# Patient Record
Sex: FEMALE | Race: BLACK OR AFRICAN AMERICAN | NOT HISPANIC OR LATINO | Employment: UNEMPLOYED | URBAN - METROPOLITAN AREA
[De-identification: names, ages, dates, MRNs, and addresses within clinical notes are randomized per-mention and may not be internally consistent; named-entity substitution may affect disease eponyms.]

---

## 2017-01-03 ENCOUNTER — OFFICE VISIT (OUTPATIENT)
Dept: DERMATOLOGY | Facility: CLINIC | Age: 27
End: 2017-01-03
Payer: COMMERCIAL

## 2017-01-03 DIAGNOSIS — B35.1 ONYCHOMYCOSIS: Primary | ICD-10-CM

## 2017-01-03 DIAGNOSIS — L60.8 PINCER NAIL DEFORMITY: ICD-10-CM

## 2017-01-03 DIAGNOSIS — L60.3 BRITTLE NAILS: ICD-10-CM

## 2017-01-03 PROCEDURE — 99999 PR PBB SHADOW E&M-EST. PATIENT-LVL II: CPT | Mod: PBBFAC,,, | Performed by: DERMATOLOGY

## 2017-01-03 PROCEDURE — 1159F MED LIST DOCD IN RCRD: CPT | Mod: S$GLB,,, | Performed by: DERMATOLOGY

## 2017-01-03 PROCEDURE — 99213 OFFICE O/P EST LOW 20 MIN: CPT | Mod: S$GLB,,, | Performed by: DERMATOLOGY

## 2017-01-03 NOTE — PROGRESS NOTES
Subjective:       Patient ID:  Alisia Vines is a 26 y.o. female who presents for   Chief Complaint   Patient presents with    Follow-up     HPI Comments: Hx of SLE and lupus glomerulonephritis, currently on cellcept and plaquenil, last seen on 7/27/16.  She was last seen for potential onychomycosis.  Fungal culture was negative at 4 weeks.  She states that fingernails and toenails have worsened.  She recently started OTC hair and nail supplement.       Review of Systems   Constitutional: Negative for fever and chills.   Gastrointestinal: Negative for nausea and vomiting.   Skin: Negative for itching, rash, daily sunscreen use, activity-related sunscreen use and recent sunburn.   Hematologic/Lymphatic: Does not bruise/bleed easily.        Objective:    Physical Exam   Constitutional: She appears well-developed and well-nourished. No distress.   Neurological: She is alert and oriented to person, place, and time. She is not disoriented.   Psychiatric: She has a normal mood and affect.   Skin:   Areas Examined (abnormalities noted in diagram):   Head / Face Inspection Performed  Neck Inspection Performed  RUE Inspected  LUE Inspection Performed  Nails and Digits Inspection Performed                Assessment / Plan:        Onychomycosis  -     efinaconazole (JUBLIA) 10 % Mario; Apply once daily to nail and nail fold.  Use daily for up to 1 year.  Dispense: 8 mL; Refill: 3  -     Will start jublia.  Fungal culture negative, however onychomycosis clinically discussed that fungal culture can have false negatives.   Recommend water-vinegar soaks 3 times/week.     Pincer nail deformity  -     efinaconazole (JUBLIA) 10 % Mario; Apply once daily to nail and nail fold.  Use daily for up to 1 year.  Dispense: 8 mL; Refill: 3  -     Ambulatory Referral to Podiatry  -     Will refer to podiatry for possible debridement and treatment    Brittle nails  -     efinaconazole (JUBLIA) 10 % Mario; Apply once daily to nail and  nail fold.  Use daily for up to 1 year.  Dispense: 8 mL; Refill: 3  -     Recommend OTC biotin           Return in about 6 months (around 7/3/2017).

## 2017-01-03 NOTE — MR AVS SNAPSHOT
[facility] Dermatology  [address] [place] [address]  Phone: [phone]  Fax: [phone]                  Alisia Loconum   1/3/2017 8:15 AM   Office Visit    Description:  Female : 1990   Provider:  Matilde Tucker MD   Department:  [facility] - Dermatology           Reason for Visit     Follow-up           Diagnoses this Visit        Comments    Onychomycosis    -  Primary     Pincer nail deformity         Brittle nails                To Do List           Future Appointments        Provider Department Dept Phone    2017 8:05 AM LABORATORY, [facility] [phone]    2017 8:30 AM SPECIMEN, [facility] [phone]    2017 1:00 PM Shnae Blair MD [facility] Rheumatology [phone]      Goals (5 Years of Data)     None      Follow-Up and Disposition     Return in about 6 months (around 7/3/2017).       These Medications        Disp Refills Start End    efinaconazole (JUBLIA) 10 % Mario 8 mL 3 1/3/2017     Apply once daily to nail and nail fold.  Use daily for up to 1 year.    Pharmacy: [address], [address] Ph #: [phone]         [facility] On Call     [facility] On Call Nurse Care Line -  Assistance  Registered nurses in the [facility] On Call Center provide clinical advisement, health education, appointment booking, and other advisory services.  Call for this free service at [phone].             Medications           Message regarding Medications     Verify the changes and/or additions to your medication regime listed below are the same as discussed with your clinician today.  If any of these changes or additions are incorrect, please notify your healthcare provider.        START taking these NEW medications        Refills    efinaconazole (JUBLIA) 10 % Mario 3    Sig: Apply once daily to nail and nail fold.  Use daily for up to 1 year.     Class: Normal      STOP taking these medications     albuterol 90 mcg/actuation inhaler Inhale 2 puffs into the lungs every 4 to 6 hours as needed for Wheezing.           Verify that the below list of medications is an accurate representation of the medications you are currently taking.  If none reported, the list may be blank. If incorrect, please contact your healthcare provider. Carry this list with you in case of emergency.           Current Medications     amlodipine (NORVASC) 5 MG tablet Take 1 tablet (5 mg total) by mouth once daily.    atorvastatin (LIPITOR) 10 MG tablet Take 1 tablet (10 mg total) by mouth once daily.    hydroxychloroquine (PLAQUENIL) 200 mg tablet Take 1 tablet (200 mg total) by mouth 2 (two) times daily.    losartan-hydrochlorothiazide 100-12.5 mg (HYZAAR) 100-12.5 mg Tab Take 1 tablet by mouth once daily.    multivitamin capsule Take 1 capsule by mouth once daily.    mycophenolate (CELLCEPT) 500 mg Tab TAKE 1 TABLET THREE TIMES DAILY AFTER MEALS    norgestimate-ethinyl estradiol (TRINESSA, 28,) 0.18/0.215/0.25 mg-35 mcg (28) tablet TAKE 1 TABLET ONCE DAILY.    predniSONE (DELTASONE) 10 MG tablet Take 1 tablet (10 mg total) by mouth once daily.    valacyclovir (VALTREX) 500 MG tablet TAKE 1 TABLET ONE TIME DAILY    efinaconazole (JUBLIA) 10 % Mario Apply once daily to nail and nail fold.  Use daily for up to 1 year.           Clinical Reference Information           Allergies as of 1/3/2017     No Known Allergies      Immunizations Administered on Date of Encounter - 1/3/2017     None      Orders Placed During Today's Visit      Normal Orders This Visit    Ambulatory Referral to Podiatry       Instructions    Start water-vinegar soaks 3 nights/week on feet for 5-10 minutes    Launder socks in hot water    Wear open toe shoes when possible    Wear socks with closed toe shoes

## 2017-01-03 NOTE — PATIENT INSTRUCTIONS
Start water-vinegar soaks 3 nights/week on feet for 5-10 minutes    Launder socks in hot water    Wear open toe shoes when possible    Wear socks with closed toe shoes

## 2017-01-11 ENCOUNTER — OFFICE VISIT (OUTPATIENT)
Dept: PODIATRY | Facility: CLINIC | Age: 27
End: 2017-01-11
Payer: COMMERCIAL

## 2017-01-11 VITALS
SYSTOLIC BLOOD PRESSURE: 167 MMHG | BODY MASS INDEX: 29.61 KG/M2 | HEIGHT: 65 IN | DIASTOLIC BLOOD PRESSURE: 116 MMHG | WEIGHT: 177.69 LBS | HEART RATE: 88 BPM

## 2017-01-11 DIAGNOSIS — L60.8 PINCER NAIL DEFORMITY: ICD-10-CM

## 2017-01-11 DIAGNOSIS — B35.1 DERMATOPHYTOSIS, NAIL: Primary | ICD-10-CM

## 2017-01-11 PROCEDURE — 3077F SYST BP >= 140 MM HG: CPT | Mod: S$GLB,,, | Performed by: PODIATRIST

## 2017-01-11 PROCEDURE — 99999 PR PBB SHADOW E&M-EST. PATIENT-LVL III: CPT | Mod: PBBFAC,,, | Performed by: PODIATRIST

## 2017-01-11 PROCEDURE — 1159F MED LIST DOCD IN RCRD: CPT | Mod: S$GLB,,, | Performed by: PODIATRIST

## 2017-01-11 PROCEDURE — 3080F DIAST BP >= 90 MM HG: CPT | Mod: S$GLB,,, | Performed by: PODIATRIST

## 2017-01-11 PROCEDURE — 99203 OFFICE O/P NEW LOW 30 MIN: CPT | Mod: S$GLB,,, | Performed by: PODIATRIST

## 2017-01-11 RX ORDER — FLUOXETINE HYDROCHLORIDE 40 MG/1
CAPSULE ORAL
COMMUNITY
Start: 2016-10-27 | End: 2017-03-07 | Stop reason: SDUPTHER

## 2017-01-11 RX ORDER — LORAZEPAM 1 MG/1
TABLET ORAL
COMMUNITY
Start: 2016-10-06 | End: 2017-02-22 | Stop reason: ALTCHOICE

## 2017-01-11 NOTE — PROGRESS NOTES
Podiatry Initial Consultation  Requesting Consult Provider:   PCP: Dr. Saumya Quinonez MD    CHIEF COMPLAINT   Chief Complaint   Patient presents with    Nail Problem     bilateral nail thickness with nails growing inward         HPI:    Alisia Vines is a 26 y.o. female presenting to podiatry clinic with complaint of progressive thickening and discoloration of the nails. The patient has tried over the counter medications with some success, but the problem is recurrent and aggravating. She has been referred by dermatology for fungal \nail treatment. She has not started topical jublia. She is performing water vinegar soaks. She has fungal infection in nails as well. Fungal culture of toenail negative for fungus.   Patient inquires about available treatment options. No further pedal complaints.      PMH  Past Medical History   Diagnosis Date    Allergic rhinitis     Anemia     Condyloma acuminata     GERD (gastroesophageal reflux disease)     HSV-2 (herpes simplex virus 2) infection     Lupus nephritis     Mood disorder     Overweight(278.02)     Sjogren's syndrome     Systemic lupus erythematosus     Thrombocytopenia        PROBLEM LIST  Patient Active Problem List    Diagnosis Date Noted    Essential hypertension 11/27/2015    GERD (gastroesophageal reflux disease)     Anemia 10/17/2013    Proteinuria 10/11/2013    Lupus nephritis 10/11/2013    SLE (systemic lupus erythematosus related syndrome) 10/11/2013    HSV-2 (herpes simplex virus 2) infection     Allergic rhinitis     Sjogren's syndrome 08/04/2012       MEDS  Current Outpatient Prescriptions on File Prior to Visit   Medication Sig Dispense Refill    amlodipine (NORVASC) 5 MG tablet Take 1 tablet (5 mg total) by mouth once daily. 90 tablet 3    atorvastatin (LIPITOR) 10 MG tablet Take 1 tablet (10 mg total) by mouth once daily. 90 tablet 3    hydroxychloroquine (PLAQUENIL) 200 mg tablet Take 1 tablet (200 mg total) by mouth 2  "(two) times daily. 180 tablet 3    losartan-hydrochlorothiazide 100-12.5 mg (HYZAAR) 100-12.5 mg Tab Take 1 tablet by mouth once daily. 90 tablet 3    multivitamin capsule Take 1 capsule by mouth once daily.      mycophenolate (CELLCEPT) 500 mg Tab TAKE 1 TABLET THREE TIMES DAILY AFTER MEALS 270 tablet 3    norgestimate-ethinyl estradiol (TRINESSA, 28,) 0.18/0.215/0.25 mg-35 mcg (28) tablet TAKE 1 TABLET ONCE DAILY. 84 tablet 0    predniSONE (DELTASONE) 10 MG tablet Take 1 tablet (10 mg total) by mouth once daily. 90 tablet 1    valacyclovir (VALTREX) 500 MG tablet TAKE 1 TABLET ONE TIME DAILY 90 tablet 3    efinaconazole (JUBLIA) 10 % Mario Apply once daily to nail and nail fold.  Use daily for up to 1 year. 8 mL 3     No current facility-administered medications on file prior to visit.        PSH     Past Surgical History   Procedure Laterality Date     section, low transverse      Renal biopsy  2013        ALL  Review of patient's allergies indicates:  No Known Allergies    SOC     Social History   Substance Use Topics    Smoking status: Never Smoker    Smokeless tobacco: Never Used    Alcohol use No         Family HX  Family History   Problem Relation Age of Onset    Hypertension Mother     Eczema Brother     Heart disease Son     Hypertension Maternal Grandmother     Diabetes Paternal Grandmother     Lupus Neg Hx     Psoriasis Neg Hx             REVIEW OF SYSTEMS  General: Denies any fever or chills  Chest: Denies shortness of breath, wheezing, coughing, or sputum production  Heart: Denies chest pain, cold extremities, orthopenia, or reduced exercise tolerance  As noted above and per history of current illness above, otherwise negative in the remainder of the 14 systems.      PHYSICAL EXAM:      Vitals:    17 1006   BP: (!) 160/108   Pulse: 83   Weight: 80.6 kg (177 lb 11.1 oz)   Height: 5' 5" (1.651 m)       General: This patient is well-developed, well-nourished and " appears stated age, well-oriented to person, place and time, and cooperative and pleasant on today's visit      LOWER EXTREMITY  Vascular:   · Palpable DP/PT pulses b/l  · Skin temperature warm to warm from prox to distally  · CFT <5 secs b/l  · There is no edema noted b/l    Dermatologic:   · Nails 1-5 bilateral thickened, elongated, dystrophic, with subungual debris, nail incurvated b/l borders   · No open skin lesions noted  · No erythema or drainage noted b/l  · Webspaces are C/D/I B/L  · There is no hyperkeratotic tissue noted.   · Skin texture and turgor WNL    Neurologic:  · epicritic sensation grossly intact b/l   · Light touch and sharp/dull sensation intact b/l  · Achilles and patellar deep tendon reflexes intact  · Babinski reflex absent b/l    Musculoskeletal/Orthopedic:  · No symptomatic structural abnormalities noted.   · Muscle strength AT/EHL/EDL/PT: 5/5; Achilles/Gastroc/Soleus: 5/5; PB/PL: 5/5 Muscle tone is normal.  · Ankle joint ROM  B/L supple DF/PF, non-crepitus  · STJ ROM supple inv/ev, non crepitus   · MTPJ b/l supple DF/PF, non crepitus    ASSESSMENT   1. Onychomycosis due to dermatophyte     PLAN    1. Patient was educated about clinical and imaging findings, and verbalizes understanding of above.  2. I Discussed treatment options for nail fungus.   -I explained that fungus lives in a warm dark moist environment and therefore patient should make every attempt to keep feet clean and dry.  We discussed drying feet thoroughly after shower particularly between the toes and then applying powder between the toes and in the shoes. I also discussed to disinfect shower tub, discard old shoes, and disinfect current shoes with antiseptic  -Continue with topical jublia x 12 months  I also discussed oral Lamisil but I did not recommend it as a first line of treatment since it is an internal medicine that may potentially have side effects, including liver problems. .  -We also discussed final resolution  of nail dystrophy via total nail removal either temporary or permanent. Patient was informed that nail fungus might return even after nail removal, however increased prognosis in resolution of nail fungus with combination therapy  3. RTC  for follow up/evaluation PRN    Report Electronically Signed By:  Licha Fuentes DPM   Podiatric Medicine & Surgery  [facility]  1/11/2017

## 2017-01-11 NOTE — MR AVS SNAPSHOT
[facility] - [facility]  [address] [place] [address]  Phone: [phone]  Fax: [phone]                  Alisia Loconum   2017 10:00 AM   Office Visit    Description:  Female : 1990   Provider:  Licha Fuentes DPM   Department:  [facility] - Podiatry           Reason for Visit     Nail Problem                To Do List           Future Appointments        Provider Department Dept Phone    2017 8:05 AM LABORATORY, [facility] [phone]    2017 8:30 AM SPECIMEN, [facility] [phone]    2017 1:00 PM Shane Blair MD [facility] Rheumatology [phone]      Goals (5 Years of Data)     None      [facility]     [facility] Nurse Care Line -  Assistance  Registered nurses in the [facility] On Call Center provide clinical advisement, health education, appointment booking, and other advisory services.  Call for this free service at [phone].             Medications           Message regarding Medications     Verify the changes and/or additions to your medication regime listed below are the same as discussed with your clinician today.  If any of these changes or additions are incorrect, please notify your healthcare provider.             Verify that the below list of medications is an accurate representation of the medications you are currently taking.  If none reported, the list may be blank. If incorrect, please contact your healthcare provider. Carry this list with you in case of emergency.           Current Medications     amlodipine (NORVASC) 5 MG tablet Take 1 tablet (5 mg total) by mouth once daily.    atorvastatin (LIPITOR) 10 MG tablet Take 1 tablet (10 mg total) by mouth once daily.    hydroxychloroquine (PLAQUENIL) 200 mg tablet Take 1 tablet (200 mg total) by mouth 2 (two) times daily.    lorazepam (ATIVAN) 1 MG tablet     losartan-hydrochlorothiazide 100-12.5 mg (HYZAAR) 100-12.5 mg Tab  "Take 1 tablet by mouth once daily.    multivitamin capsule Take 1 capsule by mouth once daily.    mycophenolate (CELLCEPT) 500 mg Tab TAKE 1 TABLET THREE TIMES DAILY AFTER MEALS    norgestimate-ethinyl estradiol (TRINESSA, 28,) 0.18/0.215/0.25 mg-35 mcg (28) tablet TAKE 1 TABLET ONCE DAILY.    predniSONE (DELTASONE) 10 MG tablet Take 1 tablet (10 mg total) by mouth once daily.    valacyclovir (VALTREX) 500 MG tablet TAKE 1 TABLET ONE TIME DAILY    efinaconazole (JUBLIA) 10 % Mario Apply once daily to nail and nail fold.  Use daily for up to 1 year.    fluoxetine (PROZAC) 40 MG capsule            Clinical Reference Information           Vital Signs - Last Recorded  Most recent update: 1/11/2017 10:11 AM by Arabella Sheriff MA    BP Pulse Ht Wt BMI    (!) 160/108 (BP Location: Left arm, Patient Position: Sitting, BP Method: Automatic) 83 5' 5" (1.651 m) 80.6 kg (177 lb 11.1 oz) 29.57 kg/m2      Blood Pressure          Most Recent Value    BP  (!)  160/108 [pt denies feelings of abnormality]      Allergies as of 1/11/2017     No Known Allergies      Immunizations Administered on Date of Encounter - 1/11/2017     None      "

## 2017-01-11 NOTE — LETTER
January 11, 2017      Matilde Tucker MD  [address]  [place] [address]           [facility] - Podiatry  [address] [place] [address]  Phone: [phone]  Fax: [phone]          Patient: Alisia Vines   MR Number: [id number]   YOB: 1990   Date of Visit: 1/11/2017       Dear Dr. Matilde Tucker:    Thank you for referring Alisia iVnes to me for evaluation. Attached you will find relevant portions of my assessment and plan of care.    If you have questions, please do not hesitate to call me. I look forward to following Alisia Vines along with you.    Sincerely,    Licha Fuentes, PAVEL    Enclosure  CC:  No Recipients    If you would like to receive this communication electronically, please contact [email or web address] or [phone] to request more information on [employer] access.    For providers and/or their staff who would like to refer a patient to [facility], please contact us through our one-stop-shop provider referral line, [facility], at [phone].    If you feel you have received this communication in error or would no longer like to receive these types of communications, please e-mail [email or web address]

## 2017-01-23 ENCOUNTER — PATIENT MESSAGE (OUTPATIENT)
Dept: RHEUMATOLOGY | Facility: CLINIC | Age: 27
End: 2017-01-23

## 2017-01-23 ENCOUNTER — LAB VISIT (OUTPATIENT)
Dept: LAB | Facility: HOSPITAL | Age: 27
End: 2017-01-23
Attending: INTERNAL MEDICINE
Payer: COMMERCIAL

## 2017-01-23 DIAGNOSIS — M32.9 SLE (SYSTEMIC LUPUS ERYTHEMATOSUS RELATED SYNDROME): Primary | ICD-10-CM

## 2017-01-23 DIAGNOSIS — M32.9 SLE (SYSTEMIC LUPUS ERYTHEMATOSUS RELATED SYNDROME): ICD-10-CM

## 2017-01-23 LAB
BACTERIA #/AREA URNS HPF: NORMAL /HPF
BILIRUB UR QL STRIP: NEGATIVE
CLARITY UR: CLEAR
COLOR UR: YELLOW
CREAT UR-MCNC: 220 MG/DL
GLUCOSE UR QL STRIP: NEGATIVE
HGB UR QL STRIP: NEGATIVE
HYALINE CASTS #/AREA URNS LPF: 0 /LPF
KETONES UR QL STRIP: NEGATIVE
LEUKOCYTE ESTERASE UR QL STRIP: NEGATIVE
MICROSCOPIC COMMENT: NORMAL
NITRITE UR QL STRIP: NEGATIVE
PH UR STRIP: 6 [PH] (ref 5–8)
PROT UR QL STRIP: ABNORMAL
PROT UR-MCNC: 43 MG/DL
PROT/CREAT RATIO, UR: 0.2
RBC #/AREA URNS HPF: 0 /HPF (ref 0–4)
SP GR UR STRIP: >=1.03 (ref 1–1.03)
SQUAMOUS #/AREA URNS HPF: 3 /HPF
URN SPEC COLLECT METH UR: ABNORMAL
WBC #/AREA URNS HPF: 1 /HPF (ref 0–5)

## 2017-01-23 PROCEDURE — 84156 ASSAY OF PROTEIN URINE: CPT

## 2017-01-23 PROCEDURE — 81000 URINALYSIS NONAUTO W/SCOPE: CPT | Mod: PO

## 2017-01-24 ENCOUNTER — TELEPHONE (OUTPATIENT)
Dept: NEPHROLOGY | Facility: CLINIC | Age: 27
End: 2017-01-24

## 2017-01-24 ENCOUNTER — PATIENT MESSAGE (OUTPATIENT)
Dept: NEPHROLOGY | Facility: CLINIC | Age: 27
End: 2017-01-24

## 2017-01-24 ENCOUNTER — TELEPHONE (OUTPATIENT)
Dept: RHEUMATOLOGY | Facility: CLINIC | Age: 27
End: 2017-01-24

## 2017-01-24 DIAGNOSIS — M32.9 SLE (SYSTEMIC LUPUS ERYTHEMATOSUS RELATED SYNDROME): Primary | ICD-10-CM

## 2017-01-24 DIAGNOSIS — M32.14 LUPUS NEPHRITIS: Primary | ICD-10-CM

## 2017-01-24 RX ORDER — TRAMADOL HYDROCHLORIDE 50 MG/1
50 TABLET ORAL EVERY 12 HOURS PRN
Qty: 60 TABLET | Refills: 0 | Status: SHIPPED | OUTPATIENT
Start: 2017-01-24 | End: 2017-02-22 | Stop reason: SDUPTHER

## 2017-01-24 NOTE — TELEPHONE ENCOUNTER
----- Message from Irena Pascual sent at 1/24/2017  8:11 AM CST -----  Contact: pt   Call pt regarding lab work.   ..[phone] (fjjn)

## 2017-01-24 NOTE — TELEPHONE ENCOUNTER
----- Message from Cindy Marroquin sent at 1/24/2017  9:16 AM CST -----  Contact: SELF [phone]  States that she needs to speak to nurse regarding changing the medication cellcett. States that she just found outh that her lupus was active. Please call back at [phone]//thank you acc

## 2017-01-24 NOTE — TELEPHONE ENCOUNTER
Advised pt of below, pt verbalized understanding. Pt would like further explanation on her lab results showing active lupus. Please Advise.

## 2017-01-24 NOTE — TELEPHONE ENCOUNTER
RETURNED CALL TO PT. WHO STATES THAT SHE WOULD LIKE HER CELLCEPT CHANGED TO A DIFFERENT MEDICATION IF POSSIBLE STATES THE CELLCEPT IS TOO EXPENSIVE WHICH IS WHY SHE HASN'T BEEN TAKING IT LIKE SHE SHOULD. SHE SAID SHE SPOKE WITH DR. MADISON IN [place] AND HE TOLD HER THAT HER LUPUS IS ACTIVE BUT THAT SHE SHOULD CONTACT DR. RAO FOR RECOMMENDATIONS. PLEASE ADVISE.

## 2017-01-24 NOTE — TELEPHONE ENCOUNTER
----- Message from Amee Griffiths sent at 1/24/2017 12:05 PM CST -----  Would like to speak to nurse about upcoming lab work. Please call back at [phone]. Thanks//cdb

## 2017-01-24 NOTE — TELEPHONE ENCOUNTER
Tramadol prn for pain. Patient to discuss with  about switching cellcept to imuran. Please inform Luisa about this patient since she is showing interest in clinical trials. Thanks.

## 2017-01-24 NOTE — TELEPHONE ENCOUNTER
Cellcept was given to her by . We cannot change it. Please request her to contact Dr. Morales and ask for alternative treatments to cellcept. Also , inform us about the decision made after discussion with .Thanks.

## 2017-01-24 NOTE — TELEPHONE ENCOUNTER
Spoke with pt and she states she received a message regarding her lab results stating her lupus was active and asking how much cellcept she was taking. Pt states that it is too expensive and would like to know if she can take something else. Please Advise.

## 2017-01-24 NOTE — TELEPHONE ENCOUNTER
Called pt. To schedule follow up, no answer, left voice message and sent my [facility] message asking pt. To call and schedule follow up

## 2017-01-30 ENCOUNTER — OFFICE VISIT (OUTPATIENT)
Dept: RHEUMATOLOGY | Facility: CLINIC | Age: 27
End: 2017-01-30
Payer: COMMERCIAL

## 2017-01-30 ENCOUNTER — TELEPHONE (OUTPATIENT)
Dept: RHEUMATOLOGY | Facility: CLINIC | Age: 27
End: 2017-01-30

## 2017-01-30 VITALS
SYSTOLIC BLOOD PRESSURE: 160 MMHG | DIASTOLIC BLOOD PRESSURE: 90 MMHG | WEIGHT: 179.25 LBS | BODY MASS INDEX: 29.83 KG/M2

## 2017-01-30 DIAGNOSIS — M32.14 OTHER SYSTEMIC LUPUS ERYTHEMATOSUS WITH GLOMERULAR DISEASE: Primary | ICD-10-CM

## 2017-01-30 DIAGNOSIS — M32.9 SLE (SYSTEMIC LUPUS ERYTHEMATOSUS RELATED SYNDROME): ICD-10-CM

## 2017-01-30 DIAGNOSIS — M32.14 SLE GLOMERULONEPHRITIS SYNDROME, WHO CLASS V: ICD-10-CM

## 2017-01-30 PROCEDURE — 1159F MED LIST DOCD IN RCRD: CPT | Mod: S$GLB,,, | Performed by: INTERNAL MEDICINE

## 2017-01-30 PROCEDURE — 99999 PR PBB SHADOW E&M-EST. PATIENT-LVL III: CPT | Mod: PBBFAC,,, | Performed by: INTERNAL MEDICINE

## 2017-01-30 PROCEDURE — 99214 OFFICE O/P EST MOD 30 MIN: CPT | Mod: 25,S$GLB,, | Performed by: INTERNAL MEDICINE

## 2017-01-30 PROCEDURE — 3077F SYST BP >= 140 MM HG: CPT | Mod: S$GLB,,, | Performed by: INTERNAL MEDICINE

## 2017-01-30 PROCEDURE — 96372 THER/PROPH/DIAG INJ SC/IM: CPT | Mod: S$GLB,,, | Performed by: INTERNAL MEDICINE

## 2017-01-30 PROCEDURE — 3080F DIAST BP >= 90 MM HG: CPT | Mod: S$GLB,,, | Performed by: INTERNAL MEDICINE

## 2017-01-30 RX ORDER — ACETAMINOPHEN 325 MG/1
650 TABLET ORAL
Status: CANCELLED | OUTPATIENT
Start: 2017-01-30

## 2017-01-30 RX ORDER — PREDNISONE 10 MG/1
TABLET ORAL
Qty: 90 TABLET | Refills: 1 | Status: SHIPPED | OUTPATIENT
Start: 2017-01-30 | End: 2017-10-17

## 2017-01-30 RX ORDER — BETAMETHASONE SODIUM PHOSPHATE AND BETAMETHASONE ACETATE 3; 3 MG/ML; MG/ML
6 INJECTION, SUSPENSION INTRA-ARTICULAR; INTRALESIONAL; INTRAMUSCULAR; SOFT TISSUE
Status: COMPLETED | OUTPATIENT
Start: 2017-01-30 | End: 2017-01-30

## 2017-01-30 RX ORDER — SODIUM CHLORIDE 0.9 % (FLUSH) 0.9 %
10 SYRINGE (ML) INJECTION
Status: CANCELLED | OUTPATIENT
Start: 2017-01-30

## 2017-01-30 RX ADMIN — BETAMETHASONE SODIUM PHOSPHATE AND BETAMETHASONE ACETATE 6 MG: 3; 3 INJECTION, SUSPENSION INTRA-ARTICULAR; INTRALESIONAL; INTRAMUSCULAR; SOFT TISSUE at 01:01

## 2017-01-30 NOTE — TELEPHONE ENCOUNTER
----- Message from Luana Connolly sent at 1/30/2017  4:32 PM CST -----                      [facility] is calling to verify script for med Ultram a [phone]/////please call//kamille/arabella

## 2017-01-30 NOTE — PROGRESS NOTES
First visit with me                                                         RHEUMATOLOGY CLINIC FOLLOW UP VISIT  Chief complaints:-  To follow-up for lupus.    HPI:-  Alisia Jolly a 26 y.o. pleasant female comes in for a follow up visit with above chief complaints.  She has systemic lupus erythematosus diagnosed in 2013 when she had diffuse lymphadenopathy and anasarca.  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  She has been intermittently following up in the rheumatology clinic.  There has been a question of noncompliance.  She is supposed to be on CellCept 500 mg 3 times daily and hydroxychloroquine 200 mg 2 times daily.  The last time she had her retinal examination done was a year ago. She had her lupus labs done a week ago which showed active disease. On further questioning she reported that she has not been taking cellcept because her insurance stopped paying for it. Today she reports that they founda payment plan for her and she will get the cellcept within 3 days. She is taking only on plaquenil a day since she often forgets to take the evening dose. She has been taking 5/10 mg a day - not sure what dose she has at home. Other than mild aches all over, she denies any symptoms of lupus disease. She denies seizures or psychosis,  joint swelling, muscle weakness,Raynaud's phenomenon, sicca symptoms .  She has had a pregnancy with her child having complete heart block likely secondary to her positive SSA antibody.  She denies any spontaneous abortions or blood clots.  She denies any preeclampsia or eclampsia during her last pregnancy.  She has been pregnant only once. She is on OCP at this time and has no plans of getting pregnant in the near future.      Review of Systems   Constitutional: Positive for malaise/fatigue. Negative for chills, fever and weight loss.   HENT: Negative for ear discharge, ear pain, hearing loss, nosebleeds  and sore throat.    Eyes: Negative for blurred vision, double vision, photophobia, discharge and redness.   Respiratory: Negative for cough, hemoptysis, sputum production and shortness of breath.    Cardiovascular: Positive for chest pain. Negative for palpitations and claudication.   Gastrointestinal: Negative for abdominal pain, constipation, diarrhea, melena, nausea and vomiting.   Genitourinary: Negative for dysuria, frequency, hematuria and urgency.   Musculoskeletal: Positive for back pain, joint pain and myalgias. Negative for falls and neck pain.   Skin: Negative for itching and rash.   Neurological: Negative for dizziness, tremors, sensory change, speech change, focal weakness, seizures, loss of consciousness, weakness and headaches.   Endo/Heme/Allergies: Negative for environmental allergies. Does not bruise/bleed easily.   Psychiatric/Behavioral: Negative for hallucinations and memory loss. The patient does not have insomnia.        Past Medical History   Diagnosis Date    Allergic rhinitis     Anemia     Condyloma acuminata     GERD (gastroesophageal reflux disease)     HSV-2 (herpes simplex virus 2) infection     Lupus nephritis     Mood disorder     Overweight(278.02)     Sjogren's syndrome     Systemic lupus erythematosus     Thrombocytopenia        Past Surgical History   Procedure Laterality Date     section, low transverse      Renal biopsy  2013        Social History   Substance Use Topics    Smoking status: Never Smoker    Smokeless tobacco: Never Used    Alcohol use No       Family History   Problem Relation Age of Onset    Hypertension Mother     Eczema Brother     Heart disease Son     Hypertension Maternal Grandmother     Diabetes Paternal Grandmother     Lupus Neg Hx     Psoriasis Neg Hx        Review of patient's allergies indicates:  No Known Allergies        Physical examination:-    Vitals:    17 1300   BP: (!) 160/90   Weight: 81.3 kg (179 lb  3.7 oz)   PainSc:   2   PainLoc: Generalized       Physical Exam   Constitutional: She is oriented to person, place, and time and well-developed, well-nourished, and in no distress. No distress.   HENT:   Head: Normocephalic.   Mouth/Throat: Oropharynx is clear and moist. No oropharyngeal exudate.   Good oral pooling of saliva.   Eyes: Conjunctivae and EOM are normal. Pupils are equal, round, and reactive to light.   Neck: Normal range of motion. Neck supple.   Cardiovascular: Normal rate and regular rhythm.    Pulmonary/Chest: Effort normal. No respiratory distress.   Abdominal: Soft. Bowel sounds are normal. There is no tenderness.   Musculoskeletal:   No synovitis or effusion in small joints of hands or feet.  Good range of motion in the large joints without any crepitus or effusion.  Nail fold capillaries look normal.  Good warmth of the peripheral extremities.  No active rash anywhere.  No sclerodactyly or telangiectasias.   Neurological: She is alert and oriented to person, place, and time. No cranial nerve deficit.   Skin: Skin is warm. She is not diaphoretic. No erythema. No pallor.   Psychiatric: Mood, affect and judgment normal.   Nursing note and vitals reviewed.      Labs:-  Results for HEIDI SHAIKH (MRN [id number]) as of 1/30/2017 17:26   Ref. Range 2/29/2016 09:21 4/5/2016 10:25 1/23/2017 08:10   HARPRETE HEP-2 Titer Unknown Positive >=1:2560...  Positive >=1:2560...   Anti-SSA Antibody Latest Ref Range: 0.00 - 19.99 .20 (H)  201.26 (H)   Anti-SSA Interpretation Latest Ref Range: Negative  Positive (A)  Positive (A)   Anti-SSB Antibody Latest Ref Range: 0.00 - 19.99 EU 29.69 (H)  46.84 (H)   Anti-SSB Interpretation Latest Ref Range: Negative  Positive (A)  Positive (A)   ds DNA Ab Latest Ref Range: Negative 1:10  Negative 1:10  Negative 1:10   Anti Sm Antibody Latest Ref Range: 0.00 - 19.99 EU 2.96  2.89   Anti-Sm Interpretation Latest Ref Range: Negative  Negative  Negative   Anti Sm/RNP  Antibody Latest Ref Range: 0.00 - 19.99 EU 1.34  4.12   Anti-Sm/RNP Interpretation Latest Ref Range: Negative  Negative  Negative   Complement (C-3) Latest Ref Range: 50 - 180 mg/dL 49 (L) 49 (L) 45 (L)   Complement (C-4) Latest Ref Range: 11 - 44 mg/dL 11 15 10 (L)       Assessment/Plans:-  # SLE (systemic lupus erythematosus)  High complexity.  Systemic lupus erythematosus with speckled HARPREET and positive SSA, Turpin and SSB antibody.  Restart CellCept 500 mg 3 times daily and Plaquenil 200 mg 2 times daily.  If still has problem getting cellcept , might consider imuran. I will leave that discussion to be done between her and  since he has been prescribing the medication for her lupus nephritis. Active serologies . Start benlysta for active SLE with arthritis and nephritis along with cellcept and plaquenil.   She is up-to-date on influenza and pneumococcal vaccine.     # SLE glomerulonephritis syndrome, WHO class V   Systemic lupus erythematosus with speckled HARPREET and positive SSA, Turpin and SSB antibody.  Restart CellCept 500 mg 3 times daily and Plaquenil 200 mg 2 times daily.  If still has problem getting cellcept , might consider imuran. I will leave that discussion to be done between her and  since he has been prescribing the medication for her lupus nephritis.   She is up-to-date on influenza and pneumococcal vaccine.      # RTC in 3 months.  Disclaimer: This note was prepared using voice recognition system and is likely to have sound alike errors .  Please call me with any questions

## 2017-01-30 NOTE — TELEPHONE ENCOUNTER
Called [facility] pharmacy to confirm if they received tramadol rx on 1.24.17, no answer.

## 2017-01-30 NOTE — MR AVS SNAPSHOT
[facility] Rheumatology  [address] [place] [address]  Phone: [phone]  Fax: [phone]                  Alisia Vines   2017 1:00 PM   Office Visit    Description:  Female : 1990   Provider:  Shane Blair MD   Department:  [facility] - Rheumatology           Reason for Visit     Disease Management           Diagnoses this Visit        Comments    Other systemic lupus erythematosus with glomerular disease    -  Primary     SLE glomerulonephritis syndrome, WHO class V         SLE (systemic lupus erythematosus related syndrome)                To Do List           Future Appointments        Provider Department Dept Phone    2017 9:25 AM LABORATORY, [facility] [phone]    2017 10:00 AM SPECIMEN, [facility] [phone]    2017 1:30 PM Laurence Morales MD [facility] Nephrology [phone]      Goals (5 Years of Data)     None       These Medications        Disp Refills Start End    predniSONE (DELTASONE) 10 MG tablet 90 tablet 1 2017     Take 20 mg once daily for 2 weeks, then reduce to 10 mg daily thereafter.    Pharmacy: [facility] - [facility] [address] Ph #: [phone]         [facility] On Call     [facility] Care Line - [phone] Assistance  Registered nurses in the [facility] provide clinical advisement, health education, appointment booking, and other advisory services.  Call for this free service at [phone].             Medications           Message regarding Medications     Verify the changes and/or additions to your medication regime listed below are the same as discussed with your clinician today.  If any of these changes or additions are incorrect, please notify your healthcare provider.        These medications were administered today        Dose Freq    betamethasone acetate-betamethasone sodium phosphate injection 6 mg 6 mg  Clinic/HOD 1 time    Sig: Inject 1 mL (6 mg total) into the muscle one time.    Class: Normal    Route: Intramuscular      CHANGE how you are taking these medications     Start Taking Instead of    predniSONE (DELTASONE) 10 MG tablet predniSONE (DELTASONE) 10 MG tablet    Dosage:  Take 20 mg once daily for 2 weeks, then reduce to 10 mg daily thereafter. Dosage:  Take 1 tablet (10 mg total) by mouth once daily.    Reason for Change:  Reorder            Verify that the below list of medications is an accurate representation of the medications you are currently taking.  If none reported, the list may be blank. If incorrect, please contact your healthcare provider. Carry this list with you in case of emergency.           Current Medications     amlodipine (NORVASC) 5 MG tablet Take 1 tablet (5 mg total) by mouth once daily.    atorvastatin (LIPITOR) 10 MG tablet Take 1 tablet (10 mg total) by mouth once daily.    efinaconazole (JUBLIA) 10 % Mario Apply once daily to nail and nail fold.  Use daily for up to 1 year.    fluoxetine (PROZAC) 40 MG capsule     hydroxychloroquine (PLAQUENIL) 200 mg tablet Take 1 tablet (200 mg total) by mouth 2 (two) times daily.    lorazepam (ATIVAN) 1 MG tablet     losartan-hydrochlorothiazide 100-12.5 mg (HYZAAR) 100-12.5 mg Tab Take 1 tablet by mouth once daily.    multivitamin capsule Take 1 capsule by mouth once daily.    mycophenolate (CELLCEPT) 500 mg Tab TAKE 1 TABLET THREE TIMES DAILY AFTER MEALS    norgestimate-ethinyl estradiol (TRINESSA, 28,) 0.18/0.215/0.25 mg-35 mcg (28) tablet TAKE 1 TABLET ONCE DAILY.    predniSONE (DELTASONE) 10 MG tablet Take 20 mg once daily for 2 weeks, then reduce to 10 mg daily thereafter.    tramadol (ULTRAM) 50 mg tablet Take 1 tablet (50 mg total) by mouth every 12 (twelve) hours as needed for Pain.    valacyclovir (VALTREX) 500 MG tablet TAKE 1 TABLET ONE TIME DAILY           Clinical Reference Information           Vital Signs - Last Recorded  Most  recent update: 1/30/2017  1:01 PM by Linden Schrader LPN    BP Wt BMI          (!) 160/90 81.3 kg (179 lb 3.7 oz) 29.83 kg/m2        Blood Pressure          Most Recent Value    BP  (!)  160/90      Allergies as of 1/30/2017     No Known Allergies      Immunizations Administered on Date of Encounter - 1/30/2017     None

## 2017-01-30 NOTE — PROGRESS NOTES
Administered 1 cc Betamethasone 6mg/cc  to right ventrogluteal. Pt tolerated well. No acute reaction noted to site. Pt instructed on S/S to report. Pt verbalized understanding.     Lot: [id number]  Exp: 02/18

## 2017-02-02 ENCOUNTER — TELEPHONE (OUTPATIENT)
Dept: RHEUMATOLOGY | Facility: CLINIC | Age: 27
End: 2017-02-02

## 2017-02-02 NOTE — TELEPHONE ENCOUNTER
----- Message from Chelsey Wright sent at 2/2/2017  8:03 AM CST -----  Please call pt regarding an appt she needs. Please call her at [phone].

## 2017-02-02 NOTE — TELEPHONE ENCOUNTER
Returned call to patient and she was calling because she has not heard from the  regarding the infusions she will begin on 2.6.17. She would also like to know how long her infusion will last as well. Patient requested to us to leave  Voicemail with that information since she will be at work in training today. Advised her I will reach out to the  and advised her when the appointment is and that she would like to speak with her before to know how much she has to pay if anything and I will reach out to the infusion department regarding how long her infusion is expected to last on 2.6.17. Pt verbalized understanding

## 2017-02-02 NOTE — TELEPHONE ENCOUNTER
Spoke with Lesvia,  and she will call patient today, 2-2-17.             Spoke with infusion department and patient can expect visit on 2-6-17 to be a minimum of 2 hours once she is seated in the infusion room.

## 2017-02-02 NOTE — TELEPHONE ENCOUNTER
Called patient to advise her of below, pt was on the other line with the . Advised her that once she is seated in the chair in infusion it will be a minimum of 2 hours but it could be longer. Pt verbalized understanding,

## 2017-02-06 ENCOUNTER — TELEPHONE (OUTPATIENT)
Dept: RHEUMATOLOGY | Facility: CLINIC | Age: 27
End: 2017-02-06

## 2017-02-06 ENCOUNTER — INFUSION (OUTPATIENT)
Dept: RHEUMATOLOGY | Facility: HOSPITAL | Age: 27
End: 2017-02-06
Attending: INTERNAL MEDICINE
Payer: COMMERCIAL

## 2017-02-06 VITALS
DIASTOLIC BLOOD PRESSURE: 106 MMHG | TEMPERATURE: 98 F | BODY MASS INDEX: 29.61 KG/M2 | RESPIRATION RATE: 18 BRPM | HEART RATE: 72 BPM | SYSTOLIC BLOOD PRESSURE: 172 MMHG | WEIGHT: 177.94 LBS

## 2017-02-06 DIAGNOSIS — M32.14 OTHER SYSTEMIC LUPUS ERYTHEMATOSUS WITH GLOMERULAR DISEASE: Primary | ICD-10-CM

## 2017-02-06 DIAGNOSIS — M32.9 SLE (SYSTEMIC LUPUS ERYTHEMATOSUS RELATED SYNDROME): ICD-10-CM

## 2017-02-06 PROCEDURE — 96375 TX/PRO/DX INJ NEW DRUG ADDON: CPT | Mod: PO

## 2017-02-06 PROCEDURE — 63600175 PHARM REV CODE 636 W HCPCS: Mod: PO | Performed by: INTERNAL MEDICINE

## 2017-02-06 PROCEDURE — 25000003 PHARM REV CODE 250: Mod: PO | Performed by: INTERNAL MEDICINE

## 2017-02-06 PROCEDURE — 96413 CHEMO IV INFUSION 1 HR: CPT | Mod: PO

## 2017-02-06 RX ORDER — ACETAMINOPHEN 325 MG/1
650 TABLET ORAL
Status: COMPLETED | OUTPATIENT
Start: 2017-02-06 | End: 2017-02-06

## 2017-02-06 RX ORDER — ACETAMINOPHEN 325 MG/1
650 TABLET ORAL
Status: CANCELLED | OUTPATIENT
Start: 2017-02-13

## 2017-02-06 RX ORDER — DIPHENHYDRAMINE HYDROCHLORIDE 50 MG/ML
25 INJECTION INTRAMUSCULAR; INTRAVENOUS
Status: COMPLETED | OUTPATIENT
Start: 2017-02-06 | End: 2017-02-06

## 2017-02-06 RX ORDER — SODIUM CHLORIDE 0.9 % (FLUSH) 0.9 %
10 SYRINGE (ML) INJECTION
Status: CANCELLED | OUTPATIENT
Start: 2017-02-13

## 2017-02-06 RX ORDER — SODIUM CHLORIDE 0.9 % (FLUSH) 0.9 %
10 SYRINGE (ML) INJECTION
Status: DISCONTINUED | OUTPATIENT
Start: 2017-02-06 | End: 2017-02-06 | Stop reason: HOSPADM

## 2017-02-06 RX ADMIN — Medication 10 ML: at 10:02

## 2017-02-06 RX ADMIN — METHYLPREDNISOLONE SODIUM SUCCINATE 100 MG: 125 INJECTION, POWDER, FOR SOLUTION INTRAMUSCULAR; INTRAVENOUS at 10:02

## 2017-02-06 RX ADMIN — BELIMUMAB 807 MG: 400 INJECTION, POWDER, LYOPHILIZED, FOR SOLUTION INTRAVENOUS at 10:02

## 2017-02-06 RX ADMIN — ACETAMINOPHEN 650 MG: 325 TABLET ORAL at 10:02

## 2017-02-06 RX ADMIN — DIPHENHYDRAMINE HYDROCHLORIDE 25 MG: 50 INJECTION INTRAMUSCULAR; INTRAVENOUS at 10:02

## 2017-02-06 NOTE — PROGRESS NOTES
Infusion# 1  S/S infection noted or voiced? denies  Recent labs? yes    Premeds? Benadryl 25 mg IVP over 2 minutes, Solumedrol 100 mg IVP over 2 minutes and Tylenol 650 mg PO    Benlysta 10 mg/kg =  807 mg administered IV at a 60 minute rate per orders; see MAR and vitals for more  details. Tolerated well without adverse events, discharged and ambulatory out of clinic.

## 2017-02-06 NOTE — TELEPHONE ENCOUNTER
----- Message from Amee Diop sent at 2/3/2017 12:01 PM CST -----  Contact: Ilir Bell  Please call [phone] regarding prior authorization.  [facility] medical directory will not approve until the questionnaire is answered. States need to know Does the patient have  severe active lupus nephrotitis?

## 2017-02-06 NOTE — MR AVS SNAPSHOT
[facility]  [address] [place] [address]  Phone: [phone]  Fax: [phone]                  Alisia Ceballos [place]   2017 11:00 AM   Infusion    Description:  Female : 1990   Provider:  RHEUMATOLOGY, INFUSION   Department:  [facility]           Diagnoses this Visit        Comments    Other systemic lupus erythematosus with glomerular disease    -  Primary     SLE (systemic lupus erythematosus related syndrome)                To Do List           Future Appointments        Provider Department Dept Phone    2017 11:00 AM RHEUMATOLOGY, INFUSION [facility] [phone]    2017 9:25 AM LABORATORY, [facility] [phone]    2017 10:00 AM SPECIMEN, [facility] [phone]    2017 8:30 AM RHEUMATOLOGY, [facility] [phone]    2017 1:30 PM Laurence Morales MD [facility] - Nephrology [phone]      Goals (5 Years of Data)     None      [facility]     [facility] Care [facility] -  Assistance  Registered nurses in the [facility] provide clinical advisement, health education, appointment booking, and other advisory services.  Call for this free service at [phone].             Medications           Message regarding Medications     Verify the changes and/or additions to your medication regime listed below are the same as discussed with your clinician today.  If any of these changes or additions are incorrect, please notify your healthcare provider.        These medications were administered today        Dose Freq    acetaminophen tablet 650 mg 650 mg Clinic/HOD 1 time    Sig: Take 2 tablets (650 mg total) by mouth one time.    Class: Normal    Route: Oral    belimumab 807 mg in sodium chloride 0.9% 250 mL IVPB 10 mg/kg × 80.7 kg Clinic/HOD 1 time    Sig: Inject 807 mg into the vein one time.    Class:  Normal    Route: Intravenous    sodium chloride 0.9% flush 10 mL 10 mL As needed (PRN)    Sig: Inject 10 mLs into the vein as needed for Line Care.    Class: Normal    Route: Intravenous    methylPREDNISolone sod suc(PF) 125 mg/2 mL injection 100 mg 100 mg Clinic/HOD 1 time    Sig: Inject 100 mg into the vein one time.    Class: Normal    Route: Intravenous    diphenhydrAMINE injection 25 mg 25 mg Clinic/HOD 1 time    Sig: Inject 0.5 mLs (25 mg total) into the vein one time.    Class: Normal    Route: Intravenous           Verify that the below list of medications is an accurate representation of the medications you are currently taking.  If none reported, the list may be blank. If incorrect, please contact your healthcare provider. Carry this list with you in case of emergency.           Current Medications     amlodipine (NORVASC) 5 MG tablet Take 1 tablet (5 mg total) by mouth once daily.    atorvastatin (LIPITOR) 10 MG tablet Take 1 tablet (10 mg total) by mouth once daily.    efinaconazole (JUBLIA) 10 % Mario Apply once daily to nail and nail fold.  Use daily for up to 1 year.    fluoxetine (PROZAC) 40 MG capsule     hydroxychloroquine (PLAQUENIL) 200 mg tablet Take 1 tablet (200 mg total) by mouth 2 (two) times daily.    lorazepam (ATIVAN) 1 MG tablet     losartan-hydrochlorothiazide 100-12.5 mg (HYZAAR) 100-12.5 mg Tab Take 1 tablet by mouth once daily.    multivitamin capsule Take 1 capsule by mouth once daily.    mycophenolate (CELLCEPT) 500 mg Tab TAKE 1 TABLET THREE TIMES DAILY AFTER MEALS    norgestimate-ethinyl estradiol (TRINESSA, 28,) 0.18/0.215/0.25 mg-35 mcg (28) tablet TAKE 1 TABLET ONCE DAILY.    predniSONE (DELTASONE) 10 MG tablet Take 20 mg once daily for 2 weeks, then reduce to 10 mg daily thereafter.    tramadol (ULTRAM) 50 mg tablet Take 1 tablet (50 mg total) by mouth every 12 (twelve) hours as needed for Pain.    valacyclovir (VALTREX) 500 MG tablet TAKE 1 TABLET ONE TIME DAILY            Clinical Reference Information           Your Vitals Were     BP Pulse Temp Resp Weight BMI    165/110 80 97.7 °F (36.5 °C) 18 80.7 kg (177 lb 14.6 oz) 29.61 kg/m2      Allergies as of 2/6/2017     No Known Allergies      Immunizations Administered on Date of Encounter - 2/6/2017     None      Administrations This Visit     acetaminophen tablet 650 mg     Admin Date Action Dose Route Administered By             02/06/2017 Given 650 mg Oral Estella Stahl RN                    belimumab 807 mg in sodium chloride 0.9% 250 mL IVPB     Admin Date Action Dose Rate Route Administered By          02/06/2017 New Bag 807 mg 250 mL/hr Intravenous Estella Stahl RN                   diphenhydrAMINE injection 25 mg     Admin Date Action Dose Route Administered By             02/06/2017 Given 25 mg Intravenous Estella Stahl RN                    methylPREDNISolone sod suc(PF) 125 mg/2 mL injection 100 mg     Admin Date Action Dose Route Administered By             02/06/2017 Given 100 mg Intravenous Estella Stahl RN                    sodium chloride 0.9% flush 10 mL     Admin Date Action Dose Route Administered By             02/06/2017 Given 10 mL Intravenous Estella Stahl RN                      Instructions      Belimumab solution for injection  What is this medicine?  BELIMUMAB (be ANTOINE ue mab) is a medicine used to treat a certain type of systemic lupus erythematosus (SLE). This medicine is used with standard therapy for SLE. It is not a cure.  How should I use this medicine?  This medicine is for infusion into a vein. It is given by a health care professional in a hospital or clinic setting.  A special MedGuide will be given to you by the pharmacist with each prescription and refill. Be sure to read this information carefully each time.  Talk to your pediatrician regarding the use of this medicine in children. Special care may be needed.  What side effects may I notice from receiving this medicine?  Side  effects that you should report to your doctor or health care professional as soon as possible:  · allergic reactions like skin rash, itching or hives, swelling of the face, lips, or tongue  · depressed mood  · signs of infection - fever or chills, cough, sore throat, pain or difficulty passing urine  · suicidal thoughts or other mood changes  · unusually slow heartbeat  Side effects that usually do not require medical attention (Report these to your doctor or health care professional if they continue or are bothersome.):  · diarrhea  · headache  · muscle aches  · nausea, vomiting  · trouble sleeping  What may interact with this medicine?  Do not take this medicine with any of the following medications:  · live virus vaccines  This medicine may also interact with the following medications:  · cyclophosphamide  · ofatumumab  · rituximab  What if I miss a dose?  It is important not to miss your dose. Call your doctor or health care professional if you are unable to keep an appointment.  Where should I keep my medicine?  This drug is given in a hospital or clinic and will not be stored at home.  What should I tell my health care provider before I take this medicine?  They need to know if you have any of these conditions:  · heart disease  · immune system problems  · infection (especially a virus infection such as chickenpox, cold sores, or herpes)  · mental illness  · suicidal thoughts, plans, or attempt; a previous suicide attempt by you or a family member  · an unusual or allergic reaction to belimumab, other medicines, foods, dyes, or preservatives  · pregnant or trying to get pregnant  · breast-feeding  What should I watch for while using this medicine?  Tell your doctor or healthcare professional if your symptoms do not start to get better or if they get worse.  Your condition will be monitored carefully while you are receiving this medicine.  Date Last Reviewed:   NOTE:This sheet is a summary. It may not cover all  possible information. If you have questions about this medicine, talk to your doctor, pharmacist, or health care provider. Copyright© 2016 [employer]      WAYS TO HELP PREVENT INFECTION         WASH YOUR HANDS OFTEN DURING THE DAY, ESPECIALLY BEFORE YOU EAT, AFTER USING THE BATHROOM, AND AFTER TOUCHING ANIMALS     STAY AWAY FROM PEOPLE WHO HAVE ILLNESSES YOU CAN CATCH; SUCH AS COLDS, FLU, CHICKEN POX     TRY TO AVOID CROWDS     STAY AWAY FROM CHILDREN WHO RECENTLY HAVE RECEIVED LIVE VIRUS VACCINES     MAINTAIN GOOD MOUTH CARE     DO NOT SQUEEZE OR SCRATCH PIMPLES     CLEAN CUTS & SCRAPES RIGHT AWAY AND DAILY UNTIL HEALED WITH WARM WATER, SOAP & AN ANTISEPTIC     AVOID CONTACT WITH LITTER BOXES, BIRD CAGES, & FISH TANKS     AVOID STANDING WATER, IE., BIRD BATHS, FLOWER POTS/VASES, OR HUMIDIFIERS     WEAR GLOVES WHEN GARDENING OR CLEANING UP AFTER OTHERS, ESPECIALLY BABIES & SMALL CHILDREN    DO NOT EAT RAW FISH, SEAFOOD, MEAT, OR EGGS           Language Assistance Services     ATTENTION: Language assistance services are available, free of charge. Please call [phone].      ATENCIÓN: Si habla jerry, tiene a cruz disposición servicios gratuitos de asistencia lingüística. Llame al [phone].     CHÚ Ý: N?u b?n nói Ti?ng Vi?t, có các d?ch v? h? tr? ngôn ng? mi?n phí dành cho b?n. G?i s? [phone].         [facility] complies with applicable Federal civil rights laws and does not discriminate on the basis of race, color, national origin, age, disability, or sex.

## 2017-02-06 NOTE — PATIENT INSTRUCTIONS
Belimumab solution for injection  What is this medicine?  BELIMUMAB (be ANTOINE ue mab) is a medicine used to treat a certain type of systemic lupus erythematosus (SLE). This medicine is used with standard therapy for SLE. It is not a cure.  How should I use this medicine?  This medicine is for infusion into a vein. It is given by a health care professional in a hospital or clinic setting.  A special MedGuide will be given to you by the pharmacist with each prescription and refill. Be sure to read this information carefully each time.  Talk to your pediatrician regarding the use of this medicine in children. Special care may be needed.  What side effects may I notice from receiving this medicine?  Side effects that you should report to your doctor or health care professional as soon as possible:  · allergic reactions like skin rash, itching or hives, swelling of the face, lips, or tongue  · depressed mood  · signs of infection - fever or chills, cough, sore throat, pain or difficulty passing urine  · suicidal thoughts or other mood changes  · unusually slow heartbeat  Side effects that usually do not require medical attention (Report these to your doctor or health care professional if they continue or are bothersome.):  · diarrhea  · headache  · muscle aches  · nausea, vomiting  · trouble sleeping  What may interact with this medicine?  Do not take this medicine with any of the following medications:  · live virus vaccines  This medicine may also interact with the following medications:  · cyclophosphamide  · ofatumumab  · rituximab  What if I miss a dose?  It is important not to miss your dose. Call your doctor or health care professional if you are unable to keep an appointment.  Where should I keep my medicine?  This drug is given in a hospital or clinic and will not be stored at home.  What should I tell my health care provider before I take this medicine?  They need to know if you have any of these  conditions:  · heart disease  · immune system problems  · infection (especially a virus infection such as chickenpox, cold sores, or herpes)  · mental illness  · suicidal thoughts, plans, or attempt; a previous suicide attempt by you or a family member  · an unusual or allergic reaction to belimumab, other medicines, foods, dyes, or preservatives  · pregnant or trying to get pregnant  · breast-feeding  What should I watch for while using this medicine?  Tell your doctor or healthcare professional if your symptoms do not start to get better or if they get worse.  Your condition will be monitored carefully while you are receiving this medicine.  Date Last Reviewed:   NOTE:This sheet is a summary. It may not cover all possible information. If you have questions about this medicine, talk to your doctor, pharmacist, or health care provider. Copyright© 2016 [employer]      WAYS TO HELP PREVENT INFECTION         WASH YOUR HANDS OFTEN DURING THE DAY, ESPECIALLY BEFORE YOU EAT, AFTER USING THE BATHROOM, AND AFTER TOUCHING ANIMALS     STAY AWAY FROM PEOPLE WHO HAVE ILLNESSES YOU CAN CATCH; SUCH AS COLDS, FLU, CHICKEN POX     TRY TO AVOID CROWDS     STAY AWAY FROM CHILDREN WHO RECENTLY HAVE RECEIVED LIVE VIRUS VACCINES     MAINTAIN GOOD MOUTH CARE     DO NOT SQUEEZE OR SCRATCH PIMPLES     CLEAN CUTS & SCRAPES RIGHT AWAY AND DAILY UNTIL HEALED WITH WARM WATER, SOAP & AN ANTISEPTIC     AVOID CONTACT WITH LITTER BOXES, BIRD CAGES, & FISH TANKS     AVOID STANDING WATER, IE., BIRD BATHS, FLOWER POTS/VASES, OR HUMIDIFIERS     WEAR GLOVES WHEN GARDENING OR CLEANING UP AFTER OTHERS, ESPECIALLY BABIES & SMALL CHILDREN    DO NOT EAT RAW FISH, SEAFOOD, MEAT, OR EGGS

## 2017-02-17 ENCOUNTER — LAB VISIT (OUTPATIENT)
Dept: LAB | Facility: HOSPITAL | Age: 27
End: 2017-02-17
Attending: INTERNAL MEDICINE
Payer: COMMERCIAL

## 2017-02-17 DIAGNOSIS — M32.14 LUPUS NEPHRITIS: ICD-10-CM

## 2017-02-17 LAB
BILIRUB UR QL STRIP: NEGATIVE
CLARITY UR: CLEAR
COLOR UR: YELLOW
CREAT UR-MCNC: 124 MG/DL
GLUCOSE UR QL STRIP: NEGATIVE
HGB UR QL STRIP: NEGATIVE
KETONES UR QL STRIP: NEGATIVE
LEUKOCYTE ESTERASE UR QL STRIP: NEGATIVE
NITRITE UR QL STRIP: NEGATIVE
PH UR STRIP: 7 [PH] (ref 5–8)
PROT UR QL STRIP: ABNORMAL
PROT UR-MCNC: 38 MG/DL
PROT/CREAT RATIO, UR: 0.31
SP GR UR STRIP: 1.02 (ref 1–1.03)
URN SPEC COLLECT METH UR: ABNORMAL

## 2017-02-17 PROCEDURE — 82570 ASSAY OF URINE CREATININE: CPT

## 2017-02-17 PROCEDURE — 81003 URINALYSIS AUTO W/O SCOPE: CPT | Mod: PO

## 2017-02-20 ENCOUNTER — TELEPHONE (OUTPATIENT)
Dept: RHEUMATOLOGY | Facility: HOSPITAL | Age: 27
End: 2017-02-20

## 2017-02-20 ENCOUNTER — OFFICE VISIT (OUTPATIENT)
Dept: NEPHROLOGY | Facility: CLINIC | Age: 27
End: 2017-02-20
Payer: COMMERCIAL

## 2017-02-20 VITALS
HEIGHT: 65 IN | WEIGHT: 174.81 LBS | BODY MASS INDEX: 29.12 KG/M2 | HEART RATE: 82 BPM | DIASTOLIC BLOOD PRESSURE: 90 MMHG | SYSTOLIC BLOOD PRESSURE: 160 MMHG

## 2017-02-20 DIAGNOSIS — R80.8 OTHER PROTEINURIA: ICD-10-CM

## 2017-02-20 DIAGNOSIS — I10 ESSENTIAL HYPERTENSION: ICD-10-CM

## 2017-02-20 DIAGNOSIS — M32.14 LUPUS NEPHRITIS: Primary | ICD-10-CM

## 2017-02-20 PROCEDURE — 3077F SYST BP >= 140 MM HG: CPT | Mod: S$GLB,,, | Performed by: INTERNAL MEDICINE

## 2017-02-20 PROCEDURE — 99215 OFFICE O/P EST HI 40 MIN: CPT | Mod: S$GLB,,, | Performed by: INTERNAL MEDICINE

## 2017-02-20 PROCEDURE — 3080F DIAST BP >= 90 MM HG: CPT | Mod: S$GLB,,, | Performed by: INTERNAL MEDICINE

## 2017-02-20 PROCEDURE — 99999 PR PBB SHADOW E&M-EST. PATIENT-LVL III: CPT | Mod: PBBFAC,,, | Performed by: INTERNAL MEDICINE

## 2017-02-20 RX ORDER — GUAIFENESIN/DEXTROMETHORPHAN 100-10MG/5
5 SYRUP ORAL NIGHTLY
Qty: 30 ML | Refills: 0 | COMMUNITY
Start: 2017-02-20 | End: 2017-03-02

## 2017-02-20 RX ORDER — AMLODIPINE BESYLATE 10 MG/1
10 TABLET ORAL DAILY
Qty: 90 TABLET | Refills: 4 | Status: SHIPPED | OUTPATIENT
Start: 2017-02-20 | End: 2017-02-20 | Stop reason: SDUPTHER

## 2017-02-20 RX ORDER — AMLODIPINE BESYLATE 10 MG/1
10 TABLET ORAL DAILY
Qty: 90 TABLET | Refills: 4 | Status: SHIPPED | OUTPATIENT
Start: 2017-02-20 | End: 2017-03-03

## 2017-02-20 NOTE — PROGRESS NOTES
NEPHROLOGY CLINIC FOLLOWUP NOTE      Date of clinic visit: 2/20/17     RHEUMATOLOGIST: Jaden Echeverria M.D.      REASON FOR CONSULTATION: lupus nephritis      CHIEF COMPLAINT: History of systemic lupus erythematosus.      HISTORY OF PRESENT ILLNESS: Ms. Vines is a 26-year-old -American female with a h/o of lupus nephritis who presents for f/u. Pt has kidney biopsy proven class V LN. Pt was lost to f/u, had been seen in July 2015. Previous records, notes since then reviewed, in particular rheumatology note. Noted that pt had for a while run out of mycophenolate due to insurance reasons. On the kidney biopsy she had no acuity and only about 5% to 10% chronicity. Pt has received induction treatment with mycophenolate combined with prednisone. Pt was previously on mycophenolate maintenance dose and low dose prednisone. Noted that complement both C3 and C4 were both low end of 2016 and early 2017. Once pt re-started taking mycophenolate again, the complement levels have improved. She reports no problems or side effects with the medicines she is taking. No new problems. No fever. No diarrhea. She has no acute c/o's today.     PAST MEDICAL HISTORY:   1. Systemic lupus erythematosus diagnosed in November 2013.   2. Class V membranous lupus nephritis. Kidney biopsy on 10/17/2013 showed 5%   to 10% interstitial fibrosis. No acuity on this biopsy.   3. Sjogren's disease, diagnosed in 2012.   4. History of genital herpes.      PAST SURGICAL HISTORY: None, apart from the kidney biopsy.      FAMILY HISTORY: No one in the family with lupus. Father is 47 years old. Mom is 44. Both are healthy. The patient does have a 1-year-old son who was diagnosed with third-degree AV block   .   ALLERGIES: Reviewed. No known drug allergies.      MEDICATIONS: Reviewed. Mycophenolate 500 mg p.o. Bid, prednisone 10 mg daily, amlodipine 5 mg daily, losartan 1000 mg daily, HCTZ 12.5 mg po qd, Valtrex 500 mg daily and Multivitamins.       REVIEW OF SYSTEMS:   HEAD, EYES, EARS, NOSE, THROAT: As above, otherwise negative.   CARDIAC: Negative.   PULMONARY: Negative.   GASTROINTESTINAL: Negative.   GENITOURINARY: As above, otherwise negative.   INFECTIOUS DISEASE: negative.   RHEUMATOLOGICAL: As above, otherwise negative.   The rest of the review of systems is negative.      PHYSICAL EXAMINATION:   VITAL SIGNS: Blood pressure is 160/90, pulse is 82, weight is 174 lbs   In no acute distress.   GENERAL: She is ambulatory.   HEART: Regular rate and rhythm. No pericardial friction rubs. S1 and S2 audible.   CHEST: Clear to auscultation. No rales, breathing symmetric and unlabored  ABDOMEN: Soft, nontender.   EXTREMITIES: Showed no edema.      LABORATORY VALUES: reviewed:  U prot/cr ratio 0.31 g  C3 and C4 now normal  BMP  Lab Results   Component Value Date     02/17/2017    K 3.9 02/17/2017     02/17/2017    CO2 20 (L) 02/17/2017    BUN 11 02/17/2017    CREATININE 0.7 02/17/2017    CALCIUM 8.5 (L) 02/17/2017    ANIONGAP 8 02/17/2017    ESTGFRAFRICA >60 02/17/2017    EGFRNONAA >60 02/17/2017     Lab Results   Component Value Date    WBC 6.91 02/17/2017    HGB 10.1 (L) 02/17/2017    HCT 30.1 (L) 02/17/2017    MCV 83 02/17/2017     02/17/2017          ASSESSMENT AND PLAN: This is a 26-year-old female with lupus nephritis who presents for f/u.   impression is as follows:      1. Renal: Cr is stable, pt is doing well overall now  However, it appears that pt had a lupus flare when she was out of mycophenolate, evidenced by hypocompleemntemia  Pt is taking mycophenolate again.   Insurance problems is resolved, per pt.  Cr has not changed or worsened  H/o of lupus nephritis, membranous, class V, kidney biopsy proven  Proteinuria has improved overall, stable  C3 and C4 are not low at this point, good response to mycophenolate combined with prednisone    2. HTN: BP elevated, Meds reviewed  Is on max dose of ARB  Will increase amlodipine  dose  Tolerating ARB well.     3. The patient is on maintenance dose of mycophenolate.   Pt has been advised that in case of unplanned pregnancy, the teratogenicity of both mycophenolate and losartan was previously explained to pt in layman's language.  Will need to stop both these meds before becoming pregnant, pt is aware.     PLANS AND RECOMMENDATIONS:   Increase amlodipine to 10 mg po qd  Total time spent 40 minutes including time needed to review the records, the   patient evaluation, documentation, face-to-face discussion with the patient,   more than 50% of the time was spent on coordination of care and counseling.    Level V visit.  RTC 3 months     Laurence Morales MD

## 2017-02-20 NOTE — MR AVS SNAPSHOT
[facility]  [address] [place] [address]  Phone: [phone]  Fax: [phone]                  Alisia Loconum   2017 1:30 PM   Office Visit    Description:  Female : 1990   Provider:  Laurence Morales MD   Department:  [facility]           Reason for Visit     Nephritis     Follow-up           Diagnoses this Visit        Comments    Lupus nephritis    -  Primary            To Do List           Future Appointments        Provider Department Dept Phone    3/9/2017 8:30 AM RHEUMATOLOGY, IN[facility]USION [facility] [phone]    4/3/2017 8:30 AM RHEUMATOLOGY, INFUSION [facility] [phone]    2017 1:00 PM LABORATORY, [facility] [phone]    2017 1:30 PM Shane Blair MD [facility] Rheumatology [phone]    2017 2:00 PM RHEUMATOLOGY, INFUSION [facility] [phone]      Goals (5 Years of Data)     None      Follow-Up and Disposition     Return in about 3 months (around 2017).       These Medications        Disp Refills Start End    amlodipine (NORVASC) 10 MG tablet 90 tablet 4 2017     Take 1 tablet (10 mg total) by mouth once daily. - Oral    Pharmacy: [facility] - [address] Ph #: [phone]         PURCHASE these Medications (No prescription required)        Start End    dextromethorphan-guaifenesin  mg/5 ml (ROBITUSSIN-DM)  mg/5 mL liquid 2017 3/2/2017    Sig - Route: Take 5 mLs by mouth every evening. - Oral    Class: OTC      [facility]     [facility] On Call Nurse Care Line -  Assistance  Registered nurses in the [facility] On Call Center provide clinical advisement, health education, appointment booking, and other advisory services.  Call for this free service at [phone].             Medications           Message regarding Medications     Verify the changes and/or  additions to your medication regime listed below are the same as discussed with your clinician today.  If any of these changes or additions are incorrect, please notify your healthcare provider.        START taking these NEW medications        Refills    dextromethorphan-guaifenesin  mg/5 ml (ROBITUSSIN-DM)  mg/5 mL liquid 0    Sig: Take 5 mLs by mouth every evening.    Class: OTC    Route: Oral      CHANGE how you are taking these medications     Start Taking Instead of    amlodipine (NORVASC) 10 MG tablet amlodipine (NORVASC) 5 MG tablet    Dosage:  Take 1 tablet (10 mg total) by mouth once daily. Dosage:  Take 1 tablet (5 mg total) by mouth once daily.    Reason for Change:  Reorder            Verify that the below list of medications is an accurate representation of the medications you are currently taking.  If none reported, the list may be blank. If incorrect, please contact your healthcare provider. Carry this list with you in case of emergency.           Current Medications     amlodipine (NORVASC) 10 MG tablet Take 1 tablet (10 mg total) by mouth once daily.    atorvastatin (LIPITOR) 10 MG tablet Take 1 tablet (10 mg total) by mouth once daily.    efinaconazole (JUBLIA) 10 % Mario Apply once daily to nail and nail fold.  Use daily for up to 1 year.    fluoxetine (PROZAC) 40 MG capsule     hydroxychloroquine (PLAQUENIL) 200 mg tablet Take 1 tablet (200 mg total) by mouth 2 (two) times daily.    lorazepam (ATIVAN) 1 MG tablet     losartan-hydrochlorothiazide 100-12.5 mg (HYZAAR) 100-12.5 mg Tab Take 1 tablet by mouth once daily.    multivitamin capsule Take 1 capsule by mouth once daily.    mycophenolate (CELLCEPT) 500 mg Tab TAKE 1 TABLET THREE TIMES DAILY AFTER MEALS    norgestimate-ethinyl estradiol (TRINESSA, 28,) 0.18/0.215/0.25 mg-35 mcg (28) tablet TAKE 1 TABLET ONCE DAILY.    predniSONE (DELTASONE) 10 MG tablet Take 20 mg once daily for 2 weeks, then reduce to 10 mg daily thereafter.     "tramadol (ULTRAM) 50 mg tablet Take 1 tablet (50 mg total) by mouth every 12 (twelve) hours as needed for Pain.    valacyclovir (VALTREX) 500 MG tablet TAKE 1 TABLET ONE TIME DAILY    dextromethorphan-guaifenesin  mg/5 ml (ROBITUSSIN-DM)  mg/5 mL liquid Take 5 mLs by mouth every evening.           Clinical Reference Information           Your Vitals Were     BP Pulse Height Weight BMI    160/90 82 5' 5" (1.651 m) 79.3 kg (174 lb 13.2 oz) 29.09 kg/m2      Blood Pressure          Most Recent Value    BP  (!)  160/90      Allergies as of 2/20/2017     No Known Allergies      Immunizations Administered on Date of Encounter - 2/20/2017     None      Orders Placed During Today's Visit     Future Labs/Procedures Expected by Expires    Basic metabolic panel  2/20/2017 4/21/2018    C3 complement  2/20/2017 4/21/2018    C4 complement  2/20/2017 4/21/2018    Protein / creatinine ratio, urine  As directed 2/20/2018      Language Assistance Services     ATTENTION: Language assistance services are available, free of charge. Please call [phone].      ATENCIÓN: Si habla jerry, tiene a cruz disposición servicios gratuitos de asistencia lingüística. Llame al [phone].     ALBERTO Ý: N?u b?n nói Ti?ng Vi?t, có các d?ch v? h? tr? ngôn ng? mi?n phí dành cho b?n. G?i s? [phone].         [facility] complies with applicable Federal civil rights laws and does not discriminate on the basis of race, color, national origin, age, disability, or sex.        "

## 2017-02-20 NOTE — TELEPHONE ENCOUNTER
Pt reports Fever of 101.2 with a sore throat and sinus congestion. She is making an appt with her PCP and will call us back with a dx and treatment. She did not receive Benlysta today. We will reschedule appt pending further information.

## 2017-02-22 ENCOUNTER — TELEPHONE (OUTPATIENT)
Dept: RHEUMATOLOGY | Facility: CLINIC | Age: 27
End: 2017-02-22

## 2017-02-22 ENCOUNTER — TELEPHONE (OUTPATIENT)
Dept: NEPHROLOGY | Facility: CLINIC | Age: 27
End: 2017-02-22

## 2017-02-22 ENCOUNTER — OFFICE VISIT (OUTPATIENT)
Dept: FAMILY MEDICINE | Facility: CLINIC | Age: 27
End: 2017-02-22
Payer: COMMERCIAL

## 2017-02-22 VITALS
OXYGEN SATURATION: 100 % | HEIGHT: 66 IN | HEART RATE: 100 BPM | BODY MASS INDEX: 27.95 KG/M2 | WEIGHT: 173.94 LBS | DIASTOLIC BLOOD PRESSURE: 100 MMHG | SYSTOLIC BLOOD PRESSURE: 162 MMHG | RESPIRATION RATE: 18 BRPM | TEMPERATURE: 99 F

## 2017-02-22 DIAGNOSIS — M32.9 SLE (SYSTEMIC LUPUS ERYTHEMATOSUS RELATED SYNDROME): ICD-10-CM

## 2017-02-22 DIAGNOSIS — R05.9 COUGH: ICD-10-CM

## 2017-02-22 DIAGNOSIS — N91.2 AMENORRHEA: Primary | ICD-10-CM

## 2017-02-22 DIAGNOSIS — H10.30 ACUTE CONJUNCTIVITIS, UNSPECIFIED ACUTE CONJUNCTIVITIS TYPE, UNSPECIFIED LATERALITY: ICD-10-CM

## 2017-02-22 DIAGNOSIS — Z34.90 PREGNANCY, UNSPECIFIED GESTATIONAL AGE: ICD-10-CM

## 2017-02-22 DIAGNOSIS — I10 ESSENTIAL HYPERTENSION: ICD-10-CM

## 2017-02-22 LAB
B-HCG UR QL: POSITIVE
CTP QC/QA: YES

## 2017-02-22 PROCEDURE — 99214 OFFICE O/P EST MOD 30 MIN: CPT | Mod: S$GLB,,, | Performed by: FAMILY MEDICINE

## 2017-02-22 PROCEDURE — 81025 URINE PREGNANCY TEST: CPT | Mod: S$GLB,,, | Performed by: FAMILY MEDICINE

## 2017-02-22 PROCEDURE — 99999 PR PBB SHADOW E&M-EST. PATIENT-LVL IV: CPT | Mod: PBBFAC,,, | Performed by: FAMILY MEDICINE

## 2017-02-22 PROCEDURE — 1160F RVW MEDS BY RX/DR IN RCRD: CPT | Mod: S$GLB,,, | Performed by: FAMILY MEDICINE

## 2017-02-22 RX ORDER — BENZONATATE 100 MG/1
100 CAPSULE ORAL 3 TIMES DAILY PRN
Qty: 30 CAPSULE | Refills: 0 | Status: SHIPPED | OUTPATIENT
Start: 2017-02-22 | End: 2017-03-04

## 2017-02-22 RX ORDER — ERYTHROMYCIN 5 MG/G
OINTMENT OPHTHALMIC NIGHTLY
Qty: 3.5 G | Refills: 0 | Status: SHIPPED | OUTPATIENT
Start: 2017-02-22 | End: 2017-03-04

## 2017-02-22 RX ORDER — LOSARTAN POTASSIUM AND HYDROCHLOROTHIAZIDE 12.5; 1 MG/1; MG/1
1 TABLET ORAL DAILY
Qty: 90 TABLET | Refills: 3 | Status: SHIPPED | OUTPATIENT
Start: 2017-02-22 | End: 2017-03-03

## 2017-02-22 RX ORDER — TRAMADOL HYDROCHLORIDE 50 MG/1
50 TABLET ORAL EVERY 12 HOURS PRN
Qty: 60 TABLET | Refills: 0 | Status: SHIPPED | OUTPATIENT
Start: 2017-02-22 | End: 2017-02-24 | Stop reason: CLARIF

## 2017-02-22 NOTE — TELEPHONE ENCOUNTER
Spoke with pt and advised her that Dr. MADISON is out this afternoon and I spoke with Dr. Echeverria regarding the plaquenil and prednisone Dr. MADISON's prescribes and she can continue the plaquenil and prednisone. Advised patient I will forward the message to Dr. MADISON as well and make sure he is aware tomorrow when he is back in clinic. Pt verbalized understanding. Advised her to call clinic back if she needed help with anything else.

## 2017-02-22 NOTE — PROGRESS NOTES
Subjective:       Patient ID: Alisia Vines is a 26 y.o. female.    Chief Complaint: Conjunctivitis      HPI   Ms. Vines presents to clinic today for complaints of having pink eye.   She states she had a fever on Friday of 101.   She has not had any eye pain.   She states her right eye  have been draining some mucus.  She denies any sick contacts . She does not wear contacts.    She also has had cold symptoms since Friday.   She has tried nyquil gel caps for her symptoms without relief.   She states she has congestion, sinus pressure and sneezing. She is to have infusion therapy on Monday and wanted to get cleared.   The infusion is for her lupus.     She also reports not having had a menstrual cycle since December or January.   She has not been taking her birth control.     Review of Systems   Constitutional: Positive for fever. Negative for appetite change.   HENT: Positive for congestion, sinus pressure, sneezing and sore throat. Negative for postnasal drip.    Respiratory: Positive for cough.    Cardiovascular: Negative for chest pain.   Gastrointestinal: Negative for abdominal pain, nausea and vomiting.   Genitourinary: Negative for frequency and hematuria.       Medication List with Changes/Refills   New Medications    AZITHROMYCIN (Z-ORALIA) 250 MG TABLET    Take 2 tabs by mouth today, then 1 tab daily x 4 days.    BENZONATATE (TESSALON) 100 MG CAPSULE    Take 1 capsule (100 mg total) by mouth 3 (three) times daily as needed for Cough.    ERYTHROMYCIN (ROMYCIN) OPHTHALMIC OINTMENT    Place into the right eye every evening. Apply to eye 4 times a day   Current Medications    AMLODIPINE (NORVASC) 10 MG TABLET    Take 1 tablet (10 mg total) by mouth once daily.    ATORVASTATIN (LIPITOR) 10 MG TABLET    Take 1 tablet (10 mg total) by mouth once daily.    DEXTROMETHORPHAN-GUAIFENESIN  MG/5 ML (ROBITUSSIN-DM)  MG/5 ML LIQUID    Take 5 mLs by mouth every evening.    EFINACONAZOLE (JUBLIA) 10 % RALF     Apply once daily to nail and nail fold.  Use daily for up to 1 year.    FLUOXETINE (PROZAC) 40 MG CAPSULE        HYDROXYCHLOROQUINE (PLAQUENIL) 200 MG TABLET    Take 1 tablet (200 mg total) by mouth 2 (two) times daily.    MULTIVITAMIN CAPSULE    Take 1 capsule by mouth once daily.    MYCOPHENOLATE (CELLCEPT) 500 MG TAB    TAKE 1 TABLET THREE TIMES DAILY AFTER MEALS    NORGESTIMATE-ETHINYL ESTRADIOL (TRINESSA, 28,) 0.18/0.215/0.25 MG-35 MCG (28) TABLET    TAKE 1 TABLET ONCE DAILY.    PREDNISONE (DELTASONE) 10 MG TABLET    Take 20 mg once daily for 2 weeks, then reduce to 10 mg daily thereafter.    VALACYCLOVIR (VALTREX) 500 MG TABLET    TAKE 1 TABLET ONE TIME DAILY   Changed and/or Refilled Medications    Modified Medication Previous Medication    LOSARTAN-HYDROCHLOROTHIAZIDE 100-12.5 MG (HYZAAR) 100-12.5 MG TAB losartan-hydrochlorothiazide 100-12.5 mg (HYZAAR) 100-12.5 mg Tab       Take 1 tablet by mouth once daily.    Take 1 tablet by mouth once daily.   Discontinued Medications    LORAZEPAM (ATIVAN) 1 MG TABLET        TRAMADOL (ULTRAM) 50 MG TABLET    Take 1 tablet (50 mg total) by mouth every 12 (twelve) hours as needed for Pain.       Patient Active Problem List   Diagnosis    Sjogren's syndrome    HSV-2 (herpes simplex virus 2) infection    Allergic rhinitis    Proteinuria    Lupus nephritis    SLE (systemic lupus erythematosus related syndrome)    Anemia    GERD (gastroesophageal reflux disease)    Essential hypertension    Systemic lupus erythematosus with glomerular disease    SLE glomerulonephritis syndrome, WHO class V         Objective:     Physical Exam  Vitals:    02/22/17 1049   BP: (!) 162/100   Pulse: 100   Resp: 18   Temp: 98.9 °F (37.2 °C)       Assessment/  PLAN     Amenorrhea  -     POCT Urine Pregnancy  - pregnancy test reviewed with the patient     SLE (systemic lupus erythematosus related syndrome)  -     Ambulatory referral to Obstetrics / Gynecology  - she will contact her  rheumatologist and nephrologist for assistance on what meds she needs to stop because of the pregnancy    Cough  -     benzonatate (TESSALON) 100 MG capsule; Take 1 capsule (100 mg total) by mouth 3 (three) times daily as needed for Cough.  Dispense: 30 capsule; Refill: 0    Essential hypertension  -     losartan-hydrochlorothiazide 100-12.5 mg (HYZAAR) 100-12.5 mg Tab; Take 1 tablet by mouth once daily.  Dispense: 90 tablet; Refill: 3    Acute conjunctivitis, unspecified acute conjunctivitis type, unspecified laterality  -     erythromycin (ROMYCIN) ophthalmic ointment; Place into the right eye every evening. Apply to eye 4 times a day  Dispense: 3.5 g; Refill: 0    Pregnancy, unspecified gestational age  -     Ambulatory referral to Obstetrics / Gynecology    Plan as above   rtc prn     Yane Wright MD  [facility]

## 2017-02-22 NOTE — TELEPHONE ENCOUNTER
Returned call to patient who states that just found out she is pregnant. Patient would like a call back to be advised on what she should do about her medications.   [phone]

## 2017-02-22 NOTE — MR AVS SNAPSHOT
[facility]  [address]  [place] [address]  Phone: [phone]                  Alisia Loconum   2017 10:20 AM   Office Visit    Description:  Female : 1990   Provider:  Yane Wright MD   Department:  [facility]           Reason for Visit     Conjunctivitis           Diagnoses this Visit        Comments    Amenorrhea    -  Primary     SLE (systemic lupus erythematosus related syndrome)         Cough         Essential hypertension         Acute conjunctivitis, unspecified acute conjunctivitis type, unspecified laterality         Pregnancy, unspecified gestational age                To Do List           Future Appointments        Provider Department Dept Phone    3/1/2017 9:45 AM [facility] -[phone]    3/9/2017 8:30 AM RHEUMATOLOGY, [facility] [phone]    4/3/2017 8:30 AM RHEUMATOLOGY, INFUSION [facility] [phone]    2017 1:00 PM LABORATORY, [facility] [phone]    2017 1:30 PM Shane Blair MD [facility] Rheumatology [phone]      Goals (5 Years of Data)     None       These Medications        Disp Refills Start End    tramadol (ULTRAM) 50 mg tablet 60 tablet 0 2017     Take 1 tablet (50 mg total) by mouth every 12 (twelve) hours as needed for Pain. - Oral    Pharmacy: [facility] [address] [place] [address] A Ph #: [phone]       benzonatate (TESSALON) 100 MG capsule 30 capsule 0 2017 3/4/2017    Take 1 capsule (100 mg total) by mouth 3 (three) times daily as needed for Cough. - Oral    Pharmacy: [facility] [address] [place] [address] A Ph #: [phone]       losartan-hydrochlorothiazide 100-12.5 mg (HYZAAR) 100-12.5 mg Tab 90 tablet 3 2017    Take 1 tablet by mouth once daily. - Oral    Pharmacy:  [facility] [id number] [place] [address], [address] Ph #: [phone]       erythromycin (ROMYCIN) ophthalmic ointment 3.5 g 0 2/22/2017 3/4/2017    Place into the right eye every evening. Apply to eye 4 times a day - Right Eye    Pharmacy: [facility][id number] - [place], [place] - [address] [facility] A Ph #: [phone]         [facility]     [facility] On Call Nurse [facility] - 24/7 Assistance  Registered nurses in the [facility] Center provide clinical advisement, health education, appointment booking, and other advisory services.  Call for this free service at [phone].             Medications           Message regarding Medications     Verify the changes and/or additions to your medication regime listed below are the same as discussed with your clinician today.  If any of these changes or additions are incorrect, please notify your healthcare provider.        START taking these NEW medications        Refills    benzonatate (TESSALON) 100 MG capsule 0    Sig: Take 1 capsule (100 mg total) by mouth 3 (three) times daily as needed for Cough.    Class: Normal    Route: Oral    erythromycin (ROMYCIN) ophthalmic ointment 0    Sig: Place into the right eye every evening. Apply to eye 4 times a day    Class: Normal    Route: Right Eye      STOP taking these medications     lorazepam (ATIVAN) 1 MG tablet            Verify that the below list of medications is an accurate representation of the medications you are currently taking.  If none reported, the list may be blank. If incorrect, please contact your healthcare provider. Carry this list with you in case of emergency.           Current Medications     amlodipine (NORVASC) 10 MG tablet Take 1 tablet (10 mg total) by mouth once daily.    atorvastatin (LIPITOR) 10 MG tablet Take 1 tablet (10 mg total) by mouth once daily.    benzonatate (TESSALON) 100 MG capsule Take 1 capsule (100 mg total) by mouth 3 (three) times daily as  "needed for Cough.    dextromethorphan-guaifenesin  mg/5 ml (ROBITUSSIN-DM)  mg/5 mL liquid Take 5 mLs by mouth every evening.    efinaconazole (JUBLIA) 10 % Mario Apply once daily to nail and nail fold.  Use daily for up to 1 year.    erythromycin (ROMYCIN) ophthalmic ointment Place into the right eye every evening. Apply to eye 4 times a day    fluoxetine (PROZAC) 40 MG capsule     hydroxychloroquine (PLAQUENIL) 200 mg tablet Take 1 tablet (200 mg total) by mouth 2 (two) times daily.    losartan-hydrochlorothiazide 100-12.5 mg (HYZAAR) 100-12.5 mg Tab Take 1 tablet by mouth once daily.    multivitamin capsule Take 1 capsule by mouth once daily.    mycophenolate (CELLCEPT) 500 mg Tab TAKE 1 TABLET THREE TIMES DAILY AFTER MEALS    norgestimate-ethinyl estradiol (TRINESSA, 28,) 0.18/0.215/0.25 mg-35 mcg (28) tablet TAKE 1 TABLET ONCE DAILY.    predniSONE (DELTASONE) 10 MG tablet Take 20 mg once daily for 2 weeks, then reduce to 10 mg daily thereafter.    tramadol (ULTRAM) 50 mg tablet Take 1 tablet (50 mg total) by mouth every 12 (twelve) hours as needed for Pain.    valacyclovir (VALTREX) 500 MG tablet TAKE 1 TABLET ONE TIME DAILY           Clinical Reference Information           Your Vitals Were     BP Pulse Temp Resp Height Weight    162/100 100 98.9 °F (37.2 °C) (Tympanic) 18 5' 6" (1.676 m) 78.9 kg (173 lb 15.1 oz)    Last Period SpO2 BMI          12/18/2016 100% 28.08 kg/m2        Blood Pressure          Most Recent Value    BP  (!)  162/100      Allergies as of 2/22/2017     No Known Allergies      Immunizations Administered on Date of Encounter - 2/22/2017     None      Orders Placed During Today's Visit      Normal Orders This Visit    Ambulatory referral to Obstetrics / Gynecology     POCT Urine Pregnancy          2/22/2017 11:25 AM - Aye Rodríguez MA      Component Results     Component Value Flag Ref Range Units Status    POC Preg Test, Ur Positive (A) Negative  Final     " Acceptable Yes    Final            Language Assistance Services     ATTENTION: Language assistance services are available, free of charge. Please call [phone].      ATENCIÓN: Si habla jerry, tiene a cruz disposición servicios gratuitos de asistencia lingüística. Llame al [phone].     CHÚ Ý: N?u b?n nói Ti?ng Vi?t, có các d?ch v? h? tr? ngôn ng? mi?n phí dành cho b?n. G?i s? [phone].         [facility] complies with applicable Federal civil rights laws and does not discriminate on the basis of race, color, national origin, age, disability, or sex.

## 2017-02-22 NOTE — TELEPHONE ENCOUNTER
----- Message from Madonna Diop sent at 2/22/2017 11:53 AM CST -----  Contact: pt   Pt request call back she is preg and needs to speak with someone concerning the meds he is on pt can be reached at [phone]///[id number]

## 2017-02-22 NOTE — TELEPHONE ENCOUNTER
Patient recently found out she is pregnant and called regarding her mediations, plaquenil, prednisone and benlysta. Spoke with Dr. Echeverria in your absence and was advised that patient can continue plaquenil and prednisone. Last benlysta infusion was 2.6.17. Advised patient and she verbalized understanding as well.

## 2017-02-23 ENCOUNTER — OFFICE VISIT (OUTPATIENT)
Dept: FAMILY MEDICINE | Facility: CLINIC | Age: 27
End: 2017-02-23
Payer: COMMERCIAL

## 2017-02-23 VITALS
DIASTOLIC BLOOD PRESSURE: 110 MMHG | WEIGHT: 169.56 LBS | BODY MASS INDEX: 28.25 KG/M2 | RESPIRATION RATE: 16 BRPM | SYSTOLIC BLOOD PRESSURE: 170 MMHG | HEART RATE: 86 BPM | HEIGHT: 65 IN | OXYGEN SATURATION: 99 % | TEMPERATURE: 99 F

## 2017-02-23 DIAGNOSIS — H61.21 IMPACTED CERUMEN OF RIGHT EAR: ICD-10-CM

## 2017-02-23 DIAGNOSIS — I10 ESSENTIAL HYPERTENSION: ICD-10-CM

## 2017-02-23 DIAGNOSIS — J40 BRONCHITIS: Primary | ICD-10-CM

## 2017-02-23 DIAGNOSIS — M32.9 SLE (SYSTEMIC LUPUS ERYTHEMATOSUS RELATED SYNDROME): ICD-10-CM

## 2017-02-23 PROCEDURE — 1160F RVW MEDS BY RX/DR IN RCRD: CPT | Mod: S$GLB,,, | Performed by: FAMILY MEDICINE

## 2017-02-23 PROCEDURE — 3077F SYST BP >= 140 MM HG: CPT | Mod: S$GLB,,, | Performed by: FAMILY MEDICINE

## 2017-02-23 PROCEDURE — 99214 OFFICE O/P EST MOD 30 MIN: CPT | Mod: S$GLB,,, | Performed by: FAMILY MEDICINE

## 2017-02-23 PROCEDURE — 3080F DIAST BP >= 90 MM HG: CPT | Mod: S$GLB,,, | Performed by: FAMILY MEDICINE

## 2017-02-23 PROCEDURE — 99999 PR PBB SHADOW E&M-EST. PATIENT-LVL IV: CPT | Mod: PBBFAC,,, | Performed by: FAMILY MEDICINE

## 2017-02-23 RX ORDER — AZITHROMYCIN 250 MG/1
TABLET, FILM COATED ORAL
Qty: 6 TABLET | Refills: 0 | Status: SHIPPED | OUTPATIENT
Start: 2017-02-23 | End: 2017-03-09

## 2017-02-23 NOTE — PROGRESS NOTES
Subjective:       Patient ID: Alisia Vines is a 26 y.o. female.    Chief Complaint: Follow-up (ear issue)      HPI  Ms. Luevano presents to clinic today for complaints that she can not hear out of her right ear.   She states she has sinus pressure. She has been taking OTC medication for this.   She states she feels very full in her right ear. The hearing loss started today.     Review of Systems   Constitutional: Negative for fever.   HENT: Positive for congestion, ear pain, hearing loss and sinus pressure.    Respiratory: Positive for cough.        Medication List with Changes/Refills   New Medications    AZITHROMYCIN (Z-ORALIA) 250 MG TABLET    Take 2 tabs by mouth today, then 1 tab daily x 4 days.   Current Medications    AMLODIPINE (NORVASC) 10 MG TABLET    Take 1 tablet (10 mg total) by mouth once daily.    ATORVASTATIN (LIPITOR) 10 MG TABLET    Take 1 tablet (10 mg total) by mouth once daily.    BENZONATATE (TESSALON) 100 MG CAPSULE    Take 1 capsule (100 mg total) by mouth 3 (three) times daily as needed for Cough.    DEXTROMETHORPHAN-GUAIFENESIN  MG/5 ML (ROBITUSSIN-DM)  MG/5 ML LIQUID    Take 5 mLs by mouth every evening.    EFINACONAZOLE (JUBLIA) 10 % RALF    Apply once daily to nail and nail fold.  Use daily for up to 1 year.    ERYTHROMYCIN (ROMYCIN) OPHTHALMIC OINTMENT    Place into the right eye every evening. Apply to eye 4 times a day    FLUOXETINE (PROZAC) 40 MG CAPSULE        HYDROXYCHLOROQUINE (PLAQUENIL) 200 MG TABLET    Take 1 tablet (200 mg total) by mouth 2 (two) times daily.    LOSARTAN-HYDROCHLOROTHIAZIDE 100-12.5 MG (HYZAAR) 100-12.5 MG TAB    Take 1 tablet by mouth once daily.    MULTIVITAMIN CAPSULE    Take 1 capsule by mouth once daily.    MYCOPHENOLATE (CELLCEPT) 500 MG TAB    TAKE 1 TABLET THREE TIMES DAILY AFTER MEALS    NORGESTIMATE-ETHINYL ESTRADIOL (TRINESSA, 28,) 0.18/0.215/0.25 MG-35 MCG (28) TABLET    TAKE 1 TABLET ONCE DAILY.    PREDNISONE (DELTASONE) 10 MG  TABLET    Take 20 mg once daily for 2 weeks, then reduce to 10 mg daily thereafter.    VALACYCLOVIR (VALTREX) 500 MG TABLET    TAKE 1 TABLET ONE TIME DAILY   Discontinued Medications    TRAMADOL (ULTRAM) 50 MG TABLET    Take 1 tablet (50 mg total) by mouth every 12 (twelve) hours as needed for Pain.       Patient Active Problem List   Diagnosis    Sjogren's syndrome    HSV-2 (herpes simplex virus 2) infection    Allergic rhinitis    Proteinuria    Lupus nephritis    SLE (systemic lupus erythematosus related syndrome)    Anemia    GERD (gastroesophageal reflux disease)    Essential hypertension    Systemic lupus erythematosus with glomerular disease    SLE glomerulonephritis syndrome, WHO class V         Objective:     Physical Exam   Constitutional: She is oriented to person, place, and time. She appears well-developed and well-nourished. No distress.   HENT:   Head: Normocephalic and atraumatic.   Right Ear: External ear normal.   Left Ear: External ear normal.   Mouth/Throat: Oropharynx is clear and moist. No oropharyngeal exudate.   Right ear with cerumen impaction    Eyes: EOM are normal. Right eye exhibits no discharge. Left eye exhibits no discharge.   Cardiovascular: Normal rate and regular rhythm.    Pulmonary/Chest: Effort normal and breath sounds normal. No respiratory distress. She has no wheezes.   Mild rhonchi    Musculoskeletal: She exhibits no edema.   Neurological: She is alert and oriented to person, place, and time.   Skin: Skin is warm and dry. She is not diaphoretic. No erythema.   Psychiatric: She has a normal mood and affect.   Vitals reviewed.    Vitals:    02/23/17 0825   BP: (!) 170/110   Pulse: 86   Resp: 16   Temp: 98.5 °F (36.9 °C)       Assessment/  PLAN     Bronchitis  -     azithromycin (Z-ORALIA) 250 MG tablet; Take 2 tabs by mouth today, then 1 tab daily x 4 days.  Dispense: 6 tablet; Refill: 0    Impacted cerumen of right ear  - right ear irrigated and cleaned with  katrin  - some cerumen not able to be extracted, advised pt to get debrox ear drops     Essential hypertension  - elevated blood pressure, she see nephrology often   - continue current management     SLE (systemic lupus erythematosus related syndrome)  - controlled currently on meds.   - will continue this, given that she has SLE and is on cellcept , will go ahead and treat bronchitis with abx   - she is also recently found to be pregnant, she has appt with ob gyn soon, she has made her nephrologist/ rheumatologist aware of her pregnancy         Yane Wright MD  [facility]

## 2017-02-23 NOTE — MR AVS SNAPSHOT
[facility]  [address] [address]  Phone: [phone]                  Alisia Vines   2017 8:20 AM   Office Visit    Description:  Female : 1990   Provider:  Yane Wright MD   Department:  [facility]           Reason for Visit     Follow-up           Diagnoses this Visit        Comments    Bronchitis    -  Primary            To Do List           Future Appointments        Provider Department Dept Phone    3/1/2017 9:45 AM Jessica Meredith CNM [facility] - OB/ [phone]    3/9/2017 8:30 AM RHEUMATOLOGY, [facility] [phone]    4/3/2017 8:30 AM RHEUMATOLOGY, IN[facility]US[facility] [phone]    2017 1:00 PM LABORATORY, [facility] [phone]    2017 1:30 PM Shane Blair MD [facility] Rheumatology [phone]      Goals (5 Years of Data)     None       These Medications        Disp Refills Start End    azithromycin (Z-ORALIA) 250 MG tablet 6 tablet 0 2017     Take 2 tabs by mouth today, then 1 tab daily x 4 days.    Pharmacy: [id number] Ph #: [phone]         [facility]     [facility] Nurse [facility] -  Assistance  Registered nurses in the [facility] provide clinical advisement, health education, appointment booking, and other advisory services.  Call for this free service at [phone].             Medications           Message regarding Medications     Verify the changes and/or additions to your medication regime listed below are the same as discussed with your clinician today.  If any of these changes or additions are incorrect, please notify your healthcare provider.        START taking these NEW medications        Refills    azithromycin (Z-ORALIA) 250 MG tablet 0    Sig: Take 2 tabs by mouth today, then 1 tab daily x 4 days.     Class: Normal           Verify that the below list of medications is an accurate representation of the medications you are currently taking.  If none reported, the list may be blank. If incorrect, please contact your healthcare provider. Carry this list with you in case of emergency.           Current Medications     amlodipine (NORVASC) 10 MG tablet Take 1 tablet (10 mg total) by mouth once daily.    atorvastatin (LIPITOR) 10 MG tablet Take 1 tablet (10 mg total) by mouth once daily.    azithromycin (Z-ORALIA) 250 MG tablet Take 2 tabs by mouth today, then 1 tab daily x 4 days.    benzonatate (TESSALON) 100 MG capsule Take 1 capsule (100 mg total) by mouth 3 (three) times daily as needed for Cough.    dextromethorphan-guaifenesin  mg/5 ml (ROBITUSSIN-DM)  mg/5 mL liquid Take 5 mLs by mouth every evening.    efinaconazole (JUBLIA) 10 % Mario Apply once daily to nail and nail fold.  Use daily for up to 1 year.    erythromycin (ROMYCIN) ophthalmic ointment Place into the right eye every evening. Apply to eye 4 times a day    fluoxetine (PROZAC) 40 MG capsule     hydroxychloroquine (PLAQUENIL) 200 mg tablet Take 1 tablet (200 mg total) by mouth 2 (two) times daily.    losartan-hydrochlorothiazide 100-12.5 mg (HYZAAR) 100-12.5 mg Tab Take 1 tablet by mouth once daily.    multivitamin capsule Take 1 capsule by mouth once daily.    mycophenolate (CELLCEPT) 500 mg Tab TAKE 1 TABLET THREE TIMES DAILY AFTER MEALS    norgestimate-ethinyl estradiol (TRINESSA, 28,) 0.18/0.215/0.25 mg-35 mcg (28) tablet TAKE 1 TABLET ONCE DAILY.    predniSONE (DELTASONE) 10 MG tablet Take 20 mg once daily for 2 weeks, then reduce to 10 mg daily thereafter.    tramadol (ULTRAM) 50 mg tablet Take 1 tablet (50 mg total) by mouth every 12 (twelve) hours as needed for Pain.    valacyclovir (VALTREX) 500 MG tablet TAKE 1 TABLET ONE TIME DAILY           Clinical Reference Information           Your Vitals Were     BP Pulse Temp Resp Height  "Weight    170/110 86 98.5 °F (36.9 °C) 16 5' 5" (1.651 m) 76.9 kg (169 lb 8.5 oz)    Last Period SpO2 BMI          12/18/2016 99% 28.21 kg/m2        Blood Pressure          Most Recent Value    BP  (!)  170/110      Allergies as of 2/23/2017     No Known Allergies      Immunizations Administered on Date of Encounter - 2/23/2017     None      Instructions    DEBROX ear drops available at [facility]          Language Assistance Services     ATTENTION: Language assistance services are available, free of charge. Please call [phone].      ATENCIÓN: Si habla español, tiene a cruz disposición servicios gratuitos de asistencia lingüística. Llame al [phone].     CHÚ Ý: N?u b?n nói Ti?ng Vi?t, có các d?ch v? h? tr? ngôn ng? mi?n phí dành cho b?n. G?i s? [phone].         [facility] complies with applicable Federal civil rights laws and does not discriminate on the basis of race, color, national origin, age, disability, or sex.        "

## 2017-02-23 NOTE — TELEPHONE ENCOUNTER
Spoke with pt and offered 11.30 am this morning and pt declined because she has to go into work today at 10 am, pt asked what the latest appointment we had tomorrow and advised her that we had a 4:30 pm, pt states she is not going to be able to make it in today or tomorrow, asked patient about Monday and she states she does not have her schedule for then but would call Monday Morning.

## 2017-02-28 ENCOUNTER — TELEPHONE (OUTPATIENT)
Dept: OPHTHALMOLOGY | Facility: CLINIC | Age: 27
End: 2017-02-28

## 2017-02-28 ENCOUNTER — TELEPHONE (OUTPATIENT)
Dept: FAMILY MEDICINE | Facility: CLINIC | Age: 27
End: 2017-02-28

## 2017-02-28 DIAGNOSIS — H91.91 HEARING LOSS OF RIGHT EAR, UNSPECIFIED HEARING LOSS TYPE: Primary | ICD-10-CM

## 2017-02-28 NOTE — TELEPHONE ENCOUNTER
----- Message from Wallace Anthony sent at 2/28/2017  8:49 AM CST -----  Contact: Pt  Pt request call from nurse to get referral to see an ENT because her ears are still clogged up, please contact pt at [phone]

## 2017-02-28 NOTE — TELEPHONE ENCOUNTER
Dr. Wright,   The patient would like to know if you would refer her to ENT, states she is still not able to hear.

## 2017-02-28 NOTE — TELEPHONE ENCOUNTER
----- Message from Wallace Anthony sent at 2/28/2017  8:47 AM CST -----  Contact: Pt  Pt request copy of her current Eye Glass RX [facility], please contact pt at [phone]

## 2017-02-28 NOTE — TELEPHONE ENCOUNTER
Called spoke with patient. Patient will come in on tomorrow, she needs a PD as well to order glasses online.

## 2017-03-01 ENCOUNTER — PROCEDURE VISIT (OUTPATIENT)
Dept: OBSTETRICS AND GYNECOLOGY | Facility: CLINIC | Age: 27
End: 2017-03-01
Payer: COMMERCIAL

## 2017-03-01 ENCOUNTER — PATIENT MESSAGE (OUTPATIENT)
Dept: OPHTHALMOLOGY | Facility: CLINIC | Age: 27
End: 2017-03-01

## 2017-03-01 ENCOUNTER — OFFICE VISIT (OUTPATIENT)
Dept: OBSTETRICS AND GYNECOLOGY | Facility: CLINIC | Age: 27
End: 2017-03-01
Payer: COMMERCIAL

## 2017-03-01 ENCOUNTER — LAB VISIT (OUTPATIENT)
Dept: LAB | Facility: HOSPITAL | Age: 27
End: 2017-03-01
Attending: ADVANCED PRACTICE MIDWIFE
Payer: COMMERCIAL

## 2017-03-01 VITALS
RESPIRATION RATE: 17 BRPM | HEIGHT: 65 IN | WEIGHT: 177.69 LBS | SYSTOLIC BLOOD PRESSURE: 112 MMHG | BODY MASS INDEX: 29.61 KG/M2 | DIASTOLIC BLOOD PRESSURE: 78 MMHG

## 2017-03-01 DIAGNOSIS — O09.211 HIGH RISK PREGNANCY DUE TO HISTORY OF PRETERM LABOR IN FIRST TRIMESTER: ICD-10-CM

## 2017-03-01 DIAGNOSIS — O09.211: Primary | ICD-10-CM

## 2017-03-01 DIAGNOSIS — O09.211: ICD-10-CM

## 2017-03-01 DIAGNOSIS — N91.1 AMENORRHEA, SECONDARY: ICD-10-CM

## 2017-03-01 LAB
ABO + RH BLD: NORMAL
ALBUMIN SERPL BCP-MCNC: 2.7 G/DL
ALP SERPL-CCNC: 45 U/L
ALT SERPL W/O P-5'-P-CCNC: 12 U/L
ANION GAP SERPL CALC-SCNC: 7 MMOL/L
AST SERPL-CCNC: 16 U/L
BASOPHILS # BLD AUTO: 0.01 K/UL
BASOPHILS NFR BLD: 0.3 %
BILIRUB SERPL-MCNC: 0.1 MG/DL
BLD GP AB SCN CELLS X3 SERPL QL: NORMAL
BUN SERPL-MCNC: 13 MG/DL
CALCIUM SERPL-MCNC: 8.4 MG/DL
CHLORIDE SERPL-SCNC: 108 MMOL/L
CO2 SERPL-SCNC: 22 MMOL/L
CREAT SERPL-MCNC: 0.7 MG/DL
CREAT UR-MCNC: 96 MG/DL
DIFFERENTIAL METHOD: ABNORMAL
EOSINOPHIL # BLD AUTO: 0.2 K/UL
EOSINOPHIL NFR BLD: 4.4 %
ERYTHROCYTE [DISTWIDTH] IN BLOOD BY AUTOMATED COUNT: 14.3 %
EST. GFR  (AFRICAN AMERICAN): >60 ML/MIN/1.73 M^2
EST. GFR  (NON AFRICAN AMERICAN): >60 ML/MIN/1.73 M^2
GLUCOSE SERPL-MCNC: 74 MG/DL
HCT VFR BLD AUTO: 32.2 %
HGB BLD-MCNC: 10.3 G/DL
HGB S BLD QL SOLY: NEGATIVE
LYMPHOCYTES # BLD AUTO: 2.1 K/UL
LYMPHOCYTES NFR BLD: 56.8 %
MCH RBC QN AUTO: 26.5 PG
MCHC RBC AUTO-ENTMCNC: 32 %
MCV RBC AUTO: 83 FL
MONOCYTES # BLD AUTO: 0.3 K/UL
MONOCYTES NFR BLD: 7.8 %
NEUTROPHILS # BLD AUTO: 1.1 K/UL
NEUTROPHILS NFR BLD: 30.7 %
PLATELET # BLD AUTO: 232 K/UL
PMV BLD AUTO: 11.3 FL
POTASSIUM SERPL-SCNC: 3.8 MMOL/L
PROT SERPL-MCNC: 6.9 G/DL
PROT UR-MCNC: 14 MG/DL
PROT/CREAT RATIO, UR: 0.15
RBC # BLD AUTO: 3.89 M/UL
SODIUM SERPL-SCNC: 137 MMOL/L
WBC # BLD AUTO: 3.61 K/UL

## 2017-03-01 PROCEDURE — 0500F INITIAL PRENATAL CARE VISIT: CPT | Mod: S$GLB,,, | Performed by: ADVANCED PRACTICE MIDWIFE

## 2017-03-01 PROCEDURE — 86762 RUBELLA ANTIBODY: CPT

## 2017-03-01 PROCEDURE — 80053 COMPREHEN METABOLIC PANEL: CPT

## 2017-03-01 PROCEDURE — 76817 TRANSVAGINAL US OBSTETRIC: CPT | Mod: 26,S$GLB,, | Performed by: OBSTETRICS & GYNECOLOGY

## 2017-03-01 PROCEDURE — 86703 HIV-1/HIV-2 1 RESULT ANTBDY: CPT

## 2017-03-01 PROCEDURE — 85025 COMPLETE CBC W/AUTO DIFF WBC: CPT

## 2017-03-01 PROCEDURE — 86850 RBC ANTIBODY SCREEN: CPT

## 2017-03-01 PROCEDURE — 85660 RBC SICKLE CELL TEST: CPT

## 2017-03-01 PROCEDURE — 86592 SYPHILIS TEST NON-TREP QUAL: CPT

## 2017-03-01 PROCEDURE — 84156 ASSAY OF PROTEIN URINE: CPT

## 2017-03-01 PROCEDURE — 1160F RVW MEDS BY RX/DR IN RCRD: CPT | Mod: S$GLB,,, | Performed by: ADVANCED PRACTICE MIDWIFE

## 2017-03-01 PROCEDURE — 86900 BLOOD TYPING SEROLOGIC ABO: CPT

## 2017-03-01 PROCEDURE — 87340 HEPATITIS B SURFACE AG IA: CPT

## 2017-03-01 PROCEDURE — 99999 PR PBB SHADOW E&M-EST. PATIENT-LVL III: CPT | Mod: PBBFAC,,, | Performed by: ADVANCED PRACTICE MIDWIFE

## 2017-03-01 PROCEDURE — 36415 COLL VENOUS BLD VENIPUNCTURE: CPT | Mod: PO

## 2017-03-01 NOTE — LETTER
March 1, 2017      Yane Wright MD  [address] [place] [address]           [facility] - OB/ GYN  [address]  [place] [address]  Phone: [phone]  Fax: [phone]          Patient: Alisia Vines   MR Number: [id number]   YOB: 1990   Date of Visit: 3/1/2017       Dear Dr. Yane Wright:    Thank you for referring Alisia Vines to me for evaluation. Attached you will find relevant portions of my assessment and plan of care.    If you have questions, please do not hesitate to call me. I look forward to following Alisia Vines along with you.    Sincerely,    Jessica Meredith, CN    Enclosure  CC:  No Recipients    If you would like to receive this communication electronically, please contact [email or web address] or [phone] to request more information on [facility] access.    For providers and/or their staff who would like to refer a patient to [facility], please contact us through our one-stop-shop provider referral line, [facility] , at [phone].    If you feel you have received this communication in error or would no longer like to receive these types of communications, please e-mail [email or web address]

## 2017-03-01 NOTE — MR AVS SNAPSHOT
[facility] - OB/ GYN  [address] [facility] [place] [address]  Phone: [phone]  Fax: [phone]                  Alisia Ceballos Lothian   3/1/2017 9:45 AM   Office Visit    Description:  Female : 1990   Provider:  Jessica Meredith CNM   Department[facility]  [facility] - OB/ [facility]           Reason for Visit     Possible Pregnancy           Diagnoses this Visit        Comments    Pregnancy complicated by previous  labor, first trimester    -  Primary            To Do List           Future Appointments        Provider Department Dept Phone    3/1/2017 11:30 AM ULTRASOUND, OB-GYN [facility] OB/ -[phone]    3/1/2017 12:00 PM LABORATORY, [facility] [phone]    3/3/2017 8:40 AM ARELY Carcamo - Otohinolaryngology [phone]    3/8/2017 8:30 AM Jessica Meredith CNM [facility] OB/ -[phone]    3/9/2017 8:30 AM RHEUMATOLOGY, INFUS[facility] [phone]      Goals (5 Years of Data)     None      [facility]     [facility] -  Assistance  Registered nurses in the [facility] provide clinical advisement, health education, appointment booking, and other advisory services.  Call for this free service at [phone].             Medications           Message regarding Medications     Verify the changes and/or additions to your medication regime listed below are the same as discussed with your clinician today.  If any of these changes or additions are incorrect, please notify your healthcare provider.             Verify that the below list of medications is an accurate representation of the medications you are currently taking.  If none reported, the list may be blank. If incorrect, please contact your healthcare provider. Carry this list with you in case of emergency.           Current Medications     amlodipine (NORVASC) 10 MG tablet Take 1 tablet (10 mg total) by mouth once daily.    atorvastatin  (LIPITOR) 10 MG tablet Take 1 tablet (10 mg total) by mouth once daily.    azithromycin (Z-ORALIA) 250 MG tablet Take 2 tabs by mouth today, then 1 tab daily x 4 days.    hydroxychloroquine (PLAQUENIL) 200 mg tablet Take 1 tablet (200 mg total) by mouth 2 (two) times daily.    losartan-hydrochlorothiazide 100-12.5 mg (HYZAAR) 100-12.5 mg Tab Take 1 tablet by mouth once daily.    multivitamin capsule Take 1 capsule by mouth once daily.    predniSONE (DELTASONE) 10 MG tablet Take 20 mg once daily for 2 weeks, then reduce to 10 mg daily thereafter.    valacyclovir (VALTREX) 500 MG tablet TAKE 1 TABLET ONE TIME DAILY    benzonatate (TESSALON) 100 MG capsule Take 1 capsule (100 mg total) by mouth 3 (three) times daily as needed for Cough.    dextromethorphan-guaifenesin  mg/5 ml (ROBITUSSIN-DM)  mg/5 mL liquid Take 5 mLs by mouth every evening.    efinaconazole (JUBLIA) 10 % Mario Apply once daily to nail and nail fold.  Use daily for up to 1 year.    erythromycin (ROMYCIN) ophthalmic ointment Place into the right eye every evening. Apply to eye 4 times a day    fluoxetine (PROZAC) 40 MG capsule     mycophenolate (CELLCEPT) 500 mg Tab TAKE 1 TABLET THREE TIMES DAILY AFTER MEALS    norgestimate-ethinyl estradiol (TRINESSA, 28,) 0.18/0.215/0.25 mg-35 mcg (28) tablet TAKE 1 TABLET ONCE DAILY.           Clinical Reference Information           Your Vitals Were     BP                   112/78 (BP Location: Left arm, Patient Position: Sitting, BP Method: Manual)           Blood Pressure          Most Recent Value    BP  112/78      Allergies as of 3/1/2017     No Known Allergies      Immunizations Administered on Date of Encounter - 3/1/2017     None      Orders Placed During Today's Visit      Normal Orders This Visit    Protein / creatinine ratio, urine     US OB/GYN Procedure ([facility])-Today     Future Labs/Procedures Expected by Expires    CBC auto differential  3/1/2017 4/30/2018    Comprehensive metabolic panel   3/1/2017 4/30/2018    Hepatitis B surface antigen  3/1/2017 4/30/2018    HIV-1 and HIV-2 antibodies  3/1/2017 4/30/2018    RPR  3/1/2017 4/30/2018    Rubella antibody, IgG  3/1/2017 4/30/2018    Sickle cell screen  3/1/2017 4/30/2018    Type & Screen  3/1/2017 4/30/2018      Language Assistance Services     ATTENTION: Language assistance services are available, free of charge. Please call [phone].      ATENCIÓN: Si habla jerry, tiene a cruz disposición servicios gratuitos de asistencia lingüística. Llame al [phone].     CHÚ Ý: N?u b?n nói Ti?ng Vi?t, có các d?ch v? h? tr? ngôn ng? mi?n phí dành cho b?n. G?i s? [phone].         [facility] - [facility]/ G[facility] complies with applicable Federal civil rights laws and does not discriminate on the basis of race, color, national origin, age, disability, or sex.

## 2017-03-02 ENCOUNTER — TELEPHONE (OUTPATIENT)
Dept: PHARMACY | Facility: CLINIC | Age: 27
End: 2017-03-02

## 2017-03-02 LAB
HBV SURFACE AG SERPL QL IA: NEGATIVE
HIV 1+2 AB+HIV1 P24 AG SERPL QL IA: NEGATIVE
RPR SER QL: NORMAL
RUBV IGG SER-ACNC: 44.7 IU/ML
RUBV IGG SER-IMP: REACTIVE

## 2017-03-02 NOTE — TELEPHONE ENCOUNTER
Was able to obtain deven for patient through the [employer] for assistance with her Cellcept and Plaquenil Medications.  Will continue to reach out to patient.    Documentation Purposes:  Fund: Systemic Lupus Erythematosus  Approval Date: 3/2/17  Enrollment Period: 1/31/17-1/30/18  GEOLID ID # [id number]  Deven Amount: $15,000 for copayment assistance

## 2017-03-02 NOTE — PROGRESS NOTES
"CC: Absence of menses    Alisia Vines is a 26 y.o. female  presents with complaint of absence of menstruation.  She denies nausea/vomIting/abdominal pain/bleeding.  UPT is positive.     Has an extensive medical history    Past Medical History:   Diagnosis Date    Allergic rhinitis     Anemia     Condyloma acuminata     GERD (gastroesophageal reflux disease)     HSV-2 (herpes simplex virus 2) infection     Lupus nephritis     Mood disorder     Overweight(278.02)     Sjogren's syndrome     Systemic lupus erythematosus     Thrombocytopenia      Past Surgical History:   Procedure Laterality Date     SECTION, LOW TRANSVERSE      RENAL BIOPSY  2013     Social History     Social History    Marital status: Single     Spouse name: N/A    Number of children: 1    Years of education: N/A     Occupational History     [employer]     Social History Main Topics    Smoking status: Never Smoker    Smokeless tobacco: Never Used    Alcohol use No    Drug use: No    Sexual activity: Yes     Partners: Male     Other Topics Concern    Are You Pregnant Or Think You May Be? No    Breast-Feeding No     Social History Narrative    The patient is single and is in a committed relationship.  She lives with her father. She is a college student at [email or web address] and works at [employer].     Family History   Problem Relation Age of Onset    Hypertension Mother     Eczema Brother     Heart disease Son     Hypertension Maternal Grandmother     Diabetes Paternal Grandmother     Lupus Neg Hx     Psoriasis Neg Hx      OB History    Para Term  AB SAB TAB Ectopic Multiple Living   1 1              # Outcome Date GA Lbr José Miguel/2nd Weight Sex Delivery Anes PTL Lv   1 Para    2.948 kg (6 lb 8 oz)  CS-LTranv             /78 (BP Location: Left arm, Patient Position: Sitting, BP Method: Manual)  Resp 17  Ht 5' 5" (1.651 m)  Wt 80.6 kg (177 lb 11.1 oz)  LMP 12/15/2016  BMI 29.57 " kg/m2    ROS:  GENERAL: Denies weight gain or weight loss. Feeling well overall.   SKIN: Denies rash or lesions.   HEAD: Denies head injury or headache.   NODES: Denies enlarged lymph nodes.   CHEST: Denies chest pain or shortness of breath.   CARDIOVASCULAR: Denies palpitations or left sided chest pain.   ABDOMEN: No abdominal pain, constipation, diarrhea, nausea, vomiting or rectal bleeding.   URINARY: No frequency, dysuria, hematuria, or burning on urination.  REPRODUCTIVE: See HPI.   BREASTS: The patient performs breast self-examination and denies pain, lumps, or nipple discharge.   HEMATOLOGIC: No easy bruisability or excessive bleeding.   MUSCULOSKELETAL: Denies joint pain or swelling.   NEUROLOGIC: Denies syncope or weakness.   PSYCHIATRIC: Denies depression, anxiety or mood swings.    PE:   APPEARANCE: Well nourished, well developed, in no acute distress.  AFFECT: WNL, alert and oriented x 3.  PE Deferred to New OB   visit        ASSESSMENT and PLAN:    ICD-10-CM ICD-9-CM    1. Pregnancy complicated by previous  labor, first trimester O09.211 V23.41 US OB/GYN Procedure ([facility])-Today      Hepatitis B surface antigen      HIV-1 and HIV-2 antibodies      RPR      Rubella antibody, IgG      Type & Screen      CBC auto differential      Sickle cell screen      Comprehensive metabolic panel      Protein / creatinine ratio, urine     History Sjorgren syndrome-followed by Dr Quesada at 16 weeks to begin AK intervals  History of child with #rd degree heart block- Pacemaker age 6  CHTN- Norvasc [id number], Hyzaar- 100-12.  Hist HSV  Secondary to extensive history- US performed -Normal IUP at 6w1d with EDC 10/24/17      Patient was counseled today on proper weight gain based on the [employer]'s recommendations based on her pre-pregnancy weight. Discussed foods to avoid in pregnancy (i.e. sushi, fish that are high in mercury, deli meat, and unpasteurized cheeses). Discussed prenatal vitamin options  (i.e. stool softener, DHA). Contingency screen offered - patient desires.    No Follow-up on file.

## 2017-03-03 ENCOUNTER — TELEPHONE (OUTPATIENT)
Dept: OBSTETRICS AND GYNECOLOGY | Facility: CLINIC | Age: 27
End: 2017-03-03

## 2017-03-03 DIAGNOSIS — O10.919 CHRONIC HYPERTENSION COMPLICATING OR REASON FOR CARE DURING PREGNANCY, UNSPECIFIED TRIMESTER: Primary | ICD-10-CM

## 2017-03-03 RX ORDER — METHYLDOPA 250 MG/1
250 TABLET, FILM COATED ORAL EVERY 12 HOURS
Qty: 60 TABLET | Refills: 11 | Status: SHIPPED | OUTPATIENT
Start: 2017-03-03 | End: 2017-03-16 | Stop reason: SDUPTHER

## 2017-03-03 NOTE — TELEPHONE ENCOUNTER
Discussed POC with DR JAZMIN Allan. Will DC Norvasc and Hyzaar and replace with Aldomet 250mg BID. LMOM for Alisia informing of change

## 2017-03-06 ENCOUNTER — TELEPHONE (OUTPATIENT)
Dept: FAMILY MEDICINE | Facility: CLINIC | Age: 27
End: 2017-03-06

## 2017-03-06 ENCOUNTER — TELEPHONE (OUTPATIENT)
Dept: OBSTETRICS AND GYNECOLOGY | Facility: CLINIC | Age: 27
End: 2017-03-06

## 2017-03-06 NOTE — TELEPHONE ENCOUNTER
----- Message from Poppy De La Torre sent at 3/6/2017 10:27 AM CST -----  Call pt at [phone]//returning your call//kaley lawton

## 2017-03-06 NOTE — TELEPHONE ENCOUNTER
----- Message from Amee Griffiths sent at 3/6/2017  8:24 AM CST -----  Would like to speak to nurse about medication. Please call back at [phone]. Thanks//cdb

## 2017-03-06 NOTE — TELEPHONE ENCOUNTER
----- Message from Amee Diop sent at 3/6/2017  9:15 AM CST -----  Patient returning nurse call. Please adv/call [phone].//thanks. cw

## 2017-03-06 NOTE — TELEPHONE ENCOUNTER
----- Message from Poppy De La Torre sent at 3/6/2017 10:27 AM CST -----  Call pt at [phone]//returning your call//kaley lawton

## 2017-03-06 NOTE — TELEPHONE ENCOUNTER
----- Message from Amee Jadon sent at 3/6/2017  4:38 PM CST -----  States she got the medication approved by Jessica Meredith and it can now be called in to     Pt use..  [id number] - [place] - [address], [facility] [address]  [address] [place] [address], [facility] [place] [address]  Phone: [phone] Fax: [phone]    Please adv/call [phone].//thanks. cw

## 2017-03-06 NOTE — TELEPHONE ENCOUNTER
----- Message from Poppy De La Torre sent at 3/6/2017 11:37 AM CST -----  Call pt at [phone]//pt want to know if she can continued to take the medication prozac pt is 7 wks//thks ht

## 2017-03-06 NOTE — TELEPHONE ENCOUNTER
Dr. Wright, the patient is calling and wants to know if you could give her a refill on her Prozac, she has an appointment scheduled with her psychiatrist on next week, and would not be able to get a refill until then, states she really needs this.

## 2017-03-07 DIAGNOSIS — Z76.0 MEDICATION REFILL: Primary | ICD-10-CM

## 2017-03-07 RX ORDER — FLUOXETINE HYDROCHLORIDE 40 MG/1
40 CAPSULE ORAL DAILY
Qty: 30 CAPSULE | Refills: 3 | Status: SHIPPED | OUTPATIENT
Start: 2017-03-07 | End: 2017-03-08 | Stop reason: SDUPTHER

## 2017-03-07 NOTE — TELEPHONE ENCOUNTER
----- Message from Poppy De La Torre sent at 3/7/2017 10:24 AM CST -----  Call pt at [phone]//regarding the prozac that i need to tlak with you about//kaley lawton

## 2017-03-07 NOTE — TELEPHONE ENCOUNTER
----- Message from Poppy De La Torre sent at 3/7/2017 10:24 AM CST -----  Call pt at [phone]//regarding the prozac that i need to tlak with you about//kaley lawton

## 2017-03-08 ENCOUNTER — INITIAL PRENATAL (OUTPATIENT)
Dept: OBSTETRICS AND GYNECOLOGY | Facility: CLINIC | Age: 27
End: 2017-03-08
Payer: COMMERCIAL

## 2017-03-08 VITALS
WEIGHT: 173.31 LBS | DIASTOLIC BLOOD PRESSURE: 100 MMHG | SYSTOLIC BLOOD PRESSURE: 140 MMHG | BODY MASS INDEX: 28.84 KG/M2

## 2017-03-08 DIAGNOSIS — O09.299: ICD-10-CM

## 2017-03-08 DIAGNOSIS — Z76.0 MEDICATION REFILL: ICD-10-CM

## 2017-03-08 DIAGNOSIS — I10 ESSENTIAL HYPERTENSION: ICD-10-CM

## 2017-03-08 DIAGNOSIS — O99.340 DEPRESSION AFFECTING PREGNANCY: Primary | ICD-10-CM

## 2017-03-08 DIAGNOSIS — O09.91 HIGH-RISK PREGNANCY IN FIRST TRIMESTER: ICD-10-CM

## 2017-03-08 DIAGNOSIS — F32.A DEPRESSION AFFECTING PREGNANCY: Primary | ICD-10-CM

## 2017-03-08 PROCEDURE — 0502F SUBSEQUENT PRENATAL CARE: CPT | Mod: S$GLB,,, | Performed by: ADVANCED PRACTICE MIDWIFE

## 2017-03-08 PROCEDURE — 88175 CYTOPATH C/V AUTO FLUID REDO: CPT

## 2017-03-08 PROCEDURE — 87591 N.GONORRHOEAE DNA AMP PROB: CPT

## 2017-03-08 PROCEDURE — 99999 PR PBB SHADOW E&M-EST. PATIENT-LVL III: CPT | Mod: PBBFAC,,, | Performed by: ADVANCED PRACTICE MIDWIFE

## 2017-03-08 RX ORDER — FLUOXETINE HYDROCHLORIDE 40 MG/1
40 CAPSULE ORAL DAILY
Qty: 30 CAPSULE | Refills: 3 | Status: SHIPPED | OUTPATIENT
Start: 2017-03-08 | End: 2017-08-15 | Stop reason: SDUPTHER

## 2017-03-08 NOTE — PROGRESS NOTES
New OB today  Consents not signed for mode of delivery- needs to D/W MD- Blood transfusion only  PN lab - unremarkable with PC R  0.1  Today /100 but Aldomet started yesterday and no meds over W/E- Will re-evaluate next week  Lipitor and Cellcept have been D/C'd - Seeing Dr Blair May 1st- informed pf pregnancy  Last pap 2012  High risk pregnancy- will refer to OB/GYN for evaluation  Pap, GC/CMZ today  Desires Seq 1  Desires Prozac, D/W Dr Eason- OK. Also advised counseling  ,May continue with Plaquinil but discuss with Dr Blair

## 2017-03-08 NOTE — MR AVS SNAPSHOT
[facility] - [facility]/ [facility]  [address]  [place] [address]  Phone: [phone]  Fax: [phone]                  Alisia Ceballos Hankinson   3/8/2017 8:30 AM   Initial Prenatal    Description:  Female : 1990   Provider:  Jessica Meredith CNM   Department:  [facility] - OB/ GYN           Reason for Visit     Initial Prenatal Visit           Diagnoses this Visit        Comments    Depression affecting pregnancy    -  Primary     High-risk pregnancy in first trimester         Essential hypertension         History of fetal anomaly in prior pregnancy, currently pregnant, unspecified trimester         Medication refill                To Do List           Future Appointments        Provider Department Dept Phone    3/9/2017 9:00 AM Donya Diop PA-C [facility] -[phone]    3/16/2017 2:00 PM Janice Arenas MD [facility] OB/ -[phone]    2017 1:00 PM LABORATORY, [facility] [phone]    2017 1:30 PM Shane Blair MD [facility] Rheumatology [phone]    2017 9:10 AM SPECIMEN, [facility] [phone]      Goals (5 Years of Data)     None       These Medications        Disp Refills Start End    fluoxetine (PROZAC) 40 MG capsule 30 capsule 3 3/8/2017     Take 1 capsule (40 mg total) by mouth once daily. - Oral    Pharmacy: [id number] - [place] - [address] [address] Ph #: [phone]         [facility]     [facility] -  Assistance  Registered nurses in the [facility] provide clinical advisement, health education, appointment booking, and other advisory services.  Call for this free service at [phone].             Medications           Message regarding Medications     Verify the changes and/or additions to your medication regime listed below are the same as discussed with your clinician today.  If any of these changes or additions are incorrect,  please notify your healthcare provider.             Verify that the below list of medications is an accurate representation of the medications you are currently taking.  If none reported, the list may be blank. If incorrect, please contact your healthcare provider. Carry this list with you in case of emergency.           Current Medications     atorvastatin (LIPITOR) 10 MG tablet Take 1 tablet (10 mg total) by mouth once daily.    fluoxetine (PROZAC) 40 MG capsule Take 1 capsule (40 mg total) by mouth once daily.    hydroxychloroquine (PLAQUENIL) 200 mg tablet Take 1 tablet (200 mg total) by mouth 2 (two) times daily.    methyldopa (ALDOMET) 250 MG tablet Take 1 tablet (250 mg total) by mouth every 12 (twelve) hours.    multivitamin capsule Take 1 capsule by mouth once daily.    predniSONE (DELTASONE) 10 MG tablet Take 20 mg once daily for 2 weeks, then reduce to 10 mg daily thereafter.    valacyclovir (VALTREX) 500 MG tablet TAKE 1 TABLET ONE TIME DAILY    azithromycin (Z-ORALIA) 250 MG tablet Take 2 tabs by mouth today, then 1 tab daily x 4 days.    efinaconazole (JUBLIA) 10 % Mario Apply once daily to nail and nail fold.  Use daily for up to 1 year.    mycophenolate (CELLCEPT) 500 mg Tab TAKE 1 TABLET THREE TIMES DAILY AFTER MEALS    norgestimate-ethinyl estradiol (TRINESSA, 28,) 0.18/0.215/0.25 mg-35 mcg (28) tablet TAKE 1 TABLET ONCE DAILY.           Clinical Reference Information           Prenatal Vitals     Enc. Date GA Prenatal Vitals Prenatal Pulse Pain Level Urine Albumin/Glucose Edema Presentation Dilation/Effacement/Station    3/8/17 7w1d 140/100 (A) / 78.6 kg (173 lb 4.5 oz)  /  / Absent            Your Vitals Were     BP Weight Last Period BMI       140/100 78.6 kg (173 lb 4.5 oz) 12/15/2016 28.84 kg/m2       Allergies as of 3/8/2017     No Known Allergies      Immunizations Administered on Date of Encounter - 3/8/2017     None      Orders Placed During Today's Visit      Normal Orders This Visit    C.  trachomatis/N. gonorrhoeae by AMP DNA Cervix     Liquid-based pap smear, screening     Future Labs/Procedures Expected by Expires    US OB/GYN Procedure ([facility])-Future  As directed 3/8/2018      Language Assistance Services     ATTENTION: Language assistance services are available, free of charge. Please call [phone].      ATENCIÓN: Si habla ianañol, tiene a cruz disposición servicios gratuitos de asistencia lingüística. Llame al [phone].     CHÚ Ý: N?u b?n nói Ti?ng Vi?t, có các d?ch v? h? tr? ngôn ng? mi?n phí dành cho b?n. G?i s? [phone].         [facility] - [facility]/ [facility] complies with applicable Federal civil rights laws and does not discriminate on the basis of race, color, national origin, age, disability, or sex.

## 2017-03-09 ENCOUNTER — OFFICE VISIT (OUTPATIENT)
Dept: OTOLARYNGOLOGY | Facility: CLINIC | Age: 27
End: 2017-03-09
Payer: COMMERCIAL

## 2017-03-09 VITALS
SYSTOLIC BLOOD PRESSURE: 156 MMHG | WEIGHT: 176.81 LBS | DIASTOLIC BLOOD PRESSURE: 110 MMHG | HEART RATE: 82 BPM | BODY MASS INDEX: 29.42 KG/M2

## 2017-03-09 DIAGNOSIS — H60.501 ACUTE OTITIS EXTERNA OF RIGHT EAR, UNSPECIFIED TYPE: ICD-10-CM

## 2017-03-09 DIAGNOSIS — H61.21 IMPACTED CERUMEN OF RIGHT EAR: Primary | ICD-10-CM

## 2017-03-09 DIAGNOSIS — H91.90 PERCEIVED HEARING CHANGES: ICD-10-CM

## 2017-03-09 LAB
C TRACH DNA SPEC QL NAA+PROBE: NEGATIVE
N GONORRHOEA DNA SPEC QL NAA+PROBE: NEGATIVE

## 2017-03-09 PROCEDURE — 99203 OFFICE O/P NEW LOW 30 MIN: CPT | Mod: 25,S$GLB,, | Performed by: PHYSICIAN ASSISTANT

## 2017-03-09 PROCEDURE — 1160F RVW MEDS BY RX/DR IN RCRD: CPT | Mod: S$GLB,,, | Performed by: PHYSICIAN ASSISTANT

## 2017-03-09 PROCEDURE — 99999 PR PBB SHADOW E&M-EST. PATIENT-LVL III: CPT | Mod: PBBFAC,,, | Performed by: PHYSICIAN ASSISTANT

## 2017-03-09 PROCEDURE — 69210 REMOVE IMPACTED EAR WAX UNI: CPT | Mod: RT,S$GLB,, | Performed by: PHYSICIAN ASSISTANT

## 2017-03-09 RX ORDER — OFLOXACIN 3 MG/ML
3 SOLUTION AURICULAR (OTIC) 2 TIMES DAILY
Qty: 5 ML | Refills: 0 | Status: SHIPPED | OUTPATIENT
Start: 2017-03-09 | End: 2017-03-16

## 2017-03-09 NOTE — PROGRESS NOTES
Subjective:       Patient ID: Alisia Vines is a 26 y.o. female.    Chief Complaint: Ear Fullness    HPI Comments: Patient is a very pleasant 26 year old female here to see me today for the first time for evaluation of hearing loss thought to be related to cerumen impaction in the right ear.  Patient reports diminished hearing AD started about 2-3 weeks ago.  Saw PCP at that time and right ear was flushed out but she was told there was still significant wax present and referred to ENT.  She denies ear pain or drainage.  She's tried Debrox drops last week.  Denies fever.  Occasional dizziness if she stands too fast.  She has no history of otologic surgeries.  No family history of hearing loss.  No loud noise exposure history.    Review of Systems   Constitutional: Negative for fever.   HENT: Positive for congestion and hearing loss (right ear 2-3 weeks). Negative for ear discharge, ear pain, nosebleeds, postnasal drip, rhinorrhea, sinus pressure, sore throat and tinnitus.    Eyes: Negative for discharge and itching.   Respiratory: Negative for cough and shortness of breath.    Cardiovascular: Negative for chest pain and palpitations.   Gastrointestinal: Negative for diarrhea and vomiting.   Musculoskeletal: Negative for neck pain. Arthralgias: occasional related to Lupus.   Allergic/Immunologic: Negative for food allergies.   Neurological: Positive for dizziness (occasional). Negative for light-headedness and headaches.   Hematological: Negative for adenopathy.       Objective:      Physical Exam   Constitutional: She is oriented to person, place, and time. She appears well-developed and well-nourished. No distress.   HENT:   Head: Normocephalic and atraumatic.   Right Ear: Tympanic membrane, external ear and ear canal normal.   Left Ear: Hearing, tympanic membrane, external ear and ear canal normal.  No middle ear effusion.   Nose: Mucosal edema (mild) present. No rhinorrhea. Right sinus exhibits no  maxillary sinus tenderness and no frontal sinus tenderness. Left sinus exhibits no maxillary sinus tenderness and no frontal sinus tenderness.   Mouth/Throat: Uvula is midline, oropharynx is clear and moist and mucous membranes are normal. Mucous membranes are not pale and not dry. No trismus in the jaw. No oropharyngeal exudate or posterior oropharyngeal erythema. Tonsils are 1+ on the right. Tonsils are 1+ on the left. No tonsillar exudate.   Right EAC with cerumen impaction (removal described below)   Eyes: Conjunctivae, EOM and lids are normal. Pupils are equal, round, and reactive to light. Right eye exhibits no chemosis. Left eye exhibits no chemosis. Right conjunctiva is not injected. Left conjunctiva is not injected. No scleral icterus.   Neck: Trachea normal and phonation normal. No tracheal tenderness present. No tracheal deviation present. No thyroid mass and no thyromegaly present.   Cardiovascular: Intact distal pulses.    Pulmonary/Chest: Effort normal. No stridor. No respiratory distress.   Abdominal: She exhibits no distension.   Lymphadenopathy:        Head (right side): No submental, no submandibular, no preauricular, no posterior auricular and no occipital adenopathy present.        Head (left side): No submental, no submandibular, no preauricular, no posterior auricular and no occipital adenopathy present.     She has no cervical adenopathy.   Neurological: She is alert and oriented to person, place, and time. No cranial nerve deficit.   Skin: Skin is warm and dry. No rash noted. No erythema.   Psychiatric: She has a normal mood and affect. Her behavior is normal.         Procedure Note    CHIEF COMPLAINT:  Cerumen Impaction    Description:  The patient was seated in an exam chair.  An ear speculum was placed in the right EAC and was examined under the microscope.  Suction and/or loop curettes were used to remove a large cerumen impaction.  The ear canal was inflamed, swollen and erythematous,  and tympanic membrane was visualized and was dull in appearance.  The patient tolerated the procedure well.    Assessment:       1. Impacted cerumen of right ear    2. Acute otitis externa of right ear, unspecified type    3. Perceived hearing changes        Plan:       1.   Cerumen impaction:  Removed today without difficulty.  I would recommend the use of a wax softening drop, either over the counter Debrox or mineral oil, on a weekly basis.  I also instructed the patient to avoid Qtips.  2.  SERENITY:  Alisia has evidence of right otitis externa.  This was visualized after removal of cerumen.  We discussed the need for dry ear precautions.  For maintenance therapy, I recommend a mixture of distilled vinegar and alcohol.  For acute infection, antibiotic therapy is needed.  For Alisia Ceballos Jasmyn I recommend Floxin.  3.  Perceived hearing changes:  Recommend scheduling audiogram at her convenience.  She elected to try the ear drops first and will call to schedule if she's still having diminished hearing in her right ear.    We discussed her medical conditions, treatments and plan.  Alisia should return to clinic if any issues arise (symptoms worsen or persist), otherwise we will see her back in the clinic only as needed.

## 2017-03-16 ENCOUNTER — PATIENT MESSAGE (OUTPATIENT)
Dept: OBSTETRICS AND GYNECOLOGY | Facility: CLINIC | Age: 27
End: 2017-03-16

## 2017-03-16 ENCOUNTER — TELEPHONE (OUTPATIENT)
Dept: OBSTETRICS AND GYNECOLOGY | Facility: CLINIC | Age: 27
End: 2017-03-16

## 2017-03-16 ENCOUNTER — ROUTINE PRENATAL (OUTPATIENT)
Dept: OBSTETRICS AND GYNECOLOGY | Facility: CLINIC | Age: 27
End: 2017-03-16
Payer: COMMERCIAL

## 2017-03-16 DIAGNOSIS — O99.340 DEPRESSION AFFECTING PREGNANCY: ICD-10-CM

## 2017-03-16 DIAGNOSIS — I10 ESSENTIAL HYPERTENSION: ICD-10-CM

## 2017-03-16 DIAGNOSIS — M32.9 SYSTEMIC LUPUS ERYTHEMATOSUS, UNSPECIFIED SLE TYPE, UNSPECIFIED ORGAN INVOLVEMENT STATUS: ICD-10-CM

## 2017-03-16 DIAGNOSIS — F32.A DEPRESSION AFFECTING PREGNANCY: ICD-10-CM

## 2017-03-16 DIAGNOSIS — O10.919 CHRONIC HYPERTENSION COMPLICATING OR REASON FOR CARE DURING PREGNANCY, UNSPECIFIED TRIMESTER: ICD-10-CM

## 2017-03-16 DIAGNOSIS — Z36.9 ANTENATAL SCREENING ENCOUNTER: Primary | ICD-10-CM

## 2017-03-16 PROCEDURE — 0502F SUBSEQUENT PRENATAL CARE: CPT | Mod: S$GLB,,, | Performed by: OBSTETRICS & GYNECOLOGY

## 2017-03-16 PROCEDURE — 99999 PR PBB SHADOW E&M-EST. PATIENT-LVL II: CPT | Mod: PBBFAC,,, | Performed by: OBSTETRICS & GYNECOLOGY

## 2017-03-16 RX ORDER — METHYLDOPA 500 MG/1
500 TABLET, FILM COATED ORAL EVERY 12 HOURS
Qty: 60 TABLET | Refills: 11 | Status: SHIPPED | OUTPATIENT
Start: 2017-03-16 | End: 2017-08-15 | Stop reason: SDUPTHER

## 2017-03-16 NOTE — TELEPHONE ENCOUNTER
----- Message from Ivis Baron sent at 3/16/2017  7:55 AM CDT -----  Contact: pt   States she is rtn nurses call from yesterday and can be reached at [phone]//thanks/dbw

## 2017-03-16 NOTE — MR AVS SNAPSHOT
[facility] - OB/ GYN  [address] [facility] [place] [address]  Phone: [phone]  Fax: [phone]                  Alisia Ceballos [place]   3/16/2017 2:00 PM   Routine Prenatal    Description:  Female : 1990   Provider:  Janice Arenas MD   Department:  [facility] - OB/ GYN           Reason for Visit     Routine Prenatal Visit           Diagnoses this Visit        Comments     screening encounter    -  Primary            To Do List           Future Appointments        Provider Department Dept Phone    2017 9:30 AM ULTRASOUND, OB-GYN [facility] OB/ -[phone]    2017 2:00 PM ULTRASOUND, MATERNAL FETAL MEDICINE [facility] OB/ -[phone]    2017 1:00 PM [facility]ATORY, [facility] [phone]    2017 1:30 PM Shane Blair MD [facility] Rheumatology [phone]    2017 9:10 AM SPECIMEN, [facility] [phone]      Goals (5 Years of Data)     None      Follow-Up and Disposition     Return in about 4 weeks (around 2017).      [facility]     [facility] Care [facility] - 24/7 Assistance  Registered nurses in the [facility] provide clinical advisement, health education, appointment booking, and other advisory services.  Call for this free service at [phone].             Medications           Message regarding Medications     Verify the changes and/or additions to your medication regime listed below are the same as discussed with your clinician today.  If any of these changes or additions are incorrect, please notify your healthcare provider.        STOP taking these medications     ofloxacin (FLOXIN) 0.3 % otic solution Place 3 drops into the right ear 2 (two) times daily.           Verify that the below list of medications is an accurate representation of the medications you are currently taking.  If none reported, the list may be blank. If incorrect, please contact your healthcare  provider. Carry this list with you in case of emergency.           Current Medications     fluoxetine (PROZAC) 40 MG capsule Take 1 capsule (40 mg total) by mouth once daily.    methyldopa (ALDOMET) 250 MG tablet Take 1 tablet (250 mg total) by mouth every 12 (twelve) hours.    multivitamin capsule Take 1 capsule by mouth once daily.    predniSONE (DELTASONE) 10 MG tablet Take 20 mg once daily for 2 weeks, then reduce to 10 mg daily thereafter.    valacyclovir (VALTREX) 500 MG tablet TAKE 1 TABLET ONE TIME DAILY    efinaconazole (JUBLIA) 10 % Mario Apply once daily to nail and nail fold.  Use daily for up to 1 year.    hydroxychloroquine (PLAQUENIL) 200 mg tablet Take 1 tablet (200 mg total) by mouth 2 (two) times daily.           Clinical Reference Information           Prenatal Vitals     Enc. Date GA Prenatal Vitals Prenatal Pulse Pain Level Urine Albumin/Glucose Edema Presentation Dilation/Effacement/Station    3/8/17 7w1d 140/100 (A) / 78.6 kg (173 lb 4.5 oz)  /  / Absent            Your Vitals Were     Last Period                   12/15/2016           Allergies as of 3/16/2017     No Known Allergies      Immunizations Administered on Date of Encounter - 3/16/2017     None      Orders Placed During Today's Visit     Future Labs/Procedures [facility]  As directed 3/16/2018      Language Assistance Services     ATTENTION: Language assistance services are available, free of charge. Please call [phone].      ATENCIÓN: Si habla español, tiene a cruz disposición servicios gratuitos de asistencia lingüística. Llame al [phone].     CHÚ Ý: N?u b?n nói Ti?ng Vi?t, có các d?ch v? h? tr? ngôn ng? mi?n phí dành cho b?n. G?i s? [phone].         [facility] - OB/ GYN complies with applicable Federal civil rights laws and does not discriminate on the basis of race, color, national origin, age, disability, or sex.

## 2017-03-17 ENCOUNTER — TELEPHONE (OUTPATIENT)
Dept: RHEUMATOLOGY | Facility: CLINIC | Age: 27
End: 2017-03-17

## 2017-03-17 VITALS — WEIGHT: 179 LBS | SYSTOLIC BLOOD PRESSURE: 162 MMHG | DIASTOLIC BLOOD PRESSURE: 92 MMHG | BODY MASS INDEX: 29.79 KG/M2

## 2017-03-17 NOTE — PROGRESS NOTES
Pt here for review of sjogren's, lupus, depression. Pt reports has been feeling well but does not think mood has improved with prozac. Prenatal history reviewed; risks of lupus & sjogren's discussed, plan of management for close monitoring of fetal heart. Last SSA & SSB labs reviewed, done 17. email sent to PCP, rheumatologist, & nephrologist to aid in coordinating care. Aldomet increased to 500mg bid. H/o LTCS,  vs repeat cs discussed, pending clinical status late in pregnancy. Desires sequential screen. F/u MFM next visit

## 2017-03-17 NOTE — TELEPHONE ENCOUNTER
----- Message from Shane Blair MD sent at 3/17/2017 10:38 AM CDT -----  Thanks for the update. I heard about the pregnancy and wanted her to see me immediately. But, she cancelled both her March and April appointments for unknown reasons. I will request my nurse [place] to contact her again and convince her to come in at the earliest. As you might already know she was on cellcept and benlysta during this conception. The last benlysta infusion was on 02/06/2017. Please let me know if I could be of any help in her care.   Regards  Jade.   ----- Message -----     From: Janice Arenas MD     Sent: 3/17/2017  10:24 AM       To: Laurence Morales MD, Shane Blair MD, #    Just wanted to touch base with you guys in case you did not know this pt is currently 8weeks pregnant. Sjogren's was diagnosed in 2012 during her first pregnancy, baby had complete heart block & required pacemaker postpartum. We have adjusted/changed her blood pressure medications, kept her on plaquenil, valtrex, prednisone, prozac & will be closely monitoring fetal heart tones starting at 18 weeks. I would like to have pt continue to follow up with you all so we can coordinate treatment

## 2017-03-20 ENCOUNTER — TELEPHONE (OUTPATIENT)
Dept: NEPHROLOGY | Facility: CLINIC | Age: 27
End: 2017-03-20

## 2017-03-20 NOTE — TELEPHONE ENCOUNTER
Called patient to come in for a sooner appointment per Dr. Morales. No answer, left message for patient to return the call.

## 2017-03-22 ENCOUNTER — OFFICE VISIT (OUTPATIENT)
Dept: RHEUMATOLOGY | Facility: CLINIC | Age: 27
End: 2017-03-22
Payer: COMMERCIAL

## 2017-03-22 ENCOUNTER — LAB VISIT (OUTPATIENT)
Dept: LAB | Facility: HOSPITAL | Age: 27
End: 2017-03-22
Attending: INTERNAL MEDICINE
Payer: COMMERCIAL

## 2017-03-22 VITALS
BODY MASS INDEX: 28.91 KG/M2 | SYSTOLIC BLOOD PRESSURE: 160 MMHG | WEIGHT: 173.75 LBS | HEART RATE: 85 BPM | DIASTOLIC BLOOD PRESSURE: 108 MMHG

## 2017-03-22 DIAGNOSIS — M35.00 SJOGREN'S SYNDROME: ICD-10-CM

## 2017-03-22 DIAGNOSIS — M32.14 SLE GLOMERULONEPHRITIS SYNDROME, WHO CLASS V: ICD-10-CM

## 2017-03-22 DIAGNOSIS — M32.14 OTHER SYSTEMIC LUPUS ERYTHEMATOSUS WITH GLOMERULAR DISEASE: ICD-10-CM

## 2017-03-22 DIAGNOSIS — M32.14 OTHER SYSTEMIC LUPUS ERYTHEMATOSUS WITH GLOMERULAR DISEASE: Primary | ICD-10-CM

## 2017-03-22 LAB
C3 SERPL-MCNC: 47 MG/DL
C4 SERPL-MCNC: 13 MG/DL

## 2017-03-22 PROCEDURE — 99214 OFFICE O/P EST MOD 30 MIN: CPT | Mod: S$GLB,,, | Performed by: INTERNAL MEDICINE

## 2017-03-22 PROCEDURE — 1160F RVW MEDS BY RX/DR IN RCRD: CPT | Mod: S$GLB,,, | Performed by: INTERNAL MEDICINE

## 2017-03-22 PROCEDURE — 86160 COMPLEMENT ANTIGEN: CPT | Mod: 59

## 2017-03-22 PROCEDURE — 36415 COLL VENOUS BLD VENIPUNCTURE: CPT | Mod: PO

## 2017-03-22 PROCEDURE — 99999 PR PBB SHADOW E&M-EST. PATIENT-LVL III: CPT | Mod: PBBFAC,,, | Performed by: INTERNAL MEDICINE

## 2017-03-22 PROCEDURE — 86160 COMPLEMENT ANTIGEN: CPT

## 2017-03-22 NOTE — PROGRESS NOTES
RHEUMATOLOGY CLINIC FOLLOW UP VISIT  Chief complaints:-  To follow-up for lupus.    HPI:-  Alisia Jolly a 26 y.o. pleasant female comes in for a follow up visit with above chief complaints.  She has systemic lupus erythematosus diagnosed in 2013 when she had diffuse lymphadenopathy and anasarca.  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  She has been intermittently following up in the rheumatology clinic.  She was on immunosuppressive therapy and got pregnant accidentally since she was not taking her OCP's during that week because of irregular cycles. So, she has stopped all immunosuppressives except 10 mg daily prednisone and is following with high risk OB clinic. She denies any joint pain today. She also denies seizures or psychosis,  joint swelling, muscle weakness,Raynaud's phenomenon, sicca symptoms .  She has had a pregnancy with her first child having complete heart block likely secondary to her positive SSA antibody.       Review of Systems   Constitutional: Positive for malaise/fatigue. Negative for chills, fever and weight loss.   HENT: Negative for ear discharge, ear pain, hearing loss, nosebleeds and sore throat.    Eyes: Negative for blurred vision, double vision, photophobia, discharge and redness.   Respiratory: Negative for cough, hemoptysis, sputum production and shortness of breath.    Cardiovascular: Negative for chest pain, palpitations and claudication.   Gastrointestinal: Negative for abdominal pain, constipation, diarrhea, melena, nausea and vomiting.   Genitourinary: Negative for dysuria, frequency, hematuria and urgency.   Musculoskeletal: Negative for back pain, falls, joint pain, myalgias and neck pain.   Skin: Negative for itching and rash.   Neurological: Negative for dizziness, tremors, sensory change, speech change, focal weakness, seizures, loss of  consciousness, weakness and headaches.   Endo/Heme/Allergies: Negative for environmental allergies. Does not bruise/bleed easily.   Psychiatric/Behavioral: Negative for hallucinations and memory loss. The patient does not have insomnia.        Past Medical History:   Diagnosis Date    Allergic rhinitis     Anemia     Condyloma acuminata     GERD (gastroesophageal reflux disease)     HSV-2 (herpes simplex virus 2) infection     Lupus nephritis     Mood disorder     Overweight(278.02)     Sjogren's syndrome     Systemic lupus erythematosus     Thrombocytopenia        Past Surgical History:   Procedure Laterality Date     SECTION, LOW TRANSVERSE      RENAL BIOPSY  2013        Social History   Substance Use Topics    Smoking status: Never Smoker    Smokeless tobacco: Never Used    Alcohol use No       Family History   Problem Relation Age of Onset    Hypertension Mother     Eczema Brother     Heart disease Son     Hypertension Maternal Grandmother     Diabetes Paternal Grandmother     Lupus Neg Hx     Psoriasis Neg Hx        Review of patient's allergies indicates:  No Known Allergies        Physical examination:-    Vitals:    17 0801   BP: (!) 160/108   Pulse: 85   Weight: 78.8 kg (173 lb 11.6 oz)   PainSc: 0-No pain       Physical Exam   Constitutional: She is oriented to person, place, and time and well-developed, well-nourished, and in no distress. No distress.   HENT:   Head: Normocephalic.   Mouth/Throat: Oropharynx is clear and moist. No oropharyngeal exudate.   Good oral pooling of saliva.   Eyes: Conjunctivae and EOM are normal. Pupils are equal, round, and reactive to light.   Neck: Normal range of motion. Neck supple.   Cardiovascular: Normal rate and regular rhythm.    Pulmonary/Chest: Effort normal. No respiratory distress.   Abdominal: Soft. There is no tenderness.   Musculoskeletal:   No synovitis or effusion in small joints of hands or feet.  Good range of  motion in the large joints without any crepitus or effusion.  Nail fold capillaries look normal.  Good warmth of the peripheral extremities.  No active rash anywhere.  No sclerodactyly or telangiectasias.   Neurological: She is alert and oriented to person, place, and time. No cranial nerve deficit.   Skin: Skin is warm. She is not diaphoretic. No erythema. No pallor.   Psychiatric: Mood, affect and judgment normal.   Nursing note and vitals reviewed.      Labs:-  Results for HEIDI SHAIKH (MRN [id number]) as of 1/30/2017 17:26   Ref. Range 2/29/2016 09:21 4/5/2016 10:25 1/23/2017 08:10   HARPREET HEP-2 Titer Unknown Positive >=1:2560...  Positive >=1:2560...   Anti-SSA Antibody Latest Ref Range: 0.00 - 19.99 .20 (H)  201.26 (H)   Anti-SSA Interpretation Latest Ref Range: Negative  Positive (A)  Positive (A)   Anti-SSB Antibody Latest Ref Range: 0.00 - 19.99 EU 29.69 (H)  46.84 (H)   Anti-SSB Interpretation Latest Ref Range: Negative  Positive (A)  Positive (A)   ds DNA Ab Latest Ref Range: Negative 1:10  Negative 1:10  Negative 1:10   Anti Sm Antibody Latest Ref Range: 0.00 - 19.99 EU 2.96  2.89   Anti-Sm Interpretation Latest Ref Range: Negative  Negative  Negative   Anti Sm/RNP Antibody Latest Ref Range: 0.00 - 19.99 EU 1.34  4.12   Anti-Sm/RNP Interpretation Latest Ref Range: Negative  Negative  Negative   Complement (C-3) Latest Ref Range: 50 - 180 mg/dL 49 (L) 49 (L) 45 (L)   Complement (C-4) Latest Ref Range: 11 - 44 mg/dL 11 15 10 (L)       Assessment/Plans:-  # SLE (systemic lupus erythematosus)  High complexity.  On 10 mg daily prednisone . Not on any immunosuppressive therapy at this time since she is pregnant. There is no contraindication to take plaquenil but she does not want to take it if the lupus activity is not high. Check activity markers today including Complement levels and quantify proteinuria. Reinforced the importance of close monitoring.      # SLE glomerulonephritis syndrome, WHO  class V   Systemic lupus erythematosus with speckled HARPREET and positive SSA, Turpin and SSB antibody.  Quantify proteinuria today. Last time it was normal.     # RTC in 8 weeks.  Disclaimer: This note was prepared using voice recognition system and is likely to have sound alike errors .  Please call me with any questions

## 2017-03-22 NOTE — MR AVS SNAPSHOT
[facility] Rheumatology  [address] [facility] [address] [place] [address]  Phone: [phone]  Fax: [phone]                  Alisia Ceballos [place]   3/22/2017 8:00 AM   Office Visit    Description:  Female : 1990   Provider:  Shane Blair MD   Department:  [facility] - Rheumatology           Reason for Visit     Disease Management           Diagnoses this Visit        Comments    Other systemic lupus erythematosus with glomerular disease    -  Primary     SLE glomerulonephritis syndrome, WHO class V         Sjogren's syndrome                To Do List           Future Appointments        Provider Department Dept Phone    2017 9:30 AM ULTRASOUND, OB-GYN [facility] OB/ -[phone]    2017 2:00 PM ULTRASOUND, MATERNAL FETAL MEDICINE [facility] OB/ -[phone]    2017 1:00 PM [facility]ATORY, [facility] [phone]    2017 1:30 PM Shane Blair MD [facility] Rheumatology [phone]    2017 9:10 AM SPECIMEN, [facility] [phone]      Goals (5 Years of Data)     None      Follow-Up and Disposition     Return in about 2 months (around 2017).      [facility]     [facility] Care [facility] - 24/7 Assistance  Registered nurses in the [facility] provide clinical advisement, health education, appointment booking, and other advisory services.  Call for this free service at [phone].             Medications           Message regarding Medications     Verify the changes and/or additions to your medication regime listed below are the same as discussed with your clinician today.  If any of these changes or additions are incorrect, please notify your healthcare provider.             Verify that the below list of medications is an accurate representation of the medications you are currently taking.  If none reported, the list may be blank. If incorrect, please contact your healthcare provider. Carry  this list with you in case of emergency.           Current Medications     efinaconazole (JUBLIA) 10 % Mario Apply once daily to nail and nail fold.  Use daily for up to 1 year.    fluoxetine (PROZAC) 40 MG capsule Take 1 capsule (40 mg total) by mouth once daily.    hydroxychloroquine (PLAQUENIL) 200 mg tablet Take 1 tablet (200 mg total) by mouth 2 (two) times daily.    methyldopa (ALDOMET) 500 MG tablet Take 1 tablet (500 mg total) by mouth every 12 (twelve) hours.    multivitamin capsule Take 1 capsule by mouth once daily.    predniSONE (DELTASONE) 10 MG tablet Take 20 mg once daily for 2 weeks, then reduce to 10 mg daily thereafter.    valacyclovir (VALTREX) 500 MG tablet TAKE 1 TABLET ONE TIME DAILY           Clinical Reference Information           Prenatal Vitals     Enc. Date GA Prenatal Vitals Prenatal Pulse Pain Level Urine Albumin/Glucose Edema Presentation Dilation/Effacement/Station    3/22/17 9w1d 160/108 (A) / 78.8 kg (173 lb 11.6 oz)  85         3/16/17 8w2d 162/92 (A) / 81.2 kg (179 lb 0.2 oz)           3/8/17 7w1d 140/100 (A) / 78.6 kg (173 lb 4.5 oz)  /  / Absent            Your Vitals Were     BP Pulse Weight Last Period BMI    160/108 85 78.8 kg (173 lb 11.6 oz) 12/15/2016 28.91 kg/m2      Blood Pressure          Most Recent Value    BP  (!)  160/108      Allergies as of 3/22/2017     No Known Allergies      Immunizations Administered on Date of Encounter - 3/22/2017     None      Orders Placed During Today's Visit     Future Labs/Procedures Expected by Expires    C3 complement  3/22/2017 3/22/2018    C4 complement  3/22/2017 3/22/2018    Protein / creatinine ratio, urine  As directed 3/22/2018    Urinalysis  As directed 5/21/2018      Language Assistance Services     ATTENTION: Language assistance services are available, free of charge. Please call [phone].      ATENCIÓN: Si habla español, tiene a cruz disposición servicios gratuitos de asistencia lingüística. Llame al  [phone].     ALBERTO Ý: N?u b?n nói Ti?ng Vi?t, có các d?ch v? h? tr? ngôn ng? mi?n phí dành cho b?n. G?i s? [phone].         [facility] - Rheumatology complies with applicable Federal civil rights laws and does not discriminate on the basis of race, color, national origin, age, disability, or sex.

## 2017-04-01 ENCOUNTER — NURSE TRIAGE (OUTPATIENT)
Dept: ADMINISTRATIVE | Facility: CLINIC | Age: 27
End: 2017-04-01

## 2017-04-01 NOTE — TELEPHONE ENCOUNTER
"    Reason for Disposition   [1] Patient claims chest pain is same as previously diagnosed "heartburn" AND [2] describes burning in chest AND [3] accompanying sour taste in mouth    Protocols used: ST CHEST PAIN-A-AH    Patient called and is approximately 10 weeks pregnant and has complaints of heart burn. Patient wants to know what she can take over the counter. Contacted [facility] L& D unit at [facility] and Irish Xiong CNM assisted. Irish explained that patient can take regular Tums OTC (no more than 3 Tums per day). If this does not alleviate the heartburn, she should call her provider for additional information. Patient verbalized understanding of instructions given.   "

## 2017-04-06 ENCOUNTER — LAB VISIT (OUTPATIENT)
Dept: LAB | Facility: HOSPITAL | Age: 27
End: 2017-04-06
Attending: INTERNAL MEDICINE
Payer: COMMERCIAL

## 2017-04-06 ENCOUNTER — PROCEDURE VISIT (OUTPATIENT)
Dept: OBSTETRICS AND GYNECOLOGY | Facility: CLINIC | Age: 27
End: 2017-04-06
Payer: COMMERCIAL

## 2017-04-06 DIAGNOSIS — Z36.9 ANTENATAL SCREENING ENCOUNTER: ICD-10-CM

## 2017-04-06 PROCEDURE — 76813 OB US NUCHAL MEAS 1 GEST: CPT | Mod: S$GLB,,, | Performed by: OBSTETRICS & GYNECOLOGY

## 2017-04-06 PROCEDURE — 36415 COLL VENOUS BLD VENIPUNCTURE: CPT | Mod: PO

## 2017-04-06 PROCEDURE — 81508 FTL CGEN ABNOR TWO PROTEINS: CPT

## 2017-04-10 LAB
B-HCG (M.O.M.) (SS1): NORMAL
CRL MEASUREMENT (SS1): 50 MM
DOWN SYNDROME (<1:25) (SS1): NORMAL
DS BASED ON MAT. AGE (SS1): NORMAL
ETHNIC ORIGIN (SS1): NORMAL
GESTATIONAL AGE (DAYS)(SS1): 5
GESTATIONAL AGE (WEEKS)(SS1): 11
HCG (SS1): 82.2 IU/ML
INSULIN DEPEND. DIABETES (SS1): NORMAL
MATERNAL AGE AT EDD (YRS) (SS1): 25
MATERNAL WEIGHT (LBS) (SS1): 173
MULTIPLE GESTATION (SS1): NORMAL
NASAL BONE (SS1): NORMAL
NUCHAL TRANSL. (M.O.M.) (SS1): NORMAL
NUCHAL TRANSLUCENCY (SS1): 1.1 MM
PAPP-A (M.O.M.) (SS1): NORMAL
PAPP-A (SS1): 893.6 NG/ML
SEQ. SCREEN PART 1: NEGATIVE
SEQUENTIAL SCREEN I INTERP.: NORMAL
TRISOMY 18 (<1:100) (SS1): NORMAL
ULTRASOUND DATE (SS1): NORMAL

## 2017-04-12 ENCOUNTER — OFFICE VISIT (OUTPATIENT)
Dept: OBSTETRICS AND GYNECOLOGY | Facility: CLINIC | Age: 27
End: 2017-04-12
Payer: COMMERCIAL

## 2017-04-12 ENCOUNTER — NURSE TRIAGE (OUTPATIENT)
Dept: ADMINISTRATIVE | Facility: CLINIC | Age: 27
End: 2017-04-12

## 2017-04-12 DIAGNOSIS — M35.00 SJOGREN'S SYNDROME, WITH UNSPECIFIED ORGAN INVOLVEMENT: ICD-10-CM

## 2017-04-12 DIAGNOSIS — O09.629: Primary | ICD-10-CM

## 2017-04-12 DIAGNOSIS — O09.629: ICD-10-CM

## 2017-04-12 PROCEDURE — 99214 OFFICE O/P EST MOD 30 MIN: CPT | Mod: 25,S$GLB,, | Performed by: PEDIATRICS

## 2017-04-12 PROCEDURE — 76801 OB US < 14 WKS SINGLE FETUS: CPT | Mod: S$GLB,,, | Performed by: PEDIATRICS

## 2017-04-13 ENCOUNTER — TELEPHONE (OUTPATIENT)
Dept: OBSTETRICS AND GYNECOLOGY | Facility: CLINIC | Age: 27
End: 2017-04-13

## 2017-04-13 ENCOUNTER — HOSPITAL ENCOUNTER (EMERGENCY)
Facility: HOSPITAL | Age: 27
Discharge: HOME | End: 2017-04-13
Payer: COMMERCIAL

## 2017-04-13 VITALS
RESPIRATION RATE: 20 BRPM | DIASTOLIC BLOOD PRESSURE: 86 MMHG | HEIGHT: 65 IN | TEMPERATURE: 100 F | SYSTOLIC BLOOD PRESSURE: 177 MMHG | BODY MASS INDEX: 29.66 KG/M2 | WEIGHT: 178 LBS | HEART RATE: 105 BPM | OXYGEN SATURATION: 99 %

## 2017-04-13 DIAGNOSIS — E86.0 MILD DEHYDRATION: ICD-10-CM

## 2017-04-13 DIAGNOSIS — O21.9 NAUSEA/VOMITING IN PREGNANCY: Primary | ICD-10-CM

## 2017-04-13 DIAGNOSIS — E87.6 HYPOKALEMIA: ICD-10-CM

## 2017-04-13 LAB
ALBUMIN SERPL BCP-MCNC: 3.1 G/DL
ALP SERPL-CCNC: 43 U/L
ALT SERPL W/O P-5'-P-CCNC: 8 U/L
ANION GAP SERPL CALC-SCNC: 10 MMOL/L
AST SERPL-CCNC: 15 U/L
BASOPHILS # BLD AUTO: 0 K/UL
BASOPHILS NFR BLD: 0 %
BILIRUB SERPL-MCNC: 0.4 MG/DL
BUN SERPL-MCNC: 5 MG/DL
CALCIUM SERPL-MCNC: 8.6 MG/DL
CHLORIDE SERPL-SCNC: 103 MMOL/L
CO2 SERPL-SCNC: 20 MMOL/L
CREAT SERPL-MCNC: 0.6 MG/DL
DIFFERENTIAL METHOD: ABNORMAL
EOSINOPHIL # BLD AUTO: 0 K/UL
EOSINOPHIL NFR BLD: 0 %
ERYTHROCYTE [DISTWIDTH] IN BLOOD BY AUTOMATED COUNT: 14.2 %
EST. GFR  (AFRICAN AMERICAN): >60 ML/MIN/1.73 M^2
EST. GFR  (NON AFRICAN AMERICAN): >60 ML/MIN/1.73 M^2
GLUCOSE SERPL-MCNC: 115 MG/DL
HCT VFR BLD AUTO: 29.1 %
HGB BLD-MCNC: 10.3 G/DL
LYMPHOCYTES # BLD AUTO: 0.6 K/UL
LYMPHOCYTES NFR BLD: 6.6 %
MCH RBC QN AUTO: 28.3 PG
MCHC RBC AUTO-ENTMCNC: 35.4 %
MCV RBC AUTO: 80 FL
MONOCYTES # BLD AUTO: 0.6 K/UL
MONOCYTES NFR BLD: 6.1 %
NEUTROPHILS # BLD AUTO: 8.1 K/UL
NEUTROPHILS NFR BLD: 87.6 %
PLATELET # BLD AUTO: 116 K/UL
PMV BLD AUTO: 9.8 FL
POTASSIUM SERPL-SCNC: 3 MMOL/L
PROT SERPL-MCNC: 7.6 G/DL
RBC # BLD AUTO: 3.64 M/UL
SODIUM SERPL-SCNC: 133 MMOL/L
WBC # BLD AUTO: 9.25 K/UL

## 2017-04-13 PROCEDURE — 96374 THER/PROPH/DIAG INJ IV PUSH: CPT

## 2017-04-13 PROCEDURE — 80053 COMPREHEN METABOLIC PANEL: CPT

## 2017-04-13 PROCEDURE — 85025 COMPLETE CBC W/AUTO DIFF WBC: CPT

## 2017-04-13 PROCEDURE — 96361 HYDRATE IV INFUSION ADD-ON: CPT

## 2017-04-13 PROCEDURE — 63600175 PHARM REV CODE 636 W HCPCS: Performed by: PHYSICIAN ASSISTANT

## 2017-04-13 PROCEDURE — 25000003 PHARM REV CODE 250: Performed by: PHYSICIAN ASSISTANT

## 2017-04-13 PROCEDURE — 99284 EMERGENCY DEPT VISIT MOD MDM: CPT

## 2017-04-13 RX ORDER — POTASSIUM CHLORIDE 750 MG/1
10 TABLET, EXTENDED RELEASE ORAL DAILY
Qty: 3 TABLET | Refills: 0 | Status: SHIPPED | OUTPATIENT
Start: 2017-04-13 | End: 2017-05-02

## 2017-04-13 RX ORDER — METHYLDOPA 250 MG/1
500 TABLET, FILM COATED ORAL ONCE
Status: COMPLETED | OUTPATIENT
Start: 2017-04-13 | End: 2017-04-13

## 2017-04-13 RX ORDER — PROMETHAZINE HYDROCHLORIDE 25 MG/1
25 SUPPOSITORY RECTAL EVERY 6 HOURS PRN
Qty: 10 SUPPOSITORY | Refills: 0 | Status: SHIPPED | OUTPATIENT
Start: 2017-04-13 | End: 2017-05-02

## 2017-04-13 RX ORDER — DOXYLAMINE SUCCINATE AND PYRIDOXINE HYDROCHLORIDE, DELAYED RELEASE TABLETS 10 MG/10 MG 10; 10 MG/1; MG/1
2 TABLET, DELAYED RELEASE ORAL NIGHTLY
Qty: 30 TABLET | Refills: 0 | Status: SHIPPED | OUTPATIENT
Start: 2017-04-13 | End: 2017-05-21 | Stop reason: CLARIF

## 2017-04-13 RX ORDER — METOCLOPRAMIDE HYDROCHLORIDE 5 MG/ML
10 INJECTION INTRAMUSCULAR; INTRAVENOUS
Status: COMPLETED | OUTPATIENT
Start: 2017-04-13 | End: 2017-04-13

## 2017-04-13 RX ORDER — POTASSIUM CHLORIDE 750 MG/1
50 TABLET, EXTENDED RELEASE ORAL
Status: COMPLETED | OUTPATIENT
Start: 2017-04-13 | End: 2017-04-13

## 2017-04-13 RX ADMIN — METOCLOPRAMIDE 10 MG: 5 INJECTION, SOLUTION INTRAMUSCULAR; INTRAVENOUS at 09:04

## 2017-04-13 RX ADMIN — POTASSIUM CHLORIDE 50 MEQ: 750 TABLET, EXTENDED RELEASE ORAL at 10:04

## 2017-04-13 RX ADMIN — SODIUM CHLORIDE 1000 ML: 0.9 INJECTION, SOLUTION INTRAVENOUS at 09:04

## 2017-04-13 RX ADMIN — METHYLDOPA 500 MG: 250 TABLET ORAL at 10:04

## 2017-04-13 NOTE — ED AVS SNAPSHOT
[facility]  [address] [facility] [address]               Alisia Ceballos Hodges   2017  8:59 AM   ED    Description:  Female : 1990   Department:  [facility]            Your Care was Coordinated By:     Provider Role From To    Mere Aldana PA-C Physician Assistant 17 [phone] --      Reason for Visit     Emesis During Pregnancy           Diagnoses this Visit        Comments    Nausea/vomiting in pregnancy    -  Primary     Hypokalemia         Mild dehydration           ED Disposition     None           To Do List           Follow-up Information     Follow up with Janice Arenas MD In 2 days.    Specialties:  Obstetrics, Obstetrics and Gynecology    Contact information:    [address]  [facility] [address] [phone]         These Medications        Disp Refills Start End    doxylamine-pyridoxine 10-10 mg TbEC 30 tablet 0 2017     Take 2 tablets by mouth every evening. - Oral    Pharmacy: [facility] #[id number] - [address] A Ph #: [phone]       promethazine (PHENERGAN) 25 MG suppository 10 suppository 0 2017     Place 1 suppository (25 mg total) rectally every 6 (six) hours as needed for Nausea. - Rectal    Pharmacy: [facility] #[id number] - [address] A Ph #: [phone]       potassium chloride (KLOR-CON) 10 MEQ TbSR 3 tablet 0 2017     Take 1 tablet (10 mEq total) by mouth once daily. - Oral    Pharmacy: [facility] #[facility] [address] A Ph #: [phone]         [facility]     [facility] - 24/7 Assistance  Unless otherwise directed by your provider, please contact [facility], our nurse care line that is available for 24/7 assistance.     Registered nurses in the [facility] provide: appointment scheduling, clinical advisement, health  education, and other advisory services.  Call: [phone] (toll free)               Medications           Message regarding Medications     Verify the changes and/or additions to your medication regime listed below are the same as discussed with your clinician today.  If any of these changes or additions are incorrect, please notify your healthcare provider.        START taking these NEW medications        Refills    doxylamine-pyridoxine 10-10 mg TbEC 0    Sig: Take 2 tablets by mouth every evening.    Class: Print    Route: Oral    promethazine (PHENERGAN) 25 MG suppository 0    Sig: Place 1 suppository (25 mg total) rectally every 6 (six) hours as needed for Nausea.    Class: Print    Route: Rectal    potassium chloride (KLOR-CON) 10 MEQ TbSR 0    Sig: Take 1 tablet (10 mEq total) by mouth once daily.    Class: Print    Route: Oral      These medications were administered today        Dose Freq    sodium chloride 0.9% bolus 1,000 mL 1,000 mL ED 1 Time    Sig: Inject 1,000 mLs into the vein ED 1 Time.    Class: Normal    Route: Intravenous    Cosign for Ordering: Required by Kavin Pacheco MD    metoclopramide HCl injection 10 mg 10 mg ED 1 Time    Sig: Inject 2 mLs (10 mg total) into the vein ED 1 Time.    Class: Normal    Route: Intravenous    Cosign for Ordering: Required by Kavin Pacheco MD    methyldopa tablet 500 mg 500 mg Once    Sig: Take 2 tablets (500 mg total) by mouth once.    Class: Normal    Route: Oral    Cosign for Ordering: Required by Kavin Pacheco MD    potassium chloride SA CR tablet 50 mEq 50 mEq ED 1 Time    Sig: Take 5 tablets (50 mEq total) by mouth ED 1 Time.    Class: Normal    Route: Oral    Cosign for Ordering: Required by Kavin Pacheco MD           Verify that the below list of medications is an accurate representation of the medications you are currently taking.  If none reported, the list may be blank. If incorrect, please contact your healthcare provider.  "Carry this list with you in case of emergency.           Current Medications     doxylamine-pyridoxine 10-10 mg TbEC Take 2 tablets by mouth every evening.    efinaconazole (JUBLIA) 10 % Mario Apply once daily to nail and nail fold.  Use daily for up to 1 year.    fluoxetine (PROZAC) 40 MG capsule Take 1 capsule (40 mg total) by mouth once daily.    hydroxychloroquine (PLAQUENIL) 200 mg tablet Take 1 tablet (200 mg total) by mouth 2 (two) times daily.    methyldopa (ALDOMET) 500 MG tablet Take 1 tablet (500 mg total) by mouth every 12 (twelve) hours.    methyldopa tablet 500 mg Take 2 tablets (500 mg total) by mouth once.    multivitamin capsule Take 1 capsule by mouth once daily.    potassium chloride (KLOR-CON) 10 MEQ TbSR Take 1 tablet (10 mEq total) by mouth once daily.    predniSONE (DELTASONE) 10 MG tablet Take 20 mg once daily for 2 weeks, then reduce to 10 mg daily thereafter.    promethazine (PHENERGAN) 25 MG suppository Place 1 suppository (25 mg total) rectally every 6 (six) hours as needed for Nausea.    valacyclovir (VALTREX) 500 MG tablet TAKE 1 TABLET ONE TIME DAILY           Clinical Reference Information           Your Vitals Were     BP Pulse Temp Resp Height Weight    177/86 105 99.8 °F (37.7 °C) (Oral) 20 5' 5" (1.651 m) 80.7 kg (178 lb)    Last Period SpO2 BMI          12/15/2016 99% 29.62 kg/m2        Allergies as of 4/13/2017     No Known Allergies      Immunizations Administered on Date of Encounter - 4/13/2017     None      ED Micro, Lab, POCT     Start Ordered       Status Ordering Provider    04/13/17 0909 04/13/17 0911  Comprehensive metabolic panel  STAT      Final result     04/13/17 0909 04/13/17 0911  CBC auto differential  STAT      Final result       ED Imaging Orders     None      Discharge References/Attachments     HYPEREMESIS GRAVIDARUM (ENGLISH)      Your Scheduled Appointments     May 03, 2017 10:30 AM CDT   Ultrasound with ULTRASOUND, [facility]   [facility]    [address] [place] [address]   [phone]            May 19, 2017  9:10 AM CDT   Urine with SPECIMEN, [facility]    [address] [place] [address]   [phone]            May 19, 2017  9:45 AM CDT   Non-Fasting Lab with LABORATORY, [facility]    [address] [facility] [address] [place] [address]   [phone]            May 22, 2017 11:00 AM CDT   Established Patient Visit with Shane Blair MD   [facility] Rheumatology ([facility])    [address] [facility] [address]  [place] [address]   [phone]            May 22, 2017 11:30 AM CDT   Established Patient Visit with Laurence Morales MD   [facility] Nephrology ([facility])    [address] [facility] [address]  [place] [address]   [phone]               [facility] complies with applicable Federal civil rights laws and does not discriminate on the basis of race, color, national origin, age, disability, or sex.        Language Assistance Services     ATTENTION: Language assistance services are available, free of charge. Please call [phone].      ATENCIÓN: Si habla español, tiene a cruz disposición servicios gratuitos de asistencia lingüística. Llame al [phone].     [facility] Ý: N?u b?n nói Ti?ng Vi?t, có các d?ch v? h? tr? ngôn ng? mi?n phí dành cho b?n. G?i s? [phone].

## 2017-04-13 NOTE — ED PROVIDER NOTES
History      Chief Complaint   Patient presents with    Emesis During Pregnancy     I cant keep anything down since yesterday, im dehydrated, pt is 12 weeks pregnant        Review of patient's allergies indicates:  No Known Allergies     HPI   HPI    2017, 9:04 AM   History obtained from the patient      History of Present Illness: Alisia Vines is a 26 y.o. female patient who presents to the Emergency Department for nausea and vomiting with pregnancy since yesterday.  She is 12 weeks pregnant, IUP confirmed with US through her obgyn.  She denies abd pain, dysuria, vag bleed or fever. Symptoms are moderate in severity.     No further complaints or concerns at this time.           PCP: Saumya Quinonez MD       Past Medical History:  Past Medical History:   Diagnosis Date    Allergic rhinitis     Anemia     Condyloma acuminata     GERD (gastroesophageal reflux disease)     HSV-2 (herpes simplex virus 2) infection     Lupus nephritis     Mood disorder     Overweight     Sjogren's syndrome     Systemic lupus erythematosus     Thrombocytopenia          Past Surgical History:  Past Surgical History:   Procedure Laterality Date     SECTION, LOW TRANSVERSE      RENAL BIOPSY  2013           Family History:  Family History   Problem Relation Age of Onset    Hypertension Mother     Eczema Brother     Heart disease Son     Hypertension Maternal Grandmother     Diabetes Paternal Grandmother     Lupus Neg Hx     Psoriasis Neg Hx            Social History:  Social History     Social History Main Topics    Smoking status: Never Smoker    Smokeless tobacco: Never Used    Alcohol use No    Drug use: No    Sexual activity: Yes     Partners: Male       ROS     Review of Systems   Constitutional: Negative for fever.   HENT: Negative for sore throat.    Respiratory: Negative for shortness of breath.    Cardiovascular: Negative for chest pain.   Gastrointestinal: Positive for  "nausea and vomiting. Negative for abdominal pain.   Genitourinary: Negative for dysuria and vaginal bleeding.   Musculoskeletal: Negative for back pain.   Skin: Negative for rash.   Neurological: Negative for weakness.   Hematological: Does not bruise/bleed easily.   All other systems reviewed and are negative.      Physical Exam      Initial Vitals   BP Pulse Resp Temp SpO2   04/13/17 0844 04/13/17 0844 04/13/17 0844 04/13/17 0844 04/13/17 0844   173/106 126 16 99.8 °F (37.7 °C) 100 %     Physical Exam  Vital signs and nursing notes reviewed.  Constitutional: Patient is in NAD. Awake and alert. Well-developed and well-nourished.  Head: Atraumatic. Normocephalic.  Eyes: PERRL. EOM intact. Conjunctivae nl. No scleral icterus.  ENT: Mucous membranes are moist. Oropharynx is clear.  Neck: Supple. No JVD. No lymphadenopathy.  No meningismus  Cardiovascular: Regular rate and rhythm. No murmurs, rubs, or gallops. Distal pulses are 2+ and symmetric.  Pulmonary/Chest: No respiratory distress. Clear to auscultation bilaterally. No wheezing, rales, or rhonchi.  Abdominal: Soft. Non-distended. No TTP. No rebound, guarding, or rigidity. Good bowel sounds.  Genitourinary: No CVA tenderness  Musculoskeletal: Moves all extremities. No edema.   Skin: Warm and dry.  Neurological: Awake and alert. No acute focal neurological deficits are appreciated.  Psychiatric: Normal affect. Good eye contact. Appropriate in content.      ED Course          Procedures  ED Vital Signs:  Vitals:    04/13/17 0844 04/13/17 1057   BP: (!) 173/106 (!) 177/86   Pulse: (!) 126 105   Resp: 16 20   Temp: 99.8 °F (37.7 °C)    TempSrc: Oral    SpO2: 100% 99%   Weight: 80.7 kg (178 lb)    Height: 5' 5" (1.651 m)          Results for orders placed or performed during the hospital encounter of 04/13/17   Comprehensive metabolic panel   Result Value Ref Range    Sodium 133 (L) 136 - 145 mmol/L    Potassium 3.0 (L) 3.5 - 5.1 mmol/L    Chloride 103 95 - 110 mmol/L "    CO2 20 (L) 23 - 29 mmol/L    Glucose 115 (H) 70 - 110 mg/dL    BUN, Bld 5 (L) 6 - 20 mg/dL    Creatinine 0.6 0.5 - 1.4 mg/dL    Calcium 8.6 (L) 8.7 - 10.5 mg/dL    Total Protein 7.6 6.0 - 8.4 g/dL    Albumin 3.1 (L) 3.5 - 5.2 g/dL    Total Bilirubin 0.4 0.1 - 1.0 mg/dL    Alkaline Phosphatase 43 (L) 55 - 135 U/L    AST 15 10 - 40 U/L    ALT 8 (L) 10 - 44 U/L    Anion Gap 10 8 - 16 mmol/L    eGFR if African American >60 >60 mL/min/1.73 m^2    eGFR if non African American >60 >60 mL/min/1.73 m^2   CBC auto differential   Result Value Ref Range    WBC 9.25 3.90 - 12.70 K/uL    RBC 3.64 (L) 4.00 - 5.40 M/uL    Hemoglobin 10.3 (L) 12.0 - 16.0 g/dL    Hematocrit 29.1 (L) 37.0 - 48.5 %    MCV 80 (L) 82 - 98 fL    MCH 28.3 27.0 - 31.0 pg    MCHC 35.4 32.0 - 36.0 %    RDW 14.2 11.5 - 14.5 %    Platelets 116 (L) 150 - 350 K/uL    MPV 9.8 9.2 - 12.9 fL    Gran # 8.1 (H) 1.8 - 7.7 K/uL    Lymph # 0.6 (L) 1.0 - 4.8 K/uL    Mono # 0.6 0.3 - 1.0 K/uL    Eos # 0.0 0.0 - 0.5 K/uL    Baso # 0.00 0.00 - 0.20 K/uL    Gran% 87.6 (H) 38.0 - 73.0 %    Lymph% 6.6 (L) 18.0 - 48.0 %    Mono% 6.1 4.0 - 15.0 %    Eosinophil% 0.0 0.0 - 8.0 %    Basophil% 0.0 0.0 - 1.9 %    Differential Method Automated              Imaging Results:  Imaging Results     None             The Emergency Provider reviewed the vital signs and test results, which are outlined above.    ED Discussion             Medication(s) given in the ER:  Medications   sodium chloride 0.9% bolus 1,000 mL (0 mLs Intravenous Stopped 4/13/17 1129)   metoclopramide HCl injection 10 mg (10 mg Intravenous Given 4/13/17 0944)   methyldopa tablet 500 mg (500 mg Oral Given 4/13/17 1002)   potassium chloride SA CR tablet 50 mEq (50 mEq Oral Given 4/13/17 1055)           Follow-up Information     Follow up with Janice Arenas MD In 2 days.    Specialties:  Obstetrics, Obstetrics and Gynecology    Contact information:    [address] [place] [address] [phone]                 Discharge Medication List as of 4/13/2017 11:08 AM      START taking these medications    Details   doxylamine-pyridoxine 10-10 mg TbEC Take 2 tablets by mouth every evening., Starting 4/13/2017, Until Discontinued, Print      potassium chloride (KLOR-CON) 10 MEQ TbSR Take 1 tablet (10 mEq total) by mouth once daily., Starting 4/13/2017, Until Discontinued, Print      promethazine (PHENERGAN) 25 MG suppository Place 1 suppository (25 mg total) rectally every 6 (six) hours as needed for Nausea., Starting 4/13/2017, Until Discontinued, Print                Medical Decision Making        Pt tolerating po fluids and says she feels much better.  Will Rx generic diclegis, phenergan suppositories and few days of potassium.  Pt rter if worse.  All findings were reviewed with the patient/family in detail.  Findings seem to be most consistent with a diagnosis of n/v.  All remaining questions and concerns were addressed at that time.  Patient/family has been counseled regarding the need for follow-up as well as the indication to return to the emergency room should new or worrisome developments occur.        MDM               Clinical Impression:        ICD-10-CM ICD-9-CM   1. Nausea/vomiting in pregnancy O21.9 643.90   2. Hypokalemia E87.6 276.8   3. Mild dehydration E86.0 276.51             Mere Aldana PA-C  04/13/17 3248

## 2017-04-13 NOTE — TELEPHONE ENCOUNTER
----- Message from Nicky Lorenz sent at 4/13/2017  6:38 AM CDT -----  Pt needs to speak to the nurse regarding her not being able to sleep or hold anything down/ please call [phone]/ma

## 2017-04-13 NOTE — TELEPHONE ENCOUNTER
Reason for Disposition   [1] Sinus pain (not just congestion) AND [2] fever   [1] Sinus pain (not just congestion) AND [2] fever    Answer Assessment - Initial Assessment Questions  Seen in clinic today. Had onset  of nasal congestion with headache on left side of head and pain around left eye after leaving 's office today. Has po temp 100.3 at time of call. Took 500 mg tylenol about 1400 for headache. Wants to know what else she can take for sx's as she is 12 wks pregnant.    Protocols used: ST SINUS PAIN OR CONGESTION-A-, ST COLDS-A-AH

## 2017-04-14 ENCOUNTER — NURSE TRIAGE (OUTPATIENT)
Dept: ADMINISTRATIVE | Facility: CLINIC | Age: 27
End: 2017-04-14

## 2017-04-14 NOTE — TELEPHONE ENCOUNTER
"12 wks preg   Dr Harden  Pt called re GRIFFIN for past three days. Told to take tylenol     Reason for Disposition   [1] SEVERE headache (e.g., excruciating) AND [2] not improved after 2 hours of pain medicine (Exception: similar to previous migraines)    Answer Assessment - Initial Assessment Questions  1. LOCATION: "Where does it hurt?"      Behind left eye   2. ONSET: "When did the headache start?" (Minutes, hours or days)      Wed afternoon   3. PATTERN: "Does the pain come and go, or has it been constant since it started?"     Constant   4. SEVERITY: "How bad is the pain?" and "What does it keep you from doing?"     - MILD - doesn't interfere with normal activities     - MODERATE - interferes with normal activities or awakens from sleep     - SEVERE - excruciating pain, unable to do any normal activities        9, last took tylenol at 3pm   5. RECURRENT SYMPTOM: "Have you ever had headaches before?" If so, ask: "When was the last time?" and "What happened that time?"       no not that lasted this long   6. CAUSE: "What do you think is causing the headache?"     Unsure   7. MIGRAINE: "Have you been diagnosed with migraine headaches?" If so, ask: "Is this headache similar?"      no  8. HEAD INJURY: "Has there been any recent injury to the head?"      No   9. OTHER SYMPTOMS: "Do you have any other symptoms?" (e.g., fever, stiff neck, blurred vision; swelling of hands, face, or feet)     Fever last pm , afeb today , blurred vision when bending forward   10. PREGNANCY: "How many weeks pregnant are you?"        12 wks   11. VICTORIANO: "What date are you expecting to deliver?"        10/24/2017    Protocols used: ST PREGNANCY - HEADACHE-A-AH  sob when walking around, nauseated - off vits for a few days. rec tylenol and fluids. If no help rec ER for level 9 headache. Friend is currently picking up rx from ED. Call back with questions.     "

## 2017-04-14 NOTE — PROGRESS NOTES
"Indication  ========    SJOGRENS, THROMBOCYTOPENIA.    Maternal Assessment  =================    Weight 79 kg  Weight (lb) 174 lb  BP syst 144 mmHg  BP diast 68 mmHg    Method  ======    Transabdominal ultrasound examination. View: Sufficient.    Pregnancy  =========    Cantor pregnancy. Number of fetuses: 1.    Dating  ======    LMP on: 12/15/2016  GA by LMP 16 w + 6 d  VICTORIANO by LMP: 2017  GA by "stated dating" 12 w + 1 d  VICTORIANO by "stated dating": 10/24/2017  Ultrasound examination on: 2017  GA by U/S based upon: CRL  GA by U/S 12 w + 5 d  VICTORIANO by U/S: 10/20/2017  Assigned: The calculation of the gestational age based on CRL.  The Best Overall Assessment is based on the ultrasound examination on 17.  Assigned GA 12 w + 1 d  Assigned VICTORIANO: 10/24/2017    General Evaluation  ==============    Cardiac activity: present.  Placenta: anterior.  Cord vessels: 3 vessel cord.  Amniotic fluid: normal amount.    Fetal Biometry  ============    CRL 62.6 mm 78% 12w 5d Hadlock   bpm 72% Nicolaides    Fetal Anatomy  ============    The following structures appear normal:  Arms.    The following structures could not be adequately visualized:  Legs.    Maternal Structures  ===============    Uterus / Cervix  Uterus: Visualized  Cervix: Visualized  Ovaries / Tubes / Adnexa  Rt ovary: Visualized  Lt ovary: Not visualized    Consultation  ==========    Consult:  Sjogren's/SLE - positive SSA and SSB    Consultant:  Tiesha Motley MD      I am happy to have the opportunity to assist you with the care of your patient, Ms. Alisia Vines, a 27 yo  female at 12-1/7 weeks  Gestation.    She is referred for Sjogren's disease. She also has been diagnosed with SLE and chronic hypertension for which she takes Aldomet 500 mg po  daily. She also takes Plaquenil 200 mg daily, PNV, prednisone 10 mg po daily, Jublia 10% solution and Prozac.    Last pregnancy resulted in a term delivery by C/S. The fetus had complete " heart block.    We discussed her history of Sjogens disease. She is presently treated with Plaquenil 200 mg bid.    Her Disease will be followed by her Rheumatologist ,but we stand ready to assist if needed.    Pregnancy outcomes among patients with SS are generally similar to those of healthy women. However, women with SS who have  anti-Ro/SSA antibodies are at risk for giving birth to babies with  lupus. Among the manifestations of  lupus, the greatest  concern is congenital heart block. The risk of congenital heart block among the offspring of pregnant SS women who are anti-Ro/SSA antibody  positive is approximately 4-10%. Other features of  lupus include cutaneous manifestations, transient hepatitis, thrombocytopenia, and  anemia.    The diagnosis is usually made in utero in mothers with autoimmune disease who screened positive for anti-Ro/SSA and/or anti-La/SSB  antibodies. There are no formal guidelines for testing to detect fetal heart block, but weekly-pulsed Doppler fetal echocardiography from the  18th through the 26th week of pregnancy and then every other week until 32 weeks is often advised. The detection of a slow fetal heart rate and  subsequent echocardiographic confirmation of heart block, warrants immediate maternal testing for anti-Ro/SSA and La/SSB antibodies.        IMPRESSION:  1. Sjogren's disease with Anti Ro/La antibodies.  2. SLE    RECOMMEND A JEFFERY NS:  1. Serial growth scans every 4 weeks for the duration of the pregnancy to evaluate fetal growth. She is scheduled for a growth in 4 weeks and  anatomy in 8 weeks.  2. Continue current medications including Plaquenil.  3. AR intervals Q week from 16 to 28 weeks then every other week til 32 weeks.  4. Recommend switch of Prozac to less concerning SSRI.  5. Other studies as clinically indicated.    The approximate physician time was 25 minutes with the majority of the time (>50%) spent on counseling of the patient or  coordination of  care.    Thank you for sharing in the care of your patient with us. Please do not hesitate to contact us if you have any questions or concerns.      Impression  =========    Patient seen on Telemed.    First trimester scan.  Single viable intrauterine pregnancy consistent with prior US dating.    Embryo grossly WNL with normal cardiac activity.  Normal uterus, cervix and adnexae as noted above.    No fluid seen in cul-de-sac.      Recommendation  ==============    Recommendation above in consult.    Thank you for allowing us to participate in the care of your patients. If you have any questions concerning today's consultation feel free to  contact me or one of my partners. We can be reached at [phone] during normal business hours. If you have a question after normal  business hours, please contact Labor and Delivery [phone] and the unit secretary will page our on call physician..

## 2017-04-17 ENCOUNTER — OFFICE VISIT (OUTPATIENT)
Dept: FAMILY MEDICINE | Facility: CLINIC | Age: 27
End: 2017-04-17
Payer: COMMERCIAL

## 2017-04-17 ENCOUNTER — TELEPHONE (OUTPATIENT)
Dept: FAMILY MEDICINE | Facility: CLINIC | Age: 27
End: 2017-04-17

## 2017-04-17 ENCOUNTER — LAB VISIT (OUTPATIENT)
Dept: LAB | Facility: HOSPITAL | Age: 27
End: 2017-04-17
Attending: FAMILY MEDICINE
Payer: COMMERCIAL

## 2017-04-17 VITALS
HEART RATE: 105 BPM | TEMPERATURE: 98 F | WEIGHT: 168 LBS | DIASTOLIC BLOOD PRESSURE: 90 MMHG | HEIGHT: 66 IN | RESPIRATION RATE: 18 BRPM | OXYGEN SATURATION: 100 % | SYSTOLIC BLOOD PRESSURE: 140 MMHG | BODY MASS INDEX: 27 KG/M2

## 2017-04-17 DIAGNOSIS — I10 ESSENTIAL HYPERTENSION: ICD-10-CM

## 2017-04-17 DIAGNOSIS — M32.14 OTHER SYSTEMIC LUPUS ERYTHEMATOSUS WITH GLOMERULAR DISEASE: Primary | ICD-10-CM

## 2017-04-17 DIAGNOSIS — R51.9 HEADACHE, UNSPECIFIED HEADACHE TYPE: ICD-10-CM

## 2017-04-17 DIAGNOSIS — E87.6 HYPOKALEMIA: ICD-10-CM

## 2017-04-17 DIAGNOSIS — J01.90 ACUTE NON-RECURRENT SINUSITIS, UNSPECIFIED LOCATION: ICD-10-CM

## 2017-04-17 DIAGNOSIS — O09.91 HIGH-RISK PREGNANCY IN FIRST TRIMESTER: ICD-10-CM

## 2017-04-17 LAB
ALBUMIN SERPL BCP-MCNC: 2.4 G/DL
ALP SERPL-CCNC: 47 U/L
ALT SERPL W/O P-5'-P-CCNC: 6 U/L
ANION GAP SERPL CALC-SCNC: 10 MMOL/L
AST SERPL-CCNC: 11 U/L
BILIRUB SERPL-MCNC: 0.3 MG/DL
BUN SERPL-MCNC: 5 MG/DL
CALCIUM SERPL-MCNC: 8.6 MG/DL
CHLORIDE SERPL-SCNC: 103 MMOL/L
CO2 SERPL-SCNC: 23 MMOL/L
CREAT SERPL-MCNC: 0.6 MG/DL
EST. GFR  (AFRICAN AMERICAN): >60 ML/MIN/1.73 M^2
EST. GFR  (NON AFRICAN AMERICAN): >60 ML/MIN/1.73 M^2
GLUCOSE SERPL-MCNC: 93 MG/DL
POTASSIUM SERPL-SCNC: 3.2 MMOL/L
PROT SERPL-MCNC: 6.9 G/DL
SODIUM SERPL-SCNC: 136 MMOL/L

## 2017-04-17 PROCEDURE — 1160F RVW MEDS BY RX/DR IN RCRD: CPT | Mod: S$GLB,,, | Performed by: FAMILY MEDICINE

## 2017-04-17 PROCEDURE — 99999 PR PBB SHADOW E&M-EST. PATIENT-LVL III: CPT | Mod: PBBFAC,,, | Performed by: FAMILY MEDICINE

## 2017-04-17 PROCEDURE — 36415 COLL VENOUS BLD VENIPUNCTURE: CPT | Mod: PO

## 2017-04-17 PROCEDURE — 80053 COMPREHEN METABOLIC PANEL: CPT

## 2017-04-17 PROCEDURE — 99214 OFFICE O/P EST MOD 30 MIN: CPT | Mod: S$GLB,,, | Performed by: FAMILY MEDICINE

## 2017-04-17 RX ORDER — AMOXICILLIN AND CLAVULANATE POTASSIUM 500; 125 MG/1; MG/1
1 TABLET, FILM COATED ORAL 2 TIMES DAILY
Qty: 20 TABLET | Refills: 0 | Status: SHIPPED | OUTPATIENT
Start: 2017-04-17 | End: 2017-04-27

## 2017-04-17 RX ORDER — HYDROCODONE BITARTRATE AND ACETAMINOPHEN 5; 325 MG/1; MG/1
1 TABLET ORAL EVERY 12 HOURS PRN
Qty: 5 TABLET | Refills: 0 | Status: SHIPPED | OUTPATIENT
Start: 2017-04-17 | End: 2017-05-02

## 2017-04-17 NOTE — PATIENT INSTRUCTIONS
Taking Your Blood Pressure  Blood pressure is the force of blood against the artery wall as it moves from the heart through the blood vessels. You can take your own blood pressure reading using a digital monitor. Take readings as often as your healthcare provider instructs. Take each reading at the same time of day.  Step 1. Relax    · Take your blood pressure at the same time every day, such as in the morning or evening, or at the time your healthcare provider recommends.  · Wait at least a half-hour after smoking, eating, drinking caffeinated beverages, or exercising.  · Sit comfortably at a table with both feet on the floor. Do not cross your legs or feet. Place the monitor near you.  · Rest for a few minutes before you begin.  Step 2. Wrap the cuff    · Place your arm on the table, palm up. Your arm should be at the level of your heart. Wrap the cuff around your upper arm, just above your elbow. Its best done on bare skin, not over clothing. Most cuffs will indicate where the brachial artery (the blood vessel in the middle of the arm at the inner side of the elbow) should line up with the cuff. Look in your monitor's instruction booklet for an illustration. You can also bring your cuff to your healthcare provider and have them show you how to correctly place the cuff.  · Make sure your cuff fits. If it doesnt wrap around your upper arm, order a larger cuff.  Step 3. Inflate the cuff    · Pump the cuff until the scale reads 160. If you have a self-inflating cuff, push the button that starts the pump.  · The cuff will tighten, then loosen.  · The numbers will change. When they stop changing, your blood pressure reading will appear.  · Take 2 or 3 readings one minute apart.  Step 4. Write down the results of each reading    · Write down your blood pressure numbers for each reading. Note the date and time. Keep your results in one place, such as a notebook. Even if your monitor has a built-in memory, keep a hard  copy of the readings.  · Remove the cuff from your arm. Turn off the machine.  · Share your blood pressure records with your healthcare providers at each visit.  Date Last Reviewed: 4/27/2016  © 2481-4724 [employer]. [address] [place] [address], [place] [address]. All rights reserved. This information is not intended as a substitute for professional medical care. Always follow your healthcare professional's instructions.

## 2017-04-17 NOTE — MR AVS SNAPSHOT
[facility]  [address] [facility] [address]  Phone: [phone]                  Alisia Loconum   2017 8:40 AM   Office Visit    Description:  Female : 1990   Provider:  Rebekah Gomez MD   Department:  [facility]           Reason for Visit     Nasal Congestion     Headache           Diagnoses this Visit        Comments    Other systemic lupus erythematosus with glomerular disease    -  Primary     Essential hypertension         High-risk pregnancy in first trimester         Headache, unspecified headache type         Acute non-recurrent sinusitis, unspecified location                To Do List           Future Appointments        Provider Department Dept Phone    5/3/2017 10:30 AM ULTRASOUND, OB-GYN [facility] OB/ [phone]    2017 9:10 AM SPECIMEN, [facility] [phone]    2017 9:45 AM LABORATORY, [facility] [phone]    2017 11:00 AM Shane Blair MD [facility] - Rheumatology [phone]    2017 11:30 AM Laurence Morales MD [facility] - Nephrology [phone]      Goals (5 Years of Data)     None      Follow-Up and Disposition     Return Keep appt with gyn.       These Medications        Disp Refills Start End    hydrocodone-acetaminophen 5-325mg (NORCO) 5-325 mg per tablet 5 tablet 0 2017     Take 1 tablet by mouth every 12 (twelve) hours as needed for Pain. - Oral    Pharmacy: [facility] [address] [place] [address] A Ph #: [phone]       amoxicillin-clavulanate 500-125mg (AUGMENTIN) 500-125 mg Tab 20 tablet 0 2017    Take 1 tablet (500 mg total) by mouth 2 (two) times daily. - Oral    Pharmacy: [facility] [address] [place] [address] A Ph #: [phone]         [facility]     [facility] Care [facility] -  Assistance  Unless otherwise  directed by your provider, please contact [facility], our nurse care line that is available for 24/7 assistance.     Registered nurses in the [facility] provide: appointment scheduling, clinical advisement, health education, and other advisory services.  Call: [phone] (toll free)               Medications           Message regarding Medications     Verify the changes and/or additions to your medication regime listed below are the same as discussed with your clinician today.  If any of these changes or additions are incorrect, please notify your healthcare provider.        START taking these NEW medications        Refills    hydrocodone-acetaminophen 5-325mg (NORCO) 5-325 mg per tablet 0    Sig: Take 1 tablet by mouth every 12 (twelve) hours as needed for Pain.    Class: Print    Route: Oral    amoxicillin-clavulanate 500-125mg (AUGMENTIN) 500-125 mg Tab 0    Sig: Take 1 tablet (500 mg total) by mouth 2 (two) times daily.    Class: Normal    Route: Oral           Verify that the below list of medications is an accurate representation of the medications you are currently taking.  If none reported, the list may be blank. If incorrect, please contact your healthcare provider. Carry this list with you in case of emergency.           Current Medications     doxylamine-pyridoxine 10-10 mg TbEC Take 2 tablets by mouth every evening.    efinaconazole (JUBLIA) 10 % Mario Apply once daily to nail and nail fold.  Use daily for up to 1 year.    fluoxetine (PROZAC) 40 MG capsule Take 1 capsule (40 mg total) by mouth once daily.    hydroxychloroquine (PLAQUENIL) 200 mg tablet Take 1 tablet (200 mg total) by mouth 2 (two) times daily.    methyldopa (ALDOMET) 500 MG tablet Take 1 tablet (500 mg total) by mouth every 12 (twelve) hours.    multivitamin capsule Take 1 capsule by mouth once daily.    potassium chloride (KLOR-CON) 10 MEQ TbSR Take 1 tablet (10 mEq total) by mouth once daily.    predniSONE  "(DELTASONE) 10 MG tablet Take 20 mg once daily for 2 weeks, then reduce to 10 mg daily thereafter.    valacyclovir (VALTREX) 500 MG tablet TAKE 1 TABLET ONE TIME DAILY    amoxicillin-clavulanate 500-125mg (AUGMENTIN) 500-125 mg Tab Take 1 tablet (500 mg total) by mouth 2 (two) times daily.    hydrocodone-acetaminophen 5-325mg (NORCO) 5-325 mg per tablet Take 1 tablet by mouth every 12 (twelve) hours as needed for Pain.    promethazine (PHENERGAN) 25 MG suppository Place 1 suppository (25 mg total) rectally every 6 (six) hours as needed for Nausea.           Clinical Reference Information           Prenatal Vitals     Enc. Date GA Prenatal Vitals Prenatal Pulse Pain Level Urine Albumin/Glucose Edema Presentation Dilation/Effacement/Station    4/17/17 12w6d 140/90 (A) / 76.2 kg (167 lb 15.9 oz)  105         3/16/17 8w2d 162/92 (A) / 81.2 kg (179 lb 0.2 oz)           3/8/17 7w1d 140/100 (A) / 78.6 kg (173 lb 4.5 oz)  /  / Absent            Your Vitals Were     BP Pulse Temp Resp Height Weight    140/90 105 98.1 °F (36.7 °C) (Tympanic) 18 5' 6" (1.676 m) 76.2 kg (167 lb 15.9 oz)    Last Period SpO2 BMI          12/15/2016 100% 27.11 kg/m2        Blood Pressure          Most Recent Value    BP  (!)  140/90      Allergies as of 4/17/2017     No Known Allergies      Immunizations Administered on Date of Encounter - 4/17/2017     None      Instructions      Taking Your Blood Pressure  Blood pressure is the force of blood against the artery wall as it moves from the heart through the blood vessels. You can take your own blood pressure reading using a digital monitor. Take readings as often as your healthcare provider instructs. Take each reading at the same time of day.  Step 1. Relax    · Take your blood pressure at the same time every day, such as in the morning or evening, or at the time your healthcare provider recommends.  · Wait at least a half-hour after smoking, eating, drinking caffeinated beverages, or " exercising.  · Sit comfortably at a table with both feet on the floor. Do not cross your legs or feet. Place the monitor near you.  · Rest for a few minutes before you begin.  Step 2. Wrap the cuff    · Place your arm on the table, palm up. Your arm should be at the level of your heart. Wrap the cuff around your upper arm, just above your elbow. Its best done on bare skin, not over clothing. Most cuffs will indicate where the brachial artery (the blood vessel in the middle of the arm at the inner side of the elbow) should line up with the cuff. Look in your monitor's instruction booklet for an illustration. You can also bring your cuff to your healthcare provider and have them show you how to correctly place the cuff.  · Make sure your cuff fits. If it doesnt wrap around your upper arm, order a larger cuff.  Step 3. Inflate the cuff    · Pump the cuff until the scale reads 160. If you have a self-inflating cuff, push the button that starts the pump.  · The cuff will tighten, then loosen.  · The numbers will change. When they stop changing, your blood pressure reading will appear.  · Take 2 or 3 readings one minute apart.  Step 4. Write down the results of each reading    · Write down your blood pressure numbers for each reading. Note the date and time. Keep your results in one place, such as a notebook. Even if your monitor has a built-in memory, keep a hard copy of the readings.  · Remove the cuff from your arm. Turn off the machine.  · Share your blood pressure records with your healthcare providers at each visit.  Date Last Reviewed: 4/27/2016 © 2000-2016 [employer]. [address] [place] [address], [place] [address]. All rights reserved. This information is not intended as a substitute for professional medical care. Always follow your healthcare professional's instructions.             Language Assistance Services     ATTENTION: Language assistance services are available, free of charge. Please call  [phone].      ATENCIÓN: Si habla español, tiene a cruz disposición servicios gratuitos de asistencia lingüística. Llame al [phone].     CHÚ Ý: N?u b?n nói Ti?ng Vi?t, có các d?ch v? h? tr? ngôn ng? mi?n phí dành cho b?n. G?i s? [phone].         [facility] complies with applicable Federal civil rights laws and does not discriminate on the basis of race, color, national origin, age, disability, or sex.

## 2017-04-17 NOTE — TELEPHONE ENCOUNTER
Notified patient to prescription had been sent to her pharmacy [facility] , patient states will call and have Rx sen to [facility] on [address]

## 2017-04-17 NOTE — TELEPHONE ENCOUNTER
----- Message from Ya Sow sent at 4/17/2017  9:46 AM CDT -----  Contact: Pt   Pt called and stated she needed to speak to the nurse. She stated she thought she was prescribed an antibiotic for a sinus infection. She can be reached at [phone].    Thanks,  TF

## 2017-04-17 NOTE — PROGRESS NOTES
Subjective:      Patient ID: Alisia Vines is a 26 y.o. female.    Chief Complaint: Nasal Congestion and Headache      Past Medical History:   Diagnosis Date    Allergic rhinitis     Anemia     Condyloma acuminata     GERD (gastroesophageal reflux disease)     HSV-2 (herpes simplex virus 2) infection     Lupus nephritis     Mood disorder     Overweight     Sjogren's syndrome     Systemic lupus erythematosus     Thrombocytopenia      Past Surgical History:   Procedure Laterality Date     SECTION, LOW TRANSVERSE      RENAL BIOPSY  2013     Family History   Problem Relation Age of Onset    Hypertension Mother     Eczema Brother     Heart disease Son     Hypertension Maternal Grandmother     Diabetes Paternal Grandmother     Lupus Neg Hx     Psoriasis Neg Hx      Social History     Social History    Marital status: Single     Spouse name: N/A    Number of children: 1    Years of education: N/A     Occupational History     [employer]     Social History Main Topics    Smoking status: Never Smoker    Smokeless tobacco: Never Used    Alcohol use No    Drug use: No    Sexual activity: Yes     Partners: Male     Other Topics Concern    Are You Pregnant Or Think You May Be? No    Breast-Feeding No     Social History Narrative    The patient is single and is in a committed relationship.  She lives with her father. 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       Current Outpatient Prescriptions:     doxylamine-pyridoxine 10-10 mg TbEC, Take 2 tablets by mouth every evening., Disp: 30 tablet, Rfl: 0    efinaconazole (JUBLIA) 10 % Mario, Apply once daily to nail and nail fold.  Use daily for up to 1 year., Disp: 8 mL, Rfl: 3    fluoxetine (PROZAC) 40 MG capsule, Take 1 capsule (40 mg total) by mouth once daily., Disp: 30 capsule, Rfl: 3    hydroxychloroquine (PLAQUENIL) 200 mg tablet, Take 1 tablet (200 mg total) by mouth 2 (two) times daily., Disp: 180  tablet, Rfl: 3    methyldopa (ALDOMET) 500 MG tablet, Take 1 tablet (500 mg total) by mouth every 12 (twelve) hours., Disp: 60 tablet, Rfl: 11    multivitamin capsule, Take 1 capsule by mouth once daily., Disp: , Rfl:     potassium chloride (KLOR-CON) 10 MEQ TbSR, Take 1 tablet (10 mEq total) by mouth once daily., Disp: 3 tablet, Rfl: 0    predniSONE (DELTASONE) 10 MG tablet, Take 20 mg once daily for 2 weeks, then reduce to 10 mg daily thereafter., Disp: 90 tablet, Rfl: 1    valacyclovir (VALTREX) 500 MG tablet, TAKE 1 TABLET ONE TIME DAILY, Disp: 90 tablet, Rfl: 3    amoxicillin-clavulanate 500-125mg (AUGMENTIN) 500-125 mg Tab, Take 1 tablet (500 mg total) by mouth 2 (two) times daily., Disp: 20 tablet, Rfl: 0    hydrocodone-acetaminophen 5-325mg (NORCO) 5-325 mg per tablet, Take 1 tablet by mouth every 12 (twelve) hours as needed for Pain., Disp: 5 tablet, Rfl: 0    promethazine (PHENERGAN) 25 MG suppository, Place 1 suppository (25 mg total) rectally every 6 (six) hours as needed for Nausea., Disp: 10 suppository, Rfl: 0  Review of patient's allergies indicates:  No Known Allergies    Review of Systems   Constitutional: Positive for fatigue. Negative for fever.   HENT: Positive for congestion, postnasal drip, rhinorrhea and sinus pressure. Negative for ear pain.    Eyes: Negative for redness and visual disturbance.   Respiratory: Negative for cough and shortness of breath.    Neurological: Positive for headaches. Negative for tremors, seizures and facial asymmetry.     Headache    Associated symptoms include rhinorrhea and sinus pressure. Pertinent negatives include no coughing, ear pain, eye redness, fever or seizures.     Patient started with headache, nasuea and vomiting last Wednesday.  She was seen in ER on Thursday.  She received treatment for dehydration and nausea, but h/a's are persistent and accompanied with thick yellow sputum - rhinnorhea.  She is taking bp medication daily.  The symptoms  "started acutely last week after her boyfriend was sick.  She is holding down food and fluids, but has decreased po intake.  Headache is 5/10 and tylenol is not helping.        Objective:   BP (!) 140/90  Pulse 105  Temp 98.1 °F (36.7 °C) (Tympanic)   Resp 18  Ht 5' 6" (1.676 m)  Wt 76.2 kg (167 lb 15.9 oz)  LMP 12/15/2016  SpO2 100%  BMI 27.11 kg/m2     Physical Exam   Constitutional: She is oriented to person, place, and time. She is cooperative. No distress.   HENT:   Right Ear: Hearing, tympanic membrane, external ear and ear canal normal.   Left Ear: Hearing, tympanic membrane, external ear and ear canal normal.   Mouth/Throat: Uvula is midline, oropharynx is clear and moist and mucous membranes are normal.   Eyes: Conjunctivae and EOM are normal.   Cardiovascular: Normal rate, regular rhythm and normal heart sounds.    Pulmonary/Chest: Effort normal and breath sounds normal.   Abdominal: There is no tenderness. There is no rebound and no guarding.   Musculoskeletal: She exhibits no edema.   Neurological: She is alert and oriented to person, place, and time. No cranial nerve deficit. Coordination and gait normal.   Skin: Skin is warm, dry and intact. No rash noted. She is not diaphoretic. No erythema.   Psychiatric: She has a normal mood and affect. Her speech is normal and behavior is normal.   Nursing note and vitals reviewed.          Assessment:       1. Other systemic lupus erythematosus with glomerular disease    2. Essential hypertension    3. High-risk pregnancy in first trimester    4. Headache, unspecified headache type    5. Acute non-recurrent sinusitis, unspecified location    6. Hypokalemia            Plan:         Other systemic lupus erythematosus with glomerular disease  Comments:  Currently states that she is only taking 10 mg prednisone for SLE.  She has been on this for 2 years since diagnosis.    Essential hypertension  Comments:  BP better controlled, but still high.  Reassess on " upcoming f/u with GYN a couple of weeks.  Take home bp's.  Call if > 140/90.  Orders:  -     Comprehensive metabolic panel; Future; Expected date: 4/17/17    High-risk pregnancy in first trimester  Comments:  High risk IUP - with sinusitis symptoms.  Seen in ER on Thursday for h/a, dehydration and vomiting.  Now nauseated and decreased po, but h/a has not resolved.    Headache, unspecified headache type  Comments:  Tylenol not working.  Avoid nsaids since taking steroids already. Side effects of hydrocodone - include fatigue and off balance.Call if h/a worsening/not improv    Acute non-recurrent sinusitis, unspecified location  Comments:  Treat with augmentin.  NKDA.    Hypokalemia  Comments:  Recheck potassium today.  Holding down food and fluids, but just decreased po intake.  Nausea due to h/a.  No nausea until last week.    Other orders  -     hydrocodone-acetaminophen 5-325mg (NORCO) 5-325 mg per tablet; Take 1 tablet by mouth every 12 (twelve) hours as needed for Pain.  Dispense: 5 tablet; Refill: 0  -     amoxicillin-clavulanate 500-125mg (AUGMENTIN) 500-125 mg Tab; Take 1 tablet (500 mg total) by mouth 2 (two) times daily.  Dispense: 20 tablet; Refill: 0        Patient Care Team:  Saumya Quinonez MD as PCP - General (Family Medicine)

## 2017-04-18 ENCOUNTER — TELEPHONE (OUTPATIENT)
Dept: FAMILY MEDICINE | Facility: CLINIC | Age: 27
End: 2017-04-18

## 2017-04-18 DIAGNOSIS — E87.6 HYPOKALEMIA: Primary | ICD-10-CM

## 2017-04-18 NOTE — TELEPHONE ENCOUNTER
Spoke with Alisia.  She had potassium at the pharmacy from the ER that she was called back to .  (initially on hold.)  Start potassium - she has 3 days of potassium tablets.  Did not have a problem with low potassium until vomiting and nauseated per patient.  Recheck bmp on Thursday to see if potassium is normalized. She feels better today.  Reassured patient that as food intake normalizes potassium should normalize.  Needs to make 1-2 week f/u appt with GYN or me for f/u on headache/nausea and hypokalemia.

## 2017-04-18 NOTE — TELEPHONE ENCOUNTER
Spoke with patient will come in on Thursday to repeat BMP ,but will follow up with GYN since she already has an appointment in the next 2 weeks

## 2017-04-28 ENCOUNTER — ROUTINE PRENATAL (OUTPATIENT)
Dept: OBSTETRICS AND GYNECOLOGY | Facility: CLINIC | Age: 27
End: 2017-04-28
Payer: COMMERCIAL

## 2017-04-28 VITALS
BODY MASS INDEX: 27.01 KG/M2 | DIASTOLIC BLOOD PRESSURE: 90 MMHG | WEIGHT: 167.31 LBS | SYSTOLIC BLOOD PRESSURE: 140 MMHG

## 2017-04-28 DIAGNOSIS — O21.9 NAUSEA/VOMITING IN PREGNANCY: Primary | ICD-10-CM

## 2017-04-28 DIAGNOSIS — Z36.89 ENCOUNTER FOR FETAL ANATOMIC SURVEY: ICD-10-CM

## 2017-04-28 PROCEDURE — 99999 PR PBB SHADOW E&M-EST. PATIENT-LVL I: CPT | Mod: PBBFAC,,, | Performed by: ADVANCED PRACTICE MIDWIFE

## 2017-04-28 PROCEDURE — 0502F SUBSEQUENT PRENATAL CARE: CPT | Mod: S$GLB,,, | Performed by: ADVANCED PRACTICE MIDWIFE

## 2017-04-28 RX ORDER — PROMETHAZINE HYDROCHLORIDE 25 MG/1
25 TABLET ORAL EVERY 6 HOURS PRN
Qty: 30 TABLET | Refills: 0 | Status: SHIPPED | OUTPATIENT
Start: 2017-04-28 | End: 2017-05-21 | Stop reason: CLARIF

## 2017-04-28 NOTE — MR AVS SNAPSHOT
[facility]  [address] [place] [address]  Phone: [phone]  Fax: [phone]                  Alisia Ceballos [place]   2017 3:45 PM   Routine Prenatal    Description:  Female : 1990   Provider:  Donya Roa CNM   Department:  [facility]           Diagnoses this Visit        Comments    Nausea/vomiting in pregnancy    -  Primary            To Do List           Future Appointments        Provider Department Dept Phone    2017 3:45 PM Donya Roa CNM [facility]/ [phone]    2017 9:30 AM ULTRASOUND, [facility] [phone]    2017 10:00 AM Janice Arenas MD [facility] [phone]    2017 9:10 AM SPECIMEN, [facility] [phone]    2017 9:45 AM LABORATORY, [facility] [phone]      Goals (5 Years of Data)     None       These Medications        Disp Refills Start End    promethazine (PHENERGAN) 25 MG tablet 30 tablet 0 2017     Take 1 tablet (25 mg total) by mouth every 6 (six) hours as needed for Nausea. - Oral    Pharmacy: [id number], [place] - [address] [facility] Ph #: [phone]         [facility]     [facility] Nurse Care Line - 24/ Assistance  Unless otherwise directed by your provider, please contact [facility], our nurse care line that is available for 24/7 assistance.     Registered nurses in the [facility] provide: appointment scheduling, clinical advisement, health education, and other advisory services.  Call: [phone] (toll free)               Medications           Message regarding Medications     Verify the changes and/or additions to your medication regime listed below are the same as discussed with your clinician today.  If any of these changes or additions are incorrect, please notify your healthcare provider.        START taking these NEW medications         Refills    promethazine (PHENERGAN) 25 MG tablet 0    Sig: Take 1 tablet (25 mg total) by mouth every 6 (six) hours as needed for Nausea.    Class: Normal    Route: Oral           Verify that the below list of medications is an accurate representation of the medications you are currently taking.  If none reported, the list may be blank. If incorrect, please contact your healthcare provider. Carry this list with you in case of emergency.           Current Medications     doxylamine-pyridoxine 10-10 mg TbEC Take 2 tablets by mouth every evening.    efinaconazole (JUBLIA) 10 % Mario Apply once daily to nail and nail fold.  Use daily for up to 1 year.    fluoxetine (PROZAC) 40 MG capsule Take 1 capsule (40 mg total) by mouth once daily.    hydrocodone-acetaminophen 5-325mg (NORCO) 5-325 mg per tablet Take 1 tablet by mouth every 12 (twelve) hours as needed for Pain.    hydroxychloroquine (PLAQUENIL) 200 mg tablet Take 1 tablet (200 mg total) by mouth 2 (two) times daily.    methyldopa (ALDOMET) 500 MG tablet Take 1 tablet (500 mg total) by mouth every 12 (twelve) hours.    multivitamin capsule Take 1 capsule by mouth once daily.    potassium chloride (KLOR-CON) 10 MEQ TbSR Take 1 tablet (10 mEq total) by mouth once daily.    predniSONE (DELTASONE) 10 MG tablet Take 20 mg once daily for 2 weeks, then reduce to 10 mg daily thereafter.    promethazine (PHENERGAN) 25 MG suppository Place 1 suppository (25 mg total) rectally every 6 (six) hours as needed for Nausea.    promethazine (PHENERGAN) 25 MG tablet Take 1 tablet (25 mg total) by mouth every 6 (six) hours as needed for Nausea.    valacyclovir (VALTREX) 500 MG tablet TAKE 1 TABLET ONE TIME DAILY           Clinical Reference Information           Prenatal Vitals     Enc. Date GA Prenatal Vitals Prenatal Pulse Pain Level Urine Albumin/Glucose Edema Presentation Dilation/Effacement/Station    4/28/17 14w3d 140/90 (A) / 75.9 kg (167 lb 5.3 oz)           3/16/17 8w2d 162/92  (A) / 81.2 kg (179 lb 0.2 oz)           3/8/17 7w1d 140/100 (A) / 78.6 kg (173 lb 4.5 oz)  /  / Absent            Your Vitals Were     BP Weight Last Period BMI       140/90 75.9 kg (167 lb 5.3 oz) 12/15/2016 27.01 kg/m2       Allergies as of 4/28/2017     No Known Allergies      Immunizations Administered on Date of Encounter - 4/28/2017     None      Language Assistance Services     ATTENTION: Language assistance services are available, free of charge. Please call [phone].      ATENCIÓN: Si habla español, tiene a cruz disposición servicios gratuitos de asistencia lingüística. Llame al [phone].     CHÚ Ý: N?u b?n nói Ti?ng Vi?t, có các d?ch v? h? tr? ngôn ng? mi?n phí dành cho b?n. G?i s? [phone].         [facility] complies with applicable Federal civil rights laws and does not discriminate on the basis of race, color, national origin, age, disability, or sex.

## 2017-04-28 NOTE — PROGRESS NOTES
Here with c/o lower abdominal pain, headaches, unable to eat or drink because of nausea. Pt appears well, NAD, reports abdominal pain 2/10 when occurs. Admits that she has not been eating or drinking well. Rec to increase water to improve headaches and cramping. Smoothies as meal replacement if unable to eat. Has US next visit and appt with Dr. Arenas.

## 2017-05-02 ENCOUNTER — PROCEDURE VISIT (OUTPATIENT)
Dept: OBSTETRICS AND GYNECOLOGY | Facility: CLINIC | Age: 27
End: 2017-05-02
Payer: COMMERCIAL

## 2017-05-02 ENCOUNTER — ROUTINE PRENATAL (OUTPATIENT)
Dept: OBSTETRICS AND GYNECOLOGY | Facility: CLINIC | Age: 27
End: 2017-05-02
Payer: COMMERCIAL

## 2017-05-02 VITALS — SYSTOLIC BLOOD PRESSURE: 142 MMHG | WEIGHT: 167 LBS | DIASTOLIC BLOOD PRESSURE: 100 MMHG | BODY MASS INDEX: 26.95 KG/M2

## 2017-05-02 DIAGNOSIS — O09.91 HIGH-RISK PREGNANCY IN FIRST TRIMESTER: ICD-10-CM

## 2017-05-02 DIAGNOSIS — Z36.89 ENCOUNTER FOR FETAL ANATOMIC SURVEY: Primary | ICD-10-CM

## 2017-05-02 DIAGNOSIS — M35.09 SJOGREN'S SYNDROME WITH OTHER ORGAN INVOLVEMENT: ICD-10-CM

## 2017-05-02 DIAGNOSIS — Z36.89 ENCOUNTER FOR FETAL ANATOMIC SURVEY: ICD-10-CM

## 2017-05-02 DIAGNOSIS — O10.919 CHRONIC HYPERTENSION AFFECTING PREGNANCY: ICD-10-CM

## 2017-05-02 PROCEDURE — 76801 OB US < 14 WKS SINGLE FETUS: CPT | Mod: S$GLB,,, | Performed by: OBSTETRICS & GYNECOLOGY

## 2017-05-02 PROCEDURE — 0502F SUBSEQUENT PRENATAL CARE: CPT | Mod: S$GLB,,, | Performed by: OBSTETRICS & GYNECOLOGY

## 2017-05-02 PROCEDURE — 99999 PR PBB SHADOW E&M-EST. PATIENT-LVL II: CPT | Mod: PBBFAC,,, | Performed by: OBSTETRICS & GYNECOLOGY

## 2017-05-03 NOTE — PROGRESS NOTES
Consistently mildly elevated BPs-pt reports not takin aldomet consistently, often only once a day. U/S today shows AGA with FHTs 150, TN interval 120.ms (normal). Anatomy scan next visit

## 2017-05-04 ENCOUNTER — TELEPHONE (OUTPATIENT)
Dept: OBSTETRICS AND GYNECOLOGY | Facility: CLINIC | Age: 27
End: 2017-05-04

## 2017-05-04 NOTE — TELEPHONE ENCOUNTER
----- Message from Luana Connolly sent at 5/3/2017  2:04 PM CDT -----  Dian with Dr Robert Cohen//at [phone] and fax is [phone]//states pt having procedure tomorrow//5-4-17 and they are waiting to get pt's clearance//please send asap//eliane/marga

## 2017-05-08 ENCOUNTER — TELEPHONE (OUTPATIENT)
Dept: OBSTETRICS AND GYNECOLOGY | Facility: CLINIC | Age: 27
End: 2017-05-08

## 2017-05-08 ENCOUNTER — TELEPHONE (OUTPATIENT)
Dept: FAMILY MEDICINE | Facility: CLINIC | Age: 27
End: 2017-05-08

## 2017-05-08 ENCOUNTER — PATIENT MESSAGE (OUTPATIENT)
Dept: OBSTETRICS AND GYNECOLOGY | Facility: CLINIC | Age: 27
End: 2017-05-08

## 2017-05-08 NOTE — TELEPHONE ENCOUNTER
----- Message from Kenya Caldwell sent at 5/8/2017  8:34 AM CDT -----  Contact: pt  Pt states wants to know if anything she can take for sinus congestion,pt is pregnant....[phone]

## 2017-05-08 NOTE — TELEPHONE ENCOUNTER
She can use nasal saline spray or Rhinocort nasal spray.  She can use Claritin.  She can use Tylenol.

## 2017-05-08 NOTE — TELEPHONE ENCOUNTER
Pt states she has stuffy nose  Congestion  No fever  Runny eyes   No cough  States she is pregnant and wants to know if there is something she can take

## 2017-05-08 NOTE — TELEPHONE ENCOUNTER
----- Message from Kenya Caldwell sent at 5/8/2017  8:31 AM CDT -----  Contact: pt  Pt states wants to know if anything she can take for sinus congestion,pt is pregnant....[phone] (home)

## 2017-05-17 ENCOUNTER — OFFICE VISIT (OUTPATIENT)
Dept: OBSTETRICS AND GYNECOLOGY | Facility: CLINIC | Age: 27
End: 2017-05-17
Payer: COMMERCIAL

## 2017-05-17 DIAGNOSIS — M32.14: ICD-10-CM

## 2017-05-17 DIAGNOSIS — M35.00 SJOGREN'S SYNDROME: ICD-10-CM

## 2017-05-17 DIAGNOSIS — M32.14 SLE GLOMERULONEPHRITIS SYNDROME, WHO CLASS V: Primary | ICD-10-CM

## 2017-05-17 DIAGNOSIS — O09.91 HIGH-RISK PREGNANCY IN FIRST TRIMESTER: ICD-10-CM

## 2017-05-17 DIAGNOSIS — M32.9 SLE (SYSTEMIC LUPUS ERYTHEMATOSUS RELATED SYNDROME): ICD-10-CM

## 2017-05-17 PROCEDURE — 99499 UNLISTED E&M SERVICE: CPT | Mod: S$GLB,,, | Performed by: OBSTETRICS & GYNECOLOGY

## 2017-05-17 PROCEDURE — 76815 OB US LIMITED FETUS(S): CPT | Mod: S$GLB,,, | Performed by: OBSTETRICS & GYNECOLOGY

## 2017-05-17 PROCEDURE — 1160F RVW MEDS BY RX/DR IN RCRD: CPT | Mod: S$GLB,,, | Performed by: OBSTETRICS & GYNECOLOGY

## 2017-05-17 PROCEDURE — 76828 ECHO EXAM OF FETAL HEART: CPT | Mod: S$GLB,,, | Performed by: OBSTETRICS & GYNECOLOGY

## 2017-05-19 ENCOUNTER — LAB VISIT (OUTPATIENT)
Dept: LAB | Facility: HOSPITAL | Age: 27
End: 2017-05-19
Attending: INTERNAL MEDICINE
Payer: COMMERCIAL

## 2017-05-19 DIAGNOSIS — M32.14 LUPUS NEPHRITIS: ICD-10-CM

## 2017-05-19 LAB
ANION GAP SERPL CALC-SCNC: 9 MMOL/L
BUN SERPL-MCNC: 10 MG/DL
C3 SERPL-MCNC: 56 MG/DL
C4 SERPL-MCNC: 16 MG/DL
CALCIUM SERPL-MCNC: 8.7 MG/DL
CHLORIDE SERPL-SCNC: 108 MMOL/L
CO2 SERPL-SCNC: 21 MMOL/L
CREAT SERPL-MCNC: 0.6 MG/DL
CREAT UR-MCNC: 206 MG/DL
EST. GFR  (AFRICAN AMERICAN): >60 ML/MIN/1.73 M^2
EST. GFR  (NON AFRICAN AMERICAN): >60 ML/MIN/1.73 M^2
GLUCOSE SERPL-MCNC: 93 MG/DL
POTASSIUM SERPL-SCNC: 3.6 MMOL/L
PROT UR-MCNC: 45 MG/DL
PROT/CREAT RATIO, UR: 0.22
SODIUM SERPL-SCNC: 138 MMOL/L

## 2017-05-19 PROCEDURE — 86160 COMPLEMENT ANTIGEN: CPT

## 2017-05-19 PROCEDURE — 86160 COMPLEMENT ANTIGEN: CPT | Mod: 59

## 2017-05-19 PROCEDURE — 80048 BASIC METABOLIC PNL TOTAL CA: CPT

## 2017-05-19 PROCEDURE — 84156 ASSAY OF PROTEIN URINE: CPT

## 2017-05-19 PROCEDURE — 36415 COLL VENOUS BLD VENIPUNCTURE: CPT | Mod: PO

## 2017-05-21 ENCOUNTER — NURSE TRIAGE (OUTPATIENT)
Dept: ADMINISTRATIVE | Facility: CLINIC | Age: 27
End: 2017-05-21

## 2017-05-21 ENCOUNTER — HOSPITAL ENCOUNTER (EMERGENCY)
Facility: HOSPITAL | Age: 27
Discharge: HOME | End: 2017-05-22
Payer: COMMERCIAL

## 2017-05-21 DIAGNOSIS — R07.9 CHEST PAIN: ICD-10-CM

## 2017-05-21 DIAGNOSIS — K21.9 GASTROESOPHAGEAL REFLUX DISEASE, ESOPHAGITIS PRESENCE NOT SPECIFIED: Primary | ICD-10-CM

## 2017-05-21 PROCEDURE — 93005 ELECTROCARDIOGRAM TRACING: CPT

## 2017-05-21 PROCEDURE — 99283 EMERGENCY DEPT VISIT LOW MDM: CPT | Mod: 25

## 2017-05-21 PROCEDURE — 93010 ELECTROCARDIOGRAM REPORT: CPT | Mod: ,,, | Performed by: INTERNAL MEDICINE

## 2017-05-21 PROCEDURE — 25000003 PHARM REV CODE 250: Performed by: PHYSICIAN ASSISTANT

## 2017-05-21 RX ADMIN — LIDOCAINE HYDROCHLORIDE 50 ML: 20 SOLUTION ORAL; TOPICAL at 10:05

## 2017-05-21 NOTE — TELEPHONE ENCOUNTER
"16 wks preg.   Pt called re hard for pt to breathe since last night. Did not sleep well last pm.denies vag bleeding. Denies asthma.      Reason for Disposition   [1] MODERATE difficulty breathing (e.g., speaks in phrases, SOB even at rest, pulse 100-120) AND [2] NEW-onset or WORSE than normal    Answer Assessment - Initial Assessment Questions  1. RESPIRATORY STATUS: "Describe your breathing?" (e.g., wheezing, shortness of breath, unable to speak, severe coughing)     SOB sitting still, dx'd with anemia- stopped taking iron and prenatal for a while- stopped about two weeks ago   2. ONSET: "When did this breathing problem begin?"      Sat evening   3. PATTERN "Does the difficult breathing come and go, or has it been constant since it started?"      Constant   4. SEVERITY: "How bad is your breathing?" (e.g., mild, moderate, severe)     - MILD: No SOB at rest, mild SOB with walking, speaks normally in sentences, can lay down, no retractions, pulse < 100.     - MODERATE: SOB at rest, SOB with minimal exertion and prefers to sit, cannot lie down flat, speaks in phrases, mild retractions, audible wheezing, pulse 100-120.     - SEVERE: Very SOB at rest, speaks in single words, struggling to breathe, sitting hunched forward, retractions, pulse > 120      sob at rest   5. RECURRENT SYMPTOM: "Have you had difficulty breathing before?" If so, ask: "When was the last time?" and "What happened that time?"      No   6. CARDIAC HISTORY: "Do you have any history of heart disease?" (e.g., heart attack, angina, bypass surgery, angioplasty)      No   7. LUNG HISTORY: "Do you have any history of lung disease?"  (e.g., pulmonary embolus, asthma, emphysema)     No   8. CAUSE: "What do you think is causing the breathing problem?"      Unsure  9. OTHER SYMPTOMS: "Do you have any other symptoms? (e.g., dizziness, runny nose, cough, chest pain, fever)     No   10. PREGNANCY: "Is there any chance you are pregnant?" "When was your last " "menstrual period?"       16 wks   11. TRAVEL: "Have you traveled out of the country in the last month?" (e.g., travel history, exposures)       No    Protocols used: ST BREATHING DIFFICULTY-A-AH    Two appts danny for neph and for lupus. rec ED due to mod - severe SOB. Call back with questions.   "

## 2017-05-22 ENCOUNTER — OFFICE VISIT (OUTPATIENT)
Dept: NEPHROLOGY | Facility: CLINIC | Age: 27
End: 2017-05-22
Payer: COMMERCIAL

## 2017-05-22 ENCOUNTER — OFFICE VISIT (OUTPATIENT)
Dept: RHEUMATOLOGY | Facility: CLINIC | Age: 27
End: 2017-05-22
Payer: COMMERCIAL

## 2017-05-22 VITALS
HEART RATE: 100 BPM | SYSTOLIC BLOOD PRESSURE: 138 MMHG | DIASTOLIC BLOOD PRESSURE: 88 MMHG | WEIGHT: 171.06 LBS | DIASTOLIC BLOOD PRESSURE: 97 MMHG | BODY MASS INDEX: 28.57 KG/M2 | BODY MASS INDEX: 28.47 KG/M2 | SYSTOLIC BLOOD PRESSURE: 142 MMHG | HEART RATE: 92 BPM | WEIGHT: 171.5 LBS | HEIGHT: 65 IN

## 2017-05-22 VITALS
RESPIRATION RATE: 16 BRPM | DIASTOLIC BLOOD PRESSURE: 98 MMHG | BODY MASS INDEX: 29.16 KG/M2 | HEART RATE: 72 BPM | WEIGHT: 175 LBS | HEIGHT: 65 IN | SYSTOLIC BLOOD PRESSURE: 151 MMHG | OXYGEN SATURATION: 98 % | TEMPERATURE: 98 F

## 2017-05-22 DIAGNOSIS — R07.89 CHEST DISCOMFORT: ICD-10-CM

## 2017-05-22 DIAGNOSIS — M32.9 SLE (SYSTEMIC LUPUS ERYTHEMATOSUS RELATED SYNDROME): Primary | ICD-10-CM

## 2017-05-22 DIAGNOSIS — M32.14 LUPUS NEPHRITIS: Primary | ICD-10-CM

## 2017-05-22 DIAGNOSIS — M32.9 HISTORY OF SYSTEMIC LUPUS ERYTHEMATOSUS (SLE): ICD-10-CM

## 2017-05-22 DIAGNOSIS — I10 ESSENTIAL HYPERTENSION: ICD-10-CM

## 2017-05-22 DIAGNOSIS — R80.9 NON-NEPHROTIC RANGE PROTEINURIA: ICD-10-CM

## 2017-05-22 PROCEDURE — 99214 OFFICE O/P EST MOD 30 MIN: CPT | Mod: S$GLB,,, | Performed by: INTERNAL MEDICINE

## 2017-05-22 PROCEDURE — 1160F RVW MEDS BY RX/DR IN RCRD: CPT | Mod: S$GLB,,, | Performed by: INTERNAL MEDICINE

## 2017-05-22 PROCEDURE — 99999 PR PBB SHADOW E&M-EST. PATIENT-LVL III: CPT | Mod: PBBFAC,,, | Performed by: INTERNAL MEDICINE

## 2017-05-22 PROCEDURE — 99215 OFFICE O/P EST HI 40 MIN: CPT | Mod: S$GLB,,, | Performed by: INTERNAL MEDICINE

## 2017-05-22 RX ORDER — FAMOTIDINE 20 MG/1
20 TABLET, FILM COATED ORAL 2 TIMES DAILY PRN
Qty: 20 TABLET | Refills: 0 | Status: SHIPPED | OUTPATIENT
Start: 2017-05-22 | End: 2017-07-18 | Stop reason: SDUPTHER

## 2017-05-22 NOTE — PROGRESS NOTES
NEPHROLOGY CLINIC FOLLOWUP NOTE      Date of clinic visit: 5/22/17     RHEUMATOLOGIST: Jaden Echeverria M.D.      REASON FOR CONSULTATION: lupus nephritis      CHIEF COMPLAINT: History of systemic lupus erythematosus.      HISTORY OF PRESENT ILLNESS: Ms. Vines is a 26-year-old -American female with a h/o of lupus nephritis who presents for f/u. Pt was last seen by me on 2/20/17. Noted that pt is now pregnant at 17 weeks of gestation. Noted that she had a positive pregnancy test done on 2/22/17, 2 days after her last visit at the renal clinic. Pt reports that rheumatology stopped mycophenolate once the pregnancy test was known in Feb 2017. Noted that losartan was also stopped, and she is now taking methyldopa for BP mgmt, though she does not take it regularly.     Pt has kidney biopsy proven class V LN. On the kidney biopsy she had no acuity and only about 5% to 10% chronicity. Pt previously received induction treatment with mycophenolate combined with prednisone. Pt was previously on mycophenolate maintenance dose and low dose prednisone. She reports no problems or side effects with the medicines she is taking. No new problems. No fever. No diarrhea. She has no acute c/o's today. She is following closely with OB/GYN. Thye pregnancy was unplanned.     PAST MEDICAL HISTORY:   1. Systemic lupus erythematosus diagnosed in November 2013.   2. Class V membranous lupus nephritis. Kidney biopsy on 10/17/2013 showed 5%   to 10% interstitial fibrosis. No acuity on this biopsy.   3. Sjogren's disease, diagnosed in 2012.   4. History of genital herpes.      PAST SURGICAL HISTORY: None, apart from the kidney biopsy.      FAMILY HISTORY: No one in the family with lupus. Father is 47 years old. Mom is 44. Both are healthy. The patient does have a 1-year-old son who was diagnosed with third-degree AV block   .   ALLERGIES: Reviewed. No known drug allergies.      MEDICATIONS: Reviewed     REVIEW OF SYSTEMS:   HEAD, EYES,  EARS, NOSE, THROAT: As above, otherwise negative.   CARDIAC: Negative.   PULMONARY: Negative.   GASTROINTESTINAL: Negative.   GENITOURINARY: As above, otherwise negative.   INFECTIOUS DISEASE: negative.   RHEUMATOLOGICAL: As above, otherwise negative.   The rest of the review of systems is negative.      PHYSICAL EXAMINATION:   VITAL SIGNS: Blood pressure is 138/88, pulse is 100, weight is 171 lbs, last visit 174 lbs   In no acute distress.   GENERAL: She is ambulatory.   HEART: Regular rate and rhythm. No pericardial friction rubs. S1 and S2 audible.   CHEST: Clear to auscultation. No rales, breathing symmetric and unlabored  ABDOMEN: Soft, nontender.   EXTREMITIES: Showed no edema.      LABORATORY VALUES: reviewed:  U prot/cr ratio 220 mg   C3 and C4 now normal  BMP  Lab Results   Component Value Date     05/19/2017    K 3.6 05/19/2017     05/19/2017    CO2 21 (L) 05/19/2017    BUN 10 05/19/2017    CREATININE 0.6 05/19/2017    CALCIUM 8.7 05/19/2017    ANIONGAP 9 05/19/2017    ESTGFRAFRICA >60.0 05/19/2017    EGFRNONAA >60.0 05/19/2017     Lab Results   Component Value Date    WBC 9.25 04/13/2017    RBC 3.64 (L) 04/13/2017    HGB 10.3 (L) 04/13/2017    HCT 29.1 (L) 04/13/2017    MCV 80 (L) 04/13/2017    MCH 28.3 04/13/2017    MCHC 35.4 04/13/2017    RDW 14.2 04/13/2017     (L) 04/13/2017    MPV 9.8 04/13/2017    GRAN 8.1 (H) 04/13/2017    GRAN 87.6 (H) 04/13/2017    LYMPH 0.6 (L) 04/13/2017    LYMPH 6.6 (L) 04/13/2017    MONO 0.6 04/13/2017    MONO 6.1 04/13/2017    EOS 0.0 04/13/2017    BASO 0.00 04/13/2017    EOSINOPHIL 0.0 04/13/2017    BASOPHIL 0.0 04/13/2017           ASSESSMENT AND PLAN: This is a 26-year-old female with lupus nephritis who presents for f/u. Pt is pregnant.  impression is as follows:      1. Renal: Cr is stable, pt is doing well overall now  Cr has not changed or worsened  H/o of lupus nephritis, membranous, class V, kidney biopsy proven  Proteinuria has stable, can not  take any ACE-I's or ARB's at this point.  C3 and C4 are now low at this point     2. HTN: BP controlled to high for pregnancy  Meds reviewed  Advised pt to take methyldopa as prescribed regularly     3. The patient was previosuly on maintenance dose of mycophenolate.   Pt had been advised by me about the teratogenicity of both mycophenolate and losartan in case of an unplanned pregnancy.  See prior documentations    4. Rheum: discussed with pt that lupus normally becomes quiet during a pregnancy  SLE.    PLANS AND RECOMMENDATIONS:   As discussed above  RTC 1 month  High risk pregnancy  Total time spent 50 minutes including time needed to review the records, the   patient evaluation, documentation, face-to-face discussion with the patient,   more than 50% of the time was spent on coordination of care and counseling.    Level V visit.    Laurence Morales MD

## 2017-05-22 NOTE — ED NOTES
Pt examined by RAJ Tamayo.  Patient has been educated on prescriptions, given discharge instructions and discharged to lobby by provider. See provider notes for exam.

## 2017-05-22 NOTE — ASSESSMENT & PLAN NOTE
High complexity.  On 10 mg daily prednisone . Not on any immunosuppressive therapy at this time since she is pregnant. There is no contraindication to take plaquenil but she does not want to take it if the lupus activity is not high.  Stable activity markers.  Reinforced the importance of close monitoring.

## 2017-05-22 NOTE — PROGRESS NOTES
RHEUMATOLOGY CLINIC FOLLOW UP VISIT  Chief complaints:-  My chest hurts.    HPI:-  Alisia Jolly a 26 y.o. pleasant female comes in for a follow up visit with above chief complaints.   She has systemic lupus erythematosus diagnosed in 2013 when she had diffuse lymphadenopathy and anasarca.  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  She has been intermittently following up in the rheumatology clinic.  She was on immunosuppressive therapy and got pregnant accidentally since she was not taking her OCP's during that week because of irregular cycles. So, she has stopped all immunosuppressives except 10 mg daily prednisone and is following with high risk OB clinic. She was at his usual health until yesterday morning when she woke up with sudden onset chest discomfort-Central, epigastric, worse on laying flat on leaning forward, not improved by the GI cocktail given at the emergency department, associated with shortness of breath and fatigue.  She denies any pain while coughing.  The pain is more of an achy diffuse achiness than a sharp pain.  She underwent EKG and emergency department yesterday which was reported to be normal and she was diagnosed with gastritis.  She haven't taken the famotidine which was given in the emergency department yesterday since it was too late when she got home.  She denies any joint pain today.  She also denies seizures or psychosis,  joint swelling, muscle weakness,Raynaud's phenomenon, sicca symptoms .  She has had a pregnancy with her first child having complete heart block likely secondary to her positive SSA antibody.      Review of Systems   Constitutional: Positive for malaise/fatigue. Negative for chills and fever.   HENT: Negative for nosebleeds and sore throat.    Eyes: Negative for blurred vision, photophobia and redness.   Respiratory: Negative for  cough, sputum production and shortness of breath.    Cardiovascular: Positive for chest pain. Negative for leg swelling.   Gastrointestinal: Positive for heartburn. Negative for abdominal pain, constipation and diarrhea.   Genitourinary: Negative for dysuria, frequency and urgency.   Musculoskeletal: Negative for back pain, falls, joint pain, myalgias and neck pain.   Skin: Negative for itching and rash.   Neurological: Negative for dizziness, tremors, seizures, loss of consciousness, weakness and headaches.   Endo/Heme/Allergies: Negative for environmental allergies. Does not bruise/bleed easily.   Psychiatric/Behavioral: Negative for hallucinations and memory loss. The patient does not have insomnia.        Past Medical History:   Diagnosis Date    Allergic rhinitis     Anemia     Condyloma acuminata     GERD (gastroesophageal reflux disease)     HSV-2 (herpes simplex virus 2) infection     Lupus nephritis     Mood disorder     Overweight     Sjogren's syndrome     Systemic lupus erythematosus     Thrombocytopenia        Past Surgical History:   Procedure Laterality Date     SECTION, LOW TRANSVERSE      RENAL BIOPSY  2013        Social History   Substance Use Topics    Smoking status: Never Smoker    Smokeless tobacco: Never Used    Alcohol use No       Family History   Problem Relation Age of Onset    Hypertension Mother     Eczema Brother     Heart disease Son     Hypertension Maternal Grandmother     Diabetes Paternal Grandmother     Lupus Neg Hx     Psoriasis Neg Hx        Review of patient's allergies indicates:  No Known Allergies        Physical examination:-    Vitals:    17 1109   BP: (!) 142/97   Pulse: 92   Weight: 77.6 kg (171 lb 1.2 oz)   PainSc:   5   PainLoc: Chest       Physical Exam   Constitutional: She is oriented to person, place, and time and well-developed, well-nourished, and in no distress. No distress.   HENT:   Head: Normocephalic.    Mouth/Throat: Oropharynx is clear and moist.   Eyes: Conjunctivae and EOM are normal. Pupils are equal, round, and reactive to light.   Neck: Normal range of motion. Neck supple.   Cardiovascular: Normal rate and intact distal pulses.  Exam reveals no friction rub.    Pulmonary/Chest: Effort normal. No respiratory distress. She exhibits no tenderness.   Abdominal: Soft. There is no tenderness.   Musculoskeletal:   No synovitis over small joints of hands or feet.  No effusion over large joints.   Neurological: She is alert and oriented to person, place, and time. No cranial nerve deficit.   Skin: Skin is warm. No rash noted. No erythema.   Psychiatric: Mood and affect normal.   Nursing note and vitals reviewed.      Labs:-  Results for HEIDI SHAIKH (MRN [id number]) as of 5/22/2017 12:21   Ref. Range 1/23/2017 08:10 2/17/2017 08:40 3/22/2017 08:58 5/19/2017 13:36   HARPREET HEP-2 Titer Unknown Positive >=1:2560...      Anti-SSA Antibody Latest Ref Range: 0.00 - 19.99 .26 (H)      Anti-SSA Interpretation Latest Ref Range: Negative  Positive (A)      Anti-SSB Antibody Latest Ref Range: 0.00 - 19.99 EU 46.84 (H)      Anti-SSB Interpretation Latest Ref Range: Negative  Positive (A)      ds DNA Ab Latest Ref Range: Negative 1:10  Negative 1:10      Anti Sm Antibody Latest Ref Range: 0.00 - 19.99 EU 2.89      Anti-Sm Interpretation Latest Ref Range: Negative  Negative      Anti Sm/RNP Antibody Latest Ref Range: 0.00 - 19.99 EU 4.12      Anti-Sm/RNP Interpretation Latest Ref Range: Negative  Negative      Complement (C-3) Latest Ref Range: 50 - 180 mg/dL 45 (L) 56 47 (L) 56   Complement (C-4) Latest Ref Range: 11 - 44 mg/dL 10 (L) 13 13 16         Medication List with Changes/Refills   Current Medications    EFINACONAZOLE (JUBLIA) 10 % RALF    Apply once daily to nail and nail fold.  Use daily for up to 1 year.    FAMOTIDINE (PEPCID) 20 MG TABLET    Take 1 tablet (20 mg total) by mouth 2 (two) times daily as  needed for Heartburn.    FLUOXETINE (PROZAC) 40 MG CAPSULE    Take 1 capsule (40 mg total) by mouth once daily.    HYDROXYCHLOROQUINE (PLAQUENIL) 200 MG TABLET    Take 1 tablet (200 mg total) by mouth 2 (two) times daily.    METHYLDOPA (ALDOMET) 500 MG TABLET    Take 1 tablet (500 mg total) by mouth every 12 (twelve) hours.    PREDNISONE (DELTASONE) 10 MG TABLET    Take 20 mg once daily for 2 weeks, then reduce to 10 mg daily thereafter.    VALACYCLOVIR (VALTREX) 500 MG TABLET    TAKE 1 TABLET ONE TIME DAILY   Discontinued Medications    MULTIVITAMIN CAPSULE    Take 1 capsule by mouth once daily.       Assessment/Plans:-  SLE (systemic lupus erythematosus related syndrome)  High complexity.  On 10 mg daily prednisone . Not on any immunosuppressive therapy at this time since she is pregnant. There is no contraindication to take plaquenil but she does not want to take it if the lupus activity is not high.  Stable activity markers.  Reinforced the importance of close monitoring.    Chest discomfort  Central substernal chest discomfort since Sunday morning.  Differential diagnosis includes gastroesophageal reflux disease, gastritis, pleuritis or nonspecific chest pain.  Examination shows no evidence of pleural effusion or pleural/pericardial rub.  She haven't started taking famotidine which was given in the emergency department yesterday yet.  Requested to take the medication and report back in 2 days.  If no improvement, after getting approval from her OB/GYN would consider doing a chest x-ray-PA view to check for any pleural effusion or signs of interstitial disease.  I have sent an Epic Secure Chat message to her obstetrician explaining the above plan.    # Return in about 3 months (around 8/22/2017).      Time spent: 30 minutes in face to face discussion concerning diagnosis, prognosis, review of lab and test results, benefits of treatment as well as management of disease, counseling of patient and coordination of  care between various health care providers . Greater than half the time spent was used for coordination of care and counseling of patient.      Disclaimer: This note was prepared using voice recognition system and is likely to have sound alike errors and is not proof read.  Please call me with any questions.

## 2017-05-22 NOTE — ASSESSMENT & PLAN NOTE
Central substernal chest discomfort since Sunday morning.  Differential diagnosis includes gastroesophageal reflux disease, gastritis, pleuritis or nonspecific chest pain.  Examination shows no evidence of pleural effusion or pleural/pericardial rub.  She haven't started taking famotidine which was given in the emergency department yesterday yet.  Requested to take the medication and report back in 2 days.  If no improvement, after getting approval from her OB/GYN would consider doing a chest x-ray-PA view to check for any pleural effusion or signs of interstitial disease.  I have sent an Epic Secure Chat message to her obstetrician explaining the above plan.

## 2017-05-22 NOTE — ED NOTES
Patient sitting up in TL chair, laughing with friend at her side.  Denies any pain at this time.  Awaiting results and disposition.

## 2017-05-22 NOTE — DISCHARGE INSTRUCTIONS
Over the counter medications:  Antacids:  Maalox, Milk of Magnesia, Rolaids, Tums  H2 Blockers: Pepcid, Zantac, Prilosec OTC    Avoid fatty foods and spicy foods  Eat fewer acidic foods such as citrus and tomato based foods.    Limit drinking alcohol, caffeine, and fizzy beverages.  Limit chocolate, peppermint, and spearmint    Avoid lying down for 3 hours after eating  Do not snack before going to bed.

## 2017-05-22 NOTE — ED PROVIDER NOTES
"SCRIBE #1 NOTE: I, Fredi Cohen, am scribing for, and in the presence of,  RAJ Shah. I have scribed the entire note.      History      Chief Complaint   Patient presents with    Chest Pain     midsternal w/o radiation. "tightness" denies cough       Review of patient's allergies indicates:  No Known Allergies     HPI   HPI    2017, 10:29 PM   History obtained from the patient      History of Present Illness: Alisia Vines is a 26 y.o. female patient who presents to the Emergency Department for an evaluation of mid-sternal chest tightness which onset suddenly this morning. Pt reports she woke up with a tightness in her chest and contacted a nursing hotline which advised her to come into ED for further evaluation of her sx. Pt repots she has been burping, but states she has not been burping more than usual. Pt reports hx of acid reflux. Symptoms are constant and moderate in severity. Exacerbated by lying down and relieved by nothing. Associated sxs include SOB. Patient denies any fever, chills, diaphoresis, cough, CP, leg swelling, palpitations, HA, weakness, lightheadedness, dysuria, hematuria, urinary frequency/urgency, vaginal bleeding, vaginal discharge, abdominal pain, n/v/d, congestion, sore throat, and all other sxs at this time. No further complaints or concerns at this time.     Arrival mode: Personal vehicle    PCP: Saumya Quinonez MD       Past Medical History:  Past Medical History:   Diagnosis Date    Allergic rhinitis     Anemia     Condyloma acuminata     GERD (gastroesophageal reflux disease)     HSV-2 (herpes simplex virus 2) infection     Lupus nephritis     Mood disorder     Overweight     Sjogren's syndrome     Systemic lupus erythematosus     Thrombocytopenia        Past Surgical History:  Past Surgical History:   Procedure Laterality Date     SECTION, LOW TRANSVERSE      RENAL BIOPSY  2013         Family History:  Family History   Problem " Relation Age of Onset    Hypertension Mother     Eczema Brother     Heart disease Son     Hypertension Maternal Grandmother     Diabetes Paternal Grandmother     Lupus Neg Hx     Psoriasis Neg Hx        Social History:  Social History     Social History Main Topics    Smoking status: Never Smoker    Smokeless tobacco: Never Used    Alcohol use No    Drug use: No    Sexual activity: Yes     Partners: Male       ROS   Review of Systems   Constitutional: Negative for appetite change, chills, diaphoresis and fever.   HENT: Negative for congestion, sore throat and trouble swallowing.    Respiratory: Positive for chest tightness and shortness of breath. Negative for cough and wheezing.    Cardiovascular: Negative for chest pain, palpitations and leg swelling.   Gastrointestinal: Negative for abdominal pain, diarrhea, nausea and vomiting.   Genitourinary: Negative for decreased urine volume, difficulty urinating, dysuria, hematuria, pelvic pain, urgency, vaginal bleeding, vaginal discharge and vaginal pain.   Musculoskeletal: Negative for back pain, neck pain and neck stiffness.   Skin: Negative for rash.   Neurological: Negative for dizziness, weakness, light-headedness, numbness and headaches.     Physical Exam      Initial Vitals [05/21/17 2131]   BP Pulse Resp Temp SpO2   (!) 152/88 83 18 97.6 °F (36.4 °C) 100 %      Physical Exam  Nursing Notes and Vital Signs Reviewed.  Constitutional: Patient is in no acute distress. Well-developed and well-nourished.  Head: Atraumatic. Normocephalic.  Eyes: PERRL. EOM intact. Conjunctivae are not pale. No scleral icterus.  ENT: Mucous membranes are moist.   Neck: Supple. Full ROM. No lymphadenopathy.  Cardiovascular: Regular rate. Regular rhythm. No murmurs, rubs, or gallops.   Pulmonary/Chest: No respiratory distress. Clear to auscultation bilaterally. No wheezing, rales, or rhonchi.  Abdominal: Soft and non-distended.   Musculoskeletal: Moves all extremities. No  "obvious deformities. No edema. No calf tenderness.  Skin: Warm and dry.  Neurological:  Alert, awake, and appropriate.  Normal speech.  No acute focal neurological deficits are appreciated.  Psychiatric: Normal affect. Good eye contact. Appropriate in content.    ED Course    Procedures  ED Vital Signs:  Vitals:    05/21/17 2131 05/21/17 2356   BP: (!) 152/88 (!) 151/98   Pulse: 83 72   Resp: 18 16   Temp: 97.6 °F (36.4 °C)    TempSrc: Oral    SpO2: 100% 98%   Weight: 79.4 kg (175 lb)    Height: 5' 5" (1.651 m)        Abnormal Lab Results:  Labs Reviewed - No data to display         Imaging Results:  Imaging Results    None             The EKG was ordered, reviewed, and independently interpreted by the ED provider.  Interpretation time: 22:45  Rate: 72 BPM  Rhythm: normal sinus rhythm  Interpretation: Normal EKG. No STEMI.        The Emergency Provider reviewed the vital signs and test results, which are outlined above.    ED Discussion     12:10 AM: Reassessed pt at this time. Pt is awake, alert, and in NAD. Pt states her condition has improved at this time. Discussed with pt all pertinent ED information and results. Discussed pt dx of acid reflux and plan of tx. Gave pt all f/u and return to the ED instructions. All questions and concerns were addressed at this time. Pt expresses understanding of information and instructions, and is comfortable with plan to discharge. Pt is stable for discharge.    I discussed with patient and/or family/caretaker that evaluation in the ED does not suggest any emergent or life threatening medical conditions requiring immediate intervention beyond what was provided in the ED, and I believe patient is safe for discharge.  Regardless, an unremarkable evaluation in the ED does not preclude the development or presence of a serious of life threatening condition. As such, patient was instructed to return immediately for any worsening or change in current " symptoms.    Pre-hypertension/Hypertension: The pt has been informed that they may have pre-hypertension or hypertension based on a blood pressure reading in the ED. I recommend that the pt call the PCP listed on their discharge instructions or a physician of their choice this week to arrange f/u for further evaluation of possible pre-hypertension or hypertension.       ED Medication(s):  Medications   GI cocktail (mylanta 30 mL, lidocaine 2 % viscous 10 mL, dicyclomine 10 mL) 50 mL (50 mLs Oral Given 5/21/17 8339)       Discharge Medication List as of 5/22/2017 12:16 AM      START taking these medications    Details   famotidine (PEPCID) 20 MG tablet Take 1 tablet (20 mg total) by mouth 2 (two) times daily as needed for Heartburn., Starting Mon 5/22/2017, Print             Follow-up Information     Saumya Quinonez MD in 2 days.    Specialty:  Family Medicine  Contact information:  [address] [facility] [address] [phone]             OB/GYN.    Contact information:  If symptoms continue/worsen.           [facility] - .    Specialty:  Emergency Medicine  Why:  If symptoms worsen  Contact information:  [address] [address] [address] [phone]                   Medical Decision Making    Medical Decision Making:   Clinical Tests:   Lab Tests: Ordered and Reviewed  Radiological Study: Ordered and Reviewed  Medical Tests: Ordered and Reviewed           Scribe Attestation:   Scribe #1: I performed the above scribed service and the documentation accurately describes the services I performed. I attest to the accuracy of the note.    Attending:   Physician Attestation Statement for Scribe #1: I, RAJ Shah, personally performed the services described in this documentation, as scribed by Fredi Cohen, in my presence, and it is both accurate and complete.          Clinical Impression       ICD-10-CM ICD-9-CM   1. Gastroesophageal reflux disease, esophagitis  presence not specified K21.9 530.81   2. Chest pain R07.9 786.50       Disposition:   Disposition: Discharged  Condition: Stable         Belkis Rivers PA-C  05/22/17 0124

## 2017-05-26 ENCOUNTER — TELEPHONE (OUTPATIENT)
Dept: OBSTETRICS AND GYNECOLOGY | Facility: HOSPITAL | Age: 27
End: 2017-05-26

## 2017-05-26 DIAGNOSIS — M32.14 OTHER SYSTEMIC LUPUS ERYTHEMATOSUS WITH GLOMERULAR DISEASE: Primary | ICD-10-CM

## 2017-05-26 NOTE — TELEPHONE ENCOUNTER
Left message on pt's phone. Pt had presented to ER w/ chest tightness. Message was sent to me about chest xray from rheumatologist. Ok for chest xray w/ abdominal shield if pt is still symptomatic

## 2017-06-06 ENCOUNTER — TELEPHONE (OUTPATIENT)
Dept: OBSTETRICS AND GYNECOLOGY | Facility: CLINIC | Age: 27
End: 2017-06-06

## 2017-06-06 NOTE — TELEPHONE ENCOUNTER
----- Message from Chelsey Wright sent at 6/5/2017 11:58 AM CDT -----  Please call pt back at [phone] about an appt for ultra sound.

## 2017-06-06 NOTE — TELEPHONE ENCOUNTER
----- Message from Macey Acevedo sent at 6/6/2017 11:24 AM CDT -----  Contact: Patient  Patient returned call; she needs to reschedule the:    1. US  2. Teleconference  3. Appt    She can be contacted at [phone].    Thanks,  Macey

## 2017-06-06 NOTE — TELEPHONE ENCOUNTER
Returned call to pt in regards to appts, pt is unable to hear me for some reason, attempted to call pt back again with no answer.

## 2017-06-07 DIAGNOSIS — M32.9 SLE-SJOGREN OVERLAP SYNDROME: Primary | ICD-10-CM

## 2017-06-07 DIAGNOSIS — M35.00 SLE-SJOGREN OVERLAP SYNDROME: Primary | ICD-10-CM

## 2017-06-12 ENCOUNTER — TELEPHONE (OUTPATIENT)
Dept: OBSTETRICS AND GYNECOLOGY | Facility: CLINIC | Age: 27
End: 2017-06-12

## 2017-06-12 NOTE — TELEPHONE ENCOUNTER
----- Message from Meredith Marcelino sent at 6/12/2017  7:15 AM CDT -----  Contact: pt   Pt said she called last week to speak with someone about her re-scheduling her appt. But no one called her back,, pt needs to get her appt and ultrasound  re-scheduled,, and she need to discuss if it can be done in [place] if possible,, please call pt back [phone]

## 2017-06-12 NOTE — TELEPHONE ENCOUNTER
----- Message from Meredith Marcelino sent at 6/12/2017  7:15 AM CDT -----  Contact: pt   Pt said she called last week to speak with someone about her re-scheduling her appt. But no one called her back,, pt needs to get her appt and ultrasound  re-scheduled,, and she need to discuss if it can be done in [place] if possible,, please call pt back [phone]

## 2017-06-12 NOTE — TELEPHONE ENCOUNTER
Pt advised orders have been placed for [facility] ultrasound, pt states she is currently in [place] with her son who has to have heart surgery, she will make appts there and contact our office if needed.

## 2017-06-14 ENCOUNTER — OFFICE VISIT (OUTPATIENT)
Dept: MATERNAL FETAL MEDICINE | Facility: CLINIC | Age: 27
End: 2017-06-14
Attending: OBSTETRICS & GYNECOLOGY
Payer: COMMERCIAL

## 2017-06-14 VITALS — WEIGHT: 177.5 LBS | SYSTOLIC BLOOD PRESSURE: 140 MMHG | BODY MASS INDEX: 29.53 KG/M2 | DIASTOLIC BLOOD PRESSURE: 84 MMHG

## 2017-06-14 DIAGNOSIS — M32.9 SLE-SJOGREN OVERLAP SYNDROME: ICD-10-CM

## 2017-06-14 DIAGNOSIS — Z36.82 ENCOUNTER FOR NUCHAL TRANSLUCENCY TESTING: Primary | ICD-10-CM

## 2017-06-14 DIAGNOSIS — M35.00 SLE-SJOGREN OVERLAP SYNDROME: ICD-10-CM

## 2017-06-14 DIAGNOSIS — Z36.89 ENCOUNTER FOR ULTRASOUND TO CHECK FETAL GROWTH: ICD-10-CM

## 2017-06-14 PROCEDURE — 99999 PR PBB SHADOW E&M-EST. PATIENT-LVL II: CPT | Mod: PBBFAC,,, | Performed by: OBSTETRICS & GYNECOLOGY

## 2017-06-14 PROCEDURE — 99215 OFFICE O/P EST HI 40 MIN: CPT | Mod: 25,S$GLB,, | Performed by: OBSTETRICS & GYNECOLOGY

## 2017-06-14 PROCEDURE — 76811 OB US DETAILED SNGL FETUS: CPT | Mod: S$GLB,,, | Performed by: OBSTETRICS & GYNECOLOGY

## 2017-06-14 PROCEDURE — 76828 ECHO EXAM OF FETAL HEART: CPT | Mod: S$GLB,,, | Performed by: OBSTETRICS & GYNECOLOGY

## 2017-06-14 RX ORDER — HYDROXYCHLOROQUINE SULFATE 200 MG/1
200 TABLET, FILM COATED ORAL DAILY
Qty: 30 TABLET | Refills: 6 | Status: SHIPPED | OUTPATIENT
Start: 2017-06-14 | End: 2017-07-03

## 2017-06-14 NOTE — PROGRESS NOTES
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    Provider requesting consultation: Dr. Harden    26 y.o. [id number] 21w1d EGA     PMH:  Past Medical History:   Diagnosis Date    Allergic rhinitis     Anemia     Condyloma acuminata     GERD (gastroesophageal reflux disease)     HSV-2 (herpes simplex virus 2) infection     Lupus nephritis     Mood disorder     Overweight     Sjogren's syndrome     Systemic lupus erythematosus     Thrombocytopenia        PObHx:  OB History    Para Term  AB Living   2 1 1   1   SAB TAB Ectopic Multiple Live Births       1      # Outcome Date GA Lbr José Miguel/2nd Weight Sex Delivery Anes PTL Lv   2 Current            1 Term 12 38w0d  2.948 kg (6 lb 8 oz) M CS-LTranv EPI N CYDNEY          PSH:  Past Surgical History:   Procedure Laterality Date     SECTION, LOW TRANSVERSE      RENAL BIOPSY  2013       Family history:family history includes Diabetes in her paternal grandmother; Eczema in her brother; Heart disease in her son; Hypertension in her maternal grandmother and mother.    Social history: reports that she has never smoked. She has never used smokeless tobacco. She reports that she does not drink alcohol or use drugs.    A detailed fetal anatomical ultrasound was completed today.  See details in imaging section of [facility].      She is referred for a history of systemic lupus erythematosus (SLE). SLE was diagnosed 4 years ago when she presented with severe fatigue. In addition to a positive HARPREET. Anti SSA/Ro and SSB/La antibodies were + at that time. Interestingly, prior to her diagnosis she had a fetus with heart block.  That child has recently undergone a heart transplant at age 7. She has  knownlupus nephritis and hypertension.    SLE is an idiopathic chronic inflammatory disease involving the skin, joints, kidneys, nervous system and other body organs and is  characterized by many remissions and relapses. The diagnosis of SLE is established when 4 of the following 11 criteria are met; malar or butterfly rash, discoid rash, photosensitivity, oral ulcers, seizures, hemolytic anemia or thrombocytopenia, serositis (pleuritis, pericarditis ), renal disorders (e.g. proteinuria), arthritis in 2 or more joints, positive HARPREET, or an immunologic disorder( false positive VDRL, anti-DS DNA or anti-Turpin antibodies, positive LE prep).    In the past there was significant concern that pregnancy may exacerbate SLE. However, recent well controlled studies show that pregnancy does not increase the frequency of SLE exacerbations, although flares occur commonly postpartum.    Patients with symptomatic lupus are more likely to have adverse pregnancy outcomes including fetal loss (up to 40%), IUGR (25%),  delivery (30%), and preeclampsia (30%). Maternal complications include thrombocytopenia, lupus flare including lupus nephritis and increased risk for .    The mainstay of therapy for symptomatic SLE is prednisone. The goal is to use the lowest dose necessary to alleviate the patients' symptoms. Rarely additional medications are needed such as azathiaprine. One of the most difficult issues in caring for patients with SLE is diagnosing the signs and symptoms of lupus flare. As a general rule elevated anti-DNA levels or decreased complement does not have clinical significance in the absence of clinical symptoms of a flare. Valid symptoms associated with lupus flare include true rash, arthritis, elevated anti-DNA levels, fever, lymphadenopathy, and leukopenia. Symptoms that are NOT suggestive of a lupus flare include arthralgias, knee effusion, proteinuria, hypocomplementemia, and thrombocytopenia.     Distinguishing between lupus nephritis and preeclampsia can be very difficult. Increased proteinuria does not distinguish between these two entities. SLE nephritis is associated  with decreased C3/C4/C50 levels whereas preeclampsia has normal C 50 levels.     lupus can occur in 10-25% of women with SSA/SSB antibodies. This syndrome is characterized by a transient skin rash, thrombocytopenia, and congenital heart block(< 3% risk with SSA antibodies). If congenital heart block is diagnosed serial ultrasounds are needed to look for evidence of fetal hydrops.  She will have WI intervals to evaluate for early effects.      IMPRESSION:  1.IUP at 21w1d weeks gestation  2 SLE      RECOMMENDATIONS:  1. A PTT to screen for lupus anticoagulant, renal function tests and a 24 hour urine for protein and creatinine clearance.  2. She should be observed closely for signs and symptoms of lupus flare, preeclampsia and IUGR.  3. Your patient should be seen more frequently during the pregnancy to observe for the above-mentioned problems. She is scheduled for to see us again in 2 weeks.  4. She should continue her prednisone.  I also told her to restart her Plaquenil at her prior dose..  5. Serial growth scans every 4-6 weeks to evaluate fetal growth.  6. Antepartum testing beginning at 32 weeks gestation or sooner if complications develop.  7. Patients on chronic steroid therapy within 3-6 months from delivery should receive steroid therapy during labor and delivery.  8. The pediatrician should evaluate the infant for  lupus.  9. Postpartum she should be observed closely for evidence of an exacerbation and maintenance therapy can be resumed.    She is scheduled at [facility] for her next WI interval study.  After thatwe will see if these can be done in [place].       The approximate physician face-to-face time was 45 minutes. The majority of the time (>50%) was spent on counseling of the patient or coordination of care.

## 2017-06-28 ENCOUNTER — OFFICE VISIT (OUTPATIENT)
Dept: MATERNAL FETAL MEDICINE | Facility: CLINIC | Age: 27
End: 2017-06-28
Attending: OBSTETRICS & GYNECOLOGY
Payer: COMMERCIAL

## 2017-06-28 DIAGNOSIS — Z36.89 ENCOUNTER FOR FETAL ANATOMIC SURVEY: ICD-10-CM

## 2017-06-28 PROCEDURE — 99499 UNLISTED E&M SERVICE: CPT | Mod: S$GLB,,, | Performed by: OBSTETRICS & GYNECOLOGY

## 2017-06-28 PROCEDURE — 76815 OB US LIMITED FETUS(S): CPT | Mod: S$GLB,,, | Performed by: OBSTETRICS & GYNECOLOGY

## 2017-06-28 NOTE — LETTER
June 28, 2017      Jessica Meredith, [facility]  [address] [facility] [place] [address]           [facility]  [address]  [facility] [address]  Phone: [phone]          Patient: Alisia Vines   MR Number: [id number]   YOB: 1990   Date of Visit: 6/28/2017       Dear Jessica Meredith:    Thank you for referring Alisia Vines to me for evaluation. Attached you will find relevant portions of my assessment and plan of care.    If you have questions, please do not hesitate to call me. I look forward to following Alisia Vines along with you.    Sincerely,    Mello Akers III, MD    Enclosure  CC:  No Recipients    If you would like to receive this communication electronically, please contact [email or web address] or [phone] to request more information on [facility] access.    For providers and/or their staff who would like to refer a patient to [facility], please contact us through our one-stop-shop provider referral line, [facility], at [phone].    If you feel you have received this communication in error or would no longer like to receive these types of communications, please e-mail [email or web address]

## 2017-07-03 ENCOUNTER — PROCEDURE VISIT (OUTPATIENT)
Dept: OBSTETRICS AND GYNECOLOGY | Facility: CLINIC | Age: 27
End: 2017-07-03
Payer: COMMERCIAL

## 2017-07-03 ENCOUNTER — ROUTINE PRENATAL (OUTPATIENT)
Dept: OBSTETRICS AND GYNECOLOGY | Facility: CLINIC | Age: 27
End: 2017-07-03
Payer: COMMERCIAL

## 2017-07-03 VITALS
BODY MASS INDEX: 30.49 KG/M2 | SYSTOLIC BLOOD PRESSURE: 142 MMHG | WEIGHT: 183.19 LBS | DIASTOLIC BLOOD PRESSURE: 94 MMHG

## 2017-07-03 DIAGNOSIS — O16.2 HYPERTENSION DURING PREGNANCY IN SECOND TRIMESTER, UNSPECIFIED HYPERTENSION IN PREGNANCY TYPE: ICD-10-CM

## 2017-07-03 DIAGNOSIS — Z98.891 H/O: C-SECTION: Primary | ICD-10-CM
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
## 2017-07-03 DIAGNOSIS — M32.14 LUPUS NEPHRITIS: ICD-10-CM

## 2017-07-03 DIAGNOSIS — Z3A.23 PREGNANCY WITH 23 COMPLETED WEEKS GESTATION: ICD-10-CM

## 2017-07-03 PROCEDURE — 99999 PR PBB SHADOW E&M-EST. PATIENT-LVL III: CPT | Mod: PBBFAC,,, | Performed by: OBSTETRICS & GYNECOLOGY

## 2017-07-03 PROCEDURE — 76819 FETAL BIOPHYS PROFIL W/O NST: CPT | Mod: S$GLB,,, | Performed by: OBSTETRICS & GYNECOLOGY

## 2017-07-03 PROCEDURE — 0502F SUBSEQUENT PRENATAL CARE: CPT | Mod: S$GLB,,, | Performed by: OBSTETRICS & GYNECOLOGY
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
## 2017-07-10 ENCOUNTER — TELEPHONE (OUTPATIENT)
Dept: OBSTETRICS AND GYNECOLOGY | Facility: CLINIC | Age: 27
End: 2017-07-10
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
## 2017-07-10 NOTE — TELEPHONE ENCOUNTER
Lalita spoke with the patient and she will be contacting [facility] on Wednesday. I will also try to message her online.

## 2017-07-11 ENCOUNTER — PATIENT MESSAGE (OUTPATIENT)
Dept: OBSTETRICS AND GYNECOLOGY | Facility: CLINIC | Age: 27
End: 2017-07-11

## 2017-07-11 DIAGNOSIS — M54.5 BILATERAL LOW BACK PAIN, UNSPECIFIED CHRONICITY, WITH SCIATICA PRESENCE UNSPECIFIED: ICD-10-CM
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
## 2017-07-11 DIAGNOSIS — M54.2 NECK PAIN: ICD-10-CM

## 2017-07-11 DIAGNOSIS — Z34.90 PREGNANCY, UNSPECIFIED GESTATIONAL AGE: ICD-10-CM

## 2017-07-12 ENCOUNTER — OFFICE VISIT (OUTPATIENT)
Dept: OBSTETRICS AND GYNECOLOGY | Facility: CLINIC | Age: 27
End: 2017-07-12
Payer: COMMERCIAL

## 2017-07-12 ENCOUNTER — TELEPHONE (OUTPATIENT)
Dept: OBSTETRICS AND GYNECOLOGY | Facility: CLINIC | Age: 27
End: 2017-07-12

## 2017-07-12 DIAGNOSIS — M32.9 SLE-SJOGREN OVERLAP SYNDROME: ICD-10-CM

## 2017-07-12 DIAGNOSIS — Z36.89 ENCOUNTER FOR ULTRASOUND TO CHECK FETAL GROWTH: ICD-10-CM

## 2017-07-12 DIAGNOSIS — M35.00 SLE-SJOGREN OVERLAP SYNDROME: ICD-10-CM

## 2017-07-12 PROCEDURE — 99499 UNLISTED E&M SERVICE: CPT | Mod: S$GLB,,, | Performed by: OBSTETRICS & GYNECOLOGY

## 2017-07-12 PROCEDURE — 76828 ECHO EXAM OF FETAL HEART: CPT | Mod: S$GLB,,, | Performed by: OBSTETRICS & GYNECOLOGY
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
## 2017-07-18 ENCOUNTER — OFFICE VISIT (OUTPATIENT)
Dept: FAMILY MEDICINE | Facility: CLINIC | Age: 27
End: 2017-07-18
Payer: COMMERCIAL

## 2017-07-18 VITALS
TEMPERATURE: 97 F | BODY MASS INDEX: 29.65 KG/M2 | HEIGHT: 66 IN | HEART RATE: 97 BPM | SYSTOLIC BLOOD PRESSURE: 150 MMHG | WEIGHT: 184.5 LBS | RESPIRATION RATE: 18 BRPM | DIASTOLIC BLOOD PRESSURE: 90 MMHG | OXYGEN SATURATION: 99 %

## 2017-07-18 DIAGNOSIS — Z76.0 MEDICATION REFILL: ICD-10-CM

## 2017-07-18 DIAGNOSIS — R05.9 COUGH: Primary | ICD-10-CM

## 2017-07-18 DIAGNOSIS — J34.3 NASAL TURBINATE HYPERTROPHY: ICD-10-CM

## 2017-07-18 PROCEDURE — 99999 PR PBB SHADOW E&M-EST. PATIENT-LVL III: CPT | Mod: PBBFAC,,, | Performed by: FAMILY MEDICINE

## 2017-07-18 PROCEDURE — 99214 OFFICE O/P EST MOD 30 MIN: CPT | Mod: S$GLB,,, | Performed by: FAMILY MEDICINE

## 2017-07-18 RX ORDER — FAMOTIDINE 20 MG/1
20 TABLET, FILM COATED ORAL 2 TIMES DAILY PRN
Qty: 20 TABLET | Refills: 0 | Status: SHIPPED | OUTPATIENT
Start: 2017-07-18 | End: 2019-04-10 | Stop reason: SDUPTHER

## 2017-07-18 RX ORDER — FLUTICASONE PROPIONATE 50 MCG
2 SPRAY, SUSPENSION (ML) NASAL DAILY
Qty: 16 G | Refills: 0 | Status: SHIPPED | OUTPATIENT
Start: 2017-07-18 | End: 2018-10-18 | Stop reason: ALTCHOICE

## 2017-07-18 NOTE — PROGRESS NOTES
Subjective:       Patient ID: Alisia Vines is a 26 y.o. female.    Chief Complaint: Follow-up      HPI   Ms. Vines presents to clinic for complaints of cough.   She states she has been coughing for a few months.   She has some vomiting when her cough is very bad.   She states at times it wakes her out of her sleep.   It is a dry cough.   She was seen in ER for gerd and was given prevacid, but she states she only took it twice.   She denies any feeling of heartburn.   She denies any runny nose, sinus pressure, sneezing or fever.     Review of Systems   Constitutional: Negative for fever.   HENT: Negative for postnasal drip, rhinorrhea, sinus pressure, sneezing and sore throat.    Respiratory: Positive for cough.    Gastrointestinal: Positive for vomiting. Negative for abdominal pain and nausea.       Medication List with Changes/Refills   Current Medications    EFINACONAZOLE (JUBLIA) 10 % RALF    Apply once daily to nail and nail fold.  Use daily for up to 1 year.    FAMOTIDINE (PEPCID) 20 MG TABLET    Take 1 tablet (20 mg total) by mouth 2 (two) times daily as needed for Heartburn.    FLUOXETINE (PROZAC) 40 MG CAPSULE    Take 1 capsule (40 mg total) by mouth once daily.    HYDROXYCHLOROQUINE (PLAQUENIL) 200 MG TABLET    Take 1 tablet (200 mg total) by mouth 2 (two) times daily.    METHYLDOPA (ALDOMET) 500 MG TABLET    Take 1 tablet (500 mg total) by mouth every 12 (twelve) hours.    PREDNISONE (DELTASONE) 10 MG TABLET    Take 20 mg once daily for 2 weeks, then reduce to 10 mg daily thereafter.    VALACYCLOVIR (VALTREX) 500 MG TABLET    TAKE 1 TABLET ONE TIME DAILY       Patient Active Problem List   Diagnosis    Sjogren's syndrome    HSV-2 (herpes simplex virus 2) infection    Allergic rhinitis    Proteinuria    Lupus nephritis    SLE (systemic lupus erythematosus related syndrome)    Anemia    GERD (gastroesophageal reflux disease)    Essential hypertension    SLE glomerulonephritis syndrome,  WHO class V    High-risk pregnancy in first trimester    History of fetal anomaly in prior pregnancy, currently pregnant, unspecified trimester    Chest discomfort         Objective:     Physical Exam   Constitutional: She is oriented to person, place, and time. She appears well-developed and well-nourished. No distress.   pregnant    HENT:   Head: Normocephalic and atraumatic.   Right Ear: External ear normal.   Left Ear: External ear normal.   Turbinate hypertrophy    Eyes: EOM are normal. Right eye exhibits no discharge. Left eye exhibits no discharge.   Cardiovascular: Normal rate and regular rhythm.    Pulmonary/Chest: Effort normal and breath sounds normal. No respiratory distress. She has no wheezes.   Abdominal: She exhibits distension.   Musculoskeletal: She exhibits no edema.   Neurological: She is alert and oriented to person, place, and time.   Skin: Skin is warm and dry. She is not diaphoretic. No erythema.   Psychiatric: She has a normal mood and affect.   Vitals reviewed.    Vitals:    07/18/17 1006   BP: (!) 150/90   Pulse: 97   Resp: 18   Temp: 96.8 °F (36 °C)       Assessment/  PLAN     Cough  - cough may be due to GERD and post nasal drip   - continue cough drops   - due to pregnancy, will avoid any other stronger cough medicine     Nasal turbinate hypertrophy  -     fluticasone (FLONASE) 50 mcg/actuation nasal spray; 2 sprays by Each Nare route once daily.  Dispense: 16 g; Refill: 0    Medication refill  -     famotidine (PEPCID) 20 MG tablet; Take 1 tablet (20 mg total) by mouth 2 (two) times daily as needed for Heartburn.  Dispense: 20 tablet; Refill: 0    Plan as above  rtc prn    Yane Wright MD  [facility]

## 2017-07-26 ENCOUNTER — OFFICE VISIT (OUTPATIENT)
Dept: OBSTETRICS AND GYNECOLOGY | Facility: CLINIC | Age: 27
End: 2017-07-26
Payer: COMMERCIAL

## 2017-07-26 DIAGNOSIS — M32.14 SLE GLOMERULONEPHRITIS SYNDROME, WHO CLASS V: ICD-10-CM

## 2017-07-26 DIAGNOSIS — M32.14 LUPUS NEPHRITIS: ICD-10-CM

## 2017-07-26 DIAGNOSIS — M35.00 SJOGREN'S SYNDROME: Primary | ICD-10-CM

## 2017-07-26 DIAGNOSIS — O09.299: ICD-10-CM

## 2017-07-26 DIAGNOSIS — M35.09 SJOGREN'S SYNDROME WITH OTHER ORGAN INVOLVEMENT: ICD-10-CM

## 2017-07-26 DIAGNOSIS — M32.9 SLE (SYSTEMIC LUPUS ERYTHEMATOSUS RELATED SYNDROME): ICD-10-CM

## 2017-07-26 PROCEDURE — 76816 OB US FOLLOW-UP PER FETUS: CPT | Mod: S$GLB,,, | Performed by: OBSTETRICS & GYNECOLOGY

## 2017-07-26 PROCEDURE — 99499 UNLISTED E&M SERVICE: CPT | Mod: S$GLB,,, | Performed by: OBSTETRICS & GYNECOLOGY

## 2017-07-31 ENCOUNTER — ROUTINE PRENATAL (OUTPATIENT)
Dept: OBSTETRICS AND GYNECOLOGY | Facility: CLINIC | Age: 27
End: 2017-07-31
Payer: COMMERCIAL

## 2017-07-31 VITALS — SYSTOLIC BLOOD PRESSURE: 150 MMHG | BODY MASS INDEX: 29.53 KG/M2 | WEIGHT: 183 LBS | DIASTOLIC BLOOD PRESSURE: 102 MMHG

## 2017-07-31 DIAGNOSIS — M32.9 SYSTEMIC LUPUS ERYTHEMATOSUS AFFECTING PREGNANCY IN SECOND TRIMESTER: Primary | ICD-10-CM

## 2017-07-31 DIAGNOSIS — Z3A.27 PREGNANCY WITH 27 COMPLETED WEEKS GESTATION: ICD-10-CM

## 2017-07-31 DIAGNOSIS — O99.891 SYSTEMIC LUPUS ERYTHEMATOSUS AFFECTING PREGNANCY IN SECOND TRIMESTER: Primary | ICD-10-CM

## 2017-07-31 DIAGNOSIS — O10.919 CHRONIC HYPERTENSION AFFECTING PREGNANCY: ICD-10-CM

## 2017-07-31 PROCEDURE — 90715 TDAP VACCINE 7 YRS/> IM: CPT | Mod: S$GLB,,, | Performed by: OBSTETRICS & GYNECOLOGY

## 2017-07-31 PROCEDURE — 99999 PR PBB SHADOW E&M-EST. PATIENT-LVL III: CPT | Mod: PBBFAC,,, | Performed by: OBSTETRICS & GYNECOLOGY

## 2017-07-31 PROCEDURE — 0502F SUBSEQUENT PRENATAL CARE: CPT | Mod: S$GLB,,, | Performed by: OBSTETRICS & GYNECOLOGY

## 2017-07-31 PROCEDURE — 90471 IMMUNIZATION ADMIN: CPT | Mod: S$GLB,,, | Performed by: OBSTETRICS & GYNECOLOGY

## 2017-07-31 NOTE — PROGRESS NOTES
Identified patient using two patient identifiers. Allergies verified with patient. Tdap vaccine given to patient in left deltoid. Patient tolerated well and was advised of 15 minute wait time. Patient verbalized understanding.

## 2017-07-31 NOTE — PROGRESS NOTES
Repeat /96. Currently on aldomet 500 bid, will continue this dose. Baseline preE labs tomorrow w/ 28 wk labs (pt declines to do today). Ok w/ tdap today

## 2017-08-01 ENCOUNTER — LAB VISIT (OUTPATIENT)
Dept: LAB | Facility: HOSPITAL | Age: 27
End: 2017-08-01
Attending: OBSTETRICS & GYNECOLOGY
Payer: COMMERCIAL

## 2017-08-01 DIAGNOSIS — Z3A.23 PREGNANCY WITH 23 COMPLETED WEEKS GESTATION: ICD-10-CM

## 2017-08-01 DIAGNOSIS — O10.919 CHRONIC HYPERTENSION AFFECTING PREGNANCY: ICD-10-CM

## 2017-08-01 LAB
ALBUMIN SERPL BCP-MCNC: 2.2 G/DL
ALP SERPL-CCNC: 75 U/L
ALT SERPL W/O P-5'-P-CCNC: 5 U/L
ANION GAP SERPL CALC-SCNC: 7 MMOL/L
AST SERPL-CCNC: 12 U/L
BASOPHILS # BLD AUTO: 0.01 K/UL
BASOPHILS NFR BLD: 0.4 %
BILIRUB SERPL-MCNC: 0.2 MG/DL
BUN SERPL-MCNC: 8 MG/DL
CALCIUM SERPL-MCNC: 7.8 MG/DL
CHLORIDE SERPL-SCNC: 109 MMOL/L
CO2 SERPL-SCNC: 19 MMOL/L
CREAT SERPL-MCNC: 0.6 MG/DL
DIFFERENTIAL METHOD: ABNORMAL
EOSINOPHIL # BLD AUTO: 0.2 K/UL
EOSINOPHIL NFR BLD: 6 %
ERYTHROCYTE [DISTWIDTH] IN BLOOD BY AUTOMATED COUNT: 13.3 %
EST. GFR  (AFRICAN AMERICAN): >60 ML/MIN/1.73 M^2
EST. GFR  (NON AFRICAN AMERICAN): >60 ML/MIN/1.73 M^2
GLUCOSE SERPL-MCNC: 82 MG/DL
GLUCOSE SERPL-MCNC: 84 MG/DL
HCT VFR BLD AUTO: 27.2 %
HGB BLD-MCNC: 9.6 G/DL
LYMPHOCYTES # BLD AUTO: 0.7 K/UL
LYMPHOCYTES NFR BLD: 28.8 %
MCH RBC QN AUTO: 30.2 PG
MCHC RBC AUTO-ENTMCNC: 35.3 G/DL
MCV RBC AUTO: 86 FL
MONOCYTES # BLD AUTO: 0.2 K/UL
MONOCYTES NFR BLD: 8 %
NEUTROPHILS # BLD AUTO: 1.4 K/UL
NEUTROPHILS NFR BLD: 56.4 %
PLATELET # BLD AUTO: 140 K/UL
PMV BLD AUTO: 10.3 FL
POTASSIUM SERPL-SCNC: 3.5 MMOL/L
PROT SERPL-MCNC: 7 G/DL
RBC # BLD AUTO: 3.18 M/UL
SODIUM SERPL-SCNC: 135 MMOL/L
WBC # BLD AUTO: 2.5 K/UL

## 2017-08-01 PROCEDURE — 86592 SYPHILIS TEST NON-TREP QUAL: CPT

## 2017-08-01 PROCEDURE — 80053 COMPREHEN METABOLIC PANEL: CPT

## 2017-08-01 PROCEDURE — 82950 GLUCOSE TEST: CPT

## 2017-08-01 PROCEDURE — 36415 COLL VENOUS BLD VENIPUNCTURE: CPT | Mod: PO

## 2017-08-01 PROCEDURE — 85025 COMPLETE CBC W/AUTO DIFF WBC: CPT

## 2017-08-01 PROCEDURE — 86703 HIV-1/HIV-2 1 RESULT ANTBDY: CPT

## 2017-08-02 LAB
HIV 1+2 AB+HIV1 P24 AG SERPL QL IA: NEGATIVE
RPR SER QL: NORMAL

## 2017-08-15 ENCOUNTER — OFFICE VISIT (OUTPATIENT)
Dept: NEPHROLOGY | Facility: CLINIC | Age: 27
End: 2017-08-15
Payer: COMMERCIAL

## 2017-08-15 ENCOUNTER — ROUTINE PRENATAL (OUTPATIENT)
Dept: OBSTETRICS AND GYNECOLOGY | Facility: CLINIC | Age: 27
End: 2017-08-15
Payer: COMMERCIAL

## 2017-08-15 VITALS
WEIGHT: 185.19 LBS | DIASTOLIC BLOOD PRESSURE: 90 MMHG | SYSTOLIC BLOOD PRESSURE: 140 MMHG | BODY MASS INDEX: 29.89 KG/M2

## 2017-08-15 VITALS
WEIGHT: 186.94 LBS | HEIGHT: 66 IN | SYSTOLIC BLOOD PRESSURE: 154 MMHG | BODY MASS INDEX: 30.04 KG/M2 | DIASTOLIC BLOOD PRESSURE: 82 MMHG | HEART RATE: 84 BPM

## 2017-08-15 DIAGNOSIS — B35.1 ONYCHOMYCOSIS: ICD-10-CM

## 2017-08-15 DIAGNOSIS — M32.9 SYSTEMIC LUPUS ERYTHEMATOSUS AFFECTING PREGNANCY IN THIRD TRIMESTER: ICD-10-CM

## 2017-08-15 DIAGNOSIS — M32.14 LUPUS NEPHRITIS: Primary | ICD-10-CM

## 2017-08-15 DIAGNOSIS — Z76.0 MEDICATION REFILL: ICD-10-CM

## 2017-08-15 DIAGNOSIS — O99.340 DEPRESSION AFFECTING PREGNANCY: ICD-10-CM

## 2017-08-15 DIAGNOSIS — F32.A DEPRESSION AFFECTING PREGNANCY: ICD-10-CM

## 2017-08-15 DIAGNOSIS — O10.919 CHRONIC HYPERTENSION AFFECTING PREGNANCY: ICD-10-CM

## 2017-08-15 DIAGNOSIS — M32.14 LUPUS NEPHRITIS: ICD-10-CM

## 2017-08-15 DIAGNOSIS — L60.8 PINCER NAIL DEFORMITY: ICD-10-CM

## 2017-08-15 DIAGNOSIS — M35.04 SJOGREN'S SYNDROME WITH TUBULO-INTERSTITIAL NEPHROPATHY: ICD-10-CM

## 2017-08-15 DIAGNOSIS — L60.3 BRITTLE NAILS: ICD-10-CM

## 2017-08-15 DIAGNOSIS — O10.919 CHRONIC HYPERTENSION COMPLICATING OR REASON FOR CARE DURING PREGNANCY, UNSPECIFIED TRIMESTER: ICD-10-CM

## 2017-08-15 DIAGNOSIS — Z3A.30 30 WEEKS GESTATION OF PREGNANCY: ICD-10-CM

## 2017-08-15 DIAGNOSIS — O10.011 PRE-EXISTING ESSENTIAL HYPERTENSION DURING PREGNANCY IN FIRST TRIMESTER: ICD-10-CM

## 2017-08-15 DIAGNOSIS — O99.891 SYSTEMIC LUPUS ERYTHEMATOSUS AFFECTING PREGNANCY IN THIRD TRIMESTER: ICD-10-CM

## 2017-08-15 DIAGNOSIS — Z98.891 H/O: C-SECTION: Primary | ICD-10-CM

## 2017-08-15 PROCEDURE — 99999 PR PBB SHADOW E&M-EST. PATIENT-LVL II: CPT | Mod: PBBFAC,,, | Performed by: OBSTETRICS & GYNECOLOGY

## 2017-08-15 PROCEDURE — 99215 OFFICE O/P EST HI 40 MIN: CPT | Mod: S$GLB,,, | Performed by: INTERNAL MEDICINE

## 2017-08-15 PROCEDURE — 0502F SUBSEQUENT PRENATAL CARE: CPT | Mod: S$GLB,,, | Performed by: OBSTETRICS & GYNECOLOGY

## 2017-08-15 PROCEDURE — 3008F BODY MASS INDEX DOCD: CPT | Mod: S$GLB,,, | Performed by: INTERNAL MEDICINE

## 2017-08-15 PROCEDURE — 99999 PR PBB SHADOW E&M-EST. PATIENT-LVL III: CPT | Mod: PBBFAC,,, | Performed by: INTERNAL MEDICINE

## 2017-08-15 RX ORDER — METHYLDOPA 500 MG/1
500 TABLET, FILM COATED ORAL EVERY 8 HOURS
Qty: 90 TABLET | Refills: 11 | Status: SHIPPED | OUTPATIENT
Start: 2017-08-15 | End: 2017-08-29

## 2017-08-15 RX ORDER — FLUOXETINE HYDROCHLORIDE 40 MG/1
40 CAPSULE ORAL DAILY
Qty: 30 CAPSULE | Refills: 3 | Status: SHIPPED | OUTPATIENT
Start: 2017-08-15 | End: 2017-10-23 | Stop reason: SDUPTHER

## 2017-08-15 NOTE — PROGRESS NOTES
Protein:creatinine ratio not done after 2 orders. Will repeat-pt desires to come back in afternoon as she is seeing Dr. Morales at that time. O/w doing well

## 2017-08-15 NOTE — PROGRESS NOTES
NEPHROLOGY CLINIC FOLLOWUP NOTE:   Date of clinic visit: 8/15/17     RHEUMATOLOGIST: Jaden Echeverria M.D.      REASON FOR CONSULTATION: lupus nephritis and current pregnancy     CHIEF COMPLAINT: History of systemic lupus erythematosus.      HISTORY OF PRESENT ILLNESS: Ms. Vines is a 26-year-old -American female with a h/o of lupus nephritis who presents for f/u. Pt was last seen by me on in May 2017. Pt is now pregnant at 30 weeks of gestation. Rheumatology stopped mycophenolate once the pregnancy test was known in Feb 2017.She was not started on imuran. Losartan was also stopped then, and she is taking methyldopa for BP mgmt. The dose if 500 mg po bid. Noted that she told me last visit that she was not taking this medicine regularly. He BP is not fully controlled. She says she is taking methyldopa regularly now. She is following with OB/Gyn. Their notes were reviewed.     To review:  Pt has kidney biopsy proven class V LN. On the kidney biopsy she had no acuity and only about 5% to 10% chronicity. Pt previously received induction treatment with mycophenolate combined with prednisone. Pt was previously on mycophenolate maintenance dose and low dose prednisone. She reports no problems or side effects with the medicines she is taking. No new problems. No fever. No diarrhea. She has no acute c/o's today. She is following closely with OB/GYN. Thye pregnancy was unplanned.     PAST MEDICAL HISTORY:   1. Systemic lupus erythematosus diagnosed in November 2013.   2. Class V membranous lupus nephritis. Kidney biopsy on 10/17/2013 showed 5%   to 10% interstitial fibrosis. No acuity on this biopsy.   3. Sjogren's disease, diagnosed in 2012.   4. History of genital herpes.      PAST SURGICAL HISTORY: None, apart from the kidney biopsy.      FAMILY HISTORY: No one in the family with lupus. Father is 47 years old. Mom is 44. Both are healthy. The patient does have a 1-year-old son who was diagnosed with third-degree AV  block   .   ALLERGIES: Reviewed. No known drug allergies.      MEDICATIONS: Reviewed on EPIC     REVIEW OF SYSTEMS:   HEAD, EYES, EARS, NOSE, THROAT: As above, otherwise negative.   CARDIAC: Negative.   PULMONARY: Negative.   GASTROINTESTINAL: Negative.   GENITOURINARY: As above, otherwise negative.   INFECTIOUS DISEASE: negative.   RHEUMATOLOGICAL: As above, otherwise negative.   The rest of the review of systems is negative.      PHYSICAL EXAMINATION:   VITAL SIGNS: Blood pressure is 154/82, pulse is 84, weight is 186 lbs, last visit 171 lbs  In no acute distress.   GENERAL: She is ambulatory.   HEART: Regular rate and rhythm. No pericardial friction rubs. S1 and S2 audible.   CHEST: Clear to auscultation. No rales, breathing symmetric and unlabored  ABDOMEN: Soft, gravid  EXTREMITIES: Showed no edema.      LABORATORY VALUES: reviewed:  U prot/cr ratio pending today, was 220 mg in May 2017   C3 and C4 normal inMay 2017    BMP  Lab Results   Component Value Date     (L) 08/01/2017    K 3.5 08/01/2017     08/01/2017    CO2 19 (L) 08/01/2017    BUN 8 08/01/2017    CREATININE 0.6 08/01/2017    CALCIUM 7.8 (L) 08/01/2017    ANIONGAP 7 (L) 08/01/2017    ESTGFRAFRICA >60.0 08/01/2017    EGFRNONAA >60.0 08/01/2017     Lab Results   Component Value Date    WBC 2.50 (L) 08/01/2017    HGB 9.6 (L) 08/01/2017    HCT 27.2 (L) 08/01/2017    MCV 86 08/01/2017     (L) 08/01/2017           ASSESSMENT AND PLAN: This is a 26-year-old female with lupus nephritis who presents for f/u. Pt is pregnant at 30 weeks of gestation:  impression is as follows:      1. Renal: Cr is stable, in normal range  H/o of lupus nephritis, membranous, class V, kidney biopsy proven  Proteinuria is present, pending repeat today   ARB was stopped at start of pregnancy (though noted pregnancy was unplanned)  No recent kidney biopsy (last one in 2013), has no acuity and minimal chronicity on biopsy then  C3 and C4 are not low  Was on  maintenance cellcept pre-pregnancy  Lupus nephritis not thought to be active  Likely no need to take imuran  May continue low dose prednsone, is taking 10 mg po qd     2. HTN: BP not controlled for pregnancy  Risks explained to pt  Is pt taking methyldopa regularly?  Meds reviewed  Advised pt to take methyldopa and increase dose to 500 mg po tid (if taking it already at 500 mg bid)  If not taking it regularly, advised pt to take in 500 mg po bid regularly.  Risks of pregnancy loss, renal failure explained.     3. The patient was previosuly on maintenance dose of mycophenolate.   Pt had been advised by me about the teratogenicity of both mycophenolate and losartan in case of an unplanned pregnancy.  See prior documentations     4. Rheum: discussed with pt that lupus normally becomes quiet during a pregnancy  SLE.     PLANS AND RECOMMENDATIONS:   As discussed above  RTC 1 week, strongly advised, explained reason to pt.  High risk pregnancy  Total time spent 45 minutes including time needed to review the records, the   patient evaluation, documentation, face-to-face discussion with the patient,   more than 50% of the time was spent on coordination of care and counseling.    Level V visit.     Laurence Morales MD

## 2017-08-16 DIAGNOSIS — O10.919 CHRONIC HYPERTENSION COMPLICATING OR REASON FOR CARE DURING PREGNANCY, UNSPECIFIED TRIMESTER: ICD-10-CM

## 2017-08-17 ENCOUNTER — TELEPHONE (OUTPATIENT)
Dept: OBSTETRICS AND GYNECOLOGY | Facility: CLINIC | Age: 27
End: 2017-08-17

## 2017-08-17 ENCOUNTER — PATIENT MESSAGE (OUTPATIENT)
Dept: OBSTETRICS AND GYNECOLOGY | Facility: CLINIC | Age: 27
End: 2017-08-17

## 2017-08-17 RX ORDER — METHYLDOPA 500 MG/1
500 TABLET, FILM COATED ORAL EVERY 12 HOURS
Qty: 60 TABLET | Refills: 11 | Status: SHIPPED | OUTPATIENT
Start: 2017-08-17 | End: 2017-09-26 | Stop reason: SDUPTHER

## 2017-08-17 NOTE — TELEPHONE ENCOUNTER
----- Message from Sonja Bailey sent at 8/17/2017  9:32 AM CDT -----  Contact: [facility]  pharmacy calling regarding electronic script for Aldomet  @ [phone].

## 2017-08-23 ENCOUNTER — OFFICE VISIT (OUTPATIENT)
Dept: OBSTETRICS AND GYNECOLOGY | Facility: CLINIC | Age: 27
End: 2017-08-23
Payer: COMMERCIAL

## 2017-08-23 DIAGNOSIS — M35.00 SJOGREN'S SYNDROME: Primary | ICD-10-CM

## 2017-08-23 DIAGNOSIS — M32.14 SLE GLOMERULONEPHRITIS SYNDROME, WHO CLASS V: ICD-10-CM

## 2017-08-23 DIAGNOSIS — M32.9 SLE (SYSTEMIC LUPUS ERYTHEMATOSUS RELATED SYNDROME): ICD-10-CM

## 2017-08-23 DIAGNOSIS — I10 ESSENTIAL HYPERTENSION: ICD-10-CM

## 2017-08-23 DIAGNOSIS — M32.14 LUPUS NEPHRITIS: ICD-10-CM

## 2017-08-23 DIAGNOSIS — M35.09 SJOGREN'S SYNDROME WITH OTHER ORGAN INVOLVEMENT: ICD-10-CM

## 2017-08-23 PROCEDURE — 76816 OB US FOLLOW-UP PER FETUS: CPT | Mod: S$GLB,,, | Performed by: OBSTETRICS & GYNECOLOGY

## 2017-08-23 PROCEDURE — 76819 FETAL BIOPHYS PROFIL W/O NST: CPT | Mod: S$GLB,,, | Performed by: OBSTETRICS & GYNECOLOGY

## 2017-08-23 PROCEDURE — 99499 UNLISTED E&M SERVICE: CPT | Mod: S$GLB,,, | Performed by: OBSTETRICS & GYNECOLOGY

## 2017-08-24 ENCOUNTER — OFFICE VISIT (OUTPATIENT)
Dept: RHEUMATOLOGY | Facility: CLINIC | Age: 27
End: 2017-08-24
Payer: COMMERCIAL

## 2017-08-24 VITALS
WEIGHT: 185.88 LBS | HEART RATE: 96 BPM | DIASTOLIC BLOOD PRESSURE: 104 MMHG | SYSTOLIC BLOOD PRESSURE: 152 MMHG | BODY MASS INDEX: 30 KG/M2

## 2017-08-24 DIAGNOSIS — M35.09 SJOGREN'S SYNDROME WITH OTHER ORGAN INVOLVEMENT: ICD-10-CM

## 2017-08-24 DIAGNOSIS — M32.9 SLE (SYSTEMIC LUPUS ERYTHEMATOSUS RELATED SYNDROME): Primary | ICD-10-CM

## 2017-08-24 DIAGNOSIS — M32.14 SLE GLOMERULONEPHRITIS SYNDROME, WHO CLASS V: ICD-10-CM

## 2017-08-24 DIAGNOSIS — O09.93 HIGH-RISK PREGNANCY IN THIRD TRIMESTER: ICD-10-CM

## 2017-08-24 PROCEDURE — 3008F BODY MASS INDEX DOCD: CPT | Mod: S$GLB,,, | Performed by: INTERNAL MEDICINE

## 2017-08-24 PROCEDURE — 99214 OFFICE O/P EST MOD 30 MIN: CPT | Mod: S$GLB,,, | Performed by: INTERNAL MEDICINE

## 2017-08-24 PROCEDURE — 99999 PR PBB SHADOW E&M-EST. PATIENT-LVL III: CPT | Mod: PBBFAC,,, | Performed by: INTERNAL MEDICINE

## 2017-08-24 NOTE — ASSESSMENT & PLAN NOTE
On 5 mg prednisone. Other immunosuppressive therapy on hold during pregnancy. Recent Urine P/C ratio within normal limits. Monitor.

## 2017-08-24 NOTE — ASSESSMENT & PLAN NOTE
High complexity. On 5 mg daily prednisone . Not on any immunosuppressive therapy at this time since she is pregnant. There is no contraindication to take plaquenil but she does not want to take it if the lupus activity is not high.  Stable activity markers.   Reinforced the importance of close monitoring.

## 2017-08-24 NOTE — PROGRESS NOTES
RHEUMATOLOGY CLINIC FOLLOW UP VISIT  Chief complaints:-  To follow up for lupus.     HPI:-  Alisia Jolly a 26 y.o. pleasant female comes in for a follow up visit with above chief complaints. She has systemic lupus erythematosus diagnosed in 2013 when she had diffuse lymphadenopathy and anasarca.  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  She has been intermittently following up in the rheumatology clinic.  She was on immunosuppressive therapy and got pregnant accidentally since she was not taking her OCP's during that week because of irregular cycles. So, she has stopped all immunosuppressives except 10 mg daily prednisone and is following with high risk OB clinic. She is presently on 5 mg daily prednisone without any problems.  She denies any joint pain today.  She also denies seizures or psychosis,  joint swelling, muscle weakness,Raynaud's phenomenon, sicca symptoms .  She has had a pregnancy with her first child having complete heart block likely secondary to her positive SSA antibody.      Review of Systems   Constitutional: Positive for malaise/fatigue. Negative for chills and fever.   HENT: Negative for nosebleeds and sore throat.    Eyes: Negative for blurred vision, photophobia and redness.   Respiratory: Negative for cough, sputum production and shortness of breath.    Cardiovascular: Negative for chest pain and leg swelling.   Gastrointestinal: Negative for abdominal pain, constipation, diarrhea and heartburn.   Genitourinary: Negative for dysuria, frequency and urgency.   Musculoskeletal: Negative for back pain, falls, joint pain, myalgias and neck pain.   Skin: Negative for itching and rash.   Neurological: Negative for dizziness, tremors, seizures, loss of consciousness, weakness and headaches.   Endo/Heme/Allergies: Negative for environmental allergies. Does not  bruise/bleed easily.   Psychiatric/Behavioral: Negative for hallucinations and memory loss. The patient does not have insomnia.        Past Medical History:   Diagnosis Date    Allergic rhinitis     Anemia     Condyloma acuminata     GERD (gastroesophageal reflux disease)     HSV-2 (herpes simplex virus 2) infection     Lupus nephritis     Mood disorder     Overweight(278.02)     Sjogren's syndrome     Systemic lupus erythematosus     Thrombocytopenia        Past Surgical History:   Procedure Laterality Date     SECTION, LOW TRANSVERSE      RENAL BIOPSY  2013        Social History   Substance Use Topics    Smoking status: Never Smoker    Smokeless tobacco: Never Used    Alcohol use No       Family History   Problem Relation Age of Onset    Hypertension Mother     Eczema Brother     Heart disease Son     Hypertension Maternal Grandmother     Diabetes Paternal Grandmother     Lupus Neg Hx     Psoriasis Neg Hx        Review of patient's allergies indicates:  No Known Allergies        Physical examination:-    Vitals:    17 1003 17 1020   BP: (!) 161/110 (!) 152/104   Pulse: 90 96   Weight: 84.3 kg (185 lb 13.6 oz)    PainSc: 0-No pain        Physical Exam   Constitutional: She is oriented to person, place, and time and well-developed, well-nourished, and in no distress. No distress.   HENT:   Head: Normocephalic.   Mouth/Throat: Oropharynx is clear and moist.   Eyes: Conjunctivae and EOM are normal. Pupils are equal, round, and reactive to light.   Neck: Normal range of motion. Neck supple.   Cardiovascular: Normal rate and intact distal pulses.  Exam reveals no friction rub.    Pulmonary/Chest: Effort normal. No respiratory distress. She exhibits no tenderness.   Abdominal: Soft. There is no tenderness.   Musculoskeletal:   No synovitis over small joints of hands or feet.  No effusion over large joints.   Neurological: She is alert and oriented to person, place, and  time. No cranial nerve deficit.   Skin: Skin is warm. No rash noted. No erythema.   Psychiatric: Mood and affect normal.   Nursing note and vitals reviewed.      Labs:-  Results for HEIDI SHAIKH (MRN [id number]) as of 5/22/2017 12:21   Ref. Range 1/23/2017 08:10 2/17/2017 08:40 3/22/2017 08:58 5/19/2017 13:36   HARPREET HEP-2 Titer Unknown Positive >=1:2560...      Anti-SSA Antibody Latest Ref Range: 0.00 - 19.99 .26 (H)      Anti-SSA Interpretation Latest Ref Range: Negative  Positive (A)      Anti-SSB Antibody Latest Ref Range: 0.00 - 19.99 EU 46.84 (H)      Anti-SSB Interpretation Latest Ref Range: Negative  Positive (A)      ds DNA Ab Latest Ref Range: Negative 1:10  Negative 1:10      Anti Sm Antibody Latest Ref Range: 0.00 - 19.99 EU 2.89      Anti-Sm Interpretation Latest Ref Range: Negative  Negative      Anti Sm/RNP Antibody Latest Ref Range: 0.00 - 19.99 EU 4.12      Anti-Sm/RNP Interpretation Latest Ref Range: Negative  Negative      Complement (C-3) Latest Ref Range: 50 - 180 mg/dL 45 (L) 56 47 (L) 56   Complement (C-4) Latest Ref Range: 11 - 44 mg/dL 10 (L) 13 13 16         Medication List with Changes/Refills   Current Medications    EFINACONAZOLE (JUBLIA) 10 % RALF    Apply once daily to nail and nail fold.  Use daily for up to 1 year.    FAMOTIDINE (PEPCID) 20 MG TABLET    Take 1 tablet (20 mg total) by mouth 2 (two) times daily as needed for Heartburn.    FLUOXETINE (PROZAC) 40 MG CAPSULE    Take 1 capsule (40 mg total) by mouth once daily.    FLUTICASONE (FLONASE) 50 MCG/ACTUATION NASAL SPRAY    2 sprays by Each Nare route once daily.    HYDROXYCHLOROQUINE (PLAQUENIL) 200 MG TABLET    Take 1 tablet (200 mg total) by mouth 2 (two) times daily.    METHYLDOPA (ALDOMET) 500 MG TABLET    Take 1 tablet (500 mg total) by mouth every 8 (eight) hours.    METHYLDOPA (ALDOMET) 500 MG TABLET    Take 1 tablet (500 mg total) by mouth every 12 (twelve) hours.    PREDNISONE (DELTASONE) 10 MG TABLET     Take 20 mg once daily for 2 weeks, then reduce to 10 mg daily thereafter.    VALACYCLOVIR (VALTREX) 500 MG TABLET    TAKE 1 TABLET ONE TIME DAILY       Assessment/Plans:-  SLE (systemic lupus erythematosus related syndrome)  High complexity. On 5 mg daily prednisone . Not on any immunosuppressive therapy at this time since she is pregnant. There is no contraindication to take plaquenil but she does not want to take it if the lupus activity is not high.  Stable activity markers.   Reinforced the importance of close monitoring.    Sjogren's syndrome  Stable. Monitor.    SLE glomerulonephritis syndrome, WHO class V  On 5 mg prednisone. Other immunosuppressive therapy on hold during pregnancy. Recent Urine P/C ratio within normal limits. Monitor.     # Return in about 3 months (around 11/24/2017).      Time spent: 30 minutes in face to face discussion concerning diagnosis, prognosis, review of lab and test results, benefits of treatment as well as management of disease, counseling of patient and coordination of care between various health care providers . Greater than half the time spent was used for coordination of care and counseling of patient.      Disclaimer: This note was prepared using voice recognition system and is likely to have sound alike errors and is not proof read.  Please call me with any questions.

## 2017-08-29 ENCOUNTER — ROUTINE PRENATAL (OUTPATIENT)
Dept: OBSTETRICS AND GYNECOLOGY | Facility: CLINIC | Age: 27
End: 2017-08-29
Payer: COMMERCIAL

## 2017-08-29 ENCOUNTER — PROCEDURE VISIT (OUTPATIENT)
Dept: OBSTETRICS AND GYNECOLOGY | Facility: CLINIC | Age: 27
End: 2017-08-29
Payer: COMMERCIAL

## 2017-08-29 VITALS
WEIGHT: 183.63 LBS | SYSTOLIC BLOOD PRESSURE: 164 MMHG | DIASTOLIC BLOOD PRESSURE: 100 MMHG | BODY MASS INDEX: 29.64 KG/M2

## 2017-08-29 DIAGNOSIS — M35.04 SJOGREN'S SYNDROME WITH TUBULO-INTERSTITIAL NEPHROPATHY: ICD-10-CM

## 2017-08-29 DIAGNOSIS — M32.14 SLE GLOMERULONEPHRITIS SYNDROME, WHO CLASS V: ICD-10-CM

## 2017-08-29 DIAGNOSIS — I10 ESSENTIAL HYPERTENSION: Primary | ICD-10-CM

## 2017-08-29 DIAGNOSIS — O09.93 HIGH-RISK PREGNANCY IN THIRD TRIMESTER: ICD-10-CM

## 2017-08-29 DIAGNOSIS — O16.3 ELEVATED BLOOD PRESSURE COMPLICATING PREGNANCY IN THIRD TRIMESTER, ANTEPARTUM: ICD-10-CM

## 2017-08-29 DIAGNOSIS — M32.14 LUPUS NEPHRITIS: Primary | ICD-10-CM

## 2017-08-29 DIAGNOSIS — M35.00 SJOGREN'S SYNDROME, WITH UNSPECIFIED ORGAN INVOLVEMENT: ICD-10-CM

## 2017-08-29 PROCEDURE — 0502F SUBSEQUENT PRENATAL CARE: CPT | Mod: S$GLB,,, | Performed by: OBSTETRICS & GYNECOLOGY

## 2017-08-29 PROCEDURE — 99999 PR PBB SHADOW E&M-EST. PATIENT-LVL III: CPT | Mod: PBBFAC,,, | Performed by: OBSTETRICS & GYNECOLOGY

## 2017-08-29 PROCEDURE — 76819 FETAL BIOPHYS PROFIL W/O NST: CPT | Mod: S$GLB,,, | Performed by: OBSTETRICS & GYNECOLOGY

## 2017-08-29 NOTE — PROGRESS NOTES
Elevated BP today, asymptomatic & did not take BP med this morning. Instructed pt to take BP at home & send them to me for eval. rec repeat BP next week w/ BPPs weekly    Consider BMZ course if  delivery indicated. Seen by [facility] last week & was released from their care    Discussed  vs repeat cs-pt had low transverse cs after presented in labor at 38 w at 8cm (was supposed to have scheduled cs w/ peds cardiology present)

## 2017-09-06 ENCOUNTER — PROCEDURE VISIT (OUTPATIENT)
Dept: OBSTETRICS AND GYNECOLOGY | Facility: CLINIC | Age: 27
End: 2017-09-06
Payer: COMMERCIAL

## 2017-09-06 DIAGNOSIS — M35.04 SJOGREN'S SYNDROME WITH TUBULO-INTERSTITIAL NEPHROPATHY: ICD-10-CM

## 2017-09-06 DIAGNOSIS — O16.3 ELEVATED BLOOD PRESSURE COMPLICATING PREGNANCY IN THIRD TRIMESTER, ANTEPARTUM: ICD-10-CM

## 2017-09-06 DIAGNOSIS — M32.14 LUPUS NEPHRITIS: ICD-10-CM

## 2017-09-06 PROCEDURE — 76819 FETAL BIOPHYS PROFIL W/O NST: CPT | Mod: S$GLB,,, | Performed by: OBSTETRICS & GYNECOLOGY

## 2017-09-12 ENCOUNTER — ROUTINE PRENATAL (OUTPATIENT)
Dept: OBSTETRICS AND GYNECOLOGY | Facility: CLINIC | Age: 27
End: 2017-09-12
Payer: COMMERCIAL

## 2017-09-12 ENCOUNTER — PROCEDURE VISIT (OUTPATIENT)
Dept: OBSTETRICS AND GYNECOLOGY | Facility: CLINIC | Age: 27
End: 2017-09-12
Payer: COMMERCIAL

## 2017-09-12 VITALS
WEIGHT: 190.94 LBS | DIASTOLIC BLOOD PRESSURE: 90 MMHG | SYSTOLIC BLOOD PRESSURE: 150 MMHG | BODY MASS INDEX: 30.81 KG/M2

## 2017-09-12 DIAGNOSIS — M35.04 SJOGREN'S SYNDROME WITH TUBULO-INTERSTITIAL NEPHROPATHY: ICD-10-CM

## 2017-09-12 DIAGNOSIS — Z98.891 H/O: C-SECTION: Primary | ICD-10-CM

## 2017-09-12 DIAGNOSIS — M32.14 LUPUS NEPHRITIS: ICD-10-CM

## 2017-09-12 DIAGNOSIS — O16.3 ELEVATED BLOOD PRESSURE COMPLICATING PREGNANCY IN THIRD TRIMESTER, ANTEPARTUM: ICD-10-CM

## 2017-09-12 DIAGNOSIS — Z3A.34 PREGNANCY WITH 34 COMPLETED WEEKS GESTATION: ICD-10-CM

## 2017-09-12 DIAGNOSIS — O09.93 HIGH-RISK PREGNANCY, THIRD TRIMESTER: ICD-10-CM

## 2017-09-12 PROCEDURE — 99999 PR PBB SHADOW E&M-EST. PATIENT-LVL II: CPT | Mod: PBBFAC,,, | Performed by: OBSTETRICS & GYNECOLOGY

## 2017-09-12 PROCEDURE — 0502F SUBSEQUENT PRENATAL CARE: CPT | Mod: S$GLB,,, | Performed by: OBSTETRICS & GYNECOLOGY

## 2017-09-12 PROCEDURE — 76815 OB US LIMITED FETUS(S): CPT | Mod: 59,S$GLB,, | Performed by: OBSTETRICS & GYNECOLOGY

## 2017-09-12 PROCEDURE — 76819 FETAL BIOPHYS PROFIL W/O NST: CPT | Mod: 59,S$GLB,, | Performed by: OBSTETRICS & GYNECOLOGY

## 2017-09-19 ENCOUNTER — TELEPHONE (OUTPATIENT)
Dept: OBSTETRICS AND GYNECOLOGY | Facility: CLINIC | Age: 27
End: 2017-09-19

## 2017-09-19 ENCOUNTER — PROCEDURE VISIT (OUTPATIENT)
Dept: OBSTETRICS AND GYNECOLOGY | Facility: CLINIC | Age: 27
End: 2017-09-19
Payer: COMMERCIAL

## 2017-09-19 DIAGNOSIS — M35.04 SJOGREN'S SYNDROME WITH TUBULO-INTERSTITIAL NEPHROPATHY: ICD-10-CM

## 2017-09-19 DIAGNOSIS — M32.14 LUPUS NEPHRITIS: ICD-10-CM

## 2017-09-19 DIAGNOSIS — O16.3 ELEVATED BLOOD PRESSURE COMPLICATING PREGNANCY IN THIRD TRIMESTER, ANTEPARTUM: ICD-10-CM

## 2017-09-19 PROCEDURE — 76819 FETAL BIOPHYS PROFIL W/O NST: CPT | Mod: S$GLB,,, | Performed by: OBSTETRICS & GYNECOLOGY

## 2017-09-19 RX ORDER — ONDANSETRON 8 MG/1
8 TABLET, ORALLY DISINTEGRATING ORAL EVERY 6 HOURS PRN
Qty: 20 TABLET | Refills: 1 | Status: SHIPPED | OUTPATIENT
Start: 2017-09-19 | End: 2017-11-21

## 2017-09-19 NOTE — TELEPHONE ENCOUNTER
Spoke to patient and suggested that patient eat small meals every couple of hours, try taking meds and eating after nausea medication.  Will consult with Dr. Arenas and get nausea meds sent to [facility].

## 2017-09-19 NOTE — TELEPHONE ENCOUNTER
----- Message from Jessica Lorin sent at 9/19/2017  1:36 PM CDT -----  Contact: Patient  Patient stats that when she takes her medication she vomits, patient would like to be advised, please krystal her back at [phone]. Thank you

## 2017-09-21 NOTE — TELEPHONE ENCOUNTER
Spoke with pt, pt was advised that zofran was sent to pharmacy and she should take it before taking her other medication. Pt understanding verbally

## 2017-09-26 ENCOUNTER — ROUTINE PRENATAL (OUTPATIENT)
Dept: OBSTETRICS AND GYNECOLOGY | Facility: CLINIC | Age: 27
End: 2017-09-26
Payer: COMMERCIAL

## 2017-09-26 ENCOUNTER — PROCEDURE VISIT (OUTPATIENT)
Dept: OBSTETRICS AND GYNECOLOGY | Facility: CLINIC | Age: 27
End: 2017-09-26
Payer: COMMERCIAL

## 2017-09-26 VITALS — SYSTOLIC BLOOD PRESSURE: 160 MMHG | BODY MASS INDEX: 30.18 KG/M2 | WEIGHT: 187 LBS | DIASTOLIC BLOOD PRESSURE: 90 MMHG

## 2017-09-26 DIAGNOSIS — O10.919 CHRONIC HYPERTENSION COMPLICATING OR REASON FOR CARE DURING PREGNANCY, UNSPECIFIED TRIMESTER: ICD-10-CM

## 2017-09-26 DIAGNOSIS — O16.3 ELEVATED BLOOD PRESSURE COMPLICATING PREGNANCY IN THIRD TRIMESTER, ANTEPARTUM: ICD-10-CM

## 2017-09-26 DIAGNOSIS — Z3A.36 PREGNANCY WITH 36 COMPLETED WEEKS GESTATION: ICD-10-CM

## 2017-09-26 DIAGNOSIS — M32.14 SLE GLOMERULONEPHRITIS SYNDROME: Primary | ICD-10-CM

## 2017-09-26 DIAGNOSIS — M32.14 LUPUS NEPHRITIS: ICD-10-CM

## 2017-09-26 DIAGNOSIS — O09.93 HIGH-RISK PREGNANCY IN THIRD TRIMESTER: ICD-10-CM

## 2017-09-26 DIAGNOSIS — M35.04 SJOGREN'S SYNDROME WITH TUBULO-INTERSTITIAL NEPHROPATHY: ICD-10-CM

## 2017-09-26 DIAGNOSIS — I10 ESSENTIAL HYPERTENSION: ICD-10-CM

## 2017-09-26 DIAGNOSIS — M32.14 SLE GLOMERULONEPHRITIS SYNDROME: ICD-10-CM

## 2017-09-26 PROCEDURE — 76819 FETAL BIOPHYS PROFIL W/O NST: CPT | Mod: S$GLB,,, | Performed by: OBSTETRICS & GYNECOLOGY

## 2017-09-26 PROCEDURE — 99999 PR PBB SHADOW E&M-EST. PATIENT-LVL III: CPT | Mod: PBBFAC,,, | Performed by: OBSTETRICS & GYNECOLOGY

## 2017-09-26 PROCEDURE — 0502F SUBSEQUENT PRENATAL CARE: CPT | Mod: S$GLB,,, | Performed by: OBSTETRICS & GYNECOLOGY

## 2017-09-26 PROCEDURE — 87081 CULTURE SCREEN ONLY: CPT

## 2017-09-26 RX ORDER — METHYLDOPA 500 MG/1
500 TABLET, FILM COATED ORAL EVERY 12 HOURS
Qty: 60 TABLET | Refills: 11 | Status: SHIPPED | OUTPATIENT
Start: 2017-09-26 | End: 2018-02-20 | Stop reason: ALTCHOICE

## 2017-09-26 NOTE — PROGRESS NOTES
Currently on valtrex. Needs refill on aldomet. Repeat /90. GBS done, no ulcers/lesions noted. Cervix Ft/posterior. Consider induction before VICTORIANO due to medical problems if cervix favorable as pt desires .  Declines flu vaccine

## 2017-09-29 LAB — BACTERIA SPEC AEROBE CULT: NORMAL

## 2017-10-03 ENCOUNTER — TELEPHONE (OUTPATIENT)
Dept: OBSTETRICS AND GYNECOLOGY | Facility: CLINIC | Age: 27
End: 2017-10-03

## 2017-10-03 NOTE — TELEPHONE ENCOUNTER
Patient calling constipation advised to make sure is drink plenty of water and to avoid cheese, bananas, but needs to increase foods that are high in fiber, fruits and veggies, you can try metamucil are if not any better you can go to L&D now to be seen to make sure that's nothing going on. Patient verbalized  Tf

## 2017-10-03 NOTE — TELEPHONE ENCOUNTER
----- Message from Vero French sent at 10/3/2017  7:53 AM CDT -----  Contact: pt  States she noticed last night she had to use the bathroom (bowel movement), but there's nothing there. States she's having stomach pain, she's 37 weeks. Please call pt at [phone]. Thank you

## 2017-10-05 ENCOUNTER — PROCEDURE VISIT (OUTPATIENT)
Dept: OBSTETRICS AND GYNECOLOGY | Facility: CLINIC | Age: 27
End: 2017-10-05
Payer: COMMERCIAL

## 2017-10-05 ENCOUNTER — ROUTINE PRENATAL (OUTPATIENT)
Dept: OBSTETRICS AND GYNECOLOGY | Facility: CLINIC | Age: 27
End: 2017-10-05
Payer: COMMERCIAL

## 2017-10-05 VITALS
SYSTOLIC BLOOD PRESSURE: 128 MMHG | DIASTOLIC BLOOD PRESSURE: 72 MMHG | BODY MASS INDEX: 31.85 KG/M2 | WEIGHT: 197.31 LBS

## 2017-10-05 DIAGNOSIS — Z98.891 H/O: C-SECTION: Primary | ICD-10-CM

## 2017-10-05 DIAGNOSIS — M32.14 LUPUS NEPHRITIS: ICD-10-CM

## 2017-10-05 DIAGNOSIS — Z3A.37 PREGNANCY WITH 37 WEEKS COMPLETED GESTATION: ICD-10-CM

## 2017-10-05 DIAGNOSIS — L93.0 LUPUS ERYTHEMATOSUS, UNSPECIFIED FORM: ICD-10-CM

## 2017-10-05 DIAGNOSIS — O16.3 ELEVATED BLOOD PRESSURE COMPLICATING PREGNANCY IN THIRD TRIMESTER, ANTEPARTUM: ICD-10-CM

## 2017-10-05 DIAGNOSIS — M35.04 SJOGREN'S SYNDROME WITH TUBULO-INTERSTITIAL NEPHROPATHY: ICD-10-CM

## 2017-10-05 PROCEDURE — 76819 FETAL BIOPHYS PROFIL W/O NST: CPT | Mod: S$GLB,,, | Performed by: OBSTETRICS & GYNECOLOGY

## 2017-10-05 PROCEDURE — 0502F SUBSEQUENT PRENATAL CARE: CPT | Mod: S$GLB,,, | Performed by: OBSTETRICS & GYNECOLOGY

## 2017-10-05 PROCEDURE — 99999 PR PBB SHADOW E&M-EST. PATIENT-LVL II: CPT | Mod: PBBFAC,,, | Performed by: OBSTETRICS & GYNECOLOGY

## 2017-10-12 ENCOUNTER — PROCEDURE VISIT (OUTPATIENT)
Dept: OBSTETRICS AND GYNECOLOGY | Facility: CLINIC | Age: 27
End: 2017-10-12
Payer: COMMERCIAL

## 2017-10-12 ENCOUNTER — ROUTINE PRENATAL (OUTPATIENT)
Dept: OBSTETRICS AND GYNECOLOGY | Facility: CLINIC | Age: 27
End: 2017-10-12
Payer: COMMERCIAL

## 2017-10-12 VITALS
SYSTOLIC BLOOD PRESSURE: 142 MMHG | BODY MASS INDEX: 31.85 KG/M2 | WEIGHT: 197.31 LBS | DIASTOLIC BLOOD PRESSURE: 90 MMHG

## 2017-10-12 DIAGNOSIS — Z98.891 H/O: C-SECTION: Primary | ICD-10-CM

## 2017-10-12 DIAGNOSIS — M35.04 SJOGREN'S SYNDROME WITH TUBULO-INTERSTITIAL NEPHROPATHY: ICD-10-CM

## 2017-10-12 DIAGNOSIS — O16.3 ELEVATED BLOOD PRESSURE COMPLICATING PREGNANCY IN THIRD TRIMESTER, ANTEPARTUM: ICD-10-CM

## 2017-10-12 DIAGNOSIS — M32.14 LUPUS NEPHRITIS: ICD-10-CM

## 2017-10-12 PROCEDURE — 0502F SUBSEQUENT PRENATAL CARE: CPT | Mod: S$GLB,,, | Performed by: OBSTETRICS & GYNECOLOGY

## 2017-10-12 PROCEDURE — 76819 FETAL BIOPHYS PROFIL W/O NST: CPT | Mod: S$GLB,,, | Performed by: OBSTETRICS & GYNECOLOGY

## 2017-10-12 PROCEDURE — 99999 PR PBB SHADOW E&M-EST. PATIENT-LVL III: CPT | Mod: PBBFAC,,, | Performed by: OBSTETRICS & GYNECOLOGY

## 2017-10-12 NOTE — PROGRESS NOTES
BPP , vertex. Cervix unchanged. Desires  trial.  testing reassuring, will continue expectant management of this pregnancy. RTC 1 wk w/ BPP

## 2017-10-17 ENCOUNTER — ROUTINE PRENATAL (OUTPATIENT)
Dept: OBSTETRICS AND GYNECOLOGY | Facility: CLINIC | Age: 27
End: 2017-10-17
Payer: COMMERCIAL

## 2017-10-17 ENCOUNTER — PROCEDURE VISIT (OUTPATIENT)
Dept: OBSTETRICS AND GYNECOLOGY | Facility: CLINIC | Age: 27
End: 2017-10-17
Payer: COMMERCIAL

## 2017-10-17 VITALS
BODY MASS INDEX: 31.67 KG/M2 | DIASTOLIC BLOOD PRESSURE: 86 MMHG | WEIGHT: 196.19 LBS | SYSTOLIC BLOOD PRESSURE: 128 MMHG

## 2017-10-17 DIAGNOSIS — Z98.891 H/O: C-SECTION: ICD-10-CM

## 2017-10-17 DIAGNOSIS — M32.9 SYSTEMIC LUPUS ERYTHEMATOSUS COMPLICATING PREGNANCY IN THIRD TRIMESTER: Primary | ICD-10-CM

## 2017-10-17 DIAGNOSIS — O99.891 SYSTEMIC LUPUS ERYTHEMATOSUS COMPLICATING PREGNANCY IN THIRD TRIMESTER: Primary | ICD-10-CM

## 2017-10-17 PROCEDURE — 99999 PR PBB SHADOW E&M-EST. PATIENT-LVL II: CPT | Mod: PBBFAC,,, | Performed by: OBSTETRICS & GYNECOLOGY

## 2017-10-17 PROCEDURE — 76815 OB US LIMITED FETUS(S): CPT | Mod: 59,S$GLB,, | Performed by: OBSTETRICS & GYNECOLOGY

## 2017-10-17 PROCEDURE — 76819 FETAL BIOPHYS PROFIL W/O NST: CPT | Mod: 59,S$GLB,, | Performed by: OBSTETRICS & GYNECOLOGY

## 2017-10-17 PROCEDURE — 0502F SUBSEQUENT PRENATAL CARE: CPT | Mod: S$GLB,,, | Performed by: OBSTETRICS & GYNECOLOGY

## 2017-10-17 NOTE — PROGRESS NOTES
BPP & growth on u/s have been reassuring. No cervical change, still 1cm. Desires expectant management until next week

## 2017-10-19 ENCOUNTER — SURGERY (OUTPATIENT)
Age: 27
End: 2017-10-19

## 2017-10-19 ENCOUNTER — TELEPHONE (OUTPATIENT)
Dept: OBSTETRICS AND GYNECOLOGY | Facility: CLINIC | Age: 27
End: 2017-10-19

## 2017-10-19 ENCOUNTER — ANESTHESIA (OUTPATIENT)
Dept: OBSTETRICS AND GYNECOLOGY | Facility: HOSPITAL | Age: 27
End: 2017-10-19
Payer: COMMERCIAL

## 2017-10-19 ENCOUNTER — ANESTHESIA EVENT (OUTPATIENT)
Dept: OBSTETRICS AND GYNECOLOGY | Facility: HOSPITAL | Age: 27
End: 2017-10-19
Payer: COMMERCIAL

## 2017-10-19 ENCOUNTER — HOSPITAL ENCOUNTER (INPATIENT)
Facility: HOSPITAL | Age: 27
LOS: 3 days | Discharge: HOME | End: 2017-10-22
Attending: OBSTETRICS & GYNECOLOGY | Admitting: OBSTETRICS & GYNECOLOGY
Payer: COMMERCIAL

## 2017-10-19 DIAGNOSIS — Z98.891 STATUS POST REPEAT LOW TRANSVERSE CESAREAN SECTION: Primary | ICD-10-CM

## 2017-10-19 DIAGNOSIS — O16.3 ELEVATED BLOOD PRESSURE COMPLICATING PREGNANCY, ANTEPARTUM, THIRD TRIMESTER: ICD-10-CM

## 2017-10-19 PROBLEM — O11.3: Status: ACTIVE | Noted: 2017-10-19

## 2017-10-19 PROBLEM — O14.13 SEVERE PRE-ECLAMPSIA IN THIRD TRIMESTER: Status: ACTIVE | Noted: 2017-10-19

## 2017-10-19 PROBLEM — O14.13 SEVERE PRE-ECLAMPSIA IN THIRD TRIMESTER: Status: RESOLVED | Noted: 2017-10-19 | Resolved: 2017-10-19

## 2017-10-19 LAB
ABO + RH BLD: NORMAL
ALBUMIN SERPL BCP-MCNC: 2.4 G/DL
ALP SERPL-CCNC: 125 U/L
ALT SERPL W/O P-5'-P-CCNC: 7 U/L
ANION GAP SERPL CALC-SCNC: 9 MMOL/L
AST SERPL-CCNC: 16 U/L
BASOPHILS # BLD AUTO: 0.01 K/UL
BASOPHILS NFR BLD: 0.2 %
BILIRUB SERPL-MCNC: 0.3 MG/DL
BLD GP AB SCN CELLS X3 SERPL QL: NORMAL
BUN SERPL-MCNC: 7 MG/DL
CALCIUM SERPL-MCNC: 8.6 MG/DL
CHLORIDE SERPL-SCNC: 108 MMOL/L
CO2 SERPL-SCNC: 19 MMOL/L
CREAT SERPL-MCNC: 0.6 MG/DL
CREAT UR-MCNC: 122.3 MG/DL
DIFFERENTIAL METHOD: ABNORMAL
EOSINOPHIL # BLD AUTO: 0.1 K/UL
EOSINOPHIL NFR BLD: 2.2 %
ERYTHROCYTE [DISTWIDTH] IN BLOOD BY AUTOMATED COUNT: 13.7 %
EST. GFR  (AFRICAN AMERICAN): >60 ML/MIN/1.73 M^2
EST. GFR  (NON AFRICAN AMERICAN): >60 ML/MIN/1.73 M^2
GLUCOSE SERPL-MCNC: 74 MG/DL
HCT VFR BLD AUTO: 29.4 %
HGB BLD-MCNC: 10.4 G/DL
LYMPHOCYTES # BLD AUTO: 1 K/UL
LYMPHOCYTES NFR BLD: 21 %
MCH RBC QN AUTO: 30.6 PG
MCHC RBC AUTO-ENTMCNC: 35.4 G/DL
MCV RBC AUTO: 87 FL
MONOCYTES # BLD AUTO: 0.5 K/UL
MONOCYTES NFR BLD: 10.1 %
NEUTROPHILS # BLD AUTO: 3.3 K/UL
NEUTROPHILS NFR BLD: 66.5 %
PLATELET # BLD AUTO: 140 K/UL
PMV BLD AUTO: 10.5 FL
POTASSIUM SERPL-SCNC: 3.5 MMOL/L
PROT SERPL-MCNC: 7.8 G/DL
PROT UR-MCNC: 258 MG/DL
PROT/CREAT RATIO, UR: 2.11
RBC # BLD AUTO: 3.4 M/UL
SODIUM SERPL-SCNC: 136 MMOL/L
WBC # BLD AUTO: 4.96 K/UL

## 2017-10-19 PROCEDURE — 82570 ASSAY OF URINE CREATININE: CPT

## 2017-10-19 PROCEDURE — 63600175 PHARM REV CODE 636 W HCPCS: Performed by: NURSE ANESTHETIST, CERTIFIED REGISTERED

## 2017-10-19 PROCEDURE — 86900 BLOOD TYPING SEROLOGIC ABO: CPT

## 2017-10-19 PROCEDURE — 37000009 HC ANESTHESIA EA ADD 15 MINS: Performed by: OBSTETRICS & GYNECOLOGY

## 2017-10-19 PROCEDURE — 94770 HC EXHALED C02 TEST: CPT

## 2017-10-19 PROCEDURE — 11000001 HC ACUTE MED/SURG PRIVATE ROOM

## 2017-10-19 PROCEDURE — 63600175 PHARM REV CODE 636 W HCPCS: Performed by: OBSTETRICS & GYNECOLOGY

## 2017-10-19 PROCEDURE — 25000003 PHARM REV CODE 250: Performed by: OBSTETRICS & GYNECOLOGY

## 2017-10-19 PROCEDURE — 51702 INSERT TEMP BLADDER CATH: CPT

## 2017-10-19 PROCEDURE — 36000685 HC CESAREAN SECTION LEVEL I

## 2017-10-19 PROCEDURE — 25000003 PHARM REV CODE 250: Performed by: MIDWIFE

## 2017-10-19 PROCEDURE — 86850 RBC ANTIBODY SCREEN: CPT

## 2017-10-19 PROCEDURE — 99900035 HC TECH TIME PER 15 MIN (STAT)

## 2017-10-19 PROCEDURE — 25000003 PHARM REV CODE 250: Performed by: NURSE ANESTHETIST, CERTIFIED REGISTERED

## 2017-10-19 PROCEDURE — 63600175 PHARM REV CODE 636 W HCPCS: Performed by: ANESTHESIOLOGY

## 2017-10-19 PROCEDURE — 59514 CESAREAN DELIVERY ONLY: CPT | Mod: AS,,, | Performed by: PHYSICIAN ASSISTANT

## 2017-10-19 PROCEDURE — 37000008 HC ANESTHESIA 1ST 15 MINUTES: Performed by: OBSTETRICS & GYNECOLOGY

## 2017-10-19 PROCEDURE — 59510 CESAREAN DELIVERY: CPT | Mod: ,,, | Performed by: OBSTETRICS & GYNECOLOGY

## 2017-10-19 PROCEDURE — 63600175 PHARM REV CODE 636 W HCPCS: Performed by: MIDWIFE

## 2017-10-19 PROCEDURE — 80053 COMPREHEN METABOLIC PANEL: CPT

## 2017-10-19 PROCEDURE — 27200688 HC TRAY, SPINAL-HYPER/ ISOBARIC: Performed by: NURSE ANESTHETIST, CERTIFIED REGISTERED

## 2017-10-19 PROCEDURE — 85025 COMPLETE CBC W/AUTO DIFF WBC: CPT

## 2017-10-19 RX ORDER — OXYCODONE AND ACETAMINOPHEN 10; 325 MG/1; MG/1
1 TABLET ORAL EVERY 4 HOURS PRN
Status: DISCONTINUED | OUTPATIENT
Start: 2017-10-19 | End: 2017-10-22 | Stop reason: HOSPADM

## 2017-10-19 RX ORDER — MAGNESIUM SULFATE HEPTAHYDRATE 40 MG/ML
2 INJECTION, SOLUTION INTRAVENOUS ONCE
Status: COMPLETED | OUTPATIENT
Start: 2017-10-19 | End: 2017-10-19

## 2017-10-19 RX ORDER — METHYLDOPA 250 MG/1
500 TABLET, FILM COATED ORAL EVERY 12 HOURS
Status: DISCONTINUED | OUTPATIENT
Start: 2017-10-19 | End: 2017-10-22 | Stop reason: HOSPADM

## 2017-10-19 RX ORDER — SODIUM CITRATE AND CITRIC ACID MONOHYDRATE 334; 500 MG/5ML; MG/5ML
30 SOLUTION ORAL
Status: DISCONTINUED | OUTPATIENT
Start: 2017-10-19 | End: 2017-10-19

## 2017-10-19 RX ORDER — METHYLDOPA 250 MG/1
500 TABLET, FILM COATED ORAL EVERY 12 HOURS
Status: DISCONTINUED | OUTPATIENT
Start: 2017-10-19 | End: 2017-10-19

## 2017-10-19 RX ORDER — AMOXICILLIN 250 MG
1 CAPSULE ORAL NIGHTLY PRN
Status: DISCONTINUED | OUTPATIENT
Start: 2017-10-19 | End: 2017-10-22 | Stop reason: HOSPADM

## 2017-10-19 RX ORDER — MORPHINE SULFATE 1 MG/ML
INJECTION INTRAVENOUS CONTINUOUS
Status: DISCONTINUED | OUTPATIENT
Start: 2017-10-19 | End: 2017-10-22 | Stop reason: HOSPADM

## 2017-10-19 RX ORDER — KETOROLAC TROMETHAMINE 30 MG/ML
30 INJECTION, SOLUTION INTRAMUSCULAR; INTRAVENOUS EVERY 6 HOURS
Status: COMPLETED | OUTPATIENT
Start: 2017-10-19 | End: 2017-10-20

## 2017-10-19 RX ORDER — PHENYLEPHRINE HYDROCHLORIDE 10 MG/ML
INJECTION INTRAVENOUS
Status: DISCONTINUED | OUTPATIENT
Start: 2017-10-19 | End: 2017-10-19

## 2017-10-19 RX ORDER — AMMONIA 15 % (W/V)
0.3 AMPUL (EA) INHALATION CONTINUOUS PRN
Status: DISCONTINUED | OUTPATIENT
Start: 2017-10-19 | End: 2017-10-22 | Stop reason: HOSPADM

## 2017-10-19 RX ORDER — MISOPROSTOL 200 UG/1
800 TABLET ORAL
Status: DISCONTINUED | OUTPATIENT
Start: 2017-10-19 | End: 2017-10-19

## 2017-10-19 RX ORDER — HYDROMORPHONE HYDROCHLORIDE 2 MG/ML
1 INJECTION, SOLUTION INTRAMUSCULAR; INTRAVENOUS; SUBCUTANEOUS EVERY 4 HOURS PRN
Status: DISCONTINUED | OUTPATIENT
Start: 2017-10-19 | End: 2017-10-22 | Stop reason: HOSPADM

## 2017-10-19 RX ORDER — SODIUM CHLORIDE, SODIUM LACTATE, POTASSIUM CHLORIDE, CALCIUM CHLORIDE 600; 310; 30; 20 MG/100ML; MG/100ML; MG/100ML; MG/100ML
1000 INJECTION, SOLUTION INTRAVENOUS CONTINUOUS
Status: ACTIVE | OUTPATIENT
Start: 2017-10-19 | End: 2017-10-21

## 2017-10-19 RX ORDER — PRENATAL WITH FERROUS FUM AND FOLIC ACID 3080; 920; 120; 400; 22; 1.84; 3; 20; 10; 1; 12; 200; 27; 25; 2 [IU]/1; [IU]/1; MG/1; [IU]/1; MG/1; MG/1; MG/1; MG/1; MG/1; MG/1; UG/1; MG/1; MG/1; MG/1; MG/1
1 TABLET ORAL DAILY
Status: DISCONTINUED | OUTPATIENT
Start: 2017-10-20 | End: 2017-10-22 | Stop reason: HOSPADM

## 2017-10-19 RX ORDER — NALOXONE HCL 0.4 MG/ML
0.02 VIAL (ML) INJECTION
Status: DISCONTINUED | OUTPATIENT
Start: 2017-10-19 | End: 2017-10-22 | Stop reason: HOSPADM

## 2017-10-19 RX ORDER — OXYCODONE AND ACETAMINOPHEN 5; 325 MG/1; MG/1
1 TABLET ORAL EVERY 4 HOURS PRN
Status: DISCONTINUED | OUTPATIENT
Start: 2017-10-19 | End: 2017-10-22 | Stop reason: HOSPADM

## 2017-10-19 RX ORDER — DOCUSATE SODIUM 100 MG/1
100 CAPSULE, LIQUID FILLED ORAL DAILY
Status: DISCONTINUED | OUTPATIENT
Start: 2017-10-20 | End: 2017-10-22 | Stop reason: HOSPADM

## 2017-10-19 RX ORDER — ONDANSETRON 8 MG/1
8 TABLET, ORALLY DISINTEGRATING ORAL EVERY 8 HOURS PRN
Status: DISCONTINUED | OUTPATIENT
Start: 2017-10-19 | End: 2017-10-20 | Stop reason: SDUPTHER

## 2017-10-19 RX ORDER — ACETAMINOPHEN 500 MG
500 TABLET ORAL EVERY 6 HOURS PRN
Status: DISCONTINUED | OUTPATIENT
Start: 2017-10-19 | End: 2017-10-19

## 2017-10-19 RX ORDER — CALCIUM GLUCONATE 98 MG/ML
1 INJECTION, SOLUTION INTRAVENOUS
Status: DISCONTINUED | OUTPATIENT
Start: 2017-10-19 | End: 2017-10-22 | Stop reason: HOSPADM

## 2017-10-19 RX ORDER — LABETALOL HYDROCHLORIDE 5 MG/ML
20 INJECTION, SOLUTION INTRAVENOUS ONCE
Status: COMPLETED | OUTPATIENT
Start: 2017-10-19 | End: 2017-10-19

## 2017-10-19 RX ORDER — CHLORHEXIDINE GLUCONATE ORAL RINSE 1.2 MG/ML
10 SOLUTION DENTAL 2 TIMES DAILY
Status: DISCONTINUED | OUTPATIENT
Start: 2017-10-19 | End: 2017-10-22 | Stop reason: HOSPADM

## 2017-10-19 RX ORDER — BUPIVACAINE HYDROCHLORIDE 7.5 MG/ML
INJECTION, SOLUTION INTRASPINAL
Status: DISCONTINUED | OUTPATIENT
Start: 2017-10-19 | End: 2017-10-19

## 2017-10-19 RX ORDER — DIPHENHYDRAMINE HCL 25 MG
25 CAPSULE ORAL EVERY 4 HOURS PRN
Status: DISCONTINUED | OUTPATIENT
Start: 2017-10-19 | End: 2017-10-22 | Stop reason: HOSPADM

## 2017-10-19 RX ORDER — OXYTOCIN/RINGER'S LACTATE 20/1000 ML
PLASTIC BAG, INJECTION (ML) INTRAVENOUS CONTINUOUS PRN
Status: DISCONTINUED | OUTPATIENT
Start: 2017-10-19 | End: 2017-10-19

## 2017-10-19 RX ORDER — DIPHENHYDRAMINE HYDROCHLORIDE 50 MG/ML
25 INJECTION INTRAMUSCULAR; INTRAVENOUS EVERY 4 HOURS PRN
Status: DISCONTINUED | OUTPATIENT
Start: 2017-10-19 | End: 2017-10-22 | Stop reason: HOSPADM

## 2017-10-19 RX ORDER — ONDANSETRON 2 MG/ML
4 INJECTION INTRAMUSCULAR; INTRAVENOUS EVERY 12 HOURS PRN
Status: DISCONTINUED | OUTPATIENT
Start: 2017-10-19 | End: 2017-10-20 | Stop reason: SDUPTHER

## 2017-10-19 RX ORDER — FAMOTIDINE 20 MG/1
20 TABLET, FILM COATED ORAL 2 TIMES DAILY
Status: DISCONTINUED | OUTPATIENT
Start: 2017-10-20 | End: 2017-10-22 | Stop reason: HOSPADM

## 2017-10-19 RX ORDER — HYDROXYCHLOROQUINE SULFATE 200 MG/1
200 TABLET, FILM COATED ORAL 2 TIMES DAILY
Status: DISCONTINUED | OUTPATIENT
Start: 2017-10-20 | End: 2017-10-22 | Stop reason: HOSPADM

## 2017-10-19 RX ORDER — OXYTOCIN/RINGER'S LACTATE 20/1000 ML
41.65 PLASTIC BAG, INJECTION (ML) INTRAVENOUS CONTINUOUS
Status: DISPENSED | OUTPATIENT
Start: 2017-10-19 | End: 2017-10-20

## 2017-10-19 RX ORDER — IBUPROFEN 800 MG/1
800 TABLET ORAL EVERY 8 HOURS
Status: DISCONTINUED | OUTPATIENT
Start: 2017-10-21 | End: 2017-10-22 | Stop reason: HOSPADM

## 2017-10-19 RX ORDER — FLUOXETINE 10 MG/1
40 CAPSULE ORAL DAILY
Status: DISCONTINUED | OUTPATIENT
Start: 2017-10-20 | End: 2017-10-22 | Stop reason: HOSPADM

## 2017-10-19 RX ORDER — SODIUM CHLORIDE, SODIUM LACTATE, POTASSIUM CHLORIDE, CALCIUM CHLORIDE 600; 310; 30; 20 MG/100ML; MG/100ML; MG/100ML; MG/100ML
INJECTION, SOLUTION INTRAVENOUS CONTINUOUS PRN
Status: DISCONTINUED | OUTPATIENT
Start: 2017-10-19 | End: 2017-10-19

## 2017-10-19 RX ORDER — SODIUM CHLORIDE, SODIUM LACTATE, POTASSIUM CHLORIDE, CALCIUM CHLORIDE 600; 310; 30; 20 MG/100ML; MG/100ML; MG/100ML; MG/100ML
INJECTION, SOLUTION INTRAVENOUS CONTINUOUS
Status: DISCONTINUED | OUTPATIENT
Start: 2017-10-19 | End: 2017-10-19

## 2017-10-19 RX ORDER — ONDANSETRON 2 MG/ML
INJECTION INTRAMUSCULAR; INTRAVENOUS
Status: DISCONTINUED | OUTPATIENT
Start: 2017-10-19 | End: 2017-10-19

## 2017-10-19 RX ORDER — ACETAMINOPHEN 325 MG/1
650 TABLET ORAL EVERY 6 HOURS PRN
Status: DISCONTINUED | OUTPATIENT
Start: 2017-10-19 | End: 2017-10-22 | Stop reason: HOSPADM

## 2017-10-19 RX ORDER — ONDANSETRON 8 MG/1
8 TABLET, ORALLY DISINTEGRATING ORAL EVERY 8 HOURS PRN
Status: DISCONTINUED | OUTPATIENT
Start: 2017-10-19 | End: 2017-10-19

## 2017-10-19 RX ORDER — SIMETHICONE 80 MG
1 TABLET,CHEWABLE ORAL EVERY 6 HOURS PRN
Status: DISCONTINUED | OUTPATIENT
Start: 2017-10-19 | End: 2017-10-20 | Stop reason: SDUPTHER

## 2017-10-19 RX ORDER — MISOPROSTOL 200 UG/1
200 TABLET ORAL ONCE AS NEEDED
Status: ACTIVE | OUTPATIENT
Start: 2017-10-19 | End: 2017-10-19

## 2017-10-19 RX ORDER — HYDROCORTISONE 25 MG/G
CREAM TOPICAL 3 TIMES DAILY PRN
Status: DISCONTINUED | OUTPATIENT
Start: 2017-10-19 | End: 2017-10-22 | Stop reason: HOSPADM

## 2017-10-19 RX ORDER — CEFAZOLIN SODIUM 1 G/3ML
INJECTION, POWDER, FOR SOLUTION INTRAMUSCULAR; INTRAVENOUS
Status: DISCONTINUED | OUTPATIENT
Start: 2017-10-19 | End: 2017-10-19

## 2017-10-19 RX ORDER — BISACODYL 10 MG
10 SUPPOSITORY, RECTAL RECTAL ONCE AS NEEDED
Status: ACTIVE | OUTPATIENT
Start: 2017-10-19 | End: 2017-10-19

## 2017-10-19 RX ORDER — MAGNESIUM SULFATE HEPTAHYDRATE 40 MG/ML
2 INJECTION, SOLUTION INTRAVENOUS CONTINUOUS
Status: DISCONTINUED | OUTPATIENT
Start: 2017-10-19 | End: 2017-10-22 | Stop reason: HOSPADM

## 2017-10-19 RX ADMIN — ONDANSETRON 4 MG: 2 INJECTION, SOLUTION INTRAMUSCULAR; INTRAVENOUS at 04:10

## 2017-10-19 RX ADMIN — CEFAZOLIN 2 G: 1 INJECTION, POWDER, FOR SOLUTION INTRAMUSCULAR; INTRAVENOUS at 04:10

## 2017-10-19 RX ADMIN — Medication 41.65 MILLI-UNITS/MIN: at 07:10

## 2017-10-19 RX ADMIN — METHYLDOPA 500 MG: 250 TABLET ORAL at 09:10

## 2017-10-19 RX ADMIN — PHENYLEPHRINE HYDROCHLORIDE 100 MCG: 10 INJECTION INTRAVENOUS at 04:10

## 2017-10-19 RX ADMIN — PHENYLEPHRINE HYDROCHLORIDE 50 MCG: 10 INJECTION INTRAVENOUS at 04:10

## 2017-10-19 RX ADMIN — MAGNESIUM SULFATE IN WATER 2 G/HR: 40 INJECTION, SOLUTION INTRAVENOUS at 11:10

## 2017-10-19 RX ADMIN — Medication: at 06:10

## 2017-10-19 RX ADMIN — Medication: at 04:10

## 2017-10-19 RX ADMIN — LABETALOL HYDROCHLORIDE 20 MG: 5 INJECTION, SOLUTION INTRAVENOUS at 11:10

## 2017-10-19 RX ADMIN — METHYLDOPA 500 MG: 250 TABLET ORAL at 08:10

## 2017-10-19 RX ADMIN — PHENYLEPHRINE HYDROCHLORIDE 150 MCG: 10 INJECTION INTRAVENOUS at 04:10

## 2017-10-19 RX ADMIN — MAGNESIUM SULFATE IN WATER 2 G: 40 INJECTION, SOLUTION INTRAVENOUS at 11:10

## 2017-10-19 RX ADMIN — KETOROLAC TROMETHAMINE 30 MG: 30 INJECTION, SOLUTION INTRAMUSCULAR at 08:10

## 2017-10-19 RX ADMIN — ONDANSETRON 8 MG: 8 TABLET, ORALLY DISINTEGRATING ORAL at 09:10

## 2017-10-19 RX ADMIN — SODIUM CHLORIDE, SODIUM LACTATE, POTASSIUM CHLORIDE, AND CALCIUM CHLORIDE: 600; 310; 30; 20 INJECTION, SOLUTION INTRAVENOUS at 04:10

## 2017-10-19 RX ADMIN — LABETALOL HYDROCHLORIDE 20 MG: 5 INJECTION, SOLUTION INTRAVENOUS at 10:10

## 2017-10-19 RX ADMIN — PHENYLEPHRINE HYDROCHLORIDE 200 MCG: 10 INJECTION INTRAVENOUS at 04:10

## 2017-10-19 RX ADMIN — BUPIVACAINE HYDROCHLORIDE IN DEXTROSE 1.6 ML: 7.5 INJECTION, SOLUTION SUBARACHNOID at 04:10

## 2017-10-19 NOTE — HOSPITAL COURSE
Pt observed for labor and subsequent elevated BP's, Pre E work up done, IV labetolol given, BP's better, spoke with Dr Franks, recommend repeat c/s, spoke w/ pt and pt agrees w/ POC.  RLTCS was performed without complications.

## 2017-10-19 NOTE — TELEPHONE ENCOUNTER
----- Message from Irena Pascual sent at 10/19/2017  7:52 AM CDT -----  Contact: pt   Call pt regarding some questions that she has.   ..[phone] (home)

## 2017-10-19 NOTE — SUBJECTIVE & OBJECTIVE
Obstetric HPI:  Patient reports cramping since last night, active fetal movement, No vaginal bleeding , No loss of fluid     This pregnancy has been complicated by HSV, lupus, essential HTN, Sjogren's    Obstetric History       T1      L1     SAB0   TAB0   Ectopic0   Multiple0   Live Births1       # Outcome Date GA Lbr José Miguel/2nd Weight Sex Delivery Anes PTL Lv   2 Current            1 Term 12 38w0d  2.948 kg (6 lb 8 oz) M CS-LTranv EPI N CYDNEY      Name: Young        Past Medical History:   Diagnosis Date    Allergic rhinitis     Anemia     Condyloma acuminata     GERD (gastroesophageal reflux disease)     HSV-2 (herpes simplex virus 2) infection     Lupus nephritis     Mood disorder     Overweight(278.02)     Sjogren's syndrome     Systemic lupus erythematosus     Thrombocytopenia      Past Surgical History:   Procedure Laterality Date     SECTION, LOW TRANSVERSE      RENAL BIOPSY  2013       PTA Medications   Medication Sig    efinaconazole (JUBLIA) 10 % Mario Apply once daily to nail and nail fold.  Use daily for up to 1 year.    famotidine (PEPCID) 20 MG tablet Take 1 tablet (20 mg total) by mouth 2 (two) times daily as needed for Heartburn.    fluticasone (FLONASE) 50 mcg/actuation nasal spray 2 sprays by Each Nare route once daily.    hydroxychloroquine (PLAQUENIL) 200 mg tablet Take 1 tablet (200 mg total) by mouth 2 (two) times daily.    methyldopa (ALDOMET) 500 MG tablet Take 1 tablet (500 mg total) by mouth every 12 (twelve) hours.    valacyclovir (VALTREX) 500 MG tablet TAKE 1 TABLET ONE TIME DAILY    fluoxetine (PROZAC) 40 MG capsule Take 1 capsule (40 mg total) by mouth once daily.    ondansetron (ZOFRAN-ODT) 8 MG TbDL Take 1 tablet (8 mg total) by mouth every 6 (six) hours as needed.       Review of patient's allergies indicates:  No Known Allergies     Family History     Problem Relation (Age of Onset)    Diabetes Paternal Grandmother    Eczema  Brother    Heart disease Son    Hypertension Mother, Maternal Grandmother        Social History Main Topics    Smoking status: Never Smoker    Smokeless tobacco: Never Used    Alcohol use No    Drug use: No    Sexual activity: Yes     Partners: Male     Review of Systems   Gastrointestinal: Positive for abdominal pain.   All other systems reviewed and are negative.     Objective:     Vital Signs (Most Recent):  Temp: 98.1 °F (36.7 °C) (10/19/17 0911)  Pulse: 86 (10/19/17 1234)  Resp: 18 (10/19/17 1204)  BP: (!) 154/91 (10/19/17 1234) Vital Signs (24h Range):  Temp:  [98.1 °F (36.7 °C)] 98.1 °F (36.7 °C)  Pulse:  [] 86  Resp:  [17-18] 18  BP: (144-170)/() 154/91        There is no height or weight on file to calculate BMI.    FHT: Cat 1 (reassuring)  TOCO:  irregular    Physical Exam:   Constitutional: She is oriented to person, place, and time. She appears well-developed and well-nourished.    HENT:   Head: Normocephalic.     Neck: Normal range of motion.    Cardiovascular: Normal rate.     Pulmonary/Chest: Effort normal and breath sounds normal.        Abdominal: Soft.     Genitourinary: Vagina normal.           Musculoskeletal: Normal range of motion and moves all extremeties.       Neurological: She is alert and oriented to person, place, and time. She displays abnormal reflex.   Brisk reflexes, 1 beat of clonus    Skin: Skin is warm and dry.    Psychiatric: She has a normal mood and affect. Her behavior is normal. Judgment and thought content normal.       Cervix:  Dilation:  1.5  Effacement:  70%  Station: -2  Presentation: Vertex     Significant Labs:  Lab Results   Component Value Date    GROUPTRH B POS 03/01/2017    HEPBSAG Negative 03/01/2017    STREPBCULT No Group B Streptococcus isolated 09/26/2017       I have personallly reviewed all pertinent lab results from the last 24 hours.  Recent Lab Results       10/19/17  0950 10/19/17  0945      Albumin 2.4(L)      Alkaline Phosphatase 125       ALT 7(L)      Anion Gap 9      AST 16      Baso # 0.01      Basophil% 0.2      Total Bilirubin 0.3  Comment:  For infants and newborns, interpretation of results should be based  on gestational age, weight and in agreement with clinical  observations.  Premature Infant recommended reference ranges:  Up to 24 hours.............<8.0 mg/dL  Up to 48 hours............<12.0 mg/dL  3-5 days..................<15.0 mg/dL  6-29 days.................<15.0 mg/dL        BUN, Bld 7      Calcium 8.6(L)      Chloride 108      CO2 19(L)      Creatinine 0.6      Creatinine, Random Ur  122.3  Comment:  The random urine reference ranges provided were established   for 24 hour urine collections.  No reference ranges exist for  random urine specimens.  Correlate clinically.       Differential Method Automated      eGFR if  >60      eGFR if non  >60  Comment:  Calculation used to obtain the estimated glomerular filtration  rate (eGFR) is the CKD-EPI equation. Since race is unknown   in our information system, the eGFR values for   -American and Non--American patients are given   for each creatinine result.        Eos # 0.1      Eosinophil% 2.2      Glucose 74      Gran # 3.3      Gran% 66.5      Hematocrit 29.4(L)      Hemoglobin 10.4(L)      Lymph # 1.0      Lymph% 21.0      MCH 30.6      MCHC 35.4      MCV 87      Mono # 0.5      Mono% 10.1      MPV 10.5      Platelets 140(L)      Potassium 3.5      Prot/Creat Ratio, Ur  2.11(H)     Total Protein 7.8      Protein, Urine Random  258  Comment:  The random urine reference ranges provided were established   for 24 hour urine collections.  No reference ranges exist for  random urine specimens.  Correlate clinically.  (H)     RBC 3.40(L)      RDW 13.7      Sodium 136      WBC 4.96

## 2017-10-19 NOTE — DISCHARGE INSTRUCTIONS
Mother Self Care:    Activity: Avoid strenuous exercise and get adequate rest.  No driving until your physician gives you consent.  Emotional Changes: The grieving process has many different stages, be prepared to experience lots of emotional ups and downs. Identify people to be your support system, and do not hesitate to call our  if you need someone to talk to.   Breast Care: You may notice milk leaking from your breasts. Wear a support bra 24 hours a day for one week or wrap breasts in an ace bandage if needed to stop milk production.  Avoid stimulation to breasts.  You may use ice packs for discomfort.  Debra-Care/Vaginal Bleeding: Remember to use your debra-bottle after urinating.  Your flow will change from red, to pink, to yellow/white color over a period of 2 weeks.  Menstruation will return in 3-8 weeks.   Section/Tubal Ligation: Keep incision clean and dry.  Please remove steri-strips in 5-7 days.  You may shower, but avoid baths.  Sexual Activity/Pelvic Rest: No sexual activity, tampons, or douching until your physician gives you consent.  Diet: Continue to eat from the five basic food groups, including plenty of protein, fruits, vegetables, and whole grains.  Limit empty calories and high fat foods.  Drink enough fluids to satisfy thirst.  Constipation/Hemorrhoids: Drink plenty of water.  You may take a stool softener or natural laxative (Metamucil). You may use tucks or hemorrhoid ointment and soak in a warm tub.    CALL YOUR OB DOCTOR IF ANY OF THE FOLLOWING OCCURS:  *Heavy bleeding - saturating a pad an hour or passing any large (2-3 inches in size) blood clots.  *Any pain, redness, or tenderness in lower leg.  *You cannot care for yourself  *Any signs of infection-      - Temperature greater than 100.5 degrees F      - Foul smelling vaginal discharge and/or incisional drainage      - Increased episiotomy or incisional pain      - Hot, hard, red or sore area on breast      -  Flu-like symptoms      - Any urgency, frequency or burning with urination

## 2017-10-19 NOTE — TRANSFER OF CARE
Anesthesia Transfer of Care Note    Patient: Alisia Vines    Procedure(s) Performed: Procedure(s) (LRB):  DELIVERY- SECTION (N/A)    Patient location: Labor and Delivery    Anesthesia Type: spinal    Transport from OR: Transported from OR on room air with adequate spontaneous ventilation    Post pain: adequate analgesia    Post assessment: no apparent anesthetic complications and tolerated procedure well    Post vital signs: stable    Level of consciousness: awake, alert and oriented    Nausea/Vomiting: no nausea/vomiting    Complications: none    Transfer of care protocol was followed      Last vitals:   Visit Vitals  /85 (BP Location: Right arm, Patient Position: Lying)   Pulse 78   Temp 36.7 °C (98.1 °F) (Oral)   Resp 16   LMP 12/15/2016   SpO2 100%   Breastfeeding? No

## 2017-10-19 NOTE — H&P
[facility] -   Obstetrics  History & Physical    Patient Name: Alisia Vines  MRN: [id number]  Admission Date: 10/19/2017  Primary Care Provider: Saumya Quinonez MD    Subjective:     Principal Problem:Severe pre-eclampsia in third trimester    History of Present Illness:  Pt presented to unit w/ c/o cramping    Obstetric HPI:  Patient reports cramping since last night, active fetal movement, No vaginal bleeding , No loss of fluid     This pregnancy has been complicated by HSV, lupus, essential HTN, Sjogren's    Obstetric History       T1      L1     SAB0   TAB0   Ectopic0   Multiple0   Live Births1       # Outcome Date GA Lbr José Miguel/2nd Weight Sex Delivery Anes PTL Lv   2 Current            1 Term 12 38w0d  2.948 kg (6 lb 8 oz) M CS-LTranv EPI N CYDNEY      Name: Young        Past Medical History:   Diagnosis Date    Allergic rhinitis     Anemia     Condyloma acuminata     GERD (gastroesophageal reflux disease)     HSV-2 (herpes simplex virus 2) infection     Lupus nephritis     Mood disorder     Overweight(278.02)     Sjogren's syndrome     Systemic lupus erythematosus     Thrombocytopenia      Past Surgical History:   Procedure Laterality Date     SECTION, LOW TRANSVERSE      RENAL BIOPSY  2013       PTA Medications   Medication Sig    efinaconazole (JUBLIA) 10 % Mario Apply once daily to nail and nail fold.  Use daily for up to 1 year.    famotidine (PEPCID) 20 MG tablet Take 1 tablet (20 mg total) by mouth 2 (two) times daily as needed for Heartburn.    fluticasone (FLONASE) 50 mcg/actuation nasal spray 2 sprays by Each Nare route once daily.    hydroxychloroquine (PLAQUENIL) 200 mg tablet Take 1 tablet (200 mg total) by mouth 2 (two) times daily.    methyldopa (ALDOMET) 500 MG tablet Take 1 tablet (500 mg total) by mouth every 12 (twelve) hours.    valacyclovir (VALTREX) 500 MG tablet TAKE 1 TABLET ONE TIME DAILY    fluoxetine (PROZAC) 40  MG capsule Take 1 capsule (40 mg total) by mouth once daily.    ondansetron (ZOFRAN-ODT) 8 MG TbDL Take 1 tablet (8 mg total) by mouth every 6 (six) hours as needed.       Review of patient's allergies indicates:  No Known Allergies     Family History     Problem Relation (Age of Onset)    Diabetes Paternal Grandmother    Eczema Brother    Heart disease Son    Hypertension Mother, Maternal Grandmother        Social History Main Topics    Smoking status: Never Smoker    Smokeless tobacco: Never Used    Alcohol use No    Drug use: No    Sexual activity: Yes     Partners: Male     Review of Systems   Gastrointestinal: Positive for abdominal pain.   All other systems reviewed and are negative.     Objective:     Vital Signs (Most Recent):  Temp: 98.1 °F (36.7 °C) (10/19/17 0911)  Pulse: 86 (10/19/17 1234)  Resp: 18 (10/19/17 1204)  BP: (!) 154/91 (10/19/17 1234) Vital Signs (24h Range):  Temp:  [98.1 °F (36.7 °C)] 98.1 °F (36.7 °C)  Pulse:  [] 86  Resp:  [17-18] 18  BP: (144-170)/() 154/91        There is no height or weight on file to calculate BMI.    FHT: Cat 1 (reassuring)  TOCO:  irregular    Physical Exam:   Constitutional: She is oriented to person, place, and time. She appears well-developed and well-nourished.    HENT:   Head: Normocephalic.     Neck: Normal range of motion.    Cardiovascular: Normal rate.     Pulmonary/Chest: Effort normal and breath sounds normal.        Abdominal: Soft.     Genitourinary: Vagina normal.           Musculoskeletal: Normal range of motion and moves all extremeties.       Neurological: She is alert and oriented to person, place, and time. She displays abnormal reflex.   Brisk reflexes, 1 beat of clonus    Skin: Skin is warm and dry.    Psychiatric: She has a normal mood and affect. Her behavior is normal. Judgment and thought content normal.       Cervix:  Dilation:  1.5  Effacement:  70%  Station: -2  Presentation: Vertex     Significant Labs:  Lab Results    Component Value Date    GROUPTRH B POS 03/01/2017    HEPBSAG Negative 03/01/2017    STREPBCULT No Group B Streptococcus isolated 09/26/2017       I have personallly reviewed all pertinent lab results from the last 24 hours.  Recent Lab Results       10/19/17  0950 10/19/17  0945      Albumin 2.4(L)      Alkaline Phosphatase 125      ALT 7(L)      Anion Gap 9      AST 16      Baso # 0.01      Basophil% 0.2      Total Bilirubin 0.3  Comment:  For infants and newborns, interpretation of results should be based  on gestational age, weight and in agreement with clinical  observations.  Premature Infant recommended reference ranges:  Up to 24 hours.............<8.0 mg/dL  Up to 48 hours............<12.0 mg/dL  3-5 days..................<15.0 mg/dL  6-29 days.................<15.0 mg/dL        BUN, Bld 7      Calcium 8.6(L)      Chloride 108      CO2 19(L)      Creatinine 0.6      Creatinine, Random Ur  122.3  Comment:  The random urine reference ranges provided were established   for 24 hour urine collections.  No reference ranges exist for  random urine specimens.  Correlate clinically.       Differential Method Automated      eGFR if  >60      eGFR if non  >60  Comment:  Calculation used to obtain the estimated glomerular filtration  rate (eGFR) is the CKD-EPI equation. Since race is unknown   in our information system, the eGFR values for   -American and Non--American patients are given   for each creatinine result.        Eos # 0.1      Eosinophil% 2.2      Glucose 74      Gran # 3.3      Gran% 66.5      Hematocrit 29.4(L)      Hemoglobin 10.4(L)      Lymph # 1.0      Lymph% 21.0      MCH 30.6      MCHC 35.4      MCV 87      Mono # 0.5      Mono% 10.1      MPV 10.5      Platelets 140(L)      Potassium 3.5      Prot/Creat Ratio, Ur  2.11(H)     Total Protein 7.8      Protein, Urine Random  258  Comment:  The random urine reference ranges provided were established   for 24  hour urine collections.  No reference ranges exist for  random urine specimens.  Correlate clinically.  (H)     RBC 3.40(L)      RDW 13.7      Sodium 136      WBC 4.96          Assessment/Plan:     27 y.o. female  at 39w2d for:    * Severe pre-eclampsia in third trimester    For repeat c/s today            Alex Carter CNM  Obstetrics  [facility] [place]

## 2017-10-19 NOTE — ANESTHESIA PREPROCEDURE EVALUATION
10/19/2017  Alisia Vines is a 27 y.o., female.    Anesthesia Evaluation    I have reviewed the Patient Summary Reports.    I have reviewed the Nursing Notes.   I have reviewed the Medications.   Steroids Taken In Past Year: Prednisone    Review of Systems  Anesthesia Hx:  No problems with previous Anesthesia    Social:  Non-Smoker, No Alcohol Use    Hematology/Oncology:     Oncology Normal    -- Anemia:   EENT/Dental:EENT/Dental Normal   Cardiovascular:   Hypertension, poorly controlled    Renal/:   Chronic Renal Disease Lupus nephritis, no complications at this time   Hepatic/GI:   GERD, well controlled    Musculoskeletal:  Musculoskeletal Normal    Neurological:  Neurology Normal    Dermatological:  Skin Normal    Psych:   anxiety depression          Physical Exam  General:  Well nourished    Airway/Jaw/Neck:  Airway Findings: Mouth Opening: Normal Tongue: Normal  General Airway Assessment: Adult  Mallampati: II  TM Distance: Normal, at least 6 cm  Jaw/Neck Findings:  Neck ROM: Normal ROM      Dental:  Dental Findings: In tact   Chest/Lungs:  Chest/Lungs Findings: Clear to auscultation, Normal Respiratory Rate     Heart/Vascular:  Heart Findings: Rate: Normal  Rhythm: Regular Rhythm  Sounds: Normal     Abdomen:  Abdomen Findings:  Normal       Mental Status:  Mental Status Findings:  Cooperative, Alert and Oriented         Anesthesia Plan  Type of Anesthesia, risks & benefits discussed:  Anesthesia Type:  general, spinal  Patient's Preference:   Intra-op Monitoring Plan: standard ASA monitors  Intra-op Monitoring Plan Comments:   Post Op Pain Control Plan:   Post Op Pain Control Plan Comments:   Induction:   IV  Beta Blocker:  Patient is not currently on a Beta-Blocker (No further documentation required).       Informed Consent: Patient understands risks and agrees with Anesthesia plan.   Questions answered. Anesthesia consent signed with patient.  ASA Score: 2     Day of Surgery Review of History & Physical: I have interviewed and examined the patient. I have reviewed the patient's H&P dated: 10/19/2017. There are no significant changes.          Ready For Surgery From Anesthesia Perspective.

## 2017-10-19 NOTE — TELEPHONE ENCOUNTER
"Patient called and stated she has been having "cramping" that woke her up and that when she went to the restroom she had pinkish discharge too.  Patient stated she believed the pains were 5-6 minutes apart.  Patient advised to go to L&D for evaluation.  Patient verbalized understanding.  "

## 2017-10-19 NOTE — PROGRESS NOTES
Reviewed and agree with CNM note.  Pt seen and examined.  Pt admitted at 39w2d with chronic HTN with super-imposed pre-eclampsia.  Pt with history of prior C/S and has an unfavorable cervix.  Recommendation made to proceed with repeat C/S.  Surgery details, indications, risks, and benefits were reviewed with pt.  Pt voiced understanding and agrees with surgery.  Hospital consents were reviewed with pt and signed.  Fetal status reassuring.  OR team aware.

## 2017-10-19 NOTE — ANESTHESIA RELEASE NOTE
Anesthesia Release from PACU Note    Patient: Alisia Vines    Procedure(s) Performed: Procedure(s) (LRB):  DELIVERY- SECTION (N/A)    Anesthesia type: spinal    Post pain: Adequate analgesia    Post assessment: no apparent anesthetic complications and tolerated procedure well    Last Vitals:   Visit Vitals  /85 (BP Location: Right arm, Patient Position: Lying)   Pulse 78   Temp 36.7 °C (98.1 °F) (Oral)   Resp 16   LMP 12/15/2016   SpO2 100%   Breastfeeding? No       Post vital signs: stable    Level of consciousness: awake, alert  and oriented    Nausea/Vomiting: no nausea/no vomiting    Complications: none    Airway Patency: patent    Respiratory: unassisted, spontaneous ventilation, room air    Cardiovascular: stable and blood pressure at baseline    Hydration: euvolemic

## 2017-10-19 NOTE — ANESTHESIA POSTPROCEDURE EVALUATION
Anesthesia Post Evaluation    Patient: Alisia Vines    Procedure(s) Performed: Procedure(s) (LRB):  DELIVERY- SECTION (N/A)    Final Anesthesia Type: spinal  Patient location during evaluation: labor & delivery  Patient participation: Yes- Able to Participate  Level of consciousness: awake and alert and oriented  Post-procedure vital signs: reviewed and stable  Pain management: adequate  Airway patency: patent  PONV status at discharge: No PONV  Anesthetic complications: no      Cardiovascular status: hemodynamically stable  Respiratory status: unassisted, spontaneous ventilation and room air  Hydration status: euvolemic  Follow-up not needed.        Visit Vitals  /85 (BP Location: Right arm, Patient Position: Lying)   Pulse 78   Temp 36.7 °C (98.1 °F) (Oral)   Resp 16   LMP 12/15/2016   SpO2 100%   Breastfeeding? No       Pain/Kwame Score: No Data Recorded

## 2017-10-20 PROBLEM — O09.93 HIGH-RISK PREGNANCY IN THIRD TRIMESTER: Status: RESOLVED | Noted: 2017-03-08 | Resolved: 2017-10-20

## 2017-10-20 PROBLEM — R07.89 CHEST DISCOMFORT: Status: RESOLVED | Noted: 2017-05-22 | Resolved: 2017-10-20

## 2017-10-20 PROCEDURE — 11000001 HC ACUTE MED/SURG PRIVATE ROOM

## 2017-10-20 PROCEDURE — 63600175 PHARM REV CODE 636 W HCPCS: Performed by: ANESTHESIOLOGY

## 2017-10-20 PROCEDURE — 63600175 PHARM REV CODE 636 W HCPCS: Performed by: OBSTETRICS & GYNECOLOGY

## 2017-10-20 PROCEDURE — 25000003 PHARM REV CODE 250: Performed by: MIDWIFE

## 2017-10-20 PROCEDURE — 25000003 PHARM REV CODE 250: Performed by: OBSTETRICS & GYNECOLOGY

## 2017-10-20 PROCEDURE — 99900035 HC TECH TIME PER 15 MIN (STAT)

## 2017-10-20 RX ORDER — SIMETHICONE 80 MG
1 TABLET,CHEWABLE ORAL 3 TIMES DAILY PRN
Status: DISCONTINUED | OUTPATIENT
Start: 2017-10-20 | End: 2017-10-22 | Stop reason: HOSPADM

## 2017-10-20 RX ORDER — ONDANSETRON 8 MG/1
8 TABLET, ORALLY DISINTEGRATING ORAL EVERY 8 HOURS PRN
Status: DISCONTINUED | OUTPATIENT
Start: 2017-10-20 | End: 2017-10-22 | Stop reason: HOSPADM

## 2017-10-20 RX ORDER — ACETAMINOPHEN 500 MG
500 TABLET ORAL EVERY 6 HOURS PRN
Status: DISCONTINUED | OUTPATIENT
Start: 2017-10-20 | End: 2017-10-22 | Stop reason: HOSPADM

## 2017-10-20 RX ADMIN — Medication: at 12:10

## 2017-10-20 RX ADMIN — OXYCODONE HYDROCHLORIDE AND ACETAMINOPHEN 1 TABLET: 5; 325 TABLET ORAL at 11:10

## 2017-10-20 RX ADMIN — Medication 1 EACH: at 09:10

## 2017-10-20 RX ADMIN — METHYLDOPA 500 MG: 250 TABLET ORAL at 09:10

## 2017-10-20 RX ADMIN — FAMOTIDINE 20 MG: 20 TABLET ORAL at 09:10

## 2017-10-20 RX ADMIN — KETOROLAC TROMETHAMINE 30 MG: 30 INJECTION, SOLUTION INTRAMUSCULAR at 12:10

## 2017-10-20 RX ADMIN — CHLORHEXIDINE GLUCONATE 10 ML: 1.2 RINSE ORAL at 09:10

## 2017-10-20 RX ADMIN — SODIUM CHLORIDE, SODIUM LACTATE, POTASSIUM CHLORIDE, AND CALCIUM CHLORIDE 1000 ML: .6; .31; .03; .02 INJECTION, SOLUTION INTRAVENOUS at 12:10

## 2017-10-20 RX ADMIN — KETOROLAC TROMETHAMINE 30 MG: 30 INJECTION, SOLUTION INTRAMUSCULAR at 06:10

## 2017-10-20 RX ADMIN — IBUPROFEN 800 MG: 800 TABLET, FILM COATED ORAL at 11:10

## 2017-10-20 RX ADMIN — KETOROLAC TROMETHAMINE 30 MG: 30 INJECTION, SOLUTION INTRAMUSCULAR at 05:10

## 2017-10-20 RX ADMIN — HYDROXYCHLOROQUINE SULFATE 200 MG: 200 TABLET, FILM COATED ORAL at 09:10

## 2017-10-20 RX ADMIN — SIMETHICONE CHEW TAB 80 MG 80 MG: 80 TABLET ORAL at 06:10

## 2017-10-20 RX ADMIN — FLUOXETINE 40 MG: 20 CAPSULE ORAL at 09:10

## 2017-10-20 NOTE — PROGRESS NOTES
[facility] -   Obstetrics  Postpartum Progress Note    Patient Name: Alisia Ceballos Jasmyn  MRN: [id number]  Admission Date: 10/19/2017  Hospital Length of Stay: 1 days  Attending Physician: Georges Franks MD  Primary Care Provider: Saumya Quinonez MD    Subjective:     Principal Problem:Status post repeat low transverse  section    Hospital course: Pt observed for labor and subsequent elevated BP's, Pre E work up done, IV labetolol given, BP's better, spoke with Dr Franks, recommend repeat c/s, spoke w/ pt and pt agrees w/ POC.  RLTCS was performed without complications.    Interval History:      She is doing well this morning. Patient has not been advanced to regular diet due to preE treatment.  Her elizondo remains in place until 24 hour Mg is complete.  She is not ambulating. She has passed flatus, and has not a BM. Vaginal bleeding is mild. She denies fever or chills. Denies headaches or blurred vision.  Nausea has improved.  Abdominal pain is mild and controlled with oral medications. She is breastfeeding. She desires circumcision for her male baby: yes.    Objective:     Vital Signs (Most Recent):  Temp: 98.1 °F (36.7 °C) (10/19/17 1736)  Pulse: 81 (10/20/17 0600)  Resp: 18 (10/20/17 0600)  BP: 127/79 (10/20/17 0600)  SpO2: 99 % (10/20/17 0600) Vital Signs (24h Range):  Temp:  [98.1 °F (36.7 °C)] 98.1 °F (36.7 °C)  Pulse:  [] 81  Resp:  [16-20] 18  SpO2:  [99 %-100 %] 99 %  BP: (127-190)/() 127/79     Weight: 89 kg (196 lb 3.4 oz)  Body mass index is 32.65 kg/m².      Intake/Output Summary (Last 24 hours) at 10/20/17 0714  Last data filed at 10/20/17 0630   Gross per 24 hour   Intake             2302 ml   Output             1157 ml   Net             1145 ml       Significant Labs:  Lab Results   Component Value Date    GROUPTRH B POS 10/19/2017    HEPBSAG Negative 2017    STREPBCULT No Group B Streptococcus isolated 2017       Recent Labs  Lab 10/19/17  0950   HGB  10.4*   HCT 29.4*       I have personallly reviewed all pertinent lab results from the last 24 hours.    Physical Exam:   Constitutional: She is oriented to person, place, and time. She appears well-developed and well-nourished.    HENT:   Head: Normocephalic.     Neck: Normal range of motion. No thyromegaly present.    Cardiovascular: Normal rate and regular rhythm.     Pulmonary/Chest: Effort normal and breath sounds normal.        Abdominal: Soft. Bowel sounds are normal. She exhibits abdominal incision (aquasel dressing clean dry & intact).     Genitourinary:   Genitourinary Comments: Fundus nontender near the umbilicus             Musculoskeletal: Normal range of motion and moves all extremeties.       Neurological: She is alert and oriented to person, place, and time.    Skin: Skin is warm.    Psychiatric: She has a normal mood and affect.       Assessment/Plan:     27 y.o. female  for:    * Status post repeat low transverse  section    Pain well controlled, PCA d/c'd this am.  Continue with PO pain control         Pre-existing hypertension with pre-eclampsia in third trimester    Continue IV mag for 24 hours and monitor Bp; patient denies headache or blurred vision.  At 24hr, will plan to get patient up, d/c elizondo, advance diet.        Elevated blood pressure complicating pregnancy, antepartum, third trimester    Vital signs reviewed, blood pressure not elevated             Disposition: As patient meets milestones, will plan to discharge late tomorrow or .      Angela Chavarria PA-C  Obstetrics  [facility]

## 2017-10-20 NOTE — LACTATION NOTE
Lactation back to room, infant skin to skin with mother and infant is asleep. Infant diaper checked, clean, and infant begins to arouse. Infant to the left breast in the cross cradle position, nipples dense & short but graspable. Infant shows no rooting efforts after milk expressed onto lips and attempting to latch for 10 minutes. Infant wrapped and placed in crib. Hand expression reviewed with mother and preformed. Right areola edematous, taught mother reverse pressure softening to soften areola and allow milk to flow. Approximately 3 mL EBM collected and cup fed to infant by nurse and father. Discussed the importance of initiating breast pumping around 24 hours if infant is not feeding well at the breast. Mother verbalizes understanding.      10/20/17 1215   Maternal Infant Assessment   Breast Shape Bilateral:;pendulous   Breast Density Bilateral:;soft   Areola Bilateral:;elastic   Nipple(s) Right:  (edema)   Nipple Symptoms bilateral:;tender   LATCH Score   Latch 0-->too sleepy or reluctant, no latch achieved   Audible Swallowing 0-->none   Type Of Nipple 2-->everted (after stimulation)   Comfort (Breast/Nipple) 1-->filling, red/small blisters/bruises, mild/mod discomfort   Hold (Positioning) 2-->no assist from staff, mother able to position/hold infant   Score (less than 7 for 2/more consecutive times, consult Lactation Consultant) 5   Maternal Infant Feeding   Maternal Emotional State relaxed   Infant Positioning cross-cradle  (left breast)   Signs of Milk Transfer other (see comments)  (none)   Presence of Pain no   Comfort Measures Following Feeding expressed milk applied;air-drying encouraged   Latch Assistance yes   Breastfeeding Education milk expression, hand   Feeding Infant   Feeding Readiness Cues rooting   Feeding Tolerance/Success reluctant to latch   Effective Latch During Feeding no   Lactation Interventions   Attachment Promotion breastfeeding assistance provided;family involvement  promoted;infant-mother separation minimized;rooming-in promoted;skin-to-skin contact encouraged   Breastfeeding Assistance assisted with positioning;both breasts offered each feeding;feeding cue recognition promoted;feeding on demand promoted;support offered   Maternal Breastfeeding Support lactation counseling provided;encouragement offered   Latch Promotion positioning assisted;infant moved to breast;suck stimulated with colostrum drop

## 2017-10-20 NOTE — PLAN OF CARE
Problem: Patient Care Overview  Goal: Plan of Care Review  Outcome: Ongoing (interventions implemented as appropriate)  VSS. Post delivery MgSO4 stopped at 1700. Taking aldomet 500 mg BID. Denies pain at this time. Breastfeeding/pumping. Family at bedside. Will continue to monitor.

## 2017-10-20 NOTE — ASSESSMENT & PLAN NOTE
Continue IV mag for 24 hours and monitor Bp; patient denies headache or blurred vision.  At 24hr, will plan to get patient up, d/c caro, advance diet.

## 2017-10-20 NOTE — LACTATION NOTE
Lactation called to room. Mother states infant has been skin to skin well over and hour, infant asleep and no feeding cues present. Infant repositioned at the breast and feeding cues now present. Infant at the left breast in the cross cradle position. Infant attempts to slurp nipple into the mouth, does not root with a wide open mouth. After many attempts, infant does latch adequately to the breast, cheeks remain dimpled, no swallows audible. Re latched infant several times with the same results. After 10 minutes of attempting to breast with no signs of transfer, feeding attempt discontinued.    [employer] symphony pump at bedside.  Instructed on proper usage and to adjust suction according to comfort level. Verified appropriate flange fit, 24 mm bilaterally. Educated mother on frequency and duration of pumping in order to promote and maintain full milk supply.  Instructed mother on the cleaning of breast pump parts.  Mother verbalizes understanding for proper breast milk handling, collection, storage and transportation.     Mother pumps with hands on pumping techniques and hand expresses, 2 mL EBM collected. Reviewed and demonstrated safe syringe feeding technique to mother, mother states she is aware how to correctly syringe feed and feels comfortable doing so.Reinforced expected volumes of EBM on day of life 2 (5-15 mL according to ABM protocol). Instructed mother that infant does not show signs of medically needing formula supplementation at this time; if she ever wishes to provide infant with formula to request it. While syringe feeding, suck assessment reveals infant is not bringing tongue over the gumline most of the time and tongue retraction with every suckle. Tight band of tissue felt under tongue with finder sweep.    Feeding plan developed with mother, mother is able to teach back:    Plan:     Feed based on feeding cues.   Skin to skin every 2-3 hours if no feeding cues.   Notify bedside nurse if no  feeding 3 hours from beginning of last feeding.   Attempt feeding baby for 10 minutes. If feeding is not nutritive:  o Hand express and collect all available colustrum for baby, save for next feeding.         Supplement with all expressed breast milk available        Expected oral intake per feeding (according to [employer] Academy of Breastfeeding Medicine) & expected output for each day of life:  Day 2: 5-15 mL per feeding, 2 voids, 2 stools  Day 3: 15-30 mL per feeding, 3 voids, 3 stools  Day 4: 30-60 mL per feeding, 4 voids, 3 stools  Day 5: begin bottle feeding if not going well to the breast, 6-8 voids, 3 stools.     This might seems like a busy plan, but this plan allows us to feed the baby, and maintain milk supply!     Feed the baby. A baby who is getting the right amount of calories and nutrition is best able to learn how to nurse. First choice for what to feed a non-nursing baby is moms own milk.    Maintain milk supply. If moms milk supply is being maintained with an appropriate frequency and amount of milk expression, more time is available for baby to learn to nurse, and babys efforts will be better rewarded (with more milk).

## 2017-10-20 NOTE — SUBJECTIVE & OBJECTIVE
Hospital course: Pt observed for labor and subsequent elevated BP's, Pre E work up done, IV labetolol given, BP's better, spoke with Dr Franks, recommend repeat c/s, spoke w/ pt and pt agrees w/ POC.  RLTCS was performed without complications.    Interval History:      She is doing well this morning. Patient has not been advanced to regular diet due to preE treatment.  Her elizondo remains in place until 24 hour Mg is complete.  She is not ambulating. She has passed flatus, and has not a BM. Vaginal bleeding is mild. She denies fever or chills. Denies headaches or blurred vision.  Nausea has improved.  Abdominal pain is mild and controlled with oral medications. She is breastfeeding. She desires circumcision for her male baby: yes.    Objective:     Vital Signs (Most Recent):  Temp: 98.1 °F (36.7 °C) (10/19/17 1736)  Pulse: 81 (10/20/17 0600)  Resp: 18 (10/20/17 0600)  BP: 127/79 (10/20/17 0600)  SpO2: 99 % (10/20/17 0600) Vital Signs (24h Range):  Temp:  [98.1 °F (36.7 °C)] 98.1 °F (36.7 °C)  Pulse:  [] 81  Resp:  [16-20] 18  SpO2:  [99 %-100 %] 99 %  BP: (127-190)/() 127/79     Weight: 89 kg (196 lb 3.4 oz)  Body mass index is 32.65 kg/m².      Intake/Output Summary (Last 24 hours) at 10/20/17 0714  Last data filed at 10/20/17 0630   Gross per 24 hour   Intake             2302 ml   Output             1157 ml   Net             1145 ml       Significant Labs:  Lab Results   Component Value Date    GROUPTRH B POS 10/19/2017    HEPBSAG Negative 03/01/2017    STREPBCULT No Group B Streptococcus isolated 09/26/2017       Recent Labs  Lab 10/19/17  0950   HGB 10.4*   HCT 29.4*       I have personallly reviewed all pertinent lab results from the last 24 hours.    Physical Exam:   Constitutional: She is oriented to person, place, and time. She appears well-developed and well-nourished.    HENT:   Head: Normocephalic.     Neck: Normal range of motion. No thyromegaly present.    Cardiovascular: Normal rate and  regular rhythm.     Pulmonary/Chest: Effort normal and breath sounds normal.        Abdominal: Soft. Bowel sounds are normal. She exhibits abdominal incision (aquasel dressing clean dry & intact).     Genitourinary:   Genitourinary Comments: Fundus nontender near the umbilicus             Musculoskeletal: Normal range of motion and moves all extremeties.       Neurological: She is alert and oriented to person, place, and time.    Skin: Skin is warm.    Psychiatric: She has a normal mood and affect.

## 2017-10-20 NOTE — L&D DELIVERY NOTE
OPERATIVE NOTE    DATE:  10/19/2017    PRE-PROCEDURE COUNSELING:  Patient counseled on the risks, benefits, and alternatives to procedure.  Please see preoperative consents.   SCDs were applied and working prior to anesthesia induction.    PRE-OPERATIVE DIAGNOSIS:    1.  IUP at 39w2d  2.  CHTN with super-imposed pre-eclampsia  3.  SLE Nephritis  4.  Sjorgen's syndrome  5.  Prior     POST-OPERATIVE DIAGNOSIS: same    ANESTHESIA: Spinal Anesthesia    PROCEDURE: Repeat Low Transverse  Section    SURGEON: Georges Franks MD    ASSISTANT: RAJ Sanchez    FINDINGS:  Single live male infant in cephalic presentation.  APGAR 8/9  Weight 6 lb 5 oz.  Normal uterus, tubes, ovaries.    SPECIMEN:  None    EBL:  500 mL    COMPLICATIONS:  None    IMPLANTS:  None    PROCEDURE IN DETAIL:   The patient was seen in the Holding Room. The risks, benefits and alternatives were discussed. The patient agrees with the proposed plan, giving informed consent.  The patient was taken to Operating Room, identified as Alisia Vines and the procedure verified as  Delivery. A Time Out was held and the above information confirmed.    Spinal anesthesia was administered, adequate level confirmed, and preoperative antibiotics administered.  The patient was draped and prepped in the usual sterile fashion.  A Pfannenstiel skin incision was made along the old incisional scar and carried down through the subcutaneous tissue to the fascia. Fascial incision was then made and extended transversely. The fascia was  from the underlying rectus muscles superiorly and inferiorly. The peritoneum was identified, tented up, and entered sharply.  This incision was then extended superiorly and inferiorly with good visualization of the underlying bowel and bladder.   The utero-vesical peritoneal reflection was incised transversely and the bladder flap was bluntly dissected from the lower uterine segment.  A low  transverse uterine incision was made in the midline and extended laterally.  There was meconium stained amniotic fluid noted. The male infant was delivered from vertex presentation without difficulty and with Apgar scores of 8/9.  The umbilical cord was doubly clamped and cut.  Cord blood was obtained. The placenta was removed intact and appeared normal.  The uterine incision was then approximated in a running locked fashion with 0-Chromic.   The abdomen and pelvis were irrigated and hemostasis noted.  The rectus muscles were then loosely approximated at the midline with 2-0 Chromic.  The fascia was then approximated in a running fashion with 0-Vicryl.  Wound was irrigated and hemostasis noted.  The skin was approximated with 4-0 vicryl in a running subcuticular fashion and a silver abdominal dressing applied.  Instrument, sponge, and needle counts were correct prior the abdominal closure and at the conclusion of the case.     DISPOSITION: to recovery PP room           CONDITION: stable       Delivery Information for  Prosper Crump Jasmyn    Birth information:  YOB: 2015   Time of birth: 4:46 PM   Sex: male   Head Delivery Date/Time: 10/19/2017  4:46 PM   Delivery type: , Low Transverse   Gestational Age: 39w2d    Delivery Providers    Delivering clinician:  Georges Franks MD   Other personnel:   Provider Role   Sabrina Oneill RN Registered Nurse   Valeria Cordoba RN Registered Nurse   Ora Hoover RN Registered Nurse   Angela Chavarria PA-C First Assist   [facility] Surgical Tech   Joesph Alva CRNA Nurse Anesthetist   Jalen Renteria MD Anesthesiologist   Aayush Miller, RRT Day Respiratory Care Practitioner   Donya Lea NP Nurse Practitioner                Measurements    Weight:  2870 g Length:  49.5 cm   Head circum.:  34.5 cm Chest circum.:  33.5 cm   Abdominal girth:  29 cm         [place] Assessment    Living status:  Living  Apgars:     1 Minute:   5  Minute:   10 Minute:   15 Minute:   20 Minute:     Skin Color:   0  1       Heart Rate:   2  2       Reflex Irritability:   2  2       Muscle Tone:   2  2       Respiratory Effort:   2  2       Total:   8  9               Apgars Assigned By:  PHILLY MELO RN         Assisted Delivery Details:    Forceps attempted?:  No  Vacuum extractor attempted?:  No         Shoulder Dystocia    Shoulder dystocia present?:  No           Presentation and Position    Presentation:   Vertex   Position:                   Interventions/Resuscitation    Method:  Bulb Suctioning, Tactile Stimulation, Deep Suctioning       Cord    Vessels:  3 vessels  Complications:  None  Delayed Cord Clamping?:  Yes  Cord Clamped Date/Time:  10/19/2017  4:47 PM  Cord Blood Disposition:  Lab  Gases Sent?:  No  Stem Cell Collection (by MD):  No       Placenta    Date and time:  10/19/2017  4:48 PM  Removal:  Manual removal  Appearance:  Intact  Placenta disposition:  discarded           Labor Events:       labor: No     Labor Onset Date/Time:         Dilation Complete Date/Time:         Start Pushing Date/Time:       Rupture Date/Time:              Rupture type:           Fluid Amount:        Fluid Color:        Fluid Odor:        Membrane Status (PeriCalm): SRM (Spontaneous Rupture)      Rupture Date/Time (PeriCalm): 10/19/2017 15:35:00      Fluid Amount (PeriCalm): Large      Fluid Color (PeriCalm): Meconium Moderate       steroids: None     Antibiotics given for GBS: No     Induction:       Indications for induction:        Augmentation:       Indications for augmentation:       Labor complications: None     Additional complications:          Cervical ripening:                     Delivery:      Episiotomy: None     Indication for Episiotomy:       Perineal Lacerations: None Repaired:      Periurethral Laceration: none Repaired:     Labial Laceration: none Repaired:     Sulcus Laceration: none Repaired:     Vaginal Laceration: No Repaired:      Cervical Laceration: No Repaired:     Repair suture:       Repair # of packets: 5     Vaginal delivery QBL (mL): 0      QBL (mL): 716     Combined Blood Loss (mL): 716     Vaginal Sweep Performed: No     Surgicount Correct: Yes       Other providers:       Anesthesia    Method:  Spinal          Details (if applicable):  Trial of Labor No    Categorization: Repeat    Priority: Routine   Indications for : Repeat Section   Incision Type: low transverse     Additional  information:  Forceps:    Vacuum:    Breech:    Observed anomalies    Other (Comments):

## 2017-10-20 NOTE — LACTATION NOTE
Lactation Rounds: Lactation packet given and admit information reviewed. Mother verbalizes understanding of expected  behaviors and output for the first 48 hours of life.  Discussed the importance of cue based feedings on demand, unrestricted access to the breast, and frequent uninterrupted skin to skin contact.  Risk and implications of artificial nipples and supplementation discussed.     Mother states that infant has not latched to the breast and fed well; infant has been syringe feeding EBM and lapping EBM from the breast. Infant last fed at 0830 am, infant placed skin to skin, mother to call when feeding cues present or if skin to skin has been completed for one hour and no skin to skin present.    Discussed recovery period with mother and expected feeding and output pattern for days of life one and two. Discussed with mother that if infant does not feed well at the breast by around 24 hours of life, that breast pumping would be initiated and will continue of supplementation to infant; provide all EBM available, supplement with formula if medical indication arises.    Mother verbalizes understanding of all education and instructions, to call for assistance when feeding cues present.       10/20/17 1100   Infant Information   Infant's Name Joe   Maternal Infant Feeding   Breastfeeding Education adequate infant intake;adequate milk volume;importance of skin-to-skin contact;milk expression, hand   Lactation Interventions   Attachment Promotion counseling provided;family involvement promoted;infant-mother separation minimized;rooming-in promoted;skin-to-skin contact encouraged   Breastfeeding Assistance both breasts offered each feeding;feeding cue recognition promoted;feeding on demand promoted;support offered   Maternal Breastfeeding Support lactation counseling provided;encouragement offered

## 2017-10-20 NOTE — PHYSICIAN QUERY
PT Name: Alisia Vines  MR #: [id number]     Physician Query Form - Documentation Clarification      CDS/: NORA Thomas,RNC-MNN        Contact information:[email or web address]    This form is a permanent document in the medical record.     Query Date: 2017    By submitting this query, we are merely seeking further clarification of documentation. Please utilize your independent clinical judgment when addressing the question(s) below.    The Medical record reflects the following:    Supporting Clinical Findings Location in Medical Record   This pregnancy has been complicated by HSV, lupus, essential HTN, Sjogren's    Past Medical History  HSV-2 (herpes simplex virus 2) infection    PTA Medications  valacyclovir (VALTREX) 500 MG tablet  TAKE 1 TABLET ONE TIME DAILY    Genitourinary: Vagina normal    PROCEDURE: Repeat Low Transverse  Section H&P 10/19                          L&D Delivery note 10/19                                                                                      Doctor, Please specify type of HSV    Provider Use Only    [ X ] Genital, unspecified  [  ] Oral  [  ] Vagina  [  ] Vulva  [  ] Labia  [  ] Cervix  [  ] Anus  [  ] Other ( Please specify )_____________________                                                                                                                 [  ] Clinically undetermined

## 2017-10-21 PROBLEM — O16.3 ELEVATED BLOOD PRESSURE COMPLICATING PREGNANCY, ANTEPARTUM, THIRD TRIMESTER: Status: RESOLVED | Noted: 2017-10-19 | Resolved: 2017-10-21

## 2017-10-21 LAB
ALBUMIN SERPL BCP-MCNC: 2 G/DL
ALP SERPL-CCNC: 100 U/L
ALT SERPL W/O P-5'-P-CCNC: <5 U/L
ANION GAP SERPL CALC-SCNC: 6 MMOL/L
AST SERPL-CCNC: 18 U/L
BASOPHILS # BLD AUTO: 0 K/UL
BASOPHILS NFR BLD: 0 %
BILIRUB SERPL-MCNC: 0.3 MG/DL
BUN SERPL-MCNC: 7 MG/DL
CALCIUM SERPL-MCNC: 7.9 MG/DL
CHLORIDE SERPL-SCNC: 107 MMOL/L
CO2 SERPL-SCNC: 25 MMOL/L
CREAT SERPL-MCNC: 0.6 MG/DL
CREAT UR-MCNC: 100.9 MG/DL
DIFFERENTIAL METHOD: ABNORMAL
EOSINOPHIL # BLD AUTO: 0.1 K/UL
EOSINOPHIL NFR BLD: 1.4 %
ERYTHROCYTE [DISTWIDTH] IN BLOOD BY AUTOMATED COUNT: 13.7 %
EST. GFR  (AFRICAN AMERICAN): >60 ML/MIN/1.73 M^2
EST. GFR  (NON AFRICAN AMERICAN): >60 ML/MIN/1.73 M^2
GLUCOSE SERPL-MCNC: 78 MG/DL
HCT VFR BLD AUTO: 23.2 %
HGB BLD-MCNC: 8 G/DL
LYMPHOCYTES # BLD AUTO: 0.6 K/UL
LYMPHOCYTES NFR BLD: 11 %
MCH RBC QN AUTO: 30.4 PG
MCHC RBC AUTO-ENTMCNC: 34.5 G/DL
MCV RBC AUTO: 88 FL
MONOCYTES # BLD AUTO: 0.4 K/UL
MONOCYTES NFR BLD: 6.9 %
NEUTROPHILS # BLD AUTO: 4.6 K/UL
NEUTROPHILS NFR BLD: 80.7 %
PLATELET # BLD AUTO: 139 K/UL
PMV BLD AUTO: 10.4 FL
POTASSIUM SERPL-SCNC: 3.8 MMOL/L
PROT SERPL-MCNC: 6.7 G/DL
PROT UR-MCNC: 54 MG/DL
PROT/CREAT RATIO, UR: 0.54
RBC # BLD AUTO: 2.63 M/UL
SODIUM SERPL-SCNC: 138 MMOL/L
WBC # BLD AUTO: 5.64 K/UL

## 2017-10-21 PROCEDURE — 25000003 PHARM REV CODE 250: Performed by: OBSTETRICS & GYNECOLOGY

## 2017-10-21 PROCEDURE — 63600175 PHARM REV CODE 636 W HCPCS: Performed by: OBSTETRICS & GYNECOLOGY

## 2017-10-21 PROCEDURE — 80053 COMPREHEN METABOLIC PANEL: CPT

## 2017-10-21 PROCEDURE — 82570 ASSAY OF URINE CREATININE: CPT

## 2017-10-21 PROCEDURE — 99232 SBSQ HOSP IP/OBS MODERATE 35: CPT | Mod: ,,, | Performed by: OBSTETRICS & GYNECOLOGY

## 2017-10-21 PROCEDURE — 85025 COMPLETE CBC W/AUTO DIFF WBC: CPT

## 2017-10-21 PROCEDURE — 36415 COLL VENOUS BLD VENIPUNCTURE: CPT

## 2017-10-21 PROCEDURE — 11000001 HC ACUTE MED/SURG PRIVATE ROOM

## 2017-10-21 RX ORDER — LABETALOL 200 MG/1
200 TABLET, FILM COATED ORAL EVERY 12 HOURS
Status: DISCONTINUED | OUTPATIENT
Start: 2017-10-21 | End: 2017-10-22 | Stop reason: HOSPADM

## 2017-10-21 RX ORDER — HYDRALAZINE HYDROCHLORIDE 20 MG/ML
10 INJECTION INTRAMUSCULAR; INTRAVENOUS ONCE
Status: COMPLETED | OUTPATIENT
Start: 2017-10-21 | End: 2017-10-21

## 2017-10-21 RX ADMIN — FLUOXETINE 40 MG: 20 CAPSULE ORAL at 08:10

## 2017-10-21 RX ADMIN — OXYCODONE HYDROCHLORIDE AND ACETAMINOPHEN 1 TABLET: 5; 325 TABLET ORAL at 09:10

## 2017-10-21 RX ADMIN — FAMOTIDINE 20 MG: 20 TABLET ORAL at 08:10

## 2017-10-21 RX ADMIN — LABETALOL HYDROCHLORIDE 200 MG: 200 TABLET, FILM COATED ORAL at 09:10

## 2017-10-21 RX ADMIN — CHLORHEXIDINE GLUCONATE 10 ML: 1.2 RINSE ORAL at 09:10

## 2017-10-21 RX ADMIN — METHYLDOPA 500 MG: 250 TABLET ORAL at 08:10

## 2017-10-21 RX ADMIN — Medication 1 EACH: at 08:10

## 2017-10-21 RX ADMIN — HYDROXYCHLOROQUINE SULFATE 200 MG: 200 TABLET, FILM COATED ORAL at 08:10

## 2017-10-21 RX ADMIN — IBUPROFEN 800 MG: 800 TABLET, FILM COATED ORAL at 05:10

## 2017-10-21 RX ADMIN — DOCUSATE SODIUM 100 MG: 100 CAPSULE, LIQUID FILLED ORAL at 08:10

## 2017-10-21 RX ADMIN — OXYCODONE HYDROCHLORIDE AND ACETAMINOPHEN 1 TABLET: 5; 325 TABLET ORAL at 03:10

## 2017-10-21 RX ADMIN — HYDRALAZINE HYDROCHLORIDE 10 MG: 20 INJECTION INTRAMUSCULAR; INTRAVENOUS at 09:10

## 2017-10-21 NOTE — PLAN OF CARE
Problem: Postpartum ( Delivery) (Adult,Obstetrics,Pediatric)  Goal: Signs and Symptoms of Listed Potential Problems Will be Absent, Minimized or Managed (Postpartum)  Signs and symptoms of listed potential problems will be absent, minimized or managed by discharge/transition of care (reference Postpartum ( Delivery) (Adult,Obstetrics,Pediatric) CPG).   Outcome: Ongoing (interventions implemented as appropriate)  Patient stable and progressing well at this time. No acute distress or pain at this time. Breastfeeding/Formula feeding. Bonding with infant. Questions encouraged and answered. Safety maintained throughout shift. Will continue to monitor.

## 2017-10-21 NOTE — PLAN OF CARE
Problem: Patient Care Overview  Goal: Plan of Care Review  Outcome: Ongoing (interventions implemented as appropriate)  The patient was alert and oriented. VSS. The patient's pain has been adequately controlled.Mother and infant bonding well. Mom is syring feeding infant formula due to infant not properly latching. Lactation to continue to work with mother. The patient has bed in low position, call light is within reach. Will continue to monitor the patient.

## 2017-10-21 NOTE — SUBJECTIVE & OBJECTIVE
Hospital course: Pt observed for labor and subsequent elevated BP's, Pre E work up done, IV labetolol given, BP's better, spoke with Dr Franks, recommend repeat c/s, spoke w/ pt and pt agrees w/ POC.  RLTCS was performed without complications.    Interval History: s/p uncomplicated repeat csection secondary to previous cs & preE. BPs significantly improved since delivery not requiring treatment    She is doing well this morning. She is tolerating a regular diet without nausea or vomiting. She is voiding spontaneously. She is ambulating. She has passed flatus, and has not a BM. Vaginal bleeding is mild. She denies fever or chills. Abdominal pain is mild and controlled with oral medications. She is using breast pump. She desires circumcision for her male baby: yes. Denies headaches/RUQ pain    Objective:     Vital Signs (Most Recent):  Temp: 98.2 °F (36.8 °C) (10/21/17 0800)  Pulse: 73 (10/21/17 0800)  Resp: 18 (10/21/17 0800)  BP: (!) 154/92 (10/21/17 0800)  SpO2: 100 % (10/20/17 1706) Vital Signs (24h Range):  Temp:  [98 °F (36.7 °C)-98.3 °F (36.8 °C)] 98.2 °F (36.8 °C)  Pulse:  [73-95] 73  Resp:  [17-20] 18  SpO2:  [99 %-100 %] 100 %  BP: (119-155)/(75-96) 154/92     Weight: 89 kg (196 lb 3.4 oz)  Body mass index is 32.65 kg/m².      Intake/Output Summary (Last 24 hours) at 10/21/17 0915  Last data filed at 10/21/17 0200   Gross per 24 hour   Intake                0 ml   Output             1195 ml   Net            -1195 ml       Significant Labs:  Lab Results   Component Value Date    GROUPTRH B POS 10/19/2017    HEPBSAG Negative 03/01/2017    STREPBCULT No Group B Streptococcus isolated 09/26/2017       Recent Labs  Lab 10/19/17  0950   HGB 10.4*   HCT 29.4*       I have personallly reviewed all pertinent lab results from the last 24 hours.    Physical Exam:   Constitutional: She is oriented to person, place, and time. No distress.        Pulmonary/Chest: Effort normal.        Abdominal: Soft. She exhibits  abdominal incision. She exhibits no distension. There is tenderness. There is no guarding.     Genitourinary: Vagina normal.           Musculoskeletal: Moves all extremeties.       Neurological: She is alert and oriented to person, place, and time.    Skin: Skin is warm and dry.    Psychiatric: She has a normal mood and affect. Her behavior is normal.

## 2017-10-21 NOTE — LACTATION NOTE
Lactation Round: infant weight loss WNL, output marginally adequate with 1 stool & 3 voids since birth. Mother has chosen to supplement with formula via syringe due to low amounts of colostrum collected. Mother states that she was unable to latch infant to the breast overnight so she formula fed via syringe. Mother did not pump or hand express overnight as infant received formula. Reinforced mechanism of milk production and maintenance; discussed long term implications of inadequate breast stimulation; instructed mother to pump and hand express every time that infant feeds due to inadequate latch and transfer of milk.    Mother calls for latch assistance. Infant at at the left breast in the football position, minor position changes made. Left areola is now edematous as is the right areola. Reverse pressure softening preformed, infant remains unable to achieve effective latch to the breast, no signs of milk transfer. After approximately 7 minutes of attempting to latch, infant becomes fussy. Feeding attempt at the breast discontinued. Infant syringe fed 20 mL formula while mother pumps and hand expresses breast. Mother will save EBM for next infant feeding session. Mother able to teach back plan of care.    PLAN  The baby is what we call a late  baby. Late  babies are immature in multiple ways. They cannot be expected to behave like term babies. They are can sleepy, passive, or might not transfer milk well from the breast.      Plan:     Feed based on feeding cues.   Skin to skin every 2-3 hours if no feeding cues.   Notify bedside nurse if no feeding 3 hours from beginning of last feeding.   Attempt feeding baby for 10-15 minutes. If feeding is not nutritive:  o Supplement with all expressed breast milk available (from previous pumping/hand expression session); use formula when medically indicated or desired by mother.  o Hand express and collect all available colustrum for baby, save for next  "feeding.          *Begin using a bottle nipple and discontinue syringe feeding on day of life 5 if not effectively feeding from  the breast.     Mother denies any further lactation needs or questions at this time. Mother to call for assistance as needed.     10/21/17 0830   Maternal Infant Assessment   Areola Bilateral:;firm;dense;other (see comments)  (edema)   Nipple(s) Bilateral:  (not graspable)   Infant Assessment   Weight Loss (%) 2.8   Sucking Reflex present   Rooting Reflex present   Swallow Reflex present   Number of Stools (24 hours) 0   Number of Voids (24 hours) 1   LATCH Score   Latch 1-->repeated attempts, holds nipple in mouth, stimulate to suck   Audible Swallowing 0-->none   Type Of Nipple 1-->flat   Comfort (Breast/Nipple) 1-->filling, red/small blisters/bruises, mild/mod discomfort   Hold (Positioning) 1-->minimal assist, teach one side: mother does other, staff holds   Score (less than 7 for 2/more consecutive times, consult Lactation Consultant) 4   Maternal Infant Feeding   Maternal Emotional State independent;relaxed   Infant Positioning clutch/"football"  (left breast)   Latch Assistance no   Breastfeeding Education adequate infant intake;adequate milk volume;importance of skin-to-skin contact;label/storage of breast milk;milk expression, electric pump;milk expression, hand   Feeding Infant   Feeding Readiness Cues rooting   Effective Latch During Feeding no   Audible Swallow no   Equipment Type/Education   Pump Type Pump 'n Style   Breast Pump Type double electric, hospital grade   Breast Pump Flange Type hard   Breast Pump Flange Size 24 mm  (bilaterally)   Breast Pumping pumped until emptied;Bilateral Breasts:   Lactation Interventions   Attachment Promotion breastfeeding assistance provided;infant-mother separation minimized;skin-to-skin contact encouraged;rooming-in promoted   Breast Care: Breastfeeding milk massaged towards nipple;other (see comments)  (reverse pressure softening) "   Breastfeeding Assistance assisted with positioning;feeding cue recognition promoted;feeding on demand promoted;feeding session observed;milk expression/pumping;supplemental feeding provided;support offered   Maternal Breastfeeding Support lactation counseling provided;encouragement offered   Latch Promotion positioning assisted;infant moved to breast;suck stimulated with colostrum drop

## 2017-10-21 NOTE — PROGRESS NOTES
[facility] -   Obstetrics  Postpartum Progress Note    Patient Name: Alisia Loconum  MRN: [id number]  Admission Date: 10/19/2017  Hospital Length of Stay: 2 days  Attending Physician: Georges Franks MD  Primary Care Provider: Saumya Quinonez MD    Subjective:     Principal Problem:Status post repeat low transverse  section    Hospital course: Pt observed for labor and subsequent elevated BP's, Pre E work up done, IV labetolol given, BP's better, spoke with Dr Franks, recommend repeat c/s, spoke w/ pt and pt agrees w/ POC.  RLTCS was performed without complications.    Interval History: s/p uncomplicated repeat csection secondary to previous cs & preE. BPs significantly improved since delivery not requiring treatment    She is doing well this morning. She is tolerating a regular diet without nausea or vomiting. She is voiding spontaneously. She is ambulating. She has passed flatus, and has not a BM. Vaginal bleeding is mild. She denies fever or chills. Abdominal pain is mild and controlled with oral medications. She is using breast pump. She desires circumcision for her male baby: yes. Denies headaches/RUQ pain    Objective:     Vital Signs (Most Recent):  Temp: 98.2 °F (36.8 °C) (10/21/17 0800)  Pulse: 73 (10/21/17 0800)  Resp: 18 (10/21/17 0800)  BP: (!) 154/92 (10/21/17 0800)  SpO2: 100 % (10/20/17 1706) Vital Signs (24h Range):  Temp:  [98 °F (36.7 °C)-98.3 °F (36.8 °C)] 98.2 °F (36.8 °C)  Pulse:  [73-95] 73  Resp:  [17-20] 18  SpO2:  [99 %-100 %] 100 %  BP: (119-155)/(75-96) 154/92     Weight: 89 kg (196 lb 3.4 oz)  Body mass index is 32.65 kg/m².      Intake/Output Summary (Last 24 hours) at 10/21/17 0915  Last data filed at 10/21/17 0200   Gross per 24 hour   Intake                0 ml   Output             1195 ml   Net            -1195 ml       Significant Labs:  Lab Results   Component Value Date    GROUPTRH B POS 10/19/2017    HEPBSAG Negative 2017    STREPBCULT No Group  B Streptococcus isolated 2017       Recent Labs  Lab 10/19/17  0950   HGB 10.4*   HCT 29.4*       I have personallly reviewed all pertinent lab results from the last 24 hours.    Physical Exam:   Constitutional: She is oriented to person, place, and time. No distress.        Pulmonary/Chest: Effort normal.        Abdominal: Soft. She exhibits abdominal incision. She exhibits no distension. There is tenderness. There is no guarding.     Genitourinary: Vagina normal.           Musculoskeletal: Moves all extremeties.       Neurological: She is alert and oriented to person, place, and time.    Skin: Skin is warm and dry.    Psychiatric: She has a normal mood and affect. Her behavior is normal.       Assessment/Plan:     27 y.o. female  for:    * Status post repeat low transverse  section    Pain well controlled, PCA d/c'd this am.  Continue with PO pain control         Pre-existing hypertension with pre-eclampsia in third trimester    Continue IV mag for 24 hours and monitor Bp; patient denies headache or blurred vision.  At 24hr, will plan to get patient up, d/c elizondo, advance diet.            Disposition: As patient meets milestones, will plan to discharge in AM after further monitoring of BPs.    Janice Arenas MD  Obstetrics  [facility] -

## 2017-10-22 VITALS
RESPIRATION RATE: 17 BRPM | WEIGHT: 196.19 LBS | SYSTOLIC BLOOD PRESSURE: 157 MMHG | BODY MASS INDEX: 32.69 KG/M2 | HEIGHT: 65 IN | TEMPERATURE: 98 F | OXYGEN SATURATION: 100 % | HEART RATE: 82 BPM | DIASTOLIC BLOOD PRESSURE: 96 MMHG

## 2017-10-22 PROCEDURE — 3E0234Z INTRODUCTION OF SERUM, TOXOID AND VACCINE INTO MUSCLE, PERCUTANEOUS APPROACH: ICD-10-PCS | Performed by: OBSTETRICS & GYNECOLOGY

## 2017-10-22 PROCEDURE — 90471 IMMUNIZATION ADMIN: CPT | Performed by: OBSTETRICS & GYNECOLOGY

## 2017-10-22 PROCEDURE — 63600175 PHARM REV CODE 636 W HCPCS: Performed by: OBSTETRICS & GYNECOLOGY

## 2017-10-22 PROCEDURE — 90686 IIV4 VACC NO PRSV 0.5 ML IM: CPT | Performed by: OBSTETRICS & GYNECOLOGY

## 2017-10-22 PROCEDURE — 25000003 PHARM REV CODE 250: Performed by: OBSTETRICS & GYNECOLOGY

## 2017-10-22 PROCEDURE — 99232 SBSQ HOSP IP/OBS MODERATE 35: CPT | Mod: ,,, | Performed by: OBSTETRICS & GYNECOLOGY

## 2017-10-22 RX ORDER — LABETALOL 200 MG/1
200 TABLET, FILM COATED ORAL EVERY 12 HOURS
Qty: 60 TABLET | Refills: 11 | Status: SHIPPED | OUTPATIENT
Start: 2017-10-22 | End: 2018-12-12 | Stop reason: SDUPTHER

## 2017-10-22 RX ADMIN — FAMOTIDINE 20 MG: 20 TABLET ORAL at 09:10

## 2017-10-22 RX ADMIN — DOCUSATE SODIUM 100 MG: 100 CAPSULE, LIQUID FILLED ORAL at 09:10

## 2017-10-22 RX ADMIN — METHYLDOPA 500 MG: 250 TABLET ORAL at 09:10

## 2017-10-22 RX ADMIN — IBUPROFEN 800 MG: 800 TABLET, FILM COATED ORAL at 06:10

## 2017-10-22 RX ADMIN — HYDROXYCHLOROQUINE SULFATE 200 MG: 200 TABLET, FILM COATED ORAL at 09:10

## 2017-10-22 RX ADMIN — Medication 1 EACH: at 09:10

## 2017-10-22 RX ADMIN — LABETALOL HYDROCHLORIDE 200 MG: 200 TABLET, FILM COATED ORAL at 09:10

## 2017-10-22 RX ADMIN — CHLORHEXIDINE GLUCONATE 10 ML: 1.2 RINSE ORAL at 09:10

## 2017-10-22 RX ADMIN — INFLUENZA A VIRUS A/MICHIGAN/45/2015 X-275 (H1N1) ANTIGEN (FORMALDEHYDE INACTIVATED), INFLUENZA A VIRUS A/HONG KONG/4801/2014 X-263B (H3N2) ANTIGEN (FORMALDEHYDE INACTIVATED), INFLUENZA B VIRUS B/PHUKET/3073/2013 ANTIGEN (FORMALDEHYDE INACTIVATED), AND INFLUENZA B VIRUS B/BRISBANE/60/2008 ANTIGEN (FORMALDEHYDE INACTIVATED) 0.5 ML: 15; 15; 15; 15 INJECTION, SUSPENSION INTRAMUSCULAR at 01:10

## 2017-10-22 RX ADMIN — FLUOXETINE 40 MG: 20 CAPSULE ORAL at 09:10

## 2017-10-22 NOTE — DISCHARGE SUMMARY
[facility] -   Obstetrics  Discharge Summary      Patient Name: Alisia Vines  MRN: [id number]  Admission Date: 10/19/2017  Hospital Length of Stay: 3 days  Discharge Date and Time: 10/22/17  Attending Physician: Georges Franks MD   Discharging Provider: Janice Arenas MD  Primary Care Provider: Saumya Quinonez MD    HPI: 26 yo  with IUP at 39w2d WGA presented to unit w/ c/o cramping.    Procedure(s) (LRB):  DELIVERY- SECTION (N/A)     Hospital Course:   Pt observed for labor and subsequent elevated BP's, Pre E work up done, IV labetolol given, BP's better, spoke with Dr Franks, recommend repeat c/s, spoke w/ pt and pt agrees w/ POC.  RLTCS was performed without complications.    Pt was monitored PP & noted to have improvement in BPs until PPD#2. Elevated BPs requiring hydralazine 10mg IV x 1 dose as well as starting labetalol & aldomet. Pt remained asymptomatic throughout course    Final Active Diagnoses:    Diagnosis Date Noted POA    PRINCIPAL PROBLEM:  Status post repeat low transverse  section [Z98.891] 10/19/2017 Not Applicable    Pre-existing hypertension with pre-eclampsia in third trimester [O11.3, O10.913] 10/19/2017 Yes    S/P  [Z98.891] 10/19/2017 Not Applicable      Problems Resolved During this Admission:    Diagnosis Date Noted Date Resolved POA    Elevated blood pressure complicating pregnancy, antepartum, third trimester [O13.3] 10/19/2017 10/21/2017 Yes    Severe pre-eclampsia in third trimester [O14.13] 10/19/2017 10/19/2017 Yes        Labs:   CMP   Recent Labs  Lab 10/21/17  1731      K 3.8      CO2 25   GLU 78   BUN 7   CREATININE 0.6   CALCIUM 7.9*   PROT 6.7   ALBUMIN 2.0*   BILITOT 0.3   ALKPHOS 100   AST 18   ALT <5*   ANIONGAP 6*   ESTGFRAFRICA >60   EGFRNONAA >60   , CBC   Recent Labs  Lab 10/21/17  1731   WBC 5.64   HGB 8.0*   HCT 23.2*   *    and protein creatinine ratio reviewed    Feeding Method:  breast    Immunizations     Date Immunization Status Dose Route/Site Given by    10/19/17 1856 MMR Incomplete 0.5 mL Subcutaneous/Left deltoid     10/19/17 1856 Tdap Incomplete 0.5 mL Intramuscular/Left deltoid           Delivery:    Episiotomy: None   Lacerations: None   Repair suture:     Repair # of packets: 5   Blood loss (ml): 0     Birth information:  YOB: 2015   Time of birth: 4:46 PM   Sex: male   Delivery type: , Low Transverse   Gestational Age: 39w2d    Delivery Clinician:      Other providers:       Additional  information:  Forceps:    Vacuum:    Breech:    Observed anomalies      Living?:           APGARS  One minute Five minutes Ten minutes   Skin color:         Heart rate:         Grimace:         Muscle tone:         Breathing:         Totals: 8  9        Placenta: Delivered:       appearance    Pending Diagnostic Studies:     None          Discharged Condition: good    Disposition: home    Follow Up:  Follow-up Information     NURSE, VISIT In 1 week.    Why:  Dressing removal           Georges Franks MD In 4 weeks.    Specialty:  Obstetrics and Gynecology  Why:  Post-op visit  Contact information:  [address] [facility] [address] [phone]                 Patient Instructions:   No discharge procedures on file.  Medications:  Current Discharge Medication List      START taking these medications    Details   labetalol (NORMODYNE) 200 MG tablet Take 1 tablet (200 mg total) by mouth every 12 (twelve) hours.  Qty: 60 tablet, Refills: 11         CONTINUE these medications which have NOT CHANGED    Details   efinaconazole (JUBLIA) 10 % Mario Apply once daily to nail and nail fold.  Use daily for up to 1 year.  Qty: 8 mL, Refills: 3    Associated Diagnoses: Onychomycosis; Pincer nail deformity; Brittle nails      famotidine (PEPCID) 20 MG tablet Take 1 tablet (20 mg total) by mouth 2 (two) times daily as needed for Heartburn.  Qty: 20 tablet, Refills: 0    Associated  Diagnoses: Medication refill      fluticasone (FLONASE) 50 mcg/actuation nasal spray 2 sprays by Each Nare route once daily.  Qty: 16 g, Refills: 0    Associated Diagnoses: Nasal turbinate hypertrophy      hydroxychloroquine (PLAQUENIL) 200 mg tablet Take 1 tablet (200 mg total) by mouth 2 (two) times daily.  Qty: 180 tablet, Refills: 3    Associated Diagnoses: SLE (systemic lupus erythematosus)      methyldopa (ALDOMET) 500 MG tablet Take 1 tablet (500 mg total) by mouth every 12 (twelve) hours.  Qty: 60 tablet, Refills: 11    Comments: Secondary to pregnancy, discontinue Norvasc and Hyzaar and replace with Aldomet as prescribed  Associated Diagnoses: Chronic hypertension complicating or reason for care during pregnancy, unspecified trimester      valacyclovir (VALTREX) 500 MG tablet TAKE 1 TABLET ONE TIME DAILY  Qty: 90 tablet, Refills: 3      fluoxetine (PROZAC) 40 MG capsule Take 1 capsule (40 mg total) by mouth once daily.  Qty: 30 capsule, Refills: 3    Associated Diagnoses: Medication refill; Depression affecting pregnancy      ondansetron (ZOFRAN-ODT) 8 MG TbDL Take 1 tablet (8 mg total) by mouth every 6 (six) hours as needed.  Qty: 20 tablet, Refills: 1             Janice Arenas MD  Obstetrics  [facility] -

## 2017-10-22 NOTE — LACTATION NOTE
"Lactation called to room: Upon entering room mother has infant in football position to right breast. Mother reports that she fed infant 7ml of EBM via syringe after infant was reluctant to latch. Infant sleepy at breast. After a few attempts to latch infant, infant latches and holds nipple in mouth. Infant reluctant to suck and falls asleep at breast. Mother reports she has been trying a while to latch infant. Mother to follow previously established feeding plan at this time and is to pump and save any EBM for next feeding. Mother agreeable to plan. Mother to call for assistance as needed.      10/22/17 1250   LATCH Score   Latch 1-->repeated attempts, holds nipple in mouth, stimulate to suck   Audible Swallowing 0-->none   Type Of Nipple 1-->flat   Comfort (Breast/Nipple) 2-->soft/nontender   Hold (Positioning) 1-->minimal assist, teach one side: mother does other, staff holds   Score (less than 7 for 2/more consecutive times, consult Lactation Consultant) 5   Maternal Infant Feeding   Infant Positioning clutch/"football"   Presence of Pain no   Feeding Infant   Feeding Readiness Cues rooting   Effective Latch During Feeding no   Audible Swallow no     "

## 2017-10-22 NOTE — SUBJECTIVE & OBJECTIVE
Hospital course: Pt observed for labor and subsequent elevated BP's, Pre E work up done, IV labetolol given, BP's better, spoke with Dr Franks, recommend repeat c/s, spoke w/ pt and pt agrees w/ POC.  RLTCS was performed without complications.    Interval History: s/p repeat csection. BPs elevated to severe range on POD#2, requiring IV hydralazine 10 mg x 1 dose. Pt was also started on labetalol 200 mg bid    She is doing well this morning. She is tolerating a regular diet without nausea or vomiting. She is voiding spontaneously. She is ambulating. She has passed flatus, and has not a BM. Vaginal bleeding is mild. She denies fever or chills. Abdominal pain is mild and controlled with oral medications. She is breastfeeding. Circumcision done. Pt denies HA/RUQ pain/vision changes    Objective:     Vital Signs (Most Recent):  Temp: 98.7 °F (37.1 °C) (10/22/17 0800)  Pulse: 93 (10/22/17 0800)  Resp: 18 (10/22/17 0800)  BP: (!) 155/86 (10/22/17 0800)  SpO2: 100 % (10/20/17 1706) Vital Signs (24h Range):  Temp:  [98.4 °F (36.9 °C)-99 °F (37.2 °C)] 98.7 °F (37.1 °C)  Pulse:  [] 93  Resp:  [18] 18  BP: (132-182)/() 155/86     Weight: 89 kg (196 lb 3.4 oz)  Body mass index is 32.65 kg/m².    No intake or output data in the 24 hours ending 10/22/17 1109    Significant Labs:  Lab Results   Component Value Date    GROUPTRH B POS 10/19/2017    HEPBSAG Negative 03/01/2017    STREPBCULT No Group B Streptococcus isolated 09/26/2017       Recent Labs  Lab 10/21/17  1731   HGB 8.0*   HCT 23.2*       I have personallly reviewed all pertinent lab results from the last 24 hours.   Repeat CMP/CBC/PCR last night due to elevated BPs-normal, significant improvement in PCR    Physical Exam:   Constitutional: She is oriented to person, place, and time. No distress.        Pulmonary/Chest: Effort normal.        Abdominal: Soft. She exhibits abdominal incision. She exhibits no distension. There is tenderness. There is no guarding.    Fundus firm. Tenderness appropriate postop. Bandage c/d/i     Genitourinary: Vagina normal.           Musculoskeletal: Moves all extremeties.       Neurological: She is alert and oriented to person, place, and time.    Skin: Skin is warm and dry.    Psychiatric: She has a normal mood and affect. Her behavior is normal.

## 2017-10-22 NOTE — LACTATION NOTE
Lactation rounds: Mother reports not pumping last night. Infant fed formula throughout the night. Mother pumped left breast to help chepe nipple. Assisted mother with latching infant to breast. Infant positioned in cross cradle hold to left breast. Infant asleep at breast. Mother reports that infant fed formula 2 hours ago. After multiple attempts to achieve latch infant reluctant to latch. Infant placed skin to skin with mother. Mother to call for assistance when infant showing feeding cues or its been 3 hours since last feeding. Dattchhony pump at bedside. Reviewed proper usage and to adjust suction according to comfort level. Reviewed with mother on frequency and duration of pumping in order to promote and maintain full milk supply. Hands on pumping technique reviewed. Encouraged hand expression after. Instructed mother on cleaning of breast pump parts. Reviewed proper milk handling, collection, storage, and transportation. Voices understanding. Mother has a pump at home.   Lactation discharge information reviewed. Reviewed with mother infants belly size and expected output for each day of life up to day 6. Encouraged mother to keep track of feedings and output in breastfeeding guide. Mother educated on not feeding infant with syringe past day 5 of life and to introduce an artifical nipple at that time.  Mother is aware of warm line, and outpatient consultations and monthly support gatherings. Encouraged mother to contact lactation with any questions, concerns, or problems. Contact numbers provided, and mother verbalizes understanding.      10/22/17 0830   Maternal Infant Assessment   Breast Shape Bilateral:;pendulous   Breast Density Bilateral:;soft   Areola Bilateral:;elastic   LATCH Score   Latch 0-->too sleepy or reluctant, no latch achieved   Audible Swallowing 0-->none   Type Of Nipple 1-->flat   Comfort (Breast/Nipple) 2-->soft/nontender   Hold (Positioning) 2-->no assist from staff, mother able to  position/hold infant   Score (less than 7 for 2/more consecutive times, consult Lactation Consultant) 5   Maternal Infant Feeding   Infant Positioning cross-cradle   Presence of Pain no   Breastfeeding Education adequate infant intake;adequate milk volume;diet;importance of skin-to-skin contact;increasing milk supply;label/storage of breast milk;medication effects;milk expression, hand;milk expression, electric pump   Feeding Infant   Feeding Tolerance/Success reluctant to latch;sleepy   Effective Latch During Feeding no   Equipment Type/Education   Pump Type Symphony   Breast Pump Type double electric, hospital grade   Breast Pump Flange Type hard   Breast Pump Flange Size 24 mm   Breast Pumping Bilateral Breasts:   Lactation Interventions   Attachment Promotion breastfeeding assistance provided;face-to-face positioning promoted;infant-mother separation minimized;family involvement promoted;privacy provided;role responsibility promoted;rooming-in promoted;skin-to-skin contact encouraged   Breast Care: Breastfeeding milk massaged towards nipple   Breastfeeding Assistance electric breast pump used;feeding cue recognition promoted;feeding on demand promoted;feeding session observed;support offered   Maternal Breastfeeding Support diary/feeding log utilized;encouragement offered;infant-mother separation minimized;lactation counseling provided;maternal hydration promoted;maternal nutrition promoted;maternal rest encouraged   Latch Promotion suck stimulated with colostrum drop

## 2017-10-22 NOTE — LACTATION NOTE
"Lactation called to room: Mother reports infant ready for a feeding. Observed mother place infatn in cross cradle hold to left breast. Mother able to perform deep latch technique. Infant grasp nipple but unable to maintain latch. After multiple attempts mother shown to "teacup hold" breast. Infant able to grasp nipple and maintain latch for a few sucks. Infant released breast. After a few more tries infant able to maintain deep asymmetric latch. Audible swallows noted. Infant remained at breast until content. Infant released breast. Nipple wnl. Infant remains skin to skin. Mother instructed to attempt to latch infant at every feeding. If infant is reluctant to latch or feeding is non nutritive she is to follow previously established feeding plan. Mother to call for assistance as needed. Mother states she is very excited about infant latching for the first time.      10/22/17 0945   LATCH Score   Latch 1-->repeated attempts, holds nipple in mouth, stimulate to suck   Audible Swallowing 1-->a few with stimulation   Type Of Nipple 2-->everted (after stimulation)   Comfort (Breast/Nipple) 2-->soft/nontender   Hold (Positioning) 1-->minimal assist, teach one side: mother does other, staff holds   Score (less than 7 for 2/more consecutive times, consult Lactation Consultant) 7   Maternal Infant Feeding   Infant Positioning cross-cradle   Signs of Milk Transfer audible swallow;infant jaw motion present   Presence of Pain no   Nipple Shape After Feeding, Left wnl   Feeding Infant   Feeding Readiness Cues smacking;rooting   Satiety Cues calm after feeding;decreased number of sucks;infant releases breast   Feeding Tolerance/Success alert for feeding;coordinated suck;coordinated swallow   Effective Latch During Feeding yes   Audible Swallow yes   Suck/Swallow Coordination present   Lactation Interventions   Attachment Promotion breastfeeding assistance provided;privacy provided   Breast Care: Breastfeeding milk massaged " towards nipple   Breastfeeding Assistance assisted with positioning;assisted with techniques for flat/inverted nipples;both breasts offered each feeding;feeding cue recognition promoted;feeding on demand promoted;feeding session observed;infant latch-on verified;infant suck/swallow verified;support offered   Maternal Breastfeeding Support diary/feeding log utilized   Latch Promotion suck stimulated with colostrum drop

## 2017-10-22 NOTE — PROGRESS NOTES
[facility] -   Obstetrics  Postpartum Progress Note    Patient Name: Alisia Loconum  MRN: [id number]  Admission Date: 10/19/2017  Hospital Length of Stay: 3 days  Attending Physician: Georges Franks MD  Primary Care Provider: Saumya Quinonez MD    Subjective:     Principal Problem:Status post repeat low transverse  section    Hospital course: Pt observed for labor and subsequent elevated BP's, Pre E work up done, IV labetolol given, BP's better, spoke with Dr Franks, recommend repeat c/s, spoke w/ pt and pt agrees w/ POC.  RLTCS was performed without complications.    Interval History: s/p repeat csection. BPs elevated to severe range on POD#2, requiring IV hydralazine 10 mg x 1 dose. Pt was also started on labetalol 200 mg bid    She is doing well this morning. She is tolerating a regular diet without nausea or vomiting. She is voiding spontaneously. She is ambulating. She has passed flatus, and has not a BM. Vaginal bleeding is mild. She denies fever or chills. Abdominal pain is mild and controlled with oral medications. She is breastfeeding. Circumcision done. Pt denies HA/RUQ pain/vision changes    Objective:     Vital Signs (Most Recent):  Temp: 98.7 °F (37.1 °C) (10/22/17 0800)  Pulse: 93 (10/22/17 0800)  Resp: 18 (10/22/17 0800)  BP: (!) 155/86 (10/22/17 0800)  SpO2: 100 % (10/20/17 1706) Vital Signs (24h Range):  Temp:  [98.4 °F (36.9 °C)-99 °F (37.2 °C)] 98.7 °F (37.1 °C)  Pulse:  [] 93  Resp:  [18] 18  BP: (132-182)/() 155/86     Weight: 89 kg (196 lb 3.4 oz)  Body mass index is 32.65 kg/m².    No intake or output data in the 24 hours ending 10/22/17 1109    Significant Labs:  Lab Results   Component Value Date    GROUPTRH B POS 10/19/2017    HEPBSAG Negative 2017    STREPBCULT No Group B Streptococcus isolated 2017       Recent Labs  Lab 10/21/17  1731   HGB 8.0*   HCT 23.2*       I have personallly reviewed all pertinent lab results from the last 24  hours.   Repeat CMP/CBC/PCR last night due to elevated BPs-normal, significant improvement in PCR    Physical Exam:   Constitutional: She is oriented to person, place, and time. No distress.        Pulmonary/Chest: Effort normal.        Abdominal: Soft. She exhibits abdominal incision. She exhibits no distension. There is tenderness. There is no guarding.   Fundus firm. Tenderness appropriate postop. Bandage c/d/i     Genitourinary: Vagina normal.           Musculoskeletal: Moves all extremeties.       Neurological: She is alert and oriented to person, place, and time.    Skin: Skin is warm and dry.    Psychiatric: She has a normal mood and affect. Her behavior is normal.       Assessment/Plan:     27 y.o. female  for:    * Status post repeat low transverse  section    Pain well controlled, PCA d/c'd this am.  Continue with PO pain control         Pre-existing hypertension with pre-eclampsia in third trimester    Continue IV mag for 24 hours and monitor Bp; patient denies headache or blurred vision.  At 24hr, will plan to get patient up, d/c elizondo, advance diet.            Disposition: As patient meets milestones, will plan to discharge today. Pt also started on aldomet bid    Janice Arenas MD  Obstetrics  [facility] [place]

## 2017-10-22 NOTE — NURSING
Dr. Arenas notified of pt elevated blood pressure. Orders to begin Labetolol 200mg bid and Hydralazine 10mg IV given.

## 2017-10-23 ENCOUNTER — TELEPHONE (OUTPATIENT)
Dept: OBSTETRICS AND GYNECOLOGY | Facility: CLINIC | Age: 27
End: 2017-10-23

## 2017-10-23 DIAGNOSIS — M32.15 SYSTEMIC LUPUS ERYTHEMATOSUS WITH TUBULO-INTERSTITIAL NEPHROPATHY, UNSPECIFIED SLE TYPE: ICD-10-CM

## 2017-10-23 DIAGNOSIS — O99.340 DEPRESSION AFFECTING PREGNANCY: ICD-10-CM

## 2017-10-23 DIAGNOSIS — Z76.0 MEDICATION REFILL: ICD-10-CM

## 2017-10-23 DIAGNOSIS — F32.A DEPRESSION AFFECTING PREGNANCY: ICD-10-CM

## 2017-10-23 DIAGNOSIS — M32.9 SLE (SYSTEMIC LUPUS ERYTHEMATOSUS): ICD-10-CM

## 2017-10-23 RX ORDER — FLUOXETINE HYDROCHLORIDE 40 MG/1
40 CAPSULE ORAL DAILY
Qty: 30 CAPSULE | Refills: 3 | Status: SHIPPED | OUTPATIENT
Start: 2017-10-23 | End: 2018-07-05 | Stop reason: SDUPTHER

## 2017-10-23 RX ORDER — FLUOXETINE HYDROCHLORIDE 40 MG/1
40 CAPSULE ORAL DAILY
Qty: 30 CAPSULE | Refills: 3 | Status: CANCELLED | OUTPATIENT
Start: 2017-10-23

## 2017-10-23 RX ORDER — VALACYCLOVIR HYDROCHLORIDE 500 MG/1
1000 TABLET, FILM COATED ORAL DAILY
Qty: 90 TABLET | Refills: 3 | Status: SHIPPED | OUTPATIENT
Start: 2017-10-23 | End: 2019-06-04 | Stop reason: SDUPTHER

## 2017-10-23 RX ORDER — HYDROXYCHLOROQUINE SULFATE 200 MG/1
200 TABLET, FILM COATED ORAL 2 TIMES DAILY
Qty: 180 TABLET | Refills: 3 | Status: SHIPPED | OUTPATIENT
Start: 2017-10-23 | End: 2017-10-23 | Stop reason: SDUPTHER

## 2017-10-23 RX ORDER — OXYCODONE AND ACETAMINOPHEN 7.5; 325 MG/1; MG/1
1 TABLET ORAL
Qty: 30 TABLET | Refills: 0 | Status: SHIPPED | OUTPATIENT
Start: 2017-10-23 | End: 2017-11-21

## 2017-10-23 RX ORDER — HYDROXYCHLOROQUINE SULFATE 200 MG/1
200 TABLET, FILM COATED ORAL 2 TIMES DAILY
Qty: 180 TABLET | Refills: 3 | Status: SHIPPED | OUTPATIENT
Start: 2017-10-23 | End: 2018-08-16 | Stop reason: SDUPTHER

## 2017-10-23 NOTE — TELEPHONE ENCOUNTER
----- Message from Aida De La Torre sent at 10/23/2017  1:32 PM CDT -----  Contact: pt  She's calling stating that she had a Rx for Percocet sent to [facility] on [address] [facility], but they need an authorization, please advise [phone] (home)

## 2017-10-23 NOTE — TELEPHONE ENCOUNTER
----- Message from Meredith Marcelino sent at 10/23/2017  7:51 AM CDT -----  Contact: pt   Pt wants to know what she can take for pain for her  pain,,, Please call pt back at [phone]

## 2017-10-23 NOTE — TELEPHONE ENCOUNTER
Spoke to patient and to pharmacy.  They are requiring ICD-10 code for prescription of percocet.  Spoke to Dr. Arenas and she stated it should be post-op pain.  That ICD-10 was not acceptable 89.18, trying T81.89XA.  Spoke to the pharmacist and the prescription was covered with G89.18 ICD10 for 28 tablets.  Prescription changed.

## 2017-10-25 ENCOUNTER — CLINICAL SUPPORT (OUTPATIENT)
Dept: OBSTETRICS AND GYNECOLOGY | Facility: CLINIC | Age: 27
End: 2017-10-25
Payer: COMMERCIAL

## 2017-10-25 VITALS — SYSTOLIC BLOOD PRESSURE: 158 MMHG | DIASTOLIC BLOOD PRESSURE: 90 MMHG

## 2017-10-25 PROCEDURE — 99999 PR PBB SHADOW E&M-EST. PATIENT-LVL I: CPT | Mod: PBBFAC,,,

## 2017-10-25 NOTE — PROGRESS NOTES
Patient here today for dressing removal.  Dressing removed with small amount of difficulty.  Left side of wound with approximately 3/4 inch area that is not completely closed, but healing, no drainage note.  Patient advised to call office with purulent (yellow/greenish) drainage, redness, warmth, swelling.  Also advised to keep pad in front of dressing for a few more days to keep clothes from rubbing on area, and to keep it clean and dry.  No bathing, showers only and can use warm soap and water to gently clean. Patient verbalized understanding.    Patient having some issues with breast feeding.  Advised to drink more water and to consult lactation services for more instruction.  Patient verbalized understanding    Patient's blood pressure elevated (158/90 - both arms).  Patient denies blurred vision and/or headaches.  Patient stated she is taking her medications (Aldomet and Labaetolol). Instructed patient to continue taking medications.  Patient advised to call us asap with any of those symptoms.  Patient verbalized understanding.    RAJ Contreras came to room to evaluate wound and discuss blood pressure with patient.

## 2017-10-29 ENCOUNTER — NURSE TRIAGE (OUTPATIENT)
Dept: ADMINISTRATIVE | Facility: CLINIC | Age: 27
End: 2017-10-29

## 2017-10-30 NOTE — TELEPHONE ENCOUNTER
Spoke with patient regarding headache and call to triage nurse. Patient states she is feeling much better and that she will be checking her blood pressure more frequently to see if that is related to the headaches. Informed patient to call our office if she has any other questions or concerns.

## 2017-10-30 NOTE — TELEPHONE ENCOUNTER
"[facility] 10/19  Having HA. On labetalol /aldometfor BP, no BP cuff. Breastfeeding     Reason for Disposition   [1] MODERATE headache (e.g., interferes with normal activities) AND [2] present > 24 hours AND [3] unexplained  (Exceptions: analgesics not tried, typical migraine, or headache part of viral illness)    Answer Assessment - Initial Assessment Questions  1. LOCATION: "Where does it hurt?"      Frontal HA started about 2-3pm   2. ONSET: "When did the headache start?" (Minutes, hours or days)      As above   3. PATTERN: "Does the pain come and go, or has it been constant since it started?"     Constant , sudden   4. SEVERITY: "How bad is the pain?" and "What does it keep you from doing?"  (e.g., Scale 1-10; mild, moderate, or severe)    - MILD (1-3): doesn't interfere with normal activities     - MODERATE (4-7): interferes with normal activities or awakens from sleep     - SEVERE (8-10): excruciating pain, unable to do any normal activities        5-6, no meds   5. RECURRENT SYMPTOM: "Have you ever had headaches before?" If so, ask: "When was the last time?" and "What happened that time?"      Yes, rest and wait to pass   6. CAUSE: "What do you think is causing the headache?"     BP   7. MIGRAINE: "Have you been diagnosed with migraine headaches?" If so, ask: "Is this headache similar?"      No   8. HEAD INJURY: "Has there been any recent injury to the head?"      No   9. OTHER SYMPTOMS: "Do you have any other symptoms?" (fever, stiff neck, eye pain, sore throat, cold symptoms)     No   10. PREGNANCY: "Is there any chance you are pregnant?" "When was your last menstrual period?"       No    Protocols used:  [facility]. Pt wants to speak with MD. Sent urgent message to OB to call pt in am. call back with questions.     "

## 2017-11-01 ENCOUNTER — TELEPHONE (OUTPATIENT)
Dept: OBSTETRICS AND GYNECOLOGY | Facility: CLINIC | Age: 27
End: 2017-11-01

## 2017-11-01 NOTE — TELEPHONE ENCOUNTER
----- Message from Macey Acevedo sent at 11/1/2017  9:46 AM CDT -----  Contact: Patient  Patient called to speak with the nurse; she wants to know when she will be able to go back to the Chiropractor. She can be contacted at [phone].    Thanks,  Macey

## 2017-11-01 NOTE — TELEPHONE ENCOUNTER
Patient wanted to know when she can go back to Physical Therapy for her back.  She had  on 10/19/2017, has postpartum visit on .  Please advise

## 2017-11-02 NOTE — TELEPHONE ENCOUNTER
Patient informed that it is ok to return to the Chiropractor at this stage.  Patient voiced understanding.

## 2017-11-21 ENCOUNTER — OFFICE VISIT (OUTPATIENT)
Dept: OBSTETRICS AND GYNECOLOGY | Facility: CLINIC | Age: 27
End: 2017-11-21
Payer: COMMERCIAL

## 2017-11-21 VITALS
BODY MASS INDEX: 30.01 KG/M2 | HEIGHT: 65 IN | DIASTOLIC BLOOD PRESSURE: 91 MMHG | WEIGHT: 180.13 LBS | SYSTOLIC BLOOD PRESSURE: 158 MMHG

## 2017-11-21 DIAGNOSIS — Z98.891 S/P C-SECTION: Primary | ICD-10-CM

## 2017-11-21 PROCEDURE — 99999 PR PBB SHADOW E&M-EST. PATIENT-LVL II: CPT | Mod: PBBFAC,,, | Performed by: OBSTETRICS & GYNECOLOGY

## 2017-11-21 PROCEDURE — 0503F POSTPARTUM CARE VISIT: CPT | Mod: S$GLB,,, | Performed by: OBSTETRICS & GYNECOLOGY

## 2017-11-21 NOTE — PROGRESS NOTES
"CC: Post-partum follow-up    Alisia Vines is a 27 y.o. female  who presents for post-partum visit.  She is S/P a RLTCS.  She and the baby are doing well.  No pain.  No fever.   No bowel / bladder complaints.    Delivery Date: 2017  Delivery MD: Golden  Gender: male  Birth Weight: 6 pounds 5 ounces  Breast Feeding: NO  Depression: NO  Contraception: IUD    Pregnancy was complicated by:  SLE, Sjorgens Syndrome, CHTN with super-imposed pre-eclampsia, Prior C/S    BP (!) 180/138   Ht 5' 5" (1.651 m)   Wt 81.7 kg (180 lb 1.9 oz)   BMI 29.97 kg/m²     ROS:  GENERAL: No fever, chills, fatigability.  CARDIOVASCULAR: No chest pain. No shortness of breath. No leg cramps.  BREAST: Denies pain. No lumps. No discharge.  ABDOMEN: No abdominal pain. Denies nausea. Denies vomiting. No diarrhea. No constipation  URINARY: No incontinence, no nocturia, no frequency and no dysuria.  VULVAR: No pain, no lesions and no itching.  VAGINAL: No relaxation, no itching, no discharge, no abnormal bleeding and no lesions.  NEUROLOGICAL: No headaches. No vision changes.    PHYSICAL EXAM:  GENERAL: Alert and oriented in no distress  CHEST: Clear to auscultation  CV; Regular rate and rhythm  ABDOMEN:  Soft, non-tender, non-distended  WOUND: Healed well  VULVA:  Normal, no lesions  CERVIX:  Without lesions, polyps or tenderness.  UTERUS:  Normal size, shape, consistency, no mass or tenderness.  ADNEXA:  Normal in size without mass or tenderness  EXTREMITIES: Non-tender, Negative Shaw's    IMP:  Doing well S/P RLTCS - Instructions / precautions reviewed  Contraceptive counseling - desires Mirena  CHTN - BP elevated (pt admits to not taking Labetalol as dog ate her medications; pt will refill today)      PLAN:  May resume normal activities  Return: Mirena insertion        "

## 2017-12-07 ENCOUNTER — LAB VISIT (OUTPATIENT)
Dept: LAB | Facility: HOSPITAL | Age: 27
End: 2017-12-07
Attending: INTERNAL MEDICINE
Payer: COMMERCIAL

## 2017-12-07 ENCOUNTER — OFFICE VISIT (OUTPATIENT)
Dept: RHEUMATOLOGY | Facility: CLINIC | Age: 27
End: 2017-12-07
Payer: COMMERCIAL

## 2017-12-07 VITALS
BODY MASS INDEX: 29.17 KG/M2 | SYSTOLIC BLOOD PRESSURE: 188 MMHG | DIASTOLIC BLOOD PRESSURE: 123 MMHG | WEIGHT: 175.25 LBS | HEART RATE: 81 BPM

## 2017-12-07 DIAGNOSIS — M32.14 SLE GLOMERULONEPHRITIS SYNDROME, WHO CLASS V: ICD-10-CM

## 2017-12-07 DIAGNOSIS — Z79.899 LONG-TERM USE OF PLAQUENIL: ICD-10-CM

## 2017-12-07 DIAGNOSIS — M35.09 SJOGREN'S SYNDROME WITH OTHER ORGAN INVOLVEMENT: ICD-10-CM

## 2017-12-07 DIAGNOSIS — M32.14 SLE GLOMERULONEPHRITIS SYNDROME, WHO CLASS V: Primary | ICD-10-CM

## 2017-12-07 LAB
ALBUMIN SERPL BCP-MCNC: 3 G/DL
ALP SERPL-CCNC: 50 U/L
ALT SERPL W/O P-5'-P-CCNC: 9 U/L
ANION GAP SERPL CALC-SCNC: 7 MMOL/L
AST SERPL-CCNC: 20 U/L
BASOPHILS # BLD AUTO: 0 K/UL
BASOPHILS NFR BLD: 0 %
BILIRUB SERPL-MCNC: 0.3 MG/DL
BUN SERPL-MCNC: 20 MG/DL
C3 SERPL-MCNC: 54 MG/DL
C4 SERPL-MCNC: 10 MG/DL
CALCIUM SERPL-MCNC: 8.4 MG/DL
CHLORIDE SERPL-SCNC: 109 MMOL/L
CO2 SERPL-SCNC: 25 MMOL/L
CREAT SERPL-MCNC: 0.8 MG/DL
CRP SERPL-MCNC: 0.4 MG/L
DIFFERENTIAL METHOD: ABNORMAL
EOSINOPHIL # BLD AUTO: 0.2 K/UL
EOSINOPHIL NFR BLD: 8.1 %
ERYTHROCYTE [DISTWIDTH] IN BLOOD BY AUTOMATED COUNT: 13.2 %
ERYTHROCYTE [SEDIMENTATION RATE] IN BLOOD BY WESTERGREN METHOD: 75 MM/HR
EST. GFR  (AFRICAN AMERICAN): >60 ML/MIN/1.73 M^2
EST. GFR  (NON AFRICAN AMERICAN): >60 ML/MIN/1.73 M^2
GLUCOSE SERPL-MCNC: 79 MG/DL
HCT VFR BLD AUTO: 27.2 %
HGB BLD-MCNC: 9.2 G/DL
LYMPHOCYTES # BLD AUTO: 1 K/UL
LYMPHOCYTES NFR BLD: 40.6 %
MCH RBC QN AUTO: 29.3 PG
MCHC RBC AUTO-ENTMCNC: 33.8 G/DL
MCV RBC AUTO: 87 FL
MONOCYTES # BLD AUTO: 0.1 K/UL
MONOCYTES NFR BLD: 4.7 %
NEUTROPHILS # BLD AUTO: 1.1 K/UL
NEUTROPHILS NFR BLD: 46.6 %
PLATELET # BLD AUTO: 143 K/UL
PMV BLD AUTO: 10.1 FL
POTASSIUM SERPL-SCNC: 3.6 MMOL/L
PROT SERPL-MCNC: 8.2 G/DL
RBC # BLD AUTO: 3.14 M/UL
SODIUM SERPL-SCNC: 141 MMOL/L
WBC # BLD AUTO: 2.34 K/UL

## 2017-12-07 PROCEDURE — 86160 COMPLEMENT ANTIGEN: CPT

## 2017-12-07 PROCEDURE — 86160 COMPLEMENT ANTIGEN: CPT | Mod: 59

## 2017-12-07 PROCEDURE — 85651 RBC SED RATE NONAUTOMATED: CPT | Mod: PO

## 2017-12-07 PROCEDURE — 85025 COMPLETE CBC W/AUTO DIFF WBC: CPT | Mod: PO

## 2017-12-07 PROCEDURE — 36415 COLL VENOUS BLD VENIPUNCTURE: CPT | Mod: PO

## 2017-12-07 PROCEDURE — 80053 COMPREHEN METABOLIC PANEL: CPT | Mod: PO

## 2017-12-07 PROCEDURE — 86140 C-REACTIVE PROTEIN: CPT

## 2017-12-07 PROCEDURE — 99999 PR PBB SHADOW E&M-EST. PATIENT-LVL III: CPT | Mod: PBBFAC,,, | Performed by: INTERNAL MEDICINE

## 2017-12-07 PROCEDURE — 86225 DNA ANTIBODY NATIVE: CPT

## 2017-12-07 PROCEDURE — 99214 OFFICE O/P EST MOD 30 MIN: CPT | Mod: S$GLB,,, | Performed by: INTERNAL MEDICINE

## 2017-12-07 RX ORDER — PREDNISONE 5 MG/1
5 TABLET ORAL DAILY
Qty: 30 TABLET | Refills: 3 | Status: SHIPPED | OUTPATIENT
Start: 2017-12-07 | End: 2017-12-07 | Stop reason: SDUPTHER

## 2017-12-07 RX ORDER — MYCOPHENOLATE MOFETIL 500 MG/1
500 TABLET ORAL 2 TIMES DAILY
Qty: 180 TABLET | Refills: 0 | Status: SHIPPED | OUTPATIENT
Start: 2017-12-07 | End: 2018-02-06 | Stop reason: SDUPTHER

## 2017-12-07 RX ORDER — PREDNISONE 5 MG/1
5 TABLET ORAL DAILY
Qty: 30 TABLET | Refills: 3 | Status: SHIPPED | OUTPATIENT
Start: 2017-12-07 | End: 2018-02-08 | Stop reason: SDUPTHER

## 2017-12-07 NOTE — ASSESSMENT & PLAN NOTE
Yearly reitnal exams while on plaquenil. Safety labs for cellcept in 4 weeks and then every 12 weeks.

## 2017-12-07 NOTE — PROGRESS NOTES
RHEUMATOLOGY CLINIC FOLLOW UP VISIT  Chief complaints:-  To follow up for lupus.     HPI:-  Alisia Jolly a 27 y.o. pleasant female comes in for a follow up visit with above chief complaints. She has systemic lupus erythematosus diagnosed in 2013 when she had diffuse lymphadenopathy and anasarca.  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  She has been intermittently following up in the rheumatology clinic.  She was on immunosuppressive therapy and got pregnant accidentally since she was not taking her OCP's during that week because of irregular cycles. She underwent elective CS and delivered a healthy baby 10/2017. She comes in for a follow up visit after delivery.  She denies any joint pain today.  She also denies seizures or psychosis,  joint swelling, muscle weakness,Raynaud's phenomenon, sicca symptoms .    Review of Systems   Constitutional: Positive for malaise/fatigue. Negative for chills and fever.   HENT: Negative for nosebleeds and sore throat.    Eyes: Negative for blurred vision, photophobia and redness.   Respiratory: Negative for cough, sputum production and shortness of breath.    Cardiovascular: Negative for chest pain and leg swelling.   Gastrointestinal: Negative for abdominal pain, constipation, diarrhea and heartburn.   Genitourinary: Negative for dysuria, frequency and urgency.   Musculoskeletal: Negative for back pain, falls, joint pain, myalgias and neck pain.   Skin: Negative for itching and rash.   Neurological: Negative for dizziness, tremors, seizures, loss of consciousness, weakness and headaches.   Endo/Heme/Allergies: Negative for environmental allergies. Does not bruise/bleed easily.   Psychiatric/Behavioral: Negative for hallucinations and memory loss. The patient does not have insomnia.        Past Medical History:   Diagnosis Date    Allergic rhinitis      Anemia     Condyloma acuminata     GERD (gastroesophageal reflux disease)     HSV-2 (herpes simplex virus 2) infection     Lupus nephritis     Mood disorder     Overweight(278.02)     Sjogren's syndrome     Systemic lupus erythematosus     Thrombocytopenia        Past Surgical History:   Procedure Laterality Date     SECTION, LOW TRANSVERSE      RENAL BIOPSY  2013        Social History   Substance Use Topics    Smoking status: Never Smoker    Smokeless tobacco: Never Used    Alcohol use No       Family History   Problem Relation Age of Onset    Hypertension Mother     Eczema Brother     Heart disease Son     Hypertension Maternal Grandmother     Diabetes Paternal Grandmother     Lupus Neg Hx     Psoriasis Neg Hx        Review of patient's allergies indicates:  No Known Allergies        Physical examination:-    There were no vitals filed for this visit.    Physical Exam   Constitutional: She is oriented to person, place, and time and well-developed, well-nourished, and in no distress. No distress.   HENT:   Head: Normocephalic.   Mouth/Throat: Oropharynx is clear and moist.   Eyes: Conjunctivae and EOM are normal. Pupils are equal, round, and reactive to light.   Neck: Normal range of motion. Neck supple.   Cardiovascular: Normal rate and intact distal pulses.  Exam reveals no friction rub.    Pulmonary/Chest: Effort normal. No respiratory distress. She exhibits no tenderness.   Abdominal: Soft. There is no tenderness.   Musculoskeletal:   No synovitis over small joints of hands or feet.  No effusion over large joints.   Neurological: She is alert and oriented to person, place, and time. No cranial nerve deficit.   Skin: Skin is warm. No rash noted. No erythema.   Psychiatric: Mood and affect normal.   Nursing note and vitals reviewed.      Labs:-  Results for HEIDI SHAIKH (MRN [id number]) as of 2017 12:21   Ref. Range 2017 08:10 2017 08:40 3/22/2017  08:58 5/19/2017 13:36   HARPREET HEP-2 Titer Unknown Positive >=1:2560...      Anti-SSA Antibody Latest Ref Range: 0.00 - 19.99 .26 (H)      Anti-SSA Interpretation Latest Ref Range: Negative  Positive (A)      Anti-SSB Antibody Latest Ref Range: 0.00 - 19.99 EU 46.84 (H)      Anti-SSB Interpretation Latest Ref Range: Negative  Positive (A)      ds DNA Ab Latest Ref Range: Negative 1:10  Negative 1:10      Anti Sm Antibody Latest Ref Range: 0.00 - 19.99 EU 2.89      Anti-Sm Interpretation Latest Ref Range: Negative  Negative      Anti Sm/RNP Antibody Latest Ref Range: 0.00 - 19.99 EU 4.12      Anti-Sm/RNP Interpretation Latest Ref Range: Negative  Negative      Complement (C-3) Latest Ref Range: 50 - 180 mg/dL 45 (L) 56 47 (L) 56   Complement (C-4) Latest Ref Range: 11 - 44 mg/dL 10 (L) 13 13 16         Medication List with Changes/Refills   Current Medications    EFINACONAZOLE (JUBLIA) 10 % RALF    Apply once daily to nail and nail fold.  Use daily for up to 1 year.    FAMOTIDINE (PEPCID) 20 MG TABLET    Take 1 tablet (20 mg total) by mouth 2 (two) times daily as needed for Heartburn.    FLUOXETINE (PROZAC) 40 MG CAPSULE    Take 1 capsule (40 mg total) by mouth once daily.    FLUTICASONE (FLONASE) 50 MCG/ACTUATION NASAL SPRAY    2 sprays by Each Nare route once daily.    HYDROXYCHLOROQUINE (PLAQUENIL) 200 MG TABLET    Take 1 tablet (200 mg total) by mouth 2 (two) times daily.    LABETALOL (NORMODYNE) 200 MG TABLET    Take 1 tablet (200 mg total) by mouth every 12 (twelve) hours.    METHYLDOPA (ALDOMET) 500 MG TABLET    Take 1 tablet (500 mg total) by mouth every 12 (twelve) hours.    VALACYCLOVIR (VALTREX) 500 MG TABLET    Take 2 tablets (1,000 mg total) by mouth once daily.       Assessment/Plans:-  SLE glomerulonephritis syndrome, WHO class V  Active. Restart Cellcept with plaquenil .     Sjogren's syndrome  Stable sicca symptoms. On plaquenil . Monitor.     Long-term use of Plaquenil  Yearly reitnal exams while  on plaquenil. Safety labs for cellcept in 4 weeks and then every 12 weeks.     # Return in about 2 months (around 2/7/2018).      Time spent: 30 minutes in face to face discussion concerning diagnosis, prognosis, review of lab and test results, benefits of treatment as well as management of disease, counseling of patient and coordination of care between various health care providers . Greater than half the time spent was used for coordination of care and counseling of patient.      Disclaimer: This note was prepared using voice recognition system and is likely to have sound alike errors and is not proof read.  Please call me with any questions.

## 2017-12-08 LAB — DSDNA AB SER-ACNC: NORMAL [IU]/ML

## 2017-12-11 ENCOUNTER — TELEPHONE (OUTPATIENT)
Dept: RHEUMATOLOGY | Facility: CLINIC | Age: 27
End: 2017-12-11

## 2017-12-11 NOTE — TELEPHONE ENCOUNTER
----- Message from Roberta Hollingsworth sent at 12/11/2017  3:51 PM CST -----  Contact: [phone]  Would like to consult with nurse about test results; please call maureen at [phone] thx lj

## 2017-12-12 NOTE — TELEPHONE ENCOUNTER
----- Message from Chelsie Patton sent at 12/12/2017  3:34 PM CST -----  Contact: Pt  Patient is due to a missed call. Please contact pt at .[phone] (test)

## 2018-01-12 ENCOUNTER — TELEPHONE (OUTPATIENT)
Dept: OBSTETRICS AND GYNECOLOGY | Facility: CLINIC | Age: 28
End: 2018-01-12

## 2018-01-12 NOTE — TELEPHONE ENCOUNTER
----- Message from Radha Scott sent at 1/12/2018 12:17 PM CST -----  Contact: Pt  PT returning nurse call, request call back ..[phone] (home)

## 2018-01-12 NOTE — TELEPHONE ENCOUNTER
Attempting to locate device at [facility] clinic. Awaiting phone call from DAVID Gomez for confirmation

## 2018-01-12 NOTE — TELEPHONE ENCOUNTER
----- Message from Radha Scott sent at 1/12/2018 12:17 PM CST -----  Contact: Pt  PT returning nurse call, request call back ..[phone] (home)

## 2018-01-12 NOTE — TELEPHONE ENCOUNTER
Spoke with pt states she signed Mirena request form in November but has not heard back from clinic. Checked both clinics and devices were not located. Claudia MARTIN Will request a new device for pt. Notified pt. Patient verbalized understanding

## 2018-01-12 NOTE — TELEPHONE ENCOUNTER
Attempted to contact patient, no answer.  Left patient a voicemail message to call the clinic back.      Did not locate mirena request form at [facility].

## 2018-01-12 NOTE — TELEPHONE ENCOUNTER
----- Message from Smitha Huston sent at 1/12/2018 10:18 AM CST -----  Contact: Pt   Pt called to follow up on if mirena has been received callback number is..[phone] (home)

## 2018-01-22 PROBLEM — O11.3: Status: RESOLVED | Noted: 2017-10-19 | Resolved: 2018-01-22

## 2018-01-30 ENCOUNTER — TELEPHONE (OUTPATIENT)
Dept: OBSTETRICS AND GYNECOLOGY | Facility: CLINIC | Age: 28
End: 2018-01-30

## 2018-01-30 NOTE — TELEPHONE ENCOUNTER
[facility] has made several attempts to contact patient to complete the enrollment review.  I called the patient and asked her to call [facility] @ [phone] to complete the enrollment review.  Patient verbalized understanding and will call [facility] today.

## 2018-01-30 NOTE — TELEPHONE ENCOUNTER
----- Message from Luana Connolly sent at 1/30/2018 11:06 AM CST -----  Pt at [phone]//[facility] is calling to ask if your office has gotten the Mirena in//please call//eliane/arabella

## 2018-02-05 ENCOUNTER — TELEPHONE (OUTPATIENT)
Dept: OBSTETRICS AND GYNECOLOGY | Facility: CLINIC | Age: 28
End: 2018-02-05

## 2018-02-05 NOTE — TELEPHONE ENCOUNTER
Mirena IUD rec'd at [facility], patient called  appt is for 02/27/2018 at 100pm at [facility]  tf

## 2018-02-06 ENCOUNTER — OFFICE VISIT (OUTPATIENT)
Dept: RHEUMATOLOGY | Facility: CLINIC | Age: 28
End: 2018-02-06
Payer: COMMERCIAL

## 2018-02-06 ENCOUNTER — LAB VISIT (OUTPATIENT)
Dept: LAB | Facility: HOSPITAL | Age: 28
End: 2018-02-06
Attending: INTERNAL MEDICINE
Payer: COMMERCIAL

## 2018-02-06 VITALS
SYSTOLIC BLOOD PRESSURE: 181 MMHG | DIASTOLIC BLOOD PRESSURE: 119 MMHG | WEIGHT: 171.06 LBS | HEART RATE: 69 BPM | BODY MASS INDEX: 28.47 KG/M2

## 2018-02-06 DIAGNOSIS — M32.14 SLE GLOMERULONEPHRITIS SYNDROME, WHO CLASS V: ICD-10-CM

## 2018-02-06 DIAGNOSIS — Z79.899 LONG-TERM USE OF PLAQUENIL: ICD-10-CM

## 2018-02-06 DIAGNOSIS — M32.9 SLE (SYSTEMIC LUPUS ERYTHEMATOSUS RELATED SYNDROME): ICD-10-CM

## 2018-02-06 DIAGNOSIS — M35.09 SJOGREN'S SYNDROME WITH OTHER ORGAN INVOLVEMENT: ICD-10-CM

## 2018-02-06 DIAGNOSIS — M32.14 SLE GLOMERULONEPHRITIS SYNDROME, WHO CLASS V: Primary | ICD-10-CM

## 2018-02-06 LAB
ALBUMIN SERPL BCP-MCNC: 3.2 G/DL
ALP SERPL-CCNC: 49 U/L
ALT SERPL W/O P-5'-P-CCNC: 8 U/L
ANION GAP SERPL CALC-SCNC: 7 MMOL/L
AST SERPL-CCNC: 18 U/L
BASOPHILS # BLD AUTO: 0.01 K/UL
BASOPHILS NFR BLD: 0.4 %
BILIRUB SERPL-MCNC: 0.3 MG/DL
BUN SERPL-MCNC: 16 MG/DL
CALCIUM SERPL-MCNC: 8.8 MG/DL
CHLORIDE SERPL-SCNC: 108 MMOL/L
CO2 SERPL-SCNC: 25 MMOL/L
CREAT SERPL-MCNC: 0.8 MG/DL
DIFFERENTIAL METHOD: ABNORMAL
EOSINOPHIL # BLD AUTO: 0.2 K/UL
EOSINOPHIL NFR BLD: 7.6 %
ERYTHROCYTE [DISTWIDTH] IN BLOOD BY AUTOMATED COUNT: 13.3 %
ERYTHROCYTE [SEDIMENTATION RATE] IN BLOOD BY WESTERGREN METHOD: 65 MM/HR
EST. GFR  (AFRICAN AMERICAN): >60 ML/MIN/1.73 M^2
EST. GFR  (NON AFRICAN AMERICAN): >60 ML/MIN/1.73 M^2
GLUCOSE SERPL-MCNC: 79 MG/DL
HCT VFR BLD AUTO: 29.6 %
HGB BLD-MCNC: 9.8 G/DL
LYMPHOCYTES # BLD AUTO: 1 K/UL
LYMPHOCYTES NFR BLD: 44.4 %
MCH RBC QN AUTO: 27.4 PG
MCHC RBC AUTO-ENTMCNC: 33.1 G/DL
MCV RBC AUTO: 83 FL
MONOCYTES # BLD AUTO: 0.1 K/UL
MONOCYTES NFR BLD: 5.8 %
NEUTROPHILS # BLD AUTO: 0.9 K/UL
NEUTROPHILS NFR BLD: 41.8 %
PLATELET # BLD AUTO: 161 K/UL
PLATELET BLD QL SMEAR: ABNORMAL
PMV BLD AUTO: 10.9 FL
POTASSIUM SERPL-SCNC: 3.8 MMOL/L
PROT SERPL-MCNC: 8.4 G/DL
RBC # BLD AUTO: 3.58 M/UL
SODIUM SERPL-SCNC: 140 MMOL/L
WBC # BLD AUTO: 2.23 K/UL

## 2018-02-06 PROCEDURE — 86160 COMPLEMENT ANTIGEN: CPT | Mod: 59

## 2018-02-06 PROCEDURE — 86038 ANTINUCLEAR ANTIBODIES: CPT

## 2018-02-06 PROCEDURE — 86140 C-REACTIVE PROTEIN: CPT

## 2018-02-06 PROCEDURE — 86235 NUCLEAR ANTIGEN ANTIBODY: CPT | Mod: 59

## 2018-02-06 PROCEDURE — 99214 OFFICE O/P EST MOD 30 MIN: CPT | Mod: S$GLB,,, | Performed by: INTERNAL MEDICINE

## 2018-02-06 PROCEDURE — 85651 RBC SED RATE NONAUTOMATED: CPT | Mod: PO

## 2018-02-06 PROCEDURE — 36415 COLL VENOUS BLD VENIPUNCTURE: CPT | Mod: PO

## 2018-02-06 PROCEDURE — 85025 COMPLETE CBC W/AUTO DIFF WBC: CPT | Mod: PO

## 2018-02-06 PROCEDURE — 80053 COMPREHEN METABOLIC PANEL: CPT | Mod: PO

## 2018-02-06 PROCEDURE — 86039 ANTINUCLEAR ANTIBODIES (ANA): CPT

## 2018-02-06 PROCEDURE — 86160 COMPLEMENT ANTIGEN: CPT

## 2018-02-06 PROCEDURE — 99999 PR PBB SHADOW E&M-EST. PATIENT-LVL III: CPT | Mod: PBBFAC,,, | Performed by: INTERNAL MEDICINE

## 2018-02-06 PROCEDURE — 3008F BODY MASS INDEX DOCD: CPT | Mod: ,,, | Performed by: INTERNAL MEDICINE

## 2018-02-06 RX ORDER — CLINDAMYCIN HYDROCHLORIDE 300 MG/1
CAPSULE ORAL
Refills: 0 | COMMUNITY
Start: 2018-01-20 | End: 2018-02-06 | Stop reason: ALTCHOICE

## 2018-02-06 RX ORDER — MYCOPHENOLATE MOFETIL 500 MG/1
500 TABLET ORAL 3 TIMES DAILY
Qty: 270 TABLET | Refills: 1 | Status: SHIPPED | OUTPATIENT
Start: 2018-02-06 | End: 2018-02-16 | Stop reason: SDUPTHER

## 2018-02-06 RX ORDER — NAPROXEN 500 MG/1
TABLET ORAL
Refills: 0 | COMMUNITY
Start: 2018-01-20 | End: 2018-02-06 | Stop reason: ALTCHOICE

## 2018-02-06 NOTE — PROGRESS NOTES
RHEUMATOLOGY CLINIC FOLLOW UP VISIT  Chief complaints:-  To follow up for lupus.     HPI:-  Alisia Jolly a 27 y.o. pleasant female comes in for a follow up visit with above chief complaints. She has systemic lupus erythematosus diagnosed in 2013 when she had diffuse lymphadenopathy and anasarca.  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  She has been intermittently following up in the rheumatology clinic.  She was on immunosuppressive therapy and got pregnant accidentally since she was not taking her OCP's during that week because of irregular cycles. She underwent elective CS and delivered a healthy baby 10/2017.  She denies any joint pain today.  She also denies seizures or psychosis,  joint swelling, muscle weakness,Raynaud's phenomenon, sicca symptoms .    Review of Systems   Constitutional: Positive for malaise/fatigue. Negative for chills and fever.   HENT: Negative for nosebleeds and sore throat.    Eyes: Negative for blurred vision, photophobia and redness.   Respiratory: Negative for cough, sputum production and shortness of breath.    Cardiovascular: Negative for chest pain and leg swelling.   Gastrointestinal: Negative for abdominal pain, constipation, diarrhea and heartburn.   Genitourinary: Negative for dysuria, frequency and urgency.   Musculoskeletal: Negative for back pain, falls, joint pain, myalgias and neck pain.   Skin: Negative for itching and rash.   Neurological: Negative for dizziness, tremors, seizures, loss of consciousness, weakness and headaches.   Endo/Heme/Allergies: Negative for environmental allergies. Does not bruise/bleed easily.   Psychiatric/Behavioral: Negative for hallucinations and memory loss. The patient does not have insomnia.        Past Medical History:   Diagnosis Date    Allergic rhinitis     Anemia     Condyloma acuminata     GERD  (gastroesophageal reflux disease)     HSV-2 (herpes simplex virus 2) infection     Lupus nephritis     Mood disorder     Overweight(278.02)     Sjogren's syndrome     Systemic lupus erythematosus     Thrombocytopenia        Past Surgical History:   Procedure Laterality Date     SECTION, LOW TRANSVERSE      RENAL BIOPSY  2013        Social History   Substance Use Topics    Smoking status: Never Smoker    Smokeless tobacco: Never Used    Alcohol use No       Family History   Problem Relation Age of Onset    Hypertension Mother     Eczema Brother     Heart disease Son     Hypertension Maternal Grandmother     Diabetes Paternal Grandmother     Lupus Neg Hx     Psoriasis Neg Hx        Review of patient's allergies indicates:  No Known Allergies        Physical examination:-    Vitals:    18 1532   BP: (!) 181/119   Pulse: 69   Weight: 77.6 kg (171 lb 1.2 oz)   PainSc: 0-No pain       Physical Exam   Constitutional: She is oriented to person, place, and time and well-developed, well-nourished, and in no distress. No distress.   HENT:   Head: Normocephalic.   Mouth/Throat: Oropharynx is clear and moist.   Eyes: Conjunctivae and EOM are normal. Pupils are equal, round, and reactive to light.   Neck: Normal range of motion. Neck supple.   Cardiovascular: Normal rate and intact distal pulses.  Exam reveals no friction rub.    Pulmonary/Chest: Effort normal. No respiratory distress. She exhibits no tenderness.   Abdominal: Soft. There is no tenderness.   Musculoskeletal:   No synovitis over small joints of hands or feet.  No effusion over large joints.   Neurological: She is alert and oriented to person, place, and time. No cranial nerve deficit.   Skin: Skin is warm. No rash noted. No erythema.   Psychiatric: Mood and affect normal.   Nursing note and vitals reviewed.      Labs:-  Results for HEIDI SHAIKH (MRN [id number]) as of 2017 12:21   Ref. Range 2017 08:10  2/17/2017 08:40 3/22/2017 08:58 5/19/2017 13:36   HARPREET HEP-2 Titer Unknown Positive >=1:2560...      Anti-SSA Antibody Latest Ref Range: 0.00 - 19.99 .26 (H)      Anti-SSA Interpretation Latest Ref Range: Negative  Positive (A)      Anti-SSB Antibody Latest Ref Range: 0.00 - 19.99 EU 46.84 (H)      Anti-SSB Interpretation Latest Ref Range: Negative  Positive (A)      ds DNA Ab Latest Ref Range: Negative 1:10  Negative 1:10      Anti Sm Antibody Latest Ref Range: 0.00 - 19.99 EU 2.89      Anti-Sm Interpretation Latest Ref Range: Negative  Negative      Anti Sm/RNP Antibody Latest Ref Range: 0.00 - 19.99 EU 4.12      Anti-Sm/RNP Interpretation Latest Ref Range: Negative  Negative      Complement (C-3) Latest Ref Range: 50 - 180 mg/dL 45 (L) 56 47 (L) 56   Complement (C-4) Latest Ref Range: 11 - 44 mg/dL 10 (L) 13 13 16         Medication List with Changes/Refills   Current Medications    EFINACONAZOLE (JUBLIA) 10 % RALF    Apply once daily to nail and nail fold.  Use daily for up to 1 year.    FAMOTIDINE (PEPCID) 20 MG TABLET    Take 1 tablet (20 mg total) by mouth 2 (two) times daily as needed for Heartburn.    FLUOXETINE (PROZAC) 40 MG CAPSULE    Take 1 capsule (40 mg total) by mouth once daily.    FLUTICASONE (FLONASE) 50 MCG/ACTUATION NASAL SPRAY    2 sprays by Each Nare route once daily.    HYDROXYCHLOROQUINE (PLAQUENIL) 200 MG TABLET    Take 1 tablet (200 mg total) by mouth 2 (two) times daily.    LABETALOL (NORMODYNE) 200 MG TABLET    Take 1 tablet (200 mg total) by mouth every 12 (twelve) hours.    METHYLDOPA (ALDOMET) 500 MG TABLET    Take 1 tablet (500 mg total) by mouth every 12 (twelve) hours.    PREDNISONE (DELTASONE) 5 MG TABLET    Take 1 tablet (5 mg total) by mouth once daily.    VALACYCLOVIR (VALTREX) 500 MG TABLET    Take 2 tablets (1,000 mg total) by mouth once daily.   Changed and/or Refilled Medications    Modified Medication Previous Medication    MYCOPHENOLATE (CELLCEPT) 500 MG TAB  mycophenolate (CELLCEPT) 500 mg Tab       Take 1 tablet (500 mg total) by mouth 3 (three) times daily.    Take 1 tablet (500 mg total) by mouth 2 (two) times daily.   Discontinued Medications    CLINDAMYCIN (CLEOCIN) 300 MG CAPSULE    TK 1 C PO Q 8 H FOR 10 DAYS    NAPROXEN (NAPROSYN) 500 MG TABLET    TK 1 T PO BID FOR 10 DAYS P AND INFLAMMATION     Assessment/Plans:-  # SLE glomerulonephritis syndrome, WHO class V  On Cellcept, plaquenil and prednisone. Check UA, U P/C ratio today. Uncontrolled hypertension. Requested to get advice from .   - HARPREET; Standing  - C3 complement; Standing  - C4 complement; Standing  - CBC auto differential; Standing  - Comprehensive metabolic panel; Standing  - Sedimentation rate, manual; Standing  - C-reactive protein; Standing  - Protein / creatinine ratio, urine; Standing  - Urinalysis; Standing  - mycophenolate (CELLCEPT) 500 mg Tab; Take 1 tablet (500 mg total) by mouth 3 (three) times daily.  Dispense: 270 tablet; Refill: 1    # Sjogren's syndrome  Stable sicca syndrome on plaquenil . Monitor.   - HARPREET; Standing  - C3 complement; Standing  - C4 complement; Standing  - CBC auto differential; Standing  - Comprehensive metabolic panel; Standing  - Sedimentation rate, manual; Standing  - C-reactive protein; Standing  - Protein / creatinine ratio, urine; Standing  - Urinalysis; Standing  - mycophenolate (CELLCEPT) 500 mg Tab; Take 1 tablet (500 mg total) by mouth 3 (three) times daily.  Dispense: 270 tablet; Refill: 1    # Long-term use of Plaquenil  Yearly reitnal exams while on plaquenil. Safety labs for cellcept every 12 weeks. Hold cellcept if any infection.     # SLE (systemic lupus erythematosus related syndrome)  Arthritis well controlled on cellcept, plaquenil and prednisone. Continue the same. Consider benlysta if active.     Disclaimer: This note was prepared using voice recognition system and is likely to have sound alike errors .  Please call me with any  questions

## 2018-02-06 NOTE — ASSESSMENT & PLAN NOTE
Arthritis well controlled on cellcept, plaquenil and prednisone. Continue the same. Consider benlysta if active.

## 2018-02-06 NOTE — ASSESSMENT & PLAN NOTE
On Cellcept, plaquenil and prednisone. Check UA, U P/C ratio today. Uncontrolled hypertension. Requested to get advice from .

## 2018-02-06 NOTE — ASSESSMENT & PLAN NOTE
Yearly reitnal exams while on plaquenil. Safety labs for cellcept every 12 weeks. Hold cellcept if any infection.

## 2018-02-07 LAB
C3 SERPL-MCNC: 44 MG/DL
C4 SERPL-MCNC: 8 MG/DL
CRP SERPL-MCNC: 0.3 MG/L

## 2018-02-08 ENCOUNTER — PATIENT MESSAGE (OUTPATIENT)
Dept: RHEUMATOLOGY | Facility: CLINIC | Age: 28
End: 2018-02-08

## 2018-02-08 DIAGNOSIS — M32.14 SLE GLOMERULONEPHRITIS SYNDROME, WHO CLASS V: ICD-10-CM

## 2018-02-08 LAB
ANA SER QL IF: POSITIVE
ANA TITR SER IF: NORMAL {TITER}

## 2018-02-08 RX ORDER — PREDNISONE 10 MG/1
TABLET ORAL
Qty: 90 TABLET | Refills: 0 | Status: SHIPPED | OUTPATIENT
Start: 2018-02-08 | End: 2018-03-15 | Stop reason: SDUPTHER

## 2018-02-12 LAB
ANTI SM ANTIBODY: 8.25 EU
ANTI SM/RNP ANTIBODY: 5.99 EU
ANTI-SM INTERPRETATION: NEGATIVE
ANTI-SM/RNP INTERPRETATION: NEGATIVE
ANTI-SSA ANTIBODY: 244.38 EU
ANTI-SSA INTERPRETATION: POSITIVE
ANTI-SSB ANTIBODY: 40.79 EU
ANTI-SSB INTERPRETATION: POSITIVE
DSDNA AB SER-ACNC: ABNORMAL [IU]/ML

## 2018-02-16 DIAGNOSIS — M35.09 SJOGREN'S SYNDROME WITH OTHER ORGAN INVOLVEMENT: ICD-10-CM

## 2018-02-16 DIAGNOSIS — M32.14 SLE GLOMERULONEPHRITIS SYNDROME, WHO CLASS V: ICD-10-CM

## 2018-02-16 RX ORDER — MYCOPHENOLATE MOFETIL 500 MG/1
500 TABLET ORAL 3 TIMES DAILY
Qty: 90 TABLET | Refills: 0 | Status: SHIPPED | OUTPATIENT
Start: 2018-02-16 | End: 2018-02-19 | Stop reason: SDUPTHER

## 2018-02-16 NOTE — TELEPHONE ENCOUNTER
----- Message from Katherine Mchugh sent at 2/16/2018 10:18 AM CST -----  Contact: self   Patient would like to consult with nurse regarding medication. Please call back at [phone].      Thanks,  Katherine Mchugh

## 2018-02-19 DIAGNOSIS — M35.09 SJOGREN'S SYNDROME WITH OTHER ORGAN INVOLVEMENT: ICD-10-CM

## 2018-02-19 DIAGNOSIS — M32.14 SLE GLOMERULONEPHRITIS SYNDROME, WHO CLASS V: ICD-10-CM

## 2018-02-19 RX ORDER — MYCOPHENOLATE MOFETIL 500 MG/1
500 TABLET ORAL 3 TIMES DAILY
Qty: 90 TABLET | Refills: 0 | Status: SHIPPED | OUTPATIENT
Start: 2018-02-19 | End: 2018-03-15 | Stop reason: SDUPTHER

## 2018-02-19 NOTE — TELEPHONE ENCOUNTER
----- Message from Sonja Bailey sent at 2/19/2018  1:11 PM CST -----  Contact: pt  Please call pt @ [phone] regarding Lupus medicaiton, states [facility] do not have meds.

## 2018-02-20 ENCOUNTER — LAB VISIT (OUTPATIENT)
Dept: LAB | Facility: HOSPITAL | Age: 28
End: 2018-02-20
Attending: INTERNAL MEDICINE
Payer: COMMERCIAL

## 2018-02-20 ENCOUNTER — OFFICE VISIT (OUTPATIENT)
Dept: NEPHROLOGY | Facility: CLINIC | Age: 28
End: 2018-02-20
Payer: COMMERCIAL

## 2018-02-20 VITALS
DIASTOLIC BLOOD PRESSURE: 89 MMHG | HEIGHT: 65 IN | WEIGHT: 174.63 LBS | HEART RATE: 78 BPM | SYSTOLIC BLOOD PRESSURE: 132 MMHG | BODY MASS INDEX: 29.09 KG/M2

## 2018-02-20 DIAGNOSIS — D84.1 HYPOCOMPLEMENTEMIA: ICD-10-CM

## 2018-02-20 DIAGNOSIS — I10 ESSENTIAL HYPERTENSION: ICD-10-CM

## 2018-02-20 DIAGNOSIS — R80.9 PROTEINURIA, UNSPECIFIED TYPE: ICD-10-CM

## 2018-02-20 DIAGNOSIS — M32.14 LUPUS NEPHRITIS: Primary | ICD-10-CM

## 2018-02-20 DIAGNOSIS — M32.14 LUPUS NEPHRITIS: ICD-10-CM

## 2018-02-20 DIAGNOSIS — Z71.89 ENCOUNTER FOR MEDICATION REVIEW AND COUNSELING: ICD-10-CM

## 2018-02-20 LAB
ANION GAP SERPL CALC-SCNC: 6 MMOL/L
BASOPHILS # BLD AUTO: 0.01 K/UL
BASOPHILS NFR BLD: 0.4 %
BUN SERPL-MCNC: 16 MG/DL
C3 SERPL-MCNC: 45 MG/DL
C4 SERPL-MCNC: 10 MG/DL
CALCIUM SERPL-MCNC: 8.5 MG/DL
CHLORIDE SERPL-SCNC: 109 MMOL/L
CO2 SERPL-SCNC: 23 MMOL/L
CREAT SERPL-MCNC: 0.8 MG/DL
DIFFERENTIAL METHOD: ABNORMAL
EOSINOPHIL # BLD AUTO: 0.2 K/UL
EOSINOPHIL NFR BLD: 6 %
ERYTHROCYTE [DISTWIDTH] IN BLOOD BY AUTOMATED COUNT: 13.4 %
EST. GFR  (AFRICAN AMERICAN): >60 ML/MIN/1.73 M^2
EST. GFR  (NON AFRICAN AMERICAN): >60 ML/MIN/1.73 M^2
GLUCOSE SERPL-MCNC: 93 MG/DL
HCT VFR BLD AUTO: 28.3 %
HGB BLD-MCNC: 9.4 G/DL
IMM GRANULOCYTES # BLD AUTO: 0.01 K/UL
IMM GRANULOCYTES NFR BLD AUTO: 0.4 %
LYMPHOCYTES # BLD AUTO: 0.9 K/UL
LYMPHOCYTES NFR BLD: 31 %
MCH RBC QN AUTO: 27.1 PG
MCHC RBC AUTO-ENTMCNC: 33.2 G/DL
MCV RBC AUTO: 82 FL
MONOCYTES # BLD AUTO: 0.2 K/UL
MONOCYTES NFR BLD: 6 %
NEUTROPHILS # BLD AUTO: 1.6 K/UL
NEUTROPHILS NFR BLD: 56.2 %
NRBC BLD-RTO: 0 /100 WBC
PLATELET # BLD AUTO: 159 K/UL
PMV BLD AUTO: 12.4 FL
POTASSIUM SERPL-SCNC: 3.7 MMOL/L
RBC # BLD AUTO: 3.47 M/UL
SODIUM SERPL-SCNC: 138 MMOL/L
WBC # BLD AUTO: 2.84 K/UL

## 2018-02-20 PROCEDURE — 85025 COMPLETE CBC W/AUTO DIFF WBC: CPT

## 2018-02-20 PROCEDURE — 99215 OFFICE O/P EST HI 40 MIN: CPT | Mod: S$GLB,,, | Performed by: INTERNAL MEDICINE

## 2018-02-20 PROCEDURE — 99999 PR PBB SHADOW E&M-EST. PATIENT-LVL III: CPT | Mod: PBBFAC,,, | Performed by: INTERNAL MEDICINE

## 2018-02-20 PROCEDURE — 36415 COLL VENOUS BLD VENIPUNCTURE: CPT

## 2018-02-20 PROCEDURE — 86160 COMPLEMENT ANTIGEN: CPT | Mod: 59

## 2018-02-20 PROCEDURE — 80048 BASIC METABOLIC PNL TOTAL CA: CPT

## 2018-02-20 PROCEDURE — 86160 COMPLEMENT ANTIGEN: CPT

## 2018-02-20 PROCEDURE — 3008F BODY MASS INDEX DOCD: CPT | Mod: S$GLB,,, | Performed by: INTERNAL MEDICINE

## 2018-02-20 RX ORDER — LOSARTAN POTASSIUM 50 MG/1
50 TABLET ORAL DAILY
Qty: 90 TABLET | Refills: 3 | Status: SHIPPED | OUTPATIENT
Start: 2018-02-20 | End: 2019-01-11

## 2018-02-20 NOTE — PROGRESS NOTES
NEPHROLOGY CLINIC FOLLOWUP NOTE:   Date of clinic visit: 2/20/18     RHEUMATOLOGIST: Jaden Echeverria M.D.      REASON FOR CONSULTATION: Suspected lupus flare. Has a  H/o of lupus nephritis     CHIEF COMPLAINT: History of systemic lupus erythematosus.      HISTORY OF PRESENT ILLNESS: Ms. Vines is a 27-year-old -American female with a h/o of lupus nephritis who presents for f/u. Pt was last seen by me on in Aug 2017. Pt was then pregnant at 30 weeks of gestation. She had problems with uncontrolled BP then. She was specifically advised (documented) to RTC in 1 wk. Appt was made for her. She did not show up until today (6 months later). Pt has given birth to a healthy baby at 38 wks of gestation. Mycophenolate was stopped once the pregnancy test was known in Feb 2017. Losartan was also stopped then as well. Lupus nephritis was not active, and imuran though considered was not used.    Pt presents for f/u today. Both C3 and C4 complements are low. Chart was reviewed. Pt was re-started on Cellcept by rheumatology 500 mg tid in early week of Jan 2018.Pt is also taking 10 mg of prednisone qd. Pt has no c/o's, no fver, no SOB, no leg swelling, no blood in urine.     PAST MEDICAL HISTORY:   1. Systemic lupus erythematosus diagnosed in November 2013.   2. Class V membranous lupus nephritis. Kidney biopsy on 10/17/2013 showed 5%   to 10% interstitial fibrosis. No acuity on this biopsy.   3. Sjogren's disease, diagnosed in 2012.   4. History of genital herpes.      PAST SURGICAL HISTORY: None, apart from the kidney biopsy.      FAMILY HISTORY: No one in the family with lupus. Father is 47 years old. Mom is 44. Both are healthy. The patient does have a 1-year-old son who was diagnosed with third-degree AV block   .   ALLERGIES: Reviewed. No known drug allergies.      MEDICATIONS: Reviewed on EPIC     REVIEW OF SYSTEMS:   HEAD, EYES, EARS, NOSE, THROAT: As above, otherwise negative.   CARDIAC: Negative.   PULMONARY:  Negative.   GASTROINTESTINAL: Negative.   GENITOURINARY: As above, otherwise negative.   INFECTIOUS DISEASE: negative.   RHEUMATOLOGICAL: As above, otherwise negative.   The rest of the review of systems is negative.      PHYSICAL EXAMINATION:   VITAL SIGNS: Blood pressure is 132/89, pulse is 78, weight is 174 lbs, last visit 186 lbs  In no acute distress.   GENERAL: She is ambulatory.   HEART: Regular rate and rhythm. No pericardial friction rubs. S1 and S2 audible.   CHEST: Clear to auscultation. No rales, breathing symmetric and unlabored  ABDOMEN: Soft, gravid  EXTREMITIES: Showed no edema.      LABORATORY VALUES: reviewed:  U prot/cr ratio 570 mg  C3 44  C4 8      BMP  Lab Results   Component Value Date     02/06/2018    K 3.8 02/06/2018     02/06/2018    CO2 25 02/06/2018    BUN 16 02/06/2018    CREATININE 0.8 02/06/2018    CALCIUM 8.8 02/06/2018    ANIONGAP 7 (L) 02/06/2018    ESTGFRAFRICA >60 02/06/2018    EGFRNONAA >60 02/06/2018     Lab Results   Component Value Date    WBC 2.23 (L) 02/06/2018    HGB 9.8 (L) 02/06/2018    HCT 29.6 (L) 02/06/2018    MCV 83 02/06/2018     02/06/2018          ASSESSMENT AND PLAN: This is a 27-year-old female with lupus nephritis who presents for f/u. Pt was last seen 6 months ago when she was 30 weeks pregnant:  impression is as follows:      1. Renal: Pt is having hypocomplementemia, both C3 and C4 are low  Highly suspected lupus flare  Pt had been advised specifically by us to maintain close clinic f/u   Pregnancy in lupus is high risk pregnancy  Was re-started on cellcept 500 mg po tid in jan 2018  Both C3 and C4 are still low  Proteinuria  Will repeat labs today  Leukopenia is the limiting factor to using higher dose of cellcept  Off ARB as well, was taking losartan in the past  Will d/c methyldopa  Will re-start losartan 50 mg po qd      H/o of lupus nephritis, membranous, class V, kidney biopsy proven  No recent kidney biopsy (last one in 2013), has  no acuity and minimal chronicity on biopsy then  Will benefit from a repeat kidney biopsy  Will await repeat labs (C3 and C4 levels) and will plan a biopsy     2. HTN: BP controlled   Meds reviewed     3. Pt has been advised by me about the teratogenicity of both mycophenolate and losartan in case of an unplanned pregnancy.    4. Rheum: note reviewed  Agree with mgmt.     PLANS AND RECOMMENDATIONS:   As discussed above  RTC  2-3 weeks, strongly advised, explained reason to pt.  Total time spent 40 minutes including time needed to review the records, the   patient evaluation, documentation, face-to-face discussion with the patient,   more than 50% of the time was spent on coordination of care and counseling.    Level V visit.     Laurence Morales MD

## 2018-02-28 ENCOUNTER — TELEPHONE (OUTPATIENT)
Dept: OBSTETRICS AND GYNECOLOGY | Facility: CLINIC | Age: 28
End: 2018-02-28

## 2018-02-28 NOTE — TELEPHONE ENCOUNTER
----- Message from Chelsie Pascual sent at 2/28/2018  8:49 AM CST -----  Contact: Patient   Patient stated that her period came down last night and she is ready to reschedule her insert, Please call her at [phone].    Thanks

## 2018-03-06 ENCOUNTER — PROCEDURE VISIT (OUTPATIENT)
Dept: OBSTETRICS AND GYNECOLOGY | Facility: CLINIC | Age: 28
End: 2018-03-06
Payer: COMMERCIAL

## 2018-03-06 VITALS
HEIGHT: 65 IN | SYSTOLIC BLOOD PRESSURE: 182 MMHG | WEIGHT: 181.19 LBS | BODY MASS INDEX: 30.19 KG/M2 | DIASTOLIC BLOOD PRESSURE: 142 MMHG

## 2018-03-06 DIAGNOSIS — I10 CHRONIC HYPERTENSION: Primary | ICD-10-CM

## 2018-03-06 RX ORDER — LEVONORGESTREL 52 MG/1
INTRAUTERINE DEVICE INTRAUTERINE
COMMUNITY
Start: 2018-01-30 | End: 2019-01-11

## 2018-03-06 NOTE — PROCEDURES
Procedures     Pt here for Mirena insertion. Blood pressure 182/142, repeat 180/100. Discussed with pt the importance of having blood pressure WNL before inserting device. Risk factors of high blood pressure including stroke reviewed. Instructed to report to urgent care or ER and contact PCP in order to evaluate. Call next month when on cycle to schedule insert when blood pressures are under control.

## 2018-03-12 ENCOUNTER — LAB VISIT (OUTPATIENT)
Dept: LAB | Facility: HOSPITAL | Age: 28
End: 2018-03-12
Attending: INTERNAL MEDICINE
Payer: COMMERCIAL

## 2018-03-12 DIAGNOSIS — M32.14 LUPUS NEPHRITIS: ICD-10-CM

## 2018-03-12 LAB
BASOPHILS # BLD AUTO: 0.01 K/UL
BASOPHILS NFR BLD: 0.2 %
C3 SERPL-MCNC: 42 MG/DL
C4 SERPL-MCNC: 10 MG/DL
DIFFERENTIAL METHOD: ABNORMAL
EOSINOPHIL # BLD AUTO: 0.1 K/UL
EOSINOPHIL NFR BLD: 2.5 %
ERYTHROCYTE [DISTWIDTH] IN BLOOD BY AUTOMATED COUNT: 14.3 %
HCT VFR BLD AUTO: 29.5 %
HGB BLD-MCNC: 9.5 G/DL
IMM GRANULOCYTES # BLD AUTO: 0.01 K/UL
IMM GRANULOCYTES NFR BLD AUTO: 0.2 %
LYMPHOCYTES # BLD AUTO: 0.8 K/UL
LYMPHOCYTES NFR BLD: 18.9 %
MCH RBC QN AUTO: 27.6 PG
MCHC RBC AUTO-ENTMCNC: 32.2 G/DL
MCV RBC AUTO: 86 FL
MONOCYTES # BLD AUTO: 0.2 K/UL
MONOCYTES NFR BLD: 5.5 %
NEUTROPHILS # BLD AUTO: 3.2 K/UL
NEUTROPHILS NFR BLD: 72.7 %
NRBC BLD-RTO: 0 /100 WBC
PLATELET # BLD AUTO: 182 K/UL
PMV BLD AUTO: 11.4 FL
RBC # BLD AUTO: 3.44 M/UL
WBC # BLD AUTO: 4.38 K/UL

## 2018-03-12 PROCEDURE — 36415 COLL VENOUS BLD VENIPUNCTURE: CPT | Mod: PO

## 2018-03-12 PROCEDURE — 86160 COMPLEMENT ANTIGEN: CPT

## 2018-03-12 PROCEDURE — 80048 BASIC METABOLIC PNL TOTAL CA: CPT

## 2018-03-12 PROCEDURE — 85025 COMPLETE CBC W/AUTO DIFF WBC: CPT

## 2018-03-12 PROCEDURE — 86160 COMPLEMENT ANTIGEN: CPT | Mod: 59

## 2018-03-13 LAB
ANION GAP SERPL CALC-SCNC: 6 MMOL/L
BUN SERPL-MCNC: 13 MG/DL
CALCIUM SERPL-MCNC: 8.5 MG/DL
CHLORIDE SERPL-SCNC: 107 MMOL/L
CO2 SERPL-SCNC: 24 MMOL/L
CREAT SERPL-MCNC: 0.7 MG/DL
EST. GFR  (AFRICAN AMERICAN): >60 ML/MIN/1.73 M^2
EST. GFR  (NON AFRICAN AMERICAN): >60 ML/MIN/1.73 M^2
GLUCOSE SERPL-MCNC: 93 MG/DL
POTASSIUM SERPL-SCNC: 3.8 MMOL/L
SODIUM SERPL-SCNC: 137 MMOL/L

## 2018-03-15 ENCOUNTER — OFFICE VISIT (OUTPATIENT)
Dept: NEPHROLOGY | Facility: CLINIC | Age: 28
End: 2018-03-15
Payer: COMMERCIAL

## 2018-03-15 VITALS
WEIGHT: 174.63 LBS | HEIGHT: 65 IN | BODY MASS INDEX: 29.09 KG/M2 | HEART RATE: 100 BPM | DIASTOLIC BLOOD PRESSURE: 100 MMHG | SYSTOLIC BLOOD PRESSURE: 148 MMHG

## 2018-03-15 DIAGNOSIS — M32.14 LUPUS NEPHRITIS: Primary | ICD-10-CM

## 2018-03-15 DIAGNOSIS — M32.14 SLE GLOMERULONEPHRITIS SYNDROME, WHO CLASS V: ICD-10-CM

## 2018-03-15 DIAGNOSIS — Z71.89 ENCOUNTER FOR MEDICATION REVIEW AND COUNSELING: ICD-10-CM

## 2018-03-15 DIAGNOSIS — R80.9 PROTEINURIA, UNSPECIFIED TYPE: ICD-10-CM

## 2018-03-15 DIAGNOSIS — I10 ESSENTIAL HYPERTENSION: ICD-10-CM

## 2018-03-15 DIAGNOSIS — M35.09 SJOGREN'S SYNDROME WITH OTHER ORGAN INVOLVEMENT: ICD-10-CM

## 2018-03-15 PROCEDURE — 99215 OFFICE O/P EST HI 40 MIN: CPT | Mod: S$GLB,,, | Performed by: INTERNAL MEDICINE

## 2018-03-15 PROCEDURE — 99999 PR PBB SHADOW E&M-EST. PATIENT-LVL III: CPT | Mod: PBBFAC,,, | Performed by: INTERNAL MEDICINE

## 2018-03-15 RX ORDER — PREDNISONE 20 MG/1
TABLET ORAL
Qty: 30 TABLET | Refills: 6 | Status: SHIPPED | OUTPATIENT
Start: 2018-03-15 | End: 2018-07-10

## 2018-03-15 RX ORDER — AMLODIPINE BESYLATE 5 MG/1
5 TABLET ORAL DAILY
Qty: 30 TABLET | Refills: 11 | Status: SHIPPED | OUTPATIENT
Start: 2018-03-15 | End: 2018-04-06 | Stop reason: SDUPTHER

## 2018-03-15 RX ORDER — MYCOPHENOLATE MOFETIL 500 MG/1
1000 TABLET ORAL 2 TIMES DAILY
Qty: 120 TABLET | Refills: 6 | Status: SHIPPED | OUTPATIENT
Start: 2018-03-15 | End: 2018-05-16

## 2018-03-15 NOTE — PROGRESS NOTES
NEPHROLOGY CLINIC FOLLOWUP NOTE:   Date of clinic visit: 3/15/18     RHEUMATOLOGIST: Jaden Echeverria M.D.      REASON FOR CONSULTATION: Suspected lupus flare. Has a  H/o of lupus nephritis     CHIEF COMPLAINT: History of systemic lupus erythematosus.      HISTORY OF PRESENT ILLNESS: Ms. Vines is a 27-year-old -American female with a h/o of lupus nephritis who presents for f/u. Pt was last seen by me 1 month ago. She had returned after a long absence since Aug 2017. Pt was then pregnant at 30 weeks of gestation. She had problems with uncontrolled BP then. She was specifically advised (documented) to RTC in 1 wk. Appt was made for her. She did not show. Pt gave birth to a healthy baby at 38 wks of gestation. Mycophenolate had been stopped once the pregnancy test was known in Feb 2017. Losartan had also been stopped then as well. Lupus nephritis was not active, and imuran though considered was not used.     On return both C3 and C4 complements were low. Pt was re-started on Cellcept by rheumatology 500 mg tid in early week of Jan 2018. Pt is also taking 10 mg of prednisone qd. On visit to us 1 month ago, WBC was low and cellcept dose could not be increased. WBC has now increased and is normal. Pt has no c/o's, no fever, no SOB, no leg swelling, no blood in urine. Pt is only using prednisone 10 mg intermittently, because it increases her appetite, and she does not want to eat much to avoid wt gain.     PAST MEDICAL HISTORY:   1. Systemic lupus erythematosus diagnosed in November 2013.   2. Class V membranous lupus nephritis. Kidney biopsy on 10/17/2013 showed 5%   to 10% interstitial fibrosis. No acuity on this biopsy.   3. Sjogren's disease, diagnosed in 2012.   4. History of genital herpes.      PAST SURGICAL HISTORY: None, apart from the kidney biopsy.      FAMILY HISTORY: No one in the family with lupus. Father is 47 years old. Mom is 44. Both are healthy. The patient does have a 1-year-old son who was  diagnosed with third-degree AV block   .   ALLERGIES: Reviewed. No known drug allergies.      MEDICATIONS: Reviewed on EPIC     REVIEW OF SYSTEMS:   HEAD, EYES, EARS, NOSE, THROAT: As above, otherwise negative.   CARDIAC: Negative.   PULMONARY: Negative.   GASTROINTESTINAL: Negative.   GENITOURINARY: As above, otherwise negative.   INFECTIOUS DISEASE: negative.   RHEUMATOLOGICAL: As above, otherwise negative.   The rest of the review of systems is negative.      PHYSICAL EXAMINATION:   VITAL SIGNS: Blood pressure is 148/100, pulse is 100, weight is 174 lbs, no change  In no acute distress.   GENERAL: She is ambulatory.   HEART: Regular rate and rhythm. No pericardial friction rubs. S1 and S2 audible.   CHEST: Clear to auscultation. No rales, breathing symmetric and unlabored  ABDOMEN: Soft, gravid  EXTREMITIES: Showed no edema.      LABORATORY VALUES: reviewed:  U prot/cr ratio 470 mg, from 570 mg  C3 42, from 44  C4 10, from 8      BMP  Lab Results   Component Value Date     03/12/2018    K 3.8 03/12/2018     03/12/2018    CO2 24 03/12/2018    BUN 13 03/12/2018    CREATININE 0.7 03/12/2018    CALCIUM 8.5 (L) 03/12/2018    ANIONGAP 6 (L) 03/12/2018    ESTGFRAFRICA >60.0 03/12/2018    EGFRNONAA >60.0 03/12/2018     Lab Results   Component Value Date    WBC 4.38 03/12/2018    HGB 9.5 (L) 03/12/2018    HCT 29.5 (L) 03/12/2018    MCV 86 03/12/2018     03/12/2018           ASSESSMENT AND PLAN: This is a 27-year-old female with lupus nephritis who presents for f/u. Pt was last seen 6 months ago when she was 30 weeks pregnant:  impression is as follows:      1. Renal: persistent  hypocomplementemia, both C3 and C4 are low.   Highly suspect lupus flare  Will benefit from a repeat kidney biopsy, last biopsy was 5 years ago.  On low dose mycophenolate  WBC has improved  Will increase mycophenolate dose  Proteinuria: stable, not worse  Tolerating re-start of losartan well   HTN: BP elevated.      H/o of  lupus nephritis, membranous, class V, kidney biopsy proven  No recent kidney biopsy (last one in 2013), has no acuity and minimal chronicity on biopsy then  Pt agreed to kidney biopsy, not on any blood thinners.  Risks and benefits of kidney biopsy were explained to the pt. Risks include pain, discomfort, bleeding, loss of the kidney biopsied. Benefits include possible finding of a dx and ability to treat medical condition. Pt verbalized understanding.     2. HTN: BP not controlled   Meds reviewed     3. Pt has been advised by me about the teratogenicity of both mycophenolate and losartan in case of an unplanned pregnancy.     4. Rheum: note reviewed  Agree with mgmt.     PLANS AND RECOMMENDATIONS:   As discussed above  Kidney biopsy was arranged for 3/27/18  Add amlodipine 5 mg po qd  Change mycophenolate from 500 mg po tidf to 1 g po bid  Increase prednisone to 20 mg po qd  Risk of renal failure with insufficient treatment discussed with pt.  RTC  2-3 weeks, strongly advised, explained reason to pt.  Total time spent 40 minutes including time needed to review the records, the   patient evaluation, documentation, face-to-face discussion with the patient,   more than 50% of the time was spent on coordination of care and counseling.    Level V visit.     Laurence Morales MD

## 2018-03-26 ENCOUNTER — TELEPHONE (OUTPATIENT)
Dept: NEPHROLOGY | Facility: CLINIC | Age: 28
End: 2018-03-26

## 2018-03-26 NOTE — TELEPHONE ENCOUNTER
----- Message from Ivis Baron sent at 3/26/2018 12:43 PM CDT -----  Contact: pt   States she's calling rg wanting to speak with the nurse rg her cycle is on wants to resched her biopsy ( CT) requested to speak with nurse prior to resched and can be reached at [phone]//eliane/dbw

## 2018-03-26 NOTE — TELEPHONE ENCOUNTER
----- Message from Ya Sow sent at 3/26/2018  2:39 PM CDT -----  Contact: Pt  Pt called and stated she was returning a call to the nurse. She can be reached at [phone].    Thanks,  TF

## 2018-04-02 DIAGNOSIS — M32.14 LUPUS NEPHRITIS: Primary | ICD-10-CM

## 2018-04-03 ENCOUNTER — HOSPITAL ENCOUNTER (OUTPATIENT)
Dept: RADIOLOGY | Facility: HOSPITAL | Age: 28
Discharge: HOME | End: 2018-04-03
Attending: INTERNAL MEDICINE
Payer: COMMERCIAL

## 2018-04-03 VITALS
SYSTOLIC BLOOD PRESSURE: 141 MMHG | WEIGHT: 180 LBS | HEIGHT: 65 IN | DIASTOLIC BLOOD PRESSURE: 77 MMHG | OXYGEN SATURATION: 100 % | BODY MASS INDEX: 29.99 KG/M2 | HEART RATE: 74 BPM | TEMPERATURE: 98 F | RESPIRATION RATE: 14 BRPM

## 2018-04-03 DIAGNOSIS — M32.14 LUPUS NEPHRITIS: ICD-10-CM

## 2018-04-03 PROCEDURE — 99152 MOD SED SAME PHYS/QHP 5/>YRS: CPT

## 2018-04-03 PROCEDURE — 50200 RENAL BIOPSY PERQ: CPT | Mod: RT

## 2018-04-03 PROCEDURE — 63600175 PHARM REV CODE 636 W HCPCS: Performed by: RADIOLOGY

## 2018-04-03 PROCEDURE — 77012 CT SCAN FOR NEEDLE BIOPSY: CPT | Mod: TC

## 2018-04-03 PROCEDURE — 99153 MOD SED SAME PHYS/QHP EA: CPT

## 2018-04-03 RX ORDER — HYDRALAZINE HYDROCHLORIDE 20 MG/ML
INJECTION INTRAMUSCULAR; INTRAVENOUS CODE/TRAUMA/SEDATION MEDICATION
Status: COMPLETED | OUTPATIENT
Start: 2018-04-03 | End: 2018-04-03

## 2018-04-03 RX ORDER — MIDAZOLAM HYDROCHLORIDE 1 MG/ML
INJECTION INTRAMUSCULAR; INTRAVENOUS CODE/TRAUMA/SEDATION MEDICATION
Status: COMPLETED | OUTPATIENT
Start: 2018-04-03 | End: 2018-04-03

## 2018-04-03 RX ORDER — FENTANYL CITRATE 50 UG/ML
INJECTION, SOLUTION INTRAMUSCULAR; INTRAVENOUS CODE/TRAUMA/SEDATION MEDICATION
Status: COMPLETED | OUTPATIENT
Start: 2018-04-03 | End: 2018-04-03

## 2018-04-03 RX ADMIN — MIDAZOLAM HYDROCHLORIDE 0.5 MG: 1 INJECTION, SOLUTION INTRAMUSCULAR; INTRAVENOUS at 11:04

## 2018-04-03 RX ADMIN — HYDRALAZINE HYDROCHLORIDE 10 MG: 20 INJECTION INTRAMUSCULAR; INTRAVENOUS at 10:04

## 2018-04-03 RX ADMIN — HYDRALAZINE HYDROCHLORIDE 10 MG: 20 INJECTION INTRAMUSCULAR; INTRAVENOUS at 11:04

## 2018-04-03 RX ADMIN — FENTANYL CITRATE 25 MCG: 50 INJECTION, SOLUTION INTRAMUSCULAR; INTRAVENOUS at 11:04

## 2018-04-03 RX ADMIN — MIDAZOLAM HYDROCHLORIDE 0.5 MG: 1 INJECTION, SOLUTION INTRAMUSCULAR; INTRAVENOUS at 10:04

## 2018-04-03 RX ADMIN — FENTANYL CITRATE 25 MCG: 50 INJECTION, SOLUTION INTRAMUSCULAR; INTRAVENOUS at 10:04

## 2018-04-03 NOTE — DISCHARGE INSTRUCTIONS
Recovery After Procedural Sedation (Adult)  You have been given medicine by vein to make you sleep during your surgery. This may have included both a pain medicine and sleeping medicine. Most of the effects have worn off. But you may still have some drowsiness for the next 6 to 8 hours.  Home care  Follow these guidelines when you get home:  · For the next 8 hours, you should be watched by a responsible adult. This person should make sure your condition is not getting worse.  · Don't drink any alcohol for the next 24 hours.  · Don't drive, operate dangerous machinery, or make important business or personal decisions during the next 24 hours.  Note: Your healthcare provider may tell you not to take any medicine by mouth for pain or sleep in the next 4 hours. These medicines may react with the medicines you were given in the hospital. This could cause a much stronger response than usual.  Follow-up care  Follow up with your healthcare provider if you are not alert and back to your usual level of activity within 12 hours.  When to seek medical advice  Call your healthcare provider right away if any of these occur:  · Drowsiness gets worse  · Weakness or dizziness gets worse  · Repeated vomiting  · You can't be awakened   Date Last Reviewed: 10/18/2016  © 8695-5347 [employer]. [address] [place] [address], [place] [address]. All rights reserved. This information is not intended as a substitute for professional medical care. Always follow your healthcare professional's instructions.        Image-Guided Biopsy     A needle is used to take a sample of tissue from inside the body.     A biopsy is a small sample of tissue or fluid taken from your body. This sample is then studied in a lab. Image-guided biopsy lets your health care provider take a sample from an abnormal mass without using surgery. This procedure is done by a general radiologist. It can also be done by a specially trained doctor called an  interventional radiologist.  Before your procedure  Tell your health care provider about any medicines, herbs, or supplements you are taking. This includes any over-the-counter medicines such as aspirin or ibuprofen.  Also tell your provider if you:  · Are pregnant or think you may be pregnant  · Are allergic to any medicines  Follow any directions you are given for not eating or drinking before the procedure. Follow any other instructions from your provider.  During your procedure  · You will change into a hospital gown and lie on a special table. The table that is used will depend on the type of imaging that will guide the biopsy. You may lie on your back, front, or side. Your position depends on where the biopsy is to be done.  · An IV (intravenous) line may be started. This is to give you fluids and medicines. You may be given medicine through the IV to help you relax.  · The skin over the biopsy site is cleaned. Medicine is put on the site to numb the skin.  · The radiologist will use CT (computed tomography), MRI, X-ray, or ultrasound images as a guide. He or she will put a thin, hollow needle through the skin. He or she will guide it to the area where the biopsy is to be done.  · The needle is used to take a sample of tissue or fluid from the area. The needle is then taken out. The sample is sent to a lab. It will be checked for cells that aren't normal.  After your procedure  · You will most likely be able to go home in a few hours.  · Be sure to have a friend or family member drive you home if you have had sedation.   · Care for the insertion site as directed.  Possible risks and complications  Possible risks and complications of an image-guided biopsy include:  · Bruising or bleeding at the place where the needle was put in  · Bleeding inside your body  · Damage to body areas along the path of the needle   Date Last Reviewed: 6/11/2015  © 1356-8619 [employer]. [address] [place] [address],  [place] [address]. All rights reserved. This information is not intended as a substitute for professional medical care. Always follow your healthcare professional's instructions.

## 2018-04-03 NOTE — PLAN OF CARE
Pt d/c home in stable condition via wheelchair with grandmother.  Verbalized understanding of d/c instructions.  Pt voiced no complaints at this time. Pt stood at side of bed, walked steps with no new motor or sensory deficits.  Neurologically intact.

## 2018-04-03 NOTE — SEDATION DOCUMENTATION
Pt lying on ct table prone with bilateral arms above head. Pt bp elevated. RAJ Addison notified. Pt took home bp meds this am per patient. Pt verbalized understanding and all questions answered

## 2018-04-05 ENCOUNTER — PATIENT MESSAGE (OUTPATIENT)
Dept: NEPHROLOGY | Facility: CLINIC | Age: 28
End: 2018-04-05

## 2018-04-06 ENCOUNTER — OFFICE VISIT (OUTPATIENT)
Dept: NEPHROLOGY | Facility: CLINIC | Age: 28
End: 2018-04-06
Payer: COMMERCIAL

## 2018-04-06 ENCOUNTER — LAB VISIT (OUTPATIENT)
Dept: LAB | Facility: HOSPITAL | Age: 28
End: 2018-04-06
Attending: INTERNAL MEDICINE
Payer: COMMERCIAL

## 2018-04-06 VITALS
HEIGHT: 65 IN | HEART RATE: 88 BPM | BODY MASS INDEX: 29.99 KG/M2 | SYSTOLIC BLOOD PRESSURE: 142 MMHG | DIASTOLIC BLOOD PRESSURE: 100 MMHG | OXYGEN SATURATION: 99 % | WEIGHT: 180 LBS

## 2018-04-06 DIAGNOSIS — R80.9 PROTEINURIA, UNSPECIFIED TYPE: ICD-10-CM

## 2018-04-06 DIAGNOSIS — M32.14 LUPUS NEPHRITIS, ISN/RPS CLASS V: Primary | ICD-10-CM

## 2018-04-06 DIAGNOSIS — Z71.89 ENCOUNTER FOR MEDICATION REVIEW AND COUNSELING: ICD-10-CM

## 2018-04-06 DIAGNOSIS — R80.9 PROTEINURIA, UNSPECIFIED TYPE: Primary | ICD-10-CM

## 2018-04-06 DIAGNOSIS — D70.2 DRUG-INDUCED LEUKOPENIA: ICD-10-CM

## 2018-04-06 DIAGNOSIS — M32.14 LUPUS NEPHRITIS: ICD-10-CM

## 2018-04-06 LAB
ANION GAP SERPL CALC-SCNC: 8 MMOL/L
BASOPHILS # BLD AUTO: 0 K/UL
BASOPHILS NFR BLD: 0 %
BUN SERPL-MCNC: 11 MG/DL
C3 SERPL-MCNC: 54 MG/DL
C4 SERPL-MCNC: 12 MG/DL
CALCIUM SERPL-MCNC: 8.9 MG/DL
CHLORIDE SERPL-SCNC: 106 MMOL/L
CO2 SERPL-SCNC: 24 MMOL/L
CREAT SERPL-MCNC: 0.7 MG/DL
DIFFERENTIAL METHOD: ABNORMAL
EOSINOPHIL # BLD AUTO: 0.1 K/UL
EOSINOPHIL NFR BLD: 3.3 %
ERYTHROCYTE [DISTWIDTH] IN BLOOD BY AUTOMATED COUNT: 14.6 %
EST. GFR  (AFRICAN AMERICAN): >60 ML/MIN/1.73 M^2
EST. GFR  (NON AFRICAN AMERICAN): >60 ML/MIN/1.73 M^2
GLUCOSE SERPL-MCNC: 86 MG/DL
HCT VFR BLD AUTO: 30.6 %
HGB BLD-MCNC: 10.1 G/DL
LYMPHOCYTES # BLD AUTO: 1.9 K/UL
LYMPHOCYTES NFR BLD: 48.3 %
MCH RBC QN AUTO: 28.2 PG
MCHC RBC AUTO-ENTMCNC: 33 G/DL
MCV RBC AUTO: 86 FL
MONOCYTES # BLD AUTO: 0.4 K/UL
MONOCYTES NFR BLD: 9.8 %
NEUTROPHILS # BLD AUTO: 1.5 K/UL
NEUTROPHILS NFR BLD: 38.6 %
PLATELET # BLD AUTO: 160 K/UL
PMV BLD AUTO: 9.8 FL
POTASSIUM SERPL-SCNC: 3.8 MMOL/L
RBC # BLD AUTO: 3.58 M/UL
SODIUM SERPL-SCNC: 138 MMOL/L
WBC # BLD AUTO: 3.89 K/UL

## 2018-04-06 PROCEDURE — 86160 COMPLEMENT ANTIGEN: CPT | Mod: 59

## 2018-04-06 PROCEDURE — 99215 OFFICE O/P EST HI 40 MIN: CPT | Mod: S$GLB,,, | Performed by: INTERNAL MEDICINE

## 2018-04-06 PROCEDURE — 86160 COMPLEMENT ANTIGEN: CPT

## 2018-04-06 PROCEDURE — 99999 PR PBB SHADOW E&M-EST. PATIENT-LVL III: CPT | Mod: PBBFAC,,, | Performed by: INTERNAL MEDICINE

## 2018-04-06 PROCEDURE — 85025 COMPLETE CBC W/AUTO DIFF WBC: CPT | Mod: PO

## 2018-04-06 PROCEDURE — 80048 BASIC METABOLIC PNL TOTAL CA: CPT | Mod: PO

## 2018-04-06 PROCEDURE — 36415 COLL VENOUS BLD VENIPUNCTURE: CPT | Mod: PO

## 2018-04-06 RX ORDER — AMLODIPINE BESYLATE 10 MG/1
10 TABLET ORAL DAILY
Qty: 30 TABLET | Refills: 11 | Status: SHIPPED | OUTPATIENT
Start: 2018-04-06 | End: 2018-12-12

## 2018-04-06 NOTE — PROGRESS NOTES
NEPHROLOGY CLINIC FOLLOWUP NOTE:   Date of clinic visit: 4/6/18     RHEUMATOLOGIST: Jaden Echeverria M.D.      REASON FOR CONSULTATION: Suspected lupus flare. Has a  H/o of lupus nephritis     CHIEF COMPLAINT: History of systemic lupus erythematosus.      HISTORY OF PRESENT ILLNESS: Ms. Vines is a 27-year-old -American female with a h/o of lupus nephritis who presents for f/u. Pt was last seen by me about 3 wks ago. She had a kidney biopsy done on 4/3/18 for suspected lupus nephritis flare that showed the following:    Per [facility] pathology verbal report:  Good biopsy sample, 10 glomeruli, Has lupus nephritis class V, 1 glomerulus globali sclerosed, 10% interstitial fibrosis, no crescents, no necrosis, no evidence of acuity, + full house effect  The biopsy also showed vascular changes c/w hypertensive nephrosclerosis         The biopsy results were interpreted for the pt in layman's language. Basically, there is no change from prior biopsy of 2013.     To review this presentation:  She had returned after a long absence since Aug 2017. Pt was then pregnant at 30 weeks of gestation. She had problems with uncontrolled BP then. She was specifically advised (documented) to RTC in 1 wk. Appt was made for her. She did not show up. Pt gave birth to a healthy baby at 38 wks of gestation. Mycophenolate had been stopped once the pregnancy test was known in Feb 2017. Losartan had also been stopped then as well. Lupus nephritis was not active, and imuran though considered was not used. On return both C3 and C4 complements were low. Pt was re-started on Cellcept by rheumatology 500 mg tid in early week of Jan 2018. Pt was also taking 10 mg of prednisone qd. On initial return visit to us, WBC was low and cellcept dose could not be increased. On her last visit to us, WBC was normal. Pt had no c/o's, no fever, no SOB, no leg swelling, no blood in urine. Pt was only using prednisone 10 mg intermittently, because it  increases her appetite, and she does not want to eat much to avoid wt gain. Mycophenolate was changed form 500 mg tid to 1 g bid. Pt was placed on prednisone 20 mg po qd, and on tapering dose, and was advised not to miss it.    Pt presents for f/u, says is taking all the meds prescribed. Pt has no active oe new c/o's, says is feeling well. Had labs done today, complement levels are not back yet.      PAST MEDICAL HISTORY:   1. Systemic lupus erythematosus diagnosed in November 2013.   2. Class V membranous lupus nephritis. Kidney biopsy on 10/17/2013 showed 5%   to 10% interstitial fibrosis. No acuity on this biopsy. 3rd biopsy on 4/3/18:lupus nephritis class V, 1 glomerulus globali sclerosed, 10% interstitial fibrosis, no crescents, no necrosis, no evidence of acuity, + full house effect. The biopsy also showed vascular changes c/w hypertensive nephrosclerosis   3. Sjogren's disease, diagnosed in 2012.   4. History of genital herpes.      PAST SURGICAL HISTORY: None, apart from the kidney biopsy.      FAMILY HISTORY: No one in the family with lupus. Father is 47 years old. Mom is 44. Both are healthy. The patient does have a 1-year-old son who was diagnosed with third-degree AV block   .   ALLERGIES: Reviewed. No known drug allergies.      MEDICATIONS: Reviewed on EPIC     REVIEW OF SYSTEMS:   HEAD, EYES, EARS, NOSE, THROAT: As above, otherwise negative.   CARDIAC: Negative.   PULMONARY: Negative.   GASTROINTESTINAL: Negative.   GENITOURINARY: As above, otherwise negative.   INFECTIOUS DISEASE: negative.   RHEUMATOLOGICAL: As above, otherwise negative.   The rest of the review of systems is negative.      PHYSICAL EXAMINATION:   VITAL SIGNS: Blood pressure is 142/100, pulse is 88, weight is 180 lbs, last visit 174 lbs  In no acute distress.   GENERAL: She is ambulatory.   HEART: Regular rate and rhythm. No pericardial friction rubs. S1 and S2 audible.   CHEST: Clear to auscultation. No rales, breathing symmetric  and unlabored  ABDOMEN: Soft, gravid  EXTREMITIES: Showed no edema.      LABORATORY VALUES: reviewed:  U prot/cr ratio 470 mg, from 570 mg  C3 pending today, last visit 42, from 44  C4 pending today, last visit 10, from 8      BMP  BMP  Lab Results   Component Value Date     04/06/2018    K 3.8 04/06/2018     04/06/2018    CO2 24 04/06/2018    BUN 11 04/06/2018    CREATININE 0.7 04/06/2018    CALCIUM 8.9 04/06/2018    ANIONGAP 8 04/06/2018    ESTGFRAFRICA >60 04/06/2018    EGFRNONAA >60 04/06/2018     Lab Results   Component Value Date    WBC 3.89 (L) 04/06/2018    HGB 10.1 (L) 04/06/2018    HCT 30.6 (L) 04/06/2018    MCV 86 04/06/2018     04/06/2018           ASSESSMENT AND PLAN: This is a 27-year-old female with lupus nephritis who presents for f/u. Pt was last seen 6 months ago when she was 30 weeks pregnant:  impression is as follows:      1. Renal: persistent  hypocomplementemia  as of complement levels 1 month ago, yet the repeat kidney biopsy does not show any acuity or active lupus disease  The difference is not clear, either mycophenolate already made a good clinical response, and complement levels are lagging behind, or there a sampling error.  Repeat complements are pending  The biopsy showed same level of chronic interstitial fibrosis as the one from 5 years ago, no change at 15%.  The biopsy also showed damage from uncontrolled HTN, pt was advised.    S Cr remains normal  Proteinuria: stable, not worse  Clinically stable  Tolerating losartan well   WBC slightly lower on higher dose of mycophenolate     2. HTN: BP not controlled   Meds reviewed  Advised pt that BP needs to be controlled better  Salt in diet should be reduced, 2-3 g/day max  Examples of salty foods given     3. Pt has been advised by me about the teratogenicity of both mycophenolate and losartan in case of an unplanned pregnancy.     4. Rheum: note reviewed  Agree with mgmt.     PLANS AND RECOMMENDATIONS:   As discussed  above  Increase amlodipine to 10 mg po qd  Continue mycophenolate 1 g po bid  Continue prednisone, do not skip  Risk of renal failure with insufficient treatment discussed with pt.  RTC  1 month, strongly advised, explained reason to pt.  Total time spent 50 minutes including time needed to review the records, the   patient evaluation, documentation, face-to-face discussion with the patient,   more than 50% of the time was spent on coordination of care and counseling.    Level V visit.     Laurence Morales MD

## 2018-04-23 ENCOUNTER — TELEPHONE (OUTPATIENT)
Dept: OBSTETRICS AND GYNECOLOGY | Facility: CLINIC | Age: 28
End: 2018-04-23

## 2018-04-23 ENCOUNTER — TELEPHONE (OUTPATIENT)
Dept: RHEUMATOLOGY | Facility: CLINIC | Age: 28
End: 2018-04-23

## 2018-04-23 NOTE — TELEPHONE ENCOUNTER
----- Message from Cindy Marroquin sent at 4/23/2018 12:32 PM CDT -----  Contact: self [phone]  States that she is on her cycle and needs appt to come in and have Mirena inserted. Please call back at [phone]//thank you acc

## 2018-04-23 NOTE — TELEPHONE ENCOUNTER
----- Message from Cindy Marroquin sent at 4/23/2018 12:32 PM CDT -----  Contact: self [phone]  States that she is on her cycle and needs appt to come in and have Mirena inserted. Please call back at [phone]//thank you acc

## 2018-04-23 NOTE — TELEPHONE ENCOUNTER
----- Message from Cindy Marroquin sent at 4/23/2018 12:34 PM CDT -----  Contact: self [phone]  States that she is having a lot of joint pain and wants to know what she can do for it. Please call back at [phone]//thank you acc

## 2018-04-23 NOTE — TELEPHONE ENCOUNTER
"Spoke with pt and she states that she has been having joint pain and been very tired. Has been having dull, achy, throbby joint pain on and off since October in her legs, arms, upper back and her hands. States her hands lock up and she can't move them but can "pop" them back into place. Denies any swelling. Would like to know if she needs to come in for an appointment or if there was something else she could take. States she recently has another kidney biopsy done as well. Please advise.   "

## 2018-04-23 NOTE — TELEPHONE ENCOUNTER
Spoke with pt and scheduled next day appointment with Dr. MADISON for 4.24.18 at 3.00 pm. Pt verbalized understanding.

## 2018-04-23 NOTE — TELEPHONE ENCOUNTER
Spoke to patient and scheduled her mirena insertion for 04/26/18 at 3:00pm at the [facility] location with Cindy Figueroa CNM. Patient verbalized understanding. Verified that the mirena is at this location.

## 2018-04-24 ENCOUNTER — TELEPHONE (OUTPATIENT)
Dept: PHARMACY | Facility: CLINIC | Age: 28
End: 2018-04-24

## 2018-04-24 ENCOUNTER — OFFICE VISIT (OUTPATIENT)
Dept: RHEUMATOLOGY | Facility: CLINIC | Age: 28
End: 2018-04-24
Payer: COMMERCIAL

## 2018-04-24 VITALS
HEART RATE: 86 BPM | BODY MASS INDEX: 29.38 KG/M2 | DIASTOLIC BLOOD PRESSURE: 104 MMHG | HEIGHT: 65 IN | WEIGHT: 176.38 LBS | SYSTOLIC BLOOD PRESSURE: 155 MMHG

## 2018-04-24 DIAGNOSIS — M35.09 SJOGREN'S SYNDROME WITH OTHER ORGAN INVOLVEMENT: ICD-10-CM

## 2018-04-24 DIAGNOSIS — M32.14 SLE GLOMERULONEPHRITIS SYNDROME, WHO CLASS V: ICD-10-CM

## 2018-04-24 DIAGNOSIS — Z79.899 LONG-TERM USE OF PLAQUENIL: ICD-10-CM

## 2018-04-24 DIAGNOSIS — M32.9 SYSTEMIC LUPUS ERYTHEMATOSUS ARTHRITIS: Primary | ICD-10-CM

## 2018-04-24 PROCEDURE — 99214 OFFICE O/P EST MOD 30 MIN: CPT | Mod: S$GLB,,, | Performed by: INTERNAL MEDICINE

## 2018-04-24 PROCEDURE — 99999 PR PBB SHADOW E&M-EST. PATIENT-LVL III: CPT | Mod: PBBFAC,,, | Performed by: INTERNAL MEDICINE

## 2018-04-24 NOTE — PROGRESS NOTES
RHEUMATOLOGY CLINIC FOLLOW UP VISIT  Chief complaints:-  I have been having more joint pains lately .     HPI:-  Alisia Jolly a 27 y.o. pleasant female comes in for a follow up visit with above chief complaints. She has systemic lupus erythematosus diagnosed in 2013 when she had diffuse lymphadenopathy and anasarca.  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  She has been intermittently following up in the rheumatology clinic.  She was on immunosuppressive therapy and got pregnant accidentally since she was not taking her OCP's during that week because of irregular cycles. She underwent elective CS and delivered a healthy baby 10/2017.  She complains of worsening joint pain since last month  Associated with swelling denies any joint pain today.  She also denies seizures or psychosis,  joint swelling, muscle weakness,Raynaud's phenomenon, sicca symptoms .    Review of Systems   Constitutional: Positive for malaise/fatigue. Negative for chills and fever.   HENT: Negative for nosebleeds and sore throat.    Eyes: Negative for blurred vision, photophobia and redness.   Respiratory: Negative for cough, sputum production and shortness of breath.    Cardiovascular: Negative for chest pain and leg swelling.   Gastrointestinal: Negative for abdominal pain, constipation, diarrhea and heartburn.   Genitourinary: Negative for dysuria, frequency and urgency.   Musculoskeletal: Positive for joint pain. Negative for back pain, falls, myalgias and neck pain.   Skin: Negative for itching and rash.   Neurological: Negative for dizziness, tremors, seizures, loss of consciousness, weakness and headaches.   Endo/Heme/Allergies: Negative for environmental allergies. Does not bruise/bleed easily.   Psychiatric/Behavioral: Negative for hallucinations and memory loss. The patient does not have insomnia.         Past Medical History:   Diagnosis Date    Allergic rhinitis     Anemia     Condyloma acuminata     GERD (gastroesophageal reflux disease)     HSV-2 (herpes simplex virus 2) infection     Lupus nephritis     Mood disorder     Overweight(278.02)     Sjogren's syndrome     Systemic lupus erythematosus     Thrombocytopenia        Past Surgical History:   Procedure Laterality Date     SECTION, LOW TRANSVERSE      RENAL BIOPSY  2013        Social History   Substance Use Topics    Smoking status: Never Smoker    Smokeless tobacco: Never Used    Alcohol use No       Family History   Problem Relation Age of Onset    Hypertension Mother     Eczema Brother     Heart disease Son     Hypertension Maternal Grandmother     Diabetes Paternal Grandmother     Lupus Neg Hx     Psoriasis Neg Hx        Review of patient's allergies indicates:  No Known Allergies        Physical examination:-    There were no vitals filed for this visit.    Physical Exam   Constitutional: She is oriented to person, place, and time and well-developed, well-nourished, and in no distress. No distress.   HENT:   Head: Normocephalic.   Mouth/Throat: Oropharynx is clear and moist.   Eyes: Conjunctivae and EOM are normal. Pupils are equal, round, and reactive to light.   Neck: Normal range of motion. Neck supple.   Cardiovascular: Normal rate and intact distal pulses.  Exam reveals no friction rub.    Pulmonary/Chest: Effort normal. No respiratory distress. She exhibits no tenderness.   Abdominal: Soft. There is no tenderness.   Musculoskeletal:   No synovitis over small joints of hands or feet.  No effusion over large joints. Some fullness over right wrist. Tenderness over back .    Neurological: She is alert and oriented to person, place, and time. No cranial nerve deficit.   Skin: Skin is warm. No rash noted. No erythema.   Psychiatric: Mood and affect normal.   Nursing note and vitals  reviewed.      Labs:-  Results for HEIDI SHAIKH (MRN [id number]) as of 5/22/2017 12:21   Ref. Range 1/23/2017 08:10 2/17/2017 08:40 3/22/2017 08:58 5/19/2017 13:36   HARPREET HEP-2 Titer Unknown Positive >=1:2560...      Anti-SSA Antibody Latest Ref Range: 0.00 - 19.99 .26 (H)      Anti-SSA Interpretation Latest Ref Range: Negative  Positive (A)      Anti-SSB Antibody Latest Ref Range: 0.00 - 19.99 EU 46.84 (H)      Anti-SSB Interpretation Latest Ref Range: Negative  Positive (A)      ds DNA Ab Latest Ref Range: Negative 1:10  Negative 1:10      Anti Sm Antibody Latest Ref Range: 0.00 - 19.99 EU 2.89      Anti-Sm Interpretation Latest Ref Range: Negative  Negative      Anti Sm/RNP Antibody Latest Ref Range: 0.00 - 19.99 EU 4.12      Anti-Sm/RNP Interpretation Latest Ref Range: Negative  Negative      Complement (C-3) Latest Ref Range: 50 - 180 mg/dL 45 (L) 56 47 (L) 56   Complement (C-4) Latest Ref Range: 11 - 44 mg/dL 10 (L) 13 13 16         Medication List with Changes/Refills   Current Medications    AMLODIPINE (NORVASC) 10 MG TABLET    Take 1 tablet (10 mg total) by mouth once daily.    EFINACONAZOLE (JUBLIA) 10 % RALF    Apply once daily to nail and nail fold.  Use daily for up to 1 year.    FAMOTIDINE (PEPCID) 20 MG TABLET    Take 1 tablet (20 mg total) by mouth 2 (two) times daily as needed for Heartburn.    FLUOXETINE (PROZAC) 40 MG CAPSULE    Take 1 capsule (40 mg total) by mouth once daily.    FLUTICASONE (FLONASE) 50 MCG/ACTUATION NASAL SPRAY    2 sprays by Each Nare route once daily.    HYDROXYCHLOROQUINE (PLAQUENIL) 200 MG TABLET    Take 1 tablet (200 mg total) by mouth 2 (two) times daily.    LABETALOL (NORMODYNE) 200 MG TABLET    Take 1 tablet (200 mg total) by mouth every 12 (twelve) hours.    LOSARTAN (COZAAR) 50 MG TABLET    Take 1 tablet (50 mg total) by mouth once daily.    MIRENA 20 MCG/24 HR (5 YEARS) IUD        MYCOPHENOLATE (CELLCEPT) 500 MG TAB    Take 2 tablets (1,000 mg total) by  mouth 2 (two) times daily.    PREDNISONE (DELTASONE) 20 MG TABLET    Take 20 mg once daily for 2 weeks, then 15 mg daily next 2 weeks, then 10 mg daily next week and then 5 mg daily thereafter.    VALACYCLOVIR (VALTREX) 500 MG TABLET    Take 2 tablets (1,000 mg total) by mouth once daily.     Assessment/Plans:-  # Systemic lupus erythematosus arthritis  Intermittently flare ups of arthritis , but no active synovitis on exam today. Arthritis was well controlled when she was on benlysta in the past. Try to get approval for SQ benlysta since she has difficulty coming in for monthly infusions. Advised all adverse effects.   - belimumab (BENLYSTA) 200 mg/mL AtIn; Inject 200 mg into the skin every 7 days.  Dispense: 4 mL; Refill: 3    # SLE glomerulonephritis syndrome, WHO class V  Improving P/C ration on Cellcept 1.5gms bid , plaquenil and 5 mg daily prednisone. Continue follow up with .     # Sjogren's syndrome with other organ involvement  Stable sicca syndrome on plaquenil . Monitor.     # Long-term use of Plaquenil  Yearly retinal exams while on plaquenil. Safety labs for cellcept every 12 weeks. Hold cellcept if any infection.     Disclaimer: This note was prepared using voice recognition system and is likely to have sound alike errors .  Please call me with any questions

## 2018-04-25 ENCOUNTER — TELEPHONE (OUTPATIENT)
Dept: OBSTETRICS AND GYNECOLOGY | Facility: CLINIC | Age: 28
End: 2018-04-25

## 2018-04-25 NOTE — TELEPHONE ENCOUNTER
----- Message from Irena Pascual sent at 4/25/2018  3:02 PM CDT -----  Contact: pt   Call pt regarding if she can get a earlier time for her procedure that is schedule for tomorrow.  [phone] (home)

## 2018-04-25 NOTE — TELEPHONE ENCOUNTER
Spoke with pt in regards to her appt tomorrow. Informed her that we did not have any earlier appts. Pt was concerned because she has a 6 month old, and she does not have a carrier for him. Informed pt that she could bring the baby, and we could take care of the baby while she has her procedure completed if needed. Pt voiced understanding.

## 2018-04-26 ENCOUNTER — PROCEDURE VISIT (OUTPATIENT)
Dept: OBSTETRICS AND GYNECOLOGY | Facility: CLINIC | Age: 28
End: 2018-04-26
Payer: COMMERCIAL

## 2018-04-26 VITALS
SYSTOLIC BLOOD PRESSURE: 162 MMHG | HEIGHT: 65 IN | DIASTOLIC BLOOD PRESSURE: 92 MMHG | BODY MASS INDEX: 29.9 KG/M2 | WEIGHT: 179.44 LBS

## 2018-04-26 DIAGNOSIS — Z30.430 ENCOUNTER FOR INSERTION OF MIRENA IUD: Primary | ICD-10-CM

## 2018-04-26 PROBLEM — Z98.891 S/P C-SECTION: Status: RESOLVED | Noted: 2017-10-19 | Resolved: 2018-04-26

## 2018-04-26 PROBLEM — Z98.891 STATUS POST REPEAT LOW TRANSVERSE CESAREAN SECTION: Status: RESOLVED | Noted: 2017-10-19 | Resolved: 2018-04-26

## 2018-04-26 PROCEDURE — 58300 INSERT INTRAUTERINE DEVICE: CPT | Mod: S$GLB,,, | Performed by: ADVANCED PRACTICE MIDWIFE

## 2018-05-01 NOTE — TELEPHONE ENCOUNTER
DOCUMENTATION ONLY:  Prior authorization for Bari approved from 5/1/18 to 12/31/18    Case Id: [id number]    Co-pay: $892.41    Patient Assistance is required

## 2018-05-15 NOTE — TELEPHONE ENCOUNTER
Initial Benlysta 200mg/ml Auto-Injector consult completed on 5/15/18. Benlysta 200mg/ml Auto-Injector will be shipped on 18 to arrive at patient's home on 18 via [employer]. $ 3.00 copay. Patient will start Benlysta 200mg/ml Auto-Injector on 18 . Address confirmed. CC on file. Confirmed 2 patient identifiers - name and . Therapy Appropriate.    Counseled patient on administration directions:  -  Inject Benlysta 200mg (1 auto-injector) into the skin every 7 days.   -  Take out of the refrigerator 30-60 minutes prior to injection.  - Wash hands before and after injection.  - Monthly RX will come with gauze, bandaids, and alcohol swabs.  - Patient may inject in either the tops of the thighs, abdomen- but at least 2 inches away from her belly button.  Patient was instructed to rotate injections sites.  - Patient is to wipe down the injection site with the alcohol pad, wait to dry.  Place the auto-injector on the skin at a 90 degree angle. Once the auto-injector is in place, the patient will push down to start the injection. The patient will hear a click (1st click) and hold in place for 10-15 seconds, until the window has gone from clear to purple. Once the window is purple, the patient will hear another click (2nd click) to alert the patient the injection is complete.    - Patient will use sharps container; once full, per [place] law, she may lock the sharps container and place in her trash. She can then contact the pharmacy and we will replace the sharps at no additional charge.    Patient was counseled on possible side effects:  - Injection site reaction: redness, soreness, itching, bruising, which should resolve within 3-5 days, nausea, diarrhea, runny nose, trouble sleeping and sore throat.  - Increased risk for infections.  If patient becomes sick, patient is to hold Benlysta use until she is better.     Patient verbalized understanding. Compliance stressed. Patient advised to keep a calendar marking dates  of injections to ensure better compliance. Patient advised to call myself or provider should any questions arise. Patient plans to start Benlysta on 5/17/18. Consultation included: indication; goals of treatment; administration; storage and handling; side effects; how to handle side effects; the importance of compliance; how to handle missed doses; the importance of laboratory monitoring; the importance of keeping all follow up appointments.  Patient understands to report any medication changes to [facility] and provider. All questions answered and addressed to patients satisfaction. I will f/u with her in 1 week from start, [facility] to contact patient in 3 weeks for refills.

## 2018-05-16 ENCOUNTER — OFFICE VISIT (OUTPATIENT)
Dept: RHEUMATOLOGY | Facility: CLINIC | Age: 28
End: 2018-05-16
Payer: COMMERCIAL

## 2018-05-16 VITALS
SYSTOLIC BLOOD PRESSURE: 180 MMHG | HEIGHT: 65 IN | DIASTOLIC BLOOD PRESSURE: 115 MMHG | HEART RATE: 70 BPM | WEIGHT: 179 LBS | BODY MASS INDEX: 29.82 KG/M2

## 2018-05-16 DIAGNOSIS — M32.14 SLE GLOMERULONEPHRITIS SYNDROME, WHO CLASS V: ICD-10-CM

## 2018-05-16 DIAGNOSIS — I10 SEVERE UNCONTROLLED HYPERTENSION: ICD-10-CM

## 2018-05-16 DIAGNOSIS — Z79.899 LONG-TERM USE OF PLAQUENIL: ICD-10-CM

## 2018-05-16 DIAGNOSIS — M32.9 SYSTEMIC LUPUS ERYTHEMATOSUS ARTHRITIS: Primary | ICD-10-CM

## 2018-05-16 DIAGNOSIS — M35.09 SJOGREN'S SYNDROME WITH OTHER ORGAN INVOLVEMENT: ICD-10-CM

## 2018-05-16 PROCEDURE — 99999 PR PBB SHADOW E&M-EST. PATIENT-LVL III: CPT | Mod: PBBFAC,,, | Performed by: INTERNAL MEDICINE

## 2018-05-16 PROCEDURE — 3080F DIAST BP >= 90 MM HG: CPT | Mod: CPTII,S$GLB,, | Performed by: INTERNAL MEDICINE

## 2018-05-16 PROCEDURE — 3008F BODY MASS INDEX DOCD: CPT | Mod: CPTII,S$GLB,, | Performed by: INTERNAL MEDICINE

## 2018-05-16 PROCEDURE — 3077F SYST BP >= 140 MM HG: CPT | Mod: CPTII,S$GLB,, | Performed by: INTERNAL MEDICINE

## 2018-05-16 PROCEDURE — 99214 OFFICE O/P EST MOD 30 MIN: CPT | Mod: S$GLB,,, | Performed by: INTERNAL MEDICINE

## 2018-05-16 NOTE — PROGRESS NOTES
RHEUMATOLOGY CLINIC FOLLOW UP VISIT  Chief complaints:-  To follow up for lupus.     HPI:-  Alisia Jolly a 27 y.o. pleasant female comes in for a follow up visit with above chief complaints. She has systemic lupus erythematosus diagnosed in 2013 when she had diffuse lymphadenopathy and anasarca.  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  She has been intermittently following up in the rheumatology clinic.  She was on immunosuppressive therapy and got pregnant accidentally since she was not taking her OCP's during that week because of irregular cycles. She underwent elective CS and delivered a healthy baby 10/2017.  Since then she has been intermittently on cellcept , plaquenil, prednisone and her HTN medications. She complains of intermittent joint pain . No swelling. She also denies seizures or psychosis,  joint swelling, muscle weakness,Raynaud's phenomenon, sicca symptoms .    Review of Systems   Constitutional: Positive for malaise/fatigue. Negative for chills and fever.   HENT: Negative for nosebleeds and sore throat.    Eyes: Negative for blurred vision, photophobia and redness.   Respiratory: Negative for cough, sputum production and shortness of breath.    Cardiovascular: Negative for chest pain and leg swelling.   Gastrointestinal: Negative for abdominal pain, constipation, diarrhea and heartburn.   Genitourinary: Negative for dysuria, frequency and urgency.   Musculoskeletal: Positive for joint pain. Negative for back pain, falls, myalgias and neck pain.   Skin: Negative for itching and rash.   Neurological: Negative for dizziness, tremors, seizures, loss of consciousness, weakness and headaches.   Endo/Heme/Allergies: Negative for environmental allergies. Does not bruise/bleed easily.   Psychiatric/Behavioral: Negative for hallucinations and memory loss. The patient does not  "have insomnia.        Past Medical History:   Diagnosis Date    Allergic rhinitis     Anemia     Condyloma acuminata     GERD (gastroesophageal reflux disease)     HSV-2 (herpes simplex virus 2) infection     Lupus nephritis     Mood disorder     Overweight(278.02)     Sjogren's syndrome     Systemic lupus erythematosus     Thrombocytopenia        Past Surgical History:   Procedure Laterality Date     SECTION, LOW TRANSVERSE      RENAL BIOPSY  2013        Social History   Substance Use Topics    Smoking status: Never Smoker    Smokeless tobacco: Never Used    Alcohol use No       Family History   Problem Relation Age of Onset    Hypertension Mother     Eczema Brother     Heart disease Son     Hypertension Maternal Grandmother     Diabetes Paternal Grandmother     Lupus Neg Hx     Psoriasis Neg Hx        Review of patient's allergies indicates:  No Known Allergies        Physical examination:-    Vitals:    18 1434   BP: (!) 180/115   Pulse: 70   Weight: 81.2 kg (179 lb 0.2 oz)   Height: 5' 5" (1.651 m)   PainSc: 0-No pain       Physical Exam   Constitutional: She is oriented to person, place, and time and well-developed, well-nourished, and in no distress. No distress.   HENT:   Head: Normocephalic.   Mouth/Throat: Oropharynx is clear and moist.   Eyes: Conjunctivae and EOM are normal. Pupils are equal, round, and reactive to light.   Neck: Normal range of motion. Neck supple.   Cardiovascular: Normal rate and intact distal pulses.  Exam reveals no friction rub.    Pulmonary/Chest: Effort normal. No respiratory distress. She exhibits no tenderness.   Abdominal: Soft. There is no tenderness.   Musculoskeletal:   No synovitis over small joints of hands or feet.  No effusion over large joints. Some fullness over right wrist. Tenderness over back .    Neurological: She is alert and oriented to person, place, and time. No cranial nerve deficit.   Skin: Skin is warm. No rash " noted. No erythema.   Psychiatric: Mood and affect normal.   Nursing note and vitals reviewed.      Labs:-  Results for HEIDI SHAIKH (MRN [id number]) as of 5/22/2017 12:21   Ref. Range 1/23/2017 08:10 2/17/2017 08:40 3/22/2017 08:58 5/19/2017 13:36   HARPREET HEP-2 Titer Unknown Positive >=1:2560...      Anti-SSA Antibody Latest Ref Range: 0.00 - 19.99 .26 (H)      Anti-SSA Interpretation Latest Ref Range: Negative  Positive (A)      Anti-SSB Antibody Latest Ref Range: 0.00 - 19.99 EU 46.84 (H)      Anti-SSB Interpretation Latest Ref Range: Negative  Positive (A)      ds DNA Ab Latest Ref Range: Negative 1:10  Negative 1:10      Anti Sm Antibody Latest Ref Range: 0.00 - 19.99 EU 2.89      Anti-Sm Interpretation Latest Ref Range: Negative  Negative      Anti Sm/RNP Antibody Latest Ref Range: 0.00 - 19.99 EU 4.12      Anti-Sm/RNP Interpretation Latest Ref Range: Negative  Negative      Complement (C-3) Latest Ref Range: 50 - 180 mg/dL 45 (L) 56 47 (L) 56   Complement (C-4) Latest Ref Range: 11 - 44 mg/dL 10 (L) 13 13 16         Medication List with Changes/Refills   Current Medications    AMLODIPINE (NORVASC) 10 MG TABLET    Take 1 tablet (10 mg total) by mouth once daily.    BELIMUMAB (BENLYSTA) 200 MG/ML ATIN    Inject 200 mg into the skin every 7 days.    EFINACONAZOLE (JUBLIA) 10 % RALF    Apply once daily to nail and nail fold.  Use daily for up to 1 year.    FAMOTIDINE (PEPCID) 20 MG TABLET    Take 1 tablet (20 mg total) by mouth 2 (two) times daily as needed for Heartburn.    FLUOXETINE (PROZAC) 40 MG CAPSULE    Take 1 capsule (40 mg total) by mouth once daily.    FLUTICASONE (FLONASE) 50 MCG/ACTUATION NASAL SPRAY    2 sprays by Each Nare route once daily.    HYDROXYCHLOROQUINE (PLAQUENIL) 200 MG TABLET    Take 1 tablet (200 mg total) by mouth 2 (two) times daily.    LABETALOL (NORMODYNE) 200 MG TABLET    Take 1 tablet (200 mg total) by mouth every 12 (twelve) hours.    LOSARTAN (COZAAR) 50 MG TABLET     Take 1 tablet (50 mg total) by mouth once daily.    MIRENA 20 MCG/24 HR (5 YEARS) IUD        MYCOPHENOLATE (CELLCEPT) 500 MG TAB    Take 2 tablets (1,000 mg total) by mouth 2 (two) times daily.    PREDNISONE (DELTASONE) 20 MG TABLET    Take 20 mg once daily for 2 weeks, then 15 mg daily next 2 weeks, then 10 mg daily next week and then 5 mg daily thereafter.    VALACYCLOVIR (VALTREX) 500 MG TABLET    Take 2 tablets (1,000 mg total) by mouth once daily.     Assessment/Plans:-  # Systemic lupus erythematosus arthritis  Intermittently flare ups of arthritis , but no active synovitis on exam today. Arthritis was well controlled when she was on benlysta in the past. Approved for sq benlysta- to start next week.  Advised all adverse effects.   - belimumab (BENLYSTA) 200 mg/mL AtIn; Inject 200 mg into the skin every 7 days.  Dispense: 4 mL; Refill: 3    # SLE glomerulonephritis syndrome, WHO class V  Improving P/C ratio on Cellcept 1gm bid , plaquenil and 5 mg daily prednisone. Continue follow up with .     # Sjogren's syndrome with other organ involvement  Stable sicca syndrome on plaquenil . Monitor.     # Long-term use of Plaquenil  Yearly retinal exams while on plaquenil. Safety labs for cellcept every 12 weeks. Hold cellcept if any infection.     Disclaimer: This note was prepared using voice recognition system and is likely to have sound alike errors .  Please call me with any questions

## 2018-06-26 ENCOUNTER — TELEPHONE (OUTPATIENT)
Dept: PHARMACY | Facility: CLINIC | Age: 28
End: 2018-06-26

## 2018-07-05 DIAGNOSIS — Z76.0 MEDICATION REFILL: ICD-10-CM

## 2018-07-05 DIAGNOSIS — O99.340 DEPRESSION AFFECTING PREGNANCY: ICD-10-CM

## 2018-07-05 DIAGNOSIS — F32.A DEPRESSION AFFECTING PREGNANCY: ICD-10-CM

## 2018-07-09 RX ORDER — FLUOXETINE HYDROCHLORIDE 40 MG/1
CAPSULE ORAL
Qty: 30 CAPSULE | Refills: 0 | Status: SHIPPED | OUTPATIENT
Start: 2018-07-09 | End: 2018-08-17 | Stop reason: SDUPTHER

## 2018-07-10 ENCOUNTER — OFFICE VISIT (OUTPATIENT)
Dept: FAMILY MEDICINE | Facility: CLINIC | Age: 28
End: 2018-07-10
Payer: COMMERCIAL

## 2018-07-10 VITALS
SYSTOLIC BLOOD PRESSURE: 135 MMHG | HEIGHT: 65 IN | DIASTOLIC BLOOD PRESSURE: 79 MMHG | BODY MASS INDEX: 29.82 KG/M2 | WEIGHT: 179 LBS | TEMPERATURE: 98 F

## 2018-07-10 DIAGNOSIS — S00.412A EAR CANAL ABRASION, LEFT, INITIAL ENCOUNTER: Primary | ICD-10-CM

## 2018-07-10 PROBLEM — I10 SEVERE UNCONTROLLED HYPERTENSION: Status: RESOLVED | Noted: 2018-05-16 | Resolved: 2018-07-10

## 2018-07-10 PROCEDURE — 99999 PR PBB SHADOW E&M-EST. PATIENT-LVL IV: CPT | Mod: PBBFAC,,, | Performed by: REGISTERED NURSE

## 2018-07-10 PROCEDURE — 3075F SYST BP GE 130 - 139MM HG: CPT | Mod: CPTII,S$GLB,, | Performed by: REGISTERED NURSE

## 2018-07-10 PROCEDURE — 3078F DIAST BP <80 MM HG: CPT | Mod: CPTII,S$GLB,, | Performed by: REGISTERED NURSE

## 2018-07-10 PROCEDURE — 99213 OFFICE O/P EST LOW 20 MIN: CPT | Mod: S$GLB,,, | Performed by: REGISTERED NURSE

## 2018-07-10 PROCEDURE — 3008F BODY MASS INDEX DOCD: CPT | Mod: CPTII,S$GLB,, | Performed by: REGISTERED NURSE

## 2018-07-10 RX ORDER — MYCOPHENOLATE MOFETIL 500 MG/1
TABLET ORAL
COMMUNITY
Start: 2018-07-05 | End: 2019-01-11

## 2018-07-10 RX ORDER — PREDNISONE 10 MG/1
TABLET ORAL
COMMUNITY
Start: 2018-07-05 | End: 2018-08-16 | Stop reason: SDUPTHER

## 2018-07-10 RX ORDER — METHYLDOPA 500 MG/1
TABLET, FILM COATED ORAL
COMMUNITY
Start: 2018-07-05 | End: 2018-10-29

## 2018-07-11 NOTE — PROGRESS NOTES
"Subjective:       Patient ID: Alisia Vines is a 27 y.o. female.    Chief Complaint: Ear Laceration      HPI    Alisia is here today with c/o cut in her LT ear for the past few days while cleaning canal out with q-tip.  Denies bleeding or drainage.  Does have mild pain and soreness.  Can feel a scab to canal.      Review of Systems   Constitutional: Negative for chills and fever.   HENT: Positive for ear pain. Negative for hearing loss, tinnitus and trouble swallowing.    Respiratory: Negative.    Cardiovascular: Negative.    Neurological: Negative.          Patient Active Problem List   Diagnosis    Sjogren's syndrome    HSV-2 (herpes simplex virus 2) infection    Allergic rhinitis    Proteinuria    Lupus nephritis    SLE (systemic lupus erythematosus related syndrome)    Anemia    GERD (gastroesophageal reflux disease)    Essential hypertension    SLE glomerulonephritis syndrome, WHO class V    History of fetal anomaly in prior pregnancy, currently pregnant, unspecified trimester    Long-term use of Plaquenil    Systemic lupus erythematosus arthritis       Past medical, surgical, family and social histories have been reviewed today.        Objective:     Vitals:    07/10/18 1410   BP: 135/79   BP Location: Left arm   Patient Position: Sitting   BP Method: Medium (Manual)   Temp: 98.1 °F (36.7 °C)   TempSrc: Oral   Weight: 81.2 kg (179 lb 0.2 oz)   Height: 5' 5" (1.651 m)       Physical Exam   Constitutional: She is oriented to person, place, and time. She appears well-developed and well-nourished.   HENT:   Left Ear: There is tenderness (mild tenderness to outer ear canal with small superficial abrasion, non-infected).   Ears:    Lymphadenopathy:     She has no cervical adenopathy.   Neurological: She is alert and oriented to person, place, and time.         Medication List with Changes/Refills   Current Medications    AMLODIPINE (NORVASC) 10 MG TABLET    Take 1 tablet (10 mg total) by " mouth once daily.    BELIMUMAB (BENLYSTA) 200 MG/ML ATIN    Inject 200 mg into the skin every 7 days.    EFINACONAZOLE (JUBLIA) 10 % RALF    Apply once daily to nail and nail fold.  Use daily for up to 1 year.    FAMOTIDINE (PEPCID) 20 MG TABLET    Take 1 tablet (20 mg total) by mouth 2 (two) times daily as needed for Heartburn.    FLUOXETINE (PROZAC) 40 MG CAPSULE    TAKE 1 CAPSULE BY MOUTH EVERY DAY    FLUTICASONE (FLONASE) 50 MCG/ACTUATION NASAL SPRAY    2 sprays by Each Nare route once daily.    HYDROXYCHLOROQUINE (PLAQUENIL) 200 MG TABLET    Take 1 tablet (200 mg total) by mouth 2 (two) times daily.    LABETALOL (NORMODYNE) 200 MG TABLET    Take 1 tablet (200 mg total) by mouth every 12 (twelve) hours.    LOSARTAN (COZAAR) 50 MG TABLET    Take 1 tablet (50 mg total) by mouth once daily.    METHYLDOPA (ALDOMET) 500 MG TABLET        MIRENA 20 MCG/24 HR (5 YEARS) IUD        MYCOPHENOLATE (CELLCEPT) 500 MG TAB    Take 2 tablets (1,000 mg total) by mouth 2 (two) times daily.    MYCOPHENOLATE (CELLCEPT) 500 MG TAB        PREDNISONE (DELTASONE) 10 MG TABLET        VALACYCLOVIR (VALTREX) 500 MG TABLET    Take 2 tablets (1,000 mg total) by mouth once daily.   Discontinued Medications    PREDNISONE (DELTASONE) 20 MG TABLET    Take 20 mg once daily for 2 weeks, then 15 mg daily next 2 weeks, then 10 mg daily next week and then 5 mg daily thereafter.           Diagnosis       1. Ear canal abrasion, left, initial encounter          Assessment/ Plan     Ear canal abrasion, left, initial encounter  · Skin/wound care discussed.  · Neosporin.  · Try to avoid using q-tips for routine ear cleaning.  · Follow-up in clinic as needed.        JUAREZ Buenrostro  [facility]

## 2018-07-27 ENCOUNTER — TELEPHONE (OUTPATIENT)
Dept: PHARMACY | Facility: CLINIC | Age: 28
End: 2018-07-27

## 2018-08-13 ENCOUNTER — TELEPHONE (OUTPATIENT)
Dept: OBSTETRICS AND GYNECOLOGY | Facility: CLINIC | Age: 28
End: 2018-08-13

## 2018-08-13 NOTE — TELEPHONE ENCOUNTER
----- Message from Luana Connolly sent at 8/13/2018 11:31 AM CDT -----  Pt at [phone]//states she is needing a letter for school that she was pregnant the beginning of January 2017 thru October 2017//need on letterhead paper//please call when ready or possibly e mail to her//eliane/arabella

## 2018-08-14 ENCOUNTER — TELEPHONE (OUTPATIENT)
Dept: PHARMACY | Facility: CLINIC | Age: 28
End: 2018-08-14

## 2018-08-16 ENCOUNTER — OFFICE VISIT (OUTPATIENT)
Dept: RHEUMATOLOGY | Facility: CLINIC | Age: 28
End: 2018-08-16
Payer: COMMERCIAL

## 2018-08-16 ENCOUNTER — LAB VISIT (OUTPATIENT)
Dept: LAB | Facility: HOSPITAL | Age: 28
End: 2018-08-16
Attending: INTERNAL MEDICINE
Payer: COMMERCIAL

## 2018-08-16 VITALS
HEART RATE: 86 BPM | SYSTOLIC BLOOD PRESSURE: 153 MMHG | HEIGHT: 65 IN | DIASTOLIC BLOOD PRESSURE: 93 MMHG | BODY MASS INDEX: 30.08 KG/M2 | WEIGHT: 180.56 LBS

## 2018-08-16 DIAGNOSIS — M35.09 SJOGREN'S SYNDROME WITH OTHER ORGAN INVOLVEMENT: ICD-10-CM

## 2018-08-16 DIAGNOSIS — M32.14 SLE GLOMERULONEPHRITIS SYNDROME, WHO CLASS V: ICD-10-CM

## 2018-08-16 DIAGNOSIS — M32.9 SYSTEMIC LUPUS ERYTHEMATOSUS ARTHRITIS: Primary | ICD-10-CM

## 2018-08-16 DIAGNOSIS — Z79.899 LONG-TERM USE OF PLAQUENIL: ICD-10-CM

## 2018-08-16 DIAGNOSIS — M32.15 SYSTEMIC LUPUS ERYTHEMATOSUS WITH TUBULO-INTERSTITIAL NEPHROPATHY, UNSPECIFIED SLE TYPE: ICD-10-CM

## 2018-08-16 LAB
ALBUMIN SERPL BCP-MCNC: 3.3 G/DL
ALP SERPL-CCNC: 55 U/L
ALT SERPL W/O P-5'-P-CCNC: 8 U/L
ANION GAP SERPL CALC-SCNC: 5 MMOL/L
AST SERPL-CCNC: 18 U/L
BASOPHILS # BLD AUTO: 0.01 K/UL
BASOPHILS NFR BLD: 0.4 %
BILIRUB SERPL-MCNC: 0.4 MG/DL
BUN SERPL-MCNC: 12 MG/DL
C3 SERPL-MCNC: 51 MG/DL
C4 SERPL-MCNC: 12 MG/DL
CALCIUM SERPL-MCNC: 8.7 MG/DL
CHLORIDE SERPL-SCNC: 109 MMOL/L
CO2 SERPL-SCNC: 24 MMOL/L
CREAT SERPL-MCNC: 0.7 MG/DL
CRP SERPL-MCNC: 0.4 MG/L
DIFFERENTIAL METHOD: ABNORMAL
EOSINOPHIL # BLD AUTO: 0.2 K/UL
EOSINOPHIL NFR BLD: 8.5 %
ERYTHROCYTE [DISTWIDTH] IN BLOOD BY AUTOMATED COUNT: 13.6 %
ERYTHROCYTE [SEDIMENTATION RATE] IN BLOOD BY WESTERGREN METHOD: 45 MM/HR
EST. GFR  (AFRICAN AMERICAN): >60 ML/MIN/1.73 M^2
EST. GFR  (NON AFRICAN AMERICAN): >60 ML/MIN/1.73 M^2
GLUCOSE SERPL-MCNC: 90 MG/DL
HCT VFR BLD AUTO: 30.1 %
HGB BLD-MCNC: 10.2 G/DL
LYMPHOCYTES # BLD AUTO: 1.2 K/UL
LYMPHOCYTES NFR BLD: 44.6 %
MCH RBC QN AUTO: 27.8 PG
MCHC RBC AUTO-ENTMCNC: 33.9 G/DL
MCV RBC AUTO: 82 FL
MONOCYTES # BLD AUTO: 0.1 K/UL
MONOCYTES NFR BLD: 3.5 %
NEUTROPHILS # BLD AUTO: 1.1 K/UL
NEUTROPHILS NFR BLD: 43 %
PLATELET # BLD AUTO: 167 K/UL
PMV BLD AUTO: 10.5 FL
POTASSIUM SERPL-SCNC: 3.6 MMOL/L
PROT SERPL-MCNC: 7.8 G/DL
RBC # BLD AUTO: 3.67 M/UL
SODIUM SERPL-SCNC: 138 MMOL/L
WBC # BLD AUTO: 2.6 K/UL

## 2018-08-16 PROCEDURE — 80053 COMPREHEN METABOLIC PANEL: CPT | Mod: PO

## 2018-08-16 PROCEDURE — 3077F SYST BP >= 140 MM HG: CPT | Mod: CPTII,S$GLB,, | Performed by: INTERNAL MEDICINE

## 2018-08-16 PROCEDURE — 86160 COMPLEMENT ANTIGEN: CPT | Mod: 59

## 2018-08-16 PROCEDURE — 36415 COLL VENOUS BLD VENIPUNCTURE: CPT | Mod: PO

## 2018-08-16 PROCEDURE — 85651 RBC SED RATE NONAUTOMATED: CPT | Mod: PO

## 2018-08-16 PROCEDURE — 85025 COMPLETE CBC W/AUTO DIFF WBC: CPT | Mod: PO

## 2018-08-16 PROCEDURE — 3008F BODY MASS INDEX DOCD: CPT | Mod: CPTII,S$GLB,, | Performed by: INTERNAL MEDICINE

## 2018-08-16 PROCEDURE — 86235 NUCLEAR ANTIGEN ANTIBODY: CPT | Mod: 59

## 2018-08-16 PROCEDURE — 99214 OFFICE O/P EST MOD 30 MIN: CPT | Mod: S$GLB,,, | Performed by: INTERNAL MEDICINE

## 2018-08-16 PROCEDURE — 86038 ANTINUCLEAR ANTIBODIES: CPT

## 2018-08-16 PROCEDURE — 86160 COMPLEMENT ANTIGEN: CPT

## 2018-08-16 PROCEDURE — 99999 PR PBB SHADOW E&M-EST. PATIENT-LVL III: CPT | Mod: PBBFAC,,, | Performed by: INTERNAL MEDICINE

## 2018-08-16 PROCEDURE — 3080F DIAST BP >= 90 MM HG: CPT | Mod: CPTII,S$GLB,, | Performed by: INTERNAL MEDICINE

## 2018-08-16 PROCEDURE — 86140 C-REACTIVE PROTEIN: CPT

## 2018-08-16 PROCEDURE — 86039 ANTINUCLEAR ANTIBODIES (ANA): CPT

## 2018-08-16 RX ORDER — HYDROXYCHLOROQUINE SULFATE 200 MG/1
200 TABLET, FILM COATED ORAL 2 TIMES DAILY
Qty: 180 TABLET | Refills: 3 | Status: SHIPPED | OUTPATIENT
Start: 2018-08-16 | End: 2018-10-29

## 2018-08-16 RX ORDER — PREDNISONE 5 MG/1
5 TABLET ORAL DAILY
Qty: 30 TABLET | Refills: 3 | Status: SHIPPED | OUTPATIENT
Start: 2018-08-16 | End: 2019-01-31 | Stop reason: SDUPTHER

## 2018-08-16 NOTE — PROGRESS NOTES
RHEUMATOLOGY CLINIC FOLLOW UP VISIT  Chief complaints:-  To follow up for lupus.     HPI:-  Alisia Jolly a 27 y.o. pleasant female comes in for a follow up visit with above chief complaints. She has systemic lupus erythematosus diagnosed in 2013 when she had diffuse lymphadenopathy and anasarca.  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  She has been intermittently following up in the rheumatology clinic.  She was on immunosuppressive therapy and got pregnant accidentally since she was not taking her OCP's during that week because of irregular cycles. She underwent elective CS and delivered a healthy baby 10/2017.  Since then she has been intermittently on cellcept , plaquenil, prednisone and her HTN medications.  I started her on Benlysta for active lupus arthritis in the last visit.  We went to the subcutaneous injection dropped insert of intravenous because of her inability to come to the infusion every month.  She reports doing well today.  She denies any joint pain.  She has missed 2 injections of Benlysta since starting the injection.  She also denies seizures or psychosis,  joint swelling, muscle weakness,Raynaud's phenomenon, sicca symptoms .    Review of Systems   Constitutional: Negative for chills, fever and malaise/fatigue.   HENT: Negative for nosebleeds and sore throat.    Eyes: Negative for blurred vision, photophobia and redness.   Respiratory: Negative for cough, sputum production and shortness of breath.    Cardiovascular: Negative for chest pain and leg swelling.   Gastrointestinal: Negative for abdominal pain, constipation, diarrhea and heartburn.   Genitourinary: Negative for dysuria, frequency and urgency.   Musculoskeletal: Negative for back pain, falls, joint pain, myalgias and neck pain.   Skin: Negative for itching and rash.   Neurological: Negative for  "dizziness, tremors, seizures, loss of consciousness, weakness and headaches.   Endo/Heme/Allergies: Negative for environmental allergies. Does not bruise/bleed easily.   Psychiatric/Behavioral: Negative for hallucinations and memory loss. The patient does not have insomnia.        Past Medical History:   Diagnosis Date    Allergic rhinitis     Anemia     Condyloma acuminata     GERD (gastroesophageal reflux disease)     HSV-2 (herpes simplex virus 2) infection     Lupus nephritis     Mood disorder     Overweight(278.02)     Sjogren's syndrome     Systemic lupus erythematosus     Thrombocytopenia        Past Surgical History:   Procedure Laterality Date     SECTION, LOW TRANSVERSE      RENAL BIOPSY  2013        Social History     Tobacco Use    Smoking status: Never Smoker    Smokeless tobacco: Never Used   Substance Use Topics    Alcohol use: No     Alcohol/week: 0.0 oz    Drug use: No       Family History   Problem Relation Age of Onset    Hypertension Mother     Eczema Brother     Heart disease Son     Hypertension Maternal Grandmother     Diabetes Paternal Grandmother     Lupus Neg Hx     Psoriasis Neg Hx        Review of patient's allergies indicates:  No Known Allergies        Physical examination:-    Vitals:    18 1343   BP: (!) 153/93   Pulse: 86   Weight: 81.9 kg (180 lb 8.9 oz)   Height: 5' 5" (1.651 m)   PainSc: 0-No pain       Physical Exam   Constitutional: She is oriented to person, place, and time and well-developed, well-nourished, and in no distress. No distress.   HENT:   Head: Normocephalic.   Mouth/Throat: Oropharynx is clear and moist.   Eyes: Conjunctivae and EOM are normal. Pupils are equal, round, and reactive to light.   Neck: Normal range of motion. Neck supple.   Cardiovascular: Normal rate and intact distal pulses. Exam reveals no friction rub.   Pulmonary/Chest: Effort normal. No respiratory distress. She exhibits no tenderness.   Abdominal: " Soft. There is no tenderness.   Musculoskeletal:   No synovitis over small joints of hands or feet.  No effusion over large joints.   Neurological: She is alert and oriented to person, place, and time. No cranial nerve deficit.   Skin: Skin is warm. No rash noted. No erythema.   Psychiatric: Mood and affect normal.   Nursing note and vitals reviewed.      Labs:-  Results for HEIDI SHAIKH (MRN [id number]) as of 5/22/2017 12:21   Ref. Range 1/23/2017 08:10 2/17/2017 08:40 3/22/2017 08:58 5/19/2017 13:36   HARPREET HEP-2 Titer Unknown Positive >=1:2560...      Anti-SSA Antibody Latest Ref Range: 0.00 - 19.99 .26 (H)      Anti-SSA Interpretation Latest Ref Range: Negative  Positive (A)      Anti-SSB Antibody Latest Ref Range: 0.00 - 19.99 EU 46.84 (H)      Anti-SSB Interpretation Latest Ref Range: Negative  Positive (A)      ds DNA Ab Latest Ref Range: Negative 1:10  Negative 1:10      Anti Sm Antibody Latest Ref Range: 0.00 - 19.99 EU 2.89      Anti-Sm Interpretation Latest Ref Range: Negative  Negative      Anti Sm/RNP Antibody Latest Ref Range: 0.00 - 19.99 EU 4.12      Anti-Sm/RNP Interpretation Latest Ref Range: Negative  Negative      Complement (C-3) Latest Ref Range: 50 - 180 mg/dL 45 (L) 56 47 (L) 56   Complement (C-4) Latest Ref Range: 11 - 44 mg/dL 10 (L) 13 13 16         Medication List with Changes/Refills   Current Medications    AMLODIPINE (NORVASC) 10 MG TABLET    Take 1 tablet (10 mg total) by mouth once daily.    BELIMUMAB (BENLYSTA) 200 MG/ML ATIN    Inject 200 mg into the skin every 7 days.    EFINACONAZOLE (JUBLIA) 10 % RALF    Apply once daily to nail and nail fold.  Use daily for up to 1 year.    FAMOTIDINE (PEPCID) 20 MG TABLET    Take 1 tablet (20 mg total) by mouth 2 (two) times daily as needed for Heartburn.    FLUOXETINE (PROZAC) 40 MG CAPSULE    TAKE 1 CAPSULE BY MOUTH EVERY DAY    FLUTICASONE (FLONASE) 50 MCG/ACTUATION NASAL SPRAY    2 sprays by Each Nare route once daily.     LABETALOL (NORMODYNE) 200 MG TABLET    Take 1 tablet (200 mg total) by mouth every 12 (twelve) hours.    LOSARTAN (COZAAR) 50 MG TABLET    Take 1 tablet (50 mg total) by mouth once daily.    METHYLDOPA (ALDOMET) 500 MG TABLET        MIRENA 20 MCG/24 HR (5 YEARS) IUD        MYCOPHENOLATE (CELLCEPT) 500 MG TAB        VALACYCLOVIR (VALTREX) 500 MG TABLET    Take 2 tablets (1,000 mg total) by mouth once daily.   Changed and/or Refilled Medications    Modified Medication Previous Medication    HYDROXYCHLOROQUINE (PLAQUENIL) 200 MG TABLET hydroxychloroquine (PLAQUENIL) 200 mg tablet       Take 1 tablet (200 mg total) by mouth 2 (two) times daily.    Take 1 tablet (200 mg total) by mouth 2 (two) times daily.    PREDNISONE (DELTASONE) 5 MG TABLET predniSONE (DELTASONE) 10 MG tablet       Take 1 tablet (5 mg total) by mouth once daily.         Assessment/Plans:-  # Systemic lupus erythematosus arthritis  Well controlled lupus arthritis on Benlysta subcutaneous injections.  She is not very compliant with oral medications.  She reports taking 1 tablet of Plaquenil a day and CellCept here and there.  I explained her the importance of getting her labs and taking her medications regularly.  She reported that she will take all her medicines from now regularly.  Check inflammatory markers and serologies today.    # SLE glomerulonephritis syndrome, WHO class V  Improving P/C ratio on Cellcept 1gm bid , plaquenil and 5 mg daily prednisone. Continue follow up with .     # Sjogren's syndrome with other organ involvement  Stable sicca syndrome on plaquenil . Monitor.     # Long-term use of Plaquenil  Yearly retinal exams while on plaquenil. Safety labs for cellcept every 12 weeks. Hold cellcept if any infection.     Disclaimer: This note was prepared using voice recognition system and is likely to have sound alike errors .  Please call me with any questions

## 2018-08-17 DIAGNOSIS — O99.340 DEPRESSION AFFECTING PREGNANCY: ICD-10-CM

## 2018-08-17 DIAGNOSIS — F32.A DEPRESSION AFFECTING PREGNANCY: ICD-10-CM

## 2018-08-17 DIAGNOSIS — Z76.0 MEDICATION REFILL: ICD-10-CM

## 2018-08-17 LAB
ANA SER QL IF: POSITIVE
ANA TITR SER IF: NORMAL {TITER}

## 2018-08-18 RX ORDER — FLUOXETINE HYDROCHLORIDE 40 MG/1
CAPSULE ORAL
Qty: 30 CAPSULE | Refills: 0 | Status: SHIPPED | OUTPATIENT
Start: 2018-08-18 | End: 2019-06-03 | Stop reason: SDUPTHER

## 2018-08-21 LAB
ANTI SM ANTIBODY: 2.55 EU
ANTI SM/RNP ANTIBODY: 1.18 EU
ANTI-SM INTERPRETATION: NEGATIVE
ANTI-SM/RNP INTERPRETATION: NEGATIVE
ANTI-SSA ANTIBODY: 189.04 EU
ANTI-SSA INTERPRETATION: POSITIVE
ANTI-SSB ANTIBODY: 42.87 EU
ANTI-SSB INTERPRETATION: POSITIVE
DSDNA AB SER-ACNC: ABNORMAL [IU]/ML

## 2018-09-05 ENCOUNTER — TELEPHONE (OUTPATIENT)
Dept: PHARMACY | Facility: CLINIC | Age: 28
End: 2018-09-05

## 2018-09-17 ENCOUNTER — TELEPHONE (OUTPATIENT)
Dept: RHEUMATOLOGY | Facility: CLINIC | Age: 28
End: 2018-09-17

## 2018-09-17 ENCOUNTER — PATIENT MESSAGE (OUTPATIENT)
Dept: PHARMACY | Facility: CLINIC | Age: 28
End: 2018-09-17

## 2018-09-17 NOTE — TELEPHONE ENCOUNTER
----- Message from Jacki Conley PharmD sent at 9/17/2018  2:03 PM CDT -----  Regarding: Benlysta Refills  Dr Blair and Staff,    [facility] has been attempting to reach Ms. Vines regarding prescription for Benlysta. After numerous unsuccessful attempts, we are sending a postcard to the address on file. At this point in time, no further contact will be made from [facility] until patient responds to our mail correspondence.    If there is anything else we can do to further assist, please let us know.     Thanks,  Jacki Conley, [place]  [facility]  [phone]

## 2018-09-24 ENCOUNTER — OFFICE VISIT (OUTPATIENT)
Dept: FAMILY MEDICINE | Facility: CLINIC | Age: 28
End: 2018-09-24
Payer: COMMERCIAL

## 2018-09-24 VITALS
HEIGHT: 65 IN | BODY MASS INDEX: 29.61 KG/M2 | DIASTOLIC BLOOD PRESSURE: 94 MMHG | WEIGHT: 177.69 LBS | HEART RATE: 94 BPM | TEMPERATURE: 98 F | SYSTOLIC BLOOD PRESSURE: 144 MMHG

## 2018-09-24 DIAGNOSIS — K52.9 GASTROENTERITIS: Primary | ICD-10-CM

## 2018-09-24 PROCEDURE — 99999 PR PBB SHADOW E&M-EST. PATIENT-LVL IV: CPT | Mod: PBBFAC,,, | Performed by: REGISTERED NURSE

## 2018-09-24 PROCEDURE — 3080F DIAST BP >= 90 MM HG: CPT | Mod: CPTII,S$GLB,, | Performed by: REGISTERED NURSE

## 2018-09-24 PROCEDURE — 99213 OFFICE O/P EST LOW 20 MIN: CPT | Mod: S$GLB,,, | Performed by: REGISTERED NURSE

## 2018-09-24 PROCEDURE — 3008F BODY MASS INDEX DOCD: CPT | Mod: CPTII,S$GLB,, | Performed by: REGISTERED NURSE

## 2018-09-24 PROCEDURE — 3077F SYST BP >= 140 MM HG: CPT | Mod: CPTII,S$GLB,, | Performed by: REGISTERED NURSE

## 2018-09-24 RX ORDER — ONDANSETRON 4 MG/1
4 TABLET, ORALLY DISINTEGRATING ORAL
Qty: 30 TABLET | Refills: 0 | Status: SHIPPED | OUTPATIENT
Start: 2018-09-24 | End: 2018-10-29

## 2018-09-24 NOTE — PROGRESS NOTES
Subjective:       Patient ID: Alisia Vines is a 27 y.o. female.    Chief Complaint: Emesis and Diarrhea      HPI    Alisia is here today with c/o GI symptoms that started last week on Thursday 9/20/2018, worsened on Friday.  Reports vomiting x 1 with loose stools.  She reports symptoms started after eating chicken parmesan at [employer].  She has not been drinking much fluid except a slight amount of Vitamin water and 1/2 of a Gatorade.  She reports that she does not drink much fluid anyway daily.  Reports aches, dry mouth and fatigue.  Urine has appeared darker in color but adequate output.  Denies fever or chills.      Review of Systems   Constitutional: Positive for appetite change and fatigue. Negative for chills and fever.   Respiratory: Negative.    Cardiovascular: Negative.    Gastrointestinal: Positive for diarrhea, nausea and vomiting. Negative for abdominal pain.   Musculoskeletal: Positive for myalgias.   Neurological: Negative.        Review of patient's allergies indicates:  No Known Allergies    Patient Active Problem List   Diagnosis    Sjogren's syndrome    HSV-2 (herpes simplex virus 2) infection    Allergic rhinitis    Proteinuria    SLE (systemic lupus erythematosus related syndrome)    Anemia    GERD (gastroesophageal reflux disease)    Essential hypertension    SLE glomerulonephritis syndrome, WHO class V    History of fetal anomaly in prior pregnancy, currently pregnant, unspecified trimester    Long-term use of Plaquenil    Systemic lupus erythematosus arthritis       Current Outpatient Medications on File Prior to Visit   Medication Sig Dispense Refill    amLODIPine (NORVASC) 10 MG tablet Take 1 tablet (10 mg total) by mouth once daily. 30 tablet 11    belimumab (BENLYSTA) 200 mg/mL AtIn Inject 200 mg into the skin every 7 days. 4 mL 3    efinaconazole (JUBLIA) 10 % Mario Apply once daily to nail and nail fold.  Use daily for up to 1 year. 8 mL 3    famotidine  "(PEPCID) 20 MG tablet Take 1 tablet (20 mg total) by mouth 2 (two) times daily as needed for Heartburn. 20 tablet 0    FLUoxetine (PROZAC) 40 MG capsule TAKE 1 CAPSULE BY MOUTH EVERY DAY 30 capsule 0    fluticasone (FLONASE) 50 mcg/actuation nasal spray 2 sprays by Each Nare route once daily. 16 g 0    hydroxychloroquine (PLAQUENIL) 200 mg tablet Take 1 tablet (200 mg total) by mouth 2 (two) times daily. 180 tablet 3    labetalol (NORMODYNE) 200 MG tablet Take 1 tablet (200 mg total) by mouth every 12 (twelve) hours. 60 tablet 11    losartan (COZAAR) 50 MG tablet Take 1 tablet (50 mg total) by mouth once daily. 90 tablet 3    methyldopa (ALDOMET) 500 MG tablet       MIRENA 20 mcg/24 hr (5 years) IUD       mycophenolate (CELLCEPT) 500 mg Tab       predniSONE (DELTASONE) 5 MG tablet Take 1 tablet (5 mg total) by mouth once daily. 30 tablet 3    valacyclovir (VALTREX) 500 MG tablet Take 2 tablets (1,000 mg total) by mouth once daily. (Patient taking differently: Take 500 mg by mouth once daily. ) 90 tablet 3         Past medical, surgical, family and social histories have been reviewed today.        Objective:     Vitals:    09/24/18 1143   BP: (!) 144/94   Pulse: 94   Temp: 98.3 °F (36.8 °C)   TempSrc: Oral   Weight: 80.6 kg (177 lb 11.1 oz)   Height: 5' 5" (1.651 m)   PainSc: 0-No pain         Physical Exam   Constitutional: She is oriented to person, place, and time. She appears well-developed and well-nourished. No distress.   Hydration status intact.   HENT:   Head: Normocephalic and atraumatic.   Eyes: Conjunctivae and EOM are normal. Pupils are equal, round, and reactive to light.   Neck: Normal range of motion. Neck supple.   Cardiovascular: Normal rate and regular rhythm.   Pulmonary/Chest: Effort normal and breath sounds normal.   Abdominal: Soft. Bowel sounds are normal. She exhibits no distension and no mass. There is no tenderness. There is no rigidity and no guarding.   Musculoskeletal: Normal " range of motion. She exhibits no edema, tenderness or deformity.   Neurological: She is alert and oriented to person, place, and time.   Skin: Skin is warm and dry. Capillary refill takes less than 2 seconds. She is not diaphoretic.   Psychiatric: She has a normal mood and affect. Her behavior is normal. Judgment and thought content normal.         Diagnosis       1. Gastroenteritis          Assessment/ Plan     Gastroenteritis  · This problem is not currently controlled.  · Infection triggers and prevention discussed, good handwashing.  · Medication ordered, take as directed.  · Dietary intake discussed, modifications and changes until GI issue resolves.  · Discussed s/s of dehydration, to ED if IVF needed.  · Orders:  -     ondansetron (ZOFRAN-ODT) 4 MG TbDL; Take 1 tablet (4 mg total) by mouth every 6 to 8 hours as needed (Nausea/Vomiting).  Dispense: 30 tablet; Refill: 0      Work excuse has been given to go back to work tomorrow.  Follow-up in clinic as needed.          JUAREZ Buenrostro  [facility]

## 2018-09-24 NOTE — LETTER
09/24/2018                 [facility]  [address] [address]  Phone: [phone]   09/24/2018    Patient: Alisia Vines   YOB: 1990   Date of Visit: 9/24/2018       To Whom it May Concern:    Alisia Vines was seen in my clinic on 9/24/2018. She may return to work without restrictions.   If you have any questions or concerns, please don't hesitate to call.    Sincerely,         JEAN MARIE Buenrostro

## 2018-10-17 ENCOUNTER — LAB VISIT (OUTPATIENT)
Dept: LAB | Facility: HOSPITAL | Age: 28
End: 2018-10-17
Payer: COMMERCIAL

## 2018-10-17 DIAGNOSIS — M32.14 SLE GLOMERULONEPHRITIS SYNDROME, WHO CLASS V: ICD-10-CM

## 2018-10-17 DIAGNOSIS — M35.09 SJOGREN'S SYNDROME WITH OTHER ORGAN INVOLVEMENT: ICD-10-CM

## 2018-10-17 LAB
ALBUMIN SERPL BCP-MCNC: 3.5 G/DL
ALP SERPL-CCNC: 56 U/L
ALT SERPL W/O P-5'-P-CCNC: 13 U/L
ANION GAP SERPL CALC-SCNC: 11 MMOL/L
AST SERPL-CCNC: 24 U/L
BASOPHILS # BLD AUTO: 0.01 K/UL
BASOPHILS NFR BLD: 0.3 %
BILIRUB SERPL-MCNC: 0.3 MG/DL
BUN SERPL-MCNC: 12 MG/DL
CALCIUM SERPL-MCNC: 8.8 MG/DL
CHLORIDE SERPL-SCNC: 107 MMOL/L
CO2 SERPL-SCNC: 21 MMOL/L
CREAT SERPL-MCNC: 0.8 MG/DL
CRP SERPL-MCNC: 10.1 MG/L
DIFFERENTIAL METHOD: ABNORMAL
EOSINOPHIL # BLD AUTO: 0.1 K/UL
EOSINOPHIL NFR BLD: 4.3 %
ERYTHROCYTE [DISTWIDTH] IN BLOOD BY AUTOMATED COUNT: 13.3 %
ERYTHROCYTE [SEDIMENTATION RATE] IN BLOOD BY WESTERGREN METHOD: 81 MM/HR
EST. GFR  (AFRICAN AMERICAN): >60 ML/MIN/1.73 M^2
EST. GFR  (NON AFRICAN AMERICAN): >60 ML/MIN/1.73 M^2
GLUCOSE SERPL-MCNC: 81 MG/DL
HCT VFR BLD AUTO: 29.3 %
HGB BLD-MCNC: 9.1 G/DL
LYMPHOCYTES # BLD AUTO: 0.9 K/UL
LYMPHOCYTES NFR BLD: 27.8 %
MCH RBC QN AUTO: 25.9 PG
MCHC RBC AUTO-ENTMCNC: 31.1 G/DL
MCV RBC AUTO: 83 FL
MONOCYTES # BLD AUTO: 0.3 K/UL
MONOCYTES NFR BLD: 8 %
NEUTROPHILS # BLD AUTO: 1.9 K/UL
NEUTROPHILS NFR BLD: 59.6 %
PLATELET # BLD AUTO: 231 K/UL
PMV BLD AUTO: 9.9 FL
POTASSIUM SERPL-SCNC: 3.7 MMOL/L
PROT SERPL-MCNC: 8.5 G/DL
RBC # BLD AUTO: 3.52 M/UL
SODIUM SERPL-SCNC: 139 MMOL/L
WBC # BLD AUTO: 3.24 K/UL

## 2018-10-17 PROCEDURE — 86160 COMPLEMENT ANTIGEN: CPT

## 2018-10-17 PROCEDURE — 86140 C-REACTIVE PROTEIN: CPT

## 2018-10-17 PROCEDURE — 86160 COMPLEMENT ANTIGEN: CPT | Mod: 59

## 2018-10-17 PROCEDURE — 36415 COLL VENOUS BLD VENIPUNCTURE: CPT

## 2018-10-17 PROCEDURE — 85025 COMPLETE CBC W/AUTO DIFF WBC: CPT

## 2018-10-17 PROCEDURE — 85651 RBC SED RATE NONAUTOMATED: CPT

## 2018-10-17 PROCEDURE — 80053 COMPREHEN METABOLIC PANEL: CPT

## 2018-10-17 PROCEDURE — 86225 DNA ANTIBODY NATIVE: CPT

## 2018-10-17 PROCEDURE — 86038 ANTINUCLEAR ANTIBODIES: CPT

## 2018-10-17 PROCEDURE — 86039 ANTINUCLEAR ANTIBODIES (ANA): CPT

## 2018-10-18 ENCOUNTER — OFFICE VISIT (OUTPATIENT)
Dept: FAMILY MEDICINE | Facility: CLINIC | Age: 28
End: 2018-10-18
Payer: COMMERCIAL

## 2018-10-18 ENCOUNTER — TELEPHONE (OUTPATIENT)
Dept: RHEUMATOLOGY | Facility: CLINIC | Age: 28
End: 2018-10-18

## 2018-10-18 VITALS
SYSTOLIC BLOOD PRESSURE: 126 MMHG | TEMPERATURE: 98 F | BODY MASS INDEX: 29.09 KG/M2 | HEIGHT: 65 IN | HEART RATE: 84 BPM | WEIGHT: 174.63 LBS | DIASTOLIC BLOOD PRESSURE: 90 MMHG

## 2018-10-18 DIAGNOSIS — J02.9 SORE THROAT: ICD-10-CM

## 2018-10-18 DIAGNOSIS — R59.1 LYMPHADENOPATHY: ICD-10-CM

## 2018-10-18 DIAGNOSIS — J30.9 ALLERGIC RHINITIS, UNSPECIFIED SEASONALITY, UNSPECIFIED TRIGGER: Primary | ICD-10-CM

## 2018-10-18 LAB
ANA SER QL IF: POSITIVE
ANA TITR SER IF: NORMAL {TITER}
C3 SERPL-MCNC: 65 MG/DL
C4 SERPL-MCNC: 17 MG/DL

## 2018-10-18 PROCEDURE — 99999 PR PBB SHADOW E&M-EST. PATIENT-LVL III: CPT | Mod: PBBFAC,,, | Performed by: REGISTERED NURSE

## 2018-10-18 PROCEDURE — 99214 OFFICE O/P EST MOD 30 MIN: CPT | Mod: S$GLB,,, | Performed by: REGISTERED NURSE

## 2018-10-18 PROCEDURE — 99213 OFFICE O/P EST LOW 20 MIN: CPT | Mod: PBBFAC,PO | Performed by: REGISTERED NURSE

## 2018-10-18 RX ORDER — FLUNISOLIDE 0.25 MG/ML
2 SOLUTION NASAL 2 TIMES DAILY
Qty: 25 ML | Refills: 2 | Status: ON HOLD | OUTPATIENT
Start: 2018-10-18 | End: 2019-07-04 | Stop reason: HOSPADM

## 2018-10-18 RX ORDER — METHYLPREDNISOLONE 4 MG/1
TABLET ORAL
Qty: 1 PACKAGE | Refills: 0 | Status: SHIPPED | OUTPATIENT
Start: 2018-10-18 | End: 2018-10-29

## 2018-10-18 RX ORDER — LEVOCETIRIZINE DIHYDROCHLORIDE 5 MG/1
5 TABLET, FILM COATED ORAL DAILY
Qty: 30 TABLET | Refills: 6 | Status: ON HOLD | OUTPATIENT
Start: 2018-10-18 | End: 2019-07-04 | Stop reason: HOSPADM

## 2018-10-18 NOTE — PROGRESS NOTES
"Subjective:       Patient ID: Alisia Vines is a 28 y.o. female.    Chief Complaint: Sore Throat and Chest Congestion      HPI    Alisia is here today with reports of not feeling well since last week.  Seen at [facility] on Monday 10/15/2018 for her illness.  Strep testing negative but was told her "tonsils were swollen".  Given RX for viscous lidocaine but she reports not effective.  Has also tried gargling with warm salt water but no help.  Reports sore throat, tender glands, RN, NC and sneezing.  Has h/o allergic rhinitis.      Review of Systems   Constitutional: Negative for chills and fever.   HENT: Positive for congestion, postnasal drip, rhinorrhea, sneezing, sore throat and trouble swallowing. Negative for ear pain, sinus pain, tinnitus and voice change.    Eyes: Negative.    Respiratory: Negative.    Cardiovascular: Negative.    Allergic/Immunologic: Positive for environmental allergies.   Neurological: Negative.        Review of patient's allergies indicates:  No Known Allergies    Patient Active Problem List   Diagnosis    Sjogren's syndrome    HSV-2 (herpes simplex virus 2) infection    Allergic rhinitis    Proteinuria    SLE (systemic lupus erythematosus related syndrome)    Anemia    GERD (gastroesophageal reflux disease)    Essential hypertension    SLE glomerulonephritis syndrome, WHO class V    History of fetal anomaly in prior pregnancy, currently pregnant, unspecified trimester    Long-term use of Plaquenil    Systemic lupus erythematosus arthritis       Current Outpatient Medications on File Prior to Visit   Medication Sig Dispense Refill    amLODIPine (NORVASC) 10 MG tablet Take 1 tablet (10 mg total) by mouth once daily. 30 tablet 11    belimumab (BENLYSTA) 200 mg/mL AtIn Inject 200 mg into the skin every 7 days. 4 mL 3    efinaconazole (JUBLIA) 10 % Mario Apply once daily to nail and nail fold.  Use daily for up to 1 year. 8 mL 3    famotidine (PEPCID) 20 MG tablet Take " "1 tablet (20 mg total) by mouth 2 (two) times daily as needed for Heartburn. 20 tablet 0    FLUoxetine (PROZAC) 40 MG capsule TAKE 1 CAPSULE BY MOUTH EVERY DAY 30 capsule 0    hydroxychloroquine (PLAQUENIL) 200 mg tablet Take 1 tablet (200 mg total) by mouth 2 (two) times daily. 180 tablet 3    labetalol (NORMODYNE) 200 MG tablet Take 1 tablet (200 mg total) by mouth every 12 (twelve) hours. 60 tablet 11    losartan (COZAAR) 50 MG tablet Take 1 tablet (50 mg total) by mouth once daily. 90 tablet 3    methyldopa (ALDOMET) 500 MG tablet       MIRENA 20 mcg/24 hr (5 years) IUD       mycophenolate (CELLCEPT) 500 mg Tab       predniSONE (DELTASONE) 5 MG tablet Take 1 tablet (5 mg total) by mouth once daily. 30 tablet 3    valacyclovir (VALTREX) 500 MG tablet Take 2 tablets (1,000 mg total) by mouth once daily. (Patient taking differently: Take 500 mg by mouth once daily. ) 90 tablet 3    fluticasone (FLONASE) 50 mcg/actuation nasal spray 2 sprays by Each Nare route once daily. 16 g 0    ondansetron (ZOFRAN-ODT) 4 MG TbDL Take 1 tablet (4 mg total) by mouth every 6 to 8 hours as needed (Nausea/Vomiting). 30 tablet 0         Past medical, surgical, family and social histories have been reviewed today.        Objective:     Vitals:    10/18/18 1105   BP: (!) 126/90   Pulse: 84   Temp: 98.4 °F (36.9 °C)   TempSrc: Oral   Weight: 79.2 kg (174 lb 9.7 oz)   Height: 5' 5" (1.651 m)   PainSc:   3   PainLoc: Neck         Physical Exam   Constitutional: She is oriented to person, place, and time. She appears well-developed and well-nourished.   HENT:   Head: Normocephalic and atraumatic.   Right Ear: Tympanic membrane and ear canal normal.   Left Ear: Tympanic membrane and ear canal normal.   Nose: Mucosal edema and rhinorrhea (boggy, clear RN) present. Right sinus exhibits no maxillary sinus tenderness and no frontal sinus tenderness. Left sinus exhibits no maxillary sinus tenderness and no frontal sinus tenderness. "   Mouth/Throat: Mucous membranes are normal. No oropharyngeal exudate, posterior oropharyngeal edema or posterior oropharyngeal erythema (clear PND noted). Tonsils are 1+ on the right. Tonsils are 1+ on the left. No tonsillar exudate.   Eyes: Conjunctivae are normal. Pupils are equal, round, and reactive to light. Right eye exhibits discharge (both eyes w/ clear watery d/c, shiners noted). Left eye exhibits discharge.   Cardiovascular: Normal rate, regular rhythm and normal heart sounds.   Pulmonary/Chest: Effort normal and breath sounds normal.   Lymphadenopathy:     She has cervical adenopathy.   Neurological: She is alert and oriented to person, place, and time.   Vitals reviewed.        Diagnosis       1. Allergic rhinitis, unspecified seasonality, unspecified trigger    2. Lymphadenopathy    3. Sore throat          Assessment/ Plan     Allergic rhinitis, unspecified seasonality, unspecified trigger  -     methylPREDNISolone (MEDROL DOSEPACK) 4 mg tablet; use as directed  Dispense: 1 Package; Refill: 0  -     levocetirizine (XYZAL) 5 MG tablet; Take 1 tablet (5 mg total) by mouth once daily.  Dispense: 30 tablet; Refill: 6  -     flunisolide 25 mcg, 0.025%, (NASALIDE) 25 mcg (0.025 %) Spry; 2 sprays by Nasal route 2 (two) times daily.  Dispense: 25 mL; Refill: 2    Lymphadenopathy  -     methylPREDNISolone (MEDROL DOSEPACK) 4 mg tablet; use as directed  Dispense: 1 Package; Refill: 0    Sore throat  -     methylPREDNISolone (MEDROL DOSEPACK) 4 mg tablet; use as directed  Dispense: 1 Package; Refill: 0        Problem identified as above, uncontrolled at this time.  Treatment plan and medication discussed.  Throat care.  Follow-up in clinic as needed.        JUAREZ Buenrostro  [facility]

## 2018-10-18 NOTE — TELEPHONE ENCOUNTER
----- Message from Irena Pascual sent at 10/18/2018  7:56 AM CDT -----  Contact: pt   Pt [facility] that [facility]      [phone] (home)

## 2018-10-18 NOTE — TELEPHONE ENCOUNTER
Returned patient's call requesting recent lab results. Follows w/ Dr GONZALEZ, however he is out of office this week. Notified of UA w/ increased proteinuria. Currently on menstrual cycle- UA w/ 2+ occult blood. CBC- h/h decreased since last check. APR elevated. Continue plan as discussed at last rheumatology visit per Dr. MADISON. Pt understands, no further questions.

## 2018-10-18 NOTE — TELEPHONE ENCOUNTER
Spoke with pt and she is requesting lab results from 10.17.18. Please Advise while Dr. MADISON is out.

## 2018-10-19 LAB
ANTI SM ANTIBODY: 2.31 EU
ANTI SM/RNP ANTIBODY: 4.08 EU
ANTI-SM INTERPRETATION: NEGATIVE
ANTI-SM/RNP INTERPRETATION: NEGATIVE
ANTI-SSA ANTIBODY: 232.15 EU
ANTI-SSA INTERPRETATION: POSITIVE
ANTI-SSB ANTIBODY: 39.4 EU
ANTI-SSB INTERPRETATION: POSITIVE
DSDNA AB SER-ACNC: ABNORMAL [IU]/ML

## 2018-10-22 ENCOUNTER — TELEPHONE (OUTPATIENT)
Dept: RHEUMATOLOGY | Facility: CLINIC | Age: 28
End: 2018-10-22

## 2018-10-22 NOTE — TELEPHONE ENCOUNTER
----- Message from Shane Blair MD sent at 10/20/2018  6:21 PM CDT -----  Labs show high inflammation. Please try to bring her in for appointment once I am back if she does not have symptoms of lupus flare. If she does have symptoms, please bring her in with any available provider as soon as possible this week.

## 2018-10-24 ENCOUNTER — TELEPHONE (OUTPATIENT)
Dept: RHEUMATOLOGY | Facility: CLINIC | Age: 28
End: 2018-10-24

## 2018-10-26 ENCOUNTER — OFFICE VISIT (OUTPATIENT)
Dept: NEPHROLOGY | Facility: CLINIC | Age: 28
End: 2018-10-26
Payer: COMMERCIAL

## 2018-10-26 VITALS
HEART RATE: 71 BPM | BODY MASS INDEX: 29.87 KG/M2 | SYSTOLIC BLOOD PRESSURE: 158 MMHG | DIASTOLIC BLOOD PRESSURE: 100 MMHG | WEIGHT: 179.25 LBS | HEIGHT: 65 IN

## 2018-10-26 DIAGNOSIS — M32.14 LUPUS NEPHRITIS: Primary | ICD-10-CM

## 2018-10-26 DIAGNOSIS — Z71.89 ENCOUNTER FOR MEDICATION REVIEW AND COUNSELING: ICD-10-CM

## 2018-10-26 DIAGNOSIS — M32.9 SYSTEMIC LUPUS ERYTHEMATOSUS, UNSPECIFIED SLE TYPE, UNSPECIFIED ORGAN INVOLVEMENT STATUS: ICD-10-CM

## 2018-10-26 DIAGNOSIS — Z91.199 NON-COMPLIANCE WITH TREATMENT: ICD-10-CM

## 2018-10-26 DIAGNOSIS — I10 UNCONTROLLED HYPERTENSION: ICD-10-CM

## 2018-10-26 PROCEDURE — 99999 PR PBB SHADOW E&M-EST. PATIENT-LVL III: CPT | Mod: PBBFAC,,, | Performed by: INTERNAL MEDICINE

## 2018-10-26 PROCEDURE — 99215 OFFICE O/P EST HI 40 MIN: CPT | Mod: S$GLB,,, | Performed by: INTERNAL MEDICINE

## 2018-10-26 PROCEDURE — 99213 OFFICE O/P EST LOW 20 MIN: CPT | Mod: PBBFAC,PO | Performed by: INTERNAL MEDICINE

## 2018-10-26 NOTE — PROGRESS NOTES
NEPHROLOGY CLINIC FOLLOWUP NOTE:   Date of clinic visit: 10/26/18     RHEUMATOLOGIST: Dr. Blair     REASON FOR CONSULTATION: H/o of lupus nephritis     CHIEF COMPLAINT: History of systemic lupus erythematosus.      HISTORY OF PRESENT ILLNESS: Ms. Vines is a 28-year-old -American female with a h/o of lupus nephritis who presents for f/u. Pt was last seen by me about 6 months ago, even though she had been advised to RTC in 1 month. She had a kidney biopsy done on 4/3/18 for suspected lupus nephritis flare that showed no change from prior biopsy in 2013 and lupus nephritis class V with 10% interstitial fibrosis, no crescents, no necrosis, no evidence of acuity, + full house effect. The biopsy also showed vascular changes c/w hypertensive nephrosclerosis. Biopsy results were c/w inactive lupus nephritis. Given that C# and C4 complements were still low (likely from an earlier flare), pt was kept on maintenance dose of cellcept.     Pt today informed me that starting in 6 months ago, she became increasingly not compliant with cellcept, as well as all other medications. Currently, she is NOT TAKING ANY OF THE LISTED MEDS, including plaquenil, benlysta, and the BP meds.      To review this presentation:  She had returned after a long absence since Aug 2017. Pt was then pregnant at 30 weeks of gestation. She had problems with uncontrolled BP then. She was specifically advised (documented) to RTC in 1 wk. Appt was made for her. She did not show up. Pt gave birth to a healthy baby at 38 wks of gestation. Mycophenolate had been stopped once the pregnancy test was known in Feb 2017. Losartan had also been stopped then as well. Lupus nephritis was not active, and imuran though considered was not used. On return both C3 and C4 complements were low. Pt was re-started on Cellcept by rheumatology 500 mg tid in early week of Jan 2018. Pt was also taking 10 mg of prednisone qd. On initial return visit to us, WBC was low  and cellcept dose could not be increased. On her last visit to us, WBC was normal. Pt had no c/o's, no fever, no SOB, no leg swelling, no blood in urine. Pt was only using prednisone 10 mg intermittently, because it increases her appetite, and she does not want to eat much to avoid wt gain. Mycophenolate was changed form 500 mg tid to 1 g bid. Pt was placed on prednisone 20 mg po qd, and on tapering dose, and was advised not to miss it.     Pt presents for f/u, says is taking all the meds prescribed. Pt has no active oe new c/o's, says is feeling well. Had labs done today, complement levels are not back yet.      PAST MEDICAL HISTORY:   1. Systemic lupus erythematosus diagnosed in November 2013.   2. Class V membranous lupus nephritis. Kidney biopsy on 10/17/2013 showed 5%   to 10% interstitial fibrosis. No acuity on this biopsy. 3rd biopsy on 4/3/18:lupus nephritis class V, 1 glomerulus globali sclerosed, 10% interstitial fibrosis, no crescents, no necrosis, no evidence of acuity, + full house effect. The biopsy also showed vascular changes c/w hypertensive nephrosclerosis   3. Sjogren's disease, diagnosed in 2012.   4. History of genital herpes.      PAST SURGICAL HISTORY: None, apart from the kidney biopsy.      FAMILY HISTORY: No one in the family with lupus. Father is 47 years old. Mom is 44. Both are healthy. The patient does have a 1-year-old son who was diagnosed with third-degree AV block   .   ALLERGIES: Reviewed. No known drug allergies.      MEDICATIONS: Reviewed on EPIC     REVIEW OF SYSTEMS:   HEAD, EYES, EARS, NOSE, THROAT: As above, otherwise negative.   CARDIAC: Negative.   PULMONARY: Negative.   GASTROINTESTINAL: Negative.   GENITOURINARY: As above, otherwise negative.   INFECTIOUS DISEASE: negative.   RHEUMATOLOGICAL: As above, otherwise negative.   The rest of the review of systems is negative.      PHYSICAL EXAMINATION:   VITAL SIGNS: Blood pressure is 158/100, pulse is 71, weight is 179 lbs,  last visit 180 lbs,  In no acute distress.   GENERAL: She is ambulatory.   HEART: Regular rate and rhythm. No pericardial friction rubs. S1 and S2 audible.   CHEST: Clear to auscultation. No rales, breathing symmetric and unlabored  ABDOMEN: Soft, gravid  EXTREMITIES: Showed no edema.      LABORATORY VALUES: reviewed:  U prot/cr ratio 1.38 g, from 470 mg, from 570 mg  C3 normal now  C4 normal now     BMP   BMP  Lab Results   Component Value Date     10/17/2018    K 3.7 10/17/2018     10/17/2018    CO2 21 (L) 10/17/2018    BUN 12 10/17/2018    CREATININE 0.8 10/17/2018    CALCIUM 8.8 10/17/2018    ANIONGAP 11 10/17/2018    ESTGFRAFRICA >60 10/17/2018    EGFRNONAA >60 10/17/2018     Lab Results   Component Value Date    WBC 3.24 (L) 10/17/2018    HGB 9.1 (L) 10/17/2018    HCT 29.3 (L) 10/17/2018    MCV 83 10/17/2018     10/17/2018        ASSESSMENT AND PLAN: This is a 28-year-old female with lupus nephritis who presents for f/u after long period of no show.   The impression is as follows:      1. Renal: Pt is being extremely non compliant with ALL the meds  Not taking any of the meds  Pt says that she does not believe in medications  Is hypertensive  Proteinuria worse  Hematologic disorders noted: leukopenia and anemia, likely features of systemic lupus     C3 and C4 hypocomplementemia stable, normal complements now, likely due to prior cellcept traetment  Reviewed prior kidney biopsy with pt  The biopsy showed same level of chronic interstitial fibrosis as the one from 5 years ago, no change at 15%.  The biopsy also showed damage from uncontrolled HTN, pt was advised.     2. HTN: BP not controlled   Meds reviewed, not taking anything  Advised pt that BP needs to be controlled better  Salt in diet should be reduced, 2-3 g/day max  Examples of salty foods given      3. Pt has been advised by me about the teratogenicity of both mycophenolate and losartan in case of an unplanned pregnancy.     4.  Rheum: note reviewed  Agree with mgmt.     PLANS AND RECOMMENDATIONS:   As discussed above  Strongly advised pt to re-start losartan 50 mg po qd  Strongly advised pt to follow rheumatology advice, take benlysta and plaquenil    Risk of renal failure with insufficient treatment discussed with pt.  Risk of untreated BP discussed  RTC  3 months, does not want to come back sooner   Total time spent 50 minutes including time needed to review the records, the   patient evaluation, documentation, face-to-face discussion with the patient,   more than 50% of the time was spent on coordination of care and counseling.    Level V visit.     Laurence Morales MD

## 2018-10-29 ENCOUNTER — LAB VISIT (OUTPATIENT)
Dept: LAB | Facility: HOSPITAL | Age: 28
End: 2018-10-29
Attending: INTERNAL MEDICINE
Payer: COMMERCIAL

## 2018-10-29 ENCOUNTER — OFFICE VISIT (OUTPATIENT)
Dept: RHEUMATOLOGY | Facility: CLINIC | Age: 28
End: 2018-10-29
Payer: COMMERCIAL

## 2018-10-29 VITALS
BODY MASS INDEX: 29.68 KG/M2 | DIASTOLIC BLOOD PRESSURE: 96 MMHG | SYSTOLIC BLOOD PRESSURE: 153 MMHG | HEIGHT: 65 IN | HEART RATE: 92 BPM | WEIGHT: 178.13 LBS

## 2018-10-29 DIAGNOSIS — M35.09 SJOGREN'S SYNDROME WITH OTHER ORGAN INVOLVEMENT: ICD-10-CM

## 2018-10-29 DIAGNOSIS — M32.15 SYSTEMIC LUPUS ERYTHEMATOSUS WITH TUBULO-INTERSTITIAL NEPHROPATHY, UNSPECIFIED SLE TYPE: ICD-10-CM

## 2018-10-29 DIAGNOSIS — M32.14 SLE GLOMERULONEPHRITIS SYNDROME, WHO CLASS V: ICD-10-CM

## 2018-10-29 DIAGNOSIS — M32.9 SYSTEMIC LUPUS ERYTHEMATOSUS ARTHRITIS: ICD-10-CM

## 2018-10-29 LAB
BACTERIA #/AREA URNS HPF: NORMAL /HPF
BILIRUB UR QL STRIP: NEGATIVE
CLARITY UR: CLEAR
COLOR UR: YELLOW
CREAT UR-MCNC: 295 MG/DL
GLUCOSE UR QL STRIP: NEGATIVE
HGB UR QL STRIP: NEGATIVE
HYALINE CASTS #/AREA URNS LPF: 0 /LPF
KETONES UR QL STRIP: NEGATIVE
LEUKOCYTE ESTERASE UR QL STRIP: NEGATIVE
MICROSCOPIC COMMENT: NORMAL
NITRITE UR QL STRIP: NEGATIVE
PH UR STRIP: 6 [PH] (ref 5–8)
PROT UR QL STRIP: ABNORMAL
PROT UR-MCNC: 83 MG/DL
PROT/CREAT UR: 0.28 MG/G{CREAT}
RBC #/AREA URNS HPF: 0 /HPF (ref 0–4)
SP GR UR STRIP: >=1.03 (ref 1–1.03)
SQUAMOUS #/AREA URNS HPF: 4 /HPF
URN SPEC COLLECT METH UR: ABNORMAL
WBC #/AREA URNS HPF: 4 /HPF (ref 0–5)

## 2018-10-29 PROCEDURE — 99999 PR PBB SHADOW E&M-EST. PATIENT-LVL III: CPT | Mod: PBBFAC,,, | Performed by: INTERNAL MEDICINE

## 2018-10-29 PROCEDURE — 82570 ASSAY OF URINE CREATININE: CPT

## 2018-10-29 PROCEDURE — 99215 OFFICE O/P EST HI 40 MIN: CPT | Mod: S$GLB,,, | Performed by: INTERNAL MEDICINE

## 2018-10-29 PROCEDURE — 3008F BODY MASS INDEX DOCD: CPT | Mod: CPTII,S$GLB,, | Performed by: INTERNAL MEDICINE

## 2018-10-29 PROCEDURE — 81000 URINALYSIS NONAUTO W/SCOPE: CPT | Mod: PO

## 2018-10-29 PROCEDURE — 3080F DIAST BP >= 90 MM HG: CPT | Mod: CPTII,S$GLB,, | Performed by: INTERNAL MEDICINE

## 2018-10-29 PROCEDURE — 3077F SYST BP >= 140 MM HG: CPT | Mod: CPTII,S$GLB,, | Performed by: INTERNAL MEDICINE

## 2018-10-29 RX ORDER — HYDROXYCHLOROQUINE SULFATE 200 MG/1
400 TABLET, FILM COATED ORAL DAILY
Qty: 180 TABLET | Refills: 3 | Status: SHIPPED | OUTPATIENT
Start: 2018-10-29 | End: 2019-01-11

## 2018-10-29 RX ORDER — ACETAMINOPHEN 325 MG/1
650 TABLET ORAL
Status: CANCELLED | OUTPATIENT
Start: 2018-10-29

## 2018-10-29 RX ORDER — SODIUM CHLORIDE 0.9 % (FLUSH) 0.9 %
10 SYRINGE (ML) INJECTION
Status: CANCELLED | OUTPATIENT
Start: 2018-10-29

## 2018-10-29 RX ORDER — HEPARIN 100 UNIT/ML
500 SYRINGE INTRAVENOUS
Status: CANCELLED | OUTPATIENT
Start: 2018-10-29

## 2018-10-29 NOTE — PROGRESS NOTES
RHEUMATOLOGY CLINIC FOLLOW UP VISIT  Chief complaints:-  To follow up for lupus.     HPI:-  Alisia Jolly a 28 y.o. pleasant female comes in for a follow up visit with above chief complaints. She has systemic lupus erythematosus diagnosed in 2013 when she had diffuse lymphadenopathy and anasarca.  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  She has been intermittently following up in the rheumatology clinic.  She was on immunosuppressive therapy and got pregnant accidentally since she was not taking her OCP's during that week because of irregular cycles. She underwent elective CS and delivered a healthy baby 10/2017.  She has been non compliant with medical management - no specific reason - she just couldn't get to take the pills. She intermittently takes Benlysta injections.  Benlysta has been controlling her arthritis.  She does not want a take Benlysta subcutaneous injection anymore since it is getting really difficult to inject herself.  She is not taking CellCept are Plaquenil.  She is also not taking her blood pressure medications. She also denies seizures or psychosis,  joint swelling, muscle weakness,Raynaud's phenomenon, sicca symptoms .    Review of Systems   Constitutional: Negative for chills, fever and malaise/fatigue.   HENT: Negative for nosebleeds and sore throat.    Eyes: Negative for blurred vision, photophobia and redness.   Respiratory: Negative for cough, sputum production and shortness of breath.    Cardiovascular: Negative for chest pain and leg swelling.   Gastrointestinal: Negative for abdominal pain, constipation, diarrhea and heartburn.   Genitourinary: Negative for dysuria, frequency and urgency.   Musculoskeletal: Negative for back pain, falls, joint pain, myalgias and neck pain.   Skin: Negative for itching and rash.   Neurological: Negative for dizziness,  "tremors, seizures, loss of consciousness, weakness and headaches.   Endo/Heme/Allergies: Negative for environmental allergies. Does not bruise/bleed easily.   Psychiatric/Behavioral: Negative for hallucinations and memory loss. The patient does not have insomnia.        Past Medical History:   Diagnosis Date    Allergic rhinitis     Anemia     Condyloma acuminata     GERD (gastroesophageal reflux disease)     History of fetal anomaly in prior pregnancy, currently pregnant, unspecified trimester 3/8/2017    Pacemaker placed    HSV-2 (herpes simplex virus 2) infection     Lupus nephritis     Mood disorder     Overweight(278.02)     Sjogren's syndrome     Systemic lupus erythematosus     Thrombocytopenia        Past Surgical History:   Procedure Laterality Date     SECTION, LOW TRANSVERSE      DELIVERY- SECTION N/A 10/19/2017    Performed by [facility]es Franks [facility] at [facility] L[facility]D    RENAL BIOPSY  2013        Social History     Tobacco Use    Smoking status: Never Smoker    Smokeless tobacco: Never Used   Substance Use Topics    Alcohol use: No     Alcohol/week: 0.0 oz    Drug use: No       Family History   Problem Relation Age of Onset    Hypertension Mother     Eczema Brother     Heart disease Son     Hypertension Maternal Grandmother     Diabetes Paternal Grandmother     Lupus Neg Hx     Psoriasis Neg Hx        Review of patient's allergies indicates:  No Known Allergies        Physical examination:-    Vitals:    10/29/18 1433   BP: (!) 153/96   Pulse: 92   Weight: 80.8 kg (178 lb 2.1 oz)   Height: 5' 5" (1.651 m)   PainSc: 0-No pain       Physical Exam   Constitutional: She is oriented to person, place, and time and well-developed, well-nourished, and in no distress. No distress.   HENT:   Head: Normocephalic.   Mouth/Throat: Oropharynx is clear and moist.   Eyes: Conjunctivae and EOM are normal. Pupils are equal, round, and reactive to light.   Neck: Normal range of " motion. Neck supple.   Cardiovascular: Normal rate and intact distal pulses. Exam reveals no friction rub.   Pulmonary/Chest: Effort normal. No respiratory distress. She exhibits no tenderness.   Abdominal: Soft. There is no tenderness.   Musculoskeletal:   No synovitis over small joints of hands or feet.  No effusion over large joints.   Neurological: She is alert and oriented to person, place, and time. No cranial nerve deficit.   Skin: Skin is warm. No rash noted. No erythema.   Psychiatric: Mood and affect normal.   Nursing note and vitals reviewed.      Labs:-  Results for HEIDI SHAIKH (MRN [id number]) as of 5/22/2017 12:21   Ref. Range 1/23/2017 08:10 2/17/2017 08:40 3/22/2017 08:58 5/19/2017 13:36   HARPREET HEP-2 Titer Unknown Positive >=1:2560...      Anti-SSA Antibody Latest Ref Range: 0.00 - 19.99 .26 (H)      Anti-SSA Interpretation Latest Ref Range: Negative  Positive (A)      Anti-SSB Antibody Latest Ref Range: 0.00 - 19.99 EU 46.84 (H)      Anti-SSB Interpretation Latest Ref Range: Negative  Positive (A)      ds DNA Ab Latest Ref Range: Negative 1:10  Negative 1:10      Anti Sm Antibody Latest Ref Range: 0.00 - 19.99 EU 2.89      Anti-Sm Interpretation Latest Ref Range: Negative  Negative      Anti Sm/RNP Antibody Latest Ref Range: 0.00 - 19.99 EU 4.12      Anti-Sm/RNP Interpretation Latest Ref Range: Negative  Negative      Complement (C-3) Latest Ref Range: 50 - 180 mg/dL 45 (L) 56 47 (L) 56   Complement (C-4) Latest Ref Range: 11 - 44 mg/dL 10 (L) 13 13 16            Medication List           Accurate as of 10/29/18  3:20 PM. If you have any questions, ask your nurse or doctor.               CHANGE how you take these medications    hydroxychloroquine 200 mg tablet  Commonly known as:  PLAQUENIL  Take 2 tablets (400 mg total) by mouth once daily.  What changed:    · how much to take  · when to take this  Changed by:  Shane Blair MD     valACYclovir 500 MG tablet  Commonly known  as:  VALTREX  Take 2 tablets (1,000 mg total) by mouth once daily.  What changed:  how much to take        CONTINUE taking these medications    amLODIPine 10 MG tablet  Commonly known as:  NORVASC  Take 1 tablet (10 mg total) by mouth once daily.     efinaconazole 10 % Stacey  Commonly known as:  JUBLIA  Apply once daily to nail and nail fold.  Use daily for up to 1 year.     famotidine 20 MG tablet  Commonly known as:  PEPCID  Take 1 tablet (20 mg total) by mouth 2 (two) times daily as needed for Heartburn.     flunisolide 25 mcg (0.025%) 25 mcg (0.025 %) Spry  Commonly known as:  NASALIDE  2 sprays by Nasal route 2 (two) times daily.     FLUoxetine 40 MG capsule  Commonly known as:  PROZAC  TAKE 1 CAPSULE BY MOUTH EVERY DAY     labetalol 200 MG tablet  Commonly known as:  NORMODYNE  Take 1 tablet (200 mg total) by mouth every 12 (twelve) hours.     levocetirizine 5 MG tablet  Commonly known as:  XYZAL  Take 1 tablet (5 mg total) by mouth once daily.     losartan 50 MG tablet  Commonly known as:  COZAAR  Take 1 tablet (50 mg total) by mouth once daily.     MIRENA 20 mcg/24 hr (5 years) IUD  Generic drug:  levonorgestrel     mycophenolate 500 mg Tab  Commonly known as:  CELLCEPT     predniSONE 5 MG tablet  Commonly known as:  DELTASONE  Take 1 tablet (5 mg total) by mouth once daily.        STOP taking these medications    BENLYSTA 200 mg/mL Atin  Generic drug:  belimumab  Stopped by:  Shane Blair MD     methyldopa 500 MG tablet  Commonly known as:  ALDOMET  Stopped by:  Shane Blair MD     methylPREDNISolone 4 mg tablet  Commonly known as:  MEDROL DOSEPACK  Stopped by:  Shane Blair MD     ondansetron 4 MG Tbdl  Commonly known as:  ZOFRAN-ODT  Stopped by:  Shane Blair MD           Where to Get Your Medications      These medications were sent to [facility] [address] [facility] [place], [place] - [address] [facility]  [address] [place] [address] [place]  [place] [address]    Phone:  [phone]   · hydroxychloroquine 200 mg tablet       Assessment/Plans:-  # Systemic lupus erythematosus arthritis:-  Well controlled lupus arthritis on Benlysta subcutaneous injections, but she complains of difficulty injecting herself. Switch to IV benlysta. Non compliance with oral medications. Advised to take at least plaquenil ,losortan and amlodipine.     # SLE glomerulonephritis syndrome, WHO class V:-  Repeat UA , U P/C ratio today since she was in her cycles when she gave the last sample . Continue follow up with .     # Sjogren's syndrome with other organ involvement:-  Stable sicca syndrome on plaquenil . Monitor.     # Long-term use of Plaquenil:-  Yearly retinal exams while on plaquenil.     Disclaimer: This note was prepared using voice recognition system and is likely to have sound alike errors .  Please call me with any questions

## 2018-10-31 ENCOUNTER — TELEPHONE (OUTPATIENT)
Dept: RHEUMATOLOGY | Facility: HOSPITAL | Age: 28
End: 2018-10-31

## 2018-11-08 ENCOUNTER — INFUSION (OUTPATIENT)
Dept: RHEUMATOLOGY | Facility: HOSPITAL | Age: 28
End: 2018-11-08
Attending: INTERNAL MEDICINE
Payer: MEDICAID

## 2018-11-08 VITALS
RESPIRATION RATE: 18 BRPM | TEMPERATURE: 98 F | OXYGEN SATURATION: 100 % | SYSTOLIC BLOOD PRESSURE: 180 MMHG | DIASTOLIC BLOOD PRESSURE: 102 MMHG | WEIGHT: 179.88 LBS | HEART RATE: 64 BPM | BODY MASS INDEX: 29.94 KG/M2

## 2018-11-08 DIAGNOSIS — O09.299: Primary | ICD-10-CM

## 2018-11-08 DIAGNOSIS — M32.9 SLE (SYSTEMIC LUPUS ERYTHEMATOSUS RELATED SYNDROME): ICD-10-CM

## 2018-11-08 PROCEDURE — A4216 STERILE WATER/SALINE, 10 ML: HCPCS | Mod: PO | Performed by: INTERNAL MEDICINE

## 2018-11-08 PROCEDURE — 96375 TX/PRO/DX INJ NEW DRUG ADDON: CPT | Mod: PO

## 2018-11-08 PROCEDURE — 63600175 PHARM REV CODE 636 W HCPCS: Mod: PO | Performed by: INTERNAL MEDICINE

## 2018-11-08 PROCEDURE — 96413 CHEMO IV INFUSION 1 HR: CPT | Mod: PO

## 2018-11-08 PROCEDURE — 25000003 PHARM REV CODE 250: Mod: PO | Performed by: INTERNAL MEDICINE

## 2018-11-08 RX ORDER — SODIUM CHLORIDE 0.9 % (FLUSH) 0.9 %
10 SYRINGE (ML) INJECTION
Status: DISCONTINUED | OUTPATIENT
Start: 2018-11-08 | End: 2018-11-08 | Stop reason: HOSPADM

## 2018-11-08 RX ORDER — ACETAMINOPHEN 325 MG/1
650 TABLET ORAL
Status: CANCELLED | OUTPATIENT
Start: 2018-11-08

## 2018-11-08 RX ORDER — HEPARIN 100 UNIT/ML
500 SYRINGE INTRAVENOUS
Status: CANCELLED | OUTPATIENT
Start: 2018-11-08

## 2018-11-08 RX ORDER — SODIUM CHLORIDE 0.9 % (FLUSH) 0.9 %
10 SYRINGE (ML) INJECTION
Status: CANCELLED | OUTPATIENT
Start: 2018-11-08

## 2018-11-08 RX ADMIN — SODIUM CHLORIDE, PRESERVATIVE FREE 10 ML: 5 INJECTION INTRAVENOUS at 01:11

## 2018-11-08 RX ADMIN — BELIMUMAB 816 MG: 400 INJECTION, POWDER, LYOPHILIZED, FOR SOLUTION INTRAVENOUS at 01:11

## 2018-11-08 RX ADMIN — METHYLPREDNISOLONE SODIUM SUCCINATE 100 MG: 125 INJECTION, POWDER, FOR SOLUTION INTRAMUSCULAR; INTRAVENOUS at 01:11

## 2018-11-08 NOTE — PATIENT INSTRUCTIONS
Benlysta (belimumab) solution for injection  What is this medicine?  BELIMUMAB (be ANTOINE ue mab) is a medicine used to treat a certain type of systemic lupus erythematosus (SLE). This medicine is used with standard therapy for SLE. It is not a cure.  How should I use this medicine?  This medicine is for infusion into a vein. It is given by a health care professional in a hospital or clinic setting.  A special MedGuide will be given to you by the pharmacist with each prescription and refill. Be sure to read this information carefully each time.  Talk to your pediatrician regarding the use of this medicine in children. Special care may be needed.  What side effects may I notice from receiving this medicine?  Side effects that you should report to your doctor or health care professional as soon as possible:  · allergic reactions like skin rash, itching or hives, swelling of the face, lips, or tongue  · depressed mood  · signs of infection - fever or chills, cough, sore throat, pain or difficulty passing urine  · suicidal thoughts or other mood changes  · unusually slow heartbeat  Side effects that usually do not require medical attention (Report these to your doctor or health care professional if they continue or are bothersome.):  · diarrhea  · headache  · muscle aches  · nausea, vomiting  · trouble sleeping  What may interact with this medicine?  Do not take this medicine with any of the following medications:  · live virus vaccines  This medicine may also interact with the following medications:  · cyclophosphamide  · ofatumumab  · rituximab  What if I miss a dose?  It is important not to miss your dose. Call your doctor or health care professional if you are unable to keep an appointment.  Where should I keep my medicine?  This drug is given in a hospital or clinic and will not be stored at home.  What should I tell my health care provider before I take this medicine?  They need to know if you have any of these  conditions:  · heart disease  · immune system problems  · infection (especially a virus infection such as chickenpox, cold sores, or herpes)  · mental illness  · suicidal thoughts, plans, or attempt; a previous suicide attempt by you or a family member  · an unusual or allergic reaction to belimumab, other medicines, foods, dyes, or preservatives  · pregnant or trying to get pregnant  · breast-feeding  What should I watch for while using this medicine?  Tell your doctor or healthcare professional if your symptoms do not start to get better or if they get worse.  Your condition will be monitored carefully while you are receiving this medicine.  NOTE:This sheet is a summary. It may not cover all possible information. If you have questions about this medicine, talk to your doctor, pharmacist, or health care provider. Copyright© 2017 [employer]

## 2018-11-08 NOTE — PROGRESS NOTES
Infusion # 1 (Week 0 loading dose) - Benlysta 10 mg/kg q  4 weeks after loading period  Today's weight-81.6 kg   -Per patient, had 1 Benlysta infusion approx 2 years ago and SQ Benlysta months ago    Any:  -recent illness, infection, or antibiotic use in past week- denies  -open wounds or mouth sores- denies  -invasive procedures or surgeries in past 4 weeks or in upcoming 4 weeks- denies  -vaccinations in past week- denies  -chance you may be pregnant- denies      Recent labs? 10/17/18  Last Rheumatology provider visit- Seen by Dr. MADISON on 10/29/18     Premeds? 100 mg Solumedrol IVP over 3 minutes     Benlysta 816 mg administered IV at a 60 minute rate per orders; see MAR and vitals for more  details. Tolerated well without adverse events, discharged and ambulatory out of clinic.

## 2018-11-20 ENCOUNTER — INFUSION (OUTPATIENT)
Dept: RHEUMATOLOGY | Facility: HOSPITAL | Age: 28
End: 2018-11-20
Attending: PHYSICIAN ASSISTANT
Payer: MEDICAID

## 2018-11-20 ENCOUNTER — CLINICAL SUPPORT (OUTPATIENT)
Dept: RHEUMATOLOGY | Facility: CLINIC | Age: 28
End: 2018-11-20
Payer: MEDICAID

## 2018-11-20 VITALS
OXYGEN SATURATION: 100 % | BODY MASS INDEX: 29.94 KG/M2 | WEIGHT: 179.88 LBS | HEART RATE: 96 BPM | TEMPERATURE: 98 F | DIASTOLIC BLOOD PRESSURE: 96 MMHG | SYSTOLIC BLOOD PRESSURE: 152 MMHG | RESPIRATION RATE: 18 BRPM

## 2018-11-20 DIAGNOSIS — M32.9 SLE (SYSTEMIC LUPUS ERYTHEMATOSUS RELATED SYNDROME): ICD-10-CM

## 2018-11-20 DIAGNOSIS — D84.9 IMMUNOSUPPRESSED STATUS: ICD-10-CM

## 2018-11-20 DIAGNOSIS — O09.299: Primary | ICD-10-CM

## 2018-11-20 PROCEDURE — 96415 CHEMO IV INFUSION ADDL HR: CPT | Mod: PO

## 2018-11-20 PROCEDURE — 96375 TX/PRO/DX INJ NEW DRUG ADDON: CPT | Mod: PO

## 2018-11-20 PROCEDURE — 25000003 PHARM REV CODE 250: Mod: PO | Performed by: INTERNAL MEDICINE

## 2018-11-20 PROCEDURE — A4216 STERILE WATER/SALINE, 10 ML: HCPCS | Mod: PO | Performed by: INTERNAL MEDICINE

## 2018-11-20 PROCEDURE — 96413 CHEMO IV INFUSION 1 HR: CPT | Mod: PO

## 2018-11-20 PROCEDURE — 63600175 PHARM REV CODE 636 W HCPCS: Mod: PO | Performed by: INTERNAL MEDICINE

## 2018-11-20 PROCEDURE — 90662 IIV NO PRSV INCREASED AG IM: CPT | Mod: PBBFAC,PO

## 2018-11-20 RX ORDER — SODIUM CHLORIDE 0.9 % (FLUSH) 0.9 %
10 SYRINGE (ML) INJECTION
Status: CANCELLED | OUTPATIENT
Start: 2018-11-20

## 2018-11-20 RX ORDER — HEPARIN 100 UNIT/ML
500 SYRINGE INTRAVENOUS
Status: CANCELLED | OUTPATIENT
Start: 2018-11-20

## 2018-11-20 RX ORDER — ACETAMINOPHEN 325 MG/1
650 TABLET ORAL
Status: CANCELLED | OUTPATIENT
Start: 2018-11-20

## 2018-11-20 RX ORDER — SODIUM CHLORIDE 0.9 % (FLUSH) 0.9 %
10 SYRINGE (ML) INJECTION
Status: DISCONTINUED | OUTPATIENT
Start: 2018-11-20 | End: 2018-11-20 | Stop reason: HOSPADM

## 2018-11-20 RX ADMIN — METHYLPREDNISOLONE SODIUM SUCCINATE 100 MG: 125 INJECTION, POWDER, FOR SOLUTION INTRAMUSCULAR; INTRAVENOUS at 02:11

## 2018-11-20 RX ADMIN — BELIMUMAB 816 MG: 400 INJECTION, POWDER, LYOPHILIZED, FOR SOLUTION INTRAVENOUS at 02:11

## 2018-11-20 RX ADMIN — Medication 10 ML: at 02:11

## 2018-11-20 NOTE — PROGRESS NOTES
Infusion # 2 (Week 2 loading dose) - Benlysta 10 mg/kg q  4 weeks after loading period  Today's weight-81.6 kg   -Per patient, had 1 Benlysta infusion approx 2 years ago and SQ Benlysta months ago     Any:  -recent illness, infection, or antibiotic use in past week- denies  -open wounds or mouth sores- denies  -invasive procedures or surgeries in past 4 weeks or in upcoming 4 weeks- denies  -vaccinations in past week- HD flu vaccination administered on 11/20/18.  -chance you may be pregnant- denies        Recent labs? 10/17/18  Last Rheumatology provider visit- Seen by Dr. MADISON on 10/29/18     Premeds? 100 mg Solumedrol IVP over 3 minutes     Benlysta 816 mg administered IV at a 60 minute rate per orders; see MAR and vitals for more  details. Tolerated well without adverse events, discharged and ambulatory out of clinic.

## 2018-11-20 NOTE — PROGRESS NOTES
VIS given on flu vaccination. Counseled on vaccination per provider. No contraindications noted on receiving flu vaccination. Denies any questions. High dose flu vaccine administered per orders to left/right: left deltoid. Tolerated without any complaints. No adverse reaction noted or reported after 15 minutes of administration. No redness, swelling, or drainage noted to site.

## 2018-11-27 ENCOUNTER — PATIENT MESSAGE (OUTPATIENT)
Dept: RHEUMATOLOGY | Facility: CLINIC | Age: 28
End: 2018-11-27

## 2018-11-27 ENCOUNTER — TELEPHONE (OUTPATIENT)
Dept: RHEUMATOLOGY | Facility: CLINIC | Age: 28
End: 2018-11-27

## 2018-11-27 NOTE — TELEPHONE ENCOUNTER
Spoke with pt and since she recieved her benlysta infusion and flu injection on 11.20.18 she has been sick. Has had fever, nausea, aches, pains and no appetite since last Thursday. Would like to come in today for an appointment. Please Advise.

## 2018-12-04 ENCOUNTER — INFUSION (OUTPATIENT)
Dept: RHEUMATOLOGY | Facility: HOSPITAL | Age: 28
End: 2018-12-04
Attending: SURGERY
Payer: MEDICAID

## 2018-12-04 ENCOUNTER — PATIENT MESSAGE (OUTPATIENT)
Dept: RHEUMATOLOGY | Facility: CLINIC | Age: 28
End: 2018-12-04

## 2018-12-04 VITALS
BODY MASS INDEX: 29.13 KG/M2 | TEMPERATURE: 98 F | OXYGEN SATURATION: 100 % | HEART RATE: 76 BPM | SYSTOLIC BLOOD PRESSURE: 160 MMHG | WEIGHT: 175.06 LBS | RESPIRATION RATE: 18 BRPM | DIASTOLIC BLOOD PRESSURE: 103 MMHG

## 2018-12-04 DIAGNOSIS — M32.9 SLE (SYSTEMIC LUPUS ERYTHEMATOSUS RELATED SYNDROME): Primary | ICD-10-CM

## 2018-12-04 DIAGNOSIS — O09.299: ICD-10-CM

## 2018-12-04 DIAGNOSIS — R11.0 NAUSEA: Primary | ICD-10-CM

## 2018-12-04 PROCEDURE — 96375 TX/PRO/DX INJ NEW DRUG ADDON: CPT | Mod: PO

## 2018-12-04 PROCEDURE — 63600175 PHARM REV CODE 636 W HCPCS: Mod: JG,PO | Performed by: INTERNAL MEDICINE

## 2018-12-04 PROCEDURE — 25000003 PHARM REV CODE 250: Mod: PO | Performed by: INTERNAL MEDICINE

## 2018-12-04 PROCEDURE — 96413 CHEMO IV INFUSION 1 HR: CPT | Mod: PO

## 2018-12-04 PROCEDURE — A4216 STERILE WATER/SALINE, 10 ML: HCPCS | Mod: PO | Performed by: INTERNAL MEDICINE

## 2018-12-04 RX ORDER — HEPARIN 100 UNIT/ML
500 SYRINGE INTRAVENOUS
Status: CANCELLED | OUTPATIENT
Start: 2018-12-04

## 2018-12-04 RX ORDER — ACETAMINOPHEN 325 MG/1
650 TABLET ORAL
Status: CANCELLED | OUTPATIENT
Start: 2018-12-04

## 2018-12-04 RX ORDER — SODIUM CHLORIDE 0.9 % (FLUSH) 0.9 %
10 SYRINGE (ML) INJECTION
Status: CANCELLED | OUTPATIENT
Start: 2018-12-04

## 2018-12-04 RX ORDER — METOCLOPRAMIDE 5 MG/1
5 TABLET ORAL
Qty: 30 TABLET | Refills: 0 | Status: SHIPPED | OUTPATIENT
Start: 2018-12-04 | End: 2019-01-11

## 2018-12-04 RX ORDER — SODIUM CHLORIDE 0.9 % (FLUSH) 0.9 %
10 SYRINGE (ML) INJECTION
Status: DISCONTINUED | OUTPATIENT
Start: 2018-12-04 | End: 2018-12-04 | Stop reason: HOSPADM

## 2018-12-04 RX ADMIN — BELIMUMAB 794 MG: 400 INJECTION, POWDER, LYOPHILIZED, FOR SOLUTION INTRAVENOUS at 08:12

## 2018-12-04 RX ADMIN — METHYLPREDNISOLONE SODIUM SUCCINATE 100 MG: 125 INJECTION, POWDER, FOR SOLUTION INTRAMUSCULAR; INTRAVENOUS at 08:12

## 2018-12-04 RX ADMIN — Medication 10 ML: at 08:12

## 2018-12-04 NOTE — TELEPHONE ENCOUNTER
Please check urine pregnancy . Sent Rx for reglan to help with nausea. If unchanged, will consult with GI. Thanks.

## 2018-12-04 NOTE — PATIENT INSTRUCTIONS
Belimumab solution for injection  What is this medicine?  BELIMUMAB (be ANTOINE ue mab) is a medicine used to treat a certain type of systemic lupus erythematosus (SLE). This medicine is used with standard therapy for SLE. It is not a cure.  How should I use this medicine?  This medicine is for infusion into a vein. It is given by a health care professional in a hospital or clinic setting.  A special MedGuide will be given to you by the pharmacist with each prescription and refill. Be sure to read this information carefully each time.  Talk to your pediatrician regarding the use of this medicine in children. Special care may be needed.  What side effects may I notice from receiving this medicine?  Side effects that you should report to your doctor or health care professional as soon as possible:  · allergic reactions like skin rash, itching or hives, swelling of the face, lips, or tongue  · depressed mood  · signs of infection - fever or chills, cough, sore throat, pain or difficulty passing urine  · suicidal thoughts or other mood changes  · unusually slow heartbeat  Side effects that usually do not require medical attention (Report these to your doctor or health care professional if they continue or are bothersome.):  · diarrhea  · headache  · muscle aches  · nausea, vomiting  · trouble sleeping  What may interact with this medicine?  Do not take this medicine with any of the following medications:  · live virus vaccines  This medicine may also interact with the following medications:  · cyclophosphamide  · ofatumumab  · rituximab  What if I miss a dose?  It is important not to miss your dose. Call your doctor or health care professional if you are unable to keep an appointment.  Where should I keep my medicine?  This drug is given in a hospital or clinic and will not be stored at home.  What should I tell my health care provider before I take this medicine?  They need to know if you have any of these  conditions:  · heart disease  · immune system problems  · infection (especially a virus infection such as chickenpox, cold sores, or herpes)  · mental illness  · suicidal thoughts, plans, or attempt; a previous suicide attempt by you or a family member  · an unusual or allergic reaction to belimumab, other medicines, foods, dyes, or preservatives  · pregnant or trying to get pregnant  · breast-feeding  What should I watch for while using this medicine?  Tell your doctor or healthcare professional if your symptoms do not start to get better or if they get worse.  Your condition will be monitored carefully while you are receiving this medicine.  NOTE:This sheet is a summary. It may not cover all possible information. If you have questions about this medicine, talk to your doctor, pharmacist, or health care provider. Copyright© 2017 [employer]      WAYS TO HELP PREVENT INFECTION         WASH YOUR HANDS OFTEN DURING THE DAY, ESPECIALLY BEFORE YOU EAT, AFTER USING THE BATHROOM, AND AFTER TOUCHING ANIMALS     STAY AWAY FROM PEOPLE WHO HAVE ILLNESSES YOU CAN CATCH; SUCH AS COLDS, FLU, CHICKEN POX     TRY TO AVOID CROWDS     STAY AWAY FROM CHILDREN WHO RECENTLY HAVE RECEIVED LIVE VIRUS VACCINES     MAINTAIN GOOD MOUTH CARE     DO NOT SQUEEZE OR SCRATCH PIMPLES     CLEAN CUTS & SCRAPES RIGHT AWAY AND DAILY UNTIL HEALED WITH WARM WATER, SOAP & AN ANTISEPTIC     AVOID CONTACT WITH LITTER BOXES, BIRD CAGES, & FISH TANKS     AVOID STANDING WATER, IE., BIRD BATHS, FLOWER POTS/VASES, OR HUMIDIFIERS     WEAR GLOVES WHEN GARDENING OR CLEANING UP AFTER OTHERS, ESPECIALLY BABIES & SMALL CHILDREN    DO NOT EAT RAW FISH, SEAFOOD, MEAT, OR EGGS

## 2018-12-04 NOTE — PROGRESS NOTES
Infusion# 3  S/S infection noted or voiced? denies  Recent labs? yes  Recent vaccines? denies    Premeds? Solumedrol 100 mg IVP over 2 minutes    Benlysta 10 mg/kg =  794 mg administered IV at a 60 minute rate per orders; see MAR & Flow Sheet for more  details. Tolerated well without adverse events

## 2018-12-11 ENCOUNTER — LAB VISIT (OUTPATIENT)
Dept: LAB | Facility: HOSPITAL | Age: 28
End: 2018-12-11
Attending: PHYSICIAN ASSISTANT
Payer: MEDICAID

## 2018-12-11 ENCOUNTER — TELEPHONE (OUTPATIENT)
Dept: URGENT CARE | Facility: CLINIC | Age: 28
End: 2018-12-11

## 2018-12-11 ENCOUNTER — OFFICE VISIT (OUTPATIENT)
Dept: URGENT CARE | Facility: CLINIC | Age: 28
End: 2018-12-11
Payer: MEDICAID

## 2018-12-11 ENCOUNTER — TELEPHONE (OUTPATIENT)
Dept: OBSTETRICS AND GYNECOLOGY | Facility: CLINIC | Age: 28
End: 2018-12-11

## 2018-12-11 ENCOUNTER — TELEPHONE (OUTPATIENT)
Dept: FAMILY MEDICINE | Facility: CLINIC | Age: 28
End: 2018-12-11

## 2018-12-11 VITALS
HEART RATE: 93 BPM | HEIGHT: 65 IN | WEIGHT: 164.56 LBS | BODY MASS INDEX: 27.42 KG/M2 | OXYGEN SATURATION: 100 % | TEMPERATURE: 99 F | RESPIRATION RATE: 20 BRPM | SYSTOLIC BLOOD PRESSURE: 120 MMHG | DIASTOLIC BLOOD PRESSURE: 70 MMHG

## 2018-12-11 DIAGNOSIS — R11.0 NAUSEA: Primary | ICD-10-CM

## 2018-12-11 DIAGNOSIS — Z32.01 POSITIVE URINE PREGNANCY TEST: ICD-10-CM

## 2018-12-11 DIAGNOSIS — N30.00 ACUTE CYSTITIS WITHOUT HEMATURIA: ICD-10-CM

## 2018-12-11 LAB
B-HCG UR QL: POSITIVE
BILIRUB SERPL-MCNC: ABNORMAL MG/DL
BLOOD URINE, POC: ABNORMAL
COLOR, POC UA: YELLOW
CTP QC/QA: YES
GLUCOSE UR QL STRIP: NORMAL
HCG INTACT+B SERPL-ACNC: NORMAL MIU/ML
KETONES UR QL STRIP: ABNORMAL
LEUKOCYTE ESTERASE URINE, POC: + 2
NITRITE, POC UA: ABNORMAL
PH, POC UA: 6
PROTEIN, POC: + 30
SPECIFIC GRAVITY, POC UA: 1.01
UROBILINOGEN, POC UA: NORMAL

## 2018-12-11 PROCEDURE — 81025 URINE PREGNANCY TEST: CPT | Mod: PBBFAC,PO | Performed by: PHYSICIAN ASSISTANT

## 2018-12-11 PROCEDURE — 81002 URINALYSIS NONAUTO W/O SCOPE: CPT | Mod: PBBFAC,PO | Performed by: PHYSICIAN ASSISTANT

## 2018-12-11 PROCEDURE — 99213 OFFICE O/P EST LOW 20 MIN: CPT | Mod: PBBFAC,PO | Performed by: PHYSICIAN ASSISTANT

## 2018-12-11 PROCEDURE — 99999 PR PBB SHADOW E&M-EST. PATIENT-LVL III: CPT | Mod: PBBFAC,,, | Performed by: PHYSICIAN ASSISTANT

## 2018-12-11 PROCEDURE — 84702 CHORIONIC GONADOTROPIN TEST: CPT | Mod: PO

## 2018-12-11 PROCEDURE — 99214 OFFICE O/P EST MOD 30 MIN: CPT | Mod: S$PBB,,, | Performed by: PHYSICIAN ASSISTANT

## 2018-12-11 PROCEDURE — 87086 URINE CULTURE/COLONY COUNT: CPT

## 2018-12-11 PROCEDURE — 36415 COLL VENOUS BLD VENIPUNCTURE: CPT | Mod: PO

## 2018-12-11 RX ORDER — CEPHALEXIN 500 MG/1
500 CAPSULE ORAL 2 TIMES DAILY
Qty: 14 CAPSULE | Refills: 0 | Status: SHIPPED | OUTPATIENT
Start: 2018-12-11 | End: 2018-12-18

## 2018-12-11 NOTE — TELEPHONE ENCOUNTER
Returned patient's call.  Patient stated that she is pregnant, and currently has an IUD in.  Consulted with KARMA Higgins who stated that she could see the patient for her initial ob part, and then a physician would have to come in and remove the IUD.  Scheduled patient for  Tomorrow 12/12/18 at 10:20am with Jessica Meredith CNM, [facility].  Informed patient that Dr. Arenas is in the office tomorrow, so she would be the physician to remove the IUD.  Patient verbalized understanding of all things discussed.

## 2018-12-11 NOTE — TELEPHONE ENCOUNTER
----- Message from Irena Pascual sent at 12/11/2018  6:38 AM CST -----  Contact: pt   Pt states that she need to get fitted in. Pt states that she has been feeling weak and nausea. Please call pt @ [phone] (home)

## 2018-12-11 NOTE — TELEPHONE ENCOUNTER
----- Message from Franck Baker sent at 12/11/2018  3:14 PM CST -----  Pt is requesting a call from nurse to discuss removal of birth control.        Please call pt  Back at [phone]

## 2018-12-11 NOTE — PATIENT INSTRUCTIONS
Follow up with OB/gyn, they will contact you for appointment scheduling.  We will alert your Rheumatologist about the positive pregnancy test.

## 2018-12-11 NOTE — TELEPHONE ENCOUNTER
Informed patient of positive serum hcg, plans for prompt OB f/u and informed that Rheumatology was updated as well. She expressed understanding and has no further questions at this time.

## 2018-12-11 NOTE — TELEPHONE ENCOUNTER
Spoke to Antonia in Urgent Care she stated that patient was in today for positive pregnancy test and still has a IUD in place.  She will have patient do hcg level and wanted us to schedule a new ob appointment and IUD removal.

## 2018-12-11 NOTE — PROGRESS NOTES
"Subjective:       Patient ID: Alisia Vines is a 28 y.o. female.    Chief Complaint: Nausea    Emesis    This is a new problem. The current episode started 1 to 4 weeks ago (has been having nausea for the past 3 weeks with decreased appetite, not really vomiting until last night and today, had 4 episodes of vomiting yellow bile this AM). The problem occurs intermittently. The problem has been gradually worsening. The emesis has an appearance of bile. There has been no fever. Pertinent negatives include no abdominal pain, chills, coughing, decreased urine volume, fever, headaches, myalgias or URI. Associated symptoms comments: Denies vaginal bleeding, in fact missed last month's cycle approximate LMP mid October (~8 weeks ago); reports +dysuria (mild). Treatments tried: started Reglan by Rheumatology. The treatment provided no relief.     Review of Systems   Constitutional: Negative for chills, fatigue and fever.   HENT: Negative for congestion, rhinorrhea, sneezing and sore throat.    Respiratory: Negative for cough and shortness of breath.    Gastrointestinal: Positive for nausea and vomiting. Negative for abdominal pain.   Genitourinary: Positive for dysuria, frequency and urgency. Negative for decreased urine volume, difficulty urinating, flank pain, hematuria, vaginal bleeding and vaginal discharge.   Musculoskeletal: Negative for myalgias.   Skin: Negative for rash.   Neurological: Negative for headaches.       Objective:      /70   Pulse 93   Temp 99.3 °F (37.4 °C)   Resp 20   Ht 5' 5" (1.651 m)   Wt 74.6 kg (164 lb 9.2 oz)   SpO2 100%   BMI 27.39 kg/m²   Physical Exam   Constitutional: She is oriented to person, place, and time. She appears well-developed and well-nourished. No distress.   HENT:   Head: Normocephalic.   Right Ear: External ear normal.   Left Ear: External ear normal.   Nose: Nose normal.   Eyes: Conjunctivae and EOM are normal. Right eye exhibits no discharge. Left eye " exhibits no discharge.   Neck: Normal range of motion. Neck supple.   Musculoskeletal:        Neurological: She is alert and oriented to person, place, and time. She has normal reflexes. She displays normal reflexes. Coordination normal.   Skin: Skin is warm and dry. She is not diaphoretic.   Vitals reviewed.      Assessment:       1. Nausea    2. Acute cystitis without hematuria    3. Positive urine pregnancy test        Plan:       Nausea  -     POCT urine dipstick without microscope  -     POCT urine pregnancy    Acute cystitis without hematuria  -     Urine culture  -     cephALEXin (KEFLEX) 500 MG capsule; Take 1 capsule (500 mg total) by mouth 2 (two) times daily. for 7 days  Dispense: 14 capsule; Refill: 0    Positive urine pregnancy test  -     hCG, quantitative; Future; Expected date: 12/11/2018    Clinically symptoms suggestive of pregnancy, +UPT, patient informed of test result, is tearful with this news. Has a Mirena in place. Discussed with OB/gyn who will arrange prompt appointment to include Mirena removal. Will send for serum HCG now. Also discussed with Marycruz MCGEE in Rheumatology who will discuss with attending and contact patient for SLE medication adjustments.    Dysuria and pyuria, start Keflex and follow up urine culture.    Heather Trant PA-C [facility]

## 2018-12-12 ENCOUNTER — PROCEDURE VISIT (OUTPATIENT)
Dept: OBSTETRICS AND GYNECOLOGY | Facility: CLINIC | Age: 28
End: 2018-12-12
Payer: MEDICAID

## 2018-12-12 ENCOUNTER — LAB VISIT (OUTPATIENT)
Dept: LAB | Facility: HOSPITAL | Age: 28
End: 2018-12-12
Attending: ADVANCED PRACTICE MIDWIFE
Payer: MEDICAID

## 2018-12-12 ENCOUNTER — TELEPHONE (OUTPATIENT)
Dept: OBSTETRICS AND GYNECOLOGY | Facility: CLINIC | Age: 28
End: 2018-12-12

## 2018-12-12 ENCOUNTER — INITIAL PRENATAL (OUTPATIENT)
Dept: OBSTETRICS AND GYNECOLOGY | Facility: CLINIC | Age: 28
End: 2018-12-12
Payer: MEDICAID

## 2018-12-12 VITALS — BODY MASS INDEX: 28.1 KG/M2 | SYSTOLIC BLOOD PRESSURE: 162 MMHG | DIASTOLIC BLOOD PRESSURE: 98 MMHG | WEIGHT: 168.88 LBS

## 2018-12-12 DIAGNOSIS — O09.90 HIGH RISK PREGNANCY, ANTEPARTUM: ICD-10-CM

## 2018-12-12 DIAGNOSIS — O30.049 DICHORIONIC DIAMNIOTIC TWIN PREGNANCY, ANTEPARTUM: ICD-10-CM

## 2018-12-12 DIAGNOSIS — M32.14 SLE GLOMERULONEPHRITIS SYNDROME, WHO CLASS V: ICD-10-CM

## 2018-12-12 DIAGNOSIS — D64.9 ANEMIA, UNSPECIFIED TYPE: Primary | ICD-10-CM

## 2018-12-12 DIAGNOSIS — Z30.431 ENCOUNTER FOR MANAGEMENT OF INTRAUTERINE CONTRACEPTIVE DEVICE (IUD), UNSPECIFIED IUD MANAGEMENT TYPE: ICD-10-CM

## 2018-12-12 DIAGNOSIS — Z30.431 ENCOUNTER FOR ROUTINE CHECKING OF INTRAUTERINE CONTRACEPTIVE DEVICE (IUD): ICD-10-CM

## 2018-12-12 DIAGNOSIS — I10 ESSENTIAL HYPERTENSION: ICD-10-CM

## 2018-12-12 LAB
BASOPHILS # BLD AUTO: 0 K/UL
BASOPHILS NFR BLD: 0 %
DIFFERENTIAL METHOD: ABNORMAL
EOSINOPHIL # BLD AUTO: 0 K/UL
EOSINOPHIL NFR BLD: 1.2 %
ERYTHROCYTE [DISTWIDTH] IN BLOOD BY AUTOMATED COUNT: 14.5 %
HCT VFR BLD AUTO: 29.9 %
HGB BLD-MCNC: 10 G/DL
HGB S BLD QL SOLY: NEGATIVE
IMM GRANULOCYTES # BLD AUTO: 0.01 K/UL
IMM GRANULOCYTES NFR BLD AUTO: 0.4 %
LYMPHOCYTES # BLD AUTO: 0.8 K/UL
LYMPHOCYTES NFR BLD: 30.5 %
MCH RBC QN AUTO: 27.2 PG
MCHC RBC AUTO-ENTMCNC: 33.4 G/DL
MCV RBC AUTO: 82 FL
MONOCYTES # BLD AUTO: 0.3 K/UL
MONOCYTES NFR BLD: 9.8 %
NEUTROPHILS # BLD AUTO: 1.5 K/UL
NEUTROPHILS NFR BLD: 58.1 %
NRBC BLD-RTO: 0 /100 WBC
PLATELET # BLD AUTO: 229 K/UL
PMV BLD AUTO: 12 FL
RBC # BLD AUTO: 3.67 M/UL
WBC # BLD AUTO: 2.56 K/UL

## 2018-12-12 PROCEDURE — 76802 OB US < 14 WKS ADDL FETUS: CPT | Mod: 59,PBBFAC,PO | Performed by: OBSTETRICS & GYNECOLOGY

## 2018-12-12 PROCEDURE — 76802 OB US < 14 WKS ADDL FETUS: CPT | Mod: 26,S$PBB,, | Performed by: OBSTETRICS & GYNECOLOGY

## 2018-12-12 PROCEDURE — 76801 OB US < 14 WKS SINGLE FETUS: CPT | Mod: 59,PBBFAC,PO | Performed by: OBSTETRICS & GYNECOLOGY

## 2018-12-12 PROCEDURE — 86592 SYPHILIS TEST NON-TREP QUAL: CPT

## 2018-12-12 PROCEDURE — 87491 CHLMYD TRACH DNA AMP PROBE: CPT

## 2018-12-12 PROCEDURE — 76801 OB US < 14 WKS SINGLE FETUS: CPT | Mod: 26,S$PBB,, | Performed by: OBSTETRICS & GYNECOLOGY

## 2018-12-12 PROCEDURE — 85025 COMPLETE CBC W/AUTO DIFF WBC: CPT

## 2018-12-12 PROCEDURE — 86762 RUBELLA ANTIBODY: CPT

## 2018-12-12 PROCEDURE — 86901 BLOOD TYPING SEROLOGIC RH(D): CPT

## 2018-12-12 PROCEDURE — 87340 HEPATITIS B SURFACE AG IA: CPT

## 2018-12-12 PROCEDURE — 99213 OFFICE O/P EST LOW 20 MIN: CPT | Mod: PBBFAC,TH,PO | Performed by: ADVANCED PRACTICE MIDWIFE

## 2018-12-12 PROCEDURE — 85660 RBC SICKLE CELL TEST: CPT

## 2018-12-12 PROCEDURE — 86703 HIV-1/HIV-2 1 RESULT ANTBDY: CPT

## 2018-12-12 PROCEDURE — 36415 COLL VENOUS BLD VENIPUNCTURE: CPT | Mod: PO

## 2018-12-12 PROCEDURE — 99213 OFFICE O/P EST LOW 20 MIN: CPT | Mod: TH,S$PBB,, | Performed by: ADVANCED PRACTICE MIDWIFE

## 2018-12-12 PROCEDURE — 99999 PR PBB SHADOW E&M-EST. PATIENT-LVL III: CPT | Mod: PBBFAC,,, | Performed by: ADVANCED PRACTICE MIDWIFE

## 2018-12-12 RX ORDER — LABETALOL 200 MG/1
200 TABLET, FILM COATED ORAL EVERY 12 HOURS
Qty: 60 TABLET | Refills: 11 | Status: SHIPPED | OUTPATIENT
Start: 2018-12-12 | End: 2019-01-07 | Stop reason: SDUPTHER

## 2018-12-12 NOTE — TELEPHONE ENCOUNTER
----- Message from Valeria Mckenzie MA sent at 12/12/2018 12:33 PM CST -----  NT and OB visit 1/7/19

## 2018-12-12 NOTE — PROGRESS NOTES
New OB today.  Presented to urgent care yesterday secondary to nausea, a UPT performed was positive.  History of Teresa placed  2018, patient not aware of expulsion.  We will perform ultrasound immediately to determine its presence and position  Ultrasound suspected di/di twin gestation with CRL is consistent with last menstrual period yielding   at 8 weeks 2 days.  IUD was not visualized in uterus or adnexa.  Patient surprised and a little overwhelmed -support given.  Discussed with Dr. Arenas  Prenatal labs today   Last Pap 2017-normal.    Gonorrhea and chlamydia swab today.    Desires sequential 1 screening scheduled for 3-4 weeks.  Hypertension -meds  changed to labetalol 200 mg b.i.d.  SLE - patient spoke with Dr. MADISON this afternoon and was told to stop meds but continue with the Plaquenil.   Start baby aspirin at 12-13 weeks  Significant nausea -has Reglan prescription.    History of  x2.    First trimester precautions reviewed

## 2018-12-12 NOTE — TELEPHONE ENCOUNTER
Left a message for the patient informing her that we can do her NT scan on 1/7/19 @ 9 am and she will see Mrs Higgins following her ultrasound.

## 2018-12-13 LAB
ABO + RH BLD: NORMAL
BACTERIA UR CULT: NORMAL
BACTERIA UR CULT: NORMAL
BLD GP AB SCN CELLS X3 SERPL QL: NORMAL
C TRACH DNA SPEC QL NAA+PROBE: NOT DETECTED
HBV SURFACE AG SERPL QL IA: NEGATIVE
HIV 1+2 AB+HIV1 P24 AG SERPL QL IA: NEGATIVE
N GONORRHOEA DNA SPEC QL NAA+PROBE: NOT DETECTED
RPR SER QL: NORMAL
RUBV IGG SER-ACNC: 71.4 IU/ML
RUBV IGG SER-IMP: REACTIVE

## 2019-01-03 ENCOUNTER — TELEPHONE (OUTPATIENT)
Dept: RHEUMATOLOGY | Facility: HOSPITAL | Age: 29
End: 2019-01-03

## 2019-01-03 NOTE — TELEPHONE ENCOUNTER
Spoke with patient She cancelled her infusion on 1/4 with the understanding that DR MADISON wanted her to stop Benlysta infusions since she is pregnant and will see him at the end of this month for further instructions.

## 2019-01-07 ENCOUNTER — PROCEDURE VISIT (OUTPATIENT)
Dept: OBSTETRICS AND GYNECOLOGY | Facility: CLINIC | Age: 29
End: 2019-01-07
Payer: MEDICAID

## 2019-01-07 ENCOUNTER — ROUTINE PRENATAL (OUTPATIENT)
Dept: OBSTETRICS AND GYNECOLOGY | Facility: CLINIC | Age: 29
End: 2019-01-07
Payer: MEDICAID

## 2019-01-07 ENCOUNTER — LAB VISIT (OUTPATIENT)
Dept: LAB | Facility: HOSPITAL | Age: 29
End: 2019-01-07
Attending: ADVANCED PRACTICE MIDWIFE
Payer: MEDICAID

## 2019-01-07 VITALS
SYSTOLIC BLOOD PRESSURE: 142 MMHG | BODY MASS INDEX: 28.91 KG/M2 | DIASTOLIC BLOOD PRESSURE: 96 MMHG | WEIGHT: 173.75 LBS

## 2019-01-07 DIAGNOSIS — I10 ESSENTIAL HYPERTENSION: ICD-10-CM

## 2019-01-07 DIAGNOSIS — O09.299: ICD-10-CM

## 2019-01-07 DIAGNOSIS — O09.90 HIGH RISK PREGNANCY, ANTEPARTUM: Primary | ICD-10-CM

## 2019-01-07 DIAGNOSIS — Z37.9 TWIN BIRTH: ICD-10-CM

## 2019-01-07 DIAGNOSIS — O09.299: Primary | ICD-10-CM

## 2019-01-07 DIAGNOSIS — D50.9 IRON DEFICIENCY ANEMIA, UNSPECIFIED IRON DEFICIENCY ANEMIA TYPE: ICD-10-CM

## 2019-01-07 DIAGNOSIS — O30.049 DICHORIONIC DIAMNIOTIC TWIN PREGNANCY, ANTEPARTUM: ICD-10-CM

## 2019-01-07 LAB
ALBUMIN SERPL BCP-MCNC: 3 G/DL
ALP SERPL-CCNC: 52 U/L
ALT SERPL W/O P-5'-P-CCNC: 8 U/L
ANION GAP SERPL CALC-SCNC: 6 MMOL/L
AST SERPL-CCNC: 16 U/L
BASOPHILS # BLD AUTO: 0 K/UL
BASOPHILS NFR BLD: 0 %
BILIRUB SERPL-MCNC: 0.4 MG/DL
BUN SERPL-MCNC: 6 MG/DL
CALCIUM SERPL-MCNC: 8.8 MG/DL
CHLORIDE SERPL-SCNC: 105 MMOL/L
CO2 SERPL-SCNC: 23 MMOL/L
CREAT SERPL-MCNC: 0.6 MG/DL
CREAT UR-MCNC: 122 MG/DL
DIFFERENTIAL METHOD: ABNORMAL
EOSINOPHIL # BLD AUTO: 0.1 K/UL
EOSINOPHIL NFR BLD: 4.9 %
ERYTHROCYTE [DISTWIDTH] IN BLOOD BY AUTOMATED COUNT: 14.6 %
EST. GFR  (AFRICAN AMERICAN): >60 ML/MIN/1.73 M^2
EST. GFR  (NON AFRICAN AMERICAN): >60 ML/MIN/1.73 M^2
GLUCOSE SERPL-MCNC: 73 MG/DL
HCT VFR BLD AUTO: 30.2 %
HGB BLD-MCNC: 10 G/DL
IMM GRANULOCYTES # BLD AUTO: 0.01 K/UL
IMM GRANULOCYTES NFR BLD AUTO: 0.3 %
LYMPHOCYTES # BLD AUTO: 0.8 K/UL
LYMPHOCYTES NFR BLD: 26.6 %
MCH RBC QN AUTO: 27.9 PG
MCHC RBC AUTO-ENTMCNC: 33.1 G/DL
MCV RBC AUTO: 84 FL
MONOCYTES # BLD AUTO: 0.2 K/UL
MONOCYTES NFR BLD: 6.6 %
NEUTROPHILS # BLD AUTO: 1.8 K/UL
NEUTROPHILS NFR BLD: 61.6 %
NRBC BLD-RTO: 0 /100 WBC
PLATELET # BLD AUTO: 174 K/UL
PMV BLD AUTO: 12.1 FL
POTASSIUM SERPL-SCNC: 3.3 MMOL/L
PROT SERPL-MCNC: 7.7 G/DL
PROT UR-MCNC: 42 MG/DL
PROT/CREAT UR: 0.34 MG/G{CREAT}
RBC # BLD AUTO: 3.58 M/UL
SODIUM SERPL-SCNC: 134 MMOL/L
WBC # BLD AUTO: 2.86 K/UL

## 2019-01-07 PROCEDURE — 76814 OB US NUCHAL MEAS ADD-ON: CPT | Mod: PBBFAC,PO | Performed by: OBSTETRICS & GYNECOLOGY

## 2019-01-07 PROCEDURE — 36415 COLL VENOUS BLD VENIPUNCTURE: CPT | Mod: PO

## 2019-01-07 PROCEDURE — 82570 ASSAY OF URINE CREATININE: CPT

## 2019-01-07 PROCEDURE — 76813 PR US, OB NUCHAL, TRANSABDOM/TRANSVAG, FIRST GESTATION: ICD-10-PCS | Mod: 26,S$PBB,, | Performed by: OBSTETRICS & GYNECOLOGY

## 2019-01-07 PROCEDURE — 80053 COMPREHEN METABOLIC PANEL: CPT

## 2019-01-07 PROCEDURE — 81508 FTL CGEN ABNOR TWO PROTEINS: CPT

## 2019-01-07 PROCEDURE — 76813 OB US NUCHAL MEAS 1 GEST: CPT | Mod: PBBFAC,PO | Performed by: OBSTETRICS & GYNECOLOGY

## 2019-01-07 PROCEDURE — 76813 OB US NUCHAL MEAS 1 GEST: CPT | Mod: 26,S$PBB,, | Performed by: OBSTETRICS & GYNECOLOGY

## 2019-01-07 PROCEDURE — 99213 PR OFFICE/OUTPT VISIT, EST, LEVL III, 20-29 MIN: ICD-10-PCS | Mod: TH,S$PBB,, | Performed by: ADVANCED PRACTICE MIDWIFE

## 2019-01-07 PROCEDURE — 99213 OFFICE O/P EST LOW 20 MIN: CPT | Mod: TH,S$PBB,, | Performed by: ADVANCED PRACTICE MIDWIFE

## 2019-01-07 PROCEDURE — 99999 PR PBB SHADOW E&M-EST. PATIENT-LVL III: CPT | Mod: PBBFAC,,, | Performed by: ADVANCED PRACTICE MIDWIFE

## 2019-01-07 PROCEDURE — 76814 OB US NUCHAL MEAS ADD-ON: CPT | Mod: 26,S$PBB,, | Performed by: OBSTETRICS & GYNECOLOGY

## 2019-01-07 PROCEDURE — 99213 OFFICE O/P EST LOW 20 MIN: CPT | Mod: PBBFAC,TH,PO | Performed by: ADVANCED PRACTICE MIDWIFE

## 2019-01-07 PROCEDURE — 76814 PR US, OB NUCHAL, TRANSABDOM/TRANSVAG, EA ADDL GESTATION: ICD-10-PCS | Mod: 26,S$PBB,, | Performed by: OBSTETRICS & GYNECOLOGY

## 2019-01-07 PROCEDURE — 85025 COMPLETE CBC W/AUTO DIFF WBC: CPT

## 2019-01-07 PROCEDURE — 99999 PR PBB SHADOW E&M-EST. PATIENT-LVL III: ICD-10-PCS | Mod: PBBFAC,,, | Performed by: ADVANCED PRACTICE MIDWIFE

## 2019-01-07 RX ORDER — LABETALOL 200 MG/1
200 TABLET, FILM COATED ORAL EVERY 12 HOURS
Qty: 60 TABLET | Refills: 11 | Status: SHIPPED | OUTPATIENT
Start: 2019-01-07 | End: 2019-01-17 | Stop reason: ALTCHOICE

## 2019-01-09 LAB
# FETUSES US: NORMAL
AGE AT DELIVERY: 26
B-HCG MOM SERPL: NORMAL
B-HCG SERPL-ACNC: 104.3 IU/ML
FET CRL US.MEAS: 64.3 MM
FET NASAL BONE LENGTH US.MEAS: NORMAL MM
FET NUCHAL FOLD MOM THICKNESS US.MEAS: NORMAL
FET NUCHAL FOLD THICKNESS US.MEAS: 1.7 MM
FET TS 21 RISK FROM MAT AGE: NORMAL
GA (DAYS): 4 D
GA (WEEKS): 12 WK
IDDM PATIENT QL: NORMAL
INTEGRATED SCN PATIENT-IMP: NEGATIVE
PAPP-A MOM SERPL: NORMAL
PAPP-A SERPL-MCNC: NORMAL NG/ML
SEQUENTIAL SCREEN I INTERP.: NORMAL
SMOKING STATUS FTND: NO
TS 18 RISK FETUS: NORMAL
TS 21 RISK FETUS: NORMAL
US DATE: NORMAL

## 2019-01-11 ENCOUNTER — ROUTINE PRENATAL (OUTPATIENT)
Dept: OBSTETRICS AND GYNECOLOGY | Facility: CLINIC | Age: 29
End: 2019-01-11
Payer: MEDICAID

## 2019-01-11 VITALS
SYSTOLIC BLOOD PRESSURE: 152 MMHG | DIASTOLIC BLOOD PRESSURE: 86 MMHG | WEIGHT: 172.63 LBS | BODY MASS INDEX: 28.73 KG/M2

## 2019-01-11 DIAGNOSIS — O09.90 HIGH RISK PREGNANCY, ANTEPARTUM: Primary | ICD-10-CM

## 2019-01-11 DIAGNOSIS — O30.049 DICHORIONIC DIAMNIOTIC TWIN PREGNANCY, ANTEPARTUM: ICD-10-CM

## 2019-01-11 PROBLEM — Z30.431 ENCOUNTER FOR ROUTINE CHECKING OF INTRAUTERINE CONTRACEPTIVE DEVICE (IUD): Status: RESOLVED | Noted: 2018-12-12 | Resolved: 2019-01-11

## 2019-01-11 PROCEDURE — 99213 OFFICE O/P EST LOW 20 MIN: CPT | Mod: TH,S$PBB,, | Performed by: ADVANCED PRACTICE MIDWIFE

## 2019-01-11 PROCEDURE — 99999 PR PBB SHADOW E&M-EST. PATIENT-LVL III: CPT | Mod: PBBFAC,,, | Performed by: ADVANCED PRACTICE MIDWIFE

## 2019-01-11 PROCEDURE — 99213 OFFICE O/P EST LOW 20 MIN: CPT | Mod: PBBFAC,TH | Performed by: ADVANCED PRACTICE MIDWIFE

## 2019-01-11 PROCEDURE — 99999 PR PBB SHADOW E&M-EST. PATIENT-LVL III: ICD-10-PCS | Mod: PBBFAC,,, | Performed by: ADVANCED PRACTICE MIDWIFE

## 2019-01-11 PROCEDURE — 99213 PR OFFICE/OUTPT VISIT, EST, LEVL III, 20-29 MIN: ICD-10-PCS | Mod: TH,S$PBB,, | Performed by: ADVANCED PRACTICE MIDWIFE

## 2019-01-11 NOTE — PROGRESS NOTES
BP improved, doing well taking meds. Early AM vomiting, improves as the day goes on, eating 1 full meal a day. Discussed small, freq, snack type meals. Pain in lower abd, sometimes cramping sometimes just nausea. Reassured. Pt smiling adjusting to the unexpected pregnancy and TWINS! al

## 2019-01-17 ENCOUNTER — OFFICE VISIT (OUTPATIENT)
Dept: NEPHROLOGY | Facility: CLINIC | Age: 29
End: 2019-01-17
Payer: MEDICAID

## 2019-01-17 VITALS
HEART RATE: 88 BPM | SYSTOLIC BLOOD PRESSURE: 148 MMHG | BODY MASS INDEX: 28.47 KG/M2 | HEIGHT: 65 IN | DIASTOLIC BLOOD PRESSURE: 98 MMHG | WEIGHT: 170.88 LBS

## 2019-01-17 DIAGNOSIS — I10 UNCONTROLLED HYPERTENSION: ICD-10-CM

## 2019-01-17 DIAGNOSIS — R80.9 PROTEINURIA, UNSPECIFIED TYPE: ICD-10-CM

## 2019-01-17 DIAGNOSIS — M32.14 LUPUS NEPHRITIS: Primary | ICD-10-CM

## 2019-01-17 PROCEDURE — 99215 PR OFFICE/OUTPT VISIT, EST, LEVL V, 40-54 MIN: ICD-10-PCS | Mod: S$PBB,,, | Performed by: INTERNAL MEDICINE

## 2019-01-17 PROCEDURE — 99213 OFFICE O/P EST LOW 20 MIN: CPT | Mod: PBBFAC,PN | Performed by: INTERNAL MEDICINE

## 2019-01-17 PROCEDURE — 99215 OFFICE O/P EST HI 40 MIN: CPT | Mod: S$PBB,,, | Performed by: INTERNAL MEDICINE

## 2019-01-17 PROCEDURE — 99999 PR PBB SHADOW E&M-EST. PATIENT-LVL III: CPT | Mod: PBBFAC,,, | Performed by: INTERNAL MEDICINE

## 2019-01-17 PROCEDURE — 99999 PR PBB SHADOW E&M-EST. PATIENT-LVL III: ICD-10-PCS | Mod: PBBFAC,,, | Performed by: INTERNAL MEDICINE

## 2019-01-17 RX ORDER — METHYLDOPA 250 MG/1
250 TABLET, FILM COATED ORAL EVERY 8 HOURS
Qty: 90 TABLET | Refills: 11 | Status: SHIPPED | OUTPATIENT
Start: 2019-01-17 | End: 2019-02-04

## 2019-01-17 NOTE — PROGRESS NOTES
"NEPHROLOGY CLINIC FOLLOWUP NOTE:   Date of clinic visit: 1/17/19     RHEUMATOLOGIST: Dr. Blair     REASON FOR CONSULTATION: H/o of lupus nephritis     CHIEF COMPLAINT: History of systemic lupus erythematosus.      HISTORY OF PRESENT ILLNESS: Ms. Vines is a 28-year-old -American female with a h/o of lupus nephritis who presents for f/u. Pt was last seen by me about 3 months ago. Chart was reviewed. Pt has an unexpected (unplanned) pregnancy and she is now at 12 weeks of gestation with twins. She is not taking cellcept and is not on ACE-I's or ARB's. Was not taking them before either (see my last note from Nov 2018. She says she is feeling OK today, no c/o's, no discomfort.     To review:  She had a kidney biopsy done on 4/3/18 for suspected lupus nephritis flare that showed no change from prior biopsy in 2013 and lupus nephritis class V with 10% interstitial fibrosis, no crescents, no necrosis, no evidence of acuity, + full house effect. The biopsy also showed vascular changes c/w hypertensive nephrosclerosis. Biopsy results were c/w inactive lupus nephritis. Given that C# and C4 complements were still low (likely from an earlier flare), pt was kept on maintenance dose of cellcept.     On her last visit with us in Nov 2018, pt informed me that she was not taking any meds at all. (did not "believe" in them), was not on cellcept, as well as all other medications.      To review this presentation:  She had returned after a long absence since Aug 2017. Pt was then pregnant at 30 weeks of gestation. She had problems with uncontrolled BP then. She was specifically advised (documented) to RTC in 1 wk. Appt was made for her. She did not show up. Pt gave birth to a healthy baby at 38 wks of gestation. Mycophenolate had been stopped once the pregnancy test was known in Feb 2017. Losartan had also been stopped then as well. Lupus nephritis was not active, and imuran though considered was not used. On return both C3 " and C4 complements were low. Pt was re-started on Cellcept by rheumatology 500 mg tid in early week of Jan 2018. Pt was also taking 10 mg of prednisone qd. On initial return visit to us, WBC was low and cellcept dose could not be increased. On her last visit to us, WBC was normal. Pt had no c/o's, no fever, no SOB, no leg swelling, no blood in urine. Pt was only using prednisone 10 mg intermittently, because it increases her appetite, and she does not want to eat much to avoid wt gain. Mycophenolate was changed form 500 mg tid to 1 g bid. Pt was placed on prednisone 20 mg po qd, and on tapering dose, and was advised not to miss it.     Pt presents for f/u, says is taking all the meds prescribed. Pt has no active oe new c/o's, says is feeling well. Had labs done today, complement levels are not back yet.      PAST MEDICAL HISTORY:   1. Systemic lupus erythematosus diagnosed in November 2013.   2. Class V membranous lupus nephritis. Kidney biopsy on 10/17/2013 showed 5%   to 10% interstitial fibrosis. No acuity on this biopsy. 3rd biopsy on 4/3/18:lupus nephritis class V, 1 glomerulus globali sclerosed, 10% interstitial fibrosis, no crescents, no necrosis, no evidence of acuity, + full house effect. The biopsy also showed vascular changes c/w hypertensive nephrosclerosis   3. Sjogren's disease, diagnosed in 2012.   4. History of genital herpes.      PAST SURGICAL HISTORY: None, apart from the kidney biopsy.      FAMILY HISTORY: No one in the family with lupus. Father is 47 years old. Mom is 44. Both are healthy. The patient does have a 1-year-old son who was diagnosed with third-degree AV block   .   ALLERGIES: Reviewed. No known drug allergies.      MEDICATIONS: Reviewed on Epic, labetalol 200 mg po bid     REVIEW OF SYSTEMS:   HEAD, EYES, EARS, NOSE, THROAT: As above, otherwise negative.   CARDIAC: Negative.   PULMONARY: Negative.   GASTROINTESTINAL: Negative.   GENITOURINARY: As above, otherwise negative.  "  INFECTIOUS DISEASE: negative.   RHEUMATOLOGICAL: As above, otherwise negative.   The rest of the review of systems is negative.      PHYSICAL EXAMINATION:   VITAL SIGNS: Blood pressure is 148/98, pulse is 88, weight is 170 lbs  In no acute distress.   GENERAL: She is ambulatory.   HEART: Regular rate and rhythm. No pericardial friction rubs. S1 and S2 audible.   CHEST: Clear to auscultation. No rales, breathing symmetric and unlabored  ABDOMEN: Soft, gravid  EXTREMITIES: Showed no edema.      LABORATORY VALUES: reviewed:  U prot/cr ratio 0.34 g, from 1.38 g, from 470 mg, from 570 mg  C3 normal  C4 normal    BMP  Lab Results   Component Value Date     (L) 01/07/2019    K 3.3 (L) 01/07/2019     01/07/2019    CO2 23 01/07/2019    BUN 6 01/07/2019    CREATININE 0.6 01/07/2019    CALCIUM 8.8 01/07/2019    ANIONGAP 6 (L) 01/07/2019    ESTGFRAFRICA >60.0 01/07/2019    EGFRNONAA >60.0 01/07/2019     Lab Results   Component Value Date    WBC 2.86 (L) 01/07/2019    HGB 10.0 (L) 01/07/2019    HCT 30.2 (L) 01/07/2019    MCV 84 01/07/2019     01/07/2019           ASSESSMENT AND PLAN: This is a 28-year-old female with lupus nephritis who presents for f/u after long period of no show.   The impression is as follows:      1. Renal: stable renal function, s Cr normal  Pt is pregnant at 12 weeks with twins  Is hypertensive  High risk pregnancy  Proteinuria noted improved  Hematologic disorders noted: leukopenia and anemia, likely features of systemic lupus     Pt needs control of BP to avoid pregnancy complications  Pt was advised  Pt previously had told me personally, "I don't believe in medications."  Pt was advised that she is at high risk of pregnancy complications with uncontrolled BP  Will d/c labetalol  Will start methyldopa 250 mg po tid  Pt was advised NOT TO TAKE CELLCEPT or LOSARTAN  Risk of teratogenicity explained to her  Imuran was recommended, pt refused.    C3 and C4 hypocomplementemia stable, " normal complements, likely due to prior cellcept traetment  Reviewed prior kidney biopsy   The biopsy showed same level of chronic interstitial fibrosis as the one from 5 years ago, no change at 15%.  The biopsy also showed damage from uncontrolled HTN, pt was advised.     2. HTN: BP not controlled   As above.     3. Rheum: note reviewed  Agree with mgmt.     PLANS AND RECOMMENDATIONS:   As discussed above  RTC 1 month  Total time spent 40 minutes including time needed to review the records, the   patient evaluation, documentation, face-to-face discussion with the patient,   more than 50% of the time was spent on coordination of care and counseling.    Level V visit.     Laurence Morales MD

## 2019-01-28 ENCOUNTER — TELEPHONE (OUTPATIENT)
Dept: RHEUMATOLOGY | Facility: CLINIC | Age: 29
End: 2019-01-28

## 2019-01-28 NOTE — TELEPHONE ENCOUNTER
Spoke with pt and she would like to know if she needs to continue her infusions since she is pregnant. Advised patient of note from 1.3.19 but pt states in the past Dr. MADISON told her she could get benlysta while pregnant. Please Advise.

## 2019-01-28 NOTE — TELEPHONE ENCOUNTER
Patient advised that there is no indication for Benlysta at this time.  Please hold off all infusions until after delivery.  Continue prednisone.

## 2019-01-28 NOTE — TELEPHONE ENCOUNTER
----- Message from Ya Andrewsite sent at 1/28/2019 10:38 AM CST -----  Contact: Pt   Pt called and stated she needs to know if she has to continue her infusions. She can be reached at [phone].    Thanks,  Tf

## 2019-01-31 ENCOUNTER — OFFICE VISIT (OUTPATIENT)
Dept: RHEUMATOLOGY | Facility: CLINIC | Age: 29
End: 2019-01-31
Payer: MEDICAID

## 2019-01-31 VITALS
DIASTOLIC BLOOD PRESSURE: 102 MMHG | SYSTOLIC BLOOD PRESSURE: 152 MMHG | WEIGHT: 175.69 LBS | BODY MASS INDEX: 29.27 KG/M2 | HEIGHT: 65 IN

## 2019-01-31 DIAGNOSIS — M32.14 SLE GLOMERULONEPHRITIS SYNDROME, WHO CLASS V: ICD-10-CM

## 2019-01-31 DIAGNOSIS — M32.9 SLE (SYSTEMIC LUPUS ERYTHEMATOSUS RELATED SYNDROME): ICD-10-CM

## 2019-01-31 DIAGNOSIS — M32.9 SYSTEMIC LUPUS ERYTHEMATOSUS ARTHRITIS: ICD-10-CM

## 2019-01-31 DIAGNOSIS — O99.891 SYSTEMIC LUPUS COMPLICATING PREGNANCY: Primary | ICD-10-CM

## 2019-01-31 DIAGNOSIS — M32.9 SYSTEMIC LUPUS COMPLICATING PREGNANCY: Primary | ICD-10-CM

## 2019-01-31 DIAGNOSIS — R76.8 ANA POSITIVE: ICD-10-CM

## 2019-01-31 PROCEDURE — 99214 PR OFFICE/OUTPT VISIT, EST, LEVL IV, 30-39 MIN: ICD-10-PCS | Mod: S$PBB,,, | Performed by: INTERNAL MEDICINE

## 2019-01-31 PROCEDURE — 99999 PR PBB SHADOW E&M-EST. PATIENT-LVL III: CPT | Mod: PBBFAC,,, | Performed by: INTERNAL MEDICINE

## 2019-01-31 PROCEDURE — 99214 OFFICE O/P EST MOD 30 MIN: CPT | Mod: S$PBB,,, | Performed by: INTERNAL MEDICINE

## 2019-01-31 PROCEDURE — 99213 OFFICE O/P EST LOW 20 MIN: CPT | Mod: PBBFAC,PN | Performed by: INTERNAL MEDICINE

## 2019-01-31 PROCEDURE — 99999 PR PBB SHADOW E&M-EST. PATIENT-LVL III: ICD-10-PCS | Mod: PBBFAC,,, | Performed by: INTERNAL MEDICINE

## 2019-01-31 RX ORDER — PREDNISONE 5 MG/1
5 TABLET ORAL DAILY
Qty: 90 TABLET | Refills: 3 | Status: SHIPPED | OUTPATIENT
Start: 2019-01-31 | End: 2019-06-03 | Stop reason: SDUPTHER

## 2019-01-31 NOTE — ASSESSMENT & PLAN NOTE
Systemic lupus erythematosus complicating pregnancy.  No active flare-up in this pregnancy noted yet.  Her blood pressure has been progressively uncontrolled and she is not on methyldopa.  I advised her to go to the pharmacy downstairs today and get the methyldopa and start as soon as possible.  She has an appointment with our nephrologist next week.  I have added my labs to his lab appointment to monitor her lupus activity.  Her urine protein creatinine ratio has been okay so far with mild bump 2 weeks ago.  Continue 5 mg daily prednisone.  No problem in increasing the prednisone to 10 mg if any signs of flare-up are considering disease modifying agents after discussing with Nephrology and obstetric colleagues.

## 2019-01-31 NOTE — PROGRESS NOTES
RHEUMATOLOGY CLINIC FOLLOW UP VISIT  Chief complaints:-  To follow up for lupus.     HPI:-  Alisia Jolly a 28 y.o. pleasant female comes in for a follow up visit with above chief complaints. She has systemic lupus erythematosus diagnosed in 2013 when she had diffuse lymphadenopathy and anasarca.  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  She has been intermittently following up in the rheumatology clinic.  She was on immunosuppressive therapy and got pregnant accidentally since she was not taking her OCP's during that week because of irregular cycles. She underwent elective CS and delivered a healthy baby 10/2017.  She has been non compliant with medical management - no specific reason - she just couldn't get to take the pills. She intermittently took Benlysta injections.  Since Benlysta was controlling her arthritis she was switched to intravenous infusions for compliance.  She was compliant with infusions for awhile until she again got accidentally pregnant even with an IUD device.  Now she is pregnant with twins.  Her pregnancy has been on even full until now.  But her blood pressure has been progressively getting uncontrolled.  She was seen by our nephrologist last week and was advised to switch her blood pressure medication to methyldopa.  She thought it was sent to [facility] and was waiting to get a call back from them which she have not gotten yet.  The medication was sent to the pharmacy downstairs here at [facility].  So she have not fill the medication yet.  She denies any headache today.  No spotting.  She is only on 5 mg daily prednisone for her lupus at this time.  She also denies seizures or psychosis,  joint swelling, muscle weakness,Raynaud's phenomenon, sicca symptoms .    Review of Systems   Constitutional: Negative for chills, fever and malaise/fatigue.   HENT: Negative  for nosebleeds and sore throat.    Eyes: Negative for blurred vision, photophobia and redness.   Respiratory: Negative for cough, sputum production and shortness of breath.    Cardiovascular: Negative for chest pain and leg swelling.   Gastrointestinal: Negative for abdominal pain, constipation, diarrhea and heartburn.   Genitourinary: Negative for dysuria, frequency and urgency.   Musculoskeletal: Negative for back pain, falls, joint pain, myalgias and neck pain.   Skin: Negative for itching and rash.   Neurological: Negative for dizziness, tremors, seizures, loss of consciousness, weakness and headaches.   Endo/Heme/Allergies: Negative for environmental allergies. Does not bruise/bleed easily.   Psychiatric/Behavioral: Negative for hallucinations and memory loss. The patient does not have insomnia.        Past Medical History:   Diagnosis Date    Allergic rhinitis     Anemia     Condyloma acuminata     GERD (gastroesophageal reflux disease)     History of fetal anomaly in prior pregnancy, currently pregnant, unspecified trimester 3/8/2017    Pacemaker placed    HSV-2 (herpes simplex virus 2) infection     Lupus nephritis     Mood disorder     Overweight(278.02)     Sjogren's syndrome     Systemic lupus erythematosus     Thrombocytopenia        Past Surgical History:   Procedure Laterality Date     SECTION, LOW TRANSVERSE      DELIVERY- SECTION N/A 10/19/2017    [facility]    RENAL BIOPSY  2013        Social History     Tobacco Use    Smoking status: Never Smoker    Smokeless tobacco: Never Used   Substance Use Topics    Alcohol use: No     Alcohol/week: 0.0 oz    Drug use: No       Family History   Problem Relation Age of Onset    Hypertension Mother     Eczema Brother     Heart disease Son     Hypertension Maternal Grandmother     Diabetes Paternal Grandmother     Lupus Neg Hx     Psoriasis Neg Hx        Review of patient's  "allergies indicates:  No Known Allergies        Physical examination:-    Vitals:    01/31/19 1233   BP: (!) 152/102   Weight: 79.7 kg (175 lb 11.3 oz)   Height: 5' 5" (1.651 m)   PainSc: 0-No pain       Physical Exam   Constitutional: She is oriented to person, place, and time and well-developed, well-nourished, and in no distress. No distress.   HENT:   Head: Normocephalic.   Mouth/Throat: Oropharynx is clear and moist.   Eyes: Conjunctivae and EOM are normal. Pupils are equal, round, and reactive to light.   Neck: Normal range of motion. Neck supple.   Cardiovascular: Normal rate and intact distal pulses. Exam reveals no friction rub.   Pulmonary/Chest: Effort normal. No respiratory distress. She exhibits no tenderness.   Abdominal: Soft. There is no tenderness.   Musculoskeletal:   No synovitis over small joints of hands or feet.  No effusion over large joints.   Neurological: She is alert and oriented to person, place, and time. No cranial nerve deficit.   Skin: Skin is warm. No rash noted. No erythema.   Psychiatric: Mood and affect normal.   Nursing note and vitals reviewed.      Labs:-  Results for HEIDI SHAIKH (MRN [id number]) as of 5/22/2017 12:21   Ref. Range 1/23/2017 08:10 2/17/2017 08:40 3/22/2017 08:58 5/19/2017 13:36   HARPREET HEP-2 Titer Unknown Positive >=1:2560...      Anti-SSA Antibody Latest Ref Range: 0.00 - 19.99 .26 (H)      Anti-SSA Interpretation Latest Ref Range: Negative  Positive (A)      Anti-SSB Antibody Latest Ref Range: 0.00 - 19.99 EU 46.84 (H)      Anti-SSB Interpretation Latest Ref Range: Negative  Positive (A)      ds DNA Ab Latest Ref Range: Negative 1:10  Negative 1:10      Anti Sm Antibody Latest Ref Range: 0.00 - 19.99 EU 2.89      Anti-Sm Interpretation Latest Ref Range: Negative  Negative      Anti Sm/RNP Antibody Latest Ref Range: 0.00 - 19.99 EU 4.12      Anti-Sm/RNP Interpretation Latest Ref Range: Negative  Negative      Complement (C-3) Latest Ref Range: 50 - " 180 mg/dL 45 (L) 56 47 (L) 56   Complement (C-4) Latest Ref Range: 11 - 44 mg/dL 10 (L) 13 13 16            Medication List           Accurate as of 1/31/19  5:54 PM. If you have any questions, ask your nurse or doctor.               CHANGE how you take these medications    valACYclovir 500 MG tablet  Commonly known as:  VALTREX  Take 2 tablets (1,000 mg total) by mouth once daily.  What changed:  how much to take        CONTINUE taking these medications    efinaconazole 10 % Stacey  Commonly known as:  JUBLIA  Apply once daily to nail and nail fold.  Use daily for up to 1 year.     famotidine 20 MG tablet  Commonly known as:  PEPCID  Take 1 tablet (20 mg total) by mouth 2 (two) times daily as needed for Heartburn.     flunisolide 25 mcg (0.025%) 25 mcg (0.025 %) Spry  Commonly known as:  NASALIDE  2 sprays by Nasal route 2 (two) times daily.     FLUoxetine 40 MG capsule  TAKE 1 CAPSULE BY MOUTH EVERY DAY     levocetirizine 5 MG tablet  Commonly known as:  XYZAL  Take 1 tablet (5 mg total) by mouth once daily.     methyldopa 250 MG tablet  Commonly known as:  ALDOMET  Take 1 tablet (250 mg total) by mouth every 8 (eight) hours.     predniSONE 5 MG tablet  Commonly known as:  DELTASONE  Take 1 tablet (5 mg total) by mouth once daily.     PRENATAL 118-IRON-FOLATE 6-DHA ORAL           Where to Get Your Medications      These medications were sent to [id number] - [place] - [address] AT [facility]  [address] [id number]    Phone:  [phone]   · predniSONE 5 MG tablet         Assessment/Plans:-  # Systemic lupus complicating pregnancy  Systemic lupus erythematosus complicating pregnancy.  No active flare-up in this pregnancy noted yet.  Her blood pressure has been progressively uncontrolled and she is not on methyldopa.  I advised her to go to the pharmacy downstairs today and get the methyldopa and start as soon as possible.  She has an  appointment with our nephrologist next week.  I have added my labs to his lab appointment to monitor her lupus activity.  Her urine protein creatinine ratio has been okay so far with mild bump 2 weeks ago.  Continue 5 mg daily prednisone.  No problem in increasing the prednisone to 10 mg if any signs of flare-up are considering disease modifying agents after discussing with Nephrology and obstetric colleagues.    # SSA ANTIBODY positive  POSITIVE SSA ANTIBODY.  ADVISED TO CLOSELY FOLLOW UP WITH obstetrician since it could cause heart conduction birth defects in children.    # Systemic lupus erythematosus arthritis  - predniSONE (DELTASONE) 5 MG tablet; Take 1 tablet (5 mg total) by mouth once daily.  Dispense: 90 tablet; Refill: 3    # SLE glomerulonephritis syndrome, WHO class V  - predniSONE (DELTASONE) 5 MG tablet; Take 1 tablet (5 mg total) by mouth once daily.  Dispense: 90 tablet; Refill: 3  # RTC in 6 weeks.     Disclaimer: This note was prepared using voice recognition system and is likely to have sound alike errors .  Please call me with any questions

## 2019-02-01 NOTE — ASSESSMENT & PLAN NOTE
POSITIVE SSA ANTIBODY.  ADVISED TO CLOSELY FOLLOW UP WITH obstetrician since it could cause heart conduction birth defects in children.

## 2019-02-04 ENCOUNTER — PROCEDURE VISIT (OUTPATIENT)
Dept: OBSTETRICS AND GYNECOLOGY | Facility: CLINIC | Age: 29
End: 2019-02-04
Payer: MEDICAID

## 2019-02-04 ENCOUNTER — TELEPHONE (OUTPATIENT)
Dept: PEDIATRIC CARDIOLOGY | Facility: CLINIC | Age: 29
End: 2019-02-04

## 2019-02-04 DIAGNOSIS — R76.8 SS-A ANTIBODY POSITIVE: ICD-10-CM

## 2019-02-04 DIAGNOSIS — Z82.79 FAMILY HISTORY OF CONGENITAL HEART DEFECT: Primary | ICD-10-CM

## 2019-02-04 DIAGNOSIS — O30.042 DICHORIONIC DIAMNIOTIC TWIN PREGNANCY IN SECOND TRIMESTER: Primary | ICD-10-CM

## 2019-02-04 DIAGNOSIS — O99.891 SYSTEMIC LUPUS COMPLICATING PREGNANCY: ICD-10-CM

## 2019-02-04 DIAGNOSIS — L93.0 LUPUS ERYTHEMATOSUS, UNSPECIFIED FORM: ICD-10-CM

## 2019-02-04 DIAGNOSIS — M32.9 SYSTEMIC LUPUS COMPLICATING PREGNANCY: ICD-10-CM

## 2019-02-04 DIAGNOSIS — O30.042 DICHORIONIC DIAMNIOTIC TWIN PREGNANCY IN SECOND TRIMESTER: ICD-10-CM

## 2019-02-04 DIAGNOSIS — O30.009 TWIN PREGNANCY, ANTEPARTUM, UNSPECIFIED MULTIPLE GESTATION TYPE: ICD-10-CM

## 2019-02-04 PROCEDURE — 76810 OB US >/= 14 WKS ADDL FETUS: CPT | Mod: 26,S$PBB,, | Performed by: OBSTETRICS & GYNECOLOGY

## 2019-02-04 PROCEDURE — 76805 OB US >/= 14 WKS SNGL FETUS: CPT | Mod: 26,S$PBB,, | Performed by: OBSTETRICS & GYNECOLOGY

## 2019-02-04 PROCEDURE — 76805 OB US >/= 14 WKS SNGL FETUS: CPT | Mod: PBBFAC | Performed by: OBSTETRICS & GYNECOLOGY

## 2019-02-04 PROCEDURE — 76805 PR US, OB 14+WKS, TRANSABD, SINGLE GESTATION: ICD-10-PCS | Mod: 26,S$PBB,, | Performed by: OBSTETRICS & GYNECOLOGY

## 2019-02-04 PROCEDURE — 99214 OFFICE O/P EST MOD 30 MIN: CPT | Mod: S$PBB,TH,25, | Performed by: OBSTETRICS & GYNECOLOGY

## 2019-02-04 PROCEDURE — 76810 PR US, OB 14+WKS, TRANSABD, EA ADDL GESTATION: ICD-10-PCS | Mod: 26,S$PBB,, | Performed by: OBSTETRICS & GYNECOLOGY

## 2019-02-04 PROCEDURE — 99214 PR OFFICE/OUTPT VISIT, EST, LEVL IV, 30-39 MIN: ICD-10-PCS | Mod: S$PBB,TH,25, | Performed by: OBSTETRICS & GYNECOLOGY

## 2019-02-04 PROCEDURE — 76810 OB US >/= 14 WKS ADDL FETUS: CPT | Mod: PBBFAC | Performed by: OBSTETRICS & GYNECOLOGY

## 2019-02-04 RX ORDER — HYDROXYCHLOROQUINE SULFATE 200 MG/1
200 TABLET, FILM COATED ORAL 2 TIMES DAILY
Qty: 60 TABLET | Refills: 11 | Status: SHIPPED | OUTPATIENT
Start: 2019-02-04 | End: 2019-06-17 | Stop reason: SDUPTHER

## 2019-02-04 RX ORDER — NIFEDIPINE 30 MG/1
30 TABLET, EXTENDED RELEASE ORAL DAILY
Qty: 30 TABLET | Refills: 11 | Status: SHIPPED | OUTPATIENT
Start: 2019-02-04 | End: 2019-02-25

## 2019-02-04 RX ORDER — HYDROXYCHLOROQUINE SULFATE 200 MG/1
200 TABLET, FILM COATED ORAL 2 TIMES DAILY
Qty: 60 TABLET | Refills: 6 | Status: SHIPPED | OUTPATIENT
Start: 2019-02-04 | End: 2019-03-14 | Stop reason: SDUPTHER

## 2019-02-04 NOTE — Clinical Note
Can y'all please get her scheduled for an echo in next few weeks. She is only 16 weeks now but has ssa and had a prior child with heart block and pacemaker. This is twins.

## 2019-02-04 NOTE — TELEPHONE ENCOUNTER
Spoke with patient via telephone. Fetal echo scheduled for 3/19/19 @ 145 pm. Office number and address verified.

## 2019-02-04 NOTE — PROGRESS NOTES
[facility] Consultation    Alisia Vines is a 28-year-old para 202 currently at 16 weeks gestation pregnant with a dichorionic diamniotic twin gestation who was seen in consultation due to the twin pregnancy as well as a history of lupus nephritis and an infant with congenital heart block.    She denies any significant problems in this pregnancy thus far.  She has not had any significant bleeding or cramping.  She has not had any leakage of fluid.  This was an unintended pregnancy that occurred while she thought she had an IUD in place.    1. Lupus/Class V lupus nephritis:  She was diagnosed with lupus in 2013.  At that time she had evidence of diffuse adenopathy, anasarca, leukopenia, and on renal biopsy had evidence of glomerular nephritis.  She was treated with a variety of medications at that point and eventually improved.  She has been intermittently compliant with treatment and has been on and off of immunosuppressants since that time.  In the beginning of 2018 she had a repeat renal biopsy performed that showed a slight increase in the activity of her glomerular nephritis and she was taking CellCept for a few months.  She stopped it several months later however because she felt like it was unnecessary.  She recently saw her rheumatologist as well as her nephrologist who confirmed that overall her disease is relatively stable.  She was also previously treated with Benlysta which is a monoclonal antibody against B-cell subsets but she has not had a recent infusion.  Previously she was on Plaquenil but she stopped it due to some confusion.  The only medication she is currently taking for her lupus is prednisone 5 mg daily.  She has multiple positive serologies including high levels of anti SSA and anti SSB antibodies.  She has a baseline PC ratio of 0.34, a baseline serum creatinine of 0.6 and baseline platelets of a [id number].    2. Prior child with congenital heart block  Her 1st child born with congenital  heart block.  A pacemaker was required.  That child is now 6 years old.  This occurred prior to her diagnosis of lupus.    3. CHTN:  She was diagnosed with hypertension in the setting of her lupus nephritis.  She has been on various medications over the course of the years including losartan.  She has not been on losartan recently.  She was most recently taking labetalol 200 mg twice a day but was supposed to changed to Aldomet.  She has not made this change yet.  Today her blood pressure is 160/80.  She denies any symptoms of headache, vision changes, or anything else related to hypertensive urgency.  Overall this seems to be where she lives in looking at her other blood pressures in clinic.    4.  History of superimposed preeclampsia:  In her pregnancy in 2017 she got to 38 to 39 weeks and then developed superimposed preeclampsia.  She did not have persistent blood pressure problems throughout the pregnancy.    5.  History of  section x2    Past Medical History:   Diagnosis Date    Allergic rhinitis     Condyloma acuminata     Pacemaker placed    HSV-2 (herpes simplex virus 2) infection     Lupus nephritis     Mood disorder     Systemic lupus erythematosus     Thrombocytopenia      Past Surgical History:   Procedure Laterality Date     SECTION, LOW TRANSVERSE      DELIVERY- SECTION N/A 10/19/2017    [facility]    RENAL BIOPSY  2013     OB History    Para Term  AB Living   3 2 2     2   SAB TAB Ectopic Multiple Live Births           2      # Outcome Date GA Lbr José Miguel/2nd Weight Sex Delivery Anes PTL Lv   3 Current            2 Term 10/19/17 39w2d  2.87 kg (6 lb 5.2 oz) M CS-LTranv Spinal N CYDNEY   1 Term 12 38w0d  2.948 kg (6 lb 8 oz) M CS-LTranv EPI N CYDNEY   Infant with congenital heart block     Current Outpatient Medications on File Prior to Visit   Medication Sig Dispense Refill    efinaconazole (JUBLIA) 10 % Mario Apply  once daily to nail and nail fold.  Use daily for up to 1 year. 8 mL 3    famotidine (PEPCID) 20 MG tablet Take 1 tablet (20 mg total) by mouth 2 (two) times daily as needed for Heartburn. 20 tablet 0    flunisolide 25 mcg, 0.025%, (NASALIDE) 25 mcg (0.025 %) Spry 2 sprays by Nasal route 2 (two) times daily. 25 mL 2    FLUoxetine (PROZAC) 40 MG capsule TAKE 1 CAPSULE BY MOUTH EVERY DAY 30 capsule 0    levocetirizine (XYZAL) 5 MG tablet Take 1 tablet (5 mg total) by mouth once daily. 30 tablet 6    predniSONE (DELTASONE) 5 MG tablet Take 1 tablet (5 mg total) by mouth once daily. 90 tablet 3    PRENATAL 118-IRON-FOLATE 6-DHA ORAL Take by mouth.      valacyclovir (VALTREX) 500 MG tablet Take 2 tablets (1,000 mg total) by mouth once daily. (Patient taking differently: Take 500 mg by mouth once daily. ) 90 tablet 3     Family History   Problem Relation Age of Onset    Hypertension Mother     Eczema Brother     Heart disease Son     Hypertension Maternal Grandmother     Diabetes Paternal Grandmother     Lupus Neg Hx     Psoriasis Neg Hx      A basic ultrasound was performed today on each of the fetuses.  The early gestational age precluded a full anatomic survey but the anatomy visualized overall appeared reassuring.  The amniotic fluid volume appeared normal.  There was a thick dividing membrane and the genders were discordant consistent with a dichorionic twin gestation.    Counselin. SLE and history of a child with congenital heart block    In the past there was significant concern that pregnancy may exacerbate SLE. However, recent well controlled studies show that pregnancy does not increase the frequency of SLE exacerbations, although flares occur commonly postpartum.    Patients with symptomatic lupus are more likely to have adverse pregnancy outcomes including fetal loss (up to 40%), IUGR (25%),  delivery (30%), and preeclampsia (30%). Maternal complications include thrombocytopenia,  lupus flare including lupus nephritis and increased risk for .    The mainstay of therapy for symptomatic SLE is prednisone. The goal is to use the lowest dose necessary to alleviate the patients' symptoms. Rarely additional medications are needed such as azathiaprine. One of the most difficult issues in caring for patients with SLE is diagnosing the signs and symptoms of lupus flare. As a general rule elevated anti-DNA levels or decreased complement does not have clinical significance in the absence of clinical symptoms of a flare. Valid symptoms associated with lupus flare include true rash, arthritis, elevated anti-DNA levels, fever, lymphadenopathy, and leukopenia. Symptoms that are NOT suggestive of a lupus flare include arthralgias, knee effusion, proteinuria, hypocomplementemia, and thrombocytopenia.     Distinguishing between lupus nephritis and preeclampsia can be very difficult. Increased proteinuria does not distinguish between these two entities. SLE nephritis is associated with decreased C3/C4/C50 levels whereas preeclampsia has normal C 50 levels.     lupus can occur in 10-25% of women with SSA/SSB antibodies. This syndrome is characterized by a transient skin rash, thrombocytopenia, and congenital heart block(< 3% risk with SSA antibodies). If congenital heart block is diagnosed serial ultrasounds are needed to look for evidence of fetal hydrops.    Due to her prior history of having a child affected with congenital heart block, her recurrence risk is approximately 10% for having another affected child.  Due to this increased recurrence risk, surveillance of fetal cardiac rhythm is indicated.  It remains controversial with regard to the best approach for management and surveillance, but typically [place] interval assessment of the fetal cardiac rhythm is recommended and with any evidence of prolongation high-dose steroids with dexamethasone can be initiated.  Once complete heart block  develops, it is unclear if there is any role for steroids.  I also discussed with her that 1 medication that has been associated with a lowering of the risk of developing congenital complete heart block is Plaquenil.  Since she has previously taken this medication, I encouraged her to resume it at a dose of 200 mg twice a day.  I sent this prescription in to her pharmacy.    RECOMMENDATIONS:  1. She should be observed closely for signs and symptoms of lupus flare, preeclampsia and IUGR.  2. Serial growth scans every 4-6 weeks to evaluate fetal growth.  3. Antepartum testing beginning at 32 weeks gestation or sooner if complications develop.  4. Patients on chronic steroid therapy within 3-6 months from delivery should receive steroid therapy during labor and delivery if the daily dose exceeds 10 mg  5. The pediatrician should evaluate the infant for  lupus.  6. Postpartum she should be observed closely for evidence of an exacerbation and maintenance therapy can be resumed.  7.  Fetal echocardiogram in the next 4 weeks is indicated as she is at increased risk for having a child with congenital heart block.  8.  Restart Plaquenil at 200 mg twice a day    2. Chronic Hypertension  We discussed her blood pressure control in the need for better blood pressure control during pregnancy to minimize the risk of end-organ damage and maximize placental function.  It sounds like she is only been intermittently compliant with her labetalol as she says she can really only remember to take it once a day.  She has not yet picked up the Aldomet but doubts that she will remember to take it 3 times a day.  Given all of this, as well as her significant blood pressure elevation today, I think it would be reasonable to try her on nifedipine once a day to see if it will achieve adequate blood pressure control.  I am going to send in a prescription for Procardia XL 30 mg to her pharmacy.  Overall, this is a safe medication to use  during pregnancy for the fetus.  If necessary we have a lot of room to go up in the dosing and hopefully once daily dosing will lead to better compliance and blood pressure control.  We discussed that her history of chronic hypertension markedly increases her risk of developing recurrent superimposed preeclampsia.  Many of the recommendations for patients with chronic hypertension also apply as outlined above for lupus.  We discussed the role of low-dose aspirin in preeclampsia prevention.    She was counseled on maternal/fetal risks associated with CHTN during pregnancy including fetal growth restriction,  delivery, development of superimposed preeclampsia, and eclampsia. She was counseled on the recommendations for blood pressure control, serial sonos for fetal growth, and timing of delivery.     RECOMMENDATIONS:  1. Secondary to the higher incidence of intrauterine growth restriction (IUGR) in patients with underlying hypertension, would recommend periodic (every 4-6 weeks) fetal growth assessments.   2. Continued close observation of patient's blood pressures with a goal of 140-155/.  A prescription was sent to her pharmacy for Procardia 30 mg daily and she was instructed to begin this today.  3.  Begin ASA 81 mg po every day   4.  Begin  surveillance at 32 weeks  5.  Given the multiple comorbid conditions, will recommend delivery at no later than 37 weeks if her blood pressures remained stable, fetal growth remains reassuring, and  surveillance of remains reassuring.    We will plan to see her back in approximately 3 weeks for targeted ultrasound evaluation and reassessment of the fetal cardiac rhythm as well as her blood pressure.    Total face-to-face time approximately 30 min, greater than 50% in counseling and care coordination.    See imaging tab for full ultrasound report.

## 2019-02-06 ENCOUNTER — LAB VISIT (OUTPATIENT)
Dept: LAB | Facility: HOSPITAL | Age: 29
End: 2019-02-06
Attending: INTERNAL MEDICINE
Payer: MEDICAID

## 2019-02-06 ENCOUNTER — ROUTINE PRENATAL (OUTPATIENT)
Dept: OBSTETRICS AND GYNECOLOGY | Facility: CLINIC | Age: 29
End: 2019-02-06
Payer: MEDICAID

## 2019-02-06 VITALS
WEIGHT: 175.94 LBS | DIASTOLIC BLOOD PRESSURE: 98 MMHG | BODY MASS INDEX: 29.28 KG/M2 | SYSTOLIC BLOOD PRESSURE: 162 MMHG

## 2019-02-06 DIAGNOSIS — Z36.89 ENCOUNTER FOR FETAL ANATOMIC SURVEY: ICD-10-CM

## 2019-02-06 DIAGNOSIS — O30.042 DICHORIONIC DIAMNIOTIC TWIN PREGNANCY IN SECOND TRIMESTER: Primary | ICD-10-CM

## 2019-02-06 DIAGNOSIS — M32.14 LUPUS NEPHRITIS: ICD-10-CM

## 2019-02-06 DIAGNOSIS — M32.14 OTHER SYSTEMIC LUPUS ERYTHEMATOSUS WITH GLOMERULAR DISEASE: ICD-10-CM

## 2019-02-06 DIAGNOSIS — M32.14 SLE GLOMERULONEPHRITIS SYNDROME, WHO CLASS V: ICD-10-CM

## 2019-02-06 DIAGNOSIS — Z36.9 ANTENATAL SCREENING ENCOUNTER: ICD-10-CM

## 2019-02-06 DIAGNOSIS — M32.9 SLE (SYSTEMIC LUPUS ERYTHEMATOSUS RELATED SYNDROME): ICD-10-CM

## 2019-02-06 DIAGNOSIS — O10.012 PRE-EXISTING ESSENTIAL HYPERTENSION DURING PREGNANCY IN SECOND TRIMESTER: ICD-10-CM

## 2019-02-06 LAB
ALBUMIN SERPL BCP-MCNC: 2.7 G/DL
ANION GAP SERPL CALC-SCNC: 7 MMOL/L
BASOPHILS # BLD AUTO: 0 K/UL
BASOPHILS NFR BLD: 0 %
BUN SERPL-MCNC: 7 MG/DL
CALCIUM SERPL-MCNC: 8.6 MG/DL
CHLORIDE SERPL-SCNC: 105 MMOL/L
CO2 SERPL-SCNC: 22 MMOL/L
CREAT SERPL-MCNC: 0.5 MG/DL
CRP SERPL-MCNC: 1.1 MG/L
DIFFERENTIAL METHOD: ABNORMAL
EOSINOPHIL # BLD AUTO: 0.1 K/UL
EOSINOPHIL NFR BLD: 4.8 %
ERYTHROCYTE [DISTWIDTH] IN BLOOD BY AUTOMATED COUNT: 14.8 %
ERYTHROCYTE [SEDIMENTATION RATE] IN BLOOD BY WESTERGREN METHOD: 41 MM/HR
EST. GFR  (AFRICAN AMERICAN): >60 ML/MIN/1.73 M^2
EST. GFR  (NON AFRICAN AMERICAN): >60 ML/MIN/1.73 M^2
GLUCOSE SERPL-MCNC: 73 MG/DL
HCT VFR BLD AUTO: 28.3 %
HGB BLD-MCNC: 9.7 G/DL
IMM GRANULOCYTES # BLD AUTO: 0.01 K/UL
IMM GRANULOCYTES NFR BLD AUTO: 0.4 %
LYMPHOCYTES # BLD AUTO: 0.7 K/UL
LYMPHOCYTES NFR BLD: 25.4 %
MCH RBC QN AUTO: 28.4 PG
MCHC RBC AUTO-ENTMCNC: 34.3 G/DL
MCV RBC AUTO: 83 FL
MONOCYTES # BLD AUTO: 0.2 K/UL
MONOCYTES NFR BLD: 6.6 %
NEUTROPHILS # BLD AUTO: 1.7 K/UL
NEUTROPHILS NFR BLD: 62.8 %
NRBC BLD-RTO: 0 /100 WBC
PHOSPHATE SERPL-MCNC: 3.5 MG/DL
PLATELET # BLD AUTO: 149 K/UL
PMV BLD AUTO: 10.7 FL
POTASSIUM SERPL-SCNC: 3.6 MMOL/L
RBC # BLD AUTO: 3.41 M/UL
SODIUM SERPL-SCNC: 134 MMOL/L
WBC # BLD AUTO: 2.72 K/UL

## 2019-02-06 PROCEDURE — 85652 RBC SED RATE AUTOMATED: CPT

## 2019-02-06 PROCEDURE — 99212 OFFICE O/P EST SF 10 MIN: CPT | Mod: TH,S$PBB,, | Performed by: OBSTETRICS & GYNECOLOGY

## 2019-02-06 PROCEDURE — 99212 PR OFFICE/OUTPT VISIT, EST, LEVL II, 10-19 MIN: ICD-10-PCS | Mod: TH,S$PBB,, | Performed by: OBSTETRICS & GYNECOLOGY

## 2019-02-06 PROCEDURE — 99999 PR PBB SHADOW E&M-EST. PATIENT-LVL III: ICD-10-PCS | Mod: PBBFAC,,, | Performed by: OBSTETRICS & GYNECOLOGY

## 2019-02-06 PROCEDURE — 86225 DNA ANTIBODY NATIVE: CPT

## 2019-02-06 PROCEDURE — 86160 COMPLEMENT ANTIGEN: CPT

## 2019-02-06 PROCEDURE — 99213 OFFICE O/P EST LOW 20 MIN: CPT | Mod: PBBFAC,TH,PN | Performed by: OBSTETRICS & GYNECOLOGY

## 2019-02-06 PROCEDURE — 36415 COLL VENOUS BLD VENIPUNCTURE: CPT

## 2019-02-06 PROCEDURE — 86140 C-REACTIVE PROTEIN: CPT

## 2019-02-06 PROCEDURE — 80069 RENAL FUNCTION PANEL: CPT

## 2019-02-06 PROCEDURE — 85025 COMPLETE CBC W/AUTO DIFF WBC: CPT

## 2019-02-06 PROCEDURE — 81511 FTL CGEN ABNOR FOUR ANAL: CPT

## 2019-02-06 PROCEDURE — 86160 COMPLEMENT ANTIGEN: CPT | Mod: 59

## 2019-02-06 PROCEDURE — 99999 PR PBB SHADOW E&M-EST. PATIENT-LVL III: CPT | Mod: PBBFAC,,, | Performed by: OBSTETRICS & GYNECOLOGY

## 2019-02-06 NOTE — PROGRESS NOTES
Feeling well. Did not take BP meds today-changed to procardia XL 30mg by Dr. Stokes (reviewed his note); has not started on asa or plaquenil yet; C3/4 & PCR pending today.  RTC 2wk for prolonged doppler FHTs; RTC 4wk w/ me & anatomy scan

## 2019-02-07 LAB
C3 SERPL-MCNC: 60 MG/DL
C4 SERPL-MCNC: 18 MG/DL
DSDNA AB SER-ACNC: NORMAL [IU]/ML

## 2019-02-08 ENCOUNTER — PATIENT MESSAGE (OUTPATIENT)
Dept: OBSTETRICS AND GYNECOLOGY | Facility: CLINIC | Age: 29
End: 2019-02-08

## 2019-02-08 LAB
# FETUSES US: NORMAL
AFP MOM SERPL: 0.61
AFP SERPL-MCNC: 49.5 NG/ML
AGE AT DELIVERY: 26
B-HCG MOM SERPL: 0.56
B-HCG SERPL-ACNC: 30.7 IU/ML
COLLECT DATE BLD: NORMAL
COLLECT DATE: NORMAL
FET NASAL BONE LENGTH US.MEAS: NORMAL MM
FET NUCHAL FOLD MOM THICKNESS US.MEAS: 1.22
FET NUCHAL FOLD THICKNESS US.MEAS: 1.7 MM
FET TS 21 RISK FROM MAT AGE: NORMAL
GA (DAYS): 4 D
GA (WEEKS): 12 WK
GA METHOD: NORMAL
GEST. AGE (DAYS) 2ND SAMPLE (SS2): 6
GEST. AGE (WKS) 2ND SAMPLE (SS2): 16
IDDM PATIENT QL: NORMAL
INHIBIN A MOM SERPL: 2.29
INHIBIN A SERPL-MCNC: 689.5 PG/ML
INTEGRATED SCN PATIENT-IMP: NEGATIVE
PAPP-A MOM SERPL: 0.91
PAPP-A SERPL-MCNC: NORMAL NG/ML
SEQUENTIAL SCREEN PART 2 INTERP: NORMAL
TS 18 RISK FETUS: NORMAL
TS 21 RISK FETUS: NORMAL
U ESTRIOL MOM SERPL: 1.06
U ESTRIOL SERPL-MCNC: 1.7 NG/ML

## 2019-02-12 ENCOUNTER — OFFICE VISIT (OUTPATIENT)
Dept: NEPHROLOGY | Facility: CLINIC | Age: 29
End: 2019-02-12
Payer: MEDICAID

## 2019-02-12 VITALS
BODY MASS INDEX: 28.98 KG/M2 | SYSTOLIC BLOOD PRESSURE: 132 MMHG | HEART RATE: 96 BPM | WEIGHT: 173.94 LBS | DIASTOLIC BLOOD PRESSURE: 72 MMHG | HEIGHT: 65 IN

## 2019-02-12 DIAGNOSIS — I10 ESSENTIAL HYPERTENSION: ICD-10-CM

## 2019-02-12 DIAGNOSIS — M32.14 LUPUS NEPHRITIS: Primary | ICD-10-CM

## 2019-02-12 DIAGNOSIS — Z71.89 ENCOUNTER FOR MEDICATION REVIEW AND COUNSELING: ICD-10-CM

## 2019-02-12 DIAGNOSIS — Z91.199 NON-COMPLIANCE: ICD-10-CM

## 2019-02-12 PROCEDURE — 99999 PR PBB SHADOW E&M-EST. PATIENT-LVL III: CPT | Mod: PBBFAC,,, | Performed by: INTERNAL MEDICINE

## 2019-02-12 PROCEDURE — 99215 OFFICE O/P EST HI 40 MIN: CPT | Mod: S$PBB,,, | Performed by: INTERNAL MEDICINE

## 2019-02-12 PROCEDURE — 99999 PR PBB SHADOW E&M-EST. PATIENT-LVL III: ICD-10-PCS | Mod: PBBFAC,,, | Performed by: INTERNAL MEDICINE

## 2019-02-12 PROCEDURE — 99213 OFFICE O/P EST LOW 20 MIN: CPT | Mod: PBBFAC,PN | Performed by: INTERNAL MEDICINE

## 2019-02-12 PROCEDURE — 99215 PR OFFICE/OUTPT VISIT, EST, LEVL V, 40-54 MIN: ICD-10-PCS | Mod: S$PBB,,, | Performed by: INTERNAL MEDICINE

## 2019-02-12 NOTE — PROGRESS NOTES
"NEPHROLOGY CLINIC FOLLOWUP NOTE:   Date of clinic visit: 2/12/19     RHEUMATOLOGIST: Dr. Blair     REASON FOR CONSULTATION: H/o of lupus nephritis     CHIEF COMPLAINT: History of systemic lupus erythematosus.      HISTORY OF PRESENT ILLNESS: Ms. Vines is a 28-year-old -American female with a h/o of lupus nephritis who presents for f/u. Pt was last seen by me about 1 month ago. Chart was reviewed. Pt is now 17 weeks pregnant with twins.  Cellcept and losartan already have been stopped (or pt was not taking them - see prior documentations (Nov 2018) for medication non-compliance). She was switched from labetalol to methyldopa last visit with us. But she never picked up methyldopa from pharmacy. When Dr. Stokes attempted to re-order it, pt mentioned that she was unlikely to remember to take methyldopa 2-3 times per day. Therefore, it seemed reasonable to place pt on procardia XL 30 mg qd, which she is not taking. She says today that she is feeling OK, no c/o's, no discomfort.      To review:  She had a kidney biopsy done on 4/3/18 for suspected lupus nephritis flare that showed no change from prior biopsy in 2013 and lupus nephritis class V with 10% interstitial fibrosis, no crescents, no necrosis, no evidence of acuity, + full house effect. The biopsy also showed vascular changes c/w hypertensive nephrosclerosis. Biopsy results were c/w inactive lupus nephritis. Given that C# and C4 complements were still low (likely from an earlier flare), pt was kept on maintenance dose of cellcept.     On her last visit with us in Nov 2018, pt informed me that she was not taking any meds at all. (she personally told me "I don't believe in medications"), was not on cellcept, as well as all other medications.      To review this presentation:  She had returned after a long absence since Aug 2017. Pt was then pregnant at 30 weeks of gestation. She had problems with uncontrolled BP then. She was specifically advised " (documented) to RTC in 1 wk. Appt was made for her. She did not show up. Pt gave birth to a healthy baby at 38 wks of gestation. Mycophenolate had been stopped once the pregnancy test was known in Feb 2017. Losartan had also been stopped then as well. Lupus nephritis was not active, and imuran though considered was not used. On return both C3 and C4 complements were low. Pt was re-started on Cellcept by rheumatology 500 mg tid in early week of Jan 2018. Pt was also taking 10 mg of prednisone qd. On initial return visit to us, WBC was low and cellcept dose could not be increased. On her last visit to us, WBC was normal. Pt had no c/o's, no fever, no SOB, no leg swelling, no blood in urine. Pt was only using prednisone 10 mg intermittently, because it increases her appetite, and she does not want to eat much to avoid wt gain. Mycophenolate was changed form 500 mg tid to 1 g bid. Pt was placed on prednisone 20 mg po qd, and on tapering dose, and was advised not to miss it.     Pt presents for f/u, says is taking all the meds prescribed. Pt has no active oe new c/o's, says is feeling well. Had labs done today, complement levels are not back yet.      PAST MEDICAL HISTORY:   1. Systemic lupus erythematosus diagnosed in November 2013.   2. Class V membranous lupus nephritis. Kidney biopsy on 10/17/2013 showed 5%   to 10% interstitial fibrosis. No acuity on this biopsy. 3rd biopsy on 4/3/18:lupus nephritis class V, 1 glomerulus globali sclerosed, 10% interstitial fibrosis, no crescents, no necrosis, no evidence of acuity, + full house effect. The biopsy also showed vascular changes c/w hypertensive nephrosclerosis   3. Sjogren's disease, diagnosed in 2012.   4. History of genital herpes.      PAST SURGICAL HISTORY: None, apart from the kidney biopsy.      FAMILY HISTORY: No one in the family with lupus. Father is 47 years old. Mom is 44. Both are healthy. The patient does have a 1-year-old son who was diagnosed with  third-degree AV block   .   ALLERGIES: Reviewed. No known drug allergies.      MEDICATIONS: Reviewed on Epic, procardia XL 30 mg po qd, other meds reviewed     REVIEW OF SYSTEMS:   HEAD, EYES, EARS, NOSE, THROAT: As above, otherwise negative.   CARDIAC: Negative.   PULMONARY: Negative.   GASTROINTESTINAL: Negative.   GENITOURINARY: As above, otherwise negative.   INFECTIOUS DISEASE: negative.   RHEUMATOLOGICAL: As above, otherwise negative.   The rest of the review of systems is negative.      PHYSICAL EXAMINATION:   VITAL SIGNS: Blood pressure is 132/72, pulse is 96, weight is 173 lbs  In no acute distress.   GENERAL: She is ambulatory.   HEART: Regular rate and rhythm. No pericardial friction rubs. S1 and S2 audible.   CHEST: Clear to auscultation. No rales, breathing symmetric and unlabored  ABDOMEN: Soft, gravid  EXTREMITIES: Showed no edema.      LABORATORY VALUES: reviewed:  U prot/cr ratio 0.37 g, from 0.34 g, from 1.38 g, from 470 mg, from 570 mg  C3 normal  C4 normal    BMP  Lab Results   Component Value Date     (L) 02/06/2019    K 3.6 02/06/2019     02/06/2019    CO2 22 (L) 02/06/2019    BUN 7 02/06/2019    CREATININE 0.5 02/06/2019    CALCIUM 8.6 (L) 02/06/2019    ANIONGAP 7 (L) 02/06/2019    ESTGFRAFRICA >60.0 02/06/2019    EGFRNONAA >60.0 02/06/2019     Lab Results   Component Value Date    WBC 2.72 (L) 02/06/2019    HGB 9.7 (L) 02/06/2019    HCT 28.3 (L) 02/06/2019    MCV 83 02/06/2019     (L) 02/06/2019              ASSESSMENT AND PLAN: This is a 28-year-old female with lupus nephritis who presents for f/u after long period of no show.   The impression is as follows:      1. Renal: stable renal function, s Cr normal  Pt is pregnant at 17 weeks with twins  BP with good control today  High risk pregnancy  Proteinuria noted remains improved  Hematologic disorders noted: leukopenia and anemia, likely features of systemic lupus      Pt was reminded that she needs to continue to control  "BP to avoid pregnancy complications  Pt previously had told me personally, "I don't believe in medications."  Pt was advised that she is at high risk of pregnancy complications with uncontrolled BP    Pt was advised NOT TO TAKE CELLCEPT or LOSARTAN  Risk of teratogenicity explained to her  Imuran was recommended, pt refused.     C3 and C4 hypocomplementemia stable, normal complements, likely due to prior cellcept treatment  Reviewed prior kidney biopsy   The biopsy showed same level of chronic interstitial fibrosis as the one from 5 years ago, no change at 15%.  The biopsy also showed damage from uncontrolled HTN, pt was advised.     2. HTN: BP controlled well with procardia XL 30 mg qd  She was previously switched from labetalol to methyldopa last visit with us. But she never picked up methyldopa from pharmacy. When Dr. Stokes attempted to re-order it, pt mentioned that she was unlikely to remember to take methyldopa 2-3 times per day. Therefore, it seemed reasonable to place pt on procardia XL 30 mg qd (though methyldopa is superior in pregnancy, there is a good chance that pt would not take a bid to tid medication).  Agree with procardia XL     3. Rheum: note reviewed  Agree with mgmt.     PLANS AND RECOMMENDATIONS:   As discussed above  RTC 1 month  Total time spent 40 minutes including time needed to review the records, the   patient evaluation, documentation, face-to-face discussion with the patient,   more than 50% of the time was spent on coordination of care and counseling.    Level V visit.     Laurence Morales MD        "

## 2019-02-25 ENCOUNTER — PROCEDURE VISIT (OUTPATIENT)
Dept: OBSTETRICS AND GYNECOLOGY | Facility: CLINIC | Age: 29
End: 2019-02-25
Payer: MEDICAID

## 2019-02-25 DIAGNOSIS — M32.9 SYSTEMIC LUPUS ERYTHEMATOSUS AFFECTING PREGNANCY: ICD-10-CM

## 2019-02-25 DIAGNOSIS — R76.8 SS-A ANTIBODY POSITIVE: ICD-10-CM

## 2019-02-25 DIAGNOSIS — O30.049 DICHORIONIC DIAMNIOTIC TWIN PREGNANCY, ANTEPARTUM: ICD-10-CM

## 2019-02-25 DIAGNOSIS — O30.002: Primary | ICD-10-CM

## 2019-02-25 DIAGNOSIS — O99.891 SYSTEMIC LUPUS ERYTHEMATOSUS AFFECTING PREGNANCY: ICD-10-CM

## 2019-02-25 PROCEDURE — 76812 OB US DETAILED ADDL FETUS: CPT | Mod: PBBFAC | Performed by: OBSTETRICS & GYNECOLOGY

## 2019-02-25 PROCEDURE — 76812 OB US DETAILED ADDL FETUS: ICD-10-PCS | Mod: 26,S$PBB,, | Performed by: OBSTETRICS & GYNECOLOGY

## 2019-02-25 PROCEDURE — 76811 PR US, OB FETAL EVAL & EXAM, TRANSABDOM,FIRST GESTATION: ICD-10-PCS | Mod: 26,S$PBB,, | Performed by: OBSTETRICS & GYNECOLOGY

## 2019-02-25 PROCEDURE — 99214 OFFICE O/P EST MOD 30 MIN: CPT | Mod: S$PBB,TH,25, | Performed by: OBSTETRICS & GYNECOLOGY

## 2019-02-25 PROCEDURE — 99214 PR OFFICE/OUTPT VISIT, EST, LEVL IV, 30-39 MIN: ICD-10-PCS | Mod: S$PBB,TH,25, | Performed by: OBSTETRICS & GYNECOLOGY

## 2019-02-25 PROCEDURE — 76811 OB US DETAILED SNGL FETUS: CPT | Mod: 26,S$PBB,, | Performed by: OBSTETRICS & GYNECOLOGY

## 2019-02-25 PROCEDURE — 76811 OB US DETAILED SNGL FETUS: CPT | Mod: PBBFAC | Performed by: OBSTETRICS & GYNECOLOGY

## 2019-02-25 PROCEDURE — 76812 OB US DETAILED ADDL FETUS: CPT | Mod: 26,S$PBB,, | Performed by: OBSTETRICS & GYNECOLOGY

## 2019-02-25 RX ORDER — NIFEDIPINE 30 MG/1
60 TABLET, EXTENDED RELEASE ORAL DAILY
Qty: 30 TABLET | Refills: 11 | Status: SHIPPED | OUTPATIENT
Start: 2019-02-25 | End: 2020-05-26

## 2019-02-25 NOTE — PROCEDURES
Indication  ========    Twin Fetal anatomy survey, Hx of heart block.    History  ======    Risk Factors  Details: Son with heart block  History risk factors: Multiple gestation    Method  ======    Transabdominal ultrasound examination. View: Good view.    Pregnancy  =========    Twin pregnancy. Dichorionic-diamniotic. Number of fetuses: 2.    Dating  ======    LMP on: 10/15/2018  Cycle: regular cycle  GA by LMP 19 w + 0 d  VICTORIANO by LMP: 7/22/2019  Ultrasound examination on: 2/25/2019  GA by U/S based upon: AC, BPD, Femur, HC  GA by U/S 19 w + 1 d  VICTORIANO by U/S: 7/21/2019  GA by U/S based upon (Fetus 2): AC, BPD, Femur, HC  GA by U/S (Fetus 2) 19 w + 1 d  VICTORIANO by U/S (Fetus 2): 7/21/2019  Assigned: Dating performed on 02/25/2019, based on the LMP  Assigned GA 19 w + 0 d  Assigned VICTORIANO: 7/22/2019    General Evaluation (1)  =================    Cardiac activity: present.  bpm.  Fetal movements: visualized.  Presentation: breech.  Placenta: anterior.  Umbilical cord: normal, 3 vessel cord.  Amniotic fluid: normal amount.    Fetal Biometry (1)  ==============    Fetal Biometry  BPD 43.5 mm 19w 1d Hadlock  OFD 56.4 mm 19w 6d Pascale  .8 mm 18w 6d Hadlock  .7 mm 19w 2d Hadlock  Femur 30.9 mm 19w 4d Hadlock   g  Calculated by: Hadlock (BPD-HC-AC-FL)  EFW (lb) 0 lb  EFW (oz) 10 oz  EFW discordance 1.6 %  Cephalic index 0.77  HC / AC 1.15  FL / BPD 0.71  FL / AC 0.22  NOAM 3.7 cm   bpm    Fetal Anatomy (1)  ==============    Cranium: normal  Lateral ventricles: normal  Choroid plexus: normal  Midline falx: normal  Cavum septi pellucidi: normal  Cerebellum: normal  Cisterna magna: normal  Rt lateral ventricle: normal  Lt lateral ventricle: normal  Rt choroid plexus: normal  Lt choroid plexus: normal  Parenchyma: normal  Third ventricle: normal  Posterior fossa: normal  Cerebellar lobes: normal  Vermis: normal  Neck: appears normal  Nuchal  fold: normal  Lips: normal  Profile: normal  Nose: normal  Maxilla: normal  Mandible: normal  4-chamber view: normal  RVOT: normal  LVOT: normal  Situs: normal  Aortic arch: normal  Ductal arch: normal  SVC: normal  IVC: normal  3-vessel view: normal  3-vessel-trachea view: normal  Cardiac position: normal  Cardiac axis: normal  Cardiac size: normal  Cardiac rhythm: normal  Rt lung: normal  Lt lung: normal  Diaphragm: normal  Cord insertion: normal  Stomach: normal  Kidneys: normal  Bladder: normal  Abdom. wall: normal  Abdom. cavity: normal  Rt kidney: normal  Lt kidney: normal  Liver: normal  Bowel: normal  Rt renal artery: normal  Lt renal artery: normal  Cervical spine: normal  Thoracic spine: normal  Lumbar spine: normal  Sacral spine: normal  Rt arm: normal  Lt arm: normal  Rt hand: normal  Lt hand: normal  Rt leg: normal  Lt leg: normal  Rt foot: normal  Lt foot: normal  Position of hands: normal  Position of feet: normal  Gender: male    General Evaluation (2)  =================    Cardiac activity: present.  bpm.  Fetal movements: visualized.  Presentation: transverse.  Placenta: anterior.  Umbilical cord: normal, 3 vessel cord.  Amniotic fluid: normal amount.    Fetal Biometry (2)  ==============    Fetal Biometry  BPD 43.3 mm 19w 1d Hadlock  OFD 58.3 mm 20w 2d Pascale  .6 mm 19w 1d Hadlock  .5 mm 19w 4d Hadlock  Femur 29.3 mm 19w 0d Hadlock   g  Calculated by: Hadlock (BPD-HC-AC-FL)  EFW (lb) 0 lb  EFW (oz) 10 oz  EFW discordance 1.6 %  Cephalic index 0.74  HC / AC 1.16  FL / BPD 0.68  FL / AC 0.21  NOAM 3.7 cm   bpm    Fetal Anatomy (2)  ==============    Cranium: normal  Lateral ventricles: normal  Choroid plexus: normal  Midline falx: normal  Cavum septi pellucidi: normal  Cerebellum: normal  Cisterna magna: normal  Head shape: normal  Lt lateral ventricle: normal  Rt choroid plexus: normal  Lt choroid plexus: normal  Parenchyma: normal  Third ventricle: normal  Posterior  fossa: normal  Cerebellar lobes: normal  Vermis: normal  Neck: normal  Nuchal fold: normal  Lips: normal  Profile: normal  Nose: normal  Maxilla: normal  Mandible: normal  4-chamber view: normal  RVOT: normal  LVOT: normal  Heart / Thorax: Septum normal  Situs: normal  Aortic arch: normal  Ductal arch: normal  SVC: normal  IVC: normal  3-vessel view: normal  3-vessel-trachea view: normal  Cardiac position: normal  Cardiac axis: normal  Cardiac size: normal  Cardiac rhythm: normal  Rt lung: normal  Lt lung: normal  Diaphragm: normal  Cord insertion: normal  Stomach: normal  Kidneys: normal  Bladder: normal  Abdom. wall: normal  Abdom. cavity: normal  Rt kidney: normal  Lt kidney: normal  Liver: normal  Bowel: normal  Rt renal artery: normal  Lt renal artery: normal  Cervical spine: normal  Thoracic spine: normal  Lumbar spine: normal  Sacral spine: normal  Rt arm: normal  Lt arm: normal  Rt hand: normal  Lt hand: normal  Rt leg: normal  Lt leg: normal  Rt foot: normal  Lt foot: normal  Position of hands: normal  Position of feet: normal  Gender: female    Maternal Structures  ===============    Uterus / Cervix  Uterus: Normal  Ovaries / Tubes / Adnexa  Rt ovary: Visualized  Lt ovary: Visualized  Pouch of Darrell / Other Structures  Cul de Sac: Visualized    Consultation  ==========    Returns today for follow-up. She is now 19 weeks with a dichorionic diamniotic twin gestation. We have been following due to a twin pregnancy  as well as a history of lupus nephritis and an infant with congenital heart block.    She denies any significant interval problems. She has not had any significant bleeding or cramping. She has not had any leakage of fluid. She  started the Procardia after her last visit and is tolerating this well.    1. Lupus/Class V lupus nephritis: See prior note for details. Overall doing well today with symptoms related to lupus. Saw her nephrologist  recently and renal function stable. She had a recent  protein creatinine ratio that was 0.37.    2. Prior child with congenital heart block: Has appointment with pediatric cardiology in next 2 weeks. Fetal cardiac rhythm appears normal  today. No evidence of bradycardia or abnormal fluid collection. Restarted plaquenil after last visit.    3. CHTN:  She was started on Procardia 30 XL qd at last visit. She denies any signs or symptoms of preeclampsia. She reports that she is tolerating the  Procardia without any difficulties. Her blood pressure today is 160/90. She reports that she is taking her blood pressure medicines at night. Her  blood pressure when she was that her nephrologist office with actually 130s over 70s. She is getting a blood pressure machine so she can  take her blood pressures at home. Due to the elevated blood pressures today, I suggested that she increase her Procardia to 60 mg daily. Our  target blood pressure range is 140s to 150s over 90s. At 19 weeks gestation she is at the vivienne of blood pressure during pregnancy and my  concern is that if we do not increase her medications now, then when she enters the mid 20 week range she will have significant blood  pressure elevations. Determination of whether this represents superimposed preeclampsia or her underlying chronic hypertension will be  confounded by her underlying proteinuria. I would rather try to get her blood pressure is better controlled at this point so that hopefully we can  avoid diagnostic dilemmas. She has a follow-up appointment with her primary OB provider in approximately 2 weeks and we will plan to see her  back approximately 2 weeks after that. She should continue 81 mg ASA. Plan  surveillance at 32 weeks.    4. History of superimposed preeclampsia:  In her pregnancy in 2017 she got to 38 to 39 weeks and then developed superimposed preeclampsia. She did not have persistent blood  pressure problems throughout the pregnancy.    5. History of  section x2    6.  Dichorionic twin gestation: Discordant fetal genders, fetal growth concordant. Repeat growth scan in 4 weeks.    I overall spent approximately 25 minutes in face to face time with the patient and her family, greater than 50% of which was in counseling and  care coordination.    Impression  =========    A detailed fetal anatomic ultrasound examination was performed today due to the following high risk indication: twin gestation, SLE, history of  CHB  No fetal structural abnormalities are identified today in either fetus, and fetal size is appropriate for EGA for each without significant  discordance. Cervical length is normal. Placental location is normal without evidence of previa. Fetal cardiac rhythm is normal in each fetus.    Recommendation  ==============    1. Increase Procardia XL to 60 mg qd--goal -150/90's  2. Continue plaquenil 200 mg bid  3. Continue low dose ASA  4. We recommend repeat evaluation for fetal growth and maternal well-being assessment in 4 weeks.  5. Fetal echocardiogram already scheduled due to prior child with CHB    Thank you again for allowing us to participate in the care of your patients. If you have any questions concerning today's consultation, feel free  to contact me or one of my partners. We can be reached at [phone] during normal business hours. If you have a question after normal  business hours, please contact Labor and Delivery at [phone].

## 2019-02-28 ENCOUNTER — TELEPHONE (OUTPATIENT)
Dept: PEDIATRIC CARDIOLOGY | Facility: CLINIC | Age: 29
End: 2019-02-28

## 2019-02-28 NOTE — TELEPHONE ENCOUNTER
Spoke with patient via telephone. Fetal echo r/s for 3/6/19 @ 145. Office number and address verified.

## 2019-03-06 ENCOUNTER — OFFICE VISIT (OUTPATIENT)
Dept: PEDIATRIC CARDIOLOGY | Facility: CLINIC | Age: 29
End: 2019-03-06
Attending: PEDIATRICS
Payer: MEDICAID

## 2019-03-06 ENCOUNTER — CLINICAL SUPPORT (OUTPATIENT)
Dept: PEDIATRIC CARDIOLOGY | Facility: CLINIC | Age: 29
End: 2019-03-06
Attending: OBSTETRICS & GYNECOLOGY
Payer: MEDICAID

## 2019-03-06 ENCOUNTER — HOSPITAL ENCOUNTER (EMERGENCY)
Facility: OTHER | Age: 29
Discharge: HOME | End: 2019-03-06
Attending: OBSTETRICS & GYNECOLOGY
Payer: MEDICAID

## 2019-03-06 ENCOUNTER — TELEPHONE (OUTPATIENT)
Dept: PEDIATRIC CARDIOLOGY | Facility: CLINIC | Age: 29
End: 2019-03-06

## 2019-03-06 VITALS
OXYGEN SATURATION: 98 % | SYSTOLIC BLOOD PRESSURE: 135 MMHG | HEIGHT: 65 IN | DIASTOLIC BLOOD PRESSURE: 79 MMHG | SYSTOLIC BLOOD PRESSURE: 200 MMHG | WEIGHT: 178.13 LBS | BODY MASS INDEX: 29.68 KG/M2 | TEMPERATURE: 98 F | DIASTOLIC BLOOD PRESSURE: 100 MMHG | HEART RATE: 97 BPM

## 2019-03-06 DIAGNOSIS — O99.891 SYSTEMIC LUPUS COMPLICATING PREGNANCY: ICD-10-CM

## 2019-03-06 DIAGNOSIS — L93.0 LUPUS ERYTHEMATOSUS, UNSPECIFIED FORM: ICD-10-CM

## 2019-03-06 DIAGNOSIS — K21.9 GASTROESOPHAGEAL REFLUX DISEASE, ESOPHAGITIS PRESENCE NOT SPECIFIED: ICD-10-CM

## 2019-03-06 DIAGNOSIS — O30.009 TWIN PREGNANCY, ANTEPARTUM, UNSPECIFIED MULTIPLE GESTATION TYPE: ICD-10-CM

## 2019-03-06 DIAGNOSIS — I10 ESSENTIAL HYPERTENSION: ICD-10-CM

## 2019-03-06 DIAGNOSIS — Z82.79 FAMILY HISTORY OF CONGENITAL HEART DEFECT: ICD-10-CM

## 2019-03-06 DIAGNOSIS — R76.8 ANA POSITIVE: ICD-10-CM

## 2019-03-06 DIAGNOSIS — M32.9 SYSTEMIC LUPUS COMPLICATING PREGNANCY: ICD-10-CM

## 2019-03-06 DIAGNOSIS — O09.299: ICD-10-CM

## 2019-03-06 DIAGNOSIS — D64.9 ANEMIA, UNSPECIFIED TYPE: ICD-10-CM

## 2019-03-06 DIAGNOSIS — M32.9 SLE (SYSTEMIC LUPUS ERYTHEMATOSUS RELATED SYNDROME): Primary | ICD-10-CM

## 2019-03-06 DIAGNOSIS — O10.019 BENIGN ESSENTIAL HTN, CHRONIC, ANTEPARTUM: Primary | ICD-10-CM

## 2019-03-06 DIAGNOSIS — O30.049 DICHORIONIC DIAMNIOTIC TWIN PREGNANCY, ANTEPARTUM: ICD-10-CM

## 2019-03-06 DIAGNOSIS — Z3A.20 20 WEEKS GESTATION OF PREGNANCY: ICD-10-CM

## 2019-03-06 PROCEDURE — 99999 PR PBB SHADOW E&M-EST. PATIENT-LVL III: CPT | Mod: PBBFAC,,, | Performed by: PEDIATRICS

## 2019-03-06 PROCEDURE — 99284 EMERGENCY DEPT VISIT MOD MDM: CPT | Mod: 25,27

## 2019-03-06 PROCEDURE — 99201 PR OFFICE/OUTPT VISIT,NEW,LEVL I: CPT | Mod: S$PBB,,, | Performed by: PEDIATRICS

## 2019-03-06 PROCEDURE — 59025 FETAL NON-STRESS TEST: CPT

## 2019-03-06 PROCEDURE — 99213 OFFICE O/P EST LOW 20 MIN: CPT | Mod: PBBFAC,25 | Performed by: PEDIATRICS

## 2019-03-06 PROCEDURE — 99201 PR OFFICE/OUTPT VISIT,NEW,LEVL I: ICD-10-PCS | Mod: S$PBB,,, | Performed by: PEDIATRICS

## 2019-03-06 PROCEDURE — 99999 PR PBB SHADOW E&M-EST. PATIENT-LVL III: ICD-10-PCS | Mod: PBBFAC,,, | Performed by: PEDIATRICS

## 2019-03-06 NOTE — ED NOTES
Pt here for elevated blood pressures.  Pt is already on medication for hypertension.  Will obtain a series of blood pressures.

## 2019-03-06 NOTE — PROGRESS NOTES
I had the pleasure of evaluating Alisia Vines who is now 28 y.o.  and carrying her 3rd pregnancy at 20 5/7 weeks gestation. She was referred for evaluation of the fetal heart due to increased risks for congenital heart disease associated with diamniotic, dichorionic twins as well as history of lupus with elevated SSA antibodies.      Current Outpatient Medications:     efinaconazole (JUBLIA) 10 % Mario, Apply once daily to nail and nail fold.  Use daily for up to 1 year., Disp: 8 mL, Rfl: 3    famotidine (PEPCID) 20 MG tablet, Take 1 tablet (20 mg total) by mouth 2 (two) times daily as needed for Heartburn., Disp: 20 tablet, Rfl: 0    flunisolide 25 mcg, 0.025%, (NASALIDE) 25 mcg (0.025 %) Spry, 2 sprays by Nasal route 2 (two) times daily., Disp: 25 mL, Rfl: 2    FLUoxetine (PROZAC) 40 MG capsule, TAKE 1 CAPSULE BY MOUTH EVERY DAY, Disp: 30 capsule, Rfl: 0    hydroxychloroquine (PLAQUENIL) 200 mg tablet, Take 1 tablet (200 mg total) by mouth 2 (two) times daily., Disp: 60 tablet, Rfl: 11    hydroxychloroquine (PLAQUENIL) 200 mg tablet, Take 1 tablet (200 mg total) by mouth 2 (two) times daily., Disp: 60 tablet, Rfl: 6    levocetirizine (XYZAL) 5 MG tablet, Take 1 tablet (5 mg total) by mouth once daily., Disp: 30 tablet, Rfl: 6    NIFEdipine (PROCARDIA-XL) 30 MG (OSM) 24 hr tablet, Take 2 tablets (60 mg total) by mouth once daily., Disp: 30 tablet, Rfl: 11    predniSONE (DELTASONE) 5 MG tablet, Take 1 tablet (5 mg total) by mouth once daily., Disp: 90 tablet, Rfl: 3    PRENATAL 118-IRON-FOLATE 6-DHA ORAL, Take by mouth., Disp: , Rfl:     valacyclovir (VALTREX) 500 MG tablet, Take 2 tablets (1,000 mg total) by mouth once daily. (Patient taking differently: Take 500 mg by mouth once daily. ), Disp: 90 tablet, Rfl: 3  Review of patient's allergies indicates:  No Known Allergies    Ms. Vines presented in anticipation of performing fetal echocardiogram to evaluate cardiac structure and function as  well as rhythm.  Her medical diagnoses are as follow:  Encounter Diagnoses   Name Primary?    SLE (systemic lupus erythematosus related syndrome) Yes    SSA ANTIBODY positive     Dichorionic diamniotic twin pregnancy, antepartum     Systemic lupus complicating pregnancy     History of fetal anomaly in prior pregnancy, currently pregnant, unspecified trimester     Essential hypertension      On initial evaluation vital signs demonstrated a blood pressure of 220/140 mm of mercury.  This raised great concern and I discussed the information we obtained with Dr. Motley from Maternal Fetal Medicine due to the degree of elevation and a history of lupus nephritis.  Upon re-evaluation, the blood pressure remained 200/100 mm Hg.  Dr. Erickson advise was that she be evaluated for the hypertension and she was taken promptly to the Ob emergency room for evaluation of the elevated blood pressure and probable admission for management.  If the blood pressures have stabilized and she has been admitted overnight, I will plan to come by tomorrow and perform the fetal echocardiogram.  Otherwise we can reschedule her for an outpatient fetal echo cardiac at her convenience within the next week.    RECOMMENDATIONS:  Will reschedule fetal echocardiogram to be performed when blood pressure issues have been addressed appropriately.        Note:  Over 50% of visit in consultation with the family >15 minutes

## 2019-03-06 NOTE — LETTER
March 6, 2019        Janice Arenas MD  [address] [facility] [place] [address]             [facility] [address]  [address]  [facility] [address]  Phone: [phone]  Fax: [phone]   Patient: Alisia Vines   MR Number: [id number]   YOB: 1990   Date of Visit: 3/6/2019       Dear Dr. Arenas:    Thank you for referring Alisia Vines to me for evaluation. Below are the relevant portions of my assessment and plan of care.            If you have questions, please do not hesitate to call me. I look forward to following Alisia along with you.    Sincerely,      Steven Baker MD           CC  Laurence Morales MD

## 2019-03-06 NOTE — LETTER
March 6, 2019      Ramses Stokes MD  [address] [facility] [address]           [facility]  [address] [facility] [address]  Phone: [phone]  Fax: [phone]          Patient: Alisia Vines   MR Number: [id number]   YOB: 1990   Date of Visit: 3/6/2019       Dear Dr. Ramses Stokes:    Thank you for referring Alisia Vines to me for evaluation. Attached you will find relevant portions of my assessment and plan of care.    If you have questions, please do not hesitate to call me. I look forward to following Alisia Vines along with you.    Sincerely,    Steven Baker MD    Enclosure  CC:  No Recipients    If you would like to receive this communication electronically, please contact [email or web address] or [phone] to request more information on [facility] access.    For providers and/or their staff who would like to refer a patient to [facility], please contact us through our one-stop-shop provider referral line, [facility], at [phone].    If you feel you have received this communication in error or would no longer like to receive these types of communications, please e-mail [email or web address]

## 2019-03-06 NOTE — ED PROVIDER NOTES
Encounter Date: 3/6/2019       History   No chief complaint on file.    HPI   Alisia Vines is a 28 y.o. [id number] at 20w2d presents after being walked in by pediatric cardiologist from fetal echocardiogram she had scheduled today. Blood pressure was reported to be 200/100 on their intake. She was very upset at this given time. She has a history of chronic HTN and was previously on labetalol however, was switched to procardia recently however did not take her dose yesterday and only takes it approx 3-4x/week. Denies HA, vision changes, CP/SOB, RUQ pain or edema.     This IUP is complicated by di/di twin pregnancy, chronic HTN, Lupus (SS-A positive with glomerulonephritis), HSV infection, anemia and GERD. Has been on procardia 60 XL and ASA. Patient denies contractions, denies vaginal bleeding, denies LOF.   Fetal Movement: normal.     Review of patient's allergies indicates:  No Known Allergies  Past Medical History:   Diagnosis Date    Allergic rhinitis     Anemia     Condyloma acuminata     GERD (gastroesophageal reflux disease)     History of fetal anomaly in prior pregnancy, currently pregnant, unspecified trimester 3/8/2017    Pacemaker placed    HSV-2 (herpes simplex virus 2) infection     Lupus nephritis     Mood disorder     Overweight(278.02)     Sjogren's syndrome     Systemic lupus erythematosus     Thrombocytopenia      Past Surgical History:   Procedure Laterality Date     SECTION, LOW TRANSVERSE      DELIVERY- SECTION N/A 10/19/2017    Performed by Georges Franks MD at [facility]    RENAL BIOPSY  2013     Family History   Problem Relation Age of Onset    Hypertension Mother     Eczema Brother     Heart disease Son     Arrhythmia Son         CHB    Hypertension Maternal Grandmother     Diabetes Paternal Grandmother     Lupus Neg Hx     Psoriasis Neg Hx     Congenital heart disease Neg Hx     Early death Neg Hx     Heart attacks under age 50 Neg Hx      Pacemaker/defibrilator Neg Hx      Social History     Tobacco Use    Smoking status: Never Smoker    Smokeless tobacco: Never Used   Substance Use Topics    Alcohol use: No     Alcohol/week: 0.0 oz    Drug use: No     Review of Systems   Constitutional: Negative for chills, diaphoresis and fever.   HENT: Negative for congestion, postnasal drip, rhinorrhea, sneezing and sore throat.    Eyes: Negative for photophobia.   Respiratory: Negative for cough, chest tightness and shortness of breath.    Cardiovascular: Negative for chest pain, palpitations and leg swelling.   Gastrointestinal: Negative for abdominal pain, constipation, diarrhea, nausea and vomiting.   Genitourinary: Negative for dysuria, frequency and hematuria.   Musculoskeletal: Negative for arthralgias, back pain and joint swelling.   Neurological: Negative for syncope, light-headedness and headaches.   Psychiatric/Behavioral: Negative for self-injury, sleep disturbance and suicidal ideas. The patient is not nervous/anxious.        Physical Exam     Initial Vitals [03/06/19 1450]   BP Pulse Resp Temp SpO2   (!) 150/107 110 -- 97.8 °F (36.6 °C) 100 %      MAP       --         Physical Exam    Constitutional: Vital signs are normal. She appears well-developed and well-nourished. She is not diaphoretic. No distress.   HENT:   Head: Normocephalic and atraumatic.   Nose: Nose normal.   Cardiovascular: Normal rate, regular rhythm and normal heart sounds.   Pulmonary/Chest: Breath sounds normal. No respiratory distress. She has no wheezes.   Abdominal: Soft. There is no tenderness. There is no rebound and no guarding.   Neurological: She is alert and oriented to person, place, and time. She has normal reflexes. No cranial nerve deficit.   Skin: Skin is warm and dry. Capillary refill takes less than 2 seconds.   Psychiatric: She has a normal mood and affect. Her behavior is normal. Judgment and thought content normal.         ED Course   Fetal non-stress  test  Date/Time: 3/6/2019 2:56 PM  Performed by: Marycruz Sanford MD  Authorized by: Marycruz Sanford MD     Nonstress Test:     Contractions:  Not present                Labs Reviewed - No data to display       Imaging Results    None          Medical Decision Making:   History:   Old Records Summarized: records from clinic visits and records from previous admission(s).  ED Management:  All Bps normal-mild range in [facility]: 150/107, 141/91, 135/79.  Denies sx/s HA, visual changes, RUQ pain  Fetal doppler was not performed due to provider thinking that patient already had fetal echo done today  Patient was only taking half of procardia dose at home. Instructed patient that she should be taking Procardia 60 XL daily and not 30.   Patient verbalized understanding, all questions answered.               Attending Attestation:   Physician Attestation Statement for Resident:  As the supervising MD   Physician Attestation Statement: I have personally seen and examined this patient.   I agree with the above history. -:   As the supervising MD I agree with the above PE.    As the supervising MD I agree with the above treatment, course, plan, and disposition.   -:     Patient did not receive fetal doptones while in [facility] nor PreE labs. Called back in for bedside US to determine fetal viability and PreE labs. Questions answered. Pt states she will return to [facility].      I was personally present during the critical portions of the procedure(s) performed by the resident and was immediately available in the ED to provide services and assistance as needed during the entire procedure.  I have reviewed the following: old records at this facility.                  Clinical Impression:       ICD-10-CM ICD-9-CM   1. Benign essential HTN, chronic, antepartum O10.019 642.03   2. Lupus erythematosus, unspecified form L93.0 695.4   3. Anemia, unspecified type D64.9 285.9   4. Gastroesophageal reflux disease, esophagitis presence not specified  K21.9 530.81   5. 20 weeks gestation of pregnancy Z3A.20 V22.2                          Marycruz Sanford MD  Resident  03/06/19 1613       Eulalia Humphries MD  03/06/19 1755

## 2019-03-06 NOTE — TELEPHONE ENCOUNTER
Pt arrived for fetal echo appt with peds cardiology and bp elevated. MD Olivia notified. Pt sent to OB ED at [facility]  per Dr. Baker, [facility] and Dr. [facility]. Pt escorted to 6th floor by MD Olivia and med student.

## 2019-03-07 ENCOUNTER — TELEPHONE (OUTPATIENT)
Dept: PEDIATRIC CARDIOLOGY | Facility: CLINIC | Age: 29
End: 2019-03-07

## 2019-03-07 NOTE — TELEPHONE ENCOUNTER
Spoke with patient via telephone. Fetal echo scheduled for 4/10/19 @ 1245 pm at [facility]. Offered 3/21/19 @ 2 pm; however patient declined, states on vacation. Informed patient to follow up with OB and if any concerns arise to call office back and we can work out a sooner appt. Office number verified.

## 2019-03-13 ENCOUNTER — ROUTINE PRENATAL (OUTPATIENT)
Dept: OBSTETRICS AND GYNECOLOGY | Facility: CLINIC | Age: 29
End: 2019-03-13
Payer: MEDICAID

## 2019-03-13 ENCOUNTER — PROCEDURE VISIT (OUTPATIENT)
Dept: OBSTETRICS AND GYNECOLOGY | Facility: CLINIC | Age: 29
End: 2019-03-13
Payer: MEDICAID

## 2019-03-13 VITALS
BODY MASS INDEX: 30.85 KG/M2 | SYSTOLIC BLOOD PRESSURE: 132 MMHG | WEIGHT: 185.44 LBS | DIASTOLIC BLOOD PRESSURE: 82 MMHG

## 2019-03-13 DIAGNOSIS — O30.042 DICHORIONIC DIAMNIOTIC TWIN PREGNANCY IN SECOND TRIMESTER: ICD-10-CM

## 2019-03-13 DIAGNOSIS — Z36.89 ENCOUNTER FOR FETAL ANATOMIC SURVEY: ICD-10-CM

## 2019-03-13 DIAGNOSIS — Z3A.21 PREGNANCY WITH 21 COMPLETED WEEKS GESTATION: ICD-10-CM

## 2019-03-13 DIAGNOSIS — M32.14 SYSTEMIC LUPUS ERYTHEMATOSUS WITH GLOMERULAR DISEASE, UNSPECIFIED SLE TYPE: Primary | ICD-10-CM

## 2019-03-13 PROCEDURE — 99999 PR PBB SHADOW E&M-EST. PATIENT-LVL III: ICD-10-PCS | Mod: PBBFAC,,, | Performed by: OBSTETRICS & GYNECOLOGY

## 2019-03-13 PROCEDURE — 99213 OFFICE O/P EST LOW 20 MIN: CPT | Mod: PBBFAC,TH,PN | Performed by: OBSTETRICS & GYNECOLOGY

## 2019-03-13 PROCEDURE — 99212 PR OFFICE/OUTPT VISIT, EST, LEVL II, 10-19 MIN: ICD-10-PCS | Mod: TH,S$PBB,, | Performed by: OBSTETRICS & GYNECOLOGY

## 2019-03-13 PROCEDURE — 99212 OFFICE O/P EST SF 10 MIN: CPT | Mod: TH,S$PBB,, | Performed by: OBSTETRICS & GYNECOLOGY

## 2019-03-13 PROCEDURE — 99999 PR PBB SHADOW E&M-EST. PATIENT-LVL III: CPT | Mod: PBBFAC,,, | Performed by: OBSTETRICS & GYNECOLOGY

## 2019-03-14 ENCOUNTER — OFFICE VISIT (OUTPATIENT)
Dept: RHEUMATOLOGY | Facility: CLINIC | Age: 29
End: 2019-03-14
Payer: MEDICAID

## 2019-03-14 ENCOUNTER — OFFICE VISIT (OUTPATIENT)
Dept: NEPHROLOGY | Facility: CLINIC | Age: 29
End: 2019-03-14
Payer: MEDICAID

## 2019-03-14 VITALS
HEIGHT: 65 IN | SYSTOLIC BLOOD PRESSURE: 128 MMHG | DIASTOLIC BLOOD PRESSURE: 90 MMHG | HEART RATE: 100 BPM | BODY MASS INDEX: 31.19 KG/M2 | WEIGHT: 187.19 LBS

## 2019-03-14 VITALS
HEIGHT: 65 IN | DIASTOLIC BLOOD PRESSURE: 89 MMHG | WEIGHT: 187.63 LBS | SYSTOLIC BLOOD PRESSURE: 149 MMHG | HEART RATE: 98 BPM | BODY MASS INDEX: 31.26 KG/M2

## 2019-03-14 DIAGNOSIS — Z91.148 NON COMPLIANCE W MEDICATION REGIMEN: ICD-10-CM

## 2019-03-14 DIAGNOSIS — I10 UNCONTROLLED HYPERTENSION: ICD-10-CM

## 2019-03-14 DIAGNOSIS — M35.09 SJOGREN'S SYNDROME WITH OTHER ORGAN INVOLVEMENT: ICD-10-CM

## 2019-03-14 DIAGNOSIS — M32.14 OTHER SYSTEMIC LUPUS ERYTHEMATOSUS WITH GLOMERULAR DISEASE: ICD-10-CM

## 2019-03-14 DIAGNOSIS — M32.14 SLE GLOMERULONEPHRITIS SYNDROME, WHO CLASS V: ICD-10-CM

## 2019-03-14 DIAGNOSIS — O99.891 SYSTEMIC LUPUS COMPLICATING PREGNANCY: Primary | ICD-10-CM

## 2019-03-14 DIAGNOSIS — M32.9 SYSTEMIC LUPUS COMPLICATING PREGNANCY: Primary | ICD-10-CM

## 2019-03-14 DIAGNOSIS — Z79.899 LONG-TERM USE OF PLAQUENIL: ICD-10-CM

## 2019-03-14 DIAGNOSIS — M32.14 LUPUS NEPHRITIS: Primary | ICD-10-CM

## 2019-03-14 DIAGNOSIS — Z71.89 ENCOUNTER FOR MEDICATION REVIEW AND COUNSELING: ICD-10-CM

## 2019-03-14 DIAGNOSIS — M32.9 SYSTEMIC LUPUS ERYTHEMATOSUS ARTHRITIS: ICD-10-CM

## 2019-03-14 DIAGNOSIS — R76.8 ANA POSITIVE: ICD-10-CM

## 2019-03-14 DIAGNOSIS — R80.9 PROTEINURIA, UNSPECIFIED TYPE: ICD-10-CM

## 2019-03-14 PROCEDURE — 99999 PR PBB SHADOW E&M-EST. PATIENT-LVL III: CPT | Mod: PBBFAC,,, | Performed by: INTERNAL MEDICINE

## 2019-03-14 PROCEDURE — 99999 PR PBB SHADOW E&M-EST. PATIENT-LVL III: ICD-10-PCS | Mod: PBBFAC,,, | Performed by: INTERNAL MEDICINE

## 2019-03-14 PROCEDURE — 99213 OFFICE O/P EST LOW 20 MIN: CPT | Mod: PBBFAC,27,PN | Performed by: INTERNAL MEDICINE

## 2019-03-14 PROCEDURE — 99215 OFFICE O/P EST HI 40 MIN: CPT | Mod: S$PBB,,, | Performed by: INTERNAL MEDICINE

## 2019-03-14 PROCEDURE — 99214 OFFICE O/P EST MOD 30 MIN: CPT | Mod: S$PBB,,, | Performed by: INTERNAL MEDICINE

## 2019-03-14 PROCEDURE — 99215 PR OFFICE/OUTPT VISIT, EST, LEVL V, 40-54 MIN: ICD-10-PCS | Mod: S$PBB,,, | Performed by: INTERNAL MEDICINE

## 2019-03-14 PROCEDURE — 99213 OFFICE O/P EST LOW 20 MIN: CPT | Mod: PBBFAC,PN | Performed by: INTERNAL MEDICINE

## 2019-03-14 PROCEDURE — 99214 PR OFFICE/OUTPT VISIT, EST, LEVL IV, 30-39 MIN: ICD-10-PCS | Mod: S$PBB,,, | Performed by: INTERNAL MEDICINE

## 2019-03-14 NOTE — PROGRESS NOTES
SPECIAL COMMENT: PT HAS EXTREME NON-COMPLIANCE WITH MEDICATIONS    NEPHROLOGY CLINIC FOLLOWUP NOTE:   Date of clinic visit: 3/14/19     RHEUMATOLOGIST: Dr. Blair     REASON FOR CONSULTATION: H/o of lupus nephritis     CHIEF COMPLAINT: History of systemic lupus erythematosus.      HISTORY OF PRESENT ILLNESS: Ms. Vines is a 28-year-old -American female with a h/o of lupus nephritis with current pregnancy (having twins), who presents for f/u. Pt was last seen by me about 1 month ago. Chart was reviewed. Pt is now 21 weeks pregnant.  Cellcept and losartan were already stopped (or pt was not taking them - see prior documentations (Nov 2018) for medication non-compliance). Pt has refused taking imuran  For maintenance treatment of lupus nephritis. Pt was placed on procardia XL (60 mg one a day), though methyldopa and labetalol (2-3 times daily) are preferred BP meds for pregnancy, due to non-compliance with medications. However, she is not taking procardia regularly either. Pt told me today that she forgets. Takes procardia 3-4 times a week, if she remembers. Noted pt had a BP of 220/140 in [place] on 3/6/19, when she presented to pediatric cardiology for fetal heart evaluation. She informed me that she had not taken procardia before [place] visit.    She says today that she is feeling OK, no c/o's, no discomfort. Pt told me today she did not take procardia yesterday.     To review:  She had a kidney biopsy done on 4/3/18 for suspected lupus nephritis flare that showed no change from prior biopsy in 2013 and lupus nephritis class V with 10% interstitial fibrosis, no crescents, no necrosis, no evidence of acuity, + full house effect. The biopsy also showed vascular changes c/w hypertensive nephrosclerosis. Biopsy results were c/w inactive lupus nephritis. Given that C# and C4 complements were still low (likely from an earlier flare), pt was kept on maintenance dose of cellcept.     On her last visit  "with us in Nov 2018, pt informed me that she was not taking any meds at all. (she personally told me "I don't believe in medications"), was not on cellcept, as well as all other medications.      To review this presentation:  She had returned after a long absence since Aug 2017. Pt was then pregnant at 30 weeks of gestation. She had problems with uncontrolled BP then. She was specifically advised (documented) to RTC in 1 wk. Appt was made for her. She did not show up. Pt gave birth to a healthy baby at 38 wks of gestation. Mycophenolate had been stopped once the pregnancy test was known in Feb 2017. Losartan had also been stopped then as well. Lupus nephritis was not active, and imuran though considered was not used. On return both C3 and C4 complements were low. Pt was re-started on Cellcept by rheumatology 500 mg tid in early week of Jan 2018. Pt was also taking 10 mg of prednisone qd. On initial return visit to us, WBC was low and cellcept dose could not be increased. On her last visit to us, WBC was normal. Pt had no c/o's, no fever, no SOB, no leg swelling, no blood in urine. Pt was only using prednisone 10 mg intermittently, because it increases her appetite, and she does not want to eat much to avoid wt gain. Mycophenolate was changed form 500 mg tid to 1 g bid. Pt was placed on prednisone 20 mg po qd, and on tapering dose, and was advised not to miss it.     Pt presents for f/u, says is taking all the meds prescribed. Pt has no active oe new c/o's, says is feeling well. Had labs done today, complement levels are not back yet.      PAST MEDICAL HISTORY:   1. Systemic lupus erythematosus diagnosed in November 2013.   2. Class V membranous lupus nephritis. Kidney biopsy on 10/17/2013 showed 5%   to 10% interstitial fibrosis. No acuity on this biopsy. 3rd biopsy on 4/3/18:lupus nephritis class V, 1 glomerulus globali sclerosed, 10% interstitial fibrosis, no crescents, no necrosis, no evidence of acuity, + full " house effect. The biopsy also showed vascular changes c/w hypertensive nephrosclerosis   3. Sjogren's disease, diagnosed in 2012.   4. History of genital herpes.      PAST SURGICAL HISTORY: None, apart from the kidney biopsy.      FAMILY HISTORY: No one in the family with lupus. Father is 47 years old. Mom is 44. Both are healthy. The patient does have a 1-year-old son who was diagnosed with third-degree AV block   .   ALLERGIES: Reviewed. No known drug allergies.      MEDICATIONS: Reviewed on Epic, procardia XL 30 mg po qd, other meds reviewed     REVIEW OF SYSTEMS:   HEAD, EYES, EARS, NOSE, THROAT: As above, otherwise negative.   CARDIAC: Negative.   PULMONARY: Negative.   GASTROINTESTINAL: Negative.   GENITOURINARY: As above, otherwise negative.   INFECTIOUS DISEASE: negative.   RHEUMATOLOGICAL: As above, otherwise negative.   The rest of the review of systems is negative.      PHYSICAL EXAMINATION:   VITAL SIGNS: Blood pressure is 128/90, pulse is 100, weight is 187 lbs, last visit 173 lbs  In no acute distress.   GENERAL: She is ambulatory.   HEART: Regular rate and rhythm. No pericardial friction rubs. S1 and S2 audible.   CHEST: Clear to auscultation. No rales, breathing symmetric and unlabored  ABDOMEN: Soft, gravid  EXTREMITIES: Showed no edema.      LABORATORY VALUES: reviewed:  U prot/cr ratio 0.48 g, from 0.37 g, from 0.34 g, from 1.38 g, from 470 mg, from 570 mg  C3 normal  C4 normal     BMP  Lab Results   Component Value Date     03/06/2019    K 3.2 (L) 03/06/2019     03/06/2019    CO2 23 03/06/2019    BUN 7 03/06/2019    CREATININE 0.6 03/06/2019    CALCIUM 8.0 (L) 03/06/2019    ANIONGAP 5 (L) 03/06/2019    ESTGFRAFRICA >60 03/06/2019    EGFRNONAA >60 03/06/2019     Lab Results   Component Value Date    WBC 2.86 (L) 03/06/2019    HGB 9.2 (L) 03/06/2019    HCT 25.9 (L) 03/06/2019    MCV 83 03/06/2019     (L) 03/06/2019                ASSESSMENT AND PLAN: This is a 28-year-old female  "with lupus nephritis who presents for f/u. Pt is pregnant with twins.  The impression is as follows:      1. Renal: stable renal function, s Cr normal  Pt is pregnant at 21 weeks with twins  BP with poor control  Alarmingly BP in [place] last week was 220/140. This is very concerning. Pt was informed  Pt non-compliant with procardia.  BP slightly elevated today for pregnancy, specially the diastolic BP  Pt was advised she is at risk of pregnancy complications, preeclampia, renal damage, loss of pregnancy with uncontrolled BP  Pt's response was "I am not a medication person."  Pt previously had told me personally, "I don't believe in medications."  Encouraged pt to develop a system to remember to take the medications (likely also is not taking any other meds listed)  High risk pregnancy  Proteinuria noted, slightly worse     Pt was advised NOT TO TAKE CELLCEPT or LOSARTAN  Risk of teratogenicity explained to her  Imuran was recommended, pt refused.     C3 and C4 hypocomplementemia stable, normal complements, likely due to prior cellcept treatment  Reviewed prior kidney biopsy   The biopsy showed same level of chronic interstitial fibrosis as the one from 5 years ago, no change at 15%.  The biopsy also showed damage from uncontrolled HTN, pt was advised.     2. HTN: as above  Advised pt to continue procardia XL 60 mg po qd    To review:  She was previously switched from labetalol to methyldopa. But she never picked up methyldopa from pharmacy. When Dr. Stokes attempted to re-order it, pt mentioned that she was unlikely to remember to take methyldopa 2-3 times per day. Therefore, it seemed reasonable to place pt on procardia XL 30 mg qd (though methyldopa is superior in pregnancy, there is a good chance that pt would not take a bid to tid medication).  Agree with procardia XL     3. Rheum: note reviewed  Agree with mgmt.  Leukopenia, anemia, and thrombocytopenia noted. Likely due to lupus    PLANS AND " RECOMMENDATIONS:   As discussed above  RTC 1 month  Total time spent 40 minutes including time needed to review the records, the   patient evaluation, documentation, face-to-face discussion with the patient,   more than 50% of the time was spent on coordination of care and counseling.    Level V visit.     Laurence Morales MD

## 2019-03-14 NOTE — PROGRESS NOTES
RHEUMATOLOGY CLINIC FOLLOW UP VISIT  Chief complaints:-  To follow up for lupus.     HPI:-  Alisia Jolly a 28 y.o. pleasant female comes in for a follow up visit with above chief complaints. She has systemic lupus erythematosus diagnosed in 2013 when she had diffuse lymphadenopathy and anasarca.  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  She has been intermittently following up in the rheumatology clinic.  She was on immunosuppressive therapy and got pregnant accidentally since she was not taking her OCP's during that week because of irregular cycles. She underwent elective CS and delivered a healthy baby 10/2017.  She has been non compliant with medical management - no specific reason - she just couldn't get to take the pills. She intermittently took Benlysta injections.  Since Benlysta was controlling her arthritis she was switched to intravenous infusions for compliance.  She was compliant with infusions for awhile until she again got accidentally pregnant even with an IUD device.  Now she is pregnant with twins.  Her pregnancy has been on even full until now.  But her blood pressure has been progressively getting uncontrolled.  She was seen by our nephrologist last week and was advised to switch her blood pressure medication to methyldopa.  She thought it was sent to [facility] and was waiting to get a call back from them which she have not gotten yet.  The medication was sent to the pharmacy downstairs here at [facility].  So she have not fill the medication yet.  She denies any headache today.  No spotting.  She is only on 5 mg daily prednisone for her lupus at this time.  She also denies seizures or psychosis,  joint swelling, muscle weakness,Raynaud's phenomenon, sicca symptoms .    Review of Systems   Constitutional: Negative for chills, fever and malaise/fatigue.   HENT: Negative  for nosebleeds and sore throat.    Eyes: Negative for blurred vision, photophobia and redness.   Respiratory: Negative for cough, sputum production and shortness of breath.    Cardiovascular: Negative for chest pain and leg swelling.   Gastrointestinal: Negative for abdominal pain, constipation, diarrhea and heartburn.   Genitourinary: Negative for dysuria, frequency and urgency.   Musculoskeletal: Negative for back pain, falls, joint pain, myalgias and neck pain.   Skin: Negative for itching and rash.   Neurological: Negative for dizziness, tremors, seizures, loss of consciousness, weakness and headaches.   Endo/Heme/Allergies: Negative for environmental allergies. Does not bruise/bleed easily.   Psychiatric/Behavioral: Negative for hallucinations and memory loss. The patient does not have insomnia.        Past Medical History:   Diagnosis Date    Allergic rhinitis     Anemia     Condyloma acuminata     GERD (gastroesophageal reflux disease)     History of fetal anomaly in prior pregnancy, currently pregnant, unspecified trimester 3/8/2017    Pacemaker placed    HSV-2 (herpes simplex virus 2) infection     Lupus nephritis     Mood disorder     Overweight(278.02)     Sjogren's syndrome     Systemic lupus erythematosus     Thrombocytopenia        Past Surgical History:   Procedure Laterality Date     SECTION, LOW TRANSVERSE      DELIVERY- SECTION N/A 10/19/2017    Performed by Georges Franks MD at [facility]    RENAL BIOPSY  2013        Social History     Tobacco Use    Smoking status: Never Smoker    Smokeless tobacco: Never Used   Substance Use Topics    Alcohol use: No     Alcohol/week: 0.0 oz    Drug use: No       Family History   Problem Relation Age of Onset    Hypertension Mother     Eczema Brother     Heart disease Son     Arrhythmia Son         CHB    Hypertension Maternal Grandmother     Diabetes Paternal Grandmother     Lupus Neg Hx     Psoriasis Neg Hx   "   Congenital heart disease Neg Hx     Early death Neg Hx     Heart attacks under age 50 Neg Hx     Pacemaker/defibrilator Neg Hx        Review of patient's allergies indicates:  No Known Allergies        Physical examination:-    Vitals:    03/14/19 1229   BP: (!) 149/89   Pulse: 98   Weight: 85.1 kg (187 lb 9.8 oz)   Height: 5' 5" (1.651 m)   PainSc: 0-No pain       Physical Exam   Constitutional: She is oriented to person, place, and time and well-developed, well-nourished, and in no distress. No distress.   HENT:   Head: Normocephalic.   Mouth/Throat: Oropharynx is clear and moist.   Eyes: Conjunctivae and EOM are normal. Pupils are equal, round, and reactive to light.   Neck: Normal range of motion. Neck supple.   Cardiovascular: Normal rate and intact distal pulses. Exam reveals no friction rub.   Pulmonary/Chest: Effort normal. No respiratory distress. She exhibits no tenderness.   Abdominal: Soft. There is no tenderness.   Musculoskeletal:   No synovitis over small joints of hands or feet.  No effusion over large joints.   Neurological: She is alert and oriented to person, place, and time. No cranial nerve deficit.   Skin: Skin is warm. No rash noted. No erythema.   Psychiatric: Mood and affect normal.   Nursing note and vitals reviewed.      Labs:-  Results for HEIDI SHAIKH (MRN [id number]) as of 5/22/2017 12:21   Ref. Range 1/23/2017 08:10 2/17/2017 08:40 3/22/2017 08:58 5/19/2017 13:36   HARPREET HEP-2 Titer Unknown Positive >=1:2560...      Anti-SSA Antibody Latest Ref Range: 0.00 - 19.99 .26 (H)      Anti-SSA Interpretation Latest Ref Range: Negative  Positive (A)      Anti-SSB Antibody Latest Ref Range: 0.00 - 19.99 EU 46.84 (H)      Anti-SSB Interpretation Latest Ref Range: Negative  Positive (A)      ds DNA Ab Latest Ref Range: Negative 1:10  Negative 1:10      Anti Sm Antibody Latest Ref Range: 0.00 - 19.99 EU 2.89      Anti-Sm Interpretation Latest Ref Range: Negative  Negative    "   Anti Sm/RNP Antibody Latest Ref Range: 0.00 - 19.99 EU 4.12      Anti-Sm/RNP Interpretation Latest Ref Range: Negative  Negative      Complement (C-3) Latest Ref Range: 50 - 180 mg/dL 45 (L) 56 47 (L) 56   Complement (C-4) Latest Ref Range: 11 - 44 mg/dL 10 (L) 13 13 16         Medication List with Changes/Refills   Current Medications    EFINACONAZOLE (JUBLIA) 10 % RALF    Apply once daily to nail and nail fold.  Use daily for up to 1 year.    FAMOTIDINE (PEPCID) 20 MG TABLET    Take 1 tablet (20 mg total) by mouth 2 (two) times daily as needed for Heartburn.    FLUNISOLIDE 25 MCG, 0.025%, (NASALIDE) 25 MCG (0.025 %) SPRY    2 sprays by Nasal route 2 (two) times daily.    FLUOXETINE (PROZAC) 40 MG CAPSULE    TAKE 1 CAPSULE BY MOUTH EVERY DAY    HYDROXYCHLOROQUINE (PLAQUENIL) 200 MG TABLET    Take 1 tablet (200 mg total) by mouth 2 (two) times daily.    LEVOCETIRIZINE (XYZAL) 5 MG TABLET    Take 1 tablet (5 mg total) by mouth once daily.    NIFEDIPINE (PROCARDIA-XL) 30 MG (OSM) 24 HR TABLET    Take 2 tablets (60 mg total) by mouth once daily.    PREDNISONE (DELTASONE) 5 MG TABLET    Take 1 tablet (5 mg total) by mouth once daily.    PRENATAL 118-IRON-FOLATE 6-DHA ORAL    Take by mouth.    VALACYCLOVIR (VALTREX) 500 MG TABLET    Take 2 tablets (1,000 mg total) by mouth once daily.       Assessment/Plans:-  # Systemic lupus complicating pregnancy  Systemic lupus erythematosus complicating pregnancy.  No active flare-up in this pregnancy noted yet.  Her blood pressure has been progressively uncontrolled and she is not on methyldopa.  I advised her to go to the pharmacy downstairs today and get the methyldopa and start as soon as possible.  She has an appointment with our nephrologist next week.  I have added my labs to his lab appointment to monitor her lupus activity.  Her urine protein creatinine ratio has been okay so far.  Continue 5 mg daily prednisone.  No problem in increasing the prednisone to 10 mg if any  signs of flare-up are considering disease modifying agents after discussing with Nephrology and obstetric colleagues.    # SSA ANTIBODY positive  POSITIVE SSA ANTIBODY.  ADVISED TO CLOSELY FOLLOW UP WITH obstetrician since it could cause heart conduction birth defects in children. Recent fetal echo was normal per OB.     # Systemic lupus erythematosus arthritis  - predniSONE (DELTASONE) 5 MG tablet; Take 1 tablet (5 mg total) by mouth once daily.  Dispense: 90 tablet; Refill: 3    # SLE glomerulonephritis syndrome, WHO class V  - predniSONE (DELTASONE) 5 MG tablet; Take 1 tablet (5 mg total) by mouth once daily.  Dispense: 90 tablet; Refill: 3  # RTC in 18 weeks.     Disclaimer: This note was prepared using voice recognition system and is likely to have sound alike errors .  Please call me with any questions

## 2019-03-18 ENCOUNTER — PROCEDURE VISIT (OUTPATIENT)
Dept: OBSTETRICS AND GYNECOLOGY | Facility: CLINIC | Age: 29
End: 2019-03-18
Payer: MEDICAID

## 2019-03-18 DIAGNOSIS — M32.9 SYSTEMIC LUPUS COMPLICATING PREGNANCY: ICD-10-CM

## 2019-03-18 DIAGNOSIS — M32.9 SLE (SYSTEMIC LUPUS ERYTHEMATOSUS RELATED SYNDROME): Primary | ICD-10-CM

## 2019-03-18 DIAGNOSIS — O30.049 DICHORIONIC DIAMNIOTIC TWIN PREGNANCY, ANTEPARTUM: ICD-10-CM

## 2019-03-18 DIAGNOSIS — O99.891 SYSTEMIC LUPUS COMPLICATING PREGNANCY: ICD-10-CM

## 2019-03-18 DIAGNOSIS — I10 ESSENTIAL HYPERTENSION: ICD-10-CM

## 2019-03-18 PROCEDURE — 99213 PR OFFICE/OUTPT VISIT, EST, LEVL III, 20-29 MIN: ICD-10-PCS | Mod: S$PBB,TH,25, | Performed by: OBSTETRICS & GYNECOLOGY

## 2019-03-18 PROCEDURE — 99213 OFFICE O/P EST LOW 20 MIN: CPT | Mod: S$PBB,TH,25, | Performed by: OBSTETRICS & GYNECOLOGY

## 2019-03-18 PROCEDURE — 76816 OB US FOLLOW-UP PER FETUS: CPT | Mod: 59,PBBFAC | Performed by: OBSTETRICS & GYNECOLOGY

## 2019-03-18 PROCEDURE — 76816 OB US FOLLOW-UP PER FETUS: CPT | Mod: 26,S$PBB,, | Performed by: OBSTETRICS & GYNECOLOGY

## 2019-03-18 PROCEDURE — 76816 PR  US,PREGNANT UTERUS,F/U,TRANSABD APP: ICD-10-PCS | Mod: 26,S$PBB,, | Performed by: OBSTETRICS & GYNECOLOGY

## 2019-03-18 NOTE — PROGRESS NOTES
See Imaging tab for more information    [facility] Follow-up     Returns today for follow-up. She is now 22 weeks with a dichorionic diamniotic twin gestation. We have been following due to a twin pregnancy as well as a history of lupus nephritis and an infant with congenital heart block.     She denies any significant interval problems except elevated BP. When she went to her pediatric cardiology appointment, her BP was markedly increased. She had not taken her procardia. She has not had any symptoms of pre-e. Denies bleeding, cramping, leakage of fluid.       1. Lupus/Class V lupus nephritis: See prior note for details. Overall doing well today with symptoms related to lupus. Saw her rheumatologist and nephrologist recently and renal function stable.  She had a recent protein creatinine ratio that was 0.48. up from 0.37. Cr is 0.6 and platelets 145K. She is on prednisone 5 mg daily.     2. Prior child with congenital heart block: Has appointment with pediatric cardiology in April. Fetal cardiac rhythm appears normal today. No evidence of bradycardia or abnormal fluid collection. Supposed to be taking plaquenil but questionable compliance.      3. CHTN:  She was previously not compliant with her anti-hypertensives and was changed to Procardia XL to help with an easier regimen. When she was seen at [facility] recently her BP was severely elevated and she had not taken her meds. She was counseled on the importance of compliance with all her medications including procardia 60 XL daily, low dose ASA, prednisone, and plaquenil. Her BP today was 150/82.      4.  History of superimposed preeclampsia:  In her pregnancy in 2017 she got to 38 to 39 weeks and then developed superimposed preeclampsia.  She did not have persistent blood pressure problems throughout the pregnancy. Reiterated importance of ASA     5.  History of  section x2     6. Dichorionic twin gestation: Discordant fetal genders, fetal growth concordant. Repeat  growth scan in 4 weeks.     I overall spent approximately 15 minutes in face to face time with the patient and her family, greater than 50% of which was in counseling and care coordination.       Dichorionic twin gestation with concordant fetal growth. No evidence of fetal bradyarrhythmia. Normal amniotic fluid volume x 2. Good fetal movement x 2. Borderline pelviectasis for Twin B.     We recommend repeat evaluation for fetal growth assessment in 4 weeks.  Follow-up with pediatric cardiology as scheduled.  Needs follow-up with OB provider in approximately 2 weeks for BP evaluation and reassessment of fetal heart rates due to increased risks of CHB.

## 2019-03-18 NOTE — PROGRESS NOTES
Normal anatomy w/ MFM 2/25 w/ 1.6% discordance. Was sent to ER for severe range BPs at their visit in [place] but pt reports was angry at her SO. Also does not take her meds daily. BPs much improved today, denies symptoms.

## 2019-03-27 ENCOUNTER — CLINICAL SUPPORT (OUTPATIENT)
Dept: OBSTETRICS AND GYNECOLOGY | Facility: CLINIC | Age: 29
End: 2019-03-27
Payer: MEDICAID

## 2019-03-27 ENCOUNTER — PROCEDURE VISIT (OUTPATIENT)
Dept: OBSTETRICS AND GYNECOLOGY | Facility: CLINIC | Age: 29
End: 2019-03-27
Payer: MEDICAID

## 2019-03-27 VITALS — HEIGHT: 65 IN | DIASTOLIC BLOOD PRESSURE: 84 MMHG | SYSTOLIC BLOOD PRESSURE: 148 MMHG | BODY MASS INDEX: 31.22 KG/M2

## 2019-03-27 DIAGNOSIS — M35.00 SJOGREN'S SYNDROME: Primary | ICD-10-CM

## 2019-03-27 DIAGNOSIS — O30.049 DICHORIONIC DIAMNIOTIC TWIN PREGNANCY, ANTEPARTUM: ICD-10-CM

## 2019-03-27 PROCEDURE — 99211 OFF/OP EST MAY X REQ PHY/QHP: CPT | Mod: PBBFAC,PN,25

## 2019-03-27 PROCEDURE — 76816 PR  US,PREGNANT UTERUS,F/U,TRANSABD APP: ICD-10-PCS | Mod: 26,S$PBB,, | Performed by: OBSTETRICS & GYNECOLOGY

## 2019-03-27 PROCEDURE — 99999 PR PBB SHADOW E&M-EST. PATIENT-LVL I: CPT | Mod: PBBFAC,,,

## 2019-03-27 PROCEDURE — 76816 OB US FOLLOW-UP PER FETUS: CPT | Mod: 26,S$PBB,, | Performed by: OBSTETRICS & GYNECOLOGY

## 2019-03-27 PROCEDURE — 76816 OB US FOLLOW-UP PER FETUS: CPT | Mod: PBBFAC,PN | Performed by: OBSTETRICS & GYNECOLOGY

## 2019-03-27 PROCEDURE — 99999 PR PBB SHADOW E&M-EST. PATIENT-LVL I: ICD-10-PCS | Mod: PBBFAC,,,

## 2019-04-01 ENCOUNTER — TELEPHONE (OUTPATIENT)
Dept: OBSTETRICS AND GYNECOLOGY | Facility: CLINIC | Age: 29
End: 2019-04-01

## 2019-04-01 NOTE — TELEPHONE ENCOUNTER
----- Message from Janice Arenas MD sent at 4/1/2019  6:49 AM CDT -----  Did she get a fetal echo?

## 2019-04-10 ENCOUNTER — OFFICE VISIT (OUTPATIENT)
Dept: PEDIATRIC CARDIOLOGY | Facility: CLINIC | Age: 29
End: 2019-04-10
Payer: MEDICAID

## 2019-04-10 VITALS
BODY MASS INDEX: 31.14 KG/M2 | DIASTOLIC BLOOD PRESSURE: 80 MMHG | WEIGHT: 186.94 LBS | HEART RATE: 80 BPM | HEIGHT: 65 IN | SYSTOLIC BLOOD PRESSURE: 125 MMHG

## 2019-04-10 DIAGNOSIS — M35.00 SJOGREN'S SYNDROME, WITH UNSPECIFIED ORGAN INVOLVEMENT: Primary | ICD-10-CM

## 2019-04-10 DIAGNOSIS — R76.8 ANA POSITIVE: ICD-10-CM

## 2019-04-10 DIAGNOSIS — O99.891 SYSTEMIC LUPUS COMPLICATING PREGNANCY: ICD-10-CM

## 2019-04-10 DIAGNOSIS — O09.299: ICD-10-CM

## 2019-04-10 DIAGNOSIS — O09.90 HIGH RISK PREGNANCY, ANTEPARTUM: ICD-10-CM

## 2019-04-10 DIAGNOSIS — M32.9 SYSTEMIC LUPUS COMPLICATING PREGNANCY: ICD-10-CM

## 2019-04-10 DIAGNOSIS — Z76.0 MEDICATION REFILL: ICD-10-CM

## 2019-04-10 DIAGNOSIS — O30.049 DICHORIONIC DIAMNIOTIC TWIN PREGNANCY, ANTEPARTUM: ICD-10-CM

## 2019-04-10 DIAGNOSIS — M32.9 SLE (SYSTEMIC LUPUS ERYTHEMATOSUS RELATED SYNDROME): ICD-10-CM

## 2019-04-10 PROCEDURE — 99213 OFFICE O/P EST LOW 20 MIN: CPT | Mod: PBBFAC | Performed by: PEDIATRICS

## 2019-04-10 PROCEDURE — 99215 PR OFFICE/OUTPT VISIT, EST, LEVL V, 40-54 MIN: ICD-10-PCS | Mod: 25,S$PBB,, | Performed by: PEDIATRICS

## 2019-04-10 PROCEDURE — 99999 PR PBB SHADOW E&M-EST. PATIENT-LVL III: ICD-10-PCS | Mod: PBBFAC,,, | Performed by: PEDIATRICS

## 2019-04-10 PROCEDURE — 99999 PR PBB SHADOW E&M-EST. PATIENT-LVL III: CPT | Mod: PBBFAC,,, | Performed by: PEDIATRICS

## 2019-04-10 PROCEDURE — 99215 OFFICE O/P EST HI 40 MIN: CPT | Mod: 25,S$PBB,, | Performed by: PEDIATRICS

## 2019-04-10 RX ORDER — FAMOTIDINE 20 MG/1
20 TABLET, FILM COATED ORAL 2 TIMES DAILY PRN
Qty: 30 TABLET | Refills: 3 | Status: ON HOLD | OUTPATIENT
Start: 2019-04-10 | End: 2019-07-04 | Stop reason: HOSPADM

## 2019-04-15 ENCOUNTER — PROCEDURE VISIT (OUTPATIENT)
Dept: OBSTETRICS AND GYNECOLOGY | Facility: CLINIC | Age: 29
End: 2019-04-15
Payer: MEDICAID

## 2019-04-15 ENCOUNTER — TELEPHONE (OUTPATIENT)
Dept: MATERNAL FETAL MEDICINE | Facility: CLINIC | Age: 29
End: 2019-04-15

## 2019-04-15 VITALS
SYSTOLIC BLOOD PRESSURE: 140 MMHG | DIASTOLIC BLOOD PRESSURE: 90 MMHG | WEIGHT: 191.56 LBS | BODY MASS INDEX: 31.92 KG/M2

## 2019-04-15 DIAGNOSIS — O30.049 DICHORIONIC DIAMNIOTIC TWIN PREGNANCY, ANTEPARTUM: ICD-10-CM

## 2019-04-15 DIAGNOSIS — M35.00 SJOGREN'S SYNDROME, WITH UNSPECIFIED ORGAN INVOLVEMENT: ICD-10-CM

## 2019-04-15 DIAGNOSIS — Z36.89 ENCOUNTER FOR ULTRASOUND TO CHECK FETAL GROWTH: Primary | ICD-10-CM

## 2019-04-15 DIAGNOSIS — M35.00 SJOGREN'S SYNDROME: Primary | ICD-10-CM

## 2019-04-15 PROCEDURE — 99213 OFFICE O/P EST LOW 20 MIN: CPT | Mod: S$PBB,25,TH, | Performed by: OBSTETRICS & GYNECOLOGY

## 2019-04-15 PROCEDURE — 76816 OB US FOLLOW-UP PER FETUS: CPT | Mod: 59,PBBFAC | Performed by: OBSTETRICS & GYNECOLOGY

## 2019-04-15 PROCEDURE — 76816 PR  US,PREGNANT UTERUS,F/U,TRANSABD APP: ICD-10-PCS | Mod: 26,59,S$PBB, | Performed by: OBSTETRICS & GYNECOLOGY

## 2019-04-15 PROCEDURE — 76816 OB US FOLLOW-UP PER FETUS: CPT | Mod: 26,S$PBB,, | Performed by: OBSTETRICS & GYNECOLOGY

## 2019-04-15 PROCEDURE — 99213 PR OFFICE/OUTPT VISIT, EST, LEVL III, 20-29 MIN: ICD-10-PCS | Mod: S$PBB,25,TH, | Performed by: OBSTETRICS & GYNECOLOGY

## 2019-04-15 NOTE — PROCEDURES
Procedures   Indication  ========    Growth/FHR.    History  ======    Risk Factors  Details: Son with heart block  History risk factors: Multiple gestation    Method  ======    Transabdominal ultrasound examination. View: Sufficient.    Pregnancy  =========    Twin pregnancy. Dichorionic-diamniotic. Number of fetuses: 2.    Dating  ======    LMP on: 10/15/2018  Cycle: regular cycle  GA by LMP 26 w + 0 d  VICTORIANO by LMP: 7/22/2019  Ultrasound examination on: 4/15/2019  GA by U/S based upon: AC, BPD, Femur, HC  GA by U/S 25 w + 5 d  VICTORIANO by U/S: 7/24/2019  GA by U/S based upon (Fetus 2): AC, BPD, Femur, HC  GA by U/S (Fetus 2) 25 w + 6 d  VICTORIANO by U/S (Fetus 2): 7/23/2019  Assigned: Dating performed on 02/25/2019, based on the LMP  Assigned GA 26 w + 0 d  Assigned VICTORIANO: 7/22/2019    General Evaluation (1)  =================    Cardiac activity: present.  bpm.  Fetal movements: visualized.  Presentation: cephalic.  Placenta: Anterior.  Umbilical cord: 3 vessel cord.  Amniotic fluid: normal amount .    Fetal Biometry (1)  ==============    Fetal Biometry  BPD 65.8 mm 26w 4d Hadlock  OFD 85.2 mm 27w 4d Pascale  .5 mm 26w 2d Hadlock  .0 mm 25w 1d Hadlock  Femur 45.4 mm 25w 0d Hadlock   g 30% [place]  Calculated by: Hadlock (BPD-HC-AC-FL)  EFW (lb) 1 lb  EFW (oz) 12 oz  EFW discordance 7.5 %  Cephalic index 0.77  HC / AC 1.18  FL / BPD 0.69  FL / AC 0.22   bpm    Fetal Anatomy (1)  ==============    4-chamber view: normal  Diaphragm: normal  Stomach: normal  Kidneys: normal  Bladder: normal  Genitals: normal  Gender: male    General Evaluation (2)  =================    Cardiac activity: present.  bpm.  Fetal movements: visualized.  Presentation: transverse- head maternal right .  Placenta: Anterior.  Umbilical cord: 3 vessel cord.  Amniotic fluid: normal amount .    Fetal Biometry (2)  ==============    Fetal Biometry  BPD 64.4 mm 26w 0d Hadlock  OFD 84.4 mm 27w 2d Pascale  .0 mm 25w  6d Hadlock  .1 mm 25w 6d Hadlock  Femur 47.0 mm 25w 5d Hadlock   g 41% [place]  Calculated by: Hadlock (BPD-HC-AC-FL)  EFW (lb) 1 lb  EFW (oz) 14 oz  EFW discordance 7.5 %  Cephalic index 0.76  HC / AC 1.12  FL / BPD 0.73  FL / AC 0.22   bpm    Fetal Anatomy (2)  ==============    4-chamber view: normal  Diaphragm: normal  Stomach: normal  Bladder: normal  Rt kidney: pyelectasis .68 cm  Lt kidney: pyelectasis .63 cm  Gender: female    Consultation  ==========    140/80  Last note with Dr. Stokes reviewed.  Patient interviewed. Doing well. No signs of lupus flare. No signs/symptoms of PTL.  1. Class V lupus nephritis-missed lab appointment and appointment with nephrology (). Discussed with patient; advised her to call and  reschedule. Advised to come to hospital with any signs concerning for preeclampsia or lupus flare. Continue prednisone 5 mg daily. Needs labs  re-drawn (ordered but missed appointment).  2. CHTN-blood pressure within range. Doing well on procardia 60 mg. Reviewed signs of preeclampsia. States compliant with low dose  aspirin.  3. prior child with CHB-patient compliant with plaquenil.  4. Fetus B: The finding of bilateral mild renal pelvis dilation was discussed with the patient. We reviewed basic anatomy of the renal collecting  system and then discussed possible etiologies for renal pelvis dilation. When dilation of the renal pelvis is borderline or mild, most cases will  resolve spontaneously. However, if the renal dilation persists in the  period, the most common conditions that can cause renal pelvis  dilation are UPJ obstruction (ureteropelvic junction), UVJ obstruction (ureterovesical junction) and VUR (vesicoureteral reflux). These conditions  predispose infants/children to urinary tract infection, but can be effectively followed and treated. Early and prompt treatment can decrease the  incidence of upper urinary tract infection and renal dysfunction. The  vast majority of infants with these conditions never require surgical  intervention. However,  imaging studies are recommended to assess for the presence of UPJ obstruction or VUR. These studies are  generally performed between weeks 2 - 4 of life. The pediatrician should be made aware of this prenatal diagnosis so that appropriate follow-up  and treatment can be coordinated.  Follow-up appointments discussed. Will message [facility] staff to schedule follow-up on  in [place] when comes for fetal  echocardiograms.  Time  I overall spent approximately 15 minutes in face to face time with the patient and her family, greater than 50% of which was in counseling and  care coordination.    Impression  =========    Dichorionic-Diamniotic twins.  Fetal sizes are AGA and concordant.  Limited anatomy survey-Twin A is within normal limits.  Limited anatomy survey for Twin B significant for mild bilateral pyelectasis.  MVP normal for both fetuses.  FHR normal x 2.    Recommendation  ==============    Follow-up ultrasound for fetal growth with primary OB in 3-4 weeks. Please have your office schedule this.  Follow-up with [facility] in 6 weeks (19) to coincide with fetal echocardiograms.  Patient advised to re-schedule missed nephrology appointment.

## 2019-04-15 NOTE — TELEPHONE ENCOUNTER
Phone call to patient regarding her MFM follow-up at [facility] on June 11, 2019 along with fetal echo. Discussed with patient that the MD MFM schedule is not currently open for booking but that once the appointment can be scheduled, the patient will be contacted and time will be confirmed.    Pt verbalized understanding of information.

## 2019-04-16 NOTE — PROGRESS NOTES
[facility] Pediatric Cardiology [place] [address] Fetal Cardiology Clinic    Today, I had the pleasure of evaluating Alisia Vines who is now 28 y.o. and carrying her third pregnancy at 25 2/7 weeks gestation with an VICTORIANO of 19. She was referred for evaluation of the fetal heart due a history of antibody positive maternal Sjogren syndrome and lupus with a previously affect pregnancy with congenital complete heart block ([place]).  He received a pacemaker and subsequently developed severe dilated cardiomyopathy and is status post heart transplant.    She has been treated with plaquenil during this pregnancy.    She is carrying a male and a female twin fetuses, who are dichorionic and diamniotic.    Obstetric History:    .  Her first pregnancy resulted in a term C section with CCHB.  Her second pregnancy resulted in a term baby via C section with no cardiac issues.  Her OB care is by Dr. Janice Arenas and her MFM care is by Dr. Bravo.      Past Medical History:   Diagnosis Date    Allergic rhinitis     Anemia     Condyloma acuminata     GERD (gastroesophageal reflux disease)     History of fetal anomaly in prior pregnancy, currently pregnant, unspecified trimester 3/8/2017    Pacemaker placed    HSV-2 (herpes simplex virus 2) infection     Lupus nephritis     Mood disorder     Overweight(278.02)     Sjogren's syndrome     Systemic lupus erythematosus     Thrombocytopenia          Current Outpatient Medications:     efinaconazole (JUBLIA) 10 % Mario, Apply once daily to nail and nail fold.  Use daily for up to 1 year., Disp: 8 mL, Rfl: 3    FLUoxetine (PROZAC) 40 MG capsule, TAKE 1 CAPSULE BY MOUTH EVERY DAY, Disp: 30 capsule, Rfl: 0    levocetirizine (XYZAL) 5 MG tablet, Take 1 tablet (5 mg total) by mouth once daily., Disp: 30 tablet, Rfl: 6    NIFEdipine (PROCARDIA-XL) 30 MG (OSM) 24 hr tablet, Take 2 tablets (60 mg total) by mouth once daily., Disp: 30 tablet, Rfl: 11    predniSONE (DELTASONE)  5 MG tablet, Take 1 tablet (5 mg total) by mouth once daily., Disp: 90 tablet, Rfl: 3    PRENATAL 118-IRON-FOLATE 6-DHA ORAL, Take by mouth., Disp: , Rfl:     valacyclovir (VALTREX) 500 MG tablet, Take 2 tablets (1,000 mg total) by mouth once daily. (Patient taking differently: Take 500 mg by mouth once daily. ), Disp: 90 tablet, Rfl: 3    famotidine (PEPCID) 20 MG tablet, Take 1 tablet (20 mg total) by mouth 2 (two) times daily as needed for Heartburn., Disp: 30 tablet, Rfl: 3    flunisolide 25 mcg, 0.025%, (NASALIDE) 25 mcg (0.025 %) Spry, 2 sprays by Nasal route 2 (two) times daily., Disp: 25 mL, Rfl: 2    hydroxychloroquine (PLAQUENIL) 200 mg tablet, Take 1 tablet (200 mg total) by mouth 2 (two) times daily., Disp: 60 tablet, Rfl: 11    Family History: Except as above, negative for congenital heart disease, early coronary artery disease, sudden unexplained death, connective tissues disorders, genetic syndromes, multiple miscarriages or other congenital anomalies.    Social History: Ms. Vines is single.      FETAL ECHOCARDIOGRAM (summary):  Fetal echocardiogram at 25 2/7 weeks gestation for a history of maternal lupus and  Sjogren syndrome and a previous pregnancy complicated by congenital complete  heart block; the current pregnancy is dichorionic, diamniotic twin gestation. VICTORIANO  7/22/19.  This study is for twin A who is vertex and to the maternal left.  Normal IN interval of 110-120 msec.  Normally connected heart.  No ectopy or sustained arrhythmia demonstrated throughout the study. Normal IN  interval of 110-120 msec.  Normal fetal atrial and ductal level shunting.  No ventricular level shunting.  Normal atrioventricular and semilunar valve structure and function.  Normal ductal and aortic arches.  Normal biventricular size and systolic function.  No pericardial effusion.    This study is for twin B who is vertex and to the maternal right.  Normally connected heart.  No ectopy or sustained arrhythmia  demonstrated throughout the study. Normal WA  interval of 110-120 msec.  Normal fetal atrial and ductal level shunting.  No ventricular level shunting.  Normal atrioventricular and semilunar valve structure and function.  Normal ductal and aortic arches.  Normal biventricular size and systolic function.  No pericardial effusion.  (Full report in electronic medical record)    Impression:  Twin active male and female fetuses at 25 wga.  Normal fetal echocardiograms.      Todays fetal echocardiograms are normal, within the limitations of fetal echocardiography.  I discussed with her that fetal echocardiography is insufficiently sensitive to rule out all septal defects, anomalies of pulmonary and systemic veins, arch anomalies, and some valvar abnormalities, nor can it ensure that the ductus arteriosus and foramen ovale will spontaneously close.     Recommendations:  I will see Ms. Vines again in two weeks.  If the twins are doing well at that time, then their risk of developing CCHB is very low.  I will see her again once more in the third trimester.  If the twins' rhythm remains sinus, she can deliver in [place] with follow up with cardiology after birth.    Recommend continuing plaquenil through delivery.    The above information was discussed in detail including the use of diagrams, with 45 minutes of total face to face time, with greater than 50% with counseling and coordination of care.  The discussion of the diagnosis and treatment options is as described above.      Dave Martin MD, MPH  Pediatric and Fetal Cardiology  [facility]   [address] [place] [address]    Office: [phone]  Cell: [phone]

## 2019-04-24 ENCOUNTER — TELEPHONE (OUTPATIENT)
Dept: OBSTETRICS AND GYNECOLOGY | Facility: CLINIC | Age: 29
End: 2019-04-24

## 2019-04-24 DIAGNOSIS — O36.8390 FETAL ARRHYTHMIA AFFECTING PREGNANCY, ANTEPARTUM: Primary | ICD-10-CM

## 2019-04-24 NOTE — TELEPHONE ENCOUNTER
Forwarded message to LINH Shaw regarding pt received epinephrine with her dental work. Informed her pt is being closely monitored by the dentist.

## 2019-04-24 NOTE — TELEPHONE ENCOUNTER
Dr Ko Luna's office( [phone]) called stating they did receive the dental clearance from Jessica Noguera CNM but  gave the pt epinephrine because all of their anesthetics contain epinephrine and the pt needed the procedure done. They wanted to call and notify the staff that they are monitoring the pt and so far she is doing fine. Ilene CAMARILLO LPN notified Jessica noguera CNM. Dental clearance has been scanned into chart which states no epinephrine.

## 2019-04-24 NOTE — TELEPHONE ENCOUNTER
Message received regarding pt received epinephrine with her dental work today. Informed dentist will monitor her for side effects.

## 2019-04-25 ENCOUNTER — CLINICAL SUPPORT (OUTPATIENT)
Dept: PEDIATRIC CARDIOLOGY | Facility: CLINIC | Age: 29
End: 2019-04-25
Payer: MEDICAID

## 2019-04-25 ENCOUNTER — TELEPHONE (OUTPATIENT)
Dept: PEDIATRIC CARDIOLOGY | Facility: CLINIC | Age: 29
End: 2019-04-25

## 2019-04-25 ENCOUNTER — OFFICE VISIT (OUTPATIENT)
Dept: PEDIATRIC CARDIOLOGY | Facility: CLINIC | Age: 29
End: 2019-04-25
Payer: MEDICAID

## 2019-04-25 VITALS
WEIGHT: 190.5 LBS | WEIGHT: 186.5 LBS | DIASTOLIC BLOOD PRESSURE: 90 MMHG | SYSTOLIC BLOOD PRESSURE: 150 MMHG | HEART RATE: 84 BPM | BODY MASS INDEX: 31.74 KG/M2 | HEART RATE: 84 BPM | SYSTOLIC BLOOD PRESSURE: 150 MMHG | HEIGHT: 65 IN | HEIGHT: 65 IN | BODY MASS INDEX: 31.07 KG/M2 | DIASTOLIC BLOOD PRESSURE: 90 MMHG

## 2019-04-25 DIAGNOSIS — M35.00 SJOGREN'S SYNDROME, WITH UNSPECIFIED ORGAN INVOLVEMENT: Primary | ICD-10-CM

## 2019-04-25 DIAGNOSIS — O36.8390 FETAL ARRHYTHMIA AFFECTING PREGNANCY, ANTEPARTUM: ICD-10-CM

## 2019-04-25 DIAGNOSIS — R76.8 ANA POSITIVE: ICD-10-CM

## 2019-04-25 DIAGNOSIS — O09.299: ICD-10-CM

## 2019-04-25 DIAGNOSIS — M32.9 SLE (SYSTEMIC LUPUS ERYTHEMATOSUS RELATED SYNDROME): ICD-10-CM

## 2019-04-25 DIAGNOSIS — O30.049 DICHORIONIC DIAMNIOTIC TWIN PREGNANCY, ANTEPARTUM: ICD-10-CM

## 2019-04-25 PROCEDURE — 93325 PR DOPPLER COLOR FLOW VELOCITY MAP: ICD-10-PCS | Mod: 26,S$PBB,, | Performed by: PEDIATRICS

## 2019-04-25 PROCEDURE — 93325 DOPPLER ECHO COLOR FLOW MAPG: CPT | Mod: 26,S$PBB,, | Performed by: PEDIATRICS

## 2019-04-25 PROCEDURE — 99999 PR PBB SHADOW E&M-EST. PATIENT-LVL III: CPT | Mod: PBBFAC,,, | Performed by: PEDIATRICS

## 2019-04-25 PROCEDURE — 99213 OFFICE O/P EST LOW 20 MIN: CPT | Mod: PBBFAC,25

## 2019-04-25 PROCEDURE — 99213 OFFICE O/P EST LOW 20 MIN: CPT | Mod: PBBFAC,27 | Performed by: PEDIATRICS

## 2019-04-25 PROCEDURE — 76826 ECHO EXAM OF FETAL HEART: CPT | Mod: 26,S$PBB,, | Performed by: PEDIATRICS

## 2019-04-25 PROCEDURE — 99999 PR PBB SHADOW E&M-EST. PATIENT-LVL III: ICD-10-PCS | Mod: PBBFAC,,,

## 2019-04-25 PROCEDURE — 76826 ECHO EXAM OF FETAL HEART: CPT | Mod: 59,PBBFAC | Performed by: PEDIATRICS

## 2019-04-25 PROCEDURE — 76828 PR  SO2 FETAL HRT DOPPL F/U: ICD-10-PCS | Mod: 26,S$PBB,, | Performed by: PEDIATRICS

## 2019-04-25 PROCEDURE — 99214 OFFICE O/P EST MOD 30 MIN: CPT | Mod: 25,S$PBB,, | Performed by: PEDIATRICS

## 2019-04-25 PROCEDURE — 99214 PR OFFICE/OUTPT VISIT, EST, LEVL IV, 30-39 MIN: ICD-10-PCS | Mod: 25,S$PBB,, | Performed by: PEDIATRICS

## 2019-04-25 PROCEDURE — 93325 DOPPLER ECHO COLOR FLOW MAPG: CPT | Mod: PBBFAC | Performed by: PEDIATRICS

## 2019-04-25 PROCEDURE — 76828 ECHO EXAM OF FETAL HEART: CPT | Mod: 26,S$PBB,, | Performed by: PEDIATRICS

## 2019-04-25 PROCEDURE — 99999 PR PBB SHADOW E&M-EST. PATIENT-LVL III: ICD-10-PCS | Mod: PBBFAC,,, | Performed by: PEDIATRICS

## 2019-04-25 PROCEDURE — 99999 PR PBB SHADOW E&M-EST. PATIENT-LVL III: CPT | Mod: PBBFAC,,,

## 2019-04-25 PROCEDURE — 76826 PR ECHO 2D W/WO M-MODE, FETAL, F/UP: ICD-10-PCS | Mod: 26,59,S$PBB, | Performed by: PEDIATRICS

## 2019-04-25 PROCEDURE — 76828 ECHO EXAM OF FETAL HEART: CPT | Mod: 59,PBBFAC | Performed by: PEDIATRICS

## 2019-04-25 NOTE — TELEPHONE ENCOUNTER
Spoke with patient, who states she is running late. Dr Martin informed and states patient can still be seen.

## 2019-04-25 NOTE — TELEPHONE ENCOUNTER
----- Message from Litzy Hollingsworth sent at 4/25/2019  2:30 PM CDT -----  Contact: Alisia: [phone]  Late For Appt     Reason for call:  Late For Appt     Communication Preference: Call [phone]    Additional Information: Alisia stated she will be 25 mins late for apt. She would like a call back as soon as possible.

## 2019-04-26 NOTE — PROGRESS NOTES
[facility] Pediatric Cardiology [place] [address] Fetal Cardiology Clinic    Today, I had the pleasure of evaluating Alisia Vines who is now 28 y.o. and carrying her third pregnancy at 27 3/7 weeks gestation with an VICTORIANO of 19. She was referred for evaluation of the fetal heart due a history of antibody positive maternal Sjogren syndrome and lupus with a previously affect pregnancy with congenital complete heart block (Young [place]).  He received a pacemaker and subsequently developed severe dilated cardiomyopathy and is status post heart transplant.    She has been treated with plaquenil during this pregnancy.    She is carrying a male and a female twin fetuses, who are dichorionic and diamniotic.    Obstetric History:    .  Her first pregnancy resulted in a term C section with CCHB.  Her second pregnancy resulted in a term baby via C section with no cardiac issues.  Her OB care is by Dr. Janice Arenas and her MFM care is by Dr. Bravo.      Past Medical History:   Diagnosis Date    Allergic rhinitis     Anemia     Condyloma acuminata     GERD (gastroesophageal reflux disease)     History of fetal anomaly in prior pregnancy, currently pregnant, unspecified trimester 3/8/2017    Pacemaker placed    HSV-2 (herpes simplex virus 2) infection     Lupus nephritis     Mood disorder     Overweight(278.02)     Sjogren's syndrome     Systemic lupus erythematosus     Thrombocytopenia          Current Outpatient Medications:     efinaconazole (JUBLIA) 10 % Mario, Apply once daily to nail and nail fold.  Use daily for up to 1 year., Disp: 8 mL, Rfl: 3    famotidine (PEPCID) 20 MG tablet, Take 1 tablet (20 mg total) by mouth 2 (two) times daily as needed for Heartburn., Disp: 30 tablet, Rfl: 3    flunisolide 25 mcg, 0.025%, (NASALIDE) 25 mcg (0.025 %) Spry, 2 sprays by Nasal route 2 (two) times daily., Disp: 25 mL, Rfl: 2    FLUoxetine (PROZAC) 40 MG capsule, TAKE 1 CAPSULE BY MOUTH EVERY DAY, Disp: 30 capsule,  Rfl: 0    hydroxychloroquine (PLAQUENIL) 200 mg tablet, Take 1 tablet (200 mg total) by mouth 2 (two) times daily., Disp: 60 tablet, Rfl: 11    levocetirizine (XYZAL) 5 MG tablet, Take 1 tablet (5 mg total) by mouth once daily., Disp: 30 tablet, Rfl: 6    NIFEdipine (PROCARDIA-XL) 30 MG (OSM) 24 hr tablet, Take 2 tablets (60 mg total) by mouth once daily., Disp: 30 tablet, Rfl: 11    predniSONE (DELTASONE) 5 MG tablet, Take 1 tablet (5 mg total) by mouth once daily., Disp: 90 tablet, Rfl: 3    PRENATAL 118-IRON-FOLATE 6-DHA ORAL, Take by mouth., Disp: , Rfl:     valacyclovir (VALTREX) 500 MG tablet, Take 2 tablets (1,000 mg total) by mouth once daily. (Patient taking differently: Take 500 mg by mouth once daily. ), Disp: 90 tablet, Rfl: 3    Family History: Except as above, negative for congenital heart disease, early coronary artery disease, sudden unexplained death, connective tissues disorders, genetic syndromes, multiple miscarriages or other congenital anomalies.    Social History: Ms. Vines is single.      FETAL ECHOCARDIOGRAM (summary):  Fetal echocardiogram at 27 3/7 weeks gestation for a history of maternal  lupus and Sjogren syndrome and a previous pregnancy complicated by  congenital complete heart block; the current pregnancy is dichorionic,  diamniotic twin gestation. VICTORIANO 7/22/19.  This study is for twin A who is vertex and to the maternal left.  Normally connected heart.  No ectopy or sustained arrhythmia demonstrated throughout the study. Normal  SD interval of 120 msec.  Normal fetal atrial and ductal level shunting.  No ventricular level shunting.  Normal atrioventricular and semilunar valve structure and function.  Normal biventricular size and systolic function.  No pericardial effusion.    This study is for twin B who is breech and to the maternal right.  Normally connected heart.  No ectopy or sustained arrhythmia demonstrated throughout the study. Normal  SD interval of 110 sec.  Normal  fetal atrial and ductal level shunting.  No ventricular level shunting.  Normal atrioventricular and semilunar valve structure and function.  Normal aortic arches.  Normal biventricular size and systolic function.  No pericardial effusion.    (Full report in electronic medical record)    Impression:  Twin active male and female fetuses at 27 wga.  Normal fetal echocardiograms with normal sinus rhythm and mechanical OH intervals.      Todays fetal echocardiograms are normal, within the limitations of fetal echocardiography.  I discussed with her that fetal echocardiography is insufficiently sensitive to rule out all septal defects, anomalies of pulmonary and systemic veins, arch anomalies, and some valvar abnormalities, nor can it ensure that the ductus arteriosus and foramen ovale will spontaneously close.     Recommendations:  I will see Ms. Vines again in six weeks.  If the twins are doing well at that time, then their risk of developing CCHB is very low.  If the twins' rhythm remains sinus, she can deliver in [place] with follow up with cardiology after birth.    Recommend continuing plaquenil through delivery.    The above information was discussed in detail including the use of diagrams, with 45 minutes of total face to face time, with greater than 50% with counseling and coordination of care.  The discussion of the diagnosis and treatment options is as described above.      Dave Martin MD, MPH  Pediatric and Fetal Cardiology  [facility]   [address] [place] [address]    Office: [phone]  Cell: [phone]

## 2019-05-07 ENCOUNTER — TELEPHONE (OUTPATIENT)
Dept: OBSTETRICS AND GYNECOLOGY | Facility: CLINIC | Age: 29
End: 2019-05-07

## 2019-05-07 NOTE — TELEPHONE ENCOUNTER
Spoke to patient and scheduled her appointment for 05/08/19 at 11:30am to see Dr. Arenas at the [place] location. Patient verbalized understanding.

## 2019-05-07 NOTE — TELEPHONE ENCOUNTER
----- Message from Fidencio Humphries sent at 5/7/2019 11:54 AM CDT -----  Contact: Pt   Type:  Sooner Apoointment Request    Caller is requesting a sooner appointment.  Caller declined first available appointment listed below.  Caller will not accept being placed on the waitlist and is requesting a message be sent to doctor.  Name of Caller: Pt  When is the first available appointment?none  Symptoms: ob f/u  Would the patient rather a call back or a response via [facility]? Call back   Best Call Back Number: [phone] (home)   Additional Information: n/a

## 2019-05-07 NOTE — TELEPHONE ENCOUNTER
----- Message from Hina Cohen sent at 5/7/2019 11:58 AM CDT -----  Contact: Self-[phone]   Pt would like to schedule an OB appt.  Please call back at [phone].  Thx-AH

## 2019-05-08 ENCOUNTER — ROUTINE PRENATAL (OUTPATIENT)
Dept: OBSTETRICS AND GYNECOLOGY | Facility: CLINIC | Age: 29
End: 2019-05-08
Payer: MEDICAID

## 2019-05-08 ENCOUNTER — PATIENT MESSAGE (OUTPATIENT)
Dept: OBSTETRICS AND GYNECOLOGY | Facility: CLINIC | Age: 29
End: 2019-05-08

## 2019-05-08 VITALS — WEIGHT: 192 LBS | SYSTOLIC BLOOD PRESSURE: 140 MMHG | BODY MASS INDEX: 31.99 KG/M2 | DIASTOLIC BLOOD PRESSURE: 80 MMHG

## 2019-05-08 DIAGNOSIS — Z3A.29 PREGNANCY WITH 29 COMPLETED WEEKS GESTATION: ICD-10-CM

## 2019-05-08 DIAGNOSIS — M32.14 SLE GLOMERULONEPHRITIS SYNDROME: ICD-10-CM

## 2019-05-08 DIAGNOSIS — O30.042 DICHORIONIC DIAMNIOTIC TWIN PREGNANCY IN SECOND TRIMESTER: Primary | ICD-10-CM

## 2019-05-08 PROCEDURE — 99999 PR PBB SHADOW E&M-EST. PATIENT-LVL II: ICD-10-PCS | Mod: PBBFAC,,, | Performed by: OBSTETRICS & GYNECOLOGY

## 2019-05-08 PROCEDURE — 99212 PR OFFICE/OUTPT VISIT, EST, LEVL II, 10-19 MIN: ICD-10-PCS | Mod: TH,S$PBB,, | Performed by: OBSTETRICS & GYNECOLOGY

## 2019-05-08 PROCEDURE — 99999 PR PBB SHADOW E&M-EST. PATIENT-LVL II: CPT | Mod: PBBFAC,,, | Performed by: OBSTETRICS & GYNECOLOGY

## 2019-05-08 PROCEDURE — 99212 OFFICE O/P EST SF 10 MIN: CPT | Mod: PBBFAC,TH | Performed by: OBSTETRICS & GYNECOLOGY

## 2019-05-08 PROCEDURE — 99212 OFFICE O/P EST SF 10 MIN: CPT | Mod: TH,S$PBB,, | Performed by: OBSTETRICS & GYNECOLOGY

## 2019-05-08 NOTE — PROGRESS NOTES
BPs stable, no complaints. Di-di twin gestation w/ normal fetal echo; has been seeing peds cardiology in [place]. Repeat cs & BTL consents obtained-will deliver @ 37w, 7/2 @ 11am. DM labs today

## 2019-05-10 ENCOUNTER — LAB VISIT (OUTPATIENT)
Dept: LAB | Facility: HOSPITAL | Age: 29
End: 2019-05-10
Payer: MEDICAID

## 2019-05-10 DIAGNOSIS — M32.14 SLE GLOMERULONEPHRITIS SYNDROME: ICD-10-CM

## 2019-05-10 DIAGNOSIS — Z3A.29 PREGNANCY WITH 29 COMPLETED WEEKS GESTATION: ICD-10-CM

## 2019-05-10 DIAGNOSIS — O30.042 DICHORIONIC DIAMNIOTIC TWIN PREGNANCY IN SECOND TRIMESTER: ICD-10-CM

## 2019-05-10 LAB
BASOPHILS # BLD AUTO: 0.01 K/UL (ref 0–0.2)
BASOPHILS NFR BLD: 0.3 % (ref 0–1.9)
DIFFERENTIAL METHOD: ABNORMAL
EOSINOPHIL # BLD AUTO: 0.1 K/UL (ref 0–0.5)
EOSINOPHIL NFR BLD: 1.8 % (ref 0–8)
ERYTHROCYTE [DISTWIDTH] IN BLOOD BY AUTOMATED COUNT: 12.8 % (ref 11.5–14.5)
GLUCOSE SERPL-MCNC: 77 MG/DL (ref 70–140)
HCT VFR BLD AUTO: 23 % (ref 37–48.5)
HGB BLD-MCNC: 7.9 G/DL (ref 12–16)
IMM GRANULOCYTES # BLD AUTO: 0.02 K/UL (ref 0–0.04)
IMM GRANULOCYTES NFR BLD AUTO: 0.6 % (ref 0–0.5)
LYMPHOCYTES # BLD AUTO: 0.9 K/UL (ref 1–4.8)
LYMPHOCYTES NFR BLD: 27.8 % (ref 18–48)
MCH RBC QN AUTO: 30.2 PG (ref 27–31)
MCHC RBC AUTO-ENTMCNC: 34.3 G/DL (ref 32–36)
MCV RBC AUTO: 88 FL (ref 82–98)
MONOCYTES # BLD AUTO: 0.2 K/UL (ref 0.3–1)
MONOCYTES NFR BLD: 6.3 % (ref 4–15)
NEUTROPHILS # BLD AUTO: 2.1 K/UL (ref 1.8–7.7)
NEUTROPHILS NFR BLD: 63.2 % (ref 38–73)
NRBC BLD-RTO: 0 /100 WBC
PLATELET # BLD AUTO: 180 K/UL (ref 150–350)
PMV BLD AUTO: 10.5 FL (ref 9.2–12.9)
RBC # BLD AUTO: 2.62 M/UL (ref 4–5.4)
WBC # BLD AUTO: 3.35 K/UL (ref 3.9–12.7)

## 2019-05-10 PROCEDURE — 85025 COMPLETE CBC W/AUTO DIFF WBC: CPT

## 2019-05-10 PROCEDURE — 36415 COLL VENOUS BLD VENIPUNCTURE: CPT

## 2019-05-10 PROCEDURE — 86592 SYPHILIS TEST NON-TREP QUAL: CPT

## 2019-05-10 PROCEDURE — 86703 HIV-1/HIV-2 1 RESULT ANTBDY: CPT

## 2019-05-10 PROCEDURE — 82950 GLUCOSE TEST: CPT

## 2019-05-11 LAB — RPR SER QL: NORMAL

## 2019-05-13 LAB — HIV 1+2 AB+HIV1 P24 AG SERPL QL IA: NEGATIVE

## 2019-05-14 ENCOUNTER — PATIENT MESSAGE (OUTPATIENT)
Dept: OBSTETRICS AND GYNECOLOGY | Facility: HOSPITAL | Age: 29
End: 2019-05-14

## 2019-05-14 DIAGNOSIS — M32.14 SYSTEMIC LUPUS ERYTHEMATOSUS WITH GLOMERULAR DISEASE, UNSPECIFIED SLE TYPE: Primary | ICD-10-CM

## 2019-05-21 ENCOUNTER — TELEPHONE (OUTPATIENT)
Dept: MATERNAL FETAL MEDICINE | Facility: CLINIC | Age: 29
End: 2019-05-21

## 2019-05-21 NOTE — TELEPHONE ENCOUNTER
Pt notified that she has been scheduled on 6/11/19 at 240 pm as requested after her fetal echo here at [facility]. Pt verbalized understanding of information.

## 2019-05-24 ENCOUNTER — ROUTINE PRENATAL (OUTPATIENT)
Dept: OBSTETRICS AND GYNECOLOGY | Facility: CLINIC | Age: 29
End: 2019-05-24
Payer: MEDICAID

## 2019-05-24 ENCOUNTER — HOSPITAL ENCOUNTER (OUTPATIENT)
Facility: HOSPITAL | Age: 29
LOS: 1 days | Discharge: HOME | End: 2019-05-24
Attending: OBSTETRICS & GYNECOLOGY | Admitting: OBSTETRICS & GYNECOLOGY
Payer: MEDICAID

## 2019-05-24 VITALS
TEMPERATURE: 99 F | SYSTOLIC BLOOD PRESSURE: 160 MMHG | BODY MASS INDEX: 32.54 KG/M2 | RESPIRATION RATE: 20 BRPM | WEIGHT: 195.31 LBS | DIASTOLIC BLOOD PRESSURE: 86 MMHG | DIASTOLIC BLOOD PRESSURE: 98 MMHG | HEART RATE: 88 BPM | SYSTOLIC BLOOD PRESSURE: 137 MMHG

## 2019-05-24 DIAGNOSIS — I10 ESSENTIAL HYPERTENSION: Primary | ICD-10-CM

## 2019-05-24 DIAGNOSIS — O13.9 GESTATIONAL HYPERTENSION: ICD-10-CM

## 2019-05-24 DIAGNOSIS — O09.93 HIGH-RISK PREGNANCY IN THIRD TRIMESTER: Primary | ICD-10-CM

## 2019-05-24 LAB
ALBUMIN SERPL BCP-MCNC: 2.4 G/DL (ref 3.5–5.2)
ALP SERPL-CCNC: 115 U/L (ref 55–135)
ALT SERPL W/O P-5'-P-CCNC: <5 U/L (ref 10–44)
ANION GAP SERPL CALC-SCNC: 6 MMOL/L (ref 8–16)
AST SERPL-CCNC: 19 U/L (ref 10–40)
BASOPHILS # BLD AUTO: 0 K/UL (ref 0–0.2)
BASOPHILS NFR BLD: 0 % (ref 0–1.9)
BILIRUB SERPL-MCNC: 0.3 MG/DL (ref 0.1–1)
BUN SERPL-MCNC: 7 MG/DL (ref 6–20)
CALCIUM SERPL-MCNC: 8.1 MG/DL (ref 8.7–10.5)
CHLORIDE SERPL-SCNC: 108 MMOL/L (ref 95–110)
CO2 SERPL-SCNC: 21 MMOL/L (ref 23–29)
CREAT SERPL-MCNC: 0.7 MG/DL (ref 0.5–1.4)
CREAT UR-MCNC: >450 MG/DL (ref 15–325)
DIFFERENTIAL METHOD: ABNORMAL
EOSINOPHIL # BLD AUTO: 0.1 K/UL (ref 0–0.5)
EOSINOPHIL NFR BLD: 1.2 % (ref 0–8)
ERYTHROCYTE [DISTWIDTH] IN BLOOD BY AUTOMATED COUNT: 13.1 % (ref 11.5–14.5)
EST. GFR  (AFRICAN AMERICAN): >60 ML/MIN/1.73 M^2
EST. GFR  (NON AFRICAN AMERICAN): >60 ML/MIN/1.73 M^2
GLUCOSE SERPL-MCNC: 73 MG/DL (ref 70–110)
HCT VFR BLD AUTO: 22.2 % (ref 37–48.5)
HGB BLD-MCNC: 7.8 G/DL (ref 12–16)
LYMPHOCYTES # BLD AUTO: 0.8 K/UL (ref 1–4.8)
LYMPHOCYTES NFR BLD: 18.7 % (ref 18–48)
MCH RBC QN AUTO: 30 PG (ref 27–31)
MCHC RBC AUTO-ENTMCNC: 35.1 G/DL (ref 32–36)
MCV RBC AUTO: 85 FL (ref 82–98)
MONOCYTES # BLD AUTO: 0.3 K/UL (ref 0.3–1)
MONOCYTES NFR BLD: 6.7 % (ref 4–15)
NEUTROPHILS # BLD AUTO: 2.9 K/UL (ref 1.8–7.7)
NEUTROPHILS NFR BLD: 73.4 % (ref 38–73)
PLATELET # BLD AUTO: 166 K/UL (ref 150–350)
PMV BLD AUTO: 9.6 FL (ref 9.2–12.9)
POTASSIUM SERPL-SCNC: 3.4 MMOL/L (ref 3.5–5.1)
PROT SERPL-MCNC: 7.1 G/DL (ref 6–8.4)
PROT UR-MCNC: 159 MG/DL (ref 0–15)
PROT/CREAT UR: ABNORMAL MG/G{CREAT} (ref 0–0.2)
RBC # BLD AUTO: 2.6 M/UL (ref 4–5.4)
SODIUM SERPL-SCNC: 135 MMOL/L (ref 136–145)
WBC # BLD AUTO: 4.01 K/UL (ref 3.9–12.7)

## 2019-05-24 PROCEDURE — 59025 OBTAIN FETAL NONSTRESS TEST (NST): ICD-10-PCS | Mod: 26,,, | Performed by: ADVANCED PRACTICE MIDWIFE

## 2019-05-24 PROCEDURE — 99213 OFFICE O/P EST LOW 20 MIN: CPT | Mod: TH,S$PBB,25, | Performed by: ADVANCED PRACTICE MIDWIFE

## 2019-05-24 PROCEDURE — 85025 COMPLETE CBC W/AUTO DIFF WBC: CPT

## 2019-05-24 PROCEDURE — 59025 FETAL NON-STRESS TEST: CPT | Mod: 26,,, | Performed by: ADVANCED PRACTICE MIDWIFE

## 2019-05-24 PROCEDURE — 99999 PR PBB SHADOW E&M-EST. PATIENT-LVL III: CPT | Mod: PBBFAC,,, | Performed by: ADVANCED PRACTICE MIDWIFE

## 2019-05-24 PROCEDURE — 99213 PR OFFICE/OUTPT VISIT, EST, LEVL III, 20-29 MIN: ICD-10-PCS | Mod: TH,S$PBB,25, | Performed by: ADVANCED PRACTICE MIDWIFE

## 2019-05-24 PROCEDURE — 82570 ASSAY OF URINE CREATININE: CPT

## 2019-05-24 PROCEDURE — 96372 THER/PROPH/DIAG INJ SC/IM: CPT

## 2019-05-24 PROCEDURE — 99211 OFF/OP EST MAY X REQ PHY/QHP: CPT | Mod: 25,27

## 2019-05-24 PROCEDURE — 99999 PR PBB SHADOW E&M-EST. PATIENT-LVL III: ICD-10-PCS | Mod: PBBFAC,,, | Performed by: ADVANCED PRACTICE MIDWIFE

## 2019-05-24 PROCEDURE — 80053 COMPREHEN METABOLIC PANEL: CPT

## 2019-05-24 PROCEDURE — 99213 OFFICE O/P EST LOW 20 MIN: CPT | Mod: PBBFAC,TH,25 | Performed by: ADVANCED PRACTICE MIDWIFE

## 2019-05-24 PROCEDURE — 59025 FETAL NON-STRESS TEST: CPT

## 2019-05-24 PROCEDURE — 63600175 PHARM REV CODE 636 W HCPCS: Performed by: ADVANCED PRACTICE MIDWIFE

## 2019-05-24 RX ORDER — BETAMETHASONE SODIUM PHOSPHATE AND BETAMETHASONE ACETATE 3; 3 MG/ML; MG/ML
12 INJECTION, SUSPENSION INTRA-ARTICULAR; INTRALESIONAL; INTRAMUSCULAR; SOFT TISSUE ONCE
Status: COMPLETED | OUTPATIENT
Start: 2019-05-24 | End: 2019-05-24

## 2019-05-24 RX ADMIN — BETAMETHASONE SODIUM PHOSPHATE AND BETAMETHASONE ACETATE 12 MG: 3; 3 INJECTION, SUSPENSION INTRA-ARTICULAR; INTRALESIONAL; INTRAMUSCULAR at 09:05

## 2019-05-24 NOTE — PROGRESS NOTES
"Pt feeling tired and "ready for babies to be here."  BP elevated. Pt states she did not take her procardia this morning.   Denies vision changes, HA, RUQ pain, swelling. Reflexes in upper and lower extremities brisk. Urine dipstick 2+ protein.  Consulted Dr. Latif. He recommends pt go to L&D now for further PIH evaluation.   Instructed pt to take Procardia as prescribed and go to L&D now for further eval. Pt states she doesn't have time today. Instructed pt on risks associated with elevated BP and preeclampsia, including seizure, stroke, abruption, and death. Pt states she will take Procardia and try to go to hospital soon.    ARLEN ALEXANDER/IVETTE Santo CNM  "

## 2019-05-25 ENCOUNTER — HOSPITAL ENCOUNTER (OUTPATIENT)
Facility: HOSPITAL | Age: 29
Discharge: HOME | End: 2019-05-25
Attending: OBSTETRICS & GYNECOLOGY | Admitting: OBSTETRICS & GYNECOLOGY
Payer: MEDICAID

## 2019-05-25 DIAGNOSIS — O16.9 HYPERTENSION IN PREGNANCY, ANTEPARTUM: ICD-10-CM

## 2019-05-25 PROCEDURE — 99211 OFF/OP EST MAY X REQ PHY/QHP: CPT | Mod: TH

## 2019-05-25 PROCEDURE — 63600175 PHARM REV CODE 636 W HCPCS: Performed by: ADVANCED PRACTICE MIDWIFE

## 2019-05-25 PROCEDURE — 96372 THER/PROPH/DIAG INJ SC/IM: CPT

## 2019-05-25 RX ORDER — ACETAMINOPHEN 500 MG
500 TABLET ORAL EVERY 6 HOURS PRN
Status: DISCONTINUED | OUTPATIENT
Start: 2019-05-25 | End: 2019-05-26 | Stop reason: HOSPADM

## 2019-05-25 RX ORDER — BETAMETHASONE SODIUM PHOSPHATE AND BETAMETHASONE ACETATE 3; 3 MG/ML; MG/ML
12 INJECTION, SUSPENSION INTRA-ARTICULAR; INTRALESIONAL; INTRAMUSCULAR; SOFT TISSUE ONCE
Status: COMPLETED | OUTPATIENT
Start: 2019-05-25 | End: 2019-05-25

## 2019-05-25 RX ORDER — ONDANSETRON 8 MG/1
8 TABLET, ORALLY DISINTEGRATING ORAL EVERY 8 HOURS PRN
Status: DISCONTINUED | OUTPATIENT
Start: 2019-05-25 | End: 2019-05-26 | Stop reason: HOSPADM

## 2019-05-25 RX ADMIN — BETAMETHASONE SODIUM PHOSPHATE AND BETAMETHASONE ACETATE 12 MG: 3; 3 INJECTION, SUSPENSION INTRA-ARTICULAR; INTRALESIONAL; INTRAMUSCULAR at 10:05

## 2019-05-25 NOTE — H&P
[facility]  Obstetrics  History & Physical    Patient Name: Alisia Vines  MRN: [id number]  Admission Date: 2019  Primary Care Provider: Saumya Quinonez MD    Subjective:     Principal Problem:Essential hypertension    History of Present Illness:  Sent from office fpr Pre-E work-up    Obstetric HPI:  Patient reports no contractions, active fetal movement, No vaginal bleeding , No loss of fluid     This pregnancy has been complicated by Hx CHTN, twin gestation, followed per MFM and pedi cardiology    OB History    Para Term  AB Living   3 2 2 0 0 2   SAB TAB Ectopic Multiple Live Births   0 0 0 0 2      # Outcome Date GA Lbr José Miguel/2nd Weight Sex Delivery Anes PTL Lv   3 Current            2 Term 10/19/17 39w2d  2.87 kg (6 lb 5.2 oz) M CS-LTranv Spinal N CYDNEY      Name: HAWA, YOSSI GORDON      Apgar1: 8  Apgar5: 9   1 Term 12 38w0d  2.948 kg (6 lb 8 oz) M CS-LTranv EPI N CYDNEY      Name: Young     Past Medical History:   Diagnosis Date    Allergic rhinitis     Anemia     Condyloma acuminata     GERD (gastroesophageal reflux disease)     History of fetal anomaly in prior pregnancy, currently pregnant, unspecified trimester 3/8/2017    Pacemaker placed    HSV-2 (herpes simplex virus 2) infection     Lupus nephritis     Mood disorder     Overweight(278.02)     Sjogren's syndrome     Systemic lupus erythematosus     Thrombocytopenia      Past Surgical History:   Procedure Laterality Date     SECTION, LOW TRANSVERSE      DELIVERY- SECTION N/A 10/19/2017    Performed by [facility]es Franks MD at [facility] L&D    RENAL BIOPSY  2013       PTA Medications   Medication Sig    efinaconazole (JUBLIA) 10 % Mario Apply once daily to nail and nail fold.  Use daily for up to 1 year.    famotidine (PEPCID) 20 MG tablet Take 1 tablet (20 mg total) by mouth 2 (two) times daily as needed for Heartburn.    flunisolide 25 mcg, 0.025%, (NASALIDE) 25 mcg (0.025 %)  Spry 2 sprays by Nasal route 2 (two) times daily.    FLUoxetine (PROZAC) 40 MG capsule TAKE 1 CAPSULE BY MOUTH EVERY DAY    hydroxychloroquine (PLAQUENIL) 200 mg tablet Take 1 tablet (200 mg total) by mouth 2 (two) times daily.    levocetirizine (XYZAL) 5 MG tablet Take 1 tablet (5 mg total) by mouth once daily.    NIFEdipine (PROCARDIA-XL) 30 MG (OSM) 24 hr tablet Take 2 tablets (60 mg total) by mouth once daily.    predniSONE (DELTASONE) 5 MG tablet Take 1 tablet (5 mg total) by mouth once daily.    PRENATAL 118-IRON-FOLATE 6-DHA ORAL Take by mouth.    valacyclovir (VALTREX) 500 MG tablet Take 2 tablets (1,000 mg total) by mouth once daily. (Patient taking differently: Take 500 mg by mouth once daily. )       Review of patient's allergies indicates:  No Known Allergies     Family History     Problem Relation (Age of Onset)    Arrhythmia Son    Diabetes Paternal Grandmother    Eczema Brother    Heart disease Son    Hypertension Mother, Maternal Grandmother        Tobacco Use    Smoking status: Never Smoker    Smokeless tobacco: Never Used   Substance and Sexual Activity    Alcohol use: No     Alcohol/week: 0.0 oz    Drug use: No    Sexual activity: Yes     Partners: Male     Birth control/protection: None     Review of Systems   All other systems reviewed and are negative.     Objective:     Vital Signs (Most Recent):  Temp: 98.9 °F (37.2 °C) (05/24/19 1829)  Pulse: 97 (05/24/19 1900)  Resp: 20 (05/24/19 1900)  BP: (!) 165/76 (05/24/19 1900) Vital Signs (24h Range):  Temp:  [98.9 °F (37.2 °C)] 98.9 °F (37.2 °C)  Pulse:  [] 97  Resp:  [20] 20  BP: (120-165)/() 165/76        There is no height or weight on file to calculate BMI.    FHT: Cat 1 (reassuring) x's 2  TOCO:  none    Physical Exam:   Constitutional: She is oriented to person, place, and time. She appears well-developed and well-nourished.        Pulmonary/Chest: Effort normal.        Abdominal: Soft.     Genitourinary: Uterus normal.            Musculoskeletal: Normal range of motion and moves all extremeties.       Neurological: She is alert and oriented to person, place, and time. She has normal reflexes.    Skin: Skin is warm and dry.    Psychiatric: She has a normal mood and affect. Her behavior is normal.     CERVIX:  Deferred      Significant Labs:  Lab Results   Component Value Date    GROUPTRH B POS 2018    HEPBSAG Negative 2018    STREPBCULT No Group B Streptococcus isolated 2017       I have personallly reviewed all pertinent lab results from the last 24 hours.    Assessment/Plan:     28 y.o. female  at 31w4d for:    * Essential hypertension  Pre-E work up        Fiona Herrera CNM  Obstetrics  [facility]

## 2019-05-25 NOTE — PROCEDURES
Alisia Vines is a 28 y.o. female patient.    Temp: 98.9 °F (37.2 °C) (19)  Pulse: 91 (19)  Resp: 20 (19)  BP: 130/83 (19)       Obtain Fetal nonstress test (NST)  Date/Time: 2019 9:35 PM  Performed by: Fiona Herrera CNM  Authorized by: Fiona Herrera CNM     Nonstress Test:     Variability:  6-25 BPM    Decelerations:  None    Accelerations:  15 bpm    Acoustic Stimulator: No      Uterine Irritability: No      Contractions:  Not present  Biophysical Profile:     Fetal Breathin    Fetal Movement:  2    Fetal Tone:  2    Fluid Volume:  2    Nonstress Test:  2    Biophysical Profile Score  (of 8):  8    Biophysical Profile Score  (of 10):  10    Nonstress Test Interpretation: reactive      Overall Impression:  Reassuring  Post-procedure:     Patient tolerance:  Patient tolerated the procedure well with no immediate complications    BPP 10/10 for both twins    Fiona Herrera  2019

## 2019-05-25 NOTE — DISCHARGE SUMMARY
[facility]   Obstetrics  Discharge Summary      Patient Name: Alisia Vines  MRN: [id number]  Admission Date: 2019  Hospital Length of Stay: 1 days  Discharge Date and Time:  2019 9:34 PM  Attending Physician: Erin Gresham, *   Discharging Provider: Fiona Herrera CNM   Primary Care Provider: Saumya Quinonez MD    HPI: Sent from office fpr Pre-E work-up    FHT: Cat 1 (reassuring) x's 2  TOCO: None    Procedure(s) (LRB):   SECTION, WITH TUBAL LIGATION (N/A)     Hospital Course:   EFM  Pre-E labs  OB US and BPP's.  Di/di twins, EFW overdue  P/C ratio to high to calculate, BP's 140/80's range, asymptomatic, LFS WNL, PLTS Nl  BMZ series  Needs weekly visits and weekly labs  Pt advised to schedule appointment for next Thursday and was educated on PIH S/S to report to LND for  Pt to return to LND tomorrow for second BMZ injection         Final Active Diagnoses:    Diagnosis Date Noted POA    PRINCIPAL PROBLEM:  Essential hypertension [I10] 2015 Yes    Gestational hypertension [O13.9] 2019 Yes      Problems Resolved During this Admission:          Immunizations     None          This patient has no babies on file.  Pending Diagnostic Studies:     None          Discharged Condition: good    Disposition: Home or Self Care    Follow Up:Thursday    Patient Instructions:      Diet Adult Regular     Other restrictions (specify):   Order Comments: PIH precautions  Return to LND tomorrow fro #2 BMZ  Schedule appointment for Thursday     Notify your health care provider if you experience any of the following:  temperature >100.4     Notify your health care provider if you experience any of the following:  persistent nausea and vomiting or diarrhea     Notify your health care provider if you experience any of the following:  severe uncontrolled pain     Notify your health care provider if you experience any of the following:  difficulty breathing or increased  cough     Notify your health care provider if you experience any of the following:  severe persistent headache     Notify your health care provider if you experience any of the following:  persistent dizziness, light-headedness, or visual disturbances     Activity as tolerated     Medications:  Current Discharge Medication List      CONTINUE these medications which have NOT CHANGED    Details   efinaconazole (JUBLIA) 10 % Mario Apply once daily to nail and nail fold.  Use daily for up to 1 year.  Qty: 8 mL, Refills: 3    Associated Diagnoses: Onychomycosis; Pincer nail deformity; Brittle nails      famotidine (PEPCID) 20 MG tablet Take 1 tablet (20 mg total) by mouth 2 (two) times daily as needed for Heartburn.  Qty: 30 tablet, Refills: 3    Associated Diagnoses: Medication refill      flunisolide 25 mcg, 0.025%, (NASALIDE) 25 mcg (0.025 %) Spry 2 sprays by Nasal route 2 (two) times daily.  Qty: 25 mL, Refills: 2    Associated Diagnoses: Allergic rhinitis, unspecified seasonality, unspecified trigger      FLUoxetine (PROZAC) 40 MG capsule TAKE 1 CAPSULE BY MOUTH EVERY DAY  Qty: 30 capsule, Refills: 0    Associated Diagnoses: Medication refill; Depression affecting pregnancy      hydroxychloroquine (PLAQUENIL) 200 mg tablet Take 1 tablet (200 mg total) by mouth 2 (two) times daily.  Qty: 60 tablet, Refills: 11      levocetirizine (XYZAL) 5 MG tablet Take 1 tablet (5 mg total) by mouth once daily.  Qty: 30 tablet, Refills: 6    Associated Diagnoses: Allergic rhinitis, unspecified seasonality, unspecified trigger      NIFEdipine (PROCARDIA-XL) 30 MG (OSM) 24 hr tablet Take 2 tablets (60 mg total) by mouth once daily.  Qty: 30 tablet, Refills: 11      predniSONE (DELTASONE) 5 MG tablet Take 1 tablet (5 mg total) by mouth once daily.  Qty: 90 tablet, Refills: 3    Associated Diagnoses: Systemic lupus erythematosus arthritis; SLE glomerulonephritis syndrome, WHO class V      PRENATAL 118-IRON-FOLATE 6-DHA ORAL Take by  mouth.      valacyclovir (VALTREX) 500 MG tablet Take 2 tablets (1,000 mg total) by mouth once daily.  Qty: 90 tablet, Refills: 3             Fiona Herrera CNM  Obstetrics [facility]

## 2019-05-25 NOTE — SUBJECTIVE & OBJECTIVE
Obstetric HPI:  Patient reports no contractions, active fetal movement, No vaginal bleeding , No loss of fluid     This pregnancy has been complicated by Hx CHTN, twin gestation, followed per MFM and pedi cardiology    OB History    Para Term  AB Living   3 2 2 0 0 2   SAB TAB Ectopic Multiple Live Births   0 0 0 0 2      # Outcome Date GA Lbr José Miguel/2nd Weight Sex Delivery Anes PTL Lv   3 Current            2 Term 10/19/17 39w2d  2.87 kg (6 lb 5.2 oz) M CS-LTranv Spinal N CYDNEY      Name: HAWA, YOSSI GORDON      Apgar1: 8  Apgar5: 9   1 Term 12 38w0d  2.948 kg (6 lb 8 oz) M CS-LTranv EPI N CYDNEY      Name: Young     Past Medical History:   Diagnosis Date    Allergic rhinitis     Anemia     Condyloma acuminata     GERD (gastroesophageal reflux disease)     History of fetal anomaly in prior pregnancy, currently pregnant, unspecified trimester 3/8/2017    Pacemaker placed    HSV-2 (herpes simplex virus 2) infection     Lupus nephritis     Mood disorder     Overweight(278.02)     Sjogren's syndrome     Systemic lupus erythematosus     Thrombocytopenia      Past Surgical History:   Procedure Laterality Date     SECTION, LOW TRANSVERSE      DELIVERY- SECTION N/A 10/19/2017    Performed [facility] L[facility]D    RENAL BIOPSY  2013       PTA Medications   Medication Sig    efinaconazole (JUBLIA) 10 % Mario Apply once daily to nail and nail fold.  Use daily for up to 1 year.    famotidine (PEPCID) 20 MG tablet Take 1 tablet (20 mg total) by mouth 2 (two) times daily as needed for Heartburn.    flunisolide 25 mcg, 0.025%, (NASALIDE) 25 mcg (0.025 %) Spry 2 sprays by Nasal route 2 (two) times daily.    FLUoxetine (PROZAC) 40 MG capsule TAKE 1 CAPSULE BY MOUTH EVERY DAY    hydroxychloroquine (PLAQUENIL) 200 mg tablet Take 1 tablet (200 mg total) by mouth 2 (two) times daily.    levocetirizine (XYZAL) 5 MG tablet Take 1 tablet (5 mg total) by mouth once  daily.    NIFEdipine (PROCARDIA-XL) 30 MG (OSM) 24 hr tablet Take 2 tablets (60 mg total) by mouth once daily.    predniSONE (DELTASONE) 5 MG tablet Take 1 tablet (5 mg total) by mouth once daily.    PRENATAL 118-IRON-FOLATE 6-DHA ORAL Take by mouth.    valacyclovir (VALTREX) 500 MG tablet Take 2 tablets (1,000 mg total) by mouth once daily. (Patient taking differently: Take 500 mg by mouth once daily. )       Review of patient's allergies indicates:  No Known Allergies     Family History     Problem Relation (Age of Onset)    Arrhythmia Son    Diabetes Paternal Grandmother    Eczema Brother    Heart disease Son    Hypertension Mother, Maternal Grandmother        Tobacco Use    Smoking status: Never Smoker    Smokeless tobacco: Never Used   Substance and Sexual Activity    Alcohol use: No     Alcohol/week: 0.0 oz    Drug use: No    Sexual activity: Yes     Partners: Male     Birth control/protection: None     Review of Systems   All other systems reviewed and are negative.     Objective:     Vital Signs (Most Recent):  Temp: 98.9 °F (37.2 °C) (05/24/19 1829)  Pulse: 97 (05/24/19 1900)  Resp: 20 (05/24/19 1900)  BP: (!) 165/76 (05/24/19 1900) Vital Signs (24h Range):  Temp:  [98.9 °F (37.2 °C)] 98.9 °F (37.2 °C)  Pulse:  [] 97  Resp:  [20] 20  BP: (120-165)/() 165/76        There is no height or weight on file to calculate BMI.    FHT: Cat 1 (reassuring) x's 2  TOCO:  none    Physical Exam:   Constitutional: She is oriented to person, place, and time. She appears well-developed and well-nourished.        Pulmonary/Chest: Effort normal.        Abdominal: Soft.     Genitourinary: Uterus normal.           Musculoskeletal: Normal range of motion and moves all extremeties.       Neurological: She is alert and oriented to person, place, and time. She has normal reflexes.    Skin: Skin is warm and dry.    Psychiatric: She has a normal mood and affect. Her behavior is normal.     CERVIX:   Deferred      Significant Labs:  Lab Results   Component Value Date    GROUPTRH B POS 12/12/2018    HEPBSAG Negative 12/12/2018    STREPBCULT No Group B Streptococcus isolated 09/26/2017       I have personallly reviewed all pertinent lab results from the last 24 hours.

## 2019-05-25 NOTE — HOSPITAL COURSE
EFM  Pre-E labs  OB US and BPP's.  Di/di twins, EFW overdue  P/C ratio to high to calculate, BP's 140/80's range, asymptomatic, LFS WNL, PLTS Nl  BMZ series  Needs weekly visits and weekly labs  Pt advised to schedule appointment for next Thursday and was educated on PIH S/S to report to LND for  Pt to return to LND tomorrow for second BMZ injection

## 2019-05-26 NOTE — H&P
[facility]   Obstetrics  History & Physical    Patient Name: Alisia Vines  MRN: [id number]  Admission Date: 2019  Primary Care Provider: Saumya Quinonez MD    Subjective:     Principal Problem:Hypertension in pregnancy, antepartum    History of Present Illness:  27 yo  VICTORIANO 19 EGA 31w5d here for # 2 Celestone injection. Di/Di Twins, CHTN with SLE/nephritis, sjorgens and HSV.    Obstetric HPI:  This pregnancy has been complicated by Di/Di Twins, CHTN, SLE, nephritis, sjorgens, HSV.     OB History    Para Term  AB Living   3 2 2 0 0 2   SAB TAB Ectopic Multiple Live Births   0 0 0 0 2      # Outcome Date GA Lbr José Miguel/2nd Weight Sex Delivery Anes PTL Lv   3 Current            2 Term 10/19/17 39w2d  2.87 kg (6 lb 5.2 oz) M CS-LTranv Spinal N CYDNEY      Name: HAWA, YOSSI GORDON      Apgar1: 8  Apgar5: 9   1 Term 12 38w0d  2.948 kg (6 lb 8 oz) M CS-LTranv EPI N CYDNEY      Name: Young     Past Medical History:   Diagnosis Date    Allergic rhinitis     Anemia     Condyloma acuminata     GERD (gastroesophageal reflux disease)     History of fetal anomaly in prior pregnancy, currently pregnant, unspecified trimester 3/8/2017    Pacemaker placed    HSV-2 (herpes simplex virus 2) infection     Lupus nephritis     Mood disorder     Overweight(278.02)     Sjogren's syndrome     Systemic lupus erythematosus     Thrombocytopenia      Past Surgical History:   Procedure Laterality Date     SECTION, LOW TRANSVERSE      DELIVERY- SECTION N/A 10/19/2017    Performed [facility] at [facility] L[facility]D    RENAL BIOPSY  2013       PTA Medications   Medication Sig    efinaconazole (JUBLIA) 10 % Mario Apply once daily to nail and nail fold.  Use daily for up to 1 year.    famotidine (PEPCID) 20 MG tablet Take 1 tablet (20 mg total) by mouth 2 (two) times daily as needed for Heartburn.    flunisolide 25 mcg, 0.025%, (NASALIDE) 25 mcg (0.025 %)  "Spry 2 sprays by Nasal route 2 (two) times daily.    FLUoxetine (PROZAC) 40 MG capsule TAKE 1 CAPSULE BY MOUTH EVERY DAY    hydroxychloroquine (PLAQUENIL) 200 mg tablet Take 1 tablet (200 mg total) by mouth 2 (two) times daily.    levocetirizine (XYZAL) 5 MG tablet Take 1 tablet (5 mg total) by mouth once daily.    NIFEdipine (PROCARDIA-XL) 30 MG (OSM) 24 hr tablet Take 2 tablets (60 mg total) by mouth once daily.    predniSONE (DELTASONE) 5 MG tablet Take 1 tablet (5 mg total) by mouth once daily.    PRENATAL 118-IRON-FOLATE 6-DHA ORAL Take by mouth.    valacyclovir (VALTREX) 500 MG tablet Take 2 tablets (1,000 mg total) by mouth once daily. (Patient taking differently: Take 500 mg by mouth once daily. )       Review of patient's allergies indicates:  No Known Allergies     Family History     Problem Relation (Age of Onset)    Arrhythmia Son    Diabetes Paternal Grandmother    Eczema Brother    Heart disease Son    Hypertension Mother, Maternal Grandmother        Tobacco Use    Smoking status: Never Smoker    Smokeless tobacco: Never Used   Substance and Sexual Activity    Alcohol use: No     Alcohol/week: 0.0 oz    Drug use: No    Sexual activity: Yes     Partners: Male     Birth control/protection: None     Review of Systems   All other systems reviewed and are negative.     Objective:     Vital Signs (Most Recent):    Vital Signs (24h Range):           There is no height or weight on file to calculate BMI.    FHT:  Pt refuses monitoring  TOCO: Pt refuses monitoring    Physical Exam:                             unable to assess pt cannot stay for monitoring or assessment, "has plans that I am late for"         Significant Labs:  Lab Results   Component Value Date    GROUPTRH B POS 12/12/2018    HEPBSAG Negative 12/12/2018    STREPBCULT No Group B Streptococcus isolated 09/26/2017       I have personallly reviewed all pertinent lab results from the last 24 hours.    Assessment/Plan:     28 y.o. female "  at 31w5d for:    * Hypertension in pregnancy, antepartum  Celestone #2 dose  Refuses monitoring or assessment    Dichorionic diamniotic twin pregnancy, antepartum         SLE (systemic lupus erythematosus related syndrome)        HSV-2 (herpes simplex virus 2) infection        Sjogren's syndrome          Cindy Figueroa CNM  Obstetrics  [facility]

## 2019-05-26 NOTE — SUBJECTIVE & OBJECTIVE
Obstetric HPI:  This pregnancy has been complicated by Di/Di Twins, CHTN, SLE, nephritis, sjorgens, HSV.     OB History    Para Term  AB Living   3 2 2 0 0 2   SAB TAB Ectopic Multiple Live Births   0 0 0 0 2      # Outcome Date GA Lbr José Miguel/2nd Weight Sex Delivery Anes PTL Lv   3 Current            2 Term 10/19/17 39w2d  2.87 kg (6 lb 5.2 oz) M CS-LTranv Spinal N CYDNEY      Name: HAWA, BOY HEIDI      Apgar1: 8  Apgar5: 9   1 Term 12 38w0d  2.948 kg (6 lb 8 oz) M CS-LTranv EPI N CYDNEY      Name: Young     Past Medical History:   Diagnosis Date    Allergic rhinitis     Anemia     Condyloma acuminata     GERD (gastroesophageal reflux disease)     History of fetal anomaly in prior pregnancy, currently pregnant, unspecified trimester 3/8/2017    Pacemaker placed    HSV-2 (herpes simplex virus 2) infection     Lupus nephritis     Mood disorder     Overweight(278.02)     Sjogren's syndrome     Systemic lupus erythematosus     Thrombocytopenia      Past Surgical History:   Procedure Laterality Date     SECTION, LOW TRANSVERSE      DELIVERY- SECTION N/A 10/19/2017    Performed [facility] at [facility] L&D    RENAL BIOPSY  2013       PTA Medications   Medication Sig    efinaconazole (JUBLIA) 10 % Mario Apply once daily to nail and nail fold.  Use daily for up to 1 year.    famotidine (PEPCID) 20 MG tablet Take 1 tablet (20 mg total) by mouth 2 (two) times daily as needed for Heartburn.    flunisolide 25 mcg, 0.025%, (NASALIDE) 25 mcg (0.025 %) Spry 2 sprays by Nasal route 2 (two) times daily.    FLUoxetine (PROZAC) 40 MG capsule TAKE 1 CAPSULE BY MOUTH EVERY DAY    hydroxychloroquine (PLAQUENIL) 200 mg tablet Take 1 tablet (200 mg total) by mouth 2 (two) times daily.    levocetirizine (XYZAL) 5 MG tablet Take 1 tablet (5 mg total) by mouth once daily.    NIFEdipine (PROCARDIA-XL) 30 MG (OSM) 24 hr tablet Take 2 tablets (60 mg total) by mouth once daily.     "predniSONE (DELTASONE) 5 MG tablet Take 1 tablet (5 mg total) by mouth once daily.    PRENATAL 118-IRON-FOLATE 6-DHA ORAL Take by mouth.    valacyclovir (VALTREX) 500 MG tablet Take 2 tablets (1,000 mg total) by mouth once daily. (Patient taking differently: Take 500 mg by mouth once daily. )       Review of patient's allergies indicates:  No Known Allergies     Family History     Problem Relation (Age of Onset)    Arrhythmia Son    Diabetes Paternal Grandmother    Eczema Brother    Heart disease Son    Hypertension Mother, Maternal Grandmother        Tobacco Use    Smoking status: Never Smoker    Smokeless tobacco: Never Used   Substance and Sexual Activity    Alcohol use: No     Alcohol/week: 0.0 oz    Drug use: No    Sexual activity: Yes     Partners: Male     Birth control/protection: None     Review of Systems   All other systems reviewed and are negative.     Objective:     Vital Signs (Most Recent):    Vital Signs (24h Range):           There is no height or weight on file to calculate BMI.    FHT:  Pt refuses monitoring  TOCO: Pt refuses monitoring    Physical Exam:                             unable to assess pt cannot stay for monitoring or assessment, "has plans that I am late for"         Significant Labs:  Lab Results   Component Value Date    GROUPTRH B POS 12/12/2018    HEPBSAG Negative 12/12/2018    STREPBCULT No Group B Streptococcus isolated 09/26/2017       I have personallly reviewed all pertinent lab results from the last 24 hours.  "

## 2019-05-26 NOTE — NURSING
Pt presents to triage for second steroid injection. Attempted to put pt on monitor and obtain baseline vitals, pt refused both, stated she has plans and does not intend to be here long enough for monitoring. Explained importance of monitoring to assess status, benefits and risks explained, pt verbalized understanding but still declined, CNM notified

## 2019-05-26 NOTE — NURSING
"Steroid shot administered. Pt states "she has plans and does not have time for monitoring or waiting 15 minutes after her shot." Risks and benefits of monitoring for her and babies explained, and pt states she "is not staying."  "

## 2019-05-26 NOTE — DISCHARGE SUMMARY
"[facility] -   Obstetrics  Discharge Summary      Patient Name: Alisia Vines  MRN: [id number]  Admission Date: 2019  Hospital Length of Stay: 0 days  Discharge Date and Time:  2019 10:33 PM  Attending Physician: Erin Gresham, *   Discharging Provider: Cindy Figueroa CNM   Primary Care Provider: Saumya Quinonez MD    HPI: 27 yo  VICTORIANO 19 EGA 31w5d here for # 2 Celestone injection. Di/Di Twins, CHTN with SLE/nephritis, sjorgens and HSV.    FHT: pt refused  TOCO: Pt refused    Procedure(s) (LRB):   SECTION, WITH TUBAL LIGATION (N/A)     Hospital Course:   Celestone #2 injection, pt refuses fetal monitoring and BP assessment, "has plans that she is running late for", unable to assess pt because I was attending a delivery          Final Active Diagnoses:    Diagnosis Date Noted POA    PRINCIPAL PROBLEM:  Hypertension in pregnancy, antepartum [O16.9] 2019 Yes    Dichorionic diamniotic twin pregnancy, antepartum [O30.049] 2019 Yes    SLE (systemic lupus erythematosus related syndrome) [M32.9] 10/11/2013 Yes    HSV-2 (herpes simplex virus 2) infection [B00.9]  Yes    Sjogren's syndrome [M35.00] 2012 Yes      Problems Resolved During this Admission:            Feeding Method: NA    Immunizations     None          This patient has no babies on file.  Pending Diagnostic Studies:     None          Discharged Condition: good    Disposition: Home or Self Care    Follow Up:    Patient Instructions:   No discharge procedures on file.  Medications:  Current Discharge Medication List      CONTINUE these medications which have NOT CHANGED    Details   efinaconazole (JUBLIA) 10 % Mario Apply once daily to nail and nail fold.  Use daily for up to 1 year.  Qty: 8 mL, Refills: 3    Associated Diagnoses: Onychomycosis; Pincer nail deformity; Brittle nails      famotidine (PEPCID) 20 MG tablet Take 1 tablet (20 mg total) by mouth 2 (two) times daily as needed for " Heartburn.  Qty: 30 tablet, Refills: 3    Associated Diagnoses: Medication refill      flunisolide 25 mcg, 0.025%, (NASALIDE) 25 mcg (0.025 %) Spry 2 sprays by Nasal route 2 (two) times daily.  Qty: 25 mL, Refills: 2    Associated Diagnoses: Allergic rhinitis, unspecified seasonality, unspecified trigger      FLUoxetine (PROZAC) 40 MG capsule TAKE 1 CAPSULE BY MOUTH EVERY DAY  Qty: 30 capsule, Refills: 0    Associated Diagnoses: Medication refill; Depression affecting pregnancy      hydroxychloroquine (PLAQUENIL) 200 mg tablet Take 1 tablet (200 mg total) by mouth 2 (two) times daily.  Qty: 60 tablet, Refills: 11      levocetirizine (XYZAL) 5 MG tablet Take 1 tablet (5 mg total) by mouth once daily.  Qty: 30 tablet, Refills: 6    Associated Diagnoses: Allergic rhinitis, unspecified seasonality, unspecified trigger      NIFEdipine (PROCARDIA-XL) 30 MG (OSM) 24 hr tablet Take 2 tablets (60 mg total) by mouth once daily.  Qty: 30 tablet, Refills: 11      predniSONE (DELTASONE) 5 MG tablet Take 1 tablet (5 mg total) by mouth once daily.  Qty: 90 tablet, Refills: 3    Associated Diagnoses: Systemic lupus erythematosus arthritis; SLE glomerulonephritis syndrome, WHO class V      PRENATAL 118-IRON-FOLATE 6-DHA ORAL Take by mouth.      valacyclovir (VALTREX) 500 MG tablet Take 2 tablets (1,000 mg total) by mouth once daily.  Qty: 90 tablet, Refills: 3             Cindy Figueroa CNM  Obstetrics  [facility] [place]

## 2019-05-26 NOTE — HOSPITAL COURSE
"Celestone #2 injection, pt refuses fetal monitoring and BP assessment, "has plans that she is running late for", unable to assess pt because I was attending a delivery   "

## 2019-05-26 NOTE — HPI
27 yo  VICTORIANO 19 EGA 31w5d here for # 2 Celestone injection. Di/Di Twins, CHTN with SLE/nephritis, sjorgens and HSV.

## 2019-05-28 ENCOUNTER — TELEPHONE (OUTPATIENT)
Dept: PEDIATRIC CARDIOLOGY | Facility: CLINIC | Age: 29
End: 2019-05-28

## 2019-05-28 NOTE — TELEPHONE ENCOUNTER
Spoke with patient via telephone. Patient coming for 130 pm on 6/11/19. Office number and address verified.

## 2019-05-29 ENCOUNTER — LAB VISIT (OUTPATIENT)
Dept: LAB | Facility: HOSPITAL | Age: 29
End: 2019-05-29
Attending: OBSTETRICS & GYNECOLOGY
Payer: MEDICAID

## 2019-05-29 ENCOUNTER — ROUTINE PRENATAL (OUTPATIENT)
Dept: OBSTETRICS AND GYNECOLOGY | Facility: CLINIC | Age: 29
End: 2019-05-29
Payer: MEDICAID

## 2019-05-29 VITALS
WEIGHT: 189.81 LBS | SYSTOLIC BLOOD PRESSURE: 142 MMHG | BODY MASS INDEX: 31.63 KG/M2 | DIASTOLIC BLOOD PRESSURE: 88 MMHG

## 2019-05-29 DIAGNOSIS — M32.14 SYSTEMIC LUPUS ERYTHEMATOSUS WITH GLOMERULAR DISEASE, UNSPECIFIED SLE TYPE: ICD-10-CM

## 2019-05-29 DIAGNOSIS — M32.9 SYSTEMIC LUPUS COMPLICATING PREGNANCY: Primary | ICD-10-CM

## 2019-05-29 DIAGNOSIS — O99.891 SYSTEMIC LUPUS COMPLICATING PREGNANCY: Primary | ICD-10-CM

## 2019-05-29 DIAGNOSIS — O16.3 HYPERTENSION AFFECTING PREGNANCY IN THIRD TRIMESTER: ICD-10-CM

## 2019-05-29 DIAGNOSIS — M32.14 LUPUS NEPHRITIS: ICD-10-CM

## 2019-05-29 LAB
ALBUMIN SERPL BCP-MCNC: 2.4 G/DL (ref 3.5–5.2)
ALP SERPL-CCNC: 128 U/L (ref 55–135)
ALT SERPL W/O P-5'-P-CCNC: 7 U/L (ref 10–44)
ANION GAP SERPL CALC-SCNC: 7 MMOL/L (ref 8–16)
AST SERPL-CCNC: 21 U/L (ref 10–40)
BILIRUB SERPL-MCNC: 0.4 MG/DL (ref 0.1–1)
BUN SERPL-MCNC: 9 MG/DL (ref 6–20)
C3 SERPL-MCNC: 73 MG/DL (ref 50–180)
C4 SERPL-MCNC: 12 MG/DL (ref 11–44)
CALCIUM SERPL-MCNC: 8.7 MG/DL (ref 8.7–10.5)
CHLORIDE SERPL-SCNC: 107 MMOL/L (ref 95–110)
CO2 SERPL-SCNC: 20 MMOL/L (ref 23–29)
CREAT SERPL-MCNC: 0.6 MG/DL (ref 0.5–1.4)
EST. GFR  (AFRICAN AMERICAN): >60 ML/MIN/1.73 M^2
EST. GFR  (NON AFRICAN AMERICAN): >60 ML/MIN/1.73 M^2
GLUCOSE SERPL-MCNC: 81 MG/DL (ref 70–110)
POTASSIUM SERPL-SCNC: 3.7 MMOL/L (ref 3.5–5.1)
PROT SERPL-MCNC: 7.2 G/DL (ref 6–8.4)
SODIUM SERPL-SCNC: 134 MMOL/L (ref 136–145)

## 2019-05-29 PROCEDURE — 36415 COLL VENOUS BLD VENIPUNCTURE: CPT

## 2019-05-29 PROCEDURE — 99999 PR PBB SHADOW E&M-EST. PATIENT-LVL II: ICD-10-PCS | Mod: PBBFAC,,, | Performed by: OBSTETRICS & GYNECOLOGY

## 2019-05-29 PROCEDURE — 99212 PR OFFICE/OUTPT VISIT, EST, LEVL II, 10-19 MIN: ICD-10-PCS | Mod: TH,S$PBB,, | Performed by: OBSTETRICS & GYNECOLOGY

## 2019-05-29 PROCEDURE — 86160 COMPLEMENT ANTIGEN: CPT

## 2019-05-29 PROCEDURE — 99212 OFFICE O/P EST SF 10 MIN: CPT | Mod: PBBFAC,TH | Performed by: OBSTETRICS & GYNECOLOGY

## 2019-05-29 PROCEDURE — 99999 PR PBB SHADOW E&M-EST. PATIENT-LVL II: CPT | Mod: PBBFAC,,, | Performed by: OBSTETRICS & GYNECOLOGY

## 2019-05-29 PROCEDURE — 99212 OFFICE O/P EST SF 10 MIN: CPT | Mod: TH,S$PBB,, | Performed by: OBSTETRICS & GYNECOLOGY

## 2019-05-29 PROCEDURE — 80053 COMPREHEN METABOLIC PANEL: CPT

## 2019-05-29 PROCEDURE — 86160 COMPLEMENT ANTIGEN: CPT | Mod: 59

## 2019-05-29 NOTE — PROGRESS NOTES
hosp notes & labs reviewed. Protein unable to be calculated on PCR due to high creatinine levels in urine; serum cr normal. BPs stable, improved from last prior visit. Continue appts as currently scheduled: 6/11 w/ MFM in [place], 6/12 w/ me in [place]

## 2019-05-30 ENCOUNTER — PATIENT MESSAGE (OUTPATIENT)
Dept: OBSTETRICS AND GYNECOLOGY | Facility: CLINIC | Age: 29
End: 2019-05-30

## 2019-06-03 DIAGNOSIS — M32.9 SYSTEMIC LUPUS ERYTHEMATOSUS ARTHRITIS: ICD-10-CM

## 2019-06-03 DIAGNOSIS — Z76.0 MEDICATION REFILL: ICD-10-CM

## 2019-06-03 DIAGNOSIS — M32.14 SLE GLOMERULONEPHRITIS SYNDROME, WHO CLASS V: ICD-10-CM

## 2019-06-03 DIAGNOSIS — F32.A DEPRESSION AFFECTING PREGNANCY: ICD-10-CM

## 2019-06-03 DIAGNOSIS — O99.340 DEPRESSION AFFECTING PREGNANCY: ICD-10-CM

## 2019-06-03 RX ORDER — PREDNISONE 5 MG/1
5 TABLET ORAL DAILY
Qty: 90 TABLET | Refills: 3 | Status: SHIPPED | OUTPATIENT
Start: 2019-06-03 | End: 2019-08-29 | Stop reason: SDUPTHER

## 2019-06-03 RX ORDER — FLUOXETINE HYDROCHLORIDE 40 MG/1
40 CAPSULE ORAL DAILY
Qty: 30 CAPSULE | Refills: 0 | Status: SHIPPED | OUTPATIENT
Start: 2019-06-03 | End: 2019-07-05 | Stop reason: SDUPTHER

## 2019-06-04 RX ORDER — VALACYCLOVIR HYDROCHLORIDE 500 MG/1
1000 TABLET, FILM COATED ORAL DAILY
Qty: 90 TABLET | Refills: 3 | Status: SHIPPED | OUTPATIENT
Start: 2019-06-04 | End: 2020-09-15 | Stop reason: SDUPTHER

## 2019-06-11 ENCOUNTER — CLINICAL SUPPORT (OUTPATIENT)
Dept: PEDIATRIC CARDIOLOGY | Facility: CLINIC | Age: 29
End: 2019-06-11
Payer: MEDICAID

## 2019-06-11 ENCOUNTER — CLINICAL SUPPORT (OUTPATIENT)
Dept: PEDIATRIC CARDIOLOGY | Facility: CLINIC | Age: 29
End: 2019-06-11
Attending: PEDIATRICS
Payer: MEDICAID

## 2019-06-11 ENCOUNTER — PROCEDURE VISIT (OUTPATIENT)
Dept: MATERNAL FETAL MEDICINE | Facility: CLINIC | Age: 29
End: 2019-06-11
Payer: MEDICAID

## 2019-06-11 ENCOUNTER — OFFICE VISIT (OUTPATIENT)
Dept: PEDIATRIC CARDIOLOGY | Facility: CLINIC | Age: 29
End: 2019-06-11
Payer: MEDICAID

## 2019-06-11 ENCOUNTER — INITIAL CONSULT (OUTPATIENT)
Dept: MATERNAL FETAL MEDICINE | Facility: CLINIC | Age: 29
End: 2019-06-11
Payer: MEDICAID

## 2019-06-11 VITALS
DIASTOLIC BLOOD PRESSURE: 82 MMHG | WEIGHT: 187.81 LBS | SYSTOLIC BLOOD PRESSURE: 124 MMHG | BODY MASS INDEX: 32.06 KG/M2 | HEIGHT: 64 IN

## 2019-06-11 DIAGNOSIS — O36.8390 FETAL ARRHYTHMIA AFFECTING PREGNANCY, ANTEPARTUM: ICD-10-CM

## 2019-06-11 DIAGNOSIS — M35.00 SJOGREN'S SYNDROME, WITH UNSPECIFIED ORGAN INVOLVEMENT: ICD-10-CM

## 2019-06-11 DIAGNOSIS — M32.9 SYSTEMIC LUPUS COMPLICATING PREGNANCY: ICD-10-CM

## 2019-06-11 DIAGNOSIS — M32.9 SYSTEMIC LUPUS ERYTHEMATOSUS ARTHRITIS: ICD-10-CM

## 2019-06-11 DIAGNOSIS — Z36.89 ENCOUNTER FOR ULTRASOUND TO CHECK FETAL GROWTH: ICD-10-CM

## 2019-06-11 DIAGNOSIS — M32.9 SLE (SYSTEMIC LUPUS ERYTHEMATOSUS RELATED SYNDROME): ICD-10-CM

## 2019-06-11 DIAGNOSIS — I10 ESSENTIAL HYPERTENSION: ICD-10-CM

## 2019-06-11 DIAGNOSIS — O30.049 DICHORIONIC DIAMNIOTIC TWIN PREGNANCY, ANTEPARTUM: ICD-10-CM

## 2019-06-11 DIAGNOSIS — R76.8 ANA POSITIVE: ICD-10-CM

## 2019-06-11 DIAGNOSIS — O99.891 SYSTEMIC LUPUS COMPLICATING PREGNANCY: ICD-10-CM

## 2019-06-11 DIAGNOSIS — M32.14 SLE GLOMERULONEPHRITIS SYNDROME, WHO CLASS V: Primary | ICD-10-CM

## 2019-06-11 PROCEDURE — 93325 DOPPLER ECHO COLOR FLOW MAPG: CPT | Mod: PBBFAC | Performed by: PEDIATRICS

## 2019-06-11 PROCEDURE — 99999 PR PBB SHADOW E&M-EST. PATIENT-LVL I: CPT | Mod: PBBFAC,,,

## 2019-06-11 PROCEDURE — 76827 PR  SO2 FETAL HEART DOPPLER: ICD-10-PCS | Mod: 26,S$PBB,, | Performed by: PEDIATRICS

## 2019-06-11 PROCEDURE — 99999 PR PBB SHADOW E&M-EST. PATIENT-LVL III: CPT | Mod: PBBFAC,,, | Performed by: PEDIATRICS

## 2019-06-11 PROCEDURE — 76825 PR  SO2 FETAL HEART: ICD-10-PCS | Mod: 26,S$PBB,, | Performed by: PEDIATRICS

## 2019-06-11 PROCEDURE — 99213 OFFICE O/P EST LOW 20 MIN: CPT | Mod: PBBFAC,TH,25 | Performed by: OBSTETRICS & GYNECOLOGY

## 2019-06-11 PROCEDURE — 76825 ECHO EXAM OF FETAL HEART: CPT | Mod: 59,PBBFAC | Performed by: PEDIATRICS

## 2019-06-11 PROCEDURE — 76816 PR  US,PREGNANT UTERUS,F/U,TRANSABD APP: ICD-10-PCS | Mod: 26,22,S$PBB, | Performed by: OBSTETRICS & GYNECOLOGY

## 2019-06-11 PROCEDURE — 76816 OB US FOLLOW-UP PER FETUS: CPT | Mod: PBBFAC | Performed by: OBSTETRICS & GYNECOLOGY

## 2019-06-11 PROCEDURE — 99999 PR PBB SHADOW E&M-EST. PATIENT-LVL III: ICD-10-PCS | Mod: PBBFAC,,, | Performed by: OBSTETRICS & GYNECOLOGY

## 2019-06-11 PROCEDURE — 99211 OFF/OP EST MAY X REQ PHY/QHP: CPT | Mod: PBBFAC,27,25

## 2019-06-11 PROCEDURE — 99999 PR PBB SHADOW E&M-EST. PATIENT-LVL III: CPT | Mod: PBBFAC,,, | Performed by: OBSTETRICS & GYNECOLOGY

## 2019-06-11 PROCEDURE — 99214 OFFICE O/P EST MOD 30 MIN: CPT | Mod: S$PBB,,, | Performed by: PEDIATRICS

## 2019-06-11 PROCEDURE — 93325 PR DOPPLER COLOR FLOW VELOCITY MAP: ICD-10-PCS | Mod: 26,S$PBB,, | Performed by: PEDIATRICS

## 2019-06-11 PROCEDURE — 99213 PR OFFICE/OUTPT VISIT, EST, LEVL III, 20-29 MIN: ICD-10-PCS | Mod: 25,S$PBB,TH, | Performed by: OBSTETRICS & GYNECOLOGY

## 2019-06-11 PROCEDURE — 76827 ECHO EXAM OF FETAL HEART: CPT | Mod: 59,PBBFAC | Performed by: PEDIATRICS

## 2019-06-11 PROCEDURE — 76825 PR  SO2 FETAL HEART: ICD-10-PCS | Mod: 26,59,S$PBB, | Performed by: PEDIATRICS

## 2019-06-11 PROCEDURE — 93325 PR DOPPLER COLOR FLOW VELOCITY MAP: ICD-10-PCS | Mod: 26,59,S$PBB, | Performed by: PEDIATRICS

## 2019-06-11 PROCEDURE — 99213 OFFICE O/P EST LOW 20 MIN: CPT | Mod: PBBFAC,25,27 | Performed by: PEDIATRICS

## 2019-06-11 PROCEDURE — 93325 DOPPLER ECHO COLOR FLOW MAPG: CPT | Mod: 26,59,S$PBB, | Performed by: PEDIATRICS

## 2019-06-11 PROCEDURE — 99214 PR OFFICE/OUTPT VISIT, EST, LEVL IV, 30-39 MIN: ICD-10-PCS | Mod: S$PBB,,, | Performed by: PEDIATRICS

## 2019-06-11 PROCEDURE — 99999 PR PBB SHADOW E&M-EST. PATIENT-LVL III: ICD-10-PCS | Mod: PBBFAC,,, | Performed by: PEDIATRICS

## 2019-06-11 PROCEDURE — 76827 ECHO EXAM OF FETAL HEART: CPT | Mod: PBBFAC | Performed by: PEDIATRICS

## 2019-06-11 PROCEDURE — 93325 DOPPLER ECHO COLOR FLOW MAPG: CPT | Mod: 59,PBBFAC | Performed by: PEDIATRICS

## 2019-06-11 PROCEDURE — 76816 OB US FOLLOW-UP PER FETUS: CPT | Mod: 26,22,S$PBB, | Performed by: OBSTETRICS & GYNECOLOGY

## 2019-06-11 PROCEDURE — 76819 FETAL BIOPHYS PROFIL W/O NST: CPT | Mod: 26,22,S$PBB, | Performed by: OBSTETRICS & GYNECOLOGY

## 2019-06-11 PROCEDURE — 76827 PR  SO2 FETAL HEART DOPPLER: ICD-10-PCS | Mod: 26,59,S$PBB, | Performed by: PEDIATRICS

## 2019-06-11 PROCEDURE — 76825 ECHO EXAM OF FETAL HEART: CPT | Mod: PBBFAC | Performed by: PEDIATRICS

## 2019-06-11 PROCEDURE — 76827 ECHO EXAM OF FETAL HEART: CPT | Mod: 26,59,S$PBB, | Performed by: PEDIATRICS

## 2019-06-11 PROCEDURE — 99213 OFFICE O/P EST LOW 20 MIN: CPT | Mod: 25,S$PBB,TH, | Performed by: OBSTETRICS & GYNECOLOGY

## 2019-06-11 PROCEDURE — 76827 ECHO EXAM OF FETAL HEART: CPT | Mod: 26,S$PBB,, | Performed by: PEDIATRICS

## 2019-06-11 PROCEDURE — 76825 ECHO EXAM OF FETAL HEART: CPT | Mod: 26,59,S$PBB, | Performed by: PEDIATRICS

## 2019-06-11 PROCEDURE — 76819 FETAL BIOPHYS PROFIL W/O NST: CPT | Mod: PBBFAC | Performed by: OBSTETRICS & GYNECOLOGY

## 2019-06-11 PROCEDURE — 99999 PR PBB SHADOW E&M-EST. PATIENT-LVL I: ICD-10-PCS | Mod: PBBFAC,,,

## 2019-06-11 PROCEDURE — 93325 DOPPLER ECHO COLOR FLOW MAPG: CPT | Mod: 26,S$PBB,, | Performed by: PEDIATRICS

## 2019-06-11 PROCEDURE — 76819 PR US, OB, FETAL BIOPHYSICAL, W/O NST: ICD-10-PCS | Mod: 26,22,S$PBB, | Performed by: OBSTETRICS & GYNECOLOGY

## 2019-06-11 PROCEDURE — 76825 ECHO EXAM OF FETAL HEART: CPT | Mod: 26,S$PBB,, | Performed by: PEDIATRICS

## 2019-06-12 ENCOUNTER — ROUTINE PRENATAL (OUTPATIENT)
Dept: OBSTETRICS AND GYNECOLOGY | Facility: CLINIC | Age: 29
End: 2019-06-12
Payer: MEDICAID

## 2019-06-12 ENCOUNTER — LAB VISIT (OUTPATIENT)
Dept: LAB | Facility: HOSPITAL | Age: 29
End: 2019-06-12
Attending: OBSTETRICS & GYNECOLOGY
Payer: MEDICAID

## 2019-06-12 VITALS
SYSTOLIC BLOOD PRESSURE: 134 MMHG | WEIGHT: 190.69 LBS | DIASTOLIC BLOOD PRESSURE: 84 MMHG | BODY MASS INDEX: 32.73 KG/M2

## 2019-06-12 DIAGNOSIS — M32.9 SLE-SJOGREN OVERLAP SYNDROME: ICD-10-CM

## 2019-06-12 DIAGNOSIS — M35.00 SLE-SJOGREN OVERLAP SYNDROME: ICD-10-CM

## 2019-06-12 DIAGNOSIS — Z3A.34 PREGNANCY WITH 34 COMPLETED WEEKS GESTATION: ICD-10-CM

## 2019-06-12 DIAGNOSIS — I10 ESSENTIAL HYPERTENSION: ICD-10-CM

## 2019-06-12 DIAGNOSIS — Z98.891 H/O: C-SECTION: ICD-10-CM

## 2019-06-12 DIAGNOSIS — Z3A.34 PREGNANCY WITH 34 COMPLETED WEEKS GESTATION: Primary | ICD-10-CM

## 2019-06-12 LAB
BASOPHILS # BLD AUTO: 0.01 K/UL (ref 0–0.2)
BASOPHILS NFR BLD: 0.3 % (ref 0–1.9)
DIFFERENTIAL METHOD: ABNORMAL
EOSINOPHIL # BLD AUTO: 0 K/UL (ref 0–0.5)
EOSINOPHIL NFR BLD: 0.9 % (ref 0–8)
ERYTHROCYTE [DISTWIDTH] IN BLOOD BY AUTOMATED COUNT: 12.8 % (ref 11.5–14.5)
HCT VFR BLD AUTO: 24.4 % (ref 37–48.5)
HGB BLD-MCNC: 7.9 G/DL (ref 12–16)
IMM GRANULOCYTES # BLD AUTO: 0.03 K/UL (ref 0–0.04)
IMM GRANULOCYTES NFR BLD AUTO: 0.9 % (ref 0–0.5)
LYMPHOCYTES # BLD AUTO: 0.9 K/UL (ref 1–4.8)
LYMPHOCYTES NFR BLD: 24.5 % (ref 18–48)
MCH RBC QN AUTO: 28.3 PG (ref 27–31)
MCHC RBC AUTO-ENTMCNC: 32.4 G/DL (ref 32–36)
MCV RBC AUTO: 88 FL (ref 82–98)
MONOCYTES # BLD AUTO: 0.3 K/UL (ref 0.3–1)
MONOCYTES NFR BLD: 7.5 % (ref 4–15)
NEUTROPHILS # BLD AUTO: 2.3 K/UL (ref 1.8–7.7)
NEUTROPHILS NFR BLD: 65.9 % (ref 38–73)
NRBC BLD-RTO: 0 /100 WBC
PLATELET # BLD AUTO: 167 K/UL (ref 150–350)
PMV BLD AUTO: 11.1 FL (ref 9.2–12.9)
RBC # BLD AUTO: 2.79 M/UL (ref 4–5.4)
WBC # BLD AUTO: 3.47 K/UL (ref 3.9–12.7)

## 2019-06-12 PROCEDURE — 99212 PR OFFICE/OUTPT VISIT, EST, LEVL II, 10-19 MIN: ICD-10-PCS | Mod: TH,S$PBB,, | Performed by: OBSTETRICS & GYNECOLOGY

## 2019-06-12 PROCEDURE — 80053 COMPREHEN METABOLIC PANEL: CPT

## 2019-06-12 PROCEDURE — 99212 OFFICE O/P EST SF 10 MIN: CPT | Mod: TH,S$PBB,, | Performed by: OBSTETRICS & GYNECOLOGY

## 2019-06-12 PROCEDURE — 99213 OFFICE O/P EST LOW 20 MIN: CPT | Mod: PBBFAC | Performed by: OBSTETRICS & GYNECOLOGY

## 2019-06-12 PROCEDURE — 99999 PR PBB SHADOW E&M-EST. PATIENT-LVL III: CPT | Mod: PBBFAC,,, | Performed by: OBSTETRICS & GYNECOLOGY

## 2019-06-12 PROCEDURE — 84156 ASSAY OF PROTEIN URINE: CPT

## 2019-06-12 PROCEDURE — 36415 COLL VENOUS BLD VENIPUNCTURE: CPT

## 2019-06-12 PROCEDURE — 99999 PR PBB SHADOW E&M-EST. PATIENT-LVL III: ICD-10-PCS | Mod: PBBFAC,,, | Performed by: OBSTETRICS & GYNECOLOGY

## 2019-06-12 PROCEDURE — 85025 COMPLETE CBC W/AUTO DIFF WBC: CPT

## 2019-06-12 NOTE — PROGRESS NOTES
Indication  ========    RT MD: Twins (from [place])/Lupus/CHTN/BPP    History  ======    Risk Factors  Details: Son with heart block  History risk factors: Multiple gestation    Maternal Assessment  =================    Weight 85 kg  Weight (lb) 187 lb  BP syst 124 mmHg  BP diast 78 mmHg    Method  ======    Transabdominal ultrasound examination, 2D Color Doppler, [place] E10. View: Good view    Pregnancy  =========    Twin pregnancy. Dichorionic-diamniotic. Number of fetuses: 2    Dating  ======    LMP on: 10/15/2018  Cycle: regular cycle  GA by LMP 34 w + 1 d  VICTORIANO by LMP: 7/22/2019  Ultrasound examination on: 6/11/2019  GA by U/S based upon: AC, BPD, Femur, HC  GA by U/S 33 w + 5 d  VICTORIANO by U/S: 7/25/2019  GA by U/S based upon (Fetus 2): AC, BPD, Femur, HC  GA by U/S (Fetus 2) 35 w + 3 d  VICTORIANO by U/S (Fetus 2): 7/13/2019  Assigned: Dating performed on 02/25/2019, based on the LMP  Assigned GA 34 w + 1 d  Assigned VICTORIANO: 7/22/2019  Pregnancy length 280 d    General Evaluation (Fetus 1)  =====================    Cardiac activity present.  bpm.  Fetal movements visualized.  Presentation cephalic LLQ.  Placenta Placental site: anterior.  Umbilical cord Cord vessels: 3 vessel cord.  Amniotic fluid Amount of AF: normal amount. MVP 5.2 cm.    Biophysical Profile (Fetus 1)  =====================    2: Fetal breathing movements  2: Gross body movements  2: Fetal tone  2: Amniotic fluid volume  8/8 Biophysical profile score    Fetal Biometry (Fetus 1)  ===================    Standard  BPD 86.5 mm  34w 6d                Hadlock    .3 mm  35w 1d                Pascale    .4 mm  34w 4d                Hadlock    .4 mm  32w 1d                Hadlock    Femur 64.4 mm  33w 2d                Hadlock    HC / AC 1.10    EFW 2,091 g          14%        [place]    EFW discordance 16.1 %  EFW (lb) 4 lb  EFW (oz) 10 oz  EFW by: Milo (BPD-HC-AC-FL)  Head / Face / Neck  Cephalic index 0.81    Extremities / Bony  Struc  FL / BPD 0.74    FL / AC 0.23    Other Structures   bpm    Fetal Anatomy (Fetus 1)  ===================    Cranium: appears normal  Heart / Thorax: Patient seen by Peds Cardiology on 06/11/2019  Stomach: normal  Kidneys: normal  Bladder: normal  Gender: male    General Evaluation (Fetus 2)  =====================    Cardiac activity present.  bpm.  Fetal movements visualized.  Presentation breech maternal RUQ.  Placenta Placental site: anterior.  Umbilical cord Cord vessels: 3 vessel cord.  Amniotic fluid Amount of AF: normal amount. MVP 7.9 cm.    Biophysical Profile (Fetus 2)  =====================    2: Fetal breathing movements  2: Gross body movements  2: Fetal tone  2: Amniotic fluid volume  8/8 Biophysical profile score    Fetal Biometry (Fetus 2)  ===================    Standard  BPD 89.2 mm  36w 1d                Hadlock    .5 mm  38w 2d                Pascale    .8 mm  36w 5d                Hadlock    .5 mm  34w 1d                Hadlock    Femur 67.1 mm  34w 4d                Hadlock    HC / AC 1.08    EFW 2,492 g          36%        Tico    EFW discordance 16.1 %  EFW (lb) 5 lb  EFW (oz) 8 oz  EFW by: Hadlock (BPD-HC-AC-FL)  Head / Face / Neck  Cephalic index 0.79    Extremities / Bony Struc  FL / BPD 0.75    FL / AC 0.22    Other Structures   bpm    Fetal Anatomy (Fetus 2)  ===================    Cranium: appears normal  Heart / Thorax: Patient seen by Peds Cardiology on 06/11/2019  Stomach: normal  Kidneys: normal  Bladder: normal  Gender: female    Consultation  ==========    Type: follow up  Patient doing well. Did not report any symptoms of lupus flare. Denies preeclampsia symptoms. Was in hospital at end of May for HTN. P/C  was unable to calculate due to high urine creatinine and no 24 hour urine was sent.  BP today 124/78-she is still taking Procardia 60mg daily (30 in am and 30 in pm)-her dose was not increased. She is taking Plaquenil and  is  still only on 5mg of Prednisone daily.  She sees nephrology on .  Patient reports she received betamethasone in May.   Cr 0.6, AST 21, ALT 7  C4 12, C3 73, Hg 7.8, Plt 166    1. Lupus nephritis: patient given precautions; recommend primary ob repeat preeclampsia labs tomorrow at prenatal visit and check a 24 hour  urine for protein and creatinine clearance. If patient continues on 5mg of Prednisone daily and dose is not increased, she does not need stress  dose steroids but needs to receive her daily prednisone doses every day. C4 was low normal on last check. Low threshold to repeat.    2. CHTN: If patient has any severe range BP, would recommend delivery. BP currently controlled today. Has had some intermittent high blood  pressures with a few severe range recorded in past. Recommend delivery at 36 weeks if no indication for earlier delivery. This is a change in  timing given patient's episodic hypertension. Per chart, BTL is planned.    3. Prior child with heartblock: Per Dr. Martin, normal fetal echo today on both fetuses. Report pending. Ok for delivery in [place]. Patient  takes Plaquenil.    4. Renal pyelectasis on fetus B appears resolved. However,  renal ultrasound was recommended on prior consult. Recommend  primary ob notify peds so that a  scan is done.    5. Patient needs twice weekly  fetal surveillance with her primary ob-BPP should alternate with NST on Tuesday/Friday or  Monday/Thursday scheduled.    6.Primary ob should address anemia and type and cross for delivery.    15 minutes was spent in face to face time with greater than half of that time spent in counseling and coordination of care.    Impression  =========    Live dichorionic/diamniotic twin intrauterine pregnancy.  Fetus A: Overall normal fetal growth. EFW is lower normal.  Normal amniotic fluid volume.  Limited fetal anatomy appears normal.  BPP 8/8.  Fetus B: Overall normal fetal  growth.  Upper normal amniotic fluid volume.  Limited fetal anatomy appears normal.  BPP 8/8.  16.1% discordance noted.  FHR normal for both fetuses.    Recommendation  ==============    Recommend twice weekly  fetal surveillance through primary ob.  Recommend primary ob repeat preeclampsia labs tomorrow and 24 hour urine for protein/creatinine clearance  Keep nephrology follow up.  Delivery 36 weeks or earlier if indicated (this is a change from prior recommendations).  Be sure patient takes prednisone as above. No stress dose if she remains on current dose.   renal ultrasound of Fetus B is recommended per prior notes.  Primary ob should address anemia and type and cross for delivery.  No further MFM follow up scheduled.  See note above.

## 2019-06-13 ENCOUNTER — PATIENT MESSAGE (OUTPATIENT)
Dept: OBSTETRICS AND GYNECOLOGY | Facility: CLINIC | Age: 29
End: 2019-06-13

## 2019-06-13 LAB
ALBUMIN SERPL BCP-MCNC: 2.3 G/DL (ref 3.5–5.2)
ALP SERPL-CCNC: 148 U/L (ref 55–135)
ALT SERPL W/O P-5'-P-CCNC: 8 U/L (ref 10–44)
ANION GAP SERPL CALC-SCNC: 10 MMOL/L (ref 8–16)
AST SERPL-CCNC: 21 U/L (ref 10–40)
BILIRUB SERPL-MCNC: 0.4 MG/DL (ref 0.1–1)
BUN SERPL-MCNC: 8 MG/DL (ref 6–20)
CALCIUM SERPL-MCNC: 8.2 MG/DL (ref 8.7–10.5)
CHLORIDE SERPL-SCNC: 106 MMOL/L (ref 95–110)
CO2 SERPL-SCNC: 20 MMOL/L (ref 23–29)
CREAT SERPL-MCNC: 0.7 MG/DL (ref 0.5–1.4)
CREAT UR-MCNC: >450 MG/DL (ref 15–325)
EST. GFR  (AFRICAN AMERICAN): >60 ML/MIN/1.73 M^2
EST. GFR  (NON AFRICAN AMERICAN): >60 ML/MIN/1.73 M^2
GLUCOSE SERPL-MCNC: 66 MG/DL (ref 70–110)
POTASSIUM SERPL-SCNC: 3.3 MMOL/L (ref 3.5–5.1)
PROT SERPL-MCNC: 7 G/DL (ref 6–8.4)
PROT UR-MCNC: 186 MG/DL (ref 0–15)
PROT/CREAT UR: ABNORMAL MG/G{CREAT} (ref 0–0.2)
SODIUM SERPL-SCNC: 136 MMOL/L (ref 136–145)

## 2019-06-14 ENCOUNTER — PROCEDURE VISIT (OUTPATIENT)
Dept: OBSTETRICS AND GYNECOLOGY | Facility: CLINIC | Age: 29
End: 2019-06-14
Payer: MEDICAID

## 2019-06-14 DIAGNOSIS — O30.049 DICHORIONIC DIAMNIOTIC TWIN PREGNANCY, ANTEPARTUM: ICD-10-CM

## 2019-06-14 DIAGNOSIS — I10 ESSENTIAL HYPERTENSION: ICD-10-CM

## 2019-06-14 DIAGNOSIS — Z3A.34 PREGNANCY WITH 34 COMPLETED WEEKS GESTATION: ICD-10-CM

## 2019-06-14 DIAGNOSIS — M35.00 SLE-SJOGREN OVERLAP SYNDROME: ICD-10-CM

## 2019-06-14 DIAGNOSIS — M32.9 SLE-SJOGREN OVERLAP SYNDROME: ICD-10-CM

## 2019-06-14 PROCEDURE — 76819 FETAL BIOPHYS PROFIL W/O NST: CPT | Mod: 26,S$PBB,, | Performed by: OBSTETRICS & GYNECOLOGY

## 2019-06-14 PROCEDURE — 76819 PR US, OB, FETAL BIOPHYSICAL, W/O NST: ICD-10-PCS | Mod: 26,S$PBB,, | Performed by: OBSTETRICS & GYNECOLOGY

## 2019-06-14 PROCEDURE — 76819 FETAL BIOPHYS PROFIL W/O NST: CPT | Mod: PBBFAC | Performed by: OBSTETRICS & GYNECOLOGY

## 2019-06-14 NOTE — PROGRESS NOTES
[facility] Pediatric Cardiology [place] [address] Fetal Cardiology Clinic    Today, I had the pleasure of evaluating Alisia Vines who is now 28 y.o. and carrying her third pregnancy at 34 1/7 weeks gestation with an VICTORIANO of 19. She was referred for evaluation of the fetal heart due a history of antibody positive maternal Sjogren syndrome and lupus with a previously affect pregnancy with congenital complete heart block ([place]).  He received a pacemaker and subsequently developed severe dilated cardiomyopathy and is status post heart transplant.    She has been treated with plaquenil during this pregnancy.    She is carrying a male and a female twin fetuses, who are dichorionic and diamniotic.  The twins have had normal LA intervals and cardiac rhythms and rates throughout gestation.    Obstetric History:    .  Her first pregnancy resulted in a term C section with CCHB.  Her second pregnancy resulted in a term baby via C section with no cardiac issues.  Her OB care is by Dr. Janice Arenas and her MFM care is by Dr. Bravo.      Past Medical History:   Diagnosis Date    Allergic rhinitis     Anemia     Condyloma acuminata     GERD (gastroesophageal reflux disease)     History of fetal anomaly in prior pregnancy, currently pregnant, unspecified trimester 3/8/2017    Pacemaker placed    HSV-2 (herpes simplex virus 2) infection     Lupus nephritis     Mood disorder     Overweight(278.02)     Sjogren's syndrome     Systemic lupus erythematosus     Thrombocytopenia          Current Outpatient Medications:     efinaconazole (JUBLIA) 10 % Mario, Apply once daily to nail and nail fold.  Use daily for up to 1 year., Disp: 8 mL, Rfl: 3    famotidine (PEPCID) 20 MG tablet, Take 1 tablet (20 mg total) by mouth 2 (two) times daily as needed for Heartburn., Disp: 30 tablet, Rfl: 3    flunisolide 25 mcg, 0.025%, (NASALIDE) 25 mcg (0.025 %) Spry, 2 sprays by Nasal route 2 (two) times daily., Disp: 25 mL, Rfl: 2     FLUoxetine 40 MG capsule, Take 1 capsule (40 mg total) by mouth once daily., Disp: 30 capsule, Rfl: 0    hydroxychloroquine (PLAQUENIL) 200 mg tablet, Take 1 tablet (200 mg total) by mouth 2 (two) times daily., Disp: 60 tablet, Rfl: 11    levocetirizine (XYZAL) 5 MG tablet, Take 1 tablet (5 mg total) by mouth once daily., Disp: 30 tablet, Rfl: 6    NIFEdipine (PROCARDIA-XL) 30 MG (OSM) 24 hr tablet, Take 2 tablets (60 mg total) by mouth once daily., Disp: 30 tablet, Rfl: 11    predniSONE (DELTASONE) 5 MG tablet, Take 1 tablet (5 mg total) by mouth once daily., Disp: 90 tablet, Rfl: 3    PRENATAL 118-IRON-FOLATE 6-DHA ORAL, Take by mouth., Disp: , Rfl:     valACYclovir (VALTREX) 500 MG tablet, Take 2 tablets (1,000 mg total) by mouth once daily., Disp: 90 tablet, Rfl: 3    Family History: Except as above, negative for congenital heart disease, early coronary artery disease, sudden unexplained death, connective tissues disorders, genetic syndromes, multiple miscarriages or other congenital anomalies.    Social History: Ms. Vines is single.      FETAL ECHOCARDIOGRAM (summary):  Fetal echocardiogram at 34 1/7 weeks gestation for a history of maternal lupus and Sjogren syndrome and a previous pregnancy complicated by congenital complete heart block; the current pregnancy is dichorionic, diamniotic twin gestation. VICTORIANO 7/22/19.  This study is for twin A who is vertex and to the maternal left.  Normally connected heart.  No ectopy or sustained arrhythmia demonstrated throughout the study. Normal  mechanical [place] interval of 110-120 msec.  No ventricular level shunting.  Normal atrioventricular and semilunar valve structure and function.  Branch pulmonary arteries well seen and appear unobstructed.  Fetal ductus arteriosus not well seen.  Fetal aortic arch appears unobstructed by color Doppler.  There is a prominent vessel with flow that is directed anterior that courses  above the aortic arch, which likely represents a  azygous vein.  Normal biventricular size and systolic function.  No pericardial effusion.    This study is for twin B who is breech and to the maternal right.  Normally connected heart.  No ectopy or sustained arrhythmia demonstrated throughout the study.  Not able to obtain a mechanical MO interval.  Normal fetal atrial and ductal level shunting.  No ventricular level shunting.  Normal atrioventricular and semilunar valve structure and function.  Normal ductal and aortic arches.  Normal biventricular size and systolic function.  No pericardial effusion.  (Full report in electronic medical record)    Impression:  Twin active male and female fetuses at 34 wga.  Normal fetal echocardiograms with normal sinus rhythm and mechanical MO intervals.      Todays fetal echocardiograms are normal, within the limitations of fetal echocardiography.  I discussed with her that fetal echocardiography is insufficiently sensitive to rule out all septal defects, anomalies of pulmonary and systemic veins, arch anomalies, and some valvar abnormalities, nor can it ensure that the ductus arteriosus and foramen ovale will spontaneously close.     Recommendations:  The risk of the twins developing CCHB is very low at this point.  She can deliver in [place] with follow up with cardiology after birth, with an ECG.    Recommend continuing plaquenil through delivery.    The above information was discussed in detail including the use of diagrams, with 45 minutes of total face to face time, with greater than 50% with counseling and coordination of care.  The discussion of the diagnosis and treatment options is as described above.      Dave Martni MD, MPH  Pediatric and Fetal Cardiology  [facility]   [address] [place] [address]    Office: [phone]  Cell: [phone]

## 2019-06-15 ENCOUNTER — PATIENT MESSAGE (OUTPATIENT)
Dept: OBSTETRICS AND GYNECOLOGY | Facility: CLINIC | Age: 29
End: 2019-06-15

## 2019-06-15 NOTE — PROGRESS NOTES
Seen by DENILSON 6/11. Repeat preE labs & 24 hour urine to be started per recs. Discussed rec for delivery at 36 w instead of 37, pt hesitant to move delivery up. Will consider delivery 6/28, at least >36w but <37. Pt feeling well, good FM

## 2019-06-17 ENCOUNTER — TELEPHONE (OUTPATIENT)
Dept: PEDIATRIC CARDIOLOGY | Facility: CLINIC | Age: 29
End: 2019-06-17

## 2019-06-17 ENCOUNTER — OFFICE VISIT (OUTPATIENT)
Dept: RHEUMATOLOGY | Facility: CLINIC | Age: 29
End: 2019-06-17
Payer: MEDICAID

## 2019-06-17 VITALS
WEIGHT: 192 LBS | HEART RATE: 101 BPM | DIASTOLIC BLOOD PRESSURE: 104 MMHG | HEIGHT: 64 IN | BODY MASS INDEX: 32.78 KG/M2 | SYSTOLIC BLOOD PRESSURE: 155 MMHG

## 2019-06-17 DIAGNOSIS — M32.14 SLE GLOMERULONEPHRITIS SYNDROME, WHO CLASS V: ICD-10-CM

## 2019-06-17 DIAGNOSIS — R76.8 ANA POSITIVE: ICD-10-CM

## 2019-06-17 DIAGNOSIS — M32.9 SYSTEMIC LUPUS COMPLICATING PREGNANCY: Primary | ICD-10-CM

## 2019-06-17 DIAGNOSIS — M32.9 SYSTEMIC LUPUS ERYTHEMATOSUS ARTHRITIS: ICD-10-CM

## 2019-06-17 DIAGNOSIS — O99.891 SYSTEMIC LUPUS COMPLICATING PREGNANCY: Primary | ICD-10-CM

## 2019-06-17 PROCEDURE — 99214 OFFICE O/P EST MOD 30 MIN: CPT | Mod: S$PBB,,, | Performed by: INTERNAL MEDICINE

## 2019-06-17 PROCEDURE — 99999 PR PBB SHADOW E&M-EST. PATIENT-LVL III: ICD-10-PCS | Mod: PBBFAC,,, | Performed by: INTERNAL MEDICINE

## 2019-06-17 PROCEDURE — 99213 OFFICE O/P EST LOW 20 MIN: CPT | Mod: PBBFAC | Performed by: INTERNAL MEDICINE

## 2019-06-17 PROCEDURE — 99214 PR OFFICE/OUTPT VISIT, EST, LEVL IV, 30-39 MIN: ICD-10-PCS | Mod: S$PBB,,, | Performed by: INTERNAL MEDICINE

## 2019-06-17 PROCEDURE — 99999 PR PBB SHADOW E&M-EST. PATIENT-LVL III: CPT | Mod: PBBFAC,,, | Performed by: INTERNAL MEDICINE

## 2019-06-17 RX ORDER — HYDROXYCHLOROQUINE SULFATE 200 MG/1
200 TABLET, FILM COATED ORAL 2 TIMES DAILY
Qty: 60 TABLET | Refills: 11 | Status: SHIPPED | OUTPATIENT
Start: 2019-06-17 | End: 2019-08-29 | Stop reason: SDUPTHER

## 2019-06-17 NOTE — PROGRESS NOTES
RHEUMATOLOGY CLINIC FOLLOW UP VISIT  Chief complaints:-  To follow up for lupus.     HPI:-  Alisia Jolly a 28 y.o. pleasant female comes in for a follow up visit with above chief complaints. She has systemic lupus erythematosus diagnosed in 2013 when she had diffuse lymphadenopathy and anasarca.  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  She has been intermittently following up in the rheumatology clinic.  She was on immunosuppressive therapy and got pregnant accidentally since she was not taking her OCP's during that week because of irregular cycles. She underwent elective CS and delivered a healthy baby 10/2017.  She has been non compliant with medical management - no specific reason - she just couldn't get to take the pills. She intermittently took Benlysta injections.  Since Benlysta was controlling her arthritis she was switched to intravenous infusions for compliance.  She was compliant with infusions for awhile until she again got accidentally pregnant even with an IUD device.  Now she is pregnant with twins.  Her pregnancy has been uncomplicated so far. She denies any joint pain today. No headache.  She also denies seizures or psychosis,  joint swelling, muscle weakness,Raynaud's phenomenon, sicca symptoms .    Review of Systems   Constitutional: Negative for chills, fever and malaise/fatigue.   HENT: Negative for nosebleeds and sore throat.    Eyes: Negative for blurred vision, photophobia and redness.   Respiratory: Negative for cough, sputum production and shortness of breath.    Cardiovascular: Negative for chest pain and leg swelling.   Gastrointestinal: Negative for abdominal pain, constipation, diarrhea and heartburn.   Genitourinary: Negative for dysuria, frequency and urgency.   Musculoskeletal: Negative for back pain, falls, joint pain, myalgias and neck pain.   Skin:  "Negative for itching and rash.   Neurological: Negative for dizziness, tremors, seizures, loss of consciousness, weakness and headaches.   Endo/Heme/Allergies: Negative for environmental allergies. Does not bruise/bleed easily.   Psychiatric/Behavioral: Negative for hallucinations and memory loss. The patient does not have insomnia.        Past Medical History:   Diagnosis Date    Allergic rhinitis     Anemia     Condyloma acuminata     GERD (gastroesophageal reflux disease)     History of fetal anomaly in prior pregnancy, currently pregnant, unspecified trimester 3/8/2017    Pacemaker placed    HSV-2 (herpes simplex virus 2) infection     Lupus nephritis     Mood disorder     Overweight(278.02)     Sjogren's syndrome     Systemic lupus erythematosus     Thrombocytopenia        Past Surgical History:   Procedure Laterality Date     SECTION, LOW TRANSVERSE      DELIVERY- SECTION N/A 10/19/2017    [facility]D    RENAL BIOPSY  2013        Social History     Tobacco Use    Smoking status: Never Smoker    Smokeless tobacco: Never Used   Substance Use Topics    Alcohol use: No     Alcohol/week: 0.0 oz    Drug use: No       Family History   Problem Relation Age of Onset    Hypertension Mother     Eczema Brother     Heart disease Son     Arrhythmia Son         CHB    Hypertension Maternal Grandmother     Diabetes Paternal Grandmother     Lupus Neg Hx     Psoriasis Neg Hx     Congenital heart disease Neg Hx     Early death Neg Hx     Heart attacks under age 50 Neg Hx     Pacemaker/defibrilator Neg Hx        Review of patient's allergies indicates:  No Known Allergies        Physical examination:-    Vitals:    19 1253   BP: (!) 155/104   Pulse: 101   Weight: 87.1 kg (192 lb 0.3 oz)   Height: 5' 4" (1.626 m)   PainSc: 0-No pain       Physical Exam   Constitutional: She is oriented to person, place, and time and well-developed, " well-nourished, and in no distress. No distress.   HENT:   Head: Normocephalic.   Mouth/Throat: Oropharynx is clear and moist.   Eyes: Pupils are equal, round, and reactive to light. Conjunctivae and EOM are normal.   Neck: Normal range of motion. Neck supple.   Cardiovascular: Normal rate and intact distal pulses. Exam reveals no friction rub.   Pulmonary/Chest: Effort normal. No respiratory distress. She exhibits no tenderness.   Abdominal: Soft. There is no tenderness.   Musculoskeletal:   No synovitis over small joints of hands or feet.  No effusion over large joints.   Neurological: She is alert and oriented to person, place, and time. No cranial nerve deficit.   Skin: Skin is warm. No rash noted. No erythema.   Psychiatric: Mood and affect normal.   Nursing note and vitals reviewed.      Labs:-  Results for HEIDI SHAIKH (MRN [id number]) as of 5/22/2017 12:21   Ref. Range 1/23/2017 08:10 2/17/2017 08:40 3/22/2017 08:58 5/19/2017 13:36   HARPREET HEP-2 Titer Unknown Positive >=1:2560...      Anti-SSA Antibody Latest Ref Range: 0.00 - 19.99 .26 (H)      Anti-SSA Interpretation Latest Ref Range: Negative  Positive (A)      Anti-SSB Antibody Latest Ref Range: 0.00 - 19.99 EU 46.84 (H)      Anti-SSB Interpretation Latest Ref Range: Negative  Positive (A)      ds DNA Ab Latest Ref Range: Negative 1:10  Negative 1:10      Anti Sm Antibody Latest Ref Range: 0.00 - 19.99 EU 2.89      Anti-Sm Interpretation Latest Ref Range: Negative  Negative      Anti Sm/RNP Antibody Latest Ref Range: 0.00 - 19.99 EU 4.12      Anti-Sm/RNP Interpretation Latest Ref Range: Negative  Negative      Complement (C-3) Latest Ref Range: 50 - 180 mg/dL 45 (L) 56 47 (L) 56   Complement (C-4) Latest Ref Range: 11 - 44 mg/dL 10 (L) 13 13 16         Medication List with Changes/Refills   Current Medications    EFINACONAZOLE (JUBLIA) 10 % RALF    Apply once daily to nail and nail fold.  Use daily for up to 1 year.    FAMOTIDINE (PEPCID) 20  MG TABLET    Take 1 tablet (20 mg total) by mouth 2 (two) times daily as needed for Heartburn.    FLUNISOLIDE 25 MCG, 0.025%, (NASALIDE) 25 MCG (0.025 %) SPRY    2 sprays by Nasal route 2 (two) times daily.    FLUOXETINE 40 MG CAPSULE    Take 1 capsule (40 mg total) by mouth once daily.    LEVOCETIRIZINE (XYZAL) 5 MG TABLET    Take 1 tablet (5 mg total) by mouth once daily.    NIFEDIPINE (PROCARDIA-XL) 30 MG (OSM) 24 HR TABLET    Take 2 tablets (60 mg total) by mouth once daily.    PREDNISONE (DELTASONE) 5 MG TABLET    Take 1 tablet (5 mg total) by mouth once daily.    PRENATAL 118-IRON-FOLATE 6-DHA ORAL    Take by mouth.    VALACYCLOVIR (VALTREX) 500 MG TABLET    Take 2 tablets (1,000 mg total) by mouth once daily.   Changed and/or Refilled Medications    Modified Medication Previous Medication    HYDROXYCHLOROQUINE (PLAQUENIL) 200 MG TABLET hydroxychloroquine (PLAQUENIL) 200 mg tablet       Take 1 tablet (200 mg total) by mouth 2 (two) times daily.    Take 1 tablet (200 mg total) by mouth 2 (two) times daily.       Assessment/Plans:-  # Systemic lupus complicating pregnancy  Systemic lupus erythematosus complicating pregnancy.  No active flare-up in this pregnancy noted yet.  Her blood pressure is uncontrolled today and she her proteinuria is progressively worsening.  But her complement levels are normal reinforced thing that her lupus activity is well controlled on prednisone and Plaquenil.  She reports that her blood pressure is well controlled after she takes the medications.  She have not taken her medications yet today.  I advised her sincerely to recheck the blood pressure after taking the medications later sometime today.  If her blood pressure remains high along with her worsening proteinuria then I am very concerned about preeclampsia.  I have sent message to both her nephrologist and obstetrician in regards to her worsening proteinuria today.  Advised to keep close follow-ups.     # SSA ANTIBODY  positive  POSITIVE SSA ANTIBODY.  ADVISED TO CLOSELY FOLLOW UP WITH obstetrician since it could cause heart conduction birth defects in children. Recent fetal echo was normal per OB.     # Systemic lupus erythematosus arthritis  No active arthritis on examination today.  Continue Plaquenil and prednisone.      # SLE glomerulonephritis syndrome, WHO class V  WORSENING PROTEINURIA AS ABOVE.  Epic message sent to pathologist.    Disclaimer: This note was prepared using voice recognition system and is likely to have sound alike errors .  Please call me with any questions

## 2019-06-17 NOTE — TELEPHONE ENCOUNTER
Dr Martin would like for the twins to be followed by Dr Light when they are born. Provided callback name or number.

## 2019-06-18 ENCOUNTER — PROCEDURE VISIT (OUTPATIENT)
Dept: OBSTETRICS AND GYNECOLOGY | Facility: CLINIC | Age: 29
End: 2019-06-18
Payer: MEDICAID

## 2019-06-18 DIAGNOSIS — M32.9 SLE-SJOGREN OVERLAP SYNDROME: ICD-10-CM

## 2019-06-18 DIAGNOSIS — I10 ESSENTIAL HYPERTENSION: ICD-10-CM

## 2019-06-18 DIAGNOSIS — M35.00 SLE-SJOGREN OVERLAP SYNDROME: ICD-10-CM

## 2019-06-18 DIAGNOSIS — Z3A.34 PREGNANCY WITH 34 COMPLETED WEEKS GESTATION: ICD-10-CM

## 2019-06-18 PROCEDURE — 76819 FETAL BIOPHYS PROFIL W/O NST: CPT | Mod: 59,PBBFAC | Performed by: OBSTETRICS & GYNECOLOGY

## 2019-06-18 PROCEDURE — 76819 PR US, OB, FETAL BIOPHYSICAL, W/O NST: ICD-10-PCS | Mod: 26,59,S$PBB, | Performed by: OBSTETRICS & GYNECOLOGY

## 2019-06-18 PROCEDURE — 76819 FETAL BIOPHYS PROFIL W/O NST: CPT | Mod: 26,59,S$PBB, | Performed by: OBSTETRICS & GYNECOLOGY

## 2019-06-18 PROCEDURE — 76816 PR  US,PREGNANT UTERUS,F/U,TRANSABD APP: ICD-10-PCS | Mod: 26,59,S$PBB, | Performed by: OBSTETRICS & GYNECOLOGY

## 2019-06-18 PROCEDURE — 76816 OB US FOLLOW-UP PER FETUS: CPT | Mod: 59,PBBFAC | Performed by: OBSTETRICS & GYNECOLOGY

## 2019-06-18 PROCEDURE — 76816 OB US FOLLOW-UP PER FETUS: CPT | Mod: 26,59,S$PBB, | Performed by: OBSTETRICS & GYNECOLOGY

## 2019-06-21 ENCOUNTER — PROCEDURE VISIT (OUTPATIENT)
Dept: OBSTETRICS AND GYNECOLOGY | Facility: CLINIC | Age: 29
End: 2019-06-21
Payer: MEDICAID

## 2019-06-21 DIAGNOSIS — M35.00 SLE-SJOGREN OVERLAP SYNDROME: ICD-10-CM

## 2019-06-21 DIAGNOSIS — M32.9 SLE-SJOGREN OVERLAP SYNDROME: ICD-10-CM

## 2019-06-21 DIAGNOSIS — O30.049 DICHORIONIC DIAMNIOTIC TWIN PREGNANCY, ANTEPARTUM: ICD-10-CM

## 2019-06-21 DIAGNOSIS — I10 ESSENTIAL HYPERTENSION: ICD-10-CM

## 2019-06-21 DIAGNOSIS — O10.013 PRE-EXISTING ESSENTIAL HYPERTENSION DURING PREGNANCY IN THIRD TRIMESTER: ICD-10-CM

## 2019-06-21 DIAGNOSIS — Z3A.34 PREGNANCY WITH 34 COMPLETED WEEKS GESTATION: ICD-10-CM

## 2019-06-21 PROCEDURE — 76819 PR US, OB, FETAL BIOPHYSICAL, W/O NST: ICD-10-PCS | Mod: 26,S$PBB,, | Performed by: OBSTETRICS & GYNECOLOGY

## 2019-06-21 PROCEDURE — 76819 FETAL BIOPHYS PROFIL W/O NST: CPT | Mod: 26,S$PBB,, | Performed by: OBSTETRICS & GYNECOLOGY

## 2019-06-21 PROCEDURE — 76819 FETAL BIOPHYS PROFIL W/O NST: CPT | Mod: PBBFAC | Performed by: OBSTETRICS & GYNECOLOGY

## 2019-06-24 ENCOUNTER — TELEPHONE (OUTPATIENT)
Dept: OBSTETRICS AND GYNECOLOGY | Facility: CLINIC | Age: 29
End: 2019-06-24

## 2019-06-24 NOTE — TELEPHONE ENCOUNTER
Pt called in with c/o abdominal cramping. Pt states the cramps are not 5 min apart. Advised pt try otc tylenol as directed and rest. Advised pt drink water. Pt states she will try the tylenol. Advised pt if cramping becomes unbearable or as close as 5 min apart, she can go to the ED for evaluation. Pt verbalized understanding.

## 2019-06-26 ENCOUNTER — ROUTINE PRENATAL (OUTPATIENT)
Dept: OBSTETRICS AND GYNECOLOGY | Facility: CLINIC | Age: 29
End: 2019-06-26
Payer: MEDICAID

## 2019-06-26 ENCOUNTER — TELEPHONE (OUTPATIENT)
Dept: OBSTETRICS AND GYNECOLOGY | Facility: CLINIC | Age: 29
End: 2019-06-26

## 2019-06-26 VITALS
SYSTOLIC BLOOD PRESSURE: 142 MMHG | WEIGHT: 188.94 LBS | BODY MASS INDEX: 32.43 KG/M2 | DIASTOLIC BLOOD PRESSURE: 70 MMHG

## 2019-06-26 DIAGNOSIS — M32.9 SYSTEMIC LUPUS COMPLICATING PREGNANCY: ICD-10-CM

## 2019-06-26 DIAGNOSIS — O99.891 SYSTEMIC LUPUS COMPLICATING PREGNANCY: ICD-10-CM

## 2019-06-26 DIAGNOSIS — M32.14 LUPUS NEPHRITIS: ICD-10-CM

## 2019-06-26 DIAGNOSIS — Z98.891 H/O: C-SECTION: Primary | ICD-10-CM

## 2019-06-26 DIAGNOSIS — O30.043 DICHORIONIC DIAMNIOTIC TWIN PREGNANCY IN THIRD TRIMESTER: ICD-10-CM

## 2019-06-26 PROCEDURE — 99999 PR PBB SHADOW E&M-EST. PATIENT-LVL III: ICD-10-PCS | Mod: PBBFAC,,, | Performed by: OBSTETRICS & GYNECOLOGY

## 2019-06-26 PROCEDURE — 99212 PR OFFICE/OUTPT VISIT, EST, LEVL II, 10-19 MIN: ICD-10-PCS | Mod: TH,S$PBB,, | Performed by: OBSTETRICS & GYNECOLOGY

## 2019-06-26 PROCEDURE — 87081 CULTURE SCREEN ONLY: CPT

## 2019-06-26 PROCEDURE — 99999 PR PBB SHADOW E&M-EST. PATIENT-LVL III: CPT | Mod: PBBFAC,,, | Performed by: OBSTETRICS & GYNECOLOGY

## 2019-06-26 PROCEDURE — 99212 OFFICE O/P EST SF 10 MIN: CPT | Mod: TH,S$PBB,, | Performed by: OBSTETRICS & GYNECOLOGY

## 2019-06-26 PROCEDURE — 99213 OFFICE O/P EST LOW 20 MIN: CPT | Mod: PBBFAC,TH | Performed by: OBSTETRICS & GYNECOLOGY

## 2019-06-26 NOTE — PROGRESS NOTES
Pt reports doing well, good FM. Still desires to keep 7/2 as cs BTL date; aware of MFM recs, they are aware of 24 hour urine results. BPs stable, asymptomatic

## 2019-06-26 NOTE — TELEPHONE ENCOUNTER
Patient called to r/s her u/s for today before her appointment with Dr. Arenas at 2:00pm. Let her know that we do not have anymore u/s appointments available today. Patient would like staff to check to see if she can be squeezed into the schedule. Let her know I will send this message to Dr. Arenas's staff. Patient verbalized understanding.

## 2019-06-26 NOTE — TELEPHONE ENCOUNTER
----- Message from Yenni Franklin sent at 6/26/2019 10:54 AM CDT -----  Contact: Pt  Pt asked that nurse contact her regarding her 11am appointment today. Please contact pt at ([phone])

## 2019-06-27 ENCOUNTER — PROCEDURE VISIT (OUTPATIENT)
Dept: OBSTETRICS AND GYNECOLOGY | Facility: CLINIC | Age: 29
End: 2019-06-27
Payer: MEDICAID

## 2019-06-27 DIAGNOSIS — Z3A.34 PREGNANCY WITH 34 COMPLETED WEEKS GESTATION: ICD-10-CM

## 2019-06-27 DIAGNOSIS — M35.00 SLE-SJOGREN OVERLAP SYNDROME: ICD-10-CM

## 2019-06-27 DIAGNOSIS — I10 ESSENTIAL HYPERTENSION: ICD-10-CM

## 2019-06-27 DIAGNOSIS — M32.9 SLE-SJOGREN OVERLAP SYNDROME: ICD-10-CM

## 2019-06-27 DIAGNOSIS — O30.049 DICHORIONIC DIAMNIOTIC TWIN PREGNANCY, ANTEPARTUM: ICD-10-CM

## 2019-06-27 PROCEDURE — 76819 FETAL BIOPHYS PROFIL W/O NST: CPT | Mod: 26,59,S$PBB, | Performed by: OBSTETRICS & GYNECOLOGY

## 2019-06-27 PROCEDURE — 76819 FETAL BIOPHYS PROFIL W/O NST: CPT | Mod: 59,PBBFAC | Performed by: OBSTETRICS & GYNECOLOGY

## 2019-06-27 PROCEDURE — 76819 PR US, OB, FETAL BIOPHYSICAL, W/O NST: ICD-10-PCS | Mod: 26,59,S$PBB, | Performed by: OBSTETRICS & GYNECOLOGY

## 2019-06-27 PROCEDURE — 76816 OB US FOLLOW-UP PER FETUS: CPT | Mod: 59,PBBFAC | Performed by: OBSTETRICS & GYNECOLOGY

## 2019-06-27 PROCEDURE — 76816 PR  US,PREGNANT UTERUS,F/U,TRANSABD APP: ICD-10-PCS | Mod: 26,59,S$PBB, | Performed by: OBSTETRICS & GYNECOLOGY

## 2019-06-27 PROCEDURE — 76816 OB US FOLLOW-UP PER FETUS: CPT | Mod: 26,59,S$PBB, | Performed by: OBSTETRICS & GYNECOLOGY

## 2019-06-29 LAB — BACTERIA SPEC AEROBE CULT: NORMAL

## 2019-06-30 ENCOUNTER — ANESTHESIA (OUTPATIENT)
Dept: OBSTETRICS AND GYNECOLOGY | Facility: HOSPITAL | Age: 29
End: 2019-06-30
Payer: MEDICAID

## 2019-06-30 ENCOUNTER — ANESTHESIA EVENT (OUTPATIENT)
Dept: OBSTETRICS AND GYNECOLOGY | Facility: HOSPITAL | Age: 29
End: 2019-06-30
Payer: MEDICAID

## 2019-06-30 ENCOUNTER — HOSPITAL ENCOUNTER (INPATIENT)
Facility: HOSPITAL | Age: 29
LOS: 4 days | Discharge: HOME | End: 2019-07-04
Attending: OBSTETRICS & GYNECOLOGY | Admitting: OBSTETRICS & GYNECOLOGY
Payer: MEDICAID

## 2019-06-30 DIAGNOSIS — O10.913: ICD-10-CM

## 2019-06-30 DIAGNOSIS — O13.3 PIH (PREGNANCY INDUCED HYPERTENSION), THIRD TRIMESTER: ICD-10-CM

## 2019-06-30 DIAGNOSIS — Z98.891 STATUS POST REPEAT LOW TRANSVERSE CESAREAN SECTION: ICD-10-CM

## 2019-06-30 PROBLEM — O09.90 HIGH RISK PREGNANCY, ANTEPARTUM: Status: RESOLVED | Noted: 2018-12-12 | Resolved: 2019-06-30

## 2019-06-30 PROBLEM — O34.219 PREVIOUS CESAREAN DELIVERY AFFECTING PREGNANCY, ANTEPARTUM: Status: ACTIVE | Noted: 2019-06-30

## 2019-06-30 PROBLEM — O13.9 GESTATIONAL HYPERTENSION: Status: RESOLVED | Noted: 2019-05-24 | Resolved: 2019-06-30

## 2019-06-30 PROBLEM — R11.0 NAUSEA: Status: RESOLVED | Noted: 2018-12-04 | Resolved: 2019-06-30

## 2019-06-30 PROBLEM — O16.9 HYPERTENSION IN PREGNANCY, ANTEPARTUM: Status: RESOLVED | Noted: 2019-05-25 | Resolved: 2019-06-30

## 2019-06-30 LAB
ABO + RH BLD: NORMAL
ACANTHOCYTES BLD QL SMEAR: PRESENT
ALBUMIN SERPL BCP-MCNC: 2.4 G/DL (ref 3.5–5.2)
ALP SERPL-CCNC: 177 U/L (ref 55–135)
ALT SERPL W/O P-5'-P-CCNC: 10 U/L (ref 10–44)
ANION GAP SERPL CALC-SCNC: 12 MMOL/L (ref 8–16)
ANISOCYTOSIS BLD QL SMEAR: SLIGHT
AST SERPL-CCNC: 22 U/L (ref 10–40)
BACTERIA #/AREA URNS HPF: ABNORMAL /HPF
BASOPHILS # BLD AUTO: 0 K/UL (ref 0–0.2)
BASOPHILS # BLD AUTO: 0 K/UL (ref 0–0.2)
BASOPHILS NFR BLD: 0 % (ref 0–1.9)
BASOPHILS NFR BLD: 0 % (ref 0–1.9)
BILIRUB SERPL-MCNC: 0.5 MG/DL (ref 0.1–1)
BILIRUB UR QL STRIP: ABNORMAL
BLD GP AB SCN CELLS X3 SERPL QL: NORMAL
BUN SERPL-MCNC: 15 MG/DL (ref 6–20)
BURR CELLS BLD QL SMEAR: ABNORMAL
CALCIUM SERPL-MCNC: 8 MG/DL (ref 8.7–10.5)
CHLORIDE SERPL-SCNC: 106 MMOL/L (ref 95–110)
CLARITY UR: CLEAR
CO2 SERPL-SCNC: 17 MMOL/L (ref 23–29)
COLOR UR: YELLOW
CREAT SERPL-MCNC: 0.9 MG/DL (ref 0.5–1.4)
CREAT UR-MCNC: 335.9 MG/DL (ref 15–325)
DACRYOCYTES BLD QL SMEAR: ABNORMAL
DIFFERENTIAL METHOD: ABNORMAL
DIFFERENTIAL METHOD: ABNORMAL
EOSINOPHIL # BLD AUTO: 0 K/UL (ref 0–0.5)
EOSINOPHIL # BLD AUTO: 0 K/UL (ref 0–0.5)
EOSINOPHIL NFR BLD: 0.1 % (ref 0–8)
EOSINOPHIL NFR BLD: 0.3 % (ref 0–8)
ERYTHROCYTE [DISTWIDTH] IN BLOOD BY AUTOMATED COUNT: 13.2 % (ref 11.5–14.5)
ERYTHROCYTE [DISTWIDTH] IN BLOOD BY AUTOMATED COUNT: 13.3 % (ref 11.5–14.5)
EST. GFR  (AFRICAN AMERICAN): >60 ML/MIN/1.73 M^2
EST. GFR  (NON AFRICAN AMERICAN): >60 ML/MIN/1.73 M^2
GLUCOSE SERPL-MCNC: 111 MG/DL (ref 70–110)
GLUCOSE UR QL STRIP: NEGATIVE
HCO3 UR-SCNC: 20 MMOL/L (ref 24–28)
HCT VFR BLD AUTO: 20.4 % (ref 37–48.5)
HCT VFR BLD AUTO: 23.9 % (ref 37–48.5)
HGB BLD-MCNC: 7.1 G/DL (ref 12–16)
HGB BLD-MCNC: 8.2 G/DL (ref 12–16)
HGB UR QL STRIP: NEGATIVE
HYALINE CASTS #/AREA URNS LPF: 1 /LPF
KETONES UR QL STRIP: NEGATIVE
LEUKOCYTE ESTERASE UR QL STRIP: ABNORMAL
LYMPHOCYTES # BLD AUTO: 0.8 K/UL (ref 1–4.8)
LYMPHOCYTES # BLD AUTO: 0.9 K/UL (ref 1–4.8)
LYMPHOCYTES NFR BLD: 13.3 % (ref 18–48)
LYMPHOCYTES NFR BLD: 20.1 % (ref 18–48)
MCH RBC QN AUTO: 28.3 PG (ref 27–31)
MCH RBC QN AUTO: 29 PG (ref 27–31)
MCHC RBC AUTO-ENTMCNC: 34.3 G/DL (ref 32–36)
MCHC RBC AUTO-ENTMCNC: 34.8 G/DL (ref 32–36)
MCV RBC AUTO: 82 FL (ref 82–98)
MCV RBC AUTO: 83 FL (ref 82–98)
MICROSCOPIC COMMENT: ABNORMAL
MONOCYTES # BLD AUTO: 0.2 K/UL (ref 0.3–1)
MONOCYTES # BLD AUTO: 0.3 K/UL (ref 0.3–1)
MONOCYTES NFR BLD: 4.2 % (ref 4–15)
MONOCYTES NFR BLD: 4.6 % (ref 4–15)
NEUTROPHILS # BLD AUTO: 2.9 K/UL (ref 1.8–7.7)
NEUTROPHILS # BLD AUTO: 5.6 K/UL (ref 1.8–7.7)
NEUTROPHILS NFR BLD: 75.9 % (ref 38–73)
NEUTROPHILS NFR BLD: 82.4 % (ref 38–73)
NITRITE UR QL STRIP: NEGATIVE
OVALOCYTES BLD QL SMEAR: ABNORMAL
PCO2 BLDA: 54.5 MMHG (ref 35–45)
PH SMN: 7.17 [PH] (ref 7.35–7.45)
PH UR STRIP: 6 [PH] (ref 5–8)
PLATELET # BLD AUTO: 118 K/UL (ref 150–350)
PLATELET # BLD AUTO: 161 K/UL (ref 150–350)
PLATELET BLD QL SMEAR: ABNORMAL
PMV BLD AUTO: 10.5 FL (ref 9.2–12.9)
PMV BLD AUTO: 9.9 FL (ref 9.2–12.9)
PO2 BLDA: 12 MMHG (ref 80–100)
POC BE: -8 MMOL/L
POC SATURATED O2: 9 % (ref 95–100)
POIKILOCYTOSIS BLD QL SMEAR: SLIGHT
POTASSIUM SERPL-SCNC: 3.7 MMOL/L (ref 3.5–5.1)
PROT SERPL-MCNC: 7.2 G/DL (ref 6–8.4)
PROT UR QL STRIP: ABNORMAL
PROT UR-MCNC: 179 MG/DL (ref 0–15)
PROT/CREAT UR: 0.53 MG/G{CREAT} (ref 0–0.2)
RBC # BLD AUTO: 2.45 M/UL (ref 4–5.4)
RBC # BLD AUTO: 2.9 M/UL (ref 4–5.4)
RBC #/AREA URNS HPF: 0 /HPF (ref 0–4)
SAMPLE: ABNORMAL
SODIUM SERPL-SCNC: 135 MMOL/L (ref 136–145)
SP GR UR STRIP: >=1.03 (ref 1–1.03)
SQUAMOUS #/AREA URNS HPF: 6 /HPF
URN SPEC COLLECT METH UR: ABNORMAL
UROBILINOGEN UR STRIP-ACNC: 1 EU/DL
WBC # BLD AUTO: 3.79 K/UL (ref 3.9–12.7)
WBC # BLD AUTO: 6.78 K/UL (ref 3.9–12.7)
WBC #/AREA URNS HPF: 20 /HPF (ref 0–5)
YEAST URNS QL MICRO: ABNORMAL

## 2019-06-30 PROCEDURE — 82570 ASSAY OF URINE CREATININE: CPT

## 2019-06-30 PROCEDURE — 81000 URINALYSIS NONAUTO W/SCOPE: CPT

## 2019-06-30 PROCEDURE — 36600 WITHDRAWAL OF ARTERIAL BLOOD: CPT

## 2019-06-30 PROCEDURE — 63600175 PHARM REV CODE 636 W HCPCS: Performed by: ADVANCED PRACTICE MIDWIFE

## 2019-06-30 PROCEDURE — 25000003 PHARM REV CODE 250: Performed by: NURSE ANESTHETIST, CERTIFIED REGISTERED

## 2019-06-30 PROCEDURE — 63600175 PHARM REV CODE 636 W HCPCS: Performed by: NURSE ANESTHETIST, CERTIFIED REGISTERED

## 2019-06-30 PROCEDURE — 59514 CESAREAN DELIVERY ONLY: CPT | Mod: 22,AT,, | Performed by: OBSTETRICS & GYNECOLOGY

## 2019-06-30 PROCEDURE — 82803 BLOOD GASES ANY COMBINATION: CPT

## 2019-06-30 PROCEDURE — 88302 TISSUE EXAM BY PATHOLOGIST: CPT | Mod: 26,,, | Performed by: PATHOLOGY

## 2019-06-30 PROCEDURE — 59514 CESAREAN DELIVERY ONLY: CPT | Mod: AS,AT,, | Performed by: PHYSICIAN ASSISTANT

## 2019-06-30 PROCEDURE — 88307 TISSUE SPECIMEN TO PATHOLOGY - SURGERY: ICD-10-PCS | Mod: 26,,, | Performed by: PATHOLOGY

## 2019-06-30 PROCEDURE — 25000003 PHARM REV CODE 250: Performed by: OBSTETRICS & GYNECOLOGY

## 2019-06-30 PROCEDURE — 88302 TISSUE SPECIMEN TO PATHOLOGY - SURGERY: ICD-10-PCS | Mod: 26,,, | Performed by: PATHOLOGY

## 2019-06-30 PROCEDURE — 27200688 HC TRAY, SPINAL-HYPER/ ISOBARIC: Performed by: NURSE ANESTHETIST, CERTIFIED REGISTERED

## 2019-06-30 PROCEDURE — 99900035 HC TECH TIME PER 15 MIN (STAT)

## 2019-06-30 PROCEDURE — 80053 COMPREHEN METABOLIC PANEL: CPT

## 2019-06-30 PROCEDURE — 63600175 PHARM REV CODE 636 W HCPCS: Performed by: OBSTETRICS & GYNECOLOGY

## 2019-06-30 PROCEDURE — 59514 PR CESAREAN DELIVERY ONLY: ICD-10-PCS | Mod: AS,AT,, | Performed by: PHYSICIAN ASSISTANT

## 2019-06-30 PROCEDURE — 58611 PR LIGATION,FALLOPIAN TUBE W/C-SECTION: ICD-10-PCS | Mod: AS,,, | Performed by: PHYSICIAN ASSISTANT

## 2019-06-30 PROCEDURE — 58611 LIGATE OVIDUCT(S) ADD-ON: CPT | Mod: ,,, | Performed by: OBSTETRICS & GYNECOLOGY

## 2019-06-30 PROCEDURE — 86920 COMPATIBILITY TEST SPIN: CPT

## 2019-06-30 PROCEDURE — 88302 TISSUE EXAM BY PATHOLOGIST: CPT | Performed by: PATHOLOGY

## 2019-06-30 PROCEDURE — 37000009 HC ANESTHESIA EA ADD 15 MINS: Performed by: OBSTETRICS & GYNECOLOGY

## 2019-06-30 PROCEDURE — 36000686 HC CESAREAN SECTION LEVEL II

## 2019-06-30 PROCEDURE — 88307 TISSUE EXAM BY PATHOLOGIST: CPT | Performed by: PATHOLOGY

## 2019-06-30 PROCEDURE — 58611 LIGATE OVIDUCT(S) ADD-ON: CPT | Mod: AS,,, | Performed by: PHYSICIAN ASSISTANT

## 2019-06-30 PROCEDURE — 59514 PR CESAREAN DELIVERY ONLY: ICD-10-PCS | Mod: 22,AT,, | Performed by: OBSTETRICS & GYNECOLOGY

## 2019-06-30 PROCEDURE — 51702 INSERT TEMP BLADDER CATH: CPT

## 2019-06-30 PROCEDURE — 11000001 HC ACUTE MED/SURG PRIVATE ROOM

## 2019-06-30 PROCEDURE — 25000003 PHARM REV CODE 250: Performed by: ADVANCED PRACTICE MIDWIFE

## 2019-06-30 PROCEDURE — 58611 PR LIGATION,FALLOPIAN TUBE W/C-SECTION: ICD-10-PCS | Mod: ,,, | Performed by: OBSTETRICS & GYNECOLOGY

## 2019-06-30 PROCEDURE — 86901 BLOOD TYPING SEROLOGIC RH(D): CPT

## 2019-06-30 PROCEDURE — S0020 INJECTION, BUPIVICAINE HYDRO: HCPCS | Performed by: NURSE ANESTHETIST, CERTIFIED REGISTERED

## 2019-06-30 PROCEDURE — 37000008 HC ANESTHESIA 1ST 15 MINUTES: Performed by: OBSTETRICS & GYNECOLOGY

## 2019-06-30 PROCEDURE — 85025 COMPLETE CBC W/AUTO DIFF WBC: CPT

## 2019-06-30 PROCEDURE — 99211 OFF/OP EST MAY X REQ PHY/QHP: CPT

## 2019-06-30 PROCEDURE — 88307 TISSUE EXAM BY PATHOLOGIST: CPT | Mod: 26,,, | Performed by: PATHOLOGY

## 2019-06-30 RX ORDER — KETOROLAC TROMETHAMINE 30 MG/ML
INJECTION, SOLUTION INTRAMUSCULAR; INTRAVENOUS
Status: DISCONTINUED | OUTPATIENT
Start: 2019-06-30 | End: 2019-06-30

## 2019-06-30 RX ORDER — SODIUM CHLORIDE, SODIUM LACTATE, POTASSIUM CHLORIDE, CALCIUM CHLORIDE 600; 310; 30; 20 MG/100ML; MG/100ML; MG/100ML; MG/100ML
INJECTION, SOLUTION INTRAVENOUS CONTINUOUS
Status: DISCONTINUED | OUTPATIENT
Start: 2019-06-30 | End: 2019-06-30

## 2019-06-30 RX ORDER — MORPHINE SULFATE 4 MG/ML
4 INJECTION, SOLUTION INTRAMUSCULAR; INTRAVENOUS EVERY 4 HOURS PRN
Status: DISCONTINUED | OUTPATIENT
Start: 2019-06-30 | End: 2019-07-04 | Stop reason: HOSPADM

## 2019-06-30 RX ORDER — FAMOTIDINE 20 MG/1
20 TABLET, FILM COATED ORAL 2 TIMES DAILY
Status: DISCONTINUED | OUTPATIENT
Start: 2019-06-30 | End: 2019-07-04 | Stop reason: HOSPADM

## 2019-06-30 RX ORDER — OXYTOCIN/RINGER'S LACTATE 20/1000 ML
333 PLASTIC BAG, INJECTION (ML) INTRAVENOUS CONTINUOUS
Status: DISCONTINUED | OUTPATIENT
Start: 2019-06-30 | End: 2019-06-30

## 2019-06-30 RX ORDER — VALACYCLOVIR HYDROCHLORIDE 500 MG/1
1000 TABLET, FILM COATED ORAL DAILY
Status: DISCONTINUED | OUTPATIENT
Start: 2019-06-30 | End: 2019-06-30

## 2019-06-30 RX ORDER — DIPHENHYDRAMINE HCL 25 MG
25 CAPSULE ORAL EVERY 4 HOURS PRN
Status: DISCONTINUED | OUTPATIENT
Start: 2019-06-30 | End: 2019-07-04 | Stop reason: HOSPADM

## 2019-06-30 RX ORDER — ACETAMINOPHEN 325 MG/1
650 TABLET ORAL EVERY 6 HOURS PRN
Status: DISCONTINUED | OUTPATIENT
Start: 2019-06-30 | End: 2019-07-04 | Stop reason: HOSPADM

## 2019-06-30 RX ORDER — PROMETHAZINE HYDROCHLORIDE 25 MG/1
25 TABLET ORAL EVERY 6 HOURS PRN
Status: DISCONTINUED | OUTPATIENT
Start: 2019-06-30 | End: 2019-07-04 | Stop reason: HOSPADM

## 2019-06-30 RX ORDER — MORPHINE SULFATE 1 MG/ML
INJECTION, SOLUTION EPIDURAL; INTRATHECAL; INTRAVENOUS
Status: DISCONTINUED | OUTPATIENT
Start: 2019-06-30 | End: 2019-06-30

## 2019-06-30 RX ORDER — SODIUM CITRATE AND CITRIC ACID MONOHYDRATE 334; 500 MG/5ML; MG/5ML
30 SOLUTION ORAL ONCE
Status: DISCONTINUED | OUTPATIENT
Start: 2019-06-30 | End: 2019-06-30

## 2019-06-30 RX ORDER — FLUTICASONE PROPIONATE 50 MCG
1 SPRAY, SUSPENSION (ML) NASAL NIGHTLY
Status: DISCONTINUED | OUTPATIENT
Start: 2019-06-30 | End: 2019-07-04 | Stop reason: HOSPADM

## 2019-06-30 RX ORDER — NIFEDIPINE 30 MG/1
60 TABLET, EXTENDED RELEASE ORAL DAILY
Status: DISCONTINUED | OUTPATIENT
Start: 2019-06-30 | End: 2019-06-30

## 2019-06-30 RX ORDER — PREDNISONE 5 MG/1
5 TABLET ORAL DAILY
Status: DISCONTINUED | OUTPATIENT
Start: 2019-06-30 | End: 2019-06-30

## 2019-06-30 RX ORDER — OXYTOCIN/RINGER'S LACTATE 20/1000 ML
PLASTIC BAG, INJECTION (ML) INTRAVENOUS CONTINUOUS PRN
Status: DISCONTINUED | OUTPATIENT
Start: 2019-06-30 | End: 2019-06-30

## 2019-06-30 RX ORDER — NAPROXEN 500 MG/1
500 TABLET ORAL EVERY 8 HOURS
Status: DISCONTINUED | OUTPATIENT
Start: 2019-06-30 | End: 2019-07-04 | Stop reason: HOSPADM

## 2019-06-30 RX ORDER — ONDANSETRON 2 MG/ML
4 INJECTION INTRAMUSCULAR; INTRAVENOUS EVERY 12 HOURS PRN
Status: DISCONTINUED | OUTPATIENT
Start: 2019-06-30 | End: 2019-07-04 | Stop reason: HOSPADM

## 2019-06-30 RX ORDER — AMMONIA 15 % (W/V)
0.3 AMPUL (EA) INHALATION CONTINUOUS PRN
Status: DISCONTINUED | OUTPATIENT
Start: 2019-06-30 | End: 2019-07-04 | Stop reason: HOSPADM

## 2019-06-30 RX ORDER — FENTANYL CITRATE 50 UG/ML
INJECTION, SOLUTION INTRAMUSCULAR; INTRAVENOUS
Status: DISCONTINUED | OUTPATIENT
Start: 2019-06-30 | End: 2019-06-30

## 2019-06-30 RX ORDER — NIFEDIPINE 30 MG/1
60 TABLET, EXTENDED RELEASE ORAL NIGHTLY
Status: DISCONTINUED | OUTPATIENT
Start: 2019-06-30 | End: 2019-07-04 | Stop reason: HOSPADM

## 2019-06-30 RX ORDER — HYDROXYCHLOROQUINE SULFATE 200 MG/1
200 TABLET, FILM COATED ORAL 2 TIMES DAILY
Status: DISCONTINUED | OUTPATIENT
Start: 2019-06-30 | End: 2019-07-04 | Stop reason: HOSPADM

## 2019-06-30 RX ORDER — FLUOXETINE 10 MG/1
40 CAPSULE ORAL NIGHTLY
Status: DISCONTINUED | OUTPATIENT
Start: 2019-06-30 | End: 2019-07-04 | Stop reason: HOSPADM

## 2019-06-30 RX ORDER — ONDANSETRON 2 MG/ML
INJECTION INTRAMUSCULAR; INTRAVENOUS
Status: DISCONTINUED | OUTPATIENT
Start: 2019-06-30 | End: 2019-06-30

## 2019-06-30 RX ORDER — BUPIVACAINE HYDROCHLORIDE 7.5 MG/ML
INJECTION, SOLUTION EPIDURAL; RETROBULBAR
Status: DISCONTINUED | OUTPATIENT
Start: 2019-06-30 | End: 2019-06-30

## 2019-06-30 RX ORDER — VALACYCLOVIR HYDROCHLORIDE 500 MG/1
1000 TABLET, FILM COATED ORAL NIGHTLY
Status: DISCONTINUED | OUTPATIENT
Start: 2019-06-30 | End: 2019-07-04 | Stop reason: HOSPADM

## 2019-06-30 RX ORDER — FLUTICASONE PROPIONATE 50 MCG
1 SPRAY, SUSPENSION (ML) NASAL DAILY
Status: DISCONTINUED | OUTPATIENT
Start: 2019-06-30 | End: 2019-06-30

## 2019-06-30 RX ORDER — ONDANSETRON 8 MG/1
8 TABLET, ORALLY DISINTEGRATING ORAL EVERY 8 HOURS PRN
Status: DISCONTINUED | OUTPATIENT
Start: 2019-06-30 | End: 2019-07-04 | Stop reason: HOSPADM

## 2019-06-30 RX ORDER — ONDANSETRON 8 MG/1
8 TABLET, ORALLY DISINTEGRATING ORAL EVERY 8 HOURS PRN
Status: DISCONTINUED | OUTPATIENT
Start: 2019-06-30 | End: 2019-06-30

## 2019-06-30 RX ORDER — CEFAZOLIN SODIUM 1 G/50ML
2 SOLUTION INTRAVENOUS
Status: COMPLETED | OUTPATIENT
Start: 2019-06-30 | End: 2019-06-30

## 2019-06-30 RX ORDER — ACETAMINOPHEN 500 MG
500 TABLET ORAL EVERY 6 HOURS PRN
Status: DISCONTINUED | OUTPATIENT
Start: 2019-06-30 | End: 2019-06-30

## 2019-06-30 RX ORDER — SODIUM CITRATE AND CITRIC ACID MONOHYDRATE 334; 500 MG/5ML; MG/5ML
30 SOLUTION ORAL
Status: DISCONTINUED | OUTPATIENT
Start: 2019-06-30 | End: 2019-06-30

## 2019-06-30 RX ORDER — HYDROCODONE BITARTRATE AND ACETAMINOPHEN 5; 325 MG/1; MG/1
1 TABLET ORAL EVERY 4 HOURS PRN
Status: DISCONTINUED | OUTPATIENT
Start: 2019-06-30 | End: 2019-07-04 | Stop reason: HOSPADM

## 2019-06-30 RX ORDER — METOCLOPRAMIDE HYDROCHLORIDE 5 MG/ML
5 INJECTION INTRAMUSCULAR; INTRAVENOUS EVERY 6 HOURS PRN
Status: DISCONTINUED | OUTPATIENT
Start: 2019-06-30 | End: 2019-07-04 | Stop reason: HOSPADM

## 2019-06-30 RX ORDER — FLUOXETINE HYDROCHLORIDE 20 MG/1
40 CAPSULE ORAL DAILY
Status: DISCONTINUED | OUTPATIENT
Start: 2019-06-30 | End: 2019-06-30

## 2019-06-30 RX ORDER — DOCUSATE SODIUM 100 MG/1
100 CAPSULE, LIQUID FILLED ORAL DAILY
Status: DISCONTINUED | OUTPATIENT
Start: 2019-06-30 | End: 2019-07-04 | Stop reason: HOSPADM

## 2019-06-30 RX ORDER — PHENYLEPHRINE HYDROCHLORIDE 10 MG/ML
INJECTION INTRAVENOUS
Status: DISCONTINUED | OUTPATIENT
Start: 2019-06-30 | End: 2019-06-30

## 2019-06-30 RX ORDER — MISOPROSTOL 200 UG/1
800 TABLET ORAL
Status: DISCONTINUED | OUTPATIENT
Start: 2019-06-30 | End: 2019-06-30

## 2019-06-30 RX ORDER — PREDNISONE 5 MG/1
5 TABLET ORAL NIGHTLY
Status: DISCONTINUED | OUTPATIENT
Start: 2019-06-30 | End: 2019-07-04 | Stop reason: HOSPADM

## 2019-06-30 RX ORDER — CHLORHEXIDINE GLUCONATE ORAL RINSE 1.2 MG/ML
10 SOLUTION DENTAL
Status: DISCONTINUED | OUTPATIENT
Start: 2019-06-30 | End: 2019-06-30

## 2019-06-30 RX ADMIN — PREDNISONE 5 MG: 5 TABLET ORAL at 10:06

## 2019-06-30 RX ADMIN — ONDANSETRON 4 MG: 2 INJECTION, SOLUTION INTRAMUSCULAR; INTRAVENOUS at 08:06

## 2019-06-30 RX ADMIN — NAPROXEN 500 MG: 500 TABLET ORAL at 10:06

## 2019-06-30 RX ADMIN — Medication 41.67 MILLI-UNITS/MIN: at 10:06

## 2019-06-30 RX ADMIN — PHENYLEPHRINE HYDROCHLORIDE 100 MCG: 10 INJECTION INTRAVENOUS at 09:06

## 2019-06-30 RX ADMIN — Medication: at 09:06

## 2019-06-30 RX ADMIN — SODIUM CITRATE AND CITRIC ACID MONOHYDRATE 30 ML: 500; 334 SOLUTION ORAL at 08:06

## 2019-06-30 RX ADMIN — DOCUSATE SODIUM 100 MG: 100 CAPSULE, LIQUID FILLED ORAL at 12:06

## 2019-06-30 RX ADMIN — HYDROXYCHLOROQUINE SULFATE 200 MG: 200 TABLET, FILM COATED ORAL at 12:06

## 2019-06-30 RX ADMIN — NAPROXEN 500 MG: 500 TABLET ORAL at 02:06

## 2019-06-30 RX ADMIN — KETOROLAC TROMETHAMINE 30 MG: 30 INJECTION, SOLUTION INTRAMUSCULAR at 10:06

## 2019-06-30 RX ADMIN — SODIUM CHLORIDE, SODIUM LACTATE, POTASSIUM CHLORIDE, AND CALCIUM CHLORIDE 1000 ML: .6; .31; .03; .02 INJECTION, SOLUTION INTRAVENOUS at 08:06

## 2019-06-30 RX ADMIN — FAMOTIDINE 20 MG: 20 TABLET ORAL at 10:06

## 2019-06-30 RX ADMIN — NIFEDIPINE 60 MG: 30 TABLET, FILM COATED, EXTENDED RELEASE ORAL at 10:06

## 2019-06-30 RX ADMIN — VALACYCLOVIR HYDROCHLORIDE 1000 MG: 500 TABLET, FILM COATED ORAL at 10:06

## 2019-06-30 RX ADMIN — CEFAZOLIN SODIUM 2 G: 1 SOLUTION INTRAVENOUS at 09:06

## 2019-06-30 RX ADMIN — SODIUM CHLORIDE, SODIUM LACTATE, POTASSIUM CHLORIDE, AND CALCIUM CHLORIDE 2000 ML: .6; .31; .03; .02 INJECTION, SOLUTION INTRAVENOUS at 08:06

## 2019-06-30 RX ADMIN — HYDROXYCHLOROQUINE SULFATE 200 MG: 200 TABLET, FILM COATED ORAL at 11:06

## 2019-06-30 RX ADMIN — BUPIVACAINE HYDROCHLORIDE 1.7 ML: 7.5 INJECTION, SOLUTION EPIDURAL; RETROBULBAR at 09:06

## 2019-06-30 RX ADMIN — FAMOTIDINE 20 MG: 20 TABLET ORAL at 12:06

## 2019-06-30 RX ADMIN — FLUOXETINE 40 MG: 20 CAPSULE ORAL at 10:06

## 2019-06-30 RX ADMIN — FENTANYL CITRATE 10 MCG: 50 INJECTION, SOLUTION INTRAMUSCULAR; INTRAVENOUS at 09:06

## 2019-06-30 RX ADMIN — MORPHINE SULFATE 0.25 MG: 1 INJECTION, SOLUTION EPIDURAL; INTRATHECAL; INTRAVENOUS at 09:06

## 2019-06-30 NOTE — SUBJECTIVE & OBJECTIVE
Obstetric HPI:  Patient reports None contractions, active fetal movement, No vaginal bleeding , No loss of fluid     This pregnancy has been complicated by Sjogrens syndrome,HSV2 SLE, proteinurea,GERD, SLE    OB History    Para Term  AB Living   3 2 2 0 0 2   SAB TAB Ectopic Multiple Live Births   0 0 0 0 2      # Outcome Date GA Lbr José Miguel/2nd Weight Sex Delivery Anes PTL Lv   3 Current            2 Term 10/19/17 39w2d  2.87 kg (6 lb 5.2 oz) M CS-LTranv Spinal N CYDNEY      Name: HAWA, YOSSI GORDON      Apgar1: 8  Apgar5: 9   1 Term 12 38w0d  2.948 kg (6 lb 8 oz) M CS-LTranv EPI N CYDNEY      Name: Young     Past Medical History:   Diagnosis Date    Allergic rhinitis     Anemia     Condyloma acuminata     GERD (gastroesophageal reflux disease)     History of fetal anomaly in prior pregnancy, currently pregnant, unspecified trimester 3/8/2017    Pacemaker placed    HSV-2 (herpes simplex virus 2) infection     Lupus nephritis     Mood disorder     Overweight(278.02)     Sjogren's syndrome     Systemic lupus erythematosus     Thrombocytopenia      Past Surgical History:   Procedure Laterality Date     SECTION, LOW TRANSVERSE      DELIVERY- SECTION N/A 10/19/2017    Performed by [facility]es Fransk MD at [facility] L&D    RENAL BIOPSY  2013       PTA Medications   Medication Sig    efinaconazole (JUBLIA) 10 % Mario Apply once daily to nail and nail fold.  Use daily for up to 1 year.    famotidine (PEPCID) 20 MG tablet Take 1 tablet (20 mg total) by mouth 2 (two) times daily as needed for Heartburn.    flunisolide 25 mcg, 0.025%, (NASALIDE) 25 mcg (0.025 %) Spry 2 sprays by Nasal route 2 (two) times daily.    FLUoxetine 40 MG capsule Take 1 capsule (40 mg total) by mouth once daily.    hydroxychloroquine (PLAQUENIL) 200 mg tablet Take 1 tablet (200 mg total) by mouth 2 (two) times daily.    levocetirizine (XYZAL) 5 MG tablet Take 1 tablet (5 mg total) by mouth once daily.     NIFEdipine (PROCARDIA-XL) 30 MG (OSM) 24 hr tablet Take 2 tablets (60 mg total) by mouth once daily.    predniSONE (DELTASONE) 5 MG tablet Take 1 tablet (5 mg total) by mouth once daily.    PRENATAL 118-IRON-FOLATE 6-DHA ORAL Take by mouth.    valACYclovir (VALTREX) 500 MG tablet Take 2 tablets (1,000 mg total) by mouth once daily.       Review of patient's allergies indicates:  No Known Allergies     Family History     Problem Relation (Age of Onset)    Arrhythmia Son    Diabetes Paternal Grandmother    Eczema Brother    Heart disease Son    Hypertension Mother, Maternal Grandmother        Tobacco Use    Smoking status: Never Smoker    Smokeless tobacco: Never Used   Substance and Sexual Activity    Alcohol use: No     Alcohol/week: 0.0 oz    Drug use: No    Sexual activity: Yes     Partners: Male     Birth control/protection: None     Review of Systems   Constitutional: Negative.    HENT: Negative.    Eyes: Negative.    Respiratory: Negative.    Cardiovascular: Negative.    Gastrointestinal: Negative.    Endocrine: Negative.    Genitourinary: Negative.    Musculoskeletal: Negative.    Integumentary:  Negative.   Neurological: Negative.    Hematological: Negative.    Psychiatric/Behavioral: Negative.    All other systems reviewed and are negative.  Breast: negative.       Objective:     Vital Signs (Most Recent):  Temp: 97.9 °F (36.6 °C) (06/30/19 0349)  Pulse: 105 (06/30/19 0549)  BP: (!) 144/94 (06/30/19 0549)  SpO2: 99 % (06/30/19 0549) Vital Signs (24h Range):  Temp:  [97.9 °F (36.6 °C)] 97.9 °F (36.6 °C)  Pulse:  [] 105  SpO2:  [98 %-100 %] 99 %  BP: (116-144)/(73-94) 144/94        There is no height or weight on file to calculate BMI.    FHT: 130/136Cat 1 (reassuring)  TOCO:  Q 30 minutes    Physical Exam:   Constitutional: She is oriented to person, place, and time. She appears well-developed and well-nourished.    HENT:   Head: Normocephalic.    Eyes: Pupils are equal, round, and reactive  to light.    Neck: Normal range of motion.    Cardiovascular: Normal rate and regular rhythm.     Pulmonary/Chest: Effort normal and breath sounds normal.        Abdominal: Soft.             Musculoskeletal: Normal range of motion.       Neurological: She is alert and oriented to person, place, and time.    Skin: Skin is warm and dry.    Psychiatric: She has a normal mood and affect. Her behavior is normal.       Cervix:  Dilation:    Effacement:  defer  Station:   Presentation:      Significant Labs:  Lab Results   Component Value Date    GROUPTRH B POS 12/12/2018    HEPBSAG Negative 12/12/2018    STREPBCULT No Group B Streptococcus isolated 06/26/2019       I have personallly reviewed all pertinent lab results from the last 24 hours.

## 2019-06-30 NOTE — HPI
27yo  VICTORIANO 19 @ 36w6d presents c/o elevated BP at home  Di/di twins, previous C/s X 2, followed by MFM secondary to Lupus with nephritis, CHTN, sjogrens, recommended delivery at 36 wks-pt declined

## 2019-06-30 NOTE — L&D DELIVERY NOTE
[facility]   Section   Operative Note    SUMMARY     Date of Procedure: 2019     Procedure: Procedure(s) (LRB):   SECTION, WITH TUBAL LIGATION (N/A)   Repeat Low Transverse with vertical uterine extension     Surgeon(s) and Role:     * VERONICA Valdovinos MD - Primary    Assisting Surgeon:         Angela ANTHONY     Pre-Operative Diagnosis: Previous  section [Z98.891]     SLE       Hypertension       Di Di Twin Gestation       Discordant growth          Post-Operative Diagnosis: Post-Op Diagnosis Codes:     * Previous  section [Z98.891]        SLE       Hypertension       Di Di Twin Gestation       Discordant growth    Anesthesia: Spinal    Technical Procedures Used: none           Description of the Findings of the Procedure:     Significant Surgical Tasks Conducted by the Assistant(s), if Applicable:      First assist and wound closure     Complications: No    Blood Loss: 400 mL     With patient in supine position, the legs are  and Oliver Catheter placed and positioning to supine done.   Abdomen prepped with Chloroprep and 3 minute drying time allowed prior to draping of the abdomen.   Time out taken with OR team members.  Pfannenstiel Incision made through the skin, transverse fascial incision developed, rectus muscles  in the midline and the peritoneum entered.   adhesions under the fascial layer to the muscle, dense adhesion of the rectus muscles in the midline.  Bladder adhered above the previous uterine scar.  No adhesions to the upper uterus and no bowel adhesions or omental adhesions noted  adhesions noted.  The lower uterine segment and position of the fetus identified.   Bladder flap taken down through transverse peritoneal incision.    Low Transverse Incision made through well developer lower uterine segment and extended laterally with blunt dissection.   Clear fluid noted on rupture of twin # 1 which is then delivered from vertex  presentatio after reduction of the left hand .   Cord clamped  and  handed to attending nurse.  Position of the second twin transverse.  Not able to rotate to breech or vertex with membranes intact.  Rupture of membranes, light meconium noted.    Right arm presented into the incision with back down lie.  Unable to reduce arm, rotate to vertex or grasp feet for delivery.  Vertical extension of the center of the incision done which allowed for rotation and vertex delivery after the right shoulder passed through the incision    Cord blood taken from each cord separate after segment taken from each cord for Ph analysis , placenta delivered and sent to pathology   The uterus was exteriorized.  The edges of the uterine incision were identified, no significant bleeding noted.   Closure with running lock 0 Chromic, starting at each angle, tying in the midline.   The vertical extension closed with running lock chromic 0 suture  Second layer added with 0 Chromic .   Observation for bleeding with suture of any bleeding along the hysterotomy line.   With good hemostasis noted, the anterior pelvis is rinsed with sterile saline.   Right and left adnexa with normal anatomy.    Using 0 chromic suture, the right and left fallopian tube of tied in the center of the tube with a modified Warden method      Closure of the abdomen with 2 0 Vicryl running of the peritoneum, fascial closure with 0 looped PDS starting at the right and left  angle and tying the knot at the center   Skin closure with 4 0 Vicryl subcuticular.  Wound dressed with Aquacil dressing to be removed day 7 PP .          Specimens:   Specimen (12h ago, onward)    Start     Ordered    19 1016  Specimen to Pathology - Surgery  Once     Comments:  Pre-op Diagnosis: Previous  section [Z98.891]Procedure(s): SECTION, WITH TUBAL LIGATION Number of specimens: 2Name of specimens: right and left fallopian tubes36.6 WGA R C/S with BTL Di/Di twins      Start Status     19 1016 Collected (19 1016) Order ID: [id number]       19 1015    19 1015  Specimen to Pathology - Surgery  Once     Comments:  Pre-op Diagnosis: Previous  section [Z98.891]Procedure(s): SECTION, WITH TUBAL LIGATION Number of specimens: 1Name of specimens: ovjwprra32.6 WGA R C/S with BTL Di/Di twins     Start Status     19 1015 Collected (19 1016) Order ID: [id number]       19 1015          Condition: Good    Disposition: PACU - hemodynamically stable.    Attestation: Good            Solomon, A Prosper Crump [[id number]]     Delivery Information for A Prosper Vines    Birth information:  YOB: 2017   Time of birth: 9:23 AM   Sex: male   Head Delivery Date/Time:     Delivery type:    Gestational Age: 36w6d    Delivery Providers    Delivering clinician:             Measurements    Weight:    Length:           Apgars    Living status:    Apgars:   1 min.:   5 min.:   10 min.:   15 min.:   20 min.:     Skin color:          Heart rate:          Reflex irritability:          Muscle tone:          Respiratory effort:          Total:                                 Interventions/Resuscitation         Cord    No data filed        Placenta    Placenta delivery date/time:    Placenta removal:             Labor Events:       labor:       Labor Onset Date/Time:         Dilation Complete Date/Time:         Start Pushing Date/Time:       Rupture Date/Time:              Rupture type:           Fluid Amount:        Fluid Color:        Fluid Odor:        Membrane Status (PeriCalm):        Rupture Date/Time (PeriCalm):        Fluid Amount (PeriCalm):        Fluid Color (PeriCalm):         steroids:       Antibiotics given for GBS:       Induction:       Indications for induction:        Augmentation:       Indications for augmentation:       Labor complications:       Additional complications:          Cervical ripening:                      Delivery:      Episiotomy:       Indication for Episiotomy:       Perineal Lacerations:   Repaired:      Periurethral Laceration:   Repaired:     Labial Laceration:   Repaired:     Sulcus Laceration:   Repaired:     Vaginal Laceration:   Repaired:     Cervical Laceration:   Repaired:     Repair suture:       Repair # of packets:       Last Value - EBL - Nursing (mL):       Sum - EBL - Nursing (mL): 400     Last Value - EBL - Anesthesia (mL): 400      Calculated QBL (mL):        Vaginal Sweep Performed:       Surgicount Correct:         Other providers:            Details (if applicable):  Trial of Labor      Categorization:      Priority:     Indications for :     Incision Type:       Additional  information:  Forceps:    Vacuum:    Breech:    Observed anomalies    Other (Comments):            [place], IVETTE Crump [[id number]]     Delivery Information for IVETTE Crump [place]    Birth information:  YOB: 2017   Time of birth: 9:27 AM   Sex: female   Head Delivery Date/Time:     Delivery type:    Gestational Age: 36w6d    Delivery Providers    Delivering clinician:             Measurements    Weight:    Length:           Apgars    Living status:    Apgars:   1 min.:   5 min.:   10 min.:   15 min.:   20 min.:     Skin color:          Heart rate:          Reflex irritability:          Muscle tone:          Respiratory effort:          Total:                                 Interventions/Resuscitation         Cord    No data filed        Placenta    Placenta delivery date/time:    Placenta removal:             Labor Events:       labor:       Labor Onset Date/Time:         Dilation Complete Date/Time:         Start Pushing Date/Time:       Rupture Date/Time:              Rupture type:           Fluid Amount:        Fluid Color:        Fluid Odor:        Membrane Status (PeriCalm):        Rupture Date/Time (PeriCalm):        Fluid Amount (PeriCalm):         Fluid Color (PeriCalm):         steroids:       Antibiotics given for GBS:       Induction:       Indications for induction:        Augmentation:       Indications for augmentation:       Labor complications:       Additional complications:          Cervical ripening:                     Delivery:      Episiotomy:       Indication for Episiotomy:       Perineal Lacerations:   Repaired:      Periurethral Laceration:   Repaired:     Labial Laceration:   Repaired:     Sulcus Laceration:   Repaired:     Vaginal Laceration:   Repaired:     Cervical Laceration:   Repaired:     Repair suture:       Repair # of packets:       Last Value - EBL - Nursing (mL):       Sum - EBL - Nursing (mL): 400     Last Value - EBL - Anesthesia (mL): 400      Calculated QBL (mL):        Vaginal Sweep Performed:       Surgicount Correct:         Other providers:            Details (if applicable):  Trial of Labor      Categorization:      Priority:     Indications for :     Incision Type:       Additional  information:  Forceps:    Vacuum:    Breech:    Observed anomalies    Other (Comments):

## 2019-06-30 NOTE — PROGRESS NOTES
CORD GASES    BABY A  PH 7.26  PCO2  48.2  PO2     14  BE    -5  HCO3   21.6        BABY B  PH   7.17  PCO2  54.5  PO2     12  BE      -8  HCO3   20.0

## 2019-06-30 NOTE — LACTATION NOTE
Lactation Rounds:     Visited patient at bedside. Admit teaching done, including feeding on demand, recognition of feeding cues, expectations for infant feeding/behavior in first day of life, importance of skin to skin contact, hand expression. Discussed individualizing plans of care for twins based on status. Plan to be present for next feeding session to assist with positioning and latch.

## 2019-06-30 NOTE — H&P
[facility]  Obstetrics  History & Physical    Patient Name: Alisia Vines  MRN: [id number]  Admission Date: 2019  Primary Care Provider: Saumya Quinonez MD    Subjective:     Principal Problem:<principal problem not specified>    History of Present Illness:   IUP at 36w6d presents c/o elevated BP at home  Di/di twins    Obstetric HPI:  Patient reports None contractions, active fetal movement, No vaginal bleeding , No loss of fluid     This pregnancy has been complicated by Sjogrens syndrome,HSV2 SLE, proteinurea,GERD, SLE    OB History    Para Term  AB Living   3 2 2 0 0 2   SAB TAB Ectopic Multiple Live Births   0 0 0 0 2      # Outcome Date GA Lbr José Miguel/2nd Weight Sex Delivery Anes PTL Lv   3 Current            2 Term 10/19/17 39w2d  2.87 kg (6 lb 5.2 oz) M CS-LTranv Spinal N CYDNEY      Name: HAWA, YOSSI GORDON      Apgar1: 8  Apgar5: 9   1 Term 12 38w0d  2.948 kg (6 lb 8 oz) M CS-LTranv EPI N CYDNEY      Name: Young     Past Medical History:   Diagnosis Date    Allergic rhinitis     Anemia     Condyloma acuminata     GERD (gastroesophageal reflux disease)     History of fetal anomaly in prior pregnancy, currently pregnant, unspecified trimester 3/8/2017    Pacemaker placed    HSV-2 (herpes simplex virus 2) infection     Lupus nephritis     Mood disorder     Overweight(278.02)     Sjogren's syndrome     Systemic lupus erythematosus     Thrombocytopenia      Past Surgical History:   Procedure Laterality Date     SECTION, LOW TRANSVERSE      DELIVERY- SECTION N/A 10/19/2017    Performed by Georges Franks MD at [facility] L[facility]D    RENAL BIOPSY  2013       PTA Medications   Medication Sig    efinaconazole (JUBLIA) 10 % Mario Apply once daily to nail and nail fold.  Use daily for up to 1 year.    famotidine (PEPCID) 20 MG tablet Take 1 tablet (20 mg total) by mouth 2 (two) times daily as needed for Heartburn.    flunisolide 25 mcg,  0.025%, (NASALIDE) 25 mcg (0.025 %) Spry 2 sprays by Nasal route 2 (two) times daily.    FLUoxetine 40 MG capsule Take 1 capsule (40 mg total) by mouth once daily.    hydroxychloroquine (PLAQUENIL) 200 mg tablet Take 1 tablet (200 mg total) by mouth 2 (two) times daily.    levocetirizine (XYZAL) 5 MG tablet Take 1 tablet (5 mg total) by mouth once daily.    NIFEdipine (PROCARDIA-XL) 30 MG (OSM) 24 hr tablet Take 2 tablets (60 mg total) by mouth once daily.    predniSONE (DELTASONE) 5 MG tablet Take 1 tablet (5 mg total) by mouth once daily.    PRENATAL 118-IRON-FOLATE 6-DHA ORAL Take by mouth.    valACYclovir (VALTREX) 500 MG tablet Take 2 tablets (1,000 mg total) by mouth once daily.       Review of patient's allergies indicates:  No Known Allergies     Family History     Problem Relation (Age of Onset)    Arrhythmia Son    Diabetes Paternal Grandmother    Eczema Brother    Heart disease Son    Hypertension Mother, Maternal Grandmother        Tobacco Use    Smoking status: Never Smoker    Smokeless tobacco: Never Used   Substance and Sexual Activity    Alcohol use: No     Alcohol/week: 0.0 oz    Drug use: No    Sexual activity: Yes     Partners: Male     Birth control/protection: None     Review of Systems   Constitutional: Negative.    HENT: Negative.    Eyes: Negative.    Respiratory: Negative.    Cardiovascular: Negative.    Gastrointestinal: Negative.    Endocrine: Negative.    Genitourinary: Negative.    Musculoskeletal: Negative.    Integumentary:  Negative.   Neurological: Negative.    Hematological: Negative.    Psychiatric/Behavioral: Negative.    All other systems reviewed and are negative.  Breast: negative.       Objective:     Vital Signs (Most Recent):  Temp: 97.9 °F (36.6 °C) (06/30/19 0349)  Pulse: 105 (06/30/19 0549)  BP: (!) 144/94 (06/30/19 0549)  SpO2: 99 % (06/30/19 0549) Vital Signs (24h Range):  Temp:  [97.9 °F (36.6 °C)] 97.9 °F (36.6 °C)  Pulse:  [] 105  SpO2:  [98 %-100  %] 99 %  BP: (116-144)/(73-94) 144/94        There is no height or weight on file to calculate BMI.    FHT: 130/136Cat 1 (reassuring)  TOCO:  Q 30 minutes    Physical Exam:   Constitutional: She is oriented to person, place, and time. She appears well-developed and well-nourished.    HENT:   Head: Normocephalic.    Eyes: Pupils are equal, round, and reactive to light.    Neck: Normal range of motion.    Cardiovascular: Normal rate and regular rhythm.     Pulmonary/Chest: Effort normal and breath sounds normal.        Abdominal: Soft.             Musculoskeletal: Normal range of motion.       Neurological: She is alert and oriented to person, place, and time.    Skin: Skin is warm and dry.    Psychiatric: She has a normal mood and affect. Her behavior is normal.       Cervix:  Dilation:    Effacement:  defer  Station:   Presentation:      Significant Labs:  Lab Results   Component Value Date    GROUPTRH B POS 2018    HEPBSAG Negative 2018    STREPBCULT No Group B Streptococcus isolated 2019       I have personallly reviewed all pertinent lab results from the last 24 hours.    Assessment/Plan:     28 y.o. female  at 36w6d for:    PIH (pregnancy induced hypertension), third trimester  NST  PIH labs  observe        Vale Peralta CNM  Obstetrics  [facility]

## 2019-06-30 NOTE — LACTATION NOTE
Lactation Rounds:     Called to bedside for feeding assistance. Twin B Baby Girl (Iram) was being taken to NICU for tachypnea at start of visit. Twin A Baby Boy (Justin) was showing feeding cues.    Justin was placed skin to skin in football hold on the left side. Mother was assisted to position infant with pillow support. He was sleepy during latch attempts and would not open his mouth to grasp breast, despite gentle waking techniques and repositioning to cross-cradle hold.     Hand expression performed at this time by mother and transition nurse. Approximately 5 mL of colostrum was obtained with hand expression and syringe fed to Justin.     Medela Symphony pump at bedside. Discussed the importance of pumping 8 or more times in a 24 hour period, hand expression, breast massage/hands on pumping, cleaning of pump parts, Medela Symphony pump settings, milk collection and storage. Verified appropriate flange fit (24 mm bilaterally for now). Mother pumped on INITIATE setting and obtained 7 mL of colostrum. This was brought to the NICU for Iram.

## 2019-06-30 NOTE — PROGRESS NOTES
[facility]  Obstetrics  Labor Progress Note    Patient Name: Alisia Ceballos Jasmyn  MRN: [id number]  Admission Date: 2019  Hospital Length of Stay: 0 days  Attending Physician: VERONICA Valdovinos MD  Primary Care Provider: Saumya Quinonez MD    Subjective:     Principal Problem:Pre-existing hypertension during pregnancy in third trimester    Hospital Course:  Admitted for observation  PIH labs done P/C ratio .53  19 0745 AM Dr. Valdovinos notified of pt's history, labs, BP, MFM recommendations-will proceed with c/s    Interval History:  Alisia is a 28 y.o.  at 36w6d. Sleeping, awaken for assessment    Objective:     Vital Signs (Most Recent):  Temp: 97.9 °F (36.6 °C) (19 0349)  Pulse: 88 (19 0734)  BP: 127/76 (19 0734)  SpO2: 97 % (19 0734) Vital Signs (24h Range):  Temp:  [97.9 °F (36.6 °C)] 97.9 °F (36.6 °C)  Pulse:  [] 88  SpO2:  [97 %-100 %] 97 %  BP: (116-144)/(72-94) 127/76        There is no height or weight on file to calculate BMI.    FHT:  Cat 1 (reassuring) X 2  TOCO:  irregular    Cervical Exam: deferred scheduled repeat c/s denies RUC       Significant Labs:  Lab Results   Component Value Date    GROUPTRH B POS 2018    HEPBSAG Negative 2018    STREPBCULT No Group B Streptococcus isolated 2019       CBC:   Recent Labs   Lab 19  0320   WBC 3.79*   RBC 2.90*   HGB 8.2*   HCT 23.9*      MCV 82   MCH 28.3   MCHC 34.3     Chlamydia: No results for input(s): LABCHLA in the last 48 hours.  Dorene/Creat Ratio:   Recent Labs   Lab 19  0300   UTPCR 0.53*       I have personallly reviewed all pertinent lab results from the last 24 hours.    Physical Exam:   Constitutional: She is oriented to person, place, and time. She appears well-developed and well-nourished.     Eyes:   Denies headaches or visual disturbances    Neck: Normal range of motion.    Cardiovascular: Normal rate and regular rhythm.      Pulmonary/Chest: Effort normal and breath sounds normal.        Abdominal: Soft.   Gravid, Di Di Twins             Musculoskeletal: Normal range of motion and moves all extremeties. Edema: trace edema.       Neurological: She is alert and oriented to person, place, and time. She has normal reflexes.    Skin: Skin is warm and dry.    Psychiatric: She has a normal mood and affect. Her behavior is normal.       Assessment/Plan:     28 y.o. female  at 36w6d for:    * Pre-existing hypertension during pregnancy in third trimester  On Procardia XL 60 mg daily  Proteinuria      Previous  delivery affecting pregnancy, antepartum  Previous c/s X 2  Plan for repeat c/s with BTL    Systemic lupus complicating pregnancy  Followed by Rheumatology- on plaquenil, prednisone    Dichorionic diamniotic twin pregnancy, antepartum  Plan for Repeat C/S with BTL          Anemia  Chronic, admit H/H 23.9/8.2 Type and screen pending    SLE (systemic lupus erythematosus related syndrome)  Followed by Rheumatology- on plaquenil, prednisone    HSV-2 (herpes simplex virus 2) infection  On Valtrex prophylactics    Sjogren's syndrome              Cindy Figueroa CNM  Obstetrics  [facility]

## 2019-06-30 NOTE — TRANSFER OF CARE
Anesthesia Transfer of Care Note    Patient: Alisia Ceballos [place]    Procedure(s) Performed: Procedure(s) (LRB):   SECTION, WITH TUBAL LIGATION (N/A)    Patient location: Labor and Delivery    Anesthesia Type: general and spinal    Transport from OR: Transported from OR on room air with adequate spontaneous ventilation    Post pain: adequate analgesia    Post assessment: no apparent anesthetic complications and tolerated procedure well    Post vital signs: stable    Level of consciousness: awake and alert    Nausea/Vomiting: no nausea/vomiting    Complications: none    Transfer of care protocol was followed      Last vitals:   Visit Vitals  /69   Pulse 79   Temp 36.6 °C (97.9 °F)   Resp 16   LMP 10/15/2018   SpO2 100%   Breastfeeding? No

## 2019-06-30 NOTE — ANESTHESIA POSTPROCEDURE EVALUATION
Anesthesia Post Evaluation    Patient: Alisai Vines    Procedure(s) Performed: Procedure(s) (LRB):   SECTION, WITH TUBAL LIGATION (N/A)    Final Anesthesia Type: spinal  Patient location during evaluation: labor & delivery  Patient participation: Yes- Able to Participate  Level of consciousness: awake and alert, awake and oriented  Post-procedure vital signs: reviewed and stable  Pain management: adequate  Airway patency: patent  PONV status at discharge: No PONV  Anesthetic complications: no      Cardiovascular status: blood pressure returned to baseline  Respiratory status: unassisted  Hydration status: euvolemic  Follow-up not needed.          Vitals Value Taken Time   /69 2019 10:22 AM   Temp 36.6 °C (97.9 °F) 2019 10:22 AM   Pulse 79 2019 10:22 AM   Resp 16 2019 10:22 AM   SpO2 100 % 2019 10:22 AM         No case tracking events are documented in the log.      Pain/Kwame Score: No data recorded

## 2019-06-30 NOTE — HOSPITAL COURSE
Admitted for observation  PIH labs done P/C ratio .53  19 0745 AM Dr. Valdovinos notified of pt's history, labs, BP, MFM recommendations-will proceed with c/s  2019 10:30 AM    Repeat  section low transverse with vertical extension of uterine incision for delivery of 2nd twin with bilateral tubal ligation done.   Transferred to mother baby unit for routine post partum care. Her chronic anemia worsened post operatively due to acute blood loss .3/18 and thus she received 1 unit PRBC. Patient progressed well postoperatively without complication.    7/3/19-pt feeling well. Trying to use breast pump. One twin in NICU but doing well. Denies preE symptoms at this time  19-pt w no complaints. Had episode of elevated diastolic last pm but back to baseline this morning.

## 2019-06-30 NOTE — ANESTHESIA PREPROCEDURE EVALUATION
06/30/2019  Alisia Vines is a 28 y.o., female.    Anesthesia Evaluation    I have reviewed the Patient Summary Reports.    I have reviewed the Nursing Notes.   I have reviewed the Medications.     Review of Systems  Anesthesia Hx:  Neg history of prior surgery. Denies Family Hx of Anesthesia complications.   Denies Personal Hx of Anesthesia complications.   Cardiovascular:   Hypertension    Renal/:   Chronic Renal Disease lupus   Hepatic/GI:   GERD    Psych:   Psychiatric History          Physical Exam  General:  Well nourished    Airway/Jaw/Neck:  Airway Findings: Mouth Opening: Normal Tongue: Normal  General Airway Assessment: Adult  Mallampati: II  Jaw/Neck Findings:  Neck ROM: Normal ROM     Eyes/Ears/Nose:  EYES/EARS/NOSE FINDINGS: Normal   Dental:  Dental Findings: In tact   Chest/Lungs:  Chest/Lungs Clear    Heart/Vascular:  Heart Findings: Normal Heart murmur: negative       Mental Status:  Mental Status Findings:  Cooperative         Anesthesia Plan  Type of Anesthesia, risks & benefits discussed:  Anesthesia Type:  spinal  Patient's Preference:   Intra-op Monitoring Plan: standard ASA monitors  Intra-op Monitoring Plan Comments:   Post Op Pain Control Plan: intrathecal opioid and multimodal analgesia  Post Op Pain Control Plan Comments:   Induction:   IV  Beta Blocker:         Informed Consent: Patient understands risks and agrees with Anesthesia plan.  Questions answered. Anesthesia consent signed with patient.  ASA Score: 3     Day of Surgery Review of History & Physical: I have interviewed and examined the patient. I have reviewed the patient's H&P dated:  There are no significant changes.  H&P update referred to the surgeon.         Ready For Surgery From Anesthesia Perspective.

## 2019-06-30 NOTE — PROGRESS NOTES
Discussed feeding choice with mother.  Reviewed benefits of breastfeeding and risks of formula feeding. Mother states her intention is breast and formula feeding.

## 2019-06-30 NOTE — SUBJECTIVE & OBJECTIVE
Interval History:  Alisia is a 28 y.o.  at 36w6d. Sleeping, awaken for assessment    Objective:     Vital Signs (Most Recent):  Temp: 97.9 °F (36.6 °C) (19 0349)  Pulse: 88 (19 0734)  BP: 127/76 (19 0734)  SpO2: 97 % (19 0734) Vital Signs (24h Range):  Temp:  [97.9 °F (36.6 °C)] 97.9 °F (36.6 °C)  Pulse:  [] 88  SpO2:  [97 %-100 %] 97 %  BP: (116-144)/(72-94) 127/76        There is no height or weight on file to calculate BMI.    FHT:  Cat 1 (reassuring) X 2  TOCO:  irregular    Cervical Exam: deferred scheduled repeat c/s denies RUC       Significant Labs:  Lab Results   Component Value Date    GROUPTRH B POS 2018    HEPBSAG Negative 2018    STREPBCULT No Group B Streptococcus isolated 2019       CBC:   Recent Labs   Lab 19  0320   WBC 3.79*   RBC 2.90*   HGB 8.2*   HCT 23.9*      MCV 82   MCH 28.3   MCHC 34.3     Chlamydia: No results for input(s): LABCHLA in the last 48 hours.  Dorene/Creat Ratio:   Recent Labs   Lab 19  0300   UTPCR 0.53*       I have personallly reviewed all pertinent lab results from the last 24 hours.    Physical Exam:   Constitutional: She is oriented to person, place, and time. She appears well-developed and well-nourished.     Eyes:   Denies headaches or visual disturbances    Neck: Normal range of motion.    Cardiovascular: Normal rate and regular rhythm.     Pulmonary/Chest: Effort normal and breath sounds normal.        Abdominal: Soft.   Gravid, Di Di Twins             Musculoskeletal: Normal range of motion and moves all extremeties. Edema: trace edema.       Neurological: She is alert and oriented to person, place, and time. She has normal reflexes.    Skin: Skin is warm and dry.    Psychiatric: She has a normal mood and affect. Her behavior is normal.

## 2019-07-01 ENCOUNTER — TELEPHONE (OUTPATIENT)
Dept: RHEUMATOLOGY | Facility: CLINIC | Age: 29
End: 2019-07-01

## 2019-07-01 LAB
ALBUMIN SERPL BCP-MCNC: 2 G/DL (ref 3.5–5.2)
ALP SERPL-CCNC: 134 U/L (ref 55–135)
ALT SERPL W/O P-5'-P-CCNC: 10 U/L (ref 10–44)
AST SERPL-CCNC: 28 U/L (ref 10–40)
BASOPHILS # BLD AUTO: 0 K/UL (ref 0–0.2)
BASOPHILS NFR BLD: 0 % (ref 0–1.9)
BILIRUB DIRECT SERPL-MCNC: 0.2 MG/DL (ref 0.1–0.3)
BILIRUB SERPL-MCNC: 0.3 MG/DL (ref 0.1–1)
BLD PROD TYP BPU: NORMAL
BLOOD UNIT EXPIRATION DATE: NORMAL
BLOOD UNIT TYPE CODE: 7300
BLOOD UNIT TYPE: NORMAL
CODING SYSTEM: NORMAL
CREAT UR-MCNC: 95 MG/DL (ref 15–325)
DIFFERENTIAL METHOD: ABNORMAL
DISPENSE STATUS: NORMAL
EOSINOPHIL # BLD AUTO: 0 K/UL (ref 0–0.5)
EOSINOPHIL NFR BLD: 0.1 % (ref 0–8)
ERYTHROCYTE [DISTWIDTH] IN BLOOD BY AUTOMATED COUNT: 13.2 % (ref 11.5–14.5)
HCT VFR BLD AUTO: 18 % (ref 37–48.5)
HGB BLD-MCNC: 6.3 G/DL (ref 12–16)
LYMPHOCYTES # BLD AUTO: 0.6 K/UL (ref 1–4.8)
LYMPHOCYTES NFR BLD: 8 % (ref 18–48)
MCH RBC QN AUTO: 28.9 PG (ref 27–31)
MCHC RBC AUTO-ENTMCNC: 35 G/DL (ref 32–36)
MCV RBC AUTO: 83 FL (ref 82–98)
MONOCYTES # BLD AUTO: 0.5 K/UL (ref 0.3–1)
MONOCYTES NFR BLD: 6.2 % (ref 4–15)
NEUTROPHILS # BLD AUTO: 6.7 K/UL (ref 1.8–7.7)
NEUTROPHILS NFR BLD: 86.1 % (ref 38–73)
NUM UNITS TRANS PACKED RBC: NORMAL
PLATELET # BLD AUTO: 112 K/UL (ref 150–350)
PMV BLD AUTO: 10.3 FL (ref 9.2–12.9)
PROT SERPL-MCNC: 5.6 G/DL (ref 6–8.4)
PROT UR-MCNC: 15 MG/DL (ref 0–15)
PROT/CREAT UR: 0.16 MG/G{CREAT} (ref 0–0.2)
RBC # BLD AUTO: 2.18 M/UL (ref 4–5.4)
WBC # BLD AUTO: 7.77 K/UL (ref 3.9–12.7)

## 2019-07-01 PROCEDURE — 63600175 PHARM REV CODE 636 W HCPCS: Performed by: OBSTETRICS & GYNECOLOGY

## 2019-07-01 PROCEDURE — 11000001 HC ACUTE MED/SURG PRIVATE ROOM

## 2019-07-01 PROCEDURE — 99232 PR SUBSEQUENT HOSPITAL CARE,LEVL II: ICD-10-PCS | Mod: ,,, | Performed by: OBSTETRICS & GYNECOLOGY

## 2019-07-01 PROCEDURE — 85025 COMPLETE CBC W/AUTO DIFF WBC: CPT

## 2019-07-01 PROCEDURE — 25000003 PHARM REV CODE 250: Performed by: OBSTETRICS & GYNECOLOGY

## 2019-07-01 PROCEDURE — 99232 SBSQ HOSP IP/OBS MODERATE 35: CPT | Mod: ,,, | Performed by: OBSTETRICS & GYNECOLOGY

## 2019-07-01 PROCEDURE — 80076 HEPATIC FUNCTION PANEL: CPT

## 2019-07-01 PROCEDURE — 36430 TRANSFUSION BLD/BLD COMPNT: CPT

## 2019-07-01 PROCEDURE — 36415 COLL VENOUS BLD VENIPUNCTURE: CPT

## 2019-07-01 PROCEDURE — P9016 RBC LEUKOCYTES REDUCED: HCPCS

## 2019-07-01 PROCEDURE — 84156 ASSAY OF PROTEIN URINE: CPT

## 2019-07-01 RX ORDER — HYDROCODONE BITARTRATE AND ACETAMINOPHEN 500; 5 MG/1; MG/1
TABLET ORAL
Status: DISCONTINUED | OUTPATIENT
Start: 2019-07-01 | End: 2019-07-04 | Stop reason: HOSPADM

## 2019-07-01 RX ORDER — SIMETHICONE 80 MG
1 TABLET,CHEWABLE ORAL 3 TIMES DAILY PRN
Status: DISCONTINUED | OUTPATIENT
Start: 2019-07-01 | End: 2019-07-04 | Stop reason: HOSPADM

## 2019-07-01 RX ADMIN — NAPROXEN 500 MG: 500 TABLET ORAL at 05:07

## 2019-07-01 RX ADMIN — VALACYCLOVIR HYDROCHLORIDE 1000 MG: 500 TABLET, FILM COATED ORAL at 09:07

## 2019-07-01 RX ADMIN — FLUOXETINE 40 MG: 20 CAPSULE ORAL at 09:07

## 2019-07-01 RX ADMIN — DOCUSATE SODIUM 100 MG: 100 CAPSULE, LIQUID FILLED ORAL at 09:07

## 2019-07-01 RX ADMIN — PREDNISONE 5 MG: 5 TABLET ORAL at 09:07

## 2019-07-01 RX ADMIN — FAMOTIDINE 20 MG: 20 TABLET ORAL at 09:07

## 2019-07-01 RX ADMIN — SIMETHICONE CHEW TAB 80 MG 80 MG: 80 TABLET ORAL at 07:07

## 2019-07-01 RX ADMIN — NIFEDIPINE 60 MG: 30 TABLET, FILM COATED, EXTENDED RELEASE ORAL at 09:07

## 2019-07-01 RX ADMIN — NAPROXEN 500 MG: 500 TABLET ORAL at 09:07

## 2019-07-01 RX ADMIN — HYDROXYCHLOROQUINE SULFATE 200 MG: 200 TABLET, FILM COATED ORAL at 09:07

## 2019-07-01 RX ADMIN — NAPROXEN 500 MG: 500 TABLET ORAL at 02:07

## 2019-07-01 RX ADMIN — HYDROCODONE BITARTRATE AND ACETAMINOPHEN 1 TABLET: 5; 325 TABLET ORAL at 09:07

## 2019-07-01 NOTE — SUBJECTIVE & OBJECTIVE
Hospital course: Admitted for observation  PIH labs done P/C ratio .53  19 0745 AM Dr. Valdovinos notified of pt's history, labs, BP, MFM recommendations-will proceed with c/s  2019 10:30 AM    Repeat  section low transverse with vertical extension of uterine incision for delivery of 2nd twin with bilateral tubal ligation done.   Transferred to mother baby unit for routine post partum care. Her chronic anemia worsened post operatively due to acute blood loss 6.318 and thus she received 1 unit PRBC. Patient progressed well postoperatively without complication.        Interval History:     She is doing well this morning. She is tolerating a regular diet without nausea or vomiting. She is voiding spontaneously. She is ambulating. She  has not a BM. Vaginal bleeding is mild. She denies fever or chills. Abdominal pain is mild and controlled with oral medications. She is breastfeeding. She denies HA, dizziness with ambulation, vision changes, or chest pain/SOB with ambulation. Reports left leg was slightly more swollen shortly before she was admitted for delivery but was not like this the entire pregnancy.    Objective:     Vital Signs (Most Recent):  Temp: 97.9 °F (36.6 °C) (19 0804)  Pulse: 77 (19 0804)  Resp: 16 (19 0804)  BP: 119/74 (19 0804)  SpO2: 100 % (19 1249) Vital Signs (24h Range):  Temp:  [97.2 °F (36.2 °C)-97.9 °F (36.6 °C)] 97.9 °F (36.6 °C)  Pulse:  [64-84] 77  Resp:  [16-20] 16  SpO2:  [100 %] 100 %  BP: (106-148)/() 119/74        There is no height or weight on file to calculate BMI.      Intake/Output Summary (Last 24 hours) at 2019 0850  Last data filed at 2019 0450  Gross per 24 hour   Intake 3000 ml   Output 5890 ml   Net -2890 ml       Significant Labs:  Lab Results   Component Value Date    GROUPTRH B POS 2019    HEPBSAG Negative 2018    STREPBCULT No Group B Streptococcus isolated 2019     Recent Labs   Lab  07/01/19  0713   HGB 6.3*   HCT 18.0*       I have personallly reviewed all pertinent lab results from the last 24 hours.    Physical Exam:   Constitutional: She is oriented to person, place, and time. She appears well-developed and well-nourished. No distress.       Cardiovascular: Normal rate, regular rhythm, normal heart sounds and intact distal pulses.    No murmur heard.   Pulmonary/Chest: Effort normal and breath sounds normal. No respiratory distress. She has no wheezes. She has no rales.        Abdominal: Soft. Bowel sounds are normal. She exhibits abdominal incision (Aquacel dressing in place, ballooning of dressing with old bloody drainage). She exhibits no distension. There is no tenderness. There is no guarding.   Uterine fundus firm, below umbilicus, mild tenderness (appropriate)             Musculoskeletal: Moves all extremeties. She exhibits edema (L>R edema).   No calf tenderness       Neurological: She is alert and oriented to person, place, and time. She has normal reflexes.   No clonus    Skin: Skin is warm and dry. No rash noted. She is not diaphoretic.

## 2019-07-01 NOTE — LACTATION NOTE
Lactation Rounds:     Called to bedside for latch assistance. Infant was sleeping on mother's chest at start of visit. Gentle waking techniques used, including diaper change (moderate meconium stool). Mother was assisted to place infant in football hold on the left side. She easily expressed colostrum to elicit infant suckling. Infant remained sleepy despite mother's attempts to latch him to the breast with good technique. She then requested additional supplies to hand express her colostrum, which were brought to her. Reviewed feeding plan, including offering Justin expressed colostrum via alternative feeding method. Mother verbalized her understanding.

## 2019-07-01 NOTE — PHYSICIAN QUERY
PT Name: Alisia Vines  MR #: [id number]     Physician Query Form - Documentation Clarification      CDS/: NORA Thomas,RNC-MNN         Contact information:[email or web address]    This form is a permanent document in the medical record.     Query Date: 2019    By submitting this query, we are merely seeking further clarification of documentation. Please utilize your independent clinical judgment when addressing the question(s) below.    The Medical record reflects the following:    Supporting Clinical Findings Location in Medical Record   Pre-existing hypertension during pregnancy in third trimester  Continue Procardia XL 60 mg daily, currently blood pressure well controlled. No signs or symptoms of preE    Status post repeat low transverse  section    Reports left leg was slightly more swollen shortly before she was admitted for delivery but was not like this the entire pregnancy.     IUP at 36w6d presents c/o elevated BP at home    This pregnancy has been complicated by Sjogrens syndrome,HSV2 SLE, proteinurea,GERD, SLE    PIH (pregnancy induced hypertension), third trimester    Discussed plan for repeat  section at 36 wk 6 days for Twin gestation with previous  section x 2 with SLE , hypertension , known nephritis, possible exacerbation of BP by Pre E .  Antonia Whitaker progress note @857am                          H&P                     Cindy Figueroa CNM/Dr Valdovinos Progress note @857am                                                                                      Doctor, Please specify diagnosis or diagnoses associated with above clinical findings.    Please clarify type of hypertension.    Provider Use Only      [ x ] Pre-existing hypertension with superimposed pre-eclampsia  [  ] Pre-existing hypertension only  [  ] Gestational hypertension  [  ] Mild pre-eclampsia  [  ] Severe pre-eclampsia  [  ] Other, please  specify:_______________________________________                                                                                                                   [  ] Clinically Undetermined

## 2019-07-01 NOTE — PHYSICIAN QUERY
PT Name: Alisia Vines  MR #: [id number]     Physician Query Form - Documentation Clarification      CDS/: NORA Thomas,RNC-MNN          Contact information:[email or web address]    This form is a permanent document in the medical record.     Query Date: July 1, 2019    By submitting this query, we are merely seeking further clarification of documentation. Please utilize your independent clinical judgment when addressing the question(s) below.    The Medical record reflects the following:    Supporting Clinical Findings Location in Medical Record   This pregnancy has been complicated by Sjogrens syndrome,HSV2 SLE, proteinurea,GERD, SLE    Genitourinary: Negative    HSV-2 (herpes simplex virus 2) infection  On Valtrex prophylactics H&P 6/30            Antonia Whitaker progress note 7/1@857am                                                                                      Doctor, Please specify diagnosis or diagnoses associated with above clinical findings.    Please specify type of HSV.    Provider Use Only      [x  ] Genital, unspecified  [  ] Oral  [  ] Vulva  [  ] Vagina  [  ] Labia  [  ] Cervix   [  ] Anus  [  ] Other, please specify:________________________________________                                                                                                                 [  ] Clinically Undetermined

## 2019-07-01 NOTE — ASSESSMENT & PLAN NOTE
POD #1 s/p repeat LTCS  Pt doing well, afebrile overnight.  Proceed with routine post-partum care, encouraged ambulation, aware ok to shower. Will change Aquacel today. Plan for doppler of LLE to evaluate unilateral edema.  Plan for 1 unit pRBC, patient in agreement with transfusion, consent signed.  Pt counseled on management plan and discharge goals. Currently one infant in NICU.

## 2019-07-01 NOTE — NURSING
Pt just completed syringe feeding baby boy, now wants to go to NICU to visit baby girl then will call for blood transfusion.

## 2019-07-01 NOTE — PLAN OF CARE
Problem: Adult Inpatient Plan of Care  Goal: Plan of Care Review  Outcome: Ongoing (interventions implemented as appropriate)  Pt with c/o right sided/back pain following ambulation to NICU, work up performed and WDL.  Blood transfusion of 1 unit PRBC given.  Pt ambulating well.  Medication given for pain as ordered.  Pt bonding well with infant.  Pt not pumping as often as discussed due to multiple medical procedures.  Infant syringe fed per nurse and mother.

## 2019-07-01 NOTE — NURSING
Pt assisted to RR, denies weakness or dizziness. Instructed pt on use of debra bottle. Perineum cleansed with debra bottle. New pad, underwear and gown given to pt. New green pads placed on bed.

## 2019-07-01 NOTE — NURSING
IV infiltrated.  Blood transfusion paused to restart IV.  Pt with c/o a vague right sided/torso pain that began when she ambulated to NICU and worsens when she moves.  Breath sounds are clear with oxygen saturation 100%, /87 P 91.  Message sent to RJA Kwon.

## 2019-07-01 NOTE — NURSING
Left lower leg, ankle, and foot more swollen than right lower extremity with apparent bruise noted to front area of ankle.  Pt denies pain or tenderness to ankle or back of lower leg.  Dr Allan at bedside for assessment with ultrasound orders received.

## 2019-07-01 NOTE — NURSING
Aquacel dressing d/c'd with moderate amount of old blood noted behind dressing.  Incision cleaned with no active bleeding noted from incision.  New Aquacel dressing applied.

## 2019-07-01 NOTE — PROGRESS NOTES
[facility] -   Obstetrics  Postpartum Progress Note    Patient Name: Alisia Loconum  MRN: [id number]  Admission Date: 2019  Hospital Length of Stay: 1 days  Attending Physician: VERONICA Valdovinos MD  Primary Care Provider: Saumya Quinonez MD    Subjective:     Principal Problem:Status post repeat low transverse  section    Hospital course: Admitted for observation  PIH labs done P/C ratio .53  19 0745 AM Dr. Valdovinos notified of pt's history, labs, BP, MFM recommendations-will proceed with c/s  2019 10:30 AM    Repeat  section low transverse with vertical extension of uterine incision for delivery of 2nd twin with bilateral tubal ligation done.   Transferred to mother baby unit for routine post partum care. Her chronic anemia worsened post operatively due to acute blood loss 6.3/18 and thus she received 1 unit PRBC. Patient progressed well postoperatively without complication.        Interval History:     She is doing well this morning. She is tolerating a regular diet without nausea or vomiting. She is voiding spontaneously. She is ambulating. She  has not a BM. Vaginal bleeding is mild. She denies fever or chills. Abdominal pain is mild and controlled with oral medications. She is breastfeeding. She denies HA, dizziness with ambulation, vision changes, or chest pain/SOB with ambulation. Reports left leg was slightly more swollen shortly before she was admitted for delivery but was not like this the entire pregnancy.    Objective:     Vital Signs (Most Recent):  Temp: 97.9 °F (36.6 °C) (19 0804)  Pulse: 77 (19 0804)  Resp: 16 (19 0804)  BP: 119/74 (19 0804)  SpO2: 100 % (19 1249) Vital Signs (24h Range):  Temp:  [97.2 °F (36.2 °C)-97.9 °F (36.6 °C)] 97.9 °F (36.6 °C)  Pulse:  [64-84] 77  Resp:  [16-20] 16  SpO2:  [100 %] 100 %  BP: (106-148)/() 119/74        There is no height or weight on file to calculate  BMI.      Intake/Output Summary (Last 24 hours) at 2019 0850  Last data filed at 2019 0450  Gross per 24 hour   Intake 3000 ml   Output 5890 ml   Net -2890 ml       Significant Labs:  Lab Results   Component Value Date    GROUPTRH B POS 2019    HEPBSAG Negative 2018    STREPBCULT No Group B Streptococcus isolated 2019     Recent Labs   Lab 19  0713   HGB 6.3*   HCT 18.0*       I have personallly reviewed all pertinent lab results from the last 24 hours.    Physical Exam:   Constitutional: She is oriented to person, place, and time. She appears well-developed and well-nourished. No distress.       Cardiovascular: Normal rate, regular rhythm, normal heart sounds and intact distal pulses.    No murmur heard.   Pulmonary/Chest: Effort normal and breath sounds normal. No respiratory distress. She has no wheezes. She has no rales.        Abdominal: Soft. Bowel sounds are normal. She exhibits abdominal incision (Aquacel dressing in place, ballooning of dressing with old bloody drainage). She exhibits no distension. There is no tenderness. There is no guarding.   Uterine fundus firm, below umbilicus, mild tenderness (appropriate)             Musculoskeletal: Moves all extremeties. She exhibits edema (L>R edema).   No calf tenderness       Neurological: She is alert and oriented to person, place, and time. She has normal reflexes.   No clonus    Skin: Skin is warm and dry. No rash noted. She is not diaphoretic.        Assessment/Plan:     28 y.o. female  for:    * Status post repeat low transverse  section  POD #1 s/p repeat LTCS  Pt doing well, afebrile overnight.  Proceed with routine post-partum care, encouraged ambulation, aware ok to shower. Will change Aquacel today. Plan for doppler of LLE to evaluate unilateral edema.  Plan for 1 unit pRBC, patient in agreement with transfusion, consent signed.  Pt counseled on management plan and discharge goals. Currently one infant in  NICU.            Previous  delivery affecting pregnancy, antepartum  Previous c/s X 2  Plan for repeat c/s with BTL    Systemic lupus complicating pregnancy  Followed by Rheumatology- on plaquenil, prednisone    Dichorionic diamniotic twin pregnancy, antepartum  Plan for Repeat C/S with BTL          Long-term use of Plaquenil  Continue plaquenil for SLE    Pre-existing hypertension during pregnancy in third trimester  Continue Procardia XL 60 mg daily, currently blood pressure well controlled. No signs or symptoms of preE.        Anemia  Chronic anemia with H/H 23.9/8.2 on admission, will transfuse 2 unit PRBC today for acute on chronic anemia due to blood loss.    SLE (systemic lupus erythematosus related syndrome)  Followed by Rheumatology- on plaquenil, prednisone    HSV-2 (herpes simplex virus 2) infection  On Valtrex prophylactics    Sjogren's syndrome           Disposition: As patient meets milestones, will plan to discharge.    Antonia Whitaker PA-C  Obstetrics  [facility] [place]

## 2019-07-01 NOTE — ASSESSMENT & PLAN NOTE
Chronic anemia with H/H 23.9/8.2 on admission, will transfuse 2 unit PRBC today for acute on chronic anemia due to blood loss.

## 2019-07-01 NOTE — PROGRESS NOTES
I have  reviewed the Physician Assistant's  assessment,   and progress note. We made a plan and will follow the pt closely.     Ledy Allan MD  [facility] -         Notified of patient's complaint of vague right sided chest pain. She has most pain in the upper right back as well as in the front, including the RUQ. No shortness of breath but pain is worse with deep inspiration and leaning forward. No left sided chest pain or cough. No fever. She had just come back from ambulating to the NICU. /87, lung exam clear, +reproducible tenderness over right upper thoracic region, also tender in RUQ. No HA, vision changes. 2+ DTR and no clonus. Platelets this AM were slightly decreased from previous. US doppler negative for DVT this AM. Transfusion in process, of note her symptoms began prior to initiation of blood transfusion. Will obtain LFT's and urine PCr as well as CXR and monitor closely.

## 2019-07-01 NOTE — NURSING
Aquacel dressing noted to be saturated with old blood upon removal of pressure dressing.  Will notify provider and change dressing as ordered.

## 2019-07-01 NOTE — LACTATION NOTE
Lactation rounds  Justin is sleeping, not showing feeding cues. Unwrapped, and installed skin to skin with mother.   After 15 minutes of skin to skin, baby is still sleepy.   Mother is able to demonstrate hand expression, and applied colostrum on Justin's lips.   Since Justin is Late pre-term, encouraged mother to pump, hand express and feed Justin and bring also some EBM in the NICU.     Weavehony breast pump set up at bedside.  Instructed on proper usage and to adjust suction according to comfort level. Verified appropriate flange fit- 24. Reviewed frequency and duration of pumping in order to promote and maintain full milk supply. Hands-on pumping technique reviewed. Encouraged hand expression after. Instructed on proper cleaning of breast pump parts. Reviewed proper milk handling, collection, storage, and transportation. Voices understanding.    PLAN  The baby is what we call a late  baby. Late  babies are immature in multiple ways. They cannot be expected to behave like term babies. They are can sleepy, passive, or might not transfer milk well from the breast.      Plan:     Feed based on feeding cues.   Skin to skin every 2-3 hours if no feeding cues.   Notify bedside nurse if no feeding 3 hours from beginning of last feeding.   Attempt feeding baby for 10-15 minutes. If feeding is not nutritive;    Supplement with all expressed breast milk available (from previous pumping/hand expression session).   Hand express and collect all available colustrum for baby, save for next feeding.       Expected oral intake per feeding (according to [employer] Breastfeeding [employer]) & expected output for each day of life:  Day 2: 5-15 mL per feeding, 2 voids, 2 stools  Day 3: 15-30 mL per feeding, 3 voids, 3 stools  Day 4: 30-60 mL per feeding, 4 voids, 3 stools  Day 5: begin bottle feeding if not going well to the breast, 6-8 voids, 3 stools.     Consider Outpatient Lactation Consult on  day of life 4 or 5, call [phone] to schedule  Consider rental of hospital grade pump if primarily pumping for infants 1st month of life.    This might seems like a busy plan, but this plan allows us to feed the baby, and maintain milk supply!     Feed the baby. A baby who is getting the right amount of calories and nutrition is best able to learn how to nurse. First choice for what to feed a non-nursing baby is moms own milk.    Maintain milk supply. If moms milk supply is being maintained with an appropriate frequency and amount of milk expression, more time is available for baby to learn to nurse, and babys efforts will be better rewarded (with more milk).

## 2019-07-01 NOTE — ASSESSMENT & PLAN NOTE
Continue Procardia XL 60 mg daily, currently blood pressure well controlled. No signs or symptoms of preE.

## 2019-07-02 PROCEDURE — 63600175 PHARM REV CODE 636 W HCPCS: Performed by: OBSTETRICS & GYNECOLOGY

## 2019-07-02 PROCEDURE — 25000003 PHARM REV CODE 250: Performed by: OBSTETRICS & GYNECOLOGY

## 2019-07-02 PROCEDURE — 99232 SBSQ HOSP IP/OBS MODERATE 35: CPT | Mod: ,,, | Performed by: OBSTETRICS & GYNECOLOGY

## 2019-07-02 PROCEDURE — 11000001 HC ACUTE MED/SURG PRIVATE ROOM

## 2019-07-02 PROCEDURE — 99232 PR SUBSEQUENT HOSPITAL CARE,LEVL II: ICD-10-PCS | Mod: ,,, | Performed by: OBSTETRICS & GYNECOLOGY

## 2019-07-02 RX ORDER — FERROUS SULFATE 325(65) MG
325 TABLET, DELAYED RELEASE (ENTERIC COATED) ORAL DAILY
Status: DISCONTINUED | OUTPATIENT
Start: 2019-07-02 | End: 2019-07-04 | Stop reason: HOSPADM

## 2019-07-02 RX ADMIN — DOCUSATE SODIUM 100 MG: 100 CAPSULE, LIQUID FILLED ORAL at 09:07

## 2019-07-02 RX ADMIN — SIMETHICONE CHEW TAB 80 MG 80 MG: 80 TABLET ORAL at 04:07

## 2019-07-02 RX ADMIN — FAMOTIDINE 20 MG: 20 TABLET ORAL at 09:07

## 2019-07-02 RX ADMIN — FLUOXETINE 40 MG: 20 CAPSULE ORAL at 09:07

## 2019-07-02 RX ADMIN — PREDNISONE 5 MG: 5 TABLET ORAL at 09:07

## 2019-07-02 RX ADMIN — NAPROXEN 500 MG: 500 TABLET ORAL at 05:07

## 2019-07-02 RX ADMIN — NAPROXEN 500 MG: 500 TABLET ORAL at 09:07

## 2019-07-02 RX ADMIN — FERROUS SULFATE TAB EC 325 MG (65 MG FE EQUIVALENT) 325 MG: 325 (65 FE) TABLET DELAYED RESPONSE at 09:07

## 2019-07-02 RX ADMIN — NAPROXEN 500 MG: 500 TABLET ORAL at 01:07

## 2019-07-02 RX ADMIN — NIFEDIPINE 60 MG: 30 TABLET, FILM COATED, EXTENDED RELEASE ORAL at 09:07

## 2019-07-02 RX ADMIN — HYDROCODONE BITARTRATE AND ACETAMINOPHEN 1 TABLET: 5; 325 TABLET ORAL at 09:07

## 2019-07-02 RX ADMIN — HYDROXYCHLOROQUINE SULFATE 200 MG: 200 TABLET, FILM COATED ORAL at 09:07

## 2019-07-02 RX ADMIN — VALACYCLOVIR HYDROCHLORIDE 1000 MG: 500 TABLET, FILM COATED ORAL at 09:07

## 2019-07-02 NOTE — ASSESSMENT & PLAN NOTE
POD #2 s/p repeat LTCS with tubal ligation  Pt doing well, afebrile overnight.  Proceed with routine post-partum care, encouraged ambulation, aware ok to shower. Will change Aquacel today. Patient advised doppler of lle negative. 1 infant doing well in NICU; 2nd infant rooming in--anticipate discharge on pod #4

## 2019-07-02 NOTE — LACTATION NOTE
Lactation rounds  Infant is showing feeding cues. Attempted to feed baby while skin to skin: however Justin is kicking  and notably uncomfortable, looking for boundaries. Swaddled baby and baby is not looking for boundaries anymore and able to focus to latch.   Helped mother to settle in a football hold position on the left breast. Reviewed deep asymmetric latch and proper positioning. Mother is able to demonstrate back and deep latch easily obtained. Audible swallows noted, and mother denies pain or discomfort.   Ongoing education provided including correct positioning and latch, signs of an effective feeding, early feeding cues and baby-led feeds, frequency of feeds including the normality of cluster feeding, hand expression, exclusive breastfeeding for 6 months, as well as when and  how to seek the assistance of a qualified health care professional for concerns related to  feeding.

## 2019-07-02 NOTE — PROGRESS NOTES
Evening rounds    Pt still w mild R side chest wall pain that comes and goes (mainly when ambulating), improved after simethicone.  No headache, vision change, or right upper quadrant pain.    Vitals:    07/01/19 1437 07/01/19 1600 07/01/19 2000 07/01/19 2048   BP: (!) 156/93 132/81 (!) 138/100 (!) 141/90   Pulse: 88 94 99 106   Resp:  18 20 20   Temp:  98 °F (36.7 °C) 98.4 °F (36.9 °C)    TempSrc:  Oral Oral    SpO2:        will continue to monitor closely.

## 2019-07-02 NOTE — PROGRESS NOTES
[facility] -   Obstetrics  Postpartum Progress Note    Patient Name: Alisia Loconum  MRN: [id number]  Admission Date: 2019  Hospital Length of Stay: 2 days  Attending Physician: VERONICA Valdovinos MD  Primary Care Provider: Saumya Quinonez MD    Subjective:     Principal Problem:Status post repeat low transverse  section    Hospital course: Admitted for observation  PIH labs done P/C ratio .53  19 0745 AM Dr. Valdovinos notified of pt's history, labs, BP, MFM recommendations-will proceed with c/s  2019 10:30 AM    Repeat  section low transverse with vertical extension of uterine incision for delivery of 2nd twin with bilateral tubal ligation done.   Transferred to mother baby unit for routine post partum care. Her chronic anemia worsened post operatively due to acute blood loss 6.318 and thus she received 1 unit PRBC. Patient progressed well postoperatively without complication.        Interval History:   Pod #2    She is doing well this morning. She is tolerating a regular diet without nausea or vomiting. She is voiding spontaneously. She is ambulating. She has passed flatus, and has not a BM. Vaginal bleeding is mild. She denies fever or chills. Abdominal pain is mild and controlled with oral medications. She is breastfeeding and bottle feeding. She desires circumcision for her male baby: yes.    Objective:     Vital Signs (Most Recent):  Temp: 97.7 °F (36.5 °C) (19 0400)  Pulse: 95 (19 0400)  Resp: 20 (19 0400)  BP: 128/80 (19 0400)  SpO2: 100 % (19 1435) Vital Signs (24h Range):  Temp:  [97.5 °F (36.4 °C)-98.6 °F (37 °C)] 97.7 °F (36.5 °C)  Pulse:  [] 95  Resp:  [16-20] 20  SpO2:  [100 %] 100 %  BP: (119-158)/() 128/80        There is no height or weight on file to calculate BMI.      Intake/Output Summary (Last 24 hours) at 2019 0759  Last data filed at 2019 1230  Gross per 24 hour   Intake 1050 ml   Output  --   Net 1050 ml       Significant Labs:  Lab Results   Component Value Date    GROUPTRH B POS 06/30/2019    HEPBSAG Negative 12/12/2018    STREPBCULT No Group B Streptococcus isolated 06/26/2019     Recent Labs   Lab 07/01/19  0713   HGB 6.3*   HCT 18.0*       I have personallly reviewed all pertinent lab results from the last 24 hours.  Recent Lab Results       07/01/19  1430   07/01/19  1337        Albumin   2.0     Alkaline Phosphatase   134     ALT   10     AST   28     Bilirubin, Direct   0.2     BILIRUBIN TOTAL   0.3  Comment:  For infants and newborns, interpretation of results should be based  on gestational age, weight and in agreement with clinical  observations.  Premature Infant recommended reference ranges:  Up to 24 hours.............<8.0 mg/dL  Up to 48 hours............<12.0 mg/dL  3-5 days..................<15.0 mg/dL  6-29 days.................<15.0 mg/dL       Creatinine, Random Ur 95.0  Comment:  The random urine reference ranges provided were established   for 24 hour urine collections.  No reference ranges exist for  random urine specimens.  Correlate clinically.         Prot/Creat Ratio, Ur 0.16       PROTEIN TOTAL   5.6     Protein, Urine Random 15  Comment:  The random urine reference ranges provided were established   for 24 hour urine collections.  No reference ranges exist for  random urine specimens.  Correlate clinically.               Physical Exam:   Constitutional: She is oriented to person, place, and time. She appears well-developed.    HENT:   Head: Normocephalic.    Eyes: Pupils are equal, round, and reactive to light.    Neck: Normal range of motion.    Cardiovascular: Normal rate and regular rhythm.     Pulmonary/Chest: Effort normal and breath sounds normal.        Abdominal: Soft. Bowel sounds are normal. She exhibits no abdominal incision (aquasel dressing --lifting up; soiled). There is no tenderness. There is no rebound and no guarding.     Genitourinary:   Genitourinary  Comments: Ut gravid, non tender           Musculoskeletal: Normal range of motion and moves all extremeties. She exhibits edema (mild). She exhibits no tenderness.       Neurological: She is alert and oriented to person, place, and time. She has normal reflexes.    Skin: Skin is warm and dry.    Psychiatric: She has a normal mood and affect. Her behavior is normal. Judgment and thought content normal.       Assessment/Plan:     28 y.o. female  for:    * Status post repeat low transverse  section  POD #2 s/p repeat LTCS with tubal ligation  Pt doing well, afebrile overnight.  Proceed with routine post-partum care, encouraged ambulation, aware ok to shower. Will change Aquacel today. Patient advised doppler of lle negative. 1 infant doing well in NICU; 2nd infant rooming in--anticipate discharge on pod #4          Previous  delivery affecting pregnancy, antepartum  Previous c/s X 2  Plan for repeat c/s with BTL    Systemic lupus complicating pregnancy  Followed by Rheumatology- on plaquenil, prednisone    Dichorionic diamniotic twin pregnancy, antepartum  Plan for Repeat C/S with BTL          Long-term use of Plaquenil  Continue plaquenil for SLE    Pre-existing hypertension during pregnancy in third trimester  Continue Procardia XL 60 mg daily, currently blood pressure well controlled. No signs or symptoms of preE.        Anemia  S/p 1 unit prbc; no signs hypovolemia  Add daily iron    SLE (systemic lupus erythematosus related syndrome)  Followed by Rheumatology- on plaquenil, prednisone    Allergic rhinitis  Continue flonase    HSV-2 (herpes simplex virus 2) infection  On Valtrex prophylactics    Sjogren's syndrome             Disposition: As patient meets milestones, will plan to discharge  In 2-3 days.    Gabriela Roa MD  Obstetrics  [facility]

## 2019-07-02 NOTE — SUBJECTIVE & OBJECTIVE
Hospital course: Admitted for observation  PIH labs done P/C ratio .53  19 0745 AM Dr. Valdovinos notified of pt's history, labs, BP, MFM recommendations-will proceed with c/s  2019 10:30 AM    Repeat  section low transverse with vertical extension of uterine incision for delivery of 2nd twin with bilateral tubal ligation done.   Transferred to mother baby unit for routine post partum care. Her chronic anemia worsened post operatively due to acute blood loss 6.3/18 and thus she received 1 unit PRBC. Patient progressed well postoperatively without complication.        Interval History:   Pod #2    She is doing well this morning. She is tolerating a regular diet without nausea or vomiting. She is voiding spontaneously. She is ambulating. She has passed flatus, and has not a BM. Vaginal bleeding is mild. She denies fever or chills. Abdominal pain is mild and controlled with oral medications. She is breastfeeding and bottle feeding. She desires circumcision for her male baby: yes.    Objective:     Vital Signs (Most Recent):  Temp: 97.7 °F (36.5 °C) (19 0400)  Pulse: 95 (19 0400)  Resp: 20 (19 0400)  BP: 128/80 (19 0400)  SpO2: 100 % (19 1435) Vital Signs (24h Range):  Temp:  [97.5 °F (36.4 °C)-98.6 °F (37 °C)] 97.7 °F (36.5 °C)  Pulse:  [] 95  Resp:  [16-20] 20  SpO2:  [100 %] 100 %  BP: (119-158)/() 128/80        There is no height or weight on file to calculate BMI.      Intake/Output Summary (Last 24 hours) at 2019 0759  Last data filed at 2019 1230  Gross per 24 hour   Intake 1050 ml   Output --   Net 1050 ml       Significant Labs:  Lab Results   Component Value Date    GROUPTRH B POS 2019    HEPBSAG Negative 2018    STREPBCULT No Group B Streptococcus isolated 2019     Recent Labs   Lab 19  0713   HGB 6.3*   HCT 18.0*       I have personallly reviewed all pertinent lab results from the last 24 hours.  Recent Lab Results        07/01/19  1430   07/01/19  1337        Albumin   2.0     Alkaline Phosphatase   134     ALT   10     AST   28     Bilirubin, Direct   0.2     BILIRUBIN TOTAL   0.3  Comment:  For infants and newborns, interpretation of results should be based  on gestational age, weight and in agreement with clinical  observations.  Premature Infant recommended reference ranges:  Up to 24 hours.............<8.0 mg/dL  Up to 48 hours............<12.0 mg/dL  3-5 days..................<15.0 mg/dL  6-29 days.................<15.0 mg/dL       Creatinine, Random Ur 95.0  Comment:  The random urine reference ranges provided were established   for 24 hour urine collections.  No reference ranges exist for  random urine specimens.  Correlate clinically.         Prot/Creat Ratio, Ur 0.16       PROTEIN TOTAL   5.6     Protein, Urine Random 15  Comment:  The random urine reference ranges provided were established   for 24 hour urine collections.  No reference ranges exist for  random urine specimens.  Correlate clinically.               Physical Exam:   Constitutional: She is oriented to person, place, and time. She appears well-developed.    HENT:   Head: Normocephalic.    Eyes: Pupils are equal, round, and reactive to light.    Neck: Normal range of motion.    Cardiovascular: Normal rate and regular rhythm.     Pulmonary/Chest: Effort normal and breath sounds normal.        Abdominal: Soft. Bowel sounds are normal. She exhibits no abdominal incision (aquasel dressing --lifting up; soiled). There is no tenderness. There is no rebound and no guarding.     Genitourinary:   Genitourinary Comments: Ut gravid, non tender           Musculoskeletal: Normal range of motion and moves all extremeties. She exhibits edema (mild). She exhibits no tenderness.       Neurological: She is alert and oriented to person, place, and time. She has normal reflexes.    Skin: Skin is warm and dry.    Psychiatric: She has a normal mood and affect. Her behavior is  normal. Judgment and thought content normal.

## 2019-07-02 NOTE — PLAN OF CARE
Problem: Adult Inpatient Plan of Care  Goal: Plan of Care Review  Outcome: Ongoing (interventions implemented as appropriate)  The patient is progressing appropriately. Vitals are stable, voiding spontaneously, and ambulating without difficulty. Pain is controlled with scheduled Neproxin and prn norco. Patient has been taking simethicone for gas pain. Patient encouraged to walk and drink hot teas for gas. Patient is visiting twin B in NICU and doing skin to skin. Patient is hand expressing and pumping. Aquacel dressing dry and intact, with small amount of dried drainage. Appropriate bonding is occurring with infants. Will continue to monitor.

## 2019-07-03 PROCEDURE — 25000003 PHARM REV CODE 250: Performed by: OBSTETRICS & GYNECOLOGY

## 2019-07-03 PROCEDURE — 99231 PR SUBSEQUENT HOSPITAL CARE,LEVL I: ICD-10-PCS | Mod: ,,, | Performed by: OBSTETRICS & GYNECOLOGY

## 2019-07-03 PROCEDURE — 11000001 HC ACUTE MED/SURG PRIVATE ROOM

## 2019-07-03 PROCEDURE — 63600175 PHARM REV CODE 636 W HCPCS: Performed by: OBSTETRICS & GYNECOLOGY

## 2019-07-03 PROCEDURE — 99231 SBSQ HOSP IP/OBS SF/LOW 25: CPT | Mod: ,,, | Performed by: OBSTETRICS & GYNECOLOGY

## 2019-07-03 RX ADMIN — DOCUSATE SODIUM 100 MG: 100 CAPSULE, LIQUID FILLED ORAL at 09:07

## 2019-07-03 RX ADMIN — NAPROXEN 500 MG: 500 TABLET ORAL at 01:07

## 2019-07-03 RX ADMIN — FERROUS SULFATE TAB EC 325 MG (65 MG FE EQUIVALENT) 325 MG: 325 (65 FE) TABLET DELAYED RESPONSE at 09:07

## 2019-07-03 RX ADMIN — FLUOXETINE 40 MG: 20 CAPSULE ORAL at 09:07

## 2019-07-03 RX ADMIN — NIFEDIPINE 60 MG: 30 TABLET, FILM COATED, EXTENDED RELEASE ORAL at 09:07

## 2019-07-03 RX ADMIN — FAMOTIDINE 20 MG: 20 TABLET ORAL at 09:07

## 2019-07-03 RX ADMIN — NAPROXEN 500 MG: 500 TABLET ORAL at 05:07

## 2019-07-03 RX ADMIN — NAPROXEN 500 MG: 500 TABLET ORAL at 09:07

## 2019-07-03 RX ADMIN — PREDNISONE 5 MG: 5 TABLET ORAL at 09:07

## 2019-07-03 RX ADMIN — HYDROXYCHLOROQUINE SULFATE 200 MG: 200 TABLET, FILM COATED ORAL at 09:07

## 2019-07-03 RX ADMIN — VALACYCLOVIR HYDROCHLORIDE 1000 MG: 500 TABLET, FILM COATED ORAL at 09:07

## 2019-07-03 NOTE — PROGRESS NOTES
[facility] -   Obstetrics  Postpartum Progress Note    Patient Name: Alisia Ceballos Jasmyn  MRN: [id number]  Admission Date: 2019  Hospital Length of Stay: 3 days  Attending Physician: VERONICA Valdovinos MD  Primary Care Provider: aSumya Quinonez MD    Subjective:     Principal Problem:Status post repeat low transverse  section    Hospital course: Admitted for observation  PIH labs done P/C ratio .53  19 0745 AM Dr. Valdovinos notified of pt's history, labs, BP, MFM recommendations-will proceed with c/s  2019 10:30 AM    Repeat  section low transverse with vertical extension of uterine incision for delivery of 2nd twin with bilateral tubal ligation done.   Transferred to mother baby unit for routine post partum care. Her chronic anemia worsened post operatively due to acute blood loss .3/18 and thus she received 1 unit PRBC. Patient progressed well postoperatively without complication.    7/3/19-pt feeling well. Trying to use breast pump. One twin in NICU but doing well. Denies preE symptoms at this time    Interval History: s/p repeat cs & BTL    She is doing well this morning. She is tolerating a regular diet without nausea or vomiting. She is voiding spontaneously. She is ambulating. She has passed flatus, and has not a BM. Vaginal bleeding is mild. She denies fever or chills. Abdominal pain is mild and controlled with oral medications. She is trying to breastfeed one twin as well as use pump; also supplementing w/ formulat.     Objective:     Vital Signs (Most Recent):  Temp: 97.8 °F (36.6 °C) (19 0400)  Pulse: 106 (19 0400)  Resp: 20 (19 0400)  BP: 131/80 (19 0400)  SpO2: 100 % (19 1435) Vital Signs (24h Range):  Temp:  [97.5 °F (36.4 °C)-98.3 °F (36.8 °C)] 97.8 °F (36.6 °C)  Pulse:  [] 106  Resp:  [18-20] 20  BP: (131-145)/(80-91) 131/80        There is no height or weight on file to calculate BMI.    No intake or output data in  the 24 hours ending 19 0759    Significant Labs:  Lab Results   Component Value Date    GROUPTRH B POS 2019    HEPBSAG Negative 2018    STREPBCULT No Group B Streptococcus isolated 2019     No results for input(s): HGB, HCT in the last 48 hours.    I have personallly reviewed all pertinent lab results from the last 24 hours.    Physical Exam:   Constitutional: She is oriented to person, place, and time. She appears well-developed and well-nourished.        Pulmonary/Chest: Effort normal.        Abdominal: Soft. She exhibits abdominal incision. She exhibits no distension. There is tenderness.   Fundus firm. Bandage c/d/i. Tenderness mild     Genitourinary:   Genitourinary Comments: No bleeding           Musculoskeletal: Moves all extremeties.       Neurological: She is alert and oriented to person, place, and time.    Skin: Skin is warm and dry.    Psychiatric: She has a normal mood and affect. Her behavior is normal.       Assessment/Plan:     28 y.o. female  for:    * Status post repeat low transverse  section  POD #2 s/p repeat LTCS with tubal ligation  Pt doing well, afebrile overnight.  Proceed with routine post-partum care, encouraged ambulation, aware ok to shower. Will change Aquacel today. Patient advised doppler of lle negative. 1 infant doing well in NICU; 2nd infant rooming in--anticipate discharge on pod #4          Previous  delivery affecting pregnancy, antepartum  Previous c/s X 2  Plan for repeat c/s with BTL    Systemic lupus complicating pregnancy  Followed by Rheumatology- on plaquenil, prednisone    Dichorionic diamniotic twin pregnancy, antepartum  Plan for Repeat C/S with BTL          Long-term use of Plaquenil  Continue plaquenil for SLE    Pre-existing hypertension during pregnancy in third trimester  Continue Procardia XL 60 mg daily, currently blood pressure well controlled. No signs or symptoms of preE.        Anemia  S/p 1 unit prbc; no signs  hypovolemia  Add daily iron    SLE (systemic lupus erythematosus related syndrome)  Followed by Rheumatology- on plaquenil, prednisone    Allergic rhinitis  Continue flonase    HSV-2 (herpes simplex virus 2) infection  On Valtrex prophylactics    Sjogren's syndrome         Disposition: As patient meets milestones, will plan to discharge w/ infants if possible.    Janice Arenas MD  Obstetrics  [facility]

## 2019-07-03 NOTE — LACTATION NOTE
Mother will be discharged, but probably be rooming in for a day.   She is changing Justin's diaper. She complains of nipple soreness- nipple care reviewed and lanolin cream given.   Breastfeeding discharge education performed. Informed mother of the World Health Organization's recommendation for exclusive breastfeeding for the first 6 months of baby's life and continued breastfeeding after the introduction of solid foods for 2 years and beyond. Also informed mother of the American Academy of Pediatric's recommendation for baby to be examined by pediatrician or other qualified HCP within 2-4 dys of discharge and again at the 2nd week of life. Discussed baby's appropriate intake and output, adequate weight gain patterns for baby, and how to seek the assistance of a qualified healthcare professional for concerns related to  feeding. Written instructions have been provided and were reviewed at this time. Mother voices understanding.

## 2019-07-03 NOTE — SUBJECTIVE & OBJECTIVE
Hospital course: Admitted for observation  PIH labs done P/C ratio .53  19 0745 AM Dr. Valdovinos notified of pt's history, labs, BP, MFM recommendations-will proceed with c/s  2019 10:30 AM    Repeat  section low transverse with vertical extension of uterine incision for delivery of 2nd twin with bilateral tubal ligation done.   Transferred to mother baby unit for routine post partum care. Her chronic anemia worsened post operatively due to acute blood loss 6.3/18 and thus she received 1 unit PRBC. Patient progressed well postoperatively without complication.    7/3/19-pt feeling well. Trying to use breast pump. One twin in NICU but doing well. Denies preE symptoms at this time    Interval History: s/p repeat cs & BTL    She is doing well this morning. She is tolerating a regular diet without nausea or vomiting. She is voiding spontaneously. She is ambulating. She has passed flatus, and has not a BM. Vaginal bleeding is mild. She denies fever or chills. Abdominal pain is mild and controlled with oral medications. She is trying to breastfeed one twin as well as use pump; also supplementing w/ formulat.     Objective:     Vital Signs (Most Recent):  Temp: 97.8 °F (36.6 °C) (19 0400)  Pulse: 106 (19 0400)  Resp: 20 (19 0400)  BP: 131/80 (19 0400)  SpO2: 100 % (19 1435) Vital Signs (24h Range):  Temp:  [97.5 °F (36.4 °C)-98.3 °F (36.8 °C)] 97.8 °F (36.6 °C)  Pulse:  [] 106  Resp:  [18-20] 20  BP: (131-145)/(80-91) 131/80        There is no height or weight on file to calculate BMI.    No intake or output data in the 24 hours ending 19 7679    Significant Labs:  Lab Results   Component Value Date    GROUPTRH B POS 2019    HEPBSAG Negative 2018    STREPBCULT No Group B Streptococcus isolated 2019     No results for input(s): HGB, HCT in the last 48 hours.    I have personallly reviewed all pertinent lab results from the last 24  hours.    Physical Exam:   Constitutional: She is oriented to person, place, and time. She appears well-developed and well-nourished.        Pulmonary/Chest: Effort normal.        Abdominal: Soft. She exhibits abdominal incision. She exhibits no distension. There is tenderness.   Fundus firm. Bandage c/d/i. Tenderness mild     Genitourinary:   Genitourinary Comments: No bleeding           Musculoskeletal: Moves all extremeties.       Neurological: She is alert and oriented to person, place, and time.    Skin: Skin is warm and dry.    Psychiatric: She has a normal mood and affect. Her behavior is normal.

## 2019-07-04 VITALS
TEMPERATURE: 98 F | HEART RATE: 96 BPM | SYSTOLIC BLOOD PRESSURE: 144 MMHG | DIASTOLIC BLOOD PRESSURE: 93 MMHG | RESPIRATION RATE: 18 BRPM | OXYGEN SATURATION: 100 %

## 2019-07-04 PROCEDURE — 99238 HOSP IP/OBS DSCHRG MGMT 30/<: CPT | Mod: ,,, | Performed by: OBSTETRICS & GYNECOLOGY

## 2019-07-04 PROCEDURE — 99238 PR HOSPITAL DISCHARGE DAY,<30 MIN: ICD-10-PCS | Mod: ,,, | Performed by: OBSTETRICS & GYNECOLOGY

## 2019-07-04 PROCEDURE — 25000003 PHARM REV CODE 250: Performed by: OBSTETRICS & GYNECOLOGY

## 2019-07-04 RX ORDER — HYDROCODONE BITARTRATE AND ACETAMINOPHEN 7.5; 325 MG/1; MG/1
1 TABLET ORAL
Qty: 20 TABLET | Refills: 0 | Status: SHIPPED | OUTPATIENT
Start: 2019-07-04 | End: 2019-08-01

## 2019-07-04 RX ADMIN — DOCUSATE SODIUM 100 MG: 100 CAPSULE, LIQUID FILLED ORAL at 08:07

## 2019-07-04 RX ADMIN — FERROUS SULFATE TAB EC 325 MG (65 MG FE EQUIVALENT) 325 MG: 325 (65 FE) TABLET DELAYED RESPONSE at 08:07

## 2019-07-04 RX ADMIN — FAMOTIDINE 20 MG: 20 TABLET ORAL at 08:07

## 2019-07-04 RX ADMIN — NAPROXEN 500 MG: 500 TABLET ORAL at 06:07

## 2019-07-04 RX ADMIN — HYDROXYCHLOROQUINE SULFATE 200 MG: 200 TABLET, FILM COATED ORAL at 08:07

## 2019-07-04 NOTE — SUBJECTIVE & OBJECTIVE
Hospital course: Admitted for observation  PIH labs done P/C ratio .53  19 0745 AM Dr. Valdovinos notified of pt's history, labs, BP, MFM recommendations-will proceed with c/s  2019 10:30 AM    Repeat  section low transverse with vertical extension of uterine incision for delivery of 2nd twin with bilateral tubal ligation done.   Transferred to mother baby unit for routine post partum care. Her chronic anemia worsened post operatively due to acute blood loss .3/18 and thus she received 1 unit PRBC. Patient progressed well postoperatively without complication.    7/3/19-pt feeling well. Trying to use breast pump. One twin in NICU but doing well. Denies preE symptoms at this time  19-pt w no complaints. Had episode of elevated diastolic last pm but back to baseline this morning.     Interval History: s/p repeat cs & BTL    She is doing well this morning. She is tolerating a regular diet without nausea or vomiting. She is voiding spontaneously. She is ambulating. She has passed flatus, and has a BM. Vaginal bleeding is mild. She denies fever or chills. Abdominal pain is mild and controlled with oral medications. She is using breast pump.     Objective:     Vital Signs (Most Recent):  Temp: 98.3 °F (36.8 °C) (19 0400)  Pulse: 100 (19 0400)  Resp: 18 (19 0400)  BP: (!) 149/94 (19 0400)  SpO2: 100 % (19 1435) Vital Signs (24h Range):  Temp:  [97.9 °F (36.6 °C)-98.3 °F (36.8 °C)] 98.3 °F (36.8 °C)  Pulse:  [] 100  Resp:  [18] 18  BP: (141-157)/() 149/94        There is no height or weight on file to calculate BMI.    No intake or output data in the 24 hours ending 19 0823    Significant Labs:  Lab Results   Component Value Date    GROUPTRH B POS 2019    HEPBSAG Negative 2018    STREPBCULT No Group B Streptococcus isolated 2019     No results for input(s): HGB, HCT in the last 48 hours.    I have personallly reviewed all pertinent lab  results from the last 24 hours.    Physical Exam:   Constitutional: She is oriented to person, place, and time. She appears well-developed and well-nourished.        Pulmonary/Chest: Effort normal.        Abdominal: Soft. She exhibits abdominal incision. She exhibits no distension. There is tenderness.   Bandage c/d/i. Mild tenderness. Fundus firm     Genitourinary:   Genitourinary Comments: Minimal bleeding           Musculoskeletal: Moves all extremeties.       Neurological: She is alert and oriented to person, place, and time.    Skin: Skin is warm and dry.    Psychiatric: She has a normal mood and affect. Her behavior is normal.

## 2019-07-04 NOTE — PLAN OF CARE
Problem: Adult Inpatient Plan of Care  Goal: Plan of Care Review  Outcome: Ongoing (interventions implemented as appropriate)  Pt progressing well. No issues noted currently. No c/o pain or discomfort. Aquacel dressing CDI without drainage. Fundus firm with scant lochia. More ambulation encouraged. Has not visited baby girl in NICU this shift but bonding well with baby boy rooming in with her. Vitals now stable; elevated BPs earlier in the shift. Will continue to monitor.

## 2019-07-04 NOTE — DISCHARGE INSTRUCTIONS
"Mother Self Care:    Activity: Avoid strenuous exercise and get adequate rest.  No driving until the physician consent given.  Emotional Changes: Most women find birth to be a time of great emotional upheaval.  Sense of loss, mood swings, fatigue, anxiety, and feeling "let down" are common.  If feelings worsen or last more than a week, call your physician.  Breast Care/Breastfeeding: Wear a bra for comfort.  Keep nipples dry and apply your own breast milk or lanolin cream as needed for soreness.  Engorgement can be relieved with warm, moist heat before feedings.  You may also take Ibuprofen.  Breast Care/Bottle Feeding: Wear support bra 24 hours a day for one week.  Avoid stimulation to breasts.  You may use ice packs for discomfort.  Debra-Care/Vaginal Bleeding: Remember to use your debra-bottle after urinating.  Your flow will change from red, to pink, to yellow/white color over a period of 2 weeks.  Menstruation will return in 3-8 weeks, or longer if breastfeeding.  Episiotomy Vaginal Delivery: Stitches will dissolve within 10 days to 3 weeks.  Warm baths, tucks, and dermoplast will promote healing.  Avoid bubble baths or strong soaps.   Section/Tubal Ligation: Keep incision clean and dry. You may shower, but avoid baths.  Sexual Activity/Pelvic Rest: No sexual activity, tampons, or douching until your physician gives you consent.  Diet: Continue to eat from the five basic food groups, including plenty of protein, fruits, vegetables, and whole grains.  Limit empty calories and high fat foods.  Drink enough fluids to satisfy thirst and add an extra 500 calories for breastfeeding.  Constipation/Hemorrhoids: Drink plenty of water.  You may take a stool softener or natural laxative (Metamucil). You may use tucks or hemorrhoid ointment and soak in a warm tub.    CALL YOUR OB DOCTOR IF ANY OF THE FOLLOWING OCCURS:  *Heavy bleeding - saturating a pad an hour or passing any large (2-3 inches in size) blood " clots.  *Any pain, redness, or tenderness in lower leg.  *You cannot care for yourself or your baby.  *Any signs of infection-      - Temperature greater than 100.5 degrees F      - Foul smelling vaginal discharge and/or incisional drainage      - Increased episiotomy or incisional pain      - Hot, hard, red or sore area on breast      - Flu-like symptoms      - Any urgency, frequency or burning with urination    Return To the Hospital for further Evaluation:  · Headache not relieved by tylenol or ibuprofen  · Blurry vision, double vision, seeing spots, or flashing lights  · Feeling faint or passing out  · Right epigastric pain  · Difficulty breathing  · Swelling in hands, face, or feet  · Any of these symptoms accompanied by nausea/vomiting  · Gaining more than 5 pounds in one week  · Seizures  These symptoms could be an indication of elevated blood pressure.       If you have any questions that need to be answered immediately please call the Labor & Delivery Unit at [phone] and ask to speak to a nurse.

## 2019-07-04 NOTE — PROGRESS NOTES
Dr. Arenas notified of patient's elevated blood pressures at 2105 and 2205 (see flowsheet). No new orders given just blood pressure parameters of >160/110, P >120. Will continue to monitor.

## 2019-07-04 NOTE — LACTATION NOTE
Lactation Rounds:    Mother states discharge information was reviewed with her yesterday and she is comfortable with her education. Baby A is breastfeeding well. She stated she had on feeding that hurt her nipple but its been fine since. Discussed making sure infant has a deep latch and to watch the [employer] Video. Medela Symphony pump at bedside. Reviewed proper usage and to adjust suction according to comfort level. Reviewed with mother on frequency and duration of pumping in order to promote and maintain full milk supply. Hands on pumping technique reviewed. Encouraged hand expression after. Instructed mother on cleaning of breast pump parts. Reviewed proper milk handling, collection, storage, and transportation. Voices understanding. Mother to fill out paperwork for a lexus pump.      07/04/19 6117   Maternal Infant Feeding   Maternal Emotional State independent   Equipment Type   Breast Pump Type double electric, hospital grade

## 2019-07-04 NOTE — PROGRESS NOTES
[facility] -   Obstetrics  Postpartum Progress Note    Patient Name: Alisia Loconum  MRN: [id number]  Admission Date: 2019  Hospital Length of Stay: 4 days  Attending Physician: VERONICA Valdovinos MD  Primary Care Provider: Saumya Quinonez MD    Subjective:     Principal Problem:Status post repeat low transverse  section    Hospital course: Admitted for observation  PIH labs done P/C ratio .53  19 0745 AM Dr. Valdovinos notified of pt's history, labs, BP, MFM recommendations-will proceed with c/s  2019 10:30 AM    Repeat  section low transverse with vertical extension of uterine incision for delivery of 2nd twin with bilateral tubal ligation done.   Transferred to mother baby unit for routine post partum care. Her chronic anemia worsened post operatively due to acute blood loss .3/18 and thus she received 1 unit PRBC. Patient progressed well postoperatively without complication.    7/3/19-pt feeling well. Trying to use breast pump. One twin in NICU but doing well. Denies preE symptoms at this time  19-pt w no complaints. Had episode of elevated diastolic last pm but back to baseline this morning.     Interval History: s/p repeat cs & BTL    She is doing well this morning. She is tolerating a regular diet without nausea or vomiting. She is voiding spontaneously. She is ambulating. She has passed flatus, and has a BM. Vaginal bleeding is mild. She denies fever or chills. Abdominal pain is mild and controlled with oral medications. She is using breast pump.     Objective:     Vital Signs (Most Recent):  Temp: 98.3 °F (36.8 °C) (19 0400)  Pulse: 100 (19 0400)  Resp: 18 (19 0400)  BP: (!) 149/94 (19 0400)  SpO2: 100 % (19 1435) Vital Signs (24h Range):  Temp:  [97.9 °F (36.6 °C)-98.3 °F (36.8 °C)] 98.3 °F (36.8 °C)  Pulse:  [] 100  Resp:  [18] 18  BP: (141-157)/() 149/94        There is no height or weight on file to  calculate BMI.    No intake or output data in the 24 hours ending 19 0823    Significant Labs:  Lab Results   Component Value Date    GROUPTRH B POS 2019    HEPBSAG Negative 2018    STREPBCULT No Group B Streptococcus isolated 2019     No results for input(s): HGB, HCT in the last 48 hours.    I have personallly reviewed all pertinent lab results from the last 24 hours.    Physical Exam:   Constitutional: She is oriented to person, place, and time. She appears well-developed and well-nourished.        Pulmonary/Chest: Effort normal.        Abdominal: Soft. She exhibits abdominal incision. She exhibits no distension. There is tenderness.   Bandage c/d/i. Mild tenderness. Fundus firm     Genitourinary:   Genitourinary Comments: Minimal bleeding           Musculoskeletal: Moves all extremeties.       Neurological: She is alert and oriented to person, place, and time.    Skin: Skin is warm and dry.    Psychiatric: She has a normal mood and affect. Her behavior is normal.       Assessment/Plan:     28 y.o. female  for:    * Status post repeat low transverse  section  POD #2 s/p repeat LTCS with tubal ligation  Pt doing well, afebrile overnight.  Proceed with routine post-partum care, encouraged ambulation, aware ok to shower. Will change Aquacel today. Patient advised doppler of lle negative. 1 infant doing well in NICU; 2nd infant rooming in--anticipate discharge on pod #4          Previous  delivery affecting pregnancy, antepartum  Previous c/s X 2  Plan for repeat c/s with BTL    Systemic lupus complicating pregnancy  Followed by Rheumatology- on plaquenil, prednisone    Dichorionic diamniotic twin pregnancy, antepartum  Plan for Repeat C/S with BTL          Long-term use of Plaquenil  Continue plaquenil for SLE    Pre-existing hypertension during pregnancy in third trimester  Continue Procardia XL 60 mg daily, currently blood pressure well controlled. No signs or  symptoms of preE.        Anemia  S/p 1 unit prbc; no signs hypovolemia  Add daily iron    SLE (systemic lupus erythematosus related syndrome)  Followed by Rheumatology- on plaquenil, prednisone    Allergic rhinitis  Continue flonase    HSV-2 (herpes simplex virus 2) infection  On Valtrex prophylactics    Sjogren's syndrome             Disposition: As patient meets milestones, will plan to discharge today.    Janice Arenas MD  Obstetrics  [facility]

## 2019-07-04 NOTE — DISCHARGE SUMMARY
[facility] -   Obstetrics  Discharge Summary      Patient Name: Alisia Vines  MRN: [id number]  Admission Date: 2019  Hospital Length of Stay: 4 days  Discharge Date and Time: 19  Attending Physician: VERONICA Valdovinos MD   Discharging Provider: Janice Arenas MD   Primary Care Provider: Saumya Quinonez MD    HPI: 29yo  VICTORIANO 19 @ 36w6d presents c/o elevated BP at home  Di/di twins, previous C/s X 2, followed by MFM secondary to Lupus with nephritis, CHTN, sjogrens, recommended delivery at 36 wks-pt declined    Procedure(s) (LRB):   SECTION, WITH TUBAL LIGATION (N/A)     Hospital Course:   Admitted for observation  PIH labs done P/C ratio .53  19 0745 AM Dr. Valdovinos notified of pt's history, labs, BP, MFM recommendations-will proceed with c/s  2019 10:30 AM    Repeat  section low transverse with vertical extension of uterine incision for delivery of 2nd twin with bilateral tubal ligation done.   Transferred to mother baby unit for routine post partum care. Her chronic anemia worsened post operatively due to acute blood loss .3/18 and thus she received 1 unit PRBC. Patient progressed well postoperatively without complication.    7/3/19-pt feeling well. Trying to use breast pump. One twin in NICU but doing well. Denies preE symptoms at this time  19-pt w no complaints. Had episode of elevated diastolic last pm but back to baseline this morning. twin girl still in NICU         Final Active Diagnoses:    Diagnosis Date Noted POA    PRINCIPAL PROBLEM:  Status post repeat low transverse  section [Z98.891] 2019 Not Applicable    Previous  delivery affecting pregnancy, antepartum [O34.219] 2019 Yes    PIH (pregnancy induced hypertension), third trimester [O13.3] 2019 Yes    Systemic lupus complicating pregnancy [O99.89, M32.9] 2019 Yes    Dichorionic diamniotic twin pregnancy, antepartum [O30.049]  2019 Yes    Long-term use of Plaquenil [Z79.899] 2017 Not Applicable    Pre-existing hypertension during pregnancy in third trimester [O10.913] 2015 Yes    Anemia [D64.9] 10/17/2013 Yes    HSV-2 (herpes simplex virus 2) infection [B00.9]  Yes    Allergic rhinitis [J30.9]  Yes    Sjogren's syndrome [M35.00] 2012 Yes      Problems Resolved During this Admission:    Diagnosis Date Noted Date Resolved POA    PIH (pregnancy induced hypertension), third trimester [O13.3] 2019 Yes        Labs: All labs within the past 24 hours have been reviewed    Feeding Method: both breast and bottle    Immunizations     Date Immunization Status Dose Route/Site Given by    19 1129 MMR Incomplete 0.5 mL Subcutaneous/Left deltoid     19 1129 Tdap Incomplete 0.5 mL Intramuscular/Left deltoid              Campton, A Boy Alisia [[id number]]     Delivery:    Episiotomy: None   Lacerations: None   Repair suture:     Repair # of packets:     Blood loss (ml): 0     Birth information:  YOB: 2017   Time of birth: 9:23 AM   Sex: male   Delivery type: , Low Transverse   Gestational Age: 36w6d    Delivery Clinician:      Other providers:       Additional  information:  Forceps:    Vacuum:    Breech:    Observed anomalies      Living?:           APGARS  One minute Five minutes Ten minutes   Skin color:         Heart rate:         Grimace:         Muscle tone:         Breathing:         Totals: 9  9        Placenta: Delivered:       appearance     Jasmyn, B Girl Alisia [[id number]]     Delivery:    Episiotomy: None   Lacerations: None   Repair suture:     Repair # of packets:     Blood loss (ml): 0     Birth information:  YOB: 2017   Time of birth: 9:27 AM   Sex: female   Delivery type: , Low Transverse   Gestational Age: 36w6d    Delivery Clinician:      Other providers:       Additional  information:  Forceps:    Vacuum:    Breech:    Observed  anomalies      Living?:           APGARS  One minute Five minutes Ten minutes   Skin color:         Heart rate:         Grimace:         Muscle tone:         Breathing:         Totals: 4  7  8      Placenta: Delivered:       appearance    Pending Diagnostic Studies:     None          Discharged Condition: good    Disposition: Home or Self Care    Follow Up:  Follow-up Information     Janice Arenas MD On 8/1/2019.    Specialties:  Obstetrics, Obstetrics and Gynecology  Why:  Post partum, 10am [place]  Contact information:  [address] [facility] [place] [id number] [phone]             [facility] In 1 week.    Why:  For dressing removal and BP check               Patient Instructions:   6 weeks pelvic rest  Medications:  Current Discharge Medication List      START taking these medications    Details   HYDROcodone-acetaminophen (NORCO) 7.5-325 mg per tablet Take 1 tablet by mouth every 6 to 8 hours as needed for Pain.  Qty: 20 tablet, Refills: 0         CONTINUE these medications which have NOT CHANGED    Details   FLUoxetine 40 MG capsule Take 1 capsule (40 mg total) by mouth once daily.  Qty: 30 capsule, Refills: 0    Associated Diagnoses: Medication refill; Depression affecting pregnancy      hydroxychloroquine (PLAQUENIL) 200 mg tablet Take 1 tablet (200 mg total) by mouth 2 (two) times daily.  Qty: 60 tablet, Refills: 11    Associated Diagnoses: Systemic lupus complicating pregnancy; HARPREET positive; Systemic lupus erythematosus arthritis; SLE glomerulonephritis syndrome, WHO class V      NIFEdipine (PROCARDIA-XL) 30 MG (OSM) 24 hr tablet Take 2 tablets (60 mg total) by mouth once daily.  Qty: 30 tablet, Refills: 11      predniSONE (DELTASONE) 5 MG tablet Take 1 tablet (5 mg total) by mouth once daily.  Qty: 90 tablet, Refills: 3    Associated Diagnoses: Systemic lupus erythematosus arthritis; SLE glomerulonephritis syndrome, WHO class V      PRENATAL 118-IRON-FOLATE 6-DHA ORAL Take by mouth.       valACYclovir (VALTREX) 500 MG tablet Take 2 tablets (1,000 mg total) by mouth once daily.  Qty: 90 tablet, Refills: 3         STOP taking these medications       efinaconazole (JUBLIA) 10 % Mario Comments:   Reason for Stopping:         famotidine (PEPCID) 20 MG tablet Comments:   Reason for Stopping:         flunisolide 25 mcg, 0.025%, (NASALIDE) 25 mcg (0.025 %) Spry Comments:   Reason for Stopping:         levocetirizine (XYZAL) 5 MG tablet Comments:   Reason for Stopping:               Janice Arenas MD  Obstetrics [facility] -

## 2019-07-04 NOTE — NURSING
VSS, bleeding light, fundus firm without massage. Aquacel dressing is intact, scant drainage on dressing. Discharge teaching reviewed with pt and she verbalizes understanding. Follow up appts made for pt and she verbalizes understanding of time date and place of appt. AVS handout given. Discharged to car via wheelchair with infant in arms by staff, other infant in NICU.

## 2019-07-05 ENCOUNTER — TELEPHONE (OUTPATIENT)
Dept: OBSTETRICS AND GYNECOLOGY | Facility: HOSPITAL | Age: 29
End: 2019-07-05

## 2019-07-05 DIAGNOSIS — F32.A DEPRESSION AFFECTING PREGNANCY: ICD-10-CM

## 2019-07-05 DIAGNOSIS — O99.340 DEPRESSION AFFECTING PREGNANCY: ICD-10-CM

## 2019-07-05 DIAGNOSIS — Z76.0 MEDICATION REFILL: ICD-10-CM

## 2019-07-05 RX ORDER — FLUOXETINE HYDROCHLORIDE 40 MG/1
40 CAPSULE ORAL DAILY
Qty: 30 CAPSULE | Refills: 0 | Status: SHIPPED | OUTPATIENT
Start: 2019-07-05 | End: 2019-08-15 | Stop reason: SDUPTHER

## 2019-07-05 NOTE — TELEPHONE ENCOUNTER
Lactation phone call:  Mother left a message stating breast pump is saying system error. A message was left for her to unplug pump and plug it back in, if error continues she can come exchange pump.

## 2019-07-06 NOTE — PLAN OF CARE
07/06/19 1002   Discharge Assessment   Assessment Type Discharge Planning Assessment   Confirmed/corrected address and phone number on facesheet? Yes   Assessment information obtained from? Caregiver   Expected Length of Stay (days) 14   Communicated expected length of stay with patient/caregiver no   Current cognitive status: Infant/Toddler   Facility Arrived From: L&D Unit   Lives With parent(s);sibling(s)   Is patient able to care for self after discharge? No;Patient is of pediatric age   Does the patient have transportation home? Yes   Transportation Anticipated family or friend will provide   Discharge Plan A Home with family   DME Needed Upon Discharge  none   Patient/Family in Agreement with Plan yes       Jazmine Hollingsworth LCSW-[facility]  NICU   Ext. [phone]-phone  [email or web address]

## 2019-07-08 ENCOUNTER — OFFICE VISIT (OUTPATIENT)
Dept: OBSTETRICS AND GYNECOLOGY | Facility: CLINIC | Age: 29
End: 2019-07-08
Payer: MEDICAID

## 2019-07-08 VITALS
SYSTOLIC BLOOD PRESSURE: 152 MMHG | BODY MASS INDEX: 27.85 KG/M2 | HEIGHT: 64 IN | DIASTOLIC BLOOD PRESSURE: 96 MMHG | WEIGHT: 163.13 LBS

## 2019-07-08 DIAGNOSIS — O10.919 PRE-EXISTING HYPERTENSION AFFECTING PREGNANCY, ANTEPARTUM: ICD-10-CM

## 2019-07-08 DIAGNOSIS — B37.89 CANDIDIASIS OF BREAST: Primary | ICD-10-CM

## 2019-07-08 PROCEDURE — 99999 PR PBB SHADOW E&M-EST. PATIENT-LVL II: ICD-10-PCS | Mod: PBBFAC,,,

## 2019-07-08 PROCEDURE — 99212 OFFICE O/P EST SF 10 MIN: CPT | Mod: PBBFAC,TH

## 2019-07-08 PROCEDURE — 99999 PR PBB SHADOW E&M-EST. PATIENT-LVL II: CPT | Mod: PBBFAC,,,

## 2019-07-08 PROCEDURE — 59430 PR CARE AFTER DELIVERY ONLY: ICD-10-PCS | Mod: TH,,, | Performed by: OBSTETRICS & GYNECOLOGY

## 2019-07-08 RX ORDER — CLOTRIMAZOLE 1 %
CREAM (GRAM) TOPICAL 3 TIMES DAILY
Qty: 30 G | Refills: 1 | Status: SHIPPED | OUTPATIENT
Start: 2019-07-08 | End: 2019-08-01

## 2019-07-08 RX ORDER — LABETALOL 100 MG/1
100 TABLET, FILM COATED ORAL NIGHTLY
Qty: 30 TABLET | Refills: 1 | Status: SHIPPED | OUTPATIENT
Start: 2019-07-08 | End: 2019-10-13 | Stop reason: SDUPTHER

## 2019-07-08 NOTE — PROGRESS NOTES
Subjective:       Patient ID: Alisia Vines is a 28 y.o. female.    Chief Complaint:  Dressing removal      History of Present Illness  HPI  Postoperative Follow-up  Patient presents to the clinic 1 weeks status post  of di di twins for repeat c section . Eating a regular diet without difficulty. Bowel movements are normal. pain is well controlled  Complains of left nipple pain and cracking for the past couple of days.  Pumping for NICU infant and nursing infant who is home.  Denies HA, vision changes, RUQ pain, or leg swelling.     GYN & OB History  Patient's last menstrual period was 10/15/2018.   Date of Last Pap: 3/10/2017    OB History    Para Term  AB Living   3 3 2 1   4   SAB TAB Ectopic Multiple Live Births         1 4      # Outcome Date GA Lbr José Miguel/2nd Weight Sex Delivery Anes PTL Lv   3A  19 36w6d  2.23 kg (4 lb 14.7 oz) M CS-LTranv Spinal N CYDNEY   3B  19 36w6d  2.23 kg (4 lb 14.7 oz) F CS-LTranv Spinal N CYDNEY   2 Term 10/19/17 39w2d  2.87 kg (6 lb 5.2 oz) M CS-LTranv Spinal N CYDNEY   1 Term 12 38w0d  2.948 kg (6 lb 8 oz) M CS-LTranv EPI N CYDNEY       Review of Systems  Review of Systems   Constitutional: Negative for activity change, chills, fatigue and fever.   Respiratory: Negative for cough.    Cardiovascular: Negative for chest pain and leg swelling.   Gastrointestinal: Negative for abdominal pain, constipation, nausea and vomiting.   Genitourinary: Negative for dysuria, frequency, hematuria, pelvic pain, urgency, vaginal bleeding (minimal lochia) and vaginal discharge.   Integumentary:  Positive for breast tenderness. Negative for rash and nipple discharge.   Breast: Positive for tenderness.Negative for nipple discharge          Objective:    Physical Exam:   Constitutional: She is oriented to person, place, and time. She appears well-developed and well-nourished. No distress.       Cardiovascular: Normal rate, regular rhythm, normal heart  sounds and intact distal pulses.    No murmur heard.   Pulmonary/Chest: Effort normal and breath sounds normal. No respiratory distress. She has no wheezes. She has no rales. Left breast exhibits skin change (left nipple with slight cracking, very minimal erythema but quite tender) and tenderness. Left breast exhibits no nipple discharge.        Abdominal: Soft. Bowel sounds are normal. She exhibits abdominal incision (Aquacel dressing in place, drainage to dressing; removed dressing, few areas of very superficial separation with absent drainage or induration). She exhibits no distension. There is no tenderness. There is no guarding.   Uterine fundus firm, below umbilicus, mild tenderness (appropriate)             Musculoskeletal: Moves all extremeties. She exhibits no edema.   No calf tenderness       Neurological: She is alert and oriented to person, place, and time. She has normal reflexes.    Skin: Skin is warm and dry. No rash noted. She is not diaphoretic.           Problem List Items Addressed This Visit     None      Visit Diagnoses     Candidiasis of breast    -  Primary    Relevant Medications    clotrimazole (LOTRIMIN) 1 % cream    Pre-existing hypertension affecting pregnancy, antepartum        Relevant Medications    labetalol (NORMODYNE) 100 MG tablet        Discussed sterile processing of pump parts, handwashing. Infant examined no evidence of oral thrush. Suspect nipple candidiasis start lotrimin.  BP elevated, has pre-existing HTN, low suspicion for preE. Currently taking procardia XL 60 mg qhs will add on labetolol 100 mg qhs (patient prefers once daily dosing for self reported better adherance) and follow up end of this week for repeat BP check and wound check.    Patient is scheduled for postpartum visit on 8/1 with Dr. Arenas.    Reviewed all restrictions & limitations with the patient. Advised to call clinic in event of fever, redness or drainage around the incision, worsening pain, or any any  concerning issues or symptoms.  Instructed the importance of showering daily, no tub baths, using an antibacterial soap.  Patient verbalized understanding.

## 2019-07-12 ENCOUNTER — OFFICE VISIT (OUTPATIENT)
Dept: OBSTETRICS AND GYNECOLOGY | Facility: CLINIC | Age: 29
End: 2019-07-12
Payer: MEDICAID

## 2019-07-12 VITALS
WEIGHT: 162.25 LBS | SYSTOLIC BLOOD PRESSURE: 134 MMHG | HEIGHT: 64 IN | DIASTOLIC BLOOD PRESSURE: 80 MMHG | BODY MASS INDEX: 27.7 KG/M2

## 2019-07-12 DIAGNOSIS — Z98.891 STATUS POST REPEAT LOW TRANSVERSE CESAREAN SECTION: Primary | ICD-10-CM

## 2019-07-12 DIAGNOSIS — O10.013 PRE-EXISTING ESSENTIAL HYPERTENSION DURING PREGNANCY IN THIRD TRIMESTER: ICD-10-CM

## 2019-07-12 PROCEDURE — 99212 OFFICE O/P EST SF 10 MIN: CPT | Mod: PBBFAC,TH

## 2019-07-12 PROCEDURE — 0503F POSTPARTUM CARE VISIT: CPT | Mod: TH,,, | Performed by: OBSTETRICS & GYNECOLOGY

## 2019-07-12 PROCEDURE — 99999 PR PBB SHADOW E&M-EST. PATIENT-LVL II: ICD-10-PCS | Mod: PBBFAC,,,

## 2019-07-12 PROCEDURE — 0503F PR POSTPARTUM CARE VISIT: ICD-10-PCS | Mod: TH,,, | Performed by: OBSTETRICS & GYNECOLOGY

## 2019-07-12 PROCEDURE — 99999 PR PBB SHADOW E&M-EST. PATIENT-LVL II: CPT | Mod: PBBFAC,,,

## 2019-07-12 NOTE — PROGRESS NOTES
Subjective:       Patient ID: Alisia Vines is a 28 y.o. female.    Chief Complaint:  Blood Pressure Check      History of Present Illness  HPI  Postoperative Follow-up  Patient presents to the clinic 12 days status post  for repeat and for di-di twins. Eating a regular diet without difficulty. Bowel movements are normal. The patient is not having any pain.      GYN & OB History  Patient's last menstrual period was 10/15/2018.   Date of Last Pap: 3/10/2017    OB History    Para Term  AB Living   3 3 2 1   4   SAB TAB Ectopic Multiple Live Births         1 4      # Outcome Date GA Lbr José Miguel/2nd Weight Sex Delivery Anes PTL Lv   3A  19 36w6d  2.23 kg (4 lb 14.7 oz) M CS-LTranv Spinal N CYDNEY   3B  19 36w6d  2.23 kg (4 lb 14.7 oz) F CS-LTranv Spinal N CYDNEY   2 Term 10/19/17 39w2d  2.87 kg (6 lb 5.2 oz) M CS-LTranv Spinal N CYDNEY   1 Term 12 38w0d  2.948 kg (6 lb 8 oz) M CS-LTranv EPI N CYDNEY       Review of Systems  Review of Systems   Constitutional: Negative for activity change, chills, fatigue and fever.   Respiratory: Negative for cough.    Cardiovascular: Negative for chest pain and leg swelling.   Gastrointestinal: Negative for abdominal pain, constipation, nausea and vomiting.   Genitourinary: Negative for dysuria, frequency, hematuria, pelvic pain, urgency, vaginal bleeding and vaginal discharge.   Integumentary:  Negative for rash.           Objective:    Physical Exam:   Constitutional: She is oriented to person, place, and time. She appears well-developed and well-nourished. No distress.       Cardiovascular: Normal rate, regular rhythm, normal heart sounds and intact distal pulses.    No murmur heard.   Pulmonary/Chest: Effort normal and breath sounds normal. No respiratory distress. She has no wheezes. She has no rales.        Abdominal: Soft. Bowel sounds are normal. She exhibits abdominal incision (incision with improvement in wound healing,  significantly reduced drainage in fact wound is dry. 1 cm small superficial separation without drainage/purulence/induration). She exhibits no distension. There is no tenderness. There is no guarding.                  Musculoskeletal: Moves all extremeties. She exhibits no edema.   No calf tenderness       Neurological: She is alert and oriented to person, place, and time.    Skin: Skin is warm and dry. No rash noted. She is not diaphoretic.           Problem List Items Addressed This Visit        Obstetric    Pre-existing hypertension during pregnancy in third trimester    Status post repeat low transverse  section - Primary        Patient with much improved BP today, 134/80. States had issues with the pharmacy and is still working on getting the prescription filled, thinks she should be able to get it today. Denies HA, RUQ pain, or vision changes.   No issues with wound, soreness but no pain. Improved from 3 days ago, now only 1 cm superficial separation no seroma or purulence, no induration. Silver nitrate applied to area and patient tolerated well.  Patient to contact us with home BP readings once she has labetalol prior to starting it, if her pressures are remaining controlled may hold off starting it.

## 2019-07-17 ENCOUNTER — TELEPHONE (OUTPATIENT)
Dept: OBSTETRICS AND GYNECOLOGY | Facility: CLINIC | Age: 29
End: 2019-07-17

## 2019-07-17 ENCOUNTER — OFFICE VISIT (OUTPATIENT)
Dept: OBSTETRICS AND GYNECOLOGY | Facility: CLINIC | Age: 29
End: 2019-07-17
Payer: MEDICAID

## 2019-07-17 VITALS
DIASTOLIC BLOOD PRESSURE: 90 MMHG | SYSTOLIC BLOOD PRESSURE: 150 MMHG | HEIGHT: 55 IN | BODY MASS INDEX: 37.85 KG/M2 | WEIGHT: 163.56 LBS

## 2019-07-17 DIAGNOSIS — O10.913 CHRONIC HYPERTENSION WITH EXACERBATION DURING PREGNANCY IN THIRD TRIMESTER: ICD-10-CM

## 2019-07-17 DIAGNOSIS — Z98.891 STATUS POST REPEAT LOW TRANSVERSE CESAREAN SECTION: Primary | ICD-10-CM

## 2019-07-17 PROCEDURE — 99999 PR PBB SHADOW E&M-EST. PATIENT-LVL III: ICD-10-PCS | Mod: PBBFAC,,,

## 2019-07-17 PROCEDURE — 99024 PR POST-OP FOLLOW-UP VISIT: ICD-10-PCS | Mod: ,,, | Performed by: OBSTETRICS & GYNECOLOGY

## 2019-07-17 PROCEDURE — 99999 PR PBB SHADOW E&M-EST. PATIENT-LVL III: CPT | Mod: PBBFAC,,,

## 2019-07-17 PROCEDURE — 99213 OFFICE O/P EST LOW 20 MIN: CPT | Mod: PBBFAC,TH

## 2019-07-17 PROCEDURE — 99024 POSTOP FOLLOW-UP VISIT: CPT | Mod: ,,, | Performed by: OBSTETRICS & GYNECOLOGY

## 2019-07-17 NOTE — TELEPHONE ENCOUNTER
----- Message from Madonna Diop sent at 7/17/2019 10:39 AM CDT -----  Contact: pt  The pt states she has questions concerning her 0/12/2019 appt, no additional info given and can be reached at [phone]///[id number]

## 2019-07-17 NOTE — TELEPHONE ENCOUNTER
Spoke with patient, feels as incision is draining more and is opening up.  Patient advised will speak with PA and call her back.

## 2019-07-17 NOTE — PROGRESS NOTES
"  Subjective:       Patient ID: Alisia Vines is a 28 y.o. female.    Chief Complaint:  Wound Check (3 weeks s/p primary  c/s)      History of Present Illness  HPI  Postoperative Follow-up  Patient presents to the clinic 17 days status post repeat  with Wolf Valdovinos.  Patient was scheduled for elective repeat & BTL with Dr. Arenas at a later date, but Ms. Vines developed PIH, in addition to pre-existing hypertension. Eating a regular diet without difficulty. Bowel movements are normal. The patient is not having any pain.  Today she presents with reports of wound drainage and follow up to ensure wound is closing well.  She saw Antonia in clinic twice last week for initial dressing removal and then follow up BP check and wound inspection.  Per Antonia's impression, the patient's wound is further improved with respect to wound healing and closure.  The "drainage" that the patient is referring to sounds like seepage from the wound, rather than an infection or seroma formation.        GYN & OB History  Patient's last menstrual period was 10/15/2018.   Date of Last Pap: 3/10/2017    OB History    Para Term  AB Living   3 3 2 1   4   SAB TAB Ectopic Multiple Live Births         1 4      # Outcome Date GA Lbr José Miguel/2nd Weight Sex Delivery Anes PTL Lv   3A  19 36w6d  2.23 kg (4 lb 14.7 oz) M CS-LTranv Spinal N CYDNEY   3B  19 36w6d  2.23 kg (4 lb 14.7 oz) F CS-LTranv Spinal N CYDNEY   2 Term 10/19/17 39w2d  2.87 kg (6 lb 5.2 oz) M CS-LTranv Spinal N CYDNEY   1 Term 12 38w0d  2.948 kg (6 lb 8 oz) M CS-LTranv EPI N CYDNEY       Review of Systems  Review of Systems   Constitutional: Negative for activity change, chills, fatigue and fever.   Respiratory: Negative for cough.    Cardiovascular: Negative for chest pain and leg swelling.   Gastrointestinal: Negative for abdominal pain, constipation, nausea and vomiting.   Genitourinary: Negative for dysuria, frequency, hematuria, " pelvic pain, urgency, vaginal bleeding and vaginal discharge.   Integumentary:  Negative for rash.           Objective:    Physical Exam:   Constitutional: She is oriented to person, place, and time. She appears well-developed and well-nourished. No distress.       Cardiovascular: Normal rate, regular rhythm, normal heart sounds and intact distal pulses.    No murmur heard.   Pulmonary/Chest: Effort normal and breath sounds normal. No respiratory distress. She has no wheezes. She has no rales.        Abdominal: Soft. Bowel sounds are normal. She exhibits abdominal incision (incision with improvement in wound healing, wound is dry. skin edges well approximated with exception of only 1-2 mm near right side. NO drainage/purulence/induration). She exhibits no distension. There is no tenderness. There is no guarding.                  Musculoskeletal: Moves all extremeties. She exhibits no edema.   No calf tenderness       Neurological: She is alert and oriented to person, place, and time.    Skin: Skin is warm and dry. No rash noted. She is not diaphoretic.           Problem List Items Addressed This Visit     None        Patient with much slight elevated BP today.  History of existing hypertension before pregnancy with superimposed PIH.   Denies HA, RUQ pain, or vision changes.   Wound is palpated to attempt expressing drainage.  No active drainage or fluid seeping from wound.  Thorough palpation of incision and the surrounding tissue above & below the wound- No fluid pocket or induration; no erythema, skin dimpling, or particular area of tenderness.

## 2019-07-29 ENCOUNTER — TELEPHONE (OUTPATIENT)
Dept: OBSTETRICS AND GYNECOLOGY | Facility: CLINIC | Age: 29
End: 2019-07-29

## 2019-07-29 NOTE — TELEPHONE ENCOUNTER
----- Message from Katherine Mchugh sent at 7/29/2019 10:59 AM CDT -----  Contact: patient   Pt requesting to reschedule appointment for 7/30/19. Please call back at [phone].        Thanks,  Katherine Mchugh

## 2019-07-29 NOTE — TELEPHONE ENCOUNTER
Returned call to patient.  She requested to schedule her appt for tomorrow with Dr. Arenas.  Made her aware that the provider doesn't have clinic at [facility] tomorrow, she verbalized understanding and said that she is going to keep scheduled appt.

## 2019-07-31 ENCOUNTER — TELEPHONE (OUTPATIENT)
Dept: OBSTETRICS AND GYNECOLOGY | Facility: CLINIC | Age: 29
End: 2019-07-31

## 2019-07-31 NOTE — TELEPHONE ENCOUNTER
Pt called c/o headaches. BPs 160/100s today, was 150/100s this morning. No vision changes. Has not taken any medication for headaches. Speaking well, does not sound distressed. Pt currently on labetalol 100mg & procardia XL 30 mg. rec increasing labetalol to 100mg, take tylenol at this time. Pt has appt w/ me tomorrow but knows she needs to go to ER if headaches worsens or other neuro symptoms start.

## 2019-08-01 ENCOUNTER — POSTPARTUM VISIT (OUTPATIENT)
Dept: OBSTETRICS AND GYNECOLOGY | Facility: CLINIC | Age: 29
End: 2019-08-01
Payer: MEDICAID

## 2019-08-01 VITALS
HEIGHT: 65 IN | WEIGHT: 161.63 LBS | BODY MASS INDEX: 26.93 KG/M2 | SYSTOLIC BLOOD PRESSURE: 136 MMHG | DIASTOLIC BLOOD PRESSURE: 74 MMHG

## 2019-08-01 PROCEDURE — 99999 PR PBB SHADOW E&M-EST. PATIENT-LVL III: CPT | Mod: PBBFAC,,, | Performed by: OBSTETRICS & GYNECOLOGY

## 2019-08-01 PROCEDURE — 0503F POSTPARTUM CARE VISIT: CPT | Mod: ,,, | Performed by: OBSTETRICS & GYNECOLOGY

## 2019-08-01 PROCEDURE — 99213 OFFICE O/P EST LOW 20 MIN: CPT | Mod: PBBFAC | Performed by: OBSTETRICS & GYNECOLOGY

## 2019-08-01 PROCEDURE — 99999 PR PBB SHADOW E&M-EST. PATIENT-LVL III: ICD-10-PCS | Mod: PBBFAC,,, | Performed by: OBSTETRICS & GYNECOLOGY

## 2019-08-01 PROCEDURE — 0503F PR POSTPARTUM CARE VISIT: ICD-10-PCS | Mod: ,,, | Performed by: OBSTETRICS & GYNECOLOGY

## 2019-08-01 NOTE — PROGRESS NOTES
"CC: Post-partum follow-up    Alisia Vines is a 28 y.o. female  who presents for post-partum visit.  She is S/P a repeat csection & BTL. H/o previous csection; di-di twins She and the twins are doing well.  No pain.  No fever.   No bowel / bladder complaints. Pt had called yesterday to report headaches x 2 days. Had not taken anything OTC. BP meds increased. Reports headaches resolved, feeling well. Busy at home but coping well & happy    Delivery Date: 2019  Delivery provider: Wolf Valdovinos  Gender: boy & girl twins (Justin & Iram)  Birth Weight: approx 4lbs 15oz  Breast Feeding: NO  Depression: NO  Contraception: bilateral tubal ligation    Pregnancy was complicated by:  Previous csection, SLE w/ previous pregnancy w/ congenital heart block, lupus nephritis    /74 (BP Location: Right arm)   Ht 5' 5" (1.651 m)   Wt 73.3 kg (161 lb 9.6 oz)   LMP 10/15/2018   BMI 26.89 kg/m²     ROS:  GENERAL: No fever, chills, fatigability.  VULVAR: No pain, no lesions and no itching.  VAGINAL: No relaxation, no itching, no discharge, no abnormal bleeding and no lesions.  ABDOMEN: No abdominal pain. Denies nausea. Denies vomiting. No diarrhea. No constipation  BREAST: Denies pain. No lumps. No discharge.  URINARY: No incontinence, no nocturia, no frequency and no dysuria.  CARDIOVASCULAR: No chest pain. No shortness of breath. No leg cramps.  NEUROLOGICAL: No headaches. No vision changes.    PHYSICAL EXAM:  GENERAL: NAD  NECK: no thyromegaly  BREASTS: no abnormal masses/nontender  ABDOMEN:  Soft, non-tender, non-distended incision well healed  VULVA:  Normal, no lesions  VAGINA: Normal vaginal mucosa/cervix. No abnormal discharge  CERVIX:  Without lesions, polyps or tenderness.  UTERUS:  Normal size, shape, consistency, no mass or tenderness.  ADNEXA:  Normal in size without mass or tenderness    IMP:  Doing well S/P see above  Instructions / precautions reviewed  Contraceptive " counseling      PLAN:  May resume normal activities  Return: ella gyn; has f/u with rheum/nephrology. Continue on current BP regimen (labetalol 100mg bid, procardia XL 30 mg qd)

## 2019-08-15 DIAGNOSIS — F32.A DEPRESSION AFFECTING PREGNANCY: ICD-10-CM

## 2019-08-15 DIAGNOSIS — Z76.0 MEDICATION REFILL: ICD-10-CM

## 2019-08-15 DIAGNOSIS — O99.340 DEPRESSION AFFECTING PREGNANCY: ICD-10-CM

## 2019-08-16 RX ORDER — FLUOXETINE HYDROCHLORIDE 40 MG/1
40 CAPSULE ORAL DAILY
Qty: 30 CAPSULE | Refills: 0 | Status: SHIPPED | OUTPATIENT
Start: 2019-08-16 | End: 2020-04-28 | Stop reason: SDUPTHER

## 2019-08-26 ENCOUNTER — TELEPHONE (OUTPATIENT)
Dept: RHEUMATOLOGY | Facility: CLINIC | Age: 29
End: 2019-08-26

## 2019-08-26 NOTE — TELEPHONE ENCOUNTER
----- Message from Raghu Young sent at 8/26/2019 11:29 AM CDT -----  The pt will like to be seen earlier that her scheduled appt.  The pt states she is experience symptoms from her lupus.  The pt can be reached at [phone].

## 2019-08-26 NOTE — TELEPHONE ENCOUNTER
Patient rescheduled for 8-29 . She wants to know if you want labs before her appointment on Thursday.

## 2019-08-27 ENCOUNTER — LAB VISIT (OUTPATIENT)
Dept: LAB | Facility: HOSPITAL | Age: 29
End: 2019-08-27
Attending: INTERNAL MEDICINE
Payer: MEDICAID

## 2019-08-27 DIAGNOSIS — M32.14 SLE GLOMERULONEPHRITIS SYNDROME, WHO CLASS V: ICD-10-CM

## 2019-08-27 DIAGNOSIS — M35.09 SJOGREN'S SYNDROME WITH OTHER ORGAN INVOLVEMENT: ICD-10-CM

## 2019-08-27 LAB
BASOPHILS # BLD AUTO: 0.01 K/UL (ref 0–0.2)
BASOPHILS NFR BLD: 0.4 % (ref 0–1.9)
BILIRUB UR QL STRIP: NEGATIVE
C3 SERPL-MCNC: 60 MG/DL (ref 50–180)
C4 SERPL-MCNC: 16 MG/DL (ref 11–44)
CLARITY UR: CLEAR
COLOR UR: YELLOW
CREAT UR-MCNC: 136 MG/DL (ref 15–325)
CRP SERPL-MCNC: 0.4 MG/L (ref 0–8.2)
DIFFERENTIAL METHOD: ABNORMAL
EOSINOPHIL # BLD AUTO: 0.2 K/UL (ref 0–0.5)
EOSINOPHIL NFR BLD: 6 % (ref 0–8)
ERYTHROCYTE [DISTWIDTH] IN BLOOD BY AUTOMATED COUNT: 16.6 % (ref 11.5–14.5)
ERYTHROCYTE [SEDIMENTATION RATE] IN BLOOD BY WESTERGREN METHOD: 71 MM/HR (ref 0–36)
GLUCOSE UR QL STRIP: NEGATIVE
HCT VFR BLD AUTO: 32.3 % (ref 37–48.5)
HGB BLD-MCNC: 10.2 G/DL (ref 12–16)
HGB UR QL STRIP: NEGATIVE
IMM GRANULOCYTES # BLD AUTO: 0 K/UL (ref 0–0.04)
IMM GRANULOCYTES NFR BLD AUTO: 0 % (ref 0–0.5)
KETONES UR QL STRIP: NEGATIVE
LEUKOCYTE ESTERASE UR QL STRIP: NEGATIVE
LYMPHOCYTES # BLD AUTO: 0.8 K/UL (ref 1–4.8)
LYMPHOCYTES NFR BLD: 32.5 % (ref 18–48)
MCH RBC QN AUTO: 26.3 PG (ref 27–31)
MCHC RBC AUTO-ENTMCNC: 31.6 G/DL (ref 32–36)
MCV RBC AUTO: 83 FL (ref 82–98)
MONOCYTES # BLD AUTO: 0.2 K/UL (ref 0.3–1)
MONOCYTES NFR BLD: 6 % (ref 4–15)
NEUTROPHILS # BLD AUTO: 1.4 K/UL (ref 1.8–7.7)
NEUTROPHILS NFR BLD: 55.1 % (ref 38–73)
NITRITE UR QL STRIP: NEGATIVE
NRBC BLD-RTO: 0 /100 WBC
PH UR STRIP: 6 [PH] (ref 5–8)
PLATELET # BLD AUTO: 242 K/UL (ref 150–350)
PMV BLD AUTO: 12.1 FL (ref 9.2–12.9)
PROT UR QL STRIP: ABNORMAL
PROT UR-MCNC: 34 MG/DL (ref 0–15)
PROT/CREAT UR: 0.25 MG/G{CREAT} (ref 0–0.2)
RBC # BLD AUTO: 3.88 M/UL (ref 4–5.4)
SP GR UR STRIP: 1.02 (ref 1–1.03)
URN SPEC COLLECT METH UR: ABNORMAL
WBC # BLD AUTO: 2.52 K/UL (ref 3.9–12.7)

## 2019-08-27 PROCEDURE — 84156 ASSAY OF PROTEIN URINE: CPT

## 2019-08-27 PROCEDURE — 36415 COLL VENOUS BLD VENIPUNCTURE: CPT

## 2019-08-27 PROCEDURE — 86225 DNA ANTIBODY NATIVE: CPT

## 2019-08-27 PROCEDURE — 86039 ANTINUCLEAR ANTIBODIES (ANA): CPT

## 2019-08-27 PROCEDURE — 81003 URINALYSIS AUTO W/O SCOPE: CPT

## 2019-08-27 PROCEDURE — 86140 C-REACTIVE PROTEIN: CPT

## 2019-08-27 PROCEDURE — 86235 NUCLEAR ANTIGEN ANTIBODY: CPT | Mod: 59

## 2019-08-27 PROCEDURE — 86160 COMPLEMENT ANTIGEN: CPT

## 2019-08-27 PROCEDURE — 86038 ANTINUCLEAR ANTIBODIES: CPT

## 2019-08-27 PROCEDURE — 85025 COMPLETE CBC W/AUTO DIFF WBC: CPT

## 2019-08-27 PROCEDURE — 85652 RBC SED RATE AUTOMATED: CPT

## 2019-08-27 PROCEDURE — 86160 COMPLEMENT ANTIGEN: CPT | Mod: 59

## 2019-08-28 LAB
ANA SER QL IF: POSITIVE
ANA TITR SER IF: NORMAL {TITER}

## 2019-08-29 ENCOUNTER — TELEPHONE (OUTPATIENT)
Dept: RHEUMATOLOGY | Facility: CLINIC | Age: 29
End: 2019-08-29

## 2019-08-29 DIAGNOSIS — O99.891 SYSTEMIC LUPUS COMPLICATING PREGNANCY: ICD-10-CM

## 2019-08-29 DIAGNOSIS — R76.8 ANA POSITIVE: ICD-10-CM

## 2019-08-29 DIAGNOSIS — M32.9 SYSTEMIC LUPUS COMPLICATING PREGNANCY: ICD-10-CM

## 2019-08-29 DIAGNOSIS — M32.9 SYSTEMIC LUPUS ERYTHEMATOSUS ARTHRITIS: ICD-10-CM

## 2019-08-29 DIAGNOSIS — M32.14 SLE GLOMERULONEPHRITIS SYNDROME, WHO CLASS V: ICD-10-CM

## 2019-08-29 LAB
ANTI SM ANTIBODY: 9.15 EU (ref 0–19.99)
ANTI SM/RNP ANTIBODY: 6.91 EU (ref 0–19.99)
ANTI-SM INTERPRETATION: NEGATIVE
ANTI-SM/RNP INTERPRETATION: NEGATIVE
ANTI-SSA ANTIBODY: 143.5 EU (ref 0–19.99)
ANTI-SSA INTERPRETATION: POSITIVE
ANTI-SSB ANTIBODY: 44.04 EU (ref 0–19.99)
ANTI-SSB INTERPRETATION: POSITIVE
DSDNA AB SER-ACNC: ABNORMAL [IU]/ML
DSDNA AB SER-ACNC: ABNORMAL [IU]/ML

## 2019-08-29 RX ORDER — HYDROXYCHLOROQUINE SULFATE 200 MG/1
400 TABLET, FILM COATED ORAL DAILY
Qty: 60 TABLET | Refills: 2 | Status: SHIPPED | OUTPATIENT
Start: 2019-08-29 | End: 2020-04-28 | Stop reason: SDUPTHER

## 2019-08-29 RX ORDER — PREDNISONE 10 MG/1
10 TABLET ORAL DAILY
Qty: 30 TABLET | Refills: 1 | Status: SHIPPED | OUTPATIENT
Start: 2019-08-29 | End: 2020-01-28 | Stop reason: SDUPTHER

## 2019-08-29 RX ORDER — MYCOPHENOLATE MOFETIL 500 MG/1
500 TABLET ORAL 2 TIMES DAILY
Qty: 60 TABLET | Refills: 2 | Status: SHIPPED | OUTPATIENT
Start: 2019-08-29 | End: 2019-11-18 | Stop reason: SDDI

## 2019-08-29 NOTE — TELEPHONE ENCOUNTER
Spoke with patient regarding missed appointment this morning with Dr. Blair. Patient stated that she overslept. Patient was rescheduled to 10/15/2019 @ 12:15. Patient verbalized understanding.      Patient also stated that she is not taking anything for her Lupus at this time and wanted to know if she should be taking something? Advised patient that I would send a message to Dr. Blair and someone will call her back. Patient verbalized understanding.

## 2019-08-29 NOTE — TELEPHONE ENCOUNTER
Patient called and advised to restart medications- plaquenil, predisone and cellcept. Return in 5 weeks.

## 2019-08-30 ENCOUNTER — TELEPHONE (OUTPATIENT)
Dept: PHARMACY | Facility: CLINIC | Age: 29
End: 2019-08-30

## 2019-08-30 NOTE — TELEPHONE ENCOUNTER
Informed Patient  that [facility] received prescription for Mycophenolate and benefits investigation is required.  [facility] will be back in touch once insurance determination is received.

## 2019-08-30 NOTE — TELEPHONE ENCOUNTER
DOCUMENTATION ONLY  FYI  Cellcept does not require a Prior Authorization through the patient's insurance.    Copay: $0    Patient Assistance IS NOT required.    Forwarded to the clinical pharmacist for consult and shipment.    -ARR

## 2019-09-17 ENCOUNTER — TELEPHONE (OUTPATIENT)
Dept: PHARMACY | Facility: CLINIC | Age: 29
End: 2019-09-17

## 2019-09-17 NOTE — TELEPHONE ENCOUNTER
Pt was contacted on 9/17 for Cellcept 7 Day Post Start Touchbase. Pt reports that she was able to start Cellcept when she received it on 9/4. Pt reports that she had trouble remembering to take the medication twice daily at first, but pt reports that Dr. MADISON said that it was okay for her to take two tablets at one time. Pt reports that it has helped with her compliance and reports no missed doses since. Pt reports that she is tolerating the medication well and reports no side effects. Pt had no further questions or concerns. [facility] will f/u with pt in the coming weeks for refill and [facility]Ph will f/u with pt in 3 months.

## 2019-09-26 ENCOUNTER — TELEPHONE (OUTPATIENT)
Dept: PHARMACY | Facility: CLINIC | Age: 29
End: 2019-09-26

## 2019-09-30 PROBLEM — O13.3 PIH (PREGNANCY INDUCED HYPERTENSION), THIRD TRIMESTER: Status: RESOLVED | Noted: 2019-06-30 | Resolved: 2019-09-30

## 2019-10-03 ENCOUNTER — OFFICE VISIT (OUTPATIENT)
Dept: RHEUMATOLOGY | Facility: CLINIC | Age: 29
End: 2019-10-03
Payer: MEDICAID

## 2019-10-03 VITALS
WEIGHT: 172.38 LBS | HEART RATE: 78 BPM | BODY MASS INDEX: 28.69 KG/M2 | SYSTOLIC BLOOD PRESSURE: 167 MMHG | DIASTOLIC BLOOD PRESSURE: 119 MMHG

## 2019-10-03 DIAGNOSIS — M32.14 SLE GLOMERULONEPHRITIS SYNDROME, WHO CLASS V: ICD-10-CM

## 2019-10-03 DIAGNOSIS — R76.8 ANA POSITIVE: ICD-10-CM

## 2019-10-03 DIAGNOSIS — M32.9 SYSTEMIC LUPUS ERYTHEMATOSUS ARTHRITIS: Primary | ICD-10-CM

## 2019-10-03 DIAGNOSIS — Z91.09 SENSITIVITY TO SUNLIGHT: ICD-10-CM

## 2019-10-03 DIAGNOSIS — Z79.899 LONG-TERM USE OF PLAQUENIL: ICD-10-CM

## 2019-10-03 DIAGNOSIS — D84.9 IMMUNOSUPPRESSED STATUS: ICD-10-CM

## 2019-10-03 PROCEDURE — 99214 OFFICE O/P EST MOD 30 MIN: CPT | Mod: S$PBB,,, | Performed by: INTERNAL MEDICINE

## 2019-10-03 PROCEDURE — 99999 PR PBB SHADOW E&M-EST. PATIENT-LVL III: ICD-10-PCS | Mod: PBBFAC,,, | Performed by: INTERNAL MEDICINE

## 2019-10-03 PROCEDURE — 99999 PR PBB SHADOW E&M-EST. PATIENT-LVL III: CPT | Mod: PBBFAC,,, | Performed by: INTERNAL MEDICINE

## 2019-10-03 PROCEDURE — 99213 OFFICE O/P EST LOW 20 MIN: CPT | Mod: PBBFAC | Performed by: INTERNAL MEDICINE

## 2019-10-03 PROCEDURE — 99214 PR OFFICE/OUTPT VISIT, EST, LEVL IV, 30-39 MIN: ICD-10-PCS | Mod: S$PBB,,, | Performed by: INTERNAL MEDICINE

## 2019-10-03 NOTE — ASSESSMENT & PLAN NOTE
Stable so far.  Check UA and urine protein creatinine ratio today.  Have not taken the nifedipine today.  Uncontrolled hypertension.  Advised to take anti hypertension medications 1st thing in the morning.

## 2019-10-03 NOTE — PROGRESS NOTES
RHEUMATOLOGY CLINIC FOLLOW UP VISIT  Chief complaints:-  To follow up for lupus.    HPI:-  Alisia Jolly a 29 y.o. pleasant female comes in for a follow up visit.  She reports improvement in her lupus arthritis since restarting Plaquenil, prednisone and CellCept.  She was off medications for a month and her symptoms flare.  Primarily the symptoms flared after sun exposure without using sunscreen.  No significant joint pain today.  No prolonged morning stiffness.  No skin rash.  Doing really well.    Review of Systems   Constitutional: Negative for chills and fever.   HENT: Negative for congestion and sore throat.    Eyes: Negative for blurred vision and redness.   Respiratory: Negative for cough and shortness of breath.    Cardiovascular: Negative for chest pain and leg swelling.   Gastrointestinal: Negative for abdominal pain.   Genitourinary: Negative for dysuria.   Musculoskeletal: Positive for back pain, joint pain, myalgias and neck pain. Negative for falls.   Skin: Negative for rash.   Neurological: Negative for headaches.   Endo/Heme/Allergies: Does not bruise/bleed easily.   Psychiatric/Behavioral: Negative for memory loss. The patient does not have insomnia.        Past Medical History:   Diagnosis Date    Allergic rhinitis     Anemia     Condyloma acuminata     GERD (gastroesophageal reflux disease)     History of fetal anomaly in prior pregnancy, currently pregnant, unspecified trimester 3/8/2017    Pacemaker placed    HSV-2 (herpes simplex virus 2) infection     Lupus nephritis     Mood disorder     Overweight(278.02)     Sjogren's syndrome     Systemic lupus erythematosus     Thrombocytopenia        Past Surgical History:   Procedure Laterality Date     SECTION WITH TUBAL LIGATION N/A 2019    Procedure:  SECTION, WITH TUBAL LIGATION;  Surgeon: VERONICA Valdovinos MD;  Location: [facility] L&D;  Service: OB/GYN;   Laterality: N/A;     SECTION, LOW TRANSVERSE      RENAL BIOPSY  2013        Social History     Tobacco Use    Smoking status: Never Smoker    Smokeless tobacco: Never Used   Substance Use Topics    Alcohol use: No     Alcohol/week: 0.0 standard drinks    Drug use: No       Family History   Problem Relation Age of Onset    Hypertension Mother     Eczema Brother     Heart disease Son     Arrhythmia Son         CHB    Hypertension Maternal Grandmother     Diabetes Paternal Grandmother     Lupus Neg Hx     Psoriasis Neg Hx     Congenital heart disease Neg Hx     Early death Neg Hx     Heart attacks under age 50 Neg Hx     Pacemaker/defibrilator Neg Hx        Review of patient's allergies indicates:  No Known Allergies    Vitals:    10/03/19 1159   BP: (!) 167/119   Pulse: 78   Weight: 78.2 kg (172 lb 6.4 oz)   PainSc: 0-No pain       Physical Exam   Constitutional: She is oriented to person, place, and time and well-developed, well-nourished, and in no distress. No distress.   HENT:   Head: Normocephalic.   Mouth/Throat: Oropharynx is clear and moist.   Eyes: Pupils are equal, round, and reactive to light. Conjunctivae and EOM are normal.   Neck: Normal range of motion. Neck supple.   Cardiovascular: Normal rate and intact distal pulses.   Pulmonary/Chest: Effort normal. No respiratory distress.   Abdominal: Soft. There is no tenderness.   Musculoskeletal:   No synovitis over small joints of hands or feet.  No effusion over large joints.   Neurological: She is alert and oriented to person, place, and time. No cranial nerve deficit.   Skin: Skin is warm. No rash noted. No erythema.   Psychiatric: Mood and affect normal.   Nursing note and vitals reviewed.      Medication List with Changes/Refills   Current Medications    FLUOXETINE 40 MG CAPSULE    Take 1 capsule (40 mg total) by mouth once daily.    HYDROXYCHLOROQUINE (PLAQUENIL) 200 MG TABLET    Take 2 tablets (400 mg total) by mouth  once daily.    LABETALOL (NORMODYNE) 100 MG TABLET    Take 1 tablet (100 mg total) by mouth every evening.    MYCOPHENOLATE (CELLCEPT) 500 MG TAB    Take 1 tablet (500 mg total) by mouth 2 (two) times daily.    NIFEDIPINE (PROCARDIA-XL) 30 MG (OSM) 24 HR TABLET    Take 2 tablets (60 mg total) by mouth once daily.    PREDNISONE (DELTASONE) 10 MG TABLET    Take 1 tablet (10 mg total) by mouth once daily.    VALACYCLOVIR (VALTREX) 500 MG TABLET    Take 2 tablets (1,000 mg total) by mouth once daily.       Assessment/Plans:-  1. Systemic lupus erythematosus arthritis    2. SSA ANTIBODY positive    3. SLE glomerulonephritis syndrome, WHO class V    4. Long-term use of Plaquenil    5. Immunosuppressed status    6. Sensitivity to sunlight      Problem List Items Addressed This Visit        Renal/    SLE glomerulonephritis syndrome, WHO class V    Overview      12/12/2018  Continue follow-up and management with Dr. Blair         Current Assessment & Plan     Stable so far.  Check UA and urine protein creatinine ratio today.  Have not taken the nifedipine today.  Uncontrolled hypertension.  Advised to take anti hypertension medications 1st thing in the morning.            Immunology/Multi System    Systemic lupus erythematosus arthritis - Primary    Current Assessment & Plan     Mild synovitis Lupus arthritis flare responding to prednisone 10 mg daily, Plaquenil and CellCept 500 twice daily.  Check CBC and CMP.  Based on labs reduce prednisone and increase CellCept.  Continue Plaquenil.  Significant issues regarding medication adherence due to being very busy at home with a 2-year-old and to in infants.  Advised importance of medication adherence before the lupus flare worsens.         SSA ANTIBODY positive    Current Assessment & Plan     Uncomplicated pregnancy.  No heart defects in kids.  No sicca syndrome.         Immunosuppressed status    Current Assessment & Plan     Compromised immune system secondary to  autoimmune disease and use of immunosuppressive drugs. Monitor carefully for infections. Advised to get immediate medical care if any infection. Also advised strict adherence to age appropriate vaccinations and cancer screenings with PCP.               Other    Long-term use of Plaquenil    Current Assessment & Plan     Follow-up with ophthalmologist by yearly.         Sensitivity to sunlight    Current Assessment & Plan     Reinforced the importance of photo protection             Disclaimer: This note was prepared using voice recognition system and is likely to have sound alike errors and is not proof read.  Please call me with any questions.

## 2019-10-03 NOTE — ASSESSMENT & PLAN NOTE
Mild synovitis Lupus arthritis flare responding to prednisone 10 mg daily, Plaquenil and CellCept 500 twice daily.  Check CBC and CMP.  Based on labs reduce prednisone and increase CellCept.  Continue Plaquenil.  Significant issues regarding medication adherence due to being very busy at home with a 2-year-old and to in infants.  Advised importance of medication adherence before the lupus flare worsens.

## 2019-10-13 DIAGNOSIS — O10.919 PRE-EXISTING HYPERTENSION AFFECTING PREGNANCY, ANTEPARTUM: ICD-10-CM

## 2019-10-14 ENCOUNTER — TELEPHONE (OUTPATIENT)
Dept: RHEUMATOLOGY | Facility: CLINIC | Age: 29
End: 2019-10-14

## 2019-10-14 NOTE — TELEPHONE ENCOUNTER
----- Message from Katherine Harris sent at 10/14/2019  9:52 AM CDT -----  Contact: pt  Would like nurse to give a call to labs to let them know she can come in today. Please give call at [phone].    Thanks,  Katherine MENON

## 2019-10-15 RX ORDER — LABETALOL 100 MG/1
100 TABLET, FILM COATED ORAL NIGHTLY
Qty: 30 TABLET | Refills: 1 | Status: SHIPPED | OUTPATIENT
Start: 2019-10-15 | End: 2020-04-28 | Stop reason: SDUPTHER

## 2019-10-25 ENCOUNTER — TELEPHONE (OUTPATIENT)
Dept: PHARMACY | Facility: CLINIC | Age: 29
End: 2019-10-25

## 2019-10-30 ENCOUNTER — TELEPHONE (OUTPATIENT)
Dept: RHEUMATOLOGY | Facility: CLINIC | Age: 29
End: 2019-10-30

## 2019-10-30 NOTE — TELEPHONE ENCOUNTER
----- Message from Cheo Wilcox sent at 10/30/2019 11:16 AM CDT -----  Contact: pt   .Type:  Needs Medical Advice    Who Called:  Pt   Symptoms (please be specific): joint pain in both hands    How long has patient had these symptoms:   Week   Pharmacy name and phone #:   [facility]   Would the patient rather a call back or a response via [facility]?  Callback   Best Call Back Number: .[phone]    Additional Information:         ..  [facility]  [address]  [place] [address]  Phone: [phone] Fax: [phone]            [phone]

## 2019-11-01 ENCOUNTER — TELEPHONE (OUTPATIENT)
Dept: RHEUMATOLOGY | Facility: CLINIC | Age: 29
End: 2019-11-01

## 2019-11-01 NOTE — TELEPHONE ENCOUNTER
----- Message from Franck Baker sent at 11/1/2019  7:32 AM CDT -----  ..Type:  Patient Returning Call    Who Called:pt   Who Left Message for Patient:  Does the patient know what this is regarding?: precription  Would the patient rather a call back or a response via [facility] and some [facility]ner? Call back  Best Call Back Number:[phone]  Additional Information: Pt is requesting a call from nurse to discuss a prescription for the pain dealing with lupus.    .  [facility] [address] [address]  Phone: [phone] Fax: [phone]

## 2019-11-05 ENCOUNTER — TELEPHONE (OUTPATIENT)
Dept: PHARMACY | Facility: CLINIC | Age: 29
End: 2019-11-05

## 2019-11-07 ENCOUNTER — TELEPHONE (OUTPATIENT)
Dept: PHARMACY | Facility: CLINIC | Age: 29
End: 2019-11-07

## 2019-11-13 NOTE — PROGRESS NOTES
"/90 and repeat 164/110- has not taken labetalol since last visit as Negro does not have "card" spoke with [facility] and labetalol provided.  Patient took 1 tablet and recheck blood pressure was 142/96. Asymptomatic`  Also did not take Raglan for same reason but is managing without with good weight gain  Start baby ASA  Seq 1 today- NT both 1.7  Will add PIH labs to Seq 1 screening today    Advised to take labetalol 200 mg b.i.d. As recommended and return to office for blood pressure check and evaluation of treatment. Scheduled for 0 Camilo secondary to [facility] office moving  Ultrasound visit in 4 weeks with Dr. Arenas  " [facility]  Discharge Summary    Chey Allen Patient Status:  inpatient    1984 MRN [id number]   Location [id number] Attending [facility] [address] Day [address] 2 PCP Rei John MD     Date of Admission: 2019    Date of Discharge:

## 2019-11-18 ENCOUNTER — LAB VISIT (OUTPATIENT)
Dept: LAB | Facility: HOSPITAL | Age: 29
End: 2019-11-18
Attending: INTERNAL MEDICINE
Payer: MEDICAID

## 2019-11-18 ENCOUNTER — OFFICE VISIT (OUTPATIENT)
Dept: RHEUMATOLOGY | Facility: CLINIC | Age: 29
End: 2019-11-18
Payer: MEDICAID

## 2019-11-18 VITALS
WEIGHT: 167.31 LBS | SYSTOLIC BLOOD PRESSURE: 153 MMHG | BODY MASS INDEX: 27.88 KG/M2 | HEART RATE: 86 BPM | HEIGHT: 65 IN | DIASTOLIC BLOOD PRESSURE: 108 MMHG

## 2019-11-18 DIAGNOSIS — D84.9 IMMUNOSUPPRESSED STATUS: ICD-10-CM

## 2019-11-18 DIAGNOSIS — M35.09 SJOGREN'S SYNDROME WITH OTHER ORGAN INVOLVEMENT: ICD-10-CM

## 2019-11-18 DIAGNOSIS — M32.14 SLE GLOMERULONEPHRITIS SYNDROME, WHO CLASS V: ICD-10-CM

## 2019-11-18 DIAGNOSIS — Z79.899 LONG-TERM USE OF PLAQUENIL: ICD-10-CM

## 2019-11-18 DIAGNOSIS — R76.8 ANA POSITIVE: ICD-10-CM

## 2019-11-18 DIAGNOSIS — M32.9 SYSTEMIC LUPUS ERYTHEMATOSUS ARTHRITIS: Primary | ICD-10-CM

## 2019-11-18 LAB
BASOPHILS # BLD AUTO: 0 K/UL (ref 0–0.2)
BASOPHILS NFR BLD: 0 % (ref 0–1.9)
C3 SERPL-MCNC: 74 MG/DL (ref 50–180)
C4 SERPL-MCNC: 23 MG/DL (ref 11–44)
CRP SERPL-MCNC: 2.6 MG/L (ref 0–8.2)
DIFFERENTIAL METHOD: ABNORMAL
EOSINOPHIL # BLD AUTO: 0.1 K/UL (ref 0–0.5)
EOSINOPHIL NFR BLD: 3.5 % (ref 0–8)
ERYTHROCYTE [DISTWIDTH] IN BLOOD BY AUTOMATED COUNT: 14.5 % (ref 11.5–14.5)
ERYTHROCYTE [SEDIMENTATION RATE] IN BLOOD BY WESTERGREN METHOD: 71 MM/HR (ref 0–36)
HCT VFR BLD AUTO: 32.3 % (ref 37–48.5)
HGB BLD-MCNC: 10.9 G/DL (ref 12–16)
IMM GRANULOCYTES # BLD AUTO: 0.01 K/UL (ref 0–0.04)
IMM GRANULOCYTES NFR BLD AUTO: 0.3 % (ref 0–0.5)
LYMPHOCYTES # BLD AUTO: 0.8 K/UL (ref 1–4.8)
LYMPHOCYTES NFR BLD: 21.8 % (ref 18–48)
MCH RBC QN AUTO: 28.8 PG (ref 27–31)
MCHC RBC AUTO-ENTMCNC: 33.7 G/DL (ref 32–36)
MCV RBC AUTO: 85 FL (ref 82–98)
MONOCYTES # BLD AUTO: 0.3 K/UL (ref 0.3–1)
MONOCYTES NFR BLD: 7.3 % (ref 4–15)
NEUTROPHILS # BLD AUTO: 2.5 K/UL (ref 1.8–7.7)
NEUTROPHILS NFR BLD: 67.4 % (ref 38–73)
NRBC BLD-RTO: 0 /100 WBC
PLATELET # BLD AUTO: 187 K/UL (ref 150–350)
PMV BLD AUTO: 10 FL (ref 9.2–12.9)
RBC # BLD AUTO: 3.78 M/UL (ref 4–5.4)
WBC # BLD AUTO: 3.71 K/UL (ref 3.9–12.7)

## 2019-11-18 PROCEDURE — 87340 HEPATITIS B SURFACE AG IA: CPT

## 2019-11-18 PROCEDURE — 86140 C-REACTIVE PROTEIN: CPT

## 2019-11-18 PROCEDURE — 36415 COLL VENOUS BLD VENIPUNCTURE: CPT

## 2019-11-18 PROCEDURE — 86706 HEP B SURFACE ANTIBODY: CPT

## 2019-11-18 PROCEDURE — 85025 COMPLETE CBC W/AUTO DIFF WBC: CPT

## 2019-11-18 PROCEDURE — 99213 OFFICE O/P EST LOW 20 MIN: CPT | Mod: PBBFAC | Performed by: INTERNAL MEDICINE

## 2019-11-18 PROCEDURE — 86160 COMPLEMENT ANTIGEN: CPT

## 2019-11-18 PROCEDURE — 86235 NUCLEAR ANTIGEN ANTIBODY: CPT | Mod: 59

## 2019-11-18 PROCEDURE — 86038 ANTINUCLEAR ANTIBODIES: CPT

## 2019-11-18 PROCEDURE — 99999 PR PBB SHADOW E&M-EST. PATIENT-LVL III: ICD-10-PCS | Mod: PBBFAC,,, | Performed by: INTERNAL MEDICINE

## 2019-11-18 PROCEDURE — 86160 COMPLEMENT ANTIGEN: CPT | Mod: 59

## 2019-11-18 PROCEDURE — 86803 HEPATITIS C AB TEST: CPT

## 2019-11-18 PROCEDURE — 99999 PR PBB SHADOW E&M-EST. PATIENT-LVL III: CPT | Mod: PBBFAC,,, | Performed by: INTERNAL MEDICINE

## 2019-11-18 PROCEDURE — 99214 PR OFFICE/OUTPT VISIT, EST, LEVL IV, 30-39 MIN: ICD-10-PCS | Mod: S$PBB,,, | Performed by: INTERNAL MEDICINE

## 2019-11-18 PROCEDURE — 99214 OFFICE O/P EST MOD 30 MIN: CPT | Mod: S$PBB,,, | Performed by: INTERNAL MEDICINE

## 2019-11-18 PROCEDURE — 86704 HEP B CORE ANTIBODY TOTAL: CPT

## 2019-11-18 PROCEDURE — 85652 RBC SED RATE AUTOMATED: CPT

## 2019-11-18 PROCEDURE — 86039 ANTINUCLEAR ANTIBODIES (ANA): CPT

## 2019-11-18 NOTE — ASSESSMENT & PLAN NOTE
No significant arthritis on examination today other than mild synovitis around wrists.  On prednisone 10 mg daily.  Non compliant with other medications.  Advised to at least take Plaquenil.  Try to get rituximab approved by insurance if possible.  If not restart Benlysta IV.

## 2019-11-18 NOTE — ASSESSMENT & PLAN NOTE
Persistent proteinuria.  Probably she is having more inflammatory lesions and maybe even a class 3 lupus nephritis with her DS DNA turning positive and worsening inflammatory markers probably contributing to her uncontrolled hypertension.  Could not take CellCept because of being busy at home with 3 kids.  No adverse effects.  At least try rituximab of to prevent her from ending up in dialysis.

## 2019-11-18 NOTE — PROGRESS NOTES
RHEUMATOLOGY CLINIC FOLLOW UP VISIT  Chief complaints:-  To follow up for lupus.    HPI:-  Alisia Lococonsueloelysia a 29 y.o. pleasant female comes in for a follow up visit.  She complains of worsening lupus arthritis symptoms and chronic fatigue since last visit.  She is non compliant with medications like before.  Randomly takes Plaquenil every now and then.  Does not take CellCept.  Considering going on alkaline foot sent treat lupus naturally.  Denies any significant joint pain today but had joint pains for the last week.  Intermittent, episodic, inflammatory.  No skin rash.  No sicca syndrome.    Review of Systems   Constitutional: Positive for malaise/fatigue. Negative for chills and fever.   HENT: Negative for congestion and sore throat.    Eyes: Negative for blurred vision and redness.   Respiratory: Negative for cough and shortness of breath.    Cardiovascular: Negative for chest pain and leg swelling.   Gastrointestinal: Negative for abdominal pain.   Genitourinary: Negative for dysuria.   Musculoskeletal: Positive for joint pain. Negative for back pain, falls, myalgias and neck pain.   Skin: Negative for rash.   Neurological: Negative for headaches.   Endo/Heme/Allergies: Does not bruise/bleed easily.   Psychiatric/Behavioral: Negative for memory loss. The patient does not have insomnia.        Past Medical History:   Diagnosis Date    Allergic rhinitis     Anemia     Condyloma acuminata     GERD (gastroesophageal reflux disease)     History of fetal anomaly in prior pregnancy, currently pregnant, unspecified trimester 3/8/2017    Pacemaker placed    HSV-2 (herpes simplex virus 2) infection     Lupus nephritis     Mood disorder     Overweight(278.02)     Sjogren's syndrome     Systemic lupus erythematosus     Thrombocytopenia        Past Surgical History:   Procedure Laterality Date     SECTION WITH TUBAL LIGATION N/A 2019     "Procedure:  SECTION, WITH TUBAL LIGATION;  Surgeon: VERONICA Valdovinos MD;  Location: [facility] L&D;  Service: OB/GYN;  Laterality: N/A;     SECTION, LOW TRANSVERSE      RENAL BIOPSY  2013        Social History     Tobacco Use    Smoking status: Never Smoker    Smokeless tobacco: Never Used   Substance Use Topics    Alcohol use: No     Alcohol/week: 0.0 standard drinks    Drug use: No       Family History   Problem Relation Age of Onset    Hypertension Mother     Eczema Brother     Heart disease Son     Arrhythmia Son         CHB    Hypertension Maternal Grandmother     Diabetes Paternal Grandmother     Lupus Neg Hx     Psoriasis Neg Hx     Congenital heart disease Neg Hx     Early death Neg Hx     Heart attacks under age 50 Neg Hx     Pacemaker/defibrilator Neg Hx        Review of patient's allergies indicates:  No Known Allergies    Vitals:    19 1311   BP: (!) 153/108   Pulse: 86   Weight: 75.9 kg (167 lb 5.3 oz)   Height: 5' 5" (1.651 m)   PainSc: 0-No pain       Physical Exam   Constitutional: She is oriented to person, place, and time and well-developed, well-nourished, and in no distress. No distress.   HENT:   Head: Normocephalic.   Mouth/Throat: Oropharynx is clear and moist.   Eyes: Pupils are equal, round, and reactive to light. Conjunctivae and EOM are normal.   Neck: Normal range of motion. Neck supple.   Cardiovascular: Normal rate and intact distal pulses.   Pulmonary/Chest: Effort normal. No respiratory distress.   Abdominal: Soft. There is no tenderness.   Musculoskeletal:   No synovitis over small joints of hands or feet except bilateral wrists.  No effusion over large joints.   Neurological: She is alert and oriented to person, place, and time. No cranial nerve deficit.   Skin: Skin is warm. No rash noted. No erythema.   Psychiatric: Mood and affect normal.   Nursing note and vitals reviewed.      Medication List with Changes/Refills   Current " Medications    FLUOXETINE 40 MG CAPSULE    Take 1 capsule (40 mg total) by mouth once daily.    HYDROXYCHLOROQUINE (PLAQUENIL) 200 MG TABLET    Take 2 tablets (400 mg total) by mouth once daily.    LABETALOL (NORMODYNE) 100 MG TABLET    Take 1 tablet (100 mg total) by mouth every evening.    NIFEDIPINE (PROCARDIA-XL) 30 MG (OSM) 24 HR TABLET    Take 2 tablets (60 mg total) by mouth once daily.    PREDNISONE (DELTASONE) 10 MG TABLET    Take 1 tablet (10 mg total) by mouth once daily.    VALACYCLOVIR (VALTREX) 500 MG TABLET    Take 2 tablets (1,000 mg total) by mouth once daily.   Discontinued Medications    MYCOPHENOLATE (CELLCEPT) 500 MG TAB    Take 1 tablet (500 mg total) by mouth 2 (two) times daily.       Assessment/Plans:-  1. Systemic lupus erythematosus arthritis    2. SSA ANTIBODY positive    3. SLE glomerulonephritis syndrome, WHO class V    4. Long-term use of Plaquenil    5. Immunosuppressed status      Problem List Items Addressed This Visit        Renal/    SLE glomerulonephritis syndrome, WHO class V    Overview      12/12/2018  Continue follow-up and management with Dr. Blair         Current Assessment & Plan     Persistent proteinuria.  Probably she is having more inflammatory lesions and maybe even a class 3 lupus nephritis with her DS DNA turning positive and worsening inflammatory markers probably contributing to her uncontrolled hypertension.  Could not take CellCept because of being busy at home with 3 kids.  No adverse effects.  At least try rituximab of to prevent her from ending up in dialysis.            Immunology/Multi System    Systemic lupus erythematosus arthritis - Primary    Current Assessment & Plan     No significant arthritis on examination today other than mild synovitis around wrists.  On prednisone 10 mg daily.  Non compliant with other medications.  Advised to at least take Plaquenil.  Try to get rituximab approved by insurance if possible.  If not restart Benlysta IV.          SSA ANTIBODY positive    Current Assessment & Plan     Uncomplicated pregnancy.  No history of heart defects in kids.  No sicca syndrome.  Monitor.         Immunosuppressed status    Current Assessment & Plan     Compromised immune system secondary to autoimmune disease and use of immunosuppressive drugs. Monitor carefully for infections. Advised to get immediate medical care if any infection. Also advised strict adherence to age appropriate vaccinations and cancer screenings with PCP.          Relevant Orders    Hepatitis B core antibody, total    Hepatitis B surface antibody    Hepatitis B surface antigen    Hepatitis C antibody       Other    Long-term use of Plaquenil    Current Assessment & Plan     Continue Plaquenil with retinal screening.  Advised to be compliant at least with Plaquenil             # Follow up in about 3 weeks (around 12/9/2019).      Disclaimer: This note was prepared using voice recognition system and is likely to have sound alike errors and is not proof read.  Please call me with any questions.

## 2019-11-19 LAB
ANA SER QL IF: POSITIVE
ANA TITR SER IF: NORMAL {TITER}
HBV CORE AB SERPL QL IA: NEGATIVE
HBV SURFACE AB SER-ACNC: POSITIVE M[IU]/ML
HBV SURFACE AG SERPL QL IA: NEGATIVE
HCV AB SERPL QL IA: NEGATIVE

## 2019-11-21 LAB
ANTI SM ANTIBODY: 7.51 EU (ref 0–19.99)
ANTI SM/RNP ANTIBODY: 7.56 EU (ref 0–19.99)
ANTI-SM INTERPRETATION: NEGATIVE
ANTI-SM/RNP INTERPRETATION: NEGATIVE
ANTI-SSA ANTIBODY: 191.84 EU (ref 0–19.99)
ANTI-SSA INTERPRETATION: POSITIVE
ANTI-SSB ANTIBODY: 62.05 EU (ref 0–19.99)
ANTI-SSB INTERPRETATION: POSITIVE
DSDNA AB SER-ACNC: ABNORMAL [IU]/ML

## 2019-11-25 RX ORDER — DIPHENHYDRAMINE HYDROCHLORIDE 50 MG/ML
25 INJECTION INTRAMUSCULAR; INTRAVENOUS
Status: CANCELLED | OUTPATIENT
Start: 2019-11-25

## 2019-11-25 RX ORDER — METHYLPREDNISOLONE SOD SUCC 125 MG
100 VIAL (EA) INJECTION
Status: CANCELLED | OUTPATIENT
Start: 2019-11-25

## 2019-11-25 RX ORDER — HEPARIN 100 UNIT/ML
500 SYRINGE INTRAVENOUS
Status: CANCELLED | OUTPATIENT
Start: 2019-11-25

## 2019-11-25 RX ORDER — ACETAMINOPHEN 325 MG/1
650 TABLET ORAL
Status: CANCELLED | OUTPATIENT
Start: 2019-11-25

## 2019-11-25 RX ORDER — SODIUM CHLORIDE 0.9 % (FLUSH) 0.9 %
10 SYRINGE (ML) INJECTION
Status: CANCELLED | OUTPATIENT
Start: 2019-11-25

## 2019-11-25 RX ORDER — FAMOTIDINE 10 MG/ML
20 INJECTION INTRAVENOUS
Status: CANCELLED | OUTPATIENT
Start: 2019-11-25

## 2019-12-04 NOTE — PROGRESS NOTES
Subjective:       Patient ID: Alisia Vines is a 29 y.o. female.    Chief Complaint: No chief complaint on file.    Alisia is a 28 y/o pt of dr. Coleman with lupus involving her kidneys with lupus nephritis on previous biopsy and joints.  She recently has had worsening proteinuria, esr/crp, htn.  Couldn't take cellcept.  Not compliant with hcq.  Prev on benlysta IV.  After her last visit with Dr. MADISON they decided on Rituxan due to her worsening lupus activity, increasing dsdna, proteinuria.  She is here for infusion today with no major complaints, generalized joint achiness.  No cp, sob, fever, rashes, weaknss, joint swelling.     Today will be rituxan #1      Past Medical History:   Diagnosis Date    Allergic rhinitis     Anemia     Condyloma acuminata     GERD (gastroesophageal reflux disease)     History of fetal anomaly in prior pregnancy, currently pregnant, unspecified trimester 3/8/2017    Pacemaker placed    HSV-2 (herpes simplex virus 2) infection     Lupus nephritis     Mood disorder     Overweight(278.02)     Sjogren's syndrome     Systemic lupus erythematosus     Thrombocytopenia      Past Surgical History:   Procedure Laterality Date     SECTION WITH TUBAL LIGATION N/A 2019    Procedure:  SECTION, WITH TUBAL LIGATION;  Surgeon: VERONICA Valdovinos MD;  Location: [facility] L&D;  Service: OB/GYN;  Laterality: N/A;     SECTION, LOW TRANSVERSE      RENAL BIOPSY  2013     Family History   Problem Relation Age of Onset    Hypertension Mother     Eczema Brother     Heart disease Son     Arrhythmia Son         CHB    Hypertension Maternal Grandmother     Diabetes Paternal Grandmother     Lupus Neg Hx     Psoriasis Neg Hx     Congenital heart disease Neg Hx     Early death Neg Hx     Heart attacks under age 50 Neg Hx     Pacemaker/defibrilator Neg Hx      Social History     Socioeconomic History    Marital status: Single     Spouse name: Not on  file    Number of children: 2    Years of education: Not on file    Highest education level: Not on file   Occupational History     Comment: [employer]   Social Needs    Financial resource strain: Not on file    Food insecurity:     Worry: Not on file     Inability: Not on file    Transportation needs:     Medical: Not on file     Non-medical: Not on file   Tobacco Use    Smoking status: Never Smoker    Smokeless tobacco: Never Used   Substance and Sexual Activity    Alcohol use: No     Alcohol/week: 0.0 standard drinks    Drug use: No    Sexual activity: Not Currently     Partners: Male     Birth control/protection: None   Lifestyle    Physical activity:     Days per week: Not on file     Minutes per session: Not on file    Stress: Not on file   Relationships    Social connections:     Talks on phone: Not on file     Gets together: Not on file     Attends [employer] service: Not on file     Active member of club or organization: Not on file     Attends meetings of clubs or organizations: Not on file     Relationship status: Not on file   Other Topics Concern    Are you pregnant or think you may be? No    Breast-feeding No   Social History Narrative    The patient is single and is in a committed relationship.  She lives with her father. 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     Review of patient's allergies indicates:  No Known Allergies  Review of Systems   Constitutional: Negative for chills, fatigue and fever.   HENT: Negative for mouth sores, rhinorrhea and sore throat.    Eyes: Negative for pain and redness.   Respiratory: Negative for cough and shortness of breath.    Cardiovascular: Negative for chest pain.   Gastrointestinal: Negative for abdominal pain, constipation, diarrhea, nausea and vomiting.   Genitourinary: Negative for dysuria and hematuria.   Musculoskeletal: Positive for arthralgias. Negative for joint swelling and myalgias.   Skin: Negative for rash.    Neurological: Negative for weakness, numbness and headaches.   Psychiatric/Behavioral: The patient is not nervous/anxious.          Objective:   There were no vitals taken for this visit.  Immunization History   Administered Date(s) Administered    Influenza - High Dose - PF (65 years and older) 11/22/2013, 11/20/2018    Influenza - Quadrivalent 11/27/2015    Influenza - Quadrivalent - PF (6 months and older) 10/22/2017    Pneumococcal Conjugate - 13 Valent 11/27/2015    Tdap 09/08/2012, 07/31/2017              Physical Exam   Constitutional: She is oriented to person, place, and time and well-developed, well-nourished, and in no distress.   HENT:   Head: Normocephalic and atraumatic.   Eyes: Pupils are equal, round, and reactive to light. Right eye exhibits no discharge.   Neck: Normal range of motion.   Cardiovascular: Normal rate, regular rhythm and normal heart sounds.  Exam reveals no friction rub.    Pulmonary/Chest: Effort normal and breath sounds normal. No respiratory distress.   Abdominal: Soft. She exhibits no distension. There is no tenderness.   Lymphadenopathy:     She has no cervical adenopathy.   Neurological: She is alert and oriented to person, place, and time.   Skin: No rash noted. No erythema.     Psychiatric: Mood normal.   Musculoskeletal: Normal range of motion. She exhibits no edema or deformity.   talon wrists, mcps, pips no synvoitis, no tenderness, no warmth, good rom  talon elbows shoulders no swelling or tenderness,full rom  talon knees no effusion, no warmth, no tenderness, full rom             No results found for this or any previous visit (from the past 336 hour(s)).     No results found for: TBGOLDPLUS   Lab Results   Component Value Date    HEPBCAB Negative 11/18/2019    HEPCAB Negative 11/18/2019        Assessment:       1. SLE glomerulonephritis syndrome, WHO class V    2. Proteinuria, unspecified type    3. Immunosuppressed status    4. SLE (systemic lupus erythematosus  related syndrome)    5. Sjogren's syndrome, with unspecified organ involvement    6. High risk medication use          Impression:   SLE  With lupus nephritis: worsening proteinuria, increasing dsdna, +arthralgi, sjogrens (+SSA), not compliant with cellcept or plaquenil 400mg/d; prev on benlysta;  starting rituxan #1 today     Hi risk med use: monitoring closely     Immunocompromised: no signs of infection  Plan:       Give rituxan as planned per Dr. MADISON  Be more compliant with plaquenil 400mg/day   See me back in 6 wks post 2nd infusion with sle labs week early  Let us know if any issues.     rtc 6 wks w labs

## 2019-12-06 ENCOUNTER — INFUSION (OUTPATIENT)
Dept: RHEUMATOLOGY | Facility: HOSPITAL | Age: 29
End: 2019-12-06
Attending: PHYSICIAN ASSISTANT
Payer: MEDICAID

## 2019-12-06 ENCOUNTER — OFFICE VISIT (OUTPATIENT)
Dept: RHEUMATOLOGY | Facility: CLINIC | Age: 29
End: 2019-12-06
Payer: MEDICAID

## 2019-12-06 ENCOUNTER — TELEPHONE (OUTPATIENT)
Dept: RHEUMATOLOGY | Facility: HOSPITAL | Age: 29
End: 2019-12-06

## 2019-12-06 VITALS
HEART RATE: 67 BPM | BODY MASS INDEX: 29.46 KG/M2 | TEMPERATURE: 97 F | DIASTOLIC BLOOD PRESSURE: 104 MMHG | RESPIRATION RATE: 16 BRPM | OXYGEN SATURATION: 98 % | WEIGHT: 177 LBS | SYSTOLIC BLOOD PRESSURE: 151 MMHG

## 2019-12-06 VITALS
HEART RATE: 74 BPM | HEIGHT: 65 IN | WEIGHT: 177 LBS | SYSTOLIC BLOOD PRESSURE: 155 MMHG | DIASTOLIC BLOOD PRESSURE: 101 MMHG | BODY MASS INDEX: 29.49 KG/M2

## 2019-12-06 DIAGNOSIS — R80.9 PROTEINURIA, UNSPECIFIED TYPE: ICD-10-CM

## 2019-12-06 DIAGNOSIS — M32.14 SLE GLOMERULONEPHRITIS SYNDROME, WHO CLASS V: Primary | ICD-10-CM

## 2019-12-06 DIAGNOSIS — M32.9 SLE (SYSTEMIC LUPUS ERYTHEMATOSUS RELATED SYNDROME): ICD-10-CM

## 2019-12-06 DIAGNOSIS — M32.9 SLE (SYSTEMIC LUPUS ERYTHEMATOSUS RELATED SYNDROME): Primary | ICD-10-CM

## 2019-12-06 DIAGNOSIS — D84.9 IMMUNOSUPPRESSED STATUS: ICD-10-CM

## 2019-12-06 DIAGNOSIS — Z79.899 HIGH RISK MEDICATION USE: ICD-10-CM

## 2019-12-06 DIAGNOSIS — M35.00 SJOGREN'S SYNDROME, WITH UNSPECIFIED ORGAN INVOLVEMENT: ICD-10-CM

## 2019-12-06 DIAGNOSIS — O09.299: Primary | ICD-10-CM

## 2019-12-06 PROCEDURE — 96415 CHEMO IV INFUSION ADDL HR: CPT

## 2019-12-06 PROCEDURE — 96413 CHEMO IV INFUSION 1 HR: CPT

## 2019-12-06 PROCEDURE — 25000003 PHARM REV CODE 250: Performed by: PHYSICIAN ASSISTANT

## 2019-12-06 PROCEDURE — 99214 PR OFFICE/OUTPT VISIT, EST, LEVL IV, 30-39 MIN: ICD-10-PCS | Mod: S$PBB,,, | Performed by: PHYSICIAN ASSISTANT

## 2019-12-06 PROCEDURE — 99214 OFFICE O/P EST MOD 30 MIN: CPT | Mod: S$PBB,,, | Performed by: PHYSICIAN ASSISTANT

## 2019-12-06 PROCEDURE — 96375 TX/PRO/DX INJ NEW DRUG ADDON: CPT

## 2019-12-06 PROCEDURE — 25000003 PHARM REV CODE 250: Performed by: INTERNAL MEDICINE

## 2019-12-06 PROCEDURE — 99212 OFFICE O/P EST SF 10 MIN: CPT | Mod: PBBFAC,25 | Performed by: PHYSICIAN ASSISTANT

## 2019-12-06 PROCEDURE — 96367 TX/PROPH/DG ADDL SEQ IV INF: CPT

## 2019-12-06 PROCEDURE — 99999 PR PBB SHADOW E&M-EST. PATIENT-LVL II: CPT | Mod: PBBFAC,,, | Performed by: PHYSICIAN ASSISTANT

## 2019-12-06 PROCEDURE — 63600175 PHARM REV CODE 636 W HCPCS: Mod: JG | Performed by: INTERNAL MEDICINE

## 2019-12-06 PROCEDURE — S0028 INJECTION, FAMOTIDINE, 20 MG: HCPCS | Performed by: PHYSICIAN ASSISTANT

## 2019-12-06 PROCEDURE — A4216 STERILE WATER/SALINE, 10 ML: HCPCS | Performed by: INTERNAL MEDICINE

## 2019-12-06 PROCEDURE — 99999 PR PBB SHADOW E&M-EST. PATIENT-LVL II: ICD-10-PCS | Mod: PBBFAC,,, | Performed by: PHYSICIAN ASSISTANT

## 2019-12-06 RX ORDER — ACETAMINOPHEN 325 MG/1
650 TABLET ORAL
Status: CANCELLED | OUTPATIENT
Start: 2019-12-20

## 2019-12-06 RX ORDER — HEPARIN 100 UNIT/ML
500 SYRINGE INTRAVENOUS
Status: CANCELLED | OUTPATIENT
Start: 2019-12-20

## 2019-12-06 RX ORDER — METHYLPREDNISOLONE SOD SUCC 125 MG
100 VIAL (EA) INJECTION
Status: COMPLETED | OUTPATIENT
Start: 2019-12-06 | End: 2019-12-06

## 2019-12-06 RX ORDER — FAMOTIDINE 20 MG/50ML
20 INJECTION, SOLUTION INTRAVENOUS
Status: COMPLETED | OUTPATIENT
Start: 2019-12-06 | End: 2019-12-06

## 2019-12-06 RX ORDER — METHYLPREDNISOLONE SOD SUCC 125 MG
100 VIAL (EA) INJECTION
Status: CANCELLED | OUTPATIENT
Start: 2019-12-20

## 2019-12-06 RX ORDER — FAMOTIDINE 20 MG/50ML
20 INJECTION, SOLUTION INTRAVENOUS
Status: CANCELLED
Start: 2019-12-20

## 2019-12-06 RX ORDER — DIPHENHYDRAMINE HYDROCHLORIDE 50 MG/ML
25 INJECTION INTRAMUSCULAR; INTRAVENOUS
Status: CANCELLED | OUTPATIENT
Start: 2019-12-20

## 2019-12-06 RX ORDER — FAMOTIDINE 10 MG/ML
20 INJECTION INTRAVENOUS
Status: CANCELLED | OUTPATIENT
Start: 2019-12-20

## 2019-12-06 RX ORDER — SODIUM CHLORIDE 0.9 % (FLUSH) 0.9 %
10 SYRINGE (ML) INJECTION
Status: DISCONTINUED | OUTPATIENT
Start: 2019-12-06 | End: 2019-12-06 | Stop reason: HOSPADM

## 2019-12-06 RX ORDER — FAMOTIDINE 10 MG/ML
20 INJECTION INTRAVENOUS
Status: DISCONTINUED | OUTPATIENT
Start: 2019-12-06 | End: 2019-12-06

## 2019-12-06 RX ORDER — SODIUM CHLORIDE 0.9 % (FLUSH) 0.9 %
10 SYRINGE (ML) INJECTION
Status: CANCELLED | OUTPATIENT
Start: 2019-12-20

## 2019-12-06 RX ORDER — DIPHENHYDRAMINE HYDROCHLORIDE 50 MG/ML
25 INJECTION INTRAMUSCULAR; INTRAVENOUS
Status: COMPLETED | OUTPATIENT
Start: 2019-12-06 | End: 2019-12-06

## 2019-12-06 RX ORDER — ACETAMINOPHEN 325 MG/1
650 TABLET ORAL
Status: DISCONTINUED | OUTPATIENT
Start: 2019-12-06 | End: 2019-12-06

## 2019-12-06 RX ORDER — ACETAMINOPHEN 325 MG/1
650 TABLET ORAL
Status: COMPLETED | OUTPATIENT
Start: 2019-12-06 | End: 2019-12-06

## 2019-12-06 RX ADMIN — DIPHENHYDRAMINE HYDROCHLORIDE 25 MG: 50 INJECTION INTRAMUSCULAR; INTRAVENOUS at 10:12

## 2019-12-06 RX ADMIN — METHYLPREDNISOLONE SODIUM SUCCINATE 100 MG: 125 INJECTION, POWDER, FOR SOLUTION INTRAMUSCULAR; INTRAVENOUS at 10:12

## 2019-12-06 RX ADMIN — ACETAMINOPHEN 650 MG: 325 TABLET ORAL at 10:12

## 2019-12-06 RX ADMIN — FAMOTIDINE 20 MG: 20 INJECTION, SOLUTION INTRAVENOUS at 10:12

## 2019-12-06 RX ADMIN — Medication 10 ML: at 10:12

## 2019-12-06 RX ADMIN — RITUXIMAB 1000 MG: 10 INJECTION, SOLUTION INTRAVENOUS at 10:12

## 2019-12-06 NOTE — PLAN OF CARE
Problem: Adult Inpatient Plan of Care  Goal: Plan of Care Review  Outcome: Ongoing, Progressing  Goal: Patient-Specific Goal (Individualization)  Outcome: Ongoing, Progressing  Flowsheets (Taken 12/6/2019 1134)  Individualized Care Needs: comfort measures - feet elevated, pillow, and blanket  Anxieties, Fears or Concerns: none  Patient-Specific Goals (Include Timeframe): to tolerate treatment without a reaction today.  Goal: Absence of Hospital-Acquired Illness or Injury  Outcome: Ongoing, Progressing  Goal: Optimal Comfort and Wellbeing  Outcome: Ongoing, Progressing  Goal: Readiness for Transition of Care  Outcome: Ongoing, Progressing  Goal: Rounds/Family Conference  Outcome: Ongoing, Progressing     Problem: Infection  Goal: Infection Symptom Resolution  Outcome: Ongoing, Progressing   Patient report, pain in her knees rated 1/10.

## 2019-12-06 NOTE — TELEPHONE ENCOUNTER
"Pt called; stated she just woke up and will be late for her first Rituxan infusion. "I can be there by 9:30"  "

## 2019-12-06 NOTE — NURSING
Infusion# 1 of 2 cycle 1  S/S infection noted or voiced? Denied/none noted  Recent labs? 11/18/19    Premeds? Tylenol 650 mg po, Benadryl 25 mg IVP over 2 min, Solumedrol 100 mg IVP over 2 min, Pepcid 20 mg IV over 20 min.    Rituxan 1,000 mg administered IV over 5 hrs and titrated per protocol/ per orders; see MAR and vitals for more  details.   Tolerated well without adverse events.

## 2019-12-20 ENCOUNTER — INFUSION (OUTPATIENT)
Dept: RHEUMATOLOGY | Facility: HOSPITAL | Age: 29
End: 2019-12-20
Attending: INTERNAL MEDICINE
Payer: MEDICAID

## 2019-12-20 VITALS
OXYGEN SATURATION: 95 % | SYSTOLIC BLOOD PRESSURE: 153 MMHG | WEIGHT: 175.5 LBS | TEMPERATURE: 97 F | DIASTOLIC BLOOD PRESSURE: 100 MMHG | HEART RATE: 78 BPM | BODY MASS INDEX: 29.2 KG/M2 | RESPIRATION RATE: 16 BRPM

## 2019-12-20 DIAGNOSIS — O09.299: Primary | ICD-10-CM

## 2019-12-20 DIAGNOSIS — M32.9 SLE (SYSTEMIC LUPUS ERYTHEMATOSUS RELATED SYNDROME): ICD-10-CM

## 2019-12-20 PROCEDURE — 63600175 PHARM REV CODE 636 W HCPCS: Performed by: INTERNAL MEDICINE

## 2019-12-20 PROCEDURE — 96413 CHEMO IV INFUSION 1 HR: CPT

## 2019-12-20 PROCEDURE — 25000003 PHARM REV CODE 250: Performed by: INTERNAL MEDICINE

## 2019-12-20 PROCEDURE — 96415 CHEMO IV INFUSION ADDL HR: CPT

## 2019-12-20 PROCEDURE — S0028 INJECTION, FAMOTIDINE, 20 MG: HCPCS | Performed by: PHYSICIAN ASSISTANT

## 2019-12-20 PROCEDURE — 96375 TX/PRO/DX INJ NEW DRUG ADDON: CPT

## 2019-12-20 PROCEDURE — 25000003 PHARM REV CODE 250: Performed by: PHYSICIAN ASSISTANT

## 2019-12-20 PROCEDURE — A4216 STERILE WATER/SALINE, 10 ML: HCPCS | Performed by: INTERNAL MEDICINE

## 2019-12-20 PROCEDURE — 96367 TX/PROPH/DG ADDL SEQ IV INF: CPT

## 2019-12-20 RX ORDER — SODIUM CHLORIDE 0.9 % (FLUSH) 0.9 %
10 SYRINGE (ML) INJECTION
Status: DISCONTINUED | OUTPATIENT
Start: 2019-12-20 | End: 2019-12-20 | Stop reason: HOSPADM

## 2019-12-20 RX ORDER — FAMOTIDINE 20 MG/50ML
20 INJECTION, SOLUTION INTRAVENOUS
Status: CANCELLED
Start: 2020-01-03

## 2019-12-20 RX ORDER — ACETAMINOPHEN 325 MG/1
650 TABLET ORAL
Status: COMPLETED | OUTPATIENT
Start: 2019-12-20 | End: 2019-12-20

## 2019-12-20 RX ORDER — ACETAMINOPHEN 325 MG/1
650 TABLET ORAL
Status: CANCELLED | OUTPATIENT
Start: 2020-01-03

## 2019-12-20 RX ORDER — DIPHENHYDRAMINE HYDROCHLORIDE 50 MG/ML
25 INJECTION INTRAMUSCULAR; INTRAVENOUS
Status: CANCELLED | OUTPATIENT
Start: 2020-01-03

## 2019-12-20 RX ORDER — HEPARIN 100 UNIT/ML
500 SYRINGE INTRAVENOUS
Status: CANCELLED | OUTPATIENT
Start: 2020-01-03

## 2019-12-20 RX ORDER — METHYLPREDNISOLONE SOD SUCC 125 MG
100 VIAL (EA) INJECTION
Status: COMPLETED | OUTPATIENT
Start: 2019-12-20 | End: 2019-12-20

## 2019-12-20 RX ORDER — SODIUM CHLORIDE 0.9 % (FLUSH) 0.9 %
10 SYRINGE (ML) INJECTION
Status: CANCELLED | OUTPATIENT
Start: 2020-01-03

## 2019-12-20 RX ORDER — DIPHENHYDRAMINE HYDROCHLORIDE 50 MG/ML
25 INJECTION INTRAMUSCULAR; INTRAVENOUS
Status: COMPLETED | OUTPATIENT
Start: 2019-12-20 | End: 2019-12-20

## 2019-12-20 RX ORDER — FAMOTIDINE 20 MG/50ML
20 INJECTION, SOLUTION INTRAVENOUS
Status: COMPLETED | OUTPATIENT
Start: 2019-12-20 | End: 2019-12-20

## 2019-12-20 RX ORDER — METHYLPREDNISOLONE SOD SUCC 125 MG
100 VIAL (EA) INJECTION
Status: CANCELLED | OUTPATIENT
Start: 2020-01-03

## 2019-12-20 RX ADMIN — ACETAMINOPHEN 650 MG: 325 TABLET ORAL at 10:12

## 2019-12-20 RX ADMIN — FAMOTIDINE 20 MG: 20 INJECTION, SOLUTION INTRAVENOUS at 10:12

## 2019-12-20 RX ADMIN — METHYLPREDNISOLONE SODIUM SUCCINATE 100 MG: 125 INJECTION, POWDER, FOR SOLUTION INTRAMUSCULAR; INTRAVENOUS at 10:12

## 2019-12-20 RX ADMIN — RITUXIMAB 1000 MG: 10 INJECTION, SOLUTION INTRAVENOUS at 11:12

## 2019-12-20 RX ADMIN — DIPHENHYDRAMINE HYDROCHLORIDE 25 MG: 50 INJECTION INTRAMUSCULAR; INTRAVENOUS at 10:12

## 2019-12-20 RX ADMIN — Medication 10 ML: at 10:12

## 2019-12-20 NOTE — PLAN OF CARE
"  Problem: Adult Inpatient Plan of Care  Goal: Plan of Care Review  Outcome: Ongoing, Progressing  Goal: Patient-Specific Goal (Individualization)  Outcome: Ongoing, Progressing  Flowsheets (Taken 12/20/2019 1128)  Individualized Care Needs: comfort measures - feet elevated, pillow, and blanket  Anxieties, Fears or Concerns: none  Patient-Specific Goals (Include Timeframe): "I want to sleep during the infusion today - that is my goal"  Goal: Absence of Hospital-Acquired Illness or Injury  Outcome: Ongoing, Progressing  Goal: Optimal Comfort and Wellbeing  Outcome: Ongoing, Progressing  Goal: Readiness for Transition of Care  Outcome: Ongoing, Progressing  Goal: Rounds/Family Conference  Outcome: Ongoing, Progressing     Problem: Infection  Goal: Infection Symptom Resolution  Outcome: Ongoing, Progressing   Patient reports, generalized pain 4/10  " No

## 2019-12-20 NOTE — NURSING
Infusion# 2 of 2 cycle 1  S/S infection noted or voiced? Denied  Recent labs? 11/18/19    Premeds? Tylenol 650 mg po, Benadryl 25 mg IVP over 2 min, Solumedrol 100 mg IVP over 2 min, Pepcid 20 mg IV over 20 min.    Rituxan 1,000 mg administered IV and titrated per protocol/ per orders; see MAR and vitals for more  details.   Tolerated well without adverse events.

## 2019-12-30 ENCOUNTER — TELEPHONE (OUTPATIENT)
Dept: RHEUMATOLOGY | Facility: CLINIC | Age: 29
End: 2019-12-30

## 2019-12-30 NOTE — TELEPHONE ENCOUNTER
----- Message from Reina Hernadez sent at 12/30/2019 11:52 AM CST -----  Contact: pt  Patient states regard disability info and paperwork that patient needs to fill out. Patient call back at [phone]. Thank CATALINA

## 2020-01-04 NOTE — LETTER
June 14, 2017      Jessica Meredith, KARMA  [address]  [place] [address]           [facility]  [address]  [facility] [address]  Phone: [phone]          Patient: Alisia Vines   MR Number: [id number]   YOB: 1990   Date of Visit: 6/14/2017       Dear Jessica Meredith:    Thank you for referring Alisia Vines to me for evaluation. Attached you will find relevant portions of my assessment and plan of care.    If you have questions, please do not hesitate to call me. I look forward to following Alisia Vines along with you.    Sincerely,    Jaden Lomas MD    Enclosure  CC:  No Recipients    If you would like to receive this communication electronically, please contact [email or web address] or [phone] to request more information on [employer] access.    For providers and/or their staff who would like to refer a patient to [facility], please contact us through our one-stop-shop provider referral line, [facility], at [phone].    If you feel you have received this communication in error or would no longer like to receive these types of communications, please e-mail [email or web address]          negative...

## 2020-01-28 ENCOUNTER — LAB VISIT (OUTPATIENT)
Dept: LAB | Facility: HOSPITAL | Age: 30
End: 2020-01-28
Attending: PHYSICIAN ASSISTANT
Payer: MEDICAID

## 2020-01-28 DIAGNOSIS — M32.9 SYSTEMIC LUPUS ERYTHEMATOSUS ARTHRITIS: ICD-10-CM

## 2020-01-28 DIAGNOSIS — R76.8 ANA POSITIVE: ICD-10-CM

## 2020-01-28 DIAGNOSIS — M32.14 SLE GLOMERULONEPHRITIS SYNDROME, WHO CLASS V: ICD-10-CM

## 2020-01-28 DIAGNOSIS — M32.9 SLE (SYSTEMIC LUPUS ERYTHEMATOSUS RELATED SYNDROME): ICD-10-CM

## 2020-01-28 DIAGNOSIS — M32.9 SYSTEMIC LUPUS COMPLICATING PREGNANCY: ICD-10-CM

## 2020-01-28 DIAGNOSIS — O99.891 SYSTEMIC LUPUS COMPLICATING PREGNANCY: ICD-10-CM

## 2020-01-28 LAB
ALBUMIN SERPL BCP-MCNC: 3.2 G/DL (ref 3.5–5.2)
ALP SERPL-CCNC: 51 U/L (ref 55–135)
ALT SERPL W/O P-5'-P-CCNC: 12 U/L (ref 10–44)
ANION GAP SERPL CALC-SCNC: 7 MMOL/L (ref 8–16)
AST SERPL-CCNC: 18 U/L (ref 10–40)
BASOPHILS # BLD AUTO: 0 K/UL (ref 0–0.2)
BASOPHILS NFR BLD: 0 % (ref 0–1.9)
BILIRUB SERPL-MCNC: 0.2 MG/DL (ref 0.1–1)
BUN SERPL-MCNC: 18 MG/DL (ref 6–20)
CALCIUM SERPL-MCNC: 8.4 MG/DL (ref 8.7–10.5)
CHLORIDE SERPL-SCNC: 110 MMOL/L (ref 95–110)
CO2 SERPL-SCNC: 23 MMOL/L (ref 23–29)
CREAT SERPL-MCNC: 0.7 MG/DL (ref 0.5–1.4)
CRP SERPL-MCNC: 0.4 MG/L (ref 0–8.2)
DIFFERENTIAL METHOD: ABNORMAL
EOSINOPHIL # BLD AUTO: 0.2 K/UL (ref 0–0.5)
EOSINOPHIL NFR BLD: 6.2 % (ref 0–8)
ERYTHROCYTE [DISTWIDTH] IN BLOOD BY AUTOMATED COUNT: 12.3 % (ref 11.5–14.5)
ERYTHROCYTE [SEDIMENTATION RATE] IN BLOOD BY WESTERGREN METHOD: 29 MM/HR (ref 0–36)
EST. GFR  (AFRICAN AMERICAN): >60 ML/MIN/1.73 M^2
EST. GFR  (NON AFRICAN AMERICAN): >60 ML/MIN/1.73 M^2
GLUCOSE SERPL-MCNC: 84 MG/DL (ref 70–110)
HCT VFR BLD AUTO: 31.8 % (ref 37–48.5)
HGB BLD-MCNC: 11 G/DL (ref 12–16)
IMM GRANULOCYTES # BLD AUTO: 0.01 K/UL (ref 0–0.04)
IMM GRANULOCYTES NFR BLD AUTO: 0.4 % (ref 0–0.5)
LYMPHOCYTES # BLD AUTO: 1 K/UL (ref 1–4.8)
LYMPHOCYTES NFR BLD: 39.2 % (ref 18–48)
MCH RBC QN AUTO: 30.8 PG (ref 27–31)
MCHC RBC AUTO-ENTMCNC: 34.6 G/DL (ref 32–36)
MCV RBC AUTO: 89 FL (ref 82–98)
MONOCYTES # BLD AUTO: 0.3 K/UL (ref 0.3–1)
MONOCYTES NFR BLD: 12.3 % (ref 4–15)
NEUTROPHILS # BLD AUTO: 1.1 K/UL (ref 1.8–7.7)
NEUTROPHILS NFR BLD: 42.3 % (ref 38–73)
NRBC BLD-RTO: 0 /100 WBC
PLATELET # BLD AUTO: 157 K/UL (ref 150–350)
PMV BLD AUTO: 10.4 FL (ref 9.2–12.9)
POTASSIUM SERPL-SCNC: 3.6 MMOL/L (ref 3.5–5.1)
PROT SERPL-MCNC: 7.1 G/DL (ref 6–8.4)
RBC # BLD AUTO: 3.57 M/UL (ref 4–5.4)
SODIUM SERPL-SCNC: 140 MMOL/L (ref 136–145)
WBC # BLD AUTO: 2.6 K/UL (ref 3.9–12.7)

## 2020-01-28 PROCEDURE — 85652 RBC SED RATE AUTOMATED: CPT

## 2020-01-28 PROCEDURE — 86160 COMPLEMENT ANTIGEN: CPT | Mod: 59

## 2020-01-28 PROCEDURE — 85025 COMPLETE CBC W/AUTO DIFF WBC: CPT

## 2020-01-28 PROCEDURE — 80053 COMPREHEN METABOLIC PANEL: CPT

## 2020-01-28 PROCEDURE — 86225 DNA ANTIBODY NATIVE: CPT

## 2020-01-28 PROCEDURE — 86140 C-REACTIVE PROTEIN: CPT

## 2020-01-28 PROCEDURE — 36415 COLL VENOUS BLD VENIPUNCTURE: CPT

## 2020-01-28 PROCEDURE — 86160 COMPLEMENT ANTIGEN: CPT

## 2020-01-28 RX ORDER — PREDNISONE 10 MG/1
10 TABLET ORAL DAILY
Qty: 30 TABLET | Refills: 1 | Status: SHIPPED | OUTPATIENT
Start: 2020-01-28 | End: 2020-02-05

## 2020-01-29 LAB
C3 SERPL-MCNC: 66 MG/DL (ref 50–180)
C4 SERPL-MCNC: 18 MG/DL (ref 11–44)

## 2020-02-04 LAB — DSDNA AB SER-ACNC: ABNORMAL [IU]/ML

## 2020-02-05 ENCOUNTER — TELEPHONE (OUTPATIENT)
Dept: INFUSION THERAPY | Facility: HOSPITAL | Age: 30
End: 2020-02-05

## 2020-02-05 ENCOUNTER — OFFICE VISIT (OUTPATIENT)
Dept: RHEUMATOLOGY | Facility: CLINIC | Age: 30
End: 2020-02-05
Payer: MEDICAID

## 2020-02-05 VITALS
WEIGHT: 179.44 LBS | DIASTOLIC BLOOD PRESSURE: 118 MMHG | SYSTOLIC BLOOD PRESSURE: 169 MMHG | BODY MASS INDEX: 29.86 KG/M2 | HEART RATE: 71 BPM

## 2020-02-05 DIAGNOSIS — I10 SEVERE UNCONTROLLED HYPERTENSION: ICD-10-CM

## 2020-02-05 DIAGNOSIS — M35.00 SJOGREN'S SYNDROME, WITH UNSPECIFIED ORGAN INVOLVEMENT: ICD-10-CM

## 2020-02-05 DIAGNOSIS — M32.9 SYSTEMIC LUPUS ERYTHEMATOSUS ARTHRITIS: Primary | ICD-10-CM

## 2020-02-05 DIAGNOSIS — O99.891 SYSTEMIC LUPUS COMPLICATING PREGNANCY: ICD-10-CM

## 2020-02-05 DIAGNOSIS — M32.14 SLE GLOMERULONEPHRITIS SYNDROME, WHO CLASS V: ICD-10-CM

## 2020-02-05 DIAGNOSIS — R76.8 ANA POSITIVE: ICD-10-CM

## 2020-02-05 DIAGNOSIS — D84.9 IMMUNOSUPPRESSED STATUS: ICD-10-CM

## 2020-02-05 DIAGNOSIS — Z79.899 LONG-TERM USE OF PLAQUENIL: ICD-10-CM

## 2020-02-05 DIAGNOSIS — M32.9 SYSTEMIC LUPUS COMPLICATING PREGNANCY: ICD-10-CM

## 2020-02-05 PROCEDURE — 99215 OFFICE O/P EST HI 40 MIN: CPT | Mod: S$PBB,,, | Performed by: INTERNAL MEDICINE

## 2020-02-05 PROCEDURE — 99999 PR PBB SHADOW E&M-EST. PATIENT-LVL III: ICD-10-PCS | Mod: PBBFAC,,, | Performed by: INTERNAL MEDICINE

## 2020-02-05 PROCEDURE — 99213 OFFICE O/P EST LOW 20 MIN: CPT | Mod: PBBFAC | Performed by: INTERNAL MEDICINE

## 2020-02-05 PROCEDURE — 99215 PR OFFICE/OUTPT VISIT, EST, LEVL V, 40-54 MIN: ICD-10-PCS | Mod: S$PBB,,, | Performed by: INTERNAL MEDICINE

## 2020-02-05 PROCEDURE — 99999 PR PBB SHADOW E&M-EST. PATIENT-LVL III: CPT | Mod: PBBFAC,,, | Performed by: INTERNAL MEDICINE

## 2020-02-05 RX ORDER — HEPARIN 100 UNIT/ML
500 SYRINGE INTRAVENOUS
Status: CANCELLED | OUTPATIENT
Start: 2020-02-05

## 2020-02-05 RX ORDER — PREDNISONE 5 MG/1
5 TABLET ORAL DAILY PRN
Qty: 30 TABLET | Refills: 1 | Status: SHIPPED | OUTPATIENT
Start: 2020-02-05 | End: 2020-11-23 | Stop reason: SDUPTHER

## 2020-02-05 RX ORDER — ACETAMINOPHEN 325 MG/1
650 TABLET ORAL
Status: CANCELLED | OUTPATIENT
Start: 2020-02-05

## 2020-02-05 RX ORDER — SODIUM CHLORIDE 0.9 % (FLUSH) 0.9 %
10 SYRINGE (ML) INJECTION
Status: CANCELLED | OUTPATIENT
Start: 2020-02-05

## 2020-02-05 RX ORDER — FAMOTIDINE 10 MG/ML
20 INJECTION INTRAVENOUS
Status: CANCELLED | OUTPATIENT
Start: 2020-02-05

## 2020-02-05 NOTE — ASSESSMENT & PLAN NOTE
Non compliant with medications.  Advised importance of taking medications to control blood pressure to prevent stroke.

## 2020-02-05 NOTE — PROGRESS NOTES
RHEUMATOLOGY CLINIC FOLLOW UP VISIT  Chief complaints:-  To follow up for lupus.    HPI:-  Alisia Jolly a 29 y.o. pleasant female comes in for a follow up visit.  She reports doing well today.  No joint pain.  She have not taken any of her medications including her antihypertensive medications today.  No headache.  No weakness.  Received rituximab due to noncompliance with oral medications for her lupus nephritis and lupus arthritis.  No adverse effects.    Review of Systems   Constitutional: Negative for chills and fever.   HENT: Negative for congestion and sore throat.    Eyes: Negative for blurred vision and redness.   Respiratory: Negative for cough and shortness of breath.    Cardiovascular: Negative for chest pain and leg swelling.   Gastrointestinal: Negative for abdominal pain.   Genitourinary: Negative for dysuria.   Musculoskeletal: Negative for back pain, falls, joint pain, myalgias and neck pain.   Skin: Negative for rash.   Neurological: Negative for headaches.   Endo/Heme/Allergies: Does not bruise/bleed easily.   Psychiatric/Behavioral: Negative for memory loss. The patient does not have insomnia.        Past Medical History:   Diagnosis Date    Allergic rhinitis     Anemia     Condyloma acuminata     GERD (gastroesophageal reflux disease)     History of fetal anomaly in prior pregnancy, currently pregnant, unspecified trimester 3/8/2017    Pacemaker placed    HSV-2 (herpes simplex virus 2) infection     Lupus nephritis     Mood disorder     Overweight(278.02)     Sjogren's syndrome     Systemic lupus complicating pregnancy 2019    Systemic lupus erythematosus     Thrombocytopenia        Past Surgical History:   Procedure Laterality Date     SECTION WITH TUBAL LIGATION N/A 2019    Procedure:  SECTION, WITH TUBAL LIGATION;  Surgeon: VERONICA Valdovinos MD;  Location: [facility] L&D;  Service: OB/GYN;   Laterality: N/A;     SECTION, LOW TRANSVERSE      RENAL BIOPSY  2013        Social History     Tobacco Use    Smoking status: Never Smoker    Smokeless tobacco: Never Used   Substance Use Topics    Alcohol use: No     Alcohol/week: 0.0 standard drinks    Drug use: No       Family History   Problem Relation Age of Onset    Hypertension Mother     Eczema Brother     Heart disease Son     Arrhythmia Son         CHB    Hypertension Maternal Grandmother     Diabetes Paternal Grandmother     Lupus Neg Hx     Psoriasis Neg Hx     Congenital heart disease Neg Hx     Early death Neg Hx     Heart attacks under age 50 Neg Hx     Pacemaker/defibrilator Neg Hx        Review of patient's allergies indicates:  No Known Allergies    Vitals:    20 1323   BP: (!) 169/118   Pulse: 71   Weight: 81.4 kg (179 lb 7.3 oz)   PainSc: 0-No pain       Physical Exam   Constitutional: She is oriented to person, place, and time and well-developed, well-nourished, and in no distress. No distress.   HENT:   Head: Normocephalic.   Mouth/Throat: Oropharynx is clear and moist.   Eyes: Pupils are equal, round, and reactive to light. Conjunctivae and EOM are normal.   Neck: Normal range of motion. Neck supple.   Cardiovascular: Normal rate and intact distal pulses.   Pulmonary/Chest: Effort normal. No respiratory distress.   Abdominal: Soft. There is no tenderness.   Musculoskeletal:   No synovitis over small joints of hands or feet.  No effusion over large joints.   Neurological: She is alert and oriented to person, place, and time. No cranial nerve deficit.   Skin: Skin is warm. No rash noted. No erythema.   Psychiatric: Mood and affect normal.   Nursing note and vitals reviewed.      Medication List with Changes/Refills   Current Medications    FLUOXETINE 40 MG CAPSULE    Take 1 capsule (40 mg total) by mouth once daily.    HYDROXYCHLOROQUINE (PLAQUENIL) 200 MG TABLET    Take 2 tablets (400 mg total) by mouth  once daily.    LABETALOL (NORMODYNE) 100 MG TABLET    Take 1 tablet (100 mg total) by mouth every evening.    NIFEDIPINE (PROCARDIA-XL) 30 MG (OSM) 24 HR TABLET    Take 2 tablets (60 mg total) by mouth once daily.    VALACYCLOVIR (VALTREX) 500 MG TABLET    Take 2 tablets (1,000 mg total) by mouth once daily.   Changed and/or Refilled Medications    Modified Medication Previous Medication    PREDNISONE (DELTASONE) 5 MG TABLET predniSONE (DELTASONE) 10 MG tablet       Take 1 tablet (5 mg total) by mouth daily as needed (Joint pain).    Take 1 tablet (10 mg total) by mouth once daily.       Assessment/Plans:-  1. Systemic lupus erythematosus arthritis    2. Sjogren's syndrome, with unspecified organ involvement    3. Immunosuppressed status    4. Long-term use of Plaquenil    5. Severe uncontrolled hypertension    6. SLE glomerulonephritis syndrome, WHO class V    7. Systemic lupus complicating pregnancy    8. SSA ANTIBODY positive      Problem List Items Addressed This Visit        Cardiac/Vascular    Severe uncontrolled hypertension    Current Assessment & Plan     Non compliant with medications.  Advised importance of taking medications to control blood pressure to prevent stroke.            Renal/    SLE glomerulonephritis syndrome, WHO class V    Overview      12/12/2018  Continue follow-up and management with Dr. Blair         Current Assessment & Plan     Improved proteinuria after recent rituximab infusion.  Non compliant with other oral medications.  Continue rituximab every 4-5 months.         Relevant Medications    predniSONE (DELTASONE) 5 MG tablet       Immunology/Multi System    Sjogren's syndrome    Current Assessment & Plan     Stable         Systemic lupus erythematosus arthritis - Primary    Current Assessment & Plan     No arthritis on examination today.  On prednisone 5 mg daily and received rituximab for lupus nephritis.  Monitor.         Relevant Medications    predniSONE (DELTASONE) 5 MG  tablet    SSA ANTIBODY positive    Relevant Medications    predniSONE (DELTASONE) 5 MG tablet    Immunosuppressed status    Current Assessment & Plan     Compromised immune system secondary to autoimmune disease and use of immunosuppressive drugs. Monitor carefully for infections. Advised to get immediate medical care if any infection. Also advised strict adherence to age appropriate vaccinations and cancer screenings with PCP.             Obstetric    RESOLVED: Systemic lupus complicating pregnancy    Relevant Medications    predniSONE (DELTASONE) 5 MG tablet       Other    Long-term use of Plaquenil    Current Assessment & Plan     Continue follow-up with ophthalmologist             # Follow up in about 3 months (around 5/5/2020).      Disclaimer: This note was prepared using voice recognition system and is likely to have sound alike errors and is not proof read.  Please call me with any questions.

## 2020-02-05 NOTE — ASSESSMENT & PLAN NOTE
No arthritis on examination today.  On prednisone 5 mg daily and received rituximab for lupus nephritis.  Monitor.

## 2020-02-05 NOTE — TELEPHONE ENCOUNTER
Patient called to set up Rituxan infusion in May.  Voice mailbox is full - unable to leave a message.

## 2020-02-05 NOTE — ASSESSMENT & PLAN NOTE
Improved proteinuria after recent rituximab infusion.  Non compliant with other oral medications.  Continue rituximab every 4-5 months.

## 2020-02-28 ENCOUNTER — HOSPITAL ENCOUNTER (EMERGENCY)
Facility: HOSPITAL | Age: 30
Discharge: HOME | End: 2020-02-28
Attending: EMERGENCY MEDICINE
Payer: MEDICAID

## 2020-02-28 VITALS
WEIGHT: 178 LBS | SYSTOLIC BLOOD PRESSURE: 142 MMHG | OXYGEN SATURATION: 100 % | BODY MASS INDEX: 29.66 KG/M2 | HEIGHT: 65 IN | RESPIRATION RATE: 19 BRPM | DIASTOLIC BLOOD PRESSURE: 92 MMHG | TEMPERATURE: 99 F | HEART RATE: 75 BPM

## 2020-02-28 DIAGNOSIS — L02.214 ABSCESS OF RIGHT GROIN: Primary | ICD-10-CM

## 2020-02-28 PROCEDURE — 25000003 PHARM REV CODE 250: Performed by: NURSE PRACTITIONER

## 2020-02-28 PROCEDURE — 63600175 PHARM REV CODE 636 W HCPCS: Performed by: NURSE PRACTITIONER

## 2020-02-28 PROCEDURE — 10060 I&D ABSCESS SIMPLE/SINGLE: CPT

## 2020-02-28 PROCEDURE — 96372 THER/PROPH/DIAG INJ SC/IM: CPT

## 2020-02-28 PROCEDURE — 99284 EMERGENCY DEPT VISIT MOD MDM: CPT | Mod: 25

## 2020-02-28 RX ORDER — HYDROMORPHONE HYDROCHLORIDE 2 MG/ML
2 INJECTION, SOLUTION INTRAMUSCULAR; INTRAVENOUS; SUBCUTANEOUS
Status: COMPLETED | OUTPATIENT
Start: 2020-02-28 | End: 2020-02-28

## 2020-02-28 RX ORDER — PROMETHAZINE HYDROCHLORIDE 25 MG/ML
12.5 INJECTION, SOLUTION INTRAMUSCULAR; INTRAVENOUS
Status: COMPLETED | OUTPATIENT
Start: 2020-02-28 | End: 2020-02-28

## 2020-02-28 RX ORDER — SULFAMETHOXAZOLE AND TRIMETHOPRIM 800; 160 MG/1; MG/1
1 TABLET ORAL 2 TIMES DAILY
Qty: 14 TABLET | Refills: 0 | Status: SHIPPED | OUTPATIENT
Start: 2020-02-28 | End: 2020-03-06

## 2020-02-28 RX ORDER — LIDOCAINE HYDROCHLORIDE 10 MG/ML
10 INJECTION, SOLUTION EPIDURAL; INFILTRATION; INTRACAUDAL; PERINEURAL
Status: COMPLETED | OUTPATIENT
Start: 2020-02-28 | End: 2020-02-28

## 2020-02-28 RX ADMIN — PROMETHAZINE HYDROCHLORIDE 12.5 MG: 25 INJECTION, SOLUTION INTRAMUSCULAR; INTRAVENOUS at 10:02

## 2020-02-28 RX ADMIN — LIDOCAINE HYDROCHLORIDE 100 MG: 10 INJECTION, SOLUTION EPIDURAL; INFILTRATION; INTRACAUDAL; PERINEURAL at 10:02

## 2020-02-28 RX ADMIN — HYDROMORPHONE HYDROCHLORIDE 2 MG: 2 INJECTION INTRAMUSCULAR; INTRAVENOUS; SUBCUTANEOUS at 10:02

## 2020-02-28 NOTE — ED NOTES
"Pt c/o abscess to right groin x 5 days. Pain rated 8/10. Denies fever, n/v/d, CP, SOB, HA, weakness, numbness, tingling or any other symptoms at this time. Pt's BP elevated - states, "I didn't take my blood pressure medication this morning." NP notified.    Patient identifiers verified and correct for Alisia Loconum.    LOC: The patient is awake, alert and aware of environment with an appropriate affect, the patient is oriented x 3 and speaking appropriately.  APPEARANCE: Patient resting comfortably and in no acute distress, patient is clean and well groomed, patient's clothing is properly fastened.  SKIN: The skin is warm and dry, color consistent with ethnicity, patient has normal skin turgor and moist mucus membranes, skin intact, no breakdown or bruising noted, abscess to right groin - no drainage or bleeding present.   MUSCULOSKELETAL: Patient moving all extremities spontaneously.  RESPIRATORY: Airway is open and patent, respirations are spontaneous.  CARDIAC: Patient has a normal rate, no peripheral edema noted, capillary refill < 3 seconds.  ABDOMEN: Soft and non tender to palpation.    "

## 2020-02-28 NOTE — ED PROVIDER NOTES
Encounter Date: 2020       History     Chief Complaint   Patient presents with    Abscess     pt c/o abscess to R groin, worsening pain; seen at [facility] yesterday     29 year old female with complaint of pain and swelling to right groin X 4-5 days.  Reports pain worsening X one day.  No relief with antibiotics.  No fever or chills.          Review of patient's allergies indicates:  No Known Allergies  Past Medical History:   Diagnosis Date    Allergic rhinitis     Anemia     Condyloma acuminata     GERD (gastroesophageal reflux disease)     History of fetal anomaly in prior pregnancy, currently pregnant, unspecified trimester 3/8/2017    Pacemaker placed    HSV-2 (herpes simplex virus 2) infection     Lupus nephritis     Mood disorder     Overweight(278.02)     Sjogren's syndrome     Systemic lupus complicating pregnancy 2019    Systemic lupus erythematosus     Thrombocytopenia      Past Surgical History:   Procedure Laterality Date     SECTION WITH TUBAL LIGATION N/A 2019    Procedure:  SECTION, WITH TUBAL LIGATION;  Surgeon: VERONICA Valdovinos MD;  Location: [facility] L&D;  Service: OB/GYN;  Laterality: N/A;     SECTION, LOW TRANSVERSE      RENAL BIOPSY  2013     Family History   Problem Relation Age of Onset    Hypertension Mother     Eczema Brother     Heart disease Son     Arrhythmia Son         CHB    Hypertension Maternal Grandmother     Diabetes Paternal Grandmother     Lupus Neg Hx     Psoriasis Neg Hx     Congenital heart disease Neg Hx     Early death Neg Hx     Heart attacks under age 50 Neg Hx     Pacemaker/defibrilator Neg Hx      Social History     Tobacco Use    Smoking status: Never Smoker    Smokeless tobacco: Never Used   Substance Use Topics    Alcohol use: No     Alcohol/week: 0.0 standard drinks    Drug use: No     Review of Systems   Constitutional: Negative for fever.   HENT: Negative for sore throat.     Respiratory: Negative for shortness of breath.    Cardiovascular: Negative for chest pain.   Gastrointestinal: Negative for nausea.   Genitourinary: Negative for dysuria.   Musculoskeletal: Negative for back pain.   Skin: Positive for wound. Negative for rash.   Neurological: Negative for weakness.   Hematological: Does not bruise/bleed easily.       Physical Exam     Initial Vitals [02/28/20 1005]   BP Pulse Resp Temp SpO2   (!) 150/103 88 18 98.6 °F (37 °C) 98 %      MAP       --         Physical Exam    Nursing note and vitals reviewed.  Constitutional: She appears well-developed and well-nourished.   HENT:   Head: Normocephalic and atraumatic.   Eyes: Conjunctivae and EOM are normal. Pupils are equal, round, and reactive to light.   Neck: Normal range of motion. Neck supple.   Cardiovascular: Normal rate, regular rhythm, normal heart sounds and intact distal pulses.   Pulmonary/Chest: Breath sounds normal.   Abdominal: Soft. There is no tenderness. There is no rebound and no guarding.   Musculoskeletal: Normal range of motion.   Neurological: She is alert and oriented to person, place, and time. She has normal strength and normal reflexes.   Skin: Skin is warm and dry.   3 cm area of erythema and fluctuance right groin, no extension into perineum   Psychiatric: She has a normal mood and affect. Her behavior is normal. Thought content normal.         ED Course   I & D - Incision and Drainage  Date/Time: 2/28/2020 11:20 AM  Performed by: Warren Boewn NP  Authorized by: Betsey Humphries MD   Comments: Abscess prepped with betadine, injected with 4 cc plain lidocaine, incision made with 11 blade, moderate amount of pus expressed, packed with 1/4 inch plain packing        Labs Reviewed - No data to display       Imaging Results    None                                          Clinical Impression:       ICD-10-CM ICD-9-CM   1. Abscess of right groin L02.214 682.2             ED Disposition Condition     Discharge Stable        ED Prescriptions     Medication Sig Dispense Start Date End Date Auth. Provider    sulfamethoxazole-trimethoprim 800-160mg (BACTRIM DS) 800-160 mg Tab Take 1 tablet by mouth 2 (two) times daily. for 7 days 14 tablet 2/28/2020 3/6/2020 Warren Bowen NP        Follow-up Information     Follow up With Specialties Details Why Contact Info    PCP  Schedule an appointment as soon as possible for a visit in 2 days                                       Warren Bowen NP  02/28/20 1121       Warren Bowen NP  02/28/20 1121       Warren Bowen NP  02/28/20 1121

## 2020-02-28 NOTE — ED NOTES
Informed pt she will be unable to drive herself home after administration of narcotics. Pt called her friend to pick her up.

## 2020-03-02 ENCOUNTER — HOSPITAL ENCOUNTER (EMERGENCY)
Facility: HOSPITAL | Age: 30
Discharge: HOME | End: 2020-03-02
Attending: EMERGENCY MEDICINE
Payer: MEDICAID

## 2020-03-02 VITALS
HEIGHT: 65 IN | SYSTOLIC BLOOD PRESSURE: 156 MMHG | DIASTOLIC BLOOD PRESSURE: 114 MMHG | HEART RATE: 82 BPM | BODY MASS INDEX: 28.52 KG/M2 | OXYGEN SATURATION: 100 % | RESPIRATION RATE: 20 BRPM | TEMPERATURE: 98 F | WEIGHT: 171.19 LBS

## 2020-03-02 DIAGNOSIS — I10 HYPERTENSION, UNSPECIFIED TYPE: ICD-10-CM

## 2020-03-02 DIAGNOSIS — Z51.89 WOUND CHECK, ABSCESS: Primary | ICD-10-CM

## 2020-03-02 PROCEDURE — 99281 EMR DPT VST MAYX REQ PHY/QHP: CPT

## 2020-03-02 NOTE — ED PROVIDER NOTES
Encounter Date: 3/2/2020       History     Chief Complaint   Patient presents with    Wound Check     pt states she attempted to remove packing from I&D site, but was unable duet to pain     29 year old female here for wound recheck.  Pt had abscess drained 3 days ago.  Reports feels better but unable to remove packing.  No fever or chills.          Review of patient's allergies indicates:  No Known Allergies  Past Medical History:   Diagnosis Date    Allergic rhinitis     Anemia     Condyloma acuminata     GERD (gastroesophageal reflux disease)     History of fetal anomaly in prior pregnancy, currently pregnant, unspecified trimester 3/8/2017    Pacemaker placed    HSV-2 (herpes simplex virus 2) infection     Lupus nephritis     Mood disorder     Overweight(278.02)     Sjogren's syndrome     Systemic lupus complicating pregnancy 2019    Systemic lupus erythematosus     Thrombocytopenia      Past Surgical History:   Procedure Laterality Date     SECTION WITH TUBAL LIGATION N/A 2019    Procedure:  SECTION, WITH TUBAL LIGATION;  Surgeon: VERONICA Valdovinos MD;  Location: [facility] L&[facility];  Service: OB/GYN;  Laterality: N/A;     SECTION, LOW TRANSVERSE      RENAL BIOPSY  2013     Family History   Problem Relation Age of Onset    Hypertension Mother     Eczema Brother     Heart disease Son     Arrhythmia Son         CHB    Hypertension Maternal Grandmother     Diabetes Paternal Grandmother     Lupus Neg Hx     Psoriasis Neg Hx     Congenital heart disease Neg Hx     Early death Neg Hx     Heart attacks under age 50 Neg Hx     Pacemaker/defibrilator Neg Hx      Social History     Tobacco Use    Smoking status: Never Smoker    Smokeless tobacco: Never Used   Substance Use Topics    Alcohol use: No     Alcohol/week: 0.0 standard drinks    Drug use: No     Review of Systems   Constitutional: Negative for fever.   HENT: Negative for sore throat.     Respiratory: Negative for shortness of breath.    Cardiovascular: Negative for chest pain.   Gastrointestinal: Negative for nausea.   Genitourinary: Negative for dysuria.   Musculoskeletal: Negative for back pain.   Skin: Negative for rash.   Neurological: Negative for weakness.   Hematological: Does not bruise/bleed easily.       Physical Exam     Initial Vitals [03/02/20 1128]   BP Pulse Resp Temp SpO2   (!) 156/114 82 20 98 °F (36.7 °C) 100 %      MAP       --         Physical Exam    Nursing note and vitals reviewed.  Constitutional: She appears well-developed and well-nourished.   HENT:   Head: Normocephalic and atraumatic.   Eyes: Conjunctivae and EOM are normal. Pupils are equal, round, and reactive to light.   Neck: Normal range of motion. Neck supple.   Cardiovascular: Normal rate, regular rhythm, normal heart sounds and intact distal pulses.   Pulmonary/Chest: Breath sounds normal.   Abdominal: Soft. There is no tenderness. There is no rebound and no guarding.   Musculoskeletal: Normal range of motion.   Neurological: She is alert and oriented to person, place, and time. She has normal strength and normal reflexes.   Skin: Skin is warm and dry.   Abscess right groin healing well, no pus drainage, no tenderness, no erythema or swelling, packing removed   Psychiatric: She has a normal mood and affect. Her behavior is normal. Thought content normal.         ED Course   Procedures  Labs Reviewed - No data to display       Imaging Results    None                                          Clinical Impression:       ICD-10-CM ICD-9-CM   1. Wound check, abscess Z51.89 V58.89   2. Hypertension, unspecified type I10 401.9             ED Disposition Condition    Discharge Stable        ED Prescriptions     None        Follow-up Information     Follow up With Specialties Details Why Contact Info    PCP  Schedule an appointment as soon as possible for a visit  As needed                                      Warren DAVIES  RABIA Bowen  03/02/20 1140

## 2020-04-01 ENCOUNTER — NURSE TRIAGE (OUTPATIENT)
Dept: ADMINISTRATIVE | Facility: CLINIC | Age: 30
End: 2020-04-01

## 2020-04-01 NOTE — TELEPHONE ENCOUNTER
Pt c/o tightness in her chest when she lays down or bends over. Denies CP, SOB, cough, fever, headache, and body aches. No other symptoms. Provided with [facility] care information.     Reason for Disposition   Health Information question, no triage required and triager able to answer question    Additional Information   Negative: [1] Caller is not with the adult (patient) AND [2] reporting urgent symptoms   Negative: Lab result questions   Negative: Medication questions   Negative: Caller can't be reached by phone   Negative: Caller has already spoken to PCP or another triager   Negative: RN needs further essential information from caller in order to complete triage   Negative: Requesting regular office appointment   Negative: [1] Caller requesting NON-URGENT health information AND [2] PCP's office is the best resource    Protocols used: INFORMATION ONLY CALL-A-

## 2020-04-18 ENCOUNTER — NURSE TRIAGE (OUTPATIENT)
Dept: ADMINISTRATIVE | Facility: CLINIC | Age: 30
End: 2020-04-18

## 2020-04-18 NOTE — TELEPHONE ENCOUNTER
Spoke with patient she states that over the last 3 days she has been having chest tightness.  States that this morning she felt as if she was struggling to breath has since gotten better. States that chest tightness is worse on the right side and it is present even at rest.  States is is uncomfortable to breath and she feels as if her breathing is gradually getting worse.  Denies having a fever or chest pain.  Advised patient to go to ER to be evaluated. Patient verbalized understanding.     Reason for Disposition   [1] MODERATE difficulty breathing (e.g., speaks in phrases, SOB even at rest, pulse 100-120) AND [2] NEW-onset or WORSE than normal    Additional Information   Negative: Severe difficulty breathing (e.g., struggling for each breath, speaks in single words)   Negative: Choking on something   Negative: Bluish (or gray) lips or face now   Negative: Difficult to awaken or acting confused (e.g., disoriented, slurred speech)   Negative: Passed out (i.e., lost consciousness, collapsed and was not responding)   Negative: Wheezing started suddenly after medicine, an allergic food or bee sting   Negative: Stridor   Negative: Slow, shallow and weak breathing   Negative: Sounds like a life-threatening emergency to the triager   Negative: [1] Breathing stopped AND [2] hasn't returned    Protocols used: BREATHING DIFFICULTY-A-AH

## 2020-04-22 ENCOUNTER — TELEPHONE (OUTPATIENT)
Dept: RHEUMATOLOGY | Facility: CLINIC | Age: 30
End: 2020-04-22

## 2020-04-22 NOTE — TELEPHONE ENCOUNTER
Called patient regarding appointment on 5.6.20 needing to be rescheduled due to Dr. Blair being out of clinic that day. Rescheduled appointment to 4.30.20 at 11.45 for a virtual visit. Pt verbalized understanding.

## 2020-04-28 ENCOUNTER — LAB VISIT (OUTPATIENT)
Dept: LAB | Facility: HOSPITAL | Age: 30
End: 2020-04-28
Attending: INTERNAL MEDICINE
Payer: MEDICAID

## 2020-04-28 DIAGNOSIS — M32.14 SLE GLOMERULONEPHRITIS SYNDROME, WHO CLASS V: ICD-10-CM

## 2020-04-28 DIAGNOSIS — M32.9 SYSTEMIC LUPUS ERYTHEMATOSUS ARTHRITIS: ICD-10-CM

## 2020-04-28 DIAGNOSIS — M35.09 SJOGREN'S SYNDROME WITH OTHER ORGAN INVOLVEMENT: ICD-10-CM

## 2020-04-28 DIAGNOSIS — F32.A DEPRESSION AFFECTING PREGNANCY: ICD-10-CM

## 2020-04-28 DIAGNOSIS — M32.9 SLE (SYSTEMIC LUPUS ERYTHEMATOSUS RELATED SYNDROME): ICD-10-CM

## 2020-04-28 DIAGNOSIS — O99.340 DEPRESSION AFFECTING PREGNANCY: ICD-10-CM

## 2020-04-28 DIAGNOSIS — M32.9 SYSTEMIC LUPUS COMPLICATING PREGNANCY: ICD-10-CM

## 2020-04-28 DIAGNOSIS — R76.8 ANA POSITIVE: ICD-10-CM

## 2020-04-28 DIAGNOSIS — Z76.0 MEDICATION REFILL: ICD-10-CM

## 2020-04-28 DIAGNOSIS — O10.919 PRE-EXISTING HYPERTENSION AFFECTING PREGNANCY, ANTEPARTUM: ICD-10-CM

## 2020-04-28 DIAGNOSIS — O99.891 SYSTEMIC LUPUS COMPLICATING PREGNANCY: ICD-10-CM

## 2020-04-28 LAB
ALBUMIN SERPL BCP-MCNC: 3.5 G/DL (ref 3.5–5.2)
ALP SERPL-CCNC: 60 U/L (ref 55–135)
ALT SERPL W/O P-5'-P-CCNC: 11 U/L (ref 10–44)
ANION GAP SERPL CALC-SCNC: 6 MMOL/L (ref 8–16)
AST SERPL-CCNC: 18 U/L (ref 10–40)
BASOPHILS # BLD AUTO: 0.01 K/UL (ref 0–0.2)
BASOPHILS NFR BLD: 0.5 % (ref 0–1.9)
BILIRUB SERPL-MCNC: 0.4 MG/DL (ref 0.1–1)
BUN SERPL-MCNC: 9 MG/DL (ref 6–20)
C3 SERPL-MCNC: 73 MG/DL (ref 50–180)
C4 SERPL-MCNC: 20 MG/DL (ref 11–44)
CALCIUM SERPL-MCNC: 8.6 MG/DL (ref 8.7–10.5)
CHLORIDE SERPL-SCNC: 108 MMOL/L (ref 95–110)
CO2 SERPL-SCNC: 24 MMOL/L (ref 23–29)
CREAT SERPL-MCNC: 0.7 MG/DL (ref 0.5–1.4)
DIFFERENTIAL METHOD: ABNORMAL
EOSINOPHIL # BLD AUTO: 0.2 K/UL (ref 0–0.5)
EOSINOPHIL NFR BLD: 7.4 % (ref 0–8)
ERYTHROCYTE [DISTWIDTH] IN BLOOD BY AUTOMATED COUNT: 12.4 % (ref 11.5–14.5)
ERYTHROCYTE [SEDIMENTATION RATE] IN BLOOD BY WESTERGREN METHOD: 21 MM/HR (ref 0–36)
EST. GFR  (AFRICAN AMERICAN): >60 ML/MIN/1.73 M^2
EST. GFR  (NON AFRICAN AMERICAN): >60 ML/MIN/1.73 M^2
GLUCOSE SERPL-MCNC: 82 MG/DL (ref 70–110)
HCT VFR BLD AUTO: 34.1 % (ref 37–48.5)
HGB BLD-MCNC: 11.6 G/DL (ref 12–16)
IMM GRANULOCYTES # BLD AUTO: 0.01 K/UL (ref 0–0.04)
IMM GRANULOCYTES NFR BLD AUTO: 0.5 % (ref 0–0.5)
LYMPHOCYTES # BLD AUTO: 0.9 K/UL (ref 1–4.8)
LYMPHOCYTES NFR BLD: 43.1 % (ref 18–48)
MCH RBC QN AUTO: 29 PG (ref 27–31)
MCHC RBC AUTO-ENTMCNC: 34 G/DL (ref 32–36)
MCV RBC AUTO: 85 FL (ref 82–98)
MONOCYTES # BLD AUTO: 0.2 K/UL (ref 0.3–1)
MONOCYTES NFR BLD: 11.9 % (ref 4–15)
NEUTROPHILS # BLD AUTO: 0.8 K/UL (ref 1.8–7.7)
NEUTROPHILS NFR BLD: 37.1 % (ref 38–73)
NRBC BLD-RTO: 0 /100 WBC
PLATELET # BLD AUTO: 187 K/UL (ref 150–350)
PMV BLD AUTO: 10.6 FL (ref 9.2–12.9)
POTASSIUM SERPL-SCNC: 3.5 MMOL/L (ref 3.5–5.1)
PROT SERPL-MCNC: 7.5 G/DL (ref 6–8.4)
RBC # BLD AUTO: 4 M/UL (ref 4–5.4)
SODIUM SERPL-SCNC: 138 MMOL/L (ref 136–145)
WBC # BLD AUTO: 2.02 K/UL (ref 3.9–12.7)

## 2020-04-28 PROCEDURE — 86160 COMPLEMENT ANTIGEN: CPT

## 2020-04-28 PROCEDURE — 80053 COMPREHEN METABOLIC PANEL: CPT

## 2020-04-28 PROCEDURE — 85025 COMPLETE CBC W/AUTO DIFF WBC: CPT

## 2020-04-28 PROCEDURE — 86225 DNA ANTIBODY NATIVE: CPT

## 2020-04-28 PROCEDURE — 36415 COLL VENOUS BLD VENIPUNCTURE: CPT

## 2020-04-28 PROCEDURE — 86160 COMPLEMENT ANTIGEN: CPT | Mod: 59

## 2020-04-28 PROCEDURE — 85652 RBC SED RATE AUTOMATED: CPT

## 2020-04-28 RX ORDER — NIFEDIPINE 30 MG/1
60 TABLET, EXTENDED RELEASE ORAL DAILY
Qty: 30 TABLET | Refills: 11 | OUTPATIENT
Start: 2020-04-28 | End: 2021-04-28

## 2020-04-28 RX ORDER — HYDROXYCHLOROQUINE SULFATE 200 MG/1
400 TABLET, FILM COATED ORAL DAILY
Qty: 60 TABLET | Refills: 2 | Status: SHIPPED | OUTPATIENT
Start: 2020-04-28 | End: 2020-09-01 | Stop reason: SDUPTHER

## 2020-04-28 RX ORDER — FLUOXETINE HYDROCHLORIDE 40 MG/1
40 CAPSULE ORAL DAILY
Qty: 30 CAPSULE | Refills: 0 | Status: SHIPPED | OUTPATIENT
Start: 2020-04-28 | End: 2020-09-04 | Stop reason: SDUPTHER

## 2020-04-29 DIAGNOSIS — Z03.818 ENCOUNTER FOR OBSERVATION FOR SUSPECTED EXPOSURE TO OTHER BIOLOGICAL AGENTS RULED OUT: Primary | ICD-10-CM

## 2020-04-29 RX ORDER — LABETALOL 100 MG/1
100 TABLET, FILM COATED ORAL NIGHTLY
Qty: 30 TABLET | Refills: 0 | Status: SHIPPED | OUTPATIENT
Start: 2020-04-29 | End: 2020-05-26

## 2020-04-30 ENCOUNTER — TELEPHONE (OUTPATIENT)
Dept: RHEUMATOLOGY | Facility: CLINIC | Age: 30
End: 2020-04-30

## 2020-04-30 ENCOUNTER — OFFICE VISIT (OUTPATIENT)
Dept: RHEUMATOLOGY | Facility: CLINIC | Age: 30
End: 2020-04-30
Payer: MEDICAID

## 2020-04-30 ENCOUNTER — PATIENT MESSAGE (OUTPATIENT)
Dept: OBSTETRICS AND GYNECOLOGY | Facility: CLINIC | Age: 30
End: 2020-04-30

## 2020-04-30 DIAGNOSIS — M35.00 SJOGREN'S SYNDROME, WITH UNSPECIFIED ORGAN INVOLVEMENT: ICD-10-CM

## 2020-04-30 DIAGNOSIS — D84.9 IMMUNOSUPPRESSED STATUS: ICD-10-CM

## 2020-04-30 DIAGNOSIS — M32.9 SYSTEMIC LUPUS ERYTHEMATOSUS ARTHRITIS: Primary | ICD-10-CM

## 2020-04-30 LAB — DSDNA AB SER-ACNC: ABNORMAL [IU]/ML

## 2020-04-30 PROCEDURE — 99214 PR OFFICE/OUTPT VISIT, EST, LEVL IV, 30-39 MIN: ICD-10-PCS | Mod: 95,,, | Performed by: INTERNAL MEDICINE

## 2020-04-30 PROCEDURE — 99214 OFFICE O/P EST MOD 30 MIN: CPT | Mod: 95,,, | Performed by: INTERNAL MEDICINE

## 2020-04-30 NOTE — TELEPHONE ENCOUNTER
----- Message from Shane Blair MD sent at 4/30/2020 11:53 AM CDT -----  Go ahead with infusion. Follow up in 4 months with lupus labs a week before the appointment.

## 2020-04-30 NOTE — PROGRESS NOTES
RHEUMATOLOGY FOLLOW UP - TELE VISIT     The patient location is:  [place]  The chief complaint leading to consultation is:  Follow-up for lupus  Visit type: Virtual visit with synchronous audio and video  Total time spent with patient:  10 min  Each patient to whom he or she provides medical services by telemedicine is:  (1) informed of the relationship between the physician and patient and the respective role of any other health care provider with respect to management of the patient; and (2) notified that he or she may decline to receive medical services by telemedicine and may withdraw from such care at any time.    HPI:-  Alisia Orozcodanny Jolly a 29 y.o. pleasant female seen today through My chart video visit for follow up.  She reports doing well today.  No arthritis.  No rash.  No chest pain, shortness of breath.  No weakness.  Denies any prolonged morning stiffness.  No infections since last visit.  No hospitalization.    Review of Systems   Constitutional: Negative for chills and fever.   HENT: Negative for congestion and sore throat.    Eyes: Negative for blurred vision and redness.   Respiratory: Negative for cough and shortness of breath.    Cardiovascular: Negative for chest pain and leg swelling.   Gastrointestinal: Negative for abdominal pain.   Genitourinary: Negative for dysuria.   Musculoskeletal: Negative for back pain, falls, joint pain, myalgias and neck pain.   Skin: Negative for rash.   Neurological: Negative for headaches.   Endo/Heme/Allergies: Does not bruise/bleed easily.   Psychiatric/Behavioral: Negative for memory loss. The patient does not have insomnia.        Past Medical History:   Diagnosis Date    Allergic rhinitis     Anemia     Condyloma acuminata     GERD (gastroesophageal reflux disease)     History of fetal anomaly in prior pregnancy, currently pregnant, unspecified trimester 3/8/2017    Pacemaker placed    HSV-2 (herpes simplex virus 2) infection     Lupus nephritis      Mood disorder     Overweight(278.02)     Sjogren's syndrome     Systemic lupus complicating pregnancy 2019    Systemic lupus erythematosus     Thrombocytopenia        Past Surgical History:   Procedure Laterality Date     SECTION WITH TUBAL LIGATION N/A 2019    Procedure:  SECTION, WITH TUBAL LIGATION;  Surgeon: VERONICA Valdovinos MD;  Location: [facility] L&D;  Service: OB/GYN;  Laterality: N/A;     SECTION, LOW TRANSVERSE      RENAL BIOPSY  2013        Social History     Tobacco Use    Smoking status: Never Smoker    Smokeless tobacco: Never Used   Substance Use Topics    Alcohol use: No     Alcohol/week: 0.0 standard drinks    Drug use: No       Family History   Problem Relation Age of Onset    Hypertension Mother     Eczema Brother     Heart disease Son     Arrhythmia Son         CHB    Hypertension Maternal Grandmother     Diabetes Paternal Grandmother     Lupus Neg Hx     Psoriasis Neg Hx     Congenital heart disease Neg Hx     Early death Neg Hx     Heart attacks under age 50 Neg Hx     Pacemaker/defibrilator Neg Hx        Review of patient's allergies indicates:  No Known Allergies    Physical exam:-    GEN: awake, alert, non-diaphoretic, no psychomotor agitation, no acute distress    HEENT :Head: atraumatic, normocephalic, no rashes noted, no lesions noted;    Eyes: NO redness, discharge, swelling, or lesions    Nose: NO redness, swelling, discharge, deformity, or impetigo/crusting    Skin: no lesions, wounds, erythema, or cyanosis noted on face or hands    Cardiopulmonary: no increased respiratory effort, speaking in clear sentences    MSK: normal ROM in the neck, Upper extremities, Lower extremities  Good ROM of hands, fist formation 100% and , no obvious synovitis    Good ambulation in front of the camera    Neuro: cranial nerves grossly normal, speech normal rate and rhythm, orientation arrived to appointment on time with no prompting, moved  both upper extremities equally    Pysch:  appearance, behavior, and attitude- well groomed, pleasant, cooperative    Medication List with Changes/Refills   Current Medications    FLUOXETINE 40 MG CAPSULE    Take 1 capsule (40 mg total) by mouth once daily.    HYDROXYCHLOROQUINE (PLAQUENIL) 200 MG TABLET    Take 2 tablets (400 mg total) by mouth once daily.    LABETALOL (NORMODYNE) 100 MG TABLET    Take 1 tablet (100 mg total) by mouth every evening.    NIFEDIPINE (PROCARDIA-XL) 30 MG (OSM) 24 HR TABLET    Take 2 tablets (60 mg total) by mouth once daily.    PREDNISONE (DELTASONE) 5 MG TABLET    Take 1 tablet (5 mg total) by mouth daily as needed (Joint pain).    VALACYCLOVIR (VALTREX) 500 MG TABLET    Take 2 tablets (1,000 mg total) by mouth once daily.       Assessment/Plans:-  1. Systemic lupus erythematosus arthritis    2. Sjogren's syndrome, with unspecified organ involvement    3. Immunosuppressed status      Problem List Items Addressed This Visit        Immunology/Multi System    Sjogren's syndrome    Current Assessment & Plan     Stable on rituximab infusion.  Continue rituximab every 6 months.         Systemic lupus erythematosus arthritis - Primary    Current Assessment & Plan     On rituximab because of medication noncompliance.  Responding well.  Well controlled protein creatinine ratio.  Significant improvement of inflammatory markers.  Due for next dose of rituximab next week.  Continue rituximab every 6 months from now on.         Immunosuppressed status    Current Assessment & Plan     Compromised immune system secondary to autoimmune disease and use of immunosuppressive drugs. Monitor carefully for infections. Advised to get immediate medical care if any infection. Also advised strict adherence to age appropriate vaccinations and cancer screenings with PCP.                Follow up in about 4 months (around 8/30/2020).    Disclaimer: This note was prepared using voice recognition system and is  likely to have sound alike errors and is not proof read.  Please call me with any questions.

## 2020-04-30 NOTE — ASSESSMENT & PLAN NOTE
On rituximab because of medication noncompliance.  Responding well.  Well controlled protein creatinine ratio.  Significant improvement of inflammatory markers.  Due for next dose of rituximab next week.  Continue rituximab every 6 months from now on.

## 2020-05-04 ENCOUNTER — LAB VISIT (OUTPATIENT)
Dept: OTOLARYNGOLOGY | Facility: CLINIC | Age: 30
End: 2020-05-04
Payer: MEDICAID

## 2020-05-04 DIAGNOSIS — Z03.818 ENCOUNTER FOR OBSERVATION FOR SUSPECTED EXPOSURE TO OTHER BIOLOGICAL AGENTS RULED OUT: ICD-10-CM

## 2020-05-04 PROCEDURE — U0002 COVID-19 LAB TEST NON-CDC: HCPCS

## 2020-05-05 LAB — SARS-COV-2 RNA RESP QL NAA+PROBE: NOT DETECTED

## 2020-05-08 ENCOUNTER — TELEPHONE (OUTPATIENT)
Dept: INTERNAL MEDICINE | Facility: CLINIC | Age: 30
End: 2020-05-08

## 2020-05-08 ENCOUNTER — TELEPHONE (OUTPATIENT)
Dept: INFUSION THERAPY | Facility: HOSPITAL | Age: 30
End: 2020-05-08

## 2020-05-08 NOTE — TELEPHONE ENCOUNTER
SARA Gonzalez with [facility]. She states she would like pt to be seen to est care with a provider and elevated bp. I advised her that Dr. Colmenares is currently out of the clinic until June. Advised her that according to her insurance, we do not have anything available at the moment. She asked to speak with supervisor. Advised her that I will give message to Supervisor.

## 2020-05-08 NOTE — TELEPHONE ENCOUNTER
Called patient and left message stating that Dr MADISON wants patient to see an Internal Medicine MD for HTN management.

## 2020-05-13 ENCOUNTER — INFUSION (OUTPATIENT)
Dept: INFUSION THERAPY | Facility: HOSPITAL | Age: 30
End: 2020-05-13
Attending: INTERNAL MEDICINE
Payer: MEDICAID

## 2020-05-13 VITALS
BODY MASS INDEX: 28.47 KG/M2 | OXYGEN SATURATION: 100 % | DIASTOLIC BLOOD PRESSURE: 91 MMHG | SYSTOLIC BLOOD PRESSURE: 151 MMHG | HEART RATE: 93 BPM | WEIGHT: 170.88 LBS | HEIGHT: 65 IN | RESPIRATION RATE: 18 BRPM | TEMPERATURE: 98 F

## 2020-05-13 DIAGNOSIS — M32.9 SYSTEMIC LUPUS ERYTHEMATOSUS ARTHRITIS: ICD-10-CM

## 2020-05-13 DIAGNOSIS — O09.299: ICD-10-CM

## 2020-05-13 DIAGNOSIS — M32.9 SLE (SYSTEMIC LUPUS ERYTHEMATOSUS RELATED SYNDROME): ICD-10-CM

## 2020-05-13 DIAGNOSIS — M32.14 SLE GLOMERULONEPHRITIS SYNDROME, WHO CLASS V: ICD-10-CM

## 2020-05-13 DIAGNOSIS — Z03.818 ENCOUNTER FOR OBSERVATION FOR SUSPECTED EXPOSURE TO OTHER BIOLOGICAL AGENTS RULED OUT: Primary | ICD-10-CM

## 2020-05-13 PROCEDURE — A4216 STERILE WATER/SALINE, 10 ML: HCPCS | Performed by: INTERNAL MEDICINE

## 2020-05-13 PROCEDURE — 25000003 PHARM REV CODE 250: Performed by: INTERNAL MEDICINE

## 2020-05-13 PROCEDURE — S0028 INJECTION, FAMOTIDINE, 20 MG: HCPCS | Performed by: INTERNAL MEDICINE

## 2020-05-13 PROCEDURE — 96375 TX/PRO/DX INJ NEW DRUG ADDON: CPT

## 2020-05-13 PROCEDURE — 96415 CHEMO IV INFUSION ADDL HR: CPT

## 2020-05-13 PROCEDURE — 63600175 PHARM REV CODE 636 W HCPCS: Performed by: INTERNAL MEDICINE

## 2020-05-13 PROCEDURE — 96413 CHEMO IV INFUSION 1 HR: CPT

## 2020-05-13 RX ORDER — ACETAMINOPHEN 325 MG/1
650 TABLET ORAL
Status: CANCELLED | OUTPATIENT
Start: 2020-05-20

## 2020-05-13 RX ORDER — HEPARIN 100 UNIT/ML
500 SYRINGE INTRAVENOUS
Status: CANCELLED | OUTPATIENT
Start: 2020-05-20

## 2020-05-13 RX ORDER — METHYLPREDNISOLONE SOD SUCC 125 MG
100 VIAL (EA) INJECTION
Status: CANCELLED
Start: 2020-05-27

## 2020-05-13 RX ORDER — ACETAMINOPHEN 325 MG/1
650 TABLET ORAL
Status: COMPLETED | OUTPATIENT
Start: 2020-05-13 | End: 2020-05-13

## 2020-05-13 RX ORDER — SODIUM CHLORIDE 0.9 % (FLUSH) 0.9 %
10 SYRINGE (ML) INJECTION
Status: CANCELLED | OUTPATIENT
Start: 2020-05-20

## 2020-05-13 RX ORDER — FAMOTIDINE 10 MG/ML
20 INJECTION INTRAVENOUS
Status: COMPLETED | OUTPATIENT
Start: 2020-05-13 | End: 2020-05-13

## 2020-05-13 RX ORDER — DIPHENHYDRAMINE HYDROCHLORIDE 50 MG/ML
25 INJECTION INTRAMUSCULAR; INTRAVENOUS
Status: COMPLETED | OUTPATIENT
Start: 2020-05-13 | End: 2020-05-13

## 2020-05-13 RX ORDER — FAMOTIDINE 10 MG/ML
20 INJECTION INTRAVENOUS
Status: CANCELLED | OUTPATIENT
Start: 2020-05-20

## 2020-05-13 RX ORDER — METHYLPREDNISOLONE SOD SUCC 125 MG
100 VIAL (EA) INJECTION
Status: COMPLETED | OUTPATIENT
Start: 2020-05-13 | End: 2020-05-13

## 2020-05-13 RX ORDER — SODIUM CHLORIDE 0.9 % (FLUSH) 0.9 %
10 SYRINGE (ML) INJECTION
Status: DISCONTINUED | OUTPATIENT
Start: 2020-05-13 | End: 2020-05-13 | Stop reason: HOSPADM

## 2020-05-13 RX ORDER — DIPHENHYDRAMINE HYDROCHLORIDE 50 MG/ML
25 INJECTION INTRAMUSCULAR; INTRAVENOUS
Status: CANCELLED
Start: 2020-05-27

## 2020-05-13 RX ORDER — DIPHENHYDRAMINE HYDROCHLORIDE 50 MG/ML
25 INJECTION INTRAMUSCULAR; INTRAVENOUS
Status: CANCELLED | OUTPATIENT
Start: 2020-05-20

## 2020-05-13 RX ORDER — METHYLPREDNISOLONE SOD SUCC 125 MG
100 VIAL (EA) INJECTION
Status: CANCELLED | OUTPATIENT
Start: 2020-05-20

## 2020-05-13 RX ADMIN — RITUXIMAB 1000 MG: 10 INJECTION, SOLUTION INTRAVENOUS at 09:05

## 2020-05-13 RX ADMIN — Medication 10 ML: at 09:05

## 2020-05-13 RX ADMIN — METHYLPREDNISOLONE SODIUM SUCCINATE 100 MG: 125 INJECTION, POWDER, FOR SOLUTION INTRAMUSCULAR; INTRAVENOUS at 09:05

## 2020-05-13 RX ADMIN — ACETAMINOPHEN 650 MG: 325 TABLET ORAL at 09:05

## 2020-05-13 RX ADMIN — FAMOTIDINE 20 MG: 10 INJECTION, SOLUTION INTRAVENOUS at 09:05

## 2020-05-13 RX ADMIN — DIPHENHYDRAMINE HYDROCHLORIDE 25 MG: 50 INJECTION, SOLUTION INTRAMUSCULAR; INTRAVENOUS at 09:05

## 2020-05-13 NOTE — PATIENT INSTRUCTIONS
Nifedipine capsules  What is this medicine?  NIFEDIPINE (daniel burch) is a calcium-channel blocker. It affects the amount of calcium found in your heart and muscle cells. This relaxes your blood vessels, which can reduce the amount of work the heart has to do. This medicine is used to treat chest pain caused by angina.  How should I use this medicine?  Take this medicine by mouth with a glass of water. Follow the directions on the prescription label. Swallow whole. Take your doses at regular intervals. Do not take your medicine more often then directed. Do not suddenly stop taking this medicine. Your doctor will tell you how much medicine to take. If your doctor wants you to stop the medicine, the dose will be slowly lowered over time to avoid any side effects.  Talk to your pediatrician regarding the use of this medicine in children. Special care may be needed.  What side effects may I notice from receiving this medicine?  Side effects that you should report to your doctor or health care professional as soon as possible:  · blood in the urine  · difficulty breathing  · fast heartbeat, palpitations, irregular heartbeat, chest pain  · redness, blistering, peeling or loosening of the skin, including inside the mouth  · reduced amount of urine passed  · skin rash  · swelling of the legs and ankles  Side effects that usually do not require medical attention (report to your doctor or health care professional if they continue or are bothersome):  · constipation  · facial flushing  · headache  · weakness or tiredness  What may interact with this medicine?  Do not take this medicine with any of the following medications:  · certain medicines for seizures like carbamazepine, phenobarbital, phenytoin  · lumacaftor; ivacaftor  · rifabutin  · rifampin  · rifapentine  · Darian's Wort  This medicine may also interact with the following medications:  · antiviral medicines for HIV or AIDS  · certain medicines for blood  pressure  · certain medicines for diabetes  · certain medicines for erectile dysfunction  · certain medicines for fungal infections like ketoconazole, fluconazole, and itraconazole  · certain medicines for irregular heart beat like flecainide and quinidine  · certain medicines that treat or prevent blood clots like warfarin  · clarithromycin  · digoxin  · dolasetron  · erythromycin  · fluoxetine  · grapefruit juice  · local or general anesthetics  · nefazodone  · orlistat  · quinupristin; dalfopristin  · sirolimus  · stomach acid blockers like cimetidine, ranitidine, omeprazole, or pantoprazole  · tacrolimus  · valproic acid  What if I miss a dose?  If you miss a dose, take it as soon as you can. If it is almost time for your next dose, take only that dose. Do not take double or extra doses.  Where should I keep my medicine?  Keep out of the reach of children.  Store at room temperature between 15 and 25 degrees C (59 and 77 degrees F). Protect from light and moisture. Keep container tightly closed. Throw away any unused medicine after the expiration date.  What should I tell my health care provider before I take this medicine?  They need to know if you have any of these conditions:  · heart problems, low blood pressure, slow or irregular heartbeat  · kidney disease  · liver disease  · previous heart attack  · an unusual or allergic reaction to nifedipine, other medicines, foods, dyes, or preservatives  · pregnant or trying to get pregnant  · breast-feeding  What should I watch for while using this medicine?  Visit your doctor or health care professional for regular check ups. Check your blood pressure and pulse rate regularly. Ask your doctor or health care professional what your blood pressure and pulse rate should be and when you should contact him or her.  You may get drowsy or dizzy. Do not drive, use machinery, or do anything that needs mental alertness until you know how this medicine affects you. Do not  stand or sit up quickly, especially if you are an older patient. This reduces the risk of dizzy or fainting spells. Alcohol may interfere with the effect of this medicine. Avoid alcoholic drinks.  NOTE:This sheet is a summary. It may not cover all possible information. If you have questions about this medicine, talk to your doctor, pharmacist, or health care provider. Copyright© 2017 [employer]        Labetalol tablets  What is this medicine?  LABETALOL (la BET a lole) is a beta-blocker. Beta-blockers reduce the workload on the heart and help it to beat more regularly. This medicine is used to treat high blood pressure.  How should I use this medicine?  Take this medicine by mouth with a glass of water. Follow the directions on the prescription label. Take your doses at regular intervals. Do not take your medicine more often than directed. Do not stop taking this medicine suddenly. This could lead to serious heart-related effects.  Talk to your pediatrician regarding the use of this medicine in children. Special care may be needed.  What side effects may I notice from receiving this medicine?  Side effects that you should report to your doctor or health care professional as soon as possible:  · allergic reactions like skin rash, itching or hives, swelling of the face, lips, or tongue  · breathing problems  · cold hands or feet  · dark urine  · depression  · general ill feeling or flu-like symptoms  · irregular heartbeat  · light-colored stools  · loss of appetite, nausea  · pain or trouble passing urine  · right upper belly pain  · slow heart rate (fewer than recommended by your doctor or health care professional)  · swollen legs or ankles  · tingling of the scalp or skin  · unusually weak or tired  · vomiting  · yellowing of the eyes or skin  Side effects that usually do not require medical attention (report to your doctor or health care professional if they continue or are bothersome):  · decreased sexual  function or desire  · dry itching skin  · headache  · tiredness  What may interact with this medicine?  This medicine also interact with the following medications:  · certain medicines for blood pressure, heart disease, irregular heart beat  · cimetidine  · general anesthetics  · medicines for asthma or lung disease like albuterol  · medicines for depression  · nitroglycerin  What if I miss a dose?  If you miss a dose, take it as soon as you can. If it is almost time for your next dose, take only that dose. Do not take double or extra doses.  Where should I keep my medicine?  Keep out of the reach of children.  Store at room temperature between 15 and 30 degrees C (59 and 86 degrees F). Protect from light. Keep container tightly closed. Throw away any unused medicine after the expiration date.  What should I tell my health care provider before I take this medicine?  They need to know if you have any of these conditions:  · diabetes  · history of heart attack, heart disease or heart failure  · kidney disease  · liver disease  · lung or breathing disease, like asthma or emphysema  · pheochromocytoma  · thyroid disease  · an unusual or allergic reaction to labetalol, other beta-blockers, medicines, foods, dyes, or preservatives  · pregnant or trying to get pregnant  · breast-feeding  What should I watch for while using this medicine?  Visit your doctor or health care professional for regular check ups. Check your blood pressure and pulse rate regularly. Ask your health care professional what your blood pressure and pulse rate should be, and when you should contact him or her.  You may get drowsy or dizzy. Do not drive, use machinery, or do anything that needs mental alertness until you know how this medicine affects you. Do not stand or sit up quickly. Alcohol may interfere with the effect of this medicine. Avoid alcoholic drinks.  This medicine can affect blood sugar levels. If you have diabetes, check with your  doctor or health care professional before you change your diet or the dose of your diabetic medicine.  Do not treat yourself for coughs, colds, or pain while you are taking this medicine without asking your doctor or health care professional for advice. Some ingredients may increase your blood pressure.  NOTE:This sheet is a summary. It may not cover all possible information. If you have questions about this medicine, talk to your doctor, pharmacist, or health care provider. Copyright© 2017 [employer]        Rituximab injection  What is this medicine?  RITUXIMAB (ri TUX i mab) is a monoclonal antibody. It is used commonly to treat non-Hodgkin lymphoma and other conditions. It is also used to treat rheumatoid arthritis (RA). In RA, this medicine slows the inflammatory process and help reduce joint pain and swelling. This medicine is often used with other cancer or arthritis medications.  How should I use this medicine?  This medicine is for infusion into a vein. It is administered in a hospital or clinic by a specially trained health care professional.  A special MedGuide will be given to you by the pharmacist with each prescription and refill. Be sure to read this information carefully each time.  Talk to your pediatrician regarding the use of this medicine in children. This medicine is not approved for use in children.  What side effects may I notice from receiving this medicine?  Side effects that you should report to your doctor or health care professional as soon as possible:  · allergic reactions like skin rash, itching or hives, swelling of the face, lips, or tongue  · low blood counts - this medicine may decrease the number of white blood cells, red blood cells and platelets. You may be at increased risk for infections and bleeding.  · signs of infection - fever or chills, cough, sore throat, pain or difficulty passing urine  · signs of decreased platelets or bleeding - bruising, pinpoint red spots on the  skin, black, tarry stools, blood in the urine  · signs of decreased red blood cells - unusually weak or tired, fainting spells, lightheadedness  · breathing problems  · confused, not responsive  · chest pain  · fast, irregular heartbeat  · feeling faint or lightheaded, falls  · mouth sores  · redness, blistering, peeling or loosening of the skin, including inside the mouth  · stomach pain  · swelling of the ankles, feet, or hands  · trouble passing urine or change in the amount of urine  Side effects that usually do not require medical attention (report to your doctor or other health care professional if they continue or are bothersome):  · anxiety  · headache  · loss of appetite  · muscle aches  · nausea  · night sweats  What may interact with this medicine?  · cisplatin  · medicines for blood pressure  · some other medicines for arthritis  · vaccines  What if I miss a dose?  It is important not to miss a dose. Call your doctor or health care professional if you are unable to keep an appointment.  Where should I keep my medicine?  This drug is given in a hospital or clinic and will not be stored at home.  What should I tell my health care provider before I take this medicine?  They need to know if you have any of these conditions:  · blood disorders  · heart disease  · history of hepatitis B  · infection (especially a virus infection such as chickenpox, cold sores, or herpes)  · irregular heartbeat  · kidney disease  · lung or breathing disease, like asthma  · lupus  · an unusual or allergic reaction to rituximab, mouse proteins, other medicines, foods, dyes, or preservatives  · pregnant or trying to get pregnant  · breast-feeding  What should I watch for while using this medicine?  Report any side effects that you notice during your treatment right away, such as changes in your breathing, fever, chills, dizziness or lightheadedness. These effects are more common with the first dose.  Visit your prescriber or  health care professional for checks on your progress. You will need to have regular blood work. Report any other side effects. The side effects of this medicine can continue after you finish your treatment. Continue your course of treatment even though you feel ill unless your doctor tells you to stop.  Call your doctor or health care professional for advice if you get a fever, chills or sore throat, or other symptoms of a cold or flu. Do not treat yourself. This drug decreases your body's ability to fight infections. Try to avoid being around people who are sick.  This medicine may increase your risk to bruise or bleed. Call your doctor or health care professional if you notice any unusual bleeding.  Be careful brushing and flossing your teeth or using a toothpick because you may get an infection or bleed more easily. If you have any dental work done, tell your dentist you are receiving this medicine.  Avoid taking products that contain aspirin, acetaminophen, ibuprofen, naproxen, or ketoprofen unless instructed by your doctor. These medicines may hide a fever.  Do not become pregnant while taking this medicine. Women should inform their doctor if they wish to become pregnant or think they might be pregnant. There is a potential for serious side effects to an unborn child. Talk to your health care professional or pharmacist for more information. Do not breast-feed an infant while taking this medicine.  NOTE:This sheet is a summary. It may not cover all possible information. If you have questions about this medicine, talk to your doctor, pharmacist, or health care provider. Copyright© 2017 [employer]

## 2020-05-25 ENCOUNTER — LAB VISIT (OUTPATIENT)
Dept: OTOLARYNGOLOGY | Facility: CLINIC | Age: 30
End: 2020-05-25
Payer: MEDICAID

## 2020-05-25 DIAGNOSIS — Z03.818 ENCOUNTER FOR OBSERVATION FOR SUSPECTED EXPOSURE TO OTHER BIOLOGICAL AGENTS RULED OUT: ICD-10-CM

## 2020-05-25 PROCEDURE — U0003 INFECTIOUS AGENT DETECTION BY NUCLEIC ACID (DNA OR RNA); SEVERE ACUTE RESPIRATORY SYNDROME CORONAVIRUS 2 (SARS-COV-2) (CORONAVIRUS DISEASE [COVID-19]), AMPLIFIED PROBE TECHNIQUE, MAKING USE OF HIGH THROUGHPUT TECHNOLOGIES AS DESCRIBED BY CMS-2020-01-R: HCPCS

## 2020-05-26 ENCOUNTER — OFFICE VISIT (OUTPATIENT)
Dept: FAMILY MEDICINE | Facility: CLINIC | Age: 30
End: 2020-05-26
Payer: MEDICAID

## 2020-05-26 VITALS
BODY MASS INDEX: 29.31 KG/M2 | TEMPERATURE: 98 F | SYSTOLIC BLOOD PRESSURE: 150 MMHG | RESPIRATION RATE: 18 BRPM | HEART RATE: 101 BPM | DIASTOLIC BLOOD PRESSURE: 90 MMHG | WEIGHT: 175.94 LBS | HEIGHT: 65 IN | OXYGEN SATURATION: 98 %

## 2020-05-26 DIAGNOSIS — I10 SEVERE UNCONTROLLED HYPERTENSION: Primary | ICD-10-CM

## 2020-05-26 DIAGNOSIS — M32.9 SLE (SYSTEMIC LUPUS ERYTHEMATOSUS RELATED SYNDROME): Chronic | ICD-10-CM

## 2020-05-26 DIAGNOSIS — M35.00 SJOGREN'S SYNDROME, WITH UNSPECIFIED ORGAN INVOLVEMENT: ICD-10-CM

## 2020-05-26 PROBLEM — O30.049 DICHORIONIC DIAMNIOTIC TWIN PREGNANCY, ANTEPARTUM: Status: RESOLVED | Noted: 2019-01-07 | Resolved: 2020-05-26

## 2020-05-26 PROBLEM — O34.219 PREVIOUS CESAREAN DELIVERY AFFECTING PREGNANCY, ANTEPARTUM: Status: RESOLVED | Noted: 2019-06-30 | Resolved: 2020-05-26

## 2020-05-26 PROBLEM — R76.8 ANA POSITIVE: Status: RESOLVED | Noted: 2019-01-31 | Resolved: 2020-05-26

## 2020-05-26 PROBLEM — Z98.891 STATUS POST REPEAT LOW TRANSVERSE CESAREAN SECTION: Status: RESOLVED | Noted: 2019-06-30 | Resolved: 2020-05-26

## 2020-05-26 LAB — SARS-COV-2 RNA RESP QL NAA+PROBE: NOT DETECTED

## 2020-05-26 PROCEDURE — 99999 PR PBB SHADOW E&M-EST. PATIENT-LVL III: CPT | Mod: PBBFAC,,, | Performed by: FAMILY MEDICINE

## 2020-05-26 PROCEDURE — 99214 OFFICE O/P EST MOD 30 MIN: CPT | Mod: S$PBB,,, | Performed by: FAMILY MEDICINE

## 2020-05-26 PROCEDURE — 99213 OFFICE O/P EST LOW 20 MIN: CPT | Mod: PBBFAC,PO | Performed by: FAMILY MEDICINE

## 2020-05-26 PROCEDURE — 99214 PR OFFICE/OUTPT VISIT, EST, LEVL IV, 30-39 MIN: ICD-10-PCS | Mod: S$PBB,,, | Performed by: FAMILY MEDICINE

## 2020-05-26 PROCEDURE — 99999 PR PBB SHADOW E&M-EST. PATIENT-LVL III: ICD-10-PCS | Mod: PBBFAC,,, | Performed by: FAMILY MEDICINE

## 2020-05-26 RX ORDER — LOSARTAN POTASSIUM AND HYDROCHLOROTHIAZIDE 12.5; 1 MG/1; MG/1
1 TABLET ORAL DAILY
Qty: 30 TABLET | Refills: 5 | Status: SHIPPED | OUTPATIENT
Start: 2020-05-26 | End: 2020-09-15 | Stop reason: SDUPTHER

## 2020-05-26 RX ORDER — AMLODIPINE BESYLATE 5 MG/1
5 TABLET ORAL DAILY
Qty: 30 TABLET | Refills: 5 | Status: SHIPPED | OUTPATIENT
Start: 2020-05-26 | End: 2020-09-15 | Stop reason: SDUPTHER

## 2020-05-26 NOTE — PROGRESS NOTES
CHIEF COMPLAINT: This is a 29-year-old female here to reestablish care and complaining of elevated BP.    SUBJECTIVE:  The patient has not been seen by me in over 4 years.  She has a history of resistant hypertension and lupus nephritis.  In 2019 she was pregnant and delivered twins in June.  She was treated for hypertension during pregnancy with Procardia XL 30 mg daily and labetalol 100 mg nightly.  Patient was seen by her dentist today and procedures were canceled because her blood pressure readings were elevated as follows:  170/120, 167/123, and 159/116.  She was to have conscious sedation for extractions and feelings.  Patient also reports that last infusion for SLE was discontinued because of elevated blood pressure.  Patient admits to noncompliance with her antihypertensive medications.  Patient reports that she simply forgets to take her medication no matter what she tries to prevent this from happening.  She states she is distracted by her children and household obligations.  She has been taking her blood pressure medication regularly for the last week.  Her blood pressure here today is 150/90.  She admits that she took her blood pressure medication only 30-60 minutes prior to her dental appointment.  Patient denies headache, vertigo, paresthesias, chest pain, palpitations, shortness of breath or lightheadedness.     The patient was diagnosed with SLE in October 2013.  Prior to her 2nd pregnancy in 2017 she took amlodipine and losartan HCT for hypertension with good results.  Patient denies side effects from medication.  Additionally, patient has Sjogren syndrome, anemia and leukopenia.      ROS:  GENERAL: Patient denies fever, chills, night sweats.  Patient denies weight gain or loss. Patient denies anorexia, fatigue, weakness or swollen glands.  SKIN: Patient denies rash.  HEENT: Patient denies sore throat, ear pain, hearing loss, nasal congestion, or runny nose. Patient denies visual disturbance, eye  irritation or discharge.  LUNGS: Patient denies cough, wheeze or hemoptysis.  CARDIOVASCULAR: Patient denies chest pain, shortness of breath, palpitations, syncope or lower extremity edema.  GI: Patient denies abdominal pain, nausea, vomiting, diarrhea, constipation, blood in stool or melena.  GENITOURINARY:  Patient denies dysuria, frequency, hematuria, nocturia, urgency or incontinence.  MUSCULOSKELETAL: Patient denies joint pain, swelling, redness or warmth.  NEUROLOGIC: Patient deniesparesthesias, weakness in limb, dysarthria, dysphagia or abnormality of gait.  PSYCHIATRIC: Patient denies anxiety, depression, or memory loss.     OBJECTIVE:   GENERAL: Well-developed well-nourished overweight black female alert and oriented x3, in no acute distress.  Memory, judgment and cognition without deficit.   SKIN: Clear without rash.  Normal color and tone.  HEENT: Eyes: Clear conjunctivae. No scleral icterus.  Pupils equal reactive to light and accommodation.  Ears: Clear canals. Clear TMs.  Nose: Without congestion.  Pharynx: Without injection or exudates.  NECK: Supple, normal range of motion.  No masses, lymphadenopathy or enlarged thyroid.  No JVD.  Carotids 2+ and equal.  No bruits.  LUNGS: Clear to auscultation.  Normal respiratory effort.  CARDIOVASCULAR:  Regular rhythm, normal S1, S2 without murmur, gallop or rub.  BACK:  No CVA or spinal tenderness.  ABDOMEN: Soft, nontender without mass or organomegaly.  No rebound or guarding.  EXTREMITIES: Without cyanosis, clubbing or edema.  Distal pulses 2+ and equal.  Normal range of motion in all extremities.  No joint effusion, erythema or warmth.  NEUROLOGIC:   Cranial nerves II through XII without deficit.  Motor strength equal bilaterally.  Sensation normal to touch.  Deep tendon reflexes 2+ and equal.  Gait without abnormality.  No tremor.      ASSESSMENT:  1. Severe uncontrolled hypertension    2. SLE (systemic lupus erythematosus related syndrome)    3. Sjogren's  syndrome, with unspecified organ involvement      PLAN:  1.  Discontinue labetalol and Procardia XL.  2.  Amlodipine 5 mg daily.  3.  Losartan -12.5 mg daily.  4.  Weight reduction.  5.  Limit salt in diet.  6.  Exercise 150 min per week.  7.  Monitor blood pressure readings.  Report readings in 1-2 weeks.  8.  Lengthy discussion regarding strategies to remind her to take her medications.    This visit was 30 min, greater than 50% of which involved counseling.    This note is generated with speech recognition software and is subject to transcription error and sound alike phrases that may be missed by proofreading.

## 2020-05-27 ENCOUNTER — INFUSION (OUTPATIENT)
Dept: INFUSION THERAPY | Facility: HOSPITAL | Age: 30
End: 2020-05-27
Attending: INTERNAL MEDICINE
Payer: MEDICAID

## 2020-05-27 VITALS
HEART RATE: 84 BPM | RESPIRATION RATE: 18 BRPM | BODY MASS INDEX: 28.72 KG/M2 | WEIGHT: 172.38 LBS | SYSTOLIC BLOOD PRESSURE: 140 MMHG | HEIGHT: 65 IN | OXYGEN SATURATION: 99 % | TEMPERATURE: 98 F | DIASTOLIC BLOOD PRESSURE: 89 MMHG

## 2020-05-27 DIAGNOSIS — M32.14 SLE GLOMERULONEPHRITIS SYNDROME, WHO CLASS V: ICD-10-CM

## 2020-05-27 DIAGNOSIS — O09.299: ICD-10-CM

## 2020-05-27 DIAGNOSIS — M32.9 SLE (SYSTEMIC LUPUS ERYTHEMATOSUS RELATED SYNDROME): Primary | ICD-10-CM

## 2020-05-27 DIAGNOSIS — M32.9 SYSTEMIC LUPUS ERYTHEMATOSUS ARTHRITIS: ICD-10-CM

## 2020-05-27 PROCEDURE — 25000003 PHARM REV CODE 250: Performed by: INTERNAL MEDICINE

## 2020-05-27 PROCEDURE — 96375 TX/PRO/DX INJ NEW DRUG ADDON: CPT

## 2020-05-27 PROCEDURE — 96413 CHEMO IV INFUSION 1 HR: CPT

## 2020-05-27 PROCEDURE — 63600175 PHARM REV CODE 636 W HCPCS: Mod: JG | Performed by: INTERNAL MEDICINE

## 2020-05-27 PROCEDURE — 96415 CHEMO IV INFUSION ADDL HR: CPT

## 2020-05-27 RX ORDER — METHYLPREDNISOLONE SOD SUCC 125 MG
100 VIAL (EA) INJECTION
Status: COMPLETED | OUTPATIENT
Start: 2020-05-27 | End: 2020-05-27

## 2020-05-27 RX ORDER — HEPARIN 100 UNIT/ML
500 SYRINGE INTRAVENOUS
Status: CANCELLED | OUTPATIENT
Start: 2020-06-10

## 2020-05-27 RX ORDER — METHYLPREDNISOLONE SOD SUCC 125 MG
100 VIAL (EA) INJECTION
Status: CANCELLED | OUTPATIENT
Start: 2020-06-10

## 2020-05-27 RX ORDER — ACETAMINOPHEN 325 MG/1
650 TABLET ORAL
Status: CANCELLED | OUTPATIENT
Start: 2020-06-10

## 2020-05-27 RX ORDER — DIPHENHYDRAMINE HYDROCHLORIDE 50 MG/ML
25 INJECTION INTRAMUSCULAR; INTRAVENOUS
Status: COMPLETED | OUTPATIENT
Start: 2020-05-27 | End: 2020-05-27

## 2020-05-27 RX ORDER — DIPHENHYDRAMINE HYDROCHLORIDE 50 MG/ML
25 INJECTION INTRAMUSCULAR; INTRAVENOUS
Status: CANCELLED | OUTPATIENT
Start: 2020-06-10

## 2020-05-27 RX ORDER — ACETAMINOPHEN 325 MG/1
650 TABLET ORAL
Status: COMPLETED | OUTPATIENT
Start: 2020-05-27 | End: 2020-05-27

## 2020-05-27 RX ADMIN — ACETAMINOPHEN 650 MG: 325 TABLET ORAL at 09:05

## 2020-05-27 RX ADMIN — DIPHENHYDRAMINE HYDROCHLORIDE 25 MG: 50 INJECTION, SOLUTION INTRAMUSCULAR; INTRAVENOUS at 09:05

## 2020-05-27 RX ADMIN — METHYLPREDNISOLONE SODIUM SUCCINATE 100 MG: 125 INJECTION, POWDER, FOR SOLUTION INTRAMUSCULAR; INTRAVENOUS at 09:05

## 2020-05-27 RX ADMIN — RITUXIMAB 1000 MG: 10 INJECTION, SOLUTION INTRAVENOUS at 09:05

## 2020-05-27 NOTE — PATIENT INSTRUCTIONS
Hydrochlorothiazide, HCTZ; Losartan tablets  What is this medicine?  LOSARTAN; HYDROCHLOROTHIAZIDE (merly SITA tan; jacky droe klor oh THYE a zide) is a combination of a drug that relaxes blood vessels and a diuretic. It is used to treat high blood pressure. This medicine may also reduce the risk of stroke in certain patients.  How should I use this medicine?  Take this medicine by mouth with a glass of water. Follow the directions on the prescription label. You can take it with or without food. If it upsets your stomach, take it with food. Take your medicine at regular intervals. Do not take it more often than directed. Do not stop taking except on your doctor's advice.  Talk to your pediatrician regarding the use of this medicine in children. Special care may be needed.  What side effects may I notice from receiving this medicine?  Side effects that you should report to your doctor or health care professional as soon as possible:  · allergic reactions like skin rash, itching or hives, swelling of the face, lips, or tongue  · breathing problems  · changes in vision  · dark urine  · eye pain  · fast or irregular heart beat, palpitations, or chest pain  · feeling faint or lightheaded  · muscle cramps  · persistent dry cough  · redness, blistering, peeling or loosening of the skin, including inside the mouth  · stomach pain  · trouble passing urine or change in the amount of urine  · unusual bleeding or bruising  · worsened gout pain  · yellowing of the eyes or skin  Side effects that usually do not require medical attention (report to your doctor or health care professional if they continue or are bothersome):  · change in sex drive or performance  · headache  What may interact with this medicine?  · barbiturates, like phenobarbital  · blood pressure medicines  · celecoxib  · cimetidine  · corticosteroids  · diabetic medicines  · diuretics, especially triamterene, spironolactone or  amiloride  · fluconazole  · lithium  · NSAIDs, medicines for pain and inflammation, like ibuprofen or naproxen  · potassium salts or potassium supplements  · prescription pain medicines  · rifampin  · skeletal muscle relaxants like tubocurarine  · some cholesterol-lowering medicines like cholestyramine or colestipol  What if I miss a dose?  If you miss a dose, take it as soon as you can. If it is almost time for your next dose, take only that dose. Do not take double or extra doses.  Where should I keep my medicine?  Keep out of the reach of children.  Store at room temperature between 15 and 30 degrees C (59 and 86 degrees F). Protect from light. Keep container tightly closed. Throw away any unused medicine after the expiration date.  What should I tell my health care provider before I take this medicine?  They need to know if you have any of these conditions:  · decreased urine  · kidney disease  · liver disease  · if you are on a special diet, like a low-salt diet  · immune system problems, like lupus  · an unusual or allergic reaction to losartan, hydrochlorothiazide, sulfa drugs, other medicines, foods, dyes, or preservatives  · pregnant or trying to get pregnant  · breast-feeding  What should I watch for while using this medicine?  Check your blood pressure regularly while you are taking this medicine. Ask your doctor or health care professional what your blood pressure should be and when you should contact him or her. When you check your blood pressure, write down the measurements to show your doctor or health care professional. If you are taking this medicine for a long time, you must visit your health care professional for regular checks on your progress. Make sure you schedule appointments on a regular basis.  You must not get dehydrated. Ask your doctor or health care professional how much fluid you need to drink a day. Check with him or her if you get an attack of severe diarrhea, nausea and vomiting, or  if you sweat a lot. The loss of too much body fluid can make it dangerous for you to take this medicine.  Women should inform their doctor if they wish to become pregnant or think they might be pregnant. There is a potential for serious side effects to an unborn child, particularly in the second or third trimester. Talk to your health care professional or pharmacist for more information.  You may get drowsy or dizzy. Do not drive, use machinery, or do anything that needs mental alertness until you know how this drug affects you. Do not stand or sit up quickly, especially if you are an older patient. This reduces the risk of dizzy or fainting spells. Alcohol can make you more drowsy and dizzy. Avoid alcoholic drinks.  This medicine may affect your blood sugar level. If you have diabetes, check with your doctor or health care professional before changing the dose of your diabetic medicine.  Avoid salt substitutes unless you are told otherwise by your doctor or health care professional.  Do not treat yourself for coughs, colds, or pain while you are taking this medicine without asking your doctor or health care professional for advice. Some ingredients may increase your blood pressure.  NOTE:This sheet is a summary. It may not cover all possible information. If you have questions about this medicine, talk to your doctor, pharmacist, or health care provider. Copyright© 2017 [employer]

## 2020-05-27 NOTE — NURSING
Infusion# 2 of 2 cycle 2  S/S infection noted or voiced? None/Denies  Recent labs? Reviewed    Patient started on new B/P medication = Losartan 100-12.5 mg po once daily . Patient's B/P improved from last visit.    Premeds? Tylenol 650  mg po, Benadryl 25 mg IVP over 2 min, Solumedrol 100 mg IVP over 2 min.    Rituxan 1000 mg administered IV over 3 hrs 25 minutes and titrated per protocol/ per orders; see MAR and vitals for more  details.   Tolerated well without adverse events.

## 2020-07-14 ENCOUNTER — TELEPHONE (OUTPATIENT)
Dept: RHEUMATOLOGY | Facility: CLINIC | Age: 30
End: 2020-07-14

## 2020-07-14 NOTE — TELEPHONE ENCOUNTER
Patient c/o of hand pain and her left foot has a spot on the bottom and hurts after only standing on it for a few minutes. She states that even when she touches the are it hurts. This has been on going for a couple of weeks.Patient denies an strenuous activities, denies falling. Pain rate 3/10. She states that it is more of an ache that is there. She denies trying anything over the counter. She does not want to take anything she just wants to know if this should be something to be concerned about. She states that she will send a message in the portal for Dr. MADISON to see. She also states that if she needs to come in or do a virtual visit she can. Please advise.

## 2020-07-14 NOTE — TELEPHONE ENCOUNTER
----- Message from Natalya Rudd sent at 7/14/2020  2:10 PM CDT -----  Regarding: cramps in hand, foot pain  Type:  Needs Medical Advice    Who Called: pt  Symptoms (please be specific): hand cramps, foot pain, spot on feet left foot  How long has patient had these symptoms:  couple of weeks  Pharmacy name and phone #:    [facility]  [address]  [place] [address]  Phone: [phone] Fax: [phone]  Would the patient rather a call back or a response via [facility]? Call back   Best Call Back Number: [phone]  Additional Information: Please call back

## 2020-07-14 NOTE — TELEPHONE ENCOUNTER
Spoke with pt and scheduled appt with Dr. MADISON for 7.15.20 at 4.45 pm. Pt verbalized understanding

## 2020-07-15 ENCOUNTER — OFFICE VISIT (OUTPATIENT)
Dept: RHEUMATOLOGY | Facility: CLINIC | Age: 30
End: 2020-07-15
Payer: MEDICAID

## 2020-07-15 DIAGNOSIS — M35.00 SJOGREN'S SYNDROME, WITH UNSPECIFIED ORGAN INVOLVEMENT: ICD-10-CM

## 2020-07-15 DIAGNOSIS — M32.9 SYSTEMIC LUPUS ERYTHEMATOSUS ARTHRITIS: Primary | ICD-10-CM

## 2020-07-15 DIAGNOSIS — D84.9 IMMUNOSUPPRESSED STATUS: ICD-10-CM

## 2020-07-15 DIAGNOSIS — B07.0 PLANTAR WART OF LEFT FOOT: ICD-10-CM

## 2020-07-15 PROCEDURE — 99214 PR OFFICE/OUTPT VISIT, EST, LEVL IV, 30-39 MIN: ICD-10-PCS | Mod: 95,,, | Performed by: INTERNAL MEDICINE

## 2020-07-15 PROCEDURE — 99214 OFFICE O/P EST MOD 30 MIN: CPT | Mod: 95,,, | Performed by: INTERNAL MEDICINE

## 2020-07-15 NOTE — PROGRESS NOTES
RHEUMATOLOGY FOLLOW UP - TELE VISIT     The patient location is: [place]  The chief complaint leading to consultation is: Mild hand pain, skin lesion under left foot  Visit type: Virtual visit with synchronous audio and video  Total time spent with patient: 12 minutes  Each patient to whom he or she provides medical services by telemedicine is:  (1) informed of the relationship between the physician and patient and the respective role of any other health care provider with respect to management of the patient; and (2) notified that he or she may decline to receive medical services by telemedicine and may withdraw from such care at any time.    HPI:-  Alisia Kunzconsuelo Jolly a 29 y.o. pleasant female seen today through My chart video visit for follow up.  She complains of a skin lesion on her left foot for the past week.  It has been painful intermittently.  No history of injury.  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  No episodes of waking up with pain or swelling of the finger.  No joint pain similar to her lupus.  No skin rash.  No fatigue.    Review of Systems   Constitutional: Negative for chills and fever.   HENT: Negative for congestion and sore throat.    Eyes: Negative for blurred vision and redness.   Respiratory: Negative for cough and shortness of breath.    Cardiovascular: Negative for chest pain and leg swelling.   Gastrointestinal: Negative for abdominal pain.   Genitourinary: Negative for dysuria.   Musculoskeletal: Positive for joint pain. Negative for back pain, falls, myalgias and neck pain.   Skin: Positive for rash.   Neurological: Negative for headaches.   Endo/Heme/Allergies: Does not bruise/bleed easily.   Psychiatric/Behavioral: Negative for memory loss. The patient does not have insomnia.        Past Medical History:   Diagnosis Date    Allergic rhinitis     Anemia     Condyloma acuminata     GERD (gastroesophageal  reflux disease)     History of fetal anomaly in prior pregnancy, currently pregnant, unspecified trimester 3/8/2017    Pacemaker placed    HSV-2 (herpes simplex virus 2) infection     Lupus nephritis     Mood disorder     Overweight(278.02)     Sjogren's syndrome     Systemic lupus complicating pregnancy 2019    Systemic lupus erythematosus     Thrombocytopenia        Past Surgical History:   Procedure Laterality Date     SECTION WITH TUBAL LIGATION N/A 2019    Procedure:  SECTION, WITH TUBAL LIGATION;  Surgeon: VERONICA Valdovinos MD;  Location: [facility] L&D;  Service: OB/GYN;  Laterality: N/A;     SECTION, LOW TRANSVERSE      x2    RENAL BIOPSY  2013        Social History     Tobacco Use    Smoking status: Never Smoker    Smokeless tobacco: Never Used   Substance Use Topics    Alcohol use: No     Alcohol/week: 0.0 standard drinks    Drug use: No       Family History   Problem Relation Age of Onset    Hypertension Mother     Eczema Brother     Heart disease Son     Arrhythmia Son         CHB    Hypertension Maternal Grandmother     Diabetes Paternal Grandmother        Review of patient's allergies indicates:  No Known Allergies    Physical exam:-    GEN: awake, alert, non-diaphoretic, no psychomotor agitation, no acute distress    HEENT :Head: atraumatic, normocephalic, no rashes noted, no lesions noted;    Eyes: NO redness, discharge, swelling, or lesions    Nose: NO redness, swelling, discharge, deformity, or impetigo/crusting    Skin:  Plantar wart like lesion under left ft.    Cardiopulmonary: no increased respiratory effort, speaking in clear sentences    MSK: normal ROM in the neck, Upper extremities, Lower extremities  Good ROM of hands, fist formation 100% * and , no obvious synovitis    Good ambulation in front of the camera    Neuro: cranial nerves grossly normal, speech normal rate and rhythm, orientation arrived to appointment on time with no  prompting, moved both upper extremities equally    Pysch:  appearance, behavior, and attitude- well groomed, pleasant, cooperative    Medication List with Changes/Refills   Current Medications    AMLODIPINE (NORVASC) 5 MG TABLET    Take 1 tablet (5 mg total) by mouth once daily.    FLUOXETINE 40 MG CAPSULE    Take 1 capsule (40 mg total) by mouth once daily.    HYDROXYCHLOROQUINE (PLAQUENIL) 200 MG TABLET    Take 2 tablets (400 mg total) by mouth once daily.    LOSARTAN-HYDROCHLOROTHIAZIDE 100-12.5 MG (HYZAAR) 100-12.5 MG TAB    Take 1 tablet by mouth once daily.    PREDNISONE (DELTASONE) 5 MG TABLET    Take 1 tablet (5 mg total) by mouth daily as needed (Joint pain).    VALACYCLOVIR (VALTREX) 500 MG TABLET    Take 2 tablets (1,000 mg total) by mouth once daily.       Assessment/Plans:-  1. Systemic lupus erythematosus arthritis    2. Sjogren's syndrome, with unspecified organ involvement    3. Immunosuppressed status    4. Plantar wart of left foot      Problem List Items Addressed This Visit     Sjogren's syndrome    Current Assessment & Plan     Stable Sjogren's on rituximab therapy.  Continue Plaquenil and every 6 months rituximab.         Systemic lupus erythematosus arthritis - Primary    Current Assessment & Plan     Stable on rituximab therapy.  No synovitis.  Continue rituximab every 6 months         Immunosuppressed status    Current Assessment & Plan     Compromised immune system secondary to autoimmune disease and use of immunosuppressive drugs. Monitor carefully for infections. Advised to get immediate medical care if any infection. Also advised strict adherence to age appropriate vaccinations and cancer screenings with PCP.          Plantar wart of left foot    Current Assessment & Plan     Advised conservative therapy.               Follow up in about 3 months (around 10/15/2020).    Disclaimer: This note was prepared using voice recognition system and is likely to have sound alike errors and is not  proof read.  Please call me with any questions.

## 2020-07-20 ENCOUNTER — TELEPHONE (OUTPATIENT)
Dept: FAMILY MEDICINE | Facility: CLINIC | Age: 30
End: 2020-07-20

## 2020-07-20 DIAGNOSIS — B07.0 PLANTAR WART: Primary | ICD-10-CM

## 2020-07-20 NOTE — TELEPHONE ENCOUNTER
----- Message from Sanchez Bowen sent at 7/20/2020  4:11 PM CDT -----  Regarding: Missed Call  Contact: Alisia Vines  Pt is returning a call     Pt can be reached at [phone]

## 2020-07-20 NOTE — TELEPHONE ENCOUNTER
----- Message from Geraldinelamine Lewis sent at 7/20/2020  3:42 PM CDT -----  Contact: pt  Type:  Sooner Apoointment Request    Caller is requesting a sooner appointment.  Caller declined first available appointment listed below.  Caller will not accept being placed on the waitlist and is requesting a message be sent to doctor.  Name of Caller:Alisia  When is the first available appointment?08/2020  Symptoms:poss wart on bottom left foot  Would the patient rather a call back or a response via [facility]? call  Best Call Back Number:[phone]  Additional Information: she is open to seeing anyone in the office

## 2020-07-21 ENCOUNTER — TELEPHONE (OUTPATIENT)
Dept: FAMILY MEDICINE | Facility: CLINIC | Age: 30
End: 2020-07-21

## 2020-07-21 NOTE — TELEPHONE ENCOUNTER
----- Message from Jessica Odonnell sent at 7/21/2020 10:19 AM CDT -----  Regarding: RETURN CALL  Type:  Patient Returning Call    Who Called:PT  Who Left Message for Patient:FEDERICA  Does the patient know what this is regarding?:NO  Would the patient rather a call back or a response via [facility]? CALL BACK  Best Call Back Number:[phone]  Additional Information:

## 2020-07-21 NOTE — TELEPHONE ENCOUNTER
S/w pt she states she would like for you to put in a referral for her to be able to go to Podiatry. She wants to scheduled at the [place]. Please advise.

## 2020-07-24 ENCOUNTER — PATIENT OUTREACH (OUTPATIENT)
Dept: ADMINISTRATIVE | Facility: HOSPITAL | Age: 30
End: 2020-07-24

## 2020-07-24 NOTE — PROGRESS NOTES
PreVisit Chart Audit Performed    updated with recent information       Pap scheduled for 9/11/2020    Yuliya CAMACHO LPN Care Coordinator  Care Coordination Department  [facility]  [phone]

## 2020-07-28 ENCOUNTER — OFFICE VISIT (OUTPATIENT)
Dept: PODIATRY | Facility: CLINIC | Age: 30
End: 2020-07-28
Payer: MEDICAID

## 2020-07-28 VITALS
SYSTOLIC BLOOD PRESSURE: 142 MMHG | DIASTOLIC BLOOD PRESSURE: 95 MMHG | WEIGHT: 173.63 LBS | BODY MASS INDEX: 28.93 KG/M2 | HEART RATE: 72 BPM | HEIGHT: 65 IN

## 2020-07-28 DIAGNOSIS — M79.672 PAIN IN LEFT FOOT: ICD-10-CM

## 2020-07-28 DIAGNOSIS — B07.0 PLANTAR WART: ICD-10-CM

## 2020-07-28 DIAGNOSIS — L84 CALLUS: Primary | ICD-10-CM

## 2020-07-28 DIAGNOSIS — L85.3 XEROSIS CUTIS: ICD-10-CM

## 2020-07-28 PROCEDURE — 99213 OFFICE O/P EST LOW 20 MIN: CPT | Mod: PBBFAC | Performed by: PODIATRIST

## 2020-07-28 PROCEDURE — 99203 PR OFFICE/OUTPT VISIT, NEW, LEVL III, 30-44 MIN: ICD-10-PCS | Mod: S$PBB,,, | Performed by: PODIATRIST

## 2020-07-28 PROCEDURE — 99999 PR PBB SHADOW E&M-EST. PATIENT-LVL III: CPT | Mod: PBBFAC,,, | Performed by: PODIATRIST

## 2020-07-28 PROCEDURE — 99999 PR PBB SHADOW E&M-EST. PATIENT-LVL III: ICD-10-PCS | Mod: PBBFAC,,, | Performed by: PODIATRIST

## 2020-07-28 PROCEDURE — 99203 OFFICE O/P NEW LOW 30 MIN: CPT | Mod: S$PBB,,, | Performed by: PODIATRIST

## 2020-07-28 NOTE — PROGRESS NOTES
Subjective:       Patient ID: Alisia Vines is a 29 y.o. female.    Chief Complaint: Callouses (rates pain 2/10 tennis w/socks non diabetic pt pcp Dr. Quinonez.)      HPI: Alisia Vines presents to the clinic with the chief complaint of painful, thickened and hypertrophic skin formation of the left foot.  Patient states possible wart.  Patient denies in-home treatment. This patient is a not a DMI/DMII or does she have PAD or Peripheral Neuropathy.atient's PMD is Saumya Quinonez MD.  Patient states severely antalgic gait.     Hemoglobin A1C   Date Value Ref Range Status   05/30/2014 5.1 4.5 - 6.2 % Final   .    Review of patient's allergies indicates:  No Known Allergies    Past Medical History:   Diagnosis Date    Allergic rhinitis     Anemia     Condyloma acuminata     GERD (gastroesophageal reflux disease)     History of fetal anomaly in prior pregnancy, currently pregnant, unspecified trimester 3/8/2017    Pacemaker placed    HSV-2 (herpes simplex virus 2) infection     Lupus nephritis     Mood disorder     Overweight(278.02)     Sjogren's syndrome     Systemic lupus complicating pregnancy 1/31/2019    Systemic lupus erythematosus     Thrombocytopenia        Family History   Problem Relation Age of Onset    Hypertension Mother     Eczema Brother     Heart disease Son     Arrhythmia Son         CHB    Hypertension Maternal Grandmother     Diabetes Paternal Grandmother        Social History     Socioeconomic History    Marital status: Single     Spouse name: Not on file    Number of children: 2    Years of education: Not on file    Highest education level: Not on file   Occupational History     Comment: [employer]   Social Needs    Financial resource strain: Not on file    Food insecurity     Worry: Not on file     Inability: Not on file    Transportation needs     Medical: Not on file     Non-medical: Not on file   Tobacco Use    Smoking status: Never Smoker     Smokeless tobacco: Never Used   Substance and Sexual Activity    Alcohol use: No     Alcohol/week: 0.0 standard drinks    Drug use: No    Sexual activity: Not Currently     Partners: Male     Birth control/protection: None   Lifestyle    Physical activity     Days per week: Not on file     Minutes per session: Not on file    Stress: Not on file   Relationships    Social connections     Talks on phone: Not on file     Gets together: Not on file     Attends [employer]ism service: Not on file     Active member of club or organization: Not on file     Attends meetings of clubs or organizations: Not on file     Relationship status: Not on file   Other Topics Concern    Are you pregnant or think you may be? No    Breast-feeding No   Social History Narrative    The patient is single and lives with her significant other.  She has 3 children.  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       Past Surgical History:   Procedure Laterality Date     SECTION WITH TUBAL LIGATION N/A 2019    Procedure:  SECTION, WITH TUBAL LIGATION;  Surgeon: VERONICA Valdovinos MD;  Location: [facility] L&D;  Service: OB/GYN;  Laterality: N/A;     SECTION, LOW TRANSVERSE      x2    RENAL BIOPSY  2013       Review of Systems   Constitutional: Negative for chills, fatigue and fever.   HENT: Negative for hearing loss.    Eyes: Negative for photophobia and visual disturbance.   Respiratory: Negative for cough, chest tightness, shortness of breath and wheezing.    Cardiovascular: Negative for chest pain and palpitations.   Gastrointestinal: Negative for constipation, diarrhea, nausea and vomiting.   Endocrine: Negative for cold intolerance and heat intolerance.   Genitourinary: Negative for flank pain.   Musculoskeletal: Positive for gait problem. Negative for neck pain and neck stiffness.   Skin: Negative for wound.   Neurological: Negative for light-headedness and headaches.  "  Psychiatric/Behavioral: Negative for sleep disturbance.         Objective:   BP (!) 142/95   Pulse 72   Ht 5' 5" (1.651 m)   Wt 78.8 kg (173 lb 9.8 oz)   BMI 28.89 kg/m²     Physical Exam  LOWER EXTREMITY PHYSICAL EXAMINATION    NEUROLOGY: Sensation to light touch is intact. Proprioception is intact. Sensation to pin prick is intact.    ORTHOPEDIC: Manual Muscle Testing is 5/5 in all planes on the left, without pains, with and without resistance. No pains to palpation of the medial or lateral ankle ligaments. No discomfort to palpation of the posterior tibial tendon, peroneal tendon, Achilles tendon or the anterior ankle tendons. Gait pattern is non-antalgic.    VASCULAR: On the left and right foot, the dorsalis pedis pulse is 2/4 and the posterior tibial pulse is 2/4. Capillary refill time is less than 3 seconds. Hair growth is not on the dorsum of the foot and at the digits. Proximal to distal temperature is warm to warm.    DERMATOLOGY: Callus formation to the plantar aspect the left 1st metatarsophalangeal joint.  No plantar wart is noted.  No local signs of infection.    Assessment:     1. Callus    2. Pain in left foot        3. Xerosis cutis        Plan:     Callus  Pain in left foot  Xerosis cutis  Hypertrophic skin formation, as outlined within the examination portion of this note, is/are trimmed/pared surgically debrided with sharp #10/#15 blade, to alleviate discomfort with weight bearing and ambulation, and to lessen the possibility of skin complications, e.g., ulceration due to pressure. No ulceration(s) is are noted with/post debridement.     Recommend twice to 3 times daily topical emollient. Did discuss with the patient concerning dry and thin skin in relation to complications due to comorbid states. Patient will purchase OTC Urea 40% and use BID or TID or OTC Eucerin Lotion/Cream and use it BID or TID.    Patient does not meet the qualifications for, or have the class findings necessary for " routine foot care. There is no lack of or diminished sensation and he/she has no significant findings of PVD to the B/L LE. At any follow-up appointment concerning routine foot care, patient will be PROC-B, and will be required to pay for services.  This fact is explained to the patient and/or any family who [facility]        Future Appointments   Date Time Provider Department Center   8/7/2020  2:30 PM Saumya Quinonez MD [facility]   8/24/2020 10:00 AM SPECIMEN, [facility] SPECLAB [facility]   8/24/2020 10:20 AM LABORATORY, [facility] LAB [facility]   9/1/2020  1:45 PM Shane Blair MD [facility]   9/11/2020  2:40 PM Jessica Meredith CNM [facility] OBGYN [facility]

## 2020-08-24 ENCOUNTER — LAB VISIT (OUTPATIENT)
Dept: LAB | Facility: HOSPITAL | Age: 30
End: 2020-08-24
Attending: INTERNAL MEDICINE
Payer: MEDICAID

## 2020-08-24 DIAGNOSIS — M32.9 SLE (SYSTEMIC LUPUS ERYTHEMATOSUS RELATED SYNDROME): ICD-10-CM

## 2020-08-24 DIAGNOSIS — M32.14 SLE GLOMERULONEPHRITIS SYNDROME, WHO CLASS V: ICD-10-CM

## 2020-08-24 LAB
ALBUMIN SERPL BCP-MCNC: 3.3 G/DL (ref 3.5–5.2)
ALP SERPL-CCNC: 57 U/L (ref 55–135)
ALT SERPL W/O P-5'-P-CCNC: 16 U/L (ref 10–44)
ANION GAP SERPL CALC-SCNC: 6 MMOL/L (ref 8–16)
AST SERPL-CCNC: 22 U/L (ref 10–40)
BASOPHILS # BLD AUTO: 0.01 K/UL (ref 0–0.2)
BASOPHILS NFR BLD: 0.4 % (ref 0–1.9)
BILIRUB SERPL-MCNC: 0.4 MG/DL (ref 0.1–1)
BUN SERPL-MCNC: 10 MG/DL (ref 6–20)
C3 SERPL-MCNC: 77 MG/DL (ref 50–180)
C4 SERPL-MCNC: 22 MG/DL (ref 11–44)
CALCIUM SERPL-MCNC: 8.4 MG/DL (ref 8.7–10.5)
CHLORIDE SERPL-SCNC: 110 MMOL/L (ref 95–110)
CO2 SERPL-SCNC: 26 MMOL/L (ref 23–29)
CREAT SERPL-MCNC: 0.7 MG/DL (ref 0.5–1.4)
CRP SERPL-MCNC: 0.4 MG/L (ref 0–8.2)
DIFFERENTIAL METHOD: ABNORMAL
EOSINOPHIL # BLD AUTO: 0.1 K/UL (ref 0–0.5)
EOSINOPHIL NFR BLD: 4.3 % (ref 0–8)
ERYTHROCYTE [DISTWIDTH] IN BLOOD BY AUTOMATED COUNT: 12.9 % (ref 11.5–14.5)
ERYTHROCYTE [SEDIMENTATION RATE] IN BLOOD BY WESTERGREN METHOD: 19 MM/HR (ref 0–36)
EST. GFR  (AFRICAN AMERICAN): >60 ML/MIN/1.73 M^2
EST. GFR  (NON AFRICAN AMERICAN): >60 ML/MIN/1.73 M^2
GLUCOSE SERPL-MCNC: 87 MG/DL (ref 70–110)
HCT VFR BLD AUTO: 33.1 % (ref 37–48.5)
HGB BLD-MCNC: 11.2 G/DL (ref 12–16)
IMM GRANULOCYTES # BLD AUTO: 0 K/UL (ref 0–0.04)
IMM GRANULOCYTES NFR BLD AUTO: 0 % (ref 0–0.5)
LYMPHOCYTES # BLD AUTO: 0.8 K/UL (ref 1–4.8)
LYMPHOCYTES NFR BLD: 32.6 % (ref 18–48)
MCH RBC QN AUTO: 28.9 PG (ref 27–31)
MCHC RBC AUTO-ENTMCNC: 33.8 G/DL (ref 32–36)
MCV RBC AUTO: 85 FL (ref 82–98)
MONOCYTES # BLD AUTO: 0.3 K/UL (ref 0.3–1)
MONOCYTES NFR BLD: 10.9 % (ref 4–15)
NEUTROPHILS # BLD AUTO: 1.3 K/UL (ref 1.8–7.7)
NEUTROPHILS NFR BLD: 51.8 % (ref 38–73)
NRBC BLD-RTO: 0 /100 WBC
PLATELET # BLD AUTO: 174 K/UL (ref 150–350)
PMV BLD AUTO: 10.3 FL (ref 9.2–12.9)
POTASSIUM SERPL-SCNC: 3.5 MMOL/L (ref 3.5–5.1)
PROT SERPL-MCNC: 7.1 G/DL (ref 6–8.4)
RBC # BLD AUTO: 3.88 M/UL (ref 4–5.4)
SODIUM SERPL-SCNC: 142 MMOL/L (ref 136–145)
WBC # BLD AUTO: 2.58 K/UL (ref 3.9–12.7)

## 2020-08-24 PROCEDURE — 86160 COMPLEMENT ANTIGEN: CPT | Mod: 59

## 2020-08-24 PROCEDURE — 85025 COMPLETE CBC W/AUTO DIFF WBC: CPT

## 2020-08-24 PROCEDURE — 86160 COMPLEMENT ANTIGEN: CPT

## 2020-08-24 PROCEDURE — 86225 DNA ANTIBODY NATIVE: CPT

## 2020-08-24 PROCEDURE — 86140 C-REACTIVE PROTEIN: CPT

## 2020-08-24 PROCEDURE — 86225 DNA ANTIBODY NATIVE: CPT | Mod: 59

## 2020-08-24 PROCEDURE — 85652 RBC SED RATE AUTOMATED: CPT

## 2020-08-24 PROCEDURE — 36415 COLL VENOUS BLD VENIPUNCTURE: CPT

## 2020-08-24 PROCEDURE — 80053 COMPREHEN METABOLIC PANEL: CPT

## 2020-08-27 LAB
DNA TITER: NORMAL
DSDNA AB SER-ACNC: POSITIVE [IU]/ML

## 2020-08-28 ENCOUNTER — TELEPHONE (OUTPATIENT)
Dept: FAMILY MEDICINE | Facility: CLINIC | Age: 30
End: 2020-08-28

## 2020-08-28 DIAGNOSIS — F39 MOOD DISORDER: Primary | ICD-10-CM

## 2020-08-28 DIAGNOSIS — R41.840 POOR CONCENTRATION: ICD-10-CM

## 2020-08-28 NOTE — TELEPHONE ENCOUNTER
----- Message from Magalis Baron sent at 8/28/2020 12:40 PM CDT -----  Contact: HEIDI  Would like to consult with nurse about yanira cole For [facility] please call back  [phone]

## 2020-08-30 ENCOUNTER — NURSE TRIAGE (OUTPATIENT)
Dept: ADMINISTRATIVE | Facility: CLINIC | Age: 30
End: 2020-08-30

## 2020-08-30 ENCOUNTER — HOSPITAL ENCOUNTER (EMERGENCY)
Facility: HOSPITAL | Age: 30
Discharge: HOME | End: 2020-08-30
Attending: EMERGENCY MEDICINE
Payer: MEDICAID

## 2020-08-30 VITALS
HEIGHT: 65 IN | HEART RATE: 85 BPM | OXYGEN SATURATION: 99 % | TEMPERATURE: 98 F | WEIGHT: 166.44 LBS | SYSTOLIC BLOOD PRESSURE: 166 MMHG | BODY MASS INDEX: 27.73 KG/M2 | DIASTOLIC BLOOD PRESSURE: 92 MMHG | RESPIRATION RATE: 20 BRPM

## 2020-08-30 DIAGNOSIS — F32.9 REACTIVE DEPRESSION: Primary | ICD-10-CM

## 2020-08-30 LAB
ALBUMIN SERPL BCP-MCNC: 3.8 G/DL (ref 3.5–5.2)
ALP SERPL-CCNC: 66 U/L (ref 55–135)
ALT SERPL W/O P-5'-P-CCNC: 14 U/L (ref 10–44)
AMPHET+METHAMPHET UR QL: NEGATIVE
ANION GAP SERPL CALC-SCNC: 8 MMOL/L (ref 8–16)
APAP SERPL-MCNC: <3 UG/ML (ref 10–20)
AST SERPL-CCNC: 24 U/L (ref 10–40)
BACTERIA #/AREA URNS HPF: NORMAL /HPF
BARBITURATES UR QL SCN>200 NG/ML: NEGATIVE
BASOPHILS # BLD AUTO: 0.01 K/UL (ref 0–0.2)
BASOPHILS NFR BLD: 0.4 % (ref 0–1.9)
BENZODIAZ UR QL SCN>200 NG/ML: NEGATIVE
BILIRUB SERPL-MCNC: 0.5 MG/DL (ref 0.1–1)
BILIRUB UR QL STRIP: NEGATIVE
BUN SERPL-MCNC: 8 MG/DL (ref 6–20)
BZE UR QL SCN: NEGATIVE
CALCIUM SERPL-MCNC: 9.2 MG/DL (ref 8.7–10.5)
CANNABINOIDS UR QL SCN: NORMAL
CHLORIDE SERPL-SCNC: 108 MMOL/L (ref 95–110)
CLARITY UR: CLEAR
CO2 SERPL-SCNC: 22 MMOL/L (ref 23–29)
COLOR UR: YELLOW
CREAT SERPL-MCNC: 0.7 MG/DL (ref 0.5–1.4)
CREAT UR-MCNC: 200.9 MG/DL (ref 15–325)
DIFFERENTIAL METHOD: ABNORMAL
EOSINOPHIL # BLD AUTO: 0.1 K/UL (ref 0–0.5)
EOSINOPHIL NFR BLD: 1.8 % (ref 0–8)
ERYTHROCYTE [DISTWIDTH] IN BLOOD BY AUTOMATED COUNT: 12.7 % (ref 11.5–14.5)
EST. GFR  (AFRICAN AMERICAN): >60 ML/MIN/1.73 M^2
EST. GFR  (NON AFRICAN AMERICAN): >60 ML/MIN/1.73 M^2
ETHANOL SERPL-MCNC: <10 MG/DL
GLUCOSE SERPL-MCNC: 88 MG/DL (ref 70–110)
GLUCOSE UR QL STRIP: NEGATIVE
HCT VFR BLD AUTO: 37.8 % (ref 37–48.5)
HGB BLD-MCNC: 12.7 G/DL (ref 12–16)
HGB UR QL STRIP: NEGATIVE
HIV 1+2 AB+HIV1 P24 AG SERPL QL IA: NEGATIVE
HYALINE CASTS #/AREA URNS LPF: 0 /LPF
IMM GRANULOCYTES # BLD AUTO: 0 K/UL (ref 0–0.04)
IMM GRANULOCYTES NFR BLD AUTO: 0 % (ref 0–0.5)
KETONES UR QL STRIP: NEGATIVE
LEUKOCYTE ESTERASE UR QL STRIP: NEGATIVE
LYMPHOCYTES # BLD AUTO: 0.7 K/UL (ref 1–4.8)
LYMPHOCYTES NFR BLD: 24.3 % (ref 18–48)
MCH RBC QN AUTO: 28.8 PG (ref 27–31)
MCHC RBC AUTO-ENTMCNC: 33.6 G/DL (ref 32–36)
MCV RBC AUTO: 86 FL (ref 82–98)
METHADONE UR QL SCN>300 NG/ML: NEGATIVE
MICROSCOPIC COMMENT: NORMAL
MONOCYTES # BLD AUTO: 0.2 K/UL (ref 0.3–1)
MONOCYTES NFR BLD: 8.1 % (ref 4–15)
NEUTROPHILS # BLD AUTO: 1.9 K/UL (ref 1.8–7.7)
NEUTROPHILS NFR BLD: 65.4 % (ref 38–73)
NITRITE UR QL STRIP: NEGATIVE
NRBC BLD-RTO: 0 /100 WBC
OPIATES UR QL SCN: NEGATIVE
PCP UR QL SCN>25 NG/ML: NEGATIVE
PH UR STRIP: 7 [PH] (ref 5–8)
PLATELET # BLD AUTO: 219 K/UL (ref 150–350)
PMV BLD AUTO: 11.3 FL (ref 9.2–12.9)
POTASSIUM SERPL-SCNC: 3.5 MMOL/L (ref 3.5–5.1)
PROT SERPL-MCNC: 8.3 G/DL (ref 6–8.4)
PROT UR QL STRIP: ABNORMAL
RBC # BLD AUTO: 4.41 M/UL (ref 4–5.4)
RBC #/AREA URNS HPF: 3 /HPF (ref 0–4)
SARS-COV-2 RDRP RESP QL NAA+PROBE: NEGATIVE
SODIUM SERPL-SCNC: 138 MMOL/L (ref 136–145)
SP GR UR STRIP: 1.02 (ref 1–1.03)
SQUAMOUS #/AREA URNS HPF: 7 /HPF
TOXICOLOGY INFORMATION: NORMAL
TSH SERPL DL<=0.005 MIU/L-ACNC: 0.6 UIU/ML (ref 0.4–4)
URN SPEC COLLECT METH UR: ABNORMAL
UROBILINOGEN UR STRIP-ACNC: NEGATIVE EU/DL
WBC # BLD AUTO: 2.84 K/UL (ref 3.9–12.7)
WBC #/AREA URNS HPF: 3 /HPF (ref 0–5)

## 2020-08-30 PROCEDURE — 99283 EMERGENCY DEPT VISIT LOW MDM: CPT

## 2020-08-30 PROCEDURE — 86703 HIV-1/HIV-2 1 RESULT ANTBDY: CPT

## 2020-08-30 PROCEDURE — U0002 COVID-19 LAB TEST NON-CDC: HCPCS

## 2020-08-30 PROCEDURE — 80329 ANALGESICS NON-OPIOID 1 OR 2: CPT

## 2020-08-30 PROCEDURE — 80320 DRUG SCREEN QUANTALCOHOLS: CPT

## 2020-08-30 PROCEDURE — 99201 PR OFFICE/OUTPT VISIT,NEW,LEVL I: ICD-10-PCS | Mod: 95,AF,HB, | Performed by: PSYCHIATRY & NEUROLOGY

## 2020-08-30 PROCEDURE — 85025 COMPLETE CBC W/AUTO DIFF WBC: CPT

## 2020-08-30 PROCEDURE — 80053 COMPREHEN METABOLIC PANEL: CPT

## 2020-08-30 PROCEDURE — 84443 ASSAY THYROID STIM HORMONE: CPT

## 2020-08-30 PROCEDURE — 80307 DRUG TEST PRSMV CHEM ANLYZR: CPT

## 2020-08-30 PROCEDURE — 81000 URINALYSIS NONAUTO W/SCOPE: CPT | Mod: 59

## 2020-08-30 PROCEDURE — 99201 PR OFFICE/OUTPT VISIT,NEW,LEVL I: CPT | Mod: 95,AF,HB, | Performed by: PSYCHIATRY & NEUROLOGY

## 2020-08-30 NOTE — TELEPHONE ENCOUNTER
Reason for Disposition   Nursing judgment, per information in Reference    Protocols used: NO GUIDELINE [id number]  pt called re having 'mental breakdown'. Denies SI/HI. Rec EMS.pt asking about 72 hour hold. Reiterate EMS. Pt agrees. call back with questions

## 2020-08-30 NOTE — ED NOTES
Pt states she is angry at everything but denies SI/HI. Not answering any other questions and states she is ready to leave because its taking too long. Attempted to stick pt unsuccessfully. Refusing to be stuck again. States she wants to leave. No PEC signed at this time. MD notified. States he is going to [place] at this time.

## 2020-08-30 NOTE — ED PROVIDER NOTES
SCRIBE #1 NOTE: I, Erendira Beverly, am scribing for, and in the presence of, Luc Dominguez Jr., MD. I have scribed the entire note.       History     Chief Complaint   Patient presents with    Psychiatric Evaluation     Tearful in triage, would like a psych eval, states she has a lot going on and her emotions have been going up and down, denies SI     Review of patient's allergies indicates:  No Known Allergies      History of Present Illness     HPI    2020, 3:51 PM  History obtained from the patient      History of Present Illness: Alisia Vines is a 29 y.o. female patient with a h/o allergic rhinitis, bipolar disorder, GERD, condyloma acuminata, HSV-2, lupus nephritis, sjogren's syndrome, thrombocytopenia, who presents to the Emergency Department for psychiatric evaluation which onset a few days ago. Pt states that she has a lot going on and her emotions have been going up and down. Symptoms are constant and moderate in severity. Associated sxs include depressed mood. Patient denies any SI, HI, auditory hallucinations, visual hallucinations, and all other sxs at this time. Pt reports that she is not taking any psychiatric medications. No further complaints or concerns at this time.       Arrival mode: Personal transportation      PCP: Saumya Quinonez MD      Past Medical History:  Past Medical History:   Diagnosis Date    Allergic rhinitis     Anemia     Condyloma acuminata     GERD (gastroesophageal reflux disease)     History of fetal anomaly in prior pregnancy, currently pregnant, unspecified trimester 3/8/2017    Pacemaker placed    HSV-2 (herpes simplex virus 2) infection     Lupus nephritis     Mood disorder     Overweight(278.02)     Sjogren's syndrome     Systemic lupus complicating pregnancy 2019    Systemic lupus erythematosus     Thrombocytopenia        Past Surgical History:  Past Surgical History:   Procedure Laterality Date     SECTION WITH TUBAL  LIGATION N/A 2019    Procedure:  SECTION, WITH TUBAL LIGATION;  Surgeon: VERONICA Valdovinos MD;  Location: [facility] L&D;  Service: OB/GYN;  Laterality: N/A;     SECTION, LOW TRANSVERSE      x2    RENAL BIOPSY  2013         Family History:  Family History   Problem Relation Age of Onset    Hypertension Mother     Eczema Brother     Heart disease Son     Arrhythmia Son         CHB    Hypertension Maternal Grandmother     Diabetes Paternal Grandmother        Social History:   Social History     Tobacco Use    Smoking status: Never Smoker    Smokeless tobacco: Never Used   Substance and Sexual Activity    Alcohol use: No     Alcohol/week: 0.0 standard drinks    Drug use: No    Sexual activity: Not Currently     Partners: Male     Birth control/protection: None        Review of Systems     Review of Systems   Constitutional: Negative for fever.   HENT: Negative for sore throat.    Respiratory: Negative for shortness of breath.    Cardiovascular: Negative for chest pain.   Gastrointestinal: Negative for nausea and vomiting.   Genitourinary: Negative for dysuria.   Musculoskeletal: Negative for back pain.   Skin: Negative for rash.   Neurological: Negative for weakness and headaches.   Hematological: Does not bruise/bleed easily.   Psychiatric/Behavioral: Negative for hallucinations (visual or auditory) and suicidal ideas.        (+) depressed mood  (-) HI   All other systems reviewed and are negative.       Physical Exam     Initial Vitals [20 1533]   BP Pulse Resp Temp SpO2   (S) (!) 192/127 83 18 98.3 °F (36.8 °C) 99 %      MAP       --          Physical Exam  Nursing Notes and Vital Signs Reviewed.  Constitutional: Well-developed and well-nourished.   Head: Atraumatic. Normocephalic.  Eyes: EOM intact. No scleral icterus.  ENT: Mucous membranes are moist. Oropharynx is clear and symmetric.    Neck: Supple. Full ROM. No lymphadenopathy.  Cardiovascular: Regular rate. Regular  "rhythm. No murmurs, rubs, or gallops. Distal pulses are 2+ and symmetric.  Pulmonary/Chest: No respiratory distress. Clear to auscultation bilaterally. No wheezing or rales.  Abdominal: Soft and non-distended.  There is no tenderness.  No rebound, guarding, or rigidity.  Genitourinary: No CVA tenderness  Musculoskeletal: Moves all extremities. No obvious deformities. No calf tenderness.  Skin: Warm and dry.  Neurological:  Alert, awake, and appropriate.  Normal speech.  No acute focal neurological deficits are appreciated.  Psychiatric: Flat affect. Teary eyed. Depressed state. Appropriate in content.     ED Course   Procedures  ED Vital Signs:  Vitals:    08/30/20 1533 08/30/20 1739   BP: (S) (!) 192/127 (!) 166/92   Pulse: 83 85   Resp: 18 20   Temp: 98.3 °F (36.8 °C)    TempSrc: Oral    SpO2: 99% 99%   Weight: 75.5 kg (166 lb 7.2 oz)    Height: 5' 5" (1.651 m)        Abnormal Lab Results:  Labs Reviewed   CBC W/ AUTO DIFFERENTIAL - Abnormal; Notable for the following components:       Result Value    WBC 2.84 (*)     Lymph # 0.7 (*)     Mono # 0.2 (*)     All other components within normal limits   URINALYSIS, REFLEX TO URINE CULTURE - Abnormal; Notable for the following components:    Protein, UA 2+ (*)     All other components within normal limits    Narrative:     Specimen Source->Urine   DRUG SCREEN PANEL, URINE EMERGENCY    Narrative:     Specimen Source->Urine   SARS-COV-2 RNA AMPLIFICATION, QUAL   URINALYSIS MICROSCOPIC    Narrative:     Specimen Source->Urine   HIV 1 / 2 ANTIBODY   COMPREHENSIVE METABOLIC PANEL   TSH   ALCOHOL,MEDICAL (ETHANOL)   ACETAMINOPHEN LEVEL        All Lab Results:  Results for orders placed or performed during the hospital encounter of 08/30/20   CBC auto differential   Result Value Ref Range    WBC 2.84 (L) 3.90 - 12.70 K/uL    RBC 4.41 4.00 - 5.40 M/uL    Hemoglobin 12.7 12.0 - 16.0 g/dL    Hematocrit 37.8 37.0 - 48.5 %    Mean Corpuscular Volume 86 82 - 98 fL    Mean " Corpuscular Hemoglobin 28.8 27.0 - 31.0 pg    Mean Corpuscular Hemoglobin Conc 33.6 32.0 - 36.0 g/dL    RDW 12.7 11.5 - 14.5 %    Platelets 219 150 - 350 K/uL    MPV 11.3 9.2 - 12.9 fL    Immature Granulocytes 0.0 0.0 - 0.5 %    Gran # (ANC) 1.9 1.8 - 7.7 K/uL    Immature Grans (Abs) 0.00 0.00 - 0.04 K/uL    Lymph # 0.7 (L) 1.0 - 4.8 K/uL    Mono # 0.2 (L) 0.3 - 1.0 K/uL    Eos # 0.1 0.0 - 0.5 K/uL    Baso # 0.01 0.00 - 0.20 K/uL    nRBC 0 0 /100 WBC    Gran% 65.4 38.0 - 73.0 %    Lymph% 24.3 18.0 - 48.0 %    Mono% 8.1 4.0 - 15.0 %    Eosinophil% 1.8 0.0 - 8.0 %    Basophil% 0.4 0.0 - 1.9 %    Differential Method Automated    Urinalysis, Reflex to Urine Culture Urine, Clean Catch    Specimen: Urine   Result Value Ref Range    Specimen UA Urine, Clean Catch     Color, UA Yellow Yellow, Straw, Erin    Appearance, UA Clear Clear    pH, UA 7.0 5.0 - 8.0    Specific Gravity, UA 1.025 1.005 - 1.030    Protein, UA 2+ (A) Negative    Glucose, UA Negative Negative    Ketones, UA Negative Negative    Bilirubin (UA) Negative Negative    Occult Blood UA Negative Negative    Nitrite, UA Negative Negative    Urobilinogen, UA Negative <2.0 EU/dL    Leukocytes, UA Negative Negative   Drug screen panel, emergency   Result Value Ref Range    Benzodiazepines Negative     Methadone metabolites Negative     Cocaine (Metab.) Negative     Opiate Scrn, Ur Negative     Barbiturate Screen, Ur Negative     Amphetamine Screen, Ur Negative     THC Presumptive Positive     Phencyclidine Negative     Creatinine, Random Ur 200.9 15.0 - 325.0 mg/dL    Toxicology Information SEE COMMENT    COVID-19 Rapid Screening   Result Value Ref Range    SARS-CoV-2 RNA, Amplification, Qual Negative Negative   Urinalysis Microscopic   Result Value Ref Range    RBC, UA 3 0 - 4 /hpf    WBC, UA 3 0 - 5 /hpf    Bacteria Rare None-Occ /hpf    Squam Epithel, UA 7 /hpf    Hyaline Casts, UA 0 0-1/lpf /lpf    Microscopic Comment SEE COMMENT            The Emergency  Provider reviewed the vital signs and test results, which are outlined above.     ED Discussion     4:35 PM: Pt states that she does not want to have blood drawn and would like to be discharged at this time. After speaking with patient, she has decided to stay and get bloodwork done and talk to telepsych.    5:30 PM: Discussed pt's case with Dr. Connelly (Psychiatry) who recommends that pt can be discharged.    5:40 PM: Reassessed pt at this time.  Pt states her condition has improved at this time. Discussed with pt all pertinent ED information and results. Discussed pt dx and plan of tx. Gave pt all f/u and return to the ED instructions. All questions and concerns were addressed at this time. Pt expresses understanding of information and instructions, and is comfortable with plan to discharge. Pt is stable for discharge.    I discussed with patient and/or family/caretaker that evaluation in the ED does not suggest any emergent or life threatening medical conditions requiring immediate intervention beyond what was provided in the ED, and I believe patient is safe for discharge.  Regardless, an unremarkable evaluation in the ED does not preclude the development or presence of a serious of life threatening condition. As such, patient was instructed to return immediately for any worsening or change in current symptoms.       MDM        Medical Decision Making:   Clinical Tests:   Lab Tests: Ordered and Reviewed           ED Medication(s):  Medications - No data to display    Discharge Medication List as of 8/30/2020  5:35 PM          Follow-up Information     [facility]. Call in 2 days.    Specialty: Psychiatry  Contact information:  [address] [facility] [id number] [phone]  Additional information:  2nd Floor - From [address], take the [facility] exit ([address]). Enter the facility from the Service Vicki Maria Attestation:   Scribe #1: I performed the above  scribed service and the documentation accurately describes the services I performed. I attest to the accuracy of the note.     Attending:   Physician Attestation Statement for Scribe #1: I, Luc Dominguez Jr., MD, personally performed the services described in this documentation, as scribed by Erendira Beverly, in my presence, and it is both accurate and complete.           Clinical Impression       ICD-10-CM ICD-9-CM   1. Reactive depression  F32.9 300.4       Disposition:   Disposition: Discharged  Condition: Stable         Luc Dominguez Jr., MD  08/30/20 6093
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
## 2020-08-31 NOTE — TELEPHONE ENCOUNTER
Spoke with pt and she states that she spoke with psychology this morning and does not need anything else at this time.

## 2020-09-01 ENCOUNTER — OFFICE VISIT (OUTPATIENT)
Dept: RHEUMATOLOGY | Facility: CLINIC | Age: 30
End: 2020-09-01
Payer: MEDICAID

## 2020-09-01 VITALS
BODY MASS INDEX: 28.87 KG/M2 | WEIGHT: 173.31 LBS | SYSTOLIC BLOOD PRESSURE: 150 MMHG | HEIGHT: 65 IN | HEART RATE: 97 BPM | DIASTOLIC BLOOD PRESSURE: 100 MMHG

## 2020-09-01 DIAGNOSIS — M32.14 SLE GLOMERULONEPHRITIS SYNDROME, WHO CLASS V: ICD-10-CM

## 2020-09-01 DIAGNOSIS — F32.9 REACTIVE DEPRESSION: ICD-10-CM

## 2020-09-01 DIAGNOSIS — M35.00 SJOGREN'S SYNDROME, WITH UNSPECIFIED ORGAN INVOLVEMENT: ICD-10-CM

## 2020-09-01 DIAGNOSIS — R76.8 ANA POSITIVE: ICD-10-CM

## 2020-09-01 DIAGNOSIS — D84.9 IMMUNOSUPPRESSED STATUS: ICD-10-CM

## 2020-09-01 DIAGNOSIS — M32.9 SYSTEMIC LUPUS ERYTHEMATOSUS ARTHRITIS: Primary | ICD-10-CM

## 2020-09-01 PROCEDURE — 99999 PR PBB SHADOW E&M-EST. PATIENT-LVL III: CPT | Mod: PBBFAC,,, | Performed by: INTERNAL MEDICINE

## 2020-09-01 PROCEDURE — 99214 PR OFFICE/OUTPT VISIT, EST, LEVL IV, 30-39 MIN: ICD-10-PCS | Mod: S$PBB,,, | Performed by: INTERNAL MEDICINE

## 2020-09-01 PROCEDURE — 99999 PR PBB SHADOW E&M-EST. PATIENT-LVL III: ICD-10-PCS | Mod: PBBFAC,,, | Performed by: INTERNAL MEDICINE

## 2020-09-01 PROCEDURE — 99213 OFFICE O/P EST LOW 20 MIN: CPT | Mod: PBBFAC | Performed by: INTERNAL MEDICINE

## 2020-09-01 PROCEDURE — 99214 OFFICE O/P EST MOD 30 MIN: CPT | Mod: S$PBB,,, | Performed by: INTERNAL MEDICINE

## 2020-09-01 RX ORDER — HYDROXYCHLOROQUINE SULFATE 200 MG/1
400 TABLET, FILM COATED ORAL DAILY
Qty: 60 TABLET | Refills: 2 | Status: SHIPPED | OUTPATIENT
Start: 2020-09-01 | End: 2021-03-08 | Stop reason: SDUPTHER

## 2020-09-04 ENCOUNTER — OFFICE VISIT (OUTPATIENT)
Dept: PSYCHIATRY | Facility: CLINIC | Age: 30
End: 2020-09-04
Payer: MEDICAID

## 2020-09-04 DIAGNOSIS — Z76.0 MEDICATION REFILL: ICD-10-CM

## 2020-09-04 DIAGNOSIS — R41.840 POOR CONCENTRATION: ICD-10-CM

## 2020-09-04 DIAGNOSIS — F39 MOOD DISORDER: ICD-10-CM

## 2020-09-04 PROCEDURE — 90792 PR PSYCHIATRIC DIAGNOSTIC EVALUATION W/MEDICAL SERVICES: ICD-10-PCS | Mod: 95,HP,HB, | Performed by: PSYCHOLOGIST

## 2020-09-04 PROCEDURE — 90792 PSYCH DIAG EVAL W/MED SRVCS: CPT | Mod: 95,HP,HB, | Performed by: PSYCHOLOGIST

## 2020-09-04 RX ORDER — FLUOXETINE HYDROCHLORIDE 40 MG/1
40 CAPSULE ORAL DAILY
Qty: 30 CAPSULE | Refills: 0 | Status: SHIPPED | OUTPATIENT
Start: 2020-09-04 | End: 2020-10-02 | Stop reason: SDUPTHER

## 2020-09-04 RX ORDER — ARIPIPRAZOLE 2 MG/1
2 TABLET ORAL DAILY
Qty: 30 TABLET | Refills: 0 | Status: SHIPPED | OUTPATIENT
Start: 2020-09-04 | End: 2020-10-02 | Stop reason: SDUPTHER

## 2020-09-04 NOTE — PATIENT INSTRUCTIONS
"[facility]   PATIENT INFORMATION    We appreciate the opportunity to participate in your medical care and hope the following protocols will make it easier for you to receive quality treatment in our department.    PUNCTUALITY: Your appointment is scheduled for a fixed amount of time, reserved especially for you.  To get the benefit of your appointment, please arrive at least 15 minutes early to allow time for traffic, parking and registration.  Should you arrive more than 20 minutes late to your appointment, you will be rescheduled in order to assure your clinician has adequate time to assess you and provide helpful care.      APPOINTMENTS: Appointments are made by the nursing/front office staff or through the patient portal. Providers do not have access  to schedule appointments. Walk in appointments are not available. FOR EMERGENCIES, PLEASE GO THE CLOSEST EMERGENCY ROOM.    CANCELLATION/MISSED APPOINTMENTS:   In order to receive quality care, all appointments must be kept.  If you are unable to keep an appointment, please reschedule at least 3 days prior if possible. Late cancellations (within 24 hours of the appointment) and repeated no-show appointments may result in dismissal from the clinic. After two no show/late cancellation visits, you will receive a notice letter, alerting you to keep visits to prevent department dismissal. If another visit is missed after receipt of the notice, you will be discharged from the clinic. This policy is in effect to allow for other individuals on a long waiting list to be seen as soon as possible. Unlike other branches of medicine where several individuals can be scheduled in a 30 minute time slot, only one individual can be scheduled in any time slot in Psychiatry.     MESSAGES: For simple questions/concerns, you may contact your individual providers electronically through the "[facility]" portal or by calling [phone] " with messages relayed via office staff. If relevant, include pharmacy name and phone number, date of last visit and next scheduled visit, phone number where you can be reached throughout the day, and whether leaving a voicemail or message on an answering machine is acceptable. Messages will be returned by the Medical Assistant or Office Staff after your provider has reviewed the message.  Please allow 24 hours for a returned message before leaving another message. Messages will be checked each workday (Monday through Friday) during office hours (8:00 a.m. and 5:00 p.m.) and returned at most within one business day.  You may leave a non-urgent message after hours. Note that psychotherapy and medication management are not appropriate by telephone or the patient portal.    PRESCRIPTION REFILLS:  Please communicate with your prescriber about any refills you need during your appointment. You may also request refills through the [facility] portal (preferred) or by calling the clinic. Prescriptions will be filled during office hours.      Please do not wait until you are completely out of medication to request refills. Same day refills are not always possible. Patients may experience symptoms of withdrawal if they run out of medications. The patient assumes all responsibility when there is an issue with non-compliance with follow-up appointments and medications.   Some medications are controlled and regulated by the FDA and [place]. Some of these medications can not be refilled before 30 days and require a face to face appointment.     PAPERWORK REQUESTS: If you have any forms or letters that need to be completed by your doctor, please present these at the beginning of the appointment to ensure that information needed to complete them is obtained during the office visit. Paperwork will be returned within 7-10 business days. Staff will call you to  the paperwork when completed.    SPECIAL EVALUATIONS: Please note that  "our department is treatment-focused. As such, we focus on treatment-oriented evaluations and do not perform specialty or "forensic" evaluations. Examples are listed below.     Disability: We do not do disability evaluations.  Please contact Social Security Administration for evaluations and determinations. You will then sign releases allowing for records from your treatment providers to be forwarded to Social Security Administration to use in their evaluation.   Gun Permit: We do not offer Sound Judgment Evaluations or assessments leading to gun ownership, nor do we fill out or file paperwork relevant to owning, concealing or purchasing a firearm.   Emotional Support      Animals ([place]): We do not provide documentation, including letters, to aid in the acclamation that an Emotional Support Animal is required. Note that ESAs are not trained to perform tasks or recognize particular signs or symptoms. Rather, they are distinguished by the close, emotional, and supportive bond between the animal and the owner.       SAMPLES: We do not provide samples of any medications. If you have financial difficulties and are on a limited income, you may qualify for Patient Assistance Programs from various pharmaceutical companies. This will require that you complete paperwork with your financial information, but this does not guarantee that the company will approve the application. Alternative medication options can be discussed.    REFERRALS/COORDINATION: You will be referred to other providers if we feel unable to adequately diagnose or treat your particular condition, or if collaboration with another provider would allow for better management of your condition.    This document is for information purposes only. Please refer to the full disclaimer and copyright statement available at http://www.[facility].health.wa.gov.au regarding the information from this website before making use of such information.  See website " www.cci.health.wa.gov.au for more handouts and resources.    What is Sleep Hygiene?  Sleep hygiene is the term used to describe good sleep habits. Considerable research has gone into developing a set of guidelines and tips which are designed to enhance good sleeping, and there is much evidence to suggest that these strategies can provide long-term solutions to sleep difficulties. There are many medications which are used to treat insomnia,  but these tend to be only effective in the short-term. Ongoing use of sleeping pills may lead to dependence and interfere with developing good sleep habits independent of medication,  thereby prolonging sleep difficulties. Talk to your health professional about what is right for you, but we recommend good sleep hygiene as an important part of treating insomnia,  either with other strategies such as medication or cognitive therapy or alone.    Sleep Hygiene Tips  1) Get regular. One of the best ways to train your body to sleep well is to go to bed and get up at more or less the same time every day, even on weekends and days off! This regular rhythm will make you feel better and will give your body something to work from.  2) Sleep when sleepy. Only try to sleep when you actually feel tired or sleepy, rather than spending too much time awake in bed.  3) Get up & try again. If you havent been able to get to sleep after about 20 minutes or more, get up and do something calming or boring until you feel sleepy, then return to bed and try again. Sit quietly on the couch with the lights off (bright light will tell your brain that it is time to wake up), or read something boring like the phone book. Avoid doing anything that is too stimulating or interesting, as this will wake you up even more.  4) Avoid caffeine & nicotine. It is best to avoid consuming any caffeine (in coffee, tea, cola drinks, chocolate, and some medications) or nicotine (cigarettes) for at least 4-6 hours before  going to bed. These substances act as stimulants and interfere with the ability to fall asleep   5) Avoid alcohol. It is also best to avoid alcohol for at least 4-6 hours before going to bed. Many people believe that alcohol is relaxing and helps them to get to sleep at first, but it actually interrupts the quality of sleep.  6) Bed is for sleeping. Try not to use your bed for anything other than sleeping and sex, so that your body comes to associate bed with sleep. If you use bed as a place to watch TV, eat, read, work on your laptop, pay bills, and other things, your body will not learn this connection.  7) No naps. It is best to avoid taking naps during the day, to make sure that you are tired at bedtime. If you cant make it through the day without a nap, make sure it is for less than an hour and before 3pm.  8) Sleep rituals. You can develop your own rituals of things to remind your body that it is time to sleep - some people find it useful to do relaxing stretches or breathing exercises for 15 minutes before bed each night, or sit calmly with a cup of caffeine-free tea.  9) Bathtime. Having a hot bath 1-2 hours before bedtime can be useful, as it will raise your body temperature, causing you to feel sleepy as your body temperature drops again. Research shows that sleepiness is associated with a drop in body temperature.  10) No clock-watching. Many people who struggle with sleep tend to watch the clock too much. Frequently checking the clock during the night can wake you up (especially if you turn  on the light to read the time) and reinforces negative thoughts such as Oh no, look how late it is, Ill never get to sleep or its so early, I have only slept for 5 hours, this is  terrible.  11) Use a sleep diary. This worksheet can be a useful way of making sure you have the right facts about your sleep, rather than making assumptions. Because a diary involves watching  the clock (see point 10) it is a good  idea to only use it for two weeks to get an idea of what is going and then perhaps two months down the track to see how you are progressing.  12) Exercise. Regular exercise is a good idea to help with good sleep, but try not to do strenuous exercise in the 4 hours before bedtime. Morning walks are a great way to start the day feeling refreshed!  13) Eat right. A healthy, balanced diet will help you to sleep well, but timing is important. Some people find that a very empty stomach at bedtime is distracting, so it can be useful  to have a light snack, but a heavy meal soon before bed can also interrupt sleep. Some people recommend a warm glass of milk, which contains tryptophan, which acts as a natural  sleep inducer.  14) The right space. It is very important that your bed and bedroom are quiet and comfortable for sleeping. A cooler room with enough blankets to stay warm is best, and make sure you have curtains or an eyemask to block out early morning light and earplugs if there is noise outside your room.  15) Keep daytime routine the same. Even if you have a bad night sleep and are tired it is important that you try to keep your daytime activities the same as you had planned. That is,  dont avoid activities because you feel tired. This can reinforce the insomnia.    Call In if problems  Call Report Side Effects   Encouraged to follow up with primary care / Gen Med MD for continued monitoring of general health and wellness  Call 911 Or go to ER if Acute Concerns (especially if any thoughts of harm to self or other)

## 2020-09-04 NOTE — LETTER
Cardiology Progress Note            Admit Date: 8/30/2017  Admit Diagnosis: Acute hyperkalemia  Inability to walk  Hypotension  Pulmonary edema  Orthostatic hypotension  Date: 9/5/2017     Time: 10:28 AM    Subjective:  Feeling better today. Still with low BP with standing. Denies CP, but can feel palpitations when Afib kicks in. Assessment and Plan     1. POTS   - Continue Midodrine and Florinef as ordered   - recommend fluids as bolus   - Follow up at [facility]  2. PAF   - Arrhythmia yesterday was Afib with LBBB   - BiV AICD interrogation this am   - Amiodarone 100 mg daily  3. CAD   - s/p stents   - ASA only. No statin d/t patient factors  4. CHF   - No BB or ACE 2/2 low BP   - Normal EF on last echo    BiV ICD interrogation this am.  Afib with LBBB yesterday. Pacing out of it. Still with ongoing POTS, follow up at [address] [place] [address]. Assessment/Plan/Discussion:Cardiology Attending:     Patient seen earlier today and examined  and agree with Advance Practice Provider (KELLY, NP,PA)  assessment and plans.   Pete Donald is a 69 y.o. male   Seen this am, moved to CCU as nurses on floor saw WCT and thought VT but their description and an asymptomatic patient suggested instead its AFib with aberrancy by BBB and indeed that is the case  He did not feel it at first, they work him up, after the second episode he did feel his heart very fast , like  It was \"playing the song Wipeout\"  Interrogation shows pacer with 82 episodes of AF, also with high Optivol in July   Pt remains orthostatic but at max doses generally- that is likely not going to get much better  We will increase amiodarone  If rhythm stable then not more to offer, fu at [facility]  If more rapid rate then may need AV marilyn ablation  Mag and Phos were fine  Check K in am    Pepe Washington MD 9/5/2017            ROS:  A comprehensive review of systems was negative except for that written in the September 4, 2020    Saumya Quinonez MD  [address]  [place] [address]       [facility]  [address] [facility]  [place] [address]  Phone: [phone]  Fax: [phone]   Patient: Alisia Vines   MR Number: [id number]   YOB: 1990   Date of Visit: 9/4/2020     Dear Dr. Quinonez:    Thank you for referring Alisia Vines to me for evaluation. Below are the relevant portions of my assessment and plan of care.    Alisia has a history of anger and irritable mood, extending to her youth when she was in juvenile [facility]. She has been treated for depression during a pregnancy but has had problems with consistency and compliance in the past. We discussed continuing Prozac and starting Abilify, as well as started counseling.    · Medication Management: Discussed risks, benefits, and alternatives to treatment plan documented above with patient. I answered all patient questions related to this plan, and patient expressed understanding and agreement.   continue Prozac 40 mg; start Abilify 2 mg  · counseling referral in clinic    Follow up in about 4 weeks (around 10/2/2020) for new medicine recheck.     If you have questions, please do not hesitate to call me. I look forward to following Alisia along with you.    Sincerely,      Mere Jarvis, PhD, MPAP   Advanced Practice Medical Psychologist          CC  No Recipients        HPI.    Objective:      Physical Exam:                Visit Vitals    BP (P) 132/76 (BP 1 Location: Right arm, BP Patient Position: At rest)    Pulse 99    Temp 97.8 °F (36.6 °C)    Resp 18    Ht 6' 2\" (1.88 m)    Wt 65.4 kg (144 lb 1.6 oz)    SpO2 99%    BMI 18.5 kg/m2        General Appearance:   Well developed, well nourished,alert and oriented x 3, and   individual in no acute distress. Ears/Nose/Mouth/Throat:    Hearing grossly normal.         Neck:  Supple. Chest:    Lungs clear to auscultation bilaterally. Cardiovascular:   Regular rate and rhythm, S1, S2 normal, no murmur. Abdomen:    Soft, non-tender, bowel sounds are active. Extremities:  No edema bilaterally. Skin:  Warm and dry.      Telemetry: paced          Data Review:    Labs:    Recent Results (from the past 24 hour(s))   EKG, 12 LEAD, INITIAL    Collection Time: 09/05/17  5:35 AM   Result Value Ref Range    Ventricular Rate 93 BPM    Atrial Rate 96 BPM    QRS Duration 124 ms    Q-T Interval 392 ms    QTC Calculation (Bezet) 487 ms    Calculated R Axis 97 degrees    Calculated T Axis 33 degrees    Diagnosis       Atrial fibrillation  Rightward axis  Left ventricular hypertrophy with QRS widening  ST & T wave abnormality, consider inferolateral ischemia or digitalis effect  When compared with ECG of 03-SEP-2017 03:54,  ST now depressed in Lateral leads    Confirmed by Pavel Arguello M.D., Oziel Hutchinson ([id number]) on 9/5/2017 8:30:44 AM     PHOSPHORUS    Collection Time: 09/05/17  5:43 AM   Result Value Ref Range    Phosphorus 2.7 2.6 - 4.7 MG/DL   MAGNESIUM    Collection Time: 09/05/17  5:43 AM   Result Value Ref Range    Magnesium 1.9 1.6 - 2.4 mg/dL          Radiology:        Current Facility-Administered Medications   Medication Dose Route Frequency    fludrocortisone (FLORINEF) tablet 200 mcg  200 mcg Oral QPM    enoxaparin (LOVENOX) injection 40 mg  40 mg SubCUTAneous Q24H    acetaminophen (TYLENOL) tablet 650 mg  650 mg Oral Q4H PRN    famotidine (PEPCID) tablet 20 mg  20 mg Oral BID    tamsulosin (FLOMAX) capsule 0.4 mg  0.4 mg Oral QHS    ondansetron (ZOFRAN) injection 4 mg  4 mg IntraVENous Q6H PRN    fludrocortisone (FLORINEF) tablet 400 mcg  400 mcg Oral DAILY    amiodarone (CORDARONE) tablet 100 mg  100 mg Oral DAILY    sodium chloride (NS) flush 5-10 mL  5-10 mL IntraVENous Q8H    sodium chloride (NS) flush 5-10 mL  5-10 mL IntraVENous PRN    aspirin delayed-release tablet 81 mg  81 mg Oral DAILY    levothyroxine (SYNTHROID) tablet 150 mcg  150 mcg Oral ACB    cyanocobalamin (VITAMIN B12) tablet 2,000 mcg  2,000 mcg Oral DAILY    primidone (MYSOLINE) tablet 250 mg  250 mg Oral BID    hydrocortisone (CORTAID) 1 % cream   Topical TID PRN    midodrine (PROAMITINE) tablet 10 mg  10 mg Oral TID WITH MEALS          Juvencio White PA-C     [facility] [address] [place] [address] [address] [facility] [address]   [facility]   [phone]
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
## 2020-09-05 NOTE — PROGRESS NOTES
RHEUMATOLOGY CLINIC FOLLOW UP VISIT  Chief complaints:-  To follow up for lupus.    HPI:-  Alisia Jolly a 29 y.o. pleasant female comes in for a follow up visit.  She denies any adverse effects with rituximab therapy.  She has longstanding history of systemic lupus with class 5 lupus and membranous nephropathy with proteinuria.  Her proteinuria has been stable since adding rituximab therapy.  She has longstanding history of noncompliance with oral medications.  No arthritis symptoms at this time.  Her main problem is worsening depression.    Review of Systems   Constitutional: Negative for chills and fever.   HENT: Negative for congestion and sore throat.    Eyes: Negative for blurred vision and redness.   Respiratory: Negative for cough and shortness of breath.    Cardiovascular: Negative for chest pain and leg swelling.   Gastrointestinal: Negative for abdominal pain.   Genitourinary: Negative for dysuria.   Musculoskeletal: Negative for back pain, falls, joint pain, myalgias and neck pain.   Skin: Negative for rash.   Neurological: Negative for headaches.   Endo/Heme/Allergies: Does not bruise/bleed easily.   Psychiatric/Behavioral: Positive for depression. Negative for memory loss. The patient does not have insomnia.        Past Medical History:   Diagnosis Date    Allergic rhinitis     Anemia     Condyloma acuminata     GERD (gastroesophageal reflux disease)     History of fetal anomaly in prior pregnancy, currently pregnant, unspecified trimester 3/8/2017    Pacemaker placed    HSV-2 (herpes simplex virus 2) infection     Lupus nephritis     Mood disorder     Overweight(278.02)     Sjogren's syndrome     Systemic lupus complicating pregnancy 2019    Systemic lupus erythematosus     Thrombocytopenia        Past Surgical History:   Procedure Laterality Date     SECTION WITH TUBAL LIGATION N/A 2019    Procedure:  " SECTION, WITH TUBAL LIGATION;  Surgeon: VERONICA Valdovinos MD;  [facility] L[facility];  Service: OB/GYN;  Laterality: N/A;     SECTION, LOW TRANSVERSE      x2    RENAL BIOPSY  2013        Social History     Tobacco Use    Smoking status: Never Smoker    Smokeless tobacco: Never Used   Substance Use Topics    Alcohol use: No     Alcohol/week: 0.0 standard drinks    Drug use: No       Family History   Problem Relation Age of Onset    Hypertension Mother     Eczema Brother     Heart disease Son     Arrhythmia Son         CHB    Hypertension Maternal Grandmother     Diabetes Paternal Grandmother        Review of patient's allergies indicates:  No Known Allergies    Vitals:    20 1408   BP: (!) 150/100   Pulse: 97   Weight: 78.6 kg (173 lb 4.5 oz)   Height: 5' 5" (1.651 m)   PainSc:   2   PainLoc: Leg       Physical Exam   Constitutional: She is oriented to person, place, and time and well-developed, well-nourished, and in no distress. No distress.   HENT:   Head: Normocephalic.   Mouth/Throat: Oropharynx is clear and moist.   Eyes: Pupils are equal, round, and reactive to light. Conjunctivae and EOM are normal.   Neck: Normal range of motion. Neck supple.   Cardiovascular: Normal rate and intact distal pulses.   Pulmonary/Chest: Effort normal. No respiratory distress.   Abdominal: Soft. There is no abdominal tenderness.   Musculoskeletal:      Comments: No synovitis over small joints of hands or feet.  No effusion over large joints.   Neurological: She is alert and oriented to person, place, and time. No cranial nerve deficit.   Skin: Skin is warm. No rash noted. No erythema.   Psychiatric: Affect and judgment normal.   Nursing note and vitals reviewed.      Medication List with Changes/Refills   New Medications    ARIPIPRAZOLE (ABILIFY) 2 MG TAB    Take 1 tablet (2 mg total) by mouth once daily.   Current Medications    AMLODIPINE (NORVASC) 5 MG TABLET    Take 1 tablet (5 mg " total) by mouth once daily.    LOSARTAN-HYDROCHLOROTHIAZIDE 100-12.5 MG (HYZAAR) 100-12.5 MG TAB    Take 1 tablet by mouth once daily.    PREDNISONE (DELTASONE) 5 MG TABLET    Take 1 tablet (5 mg total) by mouth daily as needed (Joint pain).    VALACYCLOVIR (VALTREX) 500 MG TABLET    Take 2 tablets (1,000 mg total) by mouth once daily.   Changed and/or Refilled Medications    Modified Medication Previous Medication    FLUOXETINE 40 MG CAPSULE FLUoxetine 40 MG capsule       Take 1 capsule (40 mg total) by mouth once daily.    Take 1 capsule (40 mg total) by mouth once daily.    HYDROXYCHLOROQUINE (PLAQUENIL) 200 MG TABLET hydroxychloroquine (PLAQUENIL) 200 mg tablet       Take 2 tablets (400 mg total) by mouth once daily.    Take 2 tablets (400 mg total) by mouth once daily.       Assessment/Plans:-  1. Systemic lupus erythematosus arthritis    2. Sjogren's syndrome, with unspecified organ involvement    3. Immunosuppressed status    4. Reactive depression    5. SSA ANTIBODY positive    6. SLE glomerulonephritis syndrome, WHO class V      · Her lupus is well controlled on rituximab therapy.  But she has worsening symptoms of depression and anxiety recently.  She is scheduled to see the psychiatrist tomorrow.  · She has always not taken oral medications because of compliance issues.  That was 1 of the reason why she was put on rituximab.  I will continue monitoring her at this time and see her in 3 months to decide on further rituximab timing.  · Advised to take Plaquenil in the meantime.  · Compromised immune system secondary to autoimmune disease and use of immunosuppressive drugs. Monitor carefully for infections. Advised to get immediate medical care if any infection. Also advised strict adherence to age appropriate vaccinations and cancer screenings with PCP.  · Discussed in detail about importance of adhering to medications given by psychiatrist for depression.  ·     Problem List Items Addressed This Visit      Sjogren's syndrome    SLE glomerulonephritis syndrome, WHO class V    Overview      12/12/2018  Continue follow-up and management with Dr. Blair         Relevant Medications    hydrOXYchloroQUINE (PLAQUENIL) 200 mg tablet    Systemic lupus erythematosus arthritis - Primary    Relevant Medications    hydrOXYchloroQUINE (PLAQUENIL) 200 mg tablet    Immunosuppressed status    Reactive depression      Other Visit Diagnoses     SSA ANTIBODY positive        Relevant Medications    hydrOXYchloroQUINE (PLAQUENIL) 200 mg tablet        # Follow up in about 2 months (around 11/1/2020).      Disclaimer: This note was prepared using voice recognition system and is likely to have sound alike errors and is not proof read.  Please call me with any questions.

## 2020-09-11 ENCOUNTER — TELEPHONE (OUTPATIENT)
Dept: OBSTETRICS AND GYNECOLOGY | Facility: CLINIC | Age: 30
End: 2020-09-11

## 2020-09-11 NOTE — TELEPHONE ENCOUNTER
Called pt, Pt wants to reschedule appt , appt scheduled fro 9/15 with Ms Ayoub. Patricia MONTES DE OCA

## 2020-09-11 NOTE — TELEPHONE ENCOUNTER
----- Message from Angela Spain sent at 9/11/2020  3:07 PM CDT -----  Type:  Patient Returning Call    Who Called:Pt   Who Left Message for Patient:JOSEPH MITCHELL  Does the patient know what this is regarding?:  Would the patient rather a call back or a response via [facility]? Call back   Best Call Back Number:[phone] (home)   Additional Information:      Homeless

## 2020-09-11 NOTE — TELEPHONE ENCOUNTER
----- Message from Cindy Marroquin sent at 9/11/2020  2:01 PM CDT -----  Regarding: Appt access  Contact: Pt  Pt is calling to reschedule her appt for annual. Please call back at [phone]//thank you acc

## 2020-09-15 ENCOUNTER — PATIENT OUTREACH (OUTPATIENT)
Dept: ADMINISTRATIVE | Facility: OTHER | Age: 30
End: 2020-09-15

## 2020-09-15 ENCOUNTER — OFFICE VISIT (OUTPATIENT)
Dept: OBSTETRICS AND GYNECOLOGY | Facility: CLINIC | Age: 30
End: 2020-09-15
Payer: MEDICAID

## 2020-09-15 VITALS
BODY MASS INDEX: 28.02 KG/M2 | HEIGHT: 65 IN | SYSTOLIC BLOOD PRESSURE: 140 MMHG | DIASTOLIC BLOOD PRESSURE: 98 MMHG | WEIGHT: 168.19 LBS

## 2020-09-15 DIAGNOSIS — Z01.419 ENCOUNTER FOR GYNECOLOGICAL EXAMINATION WITHOUT ABNORMAL FINDING: Primary | ICD-10-CM

## 2020-09-15 PROCEDURE — 99395 PREV VISIT EST AGE 18-39: CPT | Mod: S$PBB,,, | Performed by: NURSE PRACTITIONER

## 2020-09-15 PROCEDURE — 99395 PR PREVENTIVE VISIT,EST,18-39: ICD-10-PCS | Mod: S$PBB,,, | Performed by: NURSE PRACTITIONER

## 2020-09-15 PROCEDURE — 99999 PR PBB SHADOW E&M-EST. PATIENT-LVL III: ICD-10-PCS | Mod: PBBFAC,,, | Performed by: NURSE PRACTITIONER

## 2020-09-15 PROCEDURE — 99213 OFFICE O/P EST LOW 20 MIN: CPT | Mod: PBBFAC | Performed by: NURSE PRACTITIONER

## 2020-09-15 PROCEDURE — 99999 PR PBB SHADOW E&M-EST. PATIENT-LVL III: CPT | Mod: PBBFAC,,, | Performed by: NURSE PRACTITIONER

## 2020-09-15 PROCEDURE — 88175 CYTOPATH C/V AUTO FLUID REDO: CPT

## 2020-09-15 RX ORDER — VALACYCLOVIR HYDROCHLORIDE 500 MG/1
1000 TABLET, FILM COATED ORAL DAILY
Qty: 90 TABLET | Refills: 0 | Status: SHIPPED | OUTPATIENT
Start: 2020-09-15 | End: 2020-10-26 | Stop reason: SDUPTHER

## 2020-09-15 RX ORDER — ARIPIPRAZOLE 2 MG/1
2 TABLET ORAL DAILY
Qty: 30 TABLET | Refills: 0 | OUTPATIENT
Start: 2020-09-15 | End: 2020-10-15

## 2020-09-15 RX ORDER — AMLODIPINE BESYLATE 5 MG/1
5 TABLET ORAL DAILY
Qty: 30 TABLET | Refills: 5 | Status: SHIPPED | OUTPATIENT
Start: 2020-09-15 | End: 2021-09-10

## 2020-09-15 RX ORDER — LOSARTAN POTASSIUM AND HYDROCHLOROTHIAZIDE 12.5; 1 MG/1; MG/1
1 TABLET ORAL DAILY
Qty: 30 TABLET | Refills: 5 | Status: SHIPPED | OUTPATIENT
Start: 2020-09-15 | End: 2022-08-15 | Stop reason: SDUPTHER

## 2020-09-15 NOTE — PATIENT INSTRUCTIONS
Breast Health: Breast Self-Awareness  What is breast self-awareness?  Breast self-awareness is knowing how your breasts normally look and feel. Your breasts change as you go through different stages of your life. So its important to learn what is normal for your breasts. Breast self-awareness helps you notice any changes in your breasts right away. Report any changes to your healthcare provider.  Why is breast self-awareness important?  Many experts now say that women should focus on breast self-awareness instead of doing a breast self-examination (BSE). These experts include the American Cancer Society, the U.S. Preventive Services Task Force, and the American Congress of Obstetricians and Gynecologists. Some experts even advise not teaching women to do a BSE. Thats because research hasnt shown a clear benefit to doing BSEs.  Breast self-awareness is different than a BSE. Breast self-awareness isnt about following a certain method and schedule. Its about knowing what's normal for your breasts. That way you can notice even small changes right away. If you see any changes, report them to your healthcare provider.  Changes to look for  Call your healthcare provider if you find any changes in your breasts that concern you. These changes may include:  · A lump  · Nipple discharge other than breast milk, especially a bloody discharge  · Swelling  · A change in size or shape  · Skin irritation, such as redness, thickening, or dimpling of the skin  · Swollen lymph nodes in the armpit  · Nipple problems, such as pain or redness  If you find a lump  Contact your provider if you find lumpiness in one breast, feel something different in the tissue, or feel a definite lump. Sometimes lumpiness may be due to menstrual changes. But there may be reason for concern.  Your provider may want to see you right away if you have:  · Nipple discharge that is bloody  · Skin changes on your breast, such as dimpling or puckering  Its  normal to be upset if you find a lump. But its important to contact your provider right away. Remember that most breast lumps are benign. This means they are not cancer.  Date Last Reviewed: 8/10/2015  © 0553-7217 [employer]. [address] [place] [address]. All rights reserved. This information is not intended as a substitute for professional medical care. Always follow your healthcare professional's instructions.        Clinical Breast Exam    Many health organizations recommend a yearly clinical breast exam. This exam may be done by a gynecologist, family healthcare provider, nurse practitioner, or specially trained nurse. Yearly breast exams help to make sure that breast conditions are found early.  Your healthcare providers role  A healthcare professional knows the tests and follow-up care needed if a problem is found. Your clinical exam is also a great time to ask questions about breast self-exams. You can find out if youre checking your breasts in the best way. Or you may want to ask how pregnancy, breast implants, or breast reduction surgery affect the way you should check your breasts.  Diagnostic tests  If a clinical exam reveals a breast change, you may have other tests to find out more. These tests may include:  · Mammography. A low-dose X-ray of your breast tissue.  · Ultrasound. An imaging test that uses sound waves to create images of your breast.  · Biopsy. A small amount of breast tissue is removed by needle or by a cut (incision). The tissue is then checked under a microscope.  Guidelines for having clinical breast exams  The American College of Obstetricians and Gynecologists recommends that starting at age 29, you should have a clinical breast exam every 1 to 3 years. After age 40, have a clinical breast exam each year. If youre at higher risk for breast cancer, you may need exams more often. Risk factors for breast cancer may include:  · Being over 50 or  postmenopausal  · Having a family history of breast cancer  · Having the BRCA1 or BRCA2 gene mutation or certain other gene mutations  · Having more menstrual periods due to starting menstruation early (before age 12) or having a late menopause (after age 55)  · Having no pregnancies  · Having a first pregnancy after age 30  · Being obese  · Having a history of radiation treatment to your chest area  · Exposure to GAURI during your mother's pregnancy  · Not being active  · Drinking too much alcohol  · Having dense breast tissue  · Taking hormone therapy after menopause  Other health organizations have different recommendations. Talk to your healthcare provider about what is best for you.   Date Last Reviewed: 8/13/2015  © 5511-9126 [employer]. [address] [place] [address], [place] [address]. All rights reserved. This information is not intended as a substitute for professional medical care. Always follow your healthcare professional's instructions.        The Range of Pap Test Results  When your Pap test is sent to the lab, the lab studies your cell samples and reports any abnormal cell changes. Your health care provider can discuss these changes with you. In some cases, an abnormal Pap test is due to an infection. More serious cell changes range from dysplasia to cancer. Talk to your health care provider about your Pap test.    Normal results  Cervical cells, even normal ones, are always changing. As they mature, normal squamous cells move from deeper layers within the cervix. Over time, these cells flatten and cover the surface of the cervix. Within the cervical canal, the cells are different. These glandular cells are taller and not as flat as the cells on the surface of the cervix. When a Pap test sample shows healthy cells of both types, the results are negative. Keep having Pap tests as often as directed.  Abnormal results  A positive Pap test result means some cells in the sample showed abnormal  changes. These results are grouped by the type of cell change and the location, or extent, of the changes. Depending on the results, you may need further testing.  · Inflammation: Noncancerous changes are present. They may be due to normal cell repair. Or, they may be caused by an infection, such as HPV or yeast. Further testing may be needed. (Also called reactive cellular changes.)  · Atypical squamous cells: Test results are unclear. Cells on the surface of the cervix show changes, but their significance is not yet known. Testing for HPV and other sexually transmitted infections (STIs) may be needed. Treatment may be required. (Reported as ASC-US or ASC-H.)  · Atypical glandular cells: Cells lining the cervical canal show abnormal changes. Further testing is likely. You may also have treatment to destroy or remove problem cells. (Reported as AGC.)  · Mild dysplasia: Cells show distinct changes. More testing or HPV typing may be done. You may also have treatment to destroy or remove problem cells. (Reported as low-grade PILY or MARY 1.)  · Moderate to severe dysplasia: Cells show precancerous changes. Or, noninvasive cancer (carcinoma in situ) may be present. Treatment to destroy or remove problem cells is likely. (Reported as high-grade PILY or MARY 2 or MARY 3.)  · Cancer: Different types of cancer may be detected by your Pap test. More tests to assess the cancer's extent are likely. The type of treatment will depend on the test results and other factors, such as age and health history. (Reported as squamous cell carcinoma, endocervical adenocarcinoma in situ, or adenocarcinoma.)  Date Last Reviewed: 5/12/2015  © 4009-9193 "[employer]. [address] [place] [address], [place] [address]. All rights reserved. This information is not intended as a substitute for professional medical care. Always follow your healthcare professional's instructions.

## 2020-09-15 NOTE — PROGRESS NOTES
CC: Well woman exam    Alisia Vines is a 29 y.o. female  presents for well woman exam.  LMP: Patient's last menstrual period was 2020..  No issues, problems, or complaints.    Was not doing well with mood and had stopped a lot of her meds, she has started Abilify and Prozac and has really noticed a difference in her mood in just a week.  Feeling much better.      Past Medical History:   Diagnosis Date    Allergic rhinitis     Anemia     Condyloma acuminata     GERD (gastroesophageal reflux disease)     History of fetal anomaly in prior pregnancy, currently pregnant, unspecified trimester 3/8/2017    Pacemaker placed    HSV-2 (herpes simplex virus 2) infection     Lupus nephritis     Mood disorder     Overweight(278.02)     Sjogren's syndrome     Systemic lupus complicating pregnancy 2019    Systemic lupus erythematosus     Thrombocytopenia      Past Surgical History:   Procedure Laterality Date     SECTION WITH TUBAL LIGATION N/A 2019    Procedure:  SECTION, WITH TUBAL LIGATION;  Surgeon: VERONICA Valdovinos MD;  Location: [facility] L&D;  Service: OB/GYN;  Laterality: N/A;     SECTION, LOW TRANSVERSE      x2    RENAL BIOPSY  2013     Social History     Socioeconomic History    Marital status: Single     Spouse name: Not on file    Number of children: 2    Years of education: Not on file    Highest education level: Not on file   Occupational History     Comment: [employer]   Social Needs    Financial resource strain: Not on file    Food insecurity     Worry: Not on file     Inability: Not on file    Transportation needs     Medical: Not on file     Non-medical: Not on file   Tobacco Use    Smoking status: Never Smoker    Smokeless tobacco: Never Used   Substance and Sexual Activity    Alcohol use: No     Alcohol/week: 0.0 standard drinks    Drug use: No    Sexual activity: Not Currently     Partners: Male     Birth  "control/protection: None   Lifestyle    Physical activity     Days per week: Not on file     Minutes per session: Not on file    Stress: Not on file   Relationships    Social connections     Talks on phone: Not on file     Gets together: Not on file     Attends [employer] service: Not on file     Active member of club or organization: Not on file     Attends meetings of clubs or organizations: Not on file     Relationship status: Not on file   Other Topics Concern    Are you pregnant or think you may be? No    Breast-feeding No   Social History Narrative    The patient is single and lives with her significant other.  She has 3 children.  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     Family History   Problem Relation Age of Onset    Hypertension Mother     Eczema Brother     Heart disease Son     Arrhythmia Son         CHB    Hypertension Maternal Grandmother     Diabetes Paternal Grandmother      OB History        3    Para   3    Term   2       1    AB        Living   4       SAB        TAB        Ectopic        Multiple   1    Live Births   4                 BP (!) 140/98   Ht 5' 5" (1.651 m)   Wt 76.3 kg (168 lb 3.4 oz)   LMP 2020   BMI 27.99 kg/m²       ROS:  GENERAL: Denies weight gain or weight loss. Feeling well overall.   SKIN: Denies rash or lesions.   HEAD: Denies head injury or headache.   NODES: Denies enlarged lymph nodes.   CHEST: Denies chest pain or shortness of breath.   CARDIOVASCULAR: Denies palpitations or left sided chest pain.   ABDOMEN: No abdominal pain, constipation, diarrhea, nausea, vomiting or rectal bleeding.   URINARY: No frequency, dysuria, hematuria, or burning on urination.  REPRODUCTIVE: See HPI.   BREASTS: The patient performs breast self-examination and denies pain, lumps, or nipple discharge.   HEMATOLOGIC: No easy bruisability or excessive bleeding.   MUSCULOSKELETAL: Denies joint pain or swelling.   NEUROLOGIC: Denies syncope " or weakness.   PSYCHIATRIC: Denies depression, anxiety or mood swings.    PHYSICAL EXAM:  APPEARANCE: Well nourished, well developed, in no acute distress.  AFFECT: WNL, alert and oriented x 3  SKIN: No acne or hirsutism  NECK: Neck symmetric without masses or thyromegaly  NODES: No inguinal, cervical, axillary, or femoral lymph node enlargement  CHEST: Good respiratory effect  ABDOMEN: Soft.  No tenderness or masses.  No hepatosplenomegaly.  No hernias.  BREASTS: Symmetrical, no skin changes or visible lesions.  No palpable masses, nipple discharge bilaterally.  PELVIC: Normal external genitalia without lesions.  Normal hair distribution.  Adequate perineal body, normal urethral meatus.  Vagina moist and well rugated without lesions or discharge.  Cervix pink, without lesions, discharge or tenderness.  No significant cystocele or rectocele.  Bimanual exam shows uterus to be normal size, regular, mobile and nontender.  Adnexa without masses or tenderness.    EXTREMITIES: No edema.  Physical Exam    1. Encounter for gynecological examination without abnormal finding  Liquid-Based Pap Smear, Screening    AND PLAN:    Patient was counseled today on A.C.S. Pap guidelines and recommendations for yearly pelvic exams, mammograms and monthly self breast exams; to see her PCP for other health maintenance.

## 2020-09-30 ENCOUNTER — PATIENT MESSAGE (OUTPATIENT)
Dept: OBSTETRICS AND GYNECOLOGY | Facility: CLINIC | Age: 30
End: 2020-09-30

## 2020-09-30 LAB
FINAL PATHOLOGIC DIAGNOSIS: NORMAL
Lab: NORMAL

## 2020-10-02 ENCOUNTER — OFFICE VISIT (OUTPATIENT)
Dept: PSYCHIATRY | Facility: CLINIC | Age: 30
End: 2020-10-02
Payer: MEDICAID

## 2020-10-02 DIAGNOSIS — F39 MOOD DISORDER: ICD-10-CM

## 2020-10-02 DIAGNOSIS — Z76.0 MEDICATION REFILL: ICD-10-CM

## 2020-10-02 PROCEDURE — 99213 OFFICE O/P EST LOW 20 MIN: CPT | Mod: 95,HP,HB, | Performed by: PSYCHOLOGIST

## 2020-10-02 PROCEDURE — 99213 PR OFFICE/OUTPT VISIT, EST, LEVL III, 20-29 MIN: ICD-10-PCS | Mod: 95,HP,HB, | Performed by: PSYCHOLOGIST

## 2020-10-02 RX ORDER — ARIPIPRAZOLE 2 MG/1
2 TABLET ORAL DAILY
Qty: 30 TABLET | Refills: 0 | Status: SHIPPED | OUTPATIENT
Start: 2020-10-02 | End: 2020-11-06

## 2020-10-02 RX ORDER — FLUOXETINE HYDROCHLORIDE 40 MG/1
40 CAPSULE ORAL DAILY
Qty: 30 CAPSULE | Refills: 0 | Status: SHIPPED | OUTPATIENT
Start: 2020-10-02 | End: 2020-10-12

## 2020-10-02 NOTE — PATIENT INSTRUCTIONS
"[facility]PARTMENT OF PSYCHIATRY   PATIENT INFORMATION    We appreciate the opportunity to participate in your medical care and hope the following protocols will make it easier for you to receive quality treatment in our department.    PUNCTUALITY: Your appointment is scheduled for a fixed amount of time, reserved especially for you.  To get the benefit of your appointment, please arrive at least 15 minutes early to allow time for traffic, parking and registration.  Should you arrive more than 20 minutes late to your appointment, you will be rescheduled in order to assure your clinician has adequate time to assess you and provide helpful care.      APPOINTMENTS: Appointments are made by the nursing/front office staff or through the patient portal. Providers do not have access  to schedule appointments. Walk in appointments are not available. FOR EMERGENCIES, PLEASE GO THE CLOSEST EMERGENCY ROOM.    CANCELLATION/MISSED APPOINTMENTS:   In order to receive quality care, all appointments must be kept.  If you are unable to keep an appointment, please reschedule at least 3 days prior if possible. Late cancellations (within 24 hours of the appointment) and repeated no-show appointments may result in dismissal from the clinic. After two no show/late cancellation visits, you will receive a notice letter, alerting you to keep visits to prevent department dismissal. If another visit is missed after receipt of the notice, you will be discharged from the clinic. This policy is in effect to allow for other individuals on a long waiting list to be seen as soon as possible. Unlike other branches of medicine where several individuals can be scheduled in a 30 minute time slot, only one individual can be scheduled in any time slot in Psychiatry.     MESSAGES: For simple questions/concerns, you may contact your individual providers electronically through the "[facility]" portal or by calling [phone] " with messages relayed via office staff. If relevant, include pharmacy name and phone number, date of last visit and next scheduled visit, phone number where you can be reached throughout the day, and whether leaving a voicemail or message on an answering machine is acceptable. Messages will be returned by the Medical Assistant or Office Staff after your provider has reviewed the message.  Please allow 24 hours for a returned message before leaving another message. Messages will be checked each workday (Monday through Friday) during office hours (8:00 a.m. and 5:00 p.m.) and returned at most within one business day.  You may leave a non-urgent message after hours. Note that psychotherapy and medication management are not appropriate by telephone or the patient portal.    PRESCRIPTION REFILLS:  Please communicate with your prescriber about any refills you need during your appointment. You may also request refills through the [facility] portal (preferred) or by calling the clinic. Prescriptions will be filled during office hours.      Please do not wait until you are completely out of medication to request refills. Same day refills are not always possible. Patients may experience symptoms of withdrawal if they run out of medications. The patient assumes all responsibility when there is an issue with non-compliance with follow-up appointments and medications.   Some medications are controlled and regulated by the FDA and [place]. Some of these medications can not be refilled before 30 days and require a face to face appointment.     PAPERWORK REQUESTS: If you have any forms or letters that need to be completed by your doctor, please present these at the beginning of the appointment to ensure that information needed to complete them is obtained during the office visit. Paperwork will be returned within 7-10 business days. Staff will call you to  the paperwork when completed.    SPECIAL EVALUATIONS: Please note that  "our department is treatment-focused. As such, we focus on treatment-oriented evaluations and do not perform specialty or "forensic" evaluations. Examples are listed below.     Disability: We do not do disability evaluations.  Please contact Social Security Administration for evaluations and determinations. You will then sign releases allowing for records from your treatment providers to be forwarded to Social Security Administration to use in their evaluation.   Gun Permit: We do not offer Sound Judgment Evaluations or assessments leading to gun ownership, nor do we fill out or file paperwork relevant to owning, concealing or purchasing a firearm.   Emotional Support      Animals ([place]): We do not provide documentation, including letters, to aid in the acclamation that an Emotional Support Animal is required. Note that ESAs are not trained to perform tasks or recognize particular signs or symptoms. Rather, they are distinguished by the close, emotional, and supportive bond between the animal and the owner.       SAMPLES: We do not provide samples of any medications. If you have financial difficulties and are on a limited income, you may qualify for Patient Assistance Programs from various pharmaceutical companies. This will require that you complete paperwork with your financial information, but this does not guarantee that the company will approve the application. Alternative medication options can be discussed.    REFERRALS/COORDINATION: You will be referred to other providers if we feel unable to adequately diagnose or treat your particular condition, or if collaboration with another provider would allow for better management of your condition.    This document is for information purposes only. Please refer to the full disclaimer and copyright statement available at http://www.[facility].health.wa.gov.au regarding the information from this website before making use of such information.  See website " www.cci.health.wa.gov.au for more handouts and resources.    What is Sleep Hygiene?  Sleep hygiene is the term used to describe good sleep habits. Considerable research has gone into developing a set of guidelines and tips which are designed to enhance good sleeping, and there is much evidence to suggest that these strategies can provide long-term solutions to sleep difficulties. There are many medications which are used to treat insomnia,  but these tend to be only effective in the short-term. Ongoing use of sleeping pills may lead to dependence and interfere with developing good sleep habits independent of medication,  thereby prolonging sleep difficulties. Talk to your health professional about what is right for you, but we recommend good sleep hygiene as an important part of treating insomnia,  either with other strategies such as medication or cognitive therapy or alone.    Sleep Hygiene Tips  1) Get regular. One of the best ways to train your body to sleep well is to go to bed and get up at more or less the same time every day, even on weekends and days off! This regular rhythm will make you feel better and will give your body something to work from.  2) Sleep when sleepy. Only try to sleep when you actually feel tired or sleepy, rather than spending too much time awake in bed.  3) Get up & try again. If you havent been able to get to sleep after about 20 minutes or more, get up and do something calming or boring until you feel sleepy, then return to bed and try again. Sit quietly on the couch with the lights off (bright light will tell your brain that it is time to wake up), or read something boring like the phone book. Avoid doing anything that is too stimulating or interesting, as this will wake you up even more.  4) Avoid caffeine & nicotine. It is best to avoid consuming any caffeine (in coffee, tea, cola drinks, chocolate, and some medications) or nicotine (cigarettes) for at least 4-6 hours before  going to bed. These substances act as stimulants and interfere with the ability to fall asleep   5) Avoid alcohol. It is also best to avoid alcohol for at least 4-6 hours before going to bed. Many people believe that alcohol is relaxing and helps them to get to sleep at first, but it actually interrupts the quality of sleep.  6) Bed is for sleeping. Try not to use your bed for anything other than sleeping and sex, so that your body comes to associate bed with sleep. If you use bed as a place to watch TV, eat, read, work on your laptop, pay bills, and other things, your body will not learn this connection.  7) No naps. It is best to avoid taking naps during the day, to make sure that you are tired at bedtime. If you cant make it through the day without a nap, make sure it is for less than an hour and before 3pm.  8) Sleep rituals. You can develop your own rituals of things to remind your body that it is time to sleep - some people find it useful to do relaxing stretches or breathing exercises for 15 minutes before bed each night, or sit calmly with a cup of caffeine-free tea.  9) Bathtime. Having a hot bath 1-2 hours before bedtime can be useful, as it will raise your body temperature, causing you to feel sleepy as your body temperature drops again. Research shows that sleepiness is associated with a drop in body temperature.  10) No clock-watching. Many people who struggle with sleep tend to watch the clock too much. Frequently checking the clock during the night can wake you up (especially if you turn  on the light to read the time) and reinforces negative thoughts such as Oh no, look how late it is, Ill never get to sleep or its so early, I have only slept for 5 hours, this is  terrible.  11) Use a sleep diary. This worksheet can be a useful way of making sure you have the right facts about your sleep, rather than making assumptions. Because a diary involves watching  the clock (see point 10) it is a good  idea to only use it for two weeks to get an idea of what is going and then perhaps two months down the track to see how you are progressing.  12) Exercise. Regular exercise is a good idea to help with good sleep, but try not to do strenuous exercise in the 4 hours before bedtime. Morning walks are a great way to start the day feeling refreshed!  13) Eat right. A healthy, balanced diet will help you to sleep well, but timing is important. Some people find that a very empty stomach at bedtime is distracting, so it can be useful  to have a light snack, but a heavy meal soon before bed can also interrupt sleep. Some people recommend a warm glass of milk, which contains tryptophan, which acts as a natural  sleep inducer.  14) The right space. It is very important that your bed and bedroom are quiet and comfortable for sleeping. A cooler room with enough blankets to stay warm is best, and make sure you have curtains or an eyemask to block out early morning light and earplugs if there is noise outside your room.  15) Keep daytime routine the same. Even if you have a bad night sleep and are tired it is important that you try to keep your daytime activities the same as you had planned. That is,  dont avoid activities because you feel tired. This can reinforce the insomnia.    Call In if problems  Call Report Side Effects   Encouraged to follow up with primary care / Gen Med MD for continued monitoring of general health and wellness  Call 911 Or go to ER if Acute Concerns (especially if any thoughts of harm to self or other)

## 2020-10-02 NOTE — PROGRESS NOTES
Outpatient Psychiatry Follow-Up Visit    10/2/2020    Timeframe: Corona Virus Outbreak     The patient location is: Patient's car/ Patient reported that his/her location at the time of this visit was in the [facility]     Visit type: Virtual visit with synchronous audio and video--We had to use audio via speakerphone and continue the video through the Mychart due to technical difficulties and audio quality.     Each patient to whom he or she provides medical services by telemedicine is: (1) informed of the relationship between the physician and patient and the respective role of any other health care provider with respect to management of the patient; and (2) notified that he or she may decline to receive medical services by telemedicine and may withdraw from such care at any time.    I also informed patient of the following:   Mere Jarvis, PhD, MPAP:  [place] medical license number: [id number]    My contact info:  [facility] / 2nd Floor  [address] [place] [address]   Ph: [phone]    If technology issues, call office phone: Ph: [phone]  If crisis: Dial 911 or go to nearest Emergency Room (ER)  If questions related to privacy practices: contact [facility] Information Department: [phone]    Chief Complaint:  Alisia Loconum is a 30 y.o. female who presents today for follow-up of mood and inattention/distractibility .       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 She has been treated for depression during a pregnancy but has had problems with consistency and compliance in the past. We discussed continuing Prozac and starting Abilify, as well as started counseling.     · Medication Management: Discussed risks, benefits, and alternatives to treatment plan documented above with patient. I answered all patient questions related to this  "plan, and patient expressed understanding and agreement.   continue Prozac 40 mg; start Abilify 2 mg  · counseling referral in clinic     Follow up in about 4 weeks (around 10/2/2020) for new medicine recheck.     Interval History and Content of Current Session: Alisia's scores on MINDY and PHQ were 0! She said that she is feeling much better--no conflict. She is more engaged with others and feeling better overall. She wants to continue her medicine as prescribed--Prozac 40 mg and Abilify 2 mg. She also still wants counseling in the clinic "to talk about some things"--will refer.      GAD7 10/2/2020 9/4/2020   1. Feeling nervous, anxious, or on edge? 0 3   2. Not being able to stop or control worrying? 0 3   3. Worrying too much about different things? 0 3   4. Trouble relaxing? 0 3   5. Being so restless that it is hard to sit still? 0 3   6. Becoming easily annoyed or irritable? 0 3   7. Feeling afraid as if something awful might happen? 0 1   MINDY-7 Score 0 19       Little interest or pleasure in doing things: (P) Not at all  Feeling down, depressed, or hopeless: (P) Not at all  Trouble falling or staying asleep, or sleeping too much: (P) Not at all  Feeling tired or having little energy: (P) Not at all  Poor appetite or overeating: (P) Not at all  Feeling bad about yourself - or that you are a failure or have let yourself or your family down: (P) Not at all  Trouble concentrating on things, such as reading the newspaper or watching television: (P) Not at all  Moving or speaking so slowly that other people could have noticed. Or the opposite - being so fidgety or restless that you have been moving around a lot more than usual: (P) Not at all  PHQ-9 Total Score: (P) 0      Review of Systems   · PSYCHIATRIC: Pertinant items are noted in the narrative.    Past Medical, Family and Social History: The patient's past medical, family and social history have been reviewed and updated as appropriate within the electronic " "medical record - see encounter notes.      Current Outpatient Medications:     amLODIPine (NORVASC) 5 MG tablet, Take 1 tablet (5 mg total) by mouth once daily., Disp: 30 tablet, Rfl: 5    ARIPiprazole (ABILIFY) 2 MG Tab, Take 1 tablet (2 mg total) by mouth once daily., Disp: 30 tablet, Rfl: 0    FLUoxetine 40 MG capsule, Take 1 capsule (40 mg total) by mouth once daily., Disp: 30 capsule, Rfl: 0    hydrOXYchloroQUINE (PLAQUENIL) 200 mg tablet, Take 2 tablets (400 mg total) by mouth once daily., Disp: 60 tablet, Rfl: 2    losartan-hydrochlorothiazide 100-12.5 mg (HYZAAR) 100-12.5 mg Tab, Take 1 tablet by mouth once daily., Disp: 30 tablet, Rfl: 5    predniSONE (DELTASONE) 5 MG tablet, Take 1 tablet (5 mg total) by mouth daily as needed (Joint pain)., Disp: 30 tablet, Rfl: 1    valACYclovir (VALTREX) 500 MG tablet, Take 2 tablets (1,000 mg total) by mouth once daily., Disp: 90 tablet, Rfl: 0    Compliance: yes    Side effects: None    Risk Parameters:  Patient reports no suicidal ideation  Patient reports no homicidal ideation  Patient reports no self-injurious behavior  Patient reports no violent behavior    Exam (detailed: at least 9 elements; comprehensive: all 15 elements)   Constitutional  Vitals:  Most recent vital signs were reviewed.   Last 3 sets of Vitals    Vitals - 1 value per visit 8/30/2020 9/1/2020 9/15/2020   SYSTOLIC 166 150 140   DIASTOLIC 92 100 98   PULSE 85 97 -   TEMPERATURE 98.3 - -   RESPIRATIONS 20 - -   SPO2 99 - -   Weight (lb) 166.45 173.28 168.21   Weight (kg) 75.5 78.6 76.3   HEIGHT 5' 5" 5' 5" 5' 5"   BODY MASS INDEX 27.7 28.84 27.99   VISIT REPORT - - -   Pain Score  - 2 -   Some recent data might be hidden          General:  age appropriate, casually dressed, neatly groomed, wearing glasses, hair pulled back     Musculoskeletal  Muscle Strength/Tone:  no tremor, no tic   Gait & Station:  video visit     Psychiatric  Speech:  no latency; no press   Mood & Affect:  "I'm " "excited"--"[birth year] my birthday"  congruent and appropriate   Thought Process:  normal and logical   Associations:  intact   Thought Content:  normal, no suicidality, no homicidality, delusions, or paranoia   Insight:  intact   Judgement: behavior is adequate to circumstances   Orientation:  grossly intact   Memory: intact for content of interview   Language: grossly intact   Attention Span & Concentration:  Grossly intact   Fund of Knowledge:  intact and appropriate to age and level of education     Assessment and Diagnosis   Status/Progress: Based on the examination today, the patient's problem(s) is/are improved.  New problems have not been presented today.   There are no complicating factors in the management of the primary condition.  There are no active rule-out diagnoses for this patient at this time.     General Impression:     Encounter Diagnosis   Name Primary?    Mood disorder          Intervention/Counseling/Treatment Plan   · Medication Management: Discussed risks, benefits, and alternatives to treatment plan documented above with patient. I answered all patient questions related to this plan, and patient expressed understanding and agreement.   continue Prozac 40 mg and Abilify 2 mg  · consider counseling      Medication List with Changes/Refills   Current Medications    AMLODIPINE (NORVASC) 5 MG TABLET    Take 1 tablet (5 mg total) by mouth once daily.    ARIPIPRAZOLE (ABILIFY) 2 MG TAB    Take 1 tablet (2 mg total) by mouth once daily.    FLUOXETINE 40 MG CAPSULE    Take 1 capsule (40 mg total) by mouth once daily.    HYDROXYCHLOROQUINE (PLAQUENIL) 200 MG TABLET    Take 2 tablets (400 mg total) by mouth once daily.    LOSARTAN-HYDROCHLOROTHIAZIDE 100-12.5 MG (HYZAAR) 100-12.5 MG TAB    Take 1 tablet by mouth once daily.    PREDNISONE (DELTASONE) 5 MG TABLET    Take 1 tablet (5 mg total) by mouth daily as needed (Joint pain).    VALACYCLOVIR (VALTREX) 500 MG TABLET    Take 2 tablets (1,000 mg total) " by mouth once daily.        Return to Clinic: 3 months    Total time spent with pt: 15 minutes    Mere Jarvis, PhD, MPAP  Advanced Practice Medical Psychologist  [facility]  [address] [facility].  [place] [id number] [phone]   [phone] fax

## 2020-10-19 ENCOUNTER — OFFICE VISIT (OUTPATIENT)
Dept: PSYCHIATRY | Facility: CLINIC | Age: 30
End: 2020-10-19
Payer: MEDICAID

## 2020-10-19 DIAGNOSIS — F12.10 CANNABIS ABUSE: ICD-10-CM

## 2020-10-19 DIAGNOSIS — F39 MOOD DISORDER: Primary | ICD-10-CM

## 2020-10-19 DIAGNOSIS — F32.9 REACTIVE DEPRESSION: ICD-10-CM

## 2020-10-19 PROCEDURE — 90791 PR PSYCHIATRIC DIAGNOSTIC EVALUATION: ICD-10-PCS | Mod: 95,AJ,HB, | Performed by: SOCIAL WORKER

## 2020-10-19 PROCEDURE — 90791 PSYCH DIAGNOSTIC EVALUATION: CPT | Mod: 95,AJ,HB, | Performed by: SOCIAL WORKER

## 2020-10-19 NOTE — PROGRESS NOTES
"Psychiatry Initial Visit (PhD/LCSW)  Diagnostic Interview - CPT 92496    Date: 10/19/2020    Site: [facility]     Each patient to whom she provides medical services by telemedicine is:  (1) informed of the relationship between the physician and patient and the respective role of any other health care provider with respect to management of the patient; and (2) notified that he or she may decline to receive medical services by telemedicine and may withdraw from such care at any time.     Virtual visit with synchronous audio and video    Pt was in her car outside of her home in [place], [place].    Referral source: Dr. Mere Jarvis    Clinical status of patient: Outpatient    Alisia Vines, a 30 y.o. female, for initial evaluation visit.  Met with patient.    Chief complaint/reason for encounter: depression, anger, anxiety and interpersonal    History of present illness: Pt has struggled with symptoms of anxiety and depression for as long as she can remember.  However, patient reports that she was experiencing a lot of irritability which was vastly affecting her relationship with her significant other and her children.  Pt stated that Dr. Jarvis added ability about 6 weeks ago and she has taken it regularly and it has been a vast improvement in her irritability.  Additionally now that she is compliant with the psychotropic medications  it has helped her become far more compliant with her lupus medications so she feels much better physically as well.  Pt does have some stressors now as they moved in with her boyfriend's mom about a year ago to save money and she and their three children under the age of three are all in one room together.  His mom sleeps on the sofa and his sister, her boyfriend and their two kids sleep in the other bedroom.  The third bedroom is so full of "junk" nobody can stay in it.  It has become evident to patient  just how differently they were raised.  She indicated that his mom is kind and " generous and would do anything she can do help them but they don't take a lot of pride in where they live.  Pt also reported that she smokes marijuana 2-3 times a day because it gives her a lot of energy. Her boyfriend smokes with her only at night because it makes him sleepy and relaxed.  She does not identify her marijuana use as a problem as she believes it helps her function better.  She does take care not to smoke in front of her kids.  Although she smoked regularly throughout her pregnancy she was not reported to DCFS for substance exposed  so she does not have that stressor in her life.      Pain: 3 joint pain from lupus has improved with compliance with lupus meds    Symptoms:   · Mood: depressed mood  · Anxiety: irritability  · Substance abuse: uses cannabis 2-3 times a day but does not feel it is causing in issues for her  · Cognitive functioning: denied  · Health behaviors: noncontributory    Psychiatric history: currently under psychiatric care    Medical history: lupus    Family history of psychiatric illness: mom has problems with depression and drinks too much.  Dad has ADHD and drinks too much    Social history (marriage, employment, etc.): Pt has been with her significant other for 7 years. They have a 3 year old and 1 year old twins together. She has a 8 year old that is in the process of being adopted by her aunt.  He was born with a heart defect and her aunt has always taken care of him as she was in a better position to care for a child with special medical needs than she was.  Pt visits him frequently and has a good relationship with him and her aunt.  She does not seem to have any grief related to that arrangement and is grateful for the care her aunt has been able to provide.  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  Her boyfriend works in a plant and they are reportedly stable financially.  Her father gave them land in [place] so they plan to build a  little house there soon.      Substance use:   Alcohol: special occasions,   Does not want to be a regular drinker as she saw how alcohol negatively impacted her parent's marriage and led to their divorce   Drugs: daily cannabis use   Tobacco: none   Caffeine: none    Current medications and drug reactions (include OTC, herbal): see medication list     Strengths and liabilities: patient is articulate, accepts feedback and is motivated to change.  She has ample support system.      Current Evaluation:     Mental Status Exam:  General Appearance:  unremarkable, age appropriate   Speech: normal tone, normal rate, normal pitch, normal volume      Level of Cooperation: cooperative      Thought Processes: normal and logical   Mood: steady, happy      Thought Content: normal, no suicidality, no homicidality, delusions, or paranoia   Affect: congruent and appropriate   Orientation: Oriented x3   Memory: normal   Attention Span & Concentration: intact   Fund of General Knowledge: intact and appropriate to age and level of education   Abstract Reasoning: not assessed   Judgment & Insight: good     Language  intact     Diagnostic Impression - Plan:       ICD-10-CM ICD-9-CM   1. Mood disorder  F39 296.90   2. Reactive depression  F32.9 300.4   3. Cannabis abuse  F12.10 305.20       Plan:individual psychotherapy    Return to Clinic: 2 weeks    Length of Service (minutes): 45      provided patient with supportive psychotherapy.  Reportedly patient is feeling much better now that she has been taking ability in addition to her other medications.  Patient stated that despite improvement in mood she wants to continue therapy as she feels she still needs help regulating her emotions and learning how to express her emotions in a better way.      Hailee Whalen   10/19/2020   8:29 AM

## 2020-11-02 ENCOUNTER — TELEPHONE (OUTPATIENT)
Dept: RHEUMATOLOGY | Facility: CLINIC | Age: 30
End: 2020-11-02

## 2020-11-03 ENCOUNTER — TELEPHONE (OUTPATIENT)
Dept: RHEUMATOLOGY | Facility: CLINIC | Age: 30
End: 2020-11-03

## 2020-11-03 NOTE — TELEPHONE ENCOUNTER
Called Pt about 9:45 appt. She stated she called yesterday to canal. Offered to reschedule appt and Pt hung up.

## 2020-11-23 DIAGNOSIS — O99.891 SYSTEMIC LUPUS COMPLICATING PREGNANCY: ICD-10-CM

## 2020-11-23 DIAGNOSIS — M32.14 SLE GLOMERULONEPHRITIS SYNDROME, WHO CLASS V: ICD-10-CM

## 2020-11-23 DIAGNOSIS — M32.9 SYSTEMIC LUPUS ERYTHEMATOSUS ARTHRITIS: ICD-10-CM

## 2020-11-23 DIAGNOSIS — R76.8 ANA POSITIVE: ICD-10-CM

## 2020-11-23 DIAGNOSIS — M32.9 SYSTEMIC LUPUS COMPLICATING PREGNANCY: ICD-10-CM

## 2020-11-24 RX ORDER — PREDNISONE 5 MG/1
5 TABLET ORAL DAILY PRN
Qty: 30 TABLET | Refills: 1 | Status: SHIPPED | OUTPATIENT
Start: 2020-11-24 | End: 2021-02-03 | Stop reason: SDUPTHER

## 2020-12-11 ENCOUNTER — PATIENT MESSAGE (OUTPATIENT)
Dept: OTHER | Facility: OTHER | Age: 30
End: 2020-12-11

## 2021-01-11 ENCOUNTER — OFFICE VISIT (OUTPATIENT)
Dept: PSYCHIATRY | Facility: CLINIC | Age: 31
End: 2021-01-11
Payer: MEDICAID

## 2021-01-11 ENCOUNTER — TELEPHONE (OUTPATIENT)
Dept: PSYCHIATRY | Facility: CLINIC | Age: 31
End: 2021-01-11

## 2021-01-11 DIAGNOSIS — F39 MOOD DISORDER: Primary | ICD-10-CM

## 2021-01-11 DIAGNOSIS — F41.9 ANXIETY: ICD-10-CM

## 2021-01-11 PROCEDURE — 99214 PR OFFICE/OUTPT VISIT, EST, LEVL IV, 30-39 MIN: ICD-10-PCS | Mod: 95,HP,HB, | Performed by: PSYCHOLOGIST

## 2021-01-11 PROCEDURE — 99214 OFFICE O/P EST MOD 30 MIN: CPT | Mod: 95,HP,HB, | Performed by: PSYCHOLOGIST

## 2021-01-11 RX ORDER — ARIPIPRAZOLE 2 MG/1
2 TABLET ORAL DAILY
Qty: 30 TABLET | Refills: 2 | Status: SHIPPED | OUTPATIENT
Start: 2021-01-11 | End: 2022-06-16 | Stop reason: SDUPTHER

## 2021-01-11 RX ORDER — FLUOXETINE HYDROCHLORIDE 40 MG/1
40 CAPSULE ORAL DAILY
Qty: 30 CAPSULE | Refills: 2 | Status: SHIPPED | OUTPATIENT
Start: 2021-01-11 | End: 2022-03-07 | Stop reason: SDUPTHER

## 2021-01-11 RX ORDER — FLUOXETINE HYDROCHLORIDE 40 MG/1
40 CAPSULE ORAL DAILY
Qty: 30 CAPSULE | Refills: 2 | Status: SHIPPED | OUTPATIENT
Start: 2021-01-11 | End: 2021-01-11 | Stop reason: SDUPTHER

## 2021-02-02 ENCOUNTER — LAB VISIT (OUTPATIENT)
Dept: LAB | Facility: HOSPITAL | Age: 31
End: 2021-02-02
Attending: INTERNAL MEDICINE
Payer: MEDICAID

## 2021-02-02 ENCOUNTER — OFFICE VISIT (OUTPATIENT)
Dept: RHEUMATOLOGY | Facility: CLINIC | Age: 31
End: 2021-02-02
Payer: MEDICAID

## 2021-02-02 VITALS
WEIGHT: 195.13 LBS | SYSTOLIC BLOOD PRESSURE: 128 MMHG | DIASTOLIC BLOOD PRESSURE: 92 MMHG | HEIGHT: 65 IN | HEART RATE: 83 BPM | BODY MASS INDEX: 32.51 KG/M2

## 2021-02-02 DIAGNOSIS — R76.8 ANA POSITIVE: ICD-10-CM

## 2021-02-02 DIAGNOSIS — M32.14 SLE GLOMERULONEPHRITIS SYNDROME, WHO CLASS V: ICD-10-CM

## 2021-02-02 DIAGNOSIS — Z79.899 LONG-TERM USE OF PLAQUENIL: ICD-10-CM

## 2021-02-02 DIAGNOSIS — M35.09 SJOGREN'S SYNDROME WITH OTHER ORGAN INVOLVEMENT: ICD-10-CM

## 2021-02-02 DIAGNOSIS — M32.9 SYSTEMIC LUPUS ERYTHEMATOSUS ARTHRITIS: ICD-10-CM

## 2021-02-02 DIAGNOSIS — M35.00 SJOGREN'S SYNDROME, WITH UNSPECIFIED ORGAN INVOLVEMENT: ICD-10-CM

## 2021-02-02 DIAGNOSIS — M32.9 SYSTEMIC LUPUS ERYTHEMATOSUS ARTHRITIS: Primary | ICD-10-CM

## 2021-02-02 DIAGNOSIS — D84.9 IMMUNOSUPPRESSED STATUS: ICD-10-CM

## 2021-02-02 LAB
ALBUMIN SERPL BCP-MCNC: 3.3 G/DL (ref 3.5–5.2)
ALP SERPL-CCNC: 59 U/L (ref 55–135)
ALT SERPL W/O P-5'-P-CCNC: 15 U/L (ref 10–44)
ANION GAP SERPL CALC-SCNC: 7 MMOL/L (ref 8–16)
AST SERPL-CCNC: 22 U/L (ref 10–40)
BACTERIA #/AREA URNS HPF: NORMAL /HPF
BASOPHILS # BLD AUTO: 0.01 K/UL (ref 0–0.2)
BASOPHILS NFR BLD: 0.4 % (ref 0–1.9)
BILIRUB SERPL-MCNC: 0.2 MG/DL (ref 0.1–1)
BILIRUB UR QL STRIP: NEGATIVE
BUN SERPL-MCNC: 9 MG/DL (ref 6–20)
CALCIUM SERPL-MCNC: 8.8 MG/DL (ref 8.7–10.5)
CHLORIDE SERPL-SCNC: 106 MMOL/L (ref 95–110)
CLARITY UR: CLEAR
CO2 SERPL-SCNC: 27 MMOL/L (ref 23–29)
COLOR UR: YELLOW
CREAT SERPL-MCNC: 0.7 MG/DL (ref 0.5–1.4)
CRP SERPL-MCNC: 0.5 MG/L (ref 0–8.2)
DIFFERENTIAL METHOD: ABNORMAL
EOSINOPHIL # BLD AUTO: 0.1 K/UL (ref 0–0.5)
EOSINOPHIL NFR BLD: 6.1 % (ref 0–8)
ERYTHROCYTE [DISTWIDTH] IN BLOOD BY AUTOMATED COUNT: 13 % (ref 11.5–14.5)
ERYTHROCYTE [SEDIMENTATION RATE] IN BLOOD BY WESTERGREN METHOD: 29 MM/HR (ref 0–20)
EST. GFR  (AFRICAN AMERICAN): >60 ML/MIN/1.73 M^2
EST. GFR  (NON AFRICAN AMERICAN): >60 ML/MIN/1.73 M^2
GLUCOSE SERPL-MCNC: 60 MG/DL (ref 70–110)
GLUCOSE UR QL STRIP: NEGATIVE
HCT VFR BLD AUTO: 33.6 % (ref 37–48.5)
HGB BLD-MCNC: 11.1 G/DL (ref 12–16)
HGB UR QL STRIP: ABNORMAL
HYALINE CASTS #/AREA URNS LPF: 0 /LPF
IMM GRANULOCYTES # BLD AUTO: 0 K/UL (ref 0–0.04)
IMM GRANULOCYTES NFR BLD AUTO: 0 % (ref 0–0.5)
KETONES UR QL STRIP: NEGATIVE
LEUKOCYTE ESTERASE UR QL STRIP: NEGATIVE
LYMPHOCYTES # BLD AUTO: 1 K/UL (ref 1–4.8)
LYMPHOCYTES NFR BLD: 44.5 % (ref 18–48)
MCH RBC QN AUTO: 29.3 PG (ref 27–31)
MCHC RBC AUTO-ENTMCNC: 33 G/DL (ref 32–36)
MCV RBC AUTO: 89 FL (ref 82–98)
MICROSCOPIC COMMENT: NORMAL
MONOCYTES # BLD AUTO: 0.2 K/UL (ref 0.3–1)
MONOCYTES NFR BLD: 7.4 % (ref 4–15)
NEUTROPHILS # BLD AUTO: 1 K/UL (ref 1.8–7.7)
NEUTROPHILS NFR BLD: 41.6 % (ref 38–73)
NITRITE UR QL STRIP: NEGATIVE
NRBC BLD-RTO: 0 /100 WBC
PH UR STRIP: 7 [PH] (ref 5–8)
PLATELET # BLD AUTO: 190 K/UL (ref 150–350)
PMV BLD AUTO: 10.5 FL (ref 9.2–12.9)
POTASSIUM SERPL-SCNC: 3.5 MMOL/L (ref 3.5–5.1)
PROT SERPL-MCNC: 7 G/DL (ref 6–8.4)
PROT UR QL STRIP: ABNORMAL
RBC # BLD AUTO: 3.79 M/UL (ref 4–5.4)
RBC #/AREA URNS HPF: 2 /HPF (ref 0–4)
SODIUM SERPL-SCNC: 140 MMOL/L (ref 136–145)
SP GR UR STRIP: 1.02 (ref 1–1.03)
SQUAMOUS #/AREA URNS HPF: 2 /HPF
URN SPEC COLLECT METH UR: ABNORMAL
WBC # BLD AUTO: 2.29 K/UL (ref 3.9–12.7)
WBC #/AREA URNS HPF: 2 /HPF (ref 0–5)

## 2021-02-02 PROCEDURE — 85651 RBC SED RATE NONAUTOMATED: CPT

## 2021-02-02 PROCEDURE — 86140 C-REACTIVE PROTEIN: CPT

## 2021-02-02 PROCEDURE — 85025 COMPLETE CBC W/AUTO DIFF WBC: CPT

## 2021-02-02 PROCEDURE — 86160 COMPLEMENT ANTIGEN: CPT

## 2021-02-02 PROCEDURE — 86160 COMPLEMENT ANTIGEN: CPT | Mod: 59

## 2021-02-02 PROCEDURE — 99999 PR PBB SHADOW E&M-EST. PATIENT-LVL III: ICD-10-PCS | Mod: PBBFAC,,, | Performed by: INTERNAL MEDICINE

## 2021-02-02 PROCEDURE — 99214 OFFICE O/P EST MOD 30 MIN: CPT | Mod: S$PBB,,, | Performed by: INTERNAL MEDICINE

## 2021-02-02 PROCEDURE — 36415 COLL VENOUS BLD VENIPUNCTURE: CPT

## 2021-02-02 PROCEDURE — 81000 URINALYSIS NONAUTO W/SCOPE: CPT

## 2021-02-02 PROCEDURE — 86225 DNA ANTIBODY NATIVE: CPT

## 2021-02-02 PROCEDURE — 84156 ASSAY OF PROTEIN URINE: CPT

## 2021-02-02 PROCEDURE — 80053 COMPREHEN METABOLIC PANEL: CPT

## 2021-02-02 PROCEDURE — 86225 DNA ANTIBODY NATIVE: CPT | Mod: 59

## 2021-02-02 PROCEDURE — 99213 OFFICE O/P EST LOW 20 MIN: CPT | Mod: PBBFAC | Performed by: INTERNAL MEDICINE

## 2021-02-02 PROCEDURE — 99999 PR PBB SHADOW E&M-EST. PATIENT-LVL III: CPT | Mod: PBBFAC,,, | Performed by: INTERNAL MEDICINE

## 2021-02-02 PROCEDURE — 99214 PR OFFICE/OUTPT VISIT, EST, LEVL IV, 30-39 MIN: ICD-10-PCS | Mod: S$PBB,,, | Performed by: INTERNAL MEDICINE

## 2021-02-03 DIAGNOSIS — M32.9 SYSTEMIC LUPUS ERYTHEMATOSUS ARTHRITIS: ICD-10-CM

## 2021-02-03 DIAGNOSIS — R76.8 ANA POSITIVE: ICD-10-CM

## 2021-02-03 DIAGNOSIS — M32.9 SYSTEMIC LUPUS COMPLICATING PREGNANCY: ICD-10-CM

## 2021-02-03 DIAGNOSIS — O99.891 SYSTEMIC LUPUS COMPLICATING PREGNANCY: ICD-10-CM

## 2021-02-03 DIAGNOSIS — M32.14 SLE GLOMERULONEPHRITIS SYNDROME, WHO CLASS V: ICD-10-CM

## 2021-02-03 LAB
CREAT UR-MCNC: 184 MG/DL (ref 15–325)
PROT UR-MCNC: 67 MG/DL (ref 0–15)
PROT/CREAT UR: 0.36 MG/G{CREAT} (ref 0–0.2)

## 2021-02-03 RX ORDER — PREDNISONE 5 MG/1
5 TABLET ORAL DAILY PRN
Qty: 30 TABLET | Refills: 1 | Status: SHIPPED | OUTPATIENT
Start: 2021-02-03 | End: 2021-05-11 | Stop reason: SDUPTHER

## 2021-02-04 LAB
C3 SERPL-MCNC: 95 MG/DL (ref 50–180)
C4 SERPL-MCNC: 28 MG/DL (ref 11–44)

## 2021-02-05 ENCOUNTER — PATIENT MESSAGE (OUTPATIENT)
Dept: RHEUMATOLOGY | Facility: CLINIC | Age: 31
End: 2021-02-05

## 2021-02-05 LAB
DNA TITER: NORMAL
DSDNA AB SER-ACNC: POSITIVE [IU]/ML

## 2021-03-08 DIAGNOSIS — M32.14 SLE GLOMERULONEPHRITIS SYNDROME, WHO CLASS V: ICD-10-CM

## 2021-03-08 DIAGNOSIS — R76.8 ANA POSITIVE: ICD-10-CM

## 2021-03-08 DIAGNOSIS — M32.9 SYSTEMIC LUPUS ERYTHEMATOSUS ARTHRITIS: ICD-10-CM

## 2021-03-08 RX ORDER — HYDROXYCHLOROQUINE SULFATE 200 MG/1
400 TABLET, FILM COATED ORAL DAILY
Qty: 60 TABLET | Refills: 2 | Status: SHIPPED | OUTPATIENT
Start: 2021-03-08 | End: 2021-05-11 | Stop reason: SDUPTHER

## 2021-05-10 ENCOUNTER — TELEPHONE (OUTPATIENT)
Dept: RHEUMATOLOGY | Facility: CLINIC | Age: 31
End: 2021-05-10

## 2021-05-11 ENCOUNTER — OFFICE VISIT (OUTPATIENT)
Dept: RHEUMATOLOGY | Facility: CLINIC | Age: 31
End: 2021-05-11
Payer: MEDICAID

## 2021-05-11 ENCOUNTER — LAB VISIT (OUTPATIENT)
Dept: LAB | Facility: HOSPITAL | Age: 31
End: 2021-05-11
Attending: INTERNAL MEDICINE
Payer: MEDICAID

## 2021-05-11 VITALS
HEART RATE: 83 BPM | HEIGHT: 65 IN | SYSTOLIC BLOOD PRESSURE: 154 MMHG | BODY MASS INDEX: 31.74 KG/M2 | DIASTOLIC BLOOD PRESSURE: 109 MMHG | WEIGHT: 190.5 LBS

## 2021-05-11 DIAGNOSIS — M32.9 SYSTEMIC LUPUS ERYTHEMATOSUS ARTHRITIS: ICD-10-CM

## 2021-05-11 DIAGNOSIS — M35.00 SJOGREN'S SYNDROME, WITH UNSPECIFIED ORGAN INVOLVEMENT: ICD-10-CM

## 2021-05-11 DIAGNOSIS — D84.9 IMMUNOSUPPRESSED STATUS: ICD-10-CM

## 2021-05-11 DIAGNOSIS — M32.14 SLE GLOMERULONEPHRITIS SYNDROME, WHO CLASS V: ICD-10-CM

## 2021-05-11 DIAGNOSIS — M32.9 SYSTEMIC LUPUS ERYTHEMATOSUS ARTHRITIS: Primary | ICD-10-CM

## 2021-05-11 DIAGNOSIS — D84.9 IMMUNOCOMPROMISED: ICD-10-CM

## 2021-05-11 LAB
ALBUMIN SERPL BCP-MCNC: 3.2 G/DL (ref 3.5–5.2)
ALP SERPL-CCNC: 74 U/L (ref 55–135)
ALT SERPL W/O P-5'-P-CCNC: 11 U/L (ref 10–44)
ANION GAP SERPL CALC-SCNC: 6 MMOL/L (ref 8–16)
ANISOCYTOSIS BLD QL SMEAR: SLIGHT
AST SERPL-CCNC: 19 U/L (ref 10–40)
BACTERIA #/AREA URNS HPF: NORMAL /HPF
BASOPHILS # BLD AUTO: 0 K/UL (ref 0–0.2)
BASOPHILS NFR BLD: 0 % (ref 0–1.9)
BILIRUB SERPL-MCNC: 0.3 MG/DL (ref 0.1–1)
BILIRUB UR QL STRIP: NEGATIVE
BUN SERPL-MCNC: 12 MG/DL (ref 6–20)
CALCIUM SERPL-MCNC: 8.7 MG/DL (ref 8.7–10.5)
CHLORIDE SERPL-SCNC: 108 MMOL/L (ref 95–110)
CLARITY UR: CLEAR
CO2 SERPL-SCNC: 27 MMOL/L (ref 23–29)
COLOR UR: YELLOW
CREAT SERPL-MCNC: 0.7 MG/DL (ref 0.5–1.4)
CRP SERPL-MCNC: 1.6 MG/L (ref 0–8.2)
DIFFERENTIAL METHOD: ABNORMAL
EOSINOPHIL # BLD AUTO: 0.2 K/UL (ref 0–0.5)
EOSINOPHIL NFR BLD: 6.2 % (ref 0–8)
ERYTHROCYTE [DISTWIDTH] IN BLOOD BY AUTOMATED COUNT: 13.2 % (ref 11.5–14.5)
ERYTHROCYTE [SEDIMENTATION RATE] IN BLOOD BY WESTERGREN METHOD: 27 MM/HR (ref 0–36)
EST. GFR  (AFRICAN AMERICAN): >60 ML/MIN/1.73 M^2
EST. GFR  (NON AFRICAN AMERICAN): >60 ML/MIN/1.73 M^2
GLUCOSE SERPL-MCNC: 83 MG/DL (ref 70–110)
GLUCOSE UR QL STRIP: NEGATIVE
HCT VFR BLD AUTO: 36.1 % (ref 37–48.5)
HGB BLD-MCNC: 12.2 G/DL (ref 12–16)
HGB UR QL STRIP: NEGATIVE
HYALINE CASTS #/AREA URNS LPF: 1 /LPF
IMM GRANULOCYTES # BLD AUTO: 0.01 K/UL (ref 0–0.04)
IMM GRANULOCYTES NFR BLD AUTO: 0.3 % (ref 0–0.5)
KETONES UR QL STRIP: ABNORMAL
LEUKOCYTE ESTERASE UR QL STRIP: NEGATIVE
LYMPHOCYTES # BLD AUTO: 1.2 K/UL (ref 1–4.8)
LYMPHOCYTES NFR BLD: 42.9 % (ref 18–48)
MCH RBC QN AUTO: 29.3 PG (ref 27–31)
MCHC RBC AUTO-ENTMCNC: 33.8 G/DL (ref 32–36)
MCV RBC AUTO: 87 FL (ref 82–98)
MICROSCOPIC COMMENT: NORMAL
MONOCYTES # BLD AUTO: 0.3 K/UL (ref 0.3–1)
MONOCYTES NFR BLD: 9.7 % (ref 4–15)
NEUTROPHILS # BLD AUTO: 1.2 K/UL (ref 1.8–7.7)
NEUTROPHILS NFR BLD: 40.9 % (ref 38–73)
NITRITE UR QL STRIP: NEGATIVE
NRBC BLD-RTO: 0 /100 WBC
OVALOCYTES BLD QL SMEAR: ABNORMAL
PH UR STRIP: 7 [PH] (ref 5–8)
PLATELET # BLD AUTO: 214 K/UL (ref 150–450)
PLATELET BLD QL SMEAR: ABNORMAL
PMV BLD AUTO: 9.8 FL (ref 9.2–12.9)
POIKILOCYTOSIS BLD QL SMEAR: SLIGHT
POTASSIUM SERPL-SCNC: 3.9 MMOL/L (ref 3.5–5.1)
PROT SERPL-MCNC: 7.2 G/DL (ref 6–8.4)
PROT UR QL STRIP: ABNORMAL
RBC # BLD AUTO: 4.17 M/UL (ref 4–5.4)
RBC #/AREA URNS HPF: 2 /HPF (ref 0–4)
SODIUM SERPL-SCNC: 141 MMOL/L (ref 136–145)
SP GR UR STRIP: 1.02 (ref 1–1.03)
SQUAMOUS #/AREA URNS HPF: 5 /HPF
URN SPEC COLLECT METH UR: ABNORMAL
WBC # BLD AUTO: 2.89 K/UL (ref 3.9–12.7)
WBC #/AREA URNS HPF: 3 /HPF (ref 0–5)

## 2021-05-11 PROCEDURE — 36415 COLL VENOUS BLD VENIPUNCTURE: CPT | Performed by: INTERNAL MEDICINE

## 2021-05-11 PROCEDURE — 99999 PR PBB SHADOW E&M-EST. PATIENT-LVL III: ICD-10-PCS | Mod: PBBFAC,,, | Performed by: INTERNAL MEDICINE

## 2021-05-11 PROCEDURE — 85025 COMPLETE CBC W/AUTO DIFF WBC: CPT | Performed by: INTERNAL MEDICINE

## 2021-05-11 PROCEDURE — 99215 PR OFFICE/OUTPT VISIT, EST, LEVL V, 40-54 MIN: ICD-10-PCS | Mod: S$PBB,,, | Performed by: INTERNAL MEDICINE

## 2021-05-11 PROCEDURE — 86140 C-REACTIVE PROTEIN: CPT | Performed by: INTERNAL MEDICINE

## 2021-05-11 PROCEDURE — 82570 ASSAY OF URINE CREATININE: CPT | Performed by: INTERNAL MEDICINE

## 2021-05-11 PROCEDURE — 86225 DNA ANTIBODY NATIVE: CPT | Performed by: INTERNAL MEDICINE

## 2021-05-11 PROCEDURE — 86225 DNA ANTIBODY NATIVE: CPT | Mod: 59 | Performed by: INTERNAL MEDICINE

## 2021-05-11 PROCEDURE — 86160 COMPLEMENT ANTIGEN: CPT | Performed by: INTERNAL MEDICINE

## 2021-05-11 PROCEDURE — 99215 OFFICE O/P EST HI 40 MIN: CPT | Mod: S$PBB,,, | Performed by: INTERNAL MEDICINE

## 2021-05-11 PROCEDURE — 99213 OFFICE O/P EST LOW 20 MIN: CPT | Mod: PBBFAC | Performed by: INTERNAL MEDICINE

## 2021-05-11 PROCEDURE — 81000 URINALYSIS NONAUTO W/SCOPE: CPT | Performed by: INTERNAL MEDICINE

## 2021-05-11 PROCEDURE — 99999 PR PBB SHADOW E&M-EST. PATIENT-LVL III: CPT | Mod: PBBFAC,,, | Performed by: INTERNAL MEDICINE

## 2021-05-11 PROCEDURE — 85652 RBC SED RATE AUTOMATED: CPT | Performed by: INTERNAL MEDICINE

## 2021-05-11 PROCEDURE — 86160 COMPLEMENT ANTIGEN: CPT | Mod: 59 | Performed by: INTERNAL MEDICINE

## 2021-05-11 PROCEDURE — 80053 COMPREHEN METABOLIC PANEL: CPT | Performed by: INTERNAL MEDICINE

## 2021-05-11 RX ORDER — HYDROXYCHLOROQUINE SULFATE 200 MG/1
400 TABLET, FILM COATED ORAL DAILY
Qty: 60 TABLET | Refills: 2 | Status: SHIPPED | OUTPATIENT
Start: 2021-05-11 | End: 2021-09-13

## 2021-05-11 RX ORDER — PREDNISONE 5 MG/1
5 TABLET ORAL DAILY PRN
Qty: 30 TABLET | Refills: 1 | Status: SHIPPED | OUTPATIENT
Start: 2021-05-11 | End: 2023-04-30 | Stop reason: ALTCHOICE

## 2021-05-12 ENCOUNTER — PATIENT MESSAGE (OUTPATIENT)
Dept: RHEUMATOLOGY | Facility: CLINIC | Age: 31
End: 2021-05-12

## 2021-05-12 ENCOUNTER — TELEPHONE (OUTPATIENT)
Dept: RHEUMATOLOGY | Facility: CLINIC | Age: 31
End: 2021-05-12

## 2021-05-12 LAB
C3 SERPL-MCNC: 114 MG/DL (ref 50–180)
C4 SERPL-MCNC: 30 MG/DL (ref 11–44)
CREAT UR-MCNC: 178 MG/DL (ref 15–325)
PROT UR-MCNC: 197 MG/DL (ref 0–15)
PROT/CREAT UR: 1.11 MG/G{CREAT} (ref 0–0.2)

## 2021-05-12 RX ORDER — FAMOTIDINE 10 MG/ML
20 INJECTION INTRAVENOUS
Status: CANCELLED | OUTPATIENT
Start: 2021-05-12

## 2021-05-12 RX ORDER — SODIUM CHLORIDE 0.9 % (FLUSH) 0.9 %
10 SYRINGE (ML) INJECTION
Status: CANCELLED | OUTPATIENT
Start: 2021-05-12

## 2021-05-12 RX ORDER — ACETAMINOPHEN 325 MG/1
650 TABLET ORAL
Status: CANCELLED | OUTPATIENT
Start: 2021-05-12

## 2021-05-12 RX ORDER — HEPARIN 100 UNIT/ML
500 SYRINGE INTRAVENOUS
Status: CANCELLED | OUTPATIENT
Start: 2021-05-12

## 2021-05-13 LAB
DNA TITER: NORMAL
DSDNA AB SER-ACNC: POSITIVE [IU]/ML

## 2021-05-25 ENCOUNTER — PATIENT MESSAGE (OUTPATIENT)
Dept: ADMINISTRATIVE | Facility: OTHER | Age: 31
End: 2021-05-25

## 2021-06-10 ENCOUNTER — TELEPHONE (OUTPATIENT)
Dept: FAMILY MEDICINE | Facility: CLINIC | Age: 31
End: 2021-06-10

## 2021-07-10 ENCOUNTER — OFFICE VISIT (OUTPATIENT)
Dept: URGENT CARE | Facility: CLINIC | Age: 31
End: 2021-07-10
Payer: MEDICAID

## 2021-07-10 VITALS
TEMPERATURE: 98 F | OXYGEN SATURATION: 100 % | RESPIRATION RATE: 16 BRPM | SYSTOLIC BLOOD PRESSURE: 159 MMHG | HEIGHT: 65 IN | HEART RATE: 73 BPM | BODY MASS INDEX: 29.99 KG/M2 | WEIGHT: 180 LBS | DIASTOLIC BLOOD PRESSURE: 99 MMHG

## 2021-07-10 DIAGNOSIS — R09.81 NASAL SINUS CONGESTION: Primary | ICD-10-CM

## 2021-07-10 DIAGNOSIS — J06.9 UPPER RESPIRATORY TRACT INFECTION, UNSPECIFIED TYPE: ICD-10-CM

## 2021-07-10 LAB
CTP QC/QA: YES
SARS-COV-2 RDRP RESP QL NAA+PROBE: NEGATIVE

## 2021-07-10 PROCEDURE — U0002: ICD-10-PCS | Mod: QW,S$GLB,, | Performed by: NURSE PRACTITIONER

## 2021-07-10 PROCEDURE — 99213 OFFICE O/P EST LOW 20 MIN: CPT | Mod: S$GLB,,, | Performed by: NURSE PRACTITIONER

## 2021-07-10 PROCEDURE — U0002 COVID-19 LAB TEST NON-CDC: HCPCS | Mod: QW,S$GLB,, | Performed by: NURSE PRACTITIONER

## 2021-07-10 PROCEDURE — 99213 PR OFFICE/OUTPT VISIT, EST, LEVL III, 20-29 MIN: ICD-10-PCS | Mod: S$GLB,,, | Performed by: NURSE PRACTITIONER

## 2021-07-15 ENCOUNTER — HOSPITAL ENCOUNTER (EMERGENCY)
Facility: HOSPITAL | Age: 31
Discharge: HOME | End: 2021-07-15
Attending: EMERGENCY MEDICINE
Payer: MEDICAID

## 2021-07-15 VITALS
TEMPERATURE: 98 F | HEART RATE: 84 BPM | DIASTOLIC BLOOD PRESSURE: 105 MMHG | OXYGEN SATURATION: 99 % | HEIGHT: 65 IN | SYSTOLIC BLOOD PRESSURE: 161 MMHG | RESPIRATION RATE: 18 BRPM | WEIGHT: 191.56 LBS | BODY MASS INDEX: 31.92 KG/M2

## 2021-07-15 DIAGNOSIS — M75.82 TENDINITIS OF LEFT ROTATOR CUFF: ICD-10-CM

## 2021-07-15 DIAGNOSIS — M25.512 ACUTE PAIN OF LEFT SHOULDER: Primary | ICD-10-CM

## 2021-07-15 PROCEDURE — 99284 EMERGENCY DEPT VISIT MOD MDM: CPT

## 2021-07-15 RX ORDER — DICLOFENAC SODIUM 75 MG/1
75 TABLET, DELAYED RELEASE ORAL 2 TIMES DAILY
Qty: 20 TABLET | Refills: 0 | Status: SHIPPED | OUTPATIENT
Start: 2021-07-15 | End: 2023-02-22

## 2021-07-15 RX ORDER — TRAMADOL HYDROCHLORIDE 50 MG/1
50 TABLET ORAL EVERY 6 HOURS PRN
Qty: 12 TABLET | Refills: 0 | Status: SHIPPED | OUTPATIENT
Start: 2021-07-15 | End: 2021-08-24

## 2021-08-11 ENCOUNTER — TELEPHONE (OUTPATIENT)
Dept: RHEUMATOLOGY | Facility: CLINIC | Age: 31
End: 2021-08-11

## 2021-08-19 ENCOUNTER — TELEPHONE (OUTPATIENT)
Dept: FAMILY MEDICINE | Facility: CLINIC | Age: 31
End: 2021-08-19

## 2021-08-20 ENCOUNTER — TELEPHONE (OUTPATIENT)
Dept: FAMILY MEDICINE | Facility: CLINIC | Age: 31
End: 2021-08-20

## 2021-08-24 ENCOUNTER — OFFICE VISIT (OUTPATIENT)
Dept: FAMILY MEDICINE | Facility: CLINIC | Age: 31
End: 2021-08-24
Payer: MEDICAID

## 2021-08-24 VITALS
HEART RATE: 103 BPM | TEMPERATURE: 97 F | OXYGEN SATURATION: 100 % | HEIGHT: 65 IN | RESPIRATION RATE: 18 BRPM | DIASTOLIC BLOOD PRESSURE: 78 MMHG | WEIGHT: 187.06 LBS | BODY MASS INDEX: 31.17 KG/M2 | SYSTOLIC BLOOD PRESSURE: 142 MMHG

## 2021-08-24 DIAGNOSIS — J30.9 ALLERGIC RHINITIS, UNSPECIFIED SEASONALITY, UNSPECIFIED TRIGGER: Primary | ICD-10-CM

## 2021-08-24 DIAGNOSIS — Z86.16 HISTORY OF COVID-19: ICD-10-CM

## 2021-08-24 PROCEDURE — 99213 PR OFFICE/OUTPT VISIT, EST, LEVL III, 20-29 MIN: ICD-10-PCS | Mod: S$PBB,,, | Performed by: REGISTERED NURSE

## 2021-08-24 PROCEDURE — 99999 PR PBB SHADOW E&M-EST. PATIENT-LVL III: ICD-10-PCS | Mod: PBBFAC,,, | Performed by: REGISTERED NURSE

## 2021-08-24 PROCEDURE — 99213 OFFICE O/P EST LOW 20 MIN: CPT | Mod: S$PBB,,, | Performed by: REGISTERED NURSE

## 2021-08-24 PROCEDURE — 99999 PR PBB SHADOW E&M-EST. PATIENT-LVL III: CPT | Mod: PBBFAC,,, | Performed by: REGISTERED NURSE

## 2021-08-24 PROCEDURE — 99213 OFFICE O/P EST LOW 20 MIN: CPT | Mod: PBBFAC,PO | Performed by: REGISTERED NURSE

## 2021-08-24 RX ORDER — MINERAL OIL
180 ENEMA (ML) RECTAL DAILY
Qty: 30 TABLET | Refills: 6 | Status: SHIPPED | OUTPATIENT
Start: 2021-08-24 | End: 2023-04-30 | Stop reason: ALTCHOICE

## 2021-08-24 RX ORDER — FLUTICASONE PROPIONATE 50 MCG
2 SPRAY, SUSPENSION (ML) NASAL DAILY
Qty: 16 G | Refills: 3 | Status: SHIPPED | OUTPATIENT
Start: 2021-08-24 | End: 2022-05-29

## 2021-09-10 RX ORDER — AMLODIPINE BESYLATE 5 MG/1
TABLET ORAL
Qty: 30 TABLET | Refills: 0 | Status: SHIPPED | OUTPATIENT
Start: 2021-09-10 | End: 2021-10-18

## 2021-09-22 ENCOUNTER — TELEPHONE (OUTPATIENT)
Dept: OPHTHALMOLOGY | Facility: CLINIC | Age: 31
End: 2021-09-22

## 2021-09-22 RX ORDER — VALACYCLOVIR HYDROCHLORIDE 500 MG/1
TABLET, FILM COATED ORAL
Qty: 90 TABLET | Refills: 0 | Status: SHIPPED | OUTPATIENT
Start: 2021-09-22 | End: 2021-09-23 | Stop reason: SDUPTHER

## 2021-09-23 ENCOUNTER — OFFICE VISIT (OUTPATIENT)
Dept: FAMILY MEDICINE | Facility: CLINIC | Age: 31
End: 2021-09-23
Payer: MEDICAID

## 2021-09-23 ENCOUNTER — TELEPHONE (OUTPATIENT)
Dept: FAMILY MEDICINE | Facility: CLINIC | Age: 31
End: 2021-09-23

## 2021-09-23 ENCOUNTER — OFFICE VISIT (OUTPATIENT)
Dept: OPHTHALMOLOGY | Facility: CLINIC | Age: 31
End: 2021-09-23
Payer: MEDICAID

## 2021-09-23 VITALS
OXYGEN SATURATION: 99 % | DIASTOLIC BLOOD PRESSURE: 82 MMHG | TEMPERATURE: 98 F | HEIGHT: 65 IN | RESPIRATION RATE: 18 BRPM | HEART RATE: 88 BPM | WEIGHT: 188.06 LBS | BODY MASS INDEX: 31.33 KG/M2 | SYSTOLIC BLOOD PRESSURE: 118 MMHG

## 2021-09-23 DIAGNOSIS — M32.9 SLE (SYSTEMIC LUPUS ERYTHEMATOSUS RELATED SYNDROME): Chronic | ICD-10-CM

## 2021-09-23 DIAGNOSIS — I10 SEVERE UNCONTROLLED HYPERTENSION: Chronic | ICD-10-CM

## 2021-09-23 DIAGNOSIS — Z00.00 PREVENTATIVE HEALTH CARE: Primary | ICD-10-CM

## 2021-09-23 DIAGNOSIS — F39 MOOD DISORDER: ICD-10-CM

## 2021-09-23 DIAGNOSIS — F32.9 REACTIVE DEPRESSION: ICD-10-CM

## 2021-09-23 DIAGNOSIS — H18.822 CORNEAL ABRASION OF LEFT EYE DUE TO CONTACT LENS: Primary | ICD-10-CM

## 2021-09-23 PROBLEM — B07.0 PLANTAR WART OF LEFT FOOT: Status: RESOLVED | Noted: 2020-07-15 | Resolved: 2021-09-23

## 2021-09-23 PROCEDURE — 99395 PR PREVENTIVE VISIT,EST,18-39: ICD-10-PCS | Mod: S$PBB,,, | Performed by: FAMILY MEDICINE

## 2021-09-23 PROCEDURE — 99999 PR PBB SHADOW E&M-EST. PATIENT-LVL III: CPT | Mod: PBBFAC,,, | Performed by: FAMILY MEDICINE

## 2021-09-23 PROCEDURE — 99203 PR OFFICE/OUTPT VISIT, NEW, LEVL III, 30-44 MIN: ICD-10-PCS | Mod: S$PBB,,, | Performed by: OPTOMETRIST

## 2021-09-23 PROCEDURE — 99213 OFFICE O/P EST LOW 20 MIN: CPT | Mod: PBBFAC,PO | Performed by: FAMILY MEDICINE

## 2021-09-23 PROCEDURE — 99999 PR PBB SHADOW E&M-EST. PATIENT-LVL III: ICD-10-PCS | Mod: PBBFAC,,, | Performed by: FAMILY MEDICINE

## 2021-09-23 PROCEDURE — 99203 OFFICE O/P NEW LOW 30 MIN: CPT | Mod: S$PBB,,, | Performed by: OPTOMETRIST

## 2021-09-23 PROCEDURE — 99395 PREV VISIT EST AGE 18-39: CPT | Mod: S$PBB,,, | Performed by: FAMILY MEDICINE

## 2021-09-23 PROCEDURE — 99999 PR PBB SHADOW E&M-EST. PATIENT-LVL III: CPT | Mod: PBBFAC,,, | Performed by: OPTOMETRIST

## 2021-09-23 PROCEDURE — 99213 OFFICE O/P EST LOW 20 MIN: CPT | Mod: PBBFAC,27,PO | Performed by: OPTOMETRIST

## 2021-09-23 PROCEDURE — 99999 PR PBB SHADOW E&M-EST. PATIENT-LVL III: ICD-10-PCS | Mod: PBBFAC,,, | Performed by: OPTOMETRIST

## 2021-09-23 RX ORDER — ERYTHROMYCIN 5 MG/G
OINTMENT OPHTHALMIC 4 TIMES DAILY
COMMUNITY
Start: 2021-09-21 | End: 2021-09-23 | Stop reason: ALTCHOICE

## 2021-09-23 RX ORDER — CIPROFLOXACIN HYDROCHLORIDE 3 MG/ML
1 SOLUTION/ DROPS OPHTHALMIC 4 TIMES DAILY
Qty: 5 ML | Refills: 0 | Status: SHIPPED | OUTPATIENT
Start: 2021-09-23 | End: 2021-09-30

## 2021-09-23 RX ORDER — VALACYCLOVIR HYDROCHLORIDE 1 G/1
1000 TABLET, FILM COATED ORAL DAILY
Qty: 90 TABLET | Refills: 3 | Status: SHIPPED | OUTPATIENT
Start: 2021-09-23 | End: 2022-11-29 | Stop reason: SDUPTHER

## 2021-09-23 RX ORDER — VALACYCLOVIR HYDROCHLORIDE 500 MG/1
1000 TABLET, FILM COATED ORAL DAILY
Qty: 90 TABLET | Refills: 3 | Status: SHIPPED | OUTPATIENT
Start: 2021-09-23 | End: 2021-09-23 | Stop reason: SDUPTHER

## 2021-09-27 ENCOUNTER — OFFICE VISIT (OUTPATIENT)
Dept: OPHTHALMOLOGY | Facility: CLINIC | Age: 31
End: 2021-09-27
Payer: MEDICAID

## 2021-09-27 DIAGNOSIS — H18.822 CORNEAL ABRASION OF LEFT EYE DUE TO CONTACT LENS: ICD-10-CM

## 2021-09-27 DIAGNOSIS — H52.13 MYOPIA OF BOTH EYES: Primary | ICD-10-CM

## 2021-09-27 PROCEDURE — 99213 PR OFFICE/OUTPT VISIT, EST, LEVL III, 20-29 MIN: ICD-10-PCS | Mod: S$PBB,,, | Performed by: OPTOMETRIST

## 2021-09-27 PROCEDURE — 99213 OFFICE O/P EST LOW 20 MIN: CPT | Mod: PBBFAC | Performed by: OPTOMETRIST

## 2021-09-27 PROCEDURE — 99213 OFFICE O/P EST LOW 20 MIN: CPT | Mod: S$PBB,,, | Performed by: OPTOMETRIST

## 2021-09-27 PROCEDURE — 99999 PR PBB SHADOW E&M-EST. PATIENT-LVL III: ICD-10-PCS | Mod: PBBFAC,,, | Performed by: OPTOMETRIST

## 2021-09-27 PROCEDURE — 99999 PR PBB SHADOW E&M-EST. PATIENT-LVL III: CPT | Mod: PBBFAC,,, | Performed by: OPTOMETRIST

## 2021-10-18 ENCOUNTER — PATIENT MESSAGE (OUTPATIENT)
Dept: PSYCHIATRY | Facility: CLINIC | Age: 31
End: 2021-10-18
Payer: MEDICAID

## 2021-10-18 RX ORDER — AMLODIPINE BESYLATE 5 MG/1
TABLET ORAL
Qty: 30 TABLET | Refills: 11 | Status: SHIPPED | OUTPATIENT
Start: 2021-10-18 | End: 2023-02-22 | Stop reason: SDUPTHER

## 2021-12-04 ENCOUNTER — OFFICE VISIT (OUTPATIENT)
Dept: URGENT CARE | Facility: CLINIC | Age: 31
End: 2021-12-04
Payer: MEDICAID

## 2021-12-04 VITALS
HEIGHT: 65 IN | HEART RATE: 72 BPM | DIASTOLIC BLOOD PRESSURE: 104 MMHG | RESPIRATION RATE: 18 BRPM | SYSTOLIC BLOOD PRESSURE: 148 MMHG | WEIGHT: 185 LBS | OXYGEN SATURATION: 100 % | TEMPERATURE: 98 F | BODY MASS INDEX: 30.82 KG/M2

## 2021-12-04 DIAGNOSIS — H10.9 BACTERIAL CONJUNCTIVITIS OF LEFT EYE: Primary | ICD-10-CM

## 2021-12-04 PROCEDURE — 99213 OFFICE O/P EST LOW 20 MIN: CPT | Mod: S$GLB,,, | Performed by: PHYSICIAN ASSISTANT

## 2021-12-04 PROCEDURE — 99213 PR OFFICE/OUTPT VISIT, EST, LEVL III, 20-29 MIN: ICD-10-PCS | Mod: S$GLB,,, | Performed by: PHYSICIAN ASSISTANT

## 2021-12-04 RX ORDER — CIPROFLOXACIN HYDROCHLORIDE 3 MG/ML
1 SOLUTION/ DROPS OPHTHALMIC EVERY 8 HOURS
Qty: 5 ML | Refills: 0 | Status: SHIPPED | OUTPATIENT
Start: 2021-12-04 | End: 2021-12-11

## 2021-12-07 ENCOUNTER — TELEPHONE (OUTPATIENT)
Dept: URGENT CARE | Facility: CLINIC | Age: 31
End: 2021-12-07
Payer: MEDICAID

## 2021-12-21 ENCOUNTER — IMMUNIZATION (OUTPATIENT)
Dept: PRIMARY CARE CLINIC | Facility: CLINIC | Age: 31
End: 2021-12-21
Payer: MEDICAID

## 2021-12-21 DIAGNOSIS — Z23 NEED FOR VACCINATION: Primary | ICD-10-CM

## 2021-12-21 PROCEDURE — 0001A COVID-19, MRNA, LNP-S, PF, 30 MCG/0.3 ML DOSE VACCINE: CPT | Mod: CV19,PBBFAC | Performed by: FAMILY MEDICINE

## 2022-01-10 ENCOUNTER — IMMUNIZATION (OUTPATIENT)
Dept: PRIMARY CARE CLINIC | Facility: CLINIC | Age: 32
End: 2022-01-10
Payer: MEDICAID

## 2022-01-10 DIAGNOSIS — Z23 NEED FOR VACCINATION: Primary | ICD-10-CM

## 2022-01-10 PROCEDURE — 0002A COVID-19, MRNA, LNP-S, PF, 30 MCG/0.3 ML DOSE VACCINE: CPT | Mod: CV19,PBBFAC | Performed by: FAMILY MEDICINE

## 2022-02-03 NOTE — SEDATION DOCUMENTATION
Procedure complete, pt tolerated well.  Vss.  Pt voiced no concerns at this time, denied pain or discomfort.  Pt awake and alert. Band aid placed to right lower back puncture site, site WDL.  Pt transferred to stretcher and transported to radiology recovery.  Pt supine on stretcher.     No

## 2022-02-15 ENCOUNTER — TELEPHONE (OUTPATIENT)
Dept: RHEUMATOLOGY | Facility: CLINIC | Age: 32
End: 2022-02-15
Payer: MEDICAID

## 2022-02-15 NOTE — TELEPHONE ENCOUNTER
----- Message from Douglas Baker sent at 2/15/2022  8:36 AM CST -----  Contact: [phone]  Type:  Patient Returning Call    Who Called:yanick  Who Left Message for Patient:nurse   Does the patient know what this is regarding?:appt  Would the patient rather a call back or a response via [facility]? Call back   Best Call Back Number: [phone]  Additional Information:

## 2022-02-15 NOTE — TELEPHONE ENCOUNTER
----- Message from Irena Pascual sent at 2/15/2022  8:24 AM CST -----  Pt is calling in regards to getting appt schedule. Pt states that she don't want to do virtual. No appt would pull up for me to schedule pt. Pt can be reached at [phone] (home)

## 2022-02-21 ENCOUNTER — TELEPHONE (OUTPATIENT)
Dept: RHEUMATOLOGY | Facility: CLINIC | Age: 32
End: 2022-02-21
Payer: MEDICAID

## 2022-02-21 ENCOUNTER — PATIENT MESSAGE (OUTPATIENT)
Dept: PSYCHIATRY | Facility: CLINIC | Age: 32
End: 2022-02-21
Payer: MEDICAID

## 2022-02-21 NOTE — TELEPHONE ENCOUNTER
Called and spoke with pt regarding reschedulingher missed appt from 8/21/21. Scheduled pt with Lora Root PA-C for 2/25/22 at 2 pm at the [facility]. Pt verbalized understanding.

## 2022-02-21 NOTE — TELEPHONE ENCOUNTER
----- Message from Link Arias sent at 2/21/2022  4:45 PM CST -----  Patient is calling in requesting a an appt for the appt she missed please call her back at [phone]  Thanks.ln

## 2022-02-23 DIAGNOSIS — D84.9 IMMUNOSUPPRESSED STATUS: ICD-10-CM

## 2022-02-24 ENCOUNTER — TELEPHONE (OUTPATIENT)
Dept: RHEUMATOLOGY | Facility: CLINIC | Age: 32
End: 2022-02-24
Payer: MEDICAID

## 2022-02-25 ENCOUNTER — TELEPHONE (OUTPATIENT)
Dept: RHEUMATOLOGY | Facility: CLINIC | Age: 32
End: 2022-02-25
Payer: MEDICAID

## 2022-02-25 ENCOUNTER — PATIENT OUTREACH (OUTPATIENT)
Dept: ADMINISTRATIVE | Facility: OTHER | Age: 32
End: 2022-02-25
Payer: MEDICAID

## 2022-02-25 NOTE — TELEPHONE ENCOUNTER
"Returned patients phone call. Appointment rescheduled to Thursday 03/03/2022 with Mrs. Paulino "Rodriguez Root PA-C at [facility] at 11:00. Patient verbalized understanding. All questions answered.       Stephanie Hauser MA ([facility])  Rheumatology Department   "

## 2022-02-25 NOTE — TELEPHONE ENCOUNTER
----- Message from Irena Pascual sent at 2/25/2022 12:59 PM CST -----  Pt is calling in regards to getting appt that she had to cancel reschedule. Pt can be reached at [phone] (imfl)

## 2022-03-02 ENCOUNTER — TELEPHONE (OUTPATIENT)
Dept: RHEUMATOLOGY | Facility: CLINIC | Age: 32
End: 2022-03-02
Payer: MEDICAID

## 2022-03-03 ENCOUNTER — OFFICE VISIT (OUTPATIENT)
Dept: RHEUMATOLOGY | Facility: CLINIC | Age: 32
End: 2022-03-03
Payer: MEDICAID

## 2022-03-03 ENCOUNTER — LAB VISIT (OUTPATIENT)
Dept: LAB | Facility: HOSPITAL | Age: 32
End: 2022-03-03
Attending: PHYSICIAN ASSISTANT
Payer: MEDICAID

## 2022-03-03 ENCOUNTER — TELEPHONE (OUTPATIENT)
Dept: PSYCHIATRY | Facility: CLINIC | Age: 32
End: 2022-03-03
Payer: MEDICAID

## 2022-03-03 VITALS
SYSTOLIC BLOOD PRESSURE: 135 MMHG | DIASTOLIC BLOOD PRESSURE: 92 MMHG | WEIGHT: 198.19 LBS | BODY MASS INDEX: 33.02 KG/M2 | HEIGHT: 65 IN | HEART RATE: 83 BPM

## 2022-03-03 DIAGNOSIS — Z51.81 MEDICATION MONITORING ENCOUNTER: ICD-10-CM

## 2022-03-03 DIAGNOSIS — D84.9 IMMUNOCOMPROMISED: ICD-10-CM

## 2022-03-03 DIAGNOSIS — M32.9 SYSTEMIC LUPUS ERYTHEMATOSUS ARTHRITIS: ICD-10-CM

## 2022-03-03 DIAGNOSIS — Z79.899 HIGH RISK MEDICATION USE: ICD-10-CM

## 2022-03-03 DIAGNOSIS — M32.9 SYSTEMIC LUPUS ERYTHEMATOSUS ARTHRITIS: Primary | ICD-10-CM

## 2022-03-03 DIAGNOSIS — M32.14 SLE GLOMERULONEPHRITIS SYNDROME, WHO CLASS V: ICD-10-CM

## 2022-03-03 DIAGNOSIS — Z79.899 LONG-TERM USE OF PLAQUENIL: ICD-10-CM

## 2022-03-03 DIAGNOSIS — R53.83 FATIGUE, UNSPECIFIED TYPE: ICD-10-CM

## 2022-03-03 LAB
ALBUMIN SERPL BCP-MCNC: 2.5 G/DL (ref 3.5–5.2)
ALP SERPL-CCNC: 65 U/L (ref 55–135)
ALT SERPL W/O P-5'-P-CCNC: 12 U/L (ref 10–44)
AMORPH CRY URNS QL MICRO: NORMAL
ANION GAP SERPL CALC-SCNC: 9 MMOL/L (ref 8–16)
AST SERPL-CCNC: 21 U/L (ref 10–40)
BACTERIA #/AREA URNS HPF: NORMAL /HPF
BASOPHILS # BLD AUTO: 0.01 K/UL (ref 0–0.2)
BASOPHILS NFR BLD: 0.3 % (ref 0–1.9)
BILIRUB SERPL-MCNC: 0.2 MG/DL (ref 0.1–1)
BILIRUB UR QL STRIP: NEGATIVE
BUN SERPL-MCNC: 12 MG/DL (ref 6–20)
C3 SERPL-MCNC: 102 MG/DL (ref 50–180)
C4 SERPL-MCNC: 30 MG/DL (ref 11–44)
CALCIUM SERPL-MCNC: 8.4 MG/DL (ref 8.7–10.5)
CHLORIDE SERPL-SCNC: 110 MMOL/L (ref 95–110)
CLARITY UR: ABNORMAL
CO2 SERPL-SCNC: 24 MMOL/L (ref 23–29)
COLOR UR: YELLOW
CREAT SERPL-MCNC: 0.6 MG/DL (ref 0.5–1.4)
CREAT UR-MCNC: 124 MG/DL (ref 15–325)
CRP SERPL-MCNC: 0.7 MG/L (ref 0–8.2)
DIFFERENTIAL METHOD: ABNORMAL
EOSINOPHIL # BLD AUTO: 0.1 K/UL (ref 0–0.5)
EOSINOPHIL NFR BLD: 3.4 % (ref 0–8)
ERYTHROCYTE [DISTWIDTH] IN BLOOD BY AUTOMATED COUNT: 13.2 % (ref 11.5–14.5)
ERYTHROCYTE [SEDIMENTATION RATE] IN BLOOD BY WESTERGREN METHOD: 29 MM/HR (ref 0–36)
EST. GFR  (AFRICAN AMERICAN): >60 ML/MIN/1.73 M^2
EST. GFR  (NON AFRICAN AMERICAN): >60 ML/MIN/1.73 M^2
GLUCOSE SERPL-MCNC: 92 MG/DL (ref 70–110)
GLUCOSE UR QL STRIP: NEGATIVE
HCT VFR BLD AUTO: 33.4 % (ref 37–48.5)
HGB BLD-MCNC: 11 G/DL (ref 12–16)
HGB UR QL STRIP: NEGATIVE
HYALINE CASTS #/AREA URNS LPF: 0 /LPF
IMM GRANULOCYTES # BLD AUTO: 0 K/UL (ref 0–0.04)
IMM GRANULOCYTES NFR BLD AUTO: 0 % (ref 0–0.5)
KETONES UR QL STRIP: NEGATIVE
LEUKOCYTE ESTERASE UR QL STRIP: NEGATIVE
LYMPHOCYTES # BLD AUTO: 1.3 K/UL (ref 1–4.8)
LYMPHOCYTES NFR BLD: 45.4 % (ref 18–48)
MCH RBC QN AUTO: 28.6 PG (ref 27–31)
MCHC RBC AUTO-ENTMCNC: 32.9 G/DL (ref 32–36)
MCV RBC AUTO: 87 FL (ref 82–98)
MICROSCOPIC COMMENT: NORMAL
MONOCYTES # BLD AUTO: 0.2 K/UL (ref 0.3–1)
MONOCYTES NFR BLD: 6.2 % (ref 4–15)
NEUTROPHILS # BLD AUTO: 1.3 K/UL (ref 1.8–7.7)
NEUTROPHILS NFR BLD: 44.7 % (ref 38–73)
NITRITE UR QL STRIP: NEGATIVE
NRBC BLD-RTO: 0 /100 WBC
PH UR STRIP: 7 [PH] (ref 5–8)
PLATELET # BLD AUTO: 209 K/UL (ref 150–450)
PMV BLD AUTO: 10.9 FL (ref 9.2–12.9)
POTASSIUM SERPL-SCNC: 3.7 MMOL/L (ref 3.5–5.1)
PROT SERPL-MCNC: 5.6 G/DL (ref 6–8.4)
PROT UR QL STRIP: ABNORMAL
PROT UR-MCNC: 226 MG/DL (ref 0–15)
PROT/CREAT UR: 1.82 MG/G{CREAT} (ref 0–0.2)
RBC # BLD AUTO: 3.84 M/UL (ref 4–5.4)
RBC #/AREA URNS HPF: 3 /HPF (ref 0–4)
SODIUM SERPL-SCNC: 143 MMOL/L (ref 136–145)
SP GR UR STRIP: >=1.03 (ref 1–1.03)
SQUAMOUS #/AREA URNS HPF: 1 /HPF
URN SPEC COLLECT METH UR: ABNORMAL
WBC # BLD AUTO: 2.91 K/UL (ref 3.9–12.7)
WBC #/AREA URNS HPF: 2 /HPF (ref 0–5)

## 2022-03-03 PROCEDURE — 3008F BODY MASS INDEX DOCD: CPT | Mod: CPTII,,, | Performed by: PHYSICIAN ASSISTANT

## 2022-03-03 PROCEDURE — 85652 RBC SED RATE AUTOMATED: CPT | Performed by: PHYSICIAN ASSISTANT

## 2022-03-03 PROCEDURE — 1159F PR MEDICATION LIST DOCUMENTED IN MEDICAL RECORD: ICD-10-PCS | Mod: CPTII,,, | Performed by: PHYSICIAN ASSISTANT

## 2022-03-03 PROCEDURE — 99999 PR PBB SHADOW E&M-EST. PATIENT-LVL III: CPT | Mod: PBBFAC,,, | Performed by: PHYSICIAN ASSISTANT

## 2022-03-03 PROCEDURE — 99215 PR OFFICE/OUTPT VISIT, EST, LEVL V, 40-54 MIN: ICD-10-PCS | Mod: S$PBB,,, | Performed by: PHYSICIAN ASSISTANT

## 2022-03-03 PROCEDURE — 99999 PR PBB SHADOW E&M-EST. PATIENT-LVL III: ICD-10-PCS | Mod: PBBFAC,,, | Performed by: PHYSICIAN ASSISTANT

## 2022-03-03 PROCEDURE — 1159F MED LIST DOCD IN RCRD: CPT | Mod: CPTII,,, | Performed by: PHYSICIAN ASSISTANT

## 2022-03-03 PROCEDURE — 84156 ASSAY OF PROTEIN URINE: CPT | Performed by: PHYSICIAN ASSISTANT

## 2022-03-03 PROCEDURE — 36415 COLL VENOUS BLD VENIPUNCTURE: CPT | Performed by: PHYSICIAN ASSISTANT

## 2022-03-03 PROCEDURE — 99215 OFFICE O/P EST HI 40 MIN: CPT | Mod: S$PBB,,, | Performed by: PHYSICIAN ASSISTANT

## 2022-03-03 PROCEDURE — 80053 COMPREHEN METABOLIC PANEL: CPT | Performed by: PHYSICIAN ASSISTANT

## 2022-03-03 PROCEDURE — 3080F PR MOST RECENT DIASTOLIC BLOOD PRESSURE >= 90 MM HG: ICD-10-PCS | Mod: CPTII,,, | Performed by: PHYSICIAN ASSISTANT

## 2022-03-03 PROCEDURE — 81000 URINALYSIS NONAUTO W/SCOPE: CPT | Performed by: PHYSICIAN ASSISTANT

## 2022-03-03 PROCEDURE — 3008F PR BODY MASS INDEX (BMI) DOCUMENTED: ICD-10-PCS | Mod: CPTII,,, | Performed by: PHYSICIAN ASSISTANT

## 2022-03-03 PROCEDURE — 3080F DIAST BP >= 90 MM HG: CPT | Mod: CPTII,,, | Performed by: PHYSICIAN ASSISTANT

## 2022-03-03 PROCEDURE — 86160 COMPLEMENT ANTIGEN: CPT | Performed by: PHYSICIAN ASSISTANT

## 2022-03-03 PROCEDURE — 86140 C-REACTIVE PROTEIN: CPT | Performed by: PHYSICIAN ASSISTANT

## 2022-03-03 PROCEDURE — 3075F PR MOST RECENT SYSTOLIC BLOOD PRESS GE 130-139MM HG: ICD-10-PCS | Mod: CPTII,,, | Performed by: PHYSICIAN ASSISTANT

## 2022-03-03 PROCEDURE — 86160 COMPLEMENT ANTIGEN: CPT | Mod: 59 | Performed by: PHYSICIAN ASSISTANT

## 2022-03-03 PROCEDURE — 99213 OFFICE O/P EST LOW 20 MIN: CPT | Mod: PBBFAC | Performed by: PHYSICIAN ASSISTANT

## 2022-03-03 PROCEDURE — 86225 DNA ANTIBODY NATIVE: CPT | Performed by: PHYSICIAN ASSISTANT

## 2022-03-03 PROCEDURE — 3075F SYST BP GE 130 - 139MM HG: CPT | Mod: CPTII,,, | Performed by: PHYSICIAN ASSISTANT

## 2022-03-03 PROCEDURE — 85025 COMPLETE CBC W/AUTO DIFF WBC: CPT | Performed by: PHYSICIAN ASSISTANT

## 2022-03-03 NOTE — PROGRESS NOTES
Subjective:       Patient ID: Alisia Vines is a 31 y.o. female.    Chief Complaint: Lupus      HPI   This is an established patient to the department, but a new patient to my clinic.  Gloria is a pleasant 31-year-old female with lupus nephritis (Class V glomerulonephritis), Sjogren syndrome.  Plaquenil 200 mg b.i.d. is ordered .  Patient is not consistent with medication.  She also has prednisone 5 mg to use when her joints are flared.  She says she uses this very rarely.  She has been on Rituxan infusions in the past due to noncompliance.  Most recent infusion 5/2020.  Stable since then.      She denies rash, eye pain, chest pain, shortness of breath, hematuria, dysuria, blood in her stools, tender swollen joints.  She has mechanical pain with prolonged weight-bearing particularly in the knees.  It hurts mostly when she is at work.  She works in the kitchen for restaurant.  She still has dry eyes and dry mouth.  This is stable.  She gets by with using water.  She is not using eyedrops.  She has not seen and ophthalmologist for Plaquenil monitoring.  I have reviewed her know to see the last time she saw nephrology was back in 2019. Overall doing relatively well.  She has no new rashes no photosensitivity.  She has no pain.  Rheumatologic systems otherwise negative.    She has a lot on her plate right now.  She is trying to take classes online.  She has 4 kids, they can go still which are between 2-year-old.  She is also taking on a new job in a couple of weeks at [employer] is in the kitchen.    Serologies/Labs:  · (+) HARPREET  · (+) ds DNA  · (+) SSA  · (+) SSB  Current Treatment:  · Plaquenil 200mg bid - not consistent, compliance an issue    Previous Treatment:   · Rituxan - used for non-compliance in past      Current Outpatient Medications:     amLODIPine (NORVASC) 5 MG tablet, TAKE 1 TABLET BY MOUTH EVERY DAY, Disp: 30 tablet, Rfl: 11    diclofenac (VOLTAREN) 75 MG EC tablet, Take 1 tablet (75 mg total)  by mouth 2 (two) times daily., Disp: 20 tablet, Rfl: 0    fexofenadine (ALLEGRA) 180 MG tablet, Take 1 tablet (180 mg total) by mouth once daily., Disp: 30 tablet, Rfl: 6    fluticasone propionate (FLONASE) 50 mcg/actuation nasal spray, 2 sprays (100 mcg total) by Each Nostril route once daily., Disp: 16 g, Rfl: 3    hydrOXYchloroQUINE (PLAQUENIL) 200 mg tablet, TAKE 2 TABLETS(400 MG) BY MOUTH EVERY DAY, Disp: 60 tablet, Rfl: 2    losartan-hydrochlorothiazide 100-12.5 mg (HYZAAR) 100-12.5 mg Tab, Take 1 tablet by mouth once daily., Disp: 30 tablet, Rfl: 5    predniSONE (DELTASONE) 5 MG tablet, Take 1 tablet (5 mg total) by mouth daily as needed (Joint pain)., Disp: 30 tablet, Rfl: 1    valACYclovir (VALTREX) 1000 MG tablet, Take 1 tablet (1,000 mg total) by mouth once daily., Disp: 90 tablet, Rfl: 3    ARIPiprazole (ABILIFY) 2 MG Tab, Take 1 tablet (2 mg total) by mouth once daily., Disp: 30 tablet, Rfl: 2    FLUoxetine 40 MG capsule, Take 1 capsule (40 mg total) by mouth once daily., Disp: 30 capsule, Rfl: 2    Past Medical History:   Diagnosis Date    Allergic rhinitis     Anemia     Condyloma acuminata     COVID-19 virus infection 07/2021    GERD (gastroesophageal reflux disease)     History of fetal anomaly in prior pregnancy, currently pregnant, unspecified trimester 3/8/2017    Pacemaker placed    HSV-2 (herpes simplex virus 2) infection     Lupus nephritis     Mood disorder     Overweight(278.02)     Sjogren's syndrome     Systemic lupus complicating pregnancy 1/31/2019    Systemic lupus erythematosus     Thrombocytopenia      Family History   Problem Relation Age of Onset    Hypertension Mother     Eczema Brother     Heart disease Son     Arrhythmia Son         CHB    Hypertension Maternal Grandmother     Diabetes Paternal Grandmother      Social History     Socioeconomic History    Marital status: Single    Number of children: 2   Occupational History     Comment: [employer]   Tobacco Use    Smoking status: Never Smoker    Smokeless tobacco: Never Used   Substance and Sexual Activity    Alcohol use: No     Alcohol/week: 0.0 standard drinks    Drug use: No    Sexual activity: Not Currently     Partners: Male     Birth control/protection: None   Other Topics Concern    Are you pregnant or think you may be? No    Breast-feeding No   Social History Narrative    The patient is single and lives with her significant other.  She has 3 children.  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     Review of patient's allergies indicates:  No Known Allergies    Objective:   BP (!) 135/92   Pulse 83   Ht 5' 5" (1.651 m)   Wt 89.9 kg (198 lb 3.1 oz)   BMI 32.98 kg/m²   Immunization History   Administered Date(s) Administered    COVID-19, MRNA, LN-S, PF (Pfizer) (Purple Cap) 12/21/2021, 01/10/2022    Influenza - High Dose - PF (65 years and older) 11/22/2013, 11/20/2018    Influenza - Quadrivalent 11/27/2015    Influenza - Quadrivalent - PF *Preferred* (6 months and older) 10/22/2017    Pneumococcal Conjugate - 13 Valent 11/27/2015    Tdap 09/08/2012, 07/31/2017       Physical Exam   Constitutional: She is oriented to person, place, and time. No distress.   HENT:   Head: Normocephalic and atraumatic.   Pulmonary/Chest: Effort normal.   Abdominal: She exhibits no distension.   Musculoskeletal:         General: No swelling or tenderness. Normal range of motion.      Cervical back: Normal range of motion.      Right knee: No effusion.      Left knee: No effusion.   Lymphadenopathy:     She has no cervical adenopathy.   Neurological: She is alert and oriented to person, place, and time.   Skin: Skin is warm.   Psychiatric: Mood normal.   Nursing note and vitals reviewed.      Right Side Rheumatological Exam     Examination finds the 1st PIP, 1st MCP, 2nd PIP, 2nd MCP, 3rd PIP, 3rd MCP, 4th PIP, 4th MCP, 5th PIP and 5th MCP normal.    Shoulder Exam   Tenderness Location: no " tenderness    Range of Motion   The patient has normal right shoulder ROM.    Knee Exam   Patellofemoral Crepitus: negative  Effusion: negative  Warmth: negative    Elbow/Wrist Exam   Tenderness Location: no elbow tenderness and no wrist tenderness    Range of Motion   Elbow   The patient has normal right elbow ROM.    Range of Motion   Wrist   The patient has normal right wrist ROM.    Left Side Rheumatological Exam     Examination finds the 1st PIP, 1st MCP, 2nd PIP, 2nd MCP, 3rd PIP, 3rd MCP, 4th PIP, 4th MCP, 5th PIP and 5th MCP normal.    Shoulder Exam   Tenderness Location: no tenderness    Range of Motion   The patient has normal left shoulder ROM.    Knee Exam     Patellofemoral Crepitus: negative  Effusion: negative  Warmth: negative    Elbow/Wrist Exam   Tenderness Location: no elbow tenderness and no wrist tenderness    Range of Motion   Elbow   The patient has normal left elbow ROM.    Range of Motion   Wrist   The patient has normal left wrist ROM.        No synovitis, no enthesitis  Minimal tenderness to palpation medial femoral condyle bilateral knees.  No effusion.  No instability.  No a with patellar grind     No results found for this or any previous visit (from the past 336 hour(s)).      No results found for: TBGOLDPLUS   Lab Results   Component Value Date    HEPBCAB Negative 11/18/2019    HEPCAB Negative 11/18/2019          Assessment:     1. Systemic lupus erythematosus arthritis    2. SLE glomerulonephritis syndrome, WHO class V    3. Immunocompromised    4. Long-term use of Plaquenil    5. High risk medication use    6. Medication monitoring encounter            Plan:     Alisia was seen today for lupus.    Diagnoses and all orders for this visit:    Systemic lupus erythematosus arthritis  -     Urinalysis; Standing  -     Protein/Creatinine Ratio, Urine; Standing  -     C3 Complement; Standing  -     C4 Complement; Standing  -     CBC Auto Differential; Standing  -     Comprehensive  Metabolic Panel; Standing  -     Sedimentation rate; Standing  -     C-Reactive Protein; Standing  -     Anti-DNA Ab, Double-Stranded; Standing    SLE glomerulonephritis syndrome, WHO class V  -     Urinalysis; Standing  -     Protein/Creatinine Ratio, Urine; Standing  -     C3 Complement; Standing  -     C4 Complement; Standing  -     CBC Auto Differential; Standing  -     Comprehensive Metabolic Panel; Standing  -     Sedimentation rate; Standing  -     C-Reactive Protein; Standing  -     Anti-DNA Ab, Double-Stranded; Standing  -     Ambulatory referral/consult to Nephrology; Future    Immunocompromised    Long-term use of Plaquenil  -     Ambulatory referral/consult to Ophthalmology; Future    High risk medication use    Medication monitoring encounter  -     Urinalysis; Standing  -     Protein/Creatinine Ratio, Urine; Standing  -     C3 Complement; Standing  -     C4 Complement; Standing  -     CBC Auto Differential; Standing  -     Comprehensive Metabolic Panel; Standing  -     Sedimentation rate; Standing  -     C-Reactive Protein; Standing  -     Anti-DNA Ab, Double-Stranded; Standing        · SLE w Class V Lupus Nephritis  · Plaquenil 200mg bid  · Ophtho ref for plaq monitoring  · Advice to help w med compliance  · Nephrology ref to re-est care  · Consider Rituxan if worsening activity found  · Cellcept in future if compliance improves ?  · Sjogrens   · Water, biotene, refress drops  · Stable  · Drug therapy requiring intensive monitoring for toxicity  · High Risk Medication Monitoring encounter  · No current medication related issues, no evidence of toxicity  · I ordered labs for toxicity monitoring, have personally reviewed the findings, and discussed them with the patient.  Pending labs will be sent via the portal  · Compromised immune system secondary to autoimmune disease and/or use of immunosuppressive drugs.  Monitor carefully for infections.  Advised patient to get immediate medical care if any  infection arises.  Also advised strict adherence age-appropriate vaccinations and cancer screenings with PCP.  · Patient advised to hold DMARD and/or biologic therapy for signs of infection or for surgery. If you are unsure what to do please call our office for instruction.[facility] [phone]  · Return to clinic: 6 mos w SLE labs (pending lab rsults today), sooner if rituxan ordered once labs reviewed    The patient understands, chooses and consents to this plan and accepts all   the risks which include but are not limited to the risks mentioned above.     Disclaimer: This note was prepared using a voice recognition system and is likely to have sound alike errors within the text.

## 2022-03-04 LAB — DSDNA AB SER-ACNC: NORMAL [IU]/ML

## 2022-03-07 ENCOUNTER — TELEPHONE (OUTPATIENT)
Dept: RHEUMATOLOGY | Facility: CLINIC | Age: 32
End: 2022-03-07
Payer: MEDICAID

## 2022-03-07 ENCOUNTER — OFFICE VISIT (OUTPATIENT)
Dept: PSYCHIATRY | Facility: CLINIC | Age: 32
End: 2022-03-07
Payer: MEDICAID

## 2022-03-07 DIAGNOSIS — N06.0 ISOLATED PROTEINURIA WITH MINOR GLOMERULAR ABNORMALITY: Primary | ICD-10-CM

## 2022-03-07 DIAGNOSIS — F41.9 ANXIETY: ICD-10-CM

## 2022-03-07 DIAGNOSIS — F39 MOOD DISORDER: Primary | ICD-10-CM

## 2022-03-07 PROCEDURE — 99499 UNLISTED E&M SERVICE: CPT | Mod: HP,HB,95, | Performed by: PSYCHOLOGIST

## 2022-03-07 PROCEDURE — 99499 NO LOS: ICD-10-PCS | Mod: HP,HB,95, | Performed by: PSYCHOLOGIST

## 2022-03-07 RX ORDER — FLUOXETINE HYDROCHLORIDE 40 MG/1
40 CAPSULE ORAL DAILY
Qty: 30 CAPSULE | Refills: 1 | Status: SHIPPED | OUTPATIENT
Start: 2022-03-07 | End: 2022-06-16 | Stop reason: SDUPTHER

## 2022-03-07 NOTE — PROGRESS NOTES
"Outpatient Psychiatry Follow-Up Visit    3/7/2022    Timeframe: Corona Virus Outbreak     The patient location is: Patient's outside/ Patient reported that his/her location at the time of this visit was in the [facility]     Visit type: Virtual visit with synchronous audio and video--We had to use audio via speakerphone and continue the video through the Mychart due to technical difficulties and audio quality. We ended up having to just use the phone up to her ear because she was having phone problems.    Each patient to whom he or she provides medical services by telemedicine is: (1) informed of the relationship between the physician and patient and the respective role of any other health care provider with respect to management of the patient; and (2) notified that he or she may decline to receive medical services by telemedicine and may withdraw from such care at any time.    I also informed patient of the following:   Mere Jarvis, PhD, MPAP:  [place] medical license number: [id number]    My contact info:  [facility] / 2nd Floor  [address] [place] [address]   Ph: [phone]    If technology issues, call office phone: Ph: [phone]  If crisis: Dial 911 or go to nearest Emergency Room (ER)  If questions related to privacy practices: contact [facility] Information Department: [phone]    Chief Complaint:  Alisia Vines is a 31 y.o. female who presents today for follow-up of mood and anxiety.       Impressions/Plan from last visit from 1/11/21: Alisia reported that "everthing is going good." She said that her biggest issue is remembering to take the medicine. She has missed at least a week and a half or so. She started back last week. She said that she had one episode where she "went off"--she got mad and threw a broken candle at her boyfriend's truck, causing some scratches. She does not recall why she was mad. She was not taking " "the medicine when she did that. We talked again about using a reminder to take her medicine--she already has a pill container but has to keep it out of reach of her children, so it is not "visible" to tie to brushing her teeth, for example. Having a phone reminder will help if she goes at that moment to take her medicine. We also discussed her restarting her counseling--staff has to schedule for her, so her visits got "off." They have started working on building plans--is hopeful about the future. We are continuing Abilify 2 mg and Prozac 40 mg.    Interval History and Content of Current Session: Alisia attended her virtual visit but we had to stop the visit because she was riding in a car with others (started in car; then got outside with phone problems and then back in her car). She said that she needed her medicine--Prozac only because she had Abilify. When asked about staff scheduling the next appt, she said that she had to work some days and needed to talk to them over the phone. I agreed to send in Prozac this time--it's been over a year since she has been seen.      [Dk visit of 3/3/22: This is an established patient to the department, but a new patient to my clinic.  Gloria is a pleasant 31-year-old female with lupus nephritis (Class V glomerulonephritis), Sjogren syndrome.  Plaquenil 200 mg b.i.d. is ordered .  Patient is not consistent with medication.  She also has prednisone 5 mg to use when her joints are flared.  She says she uses this very rarely.  She has been on Rituxan infusions in the past due to noncompliance.  Most recent infusion 5/2020.  Stable since then.       She denies rash, eye pain, chest pain, shortness of breath, hematuria, dysuria, blood in her stools, tender swollen joints.  She has mechanical pain with prolonged weight-bearing particularly in the knees.  It hurts mostly when she is at work.  She works in the kitchen for restaurant.  She still has dry eyes and dry mouth.  " This is stable.  She gets by with using water.  She is not using eyedrops.  She has not seen and ophthalmologist for Plaquenil monitoring.  I have reviewed her know to see the last time she saw nephrology was back in 2019. Overall doing relatively well.  She has no new rashes no photosensitivity.  She has no pain.  Rheumatologic systems otherwise negative.     She has a lot on her plate right now.  She is trying to take classes online.  She has 4 kids, they can go still which are between 2-year-old.  She is also taking on a new job in a couple of weeks at [employer] is in the kitchen.]      GAD7 3/7/2022 1/8/2021 10/2/2020   1. Feeling nervous, anxious, or on edge? 0 1 0   2. Not being able to stop or control worrying? 0 0 0   3. Worrying too much about different things? 1 0 0   4. Trouble relaxing? 0 1 0   5. Being so restless that it is hard to sit still? 0 1 0   6. Becoming easily annoyed or irritable? 3 1 0   7. Feeling afraid as if something awful might happen? 0 0 0   MINDY-7 Score 4 4 0      0-4 = Minimal anxiety  5-9 = Mild anxiety  10-14 = Moderate anxiety  15-21 = Severe anxiety       Review of Systems   · PSYCHIATRIC: Pertinant items are noted in the narrative.    Past Medical, Family and Social History: The patient's past medical, family and social history have been reviewed and updated as appropriate within the electronic medical record - see encounter notes.      Current Outpatient Medications:     amLODIPine (NORVASC) 5 MG tablet, TAKE 1 TABLET BY MOUTH EVERY DAY, Disp: 30 tablet, Rfl: 11    ARIPiprazole (ABILIFY) 2 MG Tab, Take 1 tablet (2 mg total) by mouth once daily., Disp: 30 tablet, Rfl: 2    diclofenac (VOLTAREN) 75 MG EC tablet, Take 1 tablet (75 mg total) by mouth 2 (two) times daily., Disp: 20 tablet, Rfl: 0    fexofenadine (ALLEGRA) 180 MG tablet, Take 1 tablet (180 mg total) by mouth once daily., Disp: 30 tablet, Rfl: 6    FLUoxetine 40 MG capsule, Take 1 capsule (40 mg total) by mouth once  daily., Disp: 30 capsule, Rfl: 2    fluticasone propionate (FLONASE) 50 mcg/actuation nasal spray, 2 sprays (100 mcg total) by Each Nostril route once daily., Disp: 16 g, Rfl: 3    hydrOXYchloroQUINE (PLAQUENIL) 200 mg tablet, TAKE 2 TABLETS(400 MG) BY MOUTH EVERY DAY, Disp: 60 tablet, Rfl: 2    losartan-hydrochlorothiazide 100-12.5 mg (HYZAAR) 100-12.5 mg Tab, Take 1 tablet by mouth once daily., Disp: 30 tablet, Rfl: 5    predniSONE (DELTASONE) 5 MG tablet, Take 1 tablet (5 mg total) by mouth daily as needed (Joint pain)., Disp: 30 tablet, Rfl: 1    valACYclovir (VALTREX) 1000 MG tablet, Take 1 tablet (1,000 mg total) by mouth once daily., Disp: 90 tablet, Rfl: 3      Exam (detailed: at least 9 elements; comprehensive: all 15 elements)   Constitutional  Vitals:  Most recent vital signs were reviewed.   Last 3 sets of Vitals    Vitals - 1 value per visit 12/4/2021 3/3/2022 3/3/2022   SYSTOLIC 148 - 135   DIASTOLIC 104 - 92   Pulse 72 - 83   Temp 98 - -   Resp 18 - -   SPO2 100 - -   Weight (lb) 185 - 198.19   Weight (kg) 83.915 - 89.9   Height 65 - 65   BMI (Calculated) 30.8 - 33   VISIT REPORT - - -   Pain Score  - 0 -   Some recent data might be hidden          General:  age appropriate, casually dressed, neatly groomed, wearing glasses, only saw her for a short time when phone was not working properly for audio--had to hold phone to her ear and then phone only for a short time     Musculoskeletal  Muscle Strength/Tone:  no tremor, no tic   Gait & Station:  video visit     Psychiatric  Speech:  no latency; no press   Behavior: wnl   Mood & Affect:  unknown  congruent and appropriate   Thought Process:  normal and logical   Associations:  intact   Thought Content:  normal, no suicidality, no homicidality, delusions, or paranoia (none voiced during short time)   Insight:  has awareness of illness   Judgement: behavior is adequate to circumstances   Orientation:  grossly intact   Memory: intact for content of  interview   Language: grossly intact   Attention Span & Concentration:  Grossly intact   Fund of Knowledge:  intact and appropriate to age and level of education     General Impression:     Encounter Diagnoses   Name Primary?    Mood disorder Yes    Anxiety          Intervention/Counseling/Treatment Plan   · Medication Management: Discussed risks, benefits, and alternatives to treatment plan documented above with patient. I answered all patient questions related to this plan, and patient expressed understanding and agreement.   continue Prozac 40 mg and Abilify 2 mg (said she only needed Prozac today)  · counseling?    Medication List with Changes/Refills   Current Medications    AMLODIPINE (NORVASC) 5 MG TABLET    TAKE 1 TABLET BY MOUTH EVERY DAY    ARIPIPRAZOLE (ABILIFY) 2 MG TAB    Take 1 tablet (2 mg total) by mouth once daily.    DICLOFENAC (VOLTAREN) 75 MG EC TABLET    Take 1 tablet (75 mg total) by mouth 2 (two) times daily.    FEXOFENADINE (ALLEGRA) 180 MG TABLET    Take 1 tablet (180 mg total) by mouth once daily.    FLUOXETINE 40 MG CAPSULE    Take 1 capsule (40 mg total) by mouth once daily.    FLUTICASONE PROPIONATE (FLONASE) 50 MCG/ACTUATION NASAL SPRAY    2 sprays (100 mcg total) by Each Nostril route once daily.    HYDROXYCHLOROQUINE (PLAQUENIL) 200 MG TABLET    TAKE 2 TABLETS(400 MG) BY MOUTH EVERY DAY    LOSARTAN-HYDROCHLOROTHIAZIDE 100-12.5 MG (HYZAAR) 100-12.5 MG TAB    Take 1 tablet by mouth once daily.    PREDNISONE (DELTASONE) 5 MG TABLET    Take 1 tablet (5 mg total) by mouth daily as needed (Joint pain).    VALACYCLOVIR (VALTREX) 1000 MG TABLET    Take 1 tablet (1,000 mg total) by mouth once daily.        Return to Clinic: 6 weeks, or as soon as possible    Time spent with pt including note preparation: 18 minutes       Mere Jarvis, PhD, MP  Advanced Practice Medical Psychologist  [facility]  [place] [id number]  [phone] ph  [phone]  fax

## 2022-03-07 NOTE — TELEPHONE ENCOUNTER
"Sent message to Nephrology pool. Appointment scheduled for next available with Dr. Willard.                Stephanie Galindo" [facility]  Rheumatology Department  "

## 2022-03-07 NOTE — TELEPHONE ENCOUNTER
----- Message from Lora Root PA-C sent at 3/7/2022  8:38 AM CST -----  Please f/u on her getting in w nephro

## 2022-03-07 NOTE — PATIENT INSTRUCTIONS
"[facility]   PATIENT INFORMATION    We appreciate the opportunity to participate in your medical care and hope the following protocols will make it easier for you to receive quality treatment in our department.    PUNCTUALITY: Your appointment is scheduled for a fixed amount of time, reserved especially for you.  To get the benefit of your appointment, please arrive at least 15 minutes early to allow time for traffic, parking and registration.  Should you arrive more than 15 minutes late to your appointment, you will be rescheduled in order to assure your clinician has adequate time to assess you and provide helpful care.      APPOINTMENTS: Appointments are made by the nursing/front office staff or through the patient portal. Providers do not have access  to schedule appointments. Walk in appointments are not available. FOR EMERGENCIES, PLEASE GO THE CLOSEST EMERGENCY ROOM.    CANCELLATION/MISSED APPOINTMENTS:   In order to receive quality care, all appointments must be kept.  If you are unable to keep an appointment, please reschedule at least 3 days prior if possible. Late cancellations (within 24 hours of the appointment) and repeated no-show appointments may result in dismissal from the clinic. After two no show/late cancellation visits, you will receive a notice letter, alerting you to keep visits to prevent department dismissal. If another visit is missed after receipt of the notice, you will be discharged from the clinic. This policy is in effect to allow for other individuals on a long waiting list to be seen as soon as possible. Unlike other branches of medicine where several individuals can be scheduled in a 30 minute time slot, only one individual can be scheduled in any time slot in Psychiatry.     MESSAGES: For simple questions/concerns, you may contact your individual providers electronically through the "[facility]" portal or by calling [phone] " with messages relayed via office staff. If relevant, include pharmacy name and phone number, date of last visit and next scheduled visit, phone number where you can be reached throughout the day, and whether leaving a voicemail or message on an answering machine is acceptable. Messages will be returned by the Medical Assistant or Office Staff after your provider has reviewed the message.  Please allow 24 hours for a returned message before leaving another message. Messages will be checked each workday (Monday through Friday) during office hours (8:00 a.m. and 5:00 p.m.) and returned at most within one business day.  You may leave a non-urgent message after hours. Note that psychotherapy and medication management are not appropriate by telephone or the patient portal.    PRESCRIPTION REFILLS:  Please communicate with your prescriber about any refills you need during your appointment. You may also request refills through the [facility] portal (preferred) or by calling the clinic. Prescriptions will be filled during office hours.     Please do not wait until you are completely out of medication to request refills. Same day refills are not always possible. Patients may experience symptoms of withdrawal if they run out of medications. The patient assumes all responsibility when there is an issue with non-compliance with follow-up appointments and medications.  Some medications are controlled and regulated by the FDA and [place]. Some of these medications can not be refilled before 30 days and require a face to face appointment.     PAPERWORK REQUESTS: If you have any forms or letters that need to be completed by your doctor, please present these at the beginning of the appointment to ensure that information needed to complete them is obtained during the office visit. Paperwork will be returned within 7-10 business days. Staff will call you to  the paperwork when completed.    SPECIAL EVALUATIONS: Please note that our  "department is treatment-focused. As such, we focus on treatment-oriented evaluations and do not perform specialty or "forensic" evaluations. Examples are listed below.    Disability: We do not do disability evaluations.  Please contact Social Security Administration for evaluations and determinations. You will then sign releases allowing for records from your treatment providers to be forwarded to Social Security Administration to use in their evaluation.  Gun Permit: We do not offer Sound Judgment Evaluations or assessments leading to gun ownership, nor do we fill out or file paperwork relevant to owning, concealing or purchasing a firearm.  Emotional Support     Animals ([place]): We do not provide documentation, including letters, to aid in the acclamation that an Emotional Support Animal is required. Note that ESAs are not trained to perform tasks or recognize particular signs or symptoms. Rather, they are distinguished by the close, emotional, and supportive bond between the animal and the owner.       SAMPLES: We do not provide samples of any medications. If you have financial difficulties and are on a limited income, you may qualify for Patient Assistance Programs from various pharmaceutical companies. This will require that you complete paperwork with your financial information, but this does not guarantee that the company will approve the application. Alternative medication options can be discussed.    REFERRALS/COORDINATION: You will be referred to other providers if we feel unable to adequately diagnose or treat your particular condition, or if collaboration with another provider would allow for better management of your condition.     This document is for information purposes only. Please refer to the full disclaimer and copyright statement available at http://www.cci.health.wa.gov.au regarding the information from this website before making use of such information.  See website www.cci.health.wa.gov.au " for more handouts and resources.    What is Sleep Hygiene?  Sleep hygiene is the term used to describe good sleep habits. Considerable research has gone into developing a set of guidelines and tips which are designed to enhance good sleeping, and there is much evidence to suggest that these strategies can provide long-term solutions to sleep difficulties. There are many medications which are used to treat insomnia,  but these tend to be only effective in the short-term. Ongoing use of sleeping pills may lead to dependence and interfere with developing good sleep habits independent of medication,  thereby prolonging sleep difficulties. Talk to your health professional about what is right for you, but we recommend good sleep hygiene as an important part of treating insomnia,  either with other strategies such as medication or cognitive therapy or alone.    Sleep Hygiene Tips  1) Get regular. One of the best ways to train your body to sleep well is to go to bed and get up at more or less the same time every day, even on weekends and days off! This regular rhythm will make you feel better and will give your body something to work from.  2) Sleep when sleepy. Only try to sleep when you actually feel tired or sleepy, rather than spending too much time awake in bed.  3) Get up & try again. If you havent been able to get to sleep after about 20 minutes or more, get up and do something calming or boring until you feel sleepy, then return to bed and try again. Sit quietly on the couch with the lights off (bright light will tell your brain that it is time to wake up), or read something boring like the phone book. Avoid doing anything that is too stimulating or interesting, as this will wake you up even more.  4) Avoid caffeine & nicotine. It is best to avoid consuming any caffeine (in coffee, tea, cola drinks, chocolate, and some medications) or nicotine (cigarettes) for at least 4-6 hours before going to bed. These  substances act as stimulants and interfere with the ability to fall asleep   5) Avoid alcohol. It is also best to avoid alcohol for at least 4-6 hours before going to bed. Many people believe that alcohol is relaxing and helps them to get to sleep at first, but it actually interrupts the quality of sleep.  6) Bed is for sleeping. Try not to use your bed for anything other than sleeping and sex, so that your body comes to associate bed with sleep. If you use bed as a place to watch TV, eat, read, work on your laptop, pay bills, and other things, your body will not learn this connection.  7) No naps. It is best to avoid taking naps during the day, to make sure that you are tired at bedtime. If you cant make it through the day without a nap, make sure it is for less than an hour and before 3pm.  8) Sleep rituals. You can develop your own rituals of things to remind your body that it is time to sleep - some people find it useful to do relaxing stretches or breathing exercises for 15 minutes before bed each night, or sit calmly with a cup of caffeine-free tea.  9) Bathtime. Having a hot bath 1-2 hours before bedtime can be useful, as it will raise your body temperature, causing you to feel sleepy as your body temperature drops again. Research shows that sleepiness is associated with a drop in body temperature.  10) No clock-watching. Many people who struggle with sleep tend to watch the clock too much. Frequently checking the clock during the night can wake you up (especially if you turn  on the light to read the time) and reinforces negative thoughts such as Oh no, look how late it is, Ill never get to sleep or its so early, I have only slept for 5 hours, this is  terrible.  11) Use a sleep diary. This worksheet can be a useful way of making sure you have the right facts about your sleep, rather than making assumptions. Because a diary involves watching  the clock (see point 10) it is a good idea to only use it  for two weeks to get an idea of what is going and then perhaps two months down the track to see how you are progressing.  12) Exercise. Regular exercise is a good idea to help with good sleep, but try not to do strenuous exercise in the 4 hours before bedtime. Morning walks are a great way to start the day feeling refreshed!  13) Eat right. A healthy, balanced diet will help you to sleep well, but timing is important. Some people find that a very empty stomach at bedtime is distracting, so it can be useful  to have a light snack, but a heavy meal soon before bed can also interrupt sleep. Some people recommend a warm glass of milk, which contains tryptophan, which acts as a natural  sleep inducer.  14) The right space. It is very important that your bed and bedroom are quiet and comfortable for sleeping. A cooler room with enough blankets to stay warm is best, and make sure you have curtains or an eyemask to block out early morning light and earplugs if there is noise outside your room.  15) Keep daytime routine the same. Even if you have a bad night sleep and are tired it is important that you try to keep your daytime activities the same as you had planned. That is,  dont avoid activities because you feel tired. This can reinforce the insomnia.     Call In if problems  Call Report Side Effects   Encouraged to follow up with primary care / Gen Med MD for continued monitoring of general health and wellness  Call [phone] Or go to ER if Acute Concerns (especially if any thoughts of harm to self or other)        Mere Jarvis, PhD, MP  Advanced Practice Medical Psychologist  [facility]  [place] [id number] [phone]   [phone] fax

## 2022-03-15 ENCOUNTER — TELEPHONE (OUTPATIENT)
Dept: RHEUMATOLOGY | Facility: CLINIC | Age: 32
End: 2022-03-15
Payer: MEDICAID

## 2022-03-15 NOTE — TELEPHONE ENCOUNTER
----- Message from Lora Root PA-C sent at 3/15/2022 10:01 AM CDT -----  Regarding: Labs  Needs repeat PC ration first week of April.      ----- Message -----  From: Shane Blair MD  Sent: 3/15/2022   9:09 AM CDT  To: Lora Root PA-C  Subject: RE: Rituxan?                                     I would recommend repeating PC ratio in 4 weeks.  If no synovitis in small joints, you could consider injecting does knees or trying Cymbalta /Lyrica for pain.  If PC ratio persistently worsens, she will need a kidney biopsy.  Thanks.  ----- Message -----  From: Lora Root PA-C  Sent: 3/15/2022   8:51 AM CDT  To: Shane Blair MD  Subject: RE: Rituxan?                                     Proteinuria w elevated PC ratio, and multiple arthralgias (knees worst - but mechanical).  My main concern was the kidneys.  I put in a nephro ref.      ----- Message -----  From: Shane Blair MD  Sent: 3/14/2022   4:38 PM CDT  To: Lora Root PA-C  Subject: RE: Rituxan?                                     Any active disease?    ----- Message -----  From: Lora Root PA-C  Sent: 3/14/2022   3:43 PM CDT  To: Shane Blair MD  Subject: Rituxan?                                         Would you want her on rituxan?  Non compliant with plaquenil.  Was on benlysta in the past with worsening sxs. Given non compliance w plaq, certainly don't think she would qualify for the lupus study.

## 2022-03-15 NOTE — TELEPHONE ENCOUNTER
"Attempted to call patient to schedule lab appointment. Patient did not answer and I was unable to leave a v/m due to v/m box not being set up/full.      Stephanie Galindo" [facility]  Rheumatology Department   "

## 2022-05-01 ENCOUNTER — OFFICE VISIT (OUTPATIENT)
Dept: URGENT CARE | Facility: CLINIC | Age: 32
End: 2022-05-01
Payer: MEDICAID

## 2022-05-01 VITALS
SYSTOLIC BLOOD PRESSURE: 125 MMHG | TEMPERATURE: 98 F | BODY MASS INDEX: 32.87 KG/M2 | RESPIRATION RATE: 18 BRPM | HEART RATE: 79 BPM | OXYGEN SATURATION: 100 % | WEIGHT: 197.31 LBS | DIASTOLIC BLOOD PRESSURE: 82 MMHG | HEIGHT: 65 IN

## 2022-05-01 DIAGNOSIS — T30.0 BURN: Primary | ICD-10-CM

## 2022-05-01 DIAGNOSIS — M79.642 LEFT HAND PAIN: ICD-10-CM

## 2022-05-01 PROCEDURE — 90471 IMMUNIZATION ADMIN: CPT | Mod: S$GLB,,, | Performed by: NURSE PRACTITIONER

## 2022-05-01 PROCEDURE — 3008F PR BODY MASS INDEX (BMI) DOCUMENTED: ICD-10-PCS | Mod: CPTII,S$GLB,, | Performed by: NURSE PRACTITIONER

## 2022-05-01 PROCEDURE — 1159F MED LIST DOCD IN RCRD: CPT | Mod: CPTII,S$GLB,, | Performed by: NURSE PRACTITIONER

## 2022-05-01 PROCEDURE — 3074F SYST BP LT 130 MM HG: CPT | Mod: CPTII,S$GLB,, | Performed by: NURSE PRACTITIONER

## 2022-05-01 PROCEDURE — 99214 PR OFFICE/OUTPT VISIT, EST, LEVL IV, 30-39 MIN: ICD-10-PCS | Mod: 25,S$GLB,, | Performed by: NURSE PRACTITIONER

## 2022-05-01 PROCEDURE — 99214 OFFICE O/P EST MOD 30 MIN: CPT | Mod: 25,S$GLB,, | Performed by: NURSE PRACTITIONER

## 2022-05-01 PROCEDURE — 90715 TDAP VACCINE 7 YRS/> IM: CPT | Mod: S$GLB,,, | Performed by: NURSE PRACTITIONER

## 2022-05-01 PROCEDURE — 3079F PR MOST RECENT DIASTOLIC BLOOD PRESSURE 80-89 MM HG: ICD-10-PCS | Mod: CPTII,S$GLB,, | Performed by: NURSE PRACTITIONER

## 2022-05-01 PROCEDURE — 3079F DIAST BP 80-89 MM HG: CPT | Mod: CPTII,S$GLB,, | Performed by: NURSE PRACTITIONER

## 2022-05-01 PROCEDURE — 3074F PR MOST RECENT SYSTOLIC BLOOD PRESSURE < 130 MM HG: ICD-10-PCS | Mod: CPTII,S$GLB,, | Performed by: NURSE PRACTITIONER

## 2022-05-01 PROCEDURE — 90471 TDAP VACCINE GREATER THAN OR EQUAL TO 7YO IM: ICD-10-PCS | Mod: S$GLB,,, | Performed by: NURSE PRACTITIONER

## 2022-05-01 PROCEDURE — 1159F PR MEDICATION LIST DOCUMENTED IN MEDICAL RECORD: ICD-10-PCS | Mod: CPTII,S$GLB,, | Performed by: NURSE PRACTITIONER

## 2022-05-01 PROCEDURE — 1160F RVW MEDS BY RX/DR IN RCRD: CPT | Mod: CPTII,S$GLB,, | Performed by: NURSE PRACTITIONER

## 2022-05-01 PROCEDURE — 90715 TDAP VACCINE GREATER THAN OR EQUAL TO 7YO IM: ICD-10-PCS | Mod: S$GLB,,, | Performed by: NURSE PRACTITIONER

## 2022-05-01 PROCEDURE — 3008F BODY MASS INDEX DOCD: CPT | Mod: CPTII,S$GLB,, | Performed by: NURSE PRACTITIONER

## 2022-05-01 PROCEDURE — 1160F PR REVIEW ALL MEDS BY PRESCRIBER/CLIN PHARMACIST DOCUMENTED: ICD-10-PCS | Mod: CPTII,S$GLB,, | Performed by: NURSE PRACTITIONER

## 2022-05-01 RX ORDER — SILVER SULFADIAZINE 10 G/1000G
CREAM TOPICAL 2 TIMES DAILY
Qty: 50 G | Refills: 0 | Status: SHIPPED | OUTPATIENT
Start: 2022-05-01 | End: 2022-05-08

## 2022-05-01 RX ORDER — SILVER SULFADIAZINE 10 G/1000G
CREAM TOPICAL
Status: COMPLETED | OUTPATIENT
Start: 2022-05-01 | End: 2022-05-01

## 2022-05-01 RX ADMIN — SILVER SULFADIAZINE: 10 CREAM TOPICAL at 12:05

## 2022-05-01 NOTE — PROGRESS NOTES
"Subjective:       Patient ID: Alisia Vines is a 31 y.o. female.    Vitals:  height is 5' 5" (1.651 m) and weight is 89.5 kg (197 lb 5 oz). Her tympanic temperature is 98.4 °F (36.9 °C). Her pulse is 79. Her respiration is 18 and oxygen saturation is 100%.     Chief Complaint: Burn    Pt presents today with a burn on her left hand.Pt states that last night she was oiling honey and burned her hand.Pt states that seh used OTC medication for her symptoms.Pt states that there is no pain currently at the site.    Burn  The incident occurred 12 to 24 hours ago. The burns occurred at home. The burns occurred while cooking. The burns were a result of contact with a hot liquid. The burns are located on the left hand. The pain is at a severity of 0/10. The patient is experiencing no pain. The treatment provided no relief.     ROS      Past Medical History:   Diagnosis Date    Allergic rhinitis     Anemia     Condyloma acuminata     COVID-19 virus infection 07/2021    GERD (gastroesophageal reflux disease)     History of fetal anomaly in prior pregnancy, currently pregnant, unspecified trimester 3/8/2017    Pacemaker placed    HSV-2 (herpes simplex virus 2) infection     Lupus nephritis     Mood disorder     Overweight(278.02)     Sjogren's syndrome     Systemic lupus complicating pregnancy 1/31/2019    Systemic lupus erythematosus     Thrombocytopenia           Social History     Socioeconomic History    Marital status: Single    Number of children: 2   Occupational History     Comment: [employer]   Tobacco Use    Smoking status: Never Smoker    Smokeless tobacco: Never Used   Substance and Sexual Activity    Alcohol use: No     Alcohol/week: 0.0 standard drinks    Drug use: No    Sexual activity: Not Currently     Partners: Male     Birth control/protection: None   Other Topics Concern    Are you pregnant or think you may be? No    Breast-feeding No   Social History Narrative    The " patient is single and lives with her significant other.  She has 3 children.  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          Family History   Problem Relation Age of Onset    Hypertension Mother     Eczema Brother     Heart disease Son     Arrhythmia Son         CHB    Hypertension Maternal Grandmother     Diabetes Paternal Grandmother        ROS:  GENERAL: No fever, chills, fatigability or weight loss.  SKIN:  Burn to left thumb and wrist with hot honey.  HEENT: No headaches or recent head trauma. Visual acuity fine. No photophobia, ocular pain or diplopia. Denies ear pain, discharge or vertigo. No loss of smell, no epistaxis or postnasal drip. No hoarseness or change in voice.   NODES: No masses or lesions. Denies swollen glands.  CHEST: Denies cyanosis, wheezing, cough and sputum production.  CARDIOVASCULAR: Denies chest pain, PND, orthopnea or reduced exercise tolerance.  ABDOMEN: Appetite fine. No weight loss. Denies diarrhea, abdominal pain  MUSCULOSKELETAL: No joint stiffness or swelling. Denies back pain.  NEUROLOGIC: No history of seizures, paralysis, alteration of gait or coordination.  PSYCHIATRIC: Denies mood swings, depression or suicidal thoughts.    PE:   APPEARANCE: Well nourished, well developed, in mild distress.  Temp 98.4°, pulse ox 100%  V/S: Reviewed.  SKIN:  Base of left thumb, hand,  wrist with burn skin lesions, spontaneous rupture of blisters, minimal redness and tenderness.    CHEST:  Respiratory symptoms  CARDIOVASCULAR: Regular rate and rhythm   MUSCULOSKELETAL: Motor: 5/5 strength major flexors/extensors.  NEUROLOGIC: No sensory deficits. Gait & Posture: Normal gait and fine motion. No cerebellar signs.  MENTAL STATUS: Patient alert, oriented x 3 & conversant.    PLAN:   Administer Tdap 0.5 ml IM now  Administer Silvadene cream with sterile dressing  Advise increase p.o. fluids-- water/juice & rest  Meds:  Silvadene cream/no refills  Practice good  handwashing..  Tylenol or Ibuprofen for fever, headache and body aches..  Advise follow up with PCP in 2 -3 days for recheck  Advise go to ER if nausea, vomiting, fever, increased pain, or fail to improve with treatment.  AVS provided and reviewed with patient including supportive care, follow up, and red flag symptoms.   Patient verbalizes understanding and agrees with treatment plan. Discharged from Urgent Care in stable condition.      DIAGNOSIS:   Burn  Left hand pain

## 2022-05-01 NOTE — PATIENT INSTRUCTIONS
PLAN:   Administer Tdap 0.5 ml IM now  Administer Silvadene cream with sterile dressing  Advise increase p.o. fluids-- water/juice & rest  Meds:  Silvadene cream/no refills  Practice good handwashing..  Tylenol or Ibuprofen for fever, headache and body aches..  Advise follow up with PCP in 2 -3 days for recheck  Advise go to ER if nausea, vomiting, fever, increased pain, or fail to improve with treatment.  AVS provided and reviewed with patient including supportive care, follow up, and red flag symptoms.   Patient verbalizes understanding and agrees with treatment plan. Discharged from Urgent Care in stable condition.

## 2022-05-09 ENCOUNTER — TELEPHONE (OUTPATIENT)
Dept: NEPHROLOGY | Facility: CLINIC | Age: 32
End: 2022-05-09
Payer: MEDICAID

## 2022-05-09 NOTE — TELEPHONE ENCOUNTER
----- Message from Roxann Mgadaleno sent at 5/9/2022  7:34 AM CDT -----  Pt would like return call; states she was unable to get labs done, would like to know if she should still come to appt today.  Please call back at .[phone]. Taryn Martinez

## 2022-05-29 ENCOUNTER — OFFICE VISIT (OUTPATIENT)
Dept: URGENT CARE | Facility: CLINIC | Age: 32
End: 2022-05-29
Payer: MEDICAID

## 2022-05-29 VITALS
RESPIRATION RATE: 18 BRPM | OXYGEN SATURATION: 99 % | WEIGHT: 187.25 LBS | BODY MASS INDEX: 31.2 KG/M2 | TEMPERATURE: 98 F | HEIGHT: 65 IN | SYSTOLIC BLOOD PRESSURE: 142 MMHG | HEART RATE: 119 BPM | DIASTOLIC BLOOD PRESSURE: 104 MMHG

## 2022-05-29 DIAGNOSIS — R19.7 DIARRHEA, UNSPECIFIED TYPE: ICD-10-CM

## 2022-05-29 DIAGNOSIS — J02.9 PHARYNGITIS, UNSPECIFIED ETIOLOGY: ICD-10-CM

## 2022-05-29 DIAGNOSIS — J06.9 URI WITH COUGH AND CONGESTION: ICD-10-CM

## 2022-05-29 DIAGNOSIS — I10 HYPERTENSION, UNSPECIFIED TYPE: ICD-10-CM

## 2022-05-29 DIAGNOSIS — B30.9 ACUTE VIRAL CONJUNCTIVITIS OF BOTH EYES: Primary | ICD-10-CM

## 2022-05-29 PROCEDURE — 3077F PR MOST RECENT SYSTOLIC BLOOD PRESSURE >= 140 MM HG: ICD-10-PCS | Mod: CPTII,S$GLB,, | Performed by: NURSE PRACTITIONER

## 2022-05-29 PROCEDURE — 99214 PR OFFICE/OUTPT VISIT, EST, LEVL IV, 30-39 MIN: ICD-10-PCS | Mod: S$GLB,,, | Performed by: NURSE PRACTITIONER

## 2022-05-29 PROCEDURE — 3077F SYST BP >= 140 MM HG: CPT | Mod: CPTII,S$GLB,, | Performed by: NURSE PRACTITIONER

## 2022-05-29 PROCEDURE — 99214 OFFICE O/P EST MOD 30 MIN: CPT | Mod: S$GLB,,, | Performed by: NURSE PRACTITIONER

## 2022-05-29 PROCEDURE — 1160F PR REVIEW ALL MEDS BY PRESCRIBER/CLIN PHARMACIST DOCUMENTED: ICD-10-PCS | Mod: CPTII,S$GLB,, | Performed by: NURSE PRACTITIONER

## 2022-05-29 PROCEDURE — 1160F RVW MEDS BY RX/DR IN RCRD: CPT | Mod: CPTII,S$GLB,, | Performed by: NURSE PRACTITIONER

## 2022-05-29 PROCEDURE — 3080F DIAST BP >= 90 MM HG: CPT | Mod: CPTII,S$GLB,, | Performed by: NURSE PRACTITIONER

## 2022-05-29 PROCEDURE — 3080F PR MOST RECENT DIASTOLIC BLOOD PRESSURE >= 90 MM HG: ICD-10-PCS | Mod: CPTII,S$GLB,, | Performed by: NURSE PRACTITIONER

## 2022-05-29 PROCEDURE — 1159F PR MEDICATION LIST DOCUMENTED IN MEDICAL RECORD: ICD-10-PCS | Mod: CPTII,S$GLB,, | Performed by: NURSE PRACTITIONER

## 2022-05-29 PROCEDURE — 1159F MED LIST DOCD IN RCRD: CPT | Mod: CPTII,S$GLB,, | Performed by: NURSE PRACTITIONER

## 2022-05-29 PROCEDURE — 3008F PR BODY MASS INDEX (BMI) DOCUMENTED: ICD-10-PCS | Mod: CPTII,S$GLB,, | Performed by: NURSE PRACTITIONER

## 2022-05-29 PROCEDURE — 3008F BODY MASS INDEX DOCD: CPT | Mod: CPTII,S$GLB,, | Performed by: NURSE PRACTITIONER

## 2022-05-29 RX ORDER — FLUTICASONE PROPIONATE 50 MCG
2 SPRAY, SUSPENSION (ML) NASAL DAILY
Qty: 16 G | Refills: 1 | Status: SHIPPED | OUTPATIENT
Start: 2022-05-29 | End: 2022-06-01

## 2022-05-29 RX ORDER — PROMETHAZINE HYDROCHLORIDE AND DEXTROMETHORPHAN HYDROBROMIDE 6.25; 15 MG/5ML; MG/5ML
5 SYRUP ORAL
Qty: 180 ML | Refills: 0 | Status: SHIPPED | OUTPATIENT
Start: 2022-05-29 | End: 2023-02-21

## 2022-05-29 RX ORDER — POLYMYXIN B SULFATE AND TRIMETHOPRIM 1; 10000 MG/ML; [USP'U]/ML
1 SOLUTION OPHTHALMIC 3 TIMES DAILY
Qty: 10 ML | Refills: 0 | Status: SHIPPED | OUTPATIENT
Start: 2022-05-29 | End: 2023-02-22

## 2022-05-29 NOTE — PATIENT INSTRUCTIONS
Plan:  Consult Ophthalmology   Advise take blood pressure medication  Advised of being contagious  Advise increase p.o. fluids  Advised frequent hand washing  Meds: Polytrim, Flonase, Phenergan DM /no refills  Advise follow up with PCP  Advise go to E.R. If symptoms, pain, swelling & redness became worse  AVS provided and reviewed with patient including supportive care, follow up, and red flag symptoms.   Patient verbalizes understanding and agrees with treatment plan. Discharged from Urgent Care in stable condition.

## 2022-05-29 NOTE — LETTER
May 29, 2022    Alisia Vines  [address]  [facility] [address]             [place] [facility]  Urgent Care  [address], [address]  [facility] [address]  Phone: [phone]  Fax: [phone]   May 29, 2022     Patient: Alisia Vines   YOB: 1990   Date of Visit: 5/29/2022       To Whom it May Concern:    Alisia Vines was seen in my clinic on 5/29/2022. She may return to work on 5/01/2022.    Please excuse her from any classes or work missed.    If you have any questions or concerns, please don't hesitate to call.    Sincerely,         Janet Li NP/DONNY Delaney

## 2022-05-29 NOTE — PROGRESS NOTES
"Subjective:       Patient ID: Alisia Vines is a 31 y.o. female.    Vitals:  height is 5' 5" (1.651 m) and weight is 84.9 kg (187 lb 4.5 oz). Her tympanic temperature is 98.2 °F (36.8 °C). Her blood pressure is 142/104 (abnormal) and her pulse is 119 (abnormal). Her respiration is 18 and oxygen saturation is 99%.     Chief Complaint: Conjunctivitis    Patient is a 31 year old female who presents to Urgent Care this morning complaining of conjunctivitis in the right eye and a productive cough. Patient states cough began approx Tuesday and the conjunctivitis last night but progressively got worse upon waking up this morning. Patient states upon cough beginning, she had a sore throat a day or so prior to cough developing. Patient states that at times cough is dry and other times it is productive. Patient states her son was ill recently but did not go to doctor to be diagnosed with any illnesses. Patient states she has been taking OTC cough drops, which only relieve her symptoms slightly while she has cough drop in her mouth. Patient denies any fever or pain.     Conjunctivitis  This is a new problem. The current episode started yesterday. The problem occurs constantly. Associated symptoms include coughing and a sore throat. Pertinent negatives include no abdominal pain, anorexia, arthralgias, change in bowel habit, chest pain, chills, congestion, diaphoresis, fatigue, fever, headaches, joint swelling, myalgias, nausea, neck pain, numbness, rash, swollen glands, urinary symptoms, vertigo, visual change, vomiting or weakness. The symptoms are aggravated by coughing. The treatment provided no relief.       Constitution: Negative for chills, sweating, fatigue and fever.   HENT: Positive for sore throat. Negative for congestion.    Neck: Negative for neck pain.   Cardiovascular: Negative for chest pain.   Respiratory: Positive for cough.    Gastrointestinal: Negative for abdominal pain, nausea and vomiting. "   Musculoskeletal: Negative for joint pain, joint swelling and muscle ache.   Skin: Negative for rash.   Neurological: Negative for history of vertigo, headaches and numbness.         Chief complaint/reason for visit:  Right red eye, sore throat, cough congestion, diarrhea     History of present illness:  31 year-old female complains of right red eye with yellowish d/c onset yesterday.  Complains right of runny nose, nasal congestion, sore throat, cough, diarrhea onset 3-4 days ago.  Patient tried over-the-counter medications with little relief.  Patient denies chest pain, shortness of breath, nausea, body aches, loss of appetite, fever.  Discussed elevated blood pressure.  Patient denies taking medication.  Discussed low-sodium diet.  Patient requesting work excuse.       Past Medical History:   Diagnosis Date    Allergic rhinitis     Anemia     Condyloma acuminata     COVID-19 virus infection 2021    GERD (gastroesophageal reflux disease)     History of fetal anomaly in prior pregnancy, currently pregnant, unspecified trimester 3/8/2017    Pacemaker placed    HSV-2 (herpes simplex virus 2) infection     Lupus nephritis     Mood disorder     Overweight(278.02)     Sjogren's syndrome     Systemic lupus complicating pregnancy 2019    Systemic lupus erythematosus     Thrombocytopenia          .  Past Surgical History:   Procedure Laterality Date     SECTION WITH TUBAL LIGATION N/A 2019    Procedure:  SECTION, WITH TUBAL LIGATION;  Surgeon: VERONICA Valdovinos MD;  Location: [facility] L&D;  Service: OB/GYN;  Laterality: N/A;     SECTION, LOW TRANSVERSE      x2    RENAL BIOPSY  2013         Social History     Socioeconomic History    Marital status: Single    Number of children: 2   Occupational History     Comment: [employer]   Tobacco Use    Smoking status: Never Smoker    Smokeless tobacco: Never Used   Substance and Sexual Activity    Alcohol  use: No     Alcohol/week: 0.0 standard drinks    Drug use: No    Sexual activity: Not Currently     Partners: Male     Birth control/protection: None   Other Topics Concern    Are you pregnant or think you may be? No    Breast-feeding No   Social History Narrative    The patient is single and lives with her significant other.  She has 3 children.  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       Family History   Problem Relation Age of Onset    Hypertension Mother     Eczema Brother     Heart disease Son     Arrhythmia Son         CHB    Hypertension Maternal Grandmother     Diabetes Paternal Grandmother          ROS:  Gen.: Denies fatigue, weight loss  Skin: Reports no rashes, itching or changes in skin color or texture  HEENT: reports red right eye with yellowish DC, runny nose, nasal congestion  Nodes:No masses or lesions  Chest:  cough and sputum production  Cardiovascular: Denies chest pain, shortness of breath  Musculoskeletal: no joint stiffness, swelling. Denies back pain  Neurologic: History of seizures, paralysis, alteration of gait or coordination  Psychiatric: Denies mood swings, depression or suicidal    PE:  Appearance: Well-nourished, well-developed, in mild distress, temp 98.2°, pulse ox 99%  V/S: Reviewed.  Skin: No masses, rashes or lesions  HEENT: turbinates injected, pink pharynx, TM's poor light reflex bilateral, right  sclera injected with yellowish DC, bilateral conjunctiva red   Chest: Lungs clear to auscultation.  Wheezing  Cardiovascular: Regular rate and rhythm.  Musculoskeletal: Motor: 5/5 strength major flexors/extensors.  Neurologic: No sensory deficits. Gait and posture: Normal gait fine motion.   Mental status: Patient awake, alert and oriented    Plan:  Consult Ophthalmology   Advise take blood pressure medication  Advised of being contagious  Advise increase p.o. fluids  Advised frequent hand washing  Meds: Polytrim, Flonase, Phenergan DM /no refills  Advise  follow up with PCP  Advise go to E.R. If symptoms, pain, swelling & redness became worse  AVS provided and reviewed with patient including supportive care, follow up, and red flag symptoms.   Patient verbalizes understanding and agrees with treatment plan. Discharged from Urgent Care in stable condition.      Diagnosis:  Diarrhea  Conjunctivitis  Pharyngitis  Hypertension  URI with cough and congestion

## 2022-05-29 NOTE — LETTER
May 29, 2022    Alisia Vines  [address]  [facility] [address]             [place] [facility]  Urgent Care  [address], [address]  [facility] [address]  Phone: [phone]  Fax: [phone]   May 29, 2022     Patient: Alisia Vines   YOB: 1990   Date of Visit: 5/29/2022       To Whom it May Concern:    Alisia Vines was seen in my clinic on 5/29/2022. She may return to work on 6/01/2022.    Please excuse her from any classes or work missed.    If you have any questions or concerns, please don't hesitate to call.    Sincerely,         Janet Li NP/DONNY Delaney

## 2022-05-31 ENCOUNTER — PATIENT MESSAGE (OUTPATIENT)
Dept: PSYCHIATRY | Facility: CLINIC | Age: 32
End: 2022-05-31
Payer: MEDICAID

## 2022-06-01 DIAGNOSIS — J02.9 PHARYNGITIS, UNSPECIFIED ETIOLOGY: ICD-10-CM

## 2022-06-01 DIAGNOSIS — J06.9 URI WITH COUGH AND CONGESTION: ICD-10-CM

## 2022-06-01 RX ORDER — FLUTICASONE PROPIONATE 50 MCG
SPRAY, SUSPENSION (ML) NASAL
Qty: 16 G | Refills: 1 | Status: SHIPPED | OUTPATIENT
Start: 2022-06-01

## 2022-06-01 NOTE — TELEPHONE ENCOUNTER
Made courtesy call to pt. Pt states she is still feeling a little congested but better. Pt was advised after she finishes medication if she has not gotten better to follow up with pcp.

## 2022-06-08 ENCOUNTER — TELEPHONE (OUTPATIENT)
Dept: PSYCHIATRY | Facility: CLINIC | Age: 32
End: 2022-06-08
Payer: MEDICAID

## 2022-06-08 NOTE — TELEPHONE ENCOUNTER
Pt requested an appointment with Mere Jarvis when I told the pt when the soonest appointment was she stated she wouldn't last that long.  A sooner appointment was found, the pt was advised to go to the er if her situation gets worst between today and the scheduled appointment.

## 2022-06-09 ENCOUNTER — OFFICE VISIT (OUTPATIENT)
Dept: URGENT CARE | Facility: CLINIC | Age: 32
End: 2022-06-09
Payer: MEDICAID

## 2022-06-09 VITALS
TEMPERATURE: 98 F | OXYGEN SATURATION: 100 % | RESPIRATION RATE: 16 BRPM | DIASTOLIC BLOOD PRESSURE: 104 MMHG | HEART RATE: 83 BPM | WEIGHT: 187 LBS | HEIGHT: 65 IN | BODY MASS INDEX: 31.16 KG/M2 | SYSTOLIC BLOOD PRESSURE: 170 MMHG

## 2022-06-09 DIAGNOSIS — J01.90 ACUTE BACTERIAL SINUSITIS: Primary | ICD-10-CM

## 2022-06-09 DIAGNOSIS — B96.89 ACUTE BACTERIAL SINUSITIS: Primary | ICD-10-CM

## 2022-06-09 DIAGNOSIS — J02.9 SORE THROAT: ICD-10-CM

## 2022-06-09 LAB
CTP QC/QA: YES
MOLECULAR STREP A: NEGATIVE

## 2022-06-09 PROCEDURE — 3077F SYST BP >= 140 MM HG: CPT | Mod: CPTII,S$GLB,, | Performed by: PHYSICIAN ASSISTANT

## 2022-06-09 PROCEDURE — 1159F PR MEDICATION LIST DOCUMENTED IN MEDICAL RECORD: ICD-10-PCS | Mod: CPTII,S$GLB,, | Performed by: PHYSICIAN ASSISTANT

## 2022-06-09 PROCEDURE — 3080F PR MOST RECENT DIASTOLIC BLOOD PRESSURE >= 90 MM HG: ICD-10-PCS | Mod: CPTII,S$GLB,, | Performed by: PHYSICIAN ASSISTANT

## 2022-06-09 PROCEDURE — 3077F PR MOST RECENT SYSTOLIC BLOOD PRESSURE >= 140 MM HG: ICD-10-PCS | Mod: CPTII,S$GLB,, | Performed by: PHYSICIAN ASSISTANT

## 2022-06-09 PROCEDURE — 1160F PR REVIEW ALL MEDS BY PRESCRIBER/CLIN PHARMACIST DOCUMENTED: ICD-10-PCS | Mod: CPTII,S$GLB,, | Performed by: PHYSICIAN ASSISTANT

## 2022-06-09 PROCEDURE — 1160F RVW MEDS BY RX/DR IN RCRD: CPT | Mod: CPTII,S$GLB,, | Performed by: PHYSICIAN ASSISTANT

## 2022-06-09 PROCEDURE — 3008F PR BODY MASS INDEX (BMI) DOCUMENTED: ICD-10-PCS | Mod: CPTII,S$GLB,, | Performed by: PHYSICIAN ASSISTANT

## 2022-06-09 PROCEDURE — 3008F BODY MASS INDEX DOCD: CPT | Mod: CPTII,S$GLB,, | Performed by: PHYSICIAN ASSISTANT

## 2022-06-09 PROCEDURE — 99214 OFFICE O/P EST MOD 30 MIN: CPT | Mod: S$GLB,,, | Performed by: PHYSICIAN ASSISTANT

## 2022-06-09 PROCEDURE — 87651 STREP A DNA AMP PROBE: CPT | Mod: QW,S$GLB,, | Performed by: PHYSICIAN ASSISTANT

## 2022-06-09 PROCEDURE — 87651 POCT STREP A MOLECULAR: ICD-10-PCS | Mod: QW,S$GLB,, | Performed by: PHYSICIAN ASSISTANT

## 2022-06-09 PROCEDURE — 99214 PR OFFICE/OUTPT VISIT, EST, LEVL IV, 30-39 MIN: ICD-10-PCS | Mod: S$GLB,,, | Performed by: PHYSICIAN ASSISTANT

## 2022-06-09 PROCEDURE — 3080F DIAST BP >= 90 MM HG: CPT | Mod: CPTII,S$GLB,, | Performed by: PHYSICIAN ASSISTANT

## 2022-06-09 PROCEDURE — 1159F MED LIST DOCD IN RCRD: CPT | Mod: CPTII,S$GLB,, | Performed by: PHYSICIAN ASSISTANT

## 2022-06-09 RX ORDER — AMOXICILLIN AND CLAVULANATE POTASSIUM 875; 125 MG/1; MG/1
1 TABLET, FILM COATED ORAL 2 TIMES DAILY
Qty: 14 TABLET | Refills: 0 | Status: SHIPPED | OUTPATIENT
Start: 2022-06-09 | End: 2022-06-16

## 2022-06-09 NOTE — PROGRESS NOTES
"Subjective:       Patient ID: Alisia Vines is a 31 y.o. female.    Vitals:  height is 5' 5" (1.651 m) and weight is 84.8 kg (187 lb). Her temperature is 98.1 °F (36.7 °C). Her blood pressure is 170/104 (abnormal) and her pulse is 83. Her respiration is 16 and oxygen saturation is 100%.     Chief Complaint: Sore Throat    Pt was seen on 05/29/22 for sore throat pt was given rx cough syrup and flonase. Pt states her throat has gotten progressively worse  It now hurts to talk or swallow and she can not take the cough syrup with making her nauseated. Pt still has a productive cough. Pt now has diarrhea, and ear congestion.  Admits to not taking her BP meds.      Sore Throat   This is a new problem. The current episode started 1 to 4 weeks ago. The problem has been gradually worsening. Neither side of throat is experiencing more pain than the other. There has been no fever. The pain is at a severity of 8/10. The pain is moderate. Associated symptoms include coughing, diarrhea, neck pain and trouble swallowing. Pertinent negatives include no abdominal pain, congestion, drooling, ear discharge, ear pain, headaches, hoarse voice, plugged ear sensation, shortness of breath, stridor, swollen glands or vomiting. The treatment provided no relief.       HENT: Positive for sore throat and trouble swallowing. Negative for ear pain, ear discharge, drooling and congestion.    Neck: Positive for neck pain.   Respiratory: Positive for cough. Negative for shortness of breath and stridor.    Gastrointestinal: Positive for diarrhea. Negative for abdominal pain and vomiting.   Neurological: Negative for headaches.       Objective:      Physical Exam   Constitutional: She is oriented to person, place, and time. She appears well-developed.   HENT:   Head: Normocephalic and atraumatic.   Ears:   Right Ear: Tympanic membrane, external ear and ear canal normal.   Left Ear: Tympanic membrane, external ear and ear canal normal.   Nose: " Mucosal edema, rhinorrhea and purulent discharge present.   Mouth/Throat: Uvula is midline, oropharynx is clear and moist and mucous membranes are normal. Cobblestoning present.   Eyes: EOM are normal. Pupils are equal, round, and reactive to light.   Neck: Neck supple.   Cardiovascular: Normal rate and regular rhythm.   Pulmonary/Chest: Effort normal and breath sounds normal.   Abdominal: Bowel sounds are normal. Soft.   Musculoskeletal: Normal range of motion.         General: Normal range of motion.   Neurological: She is alert and oriented to person, place, and time.   Skin: Skin is warm and dry.   Vitals reviewed.        Assessment:       1. Acute bacterial sinusitis    2. Sore throat          Plan:         Acute bacterial sinusitis  -     amoxicillin-clavulanate 875-125mg (AUGMENTIN) 875-125 mg per tablet; Take 1 tablet by mouth 2 (two) times daily. for 7 days  Dispense: 14 tablet; Refill: 0    Sore throat  -     POCT Strep A, Molecular      Results for orders placed or performed in visit on 06/09/22   POCT Strep A, Molecular   Result Value Ref Range    Molecular Strep A, POC Negative Negative     Acceptable Yes      *Note: Due to a large number of results and/or encounters for the requested time period, some results have not been displayed. A complete set of results can be found in Results Review.       Patient Instructions     Advise increase p.o. fluids--at least 64 ounces of water/juice & rest  Meds:  Augmentin sent to pharmacy.  Get back on BP meds.    Advise complete antibiotics.  Normal saline nasal wash to irrigate sinuses and for congestion/runny nose.  Cool mist humidifier/vaporizer.  Practice good handwashing.  Mucinex for cough and chest congestion.  Tylenol or Ibuprofen for fever, headache and body aches.  Warm salt water gargles for throat comfort.  Chloraseptic spray or lozenges for throat comfort.  See PCP or go to ER if symptoms worsen or fail to improve with treatment.

## 2022-06-09 NOTE — PATIENT INSTRUCTIONS
Advise increase p.o. fluids--at least 64 ounces of water/juice & rest  Meds:  Augmentin sent to pharmacy.  Get back on BP meds.    Advise complete antibiotics.  Normal saline nasal wash to irrigate sinuses and for congestion/runny nose.  Cool mist humidifier/vaporizer.  Practice good handwashing.  Mucinex for cough and chest congestion.  Tylenol or Ibuprofen for fever, headache and body aches.  Warm salt water gargles for throat comfort.  Chloraseptic spray or lozenges for throat comfort.  See PCP or go to ER if symptoms worsen or fail to improve with treatment.

## 2022-06-10 ENCOUNTER — TELEPHONE (OUTPATIENT)
Dept: URGENT CARE | Facility: CLINIC | Age: 32
End: 2022-06-10
Payer: MEDICAID

## 2022-06-10 DIAGNOSIS — J01.90 ACUTE BACTERIAL SINUSITIS: ICD-10-CM

## 2022-06-10 DIAGNOSIS — B96.89 ACUTE BACTERIAL SINUSITIS: ICD-10-CM

## 2022-06-10 RX ORDER — AMOXICILLIN AND CLAVULANATE POTASSIUM 875; 125 MG/1; MG/1
1 TABLET, FILM COATED ORAL 2 TIMES DAILY
Qty: 20 TABLET | Refills: 0 | Status: SHIPPED | OUTPATIENT
Start: 2022-06-10 | End: 2022-06-20

## 2022-06-10 NOTE — TELEPHONE ENCOUNTER
Patient contacted clinic stating she had contacted clinic this morning and spoke to staff asking for prescription to be sent to a different pharmacy than it was originally sent too. Patient states she has been waiting on a call back all day and was calling for an update..    Patient is requesting for; amoxicillin-clavulanate 875-125mg (AUGMENTIN) 875-125 mg per tablet, to be sent to the [facility].    Patient asked that provider or staff please contact her back at [phone] once rx is sent to pharmacy. /jloren/    3885 update. rx augmentin sent to [facility] that patient requested. Melissa MELTON informed patient that rx was sent.

## 2022-06-12 ENCOUNTER — TELEPHONE (OUTPATIENT)
Dept: URGENT CARE | Facility: CLINIC | Age: 32
End: 2022-06-12
Payer: MEDICAID

## 2022-06-16 ENCOUNTER — OFFICE VISIT (OUTPATIENT)
Dept: PSYCHIATRY | Facility: CLINIC | Age: 32
End: 2022-06-16
Payer: MEDICAID

## 2022-06-16 ENCOUNTER — PATIENT MESSAGE (OUTPATIENT)
Dept: PSYCHIATRY | Facility: CLINIC | Age: 32
End: 2022-06-16
Payer: MEDICAID

## 2022-06-16 VITALS
WEIGHT: 189.38 LBS | HEART RATE: 93 BPM | BODY MASS INDEX: 31.55 KG/M2 | HEIGHT: 65 IN | DIASTOLIC BLOOD PRESSURE: 94 MMHG | SYSTOLIC BLOOD PRESSURE: 141 MMHG

## 2022-06-16 DIAGNOSIS — F41.9 ANXIETY: ICD-10-CM

## 2022-06-16 DIAGNOSIS — F39 MOOD DISORDER: Primary | ICD-10-CM

## 2022-06-16 PROCEDURE — 99212 OFFICE O/P EST SF 10 MIN: CPT | Mod: PBBFAC | Performed by: PSYCHOLOGIST

## 2022-06-16 PROCEDURE — 1159F PR MEDICATION LIST DOCUMENTED IN MEDICAL RECORD: ICD-10-PCS | Mod: HP,HB,CPTII, | Performed by: PSYCHOLOGIST

## 2022-06-16 PROCEDURE — 3080F DIAST BP >= 90 MM HG: CPT | Mod: HP,HB,CPTII, | Performed by: PSYCHOLOGIST

## 2022-06-16 PROCEDURE — 1159F MED LIST DOCD IN RCRD: CPT | Mod: HP,HB,CPTII, | Performed by: PSYCHOLOGIST

## 2022-06-16 PROCEDURE — 99999 PR PBB SHADOW E&M-EST. PATIENT-LVL II: CPT | Mod: PBBFAC,HB,, | Performed by: PSYCHOLOGIST

## 2022-06-16 PROCEDURE — 3008F PR BODY MASS INDEX (BMI) DOCUMENTED: ICD-10-PCS | Mod: HP,HB,CPTII, | Performed by: PSYCHOLOGIST

## 2022-06-16 PROCEDURE — 3080F PR MOST RECENT DIASTOLIC BLOOD PRESSURE >= 90 MM HG: ICD-10-PCS | Mod: HP,HB,CPTII, | Performed by: PSYCHOLOGIST

## 2022-06-16 PROCEDURE — 3077F SYST BP >= 140 MM HG: CPT | Mod: HP,HB,CPTII, | Performed by: PSYCHOLOGIST

## 2022-06-16 PROCEDURE — 99214 OFFICE O/P EST MOD 30 MIN: CPT | Mod: HP,HB,S$PBB, | Performed by: PSYCHOLOGIST

## 2022-06-16 PROCEDURE — 3008F BODY MASS INDEX DOCD: CPT | Mod: HP,HB,CPTII, | Performed by: PSYCHOLOGIST

## 2022-06-16 PROCEDURE — 99214 PR OFFICE/OUTPT VISIT, EST, LEVL IV, 30-39 MIN: ICD-10-PCS | Mod: HP,HB,S$PBB, | Performed by: PSYCHOLOGIST

## 2022-06-16 PROCEDURE — 3077F PR MOST RECENT SYSTOLIC BLOOD PRESSURE >= 140 MM HG: ICD-10-PCS | Mod: HP,HB,CPTII, | Performed by: PSYCHOLOGIST

## 2022-06-16 PROCEDURE — 99999 PR PBB SHADOW E&M-EST. PATIENT-LVL II: ICD-10-PCS | Mod: PBBFAC,HB,, | Performed by: PSYCHOLOGIST

## 2022-06-16 RX ORDER — ARIPIPRAZOLE 2 MG/1
2 TABLET ORAL DAILY
Qty: 30 TABLET | Refills: 2 | Status: SHIPPED | OUTPATIENT
Start: 2022-06-16 | End: 2022-09-20 | Stop reason: SDUPTHER

## 2022-06-16 RX ORDER — FLUOXETINE HYDROCHLORIDE 40 MG/1
40 CAPSULE ORAL DAILY
Qty: 30 CAPSULE | Refills: 2 | Status: SHIPPED | OUTPATIENT
Start: 2022-06-16 | End: 2022-09-20 | Stop reason: SDUPTHER

## 2022-06-16 NOTE — Clinical Note
Please see attached--urged her to follow-up with medical on her BP. She has been non-compliant with medicines. Thanks

## 2022-06-16 NOTE — PROGRESS NOTES
"Outpatient Psychiatry Follow-Up Visit     6/16/2022      Clinical Status of Patient:  Outpatient (Ambulatory)    Chief Complaint:  Alisia Vines is a 31 y.o. female who presents today for follow-up of mood and anxiety.      Impressions/Plan from last visit: Alisia attended her virtual visit but we had to stop the visit because she was riding in a car with others (started in car; then got outside with phone problems and then back in her car). She said that she needed her medicine--Prozac only because she had Abilify. When asked about staff scheduling the next appt, she said that she had to work some days and needed to talk to them over the phone. I agreed to send in Prozac this time--it's been over a year since she has been seen.    Interval History and Content of Current Session: Alisia attended her visit (and her daughter). She said that she has been inconsistent with taking medicine--struggles with it. She has continued to feel overwhelmed and angry; passive thoughts "why am I here"--what keeps her going is her kids. She has a difficult time letting things go--keep playing over and over in her head. She reported that she recently stopped talking to her grandmother and aunt--over an argument about money. She had a job (at [employer]) at one time and was living with her boyfriend and his mother and sister were helping to watch the kids. She said that she got fired last week. She had gotten into arguments at work and she had threatened to beat a  up at [employer] (sister company with [employer]). She is happy to be at home now and does not want to work for anyone else. She wants to be her own boss and work for herself. She has maintained relationship with the coworkers. We talked about different options--alarms on, putting in bathroom, etc for medication compliance. She needs to prioritize her own self-care. She had been taking Prozac and Abilify--we agreed to continue with those medicines at this time. " Plan--continue Prozac 40 mg; Abilify 2 mg.    Passive suicidal thoughts--denied intent or plan      GAD7 3/7/2022 1/8/2021 10/2/2020   1. Feeling nervous, anxious, or on edge? 0 1 0   2. Not being able to stop or control worrying? 0 0 0   3. Worrying too much about different things? 1 0 0   4. Trouble relaxing? 0 1 0   5. Being so restless that it is hard to sit still? 0 1 0   6. Becoming easily annoyed or irritable? 3 1 0   7. Feeling afraid as if something awful might happen? 0 0 0   MINDY-7 Score 4 4 0      0-4 = Minimal anxiety  5-9 = Mild anxiety  10-14 = Moderate anxiety  15-21 = Severe anxiety       Review of Systems   · PSYCHIATRIC: Pertinant items are noted in the narrative.    Past Medical, Family and Social History: The patient's past medical, family and social history have been reviewed and updated as appropriate within the electronic medical record - see encounter notes.      Current Outpatient Medications:     amLODIPine (NORVASC) 5 MG tablet, TAKE 1 TABLET BY MOUTH EVERY DAY, Disp: 30 tablet, Rfl: 11    amoxicillin-clavulanate 875-125mg (AUGMENTIN) 875-125 mg per tablet, Take 1 tablet by mouth 2 (two) times daily. for 7 days, Disp: 14 tablet, Rfl: 0    amoxicillin-clavulanate 875-125mg (AUGMENTIN) 875-125 mg per tablet, Take 1 tablet by mouth 2 (two) times daily. for 10 days, Disp: 20 tablet, Rfl: 0    ARIPiprazole (ABILIFY) 2 MG Tab, Take 1 tablet (2 mg total) by mouth once daily., Disp: 30 tablet, Rfl: 2    diclofenac (VOLTAREN) 75 MG EC tablet, Take 1 tablet (75 mg total) by mouth 2 (two) times daily., Disp: 20 tablet, Rfl: 0    fexofenadine (ALLEGRA) 180 MG tablet, Take 1 tablet (180 mg total) by mouth once daily., Disp: 30 tablet, Rfl: 6    FLUoxetine 40 MG capsule, Take 1 capsule (40 mg total) by mouth once daily., Disp: 30 capsule, Rfl: 1    fluticasone propionate (FLONASE) 50 mcg/actuation nasal spray, SHAKE LIQUID AND USE 2 SPRAYS(100 MCG) IN EACH NOSTRIL EVERY DAY, Disp: 16 g, Rfl:  "1    hydrOXYchloroQUINE (PLAQUENIL) 200 mg tablet, TAKE 2 TABLETS(400 MG) BY MOUTH EVERY DAY, Disp: 60 tablet, Rfl: 2    losartan-hydrochlorothiazide 100-12.5 mg (HYZAAR) 100-12.5 mg Tab, Take 1 tablet by mouth once daily., Disp: 30 tablet, Rfl: 5    polymyxin B sulf-trimethoprim (POLYTRIM) 10,000 unit- 1 mg/mL Drop, Place 1 drop into both eyes 3 (three) times daily., Disp: 10 mL, Rfl: 0    predniSONE (DELTASONE) 5 MG tablet, Take 1 tablet (5 mg total) by mouth daily as needed (Joint pain)., Disp: 30 tablet, Rfl: 1    promethazine-dextromethorphan (PROMETHAZINE-DM) 6.25-15 mg/5 mL Syrp, Take 5 mLs by mouth every 6 to 8 hours as needed., Disp: 180 mL, Rfl: 0    valACYclovir (VALTREX) 1000 MG tablet, Take 1 tablet (1,000 mg total) by mouth once daily., Disp: 90 tablet, Rfl: 3    Compliance: no    Side effects: None    Risk Parameters:  Patient reports suicidal ideation: passive thoughts--no intent or plan  Patient reports no homicidal ideation  Patient reports no self-injurious behavior  Patient reports no violent behavior    Exam (detailed: at least 9 elements; comprehensive: all 15 elements)   Constitutional  Vitals:  Most recent vital signs were reviewed.   Last 3 sets of Vitals    Vitals - 1 value per visit 5/29/2022 6/9/2022 6/9/2022   SYSTOLIC 142 - 170   DIASTOLIC 104 - 104   Pulse 119 - 83   Temp 98.2 - 98.1   Resp 18 - 16   SPO2 99 - 100   Weight (lb) 187.28 - 187   Weight (kg) 84.95 - 84.823   Height 65 - 65   BMI (Calculated) 31.2 - 31.1   VISIT REPORT - - -   Pain Score  - 8 -   Some recent data might be hidden          General:  age appropriate, casually dressed, neatly groomed, nose ring     Musculoskeletal  Muscle Strength/Tone:  no tremor, no tic   Gait & Station:  non-ataxic     Psychiatric  Speech:  no latency; no press   Behavior: wnl   Mood & Affect:  "okay"  congruent and appropriate   Thought Process:  normal and logical   Associations:  intact   Thought Content:  passive thoughts--denied " intent or plan   Insight:  has awareness of illness   Judgement: behavior is adequate to circumstances   Orientation:  grossly intact   Memory: intact for content of interview   Language: grossly intact   Attention Span & Concentration:  Grossly intact   Fund of Knowledge:  intact and appropriate to age and level of education     Assessment and Diagnosis   Status/Progress: Based on the examination today, the patient's problem(s) is/are inadequately controlled.  New problems have not been presented today.   Co-morbidities and Lack of compliance are complicating management of the primary condition.  There are no active rule-out diagnoses for this patient at this time.     General Impression:     Encounter Diagnoses   Name Primary?    Mood disorder Yes    Anxiety          Intervention/Counseling/Treatment Plan   · Medication Management: Discussed risks, benefits, and alternatives to treatment plan documented above with patient. I answered all patient questions related to this plan, and patient expressed understanding and agreement.   continue Prozac 40 mg; Abilify 2 mg  · counseling   · Follow-up with PCP on BP  · Alerts on phone/reminders for medicine--keeping in bathroom by toothbrush, etc.    Return to Clinic: 3 months    Medication List with Changes/Refills   Current Medications    AMLODIPINE (NORVASC) 5 MG TABLET    TAKE 1 TABLET BY MOUTH EVERY DAY    AMOXICILLIN-CLAVULANATE 875-125MG (AUGMENTIN) 875-125 MG PER TABLET    Take 1 tablet by mouth 2 (two) times daily. for 7 days    AMOXICILLIN-CLAVULANATE 875-125MG (AUGMENTIN) 875-125 MG PER TABLET    Take 1 tablet by mouth 2 (two) times daily. for 10 days    ARIPIPRAZOLE (ABILIFY) 2 MG TAB    Take 1 tablet (2 mg total) by mouth once daily.    DICLOFENAC (VOLTAREN) 75 MG EC TABLET    Take 1 tablet (75 mg total) by mouth 2 (two) times daily.    FEXOFENADINE (ALLEGRA) 180 MG TABLET    Take 1 tablet (180 mg total) by mouth once daily.    FLUOXETINE 40 MG CAPSULE     Take 1 capsule (40 mg total) by mouth once daily.    FLUTICASONE PROPIONATE (FLONASE) 50 MCG/ACTUATION NASAL SPRAY    SHAKE LIQUID AND USE 2 SPRAYS(100 MCG) IN EACH NOSTRIL EVERY DAY    HYDROXYCHLOROQUINE (PLAQUENIL) 200 MG TABLET    TAKE 2 TABLETS(400 MG) BY MOUTH EVERY DAY    LOSARTAN-HYDROCHLOROTHIAZIDE 100-12.5 MG (HYZAAR) 100-12.5 MG TAB    Take 1 tablet by mouth once daily.    POLYMYXIN B SULF-TRIMETHOPRIM (POLYTRIM) 10,000 UNIT- 1 MG/ML DROP    Place 1 drop into both eyes 3 (three) times daily.    PREDNISONE (DELTASONE) 5 MG TABLET    Take 1 tablet (5 mg total) by mouth daily as needed (Joint pain).    PROMETHAZINE-DEXTROMETHORPHAN (PROMETHAZINE-DM) 6.25-15 MG/5 ML SYRP    Take 5 mLs by mouth every 6 to 8 hours as needed.    VALACYCLOVIR (VALTREX) 1000 MG TABLET    Take 1 tablet (1,000 mg total) by mouth once daily.        Time spent with pt including note preparation: 39 minutes     Mere Jarvis, PhD, MP  Advanced Practice Medical Psychologist  [facility]  [address] [facility].  [place] [address]  [phone] ph  [phone] fax

## 2022-06-17 ENCOUNTER — OFFICE VISIT (OUTPATIENT)
Dept: PSYCHIATRY | Facility: CLINIC | Age: 32
End: 2022-06-17
Payer: MEDICAID

## 2022-06-17 DIAGNOSIS — F41.9 ANXIETY: ICD-10-CM

## 2022-06-17 DIAGNOSIS — F39 MOOD DISORDER: Primary | ICD-10-CM

## 2022-06-17 PROCEDURE — 90834 PSYTX W PT 45 MINUTES: CPT | Mod: AJ,HB,, | Performed by: SOCIAL WORKER

## 2022-06-17 PROCEDURE — 90834 PR PSYCHOTHERAPY W/PATIENT, 45 MIN: ICD-10-PCS | Mod: AJ,HB,, | Performed by: SOCIAL WORKER

## 2022-06-17 NOTE — PROGRESS NOTES
"Individual Psychotherapy (PhD/LCSW)    6/17/2022    Site:  [place]         Therapeutic Intervention: Met with patient.  Outpatient - Supportive psychotherapy 45 min - CPT Code 69804    Chief complaint/reason for encounter: depression, anger and anxiety     Interval history and content of current session: Pt has not come to therapy since her initial evaluation almost 2 years ago.  Pt had her 3 year old with her which made establishing rapport challenging.  Pt shared that she and boyfriend are still living with his mom.  They have been sleeping in a room with their three children for 2 years.  His sister and her boyfriend live in another room with their two children.  Pt threatened someone at work last week and got fired but feels like it may be a good thing because she can focus on creating a better schedule for her kids.  His mom has no structure and so the kids run wild when they are in her care.  Pt's father is working to get a trailer fixed up on some property he owns in [place] but that process has been slow. 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  Pt stated that she got in a disagreement with her grandmother and aunt over money so those relationships have also been strained.  This is the aunt that has custody of her oldest special needs child so she has not had much contact with him because of that.  Validated all of her frustrations and her desire to have space that is her own where she can determine boundaries for her kids and schedules.  Pt smoking marijuana regularly as she feels it helps her cope with her stressors.  Pt reports that mother in law "lives in the [place]" and she really does not want her kids to grow up in that environment.  Pt seems disappointed that her kids are growing up this way when she had so much growing up.  Daughter has obvious speech delay but patient seems ill equipped to access " services at this time.  She is hoping that they will be in [facility] by the time school starts and she will be able to access speech services through [facility].  I suggested she ask pediatrician to put in a referral for speech services at the [facility] as well since the timing of their move seems uncertain at this time.      Treatment plan:  · Target symptoms: depression, anxiety , anger  · Why chosen therapy is appropriate versus another modality: patient responds to this modality  · Outcome monitoring methods: self-report, observation  · Therapeutic intervention type: supportive psychotherapy    Risk parameters:  Patient reports no suicidal ideation  Patient reports no homicidal ideation  Patient reports no self-injurious behavior  Patient reports no violent behavior    Verbal deficits: None    Patient's response to intervention:  The patient's response to intervention is guarded.    Progress toward goals and other mental status changes:  The patient's progress toward goals is not progressing.    Diagnosis:     ICD-10-CM ICD-9-CM   1. Mood disorder  F39 296.90   2. Anxiety  F41.9 300.00       Plan:  individual psychotherapy    Return to clinic: as needed    Length of Service (minutes): 45     Hailee Orlando   06/17/2022   4:22 PM

## 2022-06-17 NOTE — PATIENT INSTRUCTIONS
"[facility] DEPARTMENT OF PSYCHIATRY   PATIENT INFORMATION    We appreciate the opportunity to participate in your medical care and hope the following protocols will make it easier for you to receive quality treatment in our department.    PUNCTUALITY: Your appointment is scheduled for a fixed amount of time, reserved especially for you.  To get the benefit of your appointment, please arrive at least 15 minutes early to allow time for traffic, parking and registration.  Should you arrive more than 15 minutes late to your appointment, you will be rescheduled in order to assure your clinician has adequate time to assess you and provide helpful care.      APPOINTMENTS: Appointments are made by the nursing/front office staff or through the patient portal. Providers do not have access  to schedule appointments. Walk in appointments are not available. FOR EMERGENCIES, PLEASE GO THE CLOSEST EMERGENCY ROOM.    CANCELLATION/MISSED APPOINTMENTS:   In order to receive quality care, all appointments must be kept.  If you are unable to keep an appointment, please reschedule at least 3 days prior if possible. Late cancellations (within 24 hours of the appointment) and repeated no-show appointments may result in dismissal from the clinic. After two no show/late cancellation visits, you will receive a notice letter, alerting you to keep visits to prevent department dismissal. If another visit is missed after receipt of the notice, you will be discharged from the clinic. This policy is in effect to allow for other individuals on a long waiting list to be seen as soon as possible. Unlike other branches of medicine where several individuals can be scheduled in a 30 minute time slot, only one individual can be scheduled in any time slot in Psychiatry.     MESSAGES: For simple questions/concerns, you may contact your individual providers electronically through the "[facility]" portal or by calling [phone] " with messages relayed via office staff. If relevant, include pharmacy name and phone number, date of last visit and next scheduled visit, phone number where you can be reached throughout the day, and whether leaving a voicemail or message on an answering machine is acceptable. Messages will be returned by the Medical Assistant or Office Staff after your provider has reviewed the message.  Please allow 24 hours for a returned message before leaving another message. Messages will be checked each workday (Monday through Friday) during office hours (8:00 a.m. and 5:00 p.m.) and returned at most within one business day.  You may leave a non-urgent message after hours. Note that psychotherapy and medication management are not appropriate by telephone or the patient portal.    PRESCRIPTION REFILLS:  Please communicate with your prescriber about any refills you need during your appointment. You may also request refills through the [facility] portal (preferred) or by calling the clinic. Prescriptions will be filled during office hours.     Please do not wait until you are completely out of medication to request refills. Same day refills are not always possible. Patients may experience symptoms of withdrawal if they run out of medications. The patient assumes all responsibility when there is an issue with non-compliance with follow-up appointments and medications.  Some medications are controlled and regulated by the FDA and [place]. Some of these medications can not be refilled before 30 days and require a face to face appointment.     PAPERWORK REQUESTS: If you have any forms or letters that need to be completed by your doctor, please present these at the beginning of the appointment to ensure that information needed to complete them is obtained during the office visit. Paperwork will be returned within 7-10 business days. Staff will call you to  the paperwork when completed.    SPECIAL EVALUATIONS: Please note that our  "department is treatment-focused. As such, we focus on treatment-oriented evaluations and do not perform specialty or "forensic" evaluations. Examples are listed below.    Disability: We do not do disability evaluations.  Please contact Social Security Administration for evaluations and determinations. You will then sign releases allowing for records from your treatment providers to be forwarded to Social Security Administration to use in their evaluation.  Gun Permit: We do not offer Sound Judgment Evaluations or assessments leading to gun ownership, nor do we fill out or file paperwork relevant to owning, concealing or purchasing a firearm.  Emotional Support     Animals ([place]): We do not provide documentation, including letters, to aid in the acclamation that an Emotional Support Animal is required. Note that ESAs are not trained to perform tasks or recognize particular signs or symptoms. Rather, they are distinguished by the close, emotional, and supportive bond between the animal and the owner.       SAMPLES: We do not provide samples of any medications. If you have financial difficulties and are on a limited income, you may qualify for Patient Assistance Programs from various pharmaceutical companies. This will require that you complete paperwork with your financial information, but this does not guarantee that the company will approve the application. Alternative medication options can be discussed.    REFERRALS/COORDINATION: You will be referred to other providers if we feel unable to adequately diagnose or treat your particular condition, or if collaboration with another provider would allow for better management of your condition.       Call In if problems  Call Report Side Effects   Encouraged to follow up with primary care / Gen Med MD for continued monitoring of general health and wellness  Call 911 Or go to ER if Acute Concerns (especially if any thoughts of harm to self or other)  Follow-up with PCP " on BP  Alerts on phone/reminders for medicine--keeping in bathroom by toothbrush, etc.          Mere Jarvis, PhD, MP  Advanced Practice Medical Psychologist  [facility] [address]  [place] [id number] [phone]   [phone] fax

## 2022-06-22 ENCOUNTER — TELEPHONE (OUTPATIENT)
Dept: FAMILY MEDICINE | Facility: CLINIC | Age: 32
End: 2022-06-22

## 2022-06-22 NOTE — TELEPHONE ENCOUNTER
Pt states she has been having sore throat, neck pain when moving head a certain way or sneezing, and feels like she has knots on the side of her neck. Pt states this has been going on about two weeks and she has been to Urgent care. Pt is medicaid and cannot get appt until August. Please advise if pt needs appt sooner or other assistance.

## 2022-06-22 NOTE — TELEPHONE ENCOUNTER
Why does she think she needs to be seen? If symptoms are gradually improving she should let the illness run its course. If she is worse, then come in.

## 2022-06-22 NOTE — TELEPHONE ENCOUNTER
Pt. States she has a couple days left of antibiotics but also stated she never completes antibiotics.

## 2022-06-22 NOTE — TELEPHONE ENCOUNTER
Pt states she has been sick for 3 weeks ,[facility] prescribed [facility]     fluticasone propionate (FLONASE) 50 mcg/actuation nasal spray     polymyxin B sulf-trimethoprim (POLYTRIM) 10,000 unit- 1 mg/mL Drop    amoxicillin-clavulanate 875-125mg (AUGMENTIN) 875-125 mg per tablet

## 2022-06-22 NOTE — TELEPHONE ENCOUNTER
----- Message from Kyle Rodriguez sent at 6/22/2022  9:58 AM CDT -----  Contact: yanick Mendez is requesting a call for a sooner appt. Please call her back at [phone].      Thanks  KB

## 2022-07-26 ENCOUNTER — LAB VISIT (OUTPATIENT)
Dept: LAB | Facility: HOSPITAL | Age: 32
End: 2022-07-26
Attending: INTERNAL MEDICINE
Payer: MEDICAID

## 2022-07-26 DIAGNOSIS — N06.0 ISOLATED PROTEINURIA WITH MINOR GLOMERULAR ABNORMALITY: ICD-10-CM

## 2022-07-26 LAB
ALBUMIN SERPL BCP-MCNC: 2.6 G/DL (ref 3.5–5.2)
ALP SERPL-CCNC: 62 U/L (ref 55–135)
ALT SERPL W/O P-5'-P-CCNC: 12 U/L (ref 10–44)
ANION GAP SERPL CALC-SCNC: 7 MMOL/L (ref 8–16)
AST SERPL-CCNC: 23 U/L (ref 10–40)
BILIRUB SERPL-MCNC: 0.2 MG/DL (ref 0.1–1)
BUN SERPL-MCNC: 11 MG/DL (ref 6–20)
CALCIUM SERPL-MCNC: 8.6 MG/DL (ref 8.7–10.5)
CHLORIDE SERPL-SCNC: 108 MMOL/L (ref 95–110)
CO2 SERPL-SCNC: 23 MMOL/L (ref 23–29)
CREAT SERPL-MCNC: 0.6 MG/DL (ref 0.5–1.4)
EST. GFR  (AFRICAN AMERICAN): >60 ML/MIN/1.73 M^2
EST. GFR  (NON AFRICAN AMERICAN): >60 ML/MIN/1.73 M^2
GLUCOSE SERPL-MCNC: 74 MG/DL (ref 70–110)
POTASSIUM SERPL-SCNC: 3.6 MMOL/L (ref 3.5–5.1)
PROT SERPL-MCNC: 5.9 G/DL (ref 6–8.4)
SODIUM SERPL-SCNC: 138 MMOL/L (ref 136–145)

## 2022-07-26 PROCEDURE — 36415 COLL VENOUS BLD VENIPUNCTURE: CPT | Performed by: INTERNAL MEDICINE

## 2022-07-26 PROCEDURE — 80053 COMPREHEN METABOLIC PANEL: CPT | Performed by: INTERNAL MEDICINE

## 2022-07-27 ENCOUNTER — OFFICE VISIT (OUTPATIENT)
Dept: NEPHROLOGY | Facility: CLINIC | Age: 32
End: 2022-07-27
Payer: MEDICAID

## 2022-07-27 ENCOUNTER — PATIENT MESSAGE (OUTPATIENT)
Dept: ADMINISTRATIVE | Facility: HOSPITAL | Age: 32
End: 2022-07-27
Payer: MEDICAID

## 2022-07-27 VITALS
SYSTOLIC BLOOD PRESSURE: 124 MMHG | HEIGHT: 65 IN | DIASTOLIC BLOOD PRESSURE: 88 MMHG | WEIGHT: 192 LBS | HEART RATE: 82 BPM | BODY MASS INDEX: 31.99 KG/M2

## 2022-07-27 DIAGNOSIS — M32.14 SLE GLOMERULONEPHRITIS SYNDROME, WHO CLASS V: ICD-10-CM

## 2022-07-27 DIAGNOSIS — N18.1 CKD (CHRONIC KIDNEY DISEASE) STAGE 1, GFR 90 ML/MIN OR GREATER: ICD-10-CM

## 2022-07-27 DIAGNOSIS — M32.14 LUPUS NEPHRITIS: Primary | ICD-10-CM

## 2022-07-27 DIAGNOSIS — R80.1 PERSISTENT PROTEINURIA: ICD-10-CM

## 2022-07-27 DIAGNOSIS — I12.9 PARENCHYMAL RENAL HYPERTENSION, STAGE 1 THROUGH STAGE 4 OR UNSPECIFIED CHRONIC KIDNEY DISEASE: ICD-10-CM

## 2022-07-27 PROCEDURE — 3079F DIAST BP 80-89 MM HG: CPT | Mod: CPTII,,, | Performed by: INTERNAL MEDICINE

## 2022-07-27 PROCEDURE — 3074F SYST BP LT 130 MM HG: CPT | Mod: CPTII,,, | Performed by: INTERNAL MEDICINE

## 2022-07-27 PROCEDURE — 3008F PR BODY MASS INDEX (BMI) DOCUMENTED: ICD-10-PCS | Mod: CPTII,,, | Performed by: INTERNAL MEDICINE

## 2022-07-27 PROCEDURE — 3074F PR MOST RECENT SYSTOLIC BLOOD PRESSURE < 130 MM HG: ICD-10-PCS | Mod: CPTII,,, | Performed by: INTERNAL MEDICINE

## 2022-07-27 PROCEDURE — 3008F BODY MASS INDEX DOCD: CPT | Mod: CPTII,,, | Performed by: INTERNAL MEDICINE

## 2022-07-27 PROCEDURE — 99999 PR PBB SHADOW E&M-EST. PATIENT-LVL III: ICD-10-PCS | Mod: PBBFAC,,, | Performed by: INTERNAL MEDICINE

## 2022-07-27 PROCEDURE — 99215 PR OFFICE/OUTPT VISIT, EST, LEVL V, 40-54 MIN: ICD-10-PCS | Mod: S$PBB,,, | Performed by: INTERNAL MEDICINE

## 2022-07-27 PROCEDURE — 1160F PR REVIEW ALL MEDS BY PRESCRIBER/CLIN PHARMACIST DOCUMENTED: ICD-10-PCS | Mod: CPTII,,, | Performed by: INTERNAL MEDICINE

## 2022-07-27 PROCEDURE — 3079F PR MOST RECENT DIASTOLIC BLOOD PRESSURE 80-89 MM HG: ICD-10-PCS | Mod: CPTII,,, | Performed by: INTERNAL MEDICINE

## 2022-07-27 PROCEDURE — 1160F RVW MEDS BY RX/DR IN RCRD: CPT | Mod: CPTII,,, | Performed by: INTERNAL MEDICINE

## 2022-07-27 PROCEDURE — 3066F PR DOCUMENTATION OF TREATMENT FOR NEPHROPATHY: ICD-10-PCS | Mod: CPTII,,, | Performed by: INTERNAL MEDICINE

## 2022-07-27 PROCEDURE — 3066F NEPHROPATHY DOC TX: CPT | Mod: CPTII,,, | Performed by: INTERNAL MEDICINE

## 2022-07-27 PROCEDURE — 1159F MED LIST DOCD IN RCRD: CPT | Mod: CPTII,,, | Performed by: INTERNAL MEDICINE

## 2022-07-27 PROCEDURE — 99213 OFFICE O/P EST LOW 20 MIN: CPT | Mod: PBBFAC | Performed by: INTERNAL MEDICINE

## 2022-07-27 PROCEDURE — 99215 OFFICE O/P EST HI 40 MIN: CPT | Mod: S$PBB,,, | Performed by: INTERNAL MEDICINE

## 2022-07-27 PROCEDURE — 1159F PR MEDICATION LIST DOCUMENTED IN MEDICAL RECORD: ICD-10-PCS | Mod: CPTII,,, | Performed by: INTERNAL MEDICINE

## 2022-07-27 PROCEDURE — 99999 PR PBB SHADOW E&M-EST. PATIENT-LVL III: CPT | Mod: PBBFAC,,, | Performed by: INTERNAL MEDICINE

## 2022-07-27 NOTE — PROGRESS NOTES
"Subjective:       Patient ID: Alisia Vines is a 31 y.o. female.    Chief Complaint: Nephritis    HPI    She presents to clinic today for routine follow-up.  Since her last office visit she has been doing well and has no specific or new complaints.  Her laboratory studies and medications were reviewed.  All Nephrology related questions were answered to her satisfaction..      Review of Systems   Constitutional: Negative.    HENT: Negative.    Eyes: Negative.    Respiratory: Negative.    Cardiovascular: Negative.    Gastrointestinal: Negative.    Genitourinary: Negative.    Musculoskeletal: Negative.    Skin: Negative.    Neurological: Negative.          /88   Pulse 82   Ht 5' 5" (1.651 m)   Wt 87.1 kg (192 lb 0.3 oz)   BMI 31.95 kg/m²     Lab Results   Component Value Date    WBC 2.91 (L) 03/03/2022    HGB 11.0 (L) 03/03/2022    HCT 33.4 (L) 03/03/2022    MCV 87 03/03/2022     03/03/2022      BMP  Lab Results   Component Value Date     07/26/2022    K 3.6 07/26/2022     07/26/2022    CO2 23 07/26/2022    BUN 11 07/26/2022    CREATININE 0.6 07/26/2022    CALCIUM 8.6 (L) 07/26/2022    ANIONGAP 7 (L) 07/26/2022    ESTGFRAFRICA >60.0 07/26/2022    EGFRNONAA >60.0 07/26/2022     CMP  Sodium   Date Value Ref Range Status   07/26/2022 138 136 - 145 mmol/L Final     Potassium   Date Value Ref Range Status   07/26/2022 3.6 3.5 - 5.1 mmol/L Final     Chloride   Date Value Ref Range Status   07/26/2022 108 95 - 110 mmol/L Final     CO2   Date Value Ref Range Status   07/26/2022 23 23 - 29 mmol/L Final     Glucose   Date Value Ref Range Status   07/26/2022 74 70 - 110 mg/dL Final     BUN   Date Value Ref Range Status   07/26/2022 11 6 - 20 mg/dL Final     Creatinine   Date Value Ref Range Status   07/26/2022 0.6 0.5 - 1.4 mg/dL Final     Calcium   Date Value Ref Range Status   07/26/2022 8.6 (L) 8.7 - 10.5 mg/dL Final     Total Protein   Date Value Ref Range Status   07/26/2022 5.9 (L) 6.0 " Plan: Discussed with pt that Gabapentin may take a few more days to begin help conditioning- recommend pt increase evening dose to 2 pills at this time. Suggest pt increase morning,afternoon and evening dosage to 3 pills- gradually within the next month. Refill was sent in today - 8.4 g/dL Final     Albumin   Date Value Ref Range Status   07/26/2022 2.6 (L) 3.5 - 5.2 g/dL Final     Total Bilirubin   Date Value Ref Range Status   07/26/2022 0.2 0.1 - 1.0 mg/dL Final     Comment:     For infants and newborns, interpretation of results should be based  on gestational age, weight and in agreement with clinical  observations.    Premature Infant recommended reference ranges:  Up to 24 hours.............<8.0 mg/dL  Up to 48 hours............<12.0 mg/dL  3-5 days..................<15.0 mg/dL  6-29 days.................<15.0 mg/dL       Alkaline Phosphatase   Date Value Ref Range Status   07/26/2022 62 55 - 135 U/L Final     AST   Date Value Ref Range Status   07/26/2022 23 10 - 40 U/L Final     ALT   Date Value Ref Range Status   07/26/2022 12 10 - 44 U/L Final     Anion Gap   Date Value Ref Range Status   07/26/2022 7 (L) 8 - 16 mmol/L Final     eGFR if    Date Value Ref Range Status   07/26/2022 >60.0 >60 mL/min/1.73 m^2 Final     eGFR if non    Date Value Ref Range Status   07/26/2022 >60.0 >60 mL/min/1.73 m^2 Final     Comment:     Calculation used to obtain the estimated glomerular filtration  rate (eGFR) is the CKD-EPI equation.               Objective:            Physical Exam  Constitutional:       Appearance: Normal appearance.   HENT:      Head: Normocephalic and atraumatic.   Eyes:      General: No scleral icterus.     Extraocular Movements: Extraocular movements intact.      Pupils: Pupils are equal, round, and reactive to light.   Pulmonary:      Effort: Pulmonary effort is normal.      Breath sounds: No stridor.   Musculoskeletal:      Right lower leg: No edema.      Left lower leg: No edema.   Skin:     General: Skin is warm and dry.   Neurological:      General: No focal deficit present.      Mental Status: She is alert and oriented to person, place, and time.   Psychiatric:         Mood and Affect: Mood normal.         Assessment:       1. Lupus  Detail Level: Zone nephritis    2. CKD (chronic kidney disease) stage 1, GFR 90 ml/min or greater    3. Persistent proteinuria    4. Parenchymal renal hypertension, stage 1 through stage 4 or unspecified chronic kidney disease    5. SLE glomerulonephritis syndrome, WHO class V        Plan:         1. Has biopsy-proven class 5 lupus.  Currently followed by Rheumatology.  On review of her laboratory studies she is on a RAAS inhibitor and is on Plaquenil.  Creatinine has remained stable at 0.6.  Other electrolytes are normal with potassium at 3.6 and sodium of 138.     2. She has CKD stage 1 based on persistent proteinuria.  Secondary to class 5 lupus.    3. She continues to have subnephrotic range proteinuria about 1.5 g by PC ratio.  This is been relatively stable over the past several years.      4. Blood pressure is well controlled on current regimen.  She is on a RAAS inhibitor.  We discussed the importance of stopping this medication if she wanted to become pregnant or did become pregnant.  She verbalized understanding.    Approximately 40 minutes was spent in face-to-face conversation and chart review.      Brandon Willard MD

## 2022-08-01 ENCOUNTER — TELEPHONE (OUTPATIENT)
Dept: FAMILY MEDICINE | Facility: CLINIC | Age: 32
End: 2022-08-01

## 2022-08-01 DIAGNOSIS — L60.9 NAIL DISORDER: Primary | ICD-10-CM

## 2022-08-01 NOTE — TELEPHONE ENCOUNTER
Spoke with pt and pt states that she would like a referral to the dermatologist since beening diagnosed with Lupus her nails have been growing funny. Pt states that her nails lift up and it looks as if another nail is growing underneath the first nail. Pt states that she wasn't concerned about it at first, but now she would like to get it checked out. Please advise

## 2022-08-01 NOTE — TELEPHONE ENCOUNTER
----- Message from Amelia Arora sent at 8/1/2022  1:20 PM CDT -----  Contact: Alisia  Patient is calling to speak to the nurse regarding a referral. Reports needing to see the dermatologist. Please give patient a call back at .[phone]

## 2022-08-10 ENCOUNTER — PATIENT OUTREACH (OUTPATIENT)
Dept: ADMINISTRATIVE | Facility: HOSPITAL | Age: 32
End: 2022-08-10
Payer: MEDICAID

## 2022-08-10 NOTE — PROGRESS NOTES
HTN Report: Patient notified to obtain a home BP reading and schedule annual exam with PCP. Offered next available with , patient declined due to the time, has to get kids off of bus. Patient states she does not have a current BP reading, recently moved and does not have a monitor available. Advised that a message will be sent to 's staff to assist with scheduling appointment.

## 2022-09-16 ENCOUNTER — TELEPHONE (OUTPATIENT)
Dept: PSYCHIATRY | Facility: CLINIC | Age: 32
End: 2022-09-16
Payer: MEDICAID

## 2022-09-20 ENCOUNTER — OFFICE VISIT (OUTPATIENT)
Dept: PSYCHIATRY | Facility: CLINIC | Age: 32
End: 2022-09-20
Payer: MEDICAID

## 2022-09-20 VITALS
WEIGHT: 194 LBS | BODY MASS INDEX: 32.28 KG/M2 | SYSTOLIC BLOOD PRESSURE: 171 MMHG | DIASTOLIC BLOOD PRESSURE: 119 MMHG | HEART RATE: 69 BPM

## 2022-09-20 DIAGNOSIS — F41.9 ANXIETY: ICD-10-CM

## 2022-09-20 DIAGNOSIS — F39 MOOD DISORDER: Primary | ICD-10-CM

## 2022-09-20 PROCEDURE — 3077F SYST BP >= 140 MM HG: CPT | Mod: AH,HB,CPTII, | Performed by: PSYCHOLOGIST

## 2022-09-20 PROCEDURE — 99212 OFFICE O/P EST SF 10 MIN: CPT | Mod: PBBFAC | Performed by: PSYCHOLOGIST

## 2022-09-20 PROCEDURE — 3008F BODY MASS INDEX DOCD: CPT | Mod: AH,HB,CPTII, | Performed by: PSYCHOLOGIST

## 2022-09-20 PROCEDURE — 3080F DIAST BP >= 90 MM HG: CPT | Mod: AH,HB,CPTII, | Performed by: PSYCHOLOGIST

## 2022-09-20 PROCEDURE — 1159F MED LIST DOCD IN RCRD: CPT | Mod: AH,HB,CPTII, | Performed by: PSYCHOLOGIST

## 2022-09-20 PROCEDURE — 3008F PR BODY MASS INDEX (BMI) DOCUMENTED: ICD-10-PCS | Mod: AH,HB,CPTII, | Performed by: PSYCHOLOGIST

## 2022-09-20 PROCEDURE — 99999 PR PBB SHADOW E&M-EST. PATIENT-LVL II: ICD-10-PCS | Mod: PBBFAC,HB,, | Performed by: PSYCHOLOGIST

## 2022-09-20 PROCEDURE — 99214 PR OFFICE/OUTPT VISIT, EST, LEVL IV, 30-39 MIN: ICD-10-PCS | Mod: S$PBB,AH,HB, | Performed by: PSYCHOLOGIST

## 2022-09-20 PROCEDURE — 3066F PR DOCUMENTATION OF TREATMENT FOR NEPHROPATHY: ICD-10-PCS | Mod: AH,HB,CPTII, | Performed by: PSYCHOLOGIST

## 2022-09-20 PROCEDURE — 99214 OFFICE O/P EST MOD 30 MIN: CPT | Mod: S$PBB,AH,HB, | Performed by: PSYCHOLOGIST

## 2022-09-20 PROCEDURE — 1159F PR MEDICATION LIST DOCUMENTED IN MEDICAL RECORD: ICD-10-PCS | Mod: AH,HB,CPTII, | Performed by: PSYCHOLOGIST

## 2022-09-20 PROCEDURE — 99999 PR PBB SHADOW E&M-EST. PATIENT-LVL II: CPT | Mod: PBBFAC,HB,, | Performed by: PSYCHOLOGIST

## 2022-09-20 PROCEDURE — 3077F PR MOST RECENT SYSTOLIC BLOOD PRESSURE >= 140 MM HG: ICD-10-PCS | Mod: AH,HB,CPTII, | Performed by: PSYCHOLOGIST

## 2022-09-20 PROCEDURE — 3066F NEPHROPATHY DOC TX: CPT | Mod: AH,HB,CPTII, | Performed by: PSYCHOLOGIST

## 2022-09-20 PROCEDURE — 3080F PR MOST RECENT DIASTOLIC BLOOD PRESSURE >= 90 MM HG: ICD-10-PCS | Mod: AH,HB,CPTII, | Performed by: PSYCHOLOGIST

## 2022-09-20 RX ORDER — ARIPIPRAZOLE 2 MG/1
2 TABLET ORAL DAILY
Qty: 30 TABLET | Refills: 3 | Status: SHIPPED | OUTPATIENT
Start: 2022-09-20 | End: 2023-04-03

## 2022-09-20 RX ORDER — FLUOXETINE HYDROCHLORIDE 40 MG/1
40 CAPSULE ORAL DAILY
Qty: 30 CAPSULE | Refills: 3 | Status: SHIPPED | OUTPATIENT
Start: 2022-09-20 | End: 2023-04-03

## 2022-09-20 NOTE — PATIENT INSTRUCTIONS
"[facility] DEPARTMENT OF PSYCHIATRY   PATIENT INFORMATION    We appreciate the opportunity to participate in your medical care and hope the following protocols will make it easier for you to receive quality treatment in our department.    PUNCTUALITY: Your appointment is scheduled for a fixed amount of time, reserved especially for you.  To get the benefit of your appointment, please arrive at least 15 minutes early to allow time for traffic, parking and registration.  Should you arrive more than 15 minutes late to your appointment, you will be rescheduled in order to assure your clinician has adequate time to assess you and provide helpful care.      APPOINTMENTS: Appointments are made by the nursing/front office staff or through the patient portal. Providers do not have access  to schedule appointments. Walk in appointments are not available. FOR EMERGENCIES, PLEASE GO THE CLOSEST EMERGENCY ROOM.    CANCELLATION/MISSED APPOINTMENTS:   In order to receive quality care, all appointments must be kept.  If you are unable to keep an appointment, please reschedule at least 3 days prior if possible. Late cancellations (within 24 hours of the appointment) and repeated no-show appointments may result in dismissal from the clinic. After two no show/late cancellation visits, you will receive a notice letter, alerting you to keep visits to prevent department dismissal. If another visit is missed after receipt of the notice, you will be discharged from the clinic. This policy is in effect to allow for other individuals on a long waiting list to be seen as soon as possible. Unlike other branches of medicine where several individuals can be scheduled in a 30 minute time slot, only one individual can be scheduled in any time slot in Psychiatry.     MESSAGES: For simple questions/concerns, you may contact your individual providers electronically through the "[facility]" portal or by calling [phone] " with messages relayed via office staff. If relevant, include pharmacy name and phone number, date of last visit and next scheduled visit, phone number where you can be reached throughout the day, and whether leaving a voicemail or message on an answering machine is acceptable. Messages will be returned by the Medical Assistant or Office Staff after your provider has reviewed the message.  Please allow 24 hours for a returned message before leaving another message. Messages will be checked each workday (Monday through Friday) during office hours (8:00 a.m. and 5:00 p.m.) and returned at most within one business day.  You may leave a non-urgent message after hours. Note that psychotherapy and medication management are not appropriate by telephone or the patient portal.    PRESCRIPTION REFILLS:  Please communicate with your prescriber about any refills you need during your appointment. You may also request refills through the [facility] portal (preferred) or by calling the clinic. Prescriptions will be filled during office hours.     Please do not wait until you are completely out of medication to request refills. Same day refills are not always possible. Patients may experience symptoms of withdrawal if they run out of medications. The patient assumes all responsibility when there is an issue with non-compliance with follow-up appointments and medications.  Some medications are controlled and regulated by the FDA and [place]. Some of these medications can not be refilled before 30 days and require a face to face appointment.     PAPERWORK REQUESTS: If you have any forms or letters that need to be completed by your doctor, please present these at the beginning of the appointment to ensure that information needed to complete them is obtained during the office visit. Paperwork will be returned within 7-10 business days. Staff will call you to  the paperwork when completed.    SPECIAL EVALUATIONS: Please note that our  "department is treatment-focused. As such, we focus on treatment-oriented evaluations and do not perform specialty or "forensic" evaluations. Examples are listed below.    Disability: We do not do disability evaluations.  Please contact Social Security Administration for evaluations and determinations. You will then sign releases allowing for records from your treatment providers to be forwarded to Social Security Administration to use in their evaluation.  Gun Permit: We do not offer Sound Judgment Evaluations or assessments leading to gun ownership, nor do we fill out or file paperwork relevant to owning, concealing or purchasing a firearm.  Emotional Support     Animals ([place]YEN[place]): We do not provide documentation, including letters, to aid in the acclamation that an Emotional Support Animal is required. Note that ESAs are not trained to perform tasks or recognize particular signs or symptoms. Rather, they are distinguished by the close, emotional, and supportive bond between the animal and the owner.       SAMPLES: We do not provide samples of any medications. If you have financial difficulties and are on a limited income, you may qualify for Patient Assistance Programs from various pharmaceutical companies. This will require that you complete paperwork with your financial information, but this does not guarantee that the company will approve the application. Alternative medication options can be discussed.    REFERRALS/COORDINATION: You will be referred to other providers if we feel unable to adequately diagnose or treat your particular condition, or if collaboration with another provider would allow for better management of your condition.       Call In if problems  Call Report Side Effects   Encouraged to follow up with primary care / Gen Med MD for continued monitoring of general health and wellness  Call 911 Or go to ER if Acute Concerns (especially if any thoughts of harm to self or other)  Monitor " BP--follow-up with PCP          Mere Jarvis, PhD, MP  Advanced Practice Medical Psychologist  [facility]  [address]  [place] [id number] [phone]   [phone] fax

## 2022-09-20 NOTE — PROGRESS NOTES
"Outpatient Psychiatry Follow-Up Visit     9/20/2022      Clinical Status of Patient:  Outpatient (Ambulatory)    Chief Complaint:  Alisia Vines is a 31 y.o. female who presents today for follow-up of mood and anxiety.      Impressions/Plan from last visit: Alisia attended her visit (and her daughter). She said that she has been inconsistent with taking medicine--struggles with it. She has continued to feel overwhelmed and angry; passive thoughts "why am I here"--what keeps her going is her kids. She has a difficult time letting things go--keep playing over and over in her head. She reported that she recently stopped talking to her grandmother and aunt--over an argument about money. She had a job (at [employer]) at one time and was living with her boyfriend and his mother and sister were helping to watch the kids. She said that she got fired last week. She had gotten into arguments at work and she had threatened to beat a  up at [employer] (sister company with [employer]). She is happy to be at home now and does not want to work for anyone else. She wants to be her own boss and work for herself. She has maintained relationship with the coworkers. We talked about different options--alarms on, putting in bathroom, etc for medication compliance. She needs to prioritize her own self-care. She had been taking Prozac and Abilify--we agreed to continue with those medicines at this time. Plan--continue Prozac 40 mg; Abilify 2 mg.     Passive suicidal thoughts--denied intent or plan    Interval History and Content of Current Session: Alisia attended her visit. She said that she is glad to be "free"--her twins (age 3) are in [employer]; her 4-y/o is in pre-K. Her kids are in school in [facility]--she and the kids have been staying with her grandmother. She and her boyfriend have been working on the trailer that they are hoping to move into behind her grandmother's house. She is hoping that they will be in the " "trailer by the end of the year. They have essentially had to gut it and rebuilt it. She has been gardening, since she has not been able to help much with the construction. [place] market [place] in  [place]. MINDY was 0 today--she thinks that her mood is "pretty good." She has been trying not to over-react. She has been using her grandmother's car when needed. Her grandmother is really not supposed to drive, but she does anyway. Alisia does the cooking for her grandmother--Alisia's aunt/uncles are happy that she is there. She is much better--happy with her medicines. She has no suicidal ideation--would not want her kids to grow up without her. She is doing okay physically currently--has been a little concerned about her compliance with her BP medicine. We agreed to continue as prescribed. Plan--continue Prozac 40 mg; Abilify 2 mg.      GAD7 9/20/2022 3/7/2022 1/8/2021   1. Feeling nervous, anxious, or on edge? 0 0 1   2. Not being able to stop or control worrying? 0 0 0   3. Worrying too much about different things? 0 1 0   4. Trouble relaxing? 0 0 1   5. Being so restless that it is hard to sit still? 0 0 1   6. Becoming easily annoyed or irritable? 0 3 1   7. Feeling afraid as if something awful might happen? 0 0 0   MINDY-7 Score 0 4 4      0-4 = Minimal anxiety  5-9 = Mild anxiety  10-14 = Moderate anxiety  15-21 = Severe anxiety       Review of Systems   PSYCHIATRIC: Pertinant items are noted in the narrative.    Past Medical, Family and Social History: The patient's past medical, family and social history have been reviewed and updated as appropriate within the electronic medical record - see encounter notes.      Current Outpatient Medications:     amLODIPine (NORVASC) 5 MG tablet, TAKE 1 TABLET BY MOUTH EVERY DAY, Disp: 30 tablet, Rfl: 11    ARIPiprazole (ABILIFY) 2 MG Tab, Take 1 tablet (2 mg total) by mouth once daily., Disp: 30 tablet, Rfl: 3    diclofenac (VOLTAREN) 75 MG EC tablet, " Take 1 tablet (75 mg total) by mouth 2 (two) times daily., Disp: 20 tablet, Rfl: 0    fexofenadine (ALLEGRA) 180 MG tablet, Take 1 tablet (180 mg total) by mouth once daily., Disp: 30 tablet, Rfl: 6    FLUoxetine 40 MG capsule, Take 1 capsule (40 mg total) by mouth once daily., Disp: 30 capsule, Rfl: 3    fluticasone propionate (FLONASE) 50 mcg/actuation nasal spray, SHAKE LIQUID AND USE 2 SPRAYS(100 MCG) IN EACH NOSTRIL EVERY DAY, Disp: 16 g, Rfl: 1    hydrOXYchloroQUINE (PLAQUENIL) 200 mg tablet, TAKE 2 TABLETS(400 MG) BY MOUTH EVERY DAY, Disp: 60 tablet, Rfl: 2    losartan-hydrochlorothiazide 100-12.5 mg (HYZAAR) 100-12.5 mg Tab, TAKE 1 TABLET BY MOUTH EVERY DAY, Disp: 90 tablet, Rfl: 0    polymyxin B sulf-trimethoprim (POLYTRIM) 10,000 unit- 1 mg/mL Drop, Place 1 drop into both eyes 3 (three) times daily., Disp: 10 mL, Rfl: 0    predniSONE (DELTASONE) 5 MG tablet, Take 1 tablet (5 mg total) by mouth daily as needed (Joint pain)., Disp: 30 tablet, Rfl: 1    promethazine-dextromethorphan (PROMETHAZINE-DM) 6.25-15 mg/5 mL Syrp, Take 5 mLs by mouth every 6 to 8 hours as needed., Disp: 180 mL, Rfl: 0    valACYclovir (VALTREX) 1000 MG tablet, Take 1 tablet (1,000 mg total) by mouth once daily., Disp: 90 tablet, Rfl: 3    Compliance: yes    Side effects: None    Risk Parameters:  Patient reports no suicidal ideation  Patient reports no homicidal ideation  Patient reports no self-injurious behavior  Patient reports no violent behavior    Exam (detailed: at least 9 elements; comprehensive: all 15 elements)   Constitutional  Vitals:  Most recent vital signs were reviewed.   Last 3 sets of Vitals    Vitals - 1 value per visit 7/27/2022 7/27/2022 9/20/2022   SYSTOLIC - 124 171   DIASTOLIC - 88 119   Pulse - 82 69   Temp - - -   Resp - - -   SPO2 - - -   Weight (lb) - 192.02 194.01   Weight (kg) - 87.1 88   Height - 65 -   BMI (Calculated) - 32 -   VISIT REPORT - - -   Pain Score  0 - -   Some recent data might be hidden           General:  age appropriate, casually dressed, neatly groomed, wearing glasses, wearing mask     Musculoskeletal  Muscle Strength/Tone:  no tremor, no tic   Gait & Station:  non-ataxic     Psychiatric  Speech:  no latency; no press   Behavior: wnl   Mood & Affect:  euthymic  congruent and appropriate   Thought Process:  normal and logical   Associations:  intact   Thought Content:  normal, no suicidality, no homicidality, delusions, or paranoia   Insight:  has awareness of illness   Judgement: behavior is adequate to circumstances   Orientation:  grossly intact   Memory: intact for content of interview   Language: grossly intact   Attention Span & Concentration:  Grossly intact   Fund of Knowledge:  intact and appropriate to age and level of education     Assessment and Diagnosis   Status/Progress: Based on the examination today, the patient's problem(s) is/are improved.  New problems have not been presented today.   Co-morbidities are complicating management of the primary condition.  There are no active rule-out diagnoses for this patient at this time.     General Impression:     Encounter Diagnoses   Name Primary?    Mood disorder Yes    Anxiety          Intervention/Counseling/Treatment Plan   Medication Management: Discussed risks, benefits, and alternatives to treatment plan documented above with patient. I answered all patient questions related to this plan, and patient expressed understanding and agreement.   continue Prozac 40 mg; Abilify 2 mg  Counseling as needed  Monitor BP--follow-up with PCP    Return to Clinic:  4 months    Medication List with Changes/Refills   Current Medications    AMLODIPINE (NORVASC) 5 MG TABLET    TAKE 1 TABLET BY MOUTH EVERY DAY    DICLOFENAC (VOLTAREN) 75 MG EC TABLET    Take 1 tablet (75 mg total) by mouth 2 (two) times daily.    FEXOFENADINE (ALLEGRA) 180 MG TABLET    Take 1 tablet (180 mg total) by mouth once daily.    FLUTICASONE PROPIONATE (FLONASE) 50  MCG/ACTUATION NASAL SPRAY    SHAKE LIQUID AND USE 2 SPRAYS(100 MCG) IN EACH NOSTRIL EVERY DAY    HYDROXYCHLOROQUINE (PLAQUENIL) 200 MG TABLET    TAKE 2 TABLETS(400 MG) BY MOUTH EVERY DAY    LOSARTAN-HYDROCHLOROTHIAZIDE 100-12.5 MG (HYZAAR) 100-12.5 MG TAB    TAKE 1 TABLET BY MOUTH EVERY DAY    POLYMYXIN B SULF-TRIMETHOPRIM (POLYTRIM) 10,000 UNIT- 1 MG/ML DROP    Place 1 drop into both eyes 3 (three) times daily.    PREDNISONE (DELTASONE) 5 MG TABLET    Take 1 tablet (5 mg total) by mouth daily as needed (Joint pain).    PROMETHAZINE-DEXTROMETHORPHAN (PROMETHAZINE-DM) 6.25-15 MG/5 ML SYRP    Take 5 mLs by mouth every 6 to 8 hours as needed.    VALACYCLOVIR (VALTREX) 1000 MG TABLET    Take 1 tablet (1,000 mg total) by mouth once daily.   Changed and/or Refilled Medications    Modified Medication Previous Medication    ARIPIPRAZOLE (ABILIFY) 2 MG TAB ARIPiprazole (ABILIFY) 2 MG Tab       Take 1 tablet (2 mg total) by mouth once daily.    Take 1 tablet (2 mg total) by mouth once daily.    FLUOXETINE 40 MG CAPSULE FLUoxetine 40 MG capsule       Take 1 capsule (40 mg total) by mouth once daily.    Take 1 capsule (40 mg total) by mouth once daily.        Time spent with pt including note preparation: 27 minutes     Mere Jarvis, PhD, MP  Advanced Practice Medical Psychologist  [facility]  [address] [facility]  [place] [id number]  [phone]   [phone] fax

## 2022-10-04 ENCOUNTER — OFFICE VISIT (OUTPATIENT)
Dept: DERMATOLOGY | Facility: CLINIC | Age: 32
End: 2022-10-04
Payer: MEDICAID

## 2022-10-04 DIAGNOSIS — L60.9 NAIL DISORDER: ICD-10-CM

## 2022-10-04 DIAGNOSIS — L60.3 ONYCHODYSTROPHY: Primary | ICD-10-CM

## 2022-10-04 PROCEDURE — 87107 FUNGI IDENTIFICATION MOLD: CPT | Mod: 59 | Performed by: DERMATOLOGY

## 2022-10-04 PROCEDURE — 99213 OFFICE O/P EST LOW 20 MIN: CPT | Mod: PBBFAC | Performed by: DERMATOLOGY

## 2022-10-04 PROCEDURE — 3066F NEPHROPATHY DOC TX: CPT | Mod: CPTII,,, | Performed by: DERMATOLOGY

## 2022-10-04 PROCEDURE — 99999 PR PBB SHADOW E&M-EST. PATIENT-LVL III: ICD-10-PCS | Mod: PBBFAC,,, | Performed by: DERMATOLOGY

## 2022-10-04 PROCEDURE — 1159F MED LIST DOCD IN RCRD: CPT | Mod: CPTII,,, | Performed by: DERMATOLOGY

## 2022-10-04 PROCEDURE — 87101 SKIN FUNGI CULTURE: CPT | Performed by: DERMATOLOGY

## 2022-10-04 PROCEDURE — 3066F PR DOCUMENTATION OF TREATMENT FOR NEPHROPATHY: ICD-10-PCS | Mod: CPTII,,, | Performed by: DERMATOLOGY

## 2022-10-04 PROCEDURE — 99999 PR PBB SHADOW E&M-EST. PATIENT-LVL III: CPT | Mod: PBBFAC,,, | Performed by: DERMATOLOGY

## 2022-10-04 PROCEDURE — 99203 PR OFFICE/OUTPT VISIT, NEW, LEVL III, 30-44 MIN: ICD-10-PCS | Mod: S$PBB,,, | Performed by: DERMATOLOGY

## 2022-10-04 PROCEDURE — 1160F RVW MEDS BY RX/DR IN RCRD: CPT | Mod: CPTII,,, | Performed by: DERMATOLOGY

## 2022-10-04 PROCEDURE — 1159F PR MEDICATION LIST DOCUMENTED IN MEDICAL RECORD: ICD-10-PCS | Mod: CPTII,,, | Performed by: DERMATOLOGY

## 2022-10-04 PROCEDURE — 1160F PR REVIEW ALL MEDS BY PRESCRIBER/CLIN PHARMACIST DOCUMENTED: ICD-10-PCS | Mod: CPTII,,, | Performed by: DERMATOLOGY

## 2022-10-04 PROCEDURE — 99203 OFFICE O/P NEW LOW 30 MIN: CPT | Mod: S$PBB,,, | Performed by: DERMATOLOGY

## 2022-10-04 NOTE — PROGRESS NOTES
Subjective:       Patient ID:  Alisia Vines is a 32 y.o. female who presents for   Chief Complaint   Patient presents with    Nail Problem     Hx of SLE and lupus glomerulonephritis and possible onychomycosis, last seen on 1/3/17.  S/p jublia several years ago without improvement.     History of Present Illness: The patient presents with chief complaint of nail issues.  Location: nail  Duration: months  Signs/Symptoms: n/a    Prior treatments: n/a     Review of Systems   Constitutional:  Negative for fever and chills.   Gastrointestinal:  Negative for nausea and vomiting.   Skin:  Positive for activity-related sunscreen use. Negative for daily sunscreen use and recent sunburn.   Hematologic/Lymphatic: Does not bruise/bleed easily.      Objective:    Physical Exam   Constitutional: She appears well-developed and well-nourished. No distress.   Neurological: She is alert and oriented to person, place, and time. She is not disoriented.   Psychiatric: She has a normal mood and affect.   Skin:   Areas Examined (abnormalities noted in diagram):   Head / Face Inspection Performed  Neck Inspection Performed  RUE Inspected  LUE Inspection Performed  Nails and Digits Inspection Performed                   Assessment / Plan:        Onychodystrophy  -     Fungal culture , skin, hair, or nails    Nail disorder  -     Ambulatory referral/consult to Dermatology  -     Fungal culture , skin, hair, or nails  -     culture done today.  Will treat appropriately based on identification (yeast vs. Fungal)           Follow up in about 8 weeks (around 11/29/2022).

## 2022-10-20 ENCOUNTER — PATIENT MESSAGE (OUTPATIENT)
Dept: ADMINISTRATIVE | Facility: HOSPITAL | Age: 32
End: 2022-10-20
Payer: MEDICAID

## 2022-11-10 LAB — FUNGUS BLD CULT: ABNORMAL

## 2022-11-16 ENCOUNTER — OFFICE VISIT (OUTPATIENT)
Dept: DERMATOLOGY | Facility: CLINIC | Age: 32
End: 2022-11-16
Payer: MEDICAID

## 2022-11-16 DIAGNOSIS — B35.1 ONYCHOMYCOSIS: Primary | ICD-10-CM

## 2022-11-16 PROCEDURE — 1160F RVW MEDS BY RX/DR IN RCRD: CPT | Mod: CPTII,,, | Performed by: DERMATOLOGY

## 2022-11-16 PROCEDURE — 1160F PR REVIEW ALL MEDS BY PRESCRIBER/CLIN PHARMACIST DOCUMENTED: ICD-10-PCS | Mod: CPTII,,, | Performed by: DERMATOLOGY

## 2022-11-16 PROCEDURE — 99214 OFFICE O/P EST MOD 30 MIN: CPT | Mod: S$PBB,,, | Performed by: DERMATOLOGY

## 2022-11-16 PROCEDURE — 99214 PR OFFICE/OUTPT VISIT, EST, LEVL IV, 30-39 MIN: ICD-10-PCS | Mod: S$PBB,,, | Performed by: DERMATOLOGY

## 2022-11-16 PROCEDURE — 99999 PR PBB SHADOW E&M-EST. PATIENT-LVL III: CPT | Mod: PBBFAC,,, | Performed by: DERMATOLOGY

## 2022-11-16 PROCEDURE — 3066F NEPHROPATHY DOC TX: CPT | Mod: CPTII,,, | Performed by: DERMATOLOGY

## 2022-11-16 PROCEDURE — 99999 PR PBB SHADOW E&M-EST. PATIENT-LVL III: ICD-10-PCS | Mod: PBBFAC,,, | Performed by: DERMATOLOGY

## 2022-11-16 PROCEDURE — 1159F PR MEDICATION LIST DOCUMENTED IN MEDICAL RECORD: ICD-10-PCS | Mod: CPTII,,, | Performed by: DERMATOLOGY

## 2022-11-16 PROCEDURE — 3066F PR DOCUMENTATION OF TREATMENT FOR NEPHROPATHY: ICD-10-PCS | Mod: CPTII,,, | Performed by: DERMATOLOGY

## 2022-11-16 PROCEDURE — 99213 OFFICE O/P EST LOW 20 MIN: CPT | Mod: PBBFAC | Performed by: DERMATOLOGY

## 2022-11-16 PROCEDURE — 1159F MED LIST DOCD IN RCRD: CPT | Mod: CPTII,,, | Performed by: DERMATOLOGY

## 2022-11-16 RX ORDER — CICLOPIROX 80 MG/ML
SOLUTION TOPICAL
Qty: 6.6 ML | Refills: 6 | Status: SHIPPED | OUTPATIENT
Start: 2022-11-16

## 2022-11-16 NOTE — PROGRESS NOTES
Subjective:       Patient ID:  Alisia Vines is a 32 y.o. female who presents for   Chief Complaint   Patient presents with    Follow-up     Results      Hx of SLE (on plaquenil) and onychodystrophy of finger and toenails, last seen on 10/4/22.  Here today for results.  Patient is s/p fungal culture showing chaetomimum species.        Review of Systems   Constitutional:  Negative for fever and chills.   Gastrointestinal:  Negative for nausea and vomiting.   Skin:  Positive for activity-related sunscreen use. Negative for daily sunscreen use and recent sunburn.   Hematologic/Lymphatic: Does not bruise/bleed easily.      Objective:    Physical Exam   Constitutional: She appears well-developed and well-nourished. No distress.   Neurological: She is alert and oriented to person, place, and time. She is not disoriented.   Psychiatric: She has a normal mood and affect.   Skin:   Areas Examined (abnormalities noted in diagram):   Head / Face Inspection Performed  Neck Inspection Performed  RUE Inspected  LUE Inspection Performed  Nails and Digits Inspection Performed           Fungus Cult, skin, hair or nails CHAETOMIUM SPECIES Abnormal                         Assessment / Plan:        Onychomycosis  -     ciclopirox (PENLAC) 8 % Soln; Apply to nail and nail fold once daily for up to 1 year  Dispense: 6.6 mL; Refill: 6  -     reviewed results showing non-dermatophyte mold.  Discussed itraconazole may be most effective, however due to significant drug interactions (patient may have to reduce Abilify dose) and other drug-drug interactions, recommend start with topical therapy first.  We will start ciclopirox once daily.  Patient expressed understanding and is amenable. Discussed vinegar soaks and OTC biotin.            Follow up in about 3 months (around 2/16/2023).

## 2022-11-22 ENCOUNTER — TELEPHONE (OUTPATIENT)
Dept: PHARMACY | Facility: CLINIC | Age: 32
End: 2022-11-22
Payer: MEDICAID

## 2022-11-29 RX ORDER — VALACYCLOVIR HYDROCHLORIDE 1 G/1
1000 TABLET, FILM COATED ORAL DAILY
Qty: 30 TABLET | Refills: 0 | Status: SHIPPED | OUTPATIENT
Start: 2022-11-29 | End: 2023-03-14 | Stop reason: SDUPTHER

## 2022-11-29 NOTE — TELEPHONE ENCOUNTER
Care Due:                  Date            Visit Type   Department     Provider  --------------------------------------------------------------------------------                                EP -                              PRIMARY      [facility]  Last Visit: 09-      CARE ([facility])   MEDICINE       Saumya Quinonez  Next Visit: None Scheduled  None         None Found                                                            Last  Test          Frequency    Reason                     Performed    Due Date  --------------------------------------------------------------------------------    CBC.........  12 months..  valACYclovir.............  03- 02-    [facility] Embedded Care Gaps. Reference number: [id number]. 11/29/2022   2:30:03 PM CST

## 2022-11-29 NOTE — TELEPHONE ENCOUNTER
----- Message from Matilde Holilngsworth sent at 11/28/2022 11:58 AM CST -----  Contact: [phone]  Type:  Sooner Apoointment Request    Caller is requesting a sooner appointment.  Caller declined first available appointment listed below.  Caller will not accept being placed on the waitlist and is requesting a message be sent to doctor.  Name of Caller:Alisia  When is the first available appointment?05/01/2023  Symptoms:enlarged lumps on neck  Would the patient rather a call back or a response via [facility]? Call back  Best Call Back Number:[phone]  Additional Information:     Type:  RX Refill Request    Who Called: lAisia  Refill or New Rx:Refill  RX Name and Strength:valACYclovir (VALTREX) 1000 MG tablet  How is the patient currently taking it? (ex. 1XDay):1xday  Is this a 30 day or 90 day RX:90 day  Preferred Pharmacy with phone number:  [id number] - [place] - [address] AT [facility]  [address] [place] [address]  Phone: [phone] Fax: [phone]  Local or Mail Order:Local  Ordering Provider:Dr. Quinonez  Would the patient rather a call back or a response via [facility]? Call back  Best Call Back Number:[phone]  Additional Information:

## 2022-11-29 NOTE — TELEPHONE ENCOUNTER
Spoke with pt, scheduled appt for her to see Den Amaya NP on 12/5. Pt requesting one month rx of valtrex to make it to the appt with Den. Pt stated she hasnt had her valtrex since October 2022

## 2022-11-30 NOTE — PROGRESS NOTES
Subjective:      Alisia Vines is a 32 y.o. female here today for:  Lump on Neck (r)      PCP:  Saumya Quinonez MD     HPI    Mass  This is a chronic problem. The current episode started more than 1 month ago. The problem has been waxing and waning (size fluctuating --- smaller now but reports max size ~ small orange). Pertinent negatives include no abdominal pain, anorexia, arthralgias, chills, coughing, diaphoresis, fatigue, fever, headaches, joint swelling, nausea, neck pain, numbness, rash, sore throat, vertigo, visual change, vomiting or weakness. She has tried nothing for the symptoms.        Review of Systems   Constitutional:  Negative for activity change, appetite change, chills, diaphoresis, fatigue, fever and unexpected weight change.   HENT:  Negative for sore throat.    Respiratory: Negative.  Negative for cough.    Cardiovascular: Negative.    Gastrointestinal:  Negative for abdominal pain, anorexia, nausea and vomiting.   Musculoskeletal:  Negative for arthralgias, joint swelling and neck pain.   Skin:  Positive for wound (lump to right side of neck). Negative for rash.   Neurological: Negative.  Negative for vertigo, weakness, numbness and headaches.       Review of patient's allergies indicates:  No Known Allergies    Patient Active Problem List   Diagnosis    Sjogren's syndrome    HSV-2 (herpes simplex virus 2) infection    Allergic rhinitis    Proteinuria    SLE (systemic lupus erythematosus related syndrome)    Anemia    GERD (gastroesophageal reflux disease)    SLE glomerulonephritis syndrome, WHO class V    History of fetal anomaly in prior pregnancy, currently pregnant, unspecified trimester    Long-term use of Plaquenil    Systemic lupus erythematosus arthritis    Severe uncontrolled hypertension    Sensitivity to sunlight    Immunocompromised    Reactive depression    Mood disorder         Current Outpatient Medications:     amLODIPine (NORVASC) 5 MG tablet, TAKE 1 TABLET BY MOUTH  "EVERY DAY, Disp: 30 tablet, Rfl: 11    ARIPiprazole (ABILIFY) 2 MG Tab, Take 1 tablet (2 mg total) by mouth once daily., Disp: 30 tablet, Rfl: 3    ciclopirox (PENLAC) 8 % Soln, Apply to nail and nail fold once daily for up to 1 year, Disp: 6.6 mL, Rfl: 6    diclofenac (VOLTAREN) 75 MG EC tablet, Take 1 tablet (75 mg total) by mouth 2 (two) times daily., Disp: 20 tablet, Rfl: 0    fexofenadine (ALLEGRA) 180 MG tablet, Take 1 tablet (180 mg total) by mouth once daily., Disp: 30 tablet, Rfl: 6    FLUoxetine 40 MG capsule, Take 1 capsule (40 mg total) by mouth once daily., Disp: 30 capsule, Rfl: 3    fluticasone propionate (FLONASE) 50 mcg/actuation nasal spray, SHAKE LIQUID AND USE 2 SPRAYS(100 MCG) IN EACH NOSTRIL EVERY DAY, Disp: 16 g, Rfl: 1    hydrOXYchloroQUINE (PLAQUENIL) 200 mg tablet, TAKE 2 TABLETS(400 MG) BY MOUTH EVERY DAY, Disp: 60 tablet, Rfl: 2    losartan-hydrochlorothiazide 100-12.5 mg (HYZAAR) 100-12.5 mg Tab, TAKE 1 TABLET BY MOUTH EVERY DAY, Disp: 90 tablet, Rfl: 0    polymyxin B sulf-trimethoprim (POLYTRIM) 10,000 unit- 1 mg/mL Drop, Place 1 drop into both eyes 3 (three) times daily., Disp: 10 mL, Rfl: 0    predniSONE (DELTASONE) 5 MG tablet, Take 1 tablet (5 mg total) by mouth daily as needed (Joint pain)., Disp: 30 tablet, Rfl: 1    promethazine-dextromethorphan (PROMETHAZINE-DM) 6.25-15 mg/5 mL Syrp, Take 5 mLs by mouth every 6 to 8 hours as needed., Disp: 180 mL, Rfl: 0    valACYclovir (VALTREX) 1000 MG tablet, Take 1 tablet (1,000 mg total) by mouth once daily., Disp: 30 tablet, Rfl: 0      Past medical, surgical, family and social histories have been reviewed today.      Objective:     Vitals:    12/05/22 0846   BP: 138/82   Pulse: 99   Temp: 98.2 °F (36.8 °C)   SpO2: 99%   Weight: 83.4 kg (183 lb 15.6 oz)   Height: 5' 5" (1.651 m)   PainSc: 0-No pain       Physical Exam  Vitals reviewed.   Constitutional:       General: She is not in acute distress.  HENT:      Head:      Comments: 3 " separate non-tender soft masses to right side of neck.  Able to palpate, raised, no erythema.  Does have distinct margins.  The largest being top right, walnut-sized.  Other 2 palpable lumps about grape-sized.  Neurological:      Mental Status: She is alert and oriented to person, place, and time.      Sensory: No sensory deficit.      Motor: No weakness.      Coordination: Coordination normal.      Gait: Gait normal.   Psychiatric:         Mood and Affect: Mood normal.         Thought Content: Thought content normal.         Judgment: Judgment normal.     Physical Exam   Head   Head comments: 3 separate non-tender soft masses to right side of neck.  Able to palpate, raised, no erythema.  Does have distinct margins.  The largest being top right, walnut-sized.  Other 2 palpable lumps about grape-sized.        Diagnosis/Assessment:     1. Mass of right side of neck  - US Soft Tissue Head Neck Thyroid; Future       Follow-up:     Pending US.  RTC as directed or on prn basis.        JUAREZ Buenrostro  [facility]       15  minutes of total time spent on the encounter, which includes face to face time and non-face to face time preparing to see the patient.  This included obtaining and/or reviewing separately obtained history, and documenting clinical information in the electronic or other health record.   Also includes independent interpretation of results (not separately reported) and communicating results to the patient/family/caregiver, with care coordination (not separately reported).

## 2022-12-05 ENCOUNTER — OFFICE VISIT (OUTPATIENT)
Dept: FAMILY MEDICINE | Facility: CLINIC | Age: 32
End: 2022-12-05
Payer: MEDICAID

## 2022-12-05 VITALS
DIASTOLIC BLOOD PRESSURE: 82 MMHG | HEART RATE: 99 BPM | OXYGEN SATURATION: 99 % | SYSTOLIC BLOOD PRESSURE: 138 MMHG | WEIGHT: 184 LBS | TEMPERATURE: 98 F | BODY MASS INDEX: 30.66 KG/M2 | HEIGHT: 65 IN

## 2022-12-05 DIAGNOSIS — R22.1 MASS OF RIGHT SIDE OF NECK: Primary | ICD-10-CM

## 2022-12-05 PROCEDURE — 3075F PR MOST RECENT SYSTOLIC BLOOD PRESS GE 130-139MM HG: ICD-10-PCS | Mod: CPTII,,, | Performed by: REGISTERED NURSE

## 2022-12-05 PROCEDURE — 3008F BODY MASS INDEX DOCD: CPT | Mod: CPTII,,, | Performed by: REGISTERED NURSE

## 2022-12-05 PROCEDURE — 99212 OFFICE O/P EST SF 10 MIN: CPT | Mod: S$PBB,,, | Performed by: REGISTERED NURSE

## 2022-12-05 PROCEDURE — 99999 PR PBB SHADOW E&M-EST. PATIENT-LVL III: CPT | Mod: PBBFAC,,, | Performed by: REGISTERED NURSE

## 2022-12-05 PROCEDURE — 3075F SYST BP GE 130 - 139MM HG: CPT | Mod: CPTII,,, | Performed by: REGISTERED NURSE

## 2022-12-05 PROCEDURE — 3066F PR DOCUMENTATION OF TREATMENT FOR NEPHROPATHY: ICD-10-PCS | Mod: CPTII,,, | Performed by: REGISTERED NURSE

## 2022-12-05 PROCEDURE — 99213 OFFICE O/P EST LOW 20 MIN: CPT | Mod: PBBFAC,PO | Performed by: REGISTERED NURSE

## 2022-12-05 PROCEDURE — 3008F PR BODY MASS INDEX (BMI) DOCUMENTED: ICD-10-PCS | Mod: CPTII,,, | Performed by: REGISTERED NURSE

## 2022-12-05 PROCEDURE — 3079F DIAST BP 80-89 MM HG: CPT | Mod: CPTII,,, | Performed by: REGISTERED NURSE

## 2022-12-05 PROCEDURE — 3079F PR MOST RECENT DIASTOLIC BLOOD PRESSURE 80-89 MM HG: ICD-10-PCS | Mod: CPTII,,, | Performed by: REGISTERED NURSE

## 2022-12-05 PROCEDURE — 99999 PR PBB SHADOW E&M-EST. PATIENT-LVL III: ICD-10-PCS | Mod: PBBFAC,,, | Performed by: REGISTERED NURSE

## 2022-12-05 PROCEDURE — 3066F NEPHROPATHY DOC TX: CPT | Mod: CPTII,,, | Performed by: REGISTERED NURSE

## 2022-12-05 PROCEDURE — 1159F PR MEDICATION LIST DOCUMENTED IN MEDICAL RECORD: ICD-10-PCS | Mod: CPTII,,, | Performed by: REGISTERED NURSE

## 2022-12-05 PROCEDURE — 99212 PR OFFICE/OUTPT VISIT, EST, LEVL II, 10-19 MIN: ICD-10-PCS | Mod: S$PBB,,, | Performed by: REGISTERED NURSE

## 2022-12-05 PROCEDURE — 1159F MED LIST DOCD IN RCRD: CPT | Mod: CPTII,,, | Performed by: REGISTERED NURSE

## 2022-12-15 ENCOUNTER — HOSPITAL ENCOUNTER (OUTPATIENT)
Dept: RADIOLOGY | Facility: HOSPITAL | Age: 32
Discharge: HOME | End: 2022-12-15
Attending: REGISTERED NURSE
Payer: MEDICAID

## 2022-12-15 DIAGNOSIS — R22.1 MASS OF RIGHT SIDE OF NECK: ICD-10-CM

## 2022-12-15 PROCEDURE — 76536 US SOFT TISSUE HEAD NECK THYROID: ICD-10-PCS | Mod: 26,,, | Performed by: RADIOLOGY

## 2022-12-15 PROCEDURE — 76536 US EXAM OF HEAD AND NECK: CPT | Mod: 26,,, | Performed by: RADIOLOGY

## 2022-12-15 PROCEDURE — 76536 US EXAM OF HEAD AND NECK: CPT | Mod: TC

## 2022-12-21 DIAGNOSIS — R22.1 MASS OF RIGHT SIDE OF NECK: Primary | ICD-10-CM

## 2022-12-30 ENCOUNTER — TELEPHONE (OUTPATIENT)
Dept: PSYCHIATRY | Facility: CLINIC | Age: 32
End: 2022-12-30
Payer: MEDICAID

## 2023-01-03 ENCOUNTER — PATIENT MESSAGE (OUTPATIENT)
Dept: PSYCHIATRY | Facility: CLINIC | Age: 33
End: 2023-01-03

## 2023-01-03 ENCOUNTER — PATIENT MESSAGE (OUTPATIENT)
Dept: FAMILY MEDICINE | Facility: CLINIC | Age: 33
End: 2023-01-03
Payer: MEDICAID

## 2023-01-10 ENCOUNTER — HOSPITAL ENCOUNTER (OUTPATIENT)
Dept: RADIOLOGY | Facility: HOSPITAL | Age: 33
Discharge: HOME | End: 2023-01-10
Attending: REGISTERED NURSE
Payer: MEDICAID

## 2023-01-10 DIAGNOSIS — R59.1 LYMPHADENOPATHY: Primary | ICD-10-CM

## 2023-01-10 DIAGNOSIS — R22.1 MASS OF RIGHT SIDE OF NECK: ICD-10-CM

## 2023-01-10 DIAGNOSIS — R93.89 ABNORMAL CT SCAN, NECK: ICD-10-CM

## 2023-01-10 PROCEDURE — 25500020 PHARM REV CODE 255: Performed by: REGISTERED NURSE

## 2023-01-10 PROCEDURE — 70491 CT SOFT TISSUE NECK W/DYE: CPT | Mod: 26,,, | Performed by: RADIOLOGY

## 2023-01-10 PROCEDURE — 70491 CT SOFT TISSUE NECK W/DYE: CPT | Mod: TC

## 2023-01-10 PROCEDURE — 70491 CT SOFT TISSUE NECK WITH CONTRAST: ICD-10-PCS | Mod: 26,,, | Performed by: RADIOLOGY

## 2023-01-10 RX ADMIN — IOHEXOL 75 ML: 350 INJECTION, SOLUTION INTRAVENOUS at 11:01

## 2023-01-17 ENCOUNTER — TELEPHONE (OUTPATIENT)
Dept: FAMILY MEDICINE | Facility: CLINIC | Age: 33
End: 2023-01-17
Payer: MEDICAID

## 2023-01-17 NOTE — TELEPHONE ENCOUNTER
----- Message from Dipika Eaton sent at 1/17/2023  8:50 AM CST -----  Contact: Alisia  .Type:  Patient Returning Call    Who Called:Alisia   Who Left Message for Patient:Nurse   Does the patient know what this is regarding?:  Would the patient rather a call back or a response via [facility]? Call   Best Call Back Number:  [phone]    Additional Information:

## 2023-01-17 NOTE — TELEPHONE ENCOUNTER
----- Message from Aimee Sarkar sent at 1/17/2023  8:25 AM CST -----  Contact: Alisia Crump is needing a call back to review her CT scan results. Please call her back at [phone].

## 2023-01-19 ENCOUNTER — OFFICE VISIT (OUTPATIENT)
Dept: OTOLARYNGOLOGY | Facility: CLINIC | Age: 33
End: 2023-01-19
Payer: MEDICAID

## 2023-01-19 ENCOUNTER — LAB VISIT (OUTPATIENT)
Dept: LAB | Facility: HOSPITAL | Age: 33
End: 2023-01-19
Attending: OTOLARYNGOLOGY
Payer: MEDICAID

## 2023-01-19 VITALS — TEMPERATURE: 98 F | WEIGHT: 175.69 LBS | BODY MASS INDEX: 29.24 KG/M2

## 2023-01-19 DIAGNOSIS — R59.1 LYMPHADENOPATHY: ICD-10-CM

## 2023-01-19 DIAGNOSIS — R93.89 ABNORMAL CT SCAN, NECK: ICD-10-CM

## 2023-01-19 DIAGNOSIS — I88.9 LYMPHADENITIS: Primary | ICD-10-CM

## 2023-01-19 DIAGNOSIS — L43.9 LICHEN PLANUS: ICD-10-CM

## 2023-01-19 PROCEDURE — 99213 OFFICE O/P EST LOW 20 MIN: CPT | Mod: PBBFAC | Performed by: OTOLARYNGOLOGY

## 2023-01-19 PROCEDURE — 3008F PR BODY MASS INDEX (BMI) DOCUMENTED: ICD-10-PCS | Mod: CPTII,,, | Performed by: OTOLARYNGOLOGY

## 2023-01-19 PROCEDURE — 36415 COLL VENOUS BLD VENIPUNCTURE: CPT | Performed by: OTOLARYNGOLOGY

## 2023-01-19 PROCEDURE — 86480 TB TEST CELL IMMUN MEASURE: CPT | Performed by: OTOLARYNGOLOGY

## 2023-01-19 PROCEDURE — 99999 PR PBB SHADOW E&M-EST. PATIENT-LVL III: ICD-10-PCS | Mod: PBBFAC,,, | Performed by: OTOLARYNGOLOGY

## 2023-01-19 PROCEDURE — 86665 EPSTEIN-BARR CAPSID VCA: CPT | Performed by: OTOLARYNGOLOGY

## 2023-01-19 PROCEDURE — 85025 COMPLETE CBC W/AUTO DIFF WBC: CPT | Performed by: OTOLARYNGOLOGY

## 2023-01-19 PROCEDURE — 99204 OFFICE O/P NEW MOD 45 MIN: CPT | Mod: S$PBB,,, | Performed by: OTOLARYNGOLOGY

## 2023-01-19 PROCEDURE — 3008F BODY MASS INDEX DOCD: CPT | Mod: CPTII,,, | Performed by: OTOLARYNGOLOGY

## 2023-01-19 PROCEDURE — 86644 CMV ANTIBODY: CPT | Performed by: OTOLARYNGOLOGY

## 2023-01-19 PROCEDURE — 99204 PR OFFICE/OUTPT VISIT, NEW, LEVL IV, 45-59 MIN: ICD-10-PCS | Mod: S$PBB,,, | Performed by: OTOLARYNGOLOGY

## 2023-01-19 PROCEDURE — 86684 HEMOPHILUS INFLUENZA ANTIBDY: CPT | Mod: 91 | Performed by: OTOLARYNGOLOGY

## 2023-01-19 PROCEDURE — 1159F PR MEDICATION LIST DOCUMENTED IN MEDICAL RECORD: ICD-10-PCS | Mod: CPTII,,, | Performed by: OTOLARYNGOLOGY

## 2023-01-19 PROCEDURE — 86645 CMV ANTIBODY IGM: CPT | Performed by: OTOLARYNGOLOGY

## 2023-01-19 PROCEDURE — 99999 PR PBB SHADOW E&M-EST. PATIENT-LVL III: CPT | Mod: PBBFAC,,, | Performed by: OTOLARYNGOLOGY

## 2023-01-19 PROCEDURE — 1159F MED LIST DOCD IN RCRD: CPT | Mod: CPTII,,, | Performed by: OTOLARYNGOLOGY

## 2023-01-19 RX ORDER — TRIAMCINOLONE ACETONIDE 1 MG/G
PASTE DENTAL
Qty: 5 G | Refills: 3 | Status: SHIPPED | OUTPATIENT
Start: 2023-01-19 | End: 2023-02-22

## 2023-01-19 RX ORDER — AMOXICILLIN AND CLAVULANATE POTASSIUM 875; 125 MG/1; MG/1
1 TABLET, FILM COATED ORAL 2 TIMES DAILY
Qty: 20 TABLET | Refills: 0 | Status: SHIPPED | OUTPATIENT
Start: 2023-01-19 | End: 2023-01-29

## 2023-01-19 NOTE — PROGRESS NOTES
Referring Provider:    Saumya Quinonez Md  [address] [place] [address]  Subjective:   Patient: Alisia Ceballos Jasmyn [id number], :1990   Visit date:2023 11:45 AM    Chief Complaint:  Other    HPI:    Prior notes reviewed by myself.  Clinical documentation obtained by nursing staff reviewed.     32-year-old female presents for evaluation of cervical lymphadenopathy.  She has had enlarging lymph nodes on her right neck that have been present for at least 6 weeks.  She presented to the family Medicine Department and had an ultrasound as well as a CT neck which is below.  She does admit to a 10-20 lb weight loss over the last 3-6 months.  She denies any night sweats, odynophagia, dysphagia.  She has not had any foreign travel but she has had some contact with cats.  She is not been treated with any antibiotics for this so far per the patient.  She does have a history of lupus and Sjogren's and is currently taking Plaquenil.      Objective:     Physical Exam:  Vitals:  Temp 97.6 °F (36.4 °C) (Temporal)   Wt 79.7 kg (175 lb 11.3 oz)   LMP 2022   BMI 29.24 kg/m²   General appearance:  Well developed, well nourished    Ears:  Otoscopy of external auditory canals and tympanic membranes was normal, clinical speech reception thresholds grossly intact, no mass/lesion of auricle.    Nose:  No masses/lesions of external nose, nasal mucosa, septum, and turbinates were within normal limits.    Mouth:  No mass/lesion of lips, teeth, gums, hard/soft palate, tongue, tonsils, or oropharynx. Superficial ulceration right buccal mucosa with lace-like white mucosal changes and erythema bilaterally    Neck & Lymphatics:  Multiple enlarged right sided cervical lymph nodes in levels II-V, largest in the supraclavicular area at least 3cm, no neck mass/crepitus/ asymmetry, trachea is midline, no thyroid enlargement/tenderness/mass.        [x]  Data Reviewed:    Lab Results   Component Value Date    WBC 2.91  (L) 03/03/2022    HGB 11.0 (L) 03/03/2022    HCT 33.4 (L) 03/03/2022    MCV 87 03/03/2022    EOSINOPHIL 3.4 03/03/2022         []  Independent interpretation of test:  CT Soft Tissue Neck With Contrast  Order: [id number]  Status: Final result     Visible to patient: Yes (seen)     Next appt: 02/02/2023 at 11:15 AM in Otolaryngology (Rivera Sandoval MD)     Dx: Mass of right side of neck     2 Result Notes    1 Patient Communication  Details    Reading Physician Reading Date Result Priority   Guillermo Powers Jr., MD  [phone] 1/10/2023 Routine     Narrative & Impression  EXAMINATION:  CT SOFT TISSUE NECK WITH CONTRAST     CLINICAL HISTORY:  neck mass//abnormal ultrasound;Localized swelling, mass and lump, neck     TECHNIQUE:  Low dose axial images as well as sagittal and coronal reconstructions were performed from the skull base to the clavicles following the intravenous administration of 75 mL of Omnipaque 350.     COMPARISON:  None     FINDINGS:  The nasopharynx, oral pharynx, hypopharynx, larynx and visualized portion of the trachea are within normal limits.     The oral cavity and buccal space are limited by artifact from dental metal but appear to grossly within normal limits.     The bilateral parotid, submandibular and thyroid glands are within normal limits.     Abnormal lymphadenopathy is noted throughout the soft tissues of the neck, right greater than left, with the largest nodes in the right supraclavicular region measuring up to 3 x 2.1 cm in size (axial 111, series 2).  No evidence of necrosis is present..     Multilevel degenerative changes noted throughout the cervical spine.     Right greater than left maxillary sinusitis and bilateral ethmoid sinusitis.  Suspected left maxillary dental caries     Visualized lung apices are clear bilaterally.     Impression:     Extensive lymphadenopathy in the right greater than left neck.  Both benign and malignant etiologies are in the differential, recommend  correlation with clinical presentation for infectious etiology.  Consider biopsy to rule out lymphoma.     Sinusitis.     Suspected dental caries, dental consultation is recommended        Electronically signed by: Guillermo Powers  Date:                                            01/10/2023  Time:                                           12:53           Exam Ended: 01/10/23 11:19 Last Resulted: 01/10/23 12:53                   Assessment & Plan:   Lymphadenitis  -     amoxicillin-clavulanate 875-125mg (AUGMENTIN) 875-125 mg per tablet; Take 1 tablet by mouth 2 (two) times daily. for 10 days  Dispense: 20 tablet; Refill: 0    Lymphadenopathy  -     Ambulatory referral/consult to ENT  -     Bartonella Anitbody Panel; Future; Expected date: 01/19/2023  -     JERAD-BARR VIRUS ANTIBODY PANEL; Future; Expected date: 01/19/2023  -     CYTOMEGALOVIRUS ANTIBODY, IGG; Future; Expected date: 01/19/2023  -     CYTOMEGALOVIRUS (CMV) AB, IGM; Future; Expected date: 01/19/2023  -     QUANTIFERON GOLD TB; Future; Expected date: 01/19/2023  -     CBC Auto Differential; Future; Expected date: 01/19/2023    Abnormal CT scan, neck  -     Ambulatory referral/consult to ENT    Lichen planus  -     triamcinolone acetonide 0.1% (KENALOG) 0.1 % paste; Apply to affected area right side of oral cavity BID  Dispense: 5 g; Refill: 3        She has extensive right cervical lymphadenopathy that has been persistent for at least 6 weeks.  She is not had any antibiotic treatment so far, so we agreed to start her on a round of Augmentin as well as testing for other possible infectious etiologies of persistent cervical lymphadenopathy.  I explained that if her lymphadenopathy does not resolve with the antibiotic treatment and/or is not explained by the lab work that we will need to move forward with an excisional lymph node biopsy.  She understands and agrees with plan.  She has evidence of lichen planus of her bilateral buccal mucosa with a  superficial ulceration on the right side.  This is causing her some pain, so we agreed to treat this with topical Kenalog

## 2023-01-20 LAB
BASOPHILS # BLD AUTO: 0.01 K/UL (ref 0–0.2)
BASOPHILS NFR BLD: 0.3 % (ref 0–1.9)
CMV IGG SERPL QL IA: NORMAL
DIFFERENTIAL METHOD: ABNORMAL
EOSINOPHIL # BLD AUTO: 0.1 K/UL (ref 0–0.5)
EOSINOPHIL NFR BLD: 3.5 % (ref 0–8)
ERYTHROCYTE [DISTWIDTH] IN BLOOD BY AUTOMATED COUNT: 13.3 % (ref 11.5–14.5)
HCT VFR BLD AUTO: 32.5 % (ref 37–48.5)
HGB BLD-MCNC: 11.1 G/DL (ref 12–16)
IMM GRANULOCYTES # BLD AUTO: 0.02 K/UL (ref 0–0.04)
IMM GRANULOCYTES NFR BLD AUTO: 0.6 % (ref 0–0.5)
LYMPHOCYTES # BLD AUTO: 1 K/UL (ref 1–4.8)
LYMPHOCYTES NFR BLD: 32.3 % (ref 18–48)
MCH RBC QN AUTO: 29.7 PG (ref 27–31)
MCHC RBC AUTO-ENTMCNC: 34.2 G/DL (ref 32–36)
MCV RBC AUTO: 87 FL (ref 82–98)
MONOCYTES # BLD AUTO: 0.2 K/UL (ref 0.3–1)
MONOCYTES NFR BLD: 7.1 % (ref 4–15)
NEUTROPHILS # BLD AUTO: 1.7 K/UL (ref 1.8–7.7)
NEUTROPHILS NFR BLD: 56.2 % (ref 38–73)
NRBC BLD-RTO: 0 /100 WBC
PLATELET # BLD AUTO: 276 K/UL (ref 150–450)
PMV BLD AUTO: 12.4 FL (ref 9.2–12.9)
RBC # BLD AUTO: 3.74 M/UL (ref 4–5.4)
WBC # BLD AUTO: 3.1 K/UL (ref 3.9–12.7)

## 2023-01-21 LAB
GAMMA INTERFERON BACKGROUND BLD IA-ACNC: 0.04 IU/ML
M TB IFN-G CD4+ BCKGRND COR BLD-ACNC: 0.01 IU/ML
MITOGEN IGNF BCKGRD COR BLD-ACNC: 0.07 IU/ML
TB GOLD PLUS: ABNORMAL
TB2 - NIL: 0.01 IU/ML

## 2023-01-22 LAB
B HENSELAE IGG TITR SER IF: NORMAL TITER
B HENSELAE IGM TITR SER IF: NORMAL TITER
B QUINTANA IGG TITR SER IF: NORMAL TITER
B QUINTANA IGM TITR SER IF: NORMAL TITER

## 2023-01-23 LAB
CMV IGM SERPL IA-ACNC: 29.4 AU/ML
EBV EA IGG SER-ACNC: 32 U/ML
EBV NA IGG SER-ACNC: <3 U/ML
EBV VCA IGG SER-ACNC: >750 U/ML
EBV VCA IGM SER-ACNC: 34.3 U/ML

## 2023-01-30 ENCOUNTER — PATIENT MESSAGE (OUTPATIENT)
Dept: FAMILY MEDICINE | Facility: CLINIC | Age: 33
End: 2023-01-30
Payer: MEDICAID

## 2023-02-02 ENCOUNTER — TELEPHONE (OUTPATIENT)
Dept: OTOLARYNGOLOGY | Facility: CLINIC | Age: 33
End: 2023-02-02
Payer: MEDICAID

## 2023-02-02 ENCOUNTER — OFFICE VISIT (OUTPATIENT)
Dept: OTOLARYNGOLOGY | Facility: CLINIC | Age: 33
End: 2023-02-02
Payer: MEDICAID

## 2023-02-02 VITALS — BODY MASS INDEX: 30.52 KG/M2 | WEIGHT: 183.44 LBS

## 2023-02-02 DIAGNOSIS — R59.1 LYMPHADENOPATHY: Primary | ICD-10-CM

## 2023-02-02 DIAGNOSIS — L43.9 LICHEN PLANUS: ICD-10-CM

## 2023-02-02 DIAGNOSIS — R93.89 ABNORMAL CT SCAN, NECK: ICD-10-CM

## 2023-02-02 PROCEDURE — 99999 PR PBB SHADOW E&M-EST. PATIENT-LVL IV: ICD-10-PCS | Mod: PBBFAC,,, | Performed by: OTOLARYNGOLOGY

## 2023-02-02 PROCEDURE — 1159F PR MEDICATION LIST DOCUMENTED IN MEDICAL RECORD: ICD-10-PCS | Mod: CPTII,,, | Performed by: OTOLARYNGOLOGY

## 2023-02-02 PROCEDURE — 99214 PR OFFICE/OUTPT VISIT, EST, LEVL IV, 30-39 MIN: ICD-10-PCS | Mod: S$PBB,,, | Performed by: OTOLARYNGOLOGY

## 2023-02-02 PROCEDURE — 3008F PR BODY MASS INDEX (BMI) DOCUMENTED: ICD-10-PCS | Mod: CPTII,,, | Performed by: OTOLARYNGOLOGY

## 2023-02-02 PROCEDURE — 99214 OFFICE O/P EST MOD 30 MIN: CPT | Mod: S$PBB,,, | Performed by: OTOLARYNGOLOGY

## 2023-02-02 PROCEDURE — 3008F BODY MASS INDEX DOCD: CPT | Mod: CPTII,,, | Performed by: OTOLARYNGOLOGY

## 2023-02-02 PROCEDURE — 99999 PR PBB SHADOW E&M-EST. PATIENT-LVL IV: CPT | Mod: PBBFAC,,, | Performed by: OTOLARYNGOLOGY

## 2023-02-02 PROCEDURE — 99214 OFFICE O/P EST MOD 30 MIN: CPT | Mod: PBBFAC | Performed by: OTOLARYNGOLOGY

## 2023-02-02 PROCEDURE — 1159F MED LIST DOCD IN RCRD: CPT | Mod: CPTII,,, | Performed by: OTOLARYNGOLOGY

## 2023-02-02 NOTE — PROGRESS NOTES
Referring Provider:    No referring provider defined for this encounter.  Subjective:   Patient: Alisia Ceballos Jasmyn [id number], :1990   Visit date:2023 11:45 AM    Chief Complaint:  Other    HPI:    Prior notes reviewed by myself.  Clinical documentation obtained by nursing staff reviewed.     32-year-old female presents for evaluation of cervical lymphadenopathy.  She has had enlarging lymph nodes on her right neck that have been present for at least 6 weeks.  She presented to the family Medicine Department and had an ultrasound as well as a CT neck which is below.  She does admit to a 10-20 lb weight loss over the last 3-6 months.  She denies any night sweats, odynophagia, dysphagia.  She has not had any foreign travel but she has had some contact with cats.  She is not been treated with any antibiotics for this so far per the patient.  She does have a history of lupus and Sjogren's and is currently taking Plaquenil.    23 update:  Here for f/u.  No significant changes    Objective:     Physical Exam:  Vitals:  Wt 83.2 kg (183 lb 6.8 oz)   LMP 2023   BMI 30.52 kg/m²   General appearance:  Well developed, well nourished    Ears:  Otoscopy of external auditory canals and tympanic membranes was normal, clinical speech reception thresholds grossly intact, no mass/lesion of auricle.    Nose:  No masses/lesions of external nose, nasal mucosa, septum, and turbinates were within normal limits.    Mouth:  No mass/lesion of lips, teeth, gums, hard/soft palate, tongue, tonsils, or oropharynx. Superficial ulceration right buccal mucosa with lace-like white mucosal changes and erythema bilaterally    Neck & Lymphatics:  Multiple enlarged right sided cervical lymph nodes in levels II-V, largest in the supraclavicular area at least 3cm, no neck mass/crepitus/ asymmetry, trachea is midline, no thyroid enlargement/tenderness/mass.        [x]  Data Reviewed:    Lab Results   Component Value Date    WBC 3.10  (L) 01/19/2023    HGB 11.1 (L) 01/19/2023    HCT 32.5 (L) 01/19/2023    MCV 87 01/19/2023    EOSINOPHIL 3.5 01/19/2023         [x]  Independent interpretation of test:  CT Soft Tissue Neck With Contrast  Order: [id number]  Status: Final result     Visible to patient: Yes (seen)     Next appt: 02/02/2023 at 11:15 AM in Otolaryngology (Rivera Sandoval MD)     Dx: Mass of right side of neck     2 Result Notes    1 Patient Communication  Details    Reading Physician Reading Date Result Priority   Guillermo Powers Jr., MD  [phone] 1/10/2023 Routine     Narrative & Impression  EXAMINATION:  CT SOFT TISSUE NECK WITH CONTRAST     CLINICAL HISTORY:  neck mass//abnormal ultrasound;Localized swelling, mass and lump, neck     TECHNIQUE:  Low dose axial images as well as sagittal and coronal reconstructions were performed from the skull base to the clavicles following the intravenous administration of 75 mL of Omnipaque 350.     COMPARISON:  None     FINDINGS:  The nasopharynx, oral pharynx, hypopharynx, larynx and visualized portion of the trachea are within normal limits.     The oral cavity and buccal space are limited by artifact from dental metal but appear to grossly within normal limits.     The bilateral parotid, submandibular and thyroid glands are within normal limits.     Abnormal lymphadenopathy is noted throughout the soft tissues of the neck, right greater than left, with the largest nodes in the right supraclavicular region measuring up to 3 x 2.1 cm in size (axial 111, series 2).  No evidence of necrosis is present..     Multilevel degenerative changes noted throughout the cervical spine.     Right greater than left maxillary sinusitis and bilateral ethmoid sinusitis.  Suspected left maxillary dental caries     Visualized lung apices are clear bilaterally.     Impression:     Extensive lymphadenopathy in the right greater than left neck.  Both benign and malignant etiologies are in the differential, recommend  correlation with clinical presentation for infectious etiology.  Consider biopsy to rule out lymphoma.     Sinusitis.     Suspected dental caries, dental consultation is recommended        Electronically signed by: Guillermo Powers  Date:                                            01/10/2023  Time:                                           12:53           Exam Ended: 01/10/23 11:19 Last Resulted: 01/10/23 12:53                Results    Collected Updated Procedure    01/19/2023 1207 01/20/2023 0021 CBC Auto Differential [[id number]]   (Abnormal)   Blood    Component Value Units   WBC 3.10 Low  K/uL   RBC 3.74 Low  M/uL   Hemoglobin 11.1 Low  g/dL   Hematocrit 32.5 Low  %   MCV 87 fL   MCH 29.7 pg   MCHC 34.2 g/dL   RDW 13.3 %   Platelets 276 K/uL   MPV 12.4 fL   Immature Granulocytes 0.6 High  %   Gran # (ANC) 1.7 Low  K/uL   Immature Grans (Abs) 0.02  K/uL   Lymph # 1.0 K/uL   Mono # 0.2 Low  K/uL   Eos # 0.1 K/uL   Baso # 0.01 K/uL   nRBC 0 /100 WBC   Gran % 56.2 %   Lymph % 32.3 %   Mono % 7.1 %   Eosinophil % 3.5 %   Basophil % 0.3 %   Differential Method Automated           01/19/2023 1207 01/21/2023 1353 QUANTIFERON GOLD TB [[id number]]   (Abnormal)   Blood    Component Value Units   NIL 0.80540  IU/mL   TB1 - Nil 0.015 IU/mL   TB2 - Nil 0.009 IU/mL   Mitogen - Nil 0.072 IU/mL   TB Gold Plus Indeterminate Abnormal             01/19/2023 1207 01/23/2023 0435 CYTOMEGALOVIRUS (CMV) AB, IGM [[id number]]   Blood    Component Value Units   Cytomegalovirus IgM Ab 29.4  AU/mL          01/19/2023 1207 01/20/2023 1026 CYTOMEGALOVIRUS ANTIBODY, IGG [[id number]]   Blood    Component Value   CMV IgG Interpretation Non-Reactive          01/19/2023 1207 01/23/2023 0441 JERAD-TANNER VIRUS ANTIBODY PANEL [[id number]]   (Abnormal)   Blood    Component Value Units   EBV VCA IgG >750.0 High   U/mL   EBV VCA IgM 34.3  U/mL   EBV Early Antigen Ab, IgG 32.0 High   U/mL   EBV Nuclear Ag Ab <3.0  U/mL          01/19/2023 1207 01/22/2023 1555  Bartonella Anitbody Panel [[id number]]   Blood    Component Value Units   B. henselae IgG <1:128 titer   B. henselae IgM <1:20 titer   B gabriel IgG <1:128 titer   B Gabriel IgM <1:20  titer                Assessment & Plan:   Lymphadenopathy  -     Case Request Operating Room: EXCISION, MASS, NECK    Lichen planus    Abnormal CT scan, neck          She has extensive right cervical lymphadenopathy that has been persistent for at least 6 weeks.  She is not had any antibiotic treatment so far, so we agreed to start her on a round of Augmentin as well as testing for other possible infectious etiologies of persistent cervical lymphadenopathy.  I explained that if her lymphadenopathy does not resolve with the antibiotic treatment and/or is not explained by the lab work that we will need to move forward with an excisional lymph node biopsy.  She understands and agrees with plan.  She has evidence of lichen planus of her bilateral buccal mucosa with a superficial ulceration on the right side.  This is causing her some pain, so we agreed to treat this with topical Kenalog    2/2/23 update:  She continues to have persistent right-sided cervical lymphadenopathy that has not been responsive to antibiotic treatmen. Her laboratory workup has not yielded a definitive result for an etiology.  I recommended an excisional lymph node biopsy which we will try to proceed with a soon as possible.

## 2023-02-02 NOTE — H&P (VIEW-ONLY)
Referring Provider:    No referring provider defined for this encounter.  Subjective:   Patient: Alisia Ceballos Jasmyn [id number], :1990   Visit date:2023 11:45 AM    Chief Complaint:  Other    HPI:    Prior notes reviewed by myself.  Clinical documentation obtained by nursing staff reviewed.     32-year-old female presents for evaluation of cervical lymphadenopathy.  She has had enlarging lymph nodes on her right neck that have been present for at least 6 weeks.  She presented to the family Medicine Department and had an ultrasound as well as a CT neck which is below.  She does admit to a 10-20 lb weight loss over the last 3-6 months.  She denies any night sweats, odynophagia, dysphagia.  She has not had any foreign travel but she has had some contact with cats.  She is not been treated with any antibiotics for this so far per the patient.  She does have a history of lupus and Sjogren's and is currently taking Plaquenil.    23 update:  Here for f/u.  No significant changes    Objective:     Physical Exam:  Vitals:  Wt 83.2 kg (183 lb 6.8 oz)   LMP 2023   BMI 30.52 kg/m²   General appearance:  Well developed, well nourished    Ears:  Otoscopy of external auditory canals and tympanic membranes was normal, clinical speech reception thresholds grossly intact, no mass/lesion of auricle.    Nose:  No masses/lesions of external nose, nasal mucosa, septum, and turbinates were within normal limits.    Mouth:  No mass/lesion of lips, teeth, gums, hard/soft palate, tongue, tonsils, or oropharynx. Superficial ulceration right buccal mucosa with lace-like white mucosal changes and erythema bilaterally    Neck & Lymphatics:  Multiple enlarged right sided cervical lymph nodes in levels II-V, largest in the supraclavicular area at least 3cm, no neck mass/crepitus/ asymmetry, trachea is midline, no thyroid enlargement/tenderness/mass.        [x]  Data Reviewed:    Lab Results   Component Value Date    WBC 3.10  (L) 01/19/2023    HGB 11.1 (L) 01/19/2023    HCT 32.5 (L) 01/19/2023    MCV 87 01/19/2023    EOSINOPHIL 3.5 01/19/2023         [x]  Independent interpretation of test:  CT Soft Tissue Neck With Contrast  Order: [id number]  Status: Final result     Visible to patient: Yes (seen)     Next appt: 02/02/2023 at 11:15 AM in Otolaryngology (Rivera Sandoval MD)     Dx: Mass of right side of neck     2 Result Notes    1 Patient Communication  Details    Reading Physician Reading Date Result Priority   Guillermo Powers Jr., MD  [phone] 1/10/2023 Routine     Narrative & Impression  EXAMINATION:  CT SOFT TISSUE NECK WITH CONTRAST     CLINICAL HISTORY:  neck mass//abnormal ultrasound;Localized swelling, mass and lump, neck     TECHNIQUE:  Low dose axial images as well as sagittal and coronal reconstructions were performed from the skull base to the clavicles following the intravenous administration of 75 mL of Omnipaque 350.     COMPARISON:  None     FINDINGS:  The nasopharynx, oral pharynx, hypopharynx, larynx and visualized portion of the trachea are within normal limits.     The oral cavity and buccal space are limited by artifact from dental metal but appear to grossly within normal limits.     The bilateral parotid, submandibular and thyroid glands are within normal limits.     Abnormal lymphadenopathy is noted throughout the soft tissues of the neck, right greater than left, with the largest nodes in the right supraclavicular region measuring up to 3 x 2.1 cm in size (axial 111, series 2).  No evidence of necrosis is present..     Multilevel degenerative changes noted throughout the cervical spine.     Right greater than left maxillary sinusitis and bilateral ethmoid sinusitis.  Suspected left maxillary dental caries     Visualized lung apices are clear bilaterally.     Impression:     Extensive lymphadenopathy in the right greater than left neck.  Both benign and malignant etiologies are in the differential, recommend  correlation with clinical presentation for infectious etiology.  Consider biopsy to rule out lymphoma.     Sinusitis.     Suspected dental caries, dental consultation is recommended        Electronically signed by: Guillermo Powers  Date:                                            01/10/2023  Time:                                           12:53           Exam Ended: 01/10/23 11:19 Last Resulted: 01/10/23 12:53                Results    Collected Updated Procedure    01/19/2023 1207 01/20/2023 0021 CBC Auto Differential [[id number]]   (Abnormal)   Blood    Component Value Units   WBC 3.10 Low  K/uL   RBC 3.74 Low  M/uL   Hemoglobin 11.1 Low  g/dL   Hematocrit 32.5 Low  %   MCV 87 fL   MCH 29.7 pg   MCHC 34.2 g/dL   RDW 13.3 %   Platelets 276 K/uL   MPV 12.4 fL   Immature Granulocytes 0.6 High  %   Gran # (ANC) 1.7 Low  K/uL   Immature Grans (Abs) 0.02  K/uL   Lymph # 1.0 K/uL   Mono # 0.2 Low  K/uL   Eos # 0.1 K/uL   Baso # 0.01 K/uL   nRBC 0 /100 WBC   Gran % 56.2 %   Lymph % 32.3 %   Mono % 7.1 %   Eosinophil % 3.5 %   Basophil % 0.3 %   Differential Method Automated           01/19/2023 1207 01/21/2023 1353 QUANTIFERON GOLD TB [[id number]]   (Abnormal)   Blood    Component Value Units   NIL 0.70161  IU/mL   TB1 - Nil 0.015 IU/mL   TB2 - Nil 0.009 IU/mL   Mitogen - Nil 0.072 IU/mL   TB Gold Plus Indeterminate Abnormal             01/19/2023 1207 01/23/2023 0435 CYTOMEGALOVIRUS (CMV) AB, IGM [[id number]]   Blood    Component Value Units   Cytomegalovirus IgM Ab 29.4  AU/mL          01/19/2023 1207 01/20/2023 1026 CYTOMEGALOVIRUS ANTIBODY, IGG [[id number]]   Blood    Component Value   CMV IgG Interpretation Non-Reactive          01/19/2023 1207 01/23/2023 0441 JERAD-TANNER VIRUS ANTIBODY PANEL [[id number]]   (Abnormal)   Blood    Component Value Units   EBV VCA IgG >750.0 High   U/mL   EBV VCA IgM 34.3  U/mL   EBV Early Antigen Ab, IgG 32.0 High   U/mL   EBV Nuclear Ag Ab <3.0  U/mL          01/19/2023 1207 01/22/2023 1555  Bartonella Anitbody Panel [[id number]]   Blood    Component Value Units   B. henselae IgG <1:128 titer   B. henselae IgM <1:20 titer   B gabriel IgG <1:128 titer   B Gabriel IgM <1:20  titer                Assessment & Plan:   Lymphadenopathy  -     Case Request Operating Room: EXCISION, MASS, NECK    Lichen planus    Abnormal CT scan, neck          She has extensive right cervical lymphadenopathy that has been persistent for at least 6 weeks.  She is not had any antibiotic treatment so far, so we agreed to start her on a round of Augmentin as well as testing for other possible infectious etiologies of persistent cervical lymphadenopathy.  I explained that if her lymphadenopathy does not resolve with the antibiotic treatment and/or is not explained by the lab work that we will need to move forward with an excisional lymph node biopsy.  She understands and agrees with plan.  She has evidence of lichen planus of her bilateral buccal mucosa with a superficial ulceration on the right side.  This is causing her some pain, so we agreed to treat this with topical Kenalog    2/2/23 update:  She continues to have persistent right-sided cervical lymphadenopathy that has not been responsive to antibiotic treatmen. Her laboratory workup has not yielded a definitive result for an etiology.  I recommended an excisional lymph node biopsy which we will try to proceed with a soon as possible.

## 2023-02-02 NOTE — TELEPHONE ENCOUNTER
----- Message from Kyle Rodriguez sent at 2/2/2023  7:33 AM CST -----  Contact: [phone]  Pt Is calling in regards to seeing if they have an sooner appt time for today appt. Please call her back at [phone]. Thanks KB

## 2023-02-03 ENCOUNTER — ANESTHESIA (OUTPATIENT)
Dept: SURGERY | Facility: HOSPITAL | Age: 33
End: 2023-02-03
Payer: MEDICAID

## 2023-02-03 ENCOUNTER — TELEPHONE (OUTPATIENT)
Dept: PREADMISSION TESTING | Facility: HOSPITAL | Age: 33
End: 2023-02-03
Payer: MEDICAID

## 2023-02-03 ENCOUNTER — ANESTHESIA EVENT (OUTPATIENT)
Dept: SURGERY | Facility: HOSPITAL | Age: 33
End: 2023-02-03
Payer: MEDICAID

## 2023-02-07 ENCOUNTER — OFFICE VISIT (OUTPATIENT)
Dept: URGENT CARE | Facility: CLINIC | Age: 33
End: 2023-02-07
Payer: MEDICAID

## 2023-02-07 ENCOUNTER — TELEPHONE (OUTPATIENT)
Dept: OTOLARYNGOLOGY | Facility: CLINIC | Age: 33
End: 2023-02-07
Payer: MEDICAID

## 2023-02-07 VITALS
WEIGHT: 183 LBS | HEIGHT: 65 IN | DIASTOLIC BLOOD PRESSURE: 100 MMHG | OXYGEN SATURATION: 98 % | HEART RATE: 86 BPM | TEMPERATURE: 98 F | RESPIRATION RATE: 16 BRPM | BODY MASS INDEX: 30.49 KG/M2 | SYSTOLIC BLOOD PRESSURE: 142 MMHG

## 2023-02-07 DIAGNOSIS — S91.312A LACERATION OF LEFT FOOT, INITIAL ENCOUNTER: ICD-10-CM

## 2023-02-07 DIAGNOSIS — S99.922A FOOT INJURY, LEFT, INITIAL ENCOUNTER: Primary | ICD-10-CM

## 2023-02-07 PROCEDURE — 3008F PR BODY MASS INDEX (BMI) DOCUMENTED: ICD-10-PCS | Mod: CPTII,S$GLB,,

## 2023-02-07 PROCEDURE — 99213 OFFICE O/P EST LOW 20 MIN: CPT | Mod: S$GLB,,,

## 2023-02-07 PROCEDURE — 1159F PR MEDICATION LIST DOCUMENTED IN MEDICAL RECORD: ICD-10-PCS | Mod: CPTII,S$GLB,,

## 2023-02-07 PROCEDURE — 3080F DIAST BP >= 90 MM HG: CPT | Mod: CPTII,S$GLB,,

## 2023-02-07 PROCEDURE — 3080F PR MOST RECENT DIASTOLIC BLOOD PRESSURE >= 90 MM HG: ICD-10-PCS | Mod: CPTII,S$GLB,,

## 2023-02-07 PROCEDURE — 73630 X-RAY EXAM OF FOOT: CPT | Mod: LT,S$GLB,, | Performed by: RADIOLOGY

## 2023-02-07 PROCEDURE — 1159F MED LIST DOCD IN RCRD: CPT | Mod: CPTII,S$GLB,,

## 2023-02-07 PROCEDURE — 3077F PR MOST RECENT SYSTOLIC BLOOD PRESSURE >= 140 MM HG: ICD-10-PCS | Mod: CPTII,S$GLB,,

## 2023-02-07 PROCEDURE — 1160F RVW MEDS BY RX/DR IN RCRD: CPT | Mod: CPTII,S$GLB,,

## 2023-02-07 PROCEDURE — 73630 XR FOOT COMPLETE 3 VIEW LEFT: ICD-10-PCS | Mod: LT,S$GLB,, | Performed by: RADIOLOGY

## 2023-02-07 PROCEDURE — 3077F SYST BP >= 140 MM HG: CPT | Mod: CPTII,S$GLB,,

## 2023-02-07 PROCEDURE — 3008F BODY MASS INDEX DOCD: CPT | Mod: CPTII,S$GLB,,

## 2023-02-07 PROCEDURE — 99213 PR OFFICE/OUTPT VISIT, EST, LEVL III, 20-29 MIN: ICD-10-PCS | Mod: S$GLB,,,

## 2023-02-07 PROCEDURE — 1160F PR REVIEW ALL MEDS BY PRESCRIBER/CLIN PHARMACIST DOCUMENTED: ICD-10-PCS | Mod: CPTII,S$GLB,,

## 2023-02-07 RX ORDER — MUPIROCIN 20 MG/G
OINTMENT TOPICAL
Status: COMPLETED | OUTPATIENT
Start: 2023-02-07 | End: 2023-02-07

## 2023-02-07 RX ORDER — MUPIROCIN 20 MG/G
OINTMENT TOPICAL 3 TIMES DAILY
Qty: 22 G | Refills: 0 | Status: SHIPPED | OUTPATIENT
Start: 2023-02-07 | End: 2023-03-14

## 2023-02-07 RX ORDER — NAPROXEN 500 MG/1
500 TABLET ORAL 2 TIMES DAILY
Qty: 14 TABLET | Refills: 0 | Status: SHIPPED | OUTPATIENT
Start: 2023-02-07 | End: 2023-02-22

## 2023-02-07 RX ADMIN — MUPIROCIN: 20 OINTMENT TOPICAL at 10:02

## 2023-02-07 NOTE — PATIENT INSTRUCTIONS
Leave dressing on for 24 hours. After 24 hours remove dressing.   If your condition worsens or fails to improve we recommend that you receive another evaluation at the ER immediately or contact your PCP to discuss your concerns.  You must understand that you've received Urgent Care treatment only and that you may be released before all your medical problems are known or treated. You the patient will arrange for follow-up care as instructed.   Follow up with your primary physician as needed.  If you had X-Rays done, we will call you with the radiologist report if not discussed before discharge.  If you had labs done, we will call you with the results.  Your wound is not infected at this time. However, if it gets painful, turns red, or pus like drainage occurs; you need to be re-evaluated.   Either return here, see your PCP, or go to the ER immediately.  Clean the wound twice a day with soap and water and use prescribed antibiotic ointment. Do not use alcohol or hydrogen peroxide.

## 2023-02-07 NOTE — TELEPHONE ENCOUNTER
----- Message from Rivera Sandoval MD sent at 2/7/2023  3:11 PM CST -----  This patient was scheduled for a lymph node biopsy this past Friday 2/3 and she did not show up.  She needs to have that procedure performed - can you see if she is able to r/s for 2/17?  Thanks.    DH

## 2023-02-07 NOTE — PROGRESS NOTES
"Subjective:       Patient ID: Alisia Vines is a 32 y.o. female.    Vitals:  height is 5' 5" (1.651 m) and weight is 83 kg (183 lb). Her temperature is 97.8 °F (36.6 °C). Her blood pressure is 142/100 (abnormal) and her pulse is 86. Her respiration is 16 and oxygen saturation is 98%.     Chief Complaint: Foot Injury    Alisia Vines is a 32 y.o. female who presents for L foot injury which onset yesterday. Patient dropped a deep freezer on foot. Pain is 8/10 in severity. Pain does not radiate. Pain with weight bearing. Associated sxs include laceration. Patient denies any fever, chills, SOB, CP, n/v/d, weakness, or numbness/tingling. Tetanus is not UTD. However, patient believes she may have gotten the vaccination recently.     Foot Injury   The incident occurred 12 to 24 hours ago (Around 10:30pm last night). The incident occurred at home. The injury mechanism was a direct blow. The pain is present in the left foot. Quality: Throbbing. The pain is at a severity of 8/10. The pain is moderate. The pain has been Constant since onset. Pertinent negatives include no inability to bear weight, loss of motion, loss of sensation, muscle weakness, numbness or tingling. She reports no foreign bodies present. The symptoms are aggravated by movement, weight bearing and palpation. Treatments tried: Aleve. The treatment provided no relief.     Constitution: Negative for chills and fever.   Musculoskeletal:  Positive for pain. Negative for trauma, joint pain, joint swelling, abnormal ROM of joint, muscle cramps and muscle ache.   Neurological:  Negative for numbness and tingling.     Objective:      Physical Exam   Constitutional: She is oriented to person, place, and time.   HENT:   Head: Normocephalic and atraumatic.   Abdominal: Normal appearance.   Musculoskeletal: Normal range of motion.         General: Normal range of motion.        Feet:       Comments: L midfoot tenderness to palpation. No obvious " deformity. Full ROM of bilateral malleolus. 5/5 strength. Normal gait. Sensation intact. 2+ DP and PT pulses. NVI.   Neurological: no focal deficit. She is alert and oriented to person, place, and time.   Skin: Skin is warm and dry. Capillary refill takes less than 2 seconds.         Comments: 3 cm clean, dry oval shaped laceration to the dorsum of the L midfoot. Unable to approximate edges. No active discharge. No FB appreciated.   Nursing note and vitals reviewed.      Assessment:       1. Foot injury, left, initial encounter    2. Laceration of left foot, initial encounter          XR FOOT COMPLETE 3 VIEW LEFT  Narrative: EXAM: XR FOOT COMPLETE 3 VIEW LEFT    CLINICAL HISTORY:  [Y89.921U]-Unspecified injury of left foot, initial encounter. .    COMPARISON: None    TECHNIQUE: X-ray left foot, 3 views.    FINDINGS:    Bones:  No fracture/dislocation or osseous lesion.  Prominent dorsal enthesophyte head of the talus.  8.4 mm os trigonum.    Joint spaces:  Well-preserved.    Soft tissues: Irregularity of the dorsal soft tissues which could represent laceration.  Negative for foreign body.  Impression: 1. Negative for acute fracture or dislocation.  2.  Negative for radiopaque foreign body.    Finalized on: 2/7/2023 10:15 AM By:  Raghu Langley MD  [facility]# [id number]      2023-02-07 10:18:03.258    [facility]      Plan:         Foot injury, left, initial encounter  -     XR FOOT COMPLETE 3 VIEW LEFT; Future; Expected date: 02/07/2023  -     naproxen (NAPROSYN) 500 MG tablet; Take 1 tablet (500 mg total) by mouth 2 (two) times daily.  Dispense: 14 tablet; Refill: 0    Laceration of left foot, initial encounter  -     mupirocin 2 % ointment  -     mupirocin (BACTROBAN) 2 % ointment; Apply topically 3 (three) times daily.  Dispense: 22 g; Refill: 0    Afebrile. VSS. Elevated BP < 160/100. Patient is in NAD.  X-Ray reviewed with patient.  Patient declines laceration repair at this time.  Patient declines Toradol IM.  Patient  declines Tdap.  Meds: Naproxen and Mupirocin ointment sent to preferred pharmacy. CBC and CMP reviewed. Kidney function reviewed. Patient takes Advil.   Wound dressed in clinic.  Wound care instructions given.  RTC for any worsening symptoms.

## 2023-02-11 ENCOUNTER — HOSPITAL ENCOUNTER (EMERGENCY)
Facility: HOSPITAL | Age: 33
Discharge: HOME | End: 2023-02-11
Attending: EMERGENCY MEDICINE
Payer: MEDICAID

## 2023-02-11 VITALS
TEMPERATURE: 98 F | OXYGEN SATURATION: 100 % | HEART RATE: 84 BPM | BODY MASS INDEX: 31.24 KG/M2 | DIASTOLIC BLOOD PRESSURE: 99 MMHG | WEIGHT: 187.75 LBS | RESPIRATION RATE: 16 BRPM | SYSTOLIC BLOOD PRESSURE: 184 MMHG

## 2023-02-11 DIAGNOSIS — R60.0 LEG EDEMA, LEFT: ICD-10-CM

## 2023-02-11 DIAGNOSIS — S99.922D FOOT INJURY, LEFT, SUBSEQUENT ENCOUNTER: ICD-10-CM

## 2023-02-11 LAB — B-HCG UR QL: NEGATIVE

## 2023-02-11 PROCEDURE — 99283 EMERGENCY DEPT VISIT LOW MDM: CPT

## 2023-02-11 PROCEDURE — 81025 URINE PREGNANCY TEST: CPT | Performed by: PHYSICIAN ASSISTANT

## 2023-02-11 NOTE — ED PROVIDER NOTES
History      Chief Complaint   Patient presents with    Foot Pain     Left foot pain after dropping freezer on foot 23, reports increase pain and swelling.       Review of patient's allergies indicates:  No Known Allergies     HPI   HPI    2023, 3:27 PM   History obtained from the patient, epic      History of Present Illness: Alisia Vines is a 32 y.o. female patient who presents to the Emergency Department for foot pain and swelling since dropping a freezer on her foot on 2023.  She had negative x-ray on 2023.  Last tetanus vaccine in  per epic. Symptoms are moderate in severity.     No further complaints or concerns at this time.           PCP: Saumya Quinonez MD       Past Medical History:  Past Medical History:   Diagnosis Date    Allergic rhinitis     Anemia     Condyloma acuminata     COVID-19 virus infection 2021    Encounter for blood transfusion     GERD (gastroesophageal reflux disease)     History of fetal anomaly in prior pregnancy, currently pregnant, unspecified trimester 2017    Pacemaker placed    HSV-2 (herpes simplex virus 2) infection     Lupus nephritis     Mood disorder     Overweight(278.02)     Sjogren's syndrome     Systemic lupus complicating pregnancy 2019    Systemic lupus erythematosus     Thrombocytopenia          Past Surgical History:  Past Surgical History:   Procedure Laterality Date     SECTION WITH TUBAL LIGATION N/A 2019    Procedure:  SECTION, WITH TUBAL LIGATION;  Surgeon: VERONICA Valdovinos MD;  Location: [facility] L&D;  Service: OB/GYN;  Laterality: N/A;     SECTION, LOW TRANSVERSE      x2    RENAL BIOPSY  2013           Family History:  Family History   Problem Relation Age of Onset    Hypertension Mother     Eczema Brother     Heart disease Son     Arrhythmia Son         CHB    Hypertension Maternal Grandmother     Diabetes Paternal Grandmother            Social History:  Social History      Tobacco Use    Smoking status: Never     Passive exposure: Never    Smokeless tobacco: Never   Substance and Sexual Activity    Alcohol use: No     Alcohol/week: 0.0 standard drinks    Drug use: Yes     Types: Marijuana    Sexual activity: Not Currently     Partners: Male     Birth control/protection: None       ROS     Review of Systems   Constitutional:  Negative for chills and fever.   HENT:  Negative for facial swelling and trouble swallowing.    Eyes:  Negative for discharge, redness and visual disturbance.   Respiratory:  Negative for chest tightness and shortness of breath.    Cardiovascular:  Positive for leg swelling. Negative for chest pain.   Gastrointestinal:  Negative for diarrhea and vomiting.   Genitourinary:  Negative for decreased urine volume and dysuria.   Musculoskeletal:  Negative for joint swelling and neck stiffness.   Skin:  Positive for wound. Negative for rash.   Neurological:  Negative for syncope and facial asymmetry.   All other systems reviewed and are negative.    Physical Exam      Initial Vitals [02/11/23 1517]   BP Pulse Resp Temp SpO2   (!) 184/99 84 16 98.3 °F (36.8 °C) 100 %      MAP       --         Physical Exam  Vital signs and nursing notes reviewed.  Constitutional: Patient is in NAD. Awake and alert. Well-developed and well-nourished.  Head: Atraumatic. Normocephalic.  Eyes: PERRL. EOM intact. Conjunctivae nl. No scleral icterus.  ENT: Mucous membranes are moist. Oropharynx is clear.  Neck: Supple. No JVD. No lymphadenopathy.  No meningismus  Cardiovascular: Regular rate and rhythm. No murmurs, rubs, or gallops. Distal pulses are 2+ and symmetric.  Pulmonary/Chest: No respiratory distress. Clear to auscultation bilaterally. No wheezing, rales, or rhonchi.  Abdominal: Soft. Non-distended. No TTP. No rebound, guarding, or rigidity. Good bowel sounds.  Genitourinary: No CVA tenderness  Musculoskeletal: Moves all extremities.  Left ankle and foot edema.  Poorly healing 1.5  cm wound to dorsum of left foot.  Skin: Warm and dry.  Neurological: Awake and alert. No acute focal neurological deficits are appreciated.  Psychiatric: Normal affect. Good eye contact. Appropriate in content.      ED Course          Procedures  ED Vital Signs:  Vitals:    02/11/23 1517   BP: (!) 184/99   Pulse: 84   Resp: 16   Temp: 98.3 °F (36.8 °C)   TempSrc: Oral   SpO2: 100%   Weight: 85.2 kg (187 lb 11.6 oz)         Results for orders placed or performed during the hospital encounter of 02/11/23   Pregnancy, urine rapid   Result Value Ref Range    Preg Test, Ur Negative      *Note: Due to a large number of results and/or encounters for the requested time period, some results have not been displayed. A complete set of results can be found in Results Review.             Imaging Results:  Imaging Results              X-Ray Foot Complete Left (Final result)  Result time 02/11/23 16:39:31      Final result by Jaden Odell MD (02/11/23 16:39:31)                   Impression:      Dorsal soft tissue swelling significantly increased from the prior of 02/07/2023.  Infection not excluded.  Possible laceration.  No definite acute osseous abnormality.      Electronically signed by: Jaden Odell  Date:    02/11/2023  Time:    16:39               Narrative:    EXAMINATION:  XR FOOT COMPLETE 3 VIEW LEFT    CLINICAL HISTORY:  .  Unspecified injury of left foot, subsequent encounter    TECHNIQUE:  AP, lateral and oblique views of the left foot were performed.    COMPARISON:  None    FINDINGS:  No fracture.  No traumatic malalignment.  No osseous destructive process.  Dorsal soft tissue swelling.  This is increased from the prior.  Possible laceration.                                         The Emergency Provider reviewed the vital signs and test results, which are outlined above.    ED Discussion             Medication(s) given in the ER:  Medications - No data to display         Follow-up Information       [facility] -  Podiatry 2nd Floor. Schedule an appointment as soon as possible for a visit in 2 days.    Specialty: Podiatry  Contact information:  [address] [facility] [address] [phone]  Additional information:  Please park on the [address] side and use the Clinic entrance. Check in on the 2nd floor.                                  Medication List        ASK your doctor about these medications      amLODIPine 5 MG tablet  Commonly known as: NORVASC  TAKE 1 TABLET BY MOUTH EVERY DAY     ARIPiprazole 2 MG Tab  Commonly known as: ABILIFY  Take 1 tablet (2 mg total) by mouth once daily.     ciclopirox 8 % Soln  Commonly known as: PENLAC  Apply to nail and nail fold once daily for up to 1 year     diclofenac 75 MG EC tablet  Commonly known as: VOLTAREN  Take 1 tablet (75 mg total) by mouth 2 (two) times daily.     fexofenadine 180 MG tablet  Commonly known as: ALLEGRA  Take 1 tablet (180 mg total) by mouth once daily.     FLUoxetine 40 MG capsule  Take 1 capsule (40 mg total) by mouth once daily.     fluticasone propionate 50 mcg/actuation nasal spray  Commonly known as: FLONASE  SHAKE LIQUID AND USE 2 SPRAYS(100 MCG) IN EACH NOSTRIL EVERY DAY     hydrOXYchloroQUINE 200 mg tablet  Commonly known as: PLAQUENIL  TAKE 2 TABLETS(400 MG) BY MOUTH EVERY DAY     losartan-hydrochlorothiazide 100-12.5 mg 100-12.5 mg Tab  Commonly known as: HYZAAR  TAKE 1 TABLET BY MOUTH EVERY DAY     mupirocin 2 % ointment  Commonly known as: BACTROBAN  Apply topically 3 (three) times daily.     naproxen 500 MG tablet  Commonly known as: NAPROSYN  Take 1 tablet (500 mg total) by mouth 2 (two) times daily.     polymyxin B sulf-trimethoprim 10,000 unit- 1 mg/mL Drop  Commonly known as: POLYTRIM  Place 1 drop into both eyes 3 (three) times daily.     predniSONE 5 MG tablet  Commonly known as: DELTASONE  Take 1 tablet (5 mg total) by mouth daily as needed (Joint pain).     promethazine-dextromethorphan 6.25-15 mg/5 mL  Syrp  Commonly known as: PROMETHAZINE-DM  Take 5 mLs by mouth every 6 to 8 hours as needed.     triamcinolone acetonide 0.1% 0.1 % paste  Commonly known as: KENALOG  Apply to affected area right side of oral cavity BID     valACYclovir 1000 MG tablet  Commonly known as: VALTREX  Take 1 tablet (1,000 mg total) by mouth once daily.                  Medical Decision Making      Pt eloped  Medical Decision Making:   Clinical Tests:   Radiological Study: Ordered and Reviewed   MDM                 Clinical Impression:        ICD-10-CM ICD-9-CM   1. Leg edema, left  R60.0 782.3   2. Foot injury, left, subsequent encounter  S99.922D V58.89     959.7               Mere Aldana PA-C  02/12/23 0814

## 2023-02-16 ENCOUNTER — TELEPHONE (OUTPATIENT)
Dept: OTOLARYNGOLOGY | Facility: CLINIC | Age: 33
End: 2023-02-16
Payer: MEDICAID

## 2023-02-16 ENCOUNTER — TELEPHONE (OUTPATIENT)
Dept: PREADMISSION TESTING | Facility: HOSPITAL | Age: 33
End: 2023-02-16
Payer: MEDICAID

## 2023-02-16 NOTE — TELEPHONE ENCOUNTER
Pt calling to advise us she can not be at the clinic for 7am for surgery.  Discussed with pt that the line up is set by the OR and they require pt's to arrive approx 1.5hrs prior to the initiation of surgery so they have enough time to get them prepped.    Informed pt that I am unable to change arrival times and the OR  is gone for the day.  Pt states she will call us back to let us know if she will continue as scheduled.

## 2023-02-16 NOTE — TELEPHONE ENCOUNTER
----- Message from Dennise Driscoll sent at 2/16/2023  2:20 PM CST -----  Contact: Annette  Patient is calling requesting the department give a call back regarding arrival time 7 am for procedure 02/17/2023. Please give a call back at [phone] as requested.  Thanks  LR

## 2023-02-16 NOTE — TELEPHONE ENCOUNTER
Called and spoke with the  about the following:     Your Surgery arrival time is at 0700 on 2/17/2023 at [facility] location.   The address is [address] [place]  [address].      Only one adult (over 18) is to accompany you to surgery, unless it is a Pediatric patient, then 2 adults are encouraged to accompany them to the surgery center.     Your ride MUST STAY the entire time until you are discharged.      Please come to the main lobby and be prepared to show your photo ID and insurance card.      Nothing to eat or drink after midnight, unless you were instructed to take specific medications discussed with the Pre-admit Nurse.      Please call [phone] or [phone] with any questions or concerns.      Thanks.

## 2023-02-17 ENCOUNTER — HOSPITAL ENCOUNTER (OUTPATIENT)
Facility: HOSPITAL | Age: 33
Discharge: HOME | End: 2023-02-17
Attending: OTOLARYNGOLOGY | Admitting: OTOLARYNGOLOGY
Payer: MEDICAID

## 2023-02-17 VITALS
SYSTOLIC BLOOD PRESSURE: 141 MMHG | HEART RATE: 68 BPM | HEIGHT: 65 IN | TEMPERATURE: 98 F | WEIGHT: 188.06 LBS | DIASTOLIC BLOOD PRESSURE: 89 MMHG | BODY MASS INDEX: 31.33 KG/M2 | OXYGEN SATURATION: 100 % | RESPIRATION RATE: 14 BRPM

## 2023-02-17 DIAGNOSIS — R59.0 CERVICAL LYMPHADENOPATHY: Primary | ICD-10-CM

## 2023-02-17 LAB
B-HCG UR QL: NEGATIVE
CTP QC/QA: YES

## 2023-02-17 PROCEDURE — 88189 PR  FLOWCYTOMETRY/READ, 16 & > MARKERS: ICD-10-PCS | Mod: ,,, | Performed by: PATHOLOGY

## 2023-02-17 PROCEDURE — 88365 PR  TISSUE HYBRIDIZATION: ICD-10-PCS | Mod: 26,,, | Performed by: PATHOLOGY

## 2023-02-17 PROCEDURE — 38510 BIOPSY/REMOVAL LYMPH NODES: CPT | Mod: RT,,, | Performed by: OTOLARYNGOLOGY

## 2023-02-17 PROCEDURE — 63600175 PHARM REV CODE 636 W HCPCS: Performed by: ANESTHESIOLOGY

## 2023-02-17 PROCEDURE — 37000009 HC ANESTHESIA EA ADD 15 MINS: Performed by: OTOLARYNGOLOGY

## 2023-02-17 PROCEDURE — 88365 INSITU HYBRIDIZATION (FISH): CPT | Performed by: PATHOLOGY

## 2023-02-17 PROCEDURE — 88184 FLOWCYTOMETRY/ TC 1 MARKER: CPT | Performed by: PATHOLOGY

## 2023-02-17 PROCEDURE — D9220A PRA ANESTHESIA: ICD-10-PCS | Mod: ANES,,, | Performed by: ANESTHESIOLOGY

## 2023-02-17 PROCEDURE — 00320 ANES ALL PX NECK NOS 1YR/>: CPT | Performed by: OTOLARYNGOLOGY

## 2023-02-17 PROCEDURE — 88305 TISSUE EXAM BY PATHOLOGIST: CPT | Performed by: PATHOLOGY

## 2023-02-17 PROCEDURE — 71000033 HC RECOVERY, INTIAL HOUR: Performed by: OTOLARYNGOLOGY

## 2023-02-17 PROCEDURE — 88341 PR IHC OR ICC EACH ADD'L SINGLE ANTIBODY  STAINPR: ICD-10-PCS | Mod: 26,,, | Performed by: PATHOLOGY

## 2023-02-17 PROCEDURE — 38510 PR BIOPSY/REM LYMPH NODES, CERVICAL: ICD-10-PCS | Mod: RT,,, | Performed by: OTOLARYNGOLOGY

## 2023-02-17 PROCEDURE — D9220A PRA ANESTHESIA: ICD-10-PCS | Mod: CRNA,,, | Performed by: NURSE ANESTHETIST, CERTIFIED REGISTERED

## 2023-02-17 PROCEDURE — 27201423 OPTIME MED/SURG SUP & DEVICES STERILE SUPPLY: Performed by: OTOLARYNGOLOGY

## 2023-02-17 PROCEDURE — 88185 FLOWCYTOMETRY/TC ADD-ON: CPT | Mod: 59 | Performed by: PATHOLOGY

## 2023-02-17 PROCEDURE — 81025 URINE PREGNANCY TEST: CPT | Performed by: OTOLARYNGOLOGY

## 2023-02-17 PROCEDURE — 88341 IMHCHEM/IMCYTCHM EA ADD ANTB: CPT | Mod: 59 | Performed by: PATHOLOGY

## 2023-02-17 PROCEDURE — 88305 TISSUE EXAM BY PATHOLOGIST: CPT | Mod: 26,,, | Performed by: PATHOLOGY

## 2023-02-17 PROCEDURE — 63600175 PHARM REV CODE 636 W HCPCS: Performed by: NURSE ANESTHETIST, CERTIFIED REGISTERED

## 2023-02-17 PROCEDURE — 37000008 HC ANESTHESIA 1ST 15 MINUTES: Performed by: OTOLARYNGOLOGY

## 2023-02-17 PROCEDURE — 25000003 PHARM REV CODE 250: Performed by: NURSE ANESTHETIST, CERTIFIED REGISTERED

## 2023-02-17 PROCEDURE — 88305 TISSUE EXAM BY PATHOLOGIST: ICD-10-PCS | Mod: 26,,, | Performed by: PATHOLOGY

## 2023-02-17 PROCEDURE — 88365 INSITU HYBRIDIZATION (FISH): CPT | Mod: 26,,, | Performed by: PATHOLOGY

## 2023-02-17 PROCEDURE — 36000706: Performed by: OTOLARYNGOLOGY

## 2023-02-17 PROCEDURE — 36000707: Performed by: OTOLARYNGOLOGY

## 2023-02-17 PROCEDURE — 71000015 HC POSTOP RECOV 1ST HR: Performed by: OTOLARYNGOLOGY

## 2023-02-17 PROCEDURE — D9220A PRA ANESTHESIA: Mod: CRNA,,, | Performed by: NURSE ANESTHETIST, CERTIFIED REGISTERED

## 2023-02-17 PROCEDURE — 88342 IMHCHEM/IMCYTCHM 1ST ANTB: CPT | Mod: 26,59,, | Performed by: PATHOLOGY

## 2023-02-17 PROCEDURE — 88342 IMHCHEM/IMCYTCHM 1ST ANTB: CPT | Mod: 59 | Performed by: PATHOLOGY

## 2023-02-17 PROCEDURE — 88189 FLOWCYTOMETRY/READ 16 & >: CPT | Mod: ,,, | Performed by: PATHOLOGY

## 2023-02-17 PROCEDURE — 88341 IMHCHEM/IMCYTCHM EA ADD ANTB: CPT | Mod: 26,,, | Performed by: PATHOLOGY

## 2023-02-17 PROCEDURE — 88342 CHG IMMUNOCYTOCHEMISTRY: ICD-10-PCS | Mod: 26,59,, | Performed by: PATHOLOGY

## 2023-02-17 PROCEDURE — D9220A PRA ANESTHESIA: Mod: ANES,,, | Performed by: ANESTHESIOLOGY

## 2023-02-17 RX ORDER — DIPHENHYDRAMINE HYDROCHLORIDE 50 MG/ML
25 INJECTION INTRAMUSCULAR; INTRAVENOUS EVERY 6 HOURS PRN
Status: DISCONTINUED | OUTPATIENT
Start: 2023-02-17 | End: 2023-02-17 | Stop reason: HOSPADM

## 2023-02-17 RX ORDER — HYDROCODONE BITARTRATE AND ACETAMINOPHEN 7.5; 325 MG/1; MG/1
1 TABLET ORAL EVERY 6 HOURS PRN
Qty: 12 TABLET | Refills: 0 | Status: SHIPPED | OUTPATIENT
Start: 2023-02-17 | End: 2023-02-22

## 2023-02-17 RX ORDER — ONDANSETRON 2 MG/ML
INJECTION INTRAMUSCULAR; INTRAVENOUS
Status: DISCONTINUED | OUTPATIENT
Start: 2023-02-17 | End: 2023-02-17

## 2023-02-17 RX ORDER — DEXMEDETOMIDINE HYDROCHLORIDE 100 UG/ML
INJECTION, SOLUTION INTRAVENOUS
Status: DISCONTINUED | OUTPATIENT
Start: 2023-02-17 | End: 2023-02-17

## 2023-02-17 RX ORDER — DEXAMETHASONE SODIUM PHOSPHATE 4 MG/ML
INJECTION, SOLUTION INTRA-ARTICULAR; INTRALESIONAL; INTRAMUSCULAR; INTRAVENOUS; SOFT TISSUE
Status: DISCONTINUED | OUTPATIENT
Start: 2023-02-17 | End: 2023-02-17

## 2023-02-17 RX ORDER — ONDANSETRON 2 MG/ML
4 INJECTION INTRAMUSCULAR; INTRAVENOUS ONCE AS NEEDED
Status: DISCONTINUED | OUTPATIENT
Start: 2023-02-17 | End: 2023-02-17 | Stop reason: HOSPADM

## 2023-02-17 RX ORDER — ACETAMINOPHEN 10 MG/ML
INJECTION, SOLUTION INTRAVENOUS
Status: DISCONTINUED | OUTPATIENT
Start: 2023-02-17 | End: 2023-02-17

## 2023-02-17 RX ORDER — LIDOCAINE HYDROCHLORIDE AND EPINEPHRINE 10; 10 MG/ML; UG/ML
INJECTION, SOLUTION INFILTRATION; PERINEURAL
Status: DISCONTINUED | OUTPATIENT
Start: 2023-02-17 | End: 2023-02-17 | Stop reason: HOSPADM

## 2023-02-17 RX ORDER — HYDROCODONE BITARTRATE AND ACETAMINOPHEN 5; 325 MG/1; MG/1
1 TABLET ORAL
Status: DISCONTINUED | OUTPATIENT
Start: 2023-02-17 | End: 2023-02-17 | Stop reason: HOSPADM

## 2023-02-17 RX ORDER — ROCURONIUM BROMIDE 10 MG/ML
INJECTION, SOLUTION INTRAVENOUS
Status: DISCONTINUED | OUTPATIENT
Start: 2023-02-17 | End: 2023-02-17

## 2023-02-17 RX ORDER — BACITRACIN ZINC 500 UNIT/G
OINTMENT (GRAM) TOPICAL
Status: DISCONTINUED
Start: 2023-02-17 | End: 2023-02-17 | Stop reason: HOSPADM

## 2023-02-17 RX ORDER — SUCCINYLCHOLINE CHLORIDE 20 MG/ML
INJECTION INTRAMUSCULAR; INTRAVENOUS
Status: DISCONTINUED | OUTPATIENT
Start: 2023-02-17 | End: 2023-02-17

## 2023-02-17 RX ORDER — MIDAZOLAM HYDROCHLORIDE 1 MG/ML
INJECTION INTRAMUSCULAR; INTRAVENOUS
Status: DISCONTINUED | OUTPATIENT
Start: 2023-02-17 | End: 2023-02-17

## 2023-02-17 RX ORDER — FENTANYL CITRATE 50 UG/ML
INJECTION, SOLUTION INTRAMUSCULAR; INTRAVENOUS
Status: DISCONTINUED | OUTPATIENT
Start: 2023-02-17 | End: 2023-02-17

## 2023-02-17 RX ORDER — MEPERIDINE HYDROCHLORIDE 25 MG/ML
12.5 INJECTION INTRAMUSCULAR; INTRAVENOUS; SUBCUTANEOUS ONCE
Status: DISCONTINUED | OUTPATIENT
Start: 2023-02-17 | End: 2023-02-17 | Stop reason: HOSPADM

## 2023-02-17 RX ORDER — FENTANYL CITRATE 50 UG/ML
25 INJECTION, SOLUTION INTRAMUSCULAR; INTRAVENOUS EVERY 5 MIN PRN
Status: DISCONTINUED | OUTPATIENT
Start: 2023-02-17 | End: 2023-02-17 | Stop reason: HOSPADM

## 2023-02-17 RX ORDER — PROPOFOL 10 MG/ML
VIAL (ML) INTRAVENOUS
Status: DISCONTINUED | OUTPATIENT
Start: 2023-02-17 | End: 2023-02-17

## 2023-02-17 RX ORDER — LIDOCAINE HYDROCHLORIDE 20 MG/ML
INJECTION, SOLUTION EPIDURAL; INFILTRATION; INTRACAUDAL; PERINEURAL
Status: DISCONTINUED | OUTPATIENT
Start: 2023-02-17 | End: 2023-02-17

## 2023-02-17 RX ORDER — PHENYLEPHRINE HYDROCHLORIDE 10 MG/ML
INJECTION INTRAVENOUS
Status: DISCONTINUED | OUTPATIENT
Start: 2023-02-17 | End: 2023-02-17

## 2023-02-17 RX ORDER — SODIUM CHLORIDE, SODIUM LACTATE, POTASSIUM CHLORIDE, CALCIUM CHLORIDE 600; 310; 30; 20 MG/100ML; MG/100ML; MG/100ML; MG/100ML
INJECTION, SOLUTION INTRAVENOUS CONTINUOUS
Status: DISCONTINUED | OUTPATIENT
Start: 2023-02-17 | End: 2023-02-21

## 2023-02-17 RX ADMIN — DEXAMETHASONE SODIUM PHOSPHATE 8 MG: 4 INJECTION, SOLUTION INTRA-ARTICULAR; INTRALESIONAL; INTRAMUSCULAR; INTRAVENOUS; SOFT TISSUE at 09:02

## 2023-02-17 RX ADMIN — SUCCINYLCHOLINE CHLORIDE 140 MG: 20 INJECTION, SOLUTION INTRAMUSCULAR; INTRAVENOUS; PARENTERAL at 09:02

## 2023-02-17 RX ADMIN — DEXMEDETOMIDINE HYDROCHLORIDE 4 MCG: 100 INJECTION, SOLUTION INTRAVENOUS at 09:02

## 2023-02-17 RX ADMIN — MIDAZOLAM HYDROCHLORIDE 2 MG: 1 INJECTION INTRAMUSCULAR; INTRAVENOUS at 09:02

## 2023-02-17 RX ADMIN — ROCURONIUM BROMIDE 10 MG: 10 INJECTION, SOLUTION INTRAVENOUS at 09:02

## 2023-02-17 RX ADMIN — SODIUM CHLORIDE, POTASSIUM CHLORIDE, SODIUM LACTATE AND CALCIUM CHLORIDE: 600; 310; 30; 20 INJECTION, SOLUTION INTRAVENOUS at 07:02

## 2023-02-17 RX ADMIN — PROPOFOL 200 MG: 10 INJECTION, EMULSION INTRAVENOUS at 09:02

## 2023-02-17 RX ADMIN — FENTANYL CITRATE 100 MCG: 50 INJECTION, SOLUTION INTRAMUSCULAR; INTRAVENOUS at 09:02

## 2023-02-17 RX ADMIN — PHENYLEPHRINE HYDROCHLORIDE 100 MCG: 10 INJECTION INTRAVENOUS at 10:02

## 2023-02-17 RX ADMIN — ACETAMINOPHEN 1000 MG: 10 INJECTION, SOLUTION INTRAVENOUS at 09:02

## 2023-02-17 RX ADMIN — ONDANSETRON 4 MG: 2 INJECTION INTRAMUSCULAR; INTRAVENOUS at 09:02

## 2023-02-17 RX ADMIN — PHENYLEPHRINE HYDROCHLORIDE 100 MCG: 10 INJECTION INTRAVENOUS at 09:02

## 2023-02-17 RX ADMIN — LIDOCAINE HYDROCHLORIDE 80 MG: 20 INJECTION, SOLUTION EPIDURAL; INFILTRATION; INTRACAUDAL; PERINEURAL at 09:02

## 2023-02-17 RX ADMIN — PROPOFOL 50 MG: 10 INJECTION, EMULSION INTRAVENOUS at 10:02

## 2023-02-17 RX ADMIN — DEXTROSE 2 G: 50 INJECTION, SOLUTION INTRAVENOUS at 09:02

## 2023-02-17 NOTE — ANESTHESIA PREPROCEDURE EVALUATION
2023  Alisia Vines is a 32 y.o., female.    Past Medical History:   Diagnosis Date    Allergic rhinitis     Anemia     Condyloma acuminata     COVID-19 virus infection 2021    Encounter for blood transfusion     GERD (gastroesophageal reflux disease)     History of fetal anomaly in prior pregnancy, currently pregnant, unspecified trimester 2017    Pacemaker placed    HSV-2 (herpes simplex virus 2) infection     Lupus nephritis     Mood disorder     Overweight(278.02)     Sjogren's syndrome     Systemic lupus complicating pregnancy 2019    Systemic lupus erythematosus     Thrombocytopenia      Past Surgical History:   Procedure Laterality Date     SECTION WITH TUBAL LIGATION N/A 2019    Procedure:  SECTION, WITH TUBAL LIGATION;  Surgeon: VERONICA Valdovinos MD;  Location: [facility];  Service: OB/GYN;  Laterality: N/A;     SECTION, LOW TRANSVERSE      x2    RENAL BIOPSY  2013       Pre-op Assessment    I have reviewed the Patient Summary Reports.     I have reviewed the Nursing Notes. I have reviewed the NPO Status.   I have reviewed the Medications.     Review of Systems  Anesthesia Hx:  No problems with previous Anesthesia  History of prior surgery of interest to airway management or planning: Previous anesthesia: Spinal Denies Family Hx of Anesthesia complications.   Denies Personal Hx of Anesthesia complications.   Social:  Smoker Marijuana user.   Hematology/Oncology:  Hematology Normal      Hematology Comments: Lymphadenopathy.   Cardiovascular:   Hypertension, poorly controlled    Pulmonary:  Pulmonary Normal    Renal/:  Renal/ Normal  SLE with lupus nephritis.   Hepatic/GI:   GERD    Neurological:  Neurology Normal    Endocrine:  Obesity / BMI > 30  Psych:  Psychiatric Normal           Physical Exam  General: Alert  and Oriented    Airway:  Mallampati: I   Mouth Opening: Normal  TM Distance: Normal  Tongue: Normal  Neck ROM: Normal ROM    Dental:  Intact    Chest/Lungs:  Clear to auscultation, Normal Respiratory Rate    Heart:  Rate: Normal  Rhythm: Regular Rhythm        Anesthesia Plan  Type of Anesthesia, risks & benefits discussed:    Anesthesia Type: Gen Supraglottic Airway, MAC  Intra-op Monitoring Plan: Standard ASA Monitors  Post Op Pain Control Plan: multimodal analgesia and IV/PO Opioids PRN  Induction:  IV  Informed Consent: Informed consent signed with the Patient and all parties understand the risks and agree with anesthesia plan.  All questions answered.   ASA Score: 2  Day of Surgery Review of History & Physical: H&P Update referred to the surgeon/provider.    Ready For Surgery From Anesthesia Perspective.     .

## 2023-02-17 NOTE — BRIEF OP NOTE
[facility]  Brief Operative Note     SUMMARY     Surgery Date: 2/17/2023     Surgeon(s) and Role:     * Rivera Sandoval MD - Primary    Assisting Surgeon: None    Pre-op Diagnosis:  Lymphadenopathy [R59.1]    Post-op Diagnosis:  Post-Op Diagnosis Codes:     * Lymphadenopathy [R59.1]    Procedure(s) (LRB):  EXCISION, MASS, NECK (Right)  BIOPSY, LYMPH NODE (Right)    Anesthesia: General    Findings/Key Components:  cervical lymphadenopathy right    Estimated Blood Loss: 5 ml         Specimens:   Specimen (24h ago, onward)       Start     Ordered    02/17/23 0949  Specimen to Pathology, Surgery ENT  Once        Comments: Pre-op Diagnosis: Lymphadenopathy [R59.1]Procedure(s):EXCISION, MASS, NECK Number of specimens: 1Name of specimens: right deep cervical lymph node-PERM     References:    Click here for ordering Quick Tip   Question Answer Comment   Procedure Type: ENT    Which provider would you like to cc? RIVERA SANDOVAL    Release to patient Immediate        02/17/23 1018                    Discharge Note    SUMMARY     Admit Date: 2/17/2023    Discharge Date and Time: No discharge date for patient encounter.    Attending Physician: Rivera Sandoval MD     Discharge Provider: Rivera Sandoval    Final Diagnosis: Post-Op Diagnosis Codes:     * Lymphadenopathy [R59.1]    Disposition: Home or Self Care, discharged in good condition    Follow Up/Patient Instructions:       Medications:  Reconciled Home Medications:   Current Discharge Medication List        CONTINUE these medications which have NOT CHANGED    Details   amLODIPine (NORVASC) 5 MG tablet TAKE 1 TABLET BY MOUTH EVERY DAY  Qty: 30 tablet, Refills: 11      ARIPiprazole (ABILIFY) 2 MG Tab Take 1 tablet (2 mg total) by mouth once daily.  Qty: 30 tablet, Refills: 3    Associated Diagnoses: Mood disorder      FLUoxetine 40 MG capsule Take 1 capsule (40 mg total) by mouth once daily.  Qty: 30 capsule, Refills: 3    Associated Diagnoses: Anxiety       hydrOXYchloroQUINE (PLAQUENIL) 200 mg tablet TAKE 2 TABLETS(400 MG) BY MOUTH EVERY DAY  Qty: 60 tablet, Refills: 2    Associated Diagnoses: Systemic lupus erythematosus arthritis; Sjogren's syndrome, with unspecified organ involvement; SLE glomerulonephritis syndrome, WHO class V      losartan-hydrochlorothiazide 100-12.5 mg (HYZAAR) 100-12.5 mg Tab TAKE 1 TABLET BY MOUTH EVERY DAY  Qty: 90 tablet, Refills: 0    Comments: PATIENT NEEDS TO SCHEDULE ANNUAL PHYSICAL EXAM--NO FURTHER REFILLS.      naproxen (NAPROSYN) 500 MG tablet Take 1 tablet (500 mg total) by mouth 2 (two) times daily.  Qty: 14 tablet, Refills: 0    Associated Diagnoses: Foot injury, left, initial encounter      polymyxin B sulf-trimethoprim (POLYTRIM) 10,000 unit- 1 mg/mL Drop Place 1 drop into both eyes 3 (three) times daily.  Qty: 10 mL, Refills: 0    Associated Diagnoses: Acute viral conjunctivitis of both eyes; Pharyngitis, unspecified etiology; URI with cough and congestion      valACYclovir (VALTREX) 1000 MG tablet Take 1 tablet (1,000 mg total) by mouth once daily.  Qty: 30 tablet, Refills: 0      ciclopirox (PENLAC) 8 % Soln Apply to nail and nail fold once daily for up to 1 year  Qty: 6.6 mL, Refills: 6    Associated Diagnoses: Onychomycosis      diclofenac (VOLTAREN) 75 MG EC tablet Take 1 tablet (75 mg total) by mouth 2 (two) times daily.  Qty: 20 tablet, Refills: 0      fexofenadine (ALLEGRA) 180 MG tablet Take 1 tablet (180 mg total) by mouth once daily.  Qty: 30 tablet, Refills: 6    Associated Diagnoses: Allergic rhinitis, unspecified seasonality, unspecified trigger      fluticasone propionate (FLONASE) 50 mcg/actuation nasal spray SHAKE LIQUID AND USE 2 SPRAYS(100 MCG) IN EACH NOSTRIL EVERY DAY  Qty: 16 g, Refills: 1    Associated Diagnoses: Pharyngitis, unspecified etiology; URI with cough and congestion      mupirocin (BACTROBAN) 2 % ointment Apply topically 3 (three) times daily.  Qty: 22 g, Refills: 0    Associated Diagnoses:  Laceration of left foot, initial encounter      predniSONE (DELTASONE) 5 MG tablet Take 1 tablet (5 mg total) by mouth daily as needed (Joint pain).  Qty: 30 tablet, Refills: 1    Associated Diagnoses: Systemic lupus erythematosus arthritis; Sjogren's syndrome, with unspecified organ involvement; SLE glomerulonephritis syndrome, WHO class V      promethazine-dextromethorphan (PROMETHAZINE-DM) 6.25-15 mg/5 mL Syrp Take 5 mLs by mouth every 6 to 8 hours as needed.  Qty: 180 mL, Refills: 0    Associated Diagnoses: Pharyngitis, unspecified etiology; URI with cough and congestion; Diarrhea, unspecified type      triamcinolone acetonide 0.1% (KENALOG) 0.1 % paste Apply to affected area right side of oral cavity BID  Qty: 5 g, Refills: 3    Associated Diagnoses: Lichen planus           No discharge procedures on file.

## 2023-02-17 NOTE — OP NOTE
EXCISION, MASS, NECK  Procedure Note    Alisia Ceballos Pauline  2/17/2023    Surgeon:  Dr. Rivera Sandoval  Assistant:  None    Preoperative diagnosis:  cervical lymphadenopathy, right    Postoperative diagnosis:  Same    Procedure:  EXCISION, MASS, NECK (excision right deep cervical lymph node)    Findings:     Extensive right sided lymphadenopathy     Anesthesia:  General endotracheal anesthesia    Blood loss:  5 ml    Specimen:  Right deep cervical lymph node - sent for lymphoma protocol    Medications administered in the OR:  Ancef 2g IV    Implants:  None    Indications for procedure:  Patient presented to clinic with complaints of extensive right sided cervical lymphadenopathy.  This failed to respond to antibiotics and a lab workup failed to demonstrate an obvious cause.  We discussed all risks, benefits and alternatives of the aforementioned procedure in detail and her written and verbal informed consent were obtained prior to the procedure.    Procedure in Detail:  After appropriate consents were obtained, the patient was taken to the operating room and placed in a supine position.  Anesthesia then obtained intravenous access and placed the patient under general endotracheal anesthesia.  A shoulder roll and appropriate padding was placed and the patient's head was turned to the left exposing right neck.  A prominent right level 5 lymph node near her hairline was identified and a small transverse incision was marked out over the lymph node.  2 cc of 1% lidocaine with epinephrine were used to infiltrate the planned incision line.  Th.e patient was prepped and draped in usual sterile fashion.  A 15 blade was used to make the skin incision over the lymph node and Bovie electrocautery was used to deepen the incision down to the level of the lymph node itself.  Tenotomy scissors and bipolar electrocautery were used to dissect circumferentially around the lymph node and removed the lymph all.  The lymph node was sent  for pathology evaluation and lymphoma protocol.  Several areas of residual bleeding were addressed using bipolar electrocautery.  The wound was irrigated with normal saline and then reinspected and noted to be free of any residual bleeding.  The subcutaneous tissues was closed using deep interrupted 4-0 Vicryl sutures.  The skin was closed using interrupted 5 0 Prolene sutures.  Bacitracin ointment was applied  The patient's care was then returned to anesthesia, and the patient was awakened and extubated without difficulty, and brought to the recovery room in good condition.

## 2023-02-17 NOTE — TRANSFER OF CARE
"Anesthesia Transfer of Care Note    Patient: Alisia Vines    Procedure(s) Performed: Procedure(s) (LRB):  EXCISION, MASS, NECK (Right)  BIOPSY, LYMPH NODE (Right)    Patient location: PACU    Anesthesia Type: general    Transport from OR: Transported from OR on room air with adequate spontaneous ventilation    Post pain: adequate analgesia    Post assessment: no apparent anesthetic complications    Post vital signs: stable    Level of consciousness: sedated and responds to stimulation    Nausea/Vomiting: no nausea/vomiting    Complications: none    Transfer of care protocol was followed      Last vitals:   Visit Vitals  BP (!) 147/87 (BP Location: Right arm, Patient Position: Lying)   Pulse 75   Temp 36.5 °C (97.7 °F) (Temporal)   Resp 16   Ht 5' 5" (1.651 m)   Wt 85.3 kg (188 lb 0.8 oz)   LMP 01/20/2023   SpO2 98%   Breastfeeding No   BMI 31.29 kg/m²     "

## 2023-02-17 NOTE — DISCHARGE INSTRUCTIONS
Nozin Instructions  Goal: the goal of Nozin is to reduce the risk of post-procedural infections by bacteria in the nasal cavity. Think of it as hand  for your nose.    How to use:    1. Shake Nozin bottle well    2. Take a cotton swab and apply 4 drops to one tip    3. Insert cotton tip into one nostril, being sure not to go deeper into nose than tip of the swab.    4. Swab nostril 6 times counterclockwise and 6 times clockwise. Make sure to swab the inside front pocket of the nostril.    5. Take swab out and apply 2 drops to the same cotton tip. Repeat steps 3 and 4 in the other nostril.        Do steps 1-5 twice a day for 7 days.

## 2023-02-17 NOTE — ANESTHESIA PROCEDURE NOTES
Intubation    Date/Time: 2/17/2023 9:30 AM  Performed by: Purvi Presley CRNA  Authorized by: Tiesha Li MD     Intubation:     Induction:  Intravenous    Intubated:  Postinduction    Mask Ventilation:  Easy mask    Attempts:  1    Attempted By:  CRNA    Method of Intubation:  Direct    Blade:  Muñoz 2    Laryngeal View Grade: Grade I - full view of cords      Difficult Airway Encountered?: No      Complications:  None    Airway Device:  Oral endotracheal tube    Airway Device Size:  7.0    Style/Cuff Inflation:  Cuffed (inflated to minimal occlusive pressure)    Inflation Amount (mL):  8    Tube secured:  20    Secured at:  The lips    Placement Verified By:  Capnometry    Complicating Factors:  None    Findings Post-Intubation:  BS equal bilateral and atraumatic/condition of teeth unchanged

## 2023-02-17 NOTE — ANESTHESIA POSTPROCEDURE EVALUATION
Anesthesia Post Evaluation    Patient: Alisia Vines    Procedure(s) Performed: Procedure(s) (LRB):  EXCISION, MASS, NECK (Right)  BIOPSY, LYMPH NODE (Right)    Final Anesthesia Type: general      Patient location during evaluation: PACU  Patient participation: Yes- Able to Participate  Level of consciousness: awake and alert and oriented  Post-procedure vital signs: reviewed and stable  Pain management: adequate  Airway patency: patent    PONV status at discharge: No PONV  Anesthetic complications: no      Cardiovascular status: blood pressure returned to baseline, stable and hemodynamically stable  Respiratory status: unassisted  Hydration status: euvolemic  Follow-up not needed.          Vitals Value Taken Time   /89 02/17/23 1115   Temp 36.5 °C (97.7 °F) 02/17/23 1028   Pulse 79 02/17/23 1117   Resp 16 02/17/23 1116   SpO2 96 % 02/17/23 1117   Vitals shown include unvalidated device data.      Event Time   Out of Recovery 10:44:00         Pain/Kwame Score: Kwame Score: 10 (2/17/2023 11:13 AM)

## 2023-02-20 LAB
FLOW CYTOMETRY ANTIBODIES ANALYZED - LYMPH NODE: NORMAL
FLOW CYTOMETRY COMMENT - LYMPH NODE: NORMAL
FLOW CYTOMETRY INTERPRETATION - LYMPH NODE: NORMAL

## 2023-02-21 NOTE — PROGRESS NOTES
Subjective:      Alisia Vines is a 32 y.o. female, here today for follow-up of:  Follow-up      PCP: Saumya Quinonez MD     HPI:    Alisia is here for medication refill.  Last annual wellness completed 9/2021 with PCP.      Review of Systems   Constitutional:  Negative for activity change, appetite change, chills, diaphoresis, fatigue and fever.        Wt Readings from Last 3 Encounters:  02/22/23 0851 : 83 kg (182 lb 15.7 oz)  02/17/23 0714 : 85.3 kg (188 lb 0.8 oz)  02/11/23 1517 : 85.2 kg (187 lb 11.6 oz)  Gradual decline in weight.  No exercise but constantly stays active.  Lots of water, moderate salt in diet.  Marijuana about 2 to 3 times per day.   HENT: Negative.     Eyes:  Negative for photophobia, pain and visual disturbance.   Respiratory:  Negative for cough, chest tightness, shortness of breath and wheezing.    Cardiovascular:  Negative for chest pain, palpitations and leg swelling.        HTN --- out of medication for about 2 months, maybe longer.  No home bp checks.   Gastrointestinal:  Negative for abdominal distention, abdominal pain, anal bleeding, blood in stool, constipation, diarrhea, nausea, rectal pain and vomiting.   Endocrine: Negative for polydipsia, polyphagia and polyuria.   Genitourinary: Negative.    Musculoskeletal:  Positive for arthralgias (c/o foot pain, dropped deep freezer on top of foot), gait problem (foot pain at times) and joint swelling (LT foot). Negative for neck pain and neck stiffness.        Chronic open wound to top of LT foot s/p foot injury, saw  2/7/2023   Skin:  Negative for rash and wound.   Neurological:  Negative for dizziness, tremors, seizures, syncope, facial asymmetry, speech difficulty, weakness, light-headedness, numbness and headaches.   Hematological:  Negative for adenopathy. Does not bruise/bleed easily.   Psychiatric/Behavioral: Negative.         Review of patient's allergies indicates:  No Known Allergies      Patient Active Problem  List   Diagnosis    Sjogren's syndrome    HSV-2 (herpes simplex virus 2) infection    Allergic rhinitis    Proteinuria    SLE (systemic lupus erythematosus related syndrome)    Anemia    GERD (gastroesophageal reflux disease)    SLE glomerulonephritis syndrome, WHO class V    History of fetal anomaly in prior pregnancy, currently pregnant, unspecified trimester    Long-term use of Plaquenil    Systemic lupus erythematosus arthritis    Severe uncontrolled hypertension    Sensitivity to sunlight    Immunocompromised    Reactive depression    Mood disorder         Current Outpatient Medications:     amLODIPine (NORVASC) 5 MG tablet, TAKE 1 TABLET BY MOUTH EVERY DAY, Disp: 30 tablet, Rfl: 11    ciclopirox (PENLAC) 8 % Soln, Apply to nail and nail fold once daily for up to 1 year, Disp: 6.6 mL, Rfl: 6    fluticasone propionate (FLONASE) 50 mcg/actuation nasal spray, SHAKE LIQUID AND USE 2 SPRAYS(100 MCG) IN EACH NOSTRIL EVERY DAY, Disp: 16 g, Rfl: 1    hydrOXYchloroQUINE (PLAQUENIL) 200 mg tablet, TAKE 2 TABLETS(400 MG) BY MOUTH EVERY DAY, Disp: 60 tablet, Rfl: 2    losartan-hydrochlorothiazide 100-12.5 mg (HYZAAR) 100-12.5 mg Tab, TAKE 1 TABLET BY MOUTH EVERY DAY, Disp: 90 tablet, Rfl: 0    mupirocin (BACTROBAN) 2 % ointment, Apply topically 3 (three) times daily., Disp: 22 g, Rfl: 0    predniSONE (DELTASONE) 5 MG tablet, Take 1 tablet (5 mg total) by mouth daily as needed (Joint pain)., Disp: 30 tablet, Rfl: 1    valACYclovir (VALTREX) 1000 MG tablet, Take 1 tablet (1,000 mg total) by mouth once daily., Disp: 30 tablet, Rfl: 0    ARIPiprazole (ABILIFY) 2 MG Tab, Take 1 tablet (2 mg total) by mouth once daily., Disp: 30 tablet, Rfl: 3    fexofenadine (ALLEGRA) 180 MG tablet, Take 1 tablet (180 mg total) by mouth once daily., Disp: 30 tablet, Rfl: 6    FLUoxetine 40 MG capsule, Take 1 capsule (40 mg total) by mouth once daily., Disp: 30 capsule, Rfl: 3  No current facility-administered medications for this  "visit.      Past medical, surgical, family and social histories have been reviewed today.      Objective:     Vitals:    02/22/23 0851   BP: (!) 154/120   Pulse: 105   Temp: 97.3 °F (36.3 °C)   SpO2: 99%   Weight: 83 kg (182 lb 15.7 oz)   Height: 5' 5" (1.651 m)       Physical Exam  Vitals reviewed.   Constitutional:       General: She is not in acute distress.  HENT:      Head: Normocephalic and atraumatic.   Eyes:      Pupils: Pupils are equal, round, and reactive to light.   Cardiovascular:      Rate and Rhythm: Regular rhythm. Tachycardia present.      Pulses: Normal pulses.      Heart sounds: Normal heart sounds.      Comments: Mild tachy at 105  Pulmonary:      Effort: Pulmonary effort is normal.      Breath sounds: Normal breath sounds.   Musculoskeletal:         General: Swelling (LT dorsal foot) and tenderness (minimal/mild TTP to LT dorsal foot) present. Normal range of motion.      Cervical back: Normal range of motion and neck supple. No rigidity.      Left foot: Normal range of motion.        Feet:    Feet:      Comments: Open wound, non-healing, no infection.  Skin:     Capillary Refill: Capillary refill takes less than 2 seconds.   Neurological:      Mental Status: She is alert and oriented to person, place, and time.      Motor: No weakness.      Gait: Gait normal.   Psychiatric:         Mood and Affect: Mood normal.         Behavior: Behavior normal.         Thought Content: Thought content normal.         Judgment: Judgment normal.         Diagnosis/Assessment:     1. Hypertension, unspecified type --- uncontrolled, off med for past few months.  Refilled meds, monitor.  - amLODIPine (NORVASC) 5 MG tablet; Take 1 tablet (5 mg total) by mouth once daily.  Dispense: 90 tablet; Refill: 0  - losartan-hydrochlorothiazide 100-12.5 mg (HYZAAR) 100-12.5 mg Tab; Take 1 tablet by mouth once daily.  Dispense: 90 tablet; Refill: 0    2. Foot injury, left, initial encounter  - Ambulatory referral/consult to " Podiatry; Future    3. Open wound of left foot, initial encounter  - Ambulatory referral/consult to Podiatry; Future    4. Medication refill  - amLODIPine (NORVASC) 5 MG tablet; Take 1 tablet (5 mg total) by mouth once daily.  Dispense: 90 tablet; Refill: 0  - losartan-hydrochlorothiazide 100-12.5 mg (HYZAAR) 100-12.5 mg Tab; Take 1 tablet by mouth once daily.  Dispense: 90 tablet; Refill: 0       Plan:     Healthy diet, exercise, weight reduction.  Marijuana cessation advised.    Follow-Up:     Nurse visit 2 weeks bp recheck.  Needs annual wellness exam with PCP, last done 2021.  RTC as directed and/or prn.        JUAREZ Buenrostro  [facility]       20 minutes of total time spent on the encounter, which includes face to face time and non-face to face time preparing to see the patient.  This included obtaining and/or reviewing separately obtained history, and documenting clinical information in the electronic or other health record.   Also includes independent interpretation of results (not separately reported) and communicating results to the patient/family/caregiver, with care coordination (not separately reported).

## 2023-02-22 ENCOUNTER — OFFICE VISIT (OUTPATIENT)
Dept: FAMILY MEDICINE | Facility: CLINIC | Age: 33
End: 2023-02-22
Payer: MEDICAID

## 2023-02-22 ENCOUNTER — TELEPHONE (OUTPATIENT)
Dept: FAMILY MEDICINE | Facility: CLINIC | Age: 33
End: 2023-02-22

## 2023-02-22 VITALS
SYSTOLIC BLOOD PRESSURE: 154 MMHG | TEMPERATURE: 97 F | OXYGEN SATURATION: 99 % | WEIGHT: 183 LBS | HEIGHT: 65 IN | DIASTOLIC BLOOD PRESSURE: 120 MMHG | HEART RATE: 105 BPM | BODY MASS INDEX: 30.49 KG/M2

## 2023-02-22 DIAGNOSIS — I10 HYPERTENSION, UNSPECIFIED TYPE: Primary | ICD-10-CM

## 2023-02-22 DIAGNOSIS — S91.302A OPEN WOUND OF LEFT FOOT, INITIAL ENCOUNTER: ICD-10-CM

## 2023-02-22 DIAGNOSIS — Z76.0 MEDICATION REFILL: ICD-10-CM

## 2023-02-22 DIAGNOSIS — S99.922A FOOT INJURY, LEFT, INITIAL ENCOUNTER: ICD-10-CM

## 2023-02-22 PROCEDURE — 3077F SYST BP >= 140 MM HG: CPT | Mod: CPTII,,, | Performed by: REGISTERED NURSE

## 2023-02-22 PROCEDURE — 1159F PR MEDICATION LIST DOCUMENTED IN MEDICAL RECORD: ICD-10-PCS | Mod: CPTII,,, | Performed by: REGISTERED NURSE

## 2023-02-22 PROCEDURE — 3077F PR MOST RECENT SYSTOLIC BLOOD PRESSURE >= 140 MM HG: ICD-10-PCS | Mod: CPTII,,, | Performed by: REGISTERED NURSE

## 2023-02-22 PROCEDURE — 3080F DIAST BP >= 90 MM HG: CPT | Mod: CPTII,,, | Performed by: REGISTERED NURSE

## 2023-02-22 PROCEDURE — 99999 PR PBB SHADOW E&M-EST. PATIENT-LVL IV: ICD-10-PCS | Mod: PBBFAC,,, | Performed by: REGISTERED NURSE

## 2023-02-22 PROCEDURE — 99214 OFFICE O/P EST MOD 30 MIN: CPT | Mod: PBBFAC,PO | Performed by: REGISTERED NURSE

## 2023-02-22 PROCEDURE — 3008F BODY MASS INDEX DOCD: CPT | Mod: CPTII,,, | Performed by: REGISTERED NURSE

## 2023-02-22 PROCEDURE — 99213 OFFICE O/P EST LOW 20 MIN: CPT | Mod: S$PBB,,, | Performed by: REGISTERED NURSE

## 2023-02-22 PROCEDURE — 3008F PR BODY MASS INDEX (BMI) DOCUMENTED: ICD-10-PCS | Mod: CPTII,,, | Performed by: REGISTERED NURSE

## 2023-02-22 PROCEDURE — 3080F PR MOST RECENT DIASTOLIC BLOOD PRESSURE >= 90 MM HG: ICD-10-PCS | Mod: CPTII,,, | Performed by: REGISTERED NURSE

## 2023-02-22 PROCEDURE — 99213 PR OFFICE/OUTPT VISIT, EST, LEVL III, 20-29 MIN: ICD-10-PCS | Mod: S$PBB,,, | Performed by: REGISTERED NURSE

## 2023-02-22 PROCEDURE — 1159F MED LIST DOCD IN RCRD: CPT | Mod: CPTII,,, | Performed by: REGISTERED NURSE

## 2023-02-22 PROCEDURE — 99999 PR PBB SHADOW E&M-EST. PATIENT-LVL IV: CPT | Mod: PBBFAC,,, | Performed by: REGISTERED NURSE

## 2023-02-22 RX ORDER — LOSARTAN POTASSIUM AND HYDROCHLOROTHIAZIDE 12.5; 1 MG/1; MG/1
1 TABLET ORAL DAILY
Qty: 90 TABLET | Refills: 0 | Status: ON HOLD | OUTPATIENT
Start: 2023-02-22 | End: 2023-05-01 | Stop reason: HOSPADM

## 2023-02-22 RX ORDER — AMLODIPINE BESYLATE 5 MG/1
5 TABLET ORAL DAILY
Qty: 90 TABLET | Refills: 0 | Status: SHIPPED | OUTPATIENT
Start: 2023-02-22 | End: 2023-05-22

## 2023-02-22 NOTE — TELEPHONE ENCOUNTER
----- Message from Jina Bradshaw sent at 2/22/2023  9:39 AM CST -----  Regarding: Established Patient Appointment  Good Morning, patient seen by Den Amaya NP, requesting patient see primary care for Annual.Patient scheduled for a blood pressure check on 03/08/23 if she can get the annual around that time the blood pressure check can be addressed by Dr. Cristiano Crenshaw. Please call patient at [phone]. Thanks/elr

## 2023-02-22 NOTE — TELEPHONE ENCOUNTER
Pt is medicaid and would like to be seen earlier for her annual. Would you like for me to give her the next available?? Please advise

## 2023-02-27 LAB
COMMENT: NORMAL
FINAL PATHOLOGIC DIAGNOSIS: NORMAL
GROSS: NORMAL
Lab: NORMAL
MICROSCOPIC EXAM: NORMAL

## 2023-02-28 ENCOUNTER — TELEPHONE (OUTPATIENT)
Dept: OTOLARYNGOLOGY | Facility: CLINIC | Age: 33
End: 2023-02-28
Payer: MEDICAID

## 2023-02-28 NOTE — TELEPHONE ENCOUNTER
Spoke to pt and informed lymph node biopsy did not show any cancer but still doesn't explain chronic enlarged nodes.Instructed to contact PCP as a copy of this message and result note was sent. Informed it would be a good idea to see an infectious disease doctor to be evaluated. Voiced understanding.

## 2023-02-28 NOTE — TELEPHONE ENCOUNTER
----- Message from Rivera Sandoval MD sent at 2/28/2023  8:28 AM CST -----  Your lymph node biopsy did not show any cancer which is great news!  It still does not explain why you continue to have persistent enlarged lymph nodes.  I am going to include your primary care physician on this message as it may be a good idea for you to see an infectious disease physician and have an evaluation.  Let me know if you have any questions.

## 2023-03-02 ENCOUNTER — OFFICE VISIT (OUTPATIENT)
Dept: PODIATRY | Facility: CLINIC | Age: 33
End: 2023-03-02
Payer: MEDICAID

## 2023-03-02 VITALS — BODY MASS INDEX: 30.49 KG/M2 | WEIGHT: 183 LBS | HEIGHT: 65 IN

## 2023-03-02 DIAGNOSIS — S91.302S OPEN WOUND OF LEFT FOOT, SEQUELA: Primary | ICD-10-CM

## 2023-03-02 DIAGNOSIS — F12.90 MARIJUANA USE: ICD-10-CM

## 2023-03-02 DIAGNOSIS — M79.672 PAIN IN LEFT FOOT: ICD-10-CM

## 2023-03-02 DIAGNOSIS — S99.922A FOOT INJURY, LEFT, INITIAL ENCOUNTER: ICD-10-CM

## 2023-03-02 PROCEDURE — 87070 CULTURE OTHR SPECIMN AEROBIC: CPT | Performed by: PODIATRIST

## 2023-03-02 PROCEDURE — 11042 DBRDMT SUBQ TIS 1ST 20SQCM/<: CPT | Mod: PBBFAC | Performed by: PODIATRIST

## 2023-03-02 PROCEDURE — 87077 CULTURE AEROBIC IDENTIFY: CPT | Performed by: PODIATRIST

## 2023-03-02 PROCEDURE — 3008F BODY MASS INDEX DOCD: CPT | Mod: CPTII,,, | Performed by: PODIATRIST

## 2023-03-02 PROCEDURE — 11042 PR DEBRIDEMENT, SKIN, SUB-Q TISSUE,=<20 SQ CM: ICD-10-PCS | Mod: S$PBB,,, | Performed by: PODIATRIST

## 2023-03-02 PROCEDURE — 3008F PR BODY MASS INDEX (BMI) DOCUMENTED: ICD-10-PCS | Mod: CPTII,,, | Performed by: PODIATRIST

## 2023-03-02 PROCEDURE — 11042 DBRDMT SUBQ TIS 1ST 20SQCM/<: CPT | Mod: S$PBB,,, | Performed by: PODIATRIST

## 2023-03-02 PROCEDURE — 1160F RVW MEDS BY RX/DR IN RCRD: CPT | Mod: CPTII,,, | Performed by: PODIATRIST

## 2023-03-02 PROCEDURE — 99999 PR PBB SHADOW E&M-EST. PATIENT-LVL III: ICD-10-PCS | Mod: PBBFAC,,, | Performed by: PODIATRIST

## 2023-03-02 PROCEDURE — 99213 OFFICE O/P EST LOW 20 MIN: CPT | Mod: PBBFAC | Performed by: PODIATRIST

## 2023-03-02 PROCEDURE — 1159F PR MEDICATION LIST DOCUMENTED IN MEDICAL RECORD: ICD-10-PCS | Mod: CPTII,,, | Performed by: PODIATRIST

## 2023-03-02 PROCEDURE — 99999 PR PBB SHADOW E&M-EST. PATIENT-LVL III: CPT | Mod: PBBFAC,,, | Performed by: PODIATRIST

## 2023-03-02 PROCEDURE — 99203 OFFICE O/P NEW LOW 30 MIN: CPT | Mod: 25,S$PBB,, | Performed by: PODIATRIST

## 2023-03-02 PROCEDURE — 1159F MED LIST DOCD IN RCRD: CPT | Mod: CPTII,,, | Performed by: PODIATRIST

## 2023-03-02 PROCEDURE — 87186 SC STD MICRODIL/AGAR DIL: CPT | Performed by: PODIATRIST

## 2023-03-02 PROCEDURE — 1160F PR REVIEW ALL MEDS BY PRESCRIBER/CLIN PHARMACIST DOCUMENTED: ICD-10-PCS | Mod: CPTII,,, | Performed by: PODIATRIST

## 2023-03-02 PROCEDURE — 99203 PR OFFICE/OUTPT VISIT, NEW, LEVL III, 30-44 MIN: ICD-10-PCS | Mod: 25,S$PBB,, | Performed by: PODIATRIST

## 2023-03-02 RX ORDER — SULFAMETHOXAZOLE AND TRIMETHOPRIM 800; 160 MG/1; MG/1
1 TABLET ORAL 2 TIMES DAILY
Qty: 20 TABLET | Refills: 0 | Status: SHIPPED | OUTPATIENT
Start: 2023-03-02 | End: 2023-03-12

## 2023-03-02 NOTE — PROGRESS NOTES
Subjective:       Patient ID: Alisia Vines is a 32 y.o. female.    Chief Complaint: Foot Injury (Pt c/o left foot pain 3/10, non-diabetic pt wears slippers, PCP Dr. Amaya last seen 2-22-23)      HPI: Alisia Vines presents to the clinic today, for evaluation and treatment concerning an ulceration/wound/bulla(e) to the left dorsal foot. Patient's Primary Care Provider is Saumya Quinonez MD. The wound(s) have/has been present for several weeks. Most recent local wound care w/ no dressings. Was no Rx PO Abx when the injury occurred (freezer falling on foot), but was on IV Abx for a neck procedures (states the wound started to heal better there after). Prior XR was WNL for OM.     Hemoglobin A1C   Date Value Ref Range Status   05/30/2014 5.1 4.5 - 6.2 % Final       Review of patient's allergies indicates:  No Known Allergies    Past Medical History:   Diagnosis Date    Allergic rhinitis     Anemia     Condyloma acuminata     COVID-19 virus infection 07/2021    Encounter for blood transfusion     GERD (gastroesophageal reflux disease)     History of fetal anomaly in prior pregnancy, currently pregnant, unspecified trimester 03/08/2017    Pacemaker placed    HSV-2 (herpes simplex virus 2) infection     Lupus nephritis     Mood disorder     Overweight(278.02)     Sjogren's syndrome     Systemic lupus complicating pregnancy 01/31/2019    Systemic lupus erythematosus     Thrombocytopenia        Family History   Problem Relation Age of Onset    Hypertension Mother     Eczema Brother     Hypertension Maternal Grandmother     Diabetes Paternal Grandmother     Heart disease Son     Arrhythmia Son         CHB    Stroke Neg Hx     Cancer Neg Hx        Social History     Socioeconomic History    Marital status: Single    Number of children: 2   Occupational History     Comment: [employer]   Tobacco Use    Smoking status: Never     Passive exposure: Never    Smokeless tobacco: Never   Substance and  "Sexual Activity    Alcohol use: No     Alcohol/week: 0.0 standard drinks    Drug use: Yes     Types: Marijuana    Sexual activity: Not Currently     Partners: Male     Birth control/protection: None   Other Topics Concern    Are you pregnant or think you may be? No    Breast-feeding No   Social History Narrative    The patient is single and lives with her significant other.  She has 3 children.  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       Past Surgical History:   Procedure Laterality Date     SECTION WITH TUBAL LIGATION N/A 2019    Procedure:  SECTION, WITH TUBAL LIGATION;  Surgeon: VERONICA Valdovinos MD;  Location: [facility] L&D;  Service: OB/GYN;  Laterality: N/A;     SECTION, LOW TRANSVERSE      x2    LYMPH NODE BIOPSY Right 2023    Procedure: BIOPSY, LYMPH NODE;  Surgeon: Rivera Sandoval MD;  Location: [facility] OR;  Service: ENT;  Laterality: Right;  right deep cervial lymph node excision    NECK MASS EXCISION Right 2023    Procedure: EXCISION, MASS, NECK;  Surgeon: Rivera Sandoval MD;  Location: [facility] OR;  Service: ENT;  Laterality: Right;  right deep cervial lymph node excision    RENAL BIOPSY  2013       Review of Systems   Constitutional:  Negative for chills, fatigue and fever.   HENT:  Negative for hearing loss.    Eyes:  Negative for photophobia and visual disturbance.   Respiratory:  Negative for cough, chest tightness, shortness of breath and wheezing.    Cardiovascular:  Negative for chest pain and palpitations.   Gastrointestinal:  Negative for constipation, diarrhea, nausea and vomiting.   Endocrine: Negative for cold intolerance and heat intolerance.   Genitourinary:  Negative for flank pain.   Musculoskeletal:  Negative for neck pain and neck stiffness.   Neurological:  Negative for light-headedness and headaches.   Psychiatric/Behavioral:  Negative for sleep disturbance.         Objective:   Ht 5' 5" (1.651 m)   Wt 83 kg (182 lb 15.7 oz)   LMP " 01/20/2023   BMI 30.45 kg/m²     Physical Exam    LOWER EXTREMITY PHYSICAL EXAMINATION    DERMATOLOGY: Laceration dorsal lateral midfoot. The area measures 0.60cm x 0.50cm x 0.30cm. No drainage is noted. Granular tissues are noted. No malodor is noted.    VASCULAR: DP and PT are 2/4. CFT is WNL.    Assessment:     1. Open wound of left foot, sequela    2. Foot injury, left, initial encounter    3. Pain in left foot    4. Marijuana use        Plan:     Open wound of left foot, sequela  -     Ambulatory referral/consult to Podiatry  -     Aerobic culture (Specify Source)  -     sulfamethoxazole-trimethoprim 800-160mg (BACTRIM DS) 800-160 mg Tab; Take 1 tablet by mouth 2 (two) times daily. for 10 days  Dispense: 20 tablet; Refill: 0    Foot injury, left, initial encounter  -     Ambulatory referral/consult to Podiatry    Pain in left foot    Marijuana use      Thorough discussion is had with the patient today, concerning the diagnosis, its etiology, and the treatment algorithm at present.     The wound was surgically debrided after adequate prep with alcohol and/or betadine paint. Excisional wound debridement was performed using sharp #10/#15 blade/rounded scalpel and tissue nipper, with removal of all non-viable skin and soft tissues; necrotic skin/tissue formation. The woundbase/wound bed was also debrided to encourage bleeding as to promote/stimulate healing. Debridement was excisional and included epidermal, dermal and subcutaneous tissues. Post debridement measurements are as above. Hemostasis was achieved. Patient tolerated procedure well and without complications. Local woundcare with topical Abx ointment dressings and bandage thereafter.     If no healing in 2 or so weeks, will follow up for likely MRI to R/O OM.    Did discuss in detail the harmful effects of nicotine/tobacco/cigarette/ marijuana smoking, especially in relation to the lower extremity. I do rec. consultation with primary care provider for  further discussed of smoking cessation methods. Smoking & Tobacco use cessation couseling was rendered at today's visit; intermediate, bewteen 3 and 10 minutes.          Future Appointments   Date Time Provider Department Center   3/8/2023  8:30 AM FAMILY MEDICINE NURSE, [facility]   3/14/2023  9:40 AM [facility]

## 2023-03-03 ENCOUNTER — TELEPHONE (OUTPATIENT)
Dept: OTOLARYNGOLOGY | Facility: CLINIC | Age: 33
End: 2023-03-03
Payer: MEDICAID

## 2023-03-03 ENCOUNTER — PATIENT MESSAGE (OUTPATIENT)
Dept: FAMILY MEDICINE | Facility: CLINIC | Age: 33
End: 2023-03-03
Payer: MEDICAID

## 2023-03-03 DIAGNOSIS — R59.1 LYMPHADENOPATHY: Primary | ICD-10-CM

## 2023-03-06 ENCOUNTER — TELEPHONE (OUTPATIENT)
Dept: PODIATRY | Facility: CLINIC | Age: 33
End: 2023-03-06
Payer: MEDICAID

## 2023-03-06 LAB — BACTERIA SPEC AEROBE CULT: ABNORMAL

## 2023-03-06 NOTE — TELEPHONE ENCOUNTER
----- Message from Alisia Jacques sent at 3/6/2023 10:59 AM CST -----  Contact: Alisia  Type:  Needs Medical Advice    Who Called: Alisia  Symptoms (please be specific): Bacterial infection   How long has patient had these symptoms:  No symptoms   Pharmacy name and phone #:    [facility]   [address]  [place], [place]  Phone: [phone] Fax: [phone]  Would the patient rather a call back or a response via [facility]? call  Best Call Back Number: [phone]   Additional Information: Request to be informed if antibiotics can be used for the current bacterial infection. Please give patient a call back to notify.   Thank you,  GH

## 2023-03-07 ENCOUNTER — OFFICE VISIT (OUTPATIENT)
Dept: OTOLARYNGOLOGY | Facility: CLINIC | Age: 33
End: 2023-03-07
Payer: MEDICAID

## 2023-03-07 VITALS — WEIGHT: 179.69 LBS | BODY MASS INDEX: 29.9 KG/M2 | TEMPERATURE: 99 F

## 2023-03-07 DIAGNOSIS — R59.1 LYMPHADENOPATHY: Primary | ICD-10-CM

## 2023-03-07 PROCEDURE — 99999 PR PBB SHADOW E&M-EST. PATIENT-LVL II: CPT | Mod: PBBFAC,,, | Performed by: PHYSICIAN ASSISTANT

## 2023-03-07 PROCEDURE — 3008F BODY MASS INDEX DOCD: CPT | Mod: CPTII,,, | Performed by: PHYSICIAN ASSISTANT

## 2023-03-07 PROCEDURE — 99024 PR POST-OP FOLLOW-UP VISIT: ICD-10-PCS | Mod: ,,, | Performed by: PHYSICIAN ASSISTANT

## 2023-03-07 PROCEDURE — 1159F PR MEDICATION LIST DOCUMENTED IN MEDICAL RECORD: ICD-10-PCS | Mod: CPTII,,, | Performed by: PHYSICIAN ASSISTANT

## 2023-03-07 PROCEDURE — 99999 PR PBB SHADOW E&M-EST. PATIENT-LVL II: ICD-10-PCS | Mod: PBBFAC,,, | Performed by: PHYSICIAN ASSISTANT

## 2023-03-07 PROCEDURE — 1159F MED LIST DOCD IN RCRD: CPT | Mod: CPTII,,, | Performed by: PHYSICIAN ASSISTANT

## 2023-03-07 PROCEDURE — 99212 OFFICE O/P EST SF 10 MIN: CPT | Mod: PBBFAC | Performed by: PHYSICIAN ASSISTANT

## 2023-03-07 PROCEDURE — 99024 POSTOP FOLLOW-UP VISIT: CPT | Mod: ,,, | Performed by: PHYSICIAN ASSISTANT

## 2023-03-07 PROCEDURE — 3008F PR BODY MASS INDEX (BMI) DOCUMENTED: ICD-10-PCS | Mod: CPTII,,, | Performed by: PHYSICIAN ASSISTANT

## 2023-03-14 ENCOUNTER — LAB VISIT (OUTPATIENT)
Dept: LAB | Facility: HOSPITAL | Age: 33
End: 2023-03-14
Attending: FAMILY MEDICINE
Payer: MEDICAID

## 2023-03-14 ENCOUNTER — OFFICE VISIT (OUTPATIENT)
Dept: FAMILY MEDICINE | Facility: CLINIC | Age: 33
End: 2023-03-14
Payer: MEDICAID

## 2023-03-14 VITALS
HEIGHT: 65 IN | WEIGHT: 173.5 LBS | TEMPERATURE: 98 F | BODY MASS INDEX: 28.91 KG/M2 | SYSTOLIC BLOOD PRESSURE: 134 MMHG | HEART RATE: 104 BPM | DIASTOLIC BLOOD PRESSURE: 76 MMHG | OXYGEN SATURATION: 98 %

## 2023-03-14 DIAGNOSIS — M32.9 SLE (SYSTEMIC LUPUS ERYTHEMATOSUS RELATED SYNDROME): Chronic | ICD-10-CM

## 2023-03-14 DIAGNOSIS — I10 SEVERE UNCONTROLLED HYPERTENSION: Chronic | ICD-10-CM

## 2023-03-14 DIAGNOSIS — Z00.00 PREVENTATIVE HEALTH CARE: Primary | ICD-10-CM

## 2023-03-14 DIAGNOSIS — Z00.00 PREVENTATIVE HEALTH CARE: ICD-10-CM

## 2023-03-14 PROCEDURE — 3075F SYST BP GE 130 - 139MM HG: CPT | Mod: CPTII,,, | Performed by: FAMILY MEDICINE

## 2023-03-14 PROCEDURE — 3008F BODY MASS INDEX DOCD: CPT | Mod: CPTII,,, | Performed by: FAMILY MEDICINE

## 2023-03-14 PROCEDURE — 1160F PR REVIEW ALL MEDS BY PRESCRIBER/CLIN PHARMACIST DOCUMENTED: ICD-10-PCS | Mod: CPTII,,, | Performed by: FAMILY MEDICINE

## 2023-03-14 PROCEDURE — 3008F PR BODY MASS INDEX (BMI) DOCUMENTED: ICD-10-PCS | Mod: CPTII,,, | Performed by: FAMILY MEDICINE

## 2023-03-14 PROCEDURE — 1159F MED LIST DOCD IN RCRD: CPT | Mod: CPTII,,, | Performed by: FAMILY MEDICINE

## 2023-03-14 PROCEDURE — 99999 PR PBB SHADOW E&M-EST. PATIENT-LVL III: CPT | Mod: PBBFAC,,, | Performed by: FAMILY MEDICINE

## 2023-03-14 PROCEDURE — 80053 COMPREHEN METABOLIC PANEL: CPT | Performed by: FAMILY MEDICINE

## 2023-03-14 PROCEDURE — 3075F PR MOST RECENT SYSTOLIC BLOOD PRESS GE 130-139MM HG: ICD-10-PCS | Mod: CPTII,,, | Performed by: FAMILY MEDICINE

## 2023-03-14 PROCEDURE — 84443 ASSAY THYROID STIM HORMONE: CPT | Performed by: FAMILY MEDICINE

## 2023-03-14 PROCEDURE — 99395 PREV VISIT EST AGE 18-39: CPT | Mod: S$PBB,,, | Performed by: FAMILY MEDICINE

## 2023-03-14 PROCEDURE — 87389 HIV-1 AG W/HIV-1&-2 AB AG IA: CPT | Performed by: FAMILY MEDICINE

## 2023-03-14 PROCEDURE — 80061 LIPID PANEL: CPT | Performed by: FAMILY MEDICINE

## 2023-03-14 PROCEDURE — 1159F PR MEDICATION LIST DOCUMENTED IN MEDICAL RECORD: ICD-10-PCS | Mod: CPTII,,, | Performed by: FAMILY MEDICINE

## 2023-03-14 PROCEDURE — 86803 HEPATITIS C AB TEST: CPT | Performed by: FAMILY MEDICINE

## 2023-03-14 PROCEDURE — 3078F DIAST BP <80 MM HG: CPT | Mod: CPTII,,, | Performed by: FAMILY MEDICINE

## 2023-03-14 PROCEDURE — 3078F PR MOST RECENT DIASTOLIC BLOOD PRESSURE < 80 MM HG: ICD-10-PCS | Mod: CPTII,,, | Performed by: FAMILY MEDICINE

## 2023-03-14 PROCEDURE — 36415 COLL VENOUS BLD VENIPUNCTURE: CPT | Mod: PO | Performed by: FAMILY MEDICINE

## 2023-03-14 PROCEDURE — 1160F RVW MEDS BY RX/DR IN RCRD: CPT | Mod: CPTII,,, | Performed by: FAMILY MEDICINE

## 2023-03-14 PROCEDURE — 99395 PR PREVENTIVE VISIT,EST,18-39: ICD-10-PCS | Mod: S$PBB,,, | Performed by: FAMILY MEDICINE

## 2023-03-14 PROCEDURE — 99999 PR PBB SHADOW E&M-EST. PATIENT-LVL III: ICD-10-PCS | Mod: PBBFAC,,, | Performed by: FAMILY MEDICINE

## 2023-03-14 PROCEDURE — 86592 SYPHILIS TEST NON-TREP QUAL: CPT | Performed by: FAMILY MEDICINE

## 2023-03-14 PROCEDURE — 99213 OFFICE O/P EST LOW 20 MIN: CPT | Mod: PBBFAC,PO | Performed by: FAMILY MEDICINE

## 2023-03-14 RX ORDER — VALACYCLOVIR HYDROCHLORIDE 1 G/1
1000 TABLET, FILM COATED ORAL DAILY
Qty: 90 TABLET | Refills: 3 | Status: SHIPPED | OUTPATIENT
Start: 2023-03-14

## 2023-03-14 NOTE — PROGRESS NOTES
CHIEF COMPLAINT: This is a 32-year-old female here for preventative health exam..     SUBJECTIVE:  The patient is doing well without complaints.  She recently had lymph node biopsy from right posterior neck which was negative for lymphoma or malignancy.  She is scheduled to see infectious disease specialist.  She has a history of resistant hypertension and lupus nephritis.  She takes amlodipine 5 mg and losartan -12.5 mg daily. Her BP today is 134/76. The patient was diagnosed with SLE in October 2013. She is followed by Rheumatology for SLE and takes Plaquenil and prednisone as needed.  Additionally, patient has Sjogren syndrome, anemia and leukopenia.  Patient takes Valtrex 1000 mg daily to suppress herpes infection.  She takes fluoxetine and Abilify per psychiatrist for mood disorder and reactive depression.  Patient reports regular menses.      Eye exam September 2021. Pap September 2020. Tdap May 2022. Prenar November 2015. Flu November 2018. COVID 19 vaccine December 2021, January 2022.     ROS:  GENERAL: Patient denies fever, chills, night sweats.  Patient denies weight gain or loss. Patient denies anorexia, fatigue, weakness or swollen glands.  SKIN: Patient denies rash.  HEENT: Patient denies sore throat, ear pain, hearing loss, nasal congestion, or runny nose. Patient denies visual disturbance, eye irritation or discharge.  LUNGS: Patient denies cough, wheeze or hemoptysis.  CARDIOVASCULAR: Patient denies chest pain, shortness of breath, palpitations, syncope or lower extremity edema.  GI: Patient denies abdominal pain, nausea, vomiting, diarrhea, constipation, blood in stool or melena.  GENITOURINARY:  Patient denies dysuria, frequency, hematuria, nocturia, urgency or incontinence.  MUSCULOSKELETAL: Patient denies joint pain, swelling, redness or warmth.  NEUROLOGIC: Patient deniesparesthesias, weakness in limb, dysarthria, dysphagia or abnormality of gait.  PSYCHIATRIC: Patient denies anxiety,  depression, or memory loss.     OBJECTIVE:   GENERAL: Well-developed well-nourished overweight black female alert and oriented x3, in no acute distress.  Memory, judgment and cognition without deficit.   SKIN: Clear without rash.  Normal color and tone.  HEENT: Eyes: Clear conjunctivae. No scleral icterus.  Pupils equal reactive to light and accommodation.  Ears: Clear canals. Clear TMs.  Nose: Without congestion.  Pharynx: Without injection or exudates.  NECK: Supple, normal range of motion.  No masses, lymphadenopathy or enlarged thyroid.  No JVD.  Carotids 2+ and equal.  No bruits.  LUNGS: Clear to auscultation.  Normal respiratory effort.  CARDIOVASCULAR:  Regular rhythm, normal S1, S2 without murmur, gallop or rub.  BACK:  No CVA or spinal tenderness.  ABDOMEN: Soft, nontender without mass or organomegaly.  No rebound or guarding.  EXTREMITIES: Without cyanosis, clubbing or edema.  Distal pulses 2+ and equal.  Normal range of motion in all extremities.  No joint effusion, erythema or warmth.  NEUROLOGIC:   Cranial nerves II through XII without deficit.  Motor strength equal bilaterally.  Sensation normal to touch.  Deep tendon reflexes 2+ and equal.  Gait without abnormality.  No tremor.    PELVIC:  Deferred to September 2023.    ASSESSMENT:  1. Preventative health care    2. SLE (systemic lupus erythematosus related syndrome)    3. Severe uncontrolled hypertension      PLAN:  1. Weight reduction. Exercise regularly.  2. Age-appropriate counseling.  3. Fasting lab including hepatitis-C, RPR and HIV.  4. Refill Valtrex.  5. Return to clinic for female wellness exam.    This note is generated with speech recognition software and is subject to transcription error and sound alike phrases that may be missed by proofreading.

## 2023-03-15 ENCOUNTER — TELEPHONE (OUTPATIENT)
Dept: RHEUMATOLOGY | Facility: CLINIC | Age: 33
End: 2023-03-15
Payer: MEDICAID

## 2023-03-15 LAB
ALBUMIN SERPL BCP-MCNC: 2.5 G/DL (ref 3.5–5.2)
ALP SERPL-CCNC: 68 U/L (ref 55–135)
ALT SERPL W/O P-5'-P-CCNC: 15 U/L (ref 10–44)
ANION GAP SERPL CALC-SCNC: 9 MMOL/L (ref 8–16)
AST SERPL-CCNC: 20 U/L (ref 10–40)
BILIRUB SERPL-MCNC: 0.2 MG/DL (ref 0.1–1)
BUN SERPL-MCNC: 8 MG/DL (ref 6–20)
CALCIUM SERPL-MCNC: 9.2 MG/DL (ref 8.7–10.5)
CHLORIDE SERPL-SCNC: 106 MMOL/L (ref 95–110)
CHOLEST SERPL-MCNC: 191 MG/DL (ref 120–199)
CHOLEST/HDLC SERPL: 3.3 {RATIO} (ref 2–5)
CO2 SERPL-SCNC: 22 MMOL/L (ref 23–29)
CREAT SERPL-MCNC: 0.6 MG/DL (ref 0.5–1.4)
EST. GFR  (NO RACE VARIABLE): >60 ML/MIN/1.73 M^2
GLUCOSE SERPL-MCNC: 67 MG/DL (ref 70–110)
HCV AB SERPL QL IA: NORMAL
HDLC SERPL-MCNC: 58 MG/DL (ref 40–75)
HDLC SERPL: 30.4 % (ref 20–50)
HIV 1+2 AB+HIV1 P24 AG SERPL QL IA: NORMAL
LDLC SERPL CALC-MCNC: 123.8 MG/DL (ref 63–159)
NONHDLC SERPL-MCNC: 133 MG/DL
POTASSIUM SERPL-SCNC: 4.3 MMOL/L (ref 3.5–5.1)
PROT SERPL-MCNC: 6.5 G/DL (ref 6–8.4)
RPR SER QL: NORMAL
SODIUM SERPL-SCNC: 137 MMOL/L (ref 136–145)
TRIGL SERPL-MCNC: 46 MG/DL (ref 30–150)
TSH SERPL DL<=0.005 MIU/L-ACNC: 1 UIU/ML (ref 0.4–4)

## 2023-03-20 NOTE — ADDENDUM NOTE
Addendum  created 03/20/23 1346 by Purvi Presley, KAREN    Attestation recorded in Intraprocedure, Intraprocedure Attestations filed, Intraprocedure Event edited

## 2023-04-03 DIAGNOSIS — F39 MOOD DISORDER: ICD-10-CM

## 2023-04-03 DIAGNOSIS — F41.9 ANXIETY: ICD-10-CM

## 2023-04-03 RX ORDER — FLUOXETINE HYDROCHLORIDE 40 MG/1
CAPSULE ORAL
Qty: 30 CAPSULE | Refills: 3 | Status: SHIPPED | OUTPATIENT
Start: 2023-04-03

## 2023-04-03 RX ORDER — ARIPIPRAZOLE 2 MG/1
TABLET ORAL
Qty: 30 TABLET | Refills: 3 | Status: SHIPPED | OUTPATIENT
Start: 2023-04-03

## 2023-04-03 NOTE — TELEPHONE ENCOUNTER
----- Message from Mere Jarvis, PhD, MPAP sent at 4/3/2023  8:24 AM CDT -----  Regarding: schedule  Please schedule pt with me for 30 min in office visit if possible--will need to reach out to pt as it's been 6 months since her last visit with me.    thanks

## 2023-04-23 ENCOUNTER — OFFICE VISIT (OUTPATIENT)
Dept: URGENT CARE | Facility: CLINIC | Age: 33
End: 2023-04-23
Payer: MEDICAID

## 2023-04-23 VITALS
WEIGHT: 180 LBS | DIASTOLIC BLOOD PRESSURE: 85 MMHG | SYSTOLIC BLOOD PRESSURE: 132 MMHG | BODY MASS INDEX: 29.99 KG/M2 | OXYGEN SATURATION: 100 % | HEIGHT: 65 IN | RESPIRATION RATE: 18 BRPM | TEMPERATURE: 100 F | HEART RATE: 110 BPM

## 2023-04-23 DIAGNOSIS — J34.89 NASAL SORE: ICD-10-CM

## 2023-04-23 DIAGNOSIS — B96.89 ACUTE BACTERIAL SINUSITIS: Primary | ICD-10-CM

## 2023-04-23 DIAGNOSIS — J01.90 ACUTE BACTERIAL SINUSITIS: Primary | ICD-10-CM

## 2023-04-23 PROCEDURE — 99213 OFFICE O/P EST LOW 20 MIN: CPT | Mod: S$GLB,,, | Performed by: PHYSICIAN ASSISTANT

## 2023-04-23 PROCEDURE — 99213 PR OFFICE/OUTPT VISIT, EST, LEVL III, 20-29 MIN: ICD-10-PCS | Mod: S$GLB,,, | Performed by: PHYSICIAN ASSISTANT

## 2023-04-23 RX ORDER — MUPIROCIN 20 MG/G
OINTMENT TOPICAL 3 TIMES DAILY
Qty: 30 G | Refills: 0 | Status: SHIPPED | OUTPATIENT
Start: 2023-04-23 | End: 2023-05-05

## 2023-04-23 RX ORDER — AMOXICILLIN AND CLAVULANATE POTASSIUM 875; 125 MG/1; MG/1
1 TABLET, FILM COATED ORAL 2 TIMES DAILY
Qty: 14 TABLET | Refills: 0 | Status: SHIPPED | OUTPATIENT
Start: 2023-04-23 | End: 2023-04-30 | Stop reason: ALTCHOICE

## 2023-04-23 NOTE — PROGRESS NOTES
"Subjective:      Patient ID: Alisia Vines is a 32 y.o. female.    Vitals:  height is 5' 5" (1.651 m) and weight is 81.6 kg (180 lb). Her tympanic temperature is 100.3 °F (37.9 °C). Her blood pressure is 132/85 and her pulse is 110. Her respiration is 18 and oxygen saturation is 100%.     Chief Complaint: Sinus Problem    Alisia Vines is a 32-year-old female presenting with recurrent sinus problem. Pt reported she has been experiencing nasal irritation and nasal drainage/congestion for over a week, and reported drainage with a yellow color. Pt also reported she had a [facility] health appointment and medications where prescribed, but she feels sxs are gradually worsening. Took a round of Azithromycin with no relief. Now with a low grade fever, sore in her right nares and discomfort in her right ear.  No cough or sinus tenderness.    Sinus Problem  This is a new problem. The current episode started 1 to 4 weeks ago. The problem has been gradually worsening since onset. The maximum temperature recorded prior to her arrival was 100.4 - 100.9 F. Her pain is at a severity of 7/10. Associated symptoms include ear pain, headaches and sinus pressure. Pertinent negatives include no chills, congestion or sneezing. The treatment provided no relief.   Constitution: Negative for chills.   HENT:  Positive for ear pain and sinus pressure. Negative for congestion.    Allergic/Immunologic: Negative for sneezing.   Neurological:  Positive for headaches.    Objective:     Physical Exam   Constitutional: She is oriented to person, place, and time. She appears well-developed.   HENT:   Head: Normocephalic and atraumatic.   Ears:   Right Ear: External ear normal.   Left Ear: External ear normal.   Nose: Mucosal edema, rhinorrhea and purulent discharge present.       Mouth/Throat: Uvula is midline, oropharynx is clear and moist and mucous membranes are normal. Mucous membranes are moist. No tonsillar exudate.   Eyes: Conjunctivae " and EOM are normal. Pupils are equal, round, and reactive to light.   Neck: Neck supple.   Cardiovascular: Normal rate, regular rhythm, normal heart sounds and normal pulses.   Pulmonary/Chest: Effort normal and breath sounds normal.   Musculoskeletal: Normal range of motion.         General: Normal range of motion.   Lymphadenopathy:     She has cervical adenopathy.        Right cervical: Superficial cervical adenopathy present.   Neurological: She is alert and oriented to person, place, and time.   Skin: Skin is warm and dry.   Vitals reviewed.    Assessment:     1. Acute bacterial sinusitis    2. Nasal sore        Plan:       Acute bacterial sinusitis  -     amoxicillin-clavulanate 875-125mg (AUGMENTIN) 875-125 mg per tablet; Take 1 tablet by mouth 2 (two) times daily. for 7 days  Dispense: 14 tablet; Refill: 0  -     mupirocin (BACTROBAN) 2 % ointment; Apply topically 3 (three) times daily. for 10 days  Dispense: 30 g; Refill: 0    Nasal sore  -     mupirocin (BACTROBAN) 2 % ointment; Apply topically 3 (three) times daily. for 10 days  Dispense: 30 g; Refill: 0      Patient Instructions     Advise increase p.o. fluids--at least 64 ounces of water/juice & rest  Meds: Augmentin and Bactroban  Advise complete antibiotics.  Normal saline nasal wash to irrigate sinuses and for congestion/runny nose.  Cool mist humidifier/vaporizer.  Practice good handwashing.  Mucinex for cough and chest congestion.  Tylenol or Ibuprofen for fever, headache and body aches.  Warm salt water gargles for throat comfort.  Chloraseptic spray or lozenges for throat comfort.  See PCP or go to ER if symptoms worsen or fail to improve with treatment.

## 2023-04-23 NOTE — PATIENT INSTRUCTIONS
Advise increase p.o. fluids--at least 64 ounces of water/juice & rest  Meds: Augmentin and Bactroban  Advise complete antibiotics.  Normal saline nasal wash to irrigate sinuses and for congestion/runny nose.  Cool mist humidifier/vaporizer.  Practice good handwashing.  Mucinex for cough and chest congestion.  Tylenol or Ibuprofen for fever, headache and body aches.  Warm salt water gargles for throat comfort.  Chloraseptic spray or lozenges for throat comfort.  See PCP or go to ER if symptoms worsen or fail to improve with treatment.

## 2023-04-26 ENCOUNTER — HOSPITAL ENCOUNTER (EMERGENCY)
Facility: HOSPITAL | Age: 33
Discharge: HOME | End: 2023-04-26
Attending: EMERGENCY MEDICINE
Payer: MEDICAID

## 2023-04-26 ENCOUNTER — TELEPHONE (OUTPATIENT)
Dept: URGENT CARE | Facility: CLINIC | Age: 33
End: 2023-04-26
Payer: MEDICAID

## 2023-04-26 VITALS
HEART RATE: 100 BPM | WEIGHT: 161.63 LBS | BODY MASS INDEX: 26.89 KG/M2 | TEMPERATURE: 99 F | DIASTOLIC BLOOD PRESSURE: 72 MMHG | OXYGEN SATURATION: 100 % | SYSTOLIC BLOOD PRESSURE: 100 MMHG | RESPIRATION RATE: 18 BRPM

## 2023-04-26 DIAGNOSIS — J06.9 UPPER RESPIRATORY TRACT INFECTION, UNSPECIFIED TYPE: ICD-10-CM

## 2023-04-26 DIAGNOSIS — L73.9 FOLLICULITIS: ICD-10-CM

## 2023-04-26 DIAGNOSIS — H60.391 ACUTE INFECTIVE OTITIS EXTERNA, RIGHT: Primary | ICD-10-CM

## 2023-04-26 DIAGNOSIS — D64.9 ANEMIA, UNSPECIFIED TYPE: ICD-10-CM

## 2023-04-26 LAB
ALBUMIN SERPL BCP-MCNC: 2.3 G/DL (ref 3.5–5.2)
ALP SERPL-CCNC: 67 U/L (ref 55–135)
ALT SERPL W/O P-5'-P-CCNC: 6 U/L (ref 10–44)
ANION GAP SERPL CALC-SCNC: 8 MMOL/L (ref 8–16)
ANISOCYTOSIS BLD QL SMEAR: SLIGHT
AST SERPL-CCNC: 28 U/L (ref 10–40)
B-HCG UR QL: NEGATIVE
BASOPHILS # BLD AUTO: 0.01 K/UL (ref 0–0.2)
BASOPHILS NFR BLD: 0.1 % (ref 0–1.9)
BILIRUB SERPL-MCNC: 0.6 MG/DL (ref 0.1–1)
BUN SERPL-MCNC: 12 MG/DL (ref 6–20)
CALCIUM SERPL-MCNC: 8.2 MG/DL (ref 8.7–10.5)
CHLORIDE SERPL-SCNC: 100 MMOL/L (ref 95–110)
CO2 SERPL-SCNC: 25 MMOL/L (ref 23–29)
CREAT SERPL-MCNC: 0.7 MG/DL (ref 0.5–1.4)
DIFFERENTIAL METHOD: ABNORMAL
EOSINOPHIL # BLD AUTO: 0 K/UL (ref 0–0.5)
EOSINOPHIL NFR BLD: 0.3 % (ref 0–8)
ERYTHROCYTE [DISTWIDTH] IN BLOOD BY AUTOMATED COUNT: ABNORMAL % (ref 11.5–14.5)
EST. GFR  (NO RACE VARIABLE): >60 ML/MIN/1.73 M^2
GLUCOSE SERPL-MCNC: 107 MG/DL (ref 70–110)
HCT VFR BLD AUTO: 26.8 % (ref 37–48.5)
HGB BLD-MCNC: 10.5 G/DL (ref 12–16)
IMM GRANULOCYTES # BLD AUTO: 0.11 K/UL (ref 0–0.04)
IMM GRANULOCYTES NFR BLD AUTO: 1.6 % (ref 0–0.5)
IRON SERPL-MCNC: 44 UG/DL (ref 30–160)
LYMPHOCYTES # BLD AUTO: 2.3 K/UL (ref 1–4.8)
LYMPHOCYTES NFR BLD: 32.9 % (ref 18–48)
MCH RBC QN AUTO: 37.9 PG (ref 27–31)
MCHC RBC AUTO-ENTMCNC: 39.2 G/DL (ref 32–36)
MCV RBC AUTO: 97 FL (ref 82–98)
MONOCYTES # BLD AUTO: 0.5 K/UL (ref 0.3–1)
MONOCYTES NFR BLD: 7.1 % (ref 4–15)
NEUTROPHILS # BLD AUTO: 4 K/UL (ref 1.8–7.7)
NEUTROPHILS NFR BLD: 58 % (ref 38–73)
NRBC BLD-RTO: 0 /100 WBC
PLATELET # BLD AUTO: 220 K/UL (ref 150–450)
PMV BLD AUTO: 10.7 FL (ref 9.2–12.9)
POLYCHROMASIA BLD QL SMEAR: ABNORMAL
POTASSIUM SERPL-SCNC: 3.8 MMOL/L (ref 3.5–5.1)
PROT SERPL-MCNC: 6.6 G/DL (ref 6–8.4)
RBC # BLD AUTO: 2.77 M/UL (ref 4–5.4)
RETICS/RBC NFR AUTO: 5.8 % (ref 0.5–2.5)
SATURATED IRON: 17 % (ref 20–50)
SODIUM SERPL-SCNC: 133 MMOL/L (ref 136–145)
TOTAL IRON BINDING CAPACITY: 263 UG/DL (ref 250–450)
TRANSFERRIN SERPL-MCNC: 178 MG/DL (ref 200–375)
WBC # BLD AUTO: 6.86 K/UL (ref 3.9–12.7)

## 2023-04-26 PROCEDURE — 96360 HYDRATION IV INFUSION INIT: CPT

## 2023-04-26 PROCEDURE — 25000003 PHARM REV CODE 250: Performed by: NURSE PRACTITIONER

## 2023-04-26 PROCEDURE — 85045 AUTOMATED RETICULOCYTE COUNT: CPT | Performed by: NURSE PRACTITIONER

## 2023-04-26 PROCEDURE — 99284 EMERGENCY DEPT VISIT MOD MDM: CPT | Mod: 25

## 2023-04-26 PROCEDURE — 80053 COMPREHEN METABOLIC PANEL: CPT | Performed by: NURSE PRACTITIONER

## 2023-04-26 PROCEDURE — 84466 ASSAY OF TRANSFERRIN: CPT | Performed by: NURSE PRACTITIONER

## 2023-04-26 PROCEDURE — 85025 COMPLETE CBC W/AUTO DIFF WBC: CPT | Performed by: NURSE PRACTITIONER

## 2023-04-26 PROCEDURE — 36415 COLL VENOUS BLD VENIPUNCTURE: CPT | Performed by: NURSE PRACTITIONER

## 2023-04-26 PROCEDURE — 81025 URINE PREGNANCY TEST: CPT | Performed by: NURSE PRACTITIONER

## 2023-04-26 RX ORDER — SODIUM CHLORIDE 9 MG/ML
1000 INJECTION, SOLUTION INTRAVENOUS
Status: COMPLETED | OUTPATIENT
Start: 2023-04-26 | End: 2023-04-26

## 2023-04-26 RX ORDER — CIPROFLOXACIN AND DEXAMETHASONE 3; 1 MG/ML; MG/ML
4 SUSPENSION/ DROPS AURICULAR (OTIC) 2 TIMES DAILY
Qty: 7.5 ML | Refills: 0 | Status: SHIPPED | OUTPATIENT
Start: 2023-04-26 | End: 2023-05-03

## 2023-04-26 RX ORDER — SULFAMETHOXAZOLE AND TRIMETHOPRIM 800; 160 MG/1; MG/1
1 TABLET ORAL 2 TIMES DAILY
Qty: 14 TABLET | Refills: 0 | Status: ON HOLD | OUTPATIENT
Start: 2023-04-26 | End: 2023-05-01 | Stop reason: HOSPADM

## 2023-04-26 RX ORDER — FERROUS GLUCONATE 324(38)MG
324 TABLET ORAL
Qty: 90 TABLET | Refills: 3 | Status: SHIPPED | OUTPATIENT
Start: 2023-04-26 | End: 2023-05-26

## 2023-04-26 RX ADMIN — SODIUM CHLORIDE 1000 ML: 9 INJECTION, SOLUTION INTRAVENOUS at 10:04

## 2023-04-26 NOTE — ED PROVIDER NOTES
Encounter Date: 2023       History     Chief Complaint   Patient presents with    General Illness     Sore throat, N/V/D, ear pain onset 2 weeks ago.     32-year-old female with complaint of headache and nasal congestion for the past 2 weeks.  Patient also reports multiple scabs on scalp.  Patient also reports that she is not been eating drinking over the past couple days because she is been nauseated.  Patient denies abdominal pain.  Patient denies fever.  Patient also reports that she is been having right ear pain with drainage over the past couple days.      Review of patient's allergies indicates:  No Known Allergies  Past Medical History:   Diagnosis Date    Allergic rhinitis     Anemia     Condyloma acuminata     COVID-19 virus infection 2021    Encounter for blood transfusion     GERD (gastroesophageal reflux disease)     History of fetal anomaly in prior pregnancy, currently pregnant, unspecified trimester 2017    Pacemaker placed    HSV-2 (herpes simplex virus 2) infection     Lupus nephritis     Mood disorder     Overweight(278.02)     Sjogren's syndrome     Systemic lupus complicating pregnancy 2019    Systemic lupus erythematosus     Thrombocytopenia      Past Surgical History:   Procedure Laterality Date     SECTION WITH TUBAL LIGATION N/A 2019    Procedure:  SECTION, WITH TUBAL LIGATION;  Surgeon: VERONICA Valdovinos MD;  Location: [facility] L&D;  Service: OB/GYN;  Laterality: N/A;     SECTION, LOW TRANSVERSE      x2    LYMPH NODE BIOPSY Right 2023    Procedure: BIOPSY, LYMPH NODE;  Surgeon: Rivera Sandoval MD;  Location: [facility] OR;  Service: ENT;  Laterality: Right;  right deep cervial lymph node excision    NECK MASS EXCISION Right 2023    Procedure: EXCISION, MASS, NECK;  Surgeon: Rivera Sandoval MD;  Location: [facility] OR;  Service: ENT;  Laterality: Right;  right deep cervial lymph node excision    RENAL BIOPSY  2013     Family History   Problem  Relation Age of Onset    Hypertension Mother     Eczema Brother     Hypertension Maternal Grandmother     Diabetes Paternal Grandmother     Heart disease Son     Arrhythmia Son         CHB    Stroke Neg Hx     Cancer Neg Hx      Social History     Tobacco Use    Smoking status: Never     Passive exposure: Never    Smokeless tobacco: Never   Substance Use Topics    Alcohol use: No     Alcohol/week: 0.0 standard drinks    Drug use: Yes     Types: Marijuana     Review of Systems   Constitutional:  Negative for fever.   HENT:  Positive for congestion. Negative for sore throat.    Respiratory:  Positive for cough. Negative for shortness of breath.    Cardiovascular:  Negative for chest pain.   Gastrointestinal:  Negative for nausea.   Genitourinary:  Negative for dysuria.   Musculoskeletal:  Negative for back pain.   Skin:  Negative for rash.   Neurological:  Negative for weakness.   Hematological:  Does not bruise/bleed easily.     Physical Exam     Initial Vitals [04/26/23 1008]   BP Pulse Resp Temp SpO2   106/67 (!) 120 16 99.1 °F (37.3 °C) 100 %      MAP       --         Physical Exam    Nursing note and vitals reviewed.  Constitutional: She appears well-developed and well-nourished.   HENT:   Head: Normocephalic and atraumatic.   Right tragal tenderness, no swelling of external ear canal, right TM WNL, + nasal congestion    Eyes: Conjunctivae and EOM are normal. Pupils are equal, round, and reactive to light.   Neck: Neck supple.   Normal range of motion.  Cardiovascular:  Normal rate, regular rhythm, normal heart sounds and intact distal pulses.           Pulmonary/Chest: Breath sounds normal.   Abdominal: Abdomen is soft. There is no abdominal tenderness. There is no rebound and no guarding.   Musculoskeletal:         General: Normal range of motion.      Cervical back: Normal range of motion and neck supple.     Neurological: She is alert and oriented to person, place, and time. She has normal strength and normal  reflexes.   Skin: Skin is warm and dry.   Multiple crusted pustules in scalp   Psychiatric: She has a normal mood and affect. Her behavior is normal. Thought content normal.       ED Course   Procedures  Labs Reviewed   CBC W/ AUTO DIFFERENTIAL - Abnormal; Notable for the following components:       Result Value    RBC 2.77 (*)     Hemoglobin 10.5 (*)     Hematocrit 26.8 (*)     MCH 37.9 (*)     MCHC 39.2 (*)     Immature Granulocytes 1.6 (*)     Immature Grans (Abs) 0.11 (*)     All other components within normal limits   COMPREHENSIVE METABOLIC PANEL - Abnormal; Notable for the following components:    Sodium 133 (*)     Calcium 8.2 (*)     Albumin 2.3 (*)     ALT 6 (*)     All other components within normal limits   PREGNANCY TEST, URINE RAPID    Narrative:     Specimen Source->Urine   IRON AND TIBC   RETICULOCYTES   VITAMIN B12   IRON AND TIBC        Labs Reviewed   CBC W/ AUTO DIFFERENTIAL - Abnormal; Notable for the following components:       Result Value    RBC 2.77 (*)     Hemoglobin 10.5 (*)     Hematocrit 26.8 (*)     MCH 37.9 (*)     MCHC 39.2 (*)     Immature Granulocytes 1.6 (*)     Immature Grans (Abs) 0.11 (*)     All other components within normal limits   COMPREHENSIVE METABOLIC PANEL - Abnormal; Notable for the following components:    Sodium 133 (*)     Calcium 8.2 (*)     Albumin 2.3 (*)     ALT 6 (*)     All other components within normal limits   PREGNANCY TEST, URINE RAPID    Narrative:     Specimen Source->Urine   IRON AND TIBC   RETICULOCYTES   VITAMIN B12   IRON AND TIBC         Imaging Results    None          Medications   0.9%  NaCl infusion (0 mLs Intravenous Stopped 23 1147)     Medical Decision Makin:11 PM  Patient reports feeling better after IV fluids.  Recheck heart rate is 89.  Will send iron studies, retic, B12 and place patient on iron in consideration of H&H of 10 and 26.  Patient in agreement with plan and will follow up with PCP for further anemia work up.   Patient reports feeling well for discharge.                        Clinical Impression:   Final diagnoses:  [H60.391] Acute infective otitis externa, right (Primary)  [L73.9] Folliculitis  [D64.9] Anemia, unspecified type  [J06.9] Upper respiratory tract infection, unspecified type        ED Disposition Condition    Discharge Stable          ED Prescriptions       Medication Sig Dispense Start Date End Date Auth. Provider    ciprofloxacin-dexAMETHasone 0.3-0.1% (CIPRODEX) 0.3-0.1 % DrpS Place 4 drops into the right ear 2 (two) times daily. for 7 days 7.5 mL 4/26/2023 5/3/2023 Warren Bowen NP    sulfamethoxazole-trimethoprim 800-160mg (BACTRIM DS) 800-160 mg Tab Take 1 tablet by mouth 2 (two) times daily. for 7 days 14 tablet 4/26/2023 5/3/2023 Warren Bowen NP    ferrous gluconate (FERGON) 324 MG tablet Take 1 tablet (324 mg total) by mouth 3 (three) times daily with meals. 90 tablet 4/26/2023 5/26/2023 Warren Bowen NP          Follow-up Information       Follow up With Specialties Details Why Contact Info    Saumya Quinonez MD Family Medicine Schedule an appointment as soon as possible for a visit in 2 days  [address] [facility] [place] [address] [phone]               Warren Bowen NP  04/26/23 1216       Warren Bowen NP  04/26/23 [id number]

## 2023-04-26 NOTE — ED NOTES
Patient identifiers verified and correct for Alisia Vines.    Pt present to er with sore throat.    LOC: The patient is awake, alert and aware of environment with an appropriate affect, the patient is oriented x 3 and speaking appropriately.  APPEARANCE: Patient resting comfortably and in no acute distress, patient is clean and well groomed, patient's clothing is properly fastened.  SKIN: The skin is warm and dry, color consistent with ethnicity, patient has normal skin turgor and moist mucus membranes, skin intact, no breakdown or bruising noted.  MUSCULOSKELETAL: Patient moving all extremities spontaneously.  RESPIRATORY: Airway is open and patent, respirations are spontaneous.  CARDIAC: Patient has a normal rate, no periphreal edema noted, capillary refill < 3 seconds.  ABDOMEN: Soft and non tender to palpation.

## 2023-04-28 ENCOUNTER — PATIENT OUTREACH (OUTPATIENT)
Dept: EMERGENCY MEDICINE | Facility: HOSPITAL | Age: 33
End: 2023-04-28
Payer: MEDICAID

## 2023-04-29 ENCOUNTER — HOSPITAL ENCOUNTER (OUTPATIENT)
Facility: HOSPITAL | Age: 33
Discharge: HOME | End: 2023-05-01
Attending: EMERGENCY MEDICINE | Admitting: HOSPITALIST
Payer: MEDICAID

## 2023-04-29 DIAGNOSIS — E46 MALNUTRITION, UNSPECIFIED TYPE: ICD-10-CM

## 2023-04-29 DIAGNOSIS — M32.9 SLE (SYSTEMIC LUPUS ERYTHEMATOSUS RELATED SYNDROME): Primary | Chronic | ICD-10-CM

## 2023-04-29 DIAGNOSIS — R07.9 CHEST PAIN: ICD-10-CM

## 2023-04-29 DIAGNOSIS — D59.9 ACUTE HEMOLYTIC ANEMIA: ICD-10-CM

## 2023-04-29 DIAGNOSIS — R53.1 WEAKNESS GENERALIZED: ICD-10-CM

## 2023-04-29 LAB
ABO + RH BLD: NORMAL
ALBUMIN SERPL BCP-MCNC: 2 G/DL (ref 3.5–5.2)
ALP SERPL-CCNC: 59 U/L (ref 55–135)
ALT SERPL W/O P-5'-P-CCNC: 10 U/L (ref 10–44)
AMPHET+METHAMPHET UR QL: NEGATIVE
ANION GAP SERPL CALC-SCNC: 9 MMOL/L (ref 8–16)
ANISOCYTOSIS BLD QL SMEAR: SLIGHT
AST SERPL-CCNC: 37 U/L (ref 10–40)
BACTERIA #/AREA URNS HPF: ABNORMAL /HPF
BARBITURATES UR QL SCN>200 NG/ML: NEGATIVE
BASOPHILS # BLD AUTO: 0.03 K/UL (ref 0–0.2)
BASOPHILS NFR BLD: 0.4 % (ref 0–1.9)
BENZODIAZ UR QL SCN>200 NG/ML: NEGATIVE
BILIRUB SERPL-MCNC: 0.5 MG/DL (ref 0.1–1)
BILIRUB UR QL STRIP: NEGATIVE
BLD GP AB SCN CELLS X3 SERPL QL: NORMAL
BUN SERPL-MCNC: 16 MG/DL (ref 6–20)
BZE UR QL SCN: NEGATIVE
CALCIUM SERPL-MCNC: 7.8 MG/DL (ref 8.7–10.5)
CANNABINOIDS UR QL SCN: ABNORMAL
CHLORIDE SERPL-SCNC: 100 MMOL/L (ref 95–110)
CLARITY UR: CLEAR
CO2 SERPL-SCNC: 21 MMOL/L (ref 23–29)
COLOR UR: YELLOW
CREAT SERPL-MCNC: 0.8 MG/DL (ref 0.5–1.4)
CREAT UR-MCNC: 218.1 MG/DL (ref 15–325)
DIFFERENTIAL METHOD: ABNORMAL
EOSINOPHIL # BLD AUTO: 0 K/UL (ref 0–0.5)
EOSINOPHIL NFR BLD: 0.4 % (ref 0–8)
ERYTHROCYTE [DISTWIDTH] IN BLOOD BY AUTOMATED COUNT: 16.6 % (ref 11.5–14.5)
ERYTHROCYTE [SEDIMENTATION RATE] IN BLOOD BY WESTERGREN METHOD: 55 MM/HR (ref 0–20)
EST. GFR  (NO RACE VARIABLE): >60 ML/MIN/1.73 M^2
GLUCOSE SERPL-MCNC: 91 MG/DL (ref 70–110)
GLUCOSE UR QL STRIP: NEGATIVE
HCG INTACT+B SERPL-ACNC: <1.2 MIU/ML
HCT VFR BLD AUTO: 24 % (ref 37–48.5)
HGB BLD-MCNC: 8.5 G/DL (ref 12–16)
HGB UR QL STRIP: ABNORMAL
HYALINE CASTS #/AREA URNS LPF: 8 /LPF
IMM GRANULOCYTES # BLD AUTO: 0.2 K/UL (ref 0–0.04)
IMM GRANULOCYTES NFR BLD AUTO: 2.7 % (ref 0–0.5)
KETONES UR QL STRIP: NEGATIVE
LDH SERPL L TO P-CCNC: 619 U/L (ref 110–260)
LEUKOCYTE ESTERASE UR QL STRIP: NEGATIVE
LYMPHOCYTES # BLD AUTO: 2.3 K/UL (ref 1–4.8)
LYMPHOCYTES NFR BLD: 31.4 % (ref 18–48)
MCH RBC QN AUTO: 29.8 PG (ref 27–31)
MCHC RBC AUTO-ENTMCNC: 35.4 G/DL (ref 32–36)
MCV RBC AUTO: 84 FL (ref 82–98)
METHADONE UR QL SCN>300 NG/ML: NEGATIVE
MICROSCOPIC COMMENT: ABNORMAL
MONOCYTES # BLD AUTO: 0.7 K/UL (ref 0.3–1)
MONOCYTES NFR BLD: 9.4 % (ref 4–15)
NEUTROPHILS # BLD AUTO: 4.1 K/UL (ref 1.8–7.7)
NEUTROPHILS NFR BLD: 55.7 % (ref 38–73)
NITRITE UR QL STRIP: NEGATIVE
NRBC BLD-RTO: 0 /100 WBC
OPIATES UR QL SCN: NEGATIVE
PCP UR QL SCN>25 NG/ML: NEGATIVE
PH UR STRIP: 7 [PH] (ref 5–8)
PLATELET # BLD AUTO: 241 K/UL (ref 150–450)
PMV BLD AUTO: 9.9 FL (ref 9.2–12.9)
POLYCHROMASIA BLD QL SMEAR: ABNORMAL
POTASSIUM SERPL-SCNC: 4 MMOL/L (ref 3.5–5.1)
PROT SERPL-MCNC: 5.7 G/DL (ref 6–8.4)
PROT UR QL STRIP: ABNORMAL
RBC # BLD AUTO: 2.85 M/UL (ref 4–5.4)
RBC #/AREA URNS HPF: 2 /HPF (ref 0–4)
SODIUM SERPL-SCNC: 130 MMOL/L (ref 136–145)
SP GR UR STRIP: >1.03 (ref 1–1.03)
SPECIMEN OUTDATE: NORMAL
SQUAMOUS #/AREA URNS HPF: 1 /HPF
TOXICOLOGY INFORMATION: ABNORMAL
URN SPEC COLLECT METH UR: ABNORMAL
UROBILINOGEN UR STRIP-ACNC: ABNORMAL EU/DL
WBC # BLD AUTO: 7.43 K/UL (ref 3.9–12.7)
WBC #/AREA URNS HPF: 2 /HPF (ref 0–5)

## 2023-04-29 PROCEDURE — 25000003 PHARM REV CODE 250: Performed by: EMERGENCY MEDICINE

## 2023-04-29 PROCEDURE — 81000 URINALYSIS NONAUTO W/SCOPE: CPT | Mod: 59 | Performed by: EMERGENCY MEDICINE

## 2023-04-29 PROCEDURE — 80053 COMPREHEN METABOLIC PANEL: CPT | Performed by: EMERGENCY MEDICINE

## 2023-04-29 PROCEDURE — 83615 LACTATE (LD) (LDH) ENZYME: CPT | Performed by: EMERGENCY MEDICINE

## 2023-04-29 PROCEDURE — 80307 DRUG TEST PRSMV CHEM ANLYZR: CPT | Performed by: EMERGENCY MEDICINE

## 2023-04-29 PROCEDURE — 84702 CHORIONIC GONADOTROPIN TEST: CPT | Performed by: EMERGENCY MEDICINE

## 2023-04-29 PROCEDURE — 11000001 HC ACUTE MED/SURG PRIVATE ROOM

## 2023-04-29 PROCEDURE — 63600175 PHARM REV CODE 636 W HCPCS: Performed by: EMERGENCY MEDICINE

## 2023-04-29 PROCEDURE — 85025 COMPLETE CBC W/AUTO DIFF WBC: CPT | Performed by: EMERGENCY MEDICINE

## 2023-04-29 PROCEDURE — 85045 AUTOMATED RETICULOCYTE COUNT: CPT | Performed by: EMERGENCY MEDICINE

## 2023-04-29 PROCEDURE — 99291 CRITICAL CARE FIRST HOUR: CPT | Mod: 25

## 2023-04-29 PROCEDURE — 96374 THER/PROPH/DIAG INJ IV PUSH: CPT

## 2023-04-29 PROCEDURE — 86900 BLOOD TYPING SEROLOGIC ABO: CPT | Performed by: EMERGENCY MEDICINE

## 2023-04-29 PROCEDURE — 85651 RBC SED RATE NONAUTOMATED: CPT | Performed by: EMERGENCY MEDICINE

## 2023-04-29 RX ORDER — METOCLOPRAMIDE HYDROCHLORIDE 5 MG/ML
10 INJECTION INTRAMUSCULAR; INTRAVENOUS
Status: COMPLETED | OUTPATIENT
Start: 2023-04-29 | End: 2023-04-29

## 2023-04-29 RX ADMIN — SODIUM CHLORIDE 1000 ML: 9 INJECTION, SOLUTION INTRAVENOUS at 11:04

## 2023-04-29 RX ADMIN — METOCLOPRAMIDE 10 MG: 5 INJECTION, SOLUTION INTRAMUSCULAR; INTRAVENOUS at 09:04

## 2023-04-30 PROBLEM — M32.9 HEMOLYTIC ANEMIA ASSOCIATED WITH SYSTEMIC LUPUS ERYTHEMATOSUS: Status: ACTIVE | Noted: 2023-04-30

## 2023-04-30 PROBLEM — J06.9 UPPER RESPIRATORY INFECTION: Status: ACTIVE | Noted: 2023-04-30

## 2023-04-30 PROBLEM — R11.2 NAUSEA AND VOMITING: Status: ACTIVE | Noted: 2018-12-04

## 2023-04-30 PROBLEM — D59.10 HEMOLYTIC ANEMIA ASSOCIATED WITH SYSTEMIC LUPUS ERYTHEMATOSUS: Status: ACTIVE | Noted: 2023-04-30

## 2023-04-30 LAB
ANISOCYTOSIS BLD QL SMEAR: SLIGHT
ANISOCYTOSIS BLD QL SMEAR: SLIGHT
BASOPHILS # BLD AUTO: 0.01 K/UL (ref 0–0.2)
BASOPHILS # BLD AUTO: 0.01 K/UL (ref 0–0.2)
BASOPHILS NFR BLD: 0.2 % (ref 0–1.9)
BASOPHILS NFR BLD: 0.2 % (ref 0–1.9)
DIFFERENTIAL METHOD: ABNORMAL
DIFFERENTIAL METHOD: ABNORMAL
EOSINOPHIL # BLD AUTO: 0 K/UL (ref 0–0.5)
EOSINOPHIL # BLD AUTO: 0 K/UL (ref 0–0.5)
EOSINOPHIL NFR BLD: 0.2 % (ref 0–8)
EOSINOPHIL NFR BLD: 0.5 % (ref 0–8)
ERYTHROCYTE [DISTWIDTH] IN BLOOD BY AUTOMATED COUNT: 16.3 % (ref 11.5–14.5)
ERYTHROCYTE [DISTWIDTH] IN BLOOD BY AUTOMATED COUNT: 20.8 % (ref 11.5–14.5)
GROUP A STREP, MOLECULAR: NEGATIVE
HCT VFR BLD AUTO: 19.5 % (ref 37–48.5)
HCT VFR BLD AUTO: 21.9 % (ref 37–48.5)
HGB BLD-MCNC: 7 G/DL (ref 12–16)
HGB BLD-MCNC: 7.5 G/DL (ref 12–16)
IMM GRANULOCYTES # BLD AUTO: 0.21 K/UL (ref 0–0.04)
IMM GRANULOCYTES # BLD AUTO: 0.22 K/UL (ref 0–0.04)
IMM GRANULOCYTES NFR BLD AUTO: 3.4 % (ref 0–0.5)
IMM GRANULOCYTES NFR BLD AUTO: 3.6 % (ref 0–0.5)
INFLUENZA A, MOLECULAR: NEGATIVE
INFLUENZA B, MOLECULAR: NEGATIVE
LYMPHOCYTES # BLD AUTO: 1.7 K/UL (ref 1–4.8)
LYMPHOCYTES # BLD AUTO: 1.8 K/UL (ref 1–4.8)
LYMPHOCYTES NFR BLD: 27 % (ref 18–48)
LYMPHOCYTES NFR BLD: 29.5 % (ref 18–48)
MCH RBC QN AUTO: 28.5 PG (ref 27–31)
MCH RBC QN AUTO: 32.6 PG (ref 27–31)
MCHC RBC AUTO-ENTMCNC: 34.7 G/DL (ref 32–36)
MCHC RBC AUTO-ENTMCNC: 35.9 G/DL (ref 32–36)
MCV RBC AUTO: 82 FL (ref 82–98)
MCV RBC AUTO: 91 FL (ref 82–98)
MONOCYTES # BLD AUTO: 0.6 K/UL (ref 0.3–1)
MONOCYTES # BLD AUTO: 0.7 K/UL (ref 0.3–1)
MONOCYTES NFR BLD: 10.2 % (ref 4–15)
MONOCYTES NFR BLD: 10.7 % (ref 4–15)
NEUTROPHILS # BLD AUTO: 3.4 K/UL (ref 1.8–7.7)
NEUTROPHILS # BLD AUTO: 3.6 K/UL (ref 1.8–7.7)
NEUTROPHILS NFR BLD: 56 % (ref 38–73)
NEUTROPHILS NFR BLD: 58.5 % (ref 38–73)
NRBC BLD-RTO: 1 /100 WBC
NRBC BLD-RTO: 1 /100 WBC
OVALOCYTES BLD QL SMEAR: ABNORMAL
PLATELET # BLD AUTO: 211 K/UL (ref 150–450)
PLATELET # BLD AUTO: 219 K/UL (ref 150–450)
PLATELET BLD QL SMEAR: ABNORMAL
PMV BLD AUTO: 10.5 FL (ref 9.2–12.9)
PMV BLD AUTO: 9.6 FL (ref 9.2–12.9)
POLYCHROMASIA BLD QL SMEAR: ABNORMAL
POLYCHROMASIA BLD QL SMEAR: ABNORMAL
RBC # BLD AUTO: 2.15 M/UL (ref 4–5.4)
RBC # BLD AUTO: 2.67 M/UL (ref 4–5.4)
RETICS/RBC NFR AUTO: 3.7 % (ref 0.5–2.5)
SARS-COV-2 RDRP RESP QL NAA+PROBE: NEGATIVE
SPECIMEN SOURCE: NORMAL
WBC # BLD AUTO: 6.01 K/UL (ref 3.9–12.7)
WBC # BLD AUTO: 6.1 K/UL (ref 3.9–12.7)

## 2023-04-30 PROCEDURE — 63600175 PHARM REV CODE 636 W HCPCS: Performed by: NURSE PRACTITIONER

## 2023-04-30 PROCEDURE — 27000207 HC ISOLATION

## 2023-04-30 PROCEDURE — 85025 COMPLETE CBC W/AUTO DIFF WBC: CPT | Performed by: FAMILY MEDICINE

## 2023-04-30 PROCEDURE — 96361 HYDRATE IV INFUSION ADD-ON: CPT

## 2023-04-30 PROCEDURE — 36415 COLL VENOUS BLD VENIPUNCTURE: CPT | Performed by: NURSE PRACTITIONER

## 2023-04-30 PROCEDURE — 21400001 HC TELEMETRY ROOM

## 2023-04-30 PROCEDURE — U0002 COVID-19 LAB TEST NON-CDC: HCPCS | Performed by: HOSPITALIST

## 2023-04-30 PROCEDURE — 25000003 PHARM REV CODE 250: Performed by: NURSE PRACTITIONER

## 2023-04-30 PROCEDURE — 85025 COMPLETE CBC W/AUTO DIFF WBC: CPT | Mod: 91 | Performed by: NURSE PRACTITIONER

## 2023-04-30 PROCEDURE — G0378 HOSPITAL OBSERVATION PER HR: HCPCS

## 2023-04-30 PROCEDURE — 87502 INFLUENZA DNA AMP PROBE: CPT | Performed by: HOSPITALIST

## 2023-04-30 PROCEDURE — 87651 STREP A DNA AMP PROBE: CPT | Performed by: HOSPITALIST

## 2023-04-30 PROCEDURE — 36415 COLL VENOUS BLD VENIPUNCTURE: CPT | Performed by: FAMILY MEDICINE

## 2023-04-30 PROCEDURE — 94761 N-INVAS EAR/PLS OXIMETRY MLT: CPT

## 2023-04-30 PROCEDURE — 94799 UNLISTED PULMONARY SVC/PX: CPT

## 2023-04-30 RX ORDER — TALC
6 POWDER (GRAM) TOPICAL NIGHTLY PRN
Status: DISCONTINUED | OUTPATIENT
Start: 2023-04-30 | End: 2023-05-01 | Stop reason: HOSPADM

## 2023-04-30 RX ORDER — FERROUS GLUCONATE 324(37.5)
324 TABLET ORAL
Status: DISCONTINUED | OUTPATIENT
Start: 2023-04-30 | End: 2023-05-01 | Stop reason: HOSPADM

## 2023-04-30 RX ORDER — FAMOTIDINE 20 MG/1
20 TABLET, FILM COATED ORAL 2 TIMES DAILY
Status: DISCONTINUED | OUTPATIENT
Start: 2023-04-30 | End: 2023-05-01 | Stop reason: HOSPADM

## 2023-04-30 RX ORDER — POLYETHYLENE GLYCOL 3350 17 G/17G
17 POWDER, FOR SOLUTION ORAL DAILY PRN
Status: DISCONTINUED | OUTPATIENT
Start: 2023-04-30 | End: 2023-05-01 | Stop reason: HOSPADM

## 2023-04-30 RX ORDER — IPRATROPIUM BROMIDE AND ALBUTEROL SULFATE 2.5; .5 MG/3ML; MG/3ML
3 SOLUTION RESPIRATORY (INHALATION) EVERY 4 HOURS PRN
Status: DISCONTINUED | OUTPATIENT
Start: 2023-04-30 | End: 2023-05-01 | Stop reason: HOSPADM

## 2023-04-30 RX ORDER — NALOXONE HCL 0.4 MG/ML
0.02 VIAL (ML) INJECTION
Status: DISCONTINUED | OUTPATIENT
Start: 2023-04-30 | End: 2023-05-01 | Stop reason: HOSPADM

## 2023-04-30 RX ORDER — ARIPIPRAZOLE 2 MG/1
2 TABLET ORAL DAILY
Status: DISCONTINUED | OUTPATIENT
Start: 2023-04-30 | End: 2023-05-01 | Stop reason: HOSPADM

## 2023-04-30 RX ORDER — PROMETHAZINE HYDROCHLORIDE 25 MG/1
25 TABLET ORAL EVERY 6 HOURS PRN
Status: DISCONTINUED | OUTPATIENT
Start: 2023-04-30 | End: 2023-05-01 | Stop reason: HOSPADM

## 2023-04-30 RX ORDER — GLUCAGON 1 MG
1 KIT INJECTION
Status: DISCONTINUED | OUTPATIENT
Start: 2023-04-30 | End: 2023-05-01 | Stop reason: HOSPADM

## 2023-04-30 RX ORDER — ACETAMINOPHEN 650 MG/1
650 SUPPOSITORY RECTAL EVERY 4 HOURS PRN
Status: DISCONTINUED | OUTPATIENT
Start: 2023-04-30 | End: 2023-05-01 | Stop reason: HOSPADM

## 2023-04-30 RX ORDER — SODIUM CHLORIDE 9 MG/ML
INJECTION, SOLUTION INTRAVENOUS CONTINUOUS
Status: DISCONTINUED | OUTPATIENT
Start: 2023-04-30 | End: 2023-05-01

## 2023-04-30 RX ORDER — ONDANSETRON HYDROCHLORIDE 4 MG/5ML
4 SOLUTION ORAL ONCE
Status: COMPLETED | OUTPATIENT
Start: 2023-04-30 | End: 2023-04-30

## 2023-04-30 RX ORDER — VALACYCLOVIR HYDROCHLORIDE 500 MG/1
1000 TABLET, FILM COATED ORAL DAILY
Status: DISCONTINUED | OUTPATIENT
Start: 2023-04-30 | End: 2023-05-01 | Stop reason: HOSPADM

## 2023-04-30 RX ORDER — HYDROXYCHLOROQUINE SULFATE 200 MG/1
400 TABLET, FILM COATED ORAL DAILY
Status: DISCONTINUED | OUTPATIENT
Start: 2023-04-30 | End: 2023-05-01 | Stop reason: HOSPADM

## 2023-04-30 RX ORDER — MORPHINE SULFATE 4 MG/ML
2 INJECTION, SOLUTION INTRAMUSCULAR; INTRAVENOUS EVERY 4 HOURS PRN
Status: DISCONTINUED | OUTPATIENT
Start: 2023-04-30 | End: 2023-05-01 | Stop reason: HOSPADM

## 2023-04-30 RX ORDER — SODIUM CHLORIDE, SODIUM LACTATE, POTASSIUM CHLORIDE, CALCIUM CHLORIDE 600; 310; 30; 20 MG/100ML; MG/100ML; MG/100ML; MG/100ML
INJECTION, SOLUTION INTRAVENOUS CONTINUOUS
Status: DISCONTINUED | OUTPATIENT
Start: 2023-04-30 | End: 2023-04-30

## 2023-04-30 RX ORDER — ACETAMINOPHEN 325 MG/1
650 TABLET ORAL EVERY 8 HOURS PRN
Status: DISCONTINUED | OUTPATIENT
Start: 2023-04-30 | End: 2023-05-01 | Stop reason: HOSPADM

## 2023-04-30 RX ORDER — SODIUM CHLORIDE 0.9 % (FLUSH) 0.9 %
3 SYRINGE (ML) INJECTION EVERY 12 HOURS PRN
Status: DISCONTINUED | OUTPATIENT
Start: 2023-04-30 | End: 2023-05-01 | Stop reason: HOSPADM

## 2023-04-30 RX ORDER — HYDROCODONE BITARTRATE AND ACETAMINOPHEN 5; 325 MG/1; MG/1
1 TABLET ORAL EVERY 6 HOURS PRN
Status: DISCONTINUED | OUTPATIENT
Start: 2023-04-30 | End: 2023-05-01 | Stop reason: HOSPADM

## 2023-04-30 RX ORDER — FLUOXETINE HYDROCHLORIDE 20 MG/1
40 CAPSULE ORAL DAILY
Status: DISCONTINUED | OUTPATIENT
Start: 2023-04-30 | End: 2023-05-01 | Stop reason: HOSPADM

## 2023-04-30 RX ORDER — AMLODIPINE BESYLATE 5 MG/1
5 TABLET ORAL DAILY
Status: DISCONTINUED | OUTPATIENT
Start: 2023-04-30 | End: 2023-05-01 | Stop reason: HOSPADM

## 2023-04-30 RX ORDER — ONDANSETRON 2 MG/ML
4 INJECTION INTRAMUSCULAR; INTRAVENOUS EVERY 8 HOURS PRN
Status: DISCONTINUED | OUTPATIENT
Start: 2023-04-30 | End: 2023-05-01 | Stop reason: HOSPADM

## 2023-04-30 RX ORDER — IBUPROFEN 200 MG
16 TABLET ORAL
Status: DISCONTINUED | OUTPATIENT
Start: 2023-04-30 | End: 2023-05-01 | Stop reason: HOSPADM

## 2023-04-30 RX ORDER — PREDNISONE 5 MG/1
5 TABLET ORAL DAILY
Status: DISCONTINUED | OUTPATIENT
Start: 2023-04-30 | End: 2023-05-01

## 2023-04-30 RX ORDER — MAG HYDROX/ALUMINUM HYD/SIMETH 200-200-20
30 SUSPENSION, ORAL (FINAL DOSE FORM) ORAL 4 TIMES DAILY PRN
Status: DISCONTINUED | OUTPATIENT
Start: 2023-04-30 | End: 2023-05-01 | Stop reason: HOSPADM

## 2023-04-30 RX ORDER — SIMETHICONE 80 MG
1 TABLET,CHEWABLE ORAL 4 TIMES DAILY PRN
Status: DISCONTINUED | OUTPATIENT
Start: 2023-04-30 | End: 2023-05-01 | Stop reason: HOSPADM

## 2023-04-30 RX ORDER — IBUPROFEN 200 MG
24 TABLET ORAL
Status: DISCONTINUED | OUTPATIENT
Start: 2023-04-30 | End: 2023-05-01 | Stop reason: HOSPADM

## 2023-04-30 RX ADMIN — Medication 324 MG: at 12:04

## 2023-04-30 RX ADMIN — SODIUM CHLORIDE, POTASSIUM CHLORIDE, SODIUM LACTATE AND CALCIUM CHLORIDE: 600; 310; 30; 20 INJECTION, SOLUTION INTRAVENOUS at 01:04

## 2023-04-30 RX ADMIN — SODIUM CHLORIDE: 9 INJECTION, SOLUTION INTRAVENOUS at 04:04

## 2023-04-30 RX ADMIN — PREDNISONE 5 MG: 5 TABLET ORAL at 03:04

## 2023-04-30 RX ADMIN — HYDROXYCHLOROQUINE SULFATE 400 MG: 200 TABLET, FILM COATED ORAL at 10:04

## 2023-04-30 RX ADMIN — FAMOTIDINE 20 MG: 20 TABLET, FILM COATED ORAL at 09:04

## 2023-04-30 RX ADMIN — FLUOXETINE 40 MG: 20 CAPSULE ORAL at 10:04

## 2023-04-30 RX ADMIN — ONDANSETRON HYDROCHLORIDE 4 MG: 4 SOLUTION ORAL at 04:04

## 2023-04-30 RX ADMIN — VALACYCLOVIR HYDROCHLORIDE 1000 MG: 500 TABLET, FILM COATED ORAL at 10:04

## 2023-04-30 RX ADMIN — ARIPIPRAZOLE 2 MG: 2 TABLET ORAL at 12:04

## 2023-04-30 NOTE — CARE UPDATE
Ms. Vines is a 32 y.o. female with a PMH  has a past medical history of  GERD, HSV-2 infection, Lupus nephritis, depression, Sjogren's syndrome, Systemic lupus erythematous who was admitted to [facility] for URI, anemia N/V/D.   Work up notable for hgb 8.5 (was 10.5 on 4/26, finished menstrual cycle on 4/28), elevated sed and LD.  UDS + THC.  Patient was started on IVF and hgb trended.       This afternoon, Hgb 7.5, patient has had some nausea as well as episode of diarrhea which she states is dark in color, denies any BRB per rectum.    Will continue IVF, antiemetics, trend hgb, start PPI, stool studies and stool for OCB ordered.  Also started low dose prednisone as she takes this as needed for joint pain and she is complaining of this as well.     [facility]

## 2023-04-30 NOTE — PHARMACY MED REC
"Admission Medication History     The home medication history was taken by Herminio Diop.    You may go to "Admission" then "Reconcile Home Medications" tabs to review and/or act upon these items.     The home medication list has been updated by the Pharmacy department.   Please read ALL comments highlighted in yellow.   Please address this information as you see fit.    Feel free to contact us if you have any questions or require assistance.      Medications listed below were obtained from: Analytic software- Telly and Medical records  (Not in a hospital admission)      Herminio Diop  [id number]    Current Outpatient Medications on File Prior to Encounter   Medication Sig Dispense Refill Last Dose    amLODIPine (NORVASC) 5 MG tablet Take 1 tablet (5 mg total) by mouth once daily. 90 tablet 0 Past Week    ARIPiprazole (ABILIFY) 2 MG Tab TAKE 1 TABLET(2 MG) BY MOUTH EVERY DAY 30 tablet 3 Past Week    ciclopirox (PENLAC) 8 % Soln Apply to nail and nail fold once daily for up to 1 year 6.6 mL 6 Past Month    ciprofloxacin-dexAMETHasone 0.3-0.1% (CIPRODEX) 0.3-0.1 % DrpS Place 4 drops into the right ear 2 (two) times daily. for 7 days 7.5 mL 0 Past Week    ferrous gluconate (FERGON) 324 MG tablet Take 1 tablet (324 mg total) by mouth 3 (three) times daily with meals. 90 tablet 3     FLUoxetine 40 MG capsule TAKE 1 CAPSULE(40 MG) BY MOUTH EVERY DAY 30 capsule 3 Past Week    fluticasone propionate (FLONASE) 50 mcg/actuation nasal spray SHAKE LIQUID AND USE 2 SPRAYS(100 MCG) IN EACH NOSTRIL EVERY DAY 16 g 1 Past Week    hydrOXYchloroQUINE (PLAQUENIL) 200 mg tablet TAKE 2 TABLETS(400 MG) BY MOUTH EVERY DAY 60 tablet 2 Past Week    losartan-hydrochlorothiazide 100-12.5 mg (HYZAAR) 100-12.5 mg Tab Take 1 tablet by mouth once daily. 90 tablet 0 Past Week    sulfamethoxazole-trimethoprim 800-160mg (BACTRIM DS) 800-160 mg Tab Take 1 tablet by mouth 2 (two) times daily. for 7 days 14 tablet 0 4/29/2023    valACYclovir " (VALTREX) 1000 MG tablet Take 1 tablet (1,000 mg total) by mouth once daily. 90 tablet 3 Past Week    mupirocin (BACTROBAN) 2 % ointment Apply topically 3 (three) times daily. for 10 days 30 g 0                            .

## 2023-04-30 NOTE — ED PROVIDER NOTES
SCRIBE #1 NOTE: I, Adelia Bernstein, am scribing for, and in the presence of, Елена Harden MD. I have scribed the entire note.       History     Chief Complaint   Patient presents with    decreased appetite     Joint pain x 2 weeks pt states she feeling worse, she feels like it could be Lupus flare up       Review of patient's allergies indicates:  No Known Allergies      History of Present Illness     HPI    4/29/2023, 8:21 PM  History obtained from the patient      History of Present Illness: Alisia Vines is a 32 y.o. female patient with a PMHx of lupus, anemia, GERD, HSV-2, thrombocytopenia, and Sjogren's syndrome who presents to the Emergency Department for evaluation of decreased appetite which onset 1 week PTA. Symptoms are constant and moderate in severity. Pt was treated in this ED on 4/26/23 for a sinus infection and Rx Abx. No mitigating or exacerbating factors reported. Associated sxs include HA, bilateral hip pain, nausea, vomiting, congestion, sore throat, and difficulty sleeping. Patient denies any cough, rhinorrhea, dysuria, blood in stool, rash, and fever, and all other sxs at this time. Prior Tx includes ciprofloxacin HCL, ferrous gluconate, and sulfamethoxazole/trimethoprim. Pt reports last lupus flare up was 9 years ago. Pt's LMP 2 days PTA. Pt states they are following up with their PCP on 05/01/23. No further complaints or concerns at this time.       Arrival mode: Personal vehicle    PCP: Saumya Quinonez MD        Past Medical History:  Past Medical History:   Diagnosis Date    Allergic rhinitis     Anemia     Condyloma acuminata     COVID-19 virus infection 07/2021    Encounter for blood transfusion     GERD (gastroesophageal reflux disease)     History of fetal anomaly in prior pregnancy, currently pregnant, unspecified trimester 03/08/2017    Pacemaker placed    HSV-2 (herpes simplex virus 2) infection     Lupus nephritis     Mood disorder     Overweight(278.02)     Sjogren's  syndrome     Systemic lupus complicating pregnancy 2019    Systemic lupus erythematosus     Thrombocytopenia        Past Surgical History:  Past Surgical History:   Procedure Laterality Date     SECTION WITH TUBAL LIGATION N/A 2019    Procedure:  SECTION, WITH TUBAL LIGATION;  Surgeon: VERONICA Valdovinos MD;  Location: [facility] L&D;  Service: OB/GYN;  Laterality: N/A;     SECTION, LOW TRANSVERSE      x2    LYMPH NODE BIOPSY Right 2023    Procedure: BIOPSY, LYMPH NODE;  Surgeon: Rivera Sandoval MD;  Location: [facility] OR;  Service: ENT;  Laterality: Right;  right deep cervial lymph node excision    NECK MASS EXCISION Right 2023    Procedure: EXCISION, MASS, NECK;  Surgeon: Rivera Sandoval MD;  Location: [facility] OR;  Service: ENT;  Laterality: Right;  right deep cervial lymph node excision    RENAL BIOPSY  2013         Family History:  Family History   Problem Relation Age of Onset    Hypertension Mother     Eczema Brother     Hypertension Maternal Grandmother     Diabetes Paternal Grandmother     Heart disease Son     Arrhythmia Son         CHB    Stroke Neg Hx     Cancer Neg Hx        Social History:  Social History     Tobacco Use    Smoking status: Never     Passive exposure: Never    Smokeless tobacco: Never   Substance and Sexual Activity    Alcohol use: No     Alcohol/week: 0.0 standard drinks    Drug use: Yes     Types: Marijuana    Sexual activity: Not Currently     Partners: Male     Birth control/protection: None        Review of Systems     Review of Systems   Constitutional:  Positive for appetite change (decreased). Negative for fever.   HENT:  Positive for congestion and sore throat. Negative for rhinorrhea.    Respiratory:  Negative for cough and shortness of breath.    Cardiovascular:  Negative for chest pain.   Gastrointestinal:  Positive for nausea and vomiting. Negative for blood in stool.   Genitourinary:  Negative for dysuria.   Musculoskeletal:  Positive  for arthralgias (Bilateral hip). Negative for back pain.   Skin:  Negative for rash.   Neurological:  Positive for headaches. Negative for weakness.   Hematological:  Does not bruise/bleed easily.   Psychiatric/Behavioral:  Positive for sleep disturbance.    All other systems reviewed and are negative.     Physical Exam     Initial Vitals [04/29/23 1928]   BP Pulse Resp Temp SpO2   130/82 107 16 98.5 °F (36.9 °C) 100 %      MAP       --          Physical Exam  Nursing Notes and Vital Signs Reviewed.  Constitutional: Patient is in no acute distress. Well-developed and well-nourished.  Head: Atraumatic. Normocephalic.  Eyes: PERRL. EOM intact. Conjunctivae are not pale. No scleral icterus.  ENT: Mucous membranes are moist. Oropharynx is clear and symmetric.    Neck: Supple. Full ROM. No lymphadenopathy.  Cardiovascular: Regular rate. Regular rhythm. No murmurs, rubs, or gallops. Distal pulses are 2+ and symmetric.  Pulmonary/Chest: No respiratory distress. Clear to auscultation bilaterally. No wheezing or rales.  Abdominal: Soft and non-distended.  There is no tenderness.  No rebound, guarding, or rigidity. Good bowel sounds.  Genitourinary: No CVA tenderness  Musculoskeletal: Moves all extremities. No obvious deformities. No edema. No calf tenderness.  Skin: Warm and dry.  Neurological:  Alert, awake, and appropriate.  Normal speech.  No acute focal neurological deficits are appreciated.  Psychiatric: Normal affect. Good eye contact. Appropriate in content.     ED Course   Critical Care    Date/Time: 4/29/2023 11:46 PM  Performed by: Елена Harden MD  Authorized by: Елена Harden MD   Direct patient critical care time: 10 minutes  Additional history critical care time: 5 minutes  Ordering / reviewing critical care time: 5 minutes  Documentation critical care time: 5 minutes  Consulting other physicians critical care time: 10 minutes  Total critical care time (exclusive of procedural time) : 35 minutes  Critical care  "time was exclusive of separately billable procedures and treating other patients and teaching time.  Critical care was necessary to treat or prevent imminent or life-threatening deterioration of the following conditions: acute hemolytic anemia.  Critical care was time spent personally by me on the following activities: blood draw for specimens, development of treatment plan with patient or surrogate, discussions with consultants, interpretation of cardiac output measurements, evaluation of patient's response to treatment, examination of patient, obtaining history from patient or surrogate, ordering and performing treatments and interventions, ordering and review of laboratory studies, ordering and review of radiographic studies, pulse oximetry, re-evaluation of patient's condition and review of old charts.      ED Vital Signs:  Vitals:    04/29/23 1928 04/29/23 2030 04/29/23 2131 04/29/23 2230   BP: 130/82 120/76 116/70 (!) 106/59   Pulse: 107 99 96 97   Resp: 16 18 18    Temp: 98.5 °F (36.9 °C)      TempSrc: Oral      SpO2: 100% 100% 100% 100%   Weight: 72.8 kg (160 lb 9.7 oz)      Height: 5' 5" (1.651 m)       04/29/23 2300 04/30/23 0000 04/30/23 0130   BP: 107/65 122/82 110/60   Pulse: 94 90 98   Resp:      Temp:      TempSrc:      SpO2: 100% 100% 95%   Weight:      Height:          Abnormal Lab Results:  Labs Reviewed   CBC W/ AUTO DIFFERENTIAL - Abnormal; Notable for the following components:       Result Value    RBC 2.85 (*)     Hemoglobin 8.5 (*)     Hematocrit 24.0 (*)     RDW 16.6 (*)     Immature Granulocytes 2.7 (*)     Immature Grans (Abs) 0.20 (*)     All other components within normal limits    Narrative:     Release to patient->Immediate   COMPREHENSIVE METABOLIC PANEL - Abnormal; Notable for the following components:    Sodium 130 (*)     CO2 21 (*)     Calcium 7.8 (*)     Total Protein 5.7 (*)     Albumin 2.0 (*)     All other components within normal limits    Narrative:     Release to " patient->Immediate   SEDIMENTATION RATE - Abnormal; Notable for the following components:    Sed Rate 55 (*)     All other components within normal limits    Narrative:     Release to patient->Immediate   URINALYSIS, REFLEX TO URINE CULTURE - Abnormal; Notable for the following components:    Specific Gravity, UA >1.030 (*)     Protein, UA 3+ (*)     Occult Blood UA 3+ (*)     Urobilinogen, UA 4.0-6.0 (*)     All other components within normal limits    Narrative:     Specimen Source->Urine   DRUG SCREEN PANEL, URINE EMERGENCY - Abnormal; Notable for the following components:    THC Presumptive Positive (*)     All other components within normal limits    Narrative:     Specimen Source->Urine   URINALYSIS MICROSCOPIC - Abnormal; Notable for the following components:    Hyaline Casts, UA 8 (*)     All other components within normal limits    Narrative:     Specimen Source->Urine   LACTATE DEHYDROGENASE - Abnormal; Notable for the following components:     (*)     All other components within normal limits   RETICULOCYTES - Abnormal; Notable for the following components:    Retic 3.7 (*)     All other components within normal limits    Narrative:     Release to patient->Immediate   INFLUENZA A & B BY MOLECULAR   GROUP A STREP, MOLECULAR   THROAT SCREEN, RAPID STREP   HCG, QUANTITATIVE    Narrative:     Release to patient->Immediate   SARS-COV-2 RNA AMPLIFICATION, QUAL   RETICULOCYTES   TYPE & SCREEN        All Lab Results:  Results for orders placed or performed during the hospital encounter of 04/29/23   Influenza A & B by Molecular    Specimen: Nasopharyngeal Swab   Result Value Ref Range    Influenza A, Molecular Negative Negative    Influenza B, Molecular Negative Negative    Flu A & B Source NP    Group A Strep, Molecular    Specimen: Throat   Result Value Ref Range    Group A Strep, Molecular Negative Negative   CBC auto differential   Result Value Ref Range    WBC 7.43 3.90 - 12.70 K/uL    RBC 2.85 (L)  4.00 - 5.40 M/uL    Hemoglobin 8.5 (L) 12.0 - 16.0 g/dL    Hematocrit 24.0 (L) 37.0 - 48.5 %    MCV 84 82 - 98 fL    MCH 29.8 27.0 - 31.0 pg    MCHC 35.4 32.0 - 36.0 g/dL    RDW 16.6 (H) 11.5 - 14.5 %    Platelets 241 150 - 450 K/uL    MPV 9.9 9.2 - 12.9 fL    Immature Granulocytes 2.7 (H) 0.0 - 0.5 %    Gran # (ANC) 4.1 1.8 - 7.7 K/uL    Immature Grans (Abs) 0.20 (H) 0.00 - 0.04 K/uL    Lymph # 2.3 1.0 - 4.8 K/uL    Mono # 0.7 0.3 - 1.0 K/uL    Eos # 0.0 0.0 - 0.5 K/uL    Baso # 0.03 0.00 - 0.20 K/uL    nRBC 0 0 /100 WBC    Gran % 55.7 38.0 - 73.0 %    Lymph % 31.4 18.0 - 48.0 %    Mono % 9.4 4.0 - 15.0 %    Eosinophil % 0.4 0.0 - 8.0 %    Basophil % 0.4 0.0 - 1.9 %    Aniso Slight     Poly Occasional     Differential Method Automated    Comprehensive metabolic panel   Result Value Ref Range    Sodium 130 (L) 136 - 145 mmol/L    Potassium 4.0 3.5 - 5.1 mmol/L    Chloride 100 95 - 110 mmol/L    CO2 21 (L) 23 - 29 mmol/L    Glucose 91 70 - 110 mg/dL    BUN 16 6 - 20 mg/dL    Creatinine 0.8 0.5 - 1.4 mg/dL    Calcium 7.8 (L) 8.7 - 10.5 mg/dL    Total Protein 5.7 (L) 6.0 - 8.4 g/dL    Albumin 2.0 (L) 3.5 - 5.2 g/dL    Total Bilirubin 0.5 0.1 - 1.0 mg/dL    Alkaline Phosphatase 59 55 - 135 U/L    AST 37 10 - 40 U/L    ALT 10 10 - 44 U/L    Anion Gap 9 8 - 16 mmol/L    eGFR >60 >60 mL/min/1.73 m^2   Sedimentation rate   Result Value Ref Range    Sed Rate 55 (H) 0 - 20 mm/Hr   Urinalysis, Reflex to Urine Culture Urine, Clean Catch    Specimen: Urine   Result Value Ref Range    Specimen UA Urine, Clean Catch     Color, UA Yellow Yellow, Straw, Erin    Appearance, UA Clear Clear    pH, UA 7.0 5.0 - 8.0    Specific Gravity, UA >1.030 (A) 1.005 - 1.030    Protein, UA 3+ (A) Negative    Glucose, UA Negative Negative    Ketones, UA Negative Negative    Bilirubin (UA) Negative Negative    Occult Blood UA 3+ (A) Negative    Nitrite, UA Negative Negative    Urobilinogen, UA 4.0-6.0 (A) <2.0 EU/dL    Leukocytes, UA Negative  Negative   Drug screen panel, emergency   Result Value Ref Range    Benzodiazepines Negative Negative    Methadone metabolites Negative Negative    Cocaine (Metab.) Negative Negative    Opiate Scrn, Ur Negative Negative    Barbiturate Screen, Ur Negative Negative    Amphetamine Screen, Ur Negative Negative    THC Presumptive Positive (A) Negative    Phencyclidine Negative Negative    Creatinine, Urine 218.1 15.0 - 325.0 mg/dL    Toxicology Information SEE COMMENT    hCG, quantitative, pregnancy   Result Value Ref Range    HCG Quant <1.2 See Text mIU/mL   Urinalysis Microscopic   Result Value Ref Range    RBC, UA 2 0 - 4 /hpf    WBC, UA 2 0 - 5 /hpf    Bacteria None None-Occ /hpf    Squam Epithel, UA 1 /hpf    Hyaline Casts, UA 8 (A) 0-1/lpf /lpf    Microscopic Comment SEE COMMENT    Lactate dehydrogenase   Result Value Ref Range     (H) 110 - 260 U/L   COVID-19 Rapid Screening   Result Value Ref Range    SARS-CoV-2 RNA, Amplification, Qual Negative Negative   Reticulocytes   Result Value Ref Range    Retic 3.7 (H) 0.5 - 2.5 %   Type & Screen   Result Value Ref Range    Group & Rh B POS     Indirect Angel NEG     Specimen Outdate 05/02/2023 23:59      *Note: Due to a large number of results and/or encounters for the requested time period, some results have not been displayed. A complete set of results can be found in Results Review.         Imaging Results:  Imaging Results    None                 The Emergency Provider reviewed the vital signs and test results, which are outlined above.     ED Discussion       11:46 PM: Discussed case with Jackson Chavez MD ([facility]). Dr. hCavez agrees with current care and management of pt and accepts admission.   Admitting Service: [facility]  Admitting Physician: Dr. Chavez  Admit to: med/tele    11:48 PM: Re-evaluated pt. I have discussed test results, shared treatment plan, and the need for admission with patient and family at bedside. Pt and  family express understanding at this time and agree with all information. All questions answered. Pt and family have no further questions or concerns at this time. Pt is ready for admit.         Medical Decision Making:   History:   Old Medical Records: I decided to obtain old medical records.  Clinical Tests:   Lab Tests: Ordered and Reviewed         ED Medication(s):  Medications   sodium chloride 0.9% flush 3 mL (has no administration in time range)   lactated ringers infusion ( Intravenous New Bag 4/30/23 2568)   albuterol-ipratropium 2.5 mg-0.5 mg/3 mL nebulizer solution 3 mL (has no administration in time range)   melatonin tablet 6 mg (has no administration in time range)   ondansetron injection 4 mg (has no administration in time range)   promethazine tablet 25 mg (has no administration in time range)   polyethylene glycol packet 17 g (has no administration in time range)   acetaminophen tablet 650 mg (has no administration in time range)   simethicone chewable tablet 80 mg (has no administration in time range)   aluminum-magnesium hydroxide-simethicone 200-200-20 mg/5 mL suspension 30 mL (has no administration in time range)   acetaminophen suppository 650 mg (has no administration in time range)   HYDROcodone-acetaminophen 5-325 mg per tablet 1 tablet (has no administration in time range)   morphine injection 2 mg (has no administration in time range)   naloxone 0.4 mg/mL injection 0.02 mg (has no administration in time range)   glucose chewable tablet 16 g (has no administration in time range)   glucose chewable tablet 24 g (has no administration in time range)   dextrose 10% bolus 125 mL 125 mL (has no administration in time range)   dextrose 10% bolus 250 mL 250 mL (has no administration in time range)   glucagon (human recombinant) injection 1 mg (has no administration in time range)   metoclopramide HCl injection 10 mg (10 mg Intravenous Given 4/29/23 3618)   sodium chloride 0.9% bolus 1,000 mL 1,000  mL (0 mLs Intravenous Stopped 4/30/23 0035)       New Prescriptions    No medications on file               Scribe Attestation:   Scribe #1: I performed the above scribed service and the documentation accurately describes the services I performed. I attest to the accuracy of the note.     Attending:   Physician Attestation Statement for Scribe #1: I, Елена Harden MD, personally performed the services described in this documentation, as scribed by Adelia Bernstein, in my presence, and it is both accurate and complete.           Clinical Impression       ICD-10-CM ICD-9-CM   1. Acute hemolytic anemia  D59.9 283.9   2. Malnutrition, unspecified type  E46 263.9   3. Weakness generalized  R53.1 780.79   4. Chest pain  R07.9 786.50       Disposition:   Disposition: Admitted  Condition: Fair       Елена Harden MD  04/30/23 0357

## 2023-04-30 NOTE — ASSESSMENT & PLAN NOTE
H/H of 8.5/24.0 (previously 10.5/26.8 on 04/26/2023). Sed rate 55. , .  Plan:  -tele monitoring  -repeat labs in am  -transfuse PRBCs if needed  -IVFs  -Hem/Onc consult if warranted

## 2023-04-30 NOTE — HPI
Alisia Vines is a 32 y.o. female with a PMH  has a past medical history of Allergic rhinitis, Anemia, Condyloma acuminata, COVID-19 virus infection (07/2021), Encounter for blood transfusion, GERD (gastroesophageal reflux disease), History of fetal anomaly in prior pregnancy, currently pregnant, unspecified trimester (03/08/2017), HSV-2 (herpes simplex virus 2) infection, Lupus nephritis, Mood disorder, Overweight(278.02), Sjogren's syndrome, Systemic lupus complicating pregnancy (01/31/2019), Systemic lupus erythematosus, and Thrombocytopenia.  Presents to the ED for evaluation of decreased appetite, nausea, vomiting, sinus congestion, sore throat, ear pain, headache, and subjective fever.  Patient reports she was seen at urgent care few days ago and was diagnosed with sinus infection.  Patient was given antibiotics, but states she did not take them.  Patient presented to the ED on 04/26/2023 and was prescribed Bactrim and Cipro ear drops which she took 3 doses without any improvement of her symptoms.  Patient states that her symptoms feel similar to when she had a lupus flare-up 9 years ago.  Patient reports compliant with home medications, but states she has not taken any of them in the past week due to her illness in symptoms.  Patient also reports she just finished her menstrual cycle 2 days ago.  Denies any other complaints at this time.    ER workup revealed H/H of 8.5/24.0 (previously 10.5/26.8 on 04/26/2023), sed rate of 55, reticulocyte count of 5.8 on 04/26/2023, sodium 130, calcium 7.8, .  Pregnancy test negative.  Tox screen positive for THC.  UA negative.  Flu and COVID negative.  Patient received 1 L crystalloid fluids in addition to mg of Reglan with some improvement.  Hospital Medicine consulted to admit patient for symptomatic hemolytic anemia.  Patient is in agreement treatment plan.  Patient will be placed under inpatient status.    PCP: Saumya Quinonez

## 2023-04-30 NOTE — H&P
[facility] - Emergency Dept.  [facility] Medicine  History & Physical    Patient Name: Alisia Vines  MRN: [id number]  Patient Class: IP- Inpatient  Admission Date: 4/29/2023  Attending Physician: Jackson Chavez MD   Primary Care Provider: Saumya Quinonez MD         Patient information was obtained from patient, past medical records and ER records.     Subjective:     Principal Problem:Hemolytic anemia associated with systemic lupus erythematosus    Chief Complaint:   Chief Complaint   Patient presents with    decreased appetite     Joint pain x 2 weeks pt states she feeling worse, she feels like it could be Lupus flare up          HPI: Alisia Vines is a 32 y.o. female with a PMH  has a past medical history of Allergic rhinitis, Anemia, Condyloma acuminata, COVID-19 virus infection (07/2021), Encounter for blood transfusion, GERD (gastroesophageal reflux disease), History of fetal anomaly in prior pregnancy, currently pregnant, unspecified trimester (03/08/2017), HSV-2 (herpes simplex virus 2) infection, Lupus nephritis, Mood disorder, Overweight(278.02), Sjogren's syndrome, Systemic lupus complicating pregnancy (01/31/2019), Systemic lupus erythematosus, and Thrombocytopenia.  Presents to the ED for evaluation of decreased appetite, nausea, vomiting, sinus congestion, sore throat, ear pain, headache, and subjective fever.  Patient reports she was seen at urgent care few days ago and was diagnosed with sinus infection.  Patient was given antibiotics, but states she did not take them.  Patient presented to the ED on 04/26/2023 and was prescribed Bactrim and Cipro ear drops which she took 3 doses without any improvement of her symptoms.  Patient states that her symptoms feel similar to when she had a lupus flare-up 9 years ago.  Patient reports compliant with home medications, but states she has not taken any of them in the past week due to her illness in symptoms.  Patient also reports she just  finished her menstrual cycle 2 days ago.  Denies any other complaints at this time.    ER workup revealed H/H of 8.5/24.0 (previously 10.5/26.8 on 2023), sed rate of 55, reticulocyte count of 5.8 on 2023, sodium 130, calcium 7.8, .  Pregnancy test negative.  Tox screen positive for THC.  UA negative.  Flu and COVID negative.  Patient received 1 L crystalloid fluids in addition to mg of Reglan with some improvement.  Hospital Medicine consulted to admit patient for symptomatic hemolytic anemia.  Patient is in agreement treatment plan.  Patient will be placed under inpatient status.    PCP: Saumya Quinonez      Past Medical History:   Diagnosis Date    Allergic rhinitis     Anemia     Condyloma acuminata     COVID-19 virus infection 2021    Encounter for blood transfusion     GERD (gastroesophageal reflux disease)     History of fetal anomaly in prior pregnancy, currently pregnant, unspecified trimester 2017    Pacemaker placed    HSV-2 (herpes simplex virus 2) infection     Lupus nephritis     Mood disorder     Overweight(278.02)     Sjogren's syndrome     Systemic lupus complicating pregnancy 2019    Systemic lupus erythematosus     Thrombocytopenia        Past Surgical History:   Procedure Laterality Date     SECTION WITH TUBAL LIGATION N/A 2019    Procedure:  SECTION, WITH TUBAL LIGATION;  Surgeon: VERONICA Valdovinos MD;  Location[facility] L&D;  Service: OB/GYN;  Laterality: N/A;     SECTION, LOW TRANSVERSE      x2    LYMPH NODE BIOPSY Right 2023    Procedure: BIOPSY, LYMPH NODE;  Surgeon: Rivera Sandoval MD;  Location: [facility] OR;  Service: ENT;  Laterality: Right;  right deep cervial lymph node excision    NECK MASS EXCISION Right 2023    Procedure: EXCISION, MASS, NECK;  Surgeon: Rivera Sandoval MD;  Location: [facility] OR;  Service: ENT;  Laterality: Right;  right deep cervial lymph node excision    RENAL BIOPSY  2013        Review of patient's allergies indicates:  No Known Allergies    No current facility-administered medications on file prior to encounter.     Current Outpatient Medications on File Prior to Encounter   Medication Sig    amLODIPine (NORVASC) 5 MG tablet Take 1 tablet (5 mg total) by mouth once daily.    ARIPiprazole (ABILIFY) 2 MG Tab TAKE 1 TABLET(2 MG) BY MOUTH EVERY DAY    ciclopirox (PENLAC) 8 % Soln Apply to nail and nail fold once daily for up to 1 year    ciprofloxacin-dexAMETHasone 0.3-0.1% (CIPRODEX) 0.3-0.1 % DrpS Place 4 drops into the right ear 2 (two) times daily. for 7 days    ferrous gluconate (FERGON) 324 MG tablet Take 1 tablet (324 mg total) by mouth 3 (three) times daily with meals.    fexofenadine (ALLEGRA) 180 MG tablet Take 1 tablet (180 mg total) by mouth once daily.    FLUoxetine 40 MG capsule TAKE 1 CAPSULE(40 MG) BY MOUTH EVERY DAY    fluticasone propionate (FLONASE) 50 mcg/actuation nasal spray SHAKE LIQUID AND USE 2 SPRAYS(100 MCG) IN EACH NOSTRIL EVERY DAY    hydrOXYchloroQUINE (PLAQUENIL) 200 mg tablet TAKE 2 TABLETS(400 MG) BY MOUTH EVERY DAY    losartan-hydrochlorothiazide 100-12.5 mg (HYZAAR) 100-12.5 mg Tab Take 1 tablet by mouth once daily.    sulfamethoxazole-trimethoprim 800-160mg (BACTRIM DS) 800-160 mg Tab Take 1 tablet by mouth 2 (two) times daily. for 7 days    valACYclovir (VALTREX) 1000 MG tablet Take 1 tablet (1,000 mg total) by mouth once daily.    amoxicillin-clavulanate 875-125mg (AUGMENTIN) 875-125 mg per tablet Take 1 tablet by mouth 2 (two) times daily. for 7 days    mupirocin (BACTROBAN) 2 % ointment Apply topically 3 (three) times daily. for 10 days    predniSONE (DELTASONE) 5 MG tablet Take 1 tablet (5 mg total) by mouth daily as needed (Joint pain). (Patient not taking: Reported on 3/14/2023)     Family History       Problem Relation (Age of Onset)    Arrhythmia Son    Diabetes Paternal Grandmother    Eczema Brother    Heart disease Son     Hypertension Mother, Maternal Grandmother          Tobacco Use    Smoking status: Never     Passive exposure: Never    Smokeless tobacco: Never   Substance and Sexual Activity    Alcohol use: No     Alcohol/week: 0.0 standard drinks    Drug use: Yes     Types: Marijuana    Sexual activity: Not Currently     Partners: Male     Birth control/protection: None     Review of Systems   Constitutional:  Positive for appetite change and fever (subjective). Negative for diaphoresis and fatigue.   HENT:  Positive for congestion, ear pain, sinus pressure and sore throat. Negative for sinus pain.    Gastrointestinal:  Positive for nausea and vomiting.   Neurological:  Positive for headaches. Negative for dizziness and light-headedness.   All other systems reviewed and are negative.  Objective:     Vital Signs (Most Recent):  Temp: (!) 100.4 °F (38 °C) (04/30/23 0535)  Pulse: 86 (04/30/23 0535)  Resp: 18 (04/29/23 2131)  BP: 114/81 (04/30/23 0535)  SpO2: 100 % (04/30/23 0535)   Vital Signs (24h Range):  Temp:  [98.5 °F (36.9 °C)-100.4 °F (38 °C)] 100.4 °F (38 °C)  Pulse:  [] 86  Resp:  [16-18] 18  SpO2:  [95 %-100 %] 100 %  BP: (100-130)/(56-82) 114/81     Weight: 72.8 kg (160 lb 9.7 oz)  Body mass index is 26.73 kg/m².    Physical Exam  Vitals and nursing note reviewed.   Constitutional:       General: She is awake. She is not in acute distress.     Appearance: Normal appearance. She is well-developed and well-groomed. She is not ill-appearing, toxic-appearing or diaphoretic.   HENT:      Head: Normocephalic and atraumatic.      Mouth/Throat:      Lips: Pink.      Mouth: Mucous membranes are moist.      Pharynx: Oropharynx is clear. Uvula midline.   Eyes:      Extraocular Movements: Extraocular movements intact.      Conjunctiva/sclera: Conjunctivae normal.   Cardiovascular:      Rate and Rhythm: Normal rate and regular rhythm.      Heart sounds: Normal heart sounds. No murmur heard.  Pulmonary:      Effort:  Pulmonary effort is normal.      Breath sounds: Normal breath sounds.   Abdominal:      General: Bowel sounds are normal.      Palpations: Abdomen is soft.      Tenderness: There is no abdominal tenderness.   Musculoskeletal:      Cervical back: Normal range of motion and neck supple.      Comments: 5/5 strength throughout   Skin:     General: Skin is warm and dry.      Capillary Refill: Capillary refill takes less than 2 seconds.   Neurological:      General: No focal deficit present.      Mental Status: She is alert and oriented to person, place, and time. Mental status is at baseline.      GCS: GCS eye subscore is 4. GCS verbal subscore is 5. GCS motor subscore is 6.      Cranial Nerves: Cranial nerves 2-12 are intact.      Sensory: Sensation is intact.      Motor: Motor function is intact.   Psychiatric:         Mood and Affect: Mood normal.         Speech: Speech normal.         Behavior: Behavior normal. Behavior is cooperative.         LABS:  Recent Results (from the past 24 hour(s))   CBC auto differential    Collection Time: 04/29/23  8:24 PM   Result Value Ref Range    WBC 7.43 3.90 - 12.70 K/uL    RBC 2.85 (L) 4.00 - 5.40 M/uL    Hemoglobin 8.5 (L) 12.0 - 16.0 g/dL    Hematocrit 24.0 (L) 37.0 - 48.5 %    MCV 84 82 - 98 fL    MCH 29.8 27.0 - 31.0 pg    MCHC 35.4 32.0 - 36.0 g/dL    RDW 16.6 (H) 11.5 - 14.5 %    Platelets 241 150 - 450 K/uL    MPV 9.9 9.2 - 12.9 fL    Immature Granulocytes 2.7 (H) 0.0 - 0.5 %    Gran # (ANC) 4.1 1.8 - 7.7 K/uL    Immature Grans (Abs) 0.20 (H) 0.00 - 0.04 K/uL    Lymph # 2.3 1.0 - 4.8 K/uL    Mono # 0.7 0.3 - 1.0 K/uL    Eos # 0.0 0.0 - 0.5 K/uL    Baso # 0.03 0.00 - 0.20 K/uL    nRBC 0 0 /100 WBC    Gran % 55.7 38.0 - 73.0 %    Lymph % 31.4 18.0 - 48.0 %    Mono % 9.4 4.0 - 15.0 %    Eosinophil % 0.4 0.0 - 8.0 %    Basophil % 0.4 0.0 - 1.9 %    Aniso Slight     Poly Occasional     Differential Method Automated    Comprehensive metabolic panel    Collection Time: 04/29/23   8:24 PM   Result Value Ref Range    Sodium 130 (L) 136 - 145 mmol/L    Potassium 4.0 3.5 - 5.1 mmol/L    Chloride 100 95 - 110 mmol/L    CO2 21 (L) 23 - 29 mmol/L    Glucose 91 70 - 110 mg/dL    BUN 16 6 - 20 mg/dL    Creatinine 0.8 0.5 - 1.4 mg/dL    Calcium 7.8 (L) 8.7 - 10.5 mg/dL    Total Protein 5.7 (L) 6.0 - 8.4 g/dL    Albumin 2.0 (L) 3.5 - 5.2 g/dL    Total Bilirubin 0.5 0.1 - 1.0 mg/dL    Alkaline Phosphatase 59 55 - 135 U/L    AST 37 10 - 40 U/L    ALT 10 10 - 44 U/L    Anion Gap 9 8 - 16 mmol/L    eGFR >60 >60 mL/min/1.73 m^2   Sedimentation rate    Collection Time: 04/29/23  8:24 PM   Result Value Ref Range    Sed Rate 55 (H) 0 - 20 mm/Hr   hCG, quantitative, pregnancy    Collection Time: 04/29/23  8:24 PM   Result Value Ref Range    HCG Quant <1.2 See Text mIU/mL   Reticulocytes    Collection Time: 04/29/23  8:24 PM   Result Value Ref Range    Retic 3.7 (H) 0.5 - 2.5 %   Urinalysis, Reflex to Urine Culture Urine, Clean Catch    Collection Time: 04/29/23  9:21 PM    Specimen: Urine   Result Value Ref Range    Specimen UA Urine, Clean Catch     Color, UA Yellow Yellow, Straw, Erin    Appearance, UA Clear Clear    pH, UA 7.0 5.0 - 8.0    Specific Gravity, UA >1.030 (A) 1.005 - 1.030    Protein, UA 3+ (A) Negative    Glucose, UA Negative Negative    Ketones, UA Negative Negative    Bilirubin (UA) Negative Negative    Occult Blood UA 3+ (A) Negative    Nitrite, UA Negative Negative    Urobilinogen, UA 4.0-6.0 (A) <2.0 EU/dL    Leukocytes, UA Negative Negative   Drug screen panel, emergency    Collection Time: 04/29/23  9:21 PM   Result Value Ref Range    Benzodiazepines Negative Negative    Methadone metabolites Negative Negative    Cocaine (Metab.) Negative Negative    Opiate Scrn, Ur Negative Negative    Barbiturate Screen, Ur Negative Negative    Amphetamine Screen, Ur Negative Negative    THC Presumptive Positive (A) Negative    Phencyclidine Negative Negative    Creatinine, Urine 218.1 15.0 - 325.0  mg/dL    Toxicology Information SEE COMMENT    Urinalysis Microscopic    Collection Time: 04/29/23  9:21 PM   Result Value Ref Range    RBC, UA 2 0 - 4 /hpf    WBC, UA 2 0 - 5 /hpf    Bacteria None None-Occ /hpf    Squam Epithel, UA 1 /hpf    Hyaline Casts, UA 8 (A) 0-1/lpf /lpf    Microscopic Comment SEE COMMENT    Lactate dehydrogenase    Collection Time: 04/29/23 10:27 PM   Result Value Ref Range     (H) 110 - 260 U/L   Type & Screen    Collection Time: 04/29/23 10:27 PM   Result Value Ref Range    Group & Rh B POS     Indirect Angel NEG     Specimen Outdate 05/02/2023 23:59    COVID-19 Rapid Screening    Collection Time: 04/30/23 12:03 AM   Result Value Ref Range    SARS-CoV-2 RNA, Amplification, Qual Negative Negative   Influenza A & B by Molecular    Collection Time: 04/30/23 12:04 AM    Specimen: Nasopharyngeal Swab   Result Value Ref Range    Influenza A, Molecular Negative Negative    Influenza B, Molecular Negative Negative    Flu A & B Source NP    Group A Strep, Molecular    Collection Time: 04/30/23 12:29 AM    Specimen: Throat   Result Value Ref Range    Group A Strep, Molecular Negative Negative       RADIOLOGY  No results found.    EKG    MICROBIOLOGY    MDM     Amount and/or Complexity of Data Reviewed  Clinical lab tests: reviewed  Tests in the radiology section of CPT®: reviewed  Tests in the medicine section of CPT®: reviewed  Discussion of test results with the performing providers: yes  Decide to obtain previous medical records or to obtain history from someone other than the patient: yes  Obtain history from someone other than the patient: yes  Review and summarize past medical records: yes  Discuss the patient with other providers: yes  Independent visualization of images, tracings, or specimens: yes          Assessment/Plan:     * Hemolytic anemia associated with systemic lupus erythematosus  H/H of 8.5/24.0 (previously 10.5/26.8 on 04/26/2023). Sed rate 55. , .  Plan:  -tele  monitoring  -repeat labs in am  -transfuse PRBCs if needed  -IVFs  -Hem/Onc consult if warranted      Upper respiratory infection   compliant with Bactrim in ciprofloxacin drops.   Plan:  -continue home medicatons      Nausea and vomiting  Currently controlled with IV antiemetics.  Plan:  -antiemetics prn  -IVFs  -po challenge      VTE Risk Mitigation (From admission, onward)         Ordered     Reason for No Pharmacological VTE Prophylaxis  Once        Question:  Reasons:  Answer:  Risk of Bleeding    04/30/23 0018     IP VTE HIGH RISK PATIENT  Once         04/30/23 0018     Place sequential compression device  Until discontinued         04/30/23 0018              //Core Measures   -DVT proph: SCDs,   -Code status Full    -Surrogate: mother    Components of this note were documented using a voice recognition system and are subject to errors not corrected at the time the document was proof read. Please contact the author for any clarifications.       Fabrice Chavez NP  Department of Hospital Medicine  [facility] - Emergency Dept.

## 2023-04-30 NOTE — SUBJECTIVE & OBJECTIVE
Past Medical History:   Diagnosis Date    Allergic rhinitis     Anemia     Condyloma acuminata     COVID-19 virus infection 2021    Encounter for blood transfusion     GERD (gastroesophageal reflux disease)     History of fetal anomaly in prior pregnancy, currently pregnant, unspecified trimester 2017    Pacemaker placed    HSV-2 (herpes simplex virus 2) infection     Lupus nephritis     Mood disorder     Overweight(278.02)     Sjogren's syndrome     Systemic lupus complicating pregnancy 2019    Systemic lupus erythematosus     Thrombocytopenia        Past Surgical History:   Procedure Laterality Date     SECTION WITH TUBAL LIGATION N/A 2019    Procedure:  SECTION, WITH TUBAL LIGATION;  Surgeon: VERONICA Valdovinos MD;  Location: [facility] L&D;  Service: OB/GYN;  Laterality: N/A;     SECTION, LOW TRANSVERSE      x2    LYMPH NODE BIOPSY Right 2023    Procedure: BIOPSY, LYMPH NODE;  Surgeon: Rivera Sandoval MD;  Location: [facility] OR;  Service: ENT;  Laterality: Right;  right deep cervial lymph node excision    NECK MASS EXCISION Right 2023    Procedure: EXCISION, MASS, NECK;  Surgeon: Rivera Sandoval MD;  Location: [facility] OR;  Service: ENT;  Laterality: Right;  right deep cervial lymph node excision    RENAL BIOPSY  2013       Review of patient's allergies indicates:  No Known Allergies    No current facility-administered medications on file prior to encounter.     Current Outpatient Medications on File Prior to Encounter   Medication Sig    amLODIPine (NORVASC) 5 MG tablet Take 1 tablet (5 mg total) by mouth once daily.    ARIPiprazole (ABILIFY) 2 MG Tab TAKE 1 TABLET(2 MG) BY MOUTH EVERY DAY    ciclopirox (PENLAC) 8 % Soln Apply to nail and nail fold once daily for up to 1 year    ciprofloxacin-dexAMETHasone 0.3-0.1% (CIPRODEX) 0.3-0.1 % DrpS Place 4 drops into the right ear 2 (two) times daily. for 7 days    ferrous gluconate (FERGON) 324 MG tablet Take 1 tablet (324  mg total) by mouth 3 (three) times daily with meals.    fexofenadine (ALLEGRA) 180 MG tablet Take 1 tablet (180 mg total) by mouth once daily.    FLUoxetine 40 MG capsule TAKE 1 CAPSULE(40 MG) BY MOUTH EVERY DAY    fluticasone propionate (FLONASE) 50 mcg/actuation nasal spray SHAKE LIQUID AND USE 2 SPRAYS(100 MCG) IN EACH NOSTRIL EVERY DAY    hydrOXYchloroQUINE (PLAQUENIL) 200 mg tablet TAKE 2 TABLETS(400 MG) BY MOUTH EVERY DAY    losartan-hydrochlorothiazide 100-12.5 mg (HYZAAR) 100-12.5 mg Tab Take 1 tablet by mouth once daily.    sulfamethoxazole-trimethoprim 800-160mg (BACTRIM DS) 800-160 mg Tab Take 1 tablet by mouth 2 (two) times daily. for 7 days    valACYclovir (VALTREX) 1000 MG tablet Take 1 tablet (1,000 mg total) by mouth once daily.    amoxicillin-clavulanate 875-125mg (AUGMENTIN) 875-125 mg per tablet Take 1 tablet by mouth 2 (two) times daily. for 7 days    mupirocin (BACTROBAN) 2 % ointment Apply topically 3 (three) times daily. for 10 days    predniSONE (DELTASONE) 5 MG tablet Take 1 tablet (5 mg total) by mouth daily as needed (Joint pain). (Patient not taking: Reported on 3/14/2023)     Family History       Problem Relation (Age of Onset)    Arrhythmia Son    Diabetes Paternal Grandmother    Eczema Brother    Heart disease Son    Hypertension Mother, Maternal Grandmother          Tobacco Use    Smoking status: Never     Passive exposure: Never    Smokeless tobacco: Never   Substance and Sexual Activity    Alcohol use: No     Alcohol/week: 0.0 standard drinks    Drug use: Yes     Types: Marijuana    Sexual activity: Not Currently     Partners: Male     Birth control/protection: None     Review of Systems   Constitutional:  Positive for appetite change and fever (subjective). Negative for diaphoresis and fatigue.   HENT:  Positive for congestion, ear pain, sinus pressure and sore throat. Negative for sinus pain.    Gastrointestinal:  Positive for nausea and vomiting.   Neurological:  Positive for  headaches. Negative for dizziness and light-headedness.   All other systems reviewed and are negative.  Objective:     Vital Signs (Most Recent):  Temp: (!) 100.4 °F (38 °C) (04/30/23 0535)  Pulse: 86 (04/30/23 0535)  Resp: 18 (04/29/23 2131)  BP: 114/81 (04/30/23 0535)  SpO2: 100 % (04/30/23 0535)   Vital Signs (24h Range):  Temp:  [98.5 °F (36.9 °C)-100.4 °F (38 °C)] 100.4 °F (38 °C)  Pulse:  [] 86  Resp:  [16-18] 18  SpO2:  [95 %-100 %] 100 %  BP: (100-130)/(56-82) 114/81     Weight: 72.8 kg (160 lb 9.7 oz)  Body mass index is 26.73 kg/m².    Physical Exam  Vitals and nursing note reviewed.   Constitutional:       General: She is awake. She is not in acute distress.     Appearance: Normal appearance. She is well-developed and well-groomed. She is not ill-appearing, toxic-appearing or diaphoretic.   HENT:      Head: Normocephalic and atraumatic.      Mouth/Throat:      Lips: Pink.      Mouth: Mucous membranes are moist.      Pharynx: Oropharynx is clear. Uvula midline.   Eyes:      Extraocular Movements: Extraocular movements intact.      Conjunctiva/sclera: Conjunctivae normal.   Cardiovascular:      Rate and Rhythm: Normal rate and regular rhythm.      Heart sounds: Normal heart sounds. No murmur heard.  Pulmonary:      Effort: Pulmonary effort is normal.      Breath sounds: Normal breath sounds.   Abdominal:      General: Bowel sounds are normal.      Palpations: Abdomen is soft.      Tenderness: There is no abdominal tenderness.   Musculoskeletal:      Cervical back: Normal range of motion and neck supple.      Comments: 5/5 strength throughout   Skin:     General: Skin is warm and dry.      Capillary Refill: Capillary refill takes less than 2 seconds.   Neurological:      General: No focal deficit present.      Mental Status: She is alert and oriented to person, place, and time. Mental status is at baseline.      GCS: GCS eye subscore is 4. GCS verbal subscore is 5. GCS motor subscore is 6.      Cranial  Nerves: Cranial nerves 2-12 are intact.      Sensory: Sensation is intact.      Motor: Motor function is intact.   Psychiatric:         Mood and Affect: Mood normal.         Speech: Speech normal.         Behavior: Behavior normal. Behavior is cooperative.         LABS:  Recent Results (from the past 24 hour(s))   CBC auto differential    Collection Time: 04/29/23  8:24 PM   Result Value Ref Range    WBC 7.43 3.90 - 12.70 K/uL    RBC 2.85 (L) 4.00 - 5.40 M/uL    Hemoglobin 8.5 (L) 12.0 - 16.0 g/dL    Hematocrit 24.0 (L) 37.0 - 48.5 %    MCV 84 82 - 98 fL    MCH 29.8 27.0 - 31.0 pg    MCHC 35.4 32.0 - 36.0 g/dL    RDW 16.6 (H) 11.5 - 14.5 %    Platelets 241 150 - 450 K/uL    MPV 9.9 9.2 - 12.9 fL    Immature Granulocytes 2.7 (H) 0.0 - 0.5 %    Gran # (ANC) 4.1 1.8 - 7.7 K/uL    Immature Grans (Abs) 0.20 (H) 0.00 - 0.04 K/uL    Lymph # 2.3 1.0 - 4.8 K/uL    Mono # 0.7 0.3 - 1.0 K/uL    Eos # 0.0 0.0 - 0.5 K/uL    Baso # 0.03 0.00 - 0.20 K/uL    nRBC 0 0 /100 WBC    Gran % 55.7 38.0 - 73.0 %    Lymph % 31.4 18.0 - 48.0 %    Mono % 9.4 4.0 - 15.0 %    Eosinophil % 0.4 0.0 - 8.0 %    Basophil % 0.4 0.0 - 1.9 %    Aniso Slight     Poly Occasional     Differential Method Automated    Comprehensive metabolic panel    Collection Time: 04/29/23  8:24 PM   Result Value Ref Range    Sodium 130 (L) 136 - 145 mmol/L    Potassium 4.0 3.5 - 5.1 mmol/L    Chloride 100 95 - 110 mmol/L    CO2 21 (L) 23 - 29 mmol/L    Glucose 91 70 - 110 mg/dL    BUN 16 6 - 20 mg/dL    Creatinine 0.8 0.5 - 1.4 mg/dL    Calcium 7.8 (L) 8.7 - 10.5 mg/dL    Total Protein 5.7 (L) 6.0 - 8.4 g/dL    Albumin 2.0 (L) 3.5 - 5.2 g/dL    Total Bilirubin 0.5 0.1 - 1.0 mg/dL    Alkaline Phosphatase 59 55 - 135 U/L    AST 37 10 - 40 U/L    ALT 10 10 - 44 U/L    Anion Gap 9 8 - 16 mmol/L    eGFR >60 >60 mL/min/1.73 m^2   Sedimentation rate    Collection Time: 04/29/23  8:24 PM   Result Value Ref Range    Sed Rate 55 (H) 0 - 20 mm/Hr   hCG, quantitative, pregnancy     Collection Time: 04/29/23  8:24 PM   Result Value Ref Range    HCG Quant <1.2 See Text mIU/mL   Reticulocytes    Collection Time: 04/29/23  8:24 PM   Result Value Ref Range    Retic 3.7 (H) 0.5 - 2.5 %   Urinalysis, Reflex to Urine Culture Urine, Clean Catch    Collection Time: 04/29/23  9:21 PM    Specimen: Urine   Result Value Ref Range    Specimen UA Urine, Clean Catch     Color, UA Yellow Yellow, Straw, Erin    Appearance, UA Clear Clear    pH, UA 7.0 5.0 - 8.0    Specific Gravity, UA >1.030 (A) 1.005 - 1.030    Protein, UA 3+ (A) Negative    Glucose, UA Negative Negative    Ketones, UA Negative Negative    Bilirubin (UA) Negative Negative    Occult Blood UA 3+ (A) Negative    Nitrite, UA Negative Negative    Urobilinogen, UA 4.0-6.0 (A) <2.0 EU/dL    Leukocytes, UA Negative Negative   Drug screen panel, emergency    Collection Time: 04/29/23  9:21 PM   Result Value Ref Range    Benzodiazepines Negative Negative    Methadone metabolites Negative Negative    Cocaine (Metab.) Negative Negative    Opiate Scrn, Ur Negative Negative    Barbiturate Screen, Ur Negative Negative    Amphetamine Screen, Ur Negative Negative    THC Presumptive Positive (A) Negative    Phencyclidine Negative Negative    Creatinine, Urine 218.1 15.0 - 325.0 mg/dL    Toxicology Information SEE COMMENT    Urinalysis Microscopic    Collection Time: 04/29/23  9:21 PM   Result Value Ref Range    RBC, UA 2 0 - 4 /hpf    WBC, UA 2 0 - 5 /hpf    Bacteria None None-Occ /hpf    Squam Epithel, UA 1 /hpf    Hyaline Casts, UA 8 (A) 0-1/lpf /lpf    Microscopic Comment SEE COMMENT    Lactate dehydrogenase    Collection Time: 04/29/23 10:27 PM   Result Value Ref Range     (H) 110 - 260 U/L   Type & Screen    Collection Time: 04/29/23 10:27 PM   Result Value Ref Range    Group & Rh B POS     Indirect Angel NEG     Specimen Outdate 05/02/2023 23:59    COVID-19 Rapid Screening    Collection Time: 04/30/23 12:03 AM   Result Value Ref Range    SARS-CoV-2  RNA, Amplification, Qual Negative Negative   Influenza A & B by Molecular    Collection Time: 04/30/23 12:04 AM    Specimen: Nasopharyngeal Swab   Result Value Ref Range    Influenza A, Molecular Negative Negative    Influenza B, Molecular Negative Negative    Flu A & B Source NP    Group A Strep, Molecular    Collection Time: 04/30/23 12:29 AM    Specimen: Throat   Result Value Ref Range    Group A Strep, Molecular Negative Negative       RADIOLOGY  No results found.    EKG    MICROBIOLOGY    MDM     Amount and/or Complexity of Data Reviewed  Clinical lab tests: reviewed  Tests in the radiology section of CPT®: reviewed  Tests in the medicine section of CPT®: reviewed  Discussion of test results with the performing providers: yes  Decide to obtain previous medical records or to obtain history from someone other than the patient: yes  Obtain history from someone other than the patient: yes  Review and summarize past medical records: yes  Discuss the patient with other providers: yes  Independent visualization of images, tracings, or specimens: yes

## 2023-05-01 ENCOUNTER — TELEPHONE (OUTPATIENT)
Dept: FAMILY MEDICINE | Facility: CLINIC | Age: 33
End: 2023-05-01
Payer: MEDICAID

## 2023-05-01 VITALS
DIASTOLIC BLOOD PRESSURE: 78 MMHG | HEIGHT: 65 IN | TEMPERATURE: 98 F | OXYGEN SATURATION: 97 % | WEIGHT: 160.63 LBS | RESPIRATION RATE: 18 BRPM | BODY MASS INDEX: 26.76 KG/M2 | SYSTOLIC BLOOD PRESSURE: 114 MMHG | HEART RATE: 84 BPM

## 2023-05-01 LAB
ABO GROUP BLD: NORMAL
ALBUMIN SERPL BCP-MCNC: 2 G/DL (ref 3.5–5.2)
ALP SERPL-CCNC: 59 U/L (ref 55–135)
ALT SERPL W/O P-5'-P-CCNC: 10 U/L (ref 10–44)
ANION GAP SERPL CALC-SCNC: 9 MMOL/L (ref 8–16)
ANISOCYTOSIS BLD QL SMEAR: SLIGHT
AST SERPL-CCNC: 34 U/L (ref 10–40)
BASOPHILS # BLD AUTO: 0.02 K/UL (ref 0–0.2)
BASOPHILS NFR BLD: 0.3 % (ref 0–1.9)
BILIRUB SERPL-MCNC: 0.5 MG/DL (ref 0.1–1)
BLD GP AB SCN CELLS X3 SERPL QL: NORMAL
BUN SERPL-MCNC: 9 MG/DL (ref 6–20)
CALCIUM SERPL-MCNC: 7.9 MG/DL (ref 8.7–10.5)
CHLORIDE SERPL-SCNC: 106 MMOL/L (ref 95–110)
CO2 SERPL-SCNC: 18 MMOL/L (ref 23–29)
CREAT SERPL-MCNC: 0.7 MG/DL (ref 0.5–1.4)
DIFFERENTIAL METHOD: ABNORMAL
EOSINOPHIL # BLD AUTO: 0 K/UL (ref 0–0.5)
EOSINOPHIL NFR BLD: 0.6 % (ref 0–8)
ERYTHROCYTE [DISTWIDTH] IN BLOOD BY AUTOMATED COUNT: ABNORMAL % (ref 11.5–14.5)
EST. GFR  (NO RACE VARIABLE): >60 ML/MIN/1.73 M^2
FERRITIN SERPL-MCNC: 397 NG/ML (ref 20–300)
FOLATE SERPL-MCNC: 7.7 NG/ML (ref 4–24)
GLUCOSE SERPL-MCNC: 98 MG/DL (ref 70–110)
HAPTOGLOB SERPL-MCNC: <10 MG/DL (ref 30–250)
HCT VFR BLD AUTO: 21.7 % (ref 37–48.5)
HGB BLD-MCNC: 8.5 G/DL (ref 12–16)
IMM GRANULOCYTES # BLD AUTO: 0.18 K/UL (ref 0–0.04)
IMM GRANULOCYTES NFR BLD AUTO: 2.7 % (ref 0–0.5)
LDH SERPL L TO P-CCNC: 582 U/L (ref 110–260)
LYMPHOCYTES # BLD AUTO: 2.6 K/UL (ref 1–4.8)
LYMPHOCYTES NFR BLD: 40 % (ref 18–48)
MCH RBC QN AUTO: 40.1 PG (ref 27–31)
MCHC RBC AUTO-ENTMCNC: 39.2 G/DL (ref 32–36)
MCV RBC AUTO: 102 FL (ref 82–98)
MONOCYTES # BLD AUTO: 0.7 K/UL (ref 0.3–1)
MONOCYTES NFR BLD: 9.9 % (ref 4–15)
NEUTROPHILS # BLD AUTO: 3 K/UL (ref 1.8–7.7)
NEUTROPHILS NFR BLD: 46.5 % (ref 38–73)
NRBC BLD-RTO: 1 /100 WBC
PATH REV BLD -IMP: NORMAL
PLATELET # BLD AUTO: 248 K/UL (ref 150–450)
PMV BLD AUTO: 10.1 FL (ref 9.2–12.9)
POLYCHROMASIA BLD QL SMEAR: ABNORMAL
POTASSIUM SERPL-SCNC: 3.6 MMOL/L (ref 3.5–5.1)
PROT SERPL-MCNC: 5.6 G/DL (ref 6–8.4)
RBC # BLD AUTO: 2.12 M/UL (ref 4–5.4)
RETICS/RBC NFR AUTO: 4.4 % (ref 0.5–2.5)
RH BLD: NORMAL
ROULEAUX BLD QL SMEAR: PRESENT
SODIUM SERPL-SCNC: 133 MMOL/L (ref 136–145)
SPECIMEN OUTDATE: NORMAL
VIT B12 SERPL-MCNC: 716 PG/ML (ref 210–950)
WBC # BLD AUTO: 6.55 K/UL (ref 3.9–12.7)

## 2023-05-01 PROCEDURE — 86850 RBC ANTIBODY SCREEN: CPT | Performed by: HOSPITALIST

## 2023-05-01 PROCEDURE — 25000003 PHARM REV CODE 250: Performed by: INTERNAL MEDICINE

## 2023-05-01 PROCEDURE — 82728 ASSAY OF FERRITIN: CPT | Performed by: INTERNAL MEDICINE

## 2023-05-01 PROCEDURE — 83010 ASSAY OF HAPTOGLOBIN QUANT: CPT | Performed by: INTERNAL MEDICINE

## 2023-05-01 PROCEDURE — 96361 HYDRATE IV INFUSION ADD-ON: CPT

## 2023-05-01 PROCEDURE — 85025 COMPLETE CBC W/AUTO DIFF WBC: CPT | Performed by: INTERNAL MEDICINE

## 2023-05-01 PROCEDURE — 86901 BLOOD TYPING SEROLOGIC RH(D): CPT | Performed by: HOSPITALIST

## 2023-05-01 PROCEDURE — 96367 TX/PROPH/DG ADDL SEQ IV INF: CPT

## 2023-05-01 PROCEDURE — 80053 COMPREHEN METABOLIC PANEL: CPT | Performed by: INTERNAL MEDICINE

## 2023-05-01 PROCEDURE — 63600175 PHARM REV CODE 636 W HCPCS: Performed by: INTERNAL MEDICINE

## 2023-05-01 PROCEDURE — 36415 COLL VENOUS BLD VENIPUNCTURE: CPT | Performed by: HOSPITALIST

## 2023-05-01 PROCEDURE — 82746 ASSAY OF FOLIC ACID SERUM: CPT | Performed by: INTERNAL MEDICINE

## 2023-05-01 PROCEDURE — 25000003 PHARM REV CODE 250: Performed by: NURSE PRACTITIONER

## 2023-05-01 PROCEDURE — 83615 LACTATE (LD) (LDH) ENZYME: CPT | Performed by: INTERNAL MEDICINE

## 2023-05-01 PROCEDURE — 86900 BLOOD TYPING SEROLOGIC ABO: CPT | Performed by: HOSPITALIST

## 2023-05-01 PROCEDURE — 94799 UNLISTED PULMONARY SVC/PX: CPT

## 2023-05-01 PROCEDURE — 82607 VITAMIN B-12: CPT | Performed by: INTERNAL MEDICINE

## 2023-05-01 PROCEDURE — 85045 AUTOMATED RETICULOCYTE COUNT: CPT | Performed by: INTERNAL MEDICINE

## 2023-05-01 PROCEDURE — 63600175 PHARM REV CODE 636 W HCPCS: Performed by: NURSE PRACTITIONER

## 2023-05-01 PROCEDURE — 85060 BLOOD SMEAR INTERPRETATION: CPT | Mod: ,,, | Performed by: PATHOLOGY

## 2023-05-01 PROCEDURE — 36415 COLL VENOUS BLD VENIPUNCTURE: CPT | Performed by: INTERNAL MEDICINE

## 2023-05-01 PROCEDURE — 85060 PATHOLOGIST REVIEW: ICD-10-PCS | Mod: ,,, | Performed by: PATHOLOGY

## 2023-05-01 PROCEDURE — G0378 HOSPITAL OBSERVATION PER HR: HCPCS

## 2023-05-01 RX ORDER — MUPIROCIN 20 MG/G
OINTMENT TOPICAL 2 TIMES DAILY
Status: DISCONTINUED | OUTPATIENT
Start: 2023-05-01 | End: 2023-05-01 | Stop reason: HOSPADM

## 2023-05-01 RX ORDER — CIPROFLOXACIN AND DEXAMETHASONE 3; 1 MG/ML; MG/ML
4 SUSPENSION/ DROPS AURICULAR (OTIC) 2 TIMES DAILY
Status: DISCONTINUED | OUTPATIENT
Start: 2023-05-01 | End: 2023-05-01 | Stop reason: HOSPADM

## 2023-05-01 RX ORDER — ONDANSETRON 4 MG/1
4 TABLET, FILM COATED ORAL EVERY 8 HOURS PRN
Qty: 20 TABLET | Refills: 0 | Status: SHIPPED | OUTPATIENT
Start: 2023-05-01

## 2023-05-01 RX ADMIN — HYDROXYCHLOROQUINE SULFATE 400 MG: 200 TABLET, FILM COATED ORAL at 08:05

## 2023-05-01 RX ADMIN — Medication 324 MG: at 05:05

## 2023-05-01 RX ADMIN — CIPROFLOXACIN AND DEXAMETHASONE 4 DROP: 3; 1 SUSPENSION/ DROPS AURICULAR (OTIC) at 12:05

## 2023-05-01 RX ADMIN — VALACYCLOVIR HYDROCHLORIDE 1000 MG: 500 TABLET, FILM COATED ORAL at 08:05

## 2023-05-01 RX ADMIN — Medication 324 MG: at 08:05

## 2023-05-01 RX ADMIN — DIPHENHYDRAMINE HYDROCHLORIDE 15 ML: 12.5 LIQUID ORAL at 12:05

## 2023-05-01 RX ADMIN — PREDNISONE 5 MG: 5 TABLET ORAL at 08:05

## 2023-05-01 RX ADMIN — DIPHENHYDRAMINE HYDROCHLORIDE 15 ML: 12.5 LIQUID ORAL at 05:05

## 2023-05-01 RX ADMIN — ARIPIPRAZOLE 2 MG: 2 TABLET ORAL at 09:05

## 2023-05-01 RX ADMIN — DEXTROSE MONOHYDRATE 1000 MG: 50 INJECTION, SOLUTION INTRAVENOUS at 05:05

## 2023-05-01 RX ADMIN — MUPIROCIN: 20 OINTMENT TOPICAL at 12:05

## 2023-05-01 RX ADMIN — FAMOTIDINE 20 MG: 20 TABLET, FILM COATED ORAL at 08:05

## 2023-05-01 RX ADMIN — SODIUM CHLORIDE: 9 INJECTION, SOLUTION INTRAVENOUS at 03:05

## 2023-05-01 RX ADMIN — FLUOXETINE 40 MG: 20 CAPSULE ORAL at 08:05

## 2023-05-01 RX ADMIN — Medication 324 MG: at 12:05

## 2023-05-01 NOTE — PLAN OF CARE
[facility] - Telemetry (Hospital)  Initial Discharge Assessment       Primary Care Provider: Saumya Quinonez MD    Admission Diagnosis: Weakness generalized [R53.1]  Acute hemolytic anemia [D59.9]  Chest pain [R07.9]  Malnutrition, unspecified type [E46]    Admission Date: 4/29/2023  Expected Discharge Date:     Discharge Barriers Identified: None    Payor: MEDICAID / [facility] / Product Type: Managed Medicaid /     Extended Emergency Contact Information  Primary Emergency Contact: Gris Lakhani  Mobile Phone: [phone]  Relation: Relative  Preferred language: English   needed? No  Secondary Emergency Contact: CaptreeMeggan  Mobile Phone: [phone]  Relation: Spouse   needed? No    Discharge Plan A: Home with family         [facility]  [address]  [place] [address]  Phone: [phone] Fax: [phone]    [id number] - [facility] [place] - [address]  [address]  [place] [facility] [address]  Phone: [phone] Fax: [phone]    [id number] - [place] - [address] AT [facility] [address]  [address]  [place] [facility] [address]  Phone: [phone] Fax: [phone]      Initial Assessment (most recent)       Adult Discharge Assessment - 05/01/23 0935          Discharge Assessment    Assessment Type Discharge Planning Assessment     Confirmed/corrected address, phone number and insurance Yes     Confirmed Demographics Correct on Facesheet     Source of Information patient;family     Communicated VICTORIANO with patient/caregiver Date not available/Unable to determine     Reason For Admission Hemolytic anemia associated with systemic lupus erythematosus     People in Home significant other;child(jurgen), dependent     Facility Arrived From: home     Do you expect to return to your current living situation? Yes     Do you have help  at home or someone to help you manage your care at home? Yes     Who are your caregiver(s) and their phone number(s)? family     Prior to hospitilization cognitive status: Alert/Oriented     Current cognitive status: Alert/Oriented     Walking or Climbing Stairs --   independent    Dressing/Bathing --   independent    Home Accessibility wheelchair accessible     Equipment Currently Used at Home none     Readmission within 30 days? No     Patient currently being followed by outpatient case management? No     Do you currently have service(s) that help you manage your care at home? No     Do you take prescription medications? Yes     Do you have prescription coverage? Yes     Coverage [facility]     Do you have any problems affording any of your prescribed medications? No     Is the patient taking medications as prescribed? yes     Who is going to help you get home at discharge? family     How do you get to doctors appointments? car, drives self     Are you on dialysis? No     Do you take coumadin? No     Discharge Plan A Home with family     DME Needed Upon Discharge  none     Discharge Plan discussed with: Patient     Discharge Barriers Identified None                   Anticipated DC dispo: home with family  Prior Level of Function: independent with ADLs  PCP: Saumya Naylor MD     Comments:  SW met with patient and family at bedside to introduce role and discuss discharge planning. Patient lives with boyfriend and 3 children, all under the age of 6. Patient family will be able to help at home and can provide transport at time of discharge. CM discharge needs depends on hospital progress. SW will continue following to assist with other needs.

## 2023-05-01 NOTE — DISCHARGE SUMMARY
[facility] Medicine  Discharge Summary      Patient Name: Alisia Vines  MRN: [id number]  [facility]: [id number]  Patient Class: IP- Inpatient  Admission Date: 4/29/2023  Hospital Length of Stay: 2 days  Discharge Date and Time:  05/01/2023 4:49 PM  Attending Physician: Kacey Rogers DO   Discharging Provider: Kacey Rogers DO  Primary Care Provider: Saumya Quinonez MD    Primary Care Team: Networked reference to record PCT     HPI:   Alisia Vines is a 32 y.o. female with a PMH  has a past medical history of Allergic rhinitis, Anemia, Condyloma acuminata, COVID-19 virus infection (07/2021), Encounter for blood transfusion, GERD (gastroesophageal reflux disease), History of fetal anomaly in prior pregnancy, currently pregnant, unspecified trimester (03/08/2017), HSV-2 (herpes simplex virus 2) infection, Lupus nephritis, Mood disorder, Overweight(278.02), Sjogren's syndrome, Systemic lupus complicating pregnancy (01/31/2019), Systemic lupus erythematosus, and Thrombocytopenia.  Presents to the ED for evaluation of decreased appetite, nausea, vomiting, sinus congestion, sore throat, ear pain, headache, and subjective fever.  Patient reports she was seen at urgent care few days ago and was diagnosed with sinus infection.  Patient was given antibiotics, but states she did not take them.  Patient presented to the ED on 04/26/2023 and was prescribed Bactrim and Cipro ear drops which she took 3 doses without any improvement of her symptoms.  Patient states that her symptoms feel similar to when she had a lupus flare-up 9 years ago.  Patient reports compliant with home medications, but states she has not taken any of them in the past week due to her illness in symptoms.  Patient also reports she just finished her menstrual cycle 2 days ago.  Denies any other complaints at this time.    ER workup revealed H/H of 8.5/24.0 (previously 10.5/26.8 on 04/26/2023), sed rate of 55, reticulocyte  count of 5.8 on 04/26/2023, sodium 130, calcium 7.8, .  Pregnancy test negative.  Tox screen positive for THC.  UA negative.  Flu and COVID negative.  Patient received 1 L crystalloid fluids in addition to mg of Reglan with some improvement.  Hospital Medicine consulted to admit patient for symptomatic hemolytic anemia.  Patient is in agreement treatment plan.  Patient will be placed under inpatient status.    PCP: Saumya Quinonez      * No surgery found *      Hospital Course:   Although initially, pt's hemoglobin appears to continue to trend downwards, however it remained stable without the need of blood transfusion during hospital stay. Nausea, vomiting, decreased appetite resolved with supportive care including IV fluid, Zofran, magic mouthwash and the patient was ultimately able to tolerate diet.  Given patient has not had any improvement with multiple antibiotics courses, does not appear that her presentation is consistent with acute infection and other appears to be flare of lupus as she has not been compliant with her meds.  Discussed with patient's rheumatologist,  who reviewed patient's chart and labs and agree that patient is showing signs of uncontrolled lupus and needs to be back on suppressive meds not.  He recommended 1 g Solu-Medrol IV and close follow-up with Rheumatology.  Plan of care discussed extensively with patient with family members at bedside.  Spent greater than 25 minutes counseling patient on the importance of adherence to her meds especially as her recent lymph node pathology showed follicular hyperplasia, however this can further progress to lymphoma.  Discussed the importance of follow-up with PCP within one-week and rheumatology follow-up in 1-2 weeks Patient stated understanding.  She is stable for discharge home at this time, and feels comfortable about this plan.  All questions answered to her satisfaction.  Prescription for Zofran sent to [facility] pharmacy,  however they were not able to compound magic mouthwash.  Subsequently prescription for Magic mouthwash was called in to [facility].     Goals of Care Treatment Preferences:  Code Status: Full Code      Consults:     No new Assessment & Plan notes have been filed under this hospital service since the last note was generated.  Service: Hospital Medicine    Final Active Diagnoses:    Diagnosis Date Noted POA    PRINCIPAL PROBLEM:  Hemolytic anemia associated with systemic lupus erythematosus [D59.10, M32.9] 04/30/2023 Yes    Upper respiratory infection [J06.9] 04/30/2023 Yes    Nausea and vomiting [R11.2] 12/04/2018 Yes      Problems Resolved During this Admission:       Discharged Condition: fair    Disposition: Home or Self Care    Follow Up:   Follow-up Information       Saumya Quinonez MD Follow up in 3 day(s).    Specialty: Family Medicine  Contact information:  [address] [place] [address] [phone]               Lora Root PA-C Follow up in 1 week(s).    Specialty: Rheumatology  Contact information:  [address]  [place] [address] [phone]                           Patient Instructions:      Diet Adult Regular     Notify your health care provider if you experience any of the following:  temperature >100.4     Notify your health care provider if you experience any of the following:  persistent nausea and vomiting or diarrhea     Notify your health care provider if you experience any of the following:  persistent dizziness, light-headedness, or visual disturbances     Activity as tolerated       Significant Diagnostic Studies: Labs:   CMP   Recent Labs   Lab 04/29/23 2024 05/01/23  0904   * 133*   K 4.0 3.6    106   CO2 21* 18*   GLU 91 98   BUN 16 9   CREATININE 0.8 0.7   CALCIUM 7.8* 7.9*   PROT 5.7* 5.6*   ALBUMIN 2.0* 2.0*   BILITOT 0.5 0.5   ALKPHOS 59 59   AST 37 34   ALT 10 10   ANIONGAP 9 9   , CBC   Recent Labs   Lab 04/30/23  1301  04/30/23 2209 05/01/23 0904   WBC 6.01 6.10 6.55   HGB 7.5* 7.0* 8.5*   HCT 21.9* 19.5* 21.7*    211 248    and All labs within the past 24 hours have been reviewed    Pending Diagnostic Studies:       Procedure Component Value Units Date/Time    Folate [[id number]] Collected: 05/01/23 0904    Order Status: Sent Lab Status: In process Updated: 05/01/23 1622    Specimen: Blood     Haptoglobin [[id number]] Collected: 05/01/23 0904    Order Status: Sent Lab Status: In process Updated: 05/01/23 1622    Specimen: Blood     Vitamin B12 [[id number]] Collected: 05/01/23 0904    Order Status: Sent Lab Status: In process Updated: 05/01/23 1622    Specimen: Blood            Medications:  Reconciled Home Medications:      Medication List        START taking these medications      ondansetron 4 MG tablet  Commonly known as: ZOFRAN  Take 1 tablet (4 mg total) by mouth every 8 (eight) hours as needed for Nausea.            CONTINUE taking these medications      amLODIPine 5 MG tablet  Commonly known as: NORVASC  Take 1 tablet (5 mg total) by mouth once daily.     ARIPiprazole 2 MG Tab  Commonly known as: ABILIFY  TAKE 1 TABLET(2 MG) BY MOUTH EVERY DAY     ciclopirox 8 % Soln  Commonly known as: PENLAC  Apply to nail and nail fold once daily for up to 1 year     ciprofloxacin-dexAMETHasone 0.3-0.1% 0.3-0.1 % Drps  Commonly known as: CIPRODEX  Place 4 drops into the right ear 2 (two) times daily. for 7 days     ferrous gluconate 324 MG tablet  Commonly known as: FERGON  Take 1 tablet (324 mg total) by mouth 3 (three) times daily with meals.     FLUoxetine 40 MG capsule  TAKE 1 CAPSULE(40 MG) BY MOUTH EVERY DAY     fluticasone propionate 50 mcg/actuation nasal spray  Commonly known as: FLONASE  SHAKE LIQUID AND USE 2 SPRAYS(100 MCG) IN EACH NOSTRIL EVERY DAY     hydrOXYchloroQUINE 200 mg tablet  Commonly known as: PLAQUENIL  TAKE 2 TABLETS(400 MG) BY MOUTH EVERY DAY     mupirocin 2 % ointment  Commonly known as:  BACTROBAN  Apply topically 3 (three) times daily. for 10 days     valACYclovir 1000 MG tablet  Commonly known as: VALTREX  Take 1 tablet (1,000 mg total) by mouth once daily.            STOP taking these medications      losartan-hydrochlorothiazide 100-12.5 mg 100-12.5 mg Tab  Commonly known as: HYZAAR     sulfamethoxazole-trimethoprim 800-160mg 800-160 mg Tab  Commonly known as: BACTRIM DS              Indwelling Lines/Drains at time of discharge:   Lines/Drains/Airways       None                   Time spent on the discharge of patient: 50 minutes         Kacey Rogers DO  Department of Hospital Medicine  [facility] - Telemetry ([facility])

## 2023-05-01 NOTE — NURSING
Discharge instructions received and reviewed with pt and family at bedside.  Pt voiced understanding and all questions answered to satisfaction.  Stressed importance to making and keeping all follow up appointments.  Medications sent to pt pharmacy and reviewed with pt.  Tele monitor removed and brought to monitor tech.  IV d/c'd with tip intact, pressure dressing applied.  Pt to discharge to home after ride arrives

## 2023-05-01 NOTE — NURSING
02:42 05/01/2023  Based on recent Lab work or Hct at 7.0 and Hem at 19.5 obtained blood consent in patients folder and Type and match ordered.Steven Morris

## 2023-05-01 NOTE — PLAN OF CARE
Problem: Adult Inpatient Plan of Care  Goal: Absence of Hospital-Acquired Illness or Injury  Outcome: Ongoing, Progressing  Goal: Optimal Comfort and Wellbeing  Outcome: Ongoing, Progressing     Problem: Infection  Goal: Absence of Infection Signs and Symptoms  Outcome: Ongoing, Progressing

## 2023-05-01 NOTE — PROGRESS NOTES
Purvi Boggs  ED Navigator  Emergency Department    Project: [facility] ED Navigator  Role: Community Health Worker    Date: 05/01/2023  Patient Name: Alisia Vines  MRN: [id number]  PCP: Saumya Quinonez MD    Assessment:     Alisia Vines is a 32 y.o. female who has presented to ED for acute otitis externa. Patient has visited the ED 2 times in the past 3 months. Patient did not contact PCP.     ED Navigator Initial Assessment    ED Navigator Enrollment Documentation  Consent to Services  Does patient consent to completing the assessment?: Yes  Contact  Method of Initial Contact: Phone  Transportation  Does the patient have issues with Transportation?: No  Does the patient have transportation to and from healthcare appointments?: Yes  Insurance Coverage  Do you have coverage/adequate coverage?: Yes  Type/kind of coverage: Medicaid/[employer]  Is patient able to afford co-pays/deductibles?: Yes  Is patient able to afford HME or supplies?: Yes  Does patient have an established [facility] PCP?: Yes  Able to access?: Yes  Does the patient have a lack of adequate coverage?: No  Specialist Appointment  Did the patient come to the ED to see a specialist?: No  Does the patient have a pending specialist referral?: No  Does the patient have a specialist appointment made?: No  PCP Follow Up Appointment  Has the patient had an appointment with a primary care provider in the past year?: Yes  Approximate date: 4/1/23  Provider: Saumya Quinonez MD  Does the patient have a follow up appontment with a PCP?: No  When was the last time you saw your PCP?: 4/1/23  Why does the patient not have a follow up scheduled?: Other (see comments) (Comment: Pt said that placed a call to his office to schedule a follow up)  Medications  Is patient able to afford medication?: Yes  Is patient unable to get medication due to lack of transportation?: No  Psychological  Does the patient have psycho-social concerns?: No (Comment: Not at this  time)  Food  Does the patient have concerns about food?: No  Communication/Education  Does the patient have limited English proficiency/English not primary language?: No  Does patient have low literacy and/or low health literacy?: Yes (Comment: Low health literacy/frequent ED visits)  Does patient have concerns with care?: No  Does patient have dissatisfaction with care?: No  Other Financial Concerns  Does the patient have immediate financial distress?: No  Does the patient have general financial concerns?: No  Other Social Barriers/Concerns  Does the patient have any additional barriers or concerns?: Other (see comments) (Comment: Low health literacy)  Primary Barrier  Barriers identified: Cognitive barrier (health literacy, language and communication, etc.) (Comment: Low health literacy/frequent ED visits)  Root Cause of ED Utilization: Patient Knowledge/Low Health Literacy  Plan to address Patient Knowledge/Low Health Literacy: Provided information for [facility] On Call 24/7 Nurse triage line [phone] or 1-[facility] ([phone])  Next steps: Provided Education  Was education/educational materials provided surrounding PCP services/creating a medical home?: Yes Was education verbal or written?: Written     Was education/educational materials provided surrounding low cost, healthy foods?: Yes Was education verbal or written?: Written     Was education/educational materials provided surrounding other items? If so, use comment to explain.: Yes (Comment: Urgent Care) Was education verbal or written?: Written   Plan: Provided information for [facility] On [facility] 24/7 Nurse triage line, [phone] or [facility] ([phone])  Expected Date of Follow Up 1: 7/19/23  Additional Documentation: I spoke with patient regarding ED visit on 4/26/23 for acute otitis external. Pt accepted scheduling assistance for a follow up appt. Pt said that she does not experience any stress, anxiety or depression at this time and  does have a good support system, seeing family/friends daily. Pt said that she does not exercise regularly, does not smoke and does not drink. Pt also states that she does not have any difficulty with food, housing or utilities, and is not a member of any Mormon or social organization. Pt states that she has no additional resource needs at this time. Pt was provided resources for urgent care, establishing a healthcare home,  along with [facility] Right [facility] form, [facility] Virtual Visit flyer, [facility] on [facility] 24/7#, [facility] Nurse Triage #, and Healthy Heart diet educational information.    Purvi Boggs           Social History     Socioeconomic History    Marital status: Single    Number of children: 2   Occupational History     Comment: [employer]   Tobacco Use    Smoking status: Never     Passive exposure: Never    Smokeless tobacco: Never   Substance and Sexual Activity    Alcohol use: No     Alcohol/week: 0.0 standard drinks    Drug use: Yes     Types: Marijuana    Sexual activity: Not Currently     Partners: Male     Birth control/protection: None   Other Topics Concern    Are you pregnant or think you may be? No    Breast-feeding No   Social History Narrative    The patient is single and lives with her significant other.  She has 3 children.  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     Social Determinants of Health     Financial Resource Strain: Low Risk     Difficulty of Paying Living Expenses: Not hard at all   Food Insecurity: No Food Insecurity    Worried About Running Out of Food in the Last Year: Never true    Ran Out of Food in the Last Year: Never true   Transportation Needs: No Transportation Needs    Lack of Transportation (Medical): No    Lack of Transportation (Non-Medical): No   Physical Activity: Inactive    Days of Exercise per Week: 0 days    Minutes of Exercise per Session: 0 min   Stress: No Stress Concern Present    Feeling of Stress : Not at all   Social Connections:  Socially Isolated    Frequency of Communication with Friends and Family: More than three times a week    Frequency of Social Gatherings with Friends and Family: More than three times a week    Attends Protestant Services: Never    Active Member of Clubs or Organizations: No    Attends Club or Organization Meetings: Never    Marital Status: Never    Housing Stability: Unknown    Unable to Pay for Housing in the Last Year: No    Unstable Housing in the Last Year: No       Plan:   I spoke with patient regarding ED visit on 4/26/23 for acute otitis external. Pt accepted scheduling assistance for a follow up appt. Pt said that she does not experience any stress, anxiety or depression at this time and does have a good support system, seeing family/friends daily. Pt said that she does not exercise regularly, does not smoke and does not drink. Pt also states that she does not have any difficulty with food, housing or utilities, and is not a member of any Orthodox or social organization. Pt states that she has no additional resource needs at this time. Pt was provided resources for urgent care, establishing a healthcare home,  along with Right Care Right Place form, [facility] Virtual [facility] flyer, [facility] 24/7#, [facility] Nurse Triage #, and Healthy Heart diet educational information.    Purvi Boggs

## 2023-05-01 NOTE — TELEPHONE ENCOUNTER
----- Message from Douglas Baker sent at 5/1/2023  7:28 AM CDT -----  Contact: [phone]  Pt is calling stating she is currently in hospital . Please call back at [phone]/[employer] . Thanksdj

## 2023-05-01 NOTE — PLAN OF CARE
[facility] - Telemetry (Hospital)  Discharge Final Note    Primary Care Provider: Saumya Quinonez MD    Expected Discharge Date: 5/1/2023    Final Discharge Note (most recent)       Final Note - 05/01/23 1707          Final Note    Assessment Type Final Discharge Note     Anticipated Discharge Disposition Home-Health Care Svc        Post-Acute Status    Discharge Delays None known at this time                     Important Message from Medicare             Contact Info       Saumya Quinonez MD   Specialty: Family Medicine   Relationship: PCP - General    [address] [place] [address]   Phone: [phone]       Next Steps: Follow up in 3 day(s)    KAYLIN FerraroC   Specialty: Rheumatology    [address] [facility]  [place] [address]   Phone: [phone]       Next Steps: Follow up in 1 week(s)

## 2023-05-01 NOTE — ED NOTES
Pt resting in ER stretcher, aaox4, rr e/u, NAD noted. Vss noted. Bed low and locked, call light in reach, side rails up x2. Visitor at the bedside.  IV fluids infusing.  Pt verbalized understanding of status and POC; denies further needs. Will continue to monitor.

## 2023-05-02 ENCOUNTER — PATIENT OUTREACH (OUTPATIENT)
Dept: ADMINISTRATIVE | Facility: CLINIC | Age: 33
End: 2023-05-02
Payer: MEDICAID

## 2023-05-02 ENCOUNTER — LAB VISIT (OUTPATIENT)
Dept: LAB | Facility: HOSPITAL | Age: 33
End: 2023-05-02
Attending: INTERNAL MEDICINE
Payer: MEDICAID

## 2023-05-02 ENCOUNTER — OFFICE VISIT (OUTPATIENT)
Dept: INFECTIOUS DISEASES | Facility: CLINIC | Age: 33
End: 2023-05-02
Payer: MEDICAID

## 2023-05-02 VITALS
SYSTOLIC BLOOD PRESSURE: 122 MMHG | WEIGHT: 163.38 LBS | BODY MASS INDEX: 27.22 KG/M2 | HEART RATE: 76 BPM | HEIGHT: 65 IN | DIASTOLIC BLOOD PRESSURE: 85 MMHG | TEMPERATURE: 98 F

## 2023-05-02 DIAGNOSIS — J34.89 NASAL LESION: ICD-10-CM

## 2023-05-02 DIAGNOSIS — M32.9 SLE (SYSTEMIC LUPUS ERYTHEMATOSUS RELATED SYNDROME): Chronic | ICD-10-CM

## 2023-05-02 DIAGNOSIS — R59.1 LYMPHADENOPATHY: ICD-10-CM

## 2023-05-02 DIAGNOSIS — H60.90 OTITIS EXTERNA, UNSPECIFIED CHRONICITY, UNSPECIFIED LATERALITY, UNSPECIFIED TYPE: ICD-10-CM

## 2023-05-02 DIAGNOSIS — R59.0 LOCALIZED ENLARGED LYMPH NODES: Primary | ICD-10-CM

## 2023-05-02 LAB
GRAM STN SPEC: NORMAL
PATH REV BLD -IMP: NORMAL

## 2023-05-02 PROCEDURE — 3008F PR BODY MASS INDEX (BMI) DOCUMENTED: ICD-10-PCS | Mod: CPTII,,, | Performed by: INTERNAL MEDICINE

## 2023-05-02 PROCEDURE — 3079F PR MOST RECENT DIASTOLIC BLOOD PRESSURE 80-89 MM HG: ICD-10-PCS | Mod: CPTII,,, | Performed by: INTERNAL MEDICINE

## 2023-05-02 PROCEDURE — 99204 OFFICE O/P NEW MOD 45 MIN: CPT | Mod: S$PBB,,, | Performed by: INTERNAL MEDICINE

## 2023-05-02 PROCEDURE — 87075 CULTR BACTERIA EXCEPT BLOOD: CPT | Performed by: INTERNAL MEDICINE

## 2023-05-02 PROCEDURE — 3074F PR MOST RECENT SYSTOLIC BLOOD PRESSURE < 130 MM HG: ICD-10-PCS | Mod: CPTII,,, | Performed by: INTERNAL MEDICINE

## 2023-05-02 PROCEDURE — 1111F DSCHRG MED/CURRENT MED MERGE: CPT | Mod: CPTII,,, | Performed by: INTERNAL MEDICINE

## 2023-05-02 PROCEDURE — 3074F SYST BP LT 130 MM HG: CPT | Mod: CPTII,,, | Performed by: INTERNAL MEDICINE

## 2023-05-02 PROCEDURE — 3079F DIAST BP 80-89 MM HG: CPT | Mod: CPTII,,, | Performed by: INTERNAL MEDICINE

## 2023-05-02 PROCEDURE — 1159F PR MEDICATION LIST DOCUMENTED IN MEDICAL RECORD: ICD-10-PCS | Mod: CPTII,,, | Performed by: INTERNAL MEDICINE

## 2023-05-02 PROCEDURE — 1159F MED LIST DOCD IN RCRD: CPT | Mod: CPTII,,, | Performed by: INTERNAL MEDICINE

## 2023-05-02 PROCEDURE — 99214 OFFICE O/P EST MOD 30 MIN: CPT | Mod: PBBFAC | Performed by: INTERNAL MEDICINE

## 2023-05-02 PROCEDURE — 1111F PR DISCHARGE MEDS RECONCILED W/ CURRENT OUTPATIENT MED LIST: ICD-10-PCS | Mod: CPTII,,, | Performed by: INTERNAL MEDICINE

## 2023-05-02 PROCEDURE — 87070 CULTURE OTHR SPECIMN AEROBIC: CPT | Performed by: INTERNAL MEDICINE

## 2023-05-02 PROCEDURE — 99999 PR PBB SHADOW E&M-EST. PATIENT-LVL IV: CPT | Mod: PBBFAC,,, | Performed by: INTERNAL MEDICINE

## 2023-05-02 PROCEDURE — 99999 PR PBB SHADOW E&M-EST. PATIENT-LVL IV: ICD-10-PCS | Mod: PBBFAC,,, | Performed by: INTERNAL MEDICINE

## 2023-05-02 PROCEDURE — 87205 SMEAR GRAM STAIN: CPT | Performed by: INTERNAL MEDICINE

## 2023-05-02 PROCEDURE — 3008F BODY MASS INDEX DOCD: CPT | Mod: CPTII,,, | Performed by: INTERNAL MEDICINE

## 2023-05-02 PROCEDURE — 99204 PR OFFICE/OUTPT VISIT, NEW, LEVL IV, 45-59 MIN: ICD-10-PCS | Mod: S$PBB,,, | Performed by: INTERNAL MEDICINE

## 2023-05-02 PROCEDURE — 86480 TB TEST CELL IMMUN MEASURE: CPT | Performed by: INTERNAL MEDICINE

## 2023-05-02 RX ORDER — MINOCYCLINE HYDROCHLORIDE 100 MG/1
100 TABLET ORAL EVERY 12 HOURS
Qty: 60 TABLET | Refills: 11 | Status: SHIPPED | OUTPATIENT
Start: 2023-05-02 | End: 2023-05-12

## 2023-05-02 RX ORDER — SALICYLIC ACID 0.03 G/ML
1 SHAMPOO TOPICAL 2 TIMES DAILY
Qty: 1 EACH | Refills: 2 | Status: SHIPPED | OUTPATIENT
Start: 2023-05-02 | End: 2023-06-01

## 2023-05-02 RX ORDER — AMOXICILLIN AND CLAVULANATE POTASSIUM 875; 125 MG/1; MG/1
1 TABLET, FILM COATED ORAL 2 TIMES DAILY
Qty: 28 TABLET | Refills: 0 | Status: SHIPPED | OUTPATIENT
Start: 2023-05-02 | End: 2023-05-16

## 2023-05-02 NOTE — ASSESSMENT & PLAN NOTE
Ct scan of the neck - 01/10/23-Extensive lymphadenopathy in the right greater than left neck.  Both benign and malignant etiologies are in the differential, recommend correlation with clinical presentation for infectious etiology.  Consider biopsy to rule out lymphoma.     Sinusitis.     Suspected dental caries, dental consultation is recommended    Pathology 02/17/23- showed -1. Right deep cervical lymph node, excisional biopsy:   - Follicular hyperplasia.  See comment.   - No evidence of lymphoma or metastatic carcinoma    Infectious etiology will include Holly  bonner infection, Castlemann disease or just that she also has multiple infections that drain to those lymph nodes- she has a scalp lesion- dandruff - with nodules noted- will refer to dermatology  -start selsun shampoo  nasal lesions- will give 10 days of doxycycline and also Augmentin for dental caries .  She needs close follow up.  She will also need CT scan of chest and abdomen as he also lost 20 pounds over the last 2 months

## 2023-05-02 NOTE — HOSPITAL COURSE
Although initially, pt's hemoglobin appears to continue to trend downwards, however it remained stable without the need of blood transfusion during hospital stay. Nausea, vomiting, decreased appetite resolved with supportive care including IV fluid, Zofran, magic mouthwash and the patient was ultimately able to tolerate diet.  Given patient has not had any improvement with multiple antibiotics courses, does not appear that her presentation is consistent with acute infection and other appears to be flare of lupus as she has not been compliant with her meds.  Discussed with patient's rheumatologist,  who reviewed patient's chart and labs and agree that patient is showing signs of uncontrolled lupus and needs to be back on suppressive meds not.  He recommended 1 g Solu-Medrol IV and close follow-up with Rheumatology.  Plan of care discussed extensively with patient with family members at bedside.  Spent greater than 25 minutes counseling patient on the importance of adherence to her meds especially as her recent lymph node pathology showed follicular hyperplasia, however this can further progress to lymphoma.  Discussed the importance of follow-up with PCP within one-week and rheumatology follow-up in 1-2 weeks Patient stated understanding.  She is stable for discharge home at this time, and feels comfortable about this plan.  All questions answered to her satisfaction.  Prescription for Zofran sent to [facility], however they were not able to compound magic mouthwash.  Subsequently prescription for Magic mouthwash was called in to [facility].

## 2023-05-02 NOTE — PROGRESS NOTES
2nd Attempt made to reach patient for TCC call. Left voicemail please call [phone] leave first name, last name, and  for Ninfa I will return your call.

## 2023-05-02 NOTE — PROGRESS NOTES
C3 nurse attempted to contact Alisia Vines  for a TCC post hospital discharge follow up call. No answer. Left voicemail with callback information. The patient does not have a scheduled HOSFU appointment. Message sent to PCP staff for assistance with scheduling visit with patient.

## 2023-05-02 NOTE — PROGRESS NOTES
Subjective:       Patient ID: Alisia Vines is a 32 y.o. female.    Chief Complaint: Lymphadenopathy   HPI    32 year old woman with PMH as listed below who presented with lymphadenopathy -  She was also  recently discharged  from the Hospital yesterday for SLE flare.  CT scan of the neck-01/10/2023.    Extensive lymphadenopathy in the right greater than left neck.  Both benign and malignant etiologies are in the differential, recommend correlation with clinical presentation for infectious etiology.  Consider biopsy to rule out lymphoma.  - neck ultrasound-  In the area of palpable abnormality there are innumerable presumed lymph nodes within the subcutaneous soft tissues which range in size from 1.8 x 1.2 x 1.8 cm 2 2.9 x 1.2 x 2.5 cm.  All of these lymph nodes appear to demonstrate a thickened cortex with a few of the lymph nodes demonstrating an effaced fatty hilum.  These lymph nodes are considered enlarged by size criteria as well.  Findings are nonspecific  Labs-  HIV -neg a month ago  Biopsy - 23-  Final Pathologic Diagnosis 1. Right deep cervical lymph node, excisional biopsy:   - Follicular hyperplasia.  See comment.   - No evidence of lymphoma or metastatic carcinoma       Past Surgical History:   Procedure Laterality Date     SECTION WITH TUBAL LIGATION N/A 2019    Procedure:  SECTION, WITH TUBAL LIGATION;  Surgeon: VERONICA Valdovinos MD;  Location: [facility] L&D;  Service: OB/GYN;  Laterality: N/A;     SECTION, LOW TRANSVERSE      x2    LYMPH NODE BIOPSY Right 2023    Procedure: BIOPSY, LYMPH NODE;  Surgeon: Rivera Sandoval MD;  Location: [facility] OR;  Service: ENT;  Laterality: Right;  right deep cervial lymph node excision    NECK MASS EXCISION Right 2023    Procedure: EXCISION, MASS, NECK;  Surgeon: Rivera Sandoval MD;  Location: [facility] OR;  Service: ENT;  Laterality: Right;  right deep cervial lymph node excision    RENAL BIOPSY  2013      Family History   Problem Relation Age of Onset    Hypertension Mother     Eczema Brother     Hypertension Maternal Grandmother     Diabetes Paternal Grandmother     Heart disease Son     Arrhythmia Son         CHB    Stroke Neg Hx     Cancer Neg Hx      Social History     Socioeconomic History    Marital status: Single    Number of children: 2   Occupational History     Comment: [employer]   Tobacco Use    Smoking status: Never     Passive exposure: Never    Smokeless tobacco: Never   Substance and Sexual Activity    Alcohol use: No     Alcohol/week: 0.0 standard drinks    Drug use: Yes     Types: Marijuana    Sexual activity: Not Currently     Partners: Male     Birth control/protection: None   Other Topics Concern    Are you pregnant or think you may be? No    Breast-feeding No   Social History Narrative    The patient is single and lives with her significant other.  She has 3 children.  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     Social Determinants of Health     Financial Resource Strain: Low Risk     Difficulty of Paying Living Expenses: Not hard at all   Food Insecurity: No Food Insecurity    Worried About Running Out of Food in the Last Year: Never true    Ran Out of Food in the Last Year: Never true   Transportation Needs: No Transportation Needs    Lack of Transportation (Medical): No    Lack of Transportation (Non-Medical): No   Physical Activity: Inactive    Days of Exercise per Week: 0 days    Minutes of Exercise per Session: 0 min   Stress: No Stress Concern Present    Feeling of Stress : Not at all   Social Connections: Socially Isolated    Frequency of Communication with Friends and Family: More than three times a week    Frequency of Social Gatherings with Friends and Family: More than three times a week    Attends Caodaism Services: Never    Active Member of Clubs or Organizations: No    Attends Club or Organization Meetings: Never    Marital  Status: Never    Housing Stability: Unknown    Unable to Pay for Housing in the Last Year: No    Unstable Housing in the Last Year: No     Review of Systems   Constitutional:  Negative for activity change, appetite change, chills, diaphoresis and fatigue.   Respiratory:  Negative for apnea and chest tightness.    Gastrointestinal:  Negative for abdominal distention.   Musculoskeletal:  Negative for arthralgias.     Objective:      Physical Exam  Vitals and nursing note reviewed.   Constitutional:       Appearance: Normal appearance.      Interventions: She is sedated.   HENT:      Nose: Nose normal.      Mouth/Throat:      Mouth: Mucous membranes are moist.   Eyes:      Pupils: Pupils are equal, round, and reactive to light.   Neck:      Comments: Lymph nodes enlarged  Cardiovascular:      Rate and Rhythm: Normal rate.   Pulmonary:      Effort: Pulmonary effort is normal.   Abdominal:      General: Abdomen is flat.   Musculoskeletal:         General: Normal range of motion.   Skin:     Comments: Ee pics   Neurological:      Mental Status: She is alert and oriented to person, place, and time.   Psychiatric:         Mood and Affect: Mood normal.         Behavior: Behavior is cooperative.                     Assessment:       1. Localized enlarged lymph nodes  CT Chest Abdomen Pelvis W W/O Contrast (XPD)      2. Lymphadenopathy  Ambulatory referral/consult to Infectious Disease    Holly-Barr Virus (EBV) Antibody Panel    Ambulatory referral/consult to Dermatology    Gram Stain    Culture, Anaerobe    Aerobic Culture    QUANTIFERON GOLD TB      3. SLE (systemic lupus erythematosus related syndrome)        4. Otitis externa, unspecified chronicity, unspecified laterality, unspecified type        5. Nasal lesion            Plan:       Problem List Items Addressed This Visit       SLE (systemic lupus erythematosus related syndrome) (Chronic)     Follow rheumatology            Lymphadenopathy     Ct scan of the  neck - 01/10/23-Extensive lymphadenopathy in the right greater than left neck.  Both benign and malignant etiologies are in the differential, recommend correlation with clinical presentation for infectious etiology.  Consider biopsy to rule out lymphoma.     Sinusitis.     Suspected dental caries, dental consultation is recommended    Pathology 02/17/23- showed -1. Right deep cervical lymph node, excisional biopsy:   - Follicular hyperplasia.  See comment.   - No evidence of lymphoma or metastatic carcinoma    Infectious etiology will include Holly  bonner infection, Castlemann disease or just that she also has multiple infections that drain to those lymph nodes- she has a scalp lesion- dandruff - with nodules noted- will refer to dermatology  -start selsun shampoo  nasal lesions- will give 10 days of doxycycline and also Augmentin for dental caries .  She needs close follow up.  She will also need CT scan of chest and abdomen as he also lost 20 pounds over the last 2 months    I showed her pictures of the anatomy and drainage of the scalp /neck            Relevant Orders    Holly-Barr Virus (EBV) Antibody Panel    Ambulatory referral/consult to Dermatology    Gram Stain    Culture, Anaerobe    Aerobic Culture    QUANTIFERON GOLD TB    Otitis externa     On cipro dex, will do a swab today            Nasal lesion     Will add Minocycline and continue Bactroban             Other Visit Diagnoses       Localized enlarged lymph nodes    -  Primary    Relevant Orders    CT Chest Abdomen Pelvis W W/O Contrast (XPD)

## 2023-05-03 LAB
GAMMA INTERFERON BACKGROUND BLD IA-ACNC: 0 IU/ML
M TB IFN-G CD4+ BCKGRND COR BLD-ACNC: 0 IU/ML
MITOGEN IGNF BCKGRD COR BLD-ACNC: 0 IU/ML
TB GOLD PLUS: ABNORMAL
TB2 - NIL: 0 IU/ML

## 2023-05-03 NOTE — PROGRESS NOTES
C3 nurse spoke with Alisia Vines  for a TCC post hospital discharge follow up call. The patient has a scheduled [facility] appointment with Saumya Quinonez MD  on 5/5/23 @ 2238.

## 2023-05-04 ENCOUNTER — TELEPHONE (OUTPATIENT)
Dept: DERMATOLOGY | Facility: CLINIC | Age: 33
End: 2023-05-04
Payer: MEDICAID

## 2023-05-04 LAB — BACTERIA SPEC AEROBE CULT: ABNORMAL

## 2023-05-04 NOTE — TELEPHONE ENCOUNTER
----- Message from Milad Pitts sent at 5/4/2023 10:47 AM CDT -----  Contact: self  ..Type:  Sooner Apoointment Request    Caller is requesting a sooner appointment.  Caller declined first available appointment listed below.  Caller will not accept being placed on the waitlist and is requesting a message be sent to doctor.  Name of Caller: Carolyn Vines  When is the first available appointment? August   Symptoms: scabs in head   Would the patient rather a call back or a response via [facility]?  Call back   Best Call Back Number: .[phone] (home)   Additional Information:

## 2023-05-05 ENCOUNTER — OFFICE VISIT (OUTPATIENT)
Dept: FAMILY MEDICINE | Facility: CLINIC | Age: 33
End: 2023-05-05
Payer: MEDICAID

## 2023-05-05 VITALS
HEIGHT: 65 IN | OXYGEN SATURATION: 100 % | BODY MASS INDEX: 27.51 KG/M2 | WEIGHT: 165.13 LBS | TEMPERATURE: 97 F | HEART RATE: 85 BPM | SYSTOLIC BLOOD PRESSURE: 122 MMHG | DIASTOLIC BLOOD PRESSURE: 89 MMHG | RESPIRATION RATE: 18 BRPM

## 2023-05-05 DIAGNOSIS — I10 SEVERE UNCONTROLLED HYPERTENSION: Chronic | ICD-10-CM

## 2023-05-05 DIAGNOSIS — R51.9 FACIAL PAIN: ICD-10-CM

## 2023-05-05 DIAGNOSIS — L98.9 SKIN LESIONS: ICD-10-CM

## 2023-05-05 DIAGNOSIS — M32.9 SLE (SYSTEMIC LUPUS ERYTHEMATOSUS RELATED SYNDROME): Primary | Chronic | ICD-10-CM

## 2023-05-05 PROBLEM — J06.9 UPPER RESPIRATORY INFECTION: Status: RESOLVED | Noted: 2023-04-30 | Resolved: 2023-05-05

## 2023-05-05 PROCEDURE — 3008F BODY MASS INDEX DOCD: CPT | Mod: CPTII,,, | Performed by: FAMILY MEDICINE

## 2023-05-05 PROCEDURE — 1160F RVW MEDS BY RX/DR IN RCRD: CPT | Mod: CPTII,,, | Performed by: FAMILY MEDICINE

## 2023-05-05 PROCEDURE — 99999 PR PBB SHADOW E&M-EST. PATIENT-LVL IV: ICD-10-PCS | Mod: PBBFAC,,, | Performed by: FAMILY MEDICINE

## 2023-05-05 PROCEDURE — 99214 PR OFFICE/OUTPT VISIT, EST, LEVL IV, 30-39 MIN: ICD-10-PCS | Mod: S$PBB,,, | Performed by: FAMILY MEDICINE

## 2023-05-05 PROCEDURE — 3079F PR MOST RECENT DIASTOLIC BLOOD PRESSURE 80-89 MM HG: ICD-10-PCS | Mod: CPTII,,, | Performed by: FAMILY MEDICINE

## 2023-05-05 PROCEDURE — 99214 OFFICE O/P EST MOD 30 MIN: CPT | Mod: PBBFAC,PO | Performed by: FAMILY MEDICINE

## 2023-05-05 PROCEDURE — 3074F SYST BP LT 130 MM HG: CPT | Mod: CPTII,,, | Performed by: FAMILY MEDICINE

## 2023-05-05 PROCEDURE — 1159F PR MEDICATION LIST DOCUMENTED IN MEDICAL RECORD: ICD-10-PCS | Mod: CPTII,,, | Performed by: FAMILY MEDICINE

## 2023-05-05 PROCEDURE — 3008F PR BODY MASS INDEX (BMI) DOCUMENTED: ICD-10-PCS | Mod: CPTII,,, | Performed by: FAMILY MEDICINE

## 2023-05-05 PROCEDURE — 3074F PR MOST RECENT SYSTOLIC BLOOD PRESSURE < 130 MM HG: ICD-10-PCS | Mod: CPTII,,, | Performed by: FAMILY MEDICINE

## 2023-05-05 PROCEDURE — 99214 OFFICE O/P EST MOD 30 MIN: CPT | Mod: S$PBB,,, | Performed by: FAMILY MEDICINE

## 2023-05-05 PROCEDURE — 1159F MED LIST DOCD IN RCRD: CPT | Mod: CPTII,,, | Performed by: FAMILY MEDICINE

## 2023-05-05 PROCEDURE — 3079F DIAST BP 80-89 MM HG: CPT | Mod: CPTII,,, | Performed by: FAMILY MEDICINE

## 2023-05-05 PROCEDURE — 1160F PR REVIEW ALL MEDS BY PRESCRIBER/CLIN PHARMACIST DOCUMENTED: ICD-10-PCS | Mod: CPTII,,, | Performed by: FAMILY MEDICINE

## 2023-05-05 PROCEDURE — 99999 PR PBB SHADOW E&M-EST. PATIENT-LVL IV: CPT | Mod: PBBFAC,,, | Performed by: FAMILY MEDICINE

## 2023-05-05 RX ORDER — FLUCONAZOLE 200 MG/1
200 TABLET ORAL
Qty: 3 TABLET | Refills: 0 | Status: SHIPPED | OUTPATIENT
Start: 2023-05-05 | End: 2023-05-12

## 2023-05-05 RX ORDER — HYDROCODONE BITARTRATE AND ACETAMINOPHEN 5; 325 MG/1; MG/1
1-2 TABLET ORAL EVERY 6 HOURS PRN
Qty: 30 TABLET | Refills: 0 | Status: SHIPPED | OUTPATIENT
Start: 2023-05-05 | End: 2023-11-02 | Stop reason: ALTCHOICE

## 2023-05-05 NOTE — PROGRESS NOTES
CHIEF COMPLAINT: This is a 32-year-old female here for follow-up hospitalization.     SUBJECTIVE:  The patient was hospitalized Saturday through Monday of this week due to nausea, vomiting, respiratory symptoms, decreased appetite and subjective fever.  Patient had been treated for infection multiple times prior to admission with antibiotics.  She recently had lymph node biopsy from right posterior neck which was negative for lymphoma or malignancy but showed follicular hyperplasia.  Workup in ED revealed symptomatic hemolytic anemia and dehydration.  Patient was admitted and given IV fluids.  Hemoglobin remained stable and blood transfusion was not needed.  It was decided through consultation with her rheumatologist that she was showing signs of uncontrolled lupus.  She admitted to not taking immunosuppressive medication for awhile.  Hydroxychloroquine was restarted.  Since discharge, the patient saw Infectious Disease specialist which was scheduled prior to her admission.  One of the scabbed over lesions on her face was cultured and has grown out Corynebacterium jeikeium for which she is taking Augmentin and minocycline.    The patient reports that she still feels nauseated and the pain in her face and scalp due to skin lesions was much worse since discharge.  She reports that she is taking hydroxychloroquine daily and is scheduled to see rheumatologist next week.    The patient has a history of resistant hypertension and lupus nephritis.  She was taking amlodipine 5 mg but amlodipine was held in the hospital due to low blood pressure. Her BP today is 122/96.  The patient was diagnosed with SLE in October 2013. She is followed by Rheumatology was previously taking Plaquenil and prednisone as needed.  Additionally, patient has Sjogren syndrome, anemia and leukopenia.  Patient takes Valtrex 1000 mg daily to suppress herpes infection.  She takes fluoxetine and Abilify per psychiatrist for mood disorder and reactive  depression.      ROS:  GENERAL: Patient denies fever, chills, night sweats.  Patient denies weight gain or loss. Patient denies anorexia, weakness or swollen glands.  Positive for fatigue.  SKIN:  Positive for skin lesions.  HEENT: Patient denies sore throat, ear pain, hearing loss, nasal congestion, or runny nose. Patient denies visual disturbance, eye irritation or discharge.  Positive for facial pain.  LUNGS: Patient denies cough, wheeze or hemoptysis.  CARDIOVASCULAR: Patient denies chest pain, shortness of breath, palpitations, syncope or lower extremity edema.  GI: Patient denies abdominal pain, vomiting, diarrhea, constipation, blood in stool or melena.  Positive for nausea.  GENITOURINARY:  Patient denies dysuria, frequency, hematuria, nocturia, urgency or incontinence.  MUSCULOSKELETAL: Patient denies joint pain, swelling, redness or warmth.  NEUROLOGIC: Patient deniesparesthesias, weakness in limb, dysarthria, dysphagia or abnormality of gait.  PSYCHIATRIC: Patient denies anxiety, depression, or memory loss.     OBJECTIVE:   GENERAL: Well-developed well-nourished overweight black female alert and oriented x3, in no acute distress.  Memory, judgment and cognition without deficit.   SKIN:  Scabbed skin lesions at edge of nose and lips, in ear auricle and on scalp.    HEENT: Eyes: Clear conjunctivae. No scleral icterus.  Pupils equal reactive to light and accommodation.  Ears: Clear canals. Clear TMs.  Nose: Without congestion.  Pharynx: Without injection or exudates.  NECK: Supple, normal range of motion.  No masses, lymphadenopathy or enlarged thyroid.  No JVD.  Carotids 2+ and equal.  No bruits.  LUNGS: Clear to auscultation.  Normal respiratory effort.  CARDIOVASCULAR:  Regular rhythm, normal S1, S2 without murmur, gallop or rub.  BACK:  No CVA or spinal tenderness.  ABDOMEN: Soft, nontender without mass or organomegaly.  No rebound or guarding.  EXTREMITIES: Without cyanosis, clubbing or edema.  Distal  pulses 2+ and equal.  Normal range of motion in all extremities.  No joint effusion, erythema or warmth.  NEUROLOGIC: Motor strength equal bilaterally.  Sensation normal to touch.   Gait without abnormality.  No tremor.      ASSESSMENT:  1. SLE (systemic lupus erythematosus related syndrome)    2. Facial pain    3. Skin lesions    4. Severe uncontrolled hypertension      PLAN:  1. Restart amlodipine.    2. Monitor blood pressure.  Report readings greater than equal 140/90 or less than or equal to 100/60.    3. Continue antibiotics.  4.  Hydrocodone APAP 5325 mg 1 tablet every 6 hours as needed for pain.  Dispense # 30.  No refills.    5.  Keep appointment with rheumatologist.  6.  Follow-up if no improvement or worsening symptoms.      30 minutes of total time spent on the encounter, which includes face to face time and non-face to face time preparing to see the patient (eg, review of tests), Obtaining and/or reviewing separately obtained history, Documenting clinical information in the electronic or other health record, Independently interpreting results (not separately reported) and communicating results to the patient/family/caregiver, or Care coordination (not separately reported).     This note is generated with speech recognition software and is subject to transcription error and sound alike phrases that may be missed by proofreading.

## 2023-05-08 ENCOUNTER — LAB VISIT (OUTPATIENT)
Dept: LAB | Facility: HOSPITAL | Age: 33
End: 2023-05-08
Attending: PHYSICIAN ASSISTANT
Payer: MEDICAID

## 2023-05-08 ENCOUNTER — OFFICE VISIT (OUTPATIENT)
Dept: RHEUMATOLOGY | Facility: CLINIC | Age: 33
End: 2023-05-08
Payer: MEDICAID

## 2023-05-08 ENCOUNTER — TELEPHONE (OUTPATIENT)
Dept: DERMATOLOGY | Facility: CLINIC | Age: 33
End: 2023-05-08
Payer: MEDICAID

## 2023-05-08 VITALS
DIASTOLIC BLOOD PRESSURE: 79 MMHG | WEIGHT: 168.63 LBS | HEIGHT: 65 IN | HEART RATE: 81 BPM | BODY MASS INDEX: 28.1 KG/M2 | SYSTOLIC BLOOD PRESSURE: 128 MMHG

## 2023-05-08 DIAGNOSIS — Z79.899 HIGH RISK MEDICATION USE: ICD-10-CM

## 2023-05-08 DIAGNOSIS — Z51.81 MEDICATION MONITORING ENCOUNTER: ICD-10-CM

## 2023-05-08 DIAGNOSIS — M35.00 SJOGREN'S SYNDROME, WITH UNSPECIFIED ORGAN INVOLVEMENT: ICD-10-CM

## 2023-05-08 DIAGNOSIS — M32.14 SLE GLOMERULONEPHRITIS SYNDROME, WHO CLASS V: ICD-10-CM

## 2023-05-08 DIAGNOSIS — Z79.899 LONG-TERM USE OF PLAQUENIL: ICD-10-CM

## 2023-05-08 DIAGNOSIS — D84.9 IMMUNOCOMPROMISED: ICD-10-CM

## 2023-05-08 DIAGNOSIS — M32.9 SYSTEMIC LUPUS ERYTHEMATOSUS ARTHRITIS: Primary | ICD-10-CM

## 2023-05-08 DIAGNOSIS — R21 RASH: ICD-10-CM

## 2023-05-08 LAB
ALBUMIN SERPL BCP-MCNC: 2.2 G/DL (ref 3.5–5.2)
ALP SERPL-CCNC: 60 U/L (ref 55–135)
ALT SERPL W/O P-5'-P-CCNC: 13 U/L (ref 10–44)
ANION GAP SERPL CALC-SCNC: 7 MMOL/L (ref 8–16)
ANISOCYTOSIS BLD QL SMEAR: SLIGHT
AST SERPL-CCNC: 20 U/L (ref 10–40)
BACTERIA #/AREA URNS HPF: NORMAL /HPF
BACTERIA SPEC ANAEROBE CULT: NORMAL
BASOPHILS # BLD AUTO: 0.01 K/UL (ref 0–0.2)
BASOPHILS NFR BLD: 0.2 % (ref 0–1.9)
BILIRUB SERPL-MCNC: 0.4 MG/DL (ref 0.1–1)
BUN SERPL-MCNC: 10 MG/DL (ref 6–20)
C3 SERPL-MCNC: 55 MG/DL (ref 50–180)
C4 SERPL-MCNC: 12 MG/DL (ref 11–44)
CALCIUM SERPL-MCNC: 8.5 MG/DL (ref 8.7–10.5)
CHLORIDE SERPL-SCNC: 109 MMOL/L (ref 95–110)
CO2 SERPL-SCNC: 27 MMOL/L (ref 23–29)
CREAT SERPL-MCNC: 0.6 MG/DL (ref 0.5–1.4)
CREAT UR-MCNC: 170 MG/DL (ref 15–325)
CRP SERPL-MCNC: 1.9 MG/L (ref 0–8.2)
DIFFERENTIAL METHOD: ABNORMAL
EOSINOPHIL # BLD AUTO: 0.1 K/UL (ref 0–0.5)
EOSINOPHIL NFR BLD: 2.6 % (ref 0–8)
ERYTHROCYTE [DISTWIDTH] IN BLOOD BY AUTOMATED COUNT: 27.8 % (ref 11.5–14.5)
ERYTHROCYTE [SEDIMENTATION RATE] IN BLOOD BY PHOTOMETRIC METHOD: 14 MM/HR (ref 0–36)
EST. GFR  (NO RACE VARIABLE): >60 ML/MIN/1.73 M^2
GLUCOSE SERPL-MCNC: 73 MG/DL (ref 70–110)
HAV IGM SERPL QL IA: ABNORMAL
HBV CORE IGM SERPL QL IA: ABNORMAL
HBV SURFACE AG SERPL QL IA: ABNORMAL
HCT VFR BLD AUTO: 22.5 % (ref 37–48.5)
HCV AB SERPL QL IA: ABNORMAL
HGB BLD-MCNC: 7.8 G/DL (ref 12–16)
HYALINE CASTS #/AREA URNS LPF: 1 /LPF
HYPOCHROMIA BLD QL SMEAR: ABNORMAL
IMM GRANULOCYTES # BLD AUTO: 0.03 K/UL (ref 0–0.04)
IMM GRANULOCYTES NFR BLD AUTO: 0.6 % (ref 0–0.5)
LYMPHOCYTES # BLD AUTO: 1.3 K/UL (ref 1–4.8)
LYMPHOCYTES NFR BLD: 26.8 % (ref 18–48)
MCH RBC QN AUTO: 34.2 PG (ref 27–31)
MCHC RBC AUTO-ENTMCNC: 34.7 G/DL (ref 32–36)
MCV RBC AUTO: 99 FL (ref 82–98)
MICROSCOPIC COMMENT: NORMAL
MONOCYTES # BLD AUTO: 0.5 K/UL (ref 0.3–1)
MONOCYTES NFR BLD: 9.9 % (ref 4–15)
NEUTROPHILS # BLD AUTO: 3 K/UL (ref 1.8–7.7)
NEUTROPHILS NFR BLD: 59.9 % (ref 38–73)
NRBC BLD-RTO: 0 /100 WBC
OTHER ELEMENTS URNS MICRO: NORMAL
OVALOCYTES BLD QL SMEAR: ABNORMAL
PLATELET # BLD AUTO: 177 K/UL (ref 150–450)
PMV BLD AUTO: 9.4 FL (ref 9.2–12.9)
POIKILOCYTOSIS BLD QL SMEAR: SLIGHT
POLYCHROMASIA BLD QL SMEAR: ABNORMAL
POTASSIUM SERPL-SCNC: 3.8 MMOL/L (ref 3.5–5.1)
PROT SERPL-MCNC: 5.1 G/DL (ref 6–8.4)
PROT UR-MCNC: 131 MG/DL (ref 0–15)
PROT/CREAT UR: 0.77 MG/G{CREAT} (ref 0–0.2)
RBC # BLD AUTO: 2.28 M/UL (ref 4–5.4)
RBC #/AREA URNS HPF: 1 /HPF (ref 0–4)
SODIUM SERPL-SCNC: 143 MMOL/L (ref 136–145)
SQUAMOUS #/AREA URNS HPF: 2 /HPF
WBC # BLD AUTO: 4.93 K/UL (ref 3.9–12.7)
WBC #/AREA URNS HPF: 1 /HPF (ref 0–5)

## 2023-05-08 PROCEDURE — 84165 PROTEIN E-PHORESIS SERUM: CPT | Performed by: PHYSICIAN ASSISTANT

## 2023-05-08 PROCEDURE — 86225 DNA ANTIBODY NATIVE: CPT | Performed by: PHYSICIAN ASSISTANT

## 2023-05-08 PROCEDURE — 3008F PR BODY MASS INDEX (BMI) DOCUMENTED: ICD-10-PCS | Mod: CPTII,,, | Performed by: PHYSICIAN ASSISTANT

## 2023-05-08 PROCEDURE — 86334 IMMUNOFIX E-PHORESIS SERUM: CPT | Mod: 26,,, | Performed by: PATHOLOGY

## 2023-05-08 PROCEDURE — 80074 ACUTE HEPATITIS PANEL: CPT | Performed by: PHYSICIAN ASSISTANT

## 2023-05-08 PROCEDURE — 86334 IMMUNOFIX E-PHORESIS SERUM: CPT | Performed by: PHYSICIAN ASSISTANT

## 2023-05-08 PROCEDURE — 3074F SYST BP LT 130 MM HG: CPT | Mod: CPTII,,, | Performed by: PHYSICIAN ASSISTANT

## 2023-05-08 PROCEDURE — 3074F PR MOST RECENT SYSTOLIC BLOOD PRESSURE < 130 MM HG: ICD-10-PCS | Mod: CPTII,,, | Performed by: PHYSICIAN ASSISTANT

## 2023-05-08 PROCEDURE — 99999 PR PBB SHADOW E&M-EST. PATIENT-LVL IV: ICD-10-PCS | Mod: PBBFAC,,, | Performed by: PHYSICIAN ASSISTANT

## 2023-05-08 PROCEDURE — 3078F DIAST BP <80 MM HG: CPT | Mod: CPTII,,, | Performed by: PHYSICIAN ASSISTANT

## 2023-05-08 PROCEDURE — 86160 COMPLEMENT ANTIGEN: CPT | Mod: 59 | Performed by: PHYSICIAN ASSISTANT

## 2023-05-08 PROCEDURE — 99999 PR PBB SHADOW E&M-EST. PATIENT-LVL IV: CPT | Mod: PBBFAC,,, | Performed by: PHYSICIAN ASSISTANT

## 2023-05-08 PROCEDURE — 85025 COMPLETE CBC W/AUTO DIFF WBC: CPT | Performed by: PHYSICIAN ASSISTANT

## 2023-05-08 PROCEDURE — 1160F RVW MEDS BY RX/DR IN RCRD: CPT | Mod: CPTII,,, | Performed by: PHYSICIAN ASSISTANT

## 2023-05-08 PROCEDURE — 99214 OFFICE O/P EST MOD 30 MIN: CPT | Mod: PBBFAC | Performed by: PHYSICIAN ASSISTANT

## 2023-05-08 PROCEDURE — 1159F MED LIST DOCD IN RCRD: CPT | Mod: CPTII,,, | Performed by: PHYSICIAN ASSISTANT

## 2023-05-08 PROCEDURE — 86160 COMPLEMENT ANTIGEN: CPT | Performed by: PHYSICIAN ASSISTANT

## 2023-05-08 PROCEDURE — 82595 ASSAY OF CRYOGLOBULIN: CPT | Performed by: PHYSICIAN ASSISTANT

## 2023-05-08 PROCEDURE — 86481 TB AG RESPONSE T-CELL SUSP: CPT | Performed by: PHYSICIAN ASSISTANT

## 2023-05-08 PROCEDURE — 80053 COMPREHEN METABOLIC PANEL: CPT | Performed by: PHYSICIAN ASSISTANT

## 2023-05-08 PROCEDURE — 3078F PR MOST RECENT DIASTOLIC BLOOD PRESSURE < 80 MM HG: ICD-10-PCS | Mod: CPTII,,, | Performed by: PHYSICIAN ASSISTANT

## 2023-05-08 PROCEDURE — 86140 C-REACTIVE PROTEIN: CPT | Performed by: PHYSICIAN ASSISTANT

## 2023-05-08 PROCEDURE — 82570 ASSAY OF URINE CREATININE: CPT | Performed by: PHYSICIAN ASSISTANT

## 2023-05-08 PROCEDURE — 87086 URINE CULTURE/COLONY COUNT: CPT | Performed by: PHYSICIAN ASSISTANT

## 2023-05-08 PROCEDURE — 84165 PATHOLOGIST INTERPRETATION SPE: ICD-10-PCS | Mod: 26,,, | Performed by: PATHOLOGY

## 2023-05-08 PROCEDURE — 1159F PR MEDICATION LIST DOCUMENTED IN MEDICAL RECORD: ICD-10-PCS | Mod: CPTII,,, | Performed by: PHYSICIAN ASSISTANT

## 2023-05-08 PROCEDURE — 36415 COLL VENOUS BLD VENIPUNCTURE: CPT | Performed by: PHYSICIAN ASSISTANT

## 2023-05-08 PROCEDURE — 86334 PATHOLOGIST INTERPRETATION IFE: ICD-10-PCS | Mod: 26,,, | Performed by: PATHOLOGY

## 2023-05-08 PROCEDURE — 81000 URINALYSIS NONAUTO W/SCOPE: CPT | Performed by: PHYSICIAN ASSISTANT

## 2023-05-08 PROCEDURE — 1160F PR REVIEW ALL MEDS BY PRESCRIBER/CLIN PHARMACIST DOCUMENTED: ICD-10-PCS | Mod: CPTII,,, | Performed by: PHYSICIAN ASSISTANT

## 2023-05-08 PROCEDURE — 99215 PR OFFICE/OUTPT VISIT, EST, LEVL V, 40-54 MIN: ICD-10-PCS | Mod: S$PBB,,, | Performed by: PHYSICIAN ASSISTANT

## 2023-05-08 PROCEDURE — 99215 OFFICE O/P EST HI 40 MIN: CPT | Mod: S$PBB,,, | Performed by: PHYSICIAN ASSISTANT

## 2023-05-08 PROCEDURE — 84165 PROTEIN E-PHORESIS SERUM: CPT | Mod: 26,,, | Performed by: PATHOLOGY

## 2023-05-08 PROCEDURE — 85652 RBC SED RATE AUTOMATED: CPT | Performed by: PHYSICIAN ASSISTANT

## 2023-05-08 PROCEDURE — 3008F BODY MASS INDEX DOCD: CPT | Mod: CPTII,,, | Performed by: PHYSICIAN ASSISTANT

## 2023-05-08 RX ORDER — HYDROXYCHLOROQUINE SULFATE 200 MG/1
TABLET, FILM COATED ORAL
Qty: 60 TABLET | Refills: 2 | Status: SHIPPED | OUTPATIENT
Start: 2023-05-08 | End: 2023-10-30

## 2023-05-08 NOTE — PROGRESS NOTES
Subjective:      Patient ID: Alisia Vines is a 32 y.o. female.    Chief Complaint: Lupus      HPI   Alisia Vines  is a 32 y.o. female here for follow-up on lupus nephritis, Sjogren's syndrome.  She was recently hospitalized for lupus flare.  She got Solu-Medrol in the hospital.  Still feeling lightheaded and weak.  Apparently she had an acute hemolytic anemia while in the hospital.  She did not require transfusion while she was there.  Last appointment here in the department was March 2022.    She has Plaquenil 400 mg daily ordered.  She has been off for many months.  Compliance has been an issue for her in the past.  Tells me she only restarted Plaquenil upon discharge from hospital.  Tolerates it well.  Has been on Rituxan infusions in the past due to noncompliance.  Her most recent infusion was 05/2020.      She uses refresh drops for dry eyes.  She sees Ophthalmology here for Plaquenil monitoring.  Most recent eye exam was September 2021.  No eye pain, no visual changes per report.    Also with class 5 lupus nephritis.  She was last seen by Nephrology July 2023.  Recently had a lymph node biopsy by ENT.  It was negative for lymphoma but did show hyperplasia.    C/o rash across face.  Planning f/u w dermatology.  Also seeing ID w recent lymph node concerns.  Recent TB test is indeterminate.    Patient denies fevers, chills, photosensitivity, eye pain, shortness of breath, chest pain, hematuria, blood in the stool, raynauds, finger ulcerations, tender swollen joints, LAD, dysphagia, weakness.  Rheumatologic systems otherwise negative.    Serologies/Labs:  (+) HARPREET  (+) ds DNA  (+) SSA  (+) SSB  Current Treatment:  Plaquenil 200mg bid - not consistent, compliance an issue                Previous Treatment:   Rituxan - used for non-compliance in past         Current Outpatient Medications:     amLODIPine (NORVASC) 5 MG tablet, Take 1 tablet (5 mg total) by mouth once daily., Disp: 90 tablet, Rfl:  0    amoxicillin-clavulanate 875-125mg (AUGMENTIN) 875-125 mg per tablet, Take 1 tablet by mouth 2 (two) times daily. for 14 days, Disp: 28 tablet, Rfl: 0    ARIPiprazole (ABILIFY) 2 MG Tab, TAKE 1 TABLET(2 MG) BY MOUTH EVERY DAY, Disp: 30 tablet, Rfl: 3    ciclopirox (PENLAC) 8 % Soln, Apply to nail and nail fold once daily for up to 1 year, Disp: 6.6 mL, Rfl: 6    ferrous gluconate (FERGON) 324 MG tablet, Take 1 tablet (324 mg total) by mouth 3 (three) times daily with meals., Disp: 90 tablet, Rfl: 3    fluconazole (DIFLUCAN) 200 MG Tab, Take 1 tablet (200 mg total) by mouth Every 3 (three) days. for 3 doses, Disp: 3 tablet, Rfl: 0    FLUoxetine 40 MG capsule, TAKE 1 CAPSULE(40 MG) BY MOUTH EVERY DAY, Disp: 30 capsule, Rfl: 3    fluticasone propionate (FLONASE) 50 mcg/actuation nasal spray, SHAKE LIQUID AND USE 2 SPRAYS(100 MCG) IN EACH NOSTRIL EVERY DAY, Disp: 16 g, Rfl: 1    HYDROcodone-acetaminophen (NORCO) 5-325 mg per tablet, Take 1-2 tablets by mouth every 6 (six) hours as needed for Pain., Disp: 30 tablet, Rfl: 0    hydrOXYchloroQUINE (PLAQUENIL) 200 mg tablet, TAKE 2 TABLETS(400 MG) BY MOUTH EVERY DAY, Disp: 60 tablet, Rfl: 2    minocycline (DYNACIN) 100 MG tablet, Take 1 tablet (100 mg total) by mouth every 12 (twelve) hours. for 10 days, Disp: 60 tablet, Rfl: 11    ondansetron (ZOFRAN) 4 MG tablet, Take 1 tablet (4 mg total) by mouth every 8 (eight) hours as needed for Nausea., Disp: 20 tablet, Rfl: 0    salicylic acid (SELSUN BLUE, SALICYLIC ACID,) 3 % Sham, Apply 1 Bottle topically 2 (two) times a day., Disp: 1 each, Rfl: 2    valACYclovir (VALTREX) 1000 MG tablet, Take 1 tablet (1,000 mg total) by mouth once daily., Disp: 90 tablet, Rfl: 3    Past Medical History:   Diagnosis Date    Allergic rhinitis     Anemia     Condyloma acuminata     COVID-19 virus infection 07/2021    Encounter for blood transfusion     GERD (gastroesophageal reflux disease)     Hemolytic anemia associated with systemic lupus  erythematosus 4/30/2023    History of fetal anomaly in prior pregnancy, currently pregnant, unspecified trimester 03/08/2017    Pacemaker placed    HSV-2 (herpes simplex virus 2) infection     Lupus nephritis     Mood disorder     Overweight(278.02)     Sjogren's syndrome     Systemic lupus complicating pregnancy 01/31/2019    Systemic lupus erythematosus     Thrombocytopenia      Family History   Problem Relation Age of Onset    Hypertension Mother     Eczema Brother     Hypertension Maternal Grandmother     Diabetes Paternal Grandmother     Heart disease Son     Arrhythmia Son         CHB    Stroke Neg Hx     Cancer Neg Hx      Social History     Socioeconomic History    Marital status: Single    Number of children: 2   Occupational History     Comment: [employer]   Tobacco Use    Smoking status: Never     Passive exposure: Never    Smokeless tobacco: Never   Substance and Sexual Activity    Alcohol use: No     Alcohol/week: 0.0 standard drinks    Drug use: Yes     Types: Marijuana    Sexual activity: Not Currently     Partners: Male     Birth control/protection: None   Other Topics Concern    Are you pregnant or think you may be? No    Breast-feeding No   Social History Narrative    The patient is single and lives with her significant other.  She has 3 children.  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     Social Determinants of Health     Financial Resource Strain: Low Risk     Difficulty of Paying Living Expenses: Not hard at all   Food Insecurity: No Food Insecurity    Worried About Running Out of Food in the Last Year: Never true    Ran Out of Food in the Last Year: Never true   Transportation Needs: No Transportation Needs    Lack of Transportation (Medical): No    Lack of Transportation (Non-Medical): No   Physical Activity: Inactive    Days of Exercise per Week: 0 days    Minutes of Exercise per Session: 0 min   Stress: No Stress Concern Present    Feeling of Stress : Not at all  "  Social Connections: Socially Isolated    Frequency of Communication with Friends and Family: More than three times a week    Frequency of Social Gatherings with Friends and Family: More than three times a week    Attends Druze Services: Never    Active Member of Clubs or Organizations: No    Attends Club or Organization Meetings: Never    Marital Status: Never    Housing Stability: Unknown    Unable to Pay for Housing in the Last Year: No    Unstable Housing in the Last Year: No     Review of patient's allergies indicates:  No Known Allergies    Objective:   /79   Pulse 81   Ht 5' 5" (1.651 m)   Wt 76.5 kg (168 lb 10.4 oz)   LMP 04/28/2023 (Approximate)   BMI 28.07 kg/m²   Immunization History   Administered Date(s) Administered    COVID-19, MRNA, LN-S, PF (Pfizer) (Purple Cap) 12/21/2021, 01/10/2022    Influenza - High Dose - PF (65 years and older) 11/22/2013, 11/20/2018    Influenza - Quadrivalent 11/27/2015    Influenza - Quadrivalent - PF *Preferred* (6 months and older) 10/22/2017    Pneumococcal Conjugate - 13 Valent 11/27/2015    Tdap 09/08/2012, 07/31/2017, 05/01/2022       Physical Exam   Constitutional: She is oriented to person, place, and time. No distress.   HENT:   Head: Normocephalic and atraumatic.   Pulmonary/Chest: Effort normal.   Abdominal: She exhibits no distension.   Musculoskeletal:         General: No swelling or tenderness. Normal range of motion.      Cervical back: Normal range of motion.   Lymphadenopathy:     She has no cervical adenopathy.   Neurological: She is alert and oriented to person, place, and time.   Skin: Skin is warm and dry. No rash noted.   Psychiatric: Mood normal.   Nursing note and vitals reviewed.    No synovitis, no dactylitis, no enthesitis  No effusions of large or small joints  100% fist formation  Well preserved ROM  Rash over nose/cheeks      Recent Results (from the past 672 hour(s))   CBC auto differential    Collection Time: 04/26/23 " 10:38 AM   Result Value Ref Range    WBC 6.86 3.90 - 12.70 K/uL    RBC 2.77 (L) 4.00 - 5.40 M/uL    Hemoglobin 10.5 (L) 12.0 - 16.0 g/dL    Hematocrit 26.8 (L) 37.0 - 48.5 %    MCV 97 82 - 98 fL    MCH 37.9 (H) 27.0 - 31.0 pg    MCHC 39.2 (H) 32.0 - 36.0 g/dL    RDW SEE COMMENT 11.5 - 14.5 %    Platelets 220 150 - 450 K/uL    MPV 10.7 9.2 - 12.9 fL    Immature Granulocytes 1.6 (H) 0.0 - 0.5 %    Gran # (ANC) 4.0 1.8 - 7.7 K/uL    Immature Grans (Abs) 0.11 (H) 0.00 - 0.04 K/uL    Lymph # 2.3 1.0 - 4.8 K/uL    Mono # 0.5 0.3 - 1.0 K/uL    Eos # 0.0 0.0 - 0.5 K/uL    Baso # 0.01 0.00 - 0.20 K/uL    nRBC 0 0 /100 WBC    Gran % 58.0 38.0 - 73.0 %    Lymph % 32.9 18.0 - 48.0 %    Mono % 7.1 4.0 - 15.0 %    Eosinophil % 0.3 0.0 - 8.0 %    Basophil % 0.1 0.0 - 1.9 %    Aniso Slight     Poly Occasional     Differential Method Automated    Comprehensive metabolic panel    Collection Time: 04/26/23 10:38 AM   Result Value Ref Range    Sodium 133 (L) 136 - 145 mmol/L    Potassium 3.8 3.5 - 5.1 mmol/L    Chloride 100 95 - 110 mmol/L    CO2 25 23 - 29 mmol/L    Glucose 107 70 - 110 mg/dL    BUN 12 6 - 20 mg/dL    Creatinine 0.7 0.5 - 1.4 mg/dL    Calcium 8.2 (L) 8.7 - 10.5 mg/dL    Total Protein 6.6 6.0 - 8.4 g/dL    Albumin 2.3 (L) 3.5 - 5.2 g/dL    Total Bilirubin 0.6 0.1 - 1.0 mg/dL    Alkaline Phosphatase 67 55 - 135 U/L    AST 28 10 - 40 U/L    ALT 6 (L) 10 - 44 U/L    Anion Gap 8 8 - 16 mmol/L    eGFR >60 >60 mL/min/1.73 m^2   Iron and TIBC    Collection Time: 04/26/23 10:38 AM   Result Value Ref Range    Iron 44 30 - 160 ug/dL    Transferrin 178 (L) 200 - 375 mg/dL    TIBC 263 250 - 450 ug/dL    Saturated Iron 17 (L) 20 - 50 %   Reticulocytes    Collection Time: 04/26/23 10:38 AM   Result Value Ref Range    Retic 5.8 (H) 0.5 - 2.5 %   Rapid Pregnancy, Urine    Collection Time: 04/26/23 12:03 PM   Result Value Ref Range    Preg Test, Ur Negative    CBC auto differential    Collection Time: 04/29/23  8:24 PM   Result Value  Ref Range    WBC 7.43 3.90 - 12.70 K/uL    RBC 2.85 (L) 4.00 - 5.40 M/uL    Hemoglobin 8.5 (L) 12.0 - 16.0 g/dL    Hematocrit 24.0 (L) 37.0 - 48.5 %    MCV 84 82 - 98 fL    MCH 29.8 27.0 - 31.0 pg    MCHC 35.4 32.0 - 36.0 g/dL    RDW 16.6 (H) 11.5 - 14.5 %    Platelets 241 150 - 450 K/uL    MPV 9.9 9.2 - 12.9 fL    Immature Granulocytes 2.7 (H) 0.0 - 0.5 %    Gran # (ANC) 4.1 1.8 - 7.7 K/uL    Immature Grans (Abs) 0.20 (H) 0.00 - 0.04 K/uL    Lymph # 2.3 1.0 - 4.8 K/uL    Mono # 0.7 0.3 - 1.0 K/uL    Eos # 0.0 0.0 - 0.5 K/uL    Baso # 0.03 0.00 - 0.20 K/uL    nRBC 0 0 /100 WBC    Gran % 55.7 38.0 - 73.0 %    Lymph % 31.4 18.0 - 48.0 %    Mono % 9.4 4.0 - 15.0 %    Eosinophil % 0.4 0.0 - 8.0 %    Basophil % 0.4 0.0 - 1.9 %    Aniso Slight     Poly Occasional     Differential Method Automated    Comprehensive metabolic panel    Collection Time: 04/29/23  8:24 PM   Result Value Ref Range    Sodium 130 (L) 136 - 145 mmol/L    Potassium 4.0 3.5 - 5.1 mmol/L    Chloride 100 95 - 110 mmol/L    CO2 21 (L) 23 - 29 mmol/L    Glucose 91 70 - 110 mg/dL    BUN 16 6 - 20 mg/dL    Creatinine 0.8 0.5 - 1.4 mg/dL    Calcium 7.8 (L) 8.7 - 10.5 mg/dL    Total Protein 5.7 (L) 6.0 - 8.4 g/dL    Albumin 2.0 (L) 3.5 - 5.2 g/dL    Total Bilirubin 0.5 0.1 - 1.0 mg/dL    Alkaline Phosphatase 59 55 - 135 U/L    AST 37 10 - 40 U/L    ALT 10 10 - 44 U/L    Anion Gap 9 8 - 16 mmol/L    eGFR >60 >60 mL/min/1.73 m^2   Sedimentation rate    Collection Time: 04/29/23  8:24 PM   Result Value Ref Range    Sed Rate 55 (H) 0 - 20 mm/Hr   hCG, quantitative, pregnancy    Collection Time: 04/29/23  8:24 PM   Result Value Ref Range    HCG Quant <1.2 See Text mIU/mL   Reticulocytes    Collection Time: 04/29/23  8:24 PM   Result Value Ref Range    Retic 3.7 (H) 0.5 - 2.5 %   Urinalysis, Reflex to Urine Culture Urine, Clean Catch    Collection Time: 04/29/23  9:21 PM    Specimen: Urine   Result Value Ref Range    Specimen UA Urine, Clean Catch     Color, UA  Yellow Yellow, Straw, Erin    Appearance, UA Clear Clear    pH, UA 7.0 5.0 - 8.0    Specific Gravity, UA >1.030 (A) 1.005 - 1.030    Protein, UA 3+ (A) Negative    Glucose, UA Negative Negative    Ketones, UA Negative Negative    Bilirubin (UA) Negative Negative    Occult Blood UA 3+ (A) Negative    Nitrite, UA Negative Negative    Urobilinogen, UA 4.0-6.0 (A) <2.0 EU/dL    Leukocytes, UA Negative Negative   Drug screen panel, emergency    Collection Time: 04/29/23  9:21 PM   Result Value Ref Range    Benzodiazepines Negative Negative    Methadone metabolites Negative Negative    Cocaine (Metab.) Negative Negative    Opiate Scrn, Ur Negative Negative    Barbiturate Screen, Ur Negative Negative    Amphetamine Screen, Ur Negative Negative    THC Presumptive Positive (A) Negative    Phencyclidine Negative Negative    Creatinine, Urine 218.1 15.0 - 325.0 mg/dL    Toxicology Information SEE COMMENT    Urinalysis Microscopic    Collection Time: 04/29/23  9:21 PM   Result Value Ref Range    RBC, UA 2 0 - 4 /hpf    WBC, UA 2 0 - 5 /hpf    Bacteria None None-Occ /hpf    Squam Epithel, UA 1 /hpf    Hyaline Casts, UA 8 (A) 0-1/lpf /lpf    Microscopic Comment SEE COMMENT    Lactate dehydrogenase    Collection Time: 04/29/23 10:27 PM   Result Value Ref Range     (H) 110 - 260 U/L   Type & Screen    Collection Time: 04/29/23 10:27 PM   Result Value Ref Range    Group & Rh B POS     Indirect Angel NEG     Specimen Outdate 05/02/2023 23:59    COVID-19 Rapid Screening    Collection Time: 04/30/23 12:03 AM   Result Value Ref Range    SARS-CoV-2 RNA, Amplification, Qual Negative Negative   Influenza A & B by Molecular    Collection Time: 04/30/23 12:04 AM    Specimen: Nasopharyngeal Swab   Result Value Ref Range    Influenza A, Molecular Negative Negative    Influenza B, Molecular Negative Negative    Flu A & B Source NP    Group A Strep, Molecular    Collection Time: 04/30/23 12:29 AM    Specimen: Throat   Result Value Ref  Range    Group A Strep, Molecular Negative Negative   CBC auto differential    Collection Time: 04/30/23  1:01 PM   Result Value Ref Range    WBC 6.01 3.90 - 12.70 K/uL    RBC 2.67 (L) 4.00 - 5.40 M/uL    Hemoglobin 7.5 (L) 12.0 - 16.0 g/dL    Hematocrit 21.9 (L) 37.0 - 48.5 %    MCV 82 82 - 98 fL    MCH 28.5 27.0 - 31.0 pg    MCHC 34.7 32.0 - 36.0 g/dL    RDW 16.3 (H) 11.5 - 14.5 %    Platelets 219 150 - 450 K/uL    MPV 10.5 9.2 - 12.9 fL    Immature Granulocytes 3.6 (H) 0.0 - 0.5 %    Gran # (ANC) 3.6 1.8 - 7.7 K/uL    Immature Grans (Abs) 0.22 (H) 0.00 - 0.04 K/uL    Lymph # 1.7 1.0 - 4.8 K/uL    Mono # 0.6 0.3 - 1.0 K/uL    Eos # 0.0 0.0 - 0.5 K/uL    Baso # 0.01 0.00 - 0.20 K/uL    nRBC 1 (A) 0 /100 WBC    Gran % 58.5 38.0 - 73.0 %    Lymph % 27.0 18.0 - 48.0 %    Mono % 10.2 4.0 - 15.0 %    Eosinophil % 0.5 0.0 - 8.0 %    Basophil % 0.2 0.0 - 1.9 %    Platelet Estimate Appears normal     Aniso Slight     Poly Occasional     Differential Method Automated    CBC auto differential    Collection Time: 04/30/23 10:09 PM   Result Value Ref Range    WBC 6.10 3.90 - 12.70 K/uL    RBC 2.15 (L) 4.00 - 5.40 M/uL    Hemoglobin 7.0 (L) 12.0 - 16.0 g/dL    Hematocrit 19.5 (LL) 37.0 - 48.5 %    MCV 91 82 - 98 fL    MCH 32.6 (H) 27.0 - 31.0 pg    MCHC 35.9 32.0 - 36.0 g/dL    RDW 20.8 (H) 11.5 - 14.5 %    Platelets 211 150 - 450 K/uL    MPV 9.6 9.2 - 12.9 fL    Immature Granulocytes 3.4 (H) 0.0 - 0.5 %    Gran # (ANC) 3.4 1.8 - 7.7 K/uL    Immature Grans (Abs) 0.21 (H) 0.00 - 0.04 K/uL    Lymph # 1.8 1.0 - 4.8 K/uL    Mono # 0.7 0.3 - 1.0 K/uL    Eos # 0.0 0.0 - 0.5 K/uL    Baso # 0.01 0.00 - 0.20 K/uL    nRBC 1 (A) 0 /100 WBC    Gran % 56.0 38.0 - 73.0 %    Lymph % 29.5 18.0 - 48.0 %    Mono % 10.7 4.0 - 15.0 %    Eosinophil % 0.2 0.0 - 8.0 %    Basophil % 0.2 0.0 - 1.9 %    Aniso Slight     Poly Occasional     Ovalocytes Occasional     Differential Method Automated    Type & Screen    Collection Time: 05/01/23  5:55 AM    Result Value Ref Range    Indirect Angel NEG     Specimen Outdate 05/04/2023 23:59    Group & Rh    Collection Time: 05/01/23  5:55 AM   Result Value Ref Range    ABO B     Rh Type POS    CBC auto differential    Collection Time: 05/01/23  9:04 AM   Result Value Ref Range    WBC 6.55 3.90 - 12.70 K/uL    RBC 2.12 (L) 4.00 - 5.40 M/uL    Hemoglobin 8.5 (L) 12.0 - 16.0 g/dL    Hematocrit 21.7 (L) 37.0 - 48.5 %     (H) 82 - 98 fL    MCH 40.1 (H) 27.0 - 31.0 pg    MCHC 39.2 (H) 32.0 - 36.0 g/dL    RDW SEE COMMENT 11.5 - 14.5 %    Platelets 248 150 - 450 K/uL    MPV 10.1 9.2 - 12.9 fL    Immature Granulocytes 2.7 (H) 0.0 - 0.5 %    Gran # (ANC) 3.0 1.8 - 7.7 K/uL    Immature Grans (Abs) 0.18 (H) 0.00 - 0.04 K/uL    Lymph # 2.6 1.0 - 4.8 K/uL    Mono # 0.7 0.3 - 1.0 K/uL    Eos # 0.0 0.0 - 0.5 K/uL    Baso # 0.02 0.00 - 0.20 K/uL    nRBC 1 (A) 0 /100 WBC    Gran % 46.5 38.0 - 73.0 %    Lymph % 40.0 18.0 - 48.0 %    Mono % 9.9 4.0 - 15.0 %    Eosinophil % 0.6 0.0 - 8.0 %    Basophil % 0.3 0.0 - 1.9 %    Aniso Slight     Poly Occasional     Rouleaux Present (A)     Differential Method Automated    Reticulocytes    Collection Time: 05/01/23  9:04 AM   Result Value Ref Range    Retic 4.4 (H) 0.5 - 2.5 %   Haptoglobin    Collection Time: 05/01/23  9:04 AM   Result Value Ref Range    Haptoglobin <10 (L) 30 - 250 mg/dL   Folate    Collection Time: 05/01/23  9:04 AM   Result Value Ref Range    Folate 7.7 4.0 - 24.0 ng/mL   Vitamin B12    Collection Time: 05/01/23  9:04 AM   Result Value Ref Range    Vitamin B-12 716 210 - 950 pg/mL   Ferritin    Collection Time: 05/01/23  9:04 AM   Result Value Ref Range    Ferritin 397 (H) 20.0 - 300.0 ng/mL   Pathologist Interpretation Differential    Collection Time: 05/01/23  9:04 AM   Result Value Ref Range    Pathologist Review Review required    Comprehensive Metabolic Panel    Collection Time: 05/01/23  9:04 AM   Result Value Ref Range    Sodium 133 (L) 136 - 145 mmol/L     Potassium 3.6 3.5 - 5.1 mmol/L    Chloride 106 95 - 110 mmol/L    CO2 18 (L) 23 - 29 mmol/L    Glucose 98 70 - 110 mg/dL    BUN 9 6 - 20 mg/dL    Creatinine 0.7 0.5 - 1.4 mg/dL    Calcium 7.9 (L) 8.7 - 10.5 mg/dL    Total Protein 5.6 (L) 6.0 - 8.4 g/dL    Albumin 2.0 (L) 3.5 - 5.2 g/dL    Total Bilirubin 0.5 0.1 - 1.0 mg/dL    Alkaline Phosphatase 59 55 - 135 U/L    AST 34 10 - 40 U/L    ALT 10 10 - 44 U/L    Anion Gap 9 8 - 16 mmol/L    eGFR >60 >60 mL/min/1.73 m^2   Pathologist Review    Collection Time: 05/01/23  9:04 AM   Result Value Ref Range    Pathologist Review Peripheral Smear REVIEWED    Lactate dehydrogenase    Collection Time: 05/01/23  2:52 PM   Result Value Ref Range     (H) 110 - 260 U/L   Aerobic Culture    Collection Time: 05/02/23 10:55 AM    Specimen: Ear, Right; Skin   Result Value Ref Range    Aerobic Bacterial Culture CORYNEBACTERIUM JEIKEIUM (JK)  Moderate   (A)    QUANTIFERON GOLD TB    Collection Time: 05/02/23 11:23 AM   Result Value Ref Range    NIL 0.25037 IU/mL    TB1 - Nil 0.000 IU/mL    TB2 - Nil 0.000 IU/mL    Mitogen - Nil 0.001 IU/mL    TB Gold Plus Indeterminate (A) Negative   Culture, Anaerobe    Collection Time: 05/02/23 11:44 AM    Specimen: Ear, Right; Skin   Result Value Ref Range    Anaerobic Culture No anaerobes isolated    Gram Stain    Collection Time: 05/02/23 11:44 AM    Specimen: Ear, Right; Abscess   Result Value Ref Range    Gram Stain Result Rare WBC's     Gram Stain Result Rare Gram negative rods     Gram Stain Result Rare Gram positive rods     Gram Stain Result Rare Gram positive cocci    Comprehensive Metabolic Panel    Collection Time: 05/08/23 10:28 AM   Result Value Ref Range    Sodium 143 136 - 145 mmol/L    Potassium 3.8 3.5 - 5.1 mmol/L    Chloride 109 95 - 110 mmol/L    CO2 27 23 - 29 mmol/L    Glucose 73 70 - 110 mg/dL    BUN 10 6 - 20 mg/dL    Creatinine 0.6 0.5 - 1.4 mg/dL    Calcium 8.5 (L) 8.7 - 10.5 mg/dL    Total Protein 5.1 (L) 6.0 - 8.4  g/dL    Albumin 2.2 (L) 3.5 - 5.2 g/dL    Total Bilirubin 0.4 0.1 - 1.0 mg/dL    Alkaline Phosphatase 60 55 - 135 U/L    AST 20 10 - 40 U/L    ALT 13 10 - 44 U/L    Anion Gap 7 (L) 8 - 16 mmol/L    eGFR >60 >60 mL/min/1.73 m^2   CBC Auto Differential    Collection Time: 05/08/23 10:28 AM   Result Value Ref Range    WBC 4.93 3.90 - 12.70 K/uL    RBC 2.28 (L) 4.00 - 5.40 M/uL    Hemoglobin 7.8 (L) 12.0 - 16.0 g/dL    Hematocrit 22.5 (L) 37.0 - 48.5 %    MCV 99 (H) 82 - 98 fL    MCH 34.2 (H) 27.0 - 31.0 pg    MCHC 34.7 32.0 - 36.0 g/dL    RDW 27.8 (H) 11.5 - 14.5 %    Platelets 177 150 - 450 K/uL    MPV 9.4 9.2 - 12.9 fL    Immature Granulocytes 0.6 (H) 0.0 - 0.5 %    Gran # (ANC) 3.0 1.8 - 7.7 K/uL    Immature Grans (Abs) 0.03 0.00 - 0.04 K/uL    Lymph # 1.3 1.0 - 4.8 K/uL    Mono # 0.5 0.3 - 1.0 K/uL    Eos # 0.1 0.0 - 0.5 K/uL    Baso # 0.01 0.00 - 0.20 K/uL    nRBC 0 0 /100 WBC    Gran % 59.9 38.0 - 73.0 %    Lymph % 26.8 18.0 - 48.0 %    Mono % 9.9 4.0 - 15.0 %    Eosinophil % 2.6 0.0 - 8.0 %    Basophil % 0.2 0.0 - 1.9 %    Aniso Slight     Poik Slight     Poly Occasional     Hypo Occasional     Ovalocytes Occasional     Differential Method Automated    C-Reactive Protein    Collection Time: 05/08/23 10:28 AM   Result Value Ref Range    CRP 1.9 0.0 - 8.2 mg/L   Urinalysis Microscopic    Collection Time: 05/08/23 10:39 AM   Result Value Ref Range    RBC, UA 1 0 - 4 /hpf    WBC, UA 1 0 - 5 /hpf    Bacteria Occasional None-Occ /hpf    Squam Epithel, UA 2 /hpf    Hyaline Casts, UA 1 0-1/lpf /lpf    Other (U/A) Mucus: Heavy None    Microscopic Comment SEE COMMENT          Lab Results   Component Value Date    TBGOLDPLUS Indeterminate (A) 05/02/2023      Lab Results   Component Value Date    HEPBCAB Negative 11/18/2019    HEPCAB Non-reactive 03/14/2023        Imaging  I have personally reviewed images and reports as below.  I agree with the interpretation.  CT Soft Tissue Neck With Contrast  Order: [id number]  Status:  Final result     Visible to patient: Yes (seen)     Next appt: 07/19/2023 at 09:30 AM in Radiology ([facility] CT1 LIMIT 500 LBS)     Dx: Mass of right side of neck     2 Result Notes    1 Patient Communication  Details    Reading Physician Reading Date Result Priority   Guillermo Powers Jr., MD  [phone] 1/10/2023 Routine     Narrative & Impression  EXAMINATION:  CT SOFT TISSUE NECK WITH CONTRAST     CLINICAL HISTORY:  neck mass//abnormal ultrasound;Localized swelling, mass and lump, neck     TECHNIQUE:  Low dose axial images as well as sagittal and coronal reconstructions were performed from the skull base to the clavicles following the intravenous administration of 75 mL of Omnipaque 350.     COMPARISON:  None     FINDINGS:  The nasopharynx, oral pharynx, hypopharynx, larynx and visualized portion of the trachea are within normal limits.     The oral cavity and buccal space are limited by artifact from dental metal but appear to grossly within normal limits.     The bilateral parotid, submandibular and thyroid glands are within normal limits.     Abnormal lymphadenopathy is noted throughout the soft tissues of the neck, right greater than left, with the largest nodes in the right supraclavicular region measuring up to 3 x 2.1 cm in size (axial 111, series 2).  No evidence of necrosis is present..     Multilevel degenerative changes noted throughout the cervical spine.     Right greater than left maxillary sinusitis and bilateral ethmoid sinusitis.  Suspected left maxillary dental caries     Visualized lung apices are clear bilaterally.     Impression:     Extensive lymphadenopathy in the right greater than left neck.  Both benign and malignant etiologies are in the differential, recommend correlation with clinical presentation for infectious etiology.  Consider biopsy to rule out lymphoma.     Sinusitis.     Suspected dental caries, dental consultation is recommended        Electronically signed by: Guillermo  Gio  Date:                                            01/10/2023  Time:                                           12:53       Surgical path report also reviewed from 2/17/23  Right deep cervical lymph node, excisional biopsy: - Follicular hyperplasia. See comment. - No evidence of lymphoma or metastatic carcinoma.    Assessment:     1. Systemic lupus erythematosus arthritis    2. SLE glomerulonephritis syndrome, WHO class V    3. Immunocompromised    4. Long-term use of Plaquenil    5. High risk medication use    6. Medication monitoring encounter    7. Sjogren's syndrome, with unspecified organ involvement    8. Rash            Plan:     Alisia was seen today for lupus.    Diagnoses and all orders for this visit:    Systemic lupus erythematosus arthritis  -     Ambulatory referral/consult to Dermatology; Future  -     hydrOXYchloroQUINE (PLAQUENIL) 200 mg tablet; TAKE 2 TABLETS(400 MG) BY MOUTH EVERY DAY    SLE glomerulonephritis syndrome, WHO class V  -     T-SPOT TB Screening Test; Future  -     Comprehensive Metabolic Panel; Standing  -     CBC Auto Differential; Standing  -     Sedimentation rate; Standing  -     C-Reactive Protein; Standing  -     Anti-DNA Ab, Double-Stranded; Standing  -     Protein/Creatinine Ratio, Urine; Standing  -     C4 Complement; Standing  -     C3 Complement; Standing  -     Urine culture; Standing  -     Urinalysis Microscopic; Standing  -     Protein Electrophoresis, Serum; Standing  -     Immunofixation Electrophoresis; Standing  -     Cryoglobulin; Standing  -     Hepatitis Panel, Acute; Standing  -     hydrOXYchloroQUINE (PLAQUENIL) 200 mg tablet; TAKE 2 TABLETS(400 MG) BY MOUTH EVERY DAY    Immunocompromised  -     T-SPOT TB Screening Test; Future    Long-term use of Plaquenil    High risk medication use    Medication monitoring encounter    Sjogren's syndrome, with unspecified organ involvement  -     Protein/Creatinine Ratio, Urine; Standing  -     C4 Complement;  Standing  -     C3 Complement; Standing  -     Protein Electrophoresis, Serum; Standing  -     Immunofixation Electrophoresis; Standing  -     Cryoglobulin; Standing  -     hydrOXYchloroQUINE (PLAQUENIL) 200 mg tablet; TAKE 2 TABLETS(400 MG) BY MOUTH EVERY DAY    Rash  -     Ambulatory referral/consult to Dermatology; Future        Severe worsening Lupus w recent hospitalization  Compliance continues to be a challenge  Pt back on Plaquenil 200 mg bid since hospital DC 5/2/23  Due for ophtho exam for monitoring  About 10yrs of Plaquenil therapy -  on again, off again history  Advised I won't refill without appropriate monitoring  Request sent to Dr. Navas staff to schedul HCQ monitoring exam  Check SLE labs today  CRP WNL  No leukopenia/thrombocytopenia  Fall in hgb; but RBC and hct trending up  Recommend pt to re-establish care w Dr. Willard  Check UA, and PC ratio today  Hemolytic anemia requiring hospitalization  Check CBC today  Any worsening, will refer to hematology  Indeterminate TB - check T Spot  Overlapping SS  Recent lymph node excision shows hyperplasia but negative for malignancy such as lymphoma  We will also check SPEP, IEP, cryoglobulins today  Discussed red flag symptoms with patient  Continue refresh drops p.r.n.  Saw Dr. Garcia  On Augmentin and minocycline for nasal lesion and otitis externa  Drug therapy requiring intensive monitoring for toxicity  High Risk Medication Monitoring encounter  No current medication related issues, no evidence of toxicity  I ordered labs for toxicity monitoring, have personally reviewed the findings, and discussed them with the patient.  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  Monitor carefully for infections.  Advised patient to get immediate medical care if any infection arises.  Also advised strict adherence age-appropriate vaccinations and cancer screenings with  PCP.  Patient advised to hold DMARD and/or biologic therapy for signs of infection or for surgery. If you are unsure what to do please call our office for instruction.[facility] [phone]  Return to clinic: 3 mos w SLE labs, or sooner pending above    Follow up in about 3 months (around 8/8/2023).    The patient understands, chooses and consents to this plan and accepts all the risks which include but are not limited to the risks mentioned above.     Disclaimer: This note was prepared using a voice recognition system and is likely to have sound alike errors within the text.

## 2023-05-08 NOTE — TELEPHONE ENCOUNTER
Returned pt call. Pt scheduled to see dr. Tucker on 5/17. Pt verbalized understanding.   ----- Message from Tiesha Jimenez sent at 5/8/2023  1:15 PM CDT -----  Contact: Alisia Mendez was returning the call to schedule an appt. Please call her back at [phone].    Thanks  TS

## 2023-05-08 NOTE — Clinical Note
Pls schedule pt to see Dr. Rivas for plaquenil monitoring eye exam as soon as possible. Lora Root PA-C [facility]

## 2023-05-09 ENCOUNTER — TELEPHONE (OUTPATIENT)
Dept: RHEUMATOLOGY | Facility: CLINIC | Age: 33
End: 2023-05-09
Payer: MEDICAID

## 2023-05-09 ENCOUNTER — PATIENT MESSAGE (OUTPATIENT)
Dept: RHEUMATOLOGY | Facility: CLINIC | Age: 33
End: 2023-05-09
Payer: MEDICAID

## 2023-05-09 DIAGNOSIS — M32.9 SYSTEMIC LUPUS ERYTHEMATOSUS ARTHRITIS: ICD-10-CM

## 2023-05-09 DIAGNOSIS — M32.9 SYSTEMIC LUPUS ERYTHEMATOSUS ARTHRITIS: Primary | ICD-10-CM

## 2023-05-09 DIAGNOSIS — M32.14 SLE GLOMERULONEPHRITIS SYNDROME, WHO CLASS V: ICD-10-CM

## 2023-05-09 DIAGNOSIS — M35.00 SJOGREN'S SYNDROME, WITH UNSPECIFIED ORGAN INVOLVEMENT: ICD-10-CM

## 2023-05-09 DIAGNOSIS — R59.9 LYMPHOID HYPERPLASIA: Primary | ICD-10-CM

## 2023-05-09 DIAGNOSIS — D84.9 IMMUNOCOMPROMISED: ICD-10-CM

## 2023-05-09 LAB
ALBUMIN SERPL ELPH-MCNC: 2.21 G/DL (ref 3.35–5.55)
ALPHA1 GLOB SERPL ELPH-MCNC: 0.32 G/DL (ref 0.17–0.41)
ALPHA2 GLOB SERPL ELPH-MCNC: 0.53 G/DL (ref 0.43–0.99)
B-GLOBULIN SERPL ELPH-MCNC: 0.54 G/DL (ref 0.5–1.1)
BACTERIA UR CULT: NO GROWTH
DSDNA AB SER-ACNC: NORMAL [IU]/ML
GAMMA GLOB SERPL ELPH-MCNC: 0.92 G/DL (ref 0.67–1.58)
INTERPRETATION SERPL IFE-IMP: NORMAL
PATHOLOGIST INTERPRETATION IFE: NORMAL
PROT SERPL-MCNC: 4.5 G/DL (ref 6–8.4)

## 2023-05-09 NOTE — TELEPHONE ENCOUNTER
Spoke to pt.  Hematology referral has been placed.  Will order G6PD as well,.      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 Consult hematology. Check G6PD levels. Agree with waiting on other labs ordered today.   ----- Message -----  From: Lora Root PA-C  Sent: 5/8/2023   2:30 PM CDT  To: Shane Blair MD    Historically non compliant SLE/SS pt.  Hospiatlized about 1.5 weeks ago for hemolytic anemia and SLE flare.  According to DC summary, [facility] contacted you about her.  Dr. Garcia has her on minocylcine and augmenting for nasal lesion and otitis externa (pics in chart).  Pt planning to follow up w derm re facial lesions which are becoming more painful since DC.      Appears she got solmedrol in house.    Also w h/o Class V lupus nephritis.  Hasn't seen nephrology since 7/2022.  Back on Plaquenil 400 mg daily since DC from hospiatl.  In past, did one dose of rituxan d/t noncompliance issues.  But not in years.      Hct and RBCs are trending up.  Hgb slight trend down since last lab.  No thrombocytopenia, no leukopenia.  CRP wnl.  Awaiting other labs.  Would you just hold her at plaquenil for now?  In past we avoid cellcept d/t compliance issues.  Discussed need for plaq monitoring and compliance to avoid worsening kidney function and dialysis.  She was receptive, so I am hopeful.      In light of above, would you recommend doing anything different?

## 2023-05-10 ENCOUNTER — TELEPHONE (OUTPATIENT)
Dept: HEMATOLOGY/ONCOLOGY | Facility: CLINIC | Age: 33
End: 2023-05-10
Payer: MEDICAID

## 2023-05-10 ENCOUNTER — PATIENT MESSAGE (OUTPATIENT)
Dept: HEMATOLOGY/ONCOLOGY | Facility: CLINIC | Age: 33
End: 2023-05-10
Payer: MEDICAID

## 2023-05-10 LAB
M TB IFN-G BLD-IMP: NEGATIVE
PATHOLOGIST INTERPRETATION SPE: NORMAL

## 2023-05-10 NOTE — TELEPHONE ENCOUNTER
Left message in reference to Hematology referral from Katherine Guillory, PA-C. [facility] message sent.

## 2023-05-12 ENCOUNTER — TELEPHONE (OUTPATIENT)
Dept: INFECTIOUS DISEASES | Facility: CLINIC | Age: 33
End: 2023-05-12
Payer: MEDICAID

## 2023-05-12 ENCOUNTER — OFFICE VISIT (OUTPATIENT)
Dept: DERMATOLOGY | Facility: CLINIC | Age: 33
End: 2023-05-12
Payer: MEDICAID

## 2023-05-12 DIAGNOSIS — M32.9 SLE (SYSTEMIC LUPUS ERYTHEMATOSUS RELATED SYNDROME): Primary | Chronic | ICD-10-CM

## 2023-05-12 DIAGNOSIS — R59.1 LYMPHADENOPATHY: ICD-10-CM

## 2023-05-12 DIAGNOSIS — L21.9 SEBORRHEIC DERMATITIS: ICD-10-CM

## 2023-05-12 DIAGNOSIS — L93.0 DISCOID LUPUS: ICD-10-CM

## 2023-05-12 PROCEDURE — 99999 PR PBB SHADOW E&M-EST. PATIENT-LVL III: CPT | Mod: PBBFAC,,, | Performed by: DERMATOLOGY

## 2023-05-12 PROCEDURE — 99213 OFFICE O/P EST LOW 20 MIN: CPT | Mod: PBBFAC | Performed by: DERMATOLOGY

## 2023-05-12 PROCEDURE — 1159F PR MEDICATION LIST DOCUMENTED IN MEDICAL RECORD: ICD-10-PCS | Mod: CPTII,,, | Performed by: DERMATOLOGY

## 2023-05-12 PROCEDURE — 99214 PR OFFICE/OUTPT VISIT, EST, LEVL IV, 30-39 MIN: ICD-10-PCS | Mod: S$PBB,,, | Performed by: DERMATOLOGY

## 2023-05-12 PROCEDURE — 99999 PR PBB SHADOW E&M-EST. PATIENT-LVL III: ICD-10-PCS | Mod: PBBFAC,,, | Performed by: DERMATOLOGY

## 2023-05-12 PROCEDURE — 99214 OFFICE O/P EST MOD 30 MIN: CPT | Mod: S$PBB,,, | Performed by: DERMATOLOGY

## 2023-05-12 PROCEDURE — 1160F RVW MEDS BY RX/DR IN RCRD: CPT | Mod: CPTII,,, | Performed by: DERMATOLOGY

## 2023-05-12 PROCEDURE — 1159F MED LIST DOCD IN RCRD: CPT | Mod: CPTII,,, | Performed by: DERMATOLOGY

## 2023-05-12 PROCEDURE — 1160F PR REVIEW ALL MEDS BY PRESCRIBER/CLIN PHARMACIST DOCUMENTED: ICD-10-PCS | Mod: CPTII,,, | Performed by: DERMATOLOGY

## 2023-05-12 RX ORDER — MOMETASONE FUROATE 1 MG/ML
SOLUTION TOPICAL
Qty: 60 ML | Refills: 3 | Status: SHIPPED | OUTPATIENT
Start: 2023-05-12

## 2023-05-12 RX ORDER — LINEZOLID 600 MG/1
600 TABLET, FILM COATED ORAL 2 TIMES DAILY
Qty: 20 TABLET | Refills: 0 | Status: SHIPPED | OUTPATIENT
Start: 2023-05-12 | End: 2023-05-22

## 2023-05-12 RX ORDER — KETOCONAZOLE 20 MG/ML
SHAMPOO, SUSPENSION TOPICAL
Qty: 120 ML | Refills: 11 | Status: SHIPPED | OUTPATIENT
Start: 2023-05-12

## 2023-05-12 RX ORDER — TRIAMCINOLONE ACETONIDE 0.25 MG/G
OINTMENT TOPICAL
Qty: 80 G | Refills: 1 | Status: SHIPPED | OUTPATIENT
Start: 2023-05-12 | End: 2023-07-13 | Stop reason: SDUPTHER

## 2023-05-12 NOTE — TELEPHONE ENCOUNTER
----- Message from Cheng Garcia MD, FIDSA sent at 5/12/2023 11:55 AM CDT -----  I sent Zyvox -this might be expensive .  We will have to use good rx coupons  I also send tedizolid -this might ne cheaper as it came up preferred.  She cannot do both -only one or the other -please let me know

## 2023-05-12 NOTE — PROGRESS NOTES
Subjective:      Patient ID:  Alisia Vines is a 32 y.o. female who presents for   Chief Complaint   Patient presents with    Spot     Spot check on face as well as scalp. Referred by Rheumatology and Infection and disease. Dry spots that scab and fall off, pt has lupus.  Can be slightly painful, not itchy.     Hx of SLE x 10 years (on plaquenil) and onychodystrophy of finger and toenails, last seen on 11/6/22. She reports having stopped plaquenil x several months initially and then restarted after last hospitalization 2 weeks ago. She c/o issues of the scalp and news areas/scars of the face. Interested in treatment options.     S/p recent hospitalization from 4/26 - 4/29 for anemia and folliculitis      Review of Systems   Constitutional:  Negative for fever and chills.   Gastrointestinal:  Negative for nausea and vomiting.   Skin:  Positive for activity-related sunscreen use. Negative for daily sunscreen use and recent sunburn.   Hematologic/Lymphatic: Does not bruise/bleed easily.     Objective:   Physical Exam   Constitutional: She appears well-developed and well-nourished. No distress.   Neurological: She is alert and oriented to person, place, and time. She is not disoriented.   Psychiatric: She has a normal mood and affect.   Skin:   Areas Examined (abnormalities noted in diagram):   Scalp / Hair Palpated and Inspected  Head / Face Inspection Performed  Neck Inspection Performed  RUE Inspected  LUE Inspection Performed  Nails and Digits Inspection Performed           Assessment / Plan:        SLE (systemic lupus erythematosus related syndrome)  Discoid lupus  -     triamcinolone acetonide 0.025% (KENALOG) 0.025 % Oint; AAA bid prn. Use for 2 weeks then taper. Mild steroid.  Dispense: 80 g; Refill: 1  Lymphadenopathy  -     Ambulatory referral/consult to Dermatology  -     recommend compliance with use of plaquenil.  Possible discoid flare due to d/c plaquenil x 2-3 months.  Discussed importance of  daily broad sprectrum sunscreen. Will start above med.  Will consider trial of tacrolimus (although may be difficult with insurance).  The patient acknowledged understanding.       Seborrheic dermatitis  -     ketoconazole (NIZORAL) 2 % shampoo; Wash hair with medicated shampoo at least 1x/week - let sit on scalp at least 5 minutes prior to rinsing  Dispense: 120 mL; Refill: 11  -     mometasone (ELOCON) 0.1 % solution; AAA of scalp once daily.  Dispense: 60 mL; Refill: 3  -     of the scalp, will start above meds.              Follow up in about 3 months (around 8/12/2023).

## 2023-05-15 ENCOUNTER — TELEPHONE (OUTPATIENT)
Dept: INFECTIOUS DISEASES | Facility: CLINIC | Age: 33
End: 2023-05-15
Payer: MEDICAID

## 2023-05-15 NOTE — TELEPHONE ENCOUNTER
----- Message from Marjorie Perdue sent at 5/15/2023  1:08 PM CDT -----  Contact: patient Alisia [phone]  Patient called requesting a call back from Dr. Garcia's nurse Letty

## 2023-05-17 ENCOUNTER — TELEPHONE (OUTPATIENT)
Dept: FAMILY MEDICINE | Facility: CLINIC | Age: 33
End: 2023-05-17
Payer: MEDICAID

## 2023-05-17 ENCOUNTER — TELEPHONE (OUTPATIENT)
Dept: RHEUMATOLOGY | Facility: CLINIC | Age: 33
End: 2023-05-17
Payer: MEDICAID

## 2023-05-17 ENCOUNTER — LAB VISIT (OUTPATIENT)
Dept: LAB | Facility: HOSPITAL | Age: 33
End: 2023-05-17
Attending: PHYSICIAN ASSISTANT
Payer: MEDICAID

## 2023-05-17 DIAGNOSIS — M35.00 SJOGREN'S SYNDROME, WITH UNSPECIFIED ORGAN INVOLVEMENT: ICD-10-CM

## 2023-05-17 DIAGNOSIS — R59.9 LYMPHOID HYPERPLASIA: ICD-10-CM

## 2023-05-17 DIAGNOSIS — M32.9 SYSTEMIC LUPUS ERYTHEMATOSUS ARTHRITIS: ICD-10-CM

## 2023-05-17 PROCEDURE — 82955 ASSAY OF G6PD ENZYME: CPT | Performed by: PHYSICIAN ASSISTANT

## 2023-05-17 PROCEDURE — 36415 COLL VENOUS BLD VENIPUNCTURE: CPT | Performed by: PHYSICIAN ASSISTANT

## 2023-05-17 NOTE — TELEPHONE ENCOUNTER
----- Message from Joyce Ramírez sent at 5/17/2023  7:45 AM CDT -----  Contact: yanick  Patient states she has been having joint pain the last couple of days and would like to know what can she take for it. Please call her back at [phone].      Thanks  DD

## 2023-05-17 NOTE — TELEPHONE ENCOUNTER
----- Message from Dillan Rothman MA sent at 5/17/2023 10:17 AM CDT -----  Contact: yeimy    ----- Message -----  From: Joyce Ramírez  Sent: 5/17/2023   7:47 AM CDT  To: Cristiano Saumya A Staff    Patient states that she has been having joint pain for the last couple days and would like to know what she can take for it. Please call her back at [phone].        Thanks  DD

## 2023-05-17 NOTE — TELEPHONE ENCOUNTER
Need more information. Where is pain? What has she tried taking?     Has she discussed with rheumatologist? Don't they have her on medications?

## 2023-05-18 DIAGNOSIS — M32.9 SLE (SYSTEMIC LUPUS ERYTHEMATOSUS RELATED SYNDROME): Primary | Chronic | ICD-10-CM

## 2023-05-18 LAB
CRYOGLOB SER QL: NORMAL
G6PD RBC-CCNT: 14.4 U/G HB (ref 8–11.9)

## 2023-05-18 NOTE — TELEPHONE ENCOUNTER
Pt states that she has back and shoulder pain and that she thinks it's more of a joint pain, Pt states that she has pain meds (hydrocodone) that was prescribed previously by Dr. Quinonez but has not been taking them. She states that she has tried taking Tylenol and Naproxen. Pt states that she has sent a message to Rheumatology but has not gotten a response, states that she is taking  Plaquenil as well as a lot of antibiotics that is prescribed by Rheumatology

## 2023-05-18 NOTE — PROGRESS NOTES
HEMATOLOGY / ONCOLOGY   CLINIC NOTE     ONCOLOGICAL HISTORY:     Diagnosis:  - lupus  - lymphoid hyperplasia  - anemia with iron deficiency    Current Treatment:   - ferrous sulfate    Subjective:       Chief Complaint: Anemia      HPI    Alisia Ceballos [place]  32 y.o.  female with past medical history significant for SLE, lupus nephritis, Sjogren's syndrome referred for evaluation of lymphoid hyperplasia.      She was hospitalized in April 2023 for lupus flare with hemolytic anemia after being noncompliant with her medications (Plaquenil) for lupus. She was treated with Solu-Medrol 1 g IV and started taking Plaquenil on discharge.  Patient had previously been treated with rituximab due to noncompliance with last dose being given in May of 2020.    Interval History:     Patient denies any issues or concerns other than generalized body aches.  Denies any blood in the urine or bowel movements.  Denies any heavy vaginal bleeding.  She was started on ferrous sulfate while being in the hospital and has also been compliant with her medications for lupus which is Plaquenil.  According to her she was not taking it for many months.  She has diagnosis of anemia for long period of time and when initially she was diagnosed with lupus it was low and she required blood transfusion    Past Medical History:   Diagnosis Date    Allergic rhinitis     Anemia     Condyloma acuminata     COVID-19 virus infection 07/2021    Encounter for blood transfusion     GERD (gastroesophageal reflux disease)     Hemolytic anemia associated with systemic lupus erythematosus 4/30/2023    History of fetal anomaly in prior pregnancy, currently pregnant, unspecified trimester 03/08/2017    Pacemaker placed    HSV-2 (herpes simplex virus 2) infection     Lupus nephritis     Mood disorder     Overweight(278.02)     Sjogren's syndrome     Systemic lupus complicating pregnancy 01/31/2019    Systemic lupus erythematosus     Thrombocytopenia       Past  Surgical History:   Procedure Laterality Date     SECTION WITH TUBAL LIGATION N/A 2019    Procedure:  SECTION, WITH TUBAL LIGATION;  Surgeon: VERONICA Valdovinos MD;  Location: [facility] L&D;  Service: OB/GYN;  Laterality: N/A;     SECTION, LOW TRANSVERSE      x2    LYMPH NODE BIOPSY Right 2023    Procedure: BIOPSY, LYMPH NODE;  Surgeon: Rivera Sandoval MD;  Location: [facility] OR;  Service: ENT;  Laterality: Right;  right deep cervial lymph node excision    NECK MASS EXCISION Right 2023    Procedure: EXCISION, MASS, NECK;  Surgeon: Rivera Sandoval MD;  Location: [facility] OR;  Service: ENT;  Laterality: Right;  right deep cervial lymph node excision    RENAL BIOPSY  2013     Social History     Socioeconomic History    Marital status: Single    Number of children: 2   Occupational History     Comment: [employer]   Tobacco Use    Smoking status: Never     Passive exposure: Never    Smokeless tobacco: Never   Substance and Sexual Activity    Alcohol use: No     Alcohol/week: 0.0 standard drinks    Drug use: Yes     Types: Marijuana    Sexual activity: Not Currently     Partners: Male     Birth control/protection: None   Other Topics Concern    Are you pregnant or think you may be? No    Breast-feeding No   Social History Narrative    The patient is single and lives with her significant other.  She has 3 children.  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     Social Determinants of Health     Financial Resource Strain: Low Risk     Difficulty of Paying Living Expenses: Not hard at all   Food Insecurity: No Food Insecurity    Worried About Running Out of Food in the Last Year: Never true    Ran Out of Food in the Last Year: Never true   Transportation Needs: No Transportation Needs    Lack of Transportation (Medical): No    Lack of Transportation (Non-Medical): No   Physical Activity: Inactive    Days of Exercise per Week: 0 days    Minutes of Exercise per Session: 0 min    Stress: No Stress Concern Present    Feeling of Stress : Not at all   Social Connections: Socially Isolated    Frequency of Communication with Friends and Family: More than three times a week    Frequency of Social Gatherings with Friends and Family: More than three times a week    Attends Nondenominational Services: Never    Active Member of Clubs or Organizations: No    Attends Club or Organization Meetings: Never    Marital Status: Never    Housing Stability: Unknown    Unable to Pay for Housing in the Last Year: No    Unstable Housing in the Last Year: No      Family History   Problem Relation Age of Onset    Hypertension Mother     Eczema Brother     Hypertension Maternal Grandmother     Diabetes Paternal Grandmother     Heart disease Son     Arrhythmia Son         CHB    Stroke Neg Hx     Cancer Neg Hx       Review of patient's allergies indicates:  No Known Allergies   Review of Systems   Constitutional: Negative.  Negative for activity change, chills, fatigue and fever.   HENT: Negative.     Eyes: Negative.    Respiratory:  Negative for cough and shortness of breath.    Cardiovascular:  Negative for chest pain and leg swelling.   Gastrointestinal:  Negative for constipation, diarrhea, nausea and vomiting.   Endocrine: Negative.    Genitourinary: Negative.    Musculoskeletal:  Negative for arthralgias and myalgias.   Integumentary:  Negative.   Allergic/Immunologic: Negative.    Neurological:  Negative for light-headedness, numbness and headaches.   Hematological: Negative.    Psychiatric/Behavioral: Negative.         Objective:        Vitals:    05/19/23 0926   BP: (!) 131/90   Pulse: 78   Temp: 98.2 °F (36.8 °C)        Physical Exam  Constitutional:       General: She is not in acute distress.     Appearance: She is well-developed. She is not ill-appearing.   HENT:      Head: Normocephalic and atraumatic.      Mouth/Throat:      Mouth: Mucous membranes are moist.   Eyes:      Extraocular Movements:  Extraocular movements intact.      Conjunctiva/sclera: Conjunctivae normal.   Cardiovascular:      Rate and Rhythm: Normal rate and regular rhythm.      Heart sounds: Normal heart sounds.   Pulmonary:      Effort: Pulmonary effort is normal. No respiratory distress.      Breath sounds: Normal breath sounds. No wheezing.   Abdominal:      General: Bowel sounds are normal. There is no distension.      Palpations: Abdomen is soft.      Tenderness: There is no abdominal tenderness.   Musculoskeletal:         General: Normal range of motion.      Cervical back: Normal range of motion and neck supple.   Skin:     General: Skin is warm.   Neurological:      General: No focal deficit present.      Mental Status: She is alert and oriented to person, place, and time. Mental status is at baseline.      Cranial Nerves: No cranial nerve deficit.   Psychiatric:         Mood and Affect: Mood normal.         LABS / IMAGING      - 01/10/2023 CT NECK: Extensive lymphadenopathy in the right greater than left neck. Abnormal lymphadenopathy is noted throughout the soft tissues of the neck, right greater than left, with the largest nodes in the right supraclavicular region measuring up to 3 x 2.1 cm in size (axial 111, series 2).  Both benign and malignant etiologies are in the differential, recommend correlation with clinical presentation for infectious etiology.  Consider biopsy to rule out lymphoma.    - 02/17/2023 Right deep cervical lymph node, excisional biopsy: Follicular hyperplasia.  No evidence of lymphoma or metastatic carcinoma.           Assessment:     ECOG SCORE    0 - Fully active-able to carry on all pre-disease performance without restriction         LYMPHADENOPATHY  - noted lymphadenopathy on the CT scan of the neck from January of 2023.  - 02/17/2023 Right deep cervical lymph node, excisional biopsy: Follicular hyperplasia.  No evidence of lymphoma or metastatic carcinoma.    ANEMIA  - chronic anemia since 2006 and  recent history of worsening in April of 2023 with hemoglobin trending down to 7 from 10.5, picture consistent with hemolysis with haptoglobin less than 10,  in the setting of lupus flare.  Also noted to have iron-deficiency and was started on ferrous sulfate for that.  Patient did not require any blood transfusion in the hospital and was started on treatment for lupus flare initially with Solu-Medrol and then continued on Plaquenil  - iron saturation of 17, folate 7.7, B12 716, ferritin 397  - Pap smear with macrocytic anemia with polychromasia and rare nucleated red cells.  Consider reticulocytosis as a cause of the high MCV.  No additional morphologic abnormalities     Plan:       - patient with worsening anemia in the setting of lupus flare due to being noncompliant with her medications and iron deficiency, started on treatment for lupus and on ferrous sulfate with improvement in hemoglobin from 7 to 9.4.  - repeat labs for anemia, improving while being on treatment for lupus flare   - CT chest abdomen and pelvis pending, ordered by primary team   - MD / LABS VISIT - 4 WEEKS         Med Onc Chart Routing      Follow up with physician    Follow up with GUNJAN 4 weeks. follow up CBC   Infusion scheduling note    Injection scheduling note    Labs CBC, ferritin, iron and TIBC, LDH, other and folate   Scheduling:  Preferred lab:  Lab interval:  Labs before next visit   Imaging    Pharmacy appointment    Other referrals             The patient was seen, interviewed and examined. Pertinent lab and radiology studies were reviewed. Pt instructed to call should develop concerning signs/symptoms or have further questions.       Portions of the record may have been created with voice recognition software. Occasional wrong-word or sound-a-like substitutions may have occurred due to the inherent limitations of voice recognition software. Read the chart carefully and recognize, using context, where substitutions have  occurred.    Michelle Lynne MD  Hematology / Oncology

## 2023-05-19 ENCOUNTER — OFFICE VISIT (OUTPATIENT)
Dept: HEMATOLOGY/ONCOLOGY | Facility: CLINIC | Age: 33
End: 2023-05-19
Payer: MEDICAID

## 2023-05-19 ENCOUNTER — LAB VISIT (OUTPATIENT)
Dept: LAB | Facility: HOSPITAL | Age: 33
End: 2023-05-19
Attending: INTERNAL MEDICINE
Payer: MEDICAID

## 2023-05-19 VITALS
BODY MASS INDEX: 29.65 KG/M2 | HEIGHT: 65 IN | WEIGHT: 177.94 LBS | TEMPERATURE: 98 F | SYSTOLIC BLOOD PRESSURE: 131 MMHG | HEART RATE: 78 BPM | OXYGEN SATURATION: 100 % | DIASTOLIC BLOOD PRESSURE: 90 MMHG

## 2023-05-19 DIAGNOSIS — M32.9 SLE (SYSTEMIC LUPUS ERYTHEMATOSUS RELATED SYNDROME): Chronic | ICD-10-CM

## 2023-05-19 DIAGNOSIS — D50.9 IRON DEFICIENCY ANEMIA, UNSPECIFIED IRON DEFICIENCY ANEMIA TYPE: Primary | ICD-10-CM

## 2023-05-19 DIAGNOSIS — M35.00 SJOGREN'S SYNDROME, WITH UNSPECIFIED ORGAN INVOLVEMENT: ICD-10-CM

## 2023-05-19 DIAGNOSIS — R59.9 LYMPHOID HYPERPLASIA: ICD-10-CM

## 2023-05-19 LAB
ALBUMIN SERPL BCP-MCNC: 2.3 G/DL (ref 3.5–5.2)
ALP SERPL-CCNC: 69 U/L (ref 55–135)
ALT SERPL W/O P-5'-P-CCNC: 13 U/L (ref 10–44)
ANION GAP SERPL CALC-SCNC: 6 MMOL/L (ref 8–16)
AST SERPL-CCNC: 22 U/L (ref 10–40)
BASOPHILS # BLD AUTO: 0.01 K/UL (ref 0–0.2)
BASOPHILS NFR BLD: 0.3 % (ref 0–1.9)
BILIRUB SERPL-MCNC: 0.3 MG/DL (ref 0.1–1)
BUN SERPL-MCNC: 11 MG/DL (ref 6–20)
CALCIUM SERPL-MCNC: 8.5 MG/DL (ref 8.7–10.5)
CHLORIDE SERPL-SCNC: 110 MMOL/L (ref 95–110)
CO2 SERPL-SCNC: 24 MMOL/L (ref 23–29)
CREAT SERPL-MCNC: 0.6 MG/DL (ref 0.5–1.4)
DAT IGG-SP REAG RBC-IMP: NORMAL
DIFFERENTIAL METHOD: ABNORMAL
EOSINOPHIL # BLD AUTO: 0.1 K/UL (ref 0–0.5)
EOSINOPHIL NFR BLD: 2 % (ref 0–8)
ERYTHROCYTE [DISTWIDTH] IN BLOOD BY AUTOMATED COUNT: 22.3 % (ref 11.5–14.5)
EST. GFR  (NO RACE VARIABLE): >60 ML/MIN/1.73 M^2
FERRITIN SERPL-MCNC: 37 NG/ML (ref 20–300)
GLUCOSE SERPL-MCNC: 92 MG/DL (ref 70–110)
HAPTOGLOB SERPL-MCNC: 17 MG/DL (ref 30–250)
HCT VFR BLD AUTO: 27.8 % (ref 37–48.5)
HGB BLD-MCNC: 9.4 G/DL (ref 12–16)
IMM GRANULOCYTES # BLD AUTO: 0.02 K/UL (ref 0–0.04)
IMM GRANULOCYTES NFR BLD AUTO: 0.7 % (ref 0–0.5)
IRON SERPL-MCNC: 48 UG/DL (ref 30–160)
LDH SERPL L TO P-CCNC: 274 U/L (ref 110–260)
LYMPHOCYTES # BLD AUTO: 1.1 K/UL (ref 1–4.8)
LYMPHOCYTES NFR BLD: 36.8 % (ref 18–48)
MCH RBC QN AUTO: 32 PG (ref 27–31)
MCHC RBC AUTO-ENTMCNC: 33.8 G/DL (ref 32–36)
MCV RBC AUTO: 95 FL (ref 82–98)
MONOCYTES # BLD AUTO: 0.4 K/UL (ref 0.3–1)
MONOCYTES NFR BLD: 12.8 % (ref 4–15)
NEUTROPHILS # BLD AUTO: 1.4 K/UL (ref 1.8–7.7)
NEUTROPHILS NFR BLD: 47.4 % (ref 38–73)
NRBC BLD-RTO: 0 /100 WBC
PLATELET # BLD AUTO: 247 K/UL (ref 150–450)
PMV BLD AUTO: 9.5 FL (ref 9.2–12.9)
POTASSIUM SERPL-SCNC: 4 MMOL/L (ref 3.5–5.1)
PROT SERPL-MCNC: 5.4 G/DL (ref 6–8.4)
RBC # BLD AUTO: 2.94 M/UL (ref 4–5.4)
RETICS/RBC NFR AUTO: 3.6 % (ref 0.5–2.5)
SATURATED IRON: 16 % (ref 20–50)
SODIUM SERPL-SCNC: 140 MMOL/L (ref 136–145)
TOTAL IRON BINDING CAPACITY: 292 UG/DL (ref 250–450)
TRANSFERRIN SERPL-MCNC: 197 MG/DL (ref 200–375)
WBC # BLD AUTO: 3.04 K/UL (ref 3.9–12.7)

## 2023-05-19 PROCEDURE — 83615 LACTATE (LD) (LDH) ENZYME: CPT | Performed by: INTERNAL MEDICINE

## 2023-05-19 PROCEDURE — 1159F PR MEDICATION LIST DOCUMENTED IN MEDICAL RECORD: ICD-10-PCS | Mod: CPTII,,, | Performed by: INTERNAL MEDICINE

## 2023-05-19 PROCEDURE — 80053 COMPREHEN METABOLIC PANEL: CPT | Performed by: INTERNAL MEDICINE

## 2023-05-19 PROCEDURE — 99999 PR PBB SHADOW E&M-EST. PATIENT-LVL IV: CPT | Mod: PBBFAC,,, | Performed by: INTERNAL MEDICINE

## 2023-05-19 PROCEDURE — 99203 PR OFFICE/OUTPT VISIT, NEW, LEVL III, 30-44 MIN: ICD-10-PCS | Mod: S$PBB,,, | Performed by: INTERNAL MEDICINE

## 2023-05-19 PROCEDURE — 1159F MED LIST DOCD IN RCRD: CPT | Mod: CPTII,,, | Performed by: INTERNAL MEDICINE

## 2023-05-19 PROCEDURE — 36415 COLL VENOUS BLD VENIPUNCTURE: CPT | Performed by: INTERNAL MEDICINE

## 2023-05-19 PROCEDURE — 3080F DIAST BP >= 90 MM HG: CPT | Mod: CPTII,,, | Performed by: INTERNAL MEDICINE

## 2023-05-19 PROCEDURE — 3008F BODY MASS INDEX DOCD: CPT | Mod: CPTII,,, | Performed by: INTERNAL MEDICINE

## 2023-05-19 PROCEDURE — 99999 PR PBB SHADOW E&M-EST. PATIENT-LVL IV: ICD-10-PCS | Mod: PBBFAC,,, | Performed by: INTERNAL MEDICINE

## 2023-05-19 PROCEDURE — 3075F PR MOST RECENT SYSTOLIC BLOOD PRESS GE 130-139MM HG: ICD-10-PCS | Mod: CPTII,,, | Performed by: INTERNAL MEDICINE

## 2023-05-19 PROCEDURE — 82728 ASSAY OF FERRITIN: CPT | Performed by: INTERNAL MEDICINE

## 2023-05-19 PROCEDURE — 3075F SYST BP GE 130 - 139MM HG: CPT | Mod: CPTII,,, | Performed by: INTERNAL MEDICINE

## 2023-05-19 PROCEDURE — 85025 COMPLETE CBC W/AUTO DIFF WBC: CPT | Performed by: INTERNAL MEDICINE

## 2023-05-19 PROCEDURE — 83010 ASSAY OF HAPTOGLOBIN QUANT: CPT | Performed by: INTERNAL MEDICINE

## 2023-05-19 PROCEDURE — 86880 COOMBS TEST DIRECT: CPT | Performed by: INTERNAL MEDICINE

## 2023-05-19 PROCEDURE — 84466 ASSAY OF TRANSFERRIN: CPT | Performed by: INTERNAL MEDICINE

## 2023-05-19 PROCEDURE — 85045 AUTOMATED RETICULOCYTE COUNT: CPT | Performed by: INTERNAL MEDICINE

## 2023-05-19 PROCEDURE — 99203 OFFICE O/P NEW LOW 30 MIN: CPT | Mod: S$PBB,,, | Performed by: INTERNAL MEDICINE

## 2023-05-19 PROCEDURE — 3080F PR MOST RECENT DIASTOLIC BLOOD PRESSURE >= 90 MM HG: ICD-10-PCS | Mod: CPTII,,, | Performed by: INTERNAL MEDICINE

## 2023-05-19 PROCEDURE — 99214 OFFICE O/P EST MOD 30 MIN: CPT | Mod: PBBFAC | Performed by: INTERNAL MEDICINE

## 2023-05-19 PROCEDURE — 3008F PR BODY MASS INDEX (BMI) DOCUMENTED: ICD-10-PCS | Mod: CPTII,,, | Performed by: INTERNAL MEDICINE

## 2023-05-20 DIAGNOSIS — Z76.0 MEDICATION REFILL: ICD-10-CM

## 2023-05-20 DIAGNOSIS — I10 HYPERTENSION, UNSPECIFIED TYPE: ICD-10-CM

## 2023-05-22 RX ORDER — AMLODIPINE BESYLATE 5 MG/1
TABLET ORAL
Qty: 90 TABLET | Refills: 1 | Status: SHIPPED | OUTPATIENT
Start: 2023-05-22

## 2023-05-23 ENCOUNTER — TELEPHONE (OUTPATIENT)
Dept: NEPHROLOGY | Facility: CLINIC | Age: 33
End: 2023-05-23
Payer: MEDICAID

## 2023-05-23 NOTE — TELEPHONE ENCOUNTER
----- Message from Amelia Arora sent at 5/23/2023 10:13 AM CDT -----  Contact: Alisia  Patient is calling to speak with the nurse regarding appt. Reports needing a follow up for lupus. Please give patient a call back at [phone].

## 2023-05-29 ENCOUNTER — NURSE TRIAGE (OUTPATIENT)
Dept: ADMINISTRATIVE | Facility: CLINIC | Age: 33
End: 2023-05-29
Payer: MEDICAID

## 2023-05-29 NOTE — TELEPHONE ENCOUNTER
"[facility] incoming call - Pt c/o having "problems of possible lexy", lips are starting to swell and are painful to the touch but not severe in pain, developing mouth ulcers, no tongue swelling, had some wheezing yesterday but denies wheezing or arnel at this time, facial swelling protocol followed and pt advised to speak with a doctor within 24 hours. [facility] offered and accepted after attempt made to schedule Pt with pcp unsuccessful. Encounter routed to PCP/staff as high priority. Pt invited to call ooc at [phone] if having any new or worsening symptoms or questions. Pt has no further questions at this time. Alert and oriented, Pt agrees to follow through with dispo.  Reason for Disposition   Swelling is painful to touch    Additional Information   Negative: [1] Life-threatening reaction (anaphylaxis) in the past to similar substance (e.g., food, insect bite/sting, chemical, etc.) AND [2] < 2 hours since exposure   Negative: Unresponsive, passed out or very weak   Negative: Swollen tongue   Negative: Difficulty breathing or wheezing   Negative: Sounds like a life-threatening emergency to the triager   Negative: Followed a face injury   Negative: [1] Bee sting AND [2] within last 24 hours   Negative: Insect bite suspected   Swelling mainly of lip(s)   Negative: Unresponsive, passed out or very weak   Negative: Swollen tongue   Negative: Difficulty breathing or wheezing   Negative: [1] Life-threatening reaction in the past to similar substance (e.g., food, insect bite/sting, chemical, etc.) AND [2] < 2 hours since exposure   Negative: Sounds like a life-threatening emergency to the triager   Negative: Taking an ACE Inhibitor medicine (e.g., benazepril / LOTENSIN, captopril / CAPOTEN, enalapril / VASOTEC, lisinopril / ZESTRIL)   Negative: [1] Severe swelling AND [2] cause unknown   Negative: Patient sounds very sick or weak to the triager   Negative: [1] Swelling is red AND [2] fever   Negative: [1] Swelling is painful " to touch AND [2] fever   Negative: [1] Looks infected AND [2] large red area (> 2 in. or 5 cm)   Negative: Toothache    Protocols used: Face Swelling-A-AH, Lip Swelling-A-AH

## 2023-05-30 ENCOUNTER — PATIENT MESSAGE (OUTPATIENT)
Dept: FAMILY MEDICINE | Facility: CLINIC | Age: 33
End: 2023-05-30
Payer: MEDICAID

## 2023-05-30 ENCOUNTER — TELEPHONE (OUTPATIENT)
Dept: RHEUMATOLOGY | Facility: CLINIC | Age: 33
End: 2023-05-30
Payer: MEDICAID

## 2023-05-30 ENCOUNTER — TELEPHONE (OUTPATIENT)
Dept: FAMILY MEDICINE | Facility: CLINIC | Age: 33
End: 2023-05-30
Payer: MEDICAID

## 2023-05-30 DIAGNOSIS — K12.1 ORAL ULCER: Primary | ICD-10-CM

## 2023-05-30 NOTE — TELEPHONE ENCOUNTER
Could be related to flare of her herpes simplex infection.  Please advice to restart valacyclovir given by her primary care physician in the past.  Schedule earlier follow-up with  for evaluation of worsening joint pain likely secondary to her lupus.

## 2023-05-30 NOTE — TELEPHONE ENCOUNTER
----- Message from Saumya Quinonez MD sent at 5/30/2023 12:11 PM CDT -----  Contact: [phone]  This should be a telephone message.  She needs to be seen.  If she wants to do a virtual visit that is fine.  ----- Message -----  From: Ignacia Wright LPN  Sent: 5/30/2023  12:08 PM CDT  To: Saumya Quinonez MD      ----- Message -----  From: Alberta Diop  Sent: 5/30/2023  10:48 AM CDT  To: Cristiano Kerns A Staff    Patient called in this morning stating she has ulcers in her mouth and her lips are swollen and it is very difficult to eat. She is also experiencing joint pain and wants to know is there something that you can prescribe to help her. Please call back [phone]. Thanks tpw

## 2023-05-30 NOTE — TELEPHONE ENCOUNTER
----- Message from Alberta Diop sent at 5/30/2023 10:42 AM CDT -----  Contact: self [phone]  Patient called in stating she has ulcers inside her mouth and her lips are swollen, and wants to know is there something you can prescribe for this. And she is also concerned about her joint pain. Please call back [phone]

## 2023-05-30 NOTE — TELEPHONE ENCOUNTER
C/o mouth ulcers and lip swollen. Eating is painful. Was using Magic Mouthwash and she is out.  States that it didn't help much, any other treatment?      Negro Hernandez        Now also c/o joint pain hands and back Pain scale 7. Taking plaquenil and Norco from PCP      Still on doxycycline for infection s/p hospitalization

## 2023-06-05 ENCOUNTER — TELEPHONE (OUTPATIENT)
Dept: RHEUMATOLOGY | Facility: CLINIC | Age: 33
End: 2023-06-05
Payer: MEDICAID

## 2023-06-05 NOTE — TELEPHONE ENCOUNTER
Spoke with pt and she was unable to make her appt today, 6.5.23, rescheduled to next opening for a virtual visit on 6.21.23 at 3 pm. Added to wait list as well

## 2023-06-05 NOTE — TELEPHONE ENCOUNTER
----- Message from Lesly Lao sent at 6/5/2023  1:40 PM CDT -----  Contact: patient  Alisia Kunzconsuelo Vines states that she should be arriving for her appointment within 10 minutes. You can reach her [phone].

## 2023-06-05 NOTE — TELEPHONE ENCOUNTER
----- Message from Sydnie Baker sent at 6/5/2023  2:08 PM CDT -----  Contact: yanick Mendez is calling in  regards to that 4 pm appt on today available.Please call back at [phone]      Thanks  RALF

## 2023-06-08 ENCOUNTER — TELEPHONE (OUTPATIENT)
Dept: NEPHROLOGY | Facility: CLINIC | Age: 33
End: 2023-06-08
Payer: MEDICAID

## 2023-06-08 ENCOUNTER — OFFICE VISIT (OUTPATIENT)
Dept: RHEUMATOLOGY | Facility: CLINIC | Age: 33
End: 2023-06-08
Payer: MEDICAID

## 2023-06-08 ENCOUNTER — LAB VISIT (OUTPATIENT)
Dept: LAB | Facility: HOSPITAL | Age: 33
End: 2023-06-08
Attending: PHYSICIAN ASSISTANT
Payer: MEDICAID

## 2023-06-08 ENCOUNTER — PATIENT MESSAGE (OUTPATIENT)
Dept: RHEUMATOLOGY | Facility: CLINIC | Age: 33
End: 2023-06-08

## 2023-06-08 ENCOUNTER — TELEPHONE (OUTPATIENT)
Dept: RHEUMATOLOGY | Facility: CLINIC | Age: 33
End: 2023-06-08
Payer: MEDICAID

## 2023-06-08 DIAGNOSIS — M32.14 SLE GLOMERULONEPHRITIS SYNDROME, WHO CLASS V: ICD-10-CM

## 2023-06-08 DIAGNOSIS — K12.1 ORAL ULCER: ICD-10-CM

## 2023-06-08 DIAGNOSIS — D84.9 IMMUNOCOMPROMISED: ICD-10-CM

## 2023-06-08 DIAGNOSIS — Z79.899 HIGH RISK MEDICATION USE: ICD-10-CM

## 2023-06-08 DIAGNOSIS — Z79.899 LONG-TERM USE OF PLAQUENIL: ICD-10-CM

## 2023-06-08 DIAGNOSIS — R59.9 LYMPHOID HYPERPLASIA: ICD-10-CM

## 2023-06-08 DIAGNOSIS — M35.00 SJOGREN'S SYNDROME, WITH UNSPECIFIED ORGAN INVOLVEMENT: ICD-10-CM

## 2023-06-08 DIAGNOSIS — M32.14 LUPUS NEPHRITIS: Primary | ICD-10-CM

## 2023-06-08 DIAGNOSIS — M32.9 SYSTEMIC LUPUS ERYTHEMATOSUS ARTHRITIS: ICD-10-CM

## 2023-06-08 DIAGNOSIS — M32.9 SYSTEMIC LUPUS ERYTHEMATOSUS ARTHRITIS: Primary | ICD-10-CM

## 2023-06-08 DIAGNOSIS — Z51.81 MEDICATION MONITORING ENCOUNTER: ICD-10-CM

## 2023-06-08 LAB
BASOPHILS # BLD AUTO: 0.01 K/UL (ref 0–0.2)
BASOPHILS NFR BLD: 0.3 % (ref 0–1.9)
CRP SERPL-MCNC: 1.3 MG/L (ref 0–8.2)
DIFFERENTIAL METHOD: ABNORMAL
EOSINOPHIL # BLD AUTO: 0.1 K/UL (ref 0–0.5)
EOSINOPHIL NFR BLD: 2.6 % (ref 0–8)
ERYTHROCYTE [DISTWIDTH] IN BLOOD BY AUTOMATED COUNT: 16.7 % (ref 11.5–14.5)
ERYTHROCYTE [SEDIMENTATION RATE] IN BLOOD BY WESTERGREN METHOD: 127 MM/HR (ref 0–20)
HBV CORE AB SERPL QL IA: NORMAL
HBV SURFACE AG SERPL QL IA: NORMAL
HCT VFR BLD AUTO: 27.5 % (ref 37–48.5)
HGB BLD-MCNC: 9.3 G/DL (ref 12–16)
IMM GRANULOCYTES # BLD AUTO: 0.01 K/UL (ref 0–0.04)
IMM GRANULOCYTES NFR BLD AUTO: 0.3 % (ref 0–0.5)
LYMPHOCYTES # BLD AUTO: 1.2 K/UL (ref 1–4.8)
LYMPHOCYTES NFR BLD: 31.6 % (ref 18–48)
MCH RBC QN AUTO: 32.3 PG (ref 27–31)
MCHC RBC AUTO-ENTMCNC: 33.8 G/DL (ref 32–36)
MCV RBC AUTO: 96 FL (ref 82–98)
MONOCYTES # BLD AUTO: 0.3 K/UL (ref 0.3–1)
MONOCYTES NFR BLD: 7.6 % (ref 4–15)
NEUTROPHILS # BLD AUTO: 2.2 K/UL (ref 1.8–7.7)
NEUTROPHILS NFR BLD: 57.6 % (ref 38–73)
NRBC BLD-RTO: 0 /100 WBC
PLATELET # BLD AUTO: 190 K/UL (ref 150–450)
PMV BLD AUTO: 10.6 FL (ref 9.2–12.9)
RBC # BLD AUTO: 2.88 M/UL (ref 4–5.4)
WBC # BLD AUTO: 3.8 K/UL (ref 3.9–12.7)

## 2023-06-08 PROCEDURE — 99215 OFFICE O/P EST HI 40 MIN: CPT | Mod: 95,,, | Performed by: PHYSICIAN ASSISTANT

## 2023-06-08 PROCEDURE — 85025 COMPLETE CBC W/AUTO DIFF WBC: CPT | Performed by: PHYSICIAN ASSISTANT

## 2023-06-08 PROCEDURE — 87517 HEPATITIS B DNA QUANT: CPT | Performed by: PHYSICIAN ASSISTANT

## 2023-06-08 PROCEDURE — 86706 HEP B SURFACE ANTIBODY: CPT | Mod: 91 | Performed by: PHYSICIAN ASSISTANT

## 2023-06-08 PROCEDURE — 1160F RVW MEDS BY RX/DR IN RCRD: CPT | Mod: CPTII,95,, | Performed by: PHYSICIAN ASSISTANT

## 2023-06-08 PROCEDURE — 85651 RBC SED RATE NONAUTOMATED: CPT | Performed by: PHYSICIAN ASSISTANT

## 2023-06-08 PROCEDURE — 86704 HEP B CORE ANTIBODY TOTAL: CPT | Performed by: PHYSICIAN ASSISTANT

## 2023-06-08 PROCEDURE — 1159F MED LIST DOCD IN RCRD: CPT | Mod: CPTII,95,, | Performed by: PHYSICIAN ASSISTANT

## 2023-06-08 PROCEDURE — 86140 C-REACTIVE PROTEIN: CPT | Performed by: PHYSICIAN ASSISTANT

## 2023-06-08 PROCEDURE — 1160F PR REVIEW ALL MEDS BY PRESCRIBER/CLIN PHARMACIST DOCUMENTED: ICD-10-PCS | Mod: CPTII,95,, | Performed by: PHYSICIAN ASSISTANT

## 2023-06-08 PROCEDURE — 1159F PR MEDICATION LIST DOCUMENTED IN MEDICAL RECORD: ICD-10-PCS | Mod: CPTII,95,, | Performed by: PHYSICIAN ASSISTANT

## 2023-06-08 PROCEDURE — 87340 HEPATITIS B SURFACE AG IA: CPT | Performed by: PHYSICIAN ASSISTANT

## 2023-06-08 PROCEDURE — 99215 PR OFFICE/OUTPT VISIT, EST, LEVL V, 40-54 MIN: ICD-10-PCS | Mod: 95,,, | Performed by: PHYSICIAN ASSISTANT

## 2023-06-08 RX ORDER — PREDNISONE 10 MG/1
10 TABLET ORAL DAILY
Qty: 30 TABLET | Refills: 1 | Status: SHIPPED | OUTPATIENT
Start: 2023-06-08 | End: 2023-06-12

## 2023-06-08 NOTE — PATIENT INSTRUCTIONS
Please call to schedule w kidney doctor as soon as possible  You should hear from the eye doctors to schedule a plaquenil monitoring exam  Get labs at [facility] today - orders linked to the appt    I will contact through the portal w results

## 2023-06-08 NOTE — PROGRESS NOTES
The patient location is: car  The chief complaint leading to consultation is: SLE    Visit type: audiovisual    Face to Face time with patient: 18 min  30 minutes of total time spent on the encounter, which includes face to face time and non-face to face time preparing to see the patient (eg, review of tests), Obtaining and/or reviewing separately obtained history, Documenting clinical information in the electronic or other health record, Independently interpreting results (not separately reported) and communicating results to the patient/family/caregiver, or Care coordination (not separately reported).       Each patient to whom he or she provides medical services by telemedicine is:  (1) informed of the relationship between the physician and patient and the respective role of any other health care provider with respect to management of the patient; and (2) notified that he or she may decline to receive medical services by telemedicine and may withdraw from such care at any time.      Subjective:      Patient ID: Alisia Vines is a 32 y.o. female.    Chief Complaint: No chief complaint on file.      HPI   Alisia Vines  is a 32 y.o. female seen for lab review on lupus nephritis, Sjogren's syndrome.  She was recently hospitalized for lupus flare.  She got Solu-Medrol in the hospital.  Apparently she had an acute hemolytic anemia while in the hospital.  She did not require transfusion while she was there.  She has also followed up with Hematology.  In the past, compliance has been an issue for her.  With her recent hospitalization she is committed to being more compliant with medications and follow-up.    She has Plaquenil 400 mg daily ordered.  She was off for many months prior to resuming it in the hospital recently.  Tolerates it well.  Has been on Rituxan infusions in the past due to noncompliance.  Her most recent infusion was 05/2020.      She uses refresh drops for dry eyes.  She sees  Ophthalmology here for Plaquenil monitoring.  Most recent eye exam was September 2021.  Denies eye pain.  Having trouble getting reestablished for Plaquenil monitoring.  Requesting an internal referral.    Also with class 5 lupus nephritis.  She was last seen by Nephrology July 2022.  I have asked her to reestablish care.  She has not yet scheduled an appointment but has made some calls.  Recently had a lymph node biopsy by ENT.  It was negative for lymphoma but did show hyperplasia.    C/o rash across face.  Planning f/u w dermatology.  Also seeing ID w recent lymph node concerns.  Recent TB test is indeterminate.  T spot was negative.    Patient complain of oral and nasal ulcerations.  She also complains of prolonged stiffness lasting more than 30-45 minutes.  She is having increasing joint pain symptoms as well.  She  denies fevers, chills, photosensitivity, eye pain, shortness of breath, chest pain, hematuria, blood in the stool, raynauds, finger ulcerations, tender swollen joints, LAD, dysphagia, weakness.  Rheumatologic systems otherwise negative.    Serologies/Labs:  (+) HARPREET  (+) ds DNA  (+) SSA  (+) SSB  Current Treatment:  Plaquenil 200mg bid - not consistent, compliance an issue                Previous Treatment:   Rituxan - used for non-compliance in past         Current Outpatient Medications:     amLODIPine (NORVASC) 5 MG tablet, TAKE 1 TABLET(5 MG) BY MOUTH EVERY DAY, Disp: 90 tablet, Rfl: 1    ARIPiprazole (ABILIFY) 2 MG Tab, TAKE 1 TABLET(2 MG) BY MOUTH EVERY DAY, Disp: 30 tablet, Rfl: 3    ciclopirox (PENLAC) 8 % Soln, Apply to nail and nail fold once daily for up to 1 year, Disp: 6.6 mL, Rfl: 6    diphenhydrAMINE-aluminum-magnesium hydroxide-simethicone-LIDOcaine HCl 2%, Swish and spit 15 mLs every 4 (four) hours as needed (oral ulcer pain)., Disp: 450 each, Rfl: 0    FLUoxetine 40 MG capsule, TAKE 1 CAPSULE(40 MG) BY MOUTH EVERY DAY, Disp: 30 capsule, Rfl: 3    fluticasone propionate (FLONASE) 50  mcg/actuation nasal spray, SHAKE LIQUID AND USE 2 SPRAYS(100 MCG) IN EACH NOSTRIL EVERY DAY, Disp: 16 g, Rfl: 1    HYDROcodone-acetaminophen (NORCO) 5-325 mg per tablet, Take 1-2 tablets by mouth every 6 (six) hours as needed for Pain., Disp: 30 tablet, Rfl: 0    hydrOXYchloroQUINE (PLAQUENIL) 200 mg tablet, TAKE 2 TABLETS(400 MG) BY MOUTH EVERY DAY, Disp: 60 tablet, Rfl: 2    ketoconazole (NIZORAL) 2 % shampoo, Wash hair with medicated shampoo at least 1x/week - let sit on scalp at least 5 minutes prior to rinsing, Disp: 120 mL, Rfl: 11    mometasone (ELOCON) 0.1 % solution, AAA of scalp once daily., Disp: 60 mL, Rfl: 3    ondansetron (ZOFRAN) 4 MG tablet, Take 1 tablet (4 mg total) by mouth every 8 (eight) hours as needed for Nausea., Disp: 20 tablet, Rfl: 0    predniSONE (DELTASONE) 10 MG tablet, Take 1 tablet (10 mg total) by mouth once daily., Disp: 30 tablet, Rfl: 1    triamcinolone acetonide 0.025% (KENALOG) 0.025 % Oint, AAA bid prn. Use for 2 weeks then taper. Mild steroid., Disp: 80 g, Rfl: 1    valACYclovir (VALTREX) 1000 MG tablet, Take 1 tablet (1,000 mg total) by mouth once daily., Disp: 90 tablet, Rfl: 3    Past Medical History:   Diagnosis Date    Allergic rhinitis     Anemia     Condyloma acuminata     COVID-19 virus infection 07/2021    Encounter for blood transfusion     GERD (gastroesophageal reflux disease)     Hemolytic anemia associated with systemic lupus erythematosus 4/30/2023    History of fetal anomaly in prior pregnancy, currently pregnant, unspecified trimester 03/08/2017    Pacemaker placed    HSV-2 (herpes simplex virus 2) infection     Lupus nephritis     Mood disorder     Overweight(278.02)     Sjogren's syndrome     Systemic lupus complicating pregnancy 01/31/2019    Systemic lupus erythematosus     Thrombocytopenia      Family History   Problem Relation Age of Onset    Hypertension Mother     Eczema Brother     Hypertension Maternal Grandmother     Diabetes Paternal Grandmother      Heart disease Son     Arrhythmia Son         CHB    Stroke Neg Hx     Cancer Neg Hx      Social History     Socioeconomic History    Marital status: Single    Number of children: 2   Occupational History     Comment: [employer]   Tobacco Use    Smoking status: Never     Passive exposure: Never    Smokeless tobacco: Never   Substance and Sexual Activity    Alcohol use: No     Alcohol/week: 0.0 standard drinks    Drug use: Yes     Types: Marijuana    Sexual activity: Not Currently     Partners: Male     Birth control/protection: None   Other Topics Concern    Are you pregnant or think you may be? No    Breast-feeding No   Social History Narrative    The patient is single and lives with her significant other.  She has 3 children.  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     Social Determinants of Health     Financial Resource Strain: Low Risk     Difficulty of Paying Living Expenses: Not hard at all   Food Insecurity: No Food Insecurity    Worried About Running Out of Food in the Last Year: Never true    Ran Out of Food in the Last Year: Never true   Transportation Needs: No Transportation Needs    Lack of Transportation (Medical): No    Lack of Transportation (Non-Medical): No   Physical Activity: Inactive    Days of Exercise per Week: 0 days    Minutes of Exercise per Session: 0 min   Stress: No Stress Concern Present    Feeling of Stress : Not at all   Social Connections: Socially Isolated    Frequency of Communication with Friends and Family: More than three times a week    Frequency of Social Gatherings with Friends and Family: More than three times a week    Attends Mandaeism Services: Never    Active Member of Clubs or Organizations: No    Attends Club or Organization Meetings: Never    Marital Status: Never    Housing Stability: Unknown    Unable to Pay for Housing in the Last Year: No    Unstable Housing in the Last Year: No     Review of patient's allergies indicates:  No Known  Allergies    Objective:   There were no vitals taken for this visit.  Immunization History   Administered Date(s) Administered    COVID-19, MRNA, LN-S, PF (Pfizer) (Purple Cap) 12/21/2021, 01/10/2022    Influenza - High Dose - PF (65 years and older) 11/22/2013, 11/20/2018    Influenza - Quadrivalent 11/27/2015    Influenza - Quadrivalent - PF *Preferred* (6 months and older) 10/22/2017    Pneumococcal Conjugate - 13 Valent 11/27/2015    Tdap 09/08/2012, 07/31/2017, 05/01/2022       Physical Exam   Constitutional: She is oriented to person, place, and time. No distress.   HENT:   Head: Normocephalic and atraumatic.   Pulmonary/Chest: Effort normal.   Musculoskeletal:      Cervical back: Normal range of motion.   Neurological: She is alert and oriented to person, place, and time.   Psychiatric: Mood normal.     Visible ulcerations along upper lip w swelling    Recent Results (from the past 672 hour(s))   G6PD,Quantitative    Collection Time: 05/17/23 10:30 AM   Result Value Ref Range    G6PD Quant 14.4 (H) 8.0 - 11.9 U/g Hb   Direct antiglobulin test    Collection Time: 05/19/23  8:42 AM   Result Value Ref Range    Direct Angel (LUIS MIGUEL) NEG    Haptoglobin    Collection Time: 05/19/23  8:42 AM   Result Value Ref Range    Haptoglobin 17 (L) 30 - 250 mg/dL   Lactate Dehydrogenase    Collection Time: 05/19/23  8:42 AM   Result Value Ref Range     (H) 110 - 260 U/L   Reticulocytes    Collection Time: 05/19/23  8:42 AM   Result Value Ref Range    Retic 3.6 (H) 0.5 - 2.5 %   Iron and TIBC    Collection Time: 05/19/23  8:42 AM   Result Value Ref Range    Iron 48 30 - 160 ug/dL    Transferrin 197 (L) 200 - 375 mg/dL    TIBC 292 250 - 450 ug/dL    Saturated Iron 16 (L) 20 - 50 %   Ferritin    Collection Time: 05/19/23  8:42 AM   Result Value Ref Range    Ferritin 37 20.0 - 300.0 ng/mL   Comprehensive Metabolic Panel    Collection Time: 05/19/23  8:42 AM   Result Value Ref Range    Sodium 140 136 - 145 mmol/L    Potassium  4.0 3.5 - 5.1 mmol/L    Chloride 110 95 - 110 mmol/L    CO2 24 23 - 29 mmol/L    Glucose 92 70 - 110 mg/dL    BUN 11 6 - 20 mg/dL    Creatinine 0.6 0.5 - 1.4 mg/dL    Calcium 8.5 (L) 8.7 - 10.5 mg/dL    Total Protein 5.4 (L) 6.0 - 8.4 g/dL    Albumin 2.3 (L) 3.5 - 5.2 g/dL    Total Bilirubin 0.3 0.1 - 1.0 mg/dL    Alkaline Phosphatase 69 55 - 135 U/L    AST 22 10 - 40 U/L    ALT 13 10 - 44 U/L    Anion Gap 6 (L) 8 - 16 mmol/L    eGFR >60 >60 mL/min/1.73 m^2   CBC Auto Differential    Collection Time: 05/19/23  8:42 AM   Result Value Ref Range    WBC 3.04 (L) 3.90 - 12.70 K/uL    RBC 2.94 (L) 4.00 - 5.40 M/uL    Hemoglobin 9.4 (L) 12.0 - 16.0 g/dL    Hematocrit 27.8 (L) 37.0 - 48.5 %    MCV 95 82 - 98 fL    MCH 32.0 (H) 27.0 - 31.0 pg    MCHC 33.8 32.0 - 36.0 g/dL    RDW 22.3 (H) 11.5 - 14.5 %    Platelets 247 150 - 450 K/uL    MPV 9.5 9.2 - 12.9 fL    Immature Granulocytes 0.7 (H) 0.0 - 0.5 %    Gran # (ANC) 1.4 (L) 1.8 - 7.7 K/uL    Immature Grans (Abs) 0.02 0.00 - 0.04 K/uL    Lymph # 1.1 1.0 - 4.8 K/uL    Mono # 0.4 0.3 - 1.0 K/uL    Eos # 0.1 0.0 - 0.5 K/uL    Baso # 0.01 0.00 - 0.20 K/uL    nRBC 0 0 /100 WBC    Gran % 47.4 38.0 - 73.0 %    Lymph % 36.8 18.0 - 48.0 %    Mono % 12.8 4.0 - 15.0 %    Eosinophil % 2.0 0.0 - 8.0 %    Basophil % 0.3 0.0 - 1.9 %    Differential Method Automated          Lab Results   Component Value Date    TBGOLDPLUS Indeterminate (A) 05/02/2023      Lab Results   Component Value Date    HEPAIGM Non-reactive 05/08/2023    HEPBIGM Grayzone (A) 05/08/2023    HEPBCAB Negative 11/18/2019    HEPCAB Non-reactive 05/08/2023        Imaging  I have personally reviewed images and reports as below.  I agree with the interpretation.  CT Soft Tissue Neck With Contrast  Order: [id number]  Status: Final result     Visible to patient: Yes (seen)     Next appt: 07/19/2023 at 09:30 AM in Radiology ([facility] CT1 LIMIT 500 LBS)     Dx: Mass of right side of neck     2 Result Notes    1 Patient  Communication  Details    Reading Physician Reading Date Result Priority   Guillermo Powers Jr., MD  [phone] 1/10/2023 Routine     Narrative & Impression  EXAMINATION:  CT SOFT TISSUE NECK WITH CONTRAST     CLINICAL HISTORY:  neck mass//abnormal ultrasound;Localized swelling, mass and lump, neck     TECHNIQUE:  Low dose axial images as well as sagittal and coronal reconstructions were performed from the skull base to the clavicles following the intravenous administration of 75 mL of Omnipaque 350.     COMPARISON:  None     FINDINGS:  The nasopharynx, oral pharynx, hypopharynx, larynx and visualized portion of the trachea are within normal limits.     The oral cavity and buccal space are limited by artifact from dental metal but appear to grossly within normal limits.     The bilateral parotid, submandibular and thyroid glands are within normal limits.     Abnormal lymphadenopathy is noted throughout the soft tissues of the neck, right greater than left, with the largest nodes in the right supraclavicular region measuring up to 3 x 2.1 cm in size (axial 111, series 2).  No evidence of necrosis is present..     Multilevel degenerative changes noted throughout the cervical spine.     Right greater than left maxillary sinusitis and bilateral ethmoid sinusitis.  Suspected left maxillary dental caries     Visualized lung apices are clear bilaterally.     Impression:     Extensive lymphadenopathy in the right greater than left neck.  Both benign and malignant etiologies are in the differential, recommend correlation with clinical presentation for infectious etiology.  Consider biopsy to rule out lymphoma.     Sinusitis.     Suspected dental caries, dental consultation is recommended        Electronically signed by: Guillermo Powers  Date:                                            01/10/2023  Time:                                           12:53       Surgical path report also reviewed from 2/17/23  Right deep cervical  lymph node, excisional biopsy: - Follicular hyperplasia. See comment. - No evidence of lymphoma or metastatic carcinoma.    Assessment:     1. Systemic lupus erythematosus arthritis    2. Long-term use of Plaquenil    3. SLE glomerulonephritis syndrome, WHO class V    4. Immunocompromised    5. Sjogren's syndrome, with unspecified organ involvement    6. Lymphoid hyperplasia    7. Oral ulcer    8. High risk medication use    9. Medication monitoring encounter            Plan:     Diagnoses and all orders for this visit:    Systemic lupus erythematosus arthritis  -     Ambulatory referral/consult to Ophthalmology; Future  -     predniSONE (DELTASONE) 10 MG tablet; Take 1 tablet (10 mg total) by mouth once daily.    Long-term use of Plaquenil  -     Ambulatory referral/consult to Ophthalmology; Future    SLE glomerulonephritis syndrome, WHO class V    Immunocompromised    Sjogren's syndrome, with unspecified organ involvement    Lymphoid hyperplasia    Oral ulcer    High risk medication use    Medication monitoring encounter        Severe worsening Lupus w recent hospitalization  Compliance continues to be a challenge but pt states she is committed to being compliant.  Pt back on Plaquenil 200 mg bid since hospital DC 5/2/23  Due for ophtho exam for monitoring  Internal referral placed  About 10yrs of Plaquenil therapy -  on again, off again history  Advised I won't refill without appropriate monitoring  Indeterminant Hep B - Hep B DNA quant today  Recommend pt to re-establish care w Dr. Willard  Pt to call and schedule  Prednisone 10 mg daily for now  Consider addition of cellcept vs Benlysta  Addendum 6/9/23 - Spoke to Dr. MADISON.  He advises having pt start PDN 20 mg daily x 4 weeks then, 15 mg x 2 weeks, then 10 mg daily.  I attempted to call the pt.  No answer.  I sent a detailed message in the portal to that affect.  He also recommends starting Rituxan 1000 mg x 2 two weeks apart q 6 mos.  Pt literature sent to the  patient via the portal.  Orders submitted.  Chart cc'ed to the coordinator to schedule.  Pt to f/u w me in 2 mos.    Overlapping SS  Recent lymph node excision shows hyperplasia but negative for malignancy such as lymphoma  SPEP, IEP, cryoglobulins reviewed  Low Albumin  O/w wnl  Discussed red flag symptoms with patient  Continue refresh drops p.r.n  Add pilocarpine 5 mg tid  Saw Dr. Garcia  On Augmentin and minocycline for nasal lesion and otitis externa  Drug therapy requiring intensive monitoring for toxicity  High Risk Medication Monitoring encounter  No current medication related issues, no evidence of toxicity  I ordered labs for toxicity monitoring, have personally reviewed the findings, and discussed them with the patient.  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  Monitor carefully for infections.  Advised patient to get immediate medical care if any infection arises.  Also advised strict adherence age-appropriate vaccinations and cancer screenings with PCP.  Patient advised to hold DMARD and/or biologic therapy for signs of infection or for surgery. If you are unsure what to do please call our office for instruction.[facility] [phone]  Return to clinic: Pending above    No follow-ups on file.    The patient understands, chooses and consents to this plan and accepts all the risks which include but are not limited to the risks mentioned above.     Disclaimer: This note was prepared using a voice recognition system and is likely to have sound alike errors within the text.

## 2023-06-08 NOTE — Clinical Note
I spoke to Dr. MADISON after this pt left.  He wants her to start Rituxan.  I attempted calling her to discuss on 6/9/23.  No answer so I sent a detailed pt portal message.  Irish, juan carlos reach out to her.  My staff, lets plan to see her back in 2 mos w SLE labs prior to f/u.  We can make that as a VV if needed.  Thanks. Hellen

## 2023-06-08 NOTE — TELEPHONE ENCOUNTER
----- Message from Jennifer Roa sent at 6/8/2023 12:08 PM CDT -----  Who Called: Pt    What is the request in detail: Requesting call back to discuss being scheduled from previous hospital stay 1 month ago. Please advise    Can the clinic reply by [facility]? No    Best Call Back Number: [phone]      Additional Information:

## 2023-06-09 ENCOUNTER — HOSPITAL ENCOUNTER (EMERGENCY)
Facility: HOSPITAL | Age: 33
Discharge: HOME | End: 2023-06-10
Attending: FAMILY MEDICINE
Payer: MEDICAID

## 2023-06-09 DIAGNOSIS — R22.0 LIP SWELLING: Primary | ICD-10-CM

## 2023-06-09 LAB
BASOPHILS # BLD AUTO: 0.01 K/UL (ref 0–0.2)
BASOPHILS NFR BLD: 0.3 % (ref 0–1.9)
DIFFERENTIAL METHOD: ABNORMAL
EOSINOPHIL # BLD AUTO: 0.2 K/UL (ref 0–0.5)
EOSINOPHIL NFR BLD: 3.9 % (ref 0–8)
ERYTHROCYTE [DISTWIDTH] IN BLOOD BY AUTOMATED COUNT: 17.2 % (ref 11.5–14.5)
HBV SURFACE AB SER-ACNC: 34.65 MIU/ML
HBV SURFACE AB SER-ACNC: REACTIVE M[IU]/ML
HCT VFR BLD AUTO: 27.9 % (ref 37–48.5)
HGB BLD-MCNC: 9.8 G/DL (ref 12–16)
IMM GRANULOCYTES # BLD AUTO: 0.01 K/UL (ref 0–0.04)
IMM GRANULOCYTES NFR BLD AUTO: 0.3 % (ref 0–0.5)
LYMPHOCYTES # BLD AUTO: 1.1 K/UL (ref 1–4.8)
LYMPHOCYTES NFR BLD: 27.9 % (ref 18–48)
MCH RBC QN AUTO: 33.8 PG (ref 27–31)
MCHC RBC AUTO-ENTMCNC: 35.1 G/DL (ref 32–36)
MCV RBC AUTO: 96 FL (ref 82–98)
MONOCYTES # BLD AUTO: 0.3 K/UL (ref 0.3–1)
MONOCYTES NFR BLD: 7.8 % (ref 4–15)
NEUTROPHILS # BLD AUTO: 2.3 K/UL (ref 1.8–7.7)
NEUTROPHILS NFR BLD: 59.8 % (ref 38–73)
NRBC BLD-RTO: 0 /100 WBC
PLATELET # BLD AUTO: 188 K/UL (ref 150–450)
PMV BLD AUTO: 10.2 FL (ref 9.2–12.9)
RBC # BLD AUTO: 2.9 M/UL (ref 4–5.4)
WBC # BLD AUTO: 3.83 K/UL (ref 3.9–12.7)

## 2023-06-09 PROCEDURE — 85025 COMPLETE CBC W/AUTO DIFF WBC: CPT | Performed by: FAMILY MEDICINE

## 2023-06-09 PROCEDURE — 80053 COMPREHEN METABOLIC PANEL: CPT | Performed by: FAMILY MEDICINE

## 2023-06-09 PROCEDURE — 99283 EMERGENCY DEPT VISIT LOW MDM: CPT

## 2023-06-10 VITALS
HEART RATE: 70 BPM | SYSTOLIC BLOOD PRESSURE: 127 MMHG | BODY MASS INDEX: 29.64 KG/M2 | RESPIRATION RATE: 18 BRPM | WEIGHT: 178.13 LBS | OXYGEN SATURATION: 100 % | DIASTOLIC BLOOD PRESSURE: 79 MMHG | TEMPERATURE: 98 F

## 2023-06-10 LAB
ALBUMIN SERPL BCP-MCNC: 2.3 G/DL (ref 3.5–5.2)
ALP SERPL-CCNC: 68 U/L (ref 55–135)
ALT SERPL W/O P-5'-P-CCNC: 10 U/L (ref 10–44)
ANION GAP SERPL CALC-SCNC: 7 MMOL/L (ref 8–16)
AST SERPL-CCNC: 22 U/L (ref 10–40)
BILIRUB SERPL-MCNC: 0.2 MG/DL (ref 0.1–1)
BUN SERPL-MCNC: 7 MG/DL (ref 6–20)
CALCIUM SERPL-MCNC: 8 MG/DL (ref 8.7–10.5)
CHLORIDE SERPL-SCNC: 112 MMOL/L (ref 95–110)
CO2 SERPL-SCNC: 24 MMOL/L (ref 23–29)
CREAT SERPL-MCNC: 0.6 MG/DL (ref 0.5–1.4)
EST. GFR  (NO RACE VARIABLE): >60 ML/MIN/1.73 M^2
GLUCOSE SERPL-MCNC: 77 MG/DL (ref 70–110)
POTASSIUM SERPL-SCNC: 3.3 MMOL/L (ref 3.5–5.1)
PROT SERPL-MCNC: 5.4 G/DL (ref 6–8.4)
SODIUM SERPL-SCNC: 143 MMOL/L (ref 136–145)

## 2023-06-10 RX ORDER — PREDNISONE 20 MG/1
20 TABLET ORAL
Status: DISCONTINUED | OUTPATIENT
Start: 2023-06-10 | End: 2023-06-10 | Stop reason: HOSPADM

## 2023-06-10 RX ORDER — PREDNISONE 20 MG/1
20 TABLET ORAL DAILY
Qty: 10 TABLET | Refills: 0 | Status: SHIPPED | OUTPATIENT
Start: 2023-06-10 | End: 2023-06-12

## 2023-06-10 NOTE — ED PROVIDER NOTES
SCRIBE #1 NOTE: ICj, am scribing for, and in the presence of, Lia Solis MD. I have scribed the entire note.       History     Chief Complaint   Patient presents with    Facial Swelling     Hx of lupus. Swelling to lips and under eyes. Only pain to lip.      Review of patient's allergies indicates:  No Known Allergies      History of Present Illness     HPI    6/10/2023, 12:43 AM  History obtained from the patient      History of Present Illness: Alisia Vines is a 32 y.o. female patient with a PMHx of allergic rhinitis, anemia, condyloma, acuminata, blood transfusion, GERD, hemolytic anemia associated with systemic lupus erythematosus, lupus nephritis, and thrombocytopenia who presents to the Emergency Department for evaluation of facial swelling which onset PTA. Pt has swelling to lips and under eyes. Symptoms are constant and moderate in severity. No mitigating or exacerbating factors reported. No associated sxs reported. Patient denies any fever, chills, trouble swallowing, CP, SOB, n/v/d, and all other sxs at this time. No prior Tx reported. No further complaints or concerns at this time.       Arrival mode: Personal vehicle     PCP: Saumya Quinonez MD        Past Medical History:  Past Medical History:   Diagnosis Date    Allergic rhinitis     Anemia     Condyloma acuminata     COVID-19 virus infection 07/2021    Encounter for blood transfusion     GERD (gastroesophageal reflux disease)     Hemolytic anemia associated with systemic lupus erythematosus 4/30/2023    History of fetal anomaly in prior pregnancy, currently pregnant, unspecified trimester 03/08/2017    Pacemaker placed    HSV-2 (herpes simplex virus 2) infection     Lupus nephritis     Mood disorder     Overweight(278.02)     Sjogren's syndrome     Systemic lupus complicating pregnancy 01/31/2019    Systemic lupus erythematosus     Thrombocytopenia        Past Surgical History:  Past Surgical History:   Procedure  Laterality Date     SECTION WITH TUBAL LIGATION N/A 2019    Procedure:  SECTION, WITH TUBAL LIGATION;  Surgeon: VERONICA Valdovinos MD;  Location: [facility] L&D;  Service: OB/GYN;  Laterality: N/A;     SECTION, LOW TRANSVERSE      x2    LYMPH NODE BIOPSY Right 2023    Procedure: BIOPSY, LYMPH NODE;  Surgeon: Rivera Sandoval MD;  Location: [facility] OR;  Service: ENT;  Laterality: Right;  right deep cervial lymph node excision    NECK MASS EXCISION Right 2023    Procedure: EXCISION, MASS, NECK;  Surgeon: Rivera Sandoval MD;  Location: [facility] OR;  Service: ENT;  Laterality: Right;  right deep cervial lymph node excision    RENAL BIOPSY  2013         Family History:  Family History   Problem Relation Age of Onset    Hypertension Mother     Eczema Brother     Hypertension Maternal Grandmother     Diabetes Paternal Grandmother     Heart disease Son     Arrhythmia Son         CHB    Stroke Neg Hx     Cancer Neg Hx        Social History:  Social History     Tobacco Use    Smoking status: Never     Passive exposure: Never    Smokeless tobacco: Never   Substance and Sexual Activity    Alcohol use: No     Alcohol/week: 0.0 standard drinks    Drug use: Yes     Types: Marijuana    Sexual activity: Not Currently     Partners: Male     Birth control/protection: None        Review of Systems     Review of Systems   Constitutional:  Negative for fever.   HENT:  Positive for facial swelling (lips and under eyes). Negative for sore throat and trouble swallowing.    Respiratory:  Negative for shortness of breath.    Cardiovascular:  Negative for chest pain.   Gastrointestinal:  Negative for diarrhea, nausea and vomiting.   Genitourinary:  Negative for dysuria.   Musculoskeletal:  Negative for back pain.   Skin:  Negative for rash.   Neurological:  Negative for weakness.   Hematological:  Does not bruise/bleed easily.   All other systems reviewed and are negative.     Physical Exam     Initial Vitals  [06/09/23 2146]   BP Pulse Resp Temp SpO2   (!) 172/94 87 20 98.8 °F (37.1 °C) 99 %      MAP       --          Physical Exam  Nursing Notes and Vital Signs Reviewed.  Constitutional: Patient is in no acute distress. Well-developed and well-nourished.  Head: Atraumatic. Normocephalic.  Eyes: PERRL. EOM intact. Conjunctivae are not pale. No scleral icterus.  ENT: Mucous membranes are moist. Oropharynx is clear and symmetric.  Mouth: Mild upper lip swelling.    Neck: Supple. Full ROM. No lymphadenopathy.  Cardiovascular: Regular rate. Regular rhythm. No murmurs, rubs, or gallops. Distal pulses are 2+ and symmetric.  Pulmonary/Chest: No respiratory distress. Clear to auscultation bilaterally. No wheezing or rales.  Abdominal: Soft and non-distended.  There is no tenderness.  No rebound, guarding, or rigidity. Good bowel sounds.  Genitourinary: No CVA tenderness  Musculoskeletal: Moves all extremities. No obvious deformities. No edema. No calf tenderness.  Skin: Warm and dry.  Neurological:  Alert, awake, and appropriate.  Normal speech.  No acute focal neurological deficits are appreciated.  Psychiatric: Normal affect. Good eye contact. Appropriate in content.     ED Course   Procedures  ED Vital Signs:  Vitals:    06/09/23 2146 06/10/23 0030 06/10/23 0130 06/10/23 0131   BP: (!) 172/94 128/85 (!) 145/98 127/79   Pulse: 87 77 71 70   Resp: 20 20 18    Temp: 98.8 °F (37.1 °C)  97.9 °F (36.6 °C)    TempSrc: Oral  Oral    SpO2: 99% 100% 100%    Weight: 80.8 kg (178 lb 2.1 oz)          Abnormal Lab Results:  Labs Reviewed   CBC W/ AUTO DIFFERENTIAL - Abnormal; Notable for the following components:       Result Value    WBC 3.83 (*)     RBC 2.90 (*)     Hemoglobin 9.8 (*)     Hematocrit 27.9 (*)     MCH 33.8 (*)     RDW 17.2 (*)     All other components within normal limits   COMPREHENSIVE METABOLIC PANEL - Abnormal; Notable for the following components:    Potassium 3.3 (*)     Chloride 112 (*)     Calcium 8.0 (*)     Total  Protein 5.4 (*)     Albumin 2.3 (*)     Anion Gap 7 (*)     All other components within normal limits        All Lab Results:  Results for orders placed or performed during the hospital encounter of 06/09/23   CBC auto differential   Result Value Ref Range    WBC 3.83 (L) 3.90 - 12.70 K/uL    RBC 2.90 (L) 4.00 - 5.40 M/uL    Hemoglobin 9.8 (L) 12.0 - 16.0 g/dL    Hematocrit 27.9 (L) 37.0 - 48.5 %    MCV 96 82 - 98 fL    MCH 33.8 (H) 27.0 - 31.0 pg    MCHC 35.1 32.0 - 36.0 g/dL    RDW 17.2 (H) 11.5 - 14.5 %    Platelets 188 150 - 450 K/uL    MPV 10.2 9.2 - 12.9 fL    Immature Granulocytes 0.3 0.0 - 0.5 %    Gran # (ANC) 2.3 1.8 - 7.7 K/uL    Immature Grans (Abs) 0.01 0.00 - 0.04 K/uL    Lymph # 1.1 1.0 - 4.8 K/uL    Mono # 0.3 0.3 - 1.0 K/uL    Eos # 0.2 0.0 - 0.5 K/uL    Baso # 0.01 0.00 - 0.20 K/uL    nRBC 0 0 /100 WBC    Gran % 59.8 38.0 - 73.0 %    Lymph % 27.9 18.0 - 48.0 %    Mono % 7.8 4.0 - 15.0 %    Eosinophil % 3.9 0.0 - 8.0 %    Basophil % 0.3 0.0 - 1.9 %    Differential Method Automated    Comprehensive metabolic panel   Result Value Ref Range    Sodium 143 136 - 145 mmol/L    Potassium 3.3 (L) 3.5 - 5.1 mmol/L    Chloride 112 (H) 95 - 110 mmol/L    CO2 24 23 - 29 mmol/L    Glucose 77 70 - 110 mg/dL    BUN 7 6 - 20 mg/dL    Creatinine 0.6 0.5 - 1.4 mg/dL    Calcium 8.0 (L) 8.7 - 10.5 mg/dL    Total Protein 5.4 (L) 6.0 - 8.4 g/dL    Albumin 2.3 (L) 3.5 - 5.2 g/dL    Total Bilirubin 0.2 0.1 - 1.0 mg/dL    Alkaline Phosphatase 68 55 - 135 U/L    AST 22 10 - 40 U/L    ALT 10 10 - 44 U/L    Anion Gap 7 (L) 8 - 16 mmol/L    eGFR >60 >60 mL/min/1.73 m^2     *Note: Due to a large number of results and/or encounters for the requested time period, some results have not been displayed. A complete set of results can be found in Results Review.         Imaging Results:  Imaging Results    None                The Emergency Provider reviewed the vital signs and test results, which are outlined above.     ED Discussion      1:17 AM: Reassessed pt at this time. Discussed with pt all pertinent ED information and results. Discussed pt dx and plan of tx. Gave pt all f/u and return to the ED instructions. All questions and concerns were addressed at this time. Pt expresses understanding of information and instructions, and is comfortable with plan to discharge. Pt is stable for discharge.    I discussed with patient and/or family/caretaker that evaluation in the ED does not suggest any emergent or life threatening medical conditions requiring immediate intervention beyond what was provided in the ED, and I believe patient is safe for discharge.  Regardless, an unremarkable evaluation in the ED does not preclude the development or presence of a serious of life threatening condition. As such, patient was instructed to return immediately for any worsening or change in current symptoms.       Medical Decision Making:   Clinical Tests:   Lab Tests: Ordered and Reviewed         ED Medication(s):  Medications - No data to display      Discharge Medication List as of 6/10/2023  1:15 AM        START taking these medications    Details   !! predniSONE (DELTASONE) 20 MG tablet Take 1 tablet (20 mg total) by mouth once daily. for 10 days, Starting Sat 6/10/2023, Until Tue 6/20/2023, Print       !! - Potential duplicate medications found. Please discuss with provider.           Follow-up Information       Schedule an appointment as soon as possible for a visit  with Saumya Quinonez MD.    Specialty: Family Medicine  Why: As needed  Contact information:  [address]  [place] [address] [phone]                                 Scribe Attestation:   Scribe #1: I performed the above scribed service and the documentation accurately describes the services I performed. I attest to the accuracy of the note.     Attending:   Physician Attestation Statement for Scribe #1: I, Lia Solis MD, personally performed the services described in  this documentation, as scribed by Cj Hernandez, in my presence, and it is both accurate and complete.           Clinical Impression       ICD-10-CM ICD-9-CM   1. Lip swelling  R22.0 784.2       Disposition:   Disposition: Discharged  Condition: Stable       Lia Solis MD  06/10/23 4919

## 2023-06-12 DIAGNOSIS — M32.9 SYSTEMIC LUPUS ERYTHEMATOSUS ARTHRITIS: Primary | ICD-10-CM

## 2023-06-12 LAB
HBV DNA SERPL NAA+PROBE-ACNC: <10 IU/ML
HBV DNA SERPL NAA+PROBE-LOG IU: <1 LOG (10) IU/ML
HBV DNA SERPL QL NAA+PROBE: NOT DETECTED

## 2023-06-12 RX ORDER — PREDNISONE 10 MG/1
TABLET ORAL
Qty: 91 TABLET | Refills: 0 | Status: SHIPPED | OUTPATIENT
Start: 2023-06-12 | End: 2023-07-06

## 2023-06-13 ENCOUNTER — TELEPHONE (OUTPATIENT)
Dept: RHEUMATOLOGY | Facility: CLINIC | Age: 33
End: 2023-06-13
Payer: MEDICAID

## 2023-06-13 ENCOUNTER — PATIENT MESSAGE (OUTPATIENT)
Dept: RHEUMATOLOGY | Facility: CLINIC | Age: 33
End: 2023-06-13
Payer: MEDICAID

## 2023-06-13 DIAGNOSIS — D84.9 IMMUNOCOMPROMISED: Primary | ICD-10-CM

## 2023-06-13 DIAGNOSIS — Z79.899 HIGH RISK MEDICATION USE: ICD-10-CM

## 2023-06-13 NOTE — TELEPHONE ENCOUNTER
Discussed rituxan infusion protocol. Verbalized understanding. Denies pregnancy, UPT ordered prior to infusion. TB and Hep B labs up to date.   Cycle scheduled for 6/27 and 7/11 pending insurance approval.

## 2023-06-13 NOTE — TELEPHONE ENCOUNTER
----- Message from Lora Root PA-C sent at 6/11/2023 10:54 PM CDT -----  I spoke to Dr. MADISON after this pt left.  He wants her to start Rituxan.  I attempted calling her to discuss on 6/9/23.  No answer so I sent a detailed pt portal message.  Irish, juan carlos reach out to her.  My staff, lets plan to see her back in 2 mos w SLE labs prior to f/u.  We can make that as a VV if needed.  Thanks.  Hellen

## 2023-06-14 NOTE — PROGRESS NOTES
HEMATOLOGY / ONCOLOGY   CLINIC NOTE     ONCOLOGICAL HISTORY:     Diagnosis:  - lupus  - lymphoid hyperplasia  - anemia with iron deficiency    Current Treatment:   - ferrous sulfate    Subjective:       Chief Complaint: Anemia      HPI    Alisia Ceballos Jasmyn  32 y.o.  female with past medical history significant for SLE, lupus nephritis, Sjogren's syndrome referred for evaluation of lymphoid hyperplasia and anemia.      She was hospitalized in April 2023 for lupus flare with hemolytic anemia after being noncompliant with her medications (Plaquenil) for lupus. She was treated with Solu-Medrol 1 g IV and started taking Plaquenil on discharge.  Patient had previously been treated with rituximab due to noncompliance with last dose being given in May of 2020.    She has diagnosis of anemia for long period of time and when initially she was diagnosed with lupus it was low and she required blood transfusion.    Interval History:     Patient denies any issues or concerns today.  Denies any blood in the urine or bowel movements.  Denies any heavy vaginal bleeding.  She was started on ferrous sulfate while being in the hospital and has also been compliant with her medications for lupus which is Plaquenil.  According to her she was not taking it for many months.  She is also now started for lupus flare with prednisone with a plan for long taper and also will be treated with rituximab with 1st dose being given next week    Past Medical History:   Diagnosis Date    Allergic rhinitis     Anemia     Condyloma acuminata     COVID-19 virus infection 07/2021    Encounter for blood transfusion     GERD (gastroesophageal reflux disease)     Hemolytic anemia associated with systemic lupus erythematosus 4/30/2023    History of fetal anomaly in prior pregnancy, currently pregnant, unspecified trimester 03/08/2017    Pacemaker placed    HSV-2 (herpes simplex virus 2) infection     Lupus nephritis     Mood disorder      Overweight(278.02)     Sjogren's syndrome     Systemic lupus complicating pregnancy 2019    Systemic lupus erythematosus     Thrombocytopenia       Past Surgical History:   Procedure Laterality Date     SECTION WITH TUBAL LIGATION N/A 2019    Procedure:  SECTION, WITH TUBAL LIGATION;  Surgeon: VREONICA Valdovinos MD;  Location: [facility] L&D;  Service: OB/GYN;  Laterality: N/A;     SECTION, LOW TRANSVERSE      x2    LYMPH NODE BIOPSY Right 2023    Procedure: BIOPSY, LYMPH NODE;  Surgeon: Rivera Sandoval MD;  Location: [facility] OR;  Service: ENT;  Laterality: Right;  right deep cervial lymph node excision    NECK MASS EXCISION Right 2023    Procedure: EXCISION, MASS, NECK;  Surgeon: Rivera Sandoval MD;  Location: [facility] OR;  Service: ENT;  Laterality: Right;  right deep cervial lymph node excision    RENAL BIOPSY  2013     Social History     Socioeconomic History    Marital status: Single    Number of children: 2   Occupational History     Comment: [employer]   Tobacco Use    Smoking status: Never     Passive exposure: Never    Smokeless tobacco: Never   Substance and Sexual Activity    Alcohol use: No     Alcohol/week: 0.0 standard drinks    Drug use: Yes     Types: Marijuana    Sexual activity: Not Currently     Partners: Male     Birth control/protection: None   Other Topics Concern    Are you pregnant or think you may be? No    Breast-feeding No   Social History Narrative    The patient is single and lives with her significant other.  She has 3 children.  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     Social Determinants of Health     Financial Resource Strain: Low Risk     Difficulty of Paying Living Expenses: Not hard at all   Food Insecurity: No Food Insecurity    Worried About Running Out of Food in the Last Year: Never true    Ran Out of Food in the Last Year: Never true   Transportation Needs: No Transportation Needs    Lack of Transportation  (Medical): No    Lack of Transportation (Non-Medical): No   Physical Activity: Inactive    Days of Exercise per Week: 0 days    Minutes of Exercise per Session: 0 min   Stress: No Stress Concern Present    Feeling of Stress : Not at all   Social Connections: Socially Isolated    Frequency of Communication with Friends and Family: More than three times a week    Frequency of Social Gatherings with Friends and Family: More than three times a week    Attends Yarsanism Services: Never    Active Member of Clubs or Organizations: No    Attends Club or Organization Meetings: Never    Marital Status: Never    Housing Stability: Unknown    Unable to Pay for Housing in the Last Year: No    Unstable Housing in the Last Year: No      Family History   Problem Relation Age of Onset    Hypertension Mother     Eczema Brother     Hypertension Maternal Grandmother     Diabetes Paternal Grandmother     Heart disease Son     Arrhythmia Son         CHB    Stroke Neg Hx     Cancer Neg Hx       Review of patient's allergies indicates:  No Known Allergies   Review of Systems   Constitutional: Negative.  Negative for activity change, chills, fatigue and fever.   HENT: Negative.     Eyes: Negative.    Respiratory:  Negative for cough and shortness of breath.    Cardiovascular:  Negative for chest pain and leg swelling.   Gastrointestinal:  Negative for constipation, diarrhea, nausea and vomiting.   Endocrine: Negative.    Genitourinary: Negative.    Musculoskeletal:  Negative for arthralgias and myalgias.   Integumentary:  Negative.   Allergic/Immunologic: Negative.    Neurological:  Negative for light-headedness, numbness and headaches.   Hematological: Negative.    Psychiatric/Behavioral: Negative.         Objective:        Vitals:    06/15/23 0904   BP: (!) 143/101   Pulse: 94   Resp: 20   Temp: 97.7 °F (36.5 °C)          Physical Exam  Constitutional:       General: She is not in acute distress.     Appearance: She is  well-developed. She is not ill-appearing.   HENT:      Head: Normocephalic and atraumatic.      Mouth/Throat:      Mouth: Mucous membranes are moist.   Eyes:      Extraocular Movements: Extraocular movements intact.      Conjunctiva/sclera: Conjunctivae normal.   Cardiovascular:      Rate and Rhythm: Normal rate and regular rhythm.      Heart sounds: Normal heart sounds.   Pulmonary:      Effort: Pulmonary effort is normal. No respiratory distress.      Breath sounds: Normal breath sounds. No wheezing.   Abdominal:      General: Bowel sounds are normal. There is no distension.      Palpations: Abdomen is soft.      Tenderness: There is no abdominal tenderness.   Musculoskeletal:         General: Normal range of motion.      Cervical back: Normal range of motion and neck supple.   Skin:     General: Skin is warm.   Neurological:      General: No focal deficit present.      Mental Status: She is alert and oriented to person, place, and time. Mental status is at baseline.      Cranial Nerves: No cranial nerve deficit.   Psychiatric:         Mood and Affect: Mood normal.         LABS / IMAGING      - 01/10/2023 CT NECK: Extensive lymphadenopathy in the right greater than left neck. Abnormal lymphadenopathy is noted throughout the soft tissues of the neck, right greater than left, with the largest nodes in the right supraclavicular region measuring up to 3 x 2.1 cm in size (axial 111, series 2).  Both benign and malignant etiologies are in the differential, recommend correlation with clinical presentation for infectious etiology.  Consider biopsy to rule out lymphoma.    - 02/17/2023 Right deep cervical lymph node, excisional biopsy: Follicular hyperplasia.  No evidence of lymphoma or metastatic carcinoma.           Assessment:           LYMPHADENOPATHY  - noted lymphadenopathy on the CT scan of the neck from January of 2023.  - 02/17/2023 Right deep cervical lymph node, excisional biopsy: Follicular hyperplasia.  No  evidence of lymphoma or metastatic carcinoma.    ANEMIA  - chronic anemia since 2006 and recent history of worsening in April of 2023 with hemoglobin trending down to 7 from 10.5, picture consistent with hemolysis with haptoglobin less than 10,  in the setting of lupus flare.  Also noted to have iron-deficiency and was started on ferrous sulfate for that.  Patient did not require any blood transfusion in the hospital and was started on treatment for lupus flare initially with Solu-Medrol and then continued on Plaquenil  - iron saturation of 17, folate 7.7, B12 716, ferritin 397  - Path smear with macrocytic anemia with polychromasia and rare nucleated red cells.  Consider reticulocytosis as a cause of the high MCV.  No additional morphologic abnormalities     Plan:       - patient with worsening anemia in the setting of lupus flare due to being noncompliant with her medications and iron deficiency, started on treatment for lupus flare and is getting plaquenil, prednisone and now the plan is to start rituximab next week. Hemoglobin has improved since being started on treatment and now being stable, thus will continue to monitor while being treated with prednisone and rituximab.   - continue with ferrous sulfate but noted to have further worsening of iron level, thus will treat with IV iron sucrose 300 mg for 4 doses   - CT chest abdomen and pelvis pending, ordered by primary team   - MD / LABS VISIT - 8 WEEKS         Med Onc Chart Routing      Follow up with physician 2 months.   Follow up with GUNJAN    Infusion scheduling note   IV iron sucrose 300 mg for 4 doses   Injection scheduling note    Labs CBC and LDH   Scheduling:  Preferred lab: haptoglobin, LDH  Lab interval:     Imaging    Pharmacy appointment    Other referrals              The patient was seen, interviewed and examined. Pertinent lab and radiology studies were reviewed. Pt instructed to call should develop concerning signs/symptoms or have  further questions.       Portions of the record may have been created with voice recognition software. Occasional wrong-word or sound-a-like substitutions may have occurred due to the inherent limitations of voice recognition software. Read the chart carefully and recognize, using context, where substitutions have occurred.    Michelle Lynne MD  Hematology / Oncology

## 2023-06-15 ENCOUNTER — LAB VISIT (OUTPATIENT)
Dept: LAB | Facility: HOSPITAL | Age: 33
End: 2023-06-15
Attending: INTERNAL MEDICINE
Payer: MEDICAID

## 2023-06-15 ENCOUNTER — OFFICE VISIT (OUTPATIENT)
Dept: HEMATOLOGY/ONCOLOGY | Facility: CLINIC | Age: 33
End: 2023-06-15
Payer: MEDICAID

## 2023-06-15 ENCOUNTER — TELEPHONE (OUTPATIENT)
Dept: RHEUMATOLOGY | Facility: CLINIC | Age: 33
End: 2023-06-15
Payer: MEDICAID

## 2023-06-15 VITALS
DIASTOLIC BLOOD PRESSURE: 101 MMHG | RESPIRATION RATE: 20 BRPM | TEMPERATURE: 98 F | WEIGHT: 179.88 LBS | BODY MASS INDEX: 29.97 KG/M2 | HEART RATE: 94 BPM | SYSTOLIC BLOOD PRESSURE: 143 MMHG | OXYGEN SATURATION: 98 % | HEIGHT: 65 IN

## 2023-06-15 DIAGNOSIS — R59.9 LYMPHOID HYPERPLASIA: ICD-10-CM

## 2023-06-15 DIAGNOSIS — D50.9 IRON DEFICIENCY ANEMIA, UNSPECIFIED IRON DEFICIENCY ANEMIA TYPE: Primary | ICD-10-CM

## 2023-06-15 DIAGNOSIS — M35.00 SJOGREN'S SYNDROME, WITH UNSPECIFIED ORGAN INVOLVEMENT: ICD-10-CM

## 2023-06-15 DIAGNOSIS — D50.9 IRON DEFICIENCY ANEMIA, UNSPECIFIED IRON DEFICIENCY ANEMIA TYPE: ICD-10-CM

## 2023-06-15 LAB
ALBUMIN SERPL BCP-MCNC: 2.8 G/DL (ref 3.5–5.2)
ALP SERPL-CCNC: 64 U/L (ref 55–135)
ALT SERPL W/O P-5'-P-CCNC: 12 U/L (ref 10–44)
ANION GAP SERPL CALC-SCNC: 9 MMOL/L (ref 8–16)
AST SERPL-CCNC: 23 U/L (ref 10–40)
BASOPHILS # BLD AUTO: 0.01 K/UL (ref 0–0.2)
BASOPHILS # BLD AUTO: 0.01 K/UL (ref 0–0.2)
BASOPHILS NFR BLD: 0.3 % (ref 0–1.9)
BASOPHILS NFR BLD: 0.3 % (ref 0–1.9)
BILIRUB SERPL-MCNC: 0.3 MG/DL (ref 0.1–1)
BUN SERPL-MCNC: 17 MG/DL (ref 6–20)
CALCIUM SERPL-MCNC: 8.5 MG/DL (ref 8.7–10.5)
CHLORIDE SERPL-SCNC: 108 MMOL/L (ref 95–110)
CO2 SERPL-SCNC: 21 MMOL/L (ref 23–29)
CREAT SERPL-MCNC: 0.7 MG/DL (ref 0.5–1.4)
DAT IGG-SP REAG RBC-IMP: NORMAL
DIFFERENTIAL METHOD: ABNORMAL
DIFFERENTIAL METHOD: ABNORMAL
EOSINOPHIL # BLD AUTO: 0.1 K/UL (ref 0–0.5)
EOSINOPHIL # BLD AUTO: 0.1 K/UL (ref 0–0.5)
EOSINOPHIL NFR BLD: 2.8 % (ref 0–8)
EOSINOPHIL NFR BLD: 2.8 % (ref 0–8)
ERYTHROCYTE [DISTWIDTH] IN BLOOD BY AUTOMATED COUNT: 17 % (ref 11.5–14.5)
ERYTHROCYTE [DISTWIDTH] IN BLOOD BY AUTOMATED COUNT: 17 % (ref 11.5–14.5)
EST. GFR  (NO RACE VARIABLE): >60 ML/MIN/1.73 M^2
FERRITIN SERPL-MCNC: 26 NG/ML (ref 20–300)
FOLATE SERPL-MCNC: 6.8 NG/ML (ref 4–24)
GLUCOSE SERPL-MCNC: 83 MG/DL (ref 70–110)
HAPTOGLOB SERPL-MCNC: 47 MG/DL (ref 30–250)
HCT VFR BLD AUTO: 26 % (ref 37–48.5)
HCT VFR BLD AUTO: 26 % (ref 37–48.5)
HGB BLD-MCNC: 9.5 G/DL (ref 12–16)
HGB BLD-MCNC: 9.5 G/DL (ref 12–16)
IMM GRANULOCYTES # BLD AUTO: 0.01 K/UL (ref 0–0.04)
IMM GRANULOCYTES # BLD AUTO: 0.01 K/UL (ref 0–0.04)
IMM GRANULOCYTES NFR BLD AUTO: 0.3 % (ref 0–0.5)
IMM GRANULOCYTES NFR BLD AUTO: 0.3 % (ref 0–0.5)
IRON SERPL-MCNC: 46 UG/DL (ref 30–160)
LDH SERPL L TO P-CCNC: 225 U/L (ref 110–260)
LYMPHOCYTES # BLD AUTO: 1.1 K/UL (ref 1–4.8)
LYMPHOCYTES # BLD AUTO: 1.1 K/UL (ref 1–4.8)
LYMPHOCYTES NFR BLD: 27.7 % (ref 18–48)
LYMPHOCYTES NFR BLD: 27.7 % (ref 18–48)
MCH RBC QN AUTO: 34.7 PG (ref 27–31)
MCH RBC QN AUTO: 34.7 PG (ref 27–31)
MCHC RBC AUTO-ENTMCNC: 36.5 G/DL (ref 32–36)
MCHC RBC AUTO-ENTMCNC: 36.5 G/DL (ref 32–36)
MCV RBC AUTO: 95 FL (ref 82–98)
MCV RBC AUTO: 95 FL (ref 82–98)
MONOCYTES # BLD AUTO: 0.5 K/UL (ref 0.3–1)
MONOCYTES # BLD AUTO: 0.5 K/UL (ref 0.3–1)
MONOCYTES NFR BLD: 13.1 % (ref 4–15)
MONOCYTES NFR BLD: 13.1 % (ref 4–15)
NEUTROPHILS # BLD AUTO: 2.2 K/UL (ref 1.8–7.7)
NEUTROPHILS # BLD AUTO: 2.2 K/UL (ref 1.8–7.7)
NEUTROPHILS NFR BLD: 55.8 % (ref 38–73)
NEUTROPHILS NFR BLD: 55.8 % (ref 38–73)
NRBC BLD-RTO: 0 /100 WBC
NRBC BLD-RTO: 0 /100 WBC
PATH REV BLD -IMP: NORMAL
PLATELET # BLD AUTO: 200 K/UL (ref 150–450)
PLATELET # BLD AUTO: 200 K/UL (ref 150–450)
PMV BLD AUTO: 9.9 FL (ref 9.2–12.9)
PMV BLD AUTO: 9.9 FL (ref 9.2–12.9)
POTASSIUM SERPL-SCNC: 3.6 MMOL/L (ref 3.5–5.1)
PROT SERPL-MCNC: 6 G/DL (ref 6–8.4)
RBC # BLD AUTO: 2.74 M/UL (ref 4–5.4)
RBC # BLD AUTO: 2.74 M/UL (ref 4–5.4)
RETICS/RBC NFR AUTO: 2.6 % (ref 0.5–2.5)
SATURATED IRON: 13 % (ref 20–50)
SODIUM SERPL-SCNC: 138 MMOL/L (ref 136–145)
TOTAL IRON BINDING CAPACITY: 348 UG/DL (ref 250–450)
TRANSFERRIN SERPL-MCNC: 235 MG/DL (ref 200–375)
WBC # BLD AUTO: 3.9 K/UL (ref 3.9–12.7)
WBC # BLD AUTO: 3.9 K/UL (ref 3.9–12.7)

## 2023-06-15 PROCEDURE — 3008F BODY MASS INDEX DOCD: CPT | Mod: CPTII,,, | Performed by: INTERNAL MEDICINE

## 2023-06-15 PROCEDURE — 82728 ASSAY OF FERRITIN: CPT | Performed by: INTERNAL MEDICINE

## 2023-06-15 PROCEDURE — 82746 ASSAY OF FOLIC ACID SERUM: CPT | Performed by: INTERNAL MEDICINE

## 2023-06-15 PROCEDURE — 36415 COLL VENOUS BLD VENIPUNCTURE: CPT | Performed by: INTERNAL MEDICINE

## 2023-06-15 PROCEDURE — 85060 PATHOLOGIST REVIEW: ICD-10-PCS | Mod: ,,, | Performed by: PATHOLOGY

## 2023-06-15 PROCEDURE — 85025 COMPLETE CBC W/AUTO DIFF WBC: CPT | Performed by: INTERNAL MEDICINE

## 2023-06-15 PROCEDURE — 80053 COMPREHEN METABOLIC PANEL: CPT | Performed by: INTERNAL MEDICINE

## 2023-06-15 PROCEDURE — 1159F PR MEDICATION LIST DOCUMENTED IN MEDICAL RECORD: ICD-10-PCS | Mod: CPTII,,, | Performed by: INTERNAL MEDICINE

## 2023-06-15 PROCEDURE — 85060 BLOOD SMEAR INTERPRETATION: CPT | Mod: ,,, | Performed by: PATHOLOGY

## 2023-06-15 PROCEDURE — 83615 LACTATE (LD) (LDH) ENZYME: CPT | Performed by: INTERNAL MEDICINE

## 2023-06-15 PROCEDURE — 99999 PR PBB SHADOW E&M-EST. PATIENT-LVL IV: CPT | Mod: PBBFAC,,, | Performed by: INTERNAL MEDICINE

## 2023-06-15 PROCEDURE — 3077F SYST BP >= 140 MM HG: CPT | Mod: CPTII,,, | Performed by: INTERNAL MEDICINE

## 2023-06-15 PROCEDURE — 99214 OFFICE O/P EST MOD 30 MIN: CPT | Mod: S$PBB,,, | Performed by: INTERNAL MEDICINE

## 2023-06-15 PROCEDURE — 3077F PR MOST RECENT SYSTOLIC BLOOD PRESSURE >= 140 MM HG: ICD-10-PCS | Mod: CPTII,,, | Performed by: INTERNAL MEDICINE

## 2023-06-15 PROCEDURE — 3080F PR MOST RECENT DIASTOLIC BLOOD PRESSURE >= 90 MM HG: ICD-10-PCS | Mod: CPTII,,, | Performed by: INTERNAL MEDICINE

## 2023-06-15 PROCEDURE — 85045 AUTOMATED RETICULOCYTE COUNT: CPT | Performed by: INTERNAL MEDICINE

## 2023-06-15 PROCEDURE — 99214 PR OFFICE/OUTPT VISIT, EST, LEVL IV, 30-39 MIN: ICD-10-PCS | Mod: S$PBB,,, | Performed by: INTERNAL MEDICINE

## 2023-06-15 PROCEDURE — 99999 PR PBB SHADOW E&M-EST. PATIENT-LVL IV: ICD-10-PCS | Mod: PBBFAC,,, | Performed by: INTERNAL MEDICINE

## 2023-06-15 PROCEDURE — 86880 COOMBS TEST DIRECT: CPT | Performed by: INTERNAL MEDICINE

## 2023-06-15 PROCEDURE — 3008F PR BODY MASS INDEX (BMI) DOCUMENTED: ICD-10-PCS | Mod: CPTII,,, | Performed by: INTERNAL MEDICINE

## 2023-06-15 PROCEDURE — 3080F DIAST BP >= 90 MM HG: CPT | Mod: CPTII,,, | Performed by: INTERNAL MEDICINE

## 2023-06-15 PROCEDURE — 99214 OFFICE O/P EST MOD 30 MIN: CPT | Mod: PBBFAC | Performed by: INTERNAL MEDICINE

## 2023-06-15 PROCEDURE — 1159F MED LIST DOCD IN RCRD: CPT | Mod: CPTII,,, | Performed by: INTERNAL MEDICINE

## 2023-06-15 PROCEDURE — 84466 ASSAY OF TRANSFERRIN: CPT | Performed by: INTERNAL MEDICINE

## 2023-06-15 PROCEDURE — 83010 ASSAY OF HAPTOGLOBIN QUANT: CPT | Performed by: INTERNAL MEDICINE

## 2023-06-15 RX ORDER — SODIUM CHLORIDE 0.9 % (FLUSH) 0.9 %
10 SYRINGE (ML) INJECTION
Status: CANCELLED | OUTPATIENT
Start: 2023-07-24

## 2023-06-15 RX ORDER — HEPARIN 100 UNIT/ML
500 SYRINGE INTRAVENOUS
Status: CANCELLED | OUTPATIENT
Start: 2023-07-24

## 2023-06-15 RX ORDER — ACETAMINOPHEN 325 MG/1
650 TABLET ORAL
Status: CANCELLED | OUTPATIENT
Start: 2023-06-15

## 2023-06-15 RX ORDER — HEPARIN 100 UNIT/ML
500 SYRINGE INTRAVENOUS
Status: CANCELLED | OUTPATIENT
Start: 2023-07-17

## 2023-06-15 RX ORDER — SODIUM CHLORIDE 0.9 % (FLUSH) 0.9 %
10 SYRINGE (ML) INJECTION
Status: CANCELLED | OUTPATIENT
Start: 2023-06-15

## 2023-06-15 RX ORDER — FAMOTIDINE 10 MG/ML
20 INJECTION INTRAVENOUS
Status: CANCELLED | OUTPATIENT
Start: 2023-06-15

## 2023-06-15 RX ORDER — HEPARIN 100 UNIT/ML
500 SYRINGE INTRAVENOUS
Status: CANCELLED | OUTPATIENT
Start: 2023-07-31

## 2023-06-15 RX ORDER — HEPARIN 100 UNIT/ML
500 SYRINGE INTRAVENOUS
Status: CANCELLED | OUTPATIENT
Start: 2023-07-10

## 2023-06-15 RX ORDER — SODIUM CHLORIDE 0.9 % (FLUSH) 0.9 %
10 SYRINGE (ML) INJECTION
Status: CANCELLED | OUTPATIENT
Start: 2023-07-17

## 2023-06-15 RX ORDER — SODIUM CHLORIDE 0.9 % (FLUSH) 0.9 %
10 SYRINGE (ML) INJECTION
Status: CANCELLED | OUTPATIENT
Start: 2023-07-31

## 2023-06-15 RX ORDER — HEPARIN 100 UNIT/ML
500 SYRINGE INTRAVENOUS
Status: CANCELLED | OUTPATIENT
Start: 2023-06-15

## 2023-06-15 RX ORDER — FOLIC ACID 1 MG/1
1 TABLET ORAL DAILY
Qty: 100 TABLET | Refills: 3 | Status: SHIPPED | OUTPATIENT
Start: 2023-06-15 | End: 2023-07-08

## 2023-06-15 RX ORDER — SODIUM CHLORIDE 0.9 % (FLUSH) 0.9 %
10 SYRINGE (ML) INJECTION
Status: CANCELLED | OUTPATIENT
Start: 2023-07-10

## 2023-06-15 NOTE — TELEPHONE ENCOUNTER
----- Message from Irish Carter RN sent at 6/15/2023  9:50 AM CDT -----  Regarding: rituxan orders  Please review and sign rituxan orders for Elisha  thanks

## 2023-06-16 LAB — PATH REV BLD -IMP: NORMAL

## 2023-06-21 ENCOUNTER — TELEPHONE (OUTPATIENT)
Dept: INFUSION THERAPY | Facility: HOSPITAL | Age: 33
End: 2023-06-21
Payer: MEDICAID

## 2023-06-21 NOTE — TELEPHONE ENCOUNTER
Notified Ms. Vines that her Rituxan was denied and that Dr. MADISON's office should be calling to discuss what will be done further regarding her treatment. Ms. Vines stated understanding. Infusion notified.

## 2023-06-26 DIAGNOSIS — Z79.899 HIGH RISK MEDICATION USE: Primary | ICD-10-CM

## 2023-06-27 ENCOUNTER — INFUSION (OUTPATIENT)
Dept: INFUSION THERAPY | Facility: HOSPITAL | Age: 33
End: 2023-06-27
Attending: INTERNAL MEDICINE
Payer: MEDICAID

## 2023-06-27 VITALS
WEIGHT: 172.38 LBS | BODY MASS INDEX: 28.72 KG/M2 | HEIGHT: 65 IN | OXYGEN SATURATION: 100 % | SYSTOLIC BLOOD PRESSURE: 136 MMHG | TEMPERATURE: 98 F | DIASTOLIC BLOOD PRESSURE: 94 MMHG | HEART RATE: 82 BPM | RESPIRATION RATE: 16 BRPM

## 2023-06-27 DIAGNOSIS — M32.9 SYSTEMIC LUPUS ERYTHEMATOSUS ARTHRITIS: ICD-10-CM

## 2023-06-27 DIAGNOSIS — M32.9 SLE (SYSTEMIC LUPUS ERYTHEMATOSUS RELATED SYNDROME): Primary | ICD-10-CM

## 2023-06-27 PROCEDURE — 96365 THER/PROPH/DIAG IV INF INIT: CPT

## 2023-06-27 PROCEDURE — 25000003 PHARM REV CODE 250: Performed by: INTERNAL MEDICINE

## 2023-06-27 PROCEDURE — 63600175 PHARM REV CODE 636 W HCPCS: Performed by: INTERNAL MEDICINE

## 2023-06-27 PROCEDURE — 96375 TX/PRO/DX INJ NEW DRUG ADDON: CPT

## 2023-06-27 PROCEDURE — 96366 THER/PROPH/DIAG IV INF ADDON: CPT

## 2023-06-27 RX ORDER — ACETAMINOPHEN 325 MG/1
650 TABLET ORAL
Status: CANCELLED | OUTPATIENT
Start: 2023-07-11

## 2023-06-27 RX ORDER — METHYLPREDNISOLONE SOD SUCC 125 MG
100 VIAL (EA) INJECTION
Status: CANCELLED
Start: 2023-07-11

## 2023-06-27 RX ORDER — FAMOTIDINE 10 MG/ML
20 INJECTION INTRAVENOUS
Status: CANCELLED | OUTPATIENT
Start: 2023-07-11

## 2023-06-27 RX ORDER — ACETAMINOPHEN 325 MG/1
650 TABLET ORAL
Status: COMPLETED | OUTPATIENT
Start: 2023-06-27 | End: 2023-06-27

## 2023-06-27 RX ORDER — DIPHENHYDRAMINE HYDROCHLORIDE 50 MG/ML
25 INJECTION INTRAMUSCULAR; INTRAVENOUS
Status: COMPLETED | OUTPATIENT
Start: 2023-06-27 | End: 2023-06-27

## 2023-06-27 RX ORDER — HEPARIN 100 UNIT/ML
500 SYRINGE INTRAVENOUS
Status: CANCELLED | OUTPATIENT
Start: 2023-07-11

## 2023-06-27 RX ORDER — SODIUM CHLORIDE 0.9 % (FLUSH) 0.9 %
10 SYRINGE (ML) INJECTION
Status: CANCELLED | OUTPATIENT
Start: 2023-07-11

## 2023-06-27 RX ORDER — DIPHENHYDRAMINE HYDROCHLORIDE 50 MG/ML
25 INJECTION INTRAMUSCULAR; INTRAVENOUS
Status: CANCELLED
Start: 2023-07-11

## 2023-06-27 RX ORDER — METHYLPREDNISOLONE SOD SUCC 125 MG
100 VIAL (EA) INJECTION
Status: COMPLETED | OUTPATIENT
Start: 2023-06-27 | End: 2023-06-27

## 2023-06-27 RX ORDER — FAMOTIDINE 10 MG/ML
20 INJECTION INTRAVENOUS
Status: COMPLETED | OUTPATIENT
Start: 2023-06-27 | End: 2023-06-27

## 2023-06-27 RX ADMIN — SODIUM CHLORIDE 1000 MG: 9 INJECTION, SOLUTION INTRAVENOUS at 11:06

## 2023-06-27 RX ADMIN — DIPHENHYDRAMINE HYDROCHLORIDE 25 MG: 50 INJECTION INTRAMUSCULAR; INTRAVENOUS at 11:06

## 2023-06-27 RX ADMIN — FAMOTIDINE 20 MG: 10 INJECTION INTRAVENOUS at 11:06

## 2023-06-27 RX ADMIN — ACETAMINOPHEN 650 MG: 325 TABLET ORAL at 11:06

## 2023-06-27 RX ADMIN — METHYLPREDNISOLONE SODIUM SUCCINATE 100 MG: 125 INJECTION, POWDER, FOR SOLUTION INTRAMUSCULAR; INTRAVENOUS at 11:06

## 2023-06-27 NOTE — PLAN OF CARE
Problem: Adult Inpatient Plan of Care  Goal: Plan of Care Review  Outcome: Ongoing, Progressing  Flowsheets (Taken 6/27/2023 1419)  Plan of Care Reviewed With: patient  Goal: Patient-Specific Goal (Individualized)  Outcome: Ongoing, Progressing  Flowsheets (Taken 6/27/2023 1419)  Anxieties, Fears or Concerns: denies  Individualized Care Needs:   Reclined in chair with feet elevated   warm blanket/pillow   snack/drink provided  Goal: Optimal Comfort and Wellbeing  Outcome: Ongoing, Progressing  Intervention: Monitor Pain and Promote Comfort  Flowsheets (Taken 6/27/2023 1419)  Pain Management Interventions:   quiet environment facilitated   warm blanket provided   relaxation techniques promoted   pillow support provided  Intervention: Provide Person-Centered Care  Flowsheets (Taken 6/27/2023 1419)  Trust Relationship/Rapport:   care explained   reassurance provided   choices provided   thoughts/feelings acknowledged   emotional support provided   empathic listening provided   questions answered   questions encouraged     Problem: Infection  Goal: Absence of Infection Signs and Symptoms  Outcome: Ongoing, Progressing  Intervention: Prevent or Manage Infection  Flowsheets (Taken 6/27/2023 1419)  Infection Management: aseptic technique maintained  Isolation Precautions: contact

## 2023-06-27 NOTE — NURSING
Infusion # Ruxience - 1,000 mg q 2weeks   Receivin of 2    Any:  -recent illness, infection, or antibiotic use in past week- denies  -open wounds or mouth sores- denies  -invasive procedures or surgeries in past 4 weeks or in upcoming 4 weeks- denies  -vaccinations in past week- denies  -any new symptoms/change in symptoms-denies  -chance you may be pregnant- denies      Recent labs? Hcg - negative: 2023  Last Rheumatology provider visit- Seen by ARLEN Owen on 2023     Premeds-Tylenol 650 mg PO; Benadryl 25mg IVP; Pepcid 20 mg IVP; Solumedrol 100 mg IVP     Ruxience 1,000 mg administered IV at a rate of 50ml/hr increased 50ml/hr every 30 minutese till max rate of 400ml/hr per orders; see MAR and vitals for more details. Tolerated well without adverse events, discharged and ambulatory out of clinic. To return to clinic for Ruxience 2 of 2 on 2023.

## 2023-06-30 ENCOUNTER — TELEPHONE (OUTPATIENT)
Dept: PULMONOLOGY | Facility: CLINIC | Age: 33
End: 2023-06-30
Payer: MEDICAID

## 2023-06-30 NOTE — TELEPHONE ENCOUNTER
Spoke to VENTURA about pts auth for CT need clinical notes or a peer to peer [phone] fax number [phone] Case #[id number] 5 days from yesterday to complete

## 2023-06-30 NOTE — TELEPHONE ENCOUNTER
----- Message from Amelia Arora sent at 6/30/2023  8:05 AM CDT -----  Contact: Luana/ Yuliana Bridges is calling to speak with the nurse regarding orders. Reports calling to give determination. Please give Luana a call back at [phone] case# [id number].

## 2023-07-01 ENCOUNTER — NURSE TRIAGE (OUTPATIENT)
Dept: ADMINISTRATIVE | Facility: CLINIC | Age: 33
End: 2023-07-01
Payer: MEDICAID

## 2023-07-02 NOTE — TELEPHONE ENCOUNTER
Caller states that she is unable to eat much due to lack of appetite s/p Ruxience injection x 3 days ago. Pt states she is able to take a few bites and is able to drink fluids.  Pt advised per protocol and verbalized understanding.   Reason for Disposition   Nursing judgment or information in reference    Additional Information   Negative: Nursing judgment, per information in Reference   Negative: Nursing judgment or information in reference   Negative: Nursing judgment or information in reference   Negative: Nursing judgment or information in reference   Negative: Nursing judgment or information in reference   Negative: Nursing judgment or information in reference   Negative: Nursing judgment or information in reference   Negative: Nursing judgment or information in reference   Negative: Nursing judgment or information in reference   Negative: Nursing judgment or information in reference   Negative: Nursing judgment or information in reference    Protocols used: No Guideline [id number]

## 2023-07-04 ENCOUNTER — HOSPITAL ENCOUNTER (EMERGENCY)
Facility: HOSPITAL | Age: 33
Discharge: HOME | End: 2023-07-04
Attending: EMERGENCY MEDICINE
Payer: MEDICAID

## 2023-07-04 VITALS
SYSTOLIC BLOOD PRESSURE: 106 MMHG | HEIGHT: 65 IN | BODY MASS INDEX: 28.06 KG/M2 | TEMPERATURE: 98 F | DIASTOLIC BLOOD PRESSURE: 69 MMHG | OXYGEN SATURATION: 100 % | HEART RATE: 75 BPM | WEIGHT: 168.44 LBS | RESPIRATION RATE: 16 BRPM

## 2023-07-04 DIAGNOSIS — F19.10 POLYSUBSTANCE ABUSE: ICD-10-CM

## 2023-07-04 DIAGNOSIS — M25.50 ARTHRALGIA, UNSPECIFIED JOINT: ICD-10-CM

## 2023-07-04 DIAGNOSIS — T88.7XXA MEDICATION SIDE EFFECT: ICD-10-CM

## 2023-07-04 DIAGNOSIS — D69.6 THROMBOCYTOPENIA: ICD-10-CM

## 2023-07-04 DIAGNOSIS — F14.10 COCAINE ABUSE: ICD-10-CM

## 2023-07-04 DIAGNOSIS — D64.9 ACUTE ON CHRONIC ANEMIA: Primary | ICD-10-CM

## 2023-07-04 LAB
ABO GROUP BLD: NORMAL
ALBUMIN SERPL BCP-MCNC: 2 G/DL (ref 3.5–5.2)
ALP SERPL-CCNC: 67 U/L (ref 55–135)
ALT SERPL W/O P-5'-P-CCNC: 12 U/L (ref 10–44)
AMPHET+METHAMPHET UR QL: NEGATIVE
ANION GAP SERPL CALC-SCNC: 11 MMOL/L (ref 8–16)
ANISOCYTOSIS BLD QL SMEAR: SLIGHT
AST SERPL-CCNC: 56 U/L (ref 10–40)
B-HCG UR QL: NEGATIVE
BACTERIA #/AREA URNS HPF: ABNORMAL /HPF
BARBITURATES UR QL SCN>200 NG/ML: NEGATIVE
BASOPHILS # BLD AUTO: 0.02 K/UL (ref 0–0.2)
BASOPHILS NFR BLD: 0.3 % (ref 0–1.9)
BENZODIAZ UR QL SCN>200 NG/ML: NEGATIVE
BILIRUB SERPL-MCNC: 1 MG/DL (ref 0.1–1)
BILIRUB UR QL STRIP: ABNORMAL
BLD GP AB SCN CELLS X3 SERPL QL: ABNORMAL
BLD PROD TYP BPU: NORMAL
BLOOD UNIT EXPIRATION DATE: NORMAL
BLOOD UNIT TYPE CODE: 9500
BLOOD UNIT TYPE: NORMAL
BUN SERPL-MCNC: 7 MG/DL (ref 6–20)
BZE UR QL SCN: ABNORMAL
CALCIUM SERPL-MCNC: 7.6 MG/DL (ref 8.7–10.5)
CANNABINOIDS UR QL SCN: ABNORMAL
CHLORIDE SERPL-SCNC: 108 MMOL/L (ref 95–110)
CLARITY UR: CLEAR
CO2 SERPL-SCNC: 21 MMOL/L (ref 23–29)
CODING SYSTEM: NORMAL
COLOR UR: YELLOW
CREAT SERPL-MCNC: 0.6 MG/DL (ref 0.5–1.4)
CREAT UR-MCNC: 224.7 MG/DL (ref 15–325)
CROSSMATCH INTERPRETATION: NORMAL
DACRYOCYTES BLD QL SMEAR: ABNORMAL
DAT IGG-SP REAG RBC-IMP: NORMAL
DIFFERENTIAL METHOD: ABNORMAL
DISPENSE STATUS: NORMAL
EOSINOPHIL # BLD AUTO: 0.1 K/UL (ref 0–0.5)
EOSINOPHIL NFR BLD: 0.7 % (ref 0–8)
ERYTHROCYTE [DISTWIDTH] IN BLOOD BY AUTOMATED COUNT: ABNORMAL % (ref 11.5–14.5)
EST. GFR  (NO RACE VARIABLE): >60 ML/MIN/1.73 M^2
GLUCOSE SERPL-MCNC: 92 MG/DL (ref 70–110)
GLUCOSE UR QL STRIP: NEGATIVE
HCT VFR BLD AUTO: 10 % (ref 37–48.5)
HGB BLD-MCNC: 6.7 G/DL (ref 12–16)
HGB UR QL STRIP: ABNORMAL
HYALINE CASTS #/AREA URNS LPF: 0 /LPF
IMM GRANULOCYTES # BLD AUTO: 0.31 K/UL (ref 0–0.04)
IMM GRANULOCYTES NFR BLD AUTO: 4.2 % (ref 0–0.5)
KETONES UR QL STRIP: NEGATIVE
LEUKOCYTE ESTERASE UR QL STRIP: NEGATIVE
LYMPHOCYTES # BLD AUTO: 1.3 K/UL (ref 1–4.8)
LYMPHOCYTES NFR BLD: 16.9 % (ref 18–48)
MCH RBC QN AUTO: ABNORMAL PG (ref 27–31)
MCHC RBC AUTO-ENTMCNC: ABNORMAL G/DL (ref 32–36)
MCV RBC AUTO: 118 FL (ref 82–98)
METHADONE UR QL SCN>300 NG/ML: NEGATIVE
MICROSCOPIC COMMENT: ABNORMAL
MONOCYTES # BLD AUTO: 0.4 K/UL (ref 0.3–1)
MONOCYTES NFR BLD: 4.8 % (ref 4–15)
NEUTROPHILS # BLD AUTO: 5.5 K/UL (ref 1.8–7.7)
NEUTROPHILS NFR BLD: 73.1 % (ref 38–73)
NITRITE UR QL STRIP: NEGATIVE
NRBC BLD-RTO: 8 /100 WBC
NUM UNITS TRANS PACKED RBC: NORMAL
OPIATES UR QL SCN: ABNORMAL
PCP UR QL SCN>25 NG/ML: NEGATIVE
PH UR STRIP: 7 [PH] (ref 5–8)
PLATELET # BLD AUTO: 17 K/UL (ref 150–450)
PLATELET BLD QL SMEAR: ABNORMAL
PMV BLD AUTO: ABNORMAL FL (ref 9.2–12.9)
POLYCHROMASIA BLD QL SMEAR: ABNORMAL
POTASSIUM SERPL-SCNC: 3.7 MMOL/L (ref 3.5–5.1)
PROT SERPL-MCNC: 5.5 G/DL (ref 6–8.4)
PROT UR QL STRIP: ABNORMAL
RBC # BLD AUTO: 0.85 M/UL (ref 4–5.4)
RBC #/AREA URNS HPF: 10 /HPF (ref 0–4)
RH BLD: NORMAL
SODIUM SERPL-SCNC: 140 MMOL/L (ref 136–145)
SP GR UR STRIP: 1.03 (ref 1–1.03)
SPECIMEN OUTDATE: ABNORMAL
TOXICOLOGY INFORMATION: ABNORMAL
URN SPEC COLLECT METH UR: ABNORMAL
UROBILINOGEN UR STRIP-ACNC: >=8 EU/DL
WBC # BLD AUTO: 7.45 K/UL (ref 3.9–12.7)
WBC #/AREA URNS HPF: 0 /HPF (ref 0–5)

## 2023-07-04 PROCEDURE — 86880 COOMBS TEST DIRECT: CPT | Performed by: EMERGENCY MEDICINE

## 2023-07-04 PROCEDURE — 63600175 PHARM REV CODE 636 W HCPCS: Performed by: EMERGENCY MEDICINE

## 2023-07-04 PROCEDURE — 86920 COMPATIBILITY TEST SPIN: CPT | Performed by: EMERGENCY MEDICINE

## 2023-07-04 PROCEDURE — 86922 COMPATIBILITY TEST ANTIGLOB: CPT | Performed by: EMERGENCY MEDICINE

## 2023-07-04 PROCEDURE — 99285 EMERGENCY DEPT VISIT HI MDM: CPT | Mod: 25

## 2023-07-04 PROCEDURE — 86900 BLOOD TYPING SEROLOGIC ABO: CPT | Performed by: EMERGENCY MEDICINE

## 2023-07-04 PROCEDURE — 81000 URINALYSIS NONAUTO W/SCOPE: CPT | Mod: 59 | Performed by: EMERGENCY MEDICINE

## 2023-07-04 PROCEDURE — 81025 URINE PREGNANCY TEST: CPT | Performed by: EMERGENCY MEDICINE

## 2023-07-04 PROCEDURE — 86077 PATHOLOGIST INTERPRETATION AB/XM: ICD-10-PCS | Mod: ,,, | Performed by: PATHOLOGY

## 2023-07-04 PROCEDURE — 96374 THER/PROPH/DIAG INJ IV PUSH: CPT | Mod: 59

## 2023-07-04 PROCEDURE — 96361 HYDRATE IV INFUSION ADD-ON: CPT

## 2023-07-04 PROCEDURE — 85025 COMPLETE CBC W/AUTO DIFF WBC: CPT | Performed by: EMERGENCY MEDICINE

## 2023-07-04 PROCEDURE — 96375 TX/PRO/DX INJ NEW DRUG ADDON: CPT | Mod: 59

## 2023-07-04 PROCEDURE — 25000003 PHARM REV CODE 250: Performed by: EMERGENCY MEDICINE

## 2023-07-04 PROCEDURE — 86870 RBC ANTIBODY IDENTIFICATION: CPT | Performed by: EMERGENCY MEDICINE

## 2023-07-04 PROCEDURE — 86900 BLOOD TYPING SEROLOGIC ABO: CPT | Mod: 91 | Performed by: EMERGENCY MEDICINE

## 2023-07-04 PROCEDURE — 36430 TRANSFUSION BLD/BLD COMPNT: CPT

## 2023-07-04 PROCEDURE — 86077 PHYS BLOOD BANK SERV XMATCH: CPT | Mod: ,,, | Performed by: PATHOLOGY

## 2023-07-04 PROCEDURE — P9016 RBC LEUKOCYTES REDUCED: HCPCS | Performed by: EMERGENCY MEDICINE

## 2023-07-04 PROCEDURE — 86860 RBC ANTIBODY ELUTION: CPT | Performed by: EMERGENCY MEDICINE

## 2023-07-04 PROCEDURE — 80053 COMPREHEN METABOLIC PANEL: CPT | Performed by: EMERGENCY MEDICINE

## 2023-07-04 PROCEDURE — 80307 DRUG TEST PRSMV CHEM ANLYZR: CPT | Performed by: EMERGENCY MEDICINE

## 2023-07-04 RX ORDER — HYDROCODONE BITARTRATE AND ACETAMINOPHEN 500; 5 MG/1; MG/1
TABLET ORAL
Status: DISCONTINUED | OUTPATIENT
Start: 2023-07-04 | End: 2023-07-04 | Stop reason: HOSPADM

## 2023-07-04 RX ORDER — MORPHINE SULFATE 4 MG/ML
4 INJECTION, SOLUTION INTRAMUSCULAR; INTRAVENOUS
Status: COMPLETED | OUTPATIENT
Start: 2023-07-04 | End: 2023-07-04

## 2023-07-04 RX ORDER — ACETAMINOPHEN 500 MG
1000 TABLET ORAL
Status: COMPLETED | OUTPATIENT
Start: 2023-07-04 | End: 2023-07-04

## 2023-07-04 RX ORDER — ONDANSETRON 2 MG/ML
4 INJECTION INTRAMUSCULAR; INTRAVENOUS
Status: COMPLETED | OUTPATIENT
Start: 2023-07-04 | End: 2023-07-04

## 2023-07-04 RX ADMIN — ONDANSETRON 4 MG: 2 INJECTION INTRAMUSCULAR; INTRAVENOUS at 11:07

## 2023-07-04 RX ADMIN — SODIUM CHLORIDE 1000 ML: 9 INJECTION, SOLUTION INTRAVENOUS at 11:07

## 2023-07-04 RX ADMIN — ACETAMINOPHEN 1000 MG: 500 TABLET ORAL at 06:07

## 2023-07-04 RX ADMIN — MORPHINE SULFATE 4 MG: 4 INJECTION INTRAVENOUS at 11:07

## 2023-07-04 NOTE — ED PROVIDER NOTES
SCRIBE #1 NOTE: IShakir am scribing for, and in the presence of, Betsey Humphries MD. I have scribed the entire note.       History     Chief Complaint   Patient presents with    General Illness     Pt here with c/o n/v/d, abd pain, and R sided ear ache x1 week after Lupus medication infusion.      Review of patient's allergies indicates:  No Known Allergies      History of Present Illness     HPI    2023, 10:28 AM  History obtained from the patient      History of Present Illness: Alisia Vines is a 32 y.o. female patient with a PMHx of HSV, anemia,  who presents to the Emergency Department for evaluation of general sxs which onset gradually since pt had rituxan infusion for Lupus last week. Symptoms are constant and moderate in severity. No mitigating or exacerbating factors reported. Associated sxs include right ear pain, N/V/D, abd pain, HA, generalized joint pain, and dehydration. Patient denies any fever, weakness, CP, SOB, and all other sxs at this time. No prior Tx. No further complaints or concerns at this time.       Arrival mode: Personal vehicle     PCP: Saumya Quinonez MD        Past Medical History:  Past Medical History:   Diagnosis Date    Allergic rhinitis     Anemia     Condyloma acuminata     COVID-19 virus infection 2021    Encounter for blood transfusion     GERD (gastroesophageal reflux disease)     Hemolytic anemia associated with systemic lupus erythematosus 2023    History of fetal anomaly in prior pregnancy, currently pregnant, unspecified trimester 2017    Pacemaker placed    HSV-2 (herpes simplex virus 2) infection     Lupus nephritis     Mood disorder     Overweight(278.02)     Sjogren's syndrome     Systemic lupus complicating pregnancy 2019    Systemic lupus erythematosus     Thrombocytopenia        Past Surgical History:  Past Surgical History:   Procedure Laterality Date     SECTION WITH TUBAL LIGATION N/A 2019    Procedure:   SECTION, WITH TUBAL LIGATION;  Surgeon: VERONICA Valdovinos MD;  Location: [facility] L&D;  Service: OB/GYN;  Laterality: N/A;     SECTION, LOW TRANSVERSE      x2    LYMPH NODE BIOPSY Right 2023    Procedure: BIOPSY, LYMPH NODE;  Surgeon: Rivera Sandoval MD;  Location: [facility] OR;  Service: ENT;  Laterality: Right;  right deep cervial lymph node excision    NECK MASS EXCISION Right 2023    Procedure: EXCISION, MASS, NECK;  Surgeon: Rivera Sandoval MD;  Location: [facility] OR;  Service: ENT;  Laterality: Right;  right deep cervial lymph node excision    RENAL BIOPSY  2013         Family History:  Family History   Problem Relation Age of Onset    Hypertension Mother     Eczema Brother     Hypertension Maternal Grandmother     Diabetes Paternal Grandmother     Heart disease Son     Arrhythmia Son         CHB    Stroke Neg Hx     Cancer Neg Hx        Social History:  Social History     Tobacco Use    Smoking status: Never     Passive exposure: Never    Smokeless tobacco: Never   Substance and Sexual Activity    Alcohol use: No     Alcohol/week: 0.0 standard drinks    Drug use: Yes     Types: Marijuana    Sexual activity: Not Currently     Partners: Male     Birth control/protection: None        Review of Systems     Review of Systems   Constitutional:  Negative for chills and fever.        (+) Dehydration   HENT:  Negative for congestion and sore throat.    Respiratory:  Negative for shortness of breath.    Cardiovascular:  Negative for chest pain.   Gastrointestinal:  Positive for abdominal pain, diarrhea, nausea and vomiting.   Genitourinary:  Negative for dysuria.   Musculoskeletal:  Positive for arthralgias (generalized). Negative for back pain.   Skin:  Negative for rash.   Allergic/Immunologic: Positive for immunocompromised state.   Neurological:  Positive for headaches. Negative for dizziness and weakness.   Hematological:  Does not bruise/bleed easily.   All other systems reviewed and are  "negative.     Physical Exam     Initial Vitals [07/04/23 0957]   BP Pulse Resp Temp SpO2   (!) 162/90 102 16 98.5 °F (36.9 °C) 100 %      MAP       --          Physical Exam  Nursing Notes and Vital Signs Reviewed.  Constitutional: Patient is in no acute distress. Well-developed and well-nourished.  Head: Atraumatic. Normocephalic.  Eyes: PERRL. EOM intact. Conjunctivae are not pale. No scleral icterus.  ENT: Mucous membranes are moist. Oropharynx is clear and symmetric.    Neck: Supple. Full ROM. No lymphadenopathy.  Cardiovascular: Regular rate. Regular rhythm. No murmurs, rubs, or gallops. Distal pulses are 2+ and symmetric.  Pulmonary/Chest: No respiratory distress. Clear to auscultation bilaterally. No wheezing or rales.  Abdominal: Soft and non-distended.  There is no tenderness.  No rebound, guarding, or rigidity. Good bowel sounds.  Genitourinary: No CVA tenderness  Musculoskeletal: Moves all extremities. No obvious deformities. No edema. No calf tenderness.  Skin: Warm and dry.  Neurological:  Alert, awake, and appropriate.  Normal speech.  No acute focal neurological deficits are appreciated.  Psychiatric: Normal affect. Good eye contact. Appropriate in content.     ED Course   Procedures  ED Vital Signs:  Vitals:    07/04/23 0957 07/04/23 1127 07/04/23 1132 07/04/23 1200   BP: (!) 162/90  (!) 159/109 (!) 148/91   Pulse: 102  96 90   Resp: 16 16 19 16   Temp: 98.5 °F (36.9 °C)      TempSrc: Oral      SpO2: 100%  100% 100%   Weight: 76.4 kg (168 lb 6.9 oz)      Height: 5' 5" (1.651 m)       07/04/23 1230 07/04/23 1330 07/04/23 1430 07/04/23 1500   BP: 138/86 (!) 142/93 126/81 130/81   Pulse: 89 90 82 86   Resp: 15 15 18 15   Temp:       TempSrc:       SpO2: 100% 100% 100% 100%   Weight:       Height:        07/04/23 1600   BP: (!) 142/82   Pulse: 101   Resp: 17   Temp:    TempSrc:    SpO2: 100%   Weight:    Height:        Abnormal Lab Results:  Labs Reviewed   COMPREHENSIVE METABOLIC PANEL - Abnormal; " Notable for the following components:       Result Value    CO2 21 (*)     Calcium 7.6 (*)     Total Protein 5.5 (*)     Albumin 2.0 (*)     AST 56 (*)     All other components within normal limits   URINALYSIS, REFLEX TO URINE CULTURE - Abnormal; Notable for the following components:    Protein, UA 3+ (*)     Bilirubin (UA) 1+ (*)     Occult Blood UA 3+ (*)     Urobilinogen, UA >=8.0 (*)     All other components within normal limits    Narrative:     Specimen Source->Urine   DRUG SCREEN PANEL, URINE EMERGENCY - Abnormal; Notable for the following components:    Cocaine (Metab.) Presumptive Positive (*)     Opiate Scrn, Ur Presumptive Positive (*)     THC Presumptive Positive (*)     All other components within normal limits    Narrative:     Specimen Source->Urine   URINALYSIS MICROSCOPIC - Abnormal; Notable for the following components:    RBC, UA 10 (*)     All other components within normal limits    Narrative:     Specimen Source->Urine   CBC W/ AUTO DIFFERENTIAL - Abnormal; Notable for the following components:    RBC 0.85 (*)     Hemoglobin 6.7 (*)     Hematocrit 10.0 (*)      (*)     Platelets 17 (*)     Immature Granulocytes 4.2 (*)     Immature Grans (Abs) 0.31 (*)     nRBC 8 (*)     Gran % 73.1 (*)     Lymph % 16.9 (*)     Platelet Estimate Decreased (*)     All other components within normal limits    Narrative:      HCT, PLT  critical result(s) called and verbal readback obtained   from JUD ALLAN RN by RA2 07/04/2023 14:02   PREGNANCY TEST, URINE RAPID    Narrative:     Specimen Source->Urine   TYPE & SCREEN   PREPARE RBC SOFT        All Lab Results:  Results for orders placed or performed during the hospital encounter of 07/04/23   Comprehensive metabolic panel   Result Value Ref Range    Sodium 140 136 - 145 mmol/L    Potassium 3.7 3.5 - 5.1 mmol/L    Chloride 108 95 - 110 mmol/L    CO2 21 (L) 23 - 29 mmol/L    Glucose 92 70 - 110 mg/dL    BUN 7 6 - 20 mg/dL    Creatinine 0.6 0.5 - 1.4 mg/dL     Calcium 7.6 (L) 8.7 - 10.5 mg/dL    Total Protein 5.5 (L) 6.0 - 8.4 g/dL    Albumin 2.0 (L) 3.5 - 5.2 g/dL    Total Bilirubin 1.0 0.1 - 1.0 mg/dL    Alkaline Phosphatase 67 55 - 135 U/L    AST 56 (H) 10 - 40 U/L    ALT 12 10 - 44 U/L    eGFR >60 >60 mL/min/1.73 m^2    Anion Gap 11 8 - 16 mmol/L   Urinalysis, Reflex to Urine Culture Urine, Clean Catch    Specimen: Urine   Result Value Ref Range    Specimen UA Urine, Clean Catch     Color, UA Yellow Yellow, Straw, Erin    Appearance, UA Clear Clear    pH, UA 7.0 5.0 - 8.0    Specific Gravity, UA 1.030 1.005 - 1.030    Protein, UA 3+ (A) Negative    Glucose, UA Negative Negative    Ketones, UA Negative Negative    Bilirubin (UA) 1+ (A) Negative    Occult Blood UA 3+ (A) Negative    Nitrite, UA Negative Negative    Urobilinogen, UA >=8.0 (A) <2.0 EU/dL    Leukocytes, UA Negative Negative   Drug screen panel, emergency   Result Value Ref Range    Benzodiazepines Negative Negative    Methadone metabolites Negative Negative    Cocaine (Metab.) Presumptive Positive (A) Negative    Opiate Scrn, Ur Presumptive Positive (A) Negative    Barbiturate Screen, Ur Negative Negative    Amphetamine Screen, Ur Negative Negative    THC Presumptive Positive (A) Negative    Phencyclidine Negative Negative    Creatinine, Urine 224.7 15.0 - 325.0 mg/dL    Toxicology Information SEE COMMENT    Pregnancy, urine rapid (UPT)   Result Value Ref Range    Preg Test, Ur Negative    Urinalysis Microscopic   Result Value Ref Range    RBC, UA 10 (H) 0 - 4 /hpf    WBC, UA 0 0 - 5 /hpf    Bacteria None None-Occ /hpf    Hyaline Casts, UA 0 0-1/lpf /lpf    Microscopic Comment SEE COMMENT    CBC auto differential   Result Value Ref Range    WBC 7.45 3.90 - 12.70 K/uL    RBC 0.85 (L) 4.00 - 5.40 M/uL    Hemoglobin 6.7 (L) 12.0 - 16.0 g/dL    Hematocrit 10.0 (LL) 37.0 - 48.5 %     (H) 82 - 98 fL    MCH SEE COMMENT 27.0 - 31.0 pg    MCHC SEE COMMENT 32.0 - 36.0 g/dL    RDW SEE COMMENT 11.5 - 14.5 %     Platelets 17 (LL) 150 - 450 K/uL    MPV SEE COMMENT 9.2 - 12.9 fL    Immature Granulocytes 4.2 (H) 0.0 - 0.5 %    Gran # (ANC) 5.5 1.8 - 7.7 K/uL    Immature Grans (Abs) 0.31 (H) 0.00 - 0.04 K/uL    Lymph # 1.3 1.0 - 4.8 K/uL    Mono # 0.4 0.3 - 1.0 K/uL    Eos # 0.1 0.0 - 0.5 K/uL    Baso # 0.02 0.00 - 0.20 K/uL    nRBC 8 (A) 0 /100 WBC    Gran % 73.1 (H) 38.0 - 73.0 %    Lymph % 16.9 (L) 18.0 - 48.0 %    Mono % 4.8 4.0 - 15.0 %    Eosinophil % 0.7 0.0 - 8.0 %    Basophil % 0.3 0.0 - 1.9 %    Platelet Estimate Decreased (A)     Aniso Slight     Poly Occasional     Tear Drop Cells Occasional     Differential Method Automated      *Note: Due to a large number of results and/or encounters for the requested time period, some results have not been displayed. A complete set of results can be found in Results Review.         Imaging Results:  Imaging Results    None                   The Emergency Provider reviewed the vital signs and test results, which are outlined above.     ED Discussion     2:24 PM: Pt shows no signs our sources of bleeding but platelet levels are 17. Pt will need a pack of blood through blood infusion    2:54 PM: Reassessed pt at this time. Discussed with pt all pertinent ED information and results. Discussed pt dx and plan of tx. Gave pt all f/u and return to the ED instructions. All questions and concerns were addressed at this time. Pt expresses understanding of information and instructions, and is comfortable with plan to discharge. Pt is stable for discharge.    I discussed with patient and/or family/caretaker that evaluation in the ED does not suggest any emergent or life threatening medical conditions requiring immediate intervention beyond what was provided in the ED, and I believe patient is safe for discharge.  Regardless, an unremarkable evaluation in the ED does not preclude the development or presence of a serious of life threatening condition. As such, patient was instructed to return  immediately for any worsening or change in current symptoms.              Medical Decision Making:   Differential Diagnosis:   1. Dehydration  2. Electrolyte imbalance  3. Chemo side effect  Clinical Tests:   Lab Tests: Ordered and Reviewed  ED Management:  Hx of lupus received rituxan for first time last week, now with c/o nausea, weakness, fatigue, joint pain, no systemic symptoms, exam grossly normal, lab work reviewed and after CBC was recollected 3 times for concerns for abnormal labs per lab request patient noted to have hgb 6.7 and platelet count of 17. Nothing to suggest acute blood loss, patient to be transfused 1 unit of prbcs, no indication for platelet transfusion, also UDS positive for cocaine and THC, she will need to closely follow up with rheumatology and heme/onc, she is stable for discharge after blood ranfusion completed.     Patient with prolonged ER stay due to multiple lab recollections for type and screen, lab reports patient's type and screen continues to hemolyze and they are unable to result it. Decision made to give patient one unit of emergent blood after risks and benefits discussed.  Then lab called after blood being transfused stating they still needed another type and screen explained at length the patient had been stuck multiple times, she has been in the ER for almost 9 hours and that if they would like another type and screen then they would need come get another one from the patient.           ED Medication(s):  Medications   0.9%  NaCl infusion (for blood administration) (has no administration in time range)   sodium chloride 0.9% bolus 1,000 mL 1,000 mL (0 mLs Intravenous Stopped 7/4/23 1224)   morphine injection 4 mg (4 mg Intravenous Given 7/4/23 1127)   ondansetron injection 4 mg (4 mg Intravenous Given 7/4/23 1126)       New Prescriptions    No medications on file        Follow-up Information       Rheumatology. Schedule an appointment as soon as possible for a visit in 2  days.    Specialty: Rheumatology  Contact information:  [address] [facility] [address]  [phone]             [facility] HEMATOLOGY/ONCOLOGY. Schedule an appointment as soon as possible for a visit in 2 days.    Specialty: Hematology and Oncology  Why: Return to the Emergency Room, If symptoms worsen  Contact information:  [address] [facility] [address]  [phone]                               Scribe Attestation:   Scribe #1: I performed the above scribed service and the documentation accurately describes the services I performed. I attest to the accuracy of the note.     Attending:   Physician Attestation Statement for Scribe #1: I, Betsey Humphries MD, personally performed the services described in this documentation, as scribed by Shakir Lorenz, in my presence, and it is both accurate and complete.           Clinical Impression       ICD-10-CM ICD-9-CM   1. Acute on chronic anemia  D64.9 285.9   2. Polysubstance abuse  F19.10 305.90   3. Arthralgia, unspecified joint  M25.50 719.40   4. Cocaine abuse  F14.10 305.60   5. Thrombocytopenia  D69.6 287.5   6. Medication side effect  T88.7XXA 995.20       Disposition:   Disposition: Discharged  Condition: Stable      Betsey Humphries MD  07/04/23 1702       Betsey Humphries MD  07/04/23 1938

## 2023-07-05 ENCOUNTER — TELEPHONE (OUTPATIENT)
Dept: RHEUMATOLOGY | Facility: CLINIC | Age: 33
End: 2023-07-05
Payer: MEDICAID

## 2023-07-05 ENCOUNTER — TELEPHONE (OUTPATIENT)
Dept: HEMATOLOGY/ONCOLOGY | Facility: CLINIC | Age: 33
End: 2023-07-05
Payer: MEDICAID

## 2023-07-05 LAB — BLOOD GROUP ANTIBODIES SERPL: NORMAL

## 2023-07-05 NOTE — TELEPHONE ENCOUNTER
----- Message from Daisy Peters LPN sent at 7/5/2023 12:06 PM CDT -----  Contact: Itzel/mother    ----- Message -----  From: Zina Mart  Sent: 7/5/2023   9:13 AM CDT  To: [facility] Hematology/Oncology Scheduling Pool    Pt mother is calling in regards to getting follow up appt. Pt mother stated pt was in the ER on 07/04 and had a blood transfusion and would like to know what to do next. Please call back at [phone] if no answer call [phone].                          Thanks  KT

## 2023-07-05 NOTE — TELEPHONE ENCOUNTER
Patient is currently on Ruxience. Patient states that she started experiencing the following    Associated sxs include right ear pain, N/V/D, abd pain, HA, generalized joint pain, and dehydration.    Patient went to the ED on 07/04 (please see ED note) patient called today and stated that she still feels horrible, diarrhea, vomiting, weakness, etc.     ED told her to schedule a hosp f/u in rheumatology and hem/onc as well    Patient is scheduled for Ruxience and Venofer infusions on 07/11    Please advise

## 2023-07-05 NOTE — TELEPHONE ENCOUNTER
----- Message from Tiesha Jimenez sent at 7/5/2023  9:00 AM CDT -----  Contact: Alisia Mendez called in to schedule a follow up visit to ED visit on 7/4, nausea, headache, vomiting, fatigue. Would like to be seen before 1st available 10/19. Please call her back at [phone].    Thanks  TS

## 2023-07-05 NOTE — TELEPHONE ENCOUNTER
"Returned patients phone call. Offered patient appointment with Mrs. Paulino "Rodriguez Root PA-C tomorrow 07/06/2023 at 2:00 at [facility] [place] location. Patient gladly accepted. All questions answered.      Stephanie Hauser (Allye), [facility]  Rheumatology Department    "

## 2023-07-05 NOTE — TELEPHONE ENCOUNTER
----- Message from Krystle Arias sent at 7/5/2023  4:15 PM CDT -----  Contact: Pt @[phone]  Patient is returning a phone call.  Who left a message for the patient:  Ibeth     Does patient know what this is regarding:  Yes     Would you like a call back, or a response through your [facility] portal?:   call back     Comments:  Pt states that she is returning a missed call and requesting a call back.

## 2023-07-06 ENCOUNTER — OFFICE VISIT (OUTPATIENT)
Dept: RHEUMATOLOGY | Facility: CLINIC | Age: 33
End: 2023-07-06
Payer: MEDICAID

## 2023-07-06 ENCOUNTER — INFUSION (OUTPATIENT)
Dept: INFUSION THERAPY | Facility: HOSPITAL | Age: 33
End: 2023-07-06
Attending: PHYSICIAN ASSISTANT
Payer: MEDICAID

## 2023-07-06 VITALS
SYSTOLIC BLOOD PRESSURE: 142 MMHG | HEART RATE: 106 BPM | BODY MASS INDEX: 27.99 KG/M2 | WEIGHT: 168 LBS | DIASTOLIC BLOOD PRESSURE: 98 MMHG | HEIGHT: 65 IN

## 2023-07-06 VITALS
RESPIRATION RATE: 16 BRPM | HEART RATE: 98 BPM | OXYGEN SATURATION: 100 % | DIASTOLIC BLOOD PRESSURE: 93 MMHG | TEMPERATURE: 98 F | SYSTOLIC BLOOD PRESSURE: 130 MMHG

## 2023-07-06 DIAGNOSIS — D84.9 IMMUNOCOMPROMISED: ICD-10-CM

## 2023-07-06 DIAGNOSIS — M32.9 SYSTEMIC LUPUS ERYTHEMATOSUS ARTHRITIS: ICD-10-CM

## 2023-07-06 DIAGNOSIS — Z51.81 MEDICATION MONITORING ENCOUNTER: Primary | ICD-10-CM

## 2023-07-06 DIAGNOSIS — M35.00 SJOGREN'S SYNDROME, WITH UNSPECIFIED ORGAN INVOLVEMENT: ICD-10-CM

## 2023-07-06 DIAGNOSIS — M32.14 SLE GLOMERULONEPHRITIS SYNDROME, WHO CLASS V: Primary | ICD-10-CM

## 2023-07-06 DIAGNOSIS — K12.1 ORAL ULCER: ICD-10-CM

## 2023-07-06 DIAGNOSIS — M32.14 SLE GLOMERULONEPHRITIS SYNDROME, WHO CLASS V: ICD-10-CM

## 2023-07-06 DIAGNOSIS — M32.9 SLE (SYSTEMIC LUPUS ERYTHEMATOSUS RELATED SYNDROME): ICD-10-CM

## 2023-07-06 DIAGNOSIS — Z79.899 HIGH RISK MEDICATION USE: ICD-10-CM

## 2023-07-06 DIAGNOSIS — R59.9 LYMPHOID HYPERPLASIA: ICD-10-CM

## 2023-07-06 DIAGNOSIS — D64.9 ANEMIA, UNSPECIFIED TYPE: ICD-10-CM

## 2023-07-06 DIAGNOSIS — Z51.81 MEDICATION MONITORING ENCOUNTER: ICD-10-CM

## 2023-07-06 DIAGNOSIS — R82.998 DARK URINE: Primary | ICD-10-CM

## 2023-07-06 PROCEDURE — 99999 PR PBB SHADOW E&M-EST. PATIENT-LVL III: CPT | Mod: PBBFAC,,, | Performed by: PHYSICIAN ASSISTANT

## 2023-07-06 PROCEDURE — 96365 THER/PROPH/DIAG IV INF INIT: CPT

## 2023-07-06 PROCEDURE — 99215 PR OFFICE/OUTPT VISIT, EST, LEVL V, 40-54 MIN: ICD-10-PCS | Mod: S$PBB,,, | Performed by: PHYSICIAN ASSISTANT

## 2023-07-06 PROCEDURE — 25000003 PHARM REV CODE 250: Performed by: PHYSICIAN ASSISTANT

## 2023-07-06 PROCEDURE — 3080F DIAST BP >= 90 MM HG: CPT | Mod: CPTII,,, | Performed by: PHYSICIAN ASSISTANT

## 2023-07-06 PROCEDURE — 99999 PR PBB SHADOW E&M-EST. PATIENT-LVL III: ICD-10-PCS | Mod: PBBFAC,,, | Performed by: PHYSICIAN ASSISTANT

## 2023-07-06 PROCEDURE — 63600175 PHARM REV CODE 636 W HCPCS: Performed by: PHYSICIAN ASSISTANT

## 2023-07-06 PROCEDURE — 3008F BODY MASS INDEX DOCD: CPT | Mod: CPTII,,, | Performed by: PHYSICIAN ASSISTANT

## 2023-07-06 PROCEDURE — 99215 OFFICE O/P EST HI 40 MIN: CPT | Mod: S$PBB,,, | Performed by: PHYSICIAN ASSISTANT

## 2023-07-06 PROCEDURE — 3080F PR MOST RECENT DIASTOLIC BLOOD PRESSURE >= 90 MM HG: ICD-10-PCS | Mod: CPTII,,, | Performed by: PHYSICIAN ASSISTANT

## 2023-07-06 PROCEDURE — 3008F PR BODY MASS INDEX (BMI) DOCUMENTED: ICD-10-PCS | Mod: CPTII,,, | Performed by: PHYSICIAN ASSISTANT

## 2023-07-06 PROCEDURE — 3077F SYST BP >= 140 MM HG: CPT | Mod: CPTII,,, | Performed by: PHYSICIAN ASSISTANT

## 2023-07-06 PROCEDURE — 99213 OFFICE O/P EST LOW 20 MIN: CPT | Mod: PBBFAC | Performed by: PHYSICIAN ASSISTANT

## 2023-07-06 PROCEDURE — 3077F PR MOST RECENT SYSTOLIC BLOOD PRESSURE >= 140 MM HG: ICD-10-PCS | Mod: CPTII,,, | Performed by: PHYSICIAN ASSISTANT

## 2023-07-06 RX ORDER — HEPARIN 100 UNIT/ML
500 SYRINGE INTRAVENOUS
Status: CANCELLED | OUTPATIENT
Start: 2023-07-06

## 2023-07-06 RX ORDER — SODIUM CHLORIDE 0.9 % (FLUSH) 0.9 %
10 SYRINGE (ML) INJECTION
Status: CANCELLED | OUTPATIENT
Start: 2023-07-06

## 2023-07-06 RX ORDER — PREDNISONE 20 MG/1
TABLET ORAL
Qty: 95 TABLET | Refills: 0 | Status: SHIPPED | OUTPATIENT
Start: 2023-07-06 | End: 2023-07-19 | Stop reason: SDUPTHER

## 2023-07-06 RX ADMIN — DEXTROSE 500 MG: 50 INJECTION, SOLUTION INTRAVENOUS at 03:07

## 2023-07-06 NOTE — PLAN OF CARE
Patient tolerated Solu-medrol well today; no adverse reaction noted.  C/O headache, fatigue, dizziness, and not feeling well today.  IV discontinued with catheter intact and pressure dressing applied to the site.  Has f/u appt(s) scheduled per MD request.  No questions or concerns voiced.  NAD noted upon discharge.

## 2023-07-06 NOTE — PROGRESS NOTES
Subjective:      Patient ID: Alisia Vines is a 32 y.o. female.    Chief Complaint: Lupus and Disease Management      HPI   Alisia Vines  is a 32 y.o. female seen for urgent appointment today.  She had Rituxan dose 1 of 2 on 06/27/2023.  Following that she had a delayed infusion reaction including nausea, vomiting, loss of appetite, dizziness, severe fatigue.  Generally feeling very run down.    Went to the emergency room on 07/04/2023.  She tested positive for several illicit substances.  She admits to cocaine use on Saturday 7/1/23 but no other substance abuse since then.  In the emergency room was noted to have critically low blood counts as well as platelets.  She was transfused 1 unit in advised to follow-up with both Hematology and Rheumatology outpatient.  She has hematology appointment on Monday.    In past has been historically noncompliant.  Recently admitted to the hospital a few months ago for lupus flare.  She has been more compliant with Plaquenil since then. In the past, compliance has been an issue for her.  With her recent hospitalization she is committed to being more compliant with medications and follow-up.  She is also complaining of dark urine.    She has Plaquenil 400 mg daily ordered.  She was off for many months prior to resuming it in the hospital recently.  Tolerates it well.  Has been on Rituxan infusions in the past due to noncompliance.      She uses refresh drops for dry eyes.  She sees Ophthalmology here for Plaquenil monitoring.  Most recent eye exam was September 2021.  Denies eye pain.  Having trouble getting reestablished for Plaquenil monitoring.  Requesting an internal referral.    Also with class 5 lupus nephritis.  She was last seen by Nephrology July 2022.  She has an appointment with Dr. Willard July 31, 2023.  Recently had a lymph node biopsy by ENT.  It was negative for lymphoma but did show hyperplasia.    C/o rash across face.  Planning f/u w  dermatology.  Also seeing ID w recent lymph node concerns.  Recent TB test is indeterminate.  T spot was negative.    Patient complain of oral and nasal ulcerations which have improved since last visit.  She also complains of prolonged stiffness lasting more than 30-45 minutes.  No joint pain today.  She  denies fevers, chills, photosensitivity, eye pain, shortness of breath, chest pain, hematuria, blood in the stool, raynauds, finger ulcerations, tender swollen joints, LAD, dysphagia, weakness.  Rheumatologic systems otherwise negative.    Serologies/Labs:  (+) HARPREET  (+) ds DNA  (+) SSA  (+) SSB  Current Treatment:  Plaquenil 200mg bid - not consistent, compliance an issue  Rituxan dose 1 of 2 on 6/27/2023 - loss of apetite, n/v, dizziness  Previous Treatment:   Rituxan - used for non-compliance in past         Current Outpatient Medications:     amLODIPine (NORVASC) 5 MG tablet, TAKE 1 TABLET(5 MG) BY MOUTH EVERY DAY, Disp: 90 tablet, Rfl: 1    ARIPiprazole (ABILIFY) 2 MG Tab, TAKE 1 TABLET(2 MG) BY MOUTH EVERY DAY, Disp: 30 tablet, Rfl: 3    ciclopirox (PENLAC) 8 % Soln, Apply to nail and nail fold once daily for up to 1 year, Disp: 6.6 mL, Rfl: 6    diphenhydrAMINE-aluminum-magnesium hydroxide-simethicone-LIDOcaine HCl 2%, Swish and spit 15 mLs every 4 (four) hours as needed (oral ulcer pain)., Disp: 450 each, Rfl: 0    FLUoxetine 40 MG capsule, TAKE 1 CAPSULE(40 MG) BY MOUTH EVERY DAY, Disp: 30 capsule, Rfl: 3    fluticasone propionate (FLONASE) 50 mcg/actuation nasal spray, SHAKE LIQUID AND USE 2 SPRAYS(100 MCG) IN EACH NOSTRIL EVERY DAY, Disp: 16 g, Rfl: 1    folic acid (FOLVITE) 1 MG tablet, Take 1 tablet (1 mg total) by mouth once daily., Disp: 100 tablet, Rfl: 3    HYDROcodone-acetaminophen (NORCO) 5-325 mg per tablet, Take 1-2 tablets by mouth every 6 (six) hours as needed for Pain., Disp: 30 tablet, Rfl: 0    hydrOXYchloroQUINE (PLAQUENIL) 200 mg tablet, TAKE 2 TABLETS(400 MG) BY MOUTH EVERY DAY, Disp: 60  tablet, Rfl: 2    ketoconazole (NIZORAL) 2 % shampoo, Wash hair with medicated shampoo at least 1x/week - let sit on scalp at least 5 minutes prior to rinsing, Disp: 120 mL, Rfl: 11    mometasone (ELOCON) 0.1 % solution, AAA of scalp once daily., Disp: 60 mL, Rfl: 3    ondansetron (ZOFRAN) 4 MG tablet, Take 1 tablet (4 mg total) by mouth every 8 (eight) hours as needed for Nausea., Disp: 20 tablet, Rfl: 0    predniSONE (DELTASONE) 10 MG tablet, Take 2 tablets (20 mg total) by mouth once daily for 28 days, THEN 1.5 tablets (15 mg total) once daily for 14 days, THEN 1 tablet (10 mg total) once daily for 14 days., Disp: 91 tablet, Rfl: 0    triamcinolone acetonide 0.025% (KENALOG) 0.025 % Oint, AAA bid prn. Use for 2 weeks then taper. Mild steroid., Disp: 80 g, Rfl: 1    valACYclovir (VALTREX) 1000 MG tablet, Take 1 tablet (1,000 mg total) by mouth once daily., Disp: 90 tablet, Rfl: 3    water Liqd 150 mL with MILK OF MAGNESIA 400 mg/5 mL Susp 400 mg, diphenhydrAMINE 12.5 mg/5 mL Elix 60 mg, nystatin 100,000 unit/mL Susp 500,000 Units, SWISH AND SPIT 10 ML EVERY 6 HOURS AS NEEDED, Disp: , Rfl:     water Liqd 150 mL with MILK OF MAGNESIA 400 mg/5 mL Susp 400 mg, diphenhydrAMINE 12.5 mg/5 mL Elix 60 mg, nystatin 100,000 unit/mL Susp 500,000 Units, SWISH AND SPIT 10 ML EVERY 6 HOURS AS NEEDED, Disp: , Rfl:     Past Medical History:   Diagnosis Date    Allergic rhinitis     Anemia     Condyloma acuminata     COVID-19 virus infection 07/2021    Encounter for blood transfusion     GERD (gastroesophageal reflux disease)     Hemolytic anemia associated with systemic lupus erythematosus 4/30/2023    History of fetal anomaly in prior pregnancy, currently pregnant, unspecified trimester 03/08/2017    Pacemaker placed    HSV-2 (herpes simplex virus 2) infection     Lupus nephritis     Mood disorder     Overweight(278.02)     Sjogren's syndrome     Systemic lupus complicating pregnancy 01/31/2019    Systemic lupus erythematosus      Thrombocytopenia      Family History   Problem Relation Age of Onset    Hypertension Mother     Eczema Brother     Hypertension Maternal Grandmother     Diabetes Paternal Grandmother     Heart disease Son     Arrhythmia Son         CHB    Stroke Neg Hx     Cancer Neg Hx      Social History     Socioeconomic History    Marital status: Single    Number of children: 2   Occupational History     Comment: [employer]   Tobacco Use    Smoking status: Never     Passive exposure: Never    Smokeless tobacco: Never   Substance and Sexual Activity    Alcohol use: No     Alcohol/week: 0.0 standard drinks    Drug use: Yes     Types: Marijuana    Sexual activity: Not Currently     Partners: Male     Birth control/protection: None   Other Topics Concern    Are you pregnant or think you may be? No    Breast-feeding No   Social History Narrative    The patient is single and lives with her significant other.  She has 3 children.  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     Social Determinants of Health     Financial Resource Strain: Low Risk     Difficulty of Paying Living Expenses: Not hard at all   Food Insecurity: No Food Insecurity    Worried About Running Out of Food in the Last Year: Never true    Ran Out of Food in the Last Year: Never true   Transportation Needs: No Transportation Needs    Lack of Transportation (Medical): No    Lack of Transportation (Non-Medical): No   Physical Activity: Inactive    Days of Exercise per Week: 0 days    Minutes of Exercise per Session: 0 min   Stress: No Stress Concern Present    Feeling of Stress : Not at all   Social Connections: Socially Isolated    Frequency of Communication with Friends and Family: More than three times a week    Frequency of Social Gatherings with Friends and Family: More than three times a week    Attends Tenriism Services: Never    Active Member of Clubs or Organizations: No    Attends Club or Organization Meetings: Never    Marital Status: Never  "   Housing Stability: Unknown    Unable to Pay for Housing in the Last Year: No    Unstable Housing in the Last Year: No     Review of patient's allergies indicates:  No Known Allergies    Objective:   BP (!) 142/98   Pulse 106   Ht 5' 5" (1.651 m)   Wt 76.2 kg (168 lb)   LMP 06/30/2023   BMI 27.96 kg/m²   Immunization History   Administered Date(s) Administered    COVID-19, MRNA, LN-S, PF (Pfizer) (Purple Cap) 12/21/2021, 01/10/2022    Influenza - High Dose - PF (65 years and older) 11/22/2013, 11/20/2018    Influenza - Quadrivalent 11/27/2015    Influenza - Quadrivalent - PF *Preferred* (6 months and older) 10/22/2017    Pneumococcal Conjugate - 13 Valent 11/27/2015    Tdap 09/08/2012, 07/31/2017, 05/01/2022       Physical Exam   Constitutional: She is oriented to person, place, and time. No distress.   HENT:   Head: Normocephalic and atraumatic.   Pulmonary/Chest: Effort normal.   Musculoskeletal:      Cervical back: Normal range of motion.   Neurological: She is alert and oriented to person, place, and time.   Psychiatric: Mood normal.     Visible ulcerations along upper lip w swelling    Recent Results (from the past 672 hour(s))   CBC auto differential    Collection Time: 06/09/23 11:14 PM   Result Value Ref Range    WBC 3.83 (L) 3.90 - 12.70 K/uL    RBC 2.90 (L) 4.00 - 5.40 M/uL    Hemoglobin 9.8 (L) 12.0 - 16.0 g/dL    Hematocrit 27.9 (L) 37.0 - 48.5 %    MCV 96 82 - 98 fL    MCH 33.8 (H) 27.0 - 31.0 pg    MCHC 35.1 32.0 - 36.0 g/dL    RDW 17.2 (H) 11.5 - 14.5 %    Platelets 188 150 - 450 K/uL    MPV 10.2 9.2 - 12.9 fL    Immature Granulocytes 0.3 0.0 - 0.5 %    Gran # (ANC) 2.3 1.8 - 7.7 K/uL    Immature Grans (Abs) 0.01 0.00 - 0.04 K/uL    Lymph # 1.1 1.0 - 4.8 K/uL    Mono # 0.3 0.3 - 1.0 K/uL    Eos # 0.2 0.0 - 0.5 K/uL    Baso # 0.01 0.00 - 0.20 K/uL    nRBC 0 0 /100 WBC    Gran % 59.8 38.0 - 73.0 %    Lymph % 27.9 18.0 - 48.0 %    Mono % 7.8 4.0 - 15.0 %    Eosinophil % 3.9 0.0 - 8.0 %    " Basophil % 0.3 0.0 - 1.9 %    Differential Method Automated    Comprehensive metabolic panel    Collection Time: 06/09/23 11:14 PM   Result Value Ref Range    Sodium 143 136 - 145 mmol/L    Potassium 3.3 (L) 3.5 - 5.1 mmol/L    Chloride 112 (H) 95 - 110 mmol/L    CO2 24 23 - 29 mmol/L    Glucose 77 70 - 110 mg/dL    BUN 7 6 - 20 mg/dL    Creatinine 0.6 0.5 - 1.4 mg/dL    Calcium 8.0 (L) 8.7 - 10.5 mg/dL    Total Protein 5.4 (L) 6.0 - 8.4 g/dL    Albumin 2.3 (L) 3.5 - 5.2 g/dL    Total Bilirubin 0.2 0.1 - 1.0 mg/dL    Alkaline Phosphatase 68 55 - 135 U/L    AST 22 10 - 40 U/L    ALT 10 10 - 44 U/L    Anion Gap 7 (L) 8 - 16 mmol/L    eGFR >60 >60 mL/min/1.73 m^2   Pathologist Interpretation Differential    Collection Time: 06/15/23  8:17 AM   Result Value Ref Range    Pathologist Review Review required    Direct antiglobulin test    Collection Time: 06/15/23  8:17 AM   Result Value Ref Range    Direct Angel (LUIS MIGUEL) POS    Haptoglobin    Collection Time: 06/15/23  8:17 AM   Result Value Ref Range    Haptoglobin 47 30 - 250 mg/dL   Lactate Dehydrogenase    Collection Time: 06/15/23  8:17 AM   Result Value Ref Range     110 - 260 U/L   Reticulocytes    Collection Time: 06/15/23  8:17 AM   Result Value Ref Range    Retic 2.6 (H) 0.5 - 2.5 %   Folate    Collection Time: 06/15/23  8:17 AM   Result Value Ref Range    Folate 6.8 4.0 - 24.0 ng/mL   Iron and TIBC    Collection Time: 06/15/23  8:17 AM   Result Value Ref Range    Iron 46 30 - 160 ug/dL    Transferrin 235 200 - 375 mg/dL    TIBC 348 250 - 450 ug/dL    Saturated Iron 13 (L) 20 - 50 %   Ferritin    Collection Time: 06/15/23  8:17 AM   Result Value Ref Range    Ferritin 26 20.0 - 300.0 ng/mL   Comprehensive Metabolic Panel    Collection Time: 06/15/23  8:17 AM   Result Value Ref Range    Sodium 138 136 - 145 mmol/L    Potassium 3.6 3.5 - 5.1 mmol/L    Chloride 108 95 - 110 mmol/L    CO2 21 (L) 23 - 29 mmol/L    Glucose 83 70 - 110 mg/dL    BUN 17 6 - 20 mg/dL     Creatinine 0.7 0.5 - 1.4 mg/dL    Calcium 8.5 (L) 8.7 - 10.5 mg/dL    Total Protein 6.0 6.0 - 8.4 g/dL    Albumin 2.8 (L) 3.5 - 5.2 g/dL    Total Bilirubin 0.3 0.1 - 1.0 mg/dL    Alkaline Phosphatase 64 55 - 135 U/L    AST 23 10 - 40 U/L    ALT 12 10 - 44 U/L    Anion Gap 9 8 - 16 mmol/L    eGFR >60 >60 mL/min/1.73 m^2   CBC Auto Differential    Collection Time: 06/15/23  8:17 AM   Result Value Ref Range    WBC 3.90 3.90 - 12.70 K/uL    RBC 2.74 (L) 4.00 - 5.40 M/uL    Hemoglobin 9.5 (L) 12.0 - 16.0 g/dL    Hematocrit 26.0 (L) 37.0 - 48.5 %    MCV 95 82 - 98 fL    MCH 34.7 (H) 27.0 - 31.0 pg    MCHC 36.5 (H) 32.0 - 36.0 g/dL    RDW 17.0 (H) 11.5 - 14.5 %    Platelets 200 150 - 450 K/uL    MPV 9.9 9.2 - 12.9 fL    Immature Granulocytes 0.3 0.0 - 0.5 %    Gran # (ANC) 2.2 1.8 - 7.7 K/uL    Immature Grans (Abs) 0.01 0.00 - 0.04 K/uL    Lymph # 1.1 1.0 - 4.8 K/uL    Mono # 0.5 0.3 - 1.0 K/uL    Eos # 0.1 0.0 - 0.5 K/uL    Baso # 0.01 0.00 - 0.20 K/uL    nRBC 0 0 /100 WBC    Gran % 55.8 38.0 - 73.0 %    Lymph % 27.7 18.0 - 48.0 %    Mono % 13.1 4.0 - 15.0 %    Eosinophil % 2.8 0.0 - 8.0 %    Basophil % 0.3 0.0 - 1.9 %    Differential Method Automated    CBC Auto Differential    Collection Time: 06/15/23  8:17 AM   Result Value Ref Range    WBC 3.90 3.90 - 12.70 K/uL    RBC 2.74 (L) 4.00 - 5.40 M/uL    Hemoglobin 9.5 (L) 12.0 - 16.0 g/dL    Hematocrit 26.0 (L) 37.0 - 48.5 %    MCV 95 82 - 98 fL    MCH 34.7 (H) 27.0 - 31.0 pg    MCHC 36.5 (H) 32.0 - 36.0 g/dL    RDW 17.0 (H) 11.5 - 14.5 %    Platelets 200 150 - 450 K/uL    MPV 9.9 9.2 - 12.9 fL    Immature Granulocytes 0.3 0.0 - 0.5 %    Gran # (ANC) 2.2 1.8 - 7.7 K/uL    Immature Grans (Abs) 0.01 0.00 - 0.04 K/uL    Lymph # 1.1 1.0 - 4.8 K/uL    Mono # 0.5 0.3 - 1.0 K/uL    Eos # 0.1 0.0 - 0.5 K/uL    Baso # 0.01 0.00 - 0.20 K/uL    nRBC 0 0 /100 WBC    Gran % 55.8 38.0 - 73.0 %    Lymph % 27.7 18.0 - 48.0 %    Mono % 13.1 4.0 - 15.0 %    Eosinophil % 2.8 0.0 - 8.0 %     Basophil % 0.3 0.0 - 1.9 %    Differential Method Automated    Pathologist Review    Collection Time: 06/15/23  8:17 AM   Result Value Ref Range    Pathologist Review Peripheral Smear REVIEWED    Pregnancy, urine rapid    Collection Time: 06/27/23 10:24 AM   Result Value Ref Range    Preg Test, Ur Negative    Urinalysis, Reflex to Urine Culture Urine, Clean Catch    Collection Time: 07/04/23 10:49 AM    Specimen: Urine   Result Value Ref Range    Specimen UA Urine, Clean Catch     Color, UA Yellow Yellow, Straw, Erin    Appearance, UA Clear Clear    pH, UA 7.0 5.0 - 8.0    Specific Gravity, UA 1.030 1.005 - 1.030    Protein, UA 3+ (A) Negative    Glucose, UA Negative Negative    Ketones, UA Negative Negative    Bilirubin (UA) 1+ (A) Negative    Occult Blood UA 3+ (A) Negative    Nitrite, UA Negative Negative    Urobilinogen, UA >=8.0 (A) <2.0 EU/dL    Leukocytes, UA Negative Negative   Drug screen panel, emergency    Collection Time: 07/04/23 10:49 AM   Result Value Ref Range    Benzodiazepines Negative Negative    Methadone metabolites Negative Negative    Cocaine (Metab.) Presumptive Positive (A) Negative    Opiate Scrn, Ur Presumptive Positive (A) Negative    Barbiturate Screen, Ur Negative Negative    Amphetamine Screen, Ur Negative Negative    THC Presumptive Positive (A) Negative    Phencyclidine Negative Negative    Creatinine, Urine 224.7 15.0 - 325.0 mg/dL    Toxicology Information SEE COMMENT    Pregnancy, urine rapid (UPT)    Collection Time: 07/04/23 10:49 AM   Result Value Ref Range    Preg Test, Ur Negative    Urinalysis Microscopic    Collection Time: 07/04/23 10:49 AM   Result Value Ref Range    RBC, UA 10 (H) 0 - 4 /hpf    WBC, UA 0 0 - 5 /hpf    Bacteria None None-Occ /hpf    Hyaline Casts, UA 0 0-1/lpf /lpf    Microscopic Comment SEE COMMENT    Comprehensive metabolic panel    Collection Time: 07/04/23 11:27 AM   Result Value Ref Range    Sodium 140 136 - 145 mmol/L    Potassium 3.7 3.5 - 5.1  mmol/L    Chloride 108 95 - 110 mmol/L    CO2 21 (L) 23 - 29 mmol/L    Glucose 92 70 - 110 mg/dL    BUN 7 6 - 20 mg/dL    Creatinine 0.6 0.5 - 1.4 mg/dL    Calcium 7.6 (L) 8.7 - 10.5 mg/dL    Total Protein 5.5 (L) 6.0 - 8.4 g/dL    Albumin 2.0 (L) 3.5 - 5.2 g/dL    Total Bilirubin 1.0 0.1 - 1.0 mg/dL    Alkaline Phosphatase 67 55 - 135 U/L    AST 56 (H) 10 - 40 U/L    ALT 12 10 - 44 U/L    eGFR >60 >60 mL/min/1.73 m^2    Anion Gap 11 8 - 16 mmol/L   CBC auto differential    Collection Time: 07/04/23  1:35 PM   Result Value Ref Range    WBC 7.45 3.90 - 12.70 K/uL    RBC 0.85 (L) 4.00 - 5.40 M/uL    Hemoglobin 6.7 (L) 12.0 - 16.0 g/dL    Hematocrit 10.0 (LL) 37.0 - 48.5 %     (H) 82 - 98 fL    MCH SEE COMMENT 27.0 - 31.0 pg    MCHC SEE COMMENT 32.0 - 36.0 g/dL    RDW SEE COMMENT 11.5 - 14.5 %    Platelets 17 (LL) 150 - 450 K/uL    MPV SEE COMMENT 9.2 - 12.9 fL    Immature Granulocytes 4.2 (H) 0.0 - 0.5 %    Gran # (ANC) 5.5 1.8 - 7.7 K/uL    Immature Grans (Abs) 0.31 (H) 0.00 - 0.04 K/uL    Lymph # 1.3 1.0 - 4.8 K/uL    Mono # 0.4 0.3 - 1.0 K/uL    Eos # 0.1 0.0 - 0.5 K/uL    Baso # 0.02 0.00 - 0.20 K/uL    nRBC 8 (A) 0 /100 WBC    Gran % 73.1 (H) 38.0 - 73.0 %    Lymph % 16.9 (L) 18.0 - 48.0 %    Mono % 4.8 4.0 - 15.0 %    Eosinophil % 0.7 0.0 - 8.0 %    Basophil % 0.3 0.0 - 1.9 %    Platelet Estimate Decreased (A)     Aniso Slight     Poly Occasional     Tear Drop Cells Occasional     Differential Method Automated    Type & Screen    Collection Time: 07/04/23  4:37 PM   Result Value Ref Range    Indirect Angel POS (A)     Specimen Outdate 07/07/2023 23:59    Prepare RBC 1 Unit    Collection Time: 07/04/23  4:37 PM   Result Value Ref Range    UNIT NUMBER [id number]     Product Code V8245K03     DISPENSE STATUS TRANSFUSED     CODING SYSTEM [id number]     Unit Blood Type Code 9500     Unit Blood Type O NEG     Unit Expiration [id number]     CROSSMATCH INTERPRETATION Incompatible    Antibody identification     Collection Time: 07/04/23  4:37 PM   Result Value Ref Range    Antibody ID POS    Group & Rh    Collection Time: 07/04/23  4:37 PM   Result Value Ref Range    ABO B     Rh Type POS    Direct antiglobulin test    Collection Time: 07/04/23  4:37 PM   Result Value Ref Range    Direct Angel (LUIS MIGUEL) POS          Lab Results   Component Value Date    TBGOLDPLUS Indeterminate (A) 05/02/2023      Lab Results   Component Value Date    HEPAIGM Non-reactive 05/08/2023    HEPBIGM Grayzone (A) 05/08/2023    HEPBCAB Non-reactive 06/08/2023    HEPCAB Non-reactive 05/08/2023        Imaging  I have personally reviewed images and reports as below.  I agree with the interpretation.  CT Soft Tissue Neck With Contrast  Order: [id number]  Status: Final result     Visible to patient: Yes (seen)     Next appt: 07/19/2023 at 09:30 AM in Radiology ([facility] CT1 LIMIT 500 LBS)     Dx: Mass of right side of neck     2 Result Notes    1 Patient Communication  Details    Reading Physician Reading Date Result Priority   Guillermo Powers Jr., MD  [phone] 1/10/2023 Routine     Narrative & Impression  EXAMINATION:  CT SOFT TISSUE NECK WITH CONTRAST     CLINICAL HISTORY:  neck mass//abnormal ultrasound;Localized swelling, mass and lump, neck     TECHNIQUE:  Low dose axial images as well as sagittal and coronal reconstructions were performed from the skull base to the clavicles following the intravenous administration of 75 mL of Omnipaque 350.     COMPARISON:  None     FINDINGS:  The nasopharynx, oral pharynx, hypopharynx, larynx and visualized portion of the trachea are within normal limits.     The oral cavity and buccal space are limited by artifact from dental metal but appear to grossly within normal limits.     The bilateral parotid, submandibular and thyroid glands are within normal limits.     Abnormal lymphadenopathy is noted throughout the soft tissues of the neck, right greater than left, with the largest nodes in the right supraclavicular  region measuring up to 3 x 2.1 cm in size (axial 111, series 2).  No evidence of necrosis is present..     Multilevel degenerative changes noted throughout the cervical spine.     Right greater than left maxillary sinusitis and bilateral ethmoid sinusitis.  Suspected left maxillary dental caries     Visualized lung apices are clear bilaterally.     Impression:     Extensive lymphadenopathy in the right greater than left neck.  Both benign and malignant etiologies are in the differential, recommend correlation with clinical presentation for infectious etiology.  Consider biopsy to rule out lymphoma.     Sinusitis.     Suspected dental caries, dental consultation is recommended        Electronically signed by: Guillermo Powers  Date:                                            01/10/2023  Time:                                           12:53       Surgical path report also reviewed from 2/17/23  Right deep cervical lymph node, excisional biopsy: - Follicular hyperplasia. See comment. - No evidence of lymphoma or metastatic carcinoma.    Assessment:     1. Dark urine    2. Anemia, unspecified type    3. Systemic lupus erythematosus arthritis    4. Immunocompromised    5. High risk medication use    6. SLE glomerulonephritis syndrome, WHO class V    7. Sjogren's syndrome, with unspecified organ involvement    8. Lymphoid hyperplasia    9. Medication monitoring encounter    10. Oral ulcer            Plan:     Alisia was seen today for lupus and disease management.    Diagnoses and all orders for this visit:    Dark urine  -     CK; Future    Anemia, unspecified type    Systemic lupus erythematosus arthritis    Immunocompromised    High risk medication use    SLE glomerulonephritis syndrome, WHO class V    Sjogren's syndrome, with unspecified organ involvement    Lymphoid hyperplasia    Medication monitoring encounter    Oral ulcer  -     RPR; Future        Severe worsening Lupus w recent transfusion  Compliance continues  to be a challenge but pt states she is committed to being compliant.  Pt back on Plaquenil 200 mg bid since hospital DC 5/2/23  Due for ophtho exam for monitoring  Internal referral placed  About 10yrs of Plaquenil therapy -  on again, off again history  Advised I won't refill without appropriate monitoring  Hep B viral DNA negative  Recommend pt to re-establish care w Dr. Willard  Pt to call and schedule  Labs   Critical leukopenia and severe anemia -  likely multifactorial including polysubstance abuse and SLE  Recheck CBC today  F/u hematology  ER if any spontaneous bleeding  LDH, haptoglobin and retic count today  CBC, CMP, C3, C4, PC ratio, ds DNA today and weekly  Check CK  Check for hemolysis w c/o dark urine  Spoke to Dr. Franks - who recommends the following  Solu medrol 500 mg today  PDN 60 mg x 2 weeks (starting tomorrow), then taper by 10 mg/wk to goal of 20 mg daily  Weekly labs  Discuss appropriateness of steroid sparing therapy w heme onc  Consider addition of cellcept vs Benlysta  DC rituxan  Overlapping SS  Recent lymph node excision shows hyperplasia but negative for malignancy such as lymphoma  SPEP, IEP, cryoglobulins reviewed  Low Albumin  O/w wnl  Discussed red flag symptoms with patient  Continue refresh drops p.r.n  Add pilocarpine 5 mg tid  Saw Dr. Garcia  On Augmentin and minocycline for nasal lesion and otitis externa  Drug therapy requiring intensive monitoring for toxicity  High Risk Medication Monitoring encounter  No current medication related issues, no evidence of toxicity  I ordered labs for toxicity monitoring, have personally reviewed the findings, and discussed them with the patient.  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  Monitor carefully for infections.  Advised patient to get immediate medical care if any infection arises.  Also advised strict adherence age-appropriate  vaccinations and cancer screenings with PCP.  Patient advised to hold DMARD and/or biologic therapy for signs of infection or for surgery. If you are unsure what to do please call our office for instruction.[facility] [phone]  Return to clinic: as scheduled    65 minutes of total time spent on the encounter, which includes face to face time and non-face to face time preparing to see the patient (eg, review of tests), Obtaining and/or reviewing separately obtained history, Documenting clinical information in the electronic or other health record, Independently interpreting results (not separately reported) and communicating results to the patient/family/caregiver, or Care coordination (not separately reported).     No follow-ups on file.    The patient understands, chooses and consents to this plan and accepts all the risks which include but are not limited to the risks mentioned above.     Disclaimer: This note was prepared using a voice recognition system and is likely to have sound alike errors within the text.

## 2023-07-07 ENCOUNTER — HOSPITAL ENCOUNTER (EMERGENCY)
Facility: HOSPITAL | Age: 33
Discharge: HOME | End: 2023-07-08
Attending: EMERGENCY MEDICINE
Payer: MEDICAID

## 2023-07-07 ENCOUNTER — DOCUMENTATION ONLY (OUTPATIENT)
Dept: RHEUMATOLOGY | Facility: CLINIC | Age: 33
End: 2023-07-07
Payer: MEDICAID

## 2023-07-07 DIAGNOSIS — M32.14 LUPUS NEPHRITIS: ICD-10-CM

## 2023-07-07 DIAGNOSIS — R80.9 PROTEINURIA, UNSPECIFIED TYPE: ICD-10-CM

## 2023-07-07 DIAGNOSIS — D69.6 THROMBOCYTOPENIA: Primary | ICD-10-CM

## 2023-07-07 DIAGNOSIS — M32.9 EXACERBATION OF SYSTEMIC LUPUS: ICD-10-CM

## 2023-07-07 DIAGNOSIS — D64.9 ANEMIA, UNSPECIFIED TYPE: ICD-10-CM

## 2023-07-07 LAB
ABO + RH BLD: ABNORMAL
ALBUMIN SERPL BCP-MCNC: 2.1 G/DL (ref 3.5–5.2)
ALP SERPL-CCNC: 78 U/L (ref 55–135)
ALT SERPL W/O P-5'-P-CCNC: 22 U/L (ref 10–44)
AMPHET+METHAMPHET UR QL: NEGATIVE
ANION GAP SERPL CALC-SCNC: 9 MMOL/L (ref 8–16)
ANISOCYTOSIS BLD QL SMEAR: SLIGHT
AST SERPL-CCNC: 77 U/L (ref 10–40)
B-HCG UR QL: NEGATIVE
BACTERIA #/AREA URNS HPF: ABNORMAL /HPF
BACTERIA GENITAL QL WET PREP: ABNORMAL
BARBITURATES UR QL SCN>200 NG/ML: NEGATIVE
BASOPHILS # BLD AUTO: 0.02 K/UL (ref 0–0.2)
BASOPHILS NFR BLD: 0.4 % (ref 0–1.9)
BENZODIAZ UR QL SCN>200 NG/ML: NEGATIVE
BILIRUB SERPL-MCNC: 1.1 MG/DL (ref 0.1–1)
BILIRUB UR QL STRIP: ABNORMAL
BLD GP AB SCN CELLS X3 SERPL QL: ABNORMAL
BLOOD GROUP ANTIBODIES SERPL: NORMAL
BUN SERPL-MCNC: 14 MG/DL (ref 6–20)
BZE UR QL SCN: NEGATIVE
CALCIUM SERPL-MCNC: 8.1 MG/DL (ref 8.7–10.5)
CANNABINOIDS UR QL SCN: ABNORMAL
CHLORIDE SERPL-SCNC: 105 MMOL/L (ref 95–110)
CK SERPL-CCNC: 183 U/L (ref 20–180)
CLARITY UR: CLEAR
CLUE CELLS VAG QL WET PREP: ABNORMAL
CO2 SERPL-SCNC: 23 MMOL/L (ref 23–29)
COLOR UR: ABNORMAL
CREAT SERPL-MCNC: 0.6 MG/DL (ref 0.5–1.4)
CREAT UR-MCNC: 258.6 MG/DL (ref 15–325)
DACRYOCYTES BLD QL SMEAR: ABNORMAL
DIFFERENTIAL METHOD: ABNORMAL
EOSINOPHIL # BLD AUTO: 0 K/UL (ref 0–0.5)
EOSINOPHIL NFR BLD: 0.2 % (ref 0–8)
ERYTHROCYTE [DISTWIDTH] IN BLOOD BY AUTOMATED COUNT: 26.9 % (ref 11.5–14.5)
EST. GFR  (NO RACE VARIABLE): >60 ML/MIN/1.73 M^2
FILAMENT FUNGI VAG WET PREP-#/AREA: ABNORMAL
GLUCOSE SERPL-MCNC: 100 MG/DL (ref 70–110)
GLUCOSE UR QL STRIP: NEGATIVE
HCT VFR BLD AUTO: 29.4 % (ref 37–48.5)
HGB BLD-MCNC: 11 G/DL (ref 12–16)
HGB UR QL STRIP: ABNORMAL
HYALINE CASTS #/AREA URNS LPF: 18 /LPF
IMM GRANULOCYTES # BLD AUTO: 0.26 K/UL (ref 0–0.04)
IMM GRANULOCYTES NFR BLD AUTO: 4.6 % (ref 0–0.5)
KETONES UR QL STRIP: NEGATIVE
LACTATE SERPL-SCNC: 1.1 MMOL/L (ref 0.5–2.2)
LACTATE SERPL-SCNC: 1.6 MMOL/L (ref 0.5–2.2)
LEUKOCYTE ESTERASE UR QL STRIP: NEGATIVE
LYMPHOCYTES # BLD AUTO: 0.7 K/UL (ref 1–4.8)
LYMPHOCYTES NFR BLD: 12.1 % (ref 18–48)
MCH RBC QN AUTO: 36.2 PG (ref 27–31)
MCHC RBC AUTO-ENTMCNC: 37.4 G/DL (ref 32–36)
MCV RBC AUTO: 97 FL (ref 82–98)
METHADONE UR QL SCN>300 NG/ML: NEGATIVE
MICROSCOPIC COMMENT: ABNORMAL
MONOCYTES # BLD AUTO: 0.5 K/UL (ref 0.3–1)
MONOCYTES NFR BLD: 8.1 % (ref 4–15)
MPV, BLUE TOP: ABNORMAL FL (ref 9.2–12.9)
NEUTROPHILS # BLD AUTO: 4.2 K/UL (ref 1.8–7.7)
NEUTROPHILS NFR BLD: 74.6 % (ref 38–73)
NITRITE UR QL STRIP: NEGATIVE
NRBC BLD-RTO: 25 /100 WBC
OPIATES UR QL SCN: NEGATIVE
PCP UR QL SCN>25 NG/ML: NEGATIVE
PH UR STRIP: 7 [PH] (ref 5–8)
PLATELET # BLD AUTO: 21 K/UL (ref 150–450)
PLATELET BLD QL SMEAR: ABNORMAL
PLATELET, BLUE TOP: 19 K/UL (ref 150–450)
PMV BLD AUTO: ABNORMAL FL (ref 9.2–12.9)
POIKILOCYTOSIS BLD QL SMEAR: SLIGHT
POTASSIUM SERPL-SCNC: 3.9 MMOL/L (ref 3.5–5.1)
PROT SERPL-MCNC: 5.8 G/DL (ref 6–8.4)
PROT UR QL STRIP: ABNORMAL
RBC # BLD AUTO: 3.04 M/UL (ref 4–5.4)
RBC #/AREA URNS HPF: 0 /HPF (ref 0–4)
RPR SER QL: NORMAL
SODIUM SERPL-SCNC: 137 MMOL/L (ref 136–145)
SP GR UR STRIP: >1.03 (ref 1–1.03)
SPECIMEN OUTDATE: ABNORMAL
SPECIMEN SOURCE: ABNORMAL
T VAGINALIS GENITAL QL WET PREP: ABNORMAL
TOXICOLOGY INFORMATION: ABNORMAL
URN SPEC COLLECT METH UR: ABNORMAL
UROBILINOGEN UR STRIP-ACNC: ABNORMAL EU/DL
WBC # BLD AUTO: 7.46 K/UL (ref 3.9–12.7)
WBC #/AREA URNS HPF: 7 /HPF (ref 0–5)
WBC #/AREA VAG WET PREP: ABNORMAL
YEAST GENITAL QL WET PREP: ABNORMAL

## 2023-07-07 PROCEDURE — 82550 ASSAY OF CK (CPK): CPT | Performed by: EMERGENCY MEDICINE

## 2023-07-07 PROCEDURE — 80307 DRUG TEST PRSMV CHEM ANLYZR: CPT | Performed by: EMERGENCY MEDICINE

## 2023-07-07 PROCEDURE — 87040 BLOOD CULTURE FOR BACTERIA: CPT | Performed by: EMERGENCY MEDICINE

## 2023-07-07 PROCEDURE — 99285 EMERGENCY DEPT VISIT HI MDM: CPT | Mod: 25

## 2023-07-07 PROCEDURE — 96365 THER/PROPH/DIAG IV INF INIT: CPT

## 2023-07-07 PROCEDURE — 25000003 PHARM REV CODE 250: Performed by: EMERGENCY MEDICINE

## 2023-07-07 PROCEDURE — 87210 SMEAR WET MOUNT SALINE/INK: CPT | Performed by: EMERGENCY MEDICINE

## 2023-07-07 PROCEDURE — 86900 BLOOD TYPING SEROLOGIC ABO: CPT | Performed by: EMERGENCY MEDICINE

## 2023-07-07 PROCEDURE — 85049 AUTOMATED PLATELET COUNT: CPT | Performed by: EMERGENCY MEDICINE

## 2023-07-07 PROCEDURE — 83605 ASSAY OF LACTIC ACID: CPT | Performed by: EMERGENCY MEDICINE

## 2023-07-07 PROCEDURE — 63600175 PHARM REV CODE 636 W HCPCS: Performed by: EMERGENCY MEDICINE

## 2023-07-07 PROCEDURE — 85025 COMPLETE CBC W/AUTO DIFF WBC: CPT | Performed by: EMERGENCY MEDICINE

## 2023-07-07 PROCEDURE — 86592 SYPHILIS TEST NON-TREP QUAL: CPT | Performed by: EMERGENCY MEDICINE

## 2023-07-07 PROCEDURE — 81025 URINE PREGNANCY TEST: CPT | Performed by: EMERGENCY MEDICINE

## 2023-07-07 PROCEDURE — 81000 URINALYSIS NONAUTO W/SCOPE: CPT | Mod: 59 | Performed by: EMERGENCY MEDICINE

## 2023-07-07 PROCEDURE — 87591 N.GONORRHOEAE DNA AMP PROB: CPT | Performed by: EMERGENCY MEDICINE

## 2023-07-07 PROCEDURE — 86870 RBC ANTIBODY IDENTIFICATION: CPT | Performed by: EMERGENCY MEDICINE

## 2023-07-07 PROCEDURE — 80053 COMPREHEN METABOLIC PANEL: CPT | Performed by: EMERGENCY MEDICINE

## 2023-07-07 RX ORDER — AMLODIPINE BESYLATE 5 MG/1
5 TABLET ORAL DAILY
Status: DISCONTINUED | OUTPATIENT
Start: 2023-07-08 | End: 2023-07-08 | Stop reason: HOSPADM

## 2023-07-07 RX ORDER — PANTOPRAZOLE SODIUM 40 MG/1
40 TABLET, DELAYED RELEASE ORAL DAILY
Status: DISCONTINUED | OUTPATIENT
Start: 2023-07-08 | End: 2023-07-07

## 2023-07-07 RX ORDER — AMLODIPINE BESYLATE 5 MG/1
5 TABLET ORAL
Status: COMPLETED | OUTPATIENT
Start: 2023-07-07 | End: 2023-07-07

## 2023-07-07 RX ORDER — PANTOPRAZOLE SODIUM 40 MG/1
40 TABLET, DELAYED RELEASE ORAL ONCE
Status: COMPLETED | OUTPATIENT
Start: 2023-07-07 | End: 2023-07-07

## 2023-07-07 RX ORDER — FOLIC ACID 1 MG/1
1 TABLET ORAL DAILY
Status: DISCONTINUED | OUTPATIENT
Start: 2023-07-08 | End: 2023-07-08 | Stop reason: HOSPADM

## 2023-07-07 RX ORDER — PANTOPRAZOLE SODIUM 40 MG/1
40 TABLET, DELAYED RELEASE ORAL DAILY
Status: DISCONTINUED | OUTPATIENT
Start: 2023-07-08 | End: 2023-07-08 | Stop reason: HOSPADM

## 2023-07-07 RX ADMIN — DEXTROSE MONOHYDRATE 500 MG: 50 INJECTION, SOLUTION INTRAVENOUS at 04:07

## 2023-07-07 RX ADMIN — PANTOPRAZOLE SODIUM 40 MG: 40 TABLET, DELAYED RELEASE ORAL at 04:07

## 2023-07-07 RX ADMIN — AMLODIPINE BESYLATE 5 MG: 5 TABLET ORAL at 04:07

## 2023-07-07 NOTE — ED PROVIDER NOTES
SCRIBE #1 NOTE: I, Campos Rosario, am scribing for, and in the presence of, Edel Tesfaye DO. I have scribed the entire note.      History      Chief Complaint   Patient presents with    Abnormal Lab     Pt referred in for abnormal labs, blood cultures, urine cultures per provider note.       Review of patient's allergies indicates:  No Known Allergies     HPI   HPI    7/7/2023, 10:33 AM   History obtained from the patient      History of Present Illness: Alisia Vines is a 32 y.o. female patient who presents to the Emergency Department for abnormal lab. Pt was referred to the ED by Dr. Franks (Rheumatology) for further evaluation and Hem/Onc consult after multiple labs drawn yesterday showed abnormal values (thrombocytopenia and anemia). Symptoms are constant and moderate in severity. No mitigating or exacerbating factors reported. Associated sxs include fatigue, vaginal discharge, generalized weakness, rhinorrhea, and productive cough with yellow sputum. Patient denies any fever, chills, n/v/d, SOB, CP, numbness, dizziness, headache, easy bleeding, easy bruising, headache, numbness or weakness to 1 side and all other sxs at this time. Pt denies any IV drug use, but admits to snorting cocaine 6 days ago. No further complaints or concerns at this time.     Recommendations per Dr. Magdy Franks (Rhueumatology):  Concern for SLE flare w/ active nephropathy.  DDx of cocaine induced vasculitis / hemolysis.  Suggest ruling out active infections.  Inpatient vasculitis w/u and thrombocytopenia management.  Suggest hem/onc and rheumatology consult to continue high dose steroids, consider IVIG vs PLEX.    Per secure Chat with STAN Davis (rheumatology):  This patient was referred to the emergency department for low platelets and anemia. Patient has a history SLE, and previous cocaine use. Concern of refractory SLE with active flare concerning of lupus nephritis and severe cytopenias. Need expertise from  hem/Onc for concomitant concern of drug induced cytopenias / hemolyisis (cocaine); with differential Dx of SLE and or cocaine induced vasculitis. No response to high dose steroids, need clearance to utilize stronger immunosuppression and discuss IVIG vs PLEX therapy       Arrival mode: Personal vehicle    PCP: Saumya Quinonez MD       Past Medical History:  Past Medical History:   Diagnosis Date    Allergic rhinitis     Anemia     Condyloma acuminata     COVID-19 virus infection 2021    Encounter for blood transfusion     GERD (gastroesophageal reflux disease)     Hemolytic anemia associated with systemic lupus erythematosus 2023    History of fetal anomaly in prior pregnancy, currently pregnant, unspecified trimester 2017    Pacemaker placed    HSV-2 (herpes simplex virus 2) infection     Lupus nephritis     Mood disorder     Overweight(278.02)     Sjogren's syndrome     Systemic lupus complicating pregnancy 2019    Systemic lupus erythematosus     Thrombocytopenia        Past Surgical History:  Past Surgical History:   Procedure Laterality Date     SECTION WITH TUBAL LIGATION N/A 2019    Procedure:  SECTION, WITH TUBAL LIGATION;  Surgeon: VERONICA Valdovinos MD;  Location: [facility] L&D;  Service: OB/GYN;  Laterality: N/A;     SECTION, LOW TRANSVERSE      x2    LYMPH NODE BIOPSY Right 2023    Procedure: BIOPSY, LYMPH NODE;  Surgeon: Rivera Sandoval MD;  Location: [facility] OR;  Service: ENT;  Laterality: Right;  right deep cervial lymph node excision    NECK MASS EXCISION Right 2023    Procedure: EXCISION, MASS, NECK;  Surgeon: Rivera Sandoval MD;  Location: [facility] OR;  Service: ENT;  Laterality: Right;  right deep cervial lymph node excision    RENAL BIOPSY  2013         Family History:  Family History   Problem Relation Age of Onset    Hypertension Mother     Eczema Brother     Hypertension Maternal Grandmother     Diabetes Paternal Grandmother     Heart  disease Son     Arrhythmia Son         CHB    Stroke Neg Hx     Cancer Neg Hx        Social History:  Social History     Tobacco Use    Smoking status: Never     Passive exposure: Never    Smokeless tobacco: Never   Substance and Sexual Activity    Alcohol use: No     Alcohol/week: 0.0 standard drinks    Drug use: Yes     Types: Marijuana    Sexual activity: Not Currently     Partners: Male     Birth control/protection: None       ROS   Review of Systems   Constitutional:  Positive for fatigue. Negative for chills and fever.   HENT:  Positive for rhinorrhea. Negative for sore throat.    Respiratory:  Positive for cough (productive with yellow sputum). Negative for shortness of breath.    Cardiovascular:  Negative for chest pain.   Gastrointestinal:  Negative for anal bleeding, diarrhea, nausea and vomiting.   Genitourinary:  Positive for vaginal discharge. Negative for dysuria.   Musculoskeletal:  Negative for back pain.   Skin:  Negative for rash.   Neurological:  Positive for weakness (generalized). Negative for dizziness, numbness and headaches.   Hematological:  Does not bruise/bleed easily.   All other systems reviewed and are negative.    Physical Exam      Initial Vitals [07/07/23 1031]   BP Pulse Resp Temp SpO2   (!) 145/102 91 16 98 °F (36.7 °C) 100 %      MAP       --          Physical Exam  Nursing Notes and Vital Signs Reviewed.  Constitutional: Patient is in no acute distress. Well-developed and well-nourished.  Nontoxic appearing.  Head: Atraumatic. Normocephalic.  Eyes: PERRL. EOM intact. Conjunctivae are not pale. No scleral icterus.  ENT: Mucous membranes are moist. Oropharynx is clear and symmetric.  No palatal petechiae.    Neck: Supple. Full ROM.   Cardiovascular: Regular rate. Regular rhythm. No murmurs, rubs, or gallops. Distal pulses are 2+ and symmetric.  Pulmonary/Chest: No respiratory distress. Clear to auscultation bilaterally. No wheezing or rales.  Abdominal: Soft and non-distended.   There is no tenderness.  No rebound, guarding, or rigidity.   Pelvic: A female chaperone was present for this examination. Nl external inspection. No lesions or abnormalities were visible on the labia majora or minora. Cervical os is closed. There is no CMT. There is no blood in the vaginal vault. White discharge noted. No adnexal tenderness. No adnexal masses.  Musculoskeletal: Moves all extremities. No obvious deformities. No edema.  Skin: Warm and dry.  Neurological:  Alert, awake, and appropriate.  Normal speech.  No acute focal neurological deficits are appreciated.  Cranial nerves 2-12 intact.  GCS 15.  Psychiatric: Normal affect. Good eye contact. Appropriate in content.    ED Course    Critical Care    Date/Time: 7/7/2023 10:33 AM  Performed by: Edel Tesfaye DO  Authorized by: Edel Tesfaye DO   Direct patient critical care time: 20 minutes  Ordering / reviewing critical care time: 5 minutes  Documentation critical care time: 15 minutes  Consulting other physicians critical care time: 14 minutes  Total critical care time (exclusive of procedural time) : 54 minutes  Critical care time was exclusive of separately billable procedures and treating other patients.  Critical care was necessary to treat or prevent imminent or life-threatening deterioration of the following conditions: circulatory failure (Rheumatologic).  Critical care was time spent personally by me on the following activities: blood draw for specimens, discussions with consultants, discussions with primary provider, interpretation of cardiac output measurements, evaluation of patient's response to treatment, examination of patient, obtaining history from patient or surrogate, ordering and performing treatments and interventions, ordering and review of laboratory studies, ordering and review of radiographic studies, re-evaluation of patient's condition, vascular access procedures, pulse oximetry and review of old charts.      ED Vital  Signs:  Vitals:    07/07/23 2000 07/07/23 2100 07/07/23 2200 07/07/23 2230   BP: 127/87 (!) 137/91 (!) 137/100 (!) 132/98   Pulse: 78 85 82 83   Resp: 16 16 18 18   Temp:       TempSrc:       SpO2: 100% 100% 100% 100%   Weight:       Height:        07/08/23 0015 07/08/23 0030 07/08/23 0130 07/08/23 0300   BP: (!) 140/100 126/84 124/85 (!) 132/90   Pulse: 82 80 76 72   Resp:       Temp: 98.6 °F (37 °C) 98.4 °F (36.9 °C) 98.4 °F (36.9 °C)    TempSrc: Oral Oral Oral    SpO2: 100% 100% 100% 100%   Weight:       Height:        07/08/23 0322 07/08/23 0400 07/08/23 0500 07/08/23 0613   BP:  (!) 141/88 136/86    Pulse: 82 73 70 90   Resp:  16 18 18   Temp:       TempSrc:       SpO2:  100% 99% 99%   Weight:       Height:        07/08/23 0712 07/08/23 1113 07/08/23 1240   BP: 112/76 136/88 123/76   Pulse: 77 75 70   Resp: 14 19 16   Temp: 98.1 °F (36.7 °C)  98.1 °F (36.7 °C)   TempSrc: Oral  Oral   SpO2: 100% 100%    Weight:      Height:          Abnormal Lab Results:  Labs Reviewed   VAGINAL SCREEN - Abnormal; Notable for the following components:       Result Value    WBC - Vaginal Screen Rare (*)     Bacteria - Vaginal Screen Rare (*)     All other components within normal limits   CBC W/ AUTO DIFFERENTIAL - Abnormal; Notable for the following components:    RBC 3.04 (*)     Hemoglobin 11.0 (*)     Hematocrit 29.4 (*)     MCH 36.2 (*)     MCHC 37.4 (*)     RDW 26.9 (*)     Platelets 21 (*)     Immature Granulocytes 4.6 (*)     Immature Grans (Abs) 0.26 (*)     Lymph # 0.7 (*)     nRBC 25 (*)     Gran % 74.6 (*)     Lymph % 12.1 (*)     Platelet Estimate Clumped (*)     All other components within normal limits    Narrative:     Release to patient->Immediate  plt  result(s) called and verbal readback obtained from emelia jurado rn by RADa 07/07/2023 12:42   COMPREHENSIVE METABOLIC PANEL - Abnormal; Notable for the following components:    Calcium 8.1 (*)     Total Protein 5.8 (*)     Albumin 2.1 (*)     Total Bilirubin  1.1 (*)     AST 77 (*)     All other components within normal limits    Narrative:     Release to patient->Immediate   URINALYSIS, REFLEX TO URINE CULTURE - Abnormal; Notable for the following components:    Color, UA Orange (*)     Specific Gravity, UA >1.030 (*)     Protein, UA 3+ (*)     Bilirubin (UA) 1+ (*)     Occult Blood UA 3+ (*)     Urobilinogen, UA 4.0-6.0 (*)     All other components within normal limits    Narrative:     Specimen Source->Urine   CK - Abnormal; Notable for the following components:     (*)     All other components within normal limits    Narrative:     Release to patient->Immediate   DRUG SCREEN PANEL, URINE EMERGENCY - Abnormal; Notable for the following components:    THC Presumptive Positive (*)     All other components within normal limits    Narrative:     Specimen Source->Urine   PLATELET COUNT, BLUE TOP - Abnormal; Notable for the following components:    Platelet Count, Blue Top 19 (*)     All other components within normal limits    Narrative:      BLPLT  critical result(s) called and verbal readback obtained from   ELMIRA SALEEM RN by [email or web address] 07/07/2023 13:18   URINALYSIS MICROSCOPIC - Abnormal; Notable for the following components:    WBC, UA 7 (*)     Hyaline Casts, UA 18 (*)     All other components within normal limits    Narrative:     Specimen Source->Urine   CBC W/ AUTO DIFFERENTIAL - Abnormal; Notable for the following components:    RBC 2.05 (*)     Hemoglobin 8.5 (*)     Hematocrit 22.0 (*)      (*)     MCH 41.5 (*)     MCHC 38.6 (*)     RDW 32.1 (*)     Platelets 37 (*)     Immature Granulocytes 3.5 (*)     Gran # (ANC) 8.5 (*)     Immature Grans (Abs) 0.35 (*)     Lymph # 0.9 (*)     nRBC 10 (*)     Gran % 84.5 (*)     Lymph % 9.3 (*)     Mono % 2.6 (*)     Platelet Estimate Decreased (*)     All other components within normal limits    Narrative:       plt  critical result(s) called and verbal readback obtained from   mary del toro  by [email or web address] 07/08/2023 08:37    COMPREHENSIVE METABOLIC PANEL - Abnormal; Notable for the following components:    Glucose 159 (*)     Calcium 8.1 (*)     Total Protein 5.8 (*)     Albumin 2.1 (*)     AST 62 (*)     All other components within normal limits   CBC W/ AUTO DIFFERENTIAL - Abnormal; Notable for the following components:    RBC 2.20 (*)     Hemoglobin 9.5 (*)     Hematocrit 24.5 (*)      (*)     MCH 43.2 (*)     MCHC 38.8 (*)     Platelets 30 (*)     MPV 13.0 (*)     Immature Granulocytes 2.5 (*)     Gran # (ANC) 8.5 (*)     Immature Grans (Abs) 0.26 (*)     nRBC 10 (*)     Gran % 82.8 (*)     Lymph % 10.1 (*)     Platelet Estimate Decreased (*)     All other components within normal limits    Narrative:      PLT  critical result(s) called and verbal readback obtained from   HUANG BRYSON by [facility] 07/08/2023 10:39   PROTEIN / CREATININE RATIO, URINE - Abnormal; Notable for the following components:    Protein, Urine Random 488 (*)     Prot/Creat Ratio, Urine 4.21 (*)     All other components within normal limits    Narrative:     Specimen Source->Urine   URINALYSIS, REFLEX TO URINE CULTURE - Abnormal; Notable for the following components:    Protein, UA 3+ (*)     Occult Blood UA 3+ (*)     Urobilinogen, UA 4.0-6.0 (*)     All other components within normal limits    Narrative:     Specimen Source->Urine   BASIC METABOLIC PANEL - Abnormal; Notable for the following components:    CO2 19 (*)     Glucose 135 (*)     Calcium 8.1 (*)     All other components within normal limits   TYPE & SCREEN - Abnormal; Notable for the following components:    Indirect Angel POS (*)     All other components within normal limits   CULTURE, BLOOD    Narrative:     Aerobic and anaerobic   CULTURE, BLOOD    Narrative:     Aerobic and anaerobic   C. TRACHOMATIS/N. GONORRHOEAE BY AMP DNA   LACTIC ACID, PLASMA    Narrative:     Release to patient->Immediate   LACTIC ACID, PLASMA   PREGNANCY TEST, URINE RAPID    Narrative:     Specimen Source->Urine    RPR    Narrative:     Release to patient->Immediate   URINALYSIS MICROSCOPIC    Narrative:     Specimen Source->Urine   MYELOPEROXIDASE ANTIBODY (MPO)   PROTEINASE 3 AUTOANTIBODIES   ANTI-NEUTROPHILIC CYTOPLASMIC ANTIBODY   ANTI-DNA ANTIBODY, DOUBLE-STRANDED   ANTIBODY IDENTIFICATION        All Lab Results:  Results for orders placed or performed during the hospital encounter of 07/07/23   Blood culture x two cultures. Draw prior to antibiotics.    Specimen: Peripheral, Antecubital, Left; Blood   Result Value Ref Range    Blood Culture, Routine No Growth to date     Blood Culture, Routine No Growth to date     Blood Culture, Routine No Growth to date    Blood culture x two cultures. Draw prior to antibiotics.    Specimen: Peripheral, Antecubital, Right; Blood   Result Value Ref Range    Blood Culture, Routine No Growth to date     Blood Culture, Routine No Growth to date     Blood Culture, Routine No Growth to date    C. trachomatis/N. gonorrhoeae by AMP DNA    Specimen: Cervix; Genital   Result Value Ref Range    Chlamydia, Amplified DNA Not Detected Not Detected    N gonorrhoeae, amplified DNA Not Detected Not Detected   Vaginal Screen    Specimen: VAGINAL SPECIMEN   Result Value Ref Range    Trichomonas None None    Clue Cells None None    Budding Yeast None None    Fungal Hyphae None None    WBC - Vaginal Screen Rare (A) None    Bacteria - Vaginal Screen Rare (A) None    Wet Prep Source VAG    CBC auto differential   Result Value Ref Range    WBC 7.46 3.90 - 12.70 K/uL    RBC 3.04 (L) 4.00 - 5.40 M/uL    Hemoglobin 11.0 (L) 12.0 - 16.0 g/dL    Hematocrit 29.4 (L) 37.0 - 48.5 %    MCV 97 82 - 98 fL    MCH 36.2 (H) 27.0 - 31.0 pg    MCHC 37.4 (H) 32.0 - 36.0 g/dL    RDW 26.9 (H) 11.5 - 14.5 %    Platelets 21 (LL) 150 - 450 K/uL    MPV SEE COMMENT 9.2 - 12.9 fL    Immature Granulocytes 4.6 (H) 0.0 - 0.5 %    Gran # (ANC) 4.2 1.8 - 7.7 K/uL    Immature Grans (Abs) 0.26 (H) 0.00 - 0.04 K/uL    Lymph # 0.7 (L) 1.0 -  4.8 K/uL    Mono # 0.5 0.3 - 1.0 K/uL    Eos # 0.0 0.0 - 0.5 K/uL    Baso # 0.02 0.00 - 0.20 K/uL    nRBC 25 (A) 0 /100 WBC    Gran % 74.6 (H) 38.0 - 73.0 %    Lymph % 12.1 (L) 18.0 - 48.0 %    Mono % 8.1 4.0 - 15.0 %    Eosinophil % 0.2 0.0 - 8.0 %    Basophil % 0.4 0.0 - 1.9 %    Platelet Estimate Clumped (A)     Aniso Slight     Poik Slight     Tear Drop Cells Occasional     Differential Method Automated    Comprehensive metabolic panel   Result Value Ref Range    Sodium 137 136 - 145 mmol/L    Potassium 3.9 3.5 - 5.1 mmol/L    Chloride 105 95 - 110 mmol/L    CO2 23 23 - 29 mmol/L    Glucose 100 70 - 110 mg/dL    BUN 14 6 - 20 mg/dL    Creatinine 0.6 0.5 - 1.4 mg/dL    Calcium 8.1 (L) 8.7 - 10.5 mg/dL    Total Protein 5.8 (L) 6.0 - 8.4 g/dL    Albumin 2.1 (L) 3.5 - 5.2 g/dL    Total Bilirubin 1.1 (H) 0.1 - 1.0 mg/dL    Alkaline Phosphatase 78 55 - 135 U/L    AST 77 (H) 10 - 40 U/L    ALT 22 10 - 44 U/L    eGFR >60 >60 mL/min/1.73 m^2    Anion Gap 9 8 - 16 mmol/L   Lactic acid, plasma #1   Result Value Ref Range    Lactate (Lactic Acid) 1.6 0.5 - 2.2 mmol/L   Lactic acid, plasma #2   Result Value Ref Range    Lactate (Lactic Acid) 1.1 0.5 - 2.2 mmol/L   Urinalysis, Reflex to Urine Culture Urine, Clean Catch    Specimen: Urine   Result Value Ref Range    Specimen UA Urine, Clean Catch     Color, UA Orange (A) Yellow, Straw, Erin    Appearance, UA Clear Clear    pH, UA 7.0 5.0 - 8.0    Specific Gravity, UA >1.030 (A) 1.005 - 1.030    Protein, UA 3+ (A) Negative    Glucose, UA Negative Negative    Ketones, UA Negative Negative    Bilirubin (UA) 1+ (A) Negative    Occult Blood UA 3+ (A) Negative    Nitrite, UA Negative Negative    Urobilinogen, UA 4.0-6.0 (A) <2.0 EU/dL    Leukocytes, UA Negative Negative   Rapid Pregnancy, Urine   Result Value Ref Range    Preg Test, Ur Negative    RPR   Result Value Ref Range    RPR Non-reactive Non-reactive   CK   Result Value Ref Range     (H) 20 - 180 U/L   Drug screen  panel, emergency   Result Value Ref Range    Benzodiazepines Negative Negative    Methadone metabolites Negative Negative    Cocaine (Metab.) Negative Negative    Opiate Scrn, Ur Negative Negative    Barbiturate Screen, Ur Negative Negative    Amphetamine Screen, Ur Negative Negative    THC Presumptive Positive (A) Negative    Phencyclidine Negative Negative    Creatinine, Urine 258.6 15.0 - 325.0 mg/dL    Toxicology Information SEE COMMENT    Platelet Count, Blue Top   Result Value Ref Range    Platelet Count, Blue Top 19 (LL) 150 - 450 K/uL    MPV, Blue Top SEE COMMENT 9.2 - 12.9 fL   Urinalysis Microscopic   Result Value Ref Range    RBC, UA 0 0 - 4 /hpf    WBC, UA 7 (H) 0 - 5 /hpf    Bacteria None None-Occ /hpf    Hyaline Casts, UA 18 (A) 0-1/lpf /lpf    Microscopic Comment SEE COMMENT    CBC Auto Differential   Result Value Ref Range    WBC 10.05 3.90 - 12.70 K/uL    RBC 2.05 (L) 4.00 - 5.40 M/uL    Hemoglobin 8.5 (L) 12.0 - 16.0 g/dL    Hematocrit 22.0 (L) 37.0 - 48.5 %     (H) 82 - 98 fL    MCH 41.5 (H) 27.0 - 31.0 pg    MCHC 38.6 (H) 32.0 - 36.0 g/dL    RDW 32.1 (H) 11.5 - 14.5 %    Platelets 37 (LL) 150 - 450 K/uL    MPV SEE COMMENT 9.2 - 12.9 fL    Immature Granulocytes 3.5 (H) 0.0 - 0.5 %    Gran # (ANC) 8.5 (H) 1.8 - 7.7 K/uL    Immature Grans (Abs) 0.35 (H) 0.00 - 0.04 K/uL    Lymph # 0.9 (L) 1.0 - 4.8 K/uL    Mono # 0.3 0.3 - 1.0 K/uL    Eos # 0.0 0.0 - 0.5 K/uL    Baso # 0.01 0.00 - 0.20 K/uL    nRBC 10 (A) 0 /100 WBC    Gran % 84.5 (H) 38.0 - 73.0 %    Lymph % 9.3 (L) 18.0 - 48.0 %    Mono % 2.6 (L) 4.0 - 15.0 %    Eosinophil % 0.0 0.0 - 8.0 %    Basophil % 0.1 0.0 - 1.9 %    Platelet Estimate Decreased (A)     Aniso Slight     Poik Slight     Poly Occasional     Differential Method Automated    Comprehensive Metabolic Panel   Result Value Ref Range    Sodium 137 136 - 145 mmol/L    Potassium 4.0 3.5 - 5.1 mmol/L    Chloride 104 95 - 110 mmol/L    CO2 24 23 - 29 mmol/L    Glucose 159 (H) 70 -  110 mg/dL    BUN 15 6 - 20 mg/dL    Creatinine 0.6 0.5 - 1.4 mg/dL    Calcium 8.1 (L) 8.7 - 10.5 mg/dL    Total Protein 5.8 (L) 6.0 - 8.4 g/dL    Albumin 2.1 (L) 3.5 - 5.2 g/dL    Total Bilirubin 0.9 0.1 - 1.0 mg/dL    Alkaline Phosphatase 83 55 - 135 U/L    AST 62 (H) 10 - 40 U/L    ALT 19 10 - 44 U/L    eGFR >60 >60 mL/min/1.73 m^2    Anion Gap 9 8 - 16 mmol/L   CBC Auto Differential   Result Value Ref Range    WBC 10.25 3.90 - 12.70 K/uL    RBC 2.20 (L) 4.00 - 5.40 M/uL    Hemoglobin 9.5 (L) 12.0 - 16.0 g/dL    Hematocrit 24.5 (L) 37.0 - 48.5 %     (H) 82 - 98 fL    MCH 43.2 (H) 27.0 - 31.0 pg    MCHC 38.8 (H) 32.0 - 36.0 g/dL    RDW SEE COMMENT 11.5 - 14.5 %    Platelets 30 (LL) 150 - 450 K/uL    MPV 13.0 (H) 9.2 - 12.9 fL    Immature Granulocytes 2.5 (H) 0.0 - 0.5 %    Gran # (ANC) 8.5 (H) 1.8 - 7.7 K/uL    Immature Grans (Abs) 0.26 (H) 0.00 - 0.04 K/uL    Lymph # 1.0 1.0 - 4.8 K/uL    Mono # 0.5 0.3 - 1.0 K/uL    Eos # 0.0 0.0 - 0.5 K/uL    Baso # 0.02 0.00 - 0.20 K/uL    nRBC 10 (A) 0 /100 WBC    Gran % 82.8 (H) 38.0 - 73.0 %    Lymph % 10.1 (L) 18.0 - 48.0 %    Mono % 4.4 4.0 - 15.0 %    Eosinophil % 0.0 0.0 - 8.0 %    Basophil % 0.2 0.0 - 1.9 %    Platelet Estimate Decreased (A)     Aniso Slight     Poik Slight     Differential Method Automated    Protein / creatinine ratio, urine   Result Value Ref Range    Protein, Urine Random 488 (H) 0 - 15 mg/dL    Creatinine, Urine 115.8 15.0 - 325.0 mg/dL    Prot/Creat Ratio, Urine 4.21 (H) 0.00 - 0.20   Urinalysis, Reflex to Urine Culture Urine, Clean Catch    Specimen: Urine   Result Value Ref Range    Specimen UA Urine, Clean Catch     Color, UA Yellow Yellow, Straw, Erin    Appearance, UA Clear Clear    pH, UA 7.0 5.0 - 8.0    Specific Gravity, UA 1.020 1.005 - 1.030    Protein, UA 3+ (A) Negative    Glucose, UA Negative Negative    Ketones, UA Negative Negative    Bilirubin (UA) Negative Negative    Occult Blood UA 3+ (A) Negative    Nitrite, UA Negative  Negative    Urobilinogen, UA 4.0-6.0 (A) <2.0 EU/dL    Leukocytes, UA Negative Negative   Basic metabolic panel   Result Value Ref Range    Sodium 139 136 - 145 mmol/L    Potassium 4.4 3.5 - 5.1 mmol/L    Chloride 108 95 - 110 mmol/L    CO2 19 (L) 23 - 29 mmol/L    Glucose 135 (H) 70 - 110 mg/dL    BUN 14 6 - 20 mg/dL    Creatinine 0.6 0.5 - 1.4 mg/dL    Calcium 8.1 (L) 8.7 - 10.5 mg/dL    Anion Gap 12 8 - 16 mmol/L    eGFR >60 >60 mL/min/1.73 m^2   Urinalysis Microscopic   Result Value Ref Range    RBC, UA 0 0 - 4 /hpf    WBC, UA 4 0 - 5 /hpf    Bacteria Rare None-Occ /hpf    Hyaline Casts, UA 0 0-1/lpf /lpf    Microscopic Comment SEE COMMENT    Type & Screen   Result Value Ref Range    Group & Rh B POS     Indirect Angel POS (A)     Specimen Outdate 07/10/2023 23:59    Antibody identification   Result Value Ref Range    Antibody ID POS      *Note: Due to a large number of results and/or encounters for the requested time period, some results have not been displayed. A complete set of results can be found in Results Review.     Imaging Results:  Imaging Results              X-Ray Chest AP Portable (Final result)  Result time 07/07/23 12:24:18      Final result by Linus Lau MD (07/07/23 12:24:18)                   Impression:      No acute cardiopulmonary abnormality.      Electronically signed by: Linus Lau  Date:    07/07/2023  Time:    12:24               Narrative:    EXAMINATION:  XR CHEST AP PORTABLE    CLINICAL HISTORY:  body ache;    TECHNIQUE:  Single frontal portable view of the chest was performed.    COMPARISON:  X-ray dated 07/01/2019    FINDINGS:  Cardiomediastinal silhouette is within normal limits.  Trachea is midline.  Pulmonary vasculature is unremarkable.  Lungs are clear.  No significant pleural effusion or pneumothorax.  No acute bony abnormality.                                              The Emergency Provider reviewed the vital signs and test results, which are outlined  above.    ED Discussion     11:03 AM: Discussed pt's case with Dr. Franks (Rheumatology) who recommends transferring the pt to [facility] for Hem/Onc services. Dr. Franks had referred the pt to the ED for concern for SLE flare w/ active nephropathy (DDx of cocaine induced vasculitis / hemolysis).  Dr. Franks recommends ruling out active infections, inpatient vasculitis workup and thrombocytopenia management, as well as Rheumatology and Hem/Onc consults to continue high dose steroids, consider IVIG vs PLEX.    11:25 AM: Discussed pt's case with Dr. Mitra Cisneros (Hem/Onc at [facility]) who recommends consulting Dr. Anderson ([facility] [facility]    12:31 PM: Discussed pt's case with Dr. Anderson (Hem/Onc at [facility]) who recommends getting blue top platelet count; Dr. Anderson states that the low hemoglobin count may be a lab error.    2:09 PM: Discussed pt's case and lab results with Dr. Anderson (Hem/Onc at [facility]) and Dr. Franks (Rheumatology) who recommend transfer to [facility], as there is no inpatient Rheumatology or Hem/Onc on-call here at [facility].    3:17 PM: Consult with Dr. Vizcaino (Hospital Medicine) at [facility] concerning pt. There are no Hem/Onc or Rheumatology services, which the patient requires, offered at [facility] at this time. Dr. Vizcaino expresses understanding and will accept transfer for Hem/Onc and Rheumatology services.  Accepting Facility: [facility]  Accepting Physician: Dr. Vizcaino    3:19 PM: Re-evaluated pt. Informed pt and family that there are no Hem/Onc or Rheumatology services available at this time. I have discussed test results, shared treatment plan, and the need for transfer with patient and family at bedside. All historical, clinical, radiographic, and laboratory findings were reviewed with the patient/family in detail. Patient will be transferred by [facility] services with BLS required en  route. Patient understands that there could be unforeseen motor vehicle accidents, bleeding,  or loss of vital signs that could result in potential death or permanent disability. Pt and family express understanding at this time and agree with all information. All questions answered. Pt and family have no further questions or concerns at this time. Pt is ready for transfer.       ED Course as of 07/10/23 1122   Fri Jul 07, 2023   1105 No Hematology-Oncology call for the hospital today [LB]   1137 Spoke with Dr. DAVID Cisneros at [facility]:   She recommends speaking to Dr. Donovan Anderson. [LB]   1233 On phone with Dr. Frausto with hematology:    He recommended blue top platelet:   He believes that the low hemoglobin count obtained on July 4th could be erroneous.  He believes patient may be experiencing pseudo thrombocytopenia due to clumping.  Recommends getting a blue top platelet count.  Reach out to him when results are back. [LB]   1326 Platelet Count, Blue Top(!!): 19 [LB]   1326 Platelets(!!): 21 [LB]   1326 Hemoglobin(!): 11.0 [LB]   1326 Hematocrit(!): 29.4 [LB]   1329 Creatinine: 0.6 [LB]   1329 BUN: 14 [LB]   1408 Recommendation per Dr. Mariluz Franks:  My recommendations for this patient management:  - panculture patient.  - ANCA screen, MPO, PR3.  Trend MPO/PR3 until improvement noted  - Trend CBC, CMP, dsDNA, PCR.  Nephrology consult if no improvement of PCR with high dose steroids    - follow w/ 60mg of prednisone, taper 10mg q2 weeks until 20mg per day  - Consider IVIG if no improvement to cytopenias  - Suggest clearance by Hem/Onc for resuming rituximab or cytoxan for lupus nephritis and concern for vasculitis.  - Solu-medrol pulse 500mg BID x 3 total days [LB]   1520 On phone with Dr. Tee Vizcaino:    Will accept at [facility].   Patient updated.  [LB]   1855 Lactate, Olivier: 1.1 [LB]   1855 WBC, UA(!): 7 [LB]   1855 Specific Gravity, UA(!): >1.030 [LB]   1855 RPR: Non-reactive [LB]   2000 Patient  is currently awaiting bed at [facility].  The oncoming physician Dr.Si Harden  is aware of the plan of care.  Re-examined patient.  Currently voices no questions or concerns.  Patient nontoxic appearing. [LB]   Sat Jul 08, 2023   0645 Glucose(!): 159 [LB]   0645 Blood Culture, Routine: No Growth to date [LB]   0838 Platelets(!!): 37 [LB]   0838 Hemoglobin(!): 8.5 [LB]   0838 Hematocrit(!): 22.0 [LB]   0855 Dr. Anderson (Hematology):  Would anticipate platelets to continue to rise on steroids. If sole reason for transfer is for hematology issues her plts have increased above 30 so In safe range. Dr. Cm is covering heme consults in [place] this weekend.  We may be able to avoid an unnecessary transfer .  If transfer is for her rheum issues due to lack of rheum coverage in [place] then would defer to primary on this. [LB]   0900 Discussed with Dr Bismark Franks: Last protein/creatinine ratio on 7/6 was 9.1.  Please let's have a repeat one today.  If trending down, agree with Dr. Anderson: further management may be continued by primary team as outpatient.  If proteinuria unchanged would suggest Nephrology consult [LB]   0921 Plan of care discussed with Dr. Cool.    Added to secure chat with rheumatology and hematology.     Will redraw protein/creatine ratio.   Care turned over to Dr. Cool. [LB]   1044 CBC shows platelet count of 30.  3+ proteinuria.  Protein to creatinine ratio urine still pending. [NF]   1124 CBC shows platelet count of 30.  UA 3+ proteinuria.  Protein to creatinine ratio urine 115.8.  [NF]      ED Course User Index  [LB] Edel Tesfaye,   [NF] Bhargavi Cool,        3:39 PM   All historical, clinical, radiographic, and laboratory findings were reviewed with the patient/family in detail.  I discussed the indications and treatment need: (Internal Medicine and rheumatology and hematology ) .    Patient/Family requests transfer to:  [facility]    Patient/Family understands that  [facility] does NOT provide ( rheumatology and hematology ) services.     Patient/family verbalized understanding.   All remaining questions and concerns were addressed at that time and the patient/family agrees to proceed accordingly.  Similarly all pertinent details of the encounter were discussed with  Fiona Thapa RN  at [facility] .  Dr. Tee Vizcaino  agrees to accept the patient in transfer based on the needs/patient preferences outlined above.  Patient will be transferred by [facility] , secondary to a need for ongoing  Steroid  en route.  Risks: of transfer:    inclement weather, loss of vitals signs, permanent neurologic damage, MVC, loss of current lifestyle, bleeding and/or death.  Benefits of transfer:  Rheumatology and hematology .  Patient and/or family agree and verbalize understanding.     Edel Tesfaye DO,  FACEP      ED Medication(s):  Medications   amLODIPine tablet 5 mg (5 mg Oral Given 7/7/23 1631)   methylPREDNISolone sodium succinate (SOLU-MEDROL) 500 mg in dextrose 5 % (D5W) 100 mL IVPB (0 mg Intravenous Stopped 7/7/23 1734)   pantoprazole EC tablet 40 mg (40 mg Oral Given 7/7/23 1631)       Discharge Medication List as of 7/8/2023 12:49 PM        START taking these medications    Details   !! predniSONE (DELTASONE) 20 MG tablet Take 3 tablets (60 mg total) by mouth once daily. for 14 days, Starting Sat 7/8/2023, Until Sat 7/22/2023, Print       !! - Potential duplicate medications found. Please discuss with provider.          Medical Decision Making    Medical Decision Making:   History:   Old Medical Records: I decided to obtain old medical records.  Old Records Summarized: records from previous admission(s).       <> Summary of Records: Subjective:     Patient ID: Alisia Vines is a 32 y.o. female.     Chief Complaint: Lupus and Disease Management        HPI   Alisia Vines  is a 32 y.o. female seen for urgent appointment  today.  She had Rituxan dose 1 of 2 on 06/27/2023.  Following that she had a delayed infusion reaction including nausea, vomiting, loss of appetite, dizziness, severe fatigue.  Generally feeling very run down.     Went to the emergency room on 07/04/2023.  She tested positive for several illicit substances.  She admits to cocaine use on Saturday 7/1/23 but no other substance abuse since then.  In the emergency room was noted to have critically low blood counts as well as platelets.  She was transfused 1 unit in advised to follow-up with both Hematology and Rheumatology outpatient.  She has hematology appointment on Monday.     In past has been historically noncompliant.  Recently admitted to the hospital a few months ago for lupus flare.  She has been more compliant with Plaquenil since then. In the past, compliance has been an issue for her.  With her recent hospitalization she is committed to being more compliant with medications and follow-up.  She is also complaining of dark urine.     She has Plaquenil 400 mg daily ordered.  She was off for many months prior to resuming it in the hospital recently.  Tolerates it well.  Has been on Rituxan infusions in the past due to noncompliance.       She uses refresh drops for dry eyes.  She sees Ophthalmology here for Plaquenil monitoring.  Most recent eye exam was September 2021.  Denies eye pain.  Having trouble getting reestablished for Plaquenil monitoring.  Requesting an internal referral.     Also with class 5 lupus nephritis.  She was last seen by Nephrology July 2022.  She has an appointment with Dr. Willard July 31, 2023.  Recently had a lymph node biopsy by ENT.  It was negative for lymphoma but did show hyperplasia.     C/o rash across face.  Planning f/u w dermatology.  Also seeing ID w recent lymph node concerns.  Recent TB test is indeterminate.  T spot was negative.     Patient complain of oral and nasal ulcerations which have improved since last visit.  She  also complains of prolonged stiffness lasting more than 30-45 minutes.  No joint pain today.  She  denies fevers, chills, photosensitivity, eye pain, shortness of breath, chest pain, hematuria, blood in the stool, raynauds, finger ulcerations, tender swollen joints, LAD, dysphagia, weakness.  Rheumatologic systems otherwise negative.     Serologies/Labs:  · (+) HARPREET  · (+) ds DNA  · (+) SSA  · (+) SSB  Current Treatment:  · Plaquenil 200mg bid - not consistent, compliance an issue  · Rituxan dose 1 of 2 on 6/27/2023 - loss of apetite, n/v, dizziness  Previous Treatment:   · Rituxan - used for non-compliance in past           Current Outpatient Medications:   ·  amLODIPine (NORVASC) 5 MG tablet, TAKE 1 TABLET(5 MG) BY MOUTH EVERY DAY, Disp: 90 tablet, Rfl: 1  ·  ARIPiprazole (ABILIFY) 2 MG Tab, TAKE 1 TABLET(2 MG) BY MOUTH EVERY DAY, Disp: 30 tablet, Rfl: 3  ·  ciclopirox (PENLAC) 8 % Soln, Apply to nail and nail fold once daily for up to 1 year, Disp: 6.6 mL, Rfl: 6  ·  diphenhydrAMINE-aluminum-magnesium hydroxide-simethicone-LIDOcaine HCl 2%, Swish and spit 15 mLs every 4 (four) hours as needed (oral ulcer pain)., Disp: 450 each, Rfl: 0  ·  FLUoxetine 40 MG capsule, TAKE 1 CAPSULE(40 MG) BY MOUTH EVERY DAY, Disp: 30 capsule, Rfl: 3  ·  fluticasone propionate (FLONASE) 50 mcg/actuation nasal spray, SHAKE LIQUID AND USE 2 SPRAYS(100 MCG) IN EACH NOSTRIL EVERY DAY, Disp: 16 g, Rfl: 1  ·  folic acid (FOLVITE) 1 MG tablet, Take 1 tablet (1 mg total) by mouth once daily., Disp: 100 tablet, Rfl: 3  ·  HYDROcodone-acetaminophen (NORCO) 5-325 mg per tablet, Take 1-2 tablets by mouth every 6 (six) hours as needed for Pain., Disp: 30 tablet, Rfl: 0  ·  hydrOXYchloroQUINE (PLAQUENIL) 200 mg tablet, TAKE 2 TABLETS(400 MG) BY MOUTH EVERY DAY, Disp: 60 tablet, Rfl: 2  ·  ketoconazole (NIZORAL) 2 % shampoo, Wash hair with medicated shampoo at least 1x/week - let sit on scalp at least 5 minutes prior to rinsing, Disp: 120 mL, Rfl:  11  ·  mometasone (ELOCON) 0.1 % solution, AAA of scalp once daily., Disp: 60 mL, Rfl: 3  ·  ondansetron (ZOFRAN) 4 MG tablet, Take 1 tablet (4 mg total) by mouth every 8 (eight) hours as needed for Nausea., Disp: 20 tablet, Rfl: 0  ·  predniSONE (DELTASONE) 10 MG tablet, Take 2 tablets (20 mg total) by mouth once daily for 28 days, THEN 1.5 tablets (15 mg total) once daily for 14 days, THEN 1 tablet (10 mg total) once daily for 14 days., Disp: 91 tablet, Rfl: 0  ·  triamcinolone acetonide 0.025% (KENALOG) 0.025 % Oint, AAA bid prn. Use for 2 weeks then taper. Mild steroid., Disp: 80 g, Rfl: 1  ·  valACYclovir (VALTREX) 1000 MG tablet, Take 1 tablet (1,000 mg total) by mouth once daily., Disp: 90 tablet, Rfl: 3  ·  water Liqd 150 mL with MILK OF MAGNESIA 400 mg/5 mL Susp 400 mg, diphenhydrAMINE 12.5 mg/5 mL Elix 60 mg, nystatin 100,000 unit/mL Susp 500,000 Units, SWISH AND SPIT 10 ML EVERY 6 HOURS AS NEEDED, Disp: , Rfl:   ·  water Liqd 150 mL with MILK OF MAGNESIA 400 mg/5 mL Susp 400 mg, diphenhydrAMINE 12.5 mg/5 mL Elix 60 mg, nystatin 100,000 unit/mL Susp 500,000 Units, SWISH AND SPIT 10 ML EVERY 6 HOURS AS NEEDED, Disp: , Rfl:      Past Medical History:  Diagnosis Date  · Allergic rhinitis    · Anemia    · Condyloma acuminata    · COVID-19 virus infection 07/2021  · Encounter for blood transfusion    · GERD (gastroesophageal reflux disease)    · Hemolytic anemia associated with systemic lupus erythematosus 4/30/2023  · History of fetal anomaly in prior pregnancy, currently pregnant, unspecified trimester 03/08/2017    Pacemaker placed  · HSV-2 (herpes simplex virus 2) infection    · Lupus nephritis    · Mood disorder    · Overweight(278.02)    · Sjogren's syndrome    · Systemic lupus complicating pregnancy 01/31/2019  · Systemic lupus erythematosus    · Thrombocytopenia       Family History  Problem Relation Age of Onset  · Hypertension Mother    · Eczema Brother    · Hypertension Maternal Grandmother     · Diabetes Paternal Grandmother    · Heart disease Son    · Arrhythmia Son          CHB  · Stroke Neg Hx    · Cancer Neg Hx       Social History         Socioeconomic History  · Marital status: Single  · Number of children: 2  Occupational History      Comment: [employer]  Tobacco Use  · Smoking status: Never      Passive exposure: Never  · Smokeless tobacco: Never  Substance and Sexual Activity  · Alcohol use: No      Alcohol/week: 0.0 standard drinks  · Drug use: Yes      Types: Marijuana  · Sexual activity: Not Currently      Partners: Male      Birth control/protection: None  Other Topics Concern  · Are you pregnant or think you may be? No  · Breast-feeding No  Social History Narrative    The patient is single and lives with her significant other.  She has 3 children.  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       Social Determinants of Health       Financial Resource Strain: Low Risk   · Difficulty of Paying Living Expenses: Not hard at all  Food Insecurity: No Food Insecurity  · Worried About Running Out of Food in the Last Year: Never true  · Ran Out of Food in the Last Year: Never true  Transportation Needs: No Transportation Needs  · Lack of Transportation (Medical): No  · Lack of Transportation (Non-Medical): No  Physical Activity: Inactive  · Days of Exercise per Week: 0 days  · Minutes of Exercise per Session: 0 min  Stress: No Stress Concern Present  · Feeling of Stress : Not at all  Social Connections: Socially Isolated  · Frequency of Communication with Friends and Family: More than three times a week  · Frequency of Social Gatherings with Friends and Family: More than three times a week  · Attends Hindu Services: Never  · Active Member of Clubs or Organizations: No  · Attends Club or Organization Meetings: Never  · Marital Status: Never   Housing Stability: Unknown  · Unable to Pay for Housing in the Last Year: No  · Unstable Housing in the Last Year: No       Review  "of patient's allergies indicates:  No Known Allergies     Objective:  BP (!) 142/98   Pulse 106   Ht 5' 5" (1.651 m)   Wt 76.2 kg (168 lb)   LMP 06/30/2023   BMI 27.96 kg/m²   Immunization History  Administered Date(s) Administered  · COVID-19, MRNA, LN-S, PF (Pfizer) (Purple Cap) 12/21/2021, 01/10/2022  · Influenza - High Dose - PF (65 years and older) 11/22/2013, 11/20/2018  · Influenza - Quadrivalent 11/27/2015  · Influenza - Quadrivalent - PF *Preferred* (6 months and older) 10/22/2017  · Pneumococcal Conjugate - 13 Valent 11/27/2015  · Tdap 09/08/2012, 07/31/2017, 05/01/2022        Physical Exam   Constitutional: She is oriented to person, place, and time. No distress.   HENT:   Head: Normocephalic and atraumatic.   Pulmonary/Chest: Effort normal.   Musculoskeletal:      Cervical back: Normal range of motion.   Neurological: She is alert and oriented to person, place, and time.   Psychiatric: Mood normal.      Visible ulcerations along upper lip w swelling     Recent Results  Recent Results (from the past 672 hour(s))  CBC auto differential    Collection Time: 06/09/23 11:14 PM  Result Value Ref Range    WBC 3.83 (L) 3.90 - 12.70 K/uL    RBC 2.90 (L) 4.00 - 5.40 M/uL    Hemoglobin 9.8 (L) 12.0 - 16.0 g/dL    Hematocrit 27.9 (L) 37.0 - 48.5 %    MCV 96 82 - 98 fL    MCH 33.8 (H) 27.0 - 31.0 pg    MCHC 35.1 32.0 - 36.0 g/dL    RDW 17.2 (H) 11.5 - 14.5 %    Platelets 188 150 - 450 K/uL    MPV 10.2 9.2 - 12.9 fL    Immature Granulocytes 0.3 0.0 - 0.5 %    Gran # (ANC) 2.3 1.8 - 7.7 K/uL    Immature Grans (Abs) 0.01 0.00 - 0.04 K/uL    Lymph # 1.1 1.0 - 4.8 K/uL    Mono # 0.3 0.3 - 1.0 K/uL    Eos # 0.2 0.0 - 0.5 K/uL    Baso # 0.01 0.00 - 0.20 K/uL    nRBC 0 0 /100 WBC    Gran % 59.8 38.0 - 73.0 %    Lymph % 27.9 18.0 - 48.0 %    Mono % 7.8 4.0 - 15.0 %    Eosinophil % 3.9 0.0 - 8.0 %    Basophil % 0.3 0.0 - 1.9 %    Differential Method Automated    Comprehensive metabolic panel    Collection Time: 06/09/23 " 11:14 PM  Result Value Ref Range    Sodium 143 136 - 145 mmol/L    Potassium 3.3 (L) 3.5 - 5.1 mmol/L    Chloride 112 (H) 95 - 110 mmol/L    CO2 24 23 - 29 mmol/L    Glucose 77 70 - 110 mg/dL    BUN 7 6 - 20 mg/dL    Creatinine 0.6 0.5 - 1.4 mg/dL    Calcium 8.0 (L) 8.7 - 10.5 mg/dL    Total Protein 5.4 (L) 6.0 - 8.4 g/dL    Albumin 2.3 (L) 3.5 - 5.2 g/dL    Total Bilirubin 0.2 0.1 - 1.0 mg/dL    Alkaline Phosphatase 68 55 - 135 U/L    AST 22 10 - 40 U/L    ALT 10 10 - 44 U/L    Anion Gap 7 (L) 8 - 16 mmol/L    eGFR >60 >60 mL/min/1.73 m^2  Pathologist Interpretation Differential    Collection Time: 06/15/23  8:17 AM  Result Value Ref Range    Pathologist Review Review required    Direct antiglobulin test    Collection Time: 06/15/23  8:17 AM  Result Value Ref Range    Direct Angel (LUIS MIGUEL) POS    Haptoglobin    Collection Time: 06/15/23  8:17 AM  Result Value Ref Range    Haptoglobin 47 30 - 250 mg/dL  Lactate Dehydrogenase    Collection Time: 06/15/23  8:17 AM  Result Value Ref Range     110 - 260 U/L  Reticulocytes    Collection Time: 06/15/23  8:17 AM  Result Value Ref Range    Retic 2.6 (H) 0.5 - 2.5 %  Folate    Collection Time: 06/15/23  8:17 AM  Result Value Ref Range    Folate 6.8 4.0 - 24.0 ng/mL  Iron and TIBC    Collection Time: 06/15/23  8:17 AM  Result Value Ref Range    Iron 46 30 - 160 ug/dL    Transferrin 235 200 - 375 mg/dL    TIBC 348 250 - 450 ug/dL    Saturated Iron 13 (L) 20 - 50 %  Ferritin    Collection Time: 06/15/23  8:17 AM  Result Value Ref Range    Ferritin 26 20.0 - 300.0 ng/mL  Comprehensive Metabolic Panel    Collection Time: 06/15/23  8:17 AM  Result Value Ref Range    Sodium 138 136 - 145 mmol/L    Potassium 3.6 3.5 - 5.1 mmol/L    Chloride 108 95 - 110 mmol/L    CO2 21 (L) 23 - 29 mmol/L    Glucose 83 70 - 110 mg/dL    BUN 17 6 - 20 mg/dL    Creatinine 0.7 0.5 - 1.4 mg/dL    Calcium 8.5 (L) 8.7 - 10.5 mg/dL    Total Protein 6.0 6.0 - 8.4 g/dL    Albumin 2.8 (L) 3.5 - 5.2 g/dL     Total Bilirubin 0.3 0.1 - 1.0 mg/dL    Alkaline Phosphatase 64 55 - 135 U/L    AST 23 10 - 40 U/L    ALT 12 10 - 44 U/L    Anion Gap 9 8 - 16 mmol/L    eGFR >60 >60 mL/min/1.73 m^2  CBC Auto Differential    Collection Time: 06/15/23  8:17 AM  Result Value Ref Range    WBC 3.90 3.90 - 12.70 K/uL    RBC 2.74 (L) 4.00 - 5.40 M/uL    Hemoglobin 9.5 (L) 12.0 - 16.0 g/dL    Hematocrit 26.0 (L) 37.0 - 48.5 %    MCV 95 82 - 98 fL    MCH 34.7 (H) 27.0 - 31.0 pg    MCHC 36.5 (H) 32.0 - 36.0 g/dL    RDW 17.0 (H) 11.5 - 14.5 %    Platelets 200 150 - 450 K/uL    MPV 9.9 9.2 - 12.9 fL    Immature Granulocytes 0.3 0.0 - 0.5 %    Gran # (ANC) 2.2 1.8 - 7.7 K/uL    Immature Grans (Abs) 0.01 0.00 - 0.04 K/uL    Lymph # 1.1 1.0 - 4.8 K/uL    Mono # 0.5 0.3 - 1.0 K/uL    Eos # 0.1 0.0 - 0.5 K/uL    Baso # 0.01 0.00 - 0.20 K/uL    nRBC 0 0 /100 WBC    Gran % 55.8 38.0 - 73.0 %    Lymph % 27.7 18.0 - 48.0 %    Mono % 13.1 4.0 - 15.0 %    Eosinophil % 2.8 0.0 - 8.0 %    Basophil % 0.3 0.0 - 1.9 %    Differential Method Automated    CBC Auto Differential    Collection Time: 06/15/23  8:17 AM  Result Value Ref Range    WBC 3.90 3.90 - 12.70 K/uL    RBC 2.74 (L) 4.00 - 5.40 M/uL    Hemoglobin 9.5 (L) 12.0 - 16.0 g/dL    Hematocrit 26.0 (L) 37.0 - 48.5 %    MCV 95 82 - 98 fL    MCH 34.7 (H) 27.0 - 31.0 pg    MCHC 36.5 (H) 32.0 - 36.0 g/dL    RDW 17.0 (H) 11.5 - 14.5 %    Platelets 200 150 - 450 K/uL    MPV 9.9 9.2 - 12.9 fL    Immature Granulocytes 0.3 0.0 - 0.5 %    Gran # (ANC) 2.2 1.8 - 7.7 K/uL    Immature Grans (Abs) 0.01 0.00 - 0.04 K/uL    Lymph # 1.1 1.0 - 4.8 K/uL    Mono # 0.5 0.3 - 1.0 K/uL    Eos # 0.1 0.0 - 0.5 K/uL    Baso # 0.01 0.00 - 0.20 K/uL    nRBC 0 0 /100 WBC    Gran % 55.8 38.0 - 73.0 %    Lymph % 27.7 18.0 - 48.0 %    Mono % 13.1 4.0 - 15.0 %    Eosinophil % 2.8 0.0 - 8.0 %    Basophil % 0.3 0.0 - 1.9 %    Differential Method Automated    Pathologist Review    Collection Time: 06/15/23  8:17 AM  Result Value Ref  Range    Pathologist Review Peripheral Smear REVIEWED    Pregnancy, urine rapid    Collection Time: 06/27/23 10:24 AM  Result Value Ref Range    Preg Test, Ur Negative    Urinalysis, Reflex to Urine Culture Urine, Clean Catch    Collection Time: 07/04/23 10:49 AM    Specimen: Urine  Result Value Ref Range    Specimen UA Urine, Clean Catch      Color, UA Yellow Yellow, Straw, Erin    Appearance, UA Clear Clear    pH, UA 7.0 5.0 - 8.0    Specific Gravity, UA 1.030 1.005 - 1.030    Protein, UA 3+ (A) Negative    Glucose, UA Negative Negative    Ketones, UA Negative Negative    Bilirubin (UA) 1+ (A) Negative    Occult Blood UA 3+ (A) Negative    Nitrite, UA Negative Negative    Urobilinogen, UA >=8.0 (A) <2.0 EU/dL    Leukocytes, UA Negative Negative  Drug screen panel, emergency    Collection Time: 07/04/23 10:49 AM  Result Value Ref Range    Benzodiazepines Negative Negative    Methadone metabolites Negative Negative    Cocaine (Metab.) Presumptive Positive (A) Negative    Opiate Scrn, Ur Presumptive Positive (A) Negative    Barbiturate Screen, Ur Negative Negative    Amphetamine Screen, Ur Negative Negative    THC Presumptive Positive (A) Negative    Phencyclidine Negative Negative    Creatinine, Urine 224.7 15.0 - 325.0 mg/dL    Toxicology Information SEE COMMENT    Pregnancy, urine rapid (UPT)    Collection Time: 07/04/23 10:49 AM  Result Value Ref Range    Preg Test, Ur Negative    Urinalysis Microscopic    Collection Time: 07/04/23 10:49 AM  Result Value Ref Range    RBC, UA 10 (H) 0 - 4 /hpf    WBC, UA 0 0 - 5 /hpf    Bacteria None None-Occ /hpf    Hyaline Casts, UA 0 0-1/lpf /lpf    Microscopic Comment SEE COMMENT    Comprehensive metabolic panel    Collection Time: 07/04/23 11:27 AM  Result Value Ref Range    Sodium 140 136 - 145 mmol/L    Potassium 3.7 3.5 - 5.1 mmol/L    Chloride 108 95 - 110 mmol/L    CO2 21 (L) 23 - 29 mmol/L    Glucose 92 70 - 110 mg/dL    BUN 7 6 - 20 mg/dL    Creatinine 0.6 0.5 - 1.4  mg/dL    Calcium 7.6 (L) 8.7 - 10.5 mg/dL    Total Protein 5.5 (L) 6.0 - 8.4 g/dL    Albumin 2.0 (L) 3.5 - 5.2 g/dL    Total Bilirubin 1.0 0.1 - 1.0 mg/dL    Alkaline Phosphatase 67 55 - 135 U/L    AST 56 (H) 10 - 40 U/L    ALT 12 10 - 44 U/L    eGFR >60 >60 mL/min/1.73 m^2    Anion Gap 11 8 - 16 mmol/L  CBC auto differential    Collection Time: 07/04/23  1:35 PM  Result Value Ref Range    WBC 7.45 3.90 - 12.70 K/uL    RBC 0.85 (L) 4.00 - 5.40 M/uL    Hemoglobin 6.7 (L) 12.0 - 16.0 g/dL    Hematocrit 10.0 (LL) 37.0 - 48.5 %     (H) 82 - 98 fL    MCH SEE COMMENT 27.0 - 31.0 pg    MCHC SEE COMMENT 32.0 - 36.0 g/dL    RDW SEE COMMENT 11.5 - 14.5 %    Platelets 17 (LL) 150 - 450 K/uL    MPV SEE COMMENT 9.2 - 12.9 fL    Immature Granulocytes 4.2 (H) 0.0 - 0.5 %    Gran # (ANC) 5.5 1.8 - 7.7 K/uL    Immature Grans (Abs) 0.31 (H) 0.00 - 0.04 K/uL    Lymph # 1.3 1.0 - 4.8 K/uL    Mono # 0.4 0.3 - 1.0 K/uL    Eos # 0.1 0.0 - 0.5 K/uL    Baso # 0.02 0.00 - 0.20 K/uL    nRBC 8 (A) 0 /100 WBC    Gran % 73.1 (H) 38.0 - 73.0 %    Lymph % 16.9 (L) 18.0 - 48.0 %    Mono % 4.8 4.0 - 15.0 %    Eosinophil % 0.7 0.0 - 8.0 %    Basophil % 0.3 0.0 - 1.9 %    Platelet Estimate Decreased (A)      Aniso Slight      Poly Occasional      Tear Drop Cells Occasional      Differential Method Automated    Type & Screen    Collection Time: 07/04/23  4:37 PM  Result Value Ref Range    Indirect Angel POS (A)      Specimen Outdate 07/07/2023 23:59    Prepare RBC 1 Unit    Collection Time: 07/04/23  4:37 PM  Result Value Ref Range    UNIT NUMBER [id number]      Product Code [id number]      DISPENSE STATUS TRANSFUSED      CODING SYSTEM [id number]      Unit Blood Type Code 9500      Unit Blood Type O NEG      Unit Expiration [id number]      CROSSMATCH INTERPRETATION Incompatible    Antibody identification    Collection Time: 07/04/23  4:37 PM  Result Value Ref Range    Antibody ID POS    Group & Rh    Collection Time: 07/04/23  4:37  PM  Result Value Ref Range    ABO B      Rh Type POS    Direct antiglobulin test    Collection Time: 07/04/23  4:37 PM  Result Value Ref Range    Direct Angel (LUIS MIGUEL) POS               Lab Results  Component Value Date    TBGOLDPLUS Indeterminate (A) 05/02/2023     Lab Results  Component Value Date    HEPAIGM Non-reactive 05/08/2023    HEPBIGM Grayzone (A) 05/08/2023    HEPBCAB Non-reactive 06/08/2023    HEPCAB Non-reactive 05/08/2023        Imaging  I have personally reviewed images and reports as below.  I agree with the interpretation.  CT Soft Tissue Neck With Contrast  Order: [id number]  Status: Final result     Visible to patient: Yes (seen)     Next appt: 07/19/2023 at 09:30 AM in Radiology ([facility] CT1 LIMIT 500 LBS)     Dx: Mass of right side of neck     2 Result Notes    1 Patient Communication  Details       Reading Physician Reading Date Result Priority  Guillermo Powers Jr., MD  [phone] 1/10/2023 Routine     Narrative & Impression  EXAMINATION:  CT SOFT TISSUE NECK WITH CONTRAST     CLINICAL HISTORY:  neck mass//abnormal ultrasound;Localized swelling, mass and lump, neck     TECHNIQUE:  Low dose axial images as well as sagittal and coronal reconstructions were performed from the skull base to the clavicles following the intravenous administration of 75 mL of Omnipaque 350.     COMPARISON:  None     FINDINGS:  The nasopharynx, oral pharynx, hypopharynx, larynx and visualized portion of the trachea are within normal limits.     The oral cavity and buccal space are limited by artifact from dental metal but appear to grossly within normal limits.     The bilateral parotid, submandibular and thyroid glands are within normal limits.     Abnormal lymphadenopathy is noted throughout the soft tissues of the neck, right greater than left, with the largest nodes in the right supraclavicular region measuring up to 3 x 2.1 cm in size (axial 111, series 2).  No evidence of necrosis is present..     Multilevel  degenerative changes noted throughout the cervical spine.     Right greater than left maxillary sinusitis and bilateral ethmoid sinusitis.  Suspected left maxillary dental caries     Visualized lung apices are clear bilaterally.     Impression:     Extensive lymphadenopathy in the right greater than left neck.  Both benign and malignant etiologies are in the differential, recommend correlation with clinical presentation for infectious etiology.  Consider biopsy to rule out lymphoma.     Sinusitis.     Suspected dental caries, dental consultation is recommended        Electronically signed by: Guillermo Powers  Date:                                            01/10/2023  Time:                                           12:53        Surgical path report also reviewed from 2/17/23  Right deep cervical lymph node, excisional biopsy: - Follicular hyperplasia. See comment. - No evidence of lymphoma or metastatic carcinoma.     Assessment:     1. Dark urine   2. Anemia, unspecified type   3. Systemic lupus erythematosus arthritis   4. Immunocompromised   5. High risk medication use   6. SLE glomerulonephritis syndrome, WHO class V   7. Sjogren's syndrome, with unspecified organ involvement   8. Lymphoid hyperplasia   9. Medication monitoring encounter   10. Oral ulcer             Plan:     Alisia was seen today for lupus and disease management.     Diagnoses and all orders for this visit:     Dark urine  -     CK; Future     Anemia, unspecified type     Systemic lupus erythematosus arthritis     Immunocompromised     High risk medication use     SLE glomerulonephritis syndrome, WHO class V     Sjogren's syndrome, with unspecified organ involvement     Lymphoid hyperplasia     Medication monitoring encounter     Oral ulcer  -     RPR; Future           · Severe worsening Lupus w recent transfusion  º Compliance continues to be a challenge but pt states she is committed to being compliant.  º Pt back on Plaquenil 200 mg bid  since hospital DC 5/2/23  § Due for ophtho exam for monitoring  § Internal referral placed  § About 10yrs of Plaquenil therapy -  § on again, off again history  § Advised I won't refill without appropriate monitoring  º Hep B viral DNA negative  º Recommend pt to re-establish care w Dr. Willard  § Pt to call and schedule  º Labs   § Critical leukopenia and severe anemia -  likely multifactorial including polysubstance abuse and SLE  § Recheck CBC today  § F/u hematology  § ER if any spontaneous bleeding  § LDH, haptoglobin and retic count today  § CBC, CMP, C3, C4, PC ratio, ds DNA today and weekly  § Check CK  § Check for hemolysis w c/o dark urine  º Spoke to Dr. Franks - who recommends the following  § Solu medrol 500 mg today  § PDN 60 mg x 2 weeks (starting tomorrow), then taper by 10 mg/wk to goal of 20 mg daily  § Weekly labs  § Discuss appropriateness of steroid sparing therapy w heme onc  º Consider addition of cellcept vs Benlysta  º DC rituxan  · Overlapping SS  º Recent lymph node excision shows hyperplasia but negative for malignancy such as lymphoma  º SPEP, IEP, cryoglobulins reviewed  § Low Albumin  § O/w wnl  º Discussed red flag symptoms with patient  º Continue refresh drops p.r.n  º Add pilocarpine 5 mg tid  · Saw Dr. Garcia  º On Augmentin and minocycline for nasal lesion and otitis externa  · Drug therapy requiring intensive monitoring for toxicity  º High Risk Medication Monitoring encounter  º No current medication related issues, no evidence of toxicity  º I ordered labs for toxicity monitoring, have personally reviewed the findings, and discussed them with the patient.  Pending labs will be sent via the portal  · Compromised immune system secondary to autoimmune disease and/or use of immunosuppressive drugs.  Monitor carefully for infections.  Advised patient to get immediate medical care if any infection arises.  Also advised strict adherence age-appropriate vaccinations and cancer  screenings with PCP.  · Patient advised to hold DMARD and/or biologic therapy for signs of infection or for surgery. If you are unsure what to do please call our office for instruction.[facility] [phone]  · Return to clinic: as scheduled      Clinical Tests:   Lab Tests: Ordered and Reviewed  Radiological Study: Ordered and Reviewed           Medical Decision Making  Patient referred to the ED for low platelets and anemia. history SLE, and previous cocaine use.     Differential diagnosis includes:  Infection, refractory SLE with active flare, lupus nephritis, cytopenia    History obtained from patient's treating rheumatologist as well as physician's assistant.:  Need expertise from hem/Onc for concomitant concern of drug induced cytopenias / hemolyisis (cocaine); with differential Dx of SLE and or cocaine induced vasculitis. No response to high dose steroids, need clearance to utilize stronger immunosuppression and discuss IVIG vs PLEX therapy     Rheumatology had recommended Solu-Medrol 500 mg IV twice a day for 3 days.  Patient started on PPI.  Patient accepted at [facility] for hematology and rheumatology support services.  Appreciate all consultants in the help in this very pleasant patient.          Amount and/or Complexity of Data Reviewed  Labs: ordered. Decision-making details documented in ED Course.     Details: Notable for a platelet count of 19, hemoglobin/hematocrit of 11.029.4  Radiology: ordered and independent interpretation performed.     Details: Normal cardiac silhouette size.  No infiltrate.    Risk  Drug therapy requiring intensive monitoring for toxicity.  Decision regarding hospitalization.    Critical Care  Total time providing critical care: 54 minutes      Scribe Attestation:   Scribe #1: I performed the above scribed service and the documentation accurately describes the services I performed. I attest to the accuracy of the note.    Attending:   Physician  Attestation Statement for Scribe #1: I, Edel Tesfaye, , personally performed the services described in this documentation, as scribed by Campos Rosario, in my presence, and it is both accurate and complete.          Clinical Impression       ICD-10-CM ICD-9-CM   1. Thrombocytopenia  D69.6 287.5   2. Anemia, unspecified type  D64.9 285.9   3. Exacerbation of systemic lupus  M32.9 710.0   4. Proteinuria, unspecified type  R80.9 791.0   5. 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

## 2023-07-07 NOTE — PROGRESS NOTES
Reviewed labs from 7/6/23 w Dr. Magdy Franks.  He recommends sending her to the ED for multidisciplinary approach.  Would like heme onc consult, blood cultures, urine culture.  Spoke to pt and she will head over to the ED this morning.  Also spoke to ED triage as well.      Lora Root PA-C  [facility]  [facility]

## 2023-07-07 NOTE — PROVIDER TRANSFER
(Physician in Lead of Transfers)  Outside Transfer Acceptance Note / Regional Referral Center    Upon patient arrival to floor, please send SecureChat to [facility] or call extension [phone] (if no answer, do NOT leave a callback number after the beep, rather please send a SecureChat to [facility]), for Hospital Medicine admit team assignment and for additional admit orders for the patient.  Do not page the attending physician associated with the patient on arrival (this physician may not be on duty at the time of arrival).  Rather, always send a SecureChat to [facility] or call [phone] to reach the triage physician for orders and team assignment.    Referring facility: [facility]   Referring provider: HONG PÉREZ  Accepting facility: OUTSIDE FACILITY  Accepting provider: LEYDI MORELAND  Reason for transfer:  Need Rheumatology/Hematology  Transfer diagnosis: Lupus flare  Transfer specialty requested: Rheumatology//Hematology and Oncology  Transfer specialty notified: Yes  Transfer level: NUMBER 1-5: 2  Bed type requested: stepdown  Isolation status: No active isolations   Admission class or status: Inpatient      Narrative     32-year-old female with history of hemolytic anemia, gastroesophageal reflux disease, SLE with history of class V lupus nephritis, Sjogren's, rituximab use (most recent on June 27), and thrombocytopenia seen in the [place] [facility] on July 4 with right ear pain, abdominal pain, headache, nausea with vomiting and diarrhea, dehydration, and generalized joint pain.  She was noted to have significant anemia with hemoglobin 6.7.  She received packed red blood cells and was planned for Rheumatology/Hematology outpatient follow-up.  She saw Rheumatology in [place] on July 6, and multiple labs were ordered.  She received 500 mg Solu-Medrol and was started on prednisone 60 mg daily.  When labs resulted, she was contacted by Rheumatology on July 7  and referred to the emergency department for further treatment.  In the emergency department she noted fatigue, vaginal discharge, weakness, rhinorrhea, and cough with yellow sputum.  She had no fever chills, no chest pain or dyspnea, and no vomiting or diarrhea.  She did note cocaine use within the last week.  ED spoke with the patient's outpatient Rheumatologist and subsequently with Hematology at [facility].  There is no inpatient Hematology or inpatient Rheumatology at [facility] currently.  ED noted no alteration in mentation and no acute respiratory compromise.  Will transfer to Hospital Medicine at [facility] and step-down status for Rheumatology and potentially Hematology evaluation.    July 7:  RPR nonreactive, urine pregnancy test negative, blue top tube platelet count 19,, white blood cells 7.46, hemoglobin 11, hematocrit 29.4, platelets 21, , sodium 137, potassium 3.9, chloride 105, CO2 23, BUN 14, creatinine 0.6, AST 77, ALT 22, lactic acid 1.6  Urine drug screen positive for THC  -blood cultures collected  -chlamydia and Neisseria tests are pending  -chest x-ray had no acute cardiopulmonary abnormality.    July 6:  Urinalysis with 6 WBC/0 RBC, urine protein to creatinine ratio 9.18 (increased from 0.77 on May 8), , white blood cells 11.84, hemoglobin 9.6, hematocrit 20.2, platelets (unable to report due to clumping), haptoglobin less than 10, sedimentation rate 3, reticulocyte count 5.1, sodium 133, potassium 3.7, chloride 101, CO2 23, BUN 10, creatinine 0.6, glucose 95, total bilirubin 1.2, AST 77, ALT 20, CRP 2.9, LDH 1554, C3 is 25, C4 is 9  -double-stranded DNA pending    July 4: Urine drug screen positive for cocaine, opiates, marijuana  Urine pregnancy test negative, white blood cells 7.45, hemoglobin 6.7, hematocrit 10, platelets 17 (200 on Meg 15), direct antiglobulin test positive    Objective     Vitals: Temp: 98 °F (36.7 °C) (07/07/23 1031)  Pulse: 83  (07/07/23 1130)  Resp: 16 (07/07/23 1031)  BP: (!) 150/112 (07/07/23 1130)  SpO2: 100 % (07/07/23 1130)  Recent Labs: CBC:   Recent Labs   Lab 07/06/23  1555 07/07/23  1136   WBC 11.84 7.46   HGB 9.6* 11.0*   HCT 20.2* 29.4*   PLT SEE COMMENT 21*     CMP:   Recent Labs   Lab 07/06/23  1555 07/07/23  1136   * 137   K 3.7 3.9    105   CO2 23 23   GLU 95 100   BUN 10 14   CREATININE 0.6 0.6   CALCIUM 8.1* 8.1*   PROT 5.8* 5.8*   ALBUMIN 2.1* 2.1*   BILITOT 1.2* 1.1*   ALKPHOS 68 78   AST 77* 77*   ALT 20 22   ANIONGAP 9 9         Instructions    Admit to Hospital Medicine  Consult Rheumatology and Hematology      NAN Vizcaino MD  Hospital Medicine Staff  Cell: [phone]

## 2023-07-08 VITALS
BODY MASS INDEX: 27.95 KG/M2 | TEMPERATURE: 98 F | HEART RATE: 70 BPM | WEIGHT: 167.75 LBS | HEIGHT: 65 IN | OXYGEN SATURATION: 100 % | RESPIRATION RATE: 16 BRPM | DIASTOLIC BLOOD PRESSURE: 76 MMHG | SYSTOLIC BLOOD PRESSURE: 123 MMHG

## 2023-07-08 LAB
ALBUMIN SERPL BCP-MCNC: 2.1 G/DL (ref 3.5–5.2)
ALP SERPL-CCNC: 83 U/L (ref 55–135)
ALT SERPL W/O P-5'-P-CCNC: 19 U/L (ref 10–44)
ANION GAP SERPL CALC-SCNC: 12 MMOL/L (ref 8–16)
ANION GAP SERPL CALC-SCNC: 9 MMOL/L (ref 8–16)
ANISOCYTOSIS BLD QL SMEAR: SLIGHT
ANISOCYTOSIS BLD QL SMEAR: SLIGHT
AST SERPL-CCNC: 62 U/L (ref 10–40)
BACTERIA #/AREA URNS HPF: NORMAL /HPF
BASOPHILS # BLD AUTO: 0.01 K/UL (ref 0–0.2)
BASOPHILS # BLD AUTO: 0.02 K/UL (ref 0–0.2)
BASOPHILS NFR BLD: 0.1 % (ref 0–1.9)
BASOPHILS NFR BLD: 0.2 % (ref 0–1.9)
BILIRUB SERPL-MCNC: 0.9 MG/DL (ref 0.1–1)
BILIRUB UR QL STRIP: NEGATIVE
BUN SERPL-MCNC: 14 MG/DL (ref 6–20)
BUN SERPL-MCNC: 15 MG/DL (ref 6–20)
CALCIUM SERPL-MCNC: 8.1 MG/DL (ref 8.7–10.5)
CALCIUM SERPL-MCNC: 8.1 MG/DL (ref 8.7–10.5)
CHLORIDE SERPL-SCNC: 104 MMOL/L (ref 95–110)
CHLORIDE SERPL-SCNC: 108 MMOL/L (ref 95–110)
CLARITY UR: CLEAR
CO2 SERPL-SCNC: 19 MMOL/L (ref 23–29)
CO2 SERPL-SCNC: 24 MMOL/L (ref 23–29)
COLOR UR: YELLOW
CREAT SERPL-MCNC: 0.6 MG/DL (ref 0.5–1.4)
CREAT SERPL-MCNC: 0.6 MG/DL (ref 0.5–1.4)
CREAT UR-MCNC: 115.8 MG/DL (ref 15–325)
DIFFERENTIAL METHOD: ABNORMAL
DIFFERENTIAL METHOD: ABNORMAL
EOSINOPHIL # BLD AUTO: 0 K/UL (ref 0–0.5)
EOSINOPHIL # BLD AUTO: 0 K/UL (ref 0–0.5)
EOSINOPHIL NFR BLD: 0 % (ref 0–8)
EOSINOPHIL NFR BLD: 0 % (ref 0–8)
ERYTHROCYTE [DISTWIDTH] IN BLOOD BY AUTOMATED COUNT: 32.1 % (ref 11.5–14.5)
ERYTHROCYTE [DISTWIDTH] IN BLOOD BY AUTOMATED COUNT: ABNORMAL % (ref 11.5–14.5)
EST. GFR  (NO RACE VARIABLE): >60 ML/MIN/1.73 M^2
EST. GFR  (NO RACE VARIABLE): >60 ML/MIN/1.73 M^2
GLUCOSE SERPL-MCNC: 135 MG/DL (ref 70–110)
GLUCOSE SERPL-MCNC: 159 MG/DL (ref 70–110)
GLUCOSE UR QL STRIP: NEGATIVE
HCT VFR BLD AUTO: 22 % (ref 37–48.5)
HCT VFR BLD AUTO: 24.5 % (ref 37–48.5)
HGB BLD-MCNC: 8.5 G/DL (ref 12–16)
HGB BLD-MCNC: 9.5 G/DL (ref 12–16)
HGB UR QL STRIP: ABNORMAL
HYALINE CASTS #/AREA URNS LPF: 0 /LPF
IMM GRANULOCYTES # BLD AUTO: 0.26 K/UL (ref 0–0.04)
IMM GRANULOCYTES # BLD AUTO: 0.35 K/UL (ref 0–0.04)
IMM GRANULOCYTES NFR BLD AUTO: 2.5 % (ref 0–0.5)
IMM GRANULOCYTES NFR BLD AUTO: 3.5 % (ref 0–0.5)
KETONES UR QL STRIP: NEGATIVE
LEUKOCYTE ESTERASE UR QL STRIP: NEGATIVE
LYMPHOCYTES # BLD AUTO: 0.9 K/UL (ref 1–4.8)
LYMPHOCYTES # BLD AUTO: 1 K/UL (ref 1–4.8)
LYMPHOCYTES NFR BLD: 10.1 % (ref 18–48)
LYMPHOCYTES NFR BLD: 9.3 % (ref 18–48)
MCH RBC QN AUTO: 41.5 PG (ref 27–31)
MCH RBC QN AUTO: 43.2 PG (ref 27–31)
MCHC RBC AUTO-ENTMCNC: 38.6 G/DL (ref 32–36)
MCHC RBC AUTO-ENTMCNC: 38.8 G/DL (ref 32–36)
MCV RBC AUTO: 107 FL (ref 82–98)
MCV RBC AUTO: 111 FL (ref 82–98)
MICROSCOPIC COMMENT: NORMAL
MONOCYTES # BLD AUTO: 0.3 K/UL (ref 0.3–1)
MONOCYTES # BLD AUTO: 0.5 K/UL (ref 0.3–1)
MONOCYTES NFR BLD: 2.6 % (ref 4–15)
MONOCYTES NFR BLD: 4.4 % (ref 4–15)
NEUTROPHILS # BLD AUTO: 8.5 K/UL (ref 1.8–7.7)
NEUTROPHILS # BLD AUTO: 8.5 K/UL (ref 1.8–7.7)
NEUTROPHILS NFR BLD: 82.8 % (ref 38–73)
NEUTROPHILS NFR BLD: 84.5 % (ref 38–73)
NITRITE UR QL STRIP: NEGATIVE
NRBC BLD-RTO: 10 /100 WBC
NRBC BLD-RTO: 10 /100 WBC
PH UR STRIP: 7 [PH] (ref 5–8)
PLATELET # BLD AUTO: 30 K/UL (ref 150–450)
PLATELET # BLD AUTO: 37 K/UL (ref 150–450)
PLATELET BLD QL SMEAR: ABNORMAL
PLATELET BLD QL SMEAR: ABNORMAL
PMV BLD AUTO: 13 FL (ref 9.2–12.9)
PMV BLD AUTO: ABNORMAL FL (ref 9.2–12.9)
POIKILOCYTOSIS BLD QL SMEAR: SLIGHT
POIKILOCYTOSIS BLD QL SMEAR: SLIGHT
POLYCHROMASIA BLD QL SMEAR: ABNORMAL
POTASSIUM SERPL-SCNC: 4 MMOL/L (ref 3.5–5.1)
POTASSIUM SERPL-SCNC: 4.4 MMOL/L (ref 3.5–5.1)
PROT SERPL-MCNC: 5.8 G/DL (ref 6–8.4)
PROT UR QL STRIP: ABNORMAL
PROT UR-MCNC: 488 MG/DL (ref 0–15)
PROT/CREAT UR: 4.21 MG/G{CREAT} (ref 0–0.2)
RBC # BLD AUTO: 2.05 M/UL (ref 4–5.4)
RBC # BLD AUTO: 2.2 M/UL (ref 4–5.4)
RBC #/AREA URNS HPF: 0 /HPF (ref 0–4)
SODIUM SERPL-SCNC: 137 MMOL/L (ref 136–145)
SODIUM SERPL-SCNC: 139 MMOL/L (ref 136–145)
SP GR UR STRIP: 1.02 (ref 1–1.03)
URN SPEC COLLECT METH UR: ABNORMAL
UROBILINOGEN UR STRIP-ACNC: ABNORMAL EU/DL
WBC # BLD AUTO: 10.05 K/UL (ref 3.9–12.7)
WBC # BLD AUTO: 10.25 K/UL (ref 3.9–12.7)
WBC #/AREA URNS HPF: 4 /HPF (ref 0–5)

## 2023-07-08 PROCEDURE — 80053 COMPREHEN METABOLIC PANEL: CPT | Performed by: EMERGENCY MEDICINE

## 2023-07-08 PROCEDURE — 36415 COLL VENOUS BLD VENIPUNCTURE: CPT | Performed by: EMERGENCY MEDICINE

## 2023-07-08 PROCEDURE — 96366 THER/PROPH/DIAG IV INF ADDON: CPT

## 2023-07-08 PROCEDURE — 84156 ASSAY OF PROTEIN URINE: CPT | Performed by: EMERGENCY MEDICINE

## 2023-07-08 PROCEDURE — 63600175 PHARM REV CODE 636 W HCPCS: Performed by: EMERGENCY MEDICINE

## 2023-07-08 PROCEDURE — 86225 DNA ANTIBODY NATIVE: CPT | Mod: 59 | Performed by: EMERGENCY MEDICINE

## 2023-07-08 PROCEDURE — 86225 DNA ANTIBODY NATIVE: CPT | Performed by: EMERGENCY MEDICINE

## 2023-07-08 PROCEDURE — 80048 BASIC METABOLIC PNL TOTAL CA: CPT | Mod: XB | Performed by: EMERGENCY MEDICINE

## 2023-07-08 PROCEDURE — 25000003 PHARM REV CODE 250: Performed by: EMERGENCY MEDICINE

## 2023-07-08 PROCEDURE — 86036 ANCA SCREEN EACH ANTIBODY: CPT | Performed by: EMERGENCY MEDICINE

## 2023-07-08 PROCEDURE — 85025 COMPLETE CBC W/AUTO DIFF WBC: CPT | Mod: 91 | Performed by: EMERGENCY MEDICINE

## 2023-07-08 PROCEDURE — 83516 IMMUNOASSAY NONANTIBODY: CPT | Performed by: EMERGENCY MEDICINE

## 2023-07-08 PROCEDURE — 83516 IMMUNOASSAY NONANTIBODY: CPT | Mod: 59 | Performed by: EMERGENCY MEDICINE

## 2023-07-08 PROCEDURE — 81000 URINALYSIS NONAUTO W/SCOPE: CPT | Performed by: EMERGENCY MEDICINE

## 2023-07-08 RX ORDER — PREDNISONE 20 MG/1
60 TABLET ORAL DAILY
Qty: 42 TABLET | Refills: 0 | Status: SHIPPED | OUTPATIENT
Start: 2023-07-08 | End: 2023-07-22

## 2023-07-08 RX ADMIN — FOLIC ACID 1 MG: 1 TABLET ORAL at 09:07

## 2023-07-08 RX ADMIN — METHYLPREDNISOLONE SODIUM SUCCINATE 500 MG: 500 INJECTION INTRAMUSCULAR; INTRAVENOUS at 06:07

## 2023-07-08 RX ADMIN — AMLODIPINE BESYLATE 5 MG: 5 TABLET ORAL at 09:07

## 2023-07-08 RX ADMIN — PANTOPRAZOLE SODIUM 40 MG: 40 TABLET, DELAYED RELEASE ORAL at 09:07

## 2023-07-08 NOTE — DISCHARGE INSTRUCTIONS
Call Lora Root or Dr MADISON first thing Monday morning 7/10 to get an urgent appointment. Call Dr Brandon Willard's (nephrology) office on Monday to try and get a sooner appointment than 7/31/2023. Return to the ED with any other concerning symptoms.

## 2023-07-09 LAB
C TRACH DNA SPEC QL NAA+PROBE: NOT DETECTED
N GONORRHOEA DNA SPEC QL NAA+PROBE: NOT DETECTED

## 2023-07-09 NOTE — PROVIDER PROGRESS NOTES - EMERGENCY DEPT.
Encounter Date: 7/7/2023    ED Physician Progress Notes        Physician Note:   32-year-old female with a history of hemolytic anemia and SLE with nephritis who presents from her rheumatologist office for further evaluation of thrombocytopenia and anemia.  She is on rituximab (last dose 6/27).  She will require hematology/oncology and rheumatology evaluation.  [facility] therefore they recommended transfer to [place].  Patient has been waiting for more than 24 hours for a bed. Dr Cm (heme/onc) consulted with Dr Franks (rheumatology). It appears patient's PCR and platelets are responding steroids and they both agree that patient is stable for discharge home with oral steroids and close follow up with local rheumatologist. Patient and family are agreeable. She also has appoint with Nephrology on the 31st, instructed to call office for earlier date.

## 2023-07-10 ENCOUNTER — HOSPITAL ENCOUNTER (EMERGENCY)
Facility: HOSPITAL | Age: 33
Discharge: HOME | End: 2023-07-10
Attending: EMERGENCY MEDICINE
Payer: MEDICAID

## 2023-07-10 ENCOUNTER — TELEPHONE (OUTPATIENT)
Dept: HEMATOLOGY/ONCOLOGY | Facility: CLINIC | Age: 33
End: 2023-07-10
Payer: MEDICAID

## 2023-07-10 ENCOUNTER — OFFICE VISIT (OUTPATIENT)
Dept: HEMATOLOGY/ONCOLOGY | Facility: CLINIC | Age: 33
End: 2023-07-10
Payer: MEDICAID

## 2023-07-10 VITALS
BODY MASS INDEX: 27.07 KG/M2 | OXYGEN SATURATION: 100 % | DIASTOLIC BLOOD PRESSURE: 105 MMHG | BODY MASS INDEX: 27.03 KG/M2 | HEART RATE: 96 BPM | RESPIRATION RATE: 12 BRPM | HEART RATE: 62 BPM | HEIGHT: 65 IN | WEIGHT: 162.5 LBS | SYSTOLIC BLOOD PRESSURE: 157 MMHG | OXYGEN SATURATION: 98 % | DIASTOLIC BLOOD PRESSURE: 102 MMHG | SYSTOLIC BLOOD PRESSURE: 146 MMHG | WEIGHT: 162.25 LBS | TEMPERATURE: 98 F | HEIGHT: 65 IN | TEMPERATURE: 98 F

## 2023-07-10 DIAGNOSIS — D50.9 IRON DEFICIENCY ANEMIA, UNSPECIFIED IRON DEFICIENCY ANEMIA TYPE: ICD-10-CM

## 2023-07-10 DIAGNOSIS — D69.6 THROMBOCYTOPENIA: Primary | ICD-10-CM

## 2023-07-10 DIAGNOSIS — M32.9 SLE (SYSTEMIC LUPUS ERYTHEMATOSUS RELATED SYNDROME): ICD-10-CM

## 2023-07-10 LAB
ABO + RH BLD: ABNORMAL
ALBUMIN SERPL BCP-MCNC: 2.3 G/DL (ref 3.5–5.2)
ALP SERPL-CCNC: 82 U/L (ref 55–135)
ALT SERPL W/O P-5'-P-CCNC: 19 U/L (ref 10–44)
ANION GAP SERPL CALC-SCNC: 9 MMOL/L (ref 8–16)
ANISOCYTOSIS BLD QL SMEAR: ABNORMAL
APTT PPP: 23 SEC (ref 21–32)
AST SERPL-CCNC: 43 U/L (ref 10–40)
B-HCG UR QL: NEGATIVE
BACTERIA #/AREA URNS HPF: NORMAL /HPF
BASO STIPL BLD QL SMEAR: ABNORMAL
BASOPHILS # BLD AUTO: 0.01 K/UL (ref 0–0.2)
BASOPHILS NFR BLD: 0.1 % (ref 0–1.9)
BILIRUB SERPL-MCNC: 1.2 MG/DL (ref 0.1–1)
BILIRUB UR QL STRIP: NEGATIVE
BLD GP AB SCN CELLS X3 SERPL QL: ABNORMAL
BLOOD GROUP ANTIBODIES SERPL: NORMAL
BUN SERPL-MCNC: 17 MG/DL (ref 6–20)
CALCIUM SERPL-MCNC: 8.2 MG/DL (ref 8.7–10.5)
CHLORIDE SERPL-SCNC: 105 MMOL/L (ref 95–110)
CLARITY UR: CLEAR
CO2 SERPL-SCNC: 24 MMOL/L (ref 23–29)
COLOR UR: YELLOW
CREAT SERPL-MCNC: 0.6 MG/DL (ref 0.5–1.4)
CREAT UR-MCNC: 106.3 MG/DL (ref 15–325)
D DIMER PPP IA.FEU-MCNC: 3.26 MG/L FEU
DACRYOCYTES BLD QL SMEAR: ABNORMAL
DIFFERENTIAL METHOD: ABNORMAL
EOSINOPHIL # BLD AUTO: 0 K/UL (ref 0–0.5)
EOSINOPHIL NFR BLD: 0.1 % (ref 0–8)
ERYTHROCYTE [DISTWIDTH] IN BLOOD BY AUTOMATED COUNT: 21.9 % (ref 11.5–14.5)
ERYTHROCYTE [DISTWIDTH] IN BLOOD BY AUTOMATED COUNT: 22.1 % (ref 11.5–14.5)
EST. GFR  (NO RACE VARIABLE): >60 ML/MIN/1.73 M^2
GLUCOSE SERPL-MCNC: 157 MG/DL (ref 70–110)
GLUCOSE UR QL STRIP: NEGATIVE
HCT VFR BLD AUTO: 30.9 % (ref 37–48.5)
HCT VFR BLD AUTO: 32.1 % (ref 37–48.5)
HGB BLD-MCNC: 10.8 G/DL (ref 12–16)
HGB BLD-MCNC: 10.8 G/DL (ref 12–16)
HGB UR QL STRIP: ABNORMAL
HYALINE CASTS #/AREA URNS LPF: 0 /LPF
IMM GRANULOCYTES # BLD AUTO: 0.19 K/UL (ref 0–0.04)
IMM GRANULOCYTES # BLD AUTO: 0.22 K/UL (ref 0–0.04)
IMM GRANULOCYTES NFR BLD AUTO: 2.2 % (ref 0–0.5)
INR PPP: 0.9 (ref 0.8–1.2)
KETONES UR QL STRIP: NEGATIVE
LEUKOCYTE ESTERASE UR QL STRIP: NEGATIVE
LYMPHOCYTES # BLD AUTO: 0.9 K/UL (ref 1–4.8)
LYMPHOCYTES NFR BLD: 10.8 % (ref 18–48)
MAGNESIUM SERPL-MCNC: 2 MG/DL (ref 1.6–2.6)
MCH RBC QN AUTO: 30 PG (ref 27–31)
MCH RBC QN AUTO: 31.1 PG (ref 27–31)
MCHC RBC AUTO-ENTMCNC: 33.6 G/DL (ref 32–36)
MCHC RBC AUTO-ENTMCNC: 35 G/DL (ref 32–36)
MCV RBC AUTO: 89 FL (ref 82–98)
MCV RBC AUTO: 89 FL (ref 82–98)
MICROSCOPIC COMMENT: NORMAL
MONOCYTES # BLD AUTO: 0.5 K/UL (ref 0.3–1)
MONOCYTES NFR BLD: 6.1 % (ref 4–15)
MPV, BLUE TOP: 12.8 FL (ref 9.2–12.9)
MYELOPEROXIDASE AB SER-ACNC: <0.2 U/ML
NEUTROPHILS # BLD AUTO: 7 K/UL (ref 1.8–7.7)
NEUTROPHILS NFR BLD: 80.7 % (ref 38–73)
NITRITE UR QL STRIP: NEGATIVE
NRBC BLD-RTO: 23 /100 WBC
PH UR STRIP: 7 [PH] (ref 5–8)
PLATELET # BLD AUTO: 63 K/UL (ref 150–450)
PLATELET # BLD AUTO: 73 K/UL (ref 150–450)
PLATELET BLD QL SMEAR: ABNORMAL
PLATELET, BLUE TOP: 59 K/UL (ref 150–450)
PMV BLD AUTO: ABNORMAL FL (ref 9.2–12.9)
PMV BLD AUTO: ABNORMAL FL (ref 9.2–12.9)
POIKILOCYTOSIS BLD QL SMEAR: SLIGHT
POLYCHROMASIA BLD QL SMEAR: ABNORMAL
POTASSIUM SERPL-SCNC: 3.9 MMOL/L (ref 3.5–5.1)
PROT SERPL-MCNC: 5.7 G/DL (ref 6–8.4)
PROT UR QL STRIP: ABNORMAL
PROT UR-MCNC: 276 MG/DL (ref 0–15)
PROT/CREAT UR: 2.6 MG/G{CREAT} (ref 0–0.2)
PROTHROMBIN TIME: 9.4 SEC (ref 9–12.5)
RBC # BLD AUTO: 3.47 M/UL (ref 4–5.4)
RBC # BLD AUTO: 3.6 M/UL (ref 4–5.4)
RBC #/AREA URNS HPF: 0 /HPF (ref 0–4)
SODIUM SERPL-SCNC: 138 MMOL/L (ref 136–145)
SP GR UR STRIP: 1.02 (ref 1–1.03)
SPECIMEN OUTDATE: ABNORMAL
SPHEROCYTES BLD QL SMEAR: ABNORMAL
URN SPEC COLLECT METH UR: ABNORMAL
UROBILINOGEN UR STRIP-ACNC: >=8 EU/DL
WBC # BLD AUTO: 8.68 K/UL (ref 3.9–12.7)
WBC # BLD AUTO: 9.65 K/UL (ref 3.9–12.7)
WBC #/AREA URNS HPF: 1 /HPF (ref 0–5)

## 2023-07-10 PROCEDURE — 84156 ASSAY OF PROTEIN URINE: CPT | Performed by: EMERGENCY MEDICINE

## 2023-07-10 PROCEDURE — 81000 URINALYSIS NONAUTO W/SCOPE: CPT | Performed by: EMERGENCY MEDICINE

## 2023-07-10 PROCEDURE — 1160F RVW MEDS BY RX/DR IN RCRD: CPT | Mod: CPTII,,, | Performed by: INTERNAL MEDICINE

## 2023-07-10 PROCEDURE — 99213 OFFICE O/P EST LOW 20 MIN: CPT | Mod: PBBFAC,25 | Performed by: INTERNAL MEDICINE

## 2023-07-10 PROCEDURE — 3077F SYST BP >= 140 MM HG: CPT | Mod: CPTII,,, | Performed by: INTERNAL MEDICINE

## 2023-07-10 PROCEDURE — 3080F PR MOST RECENT DIASTOLIC BLOOD PRESSURE >= 90 MM HG: ICD-10-PCS | Mod: CPTII,,, | Performed by: INTERNAL MEDICINE

## 2023-07-10 PROCEDURE — 85610 PROTHROMBIN TIME: CPT | Performed by: PHYSICIAN ASSISTANT

## 2023-07-10 PROCEDURE — 99999 PR PBB SHADOW E&M-EST. PATIENT-LVL III: CPT | Mod: PBBFAC,,, | Performed by: INTERNAL MEDICINE

## 2023-07-10 PROCEDURE — 3080F DIAST BP >= 90 MM HG: CPT | Mod: CPTII,,, | Performed by: INTERNAL MEDICINE

## 2023-07-10 PROCEDURE — 3077F PR MOST RECENT SYSTOLIC BLOOD PRESSURE >= 140 MM HG: ICD-10-PCS | Mod: CPTII,,, | Performed by: INTERNAL MEDICINE

## 2023-07-10 PROCEDURE — 1160F PR REVIEW ALL MEDS BY PRESCRIBER/CLIN PHARMACIST DOCUMENTED: ICD-10-PCS | Mod: CPTII,,, | Performed by: INTERNAL MEDICINE

## 2023-07-10 PROCEDURE — 86900 BLOOD TYPING SEROLOGIC ABO: CPT | Performed by: PHYSICIAN ASSISTANT

## 2023-07-10 PROCEDURE — 1159F PR MEDICATION LIST DOCUMENTED IN MEDICAL RECORD: ICD-10-PCS | Mod: CPTII,,, | Performed by: INTERNAL MEDICINE

## 2023-07-10 PROCEDURE — 3008F BODY MASS INDEX DOCD: CPT | Mod: CPTII,,, | Performed by: INTERNAL MEDICINE

## 2023-07-10 PROCEDURE — 85049 AUTOMATED PLATELET COUNT: CPT | Performed by: PHYSICIAN ASSISTANT

## 2023-07-10 PROCEDURE — 80053 COMPREHEN METABOLIC PANEL: CPT | Mod: 91 | Performed by: EMERGENCY MEDICINE

## 2023-07-10 PROCEDURE — 3008F PR BODY MASS INDEX (BMI) DOCUMENTED: ICD-10-PCS | Mod: CPTII,,, | Performed by: INTERNAL MEDICINE

## 2023-07-10 PROCEDURE — 85379 FIBRIN DEGRADATION QUANT: CPT | Performed by: PHYSICIAN ASSISTANT

## 2023-07-10 PROCEDURE — 99283 EMERGENCY DEPT VISIT LOW MDM: CPT | Mod: 25,27

## 2023-07-10 PROCEDURE — 99215 OFFICE O/P EST HI 40 MIN: CPT | Mod: S$PBB,,, | Performed by: INTERNAL MEDICINE

## 2023-07-10 PROCEDURE — 81025 URINE PREGNANCY TEST: CPT | Performed by: EMERGENCY MEDICINE

## 2023-07-10 PROCEDURE — 99999 PR PBB SHADOW E&M-EST. PATIENT-LVL III: ICD-10-PCS | Mod: PBBFAC,,, | Performed by: INTERNAL MEDICINE

## 2023-07-10 PROCEDURE — 86870 RBC ANTIBODY IDENTIFICATION: CPT | Performed by: PHYSICIAN ASSISTANT

## 2023-07-10 PROCEDURE — 85027 COMPLETE CBC AUTOMATED: CPT | Mod: 91 | Performed by: EMERGENCY MEDICINE

## 2023-07-10 PROCEDURE — 99215 PR OFFICE/OUTPT VISIT, EST, LEVL V, 40-54 MIN: ICD-10-PCS | Mod: S$PBB,,, | Performed by: INTERNAL MEDICINE

## 2023-07-10 PROCEDURE — 85730 THROMBOPLASTIN TIME PARTIAL: CPT | Performed by: PHYSICIAN ASSISTANT

## 2023-07-10 PROCEDURE — 85025 COMPLETE CBC W/AUTO DIFF WBC: CPT | Performed by: PHYSICIAN ASSISTANT

## 2023-07-10 PROCEDURE — 83735 ASSAY OF MAGNESIUM: CPT | Performed by: EMERGENCY MEDICINE

## 2023-07-10 PROCEDURE — 1159F MED LIST DOCD IN RCRD: CPT | Mod: CPTII,,, | Performed by: INTERNAL MEDICINE

## 2023-07-10 NOTE — FIRST PROVIDER EVALUATION
Emergency Department TeleTriage Encounter Note      CHIEF COMPLAINT    Chief Complaint   Patient presents with    Abnormal Lab     Pt sent for blood transfusion by HEM/OC. Reports generalized weakness.        VITAL SIGNS   Initial Vitals [07/10/23 1656]   BP Pulse Resp Temp SpO2   (!) 150/97 85 16 97.7 °F (36.5 °C) 99 %      MAP       --            ALLERGIES    Review of patient's allergies indicates:  No Known Allergies    PROVIDER TRIAGE NOTE  This is a teletriage evaluation of a 32 y.o. female presenting to the ED complaining of abnormal lab. Patient sent to the ED for low platelet count. She reports fatigue. She denies other symptoms.    Patient is alert and oriented. She speaks in complete sentences. She is sitting upright in the chair in no distress.     Initial orders will be placed and care will be transferred to an alternate provider when patient is roomed for a full evaluation. Any additional orders and the final disposition will be determined by that provider.         ORDERS  Labs Reviewed   CBC W/ AUTO DIFFERENTIAL   TYPE & SCREEN       ED Orders (720h ago, onward)      Start Ordered     Status Ordering Provider    07/10/23 1737 07/10/23 1736  CBC auto differential  STAT         Ordered NENITA RODRIGUEZ    07/10/23 1737 07/10/23 1736  Insert Saline lock IV  Once         Ordered NENITA RODRIGUEZ    07/10/23 1737 07/10/23 1736  Type & Screen  STAT         Ordered NENITA RODRIGUEZ              Virtual Visit Note: The provider triage portion of this emergency department evaluation and documentation was performed via [employer], a HIPAA-compliant telemedicine application, in concert with a tele-presenter in the room. A face to face patient evaluation with one of my colleagues will occur once the patient is placed in an emergency department room.      DISCLAIMER: This note was prepared with [employer] voice recognition transcription software. Garbled syntax, mangled pronouns, and other bizarre constructions may  be attributed to that software system.

## 2023-07-10 NOTE — ED PROVIDER NOTES
Emergency Medicine Provider Note - 7/10/2023    SCRIBE #1 NOTE: I, Snehal Hollingsworth, am scribing for, and in the presence of,  Edel Tesfaye DO. I have scribed the following portions of the note - Other sections scribed: HPI, ROS, and PEx.   SCRIBE #2 NOTE: I, Cj Hernandez, am scribing for, and in the presence of,  Juan Carlos Sánchez Jr., MD. I have scribed the remaining portions of the note not scribed by Scribe #1.    History     Chief Complaint   Patient presents with    Abnormal Lab     Pt sent for blood transfusion by HEM/OC. Reports generalized weakness.           History of Present Illness   HPI    7/10/2023, 6:51 PM    The history is provided by the patient    Alisia Vines is a 32 y.o. female with a PMHx of hemolytic anemia associated with systemic lupus erythematosus, Lupus nephritis, Sjogren's syndrome, systemic lupus erythematosus, and thrombocytopenia presenting to the ED because she was sent by Dr. Lynne, (Hem/Onc) for a blood transfusion because her blood work from today showed that she has a platelet count of 10. The pt c/o generalized weakness, CP with coughing, and R otalgia. Symptoms are constant and moderate in severity. No mitigating or exacerbating factors reported. Patient denies any fever, chills, hematemesis, hematochezia, hematuria, vaginal bleeding, melena, polyuria, polydipsia, SOB, dysuria, numbness, and all other sxs at this time. Pt is currently taking 60 mg of prednisone qd. No further complaints or concerns at this time.         Arrival mode:  Personal Vehicle      PCP: Saumya Quinonez MD     Allergies:  Review of patient's allergies indicates:  No Known Allergies    Past Medical History:  Past Medical History:   Diagnosis Date    Allergic rhinitis     Anemia     Condyloma acuminata     COVID-19 virus infection 07/2021    Encounter for blood transfusion     GERD (gastroesophageal reflux disease)     Hemolytic anemia associated with systemic lupus erythematosus  2023    History of fetal anomaly in prior pregnancy, currently pregnant, unspecified trimester 2017    Pacemaker placed    HSV-2 (herpes simplex virus 2) infection     Lupus nephritis     Mood disorder     Overweight(278.02)     Sjogren's syndrome     Systemic lupus complicating pregnancy 2019    Systemic lupus erythematosus     Thrombocytopenia        Past Surgical History:  Past Surgical History:   Procedure Laterality Date     SECTION WITH TUBAL LIGATION N/A 2019    Procedure:  SECTION, WITH TUBAL LIGATION;  Surgeon: VERONICA Valdovinos MD;  Location: [facility] L&D;  Service: OB/GYN;  Laterality: N/A;     SECTION, LOW TRANSVERSE      x2    LYMPH NODE BIOPSY Right 2023    Procedure: BIOPSY, LYMPH NODE;  Surgeon: Rivera Sandoval MD;  Location: [facility] OR;  Service: ENT;  Laterality: Right;  right deep cervial lymph node excision    NECK MASS EXCISION Right 2023    Procedure: EXCISION, MASS, NECK;  Surgeon: Rivera Sandoval MD;  Location: [facility] OR;  Service: ENT;  Laterality: Right;  right deep cervial lymph node excision    RENAL BIOPSY  2013         Family History:  Family History   Problem Relation Age of Onset    Hypertension Mother     Eczema Brother     Hypertension Maternal Grandmother     Diabetes Paternal Grandmother     Heart disease Son     Arrhythmia Son         CHB    Stroke Neg Hx     Cancer Neg Hx        Social History:  Social History     Tobacco Use    Smoking status: Never     Passive exposure: Never    Smokeless tobacco: Never   Substance and Sexual Activity    Alcohol use: No     Alcohol/week: 0.0 standard drinks    Drug use: Yes     Types: Marijuana    Sexual activity: Not Currently     Partners: Male     Birth control/protection: None       Triage note, Allergies, Past Medical History, Past Surgical History, Family History and Social History reviewed as documented above.     Review of Systems   Review of Systems   Constitutional:  Negative for  chills and fever.   HENT:  Positive for ear pain (R). Negative for sore throat.    Respiratory:  Negative for shortness of breath.    Cardiovascular:  Positive for chest pain.   Gastrointestinal:  Negative for blood in stool and nausea.        (-) hematemesis  (-) melena   Endocrine: Negative for polydipsia and polyuria.   Genitourinary:  Negative for dysuria, hematuria and vaginal bleeding.   Musculoskeletal:  Negative for back pain.   Skin:  Negative for rash.   Neurological:  Positive for weakness. Negative for numbness.   Hematological:  Does not bruise/bleed easily.   All other systems reviewed and are negative.     Physical Exam     Initial Vitals [07/10/23 1656]   BP Pulse Resp Temp SpO2   (!) 150/97 85 16 97.7 °F (36.5 °C) 99 %      MAP       --          Physical Exam    Nursing Notes and Vital Signs Reviewed.  Constitutional: Patient is in no acute distress. Well-developed and well-nourished.  Head: Atraumatic. Normocephalic.  Eyes: PERRL. EOM intact. Conjunctivae are not pale. No scleral icterus.  ENT: Mucous membranes are moist. Oropharynx is clear and symmetric.  Fluid in the R ear.  Neck: Supple. Full ROM. No lymphadenopathy.  Cardiovascular: Regular rate. Regular rhythm. No murmurs, rubs, or gallops. Distal pulses are 2+ and symmetric.  Pulmonary/Chest: No respiratory distress. Clear to auscultation bilaterally. No wheezing or rales.  Abdominal: Soft and non-distended.  There is no tenderness.  No rebound, guarding, or rigidity.   Musculoskeletal: Moves all extremities. No obvious deformities. No edema.  Skin: Warm and dry.  Neurological:  Alert, awake, and appropriate.  Normal speech.  No acute focal neurological deficits are appreciated.  Psychiatric: Normal affect. Good eye contact. Appropriate in content.     ED Course     ED Procedures:  Procedures    ED Vital Signs:  Vitals:    07/10/23 1656 07/10/23 2200   BP: (!) 150/97 (!) 139/103   Pulse: 85 64   Resp: 16 15   Temp: 97.7 °F (36.5 °C)   "  [facility]: Oral    SpO2: 99% 100%   Weight: 73.6 kg (162 lb 4.1 oz)    Height: 5' 5" (1.651 m)        Abnormal Lab Results:  Labs Reviewed   CBC W/ AUTO DIFFERENTIAL - Abnormal; Notable for the following components:       Result Value    RBC 3.60 (*)     Hemoglobin 10.8 (*)     Hematocrit 32.1 (*)     RDW 22.1 (*)     Platelets 63 (*)     Immature Granulocytes 2.2 (*)     Immature Grans (Abs) 0.19 (*)     Lymph # 0.9 (*)     nRBC 23 (*)     Gran % 80.7 (*)     Lymph % 10.8 (*)     Platelet Estimate Decreased (*)     All other components within normal limits   D DIMER, QUANTITATIVE - Abnormal; Notable for the following components:    D-Dimer 3.26 (*)     All other components within normal limits    Narrative:     Peripheral smear   PLATELET COUNT, BLUE TOP - Abnormal; Notable for the following components:    Platelet Count, Blue Top 59 (*)     All other components within normal limits    Narrative:     Peripheral smear   COMPREHENSIVE METABOLIC PANEL - Abnormal; Notable for the following components:    Glucose 157 (*)     Calcium 8.2 (*)     Total Protein 5.7 (*)     Albumin 2.3 (*)     Total Bilirubin 1.2 (*)     AST 43 (*)     All other components within normal limits    Narrative:     Peripheral smear   URINALYSIS, REFLEX TO URINE CULTURE - Abnormal; Notable for the following components:    Protein, UA 3+ (*)     Occult Blood UA 3+ (*)     Urobilinogen, UA >=8.0 (*)     All other components within normal limits    Narrative:     Specimen Source->Urine   CBC ONCOLOGY - Abnormal; Notable for the following components:    RBC 3.47 (*)     Hemoglobin 10.8 (*)     Hematocrit 30.9 (*)     MCH 31.1 (*)     RDW 21.9 (*)     Platelets 73 (*)     Immature Grans (Abs) 0.22 (*)     All other components within normal limits    Narrative:     Peripheral smear   PROTEIN / CREATININE RATIO, URINE - Abnormal; Notable for the following components:    Protein, Urine Random 276 (*)     Prot/Creat Ratio, Urine 2.60 (*)     All other " components within normal limits    Narrative:     Specimen Source->Urine   TYPE & SCREEN - Abnormal; Notable for the following components:    Indirect Angel POS (*)     All other components within normal limits   APTT    Narrative:     Peripheral smear   PROTIME-INR    Narrative:     Peripheral smear   PREGNANCY TEST, URINE RAPID    Narrative:     Specimen Source->Urine   MAGNESIUM    Narrative:     Peripheral smear   URINALYSIS MICROSCOPIC    Narrative:     Specimen Source->Urine   PROTEIN / CREATININE RATIO, URINE   ANTIBODY IDENTIFICATION        All Lab Results:  Results for orders placed or performed during the hospital encounter of 07/10/23   CBC auto differential   Result Value Ref Range    WBC 8.68 3.90 - 12.70 K/uL    RBC 3.60 (L) 4.00 - 5.40 M/uL    Hemoglobin 10.8 (L) 12.0 - 16.0 g/dL    Hematocrit 32.1 (L) 37.0 - 48.5 %    MCV 89 82 - 98 fL    MCH 30.0 27.0 - 31.0 pg    MCHC 33.6 32.0 - 36.0 g/dL    RDW 22.1 (H) 11.5 - 14.5 %    Platelets 63 (L) 150 - 450 K/uL    MPV SEE COMMENT 9.2 - 12.9 fL    Immature Granulocytes 2.2 (H) 0.0 - 0.5 %    Gran # (ANC) 7.0 1.8 - 7.7 K/uL    Immature Grans (Abs) 0.19 (H) 0.00 - 0.04 K/uL    Lymph # 0.9 (L) 1.0 - 4.8 K/uL    Mono # 0.5 0.3 - 1.0 K/uL    Eos # 0.0 0.0 - 0.5 K/uL    Baso # 0.01 0.00 - 0.20 K/uL    nRBC 23 (A) 0 /100 WBC    Gran % 80.7 (H) 38.0 - 73.0 %    Lymph % 10.8 (L) 18.0 - 48.0 %    Mono % 6.1 4.0 - 15.0 %    Eosinophil % 0.1 0.0 - 8.0 %    Basophil % 0.1 0.0 - 1.9 %    Platelet Estimate Decreased (A)     Aniso Moderate     Poik Slight     Poly Occasional     Tear Drop Cells Occasional     Spherocytes Occasional     Basophilic Stippling Occasional     Differential Method Automated    APTT   Result Value Ref Range    aPTT 23.0 21.0 - 32.0 sec   Protime-INR   Result Value Ref Range    Prothrombin Time 9.4 9.0 - 12.5 sec    INR 0.9 0.8 - 1.2   D dimer, quantitative   Result Value Ref Range    D-Dimer 3.26 (H) <0.50 mg/L FEU   Platelet Count, Blue Top    Result Value Ref Range    Platelet Count, Blue Top 59 (L) 150 - 450 K/uL    MPV, Blue Top 12.8 9.2 - 12.9 fL   Comprehensive Metabolic Panel   Result Value Ref Range    Sodium 138 136 - 145 mmol/L    Potassium 3.9 3.5 - 5.1 mmol/L    Chloride 105 95 - 110 mmol/L    CO2 24 23 - 29 mmol/L    Glucose 157 (H) 70 - 110 mg/dL    BUN 17 6 - 20 mg/dL    Creatinine 0.6 0.5 - 1.4 mg/dL    Calcium 8.2 (L) 8.7 - 10.5 mg/dL    Total Protein 5.7 (L) 6.0 - 8.4 g/dL    Albumin 2.3 (L) 3.5 - 5.2 g/dL    Total Bilirubin 1.2 (H) 0.1 - 1.0 mg/dL    Alkaline Phosphatase 82 55 - 135 U/L    AST 43 (H) 10 - 40 U/L    ALT 19 10 - 44 U/L    eGFR >60 >60 mL/min/1.73 m^2    Anion Gap 9 8 - 16 mmol/L   Urinalysis, Reflex to Urine Culture Urine, Clean Catch    Specimen: Urine   Result Value Ref Range    Specimen UA Urine, Clean Catch     Color, UA Yellow Yellow, Straw, Erin    Appearance, UA Clear Clear    pH, UA 7.0 5.0 - 8.0    Specific Gravity, UA 1.025 1.005 - 1.030    Protein, UA 3+ (A) Negative    Glucose, UA Negative Negative    Ketones, UA Negative Negative    Bilirubin (UA) Negative Negative    Occult Blood UA 3+ (A) Negative    Nitrite, UA Negative Negative    Urobilinogen, UA >=8.0 (A) <2.0 EU/dL    Leukocytes, UA Negative Negative   Rapid Pregnancy, Urine   Result Value Ref Range    Preg Test, Ur Negative    CBC Oncology   Result Value Ref Range    WBC 9.65 3.90 - 12.70 K/uL    RBC 3.47 (L) 4.00 - 5.40 M/uL    Hemoglobin 10.8 (L) 12.0 - 16.0 g/dL    Hematocrit 30.9 (L) 37.0 - 48.5 %    MCV 89 82 - 98 fL    MCH 31.1 (H) 27.0 - 31.0 pg    MCHC 35.0 32.0 - 36.0 g/dL    RDW 21.9 (H) 11.5 - 14.5 %    Platelets 73 (L) 150 - 450 K/uL    MPV SEE COMMENT 9.2 - 12.9 fL    Immature Grans (Abs) 0.22 (H) 0.00 - 0.04 K/uL   Magnesium   Result Value Ref Range    Magnesium 2.0 1.6 - 2.6 mg/dL   Urinalysis Microscopic   Result Value Ref Range    RBC, UA 0 0 - 4 /hpf    WBC, UA 1 0 - 5 /hpf    Bacteria None None-Occ /hpf    Hyaline Casts, UA 0  0-1/lpf /lpf    Microscopic Comment SEE COMMENT    Protein/Creatinine Ratio, Urine   Result Value Ref Range    Protein, Urine Random 276 (H) 0 - 15 mg/dL    Creatinine, Urine 106.3 15.0 - 325.0 mg/dL    Prot/Creat Ratio, Urine 2.60 (H) 0.00 - 0.20   Type & Screen   Result Value Ref Range    Group & Rh B POS     Indirect Angel POS (A)     Specimen Outdate 07/13/2023 23:59    Antibody identification   Result Value Ref Range    Antibody ID POS      *Note: Due to a large number of results and/or encounters for the requested time period, some results have not been displayed. A complete set of results can be found in Results Review.         Imaging Results:  Imaging Results    None               The Emergency Provider reviewed the vital signs and test results, which are outlined above.     ED Discussion      8:00 PM: Dr. Jimenez transfers care of patient to Dr. Sánchez pending lab results.    10:30 PM: Discussed pt's case with Dr. Sabrina London MD (Hematology and Oncology) who states that pt is safe and stable to be discharged home.  She is reviewed the patient's chart and laboratory studies extensively.  She notes the platelet count of 10 is likely a spur is reading in light of the other platelet counts with done here x2 today being higher than her discharge.  She does not recommend any further medications at this time and she will reach out to the patient's hematologist to let them know of our findings and plan of care.  I discussed this with the patient at length.  She verbalized agreement understanding with the plan of care.  She is safe for discharge in my opinion.    10:32 PM: Reassessed pt at this time. Discussed with pt all pertinent ED information and results. Discussed pt dx and plan of tx. Gave pt all f/u and return to the ED instructions. All questions and concerns were addressed at this time. Pt expresses understanding of information and instructions, and is comfortable with plan to discharge. Pt is  stable for discharge.    I discussed with patient and/or family/caretaker that evaluation in the ED does not suggest any emergent or life threatening medical conditions requiring immediate intervention beyond what was provided in the ED, and I believe patient is safe for discharge.  Regardless, an unremarkable evaluation in the ED does not preclude the development or presence of a serious of life threatening condition. As such, patient was instructed to return immediately for any worsening or change in current symptoms.      ED Medication(s):  Medications - No data to display    New Prescriptions    No medications on file           Medical Decision Making   Medical Decision Making  Amount and/or Complexity of Data Reviewed  External Data Reviewed: labs and notes.     Details: I reviewed the patient's prior labs prompting transfer.  She had a platelet count of 10 on those outpatient labs.  I have reviewed the notes from her physician as well.  These included his recommendations regarding her care.  Labs: ordered. Decision-making details documented in ED Course.     Details: We ordered interpreted all laboratory studies.  The patient's platelet counts here are actually 63.  These are trending upward from her discharge labs as well.  We did obtain other labs.  These included the protein creatinine ratio in the urine which was elevated at 2.6 clear urine negative UPT D-dimer 3.26 normal coags normal magnesium, total bilirubin 1.2 and a mild elevation in AST at 43 but otherwise benign CMP, hemoglobin of 11 and 31 B positive blood type.  She is antibody positive.    Risk  Decision regarding hospitalization.  Risk Details: I discussed the case with Dr. London on-call for Hematology-Oncology.  She is reviewed the patient's chart at all laboratory findings.  As platelet counts appear to be heading upward the initial low platelet count was likely a spurious reading.  She recommends discharge home.  She will reach out to the  patient's hematologist to let him know of the findings as well as her plan of care.  She does not recommend any steroids or other medications at this time to continue the patient on her home medications.  She would not recommend transfer to [place] which is where she works at this time.  I discussed this with the patient at length.  She verbalized agreement and understanding with the plan of care.  She is safe for discharge in my opinion.  No evidence of bleeding             Clinical Impression       ICD-10-CM ICD-9-CM   1. Thrombocytopenia  D69.6 287.5         ED Disposition  Disposition:   Disposition: Discharged  Condition: Stable        ED Follow-up   Follow-up Information       Saumya Quinonez MD.    Specialty: Family Medicine  Contact information:  [address] [place] [address] [phone]                                   Scribe Attestation:   Scribe #1: I performed the above scribed service and the documentation accurately describes the services I performed. I attest to the accuracy of the note.  Scribe #2: I performed the above scribed service and the documentation accurately describes the services I performed. I attest to the accuracy of the note.    Attending Attestation:           Physician Attestation for Scribe:  Physician Attestation Statement for Scribe #1: I, Edel Tesfaye DO, reviewed documentation, as scribed by Snehal Hollingsworth in my presence, and it is both accurate and complete.   Physician Attestation Statement for Scribe #2: I, Juan Carlos Sánchez Jr., MD, reviewed documentation, as scribed by Cj Hernandez in my presence, and it is both accurate and complete. I also acknowledge and confirm the content of the note done by Scribe #1.           Juan Carlos Sánchez Jr., MD  07/10/23 1372

## 2023-07-10 NOTE — PROGRESS NOTES
HEMATOLOGY / ONCOLOGY   CLINIC NOTE     ONCOLOGICAL HISTORY:     Diagnosis:  - lupus  - lymphoid hyperplasia  - anemia with iron deficiency    Current Treatment:   - ferrous sulfate    Subjective:       Chief Complaint: Anemia and thrombocytopenia      HPI    Alisia Ceballos [place]  32 y.o.  female with past medical history significant for SLE, lupus nephritis, Sjogren's syndrome referred for evaluation of lymphoid hyperplasia and anemia.      She was hospitalized in April 2023 for lupus flare with hemolytic anemia after being noncompliant with her medications (Plaquenil) for lupus. She was treated with Solu-Medrol 1 g IV and started Plaquenil.  Patient previously treated with rituximab in 12/2019 & 05/2020 due to noncompliance    She has diagnosis of anemia for long period of time and has required transfusion initially when diagnosed with lupus     Interval History:     Patient was recently treated rituximab 1st dose on June 27, 2023 and after that was feeling sick, presented to the ER on July 4, 2023 and noted to have low hemoglobin and required 1 unit of PRBC.  Patient was then treated with IV Solu-Medrol on 7/6 & 7/7 and started on tapering dose of prednisone    Patient denies any issues or concerns today.  Denies any blood in the urine or bowel movements.  Denies any heavy vaginal bleeding. She has been taking prednisone and plaquenil as prescribed.     Past Medical History:   Diagnosis Date    Allergic rhinitis     Anemia     Condyloma acuminata     COVID-19 virus infection 07/2021    Encounter for blood transfusion     GERD (gastroesophageal reflux disease)     Hemolytic anemia associated with systemic lupus erythematosus 4/30/2023    History of fetal anomaly in prior pregnancy, currently pregnant, unspecified trimester 03/08/2017    Pacemaker placed    HSV-2 (herpes simplex virus 2) infection     Lupus nephritis     Mood disorder     Overweight(278.02)     Sjogren's syndrome     Systemic lupus  complicating pregnancy 2019    Systemic lupus erythematosus     Thrombocytopenia       Past Surgical History:   Procedure Laterality Date     SECTION WITH TUBAL LIGATION N/A 2019    Procedure:  SECTION, WITH TUBAL LIGATION;  Surgeon: VERONICA Valdovinos MD;  Location: [facility] L&D;  Service: OB/GYN;  Laterality: N/A;     SECTION, LOW TRANSVERSE      x2    LYMPH NODE BIOPSY Right 2023    Procedure: BIOPSY, LYMPH NODE;  Surgeon: Rivera Sandoval MD;  Location: [facility] OR;  Service: ENT;  Laterality: Right;  right deep cervial lymph node excision    NECK MASS EXCISION Right 2023    Procedure: EXCISION, MASS, NECK;  Surgeon: Rivera Sandoval MD;  Location: [facility] OR;  Service: ENT;  Laterality: Right;  right deep cervial lymph node excision    RENAL BIOPSY  2013     Social History     Socioeconomic History    Marital status: Single    Number of children: 2   Occupational History     Comment: [employer]   Tobacco Use    Smoking status: Never     Passive exposure: Never    Smokeless tobacco: Never   Substance and Sexual Activity    Alcohol use: No     Alcohol/week: 0.0 standard drinks    Drug use: Yes     Types: Marijuana    Sexual activity: Not Currently     Partners: Male     Birth control/protection: None   Other Topics Concern    Are you pregnant or think you may be? No    Breast-feeding No   Social History Narrative    The patient is single and lives with her significant other.  She has 3 children.  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     Social Determinants of Health     Financial Resource Strain: Low Risk     Difficulty of Paying Living Expenses: Not hard at all   Food Insecurity: No Food Insecurity    Worried About Running Out of Food in the Last Year: Never true    Ran Out of Food in the Last Year: Never true   Transportation Needs: No Transportation Needs    Lack of Transportation (Medical): No    Lack of Transportation (Non-Medical): No   Physical  Activity: Inactive    Days of Exercise per Week: 0 days    Minutes of Exercise per Session: 0 min   Stress: No Stress Concern Present    Feeling of Stress : Not at all   Social Connections: Socially Isolated    Frequency of Communication with Friends and Family: More than three times a week    Frequency of Social Gatherings with Friends and Family: More than three times a week    Attends Catholic Services: Never    Active Member of Clubs or Organizations: No    Attends Club or Organization Meetings: Never    Marital Status: Never    Housing Stability: Unknown    Unable to Pay for Housing in the Last Year: No    Unstable Housing in the Last Year: No      Family History   Problem Relation Age of Onset    Hypertension Mother     Eczema Brother     Hypertension Maternal Grandmother     Diabetes Paternal Grandmother     Heart disease Son     Arrhythmia Son         CHB    Stroke Neg Hx     Cancer Neg Hx       Review of patient's allergies indicates:  No Known Allergies   Review of Systems   Constitutional: Negative.  Negative for activity change, chills, fatigue and fever.   HENT: Negative.     Eyes: Negative.    Respiratory:  Negative for cough and shortness of breath.    Cardiovascular:  Negative for chest pain and leg swelling.   Gastrointestinal:  Negative for constipation, diarrhea, nausea and vomiting.   Endocrine: Negative.    Genitourinary: Negative.    Musculoskeletal:  Negative for arthralgias and myalgias.   Integumentary:  Negative.   Allergic/Immunologic: Negative.    Neurological:  Negative for light-headedness, numbness and headaches.   Hematological: Negative.    Psychiatric/Behavioral: Negative.         Objective:        Vitals:    07/10/23 1111   BP: (!) 146/105   Pulse: 96   Temp: 98 °F (36.7 °C)            Physical Exam  Constitutional:       General: She is not in acute distress.     Appearance: She is well-developed. She is not ill-appearing.   HENT:      Head: Normocephalic and atraumatic.       Mouth/Throat:      Mouth: Mucous membranes are moist.   Eyes:      Extraocular Movements: Extraocular movements intact.      Conjunctiva/sclera: Conjunctivae normal.   Cardiovascular:      Rate and Rhythm: Normal rate and regular rhythm.      Heart sounds: Normal heart sounds.   Pulmonary:      Effort: Pulmonary effort is normal. No respiratory distress.      Breath sounds: Normal breath sounds. No wheezing.   Abdominal:      General: Bowel sounds are normal. There is no distension.      Palpations: Abdomen is soft.      Tenderness: There is no abdominal tenderness.   Musculoskeletal:         General: Normal range of motion.      Cervical back: Normal range of motion and neck supple.   Skin:     General: Skin is warm.   Neurological:      General: No focal deficit present.      Mental Status: She is alert and oriented to person, place, and time. Mental status is at baseline.      Cranial Nerves: No cranial nerve deficit.   Psychiatric:         Mood and Affect: Mood normal.         LABS / IMAGING      - 01/10/2023 CT NECK: Extensive lymphadenopathy in the right greater than left neck. Abnormal lymphadenopathy is noted throughout the soft tissues of the neck, right greater than left, with the largest nodes in the right supraclavicular region measuring up to 3 x 2.1 cm in size (axial 111, series 2).  Both benign and malignant etiologies are in the differential, recommend correlation with clinical presentation for infectious etiology.  Consider biopsy to rule out lymphoma.    - 02/17/2023 Right deep cervical lymph node, excisional biopsy: Follicular hyperplasia.  No evidence of lymphoma or metastatic carcinoma.           Assessment:           LYMPHADENOPATHY  - noted lymphadenopathy on the CT scan of the neck from January of 2023.  - 02/17/2023 Right deep cervical lymph node, excisional biopsy: Follicular hyperplasia.  No evidence of lymphoma or metastatic carcinoma.    ANEMIA  - chronic anemia since 2006 and now  worsening in April 2023 with hemoglobin trending down to 7 from 10.5, picture consistent with hemolysis with haptoglobin less than 10,  in the setting of lupus flare. Also noted iron-deficiency and started on ferrous sulfate. Patient did not require any blood transfusion in the hospital and was started on treatment for lupus flare with Solu-Medrol and Plaquenil  - iron saturation of 17, folate 7.7, B12 716, ferritin 397  - Path smear with macrocytic anemia with polychromasia and rare nucleated red cells.  Consider reticulocytosis as a cause of the high MCV.  No additional morphologic abnormalities     Plan:       - worsening anemia in the setting of lupus flare, started on treatment with plaquenil & prednisone with improvement in hgb but then had a drop again in hemoglobin to 6.7 requiring transfusion after being given rituximab on 6/27/2023. Hemoglobin stable today, thus will continue to monitor while being treated with prednisone   - new onset thrombocytopenia noted since rituximab infusion which is now held and patient is started on prednisone taper.   - continue with ferrous sulfate but noted worsening iron level, thus will treat with IV iron sucrose 300 mg for 4 doses   - CT chest abdomen and pelvis pending, ordered by primary team   - MD / LABS VISIT - 1 WEEK       ADDENDUM  - Called by labs for worsening platelet counts to 10.   - Called the patient and instructed to go to the ER and recommended to admit for further management.  - Plan for platelet transfusion. Further workup recommended to rule out emergency conditions. Will recommend to check PT/INR/PTT, smear evaluation, D dimer to rule out TMA and DIC and then treat accordingly. If workup negative, then consider IVIG emergently as noted no improvement in platelets despite being on steroids for 4 days. Also patient with lupus, thus needs to be seen by rheumatology for urgent evaluation and management of lupus.        Med Onc Chart  Routing      Follow up with physician 1 week. urgent need for follow up with low counts   Follow up with [facility]    Infusion scheduling note    Injection scheduling note    Labs CBC and CMP   Scheduling:  Preferred lab:  Lab interval:  labs before next visit   Imaging    Pharmacy appointment    Other referrals              The patient was seen, interviewed and examined. Pertinent lab and radiology studies were reviewed. Pt instructed to call should develop concerning signs/symptoms or have further questions.       Portions of the record may have been created with voice recognition software. Occasional wrong-word or sound-a-like substitutions may have occurred due to the inherent limitations of voice recognition software. Read the chart carefully and recognize, using context, where substitutions have occurred.    Michelle Lynne MD  Hematology / Oncology

## 2023-07-11 ENCOUNTER — PATIENT MESSAGE (OUTPATIENT)
Dept: RHEUMATOLOGY | Facility: CLINIC | Age: 33
End: 2023-07-11
Payer: MEDICAID

## 2023-07-11 ENCOUNTER — DOCUMENTATION ONLY (OUTPATIENT)
Dept: HEMATOLOGY/ONCOLOGY | Facility: CLINIC | Age: 33
End: 2023-07-11
Payer: MEDICAID

## 2023-07-11 ENCOUNTER — TELEPHONE (OUTPATIENT)
Dept: OTOLARYNGOLOGY | Facility: CLINIC | Age: 33
End: 2023-07-11
Payer: MEDICAID

## 2023-07-11 LAB
DNA TITER: NORMAL
DSDNA AB SER-ACNC: POSITIVE [IU]/ML
PROTEINASE3 IGG SER-ACNC: <0.2 U

## 2023-07-11 NOTE — TELEPHONE ENCOUNTER
----- Message from Purvi Edouard sent at 7/11/2023 11:22 AM CDT -----  Regarding: ENT Referral  Hello,   This pt has a referral for ENT that needs to be tamar. She is an established pt with Dr. Humphries. Last seen on 3/7/23. Please assist in tamar.    Thank you

## 2023-07-11 NOTE — TELEPHONE ENCOUNTER
Pt original referral for lymphadenopathy has been closed. New referral for ear pain. Different complain=new patient.

## 2023-07-11 NOTE — TELEPHONE ENCOUNTER
Head, normocephalic, atraumatic, Face, Face within normal limits, Ears, External ears within normal limits, Nose/Nasopharynx, External nose normal appearance, nares patent, no nasal discharge, Mouth and Throat, Oral cavity appearance normal, Lips, Appearance normal Pt notifying us that she is in the hosp and will not be able to come to appt to day   Advised pt to call us when she is discharged to make follow up appt

## 2023-07-12 DIAGNOSIS — M32.14 SLE GLOMERULONEPHRITIS SYNDROME, WHO CLASS V: Primary | ICD-10-CM

## 2023-07-12 LAB
ANCA AB TITR SER IF: NORMAL TITER
BACTERIA BLD CULT: NORMAL
BACTERIA BLD CULT: NORMAL
P-ANCA TITR SER IF: NORMAL TITER

## 2023-07-12 RX ORDER — MYCOPHENOLATE MOFETIL 500 MG/1
500 TABLET ORAL 2 TIMES DAILY
Qty: 60 TABLET | Refills: 3 | Status: SHIPPED | OUTPATIENT
Start: 2023-07-12 | End: 2023-12-01

## 2023-07-12 NOTE — PROGRESS NOTES
Discussed case with ER physician. Patient sent to ED after clinic lab revealed recurrent platelet count of 10 shortly after completing pulse dose steroids x 4 days.     Repeat labs x 2 in the ED revealed a platelet count of 63k at 5:57pm and 73k at 9:10pm    No steroids or platelet transfusion were given prior to increase in counts. Patient denies easy bruising or bleeding. Okay to continue outpatient management.  I will inform the primary hematologist.

## 2023-07-13 ENCOUNTER — OFFICE VISIT (OUTPATIENT)
Dept: DERMATOLOGY | Facility: CLINIC | Age: 33
End: 2023-07-13
Payer: MEDICAID

## 2023-07-13 ENCOUNTER — TELEPHONE (OUTPATIENT)
Dept: HEMATOLOGY/ONCOLOGY | Facility: CLINIC | Age: 33
End: 2023-07-13
Payer: MEDICAID

## 2023-07-13 ENCOUNTER — TELEPHONE (OUTPATIENT)
Dept: PHARMACY | Facility: CLINIC | Age: 33
End: 2023-07-13
Payer: MEDICAID

## 2023-07-13 DIAGNOSIS — M32.9 SLE (SYSTEMIC LUPUS ERYTHEMATOSUS RELATED SYNDROME): Primary | ICD-10-CM

## 2023-07-13 DIAGNOSIS — L93.0 DISCOID LUPUS: ICD-10-CM

## 2023-07-13 PROCEDURE — 99214 PR OFFICE/OUTPT VISIT, EST, LEVL IV, 30-39 MIN: ICD-10-PCS | Mod: S$PBB,,, | Performed by: DERMATOLOGY

## 2023-07-13 PROCEDURE — 99999 PR PBB SHADOW E&M-EST. PATIENT-LVL III: CPT | Mod: PBBFAC,,, | Performed by: DERMATOLOGY

## 2023-07-13 PROCEDURE — 99213 OFFICE O/P EST LOW 20 MIN: CPT | Mod: PBBFAC | Performed by: DERMATOLOGY

## 2023-07-13 PROCEDURE — 1159F PR MEDICATION LIST DOCUMENTED IN MEDICAL RECORD: ICD-10-PCS | Mod: CPTII,,, | Performed by: DERMATOLOGY

## 2023-07-13 PROCEDURE — 99999 PR PBB SHADOW E&M-EST. PATIENT-LVL III: ICD-10-PCS | Mod: PBBFAC,,, | Performed by: DERMATOLOGY

## 2023-07-13 PROCEDURE — 1160F PR REVIEW ALL MEDS BY PRESCRIBER/CLIN PHARMACIST DOCUMENTED: ICD-10-PCS | Mod: CPTII,,, | Performed by: DERMATOLOGY

## 2023-07-13 PROCEDURE — 99214 OFFICE O/P EST MOD 30 MIN: CPT | Mod: S$PBB,,, | Performed by: DERMATOLOGY

## 2023-07-13 PROCEDURE — 1160F RVW MEDS BY RX/DR IN RCRD: CPT | Mod: CPTII,,, | Performed by: DERMATOLOGY

## 2023-07-13 PROCEDURE — 1159F MED LIST DOCD IN RCRD: CPT | Mod: CPTII,,, | Performed by: DERMATOLOGY

## 2023-07-13 RX ORDER — TACROLIMUS 1 MG/G
OINTMENT TOPICAL
Qty: 60 G | Refills: 3 | Status: SHIPPED | OUTPATIENT
Start: 2023-07-13 | End: 2023-07-14

## 2023-07-13 RX ORDER — TRIAMCINOLONE ACETONIDE 0.25 MG/G
OINTMENT TOPICAL
Qty: 80 G | Refills: 1 | Status: SHIPPED | OUTPATIENT
Start: 2023-07-13

## 2023-07-13 NOTE — PROGRESS NOTES
Subjective:      Patient ID:  Alisia Vines is a 32 y.o. female who presents for   Chief Complaint   Patient presents with    Follow-up     Patient in for follow up on a rash she had on her scalp and states she is starting to break out in her ears and her scalp is starting to flare back up.      Hx of SLE x 10 years (on plaquenil), Srogjen's, lupus nephritis (followed by [facility] and Dr. Franks), last seen on 5/12/23.  She is s/p episode with nausea and lethargy s/p rituxan infusion.  She is currently on plaquenil 400 mg bid, cellcept 500 mg bid, prednisone 60 mg x 2 weeks. She states scalp and face has flared.  She avoid sun but does not wear sunscreen regularly.  She has not used TAC 0.025% oint. Sh ehas not used keto shampoo and mometasone solution due to lack of energy.         Review of Systems   Constitutional:  Negative for fever and chills.   Gastrointestinal:  Negative for nausea and vomiting.   Skin:  Positive for activity-related sunscreen use. Negative for daily sunscreen use and recent sunburn.   Hematologic/Lymphatic: Does not bruise/bleed easily.     Objective:   Physical Exam   Constitutional: She appears well-developed and well-nourished. No distress.   Neurological: She is alert and oriented to person, place, and time. She is not disoriented.   Psychiatric: She has a normal mood and affect.   Skin:   Areas Examined (abnormalities noted in diagram):   Scalp / Hair Palpated and Inspected  Head / Face Inspection Performed  Neck Inspection Performed  RUE Inspected  LUE Inspection Performed  Nails and Digits Inspection Performed           Assessment / Plan:          SLE (systemic lupus erythematosus related syndrome)  Discoid lupus  -     triamcinolone acetonide 0.025% (KENALOG) 0.025 % Oint; AAA bid prn. Use for 2 weeks then taper. Mild steroid.  Dispense: 80 g; Refill: 1  -     tacrolimus (PROTOPIC) 0.1 % ointment; AAA bid prn eczema.  Non-steroid. Safe to use long-term  Dispense: 60 g;  Refill: 3  -     encouraged compliance with use of mometasone solution for scalp/ears and TAC 0.025% ointment for face. Will submit for protopic as non-steroid/steroid sparing agent.  Encouraged sunscreen when outdoors. The patient acknowledged understanding.              Follow up in about 6 months (around 1/13/2024).

## 2023-07-13 NOTE — TELEPHONE ENCOUNTER
----- Message from Michelle Lynne MD sent at 7/13/2023  8:15 AM CDT -----  I have canceled the treatment with IV iron sucrose for now as repeat levels are normal      ,     Michelle Lynne MD  Hematology / Oncology

## 2023-07-14 ENCOUNTER — PATIENT MESSAGE (OUTPATIENT)
Dept: FAMILY MEDICINE | Facility: CLINIC | Age: 33
End: 2023-07-14
Payer: MEDICAID

## 2023-07-14 DIAGNOSIS — L93.0 DISCOID LUPUS: ICD-10-CM

## 2023-07-14 DIAGNOSIS — L20.89 OTHER ATOPIC DERMATITIS: Primary | ICD-10-CM

## 2023-07-14 RX ORDER — PIMECROLIMUS 10 MG/G
CREAM TOPICAL
Qty: 60 G | Refills: 3 | Status: SHIPPED | OUTPATIENT
Start: 2023-07-14

## 2023-07-17 ENCOUNTER — LAB VISIT (OUTPATIENT)
Dept: LAB | Facility: HOSPITAL | Age: 33
End: 2023-07-17
Attending: INTERNAL MEDICINE
Payer: MEDICAID

## 2023-07-17 DIAGNOSIS — Z51.81 MEDICATION MONITORING ENCOUNTER: ICD-10-CM

## 2023-07-17 LAB
ALBUMIN SERPL BCP-MCNC: 2.5 G/DL (ref 3.5–5.2)
ALP SERPL-CCNC: 59 U/L (ref 55–135)
ALT SERPL W/O P-5'-P-CCNC: 16 U/L (ref 10–44)
ANION GAP SERPL CALC-SCNC: 9 MMOL/L (ref 8–16)
AST SERPL-CCNC: 25 U/L (ref 10–40)
BILIRUB SERPL-MCNC: 0.7 MG/DL (ref 0.1–1)
BUN SERPL-MCNC: 17 MG/DL (ref 6–20)
C3 SERPL-MCNC: 41 MG/DL (ref 50–180)
C4 SERPL-MCNC: 8 MG/DL (ref 11–44)
CALCIUM SERPL-MCNC: 8.7 MG/DL (ref 8.7–10.5)
CHLORIDE SERPL-SCNC: 108 MMOL/L (ref 95–110)
CO2 SERPL-SCNC: 25 MMOL/L (ref 23–29)
CREAT SERPL-MCNC: 0.6 MG/DL (ref 0.5–1.4)
CREAT UR-MCNC: 83 MG/DL (ref 15–325)
EST. GFR  (NO RACE VARIABLE): >60 ML/MIN/1.73 M^2
GLUCOSE SERPL-MCNC: 127 MG/DL (ref 70–110)
PATHOLOGIST INTERPRETATION AB/XM: NORMAL
POTASSIUM SERPL-SCNC: 4 MMOL/L (ref 3.5–5.1)
PROT SERPL-MCNC: 5.2 G/DL (ref 6–8.4)
PROT UR-MCNC: 115 MG/DL (ref 0–15)
PROT/CREAT UR: 1.39 MG/G{CREAT} (ref 0–0.2)
SODIUM SERPL-SCNC: 142 MMOL/L (ref 136–145)

## 2023-07-17 PROCEDURE — 86225 DNA ANTIBODY NATIVE: CPT | Performed by: PHYSICIAN ASSISTANT

## 2023-07-17 PROCEDURE — 80053 COMPREHEN METABOLIC PANEL: CPT | Mod: 91 | Performed by: PHYSICIAN ASSISTANT

## 2023-07-17 PROCEDURE — 86160 COMPLEMENT ANTIGEN: CPT | Performed by: PHYSICIAN ASSISTANT

## 2023-07-17 PROCEDURE — 86160 COMPLEMENT ANTIGEN: CPT | Mod: 59 | Performed by: PHYSICIAN ASSISTANT

## 2023-07-17 PROCEDURE — 82570 ASSAY OF URINE CREATININE: CPT | Performed by: PHYSICIAN ASSISTANT

## 2023-07-17 PROCEDURE — 86225 DNA ANTIBODY NATIVE: CPT | Mod: 59 | Performed by: PHYSICIAN ASSISTANT

## 2023-07-17 NOTE — PROGRESS NOTES
Subjective:       Patient ID: Alisia Vines is a 32 y.o. female.    Chief Complaint:   1. Iron deficiency anemia, unspecified iron deficiency anemia type        2. Thrombocytopenia          Current Treatment:  2. Prednisone per Rheumatology    Treatment History:  2. RITUXIMAB (RITUXAN) 1000 MG X 2 DOSES    HPI: This is a 32 year old  woman with splenomegaly, genital herpes, Sjogren's syndrome, and lupus nephritis who is seen in Hem/Onc for lymphoid hyperplasia. She was initially seen in 10/2013 while inpatient for pancytopenia in the setting of suspected lupus. She reported being diagnosed with Sjogren's syndrom in 2012 after giving birth to her son; he had complete heart block. About a month later, she awakened with a swollen lip that was unresponsive to steroid injection. She developed fever, chills, a malar rash, and swelling of the face. She was treated with over 3 different antibiotics without improvement and was finally admitted. She was found to have proteinuria, pyruia with casts suggesting nephritis. HARPREET titer was positive. Rheumatology made a diagnosis of systemic lupus and placed her on IV steroids with rapid resolution of facial edema. Neutropenia and thrombocytopenia resolved on steroids.     Her blood counts were being monitored post discharge until she was lost to follow up.     She presented again in 5/2023 after being hospitalized in 4/2023 with lupus flare and hemolytic anemia due to nonadherence to Plaquenil. 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 She was initially treated with IV SoluMedrol and continued on Plaquenil which she continued upon discharge. She was also started on oral ferrous sulfate for iron deficiency. Labs were consistent with with hemolysis with haptoglobin less than 10,  in the setting of lupus flare.     Oral iron was not effective;  the plan was to treat with Venofer x 4 doses, but she never received it. She was on prednisone and Plaquenil for lupus with plans to start rituximab treatment. She received 1 dose in 6/2023 with a drop in Hgb and plts; rituximab was held. She was treated with 1U PRBCs after presenting to the ER. Her plts continued to drop and she was sent back to the ER for admission and plt transfusion. Repeat labs in ER revealed plts 63k at 5:57pm and 73k at 9:10pm; she never received plt transfusion.     Her primary Hematologist/Oncologist is  Dr. Lynne .    Interval History: Patient presents for follow up on labs. She presents alone and reports having a lot of energy despite only sleeping 4 hours last night. She states she feels too good to have recently been in the hospital and very sick. She states Rheumatology does not recommend any more doses of Rituxan; she is currently on Plaquenil, Cellcept, and prednisone. Prednisone is likely causing her to have more energy despite lack of sleep. Rheumatology checks her CBC every Monday; will consult with her hematologist regarding timing of follow up to monitor her blood counts. RBC, H&H have dropped to 2.26 and 8.5 & 24%, respectively, but she is asymptomatic. Plts stable at 59k. Will continue to monitor. CMP stable.     Reviewed labs with patient:   CBC:   Recent Labs   Lab 07/17/23  1543   WBC 7.37   RBC 2.26 L   Hemoglobin 8.5 L   Hematocrit 24.0 L   Platelets 59 L    H   MCH 37.6 H   MCHC 35.4     CMP:  Recent Labs   Lab 07/17/23  1543   Glucose 133 H  127 H   Calcium 8.7  8.7   Albumin 2.5 L  2.5 L   Total Protein 5.3 L  5.2 L   Sodium 141  142   Potassium 4.1  4.0   CO2 23  25   Chloride 108  108   BUN 17  17   Creatinine 0.6  0.6   Alkaline Phosphatase 58  59   ALT 17  16   AST 24  25   Total Bilirubin 0.7  0.7     The patient location is: [place], [place]  The chief complaint leading to consultation is: iron deficiency anemia, thrombocytopenia    Visit  type: audiovisual    Face to Face time with patient: 8 minutes  19 minutes of total time spent on the encounter, which includes face to face time and non-face to face time preparing to see the patient (eg, review of tests), Obtaining and/or reviewing separately obtained history, Documenting clinical information in the electronic or other health record, Independently interpreting results (not separately reported) and communicating results to the patient/family/caregiver, or Care coordination (not separately reported).     Each patient to whom he or she provides medical services by telemedicine is:  (1) informed of the relationship between the physician and patient and the respective role of any other health care provider with respect to management of the patient; and (2) notified that he or she may decline to receive medical services by telemedicine and may withdraw from such care at any time.    Social History     Socioeconomic History    Marital status: Single    Number of children: 2   Occupational History     Comment: [employer]   Tobacco Use    Smoking status: Never     Passive exposure: Never    Smokeless tobacco: Never   Substance and Sexual Activity    Alcohol use: No     Alcohol/week: 0.0 standard drinks    Drug use: Yes     Types: Marijuana    Sexual activity: Not Currently     Partners: Male     Birth control/protection: None   Other Topics Concern    Are you pregnant or think you may be? No    Breast-feeding No   Social History Narrative    The patient is single and lives with her significant other.  She has 3 children.  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     Social Determinants of Health     Financial Resource Strain: Low Risk     Difficulty of Paying Living Expenses: Not hard at all   Food Insecurity: No Food Insecurity    Worried About Running Out of Food in the Last Year: Never true    Ran Out of Food in the Last Year: Never true   Transportation Needs: No Transportation  Needs    Lack of Transportation (Medical): No    Lack of Transportation (Non-Medical): No   Physical Activity: Inactive    Days of Exercise per Week: 0 days    Minutes of Exercise per Session: 0 min   Stress: No Stress Concern Present    Feeling of Stress : Not at all   Social Connections: Socially Isolated    Frequency of Communication with Friends and Family: More than three times a week    Frequency of Social Gatherings with Friends and Family: More than three times a week    Attends Taoist Services: Never    Active Member of Clubs or Organizations: No    Attends Club or Organization Meetings: Never    Marital Status: Never    Housing Stability: Unknown    Unable to Pay for Housing in the Last Year: No    Unstable Housing in the Last Year: No     Past Medical History:   Diagnosis Date    Allergic rhinitis     Anemia     Condyloma acuminata     COVID-19 virus infection 2021    Encounter for blood transfusion     GERD (gastroesophageal reflux disease)     Hemolytic anemia associated with systemic lupus erythematosus 2023    History of fetal anomaly in prior pregnancy, currently pregnant, unspecified trimester 2017    Pacemaker placed    HSV-2 (herpes simplex virus 2) infection     Lupus nephritis     Mood disorder     Overweight(278.02)     Sjogren's syndrome     Systemic lupus complicating pregnancy 2019    Systemic lupus erythematosus     Thrombocytopenia      Family History   Problem Relation Age of Onset    Hypertension Mother     Eczema Brother     Hypertension Maternal Grandmother     Diabetes Paternal Grandmother     Heart disease Son     Arrhythmia Son         CHB    Stroke Neg Hx     Cancer Neg Hx      Past Surgical History:   Procedure Laterality Date     SECTION WITH TUBAL LIGATION N/A 2019    Procedure:  SECTION, WITH TUBAL LIGATION;  Surgeon: VERONICA Valdovinos MD;  Location: [facility] L&D;  Service: OB/GYN;  Laterality: N/A;     SECTION, LOW  TRANSVERSE      x2    LYMPH NODE BIOPSY Right 2/17/2023    Procedure: BIOPSY, LYMPH NODE;  Surgeon: Rivera Sandoval MD;  Location: [facility] OR;  Service: ENT;  Laterality: Right;  right deep cervial lymph node excision    NECK MASS EXCISION Right 2/17/2023    Procedure: EXCISION, MASS, NECK;  Surgeon: Rivera Sandoval MD;  Location: [facility] OR;  Service: ENT;  Laterality: Right;  right deep cervial lymph node excision    RENAL BIOPSY  October 2013     Review of Systems   Constitutional:  Negative for appetite change and fatigue.   HENT:  Negative for mouth sores, rhinorrhea and sore throat.    Eyes: Negative.    Respiratory: Negative.     Cardiovascular: Negative.    Gastrointestinal:  Negative for constipation, diarrhea, nausea and vomiting.   Endocrine: Negative.    Genitourinary: Negative.    Musculoskeletal: Negative.    Integumentary:  Negative.   Allergic/Immunologic: Negative.    Neurological:  Negative for weakness and numbness.   Hematological: Negative.    Psychiatric/Behavioral: Negative.         Medication List with Changes/Refills   Current Medications    AMLODIPINE (NORVASC) 5 MG TABLET    TAKE 1 TABLET(5 MG) BY MOUTH EVERY DAY    ARIPIPRAZOLE (ABILIFY) 2 MG TAB    TAKE 1 TABLET(2 MG) BY MOUTH EVERY DAY    CICLOPIROX (PENLAC) 8 % SOLN    Apply to nail and nail fold once daily for up to 1 year    DIPHENHYDRAMINE-ALUMINUM-MAGNESIUM HYDROXIDE-SIMETHICONE-LIDOCAINE HCL 2%    Swish and spit 15 mLs every 4 (four) hours as needed (oral ulcer pain).    FLUOXETINE 40 MG CAPSULE    TAKE 1 CAPSULE(40 MG) BY MOUTH EVERY DAY    FLUTICASONE PROPIONATE (FLONASE) 50 MCG/ACTUATION NASAL SPRAY    SHAKE LIQUID AND USE 2 SPRAYS(100 MCG) IN EACH NOSTRIL EVERY DAY    HYDROCODONE-ACETAMINOPHEN (NORCO) 5-325 MG PER TABLET    Take 1-2 tablets by mouth every 6 (six) hours as needed for Pain.    HYDROXYCHLOROQUINE (PLAQUENIL) 200 MG TABLET    TAKE 2 TABLETS(400 MG) BY MOUTH EVERY DAY    KETOCONAZOLE (NIZORAL) 2 % SHAMPOO    Wash hair with  medicated shampoo at least 1x/week - let sit on scalp at least 5 minutes prior to rinsing    MOMETASONE (ELOCON) 0.1 % SOLUTION    AAA of scalp once daily.    MYCOPHENOLATE (CELLCEPT) 500 MG TAB    Take 1 tablet (500 mg total) by mouth 2 (two) times daily.    ONDANSETRON (ZOFRAN) 4 MG TABLET    Take 1 tablet (4 mg total) by mouth every 8 (eight) hours as needed for Nausea.    PIMECROLIMUS (ELIDEL) 1 % CREAM    Apply to affected areas twice daily. Wear daily sunscreen.    PREDNISONE (DELTASONE) 20 MG TABLET    Take 3 tablets (60 mg total) by mouth once daily for 14 days, THEN 2.5 tablets (50 mg total) once daily for 7 days, THEN 2 tablets (40 mg total) once daily for 7 days, THEN 1.5 tablets (30 mg total) once daily for 7 days, THEN 1 tablet (20 mg total) once daily for 7 days, THEN 0.5 tablets (10 mg total) once daily for 7 days.    PREDNISONE (DELTASONE) 20 MG TABLET    Take 3 tablets (60 mg total) by mouth once daily. for 14 days    TRIAMCINOLONE ACETONIDE 0.025% (KENALOG) 0.025 % OINT    AAA bid prn. Use for 2 weeks then taper. Mild steroid.    VALACYCLOVIR (VALTREX) 1000 MG TABLET    Take 1 tablet (1,000 mg total) by mouth once daily.    WATER LIQD 150 ML WITH MILK OF MAGNESIA 400 MG/5 ML SUSP 400 MG, DIPHENHYDRAMINE 12.5 MG/5 ML ELIX 60 MG, NYSTATIN 100,000 UNIT/ML SUSP 500,000 UNITS    SWISH AND SPIT 10 ML EVERY 6 HOURS AS NEEDED    WATER LIQD 150 ML WITH MILK OF MAGNESIA 400 MG/5 ML SUSP 400 MG, DIPHENHYDRAMINE 12.5 MG/5 ML ELIX 60 MG, NYSTATIN 100,000 UNIT/ML SUSP 500,000 UNITS    SWISH AND SPIT 10 ML EVERY 6 HOURS AS NEEDED     Objective:   There were no vitals filed for this visit.  Physical Exam     Unable to assess due to virtual visit.    (0) Fully active, able to carry on all predisease performance without restriction  Assessment:     Problem List Items Addressed This Visit          Hematology    Thrombocytopenia       Oncology    Iron deficiency anemia - Primary     Plan:     Iron deficiency anemia,  unspecified iron deficiency anemia type    Thrombocytopenia    Labs reviewed; plts stable at 59k.   Continue Plaquenil, Cellcept, and prednisone per Rheumatology.   Follow up in  3 months  with  iron profile, ferritin, and CBC.  Check iron studies in 3 months (10/2023).    Route Chart for Scheduling    Med Onc Chart Routing      Follow up with physician 3 months. Dr. Lynne   Follow up with GUNJAN    Infusion scheduling note    Injection scheduling note    Labs CBC, ferritin and iron and TIBC   Scheduling:  Preferred lab:  Lab interval:  in 3 months, 2 days prior   Imaging None      Pharmacy appointment No pharmacy appointment needed      Other referrals     No additional referrals needed         I will review assessment/plan with collaborating physician.      EDWIN Dooley

## 2023-07-18 ENCOUNTER — OFFICE VISIT (OUTPATIENT)
Dept: HEMATOLOGY/ONCOLOGY | Facility: CLINIC | Age: 33
End: 2023-07-18
Payer: MEDICAID

## 2023-07-18 ENCOUNTER — TELEPHONE (OUTPATIENT)
Dept: INFECTIOUS DISEASES | Facility: CLINIC | Age: 33
End: 2023-07-18
Payer: MEDICAID

## 2023-07-18 DIAGNOSIS — D69.6 THROMBOCYTOPENIA: ICD-10-CM

## 2023-07-18 DIAGNOSIS — D50.9 IRON DEFICIENCY ANEMIA, UNSPECIFIED IRON DEFICIENCY ANEMIA TYPE: Primary | ICD-10-CM

## 2023-07-18 PROCEDURE — 1160F RVW MEDS BY RX/DR IN RCRD: CPT | Mod: CPTII,95,, | Performed by: NURSE PRACTITIONER

## 2023-07-18 PROCEDURE — 1160F PR REVIEW ALL MEDS BY PRESCRIBER/CLIN PHARMACIST DOCUMENTED: ICD-10-PCS | Mod: CPTII,95,, | Performed by: NURSE PRACTITIONER

## 2023-07-18 PROCEDURE — 1159F MED LIST DOCD IN RCRD: CPT | Mod: CPTII,95,, | Performed by: NURSE PRACTITIONER

## 2023-07-18 PROCEDURE — 1159F PR MEDICATION LIST DOCUMENTED IN MEDICAL RECORD: ICD-10-PCS | Mod: CPTII,95,, | Performed by: NURSE PRACTITIONER

## 2023-07-18 PROCEDURE — 99214 OFFICE O/P EST MOD 30 MIN: CPT | Mod: 95,,, | Performed by: NURSE PRACTITIONER

## 2023-07-18 PROCEDURE — 99214 PR OFFICE/OUTPT VISIT, EST, LEVL IV, 30-39 MIN: ICD-10-PCS | Mod: 95,,, | Performed by: NURSE PRACTITIONER

## 2023-07-19 ENCOUNTER — TELEPHONE (OUTPATIENT)
Dept: RHEUMATOLOGY | Facility: CLINIC | Age: 33
End: 2023-07-19

## 2023-07-19 ENCOUNTER — OFFICE VISIT (OUTPATIENT)
Dept: RHEUMATOLOGY | Facility: CLINIC | Age: 33
End: 2023-07-19
Payer: MEDICAID

## 2023-07-19 ENCOUNTER — OFFICE VISIT (OUTPATIENT)
Dept: INFECTIOUS DISEASES | Facility: CLINIC | Age: 33
End: 2023-07-19
Payer: MEDICAID

## 2023-07-19 VITALS
BODY MASS INDEX: 28.25 KG/M2 | WEIGHT: 169.56 LBS | HEART RATE: 108 BPM | HEIGHT: 65 IN | RESPIRATION RATE: 18 BRPM | DIASTOLIC BLOOD PRESSURE: 97 MMHG | SYSTOLIC BLOOD PRESSURE: 143 MMHG | TEMPERATURE: 98 F

## 2023-07-19 DIAGNOSIS — L21.0 SEBORRHEA CAPITIS IN ADULT: ICD-10-CM

## 2023-07-19 DIAGNOSIS — M32.9 SLE (SYSTEMIC LUPUS ERYTHEMATOSUS RELATED SYNDROME): Chronic | ICD-10-CM

## 2023-07-19 DIAGNOSIS — R59.1 LYMPHADENOPATHY: ICD-10-CM

## 2023-07-19 DIAGNOSIS — J34.89 NASAL LESION: ICD-10-CM

## 2023-07-19 DIAGNOSIS — M32.14 SLE GLOMERULONEPHRITIS SYNDROME, WHO CLASS V: ICD-10-CM

## 2023-07-19 DIAGNOSIS — D50.9 IRON DEFICIENCY ANEMIA, UNSPECIFIED IRON DEFICIENCY ANEMIA TYPE: Primary | ICD-10-CM

## 2023-07-19 DIAGNOSIS — M32.9 SYSTEMIC LUPUS ERYTHEMATOSUS ARTHRITIS: ICD-10-CM

## 2023-07-19 DIAGNOSIS — Z79.899 HIGH RISK MEDICATION USE: Primary | ICD-10-CM

## 2023-07-19 DIAGNOSIS — D69.6 THROMBOCYTOPENIA: ICD-10-CM

## 2023-07-19 LAB
DNA TITER: NORMAL
DSDNA AB SER-ACNC: POSITIVE [IU]/ML

## 2023-07-19 PROCEDURE — 1160F PR REVIEW ALL MEDS BY PRESCRIBER/CLIN PHARMACIST DOCUMENTED: ICD-10-PCS | Mod: CPTII,95,, | Performed by: PHYSICIAN ASSISTANT

## 2023-07-19 PROCEDURE — 99999 PR PBB SHADOW E&M-EST. PATIENT-LVL IV: CPT | Mod: PBBFAC,,, | Performed by: INTERNAL MEDICINE

## 2023-07-19 PROCEDURE — 99999 PR PBB SHADOW E&M-EST. PATIENT-LVL IV: ICD-10-PCS | Mod: PBBFAC,,, | Performed by: INTERNAL MEDICINE

## 2023-07-19 PROCEDURE — 1159F PR MEDICATION LIST DOCUMENTED IN MEDICAL RECORD: ICD-10-PCS | Mod: CPTII,95,, | Performed by: PHYSICIAN ASSISTANT

## 2023-07-19 PROCEDURE — 99214 OFFICE O/P EST MOD 30 MIN: CPT | Mod: 95,,, | Performed by: PHYSICIAN ASSISTANT

## 2023-07-19 PROCEDURE — 99214 OFFICE O/P EST MOD 30 MIN: CPT | Mod: PBBFAC | Performed by: INTERNAL MEDICINE

## 2023-07-19 PROCEDURE — 99214 PR OFFICE/OUTPT VISIT, EST, LEVL IV, 30-39 MIN: ICD-10-PCS | Mod: S$PBB,,, | Performed by: INTERNAL MEDICINE

## 2023-07-19 PROCEDURE — 3080F PR MOST RECENT DIASTOLIC BLOOD PRESSURE >= 90 MM HG: ICD-10-PCS | Mod: CPTII,,, | Performed by: INTERNAL MEDICINE

## 2023-07-19 PROCEDURE — 1159F PR MEDICATION LIST DOCUMENTED IN MEDICAL RECORD: ICD-10-PCS | Mod: CPTII,,, | Performed by: INTERNAL MEDICINE

## 2023-07-19 PROCEDURE — 3008F PR BODY MASS INDEX (BMI) DOCUMENTED: ICD-10-PCS | Mod: CPTII,,, | Performed by: INTERNAL MEDICINE

## 2023-07-19 PROCEDURE — 3077F SYST BP >= 140 MM HG: CPT | Mod: CPTII,,, | Performed by: INTERNAL MEDICINE

## 2023-07-19 PROCEDURE — 1159F MED LIST DOCD IN RCRD: CPT | Mod: CPTII,,, | Performed by: INTERNAL MEDICINE

## 2023-07-19 PROCEDURE — 3080F DIAST BP >= 90 MM HG: CPT | Mod: CPTII,,, | Performed by: INTERNAL MEDICINE

## 2023-07-19 PROCEDURE — 1159F MED LIST DOCD IN RCRD: CPT | Mod: CPTII,95,, | Performed by: PHYSICIAN ASSISTANT

## 2023-07-19 PROCEDURE — 1160F RVW MEDS BY RX/DR IN RCRD: CPT | Mod: CPTII,95,, | Performed by: PHYSICIAN ASSISTANT

## 2023-07-19 PROCEDURE — 3077F PR MOST RECENT SYSTOLIC BLOOD PRESSURE >= 140 MM HG: ICD-10-PCS | Mod: CPTII,,, | Performed by: INTERNAL MEDICINE

## 2023-07-19 PROCEDURE — 99214 OFFICE O/P EST MOD 30 MIN: CPT | Mod: S$PBB,,, | Performed by: INTERNAL MEDICINE

## 2023-07-19 PROCEDURE — 3008F BODY MASS INDEX DOCD: CPT | Mod: CPTII,,, | Performed by: INTERNAL MEDICINE

## 2023-07-19 PROCEDURE — 99214 PR OFFICE/OUTPT VISIT, EST, LEVL IV, 30-39 MIN: ICD-10-PCS | Mod: 95,,, | Performed by: PHYSICIAN ASSISTANT

## 2023-07-19 RX ORDER — SODIUM CHLORIDE 0.9 % (FLUSH) 0.9 %
10 SYRINGE (ML) INJECTION
Status: CANCELLED | OUTPATIENT
Start: 2023-07-19

## 2023-07-19 RX ORDER — PREDNISONE 20 MG/1
TABLET ORAL
Qty: 95 TABLET | Refills: 0 | Status: SHIPPED | OUTPATIENT
Start: 2023-07-19 | End: 2023-09-06

## 2023-07-19 RX ORDER — HEPARIN 100 UNIT/ML
500 SYRINGE INTRAVENOUS
Status: CANCELLED | OUTPATIENT
Start: 2023-07-19

## 2023-07-19 RX ORDER — EPINEPHRINE 0.3 MG/.3ML
0.3 INJECTION SUBCUTANEOUS ONCE AS NEEDED
Status: CANCELLED | OUTPATIENT
Start: 2023-07-19

## 2023-07-19 RX ORDER — DIPHENHYDRAMINE HYDROCHLORIDE 50 MG/ML
50 INJECTION INTRAMUSCULAR; INTRAVENOUS ONCE AS NEEDED
Status: CANCELLED | OUTPATIENT
Start: 2023-07-19

## 2023-07-19 NOTE — PROGRESS NOTES
Audio Only Telehealth Visit     The patient location is: home  The chief complaint leading to consultation is: SLE flare  Visit type: Virtual visit with audio only (telephone)  Total time spent with patient: 30 minutes     The reason for the audio only service rather than synchronous audio and video virtual visit was related to technical difficulties or patient preference/necessity.     Each patient to whom I provide medical services by telemedicine is:  (1) informed of the relationship between the physician and patient and the respective role of any other health care provider with respect to management of the patient; and (2) notified that they may decline to receive medical services by telemedicine and may withdraw from such care at any time. Patient verbally consented to receive this service via voice-only telephone call.       HPI: Pt w SLE flare characterized by thrombocytopenia, low complements, significant fatigue.  She is following with Hematology for thrombocytopenia.  She had critical levels a couple of weeks ago and was actually admitted for several days for monitoring purposes.  No active spontaneous bleeding at present.  Platelets have improved.  They are now 59.  Slight drop in platelet count from last week.  She saw Hematology yesterday.    I spoke to Dr. MADISON regarding this patient.  She has also developed discoid lupus.  Saw Dermatology recently.  They have given her some topical creams.  No joint pain to speak of at present.  She is been on 60 mg prednisone p.o. daily since the day after discharge from the hospital on 07/10/2023.    Overall feeling much better.  Less fatigue.  Eating nonstop secondary to prednisone prescription.  Also feeling very energized with the prednisone.  She has been more compliant with hydroxychloroquine taking 400 mg by mouth once daily.  Taking CellCept 1000 mg once daily.  Tolerating both medications well.    She has follow-up with renal later this month.  She has history  of class 5 lupus nephritis.  Proteinuria was initially markedly elevated with PC ratio 9.18 2 weeks ago.  It has improved to 1.39 as of this week.    Pt did not tolerate Rituxan infusion.  A day or 2 after the infusions she started having severe loss of appetite with significant fatigue.  Unclear whether this was related to Rituxan or early symptoms of flaring lupus.      Assessment and plan:     SLE flare improving on PDN 60 mg daily  C/w cellcept 1000 mg bid  PC ratio improved to 1.39 from 9.18  Has h/o Class V LN years ago  Cr currently in normal range  F/u renal as scheduled  Consider addition of lupkynis  - will discuss   Low C3, C4 w new discoid lupus   Monthly CBC/CMP  Medication monitoring  High risk medication use  immunocompromised    Dr. MADISON recommended starting this patient on Saphnelo.  We will continue current prednisone slow taper.  She will taper by 10 mg per week starting next Wednesday.  Will discuss need for jesus w Dr. Willard.          This service was not originating from a related E/M service provided within the previous 7 days nor will  to an E/M service or procedure within the next 24 hours or my soonest available appointment.  Prevailing standard of care was able to be met in this audio-only visit.

## 2023-07-19 NOTE — PROGRESS NOTES
Subjective     Patient ID: Alisia Vines is a 32 y.o. female.    Chief Complaint: Adenopathy (Follow up)      HPI    Last ID note-  05/02/23-  32 year old woman with PMH as listed below who presented with lymphadenopathy -  She was also  recently discharged  from the Hospital yesterday for SLE flare.  CT scan of the neck-01/10/2023.     Extensive lymphadenopathy in the right greater than left neck.  Both benign and malignant etiologies are in the differential, recommend correlation with clinical presentation for infectious etiology.  Consider biopsy to rule out lymphoma.  12/22- neck ultrasound-  In the area of palpable abnormality there are innumerable presumed lymph nodes within the subcutaneous soft tissues which range in size from 1.8 x 1.2 x 1.8 cm 2 2.9 x 1.2 x 2.5 cm.  All of these lymph nodes appear to demonstrate a thickened cortex with a few of the lymph nodes demonstrating an effaced fatty hilum.  These lymph nodes are considered enlarged by size criteria as well.  Findings are nonspecific  Labs-  HIV -neg a month ago  Biopsy - 02/17/23-  Final Pathologic Diagnosis 1. Right deep cervical lymph node, excisional biopsy:   - Follicular hyperplasia.  See comment.   - No evidence of lymphoma or metastatic carcinoma      07/19- since that time, she was seen by dematology .  Her insurance company denies her imaging test for the abdomen.  She was given Augmentin /Minocycline during her visit .    Review of Systems   Constitutional:  Negative for activity change and appetite change.   Respiratory:  Negative for apnea.    Neurological:  Negative for dizziness, coordination difficulties and coordination difficulties.        Objective     Physical Exam  Vitals and nursing note reviewed.   Abdominal:      General: Abdomen is flat.   Musculoskeletal:         General: Normal range of motion.      Cervical back: Normal range of motion.   Neurological:      General: No focal deficit present.      Mental Status:  She is alert and oriented to person, place, and time.          Assessment and Plan      Problem List Items Addressed This Visit       SLE (systemic lupus erythematosus related syndrome) (Chronic)     Follow rheumatology           Lymphadenopathy     1/10-CT scan of the neck -  Extensive lymphadenopathy in the right greater than left neck.  Both benign and malignant etiologies are in the differential, recommend correlation with clinical presentation for infectious etiology.  Consider biopsy to rule out lymphoma    She will need repeat imaging -  No need for antibiotics at this time            Nasal lesion     Now resolved          Seborrhea capitis in adult     Now improved

## 2023-07-19 NOTE — ASSESSMENT & PLAN NOTE
1/10-CT scan of the neck -  Extensive lymphadenopathy in the right greater than left neck.  Both benign and malignant etiologies are in the differential, recommend correlation with clinical presentation for infectious etiology.  Consider biopsy to rule out lymphoma    She will need repeat imaging -  No need for antibiotics at this time

## 2023-07-19 NOTE — TELEPHONE ENCOUNTER
----- Message from Shane Blair MD sent at 7/17/2023  8:40 PM CDT -----  1. Sudden increase in proteinuria- Repeat renal biopsy.   2. Stable thrombocytopenia- Monitor monthly CBC.  3. Normal ESR and CRP.   4. Active discoid lupus- Start saphnelo.   ----- Message -----  From: Lora Root PA-C  Sent: 7/11/2023   8:43 AM CDT  To: Shane Blair MD    A lot has been going on w this lady.  She ended up in ER on 7/4/23.  Tested positive for cocaine.  Had critically low Plts and was severely anemic.  She was transfused one unit and discharged.  I saw her on 7/6/23.  She looked pretty bad.  I called Dr. Franks.  We ended up doing solumedrol 500 mg IV that day w plans to pulse her the next 2 days as well, then start PDN 60 mg x 2 weeks and gradually taper to 20 mg/d (decreasing dose by 10 mg/wk).      SLE labs redrawn on 7/6/23 showed worsening.  PC ratio > 9 (no significant swelling).  When I spoke to Dr. Franks on 7/7/23 we felt she needed higher level of care so we sent her back to ER and recommended hematology consult and potential transfer to [facility] for inpt mgmt for rheum related issues.  Cocaine induced hemolytic anemia was also in the ddx.      Initially platelets improved as did her PC ratio.  [facility] Hematology did not feel she needed to be transferred to [place] for inpatient management.  They felt she was stable for discharge home with close follow-up here in [place].    Yesterday, we repeated her lupus labs.  CBC showed platelet count of 10.  Dr. Lynne advised pt to go to ER again.  Blue top in ER was 59, so they suspected spurious reading on the initial CBC.  Pt was discharged home.      She now is c/o productive cough w green sputum and right ear pain.  I have asked her to see PCP.  PC ratio is now 2.6 (as of 7/10/23).  CK was wnl (I checked d/t h/o cocaine use and reported brown urine).      Prior to May 2023 she has been non compliant w SLE meds/ f/u.  She has been on HCQ  since her hospitalization in May.  She got one dose of Rituxan 2 wks ago.  Reportedly started having s/e the next day, so the 2nd dose was canceled.  Other than the high dose PDN taper, I'm not sure what is next best step.  She has h/o Class V LN.  She has an appt w Dr. Willard at the end of the month.  Any guidance you can provide would be greatly appreciated.    Hellen

## 2023-07-20 ENCOUNTER — TELEPHONE (OUTPATIENT)
Dept: RHEUMATOLOGY | Facility: CLINIC | Age: 33
End: 2023-07-20
Payer: MEDICAID

## 2023-07-20 NOTE — TELEPHONE ENCOUNTER
----- Message from Brandon Willard MD sent at 7/19/2023  7:02 PM CDT -----  Since her kidney function is improving I would suggest that we monitor for now to see if she goes into remission with just CellCept and steroids.  I think the Lupkynis would be an option if she only obtains partial remission or worsens.    It looks like her urine microscopy does not show RBCs.  At this point this looks like a class 5 lupus (membranous) with isolated proteinuria.    ----- Message -----  From: Lora Root PA-C  Sent: 7/19/2023   3:39 PM CDT  To: Brandon Willard MD    Do you see any need for me to start lupkynis?  Currently she is on a PDN 60 mg taper as well as Cellcept 1000 mg daily    ----- Message -----  From: Brandon Willard MD  Sent: 7/19/2023   3:13 PM CDT  To: Lora Root PA-C    I think with her PC ratio improving and normal creatinine I will hold off on a repeat renal biopsy.      ----- Message -----  From: Lora Root PA-C  Sent: 7/19/2023   2:57 PM CDT  To: Brandon Willard MD    SLE pt w h/o Class V LN.  Had severe flare prompting admission recently.  DDx at the time included cocaine induced hemolytic anemia.  PC ratio was 9.18 2 weeks ago.  Has improved to 1.39 this week.  Cr is wnl.  No edema.  She sees you on 7/31/23.      Dr. MADISON and I discussed her.  We are starting her on Saphnelo now that she has discoid lupus, thrombocytopenia and low complements.  She had some rxn to rituxan.  In retrospect unclear if it was early signs of SLE flare or rituxan reaction.      We are considering addition of lupkynis if she needs treatment for the LN.  Not sure if you would want to repeat a renal bx.      Lora Root PA-C  [facility]  [facility]

## 2023-07-24 ENCOUNTER — LAB VISIT (OUTPATIENT)
Dept: LAB | Facility: HOSPITAL | Age: 33
End: 2023-07-24
Attending: INTERNAL MEDICINE
Payer: MEDICAID

## 2023-07-24 DIAGNOSIS — M32.14 LUPUS NEPHRITIS: ICD-10-CM

## 2023-07-24 DIAGNOSIS — Z51.81 MEDICATION MONITORING ENCOUNTER: ICD-10-CM

## 2023-07-24 LAB
ALBUMIN SERPL BCP-MCNC: 2.6 G/DL (ref 3.5–5.2)
ALBUMIN SERPL BCP-MCNC: 2.7 G/DL (ref 3.5–5.2)
ALP SERPL-CCNC: 57 U/L (ref 55–135)
ALT SERPL W/O P-5'-P-CCNC: 14 U/L (ref 10–44)
ANION GAP SERPL CALC-SCNC: 6 MMOL/L (ref 8–16)
ANION GAP SERPL CALC-SCNC: 9 MMOL/L (ref 8–16)
ANISOCYTOSIS BLD QL SMEAR: ABNORMAL
AST SERPL-CCNC: 39 U/L (ref 10–40)
BACTERIA #/AREA URNS HPF: ABNORMAL /HPF
BASOPHILS # BLD AUTO: 0 K/UL (ref 0–0.2)
BASOPHILS NFR BLD: 0 % (ref 0–1.9)
BILIRUB SERPL-MCNC: 0.4 MG/DL (ref 0.1–1)
BILIRUB UR QL STRIP: NEGATIVE
BUN SERPL-MCNC: 13 MG/DL (ref 6–20)
BUN SERPL-MCNC: 14 MG/DL (ref 6–20)
C3 SERPL-MCNC: 49 MG/DL (ref 50–180)
C4 SERPL-MCNC: 9 MG/DL (ref 11–44)
CALCIUM SERPL-MCNC: 8.2 MG/DL (ref 8.7–10.5)
CALCIUM SERPL-MCNC: 8.3 MG/DL (ref 8.7–10.5)
CHLORIDE SERPL-SCNC: 111 MMOL/L (ref 95–110)
CHLORIDE SERPL-SCNC: 111 MMOL/L (ref 95–110)
CLARITY UR: CLEAR
CO2 SERPL-SCNC: 21 MMOL/L (ref 23–29)
CO2 SERPL-SCNC: 22 MMOL/L (ref 23–29)
COLOR UR: YELLOW
CREAT SERPL-MCNC: 0.5 MG/DL (ref 0.5–1.4)
CREAT SERPL-MCNC: 0.6 MG/DL (ref 0.5–1.4)
CREAT UR-MCNC: 126 MG/DL (ref 15–325)
CRP SERPL-MCNC: 2.5 MG/L (ref 0–8.2)
DACRYOCYTES BLD QL SMEAR: ABNORMAL
DIFFERENTIAL METHOD: ABNORMAL
EOSINOPHIL # BLD AUTO: 0 K/UL (ref 0–0.5)
EOSINOPHIL NFR BLD: 0.2 % (ref 0–8)
ERYTHROCYTE [DISTWIDTH] IN BLOOD BY AUTOMATED COUNT: 23.7 % (ref 11.5–14.5)
EST. GFR  (NO RACE VARIABLE): >60 ML/MIN/1.73 M^2
EST. GFR  (NO RACE VARIABLE): >60 ML/MIN/1.73 M^2
GLUCOSE SERPL-MCNC: 123 MG/DL (ref 70–110)
GLUCOSE SERPL-MCNC: 126 MG/DL (ref 70–110)
GLUCOSE UR QL STRIP: NEGATIVE
HCT VFR BLD AUTO: 27 % (ref 37–48.5)
HGB BLD-MCNC: 9.3 G/DL (ref 12–16)
HGB UR QL STRIP: ABNORMAL
HYALINE CASTS #/AREA URNS LPF: 2 /LPF
IMM GRANULOCYTES # BLD AUTO: 0.04 K/UL (ref 0–0.04)
IMM GRANULOCYTES NFR BLD AUTO: 0.6 % (ref 0–0.5)
KETONES UR QL STRIP: NEGATIVE
LEUKOCYTE ESTERASE UR QL STRIP: NEGATIVE
LYMPHOCYTES # BLD AUTO: 0.4 K/UL (ref 1–4.8)
LYMPHOCYTES NFR BLD: 6.3 % (ref 18–48)
MCH RBC QN AUTO: 36.6 PG (ref 27–31)
MCHC RBC AUTO-ENTMCNC: 34.4 G/DL (ref 32–36)
MCV RBC AUTO: 106 FL (ref 82–98)
MICROSCOPIC COMMENT: ABNORMAL
MONOCYTES # BLD AUTO: 0.1 K/UL (ref 0.3–1)
MONOCYTES NFR BLD: 2.3 % (ref 4–15)
NEUTROPHILS # BLD AUTO: 5.6 K/UL (ref 1.8–7.7)
NEUTROPHILS NFR BLD: 90.6 % (ref 38–73)
NITRITE UR QL STRIP: NEGATIVE
NRBC BLD-RTO: 0 /100 WBC
OVALOCYTES BLD QL SMEAR: ABNORMAL
PH UR STRIP: 7 [PH] (ref 5–8)
PHOSPHATE SERPL-MCNC: 3.1 MG/DL (ref 2.7–4.5)
PLATELET # BLD AUTO: 101 K/UL (ref 150–450)
PLATELET BLD QL SMEAR: ABNORMAL
PMV BLD AUTO: 11 FL (ref 9.2–12.9)
POIKILOCYTOSIS BLD QL SMEAR: SLIGHT
POLYCHROMASIA BLD QL SMEAR: ABNORMAL
POTASSIUM SERPL-SCNC: 4.2 MMOL/L (ref 3.5–5.1)
POTASSIUM SERPL-SCNC: 4.3 MMOL/L (ref 3.5–5.1)
PROT SERPL-MCNC: 5.8 G/DL (ref 6–8.4)
PROT UR QL STRIP: ABNORMAL
PROT UR-MCNC: 523 MG/DL (ref 0–15)
PROT/CREAT UR: 4.15 MG/G{CREAT} (ref 0–0.2)
RBC # BLD AUTO: 2.54 M/UL (ref 4–5.4)
RBC #/AREA URNS HPF: 2 /HPF (ref 0–4)
ROULEAUX BLD QL SMEAR: PRESENT
SCHISTOCYTES BLD QL SMEAR: PRESENT
SODIUM SERPL-SCNC: 139 MMOL/L (ref 136–145)
SODIUM SERPL-SCNC: 141 MMOL/L (ref 136–145)
SP GR UR STRIP: 1.02 (ref 1–1.03)
SPHEROCYTES BLD QL SMEAR: ABNORMAL
SQUAMOUS #/AREA URNS HPF: 1 /HPF
URN SPEC COLLECT METH UR: ABNORMAL
WBC # BLD AUTO: 6.17 K/UL (ref 3.9–12.7)
WBC #/AREA URNS HPF: 1 /HPF (ref 0–5)

## 2023-07-24 PROCEDURE — 86140 C-REACTIVE PROTEIN: CPT | Performed by: PHYSICIAN ASSISTANT

## 2023-07-24 PROCEDURE — 86225 DNA ANTIBODY NATIVE: CPT | Mod: 59 | Performed by: PHYSICIAN ASSISTANT

## 2023-07-24 PROCEDURE — 86160 COMPLEMENT ANTIGEN: CPT | Mod: 59 | Performed by: PHYSICIAN ASSISTANT

## 2023-07-24 PROCEDURE — 85025 COMPLETE CBC W/AUTO DIFF WBC: CPT | Performed by: PHYSICIAN ASSISTANT

## 2023-07-24 PROCEDURE — 86160 COMPLEMENT ANTIGEN: CPT | Performed by: PHYSICIAN ASSISTANT

## 2023-07-24 PROCEDURE — 85652 RBC SED RATE AUTOMATED: CPT | Performed by: PHYSICIAN ASSISTANT

## 2023-07-24 PROCEDURE — 84156 ASSAY OF PROTEIN URINE: CPT | Performed by: PHYSICIAN ASSISTANT

## 2023-07-24 PROCEDURE — 84100 ASSAY OF PHOSPHORUS: CPT | Performed by: INTERNAL MEDICINE

## 2023-07-24 PROCEDURE — 86225 DNA ANTIBODY NATIVE: CPT | Performed by: PHYSICIAN ASSISTANT

## 2023-07-24 PROCEDURE — 36415 COLL VENOUS BLD VENIPUNCTURE: CPT | Performed by: INTERNAL MEDICINE

## 2023-07-24 PROCEDURE — 81000 URINALYSIS NONAUTO W/SCOPE: CPT | Performed by: PHYSICIAN ASSISTANT

## 2023-07-24 PROCEDURE — 80053 COMPREHEN METABOLIC PANEL: CPT | Performed by: PHYSICIAN ASSISTANT

## 2023-07-25 ENCOUNTER — TELEPHONE (OUTPATIENT)
Dept: FAMILY MEDICINE | Facility: CLINIC | Age: 33
End: 2023-07-25
Payer: MEDICAID

## 2023-07-25 ENCOUNTER — PATIENT MESSAGE (OUTPATIENT)
Dept: RHEUMATOLOGY | Facility: CLINIC | Age: 33
End: 2023-07-25
Payer: MEDICAID

## 2023-07-25 LAB — ERYTHROCYTE [SEDIMENTATION RATE] IN BLOOD BY PHOTOMETRIC METHOD: >120 MM/HR (ref 0–36)

## 2023-07-25 NOTE — TELEPHONE ENCOUNTER
----- Message from Chelsie Pascual sent at 7/25/2023  6:52 AM CDT -----  Contact: Alisia Crump needs a call back at [phone], Regards to getting an appointment  work in for hospital follow up and fluid in her ears.    Thanks  Td

## 2023-07-26 ENCOUNTER — PATIENT MESSAGE (OUTPATIENT)
Dept: RHEUMATOLOGY | Facility: CLINIC | Age: 33
End: 2023-07-26
Payer: MEDICAID

## 2023-07-26 LAB
DNA TITER: NORMAL
DSDNA AB SER-ACNC: POSITIVE [IU]/ML

## 2023-07-31 ENCOUNTER — OFFICE VISIT (OUTPATIENT)
Dept: NEPHROLOGY | Facility: CLINIC | Age: 33
End: 2023-07-31
Payer: MEDICAID

## 2023-07-31 ENCOUNTER — TELEPHONE (OUTPATIENT)
Dept: RHEUMATOLOGY | Facility: CLINIC | Age: 33
End: 2023-07-31
Payer: MEDICAID

## 2023-07-31 ENCOUNTER — LAB VISIT (OUTPATIENT)
Dept: LAB | Facility: HOSPITAL | Age: 33
End: 2023-07-31
Attending: PHYSICIAN ASSISTANT
Payer: MEDICAID

## 2023-07-31 VITALS
BODY MASS INDEX: 27.92 KG/M2 | WEIGHT: 167.56 LBS | HEART RATE: 106 BPM | SYSTOLIC BLOOD PRESSURE: 112 MMHG | HEIGHT: 65 IN | DIASTOLIC BLOOD PRESSURE: 82 MMHG

## 2023-07-31 DIAGNOSIS — D84.9 IMMUNOSUPPRESSION: ICD-10-CM

## 2023-07-31 DIAGNOSIS — M32.9 LUPUS: Primary | ICD-10-CM

## 2023-07-31 DIAGNOSIS — Z51.81 MEDICATION MONITORING ENCOUNTER: ICD-10-CM

## 2023-07-31 DIAGNOSIS — R80.9 PROTEINURIA, UNSPECIFIED TYPE: ICD-10-CM

## 2023-07-31 LAB
ALBUMIN SERPL BCP-MCNC: 2.2 G/DL (ref 3.5–5.2)
ALP SERPL-CCNC: 58 U/L (ref 55–135)
ALT SERPL W/O P-5'-P-CCNC: 12 U/L (ref 10–44)
ANION GAP SERPL CALC-SCNC: 10 MMOL/L (ref 8–16)
AST SERPL-CCNC: 24 U/L (ref 10–40)
BASOPHILS # BLD AUTO: 0 K/UL (ref 0–0.2)
BASOPHILS NFR BLD: 0 % (ref 0–1.9)
BILIRUB SERPL-MCNC: 0.3 MG/DL (ref 0.1–1)
BUN SERPL-MCNC: 13 MG/DL (ref 6–20)
C3 SERPL-MCNC: 71 MG/DL (ref 50–180)
C4 SERPL-MCNC: 15 MG/DL (ref 11–44)
CALCIUM SERPL-MCNC: 8.4 MG/DL (ref 8.7–10.5)
CHLORIDE SERPL-SCNC: 104 MMOL/L (ref 95–110)
CO2 SERPL-SCNC: 25 MMOL/L (ref 23–29)
CREAT SERPL-MCNC: 0.6 MG/DL (ref 0.5–1.4)
CRP SERPL-MCNC: 87.9 MG/L (ref 0–8.2)
DIFFERENTIAL METHOD: ABNORMAL
EOSINOPHIL # BLD AUTO: 0 K/UL (ref 0–0.5)
EOSINOPHIL NFR BLD: 0 % (ref 0–8)
ERYTHROCYTE [DISTWIDTH] IN BLOOD BY AUTOMATED COUNT: 18.5 % (ref 11.5–14.5)
ERYTHROCYTE [SEDIMENTATION RATE] IN BLOOD BY WESTERGREN METHOD: 127 MM/HR (ref 0–20)
EST. GFR  (NO RACE VARIABLE): >60 ML/MIN/1.73 M^2
GLUCOSE SERPL-MCNC: 121 MG/DL (ref 70–110)
HCT VFR BLD AUTO: 29.8 % (ref 37–48.5)
HGB BLD-MCNC: 10.1 G/DL (ref 12–16)
IMM GRANULOCYTES # BLD AUTO: 0.06 K/UL (ref 0–0.04)
IMM GRANULOCYTES NFR BLD AUTO: 1.3 % (ref 0–0.5)
LYMPHOCYTES # BLD AUTO: 0.3 K/UL (ref 1–4.8)
LYMPHOCYTES NFR BLD: 7 % (ref 18–48)
MCH RBC QN AUTO: 33.4 PG (ref 27–31)
MCHC RBC AUTO-ENTMCNC: 33.9 G/DL (ref 32–36)
MCV RBC AUTO: 99 FL (ref 82–98)
MONOCYTES # BLD AUTO: 0.2 K/UL (ref 0.3–1)
MONOCYTES NFR BLD: 4.4 % (ref 4–15)
NEUTROPHILS # BLD AUTO: 4.1 K/UL (ref 1.8–7.7)
NEUTROPHILS NFR BLD: 87.3 % (ref 38–73)
NRBC BLD-RTO: 0 /100 WBC
PLATELET # BLD AUTO: 215 K/UL (ref 150–450)
PLATELET BLD QL SMEAR: ABNORMAL
PMV BLD AUTO: 8.9 FL (ref 9.2–12.9)
POTASSIUM SERPL-SCNC: 3.6 MMOL/L (ref 3.5–5.1)
PROT SERPL-MCNC: 5.6 G/DL (ref 6–8.4)
RBC # BLD AUTO: 3.02 M/UL (ref 4–5.4)
SODIUM SERPL-SCNC: 139 MMOL/L (ref 136–145)
WBC # BLD AUTO: 4.73 K/UL (ref 3.9–12.7)

## 2023-07-31 PROCEDURE — 86225 DNA ANTIBODY NATIVE: CPT | Performed by: PHYSICIAN ASSISTANT

## 2023-07-31 PROCEDURE — 3079F PR MOST RECENT DIASTOLIC BLOOD PRESSURE 80-89 MM HG: ICD-10-PCS | Mod: CPTII,,, | Performed by: INTERNAL MEDICINE

## 2023-07-31 PROCEDURE — 99213 OFFICE O/P EST LOW 20 MIN: CPT | Mod: PBBFAC | Performed by: INTERNAL MEDICINE

## 2023-07-31 PROCEDURE — 3008F BODY MASS INDEX DOCD: CPT | Mod: CPTII,,, | Performed by: INTERNAL MEDICINE

## 2023-07-31 PROCEDURE — 1159F MED LIST DOCD IN RCRD: CPT | Mod: CPTII,,, | Performed by: INTERNAL MEDICINE

## 2023-07-31 PROCEDURE — 99214 OFFICE O/P EST MOD 30 MIN: CPT | Mod: S$PBB,,, | Performed by: INTERNAL MEDICINE

## 2023-07-31 PROCEDURE — 3066F NEPHROPATHY DOC TX: CPT | Mod: CPTII,,, | Performed by: INTERNAL MEDICINE

## 2023-07-31 PROCEDURE — 86225 DNA ANTIBODY NATIVE: CPT | Mod: 59 | Performed by: PHYSICIAN ASSISTANT

## 2023-07-31 PROCEDURE — 99999 PR PBB SHADOW E&M-EST. PATIENT-LVL III: ICD-10-PCS | Mod: PBBFAC,,, | Performed by: INTERNAL MEDICINE

## 2023-07-31 PROCEDURE — 99999 PR PBB SHADOW E&M-EST. PATIENT-LVL III: CPT | Mod: PBBFAC,,, | Performed by: INTERNAL MEDICINE

## 2023-07-31 PROCEDURE — 85025 COMPLETE CBC W/AUTO DIFF WBC: CPT | Performed by: PHYSICIAN ASSISTANT

## 2023-07-31 PROCEDURE — 80053 COMPREHEN METABOLIC PANEL: CPT | Performed by: PHYSICIAN ASSISTANT

## 2023-07-31 PROCEDURE — 3074F SYST BP LT 130 MM HG: CPT | Mod: CPTII,,, | Performed by: INTERNAL MEDICINE

## 2023-07-31 PROCEDURE — 86140 C-REACTIVE PROTEIN: CPT | Performed by: PHYSICIAN ASSISTANT

## 2023-07-31 PROCEDURE — 36415 COLL VENOUS BLD VENIPUNCTURE: CPT | Performed by: PHYSICIAN ASSISTANT

## 2023-07-31 PROCEDURE — 86160 COMPLEMENT ANTIGEN: CPT | Mod: 59 | Performed by: PHYSICIAN ASSISTANT

## 2023-07-31 PROCEDURE — 3008F PR BODY MASS INDEX (BMI) DOCUMENTED: ICD-10-PCS | Mod: CPTII,,, | Performed by: INTERNAL MEDICINE

## 2023-07-31 PROCEDURE — 3066F PR DOCUMENTATION OF TREATMENT FOR NEPHROPATHY: ICD-10-PCS | Mod: CPTII,,, | Performed by: INTERNAL MEDICINE

## 2023-07-31 PROCEDURE — 86160 COMPLEMENT ANTIGEN: CPT | Performed by: PHYSICIAN ASSISTANT

## 2023-07-31 PROCEDURE — 3079F DIAST BP 80-89 MM HG: CPT | Mod: CPTII,,, | Performed by: INTERNAL MEDICINE

## 2023-07-31 PROCEDURE — 1160F RVW MEDS BY RX/DR IN RCRD: CPT | Mod: CPTII,,, | Performed by: INTERNAL MEDICINE

## 2023-07-31 PROCEDURE — 99214 PR OFFICE/OUTPT VISIT, EST, LEVL IV, 30-39 MIN: ICD-10-PCS | Mod: S$PBB,,, | Performed by: INTERNAL MEDICINE

## 2023-07-31 PROCEDURE — 85651 RBC SED RATE NONAUTOMATED: CPT | Performed by: PHYSICIAN ASSISTANT

## 2023-07-31 PROCEDURE — 1160F PR REVIEW ALL MEDS BY PRESCRIBER/CLIN PHARMACIST DOCUMENTED: ICD-10-PCS | Mod: CPTII,,, | Performed by: INTERNAL MEDICINE

## 2023-07-31 PROCEDURE — 3074F PR MOST RECENT SYSTOLIC BLOOD PRESSURE < 130 MM HG: ICD-10-PCS | Mod: CPTII,,, | Performed by: INTERNAL MEDICINE

## 2023-07-31 PROCEDURE — 1159F PR MEDICATION LIST DOCUMENTED IN MEDICAL RECORD: ICD-10-PCS | Mod: CPTII,,, | Performed by: INTERNAL MEDICINE

## 2023-07-31 NOTE — PROGRESS NOTES
"Subjective:       Patient ID: Alisia Vines is a 32 y.o. female.    Chief Complaint: Nephritis    HPI    She presents to clinic today for routine follow-up.  Since her last office visit she has has had a flare of her lupus with renal involvement.  She presents to clinic today for follow-up.  Her laboratory studies and medications were reviewed.  All Nephrology related questions were answered to her satisfaction.    Review of Systems   Constitutional: Negative.    HENT: Negative.     Respiratory: Negative.     Cardiovascular: Negative.    Gastrointestinal: Negative.    Genitourinary: Negative.    Musculoskeletal: Negative.    Skin: Negative.    Neurological: Negative.        /82   Pulse 106   Ht 5' 5" (1.651 m)   Wt 76 kg (167 lb 8.8 oz)   LMP 06/30/2023   BMI 27.88 kg/m²     Lab Results   Component Value Date    WBC 4.73 07/31/2023    HGB 10.1 (L) 07/31/2023    HCT 29.8 (L) 07/31/2023    MCV 99 (H) 07/31/2023     07/31/2023      BMP  Lab Results   Component Value Date     07/24/2023     07/24/2023    K 4.3 07/24/2023    K 4.2 07/24/2023     (H) 07/24/2023     (H) 07/24/2023    CO2 22 (L) 07/24/2023    CO2 21 (L) 07/24/2023    BUN 13 07/24/2023    BUN 14 07/24/2023    CREATININE 0.5 07/24/2023    CREATININE 0.6 07/24/2023    CALCIUM 8.2 (L) 07/24/2023    CALCIUM 8.3 (L) 07/24/2023    ANIONGAP 6 (L) 07/24/2023    ANIONGAP 9 07/24/2023    ESTGFRAFRICA >60.0 07/26/2022    EGFRNONAA >60.0 07/26/2022     CMP  Sodium   Date Value Ref Range Status   07/24/2023 139 136 - 145 mmol/L Final   07/24/2023 141 136 - 145 mmol/L Final     Potassium   Date Value Ref Range Status   07/24/2023 4.3 3.5 - 5.1 mmol/L Final   07/24/2023 4.2 3.5 - 5.1 mmol/L Final     Chloride   Date Value Ref Range Status   07/24/2023 111 (H) 95 - 110 mmol/L Final   07/24/2023 111 (H) 95 - 110 mmol/L Final     CO2   Date Value Ref Range Status   07/24/2023 22 (L) 23 - 29 mmol/L Final   07/24/2023 21 (L) 23 " - 29 mmol/L Final     Glucose   Date Value Ref Range Status   07/24/2023 123 (H) 70 - 110 mg/dL Final   07/24/2023 126 (H) 70 - 110 mg/dL Final     BUN   Date Value Ref Range Status   07/24/2023 13 6 - 20 mg/dL Final   07/24/2023 14 6 - 20 mg/dL Final     Creatinine   Date Value Ref Range Status   07/24/2023 0.5 0.5 - 1.4 mg/dL Final   07/24/2023 0.6 0.5 - 1.4 mg/dL Final     Calcium   Date Value Ref Range Status   07/24/2023 8.2 (L) 8.7 - 10.5 mg/dL Final   07/24/2023 8.3 (L) 8.7 - 10.5 mg/dL Final     Total Protein   Date Value Ref Range Status   07/24/2023 5.8 (L) 6.0 - 8.4 g/dL Final     Albumin   Date Value Ref Range Status   07/24/2023 2.6 (L) 3.5 - 5.2 g/dL Final   07/24/2023 2.7 (L) 3.5 - 5.2 g/dL Final     Total Bilirubin   Date Value Ref Range Status   07/24/2023 0.4 0.1 - 1.0 mg/dL Final     Comment:     For infants and newborns, interpretation of results should be based  on gestational age, weight and in agreement with clinical  observations.    Premature Infant recommended reference ranges:  Up to 24 hours.............<8.0 mg/dL  Up to 48 hours............<12.0 mg/dL  3-5 days..................<15.0 mg/dL  6-29 days.................<15.0 mg/dL       Alkaline Phosphatase   Date Value Ref Range Status   07/24/2023 57 55 - 135 U/L Final     AST   Date Value Ref Range Status   07/24/2023 39 10 - 40 U/L Final     ALT   Date Value Ref Range Status   07/24/2023 14 10 - 44 U/L Final     Anion Gap   Date Value Ref Range Status   07/24/2023 6 (L) 8 - 16 mmol/L Final   07/24/2023 9 8 - 16 mmol/L Final     eGFR if    Date Value Ref Range Status   07/26/2022 >60.0 >60 mL/min/1.73 m^2 Final     eGFR if non    Date Value Ref Range Status   07/26/2022 >60.0 >60 mL/min/1.73 m^2 Final     Comment:     Calculation used to obtain the estimated glomerular filtration  rate (eGFR) is the CKD-EPI equation.        Current Outpatient Medications on File Prior to Visit   Medication Sig Dispense  Refill    amLODIPine (NORVASC) 5 MG tablet TAKE 1 TABLET(5 MG) BY MOUTH EVERY DAY 90 tablet 1    ARIPiprazole (ABILIFY) 2 MG Tab TAKE 1 TABLET(2 MG) BY MOUTH EVERY DAY 30 tablet 3    ciclopirox (PENLAC) 8 % Soln Apply to nail and nail fold once daily for up to 1 year 6.6 mL 6    FLUoxetine 40 MG capsule TAKE 1 CAPSULE(40 MG) BY MOUTH EVERY DAY 30 capsule 3    fluticasone propionate (FLONASE) 50 mcg/actuation nasal spray SHAKE LIQUID AND USE 2 SPRAYS(100 MCG) IN EACH NOSTRIL EVERY DAY 16 g 1    HYDROcodone-acetaminophen (NORCO) 5-325 mg per tablet Take 1-2 tablets by mouth every 6 (six) hours as needed for Pain. 30 tablet 0    hydrOXYchloroQUINE (PLAQUENIL) 200 mg tablet TAKE 2 TABLETS(400 MG) BY MOUTH EVERY DAY 60 tablet 2    ketoconazole (NIZORAL) 2 % shampoo Wash hair with medicated shampoo at least 1x/week - let sit on scalp at least 5 minutes prior to rinsing 120 mL 11    mometasone (ELOCON) 0.1 % solution AAA of scalp once daily. 60 mL 3    mycophenolate (CELLCEPT) 500 mg Tab Take 1 tablet (500 mg total) by mouth 2 (two) times daily. 60 tablet 3    ondansetron (ZOFRAN) 4 MG tablet Take 1 tablet (4 mg total) by mouth every 8 (eight) hours as needed for Nausea. 20 tablet 0    pimecrolimus (ELIDEL) 1 % cream Apply to affected areas twice daily. Wear daily sunscreen. 60 g 3    predniSONE (DELTASONE) 20 MG tablet Take 3 tablets (60 mg total) by mouth once daily for 14 days, THEN 2.5 tablets (50 mg total) once daily for 7 days, THEN 2 tablets (40 mg total) once daily for 7 days, THEN 1.5 tablets (30 mg total) once daily for 7 days, THEN 1 tablet (20 mg total) once daily for 7 days, THEN 0.5 tablets (10 mg total) once daily for 7 days. 95 tablet 0    triamcinolone acetonide 0.025% (KENALOG) 0.025 % Oint AAA bid prn. Use for 2 weeks then taper. Mild steroid. 80 g 1    valACYclovir (VALTREX) 1000 MG tablet Take 1 tablet (1,000 mg total) by mouth once daily. 90 tablet 3    [DISCONTINUED]  diphenhydrAMINE-aluminum-magnesium hydroxide-simethicone-LIDOcaine HCl 2% Swish and spit 15 mLs every 4 (four) hours as needed (oral ulcer pain). (Patient not taking: Reported on 7/19/2023) 450 each 0     No current facility-administered medications on file prior to visit.            Objective:            Physical Exam  Constitutional:       Appearance: Normal appearance.   HENT:      Head: Normocephalic and atraumatic.   Eyes:      General: No scleral icterus.     Extraocular Movements: Extraocular movements intact.      Pupils: Pupils are equal, round, and reactive to light.   Pulmonary:      Effort: Pulmonary effort is normal.      Breath sounds: No stridor.   Musculoskeletal:      Right lower leg: No edema.      Left lower leg: No edema.   Skin:     General: Skin is warm and dry.   Neurological:      General: No focal deficit present.      Mental Status: She is alert and oriented to person, place, and time.   Psychiatric:         Mood and Affect: Mood normal.         Behavior: Behavior normal.       Assessment:       1. Lupus    2. Proteinuria, unspecified type    3. Immunosuppression        Plan:       1. Most recent complement levels remain low.  Her anti-double-stranded DNA is still positive.  Most recent urinalysis shows persistent proteinuria with some variability.    2. Proteinuria had been improving decreasing around 9 g down to about 1 g.  Her most recent PC ratio has increased back to around 4 g.  Will plan to recheck a PC ratio tomorrow.  If her proteinuria has not improved or remains high she is agreeable to a kidney biopsy.    3. She is currently on a steroid pulse at about 50 mg in her taper.  She is on 500 mg twice daily of CellCept over she states she takes both pills at the same time has she tends to forget her evening dose.    Brandon Willard MD

## 2023-07-31 NOTE — TELEPHONE ENCOUNTER
"Spoke with patient she states the blood is not in her urine, she is having vaginal bleeding.  Denies any other symptoms with it except she "was feeling bad" yesterday.      Advised patient to contact GYN or PCP to discuss an appt for evaluation.  She also has an appt with her nephrologist today, advised her to discuss with him also.    If patient cannot see GYN/PCP advised her to go to ER  "

## 2023-07-31 NOTE — TELEPHONE ENCOUNTER
----- Message from Gwyn Allen sent at 7/31/2023  9:44 AM CDT -----  Contact: Alisia Crump would like a call back at [phone] in regards to needing to speak with nurse as soon as possible about having blood in urine starting this morning.  Thanks   Am

## 2023-08-01 ENCOUNTER — INFUSION (OUTPATIENT)
Dept: INFUSION THERAPY | Facility: HOSPITAL | Age: 33
End: 2023-08-01
Attending: PHYSICIAN ASSISTANT
Payer: MEDICAID

## 2023-08-01 ENCOUNTER — TELEPHONE (OUTPATIENT)
Dept: RHEUMATOLOGY | Facility: CLINIC | Age: 33
End: 2023-08-01
Payer: MEDICAID

## 2023-08-01 VITALS
WEIGHT: 167.56 LBS | DIASTOLIC BLOOD PRESSURE: 82 MMHG | BODY MASS INDEX: 27.92 KG/M2 | RESPIRATION RATE: 18 BRPM | HEART RATE: 83 BPM | HEIGHT: 65 IN | TEMPERATURE: 98 F | OXYGEN SATURATION: 100 % | SYSTOLIC BLOOD PRESSURE: 123 MMHG

## 2023-08-01 DIAGNOSIS — M32.9 SLE (SYSTEMIC LUPUS ERYTHEMATOSUS RELATED SYNDROME): Primary | ICD-10-CM

## 2023-08-01 PROCEDURE — 63600175 PHARM REV CODE 636 W HCPCS: Mod: JZ,TB | Performed by: PHYSICIAN ASSISTANT

## 2023-08-01 PROCEDURE — 96365 THER/PROPH/DIAG IV INF INIT: CPT

## 2023-08-01 PROCEDURE — 25000003 PHARM REV CODE 250: Performed by: PHYSICIAN ASSISTANT

## 2023-08-01 RX ORDER — SODIUM CHLORIDE 0.9 % (FLUSH) 0.9 %
10 SYRINGE (ML) INJECTION
Status: DISCONTINUED | OUTPATIENT
Start: 2023-08-01 | End: 2023-08-01 | Stop reason: HOSPADM

## 2023-08-01 RX ORDER — DIPHENHYDRAMINE HYDROCHLORIDE 50 MG/ML
50 INJECTION INTRAMUSCULAR; INTRAVENOUS ONCE AS NEEDED
Status: CANCELLED | OUTPATIENT
Start: 2023-08-29

## 2023-08-01 RX ORDER — HEPARIN 100 UNIT/ML
500 SYRINGE INTRAVENOUS
Status: CANCELLED | OUTPATIENT
Start: 2023-08-29

## 2023-08-01 RX ORDER — EPINEPHRINE 0.3 MG/.3ML
0.3 INJECTION SUBCUTANEOUS ONCE AS NEEDED
Status: CANCELLED | OUTPATIENT
Start: 2023-08-29

## 2023-08-01 RX ORDER — SODIUM CHLORIDE 0.9 % (FLUSH) 0.9 %
10 SYRINGE (ML) INJECTION
Status: CANCELLED | OUTPATIENT
Start: 2023-08-29

## 2023-08-01 RX ADMIN — ANIFROLUMAB 300 MG: 300 INJECTION, SOLUTION INTRAVENOUS at 09:08

## 2023-08-01 NOTE — TELEPHONE ENCOUNTER
----- Message from Elizabeth Diop sent at 8/1/2023  9:28 AM CDT -----  Contact: Alisia  Patient is calling to speak with the nurse to check on the status of lab results. Please give patient a callback at [phone].

## 2023-08-01 NOTE — TELEPHONE ENCOUNTER
Spoke to patient. Explained to we are just getting back to clinic today.  When RAJ Macedo looks at them, I will call.

## 2023-08-01 NOTE — NURSING
Infusion# 1    Recent labs? Reviewed    Premeds? None    S/S of current or recent infections in the past 14 days? None/Denies    Recent Surgery/invasive procedures? None/Denies     Any recent vaccines ? None/Denies    Saphnelo 300 mg administered IV at a 30 minute rate per orders; see MAR and vitals for more  Details.

## 2023-08-02 LAB
DNA TITER: NORMAL
DSDNA AB SER-ACNC: POSITIVE [IU]/ML

## 2023-08-04 ENCOUNTER — OFFICE VISIT (OUTPATIENT)
Dept: OPHTHALMOLOGY | Facility: CLINIC | Age: 33
End: 2023-08-04
Payer: MEDICAID

## 2023-08-04 DIAGNOSIS — M32.9 SLE (SYSTEMIC LUPUS ERYTHEMATOSUS RELATED SYNDROME): Primary | Chronic | ICD-10-CM

## 2023-08-04 DIAGNOSIS — Z79.899 LONG-TERM USE OF PLAQUENIL: ICD-10-CM

## 2023-08-04 DIAGNOSIS — M32.9 SYSTEMIC LUPUS ERYTHEMATOSUS ARTHRITIS: ICD-10-CM

## 2023-08-04 PROCEDURE — 92250 FUNDUS PHOTOGRAPHY W/I&R: CPT | Mod: PBBFAC | Performed by: OPHTHALMOLOGY

## 2023-08-04 PROCEDURE — 92083 EXTENDED VISUAL FIELD XM: CPT | Mod: PBBFAC | Performed by: OPHTHALMOLOGY

## 2023-08-04 PROCEDURE — 99213 OFFICE O/P EST LOW 20 MIN: CPT | Mod: PBBFAC | Performed by: OPHTHALMOLOGY

## 2023-08-04 PROCEDURE — 1160F PR REVIEW ALL MEDS BY PRESCRIBER/CLIN PHARMACIST DOCUMENTED: ICD-10-PCS | Mod: CPTII,,, | Performed by: OPHTHALMOLOGY

## 2023-08-04 PROCEDURE — 99204 OFFICE O/P NEW MOD 45 MIN: CPT | Mod: S$PBB,,, | Performed by: OPHTHALMOLOGY

## 2023-08-04 PROCEDURE — 99204 PR OFFICE/OUTPT VISIT, NEW, LEVL IV, 45-59 MIN: ICD-10-PCS | Mod: S$PBB,,, | Performed by: OPHTHALMOLOGY

## 2023-08-04 PROCEDURE — 1159F PR MEDICATION LIST DOCUMENTED IN MEDICAL RECORD: ICD-10-PCS | Mod: CPTII,,, | Performed by: OPHTHALMOLOGY

## 2023-08-04 PROCEDURE — 92083 HUMPHREY VISUAL FIELD - OU - BOTH EYES: ICD-10-PCS | Mod: 26,S$PBB,, | Performed by: OPHTHALMOLOGY

## 2023-08-04 PROCEDURE — 92250 COLOR FUNDUS PHOTOGRAPHY - OU - BOTH EYES: ICD-10-PCS | Mod: 26,59,S$PBB, | Performed by: OPHTHALMOLOGY

## 2023-08-04 PROCEDURE — 1160F RVW MEDS BY RX/DR IN RCRD: CPT | Mod: CPTII,,, | Performed by: OPHTHALMOLOGY

## 2023-08-04 PROCEDURE — 99999 PR PBB SHADOW E&M-EST. PATIENT-LVL III: CPT | Mod: PBBFAC,,, | Performed by: OPHTHALMOLOGY

## 2023-08-04 PROCEDURE — 1159F MED LIST DOCD IN RCRD: CPT | Mod: CPTII,,, | Performed by: OPHTHALMOLOGY

## 2023-08-04 PROCEDURE — 99999 PR PBB SHADOW E&M-EST. PATIENT-LVL III: ICD-10-PCS | Mod: PBBFAC,,, | Performed by: OPHTHALMOLOGY

## 2023-08-04 PROCEDURE — 3066F PR DOCUMENTATION OF TREATMENT FOR NEPHROPATHY: ICD-10-PCS | Mod: CPTII,,, | Performed by: OPHTHALMOLOGY

## 2023-08-04 PROCEDURE — 92134 POSTERIOR SEGMENT OCT RETINA (OCULAR COHERENCE TOMOGRAPHY)-BOTH EYES: ICD-10-PCS | Mod: 26,S$PBB,, | Performed by: OPHTHALMOLOGY

## 2023-08-04 PROCEDURE — 3066F NEPHROPATHY DOC TX: CPT | Mod: CPTII,,, | Performed by: OPHTHALMOLOGY

## 2023-08-04 PROCEDURE — 92134 CPTRZ OPH DX IMG PST SGM RTA: CPT | Mod: PBBFAC | Performed by: OPHTHALMOLOGY

## 2023-08-04 NOTE — PROGRESS NOTES
===============================  Date today is 8/4/2023  Alisia Vines is a 32 y.o. female  Last visit [facility]: :Visit date not found   Last visit eye dept. Visit date not found    Corrected distance visual acuity was 20/50 in the right eye and 20/60 in the left eye.  Tonometry       Tonometry (Applanation, 9:42 AM)         Right Left    Pressure 13 15                  Not recorded       Manifest Refraction       Manifest Refraction         Sphere Cylinder [place] Dist [facility]    Right -4.25 +1.25 130 20/20    Left -5.00 +1.75 070 20/20                  Not recorded       Chief Complaint   Patient presents with    Plaquenil     Pt here for plaquenil check and new glasses Rx. No pain or discomfort. VA stable.      HPI     Plaquenil     Additional comments: Pt here for plaquenil check and new glasses Rx. No   pain or discomfort. VA stable.            Comments    Lupus x 2013  Sjogren's x 2013  Plaquenil Inconsistent x 2013, daily x 6/2023            Last edited by Raciel Ramírez MA on 8/4/2023  9:28 AM.      Problem List Items Addressed This Visit       SLE (systemic lupus erythematosus related syndrome) - Primary (Chronic)    Relevant Orders    Posterior Segment OCT Retina-Both eyes (Completed)    West Visual Field - OU - Extended - Both Eyes (Completed)    Color Fundus Photography - OU - Both Eyes (Completed)    Long-term use of Plaquenil    Relevant Orders    Posterior Segment OCT Retina-Both eyes (Completed)    West Visual Field - OU - Extended - Both Eyes (Completed)    Color Fundus Photography - OU - Both Eyes (Completed)    Systemic lupus erythematosus arthritis    Relevant Orders    Posterior Segment OCT Retina-Both eyes (Completed)    West Visual Field - OU - Extended - Both Eyes (Completed)    Color Fundus Photography - OU - Both Eyes (Completed)     Instructed to call 24/7 for any worsening of vision, visual distortion or pain.  Check OU independently daily.    Gave my office and personal  cell phone number.  ________________  8/4/2023 today  Alisia Ceballos [place]      Lupus/Sjogren's with Plaquenil therapy x10 years  VF today OD normal OS watch  MOCT OS subtle coarseness to EZ, OD ok, noise on OCT today  Dry eyes- recommend tears 4-6x daily  Anterior chamber ok OU  Clear lens OU  Update glasses today  Will do optos today  Recommend repeating VF in 6 months  Ok to continue Plaquenil- no toxicity on exam today      RTC 6 months with 10-2 VF, MOCT, autofluress optos and MOCT  Instructed to call 24/7 for any worsening of vision or symptoms. Check OU daily.   Gave my office and cell phone number.    =============================

## 2023-08-08 ENCOUNTER — LAB VISIT (OUTPATIENT)
Dept: LAB | Facility: HOSPITAL | Age: 33
End: 2023-08-08
Attending: PHYSICIAN ASSISTANT
Payer: MEDICAID

## 2023-08-08 DIAGNOSIS — M32.14 SLE GLOMERULONEPHRITIS SYNDROME, WHO CLASS V: ICD-10-CM

## 2023-08-08 DIAGNOSIS — Z51.81 MEDICATION MONITORING ENCOUNTER: ICD-10-CM

## 2023-08-08 DIAGNOSIS — M35.00 SJOGREN'S SYNDROME, WITH UNSPECIFIED ORGAN INVOLVEMENT: ICD-10-CM

## 2023-08-08 LAB
ALBUMIN SERPL BCP-MCNC: 2.5 G/DL (ref 3.5–5.2)
ALP SERPL-CCNC: 54 U/L (ref 55–135)
ALT SERPL W/O P-5'-P-CCNC: 12 U/L (ref 10–44)
ANION GAP SERPL CALC-SCNC: 8 MMOL/L (ref 8–16)
AST SERPL-CCNC: 16 U/L (ref 10–40)
BACTERIA #/AREA URNS HPF: ABNORMAL /HPF
BACTERIA #/AREA URNS HPF: ABNORMAL /HPF
BASOPHILS # BLD AUTO: 0.01 K/UL (ref 0–0.2)
BASOPHILS NFR BLD: 0.1 % (ref 0–1.9)
BILIRUB SERPL-MCNC: 0.2 MG/DL (ref 0.1–1)
BILIRUB UR QL STRIP: NEGATIVE
BUN SERPL-MCNC: 22 MG/DL (ref 6–20)
C3 SERPL-MCNC: 65 MG/DL (ref 50–180)
C4 SERPL-MCNC: 7 MG/DL (ref 11–44)
CALCIUM SERPL-MCNC: 8.6 MG/DL (ref 8.7–10.5)
CAOX CRY URNS QL MICRO: ABNORMAL
CAOX CRY URNS QL MICRO: ABNORMAL
CHLORIDE SERPL-SCNC: 104 MMOL/L (ref 95–110)
CLARITY UR: ABNORMAL
CO2 SERPL-SCNC: 29 MMOL/L (ref 23–29)
COLOR UR: YELLOW
CREAT SERPL-MCNC: 0.6 MG/DL (ref 0.5–1.4)
CREAT UR-MCNC: 239.3 MG/DL (ref 15–325)
CRP SERPL-MCNC: 0.7 MG/L (ref 0–8.2)
DIFFERENTIAL METHOD: ABNORMAL
EOSINOPHIL # BLD AUTO: 0 K/UL (ref 0–0.5)
EOSINOPHIL NFR BLD: 0.2 % (ref 0–8)
ERYTHROCYTE [DISTWIDTH] IN BLOOD BY AUTOMATED COUNT: 16.6 % (ref 11.5–14.5)
ERYTHROCYTE [SEDIMENTATION RATE] IN BLOOD BY WESTERGREN METHOD: 95 MM/HR (ref 0–20)
EST. GFR  (NO RACE VARIABLE): >60 ML/MIN/1.73 M^2
GLUCOSE SERPL-MCNC: 102 MG/DL (ref 70–110)
GLUCOSE UR QL STRIP: NEGATIVE
HCT VFR BLD AUTO: 30.5 % (ref 37–48.5)
HGB BLD-MCNC: 10 G/DL (ref 12–16)
HGB UR QL STRIP: NEGATIVE
HYALINE CASTS #/AREA URNS LPF: 1 /LPF
HYALINE CASTS #/AREA URNS LPF: 1 /LPF
IMM GRANULOCYTES # BLD AUTO: 0.22 K/UL (ref 0–0.04)
IMM GRANULOCYTES NFR BLD AUTO: 2.4 % (ref 0–0.5)
KETONES UR QL STRIP: NEGATIVE
LEUKOCYTE ESTERASE UR QL STRIP: NEGATIVE
LYMPHOCYTES # BLD AUTO: 1 K/UL (ref 1–4.8)
LYMPHOCYTES NFR BLD: 11.1 % (ref 18–48)
MCH RBC QN AUTO: 32.6 PG (ref 27–31)
MCHC RBC AUTO-ENTMCNC: 32.8 G/DL (ref 32–36)
MCV RBC AUTO: 99 FL (ref 82–98)
MICROSCOPIC COMMENT: ABNORMAL
MICROSCOPIC COMMENT: ABNORMAL
MONOCYTES # BLD AUTO: 0.6 K/UL (ref 0.3–1)
MONOCYTES NFR BLD: 6.3 % (ref 4–15)
NEUTROPHILS # BLD AUTO: 7.4 K/UL (ref 1.8–7.7)
NEUTROPHILS NFR BLD: 79.9 % (ref 38–73)
NITRITE UR QL STRIP: NEGATIVE
NRBC BLD-RTO: 0 /100 WBC
PH UR STRIP: 6 [PH] (ref 5–8)
PLATELET # BLD AUTO: 381 K/UL (ref 150–450)
PMV BLD AUTO: 8.9 FL (ref 9.2–12.9)
POTASSIUM SERPL-SCNC: 3.5 MMOL/L (ref 3.5–5.1)
PROT SERPL-MCNC: 5.2 G/DL (ref 6–8.4)
PROT UR QL STRIP: ABNORMAL
PROT UR-MCNC: 122 MG/DL (ref 0–15)
PROT/CREAT UR: 0.51 MG/G{CREAT} (ref 0–0.2)
RBC # BLD AUTO: 3.07 M/UL (ref 4–5.4)
RBC #/AREA URNS HPF: 4 /HPF (ref 0–4)
RBC #/AREA URNS HPF: 4 /HPF (ref 0–4)
SODIUM SERPL-SCNC: 141 MMOL/L (ref 136–145)
SP GR UR STRIP: 1.03 (ref 1–1.03)
SQUAMOUS #/AREA URNS HPF: 9 /HPF
SQUAMOUS #/AREA URNS HPF: 9 /HPF
URN SPEC COLLECT METH UR: ABNORMAL
WBC # BLD AUTO: 9.31 K/UL (ref 3.9–12.7)
WBC #/AREA URNS HPF: 5 /HPF (ref 0–5)
WBC #/AREA URNS HPF: 5 /HPF (ref 0–5)

## 2023-08-08 PROCEDURE — 85651 RBC SED RATE NONAUTOMATED: CPT | Performed by: PHYSICIAN ASSISTANT

## 2023-08-08 PROCEDURE — 86160 COMPLEMENT ANTIGEN: CPT | Mod: 59 | Performed by: PHYSICIAN ASSISTANT

## 2023-08-08 PROCEDURE — 82570 ASSAY OF URINE CREATININE: CPT | Performed by: PHYSICIAN ASSISTANT

## 2023-08-08 PROCEDURE — 86140 C-REACTIVE PROTEIN: CPT | Performed by: PHYSICIAN ASSISTANT

## 2023-08-08 PROCEDURE — 81000 URINALYSIS NONAUTO W/SCOPE: CPT | Mod: 91 | Performed by: PHYSICIAN ASSISTANT

## 2023-08-08 PROCEDURE — 86225 DNA ANTIBODY NATIVE: CPT | Performed by: PHYSICIAN ASSISTANT

## 2023-08-08 PROCEDURE — 85025 COMPLETE CBC W/AUTO DIFF WBC: CPT | Performed by: PHYSICIAN ASSISTANT

## 2023-08-08 PROCEDURE — 80053 COMPREHEN METABOLIC PANEL: CPT | Performed by: PHYSICIAN ASSISTANT

## 2023-08-08 PROCEDURE — 86160 COMPLEMENT ANTIGEN: CPT | Performed by: PHYSICIAN ASSISTANT

## 2023-08-08 PROCEDURE — 86225 DNA ANTIBODY NATIVE: CPT | Mod: 59 | Performed by: PHYSICIAN ASSISTANT

## 2023-08-10 LAB
DNA TITER: NORMAL
DSDNA AB SER-ACNC: POSITIVE [IU]/ML

## 2023-08-15 ENCOUNTER — LAB VISIT (OUTPATIENT)
Dept: LAB | Facility: HOSPITAL | Age: 33
End: 2023-08-15
Attending: FAMILY MEDICINE
Payer: MEDICAID

## 2023-08-15 ENCOUNTER — OFFICE VISIT (OUTPATIENT)
Dept: RHEUMATOLOGY | Facility: CLINIC | Age: 33
End: 2023-08-15
Payer: MEDICAID

## 2023-08-15 VITALS
HEART RATE: 94 BPM | WEIGHT: 185.19 LBS | SYSTOLIC BLOOD PRESSURE: 147 MMHG | HEIGHT: 65 IN | BODY MASS INDEX: 30.85 KG/M2 | DIASTOLIC BLOOD PRESSURE: 93 MMHG

## 2023-08-15 DIAGNOSIS — M32.14 SLE GLOMERULONEPHRITIS SYNDROME, WHO CLASS V: ICD-10-CM

## 2023-08-15 DIAGNOSIS — Z51.81 MEDICATION MONITORING ENCOUNTER: ICD-10-CM

## 2023-08-15 DIAGNOSIS — Z79.899 IMMUNOCOMPROMISED STATE DUE TO DRUG THERAPY: ICD-10-CM

## 2023-08-15 DIAGNOSIS — M35.00 SJOGREN'S SYNDROME, WITH UNSPECIFIED ORGAN INVOLVEMENT: ICD-10-CM

## 2023-08-15 DIAGNOSIS — M32.9 SYSTEMIC LUPUS ERYTHEMATOSUS ARTHRITIS: Primary | ICD-10-CM

## 2023-08-15 DIAGNOSIS — D84.821 IMMUNOCOMPROMISED STATE DUE TO DRUG THERAPY: ICD-10-CM

## 2023-08-15 DIAGNOSIS — Z87.898 HISTORY OF URINARY URGENCY: ICD-10-CM

## 2023-08-15 DIAGNOSIS — R59.9 LYMPHOID HYPERPLASIA: ICD-10-CM

## 2023-08-15 DIAGNOSIS — Z79.899 HIGH RISK MEDICATION USE: ICD-10-CM

## 2023-08-15 DIAGNOSIS — Z79.899 LONG-TERM USE OF PLAQUENIL: ICD-10-CM

## 2023-08-15 DIAGNOSIS — R82.71 BACTERIA IN URINE: ICD-10-CM

## 2023-08-15 LAB
BACTERIA #/AREA URNS HPF: ABNORMAL /HPF
BILIRUB UR QL STRIP: NEGATIVE
CLARITY UR: CLEAR
COLOR UR: YELLOW
CREAT UR-MCNC: 104 MG/DL (ref 15–325)
GLUCOSE UR QL STRIP: NEGATIVE
HGB UR QL STRIP: NEGATIVE
HYALINE CASTS #/AREA URNS LPF: 0 /LPF
KETONES UR QL STRIP: NEGATIVE
LEUKOCYTE ESTERASE UR QL STRIP: NEGATIVE
MICROSCOPIC COMMENT: ABNORMAL
NITRITE UR QL STRIP: NEGATIVE
PH UR STRIP: 8 [PH] (ref 5–8)
PROT UR QL STRIP: ABNORMAL
PROT UR-MCNC: 87 MG/DL (ref 0–15)
PROT/CREAT UR: 0.84 MG/G{CREAT} (ref 0–0.2)
RBC #/AREA URNS HPF: 4 /HPF (ref 0–4)
SP GR UR STRIP: 1.02 (ref 1–1.03)
SQUAMOUS #/AREA URNS HPF: 2 /HPF
URN SPEC COLLECT METH UR: ABNORMAL
WBC #/AREA URNS HPF: 6 /HPF (ref 0–5)

## 2023-08-15 PROCEDURE — 3066F NEPHROPATHY DOC TX: CPT | Mod: CPTII,,, | Performed by: PHYSICIAN ASSISTANT

## 2023-08-15 PROCEDURE — 3080F DIAST BP >= 90 MM HG: CPT | Mod: CPTII,,, | Performed by: PHYSICIAN ASSISTANT

## 2023-08-15 PROCEDURE — 82570 ASSAY OF URINE CREATININE: CPT | Performed by: PHYSICIAN ASSISTANT

## 2023-08-15 PROCEDURE — 3008F BODY MASS INDEX DOCD: CPT | Mod: CPTII,,, | Performed by: PHYSICIAN ASSISTANT

## 2023-08-15 PROCEDURE — 3077F PR MOST RECENT SYSTOLIC BLOOD PRESSURE >= 140 MM HG: ICD-10-PCS | Mod: CPTII,,, | Performed by: PHYSICIAN ASSISTANT

## 2023-08-15 PROCEDURE — 1160F RVW MEDS BY RX/DR IN RCRD: CPT | Mod: CPTII,,, | Performed by: PHYSICIAN ASSISTANT

## 2023-08-15 PROCEDURE — 81000 URINALYSIS NONAUTO W/SCOPE: CPT | Performed by: PHYSICIAN ASSISTANT

## 2023-08-15 PROCEDURE — 3066F PR DOCUMENTATION OF TREATMENT FOR NEPHROPATHY: ICD-10-PCS | Mod: CPTII,,, | Performed by: PHYSICIAN ASSISTANT

## 2023-08-15 PROCEDURE — 1159F MED LIST DOCD IN RCRD: CPT | Mod: CPTII,,, | Performed by: PHYSICIAN ASSISTANT

## 2023-08-15 PROCEDURE — 99214 OFFICE O/P EST MOD 30 MIN: CPT | Mod: S$PBB,,, | Performed by: PHYSICIAN ASSISTANT

## 2023-08-15 PROCEDURE — 1159F PR MEDICATION LIST DOCUMENTED IN MEDICAL RECORD: ICD-10-PCS | Mod: CPTII,,, | Performed by: PHYSICIAN ASSISTANT

## 2023-08-15 PROCEDURE — 1160F PR REVIEW ALL MEDS BY PRESCRIBER/CLIN PHARMACIST DOCUMENTED: ICD-10-PCS | Mod: CPTII,,, | Performed by: PHYSICIAN ASSISTANT

## 2023-08-15 PROCEDURE — 99999 PR PBB SHADOW E&M-EST. PATIENT-LVL III: CPT | Mod: PBBFAC,,, | Performed by: PHYSICIAN ASSISTANT

## 2023-08-15 PROCEDURE — 3008F PR BODY MASS INDEX (BMI) DOCUMENTED: ICD-10-PCS | Mod: CPTII,,, | Performed by: PHYSICIAN ASSISTANT

## 2023-08-15 PROCEDURE — 99213 OFFICE O/P EST LOW 20 MIN: CPT | Mod: PBBFAC | Performed by: PHYSICIAN ASSISTANT

## 2023-08-15 PROCEDURE — 99214 PR OFFICE/OUTPT VISIT, EST, LEVL IV, 30-39 MIN: ICD-10-PCS | Mod: S$PBB,,, | Performed by: PHYSICIAN ASSISTANT

## 2023-08-15 PROCEDURE — 3080F PR MOST RECENT DIASTOLIC BLOOD PRESSURE >= 90 MM HG: ICD-10-PCS | Mod: CPTII,,, | Performed by: PHYSICIAN ASSISTANT

## 2023-08-15 PROCEDURE — 99999 PR PBB SHADOW E&M-EST. PATIENT-LVL III: ICD-10-PCS | Mod: PBBFAC,,, | Performed by: PHYSICIAN ASSISTANT

## 2023-08-15 PROCEDURE — 3077F SYST BP >= 140 MM HG: CPT | Mod: CPTII,,, | Performed by: PHYSICIAN ASSISTANT

## 2023-08-15 NOTE — Clinical Note
Can you schedule her to f/u me in about 6 weeks w SLE labs one week prior?  Also need to add SPEP and OMAIRA>  Marvin unable to schedule.

## 2023-08-15 NOTE — Clinical Note
Saw her today.  She is looking good.  Started w mild edema in the legs.  Still w hypoalbuminemia and proteinuria.  Went ahead and repeated PC ratio today.

## 2023-08-15 NOTE — PROGRESS NOTES
Subjective:      Patient ID: Alisia Vines is a 32 y.o. female.    Chief Complaint: Lupus and Disease Management      HPI   Alisia Vines  is a 32 y.o. female seen for follow up SLE flare. She had Rituxan dose 1 of 2 on 06/27/2023.  Following that she had a delayed infusion reaction including nausea, vomiting, loss of appetite, dizziness, severe fatigue.  Generally feeling very run down.    Currently on PDN 40 mg daily.  No arthritis symptoms.  Fatigue has resolved.  H/o Class 5 LN.  Saw Dr. Willard 7/31/23.  Has had proteinuria as high as 9 during her flare.  Last week down to less than 1.  We had discussed renal biopsy with Nephrology.  They opted to follow closely with labs because kidney function was stable.    Patient complaining of some mild edema in her bilateral lower extremities.  She says this just recently started.  Platelets have improved.  Oral ulcerations and nasal ulcerations have also improved.  She is still having some ulcerations over her tongue.  Denying fevers, shortness of breath, chest pain.    Historically she has been noncompliant.  Was admitted to the hospital in May 2023 and since then has been more consistent with hydroxychloroquine 400 mg once daily.  She had another flare in July requiring pulse steroid and readmission to the hospital.  Critical platelets improved with steroid pulse.  She is been followed outpatient by Hematology since discharge.  CellCept and Saphnelo have been added.  Tolerating both.  Following pulse Solu-Medrol she is been on a 60 mg prednisone taper.  She is at 40 mg daily now.  Has been on Rituxan infusions in the past due to noncompliance.      She uses refresh drops for dry eyes.  She sees Ophthalmology here for Plaquenil monitoring.  Most recent eye exam was September 2021.  Denies eye pain.  Having trouble getting reestablished for Plaquenil monitoring.  Requesting an internal referral.    Recently had a lymph node biopsy by ENT.  It was  negative for lymphoma but did show hyperplasia.    C/o rash across face.  Planning f/u w dermatology.  Also seeing ID w recent lymph node concerns.  Recent TB test is indeterminate.  T spot was negative.    Patient complain of oral and nasal ulcerations which have improved since last visit.  Denies am stiffness.  No joint pain today.  She  denies fevers, chills, photosensitivity, eye pain, shortness of breath, chest pain, hematuria, blood in the stool, raynauds, finger ulcerations, tender swollen joints, LAD, dysphagia, weakness.  Rheumatologic systems otherwise negative.    Serologies/Labs:  (+) HARPREET  (+) ds DNA  (+) SSA  (+) SSB  Current Treatment:  Plaquenil 200mg bid - not consistent, compliance an issue  Rituxan dose 1 of 2 on 6/27/2023 - loss of apetite, n/v, dizziness  Previous Treatment:   Rituxan - used for non-compliance in past         Current Outpatient Medications:     amLODIPine (NORVASC) 5 MG tablet, TAKE 1 TABLET(5 MG) BY MOUTH EVERY DAY, Disp: 90 tablet, Rfl: 1    ARIPiprazole (ABILIFY) 2 MG Tab, TAKE 1 TABLET(2 MG) BY MOUTH EVERY DAY, Disp: 30 tablet, Rfl: 3    ciclopirox (PENLAC) 8 % Soln, Apply to nail and nail fold once daily for up to 1 year, Disp: 6.6 mL, Rfl: 6    FLUoxetine 40 MG capsule, TAKE 1 CAPSULE(40 MG) BY MOUTH EVERY DAY, Disp: 30 capsule, Rfl: 3    fluticasone propionate (FLONASE) 50 mcg/actuation nasal spray, SHAKE LIQUID AND USE 2 SPRAYS(100 MCG) IN EACH NOSTRIL EVERY DAY, Disp: 16 g, Rfl: 1    HYDROcodone-acetaminophen (NORCO) 5-325 mg per tablet, Take 1-2 tablets by mouth every 6 (six) hours as needed for Pain., Disp: 30 tablet, Rfl: 0    hydrOXYchloroQUINE (PLAQUENIL) 200 mg tablet, TAKE 2 TABLETS(400 MG) BY MOUTH EVERY DAY, Disp: 60 tablet, Rfl: 2    ketoconazole (NIZORAL) 2 % shampoo, Wash hair with medicated shampoo at least 1x/week - let sit on scalp at least 5 minutes prior to rinsing, Disp: 120 mL, Rfl: 11    mometasone (ELOCON) 0.1 % solution, AAA of scalp once daily.,  Disp: 60 mL, Rfl: 3    mycophenolate (CELLCEPT) 500 mg Tab, Take 1 tablet (500 mg total) by mouth 2 (two) times daily., Disp: 60 tablet, Rfl: 3    ondansetron (ZOFRAN) 4 MG tablet, Take 1 tablet (4 mg total) by mouth every 8 (eight) hours as needed for Nausea., Disp: 20 tablet, Rfl: 0    pimecrolimus (ELIDEL) 1 % cream, Apply to affected areas twice daily. Wear daily sunscreen., Disp: 60 g, Rfl: 3    predniSONE (DELTASONE) 20 MG tablet, Take 3 tablets (60 mg total) by mouth once daily for 14 days, THEN 2.5 tablets (50 mg total) once daily for 7 days, THEN 2 tablets (40 mg total) once daily for 7 days, THEN 1.5 tablets (30 mg total) once daily for 7 days, THEN 1 tablet (20 mg total) once daily for 7 days, THEN 0.5 tablets (10 mg total) once daily for 7 days., Disp: 95 tablet, Rfl: 0    triamcinolone acetonide 0.025% (KENALOG) 0.025 % Oint, AAA bid prn. Use for 2 weeks then taper. Mild steroid., Disp: 80 g, Rfl: 1    valACYclovir (VALTREX) 1000 MG tablet, Take 1 tablet (1,000 mg total) by mouth once daily., Disp: 90 tablet, Rfl: 3    Past Medical History:   Diagnosis Date    Allergic rhinitis     Anemia     Condyloma acuminata     COVID-19 virus infection 07/2021    Encounter for blood transfusion     GERD (gastroesophageal reflux disease)     Hemolytic anemia associated with systemic lupus erythematosus 4/30/2023    History of fetal anomaly in prior pregnancy, currently pregnant, unspecified trimester 03/08/2017    Pacemaker placed    HSV-2 (herpes simplex virus 2) infection     Lupus nephritis     Mood disorder     Overweight(278.02)     Sjogren's syndrome     Systemic lupus complicating pregnancy 01/31/2019    Systemic lupus erythematosus     Thrombocytopenia      Family History   Problem Relation Age of Onset    Hypertension Mother     Eczema Brother     Hypertension Maternal Grandmother     Diabetes Paternal Grandmother     Heart disease Son     Arrhythmia Son         CHB    Stroke Neg Hx     Cancer Neg Hx       Social History     Socioeconomic History    Marital status: Single    Number of children: 2   Occupational History     Comment: [employer]   Tobacco Use    Smoking status: Never     Passive exposure: Never    Smokeless tobacco: Never   Substance and Sexual Activity    Alcohol use: No     Alcohol/week: 0.0 standard drinks of alcohol    Drug use: Yes     Types: Marijuana    Sexual activity: Not Currently     Partners: Male     Birth control/protection: None   Other Topics Concern    Are you pregnant or think you may be? No    Breast-feeding No   Social History Narrative    The patient is single and lives with her significant other.  She has 3 children.  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     Social Determinants of Health     Financial Resource Strain: Low Risk  (5/1/2023)    Overall Financial Resource Strain (CARDIA)     Difficulty of Paying Living Expenses: Not hard at all   Food Insecurity: No Food Insecurity (5/1/2023)    Hunger Vital Sign     Worried About Running Out of Food in the Last Year: Never true     Ran Out of Food in the Last Year: Never true   Transportation Needs: No Transportation Needs (5/1/2023)    PRAPARE - Transportation     Lack of Transportation (Medical): No     Lack of Transportation (Non-Medical): No   Physical Activity: Inactive (5/1/2023)    Exercise Vital Sign     Days of Exercise per Week: 0 days     Minutes of Exercise per Session: 0 min   Stress: No Stress Concern Present (5/1/2023)    [employer] - Occupational Stress Questionnaire     Feeling of Stress : Not at all   Social Connections: Socially Isolated (5/1/2023)    Social Connection and Isolation Panel [NHANES]     Frequency of Communication with Friends and Family: More than three times a week     Frequency of Social Gatherings with Friends and Family: More than three times a week     Attends Yazdanism Services: Never     Active Member of Clubs or Organizations: No      "Attends Club or Organization Meetings: Never     Marital Status: Never    Housing Stability: Unknown (5/1/2023)    Housing Stability Vital Sign     Unable to Pay for Housing in the Last Year: No     Unstable Housing in the Last Year: No     Review of patient's allergies indicates:  No Known Allergies    Objective:   BP (!) 147/93   Pulse 94   Ht 5' 5" (1.651 m)   Wt 84 kg (185 lb 3 oz)   LMP 07/30/2023   BMI 30.82 kg/m²   Immunization History   Administered Date(s) Administered    COVID-19, MRNA, LN-S, PF (Pfizer) (Purple Cap) 12/21/2021, 01/10/2022    Influenza - High Dose - PF (65 years and older) 11/22/2013, 11/20/2018    Influenza - Quadrivalent 11/27/2015    Influenza - Quadrivalent - PF *Preferred* (6 months and older) 10/22/2017    Pneumococcal Conjugate - 13 Valent 11/27/2015    Tdap 09/08/2012, 07/31/2017, 05/01/2022       Physical Exam   Constitutional: She is oriented to person, place, and time. No distress.   HENT:   Head: Normocephalic and atraumatic.   Pulmonary/Chest: Effort normal.   Abdominal: She exhibits no distension.   Musculoskeletal:         General: No swelling or tenderness. Normal range of motion.      Cervical back: Normal range of motion.   Lymphadenopathy:     She has no cervical adenopathy.   Neurological: She is alert and oriented to person, place, and time.   Skin: Skin is warm and dry. No rash noted.   Psychiatric: Mood normal.   Nursing note and vitals reviewed.    No synovitis, no dactylitis, no enthesitis  No effusions of large or small joints  100% fist formation  Well preserved ROM      Recent Results (from the past 672 hour(s))   RENAL FUNCTION PANEL    Collection Time: 07/24/23  3:19 PM   Result Value Ref Range    Glucose 123 (H) 70 - 110 mg/dL    Sodium 139 136 - 145 mmol/L    Potassium 4.3 3.5 - 5.1 mmol/L    Chloride 111 (H) 95 - 110 mmol/L    CO2 22 (L) 23 - 29 mmol/L    BUN 13 6 - 20 mg/dL    Calcium 8.2 (L) 8.7 - 10.5 mg/dL    Creatinine 0.5 0.5 - 1.4 mg/dL "    Albumin 2.6 (L) 3.5 - 5.2 g/dL    Phosphorus 3.1 2.7 - 4.5 mg/dL    eGFR >60.0 >60 mL/min/1.73 m^2    Anion Gap 6 (L) 8 - 16 mmol/L   Anti-DNA Ab, Double-Stranded    Collection Time: 07/24/23  3:19 PM   Result Value Ref Range    ds DNA Ab Positive (A) Negative 1:10   C3 Complement    Collection Time: 07/24/23  3:19 PM   Result Value Ref Range    Complement (C-3) 49 (L) 50 - 180 mg/dL   C4 Complement    Collection Time: 07/24/23  3:19 PM   Result Value Ref Range    Complement (C-4) 9 (L) 11 - 44 mg/dL   CBC Auto Differential    Collection Time: 07/24/23  3:19 PM   Result Value Ref Range    WBC 6.17 3.90 - 12.70 K/uL    RBC 2.54 (L) 4.00 - 5.40 M/uL    Hemoglobin 9.3 (L) 12.0 - 16.0 g/dL    Hematocrit 27.0 (L) 37.0 - 48.5 %     (H) 82 - 98 fL    MCH 36.6 (H) 27.0 - 31.0 pg    MCHC 34.4 32.0 - 36.0 g/dL    RDW 23.7 (H) 11.5 - 14.5 %    Platelets 101 (L) 150 - 450 K/uL    MPV 11.0 9.2 - 12.9 fL    Immature Granulocytes 0.6 (H) 0.0 - 0.5 %    Gran # (ANC) 5.6 1.8 - 7.7 K/uL    Immature Grans (Abs) 0.04 0.00 - 0.04 K/uL    Lymph # 0.4 (L) 1.0 - 4.8 K/uL    Mono # 0.1 (L) 0.3 - 1.0 K/uL    Eos # 0.0 0.0 - 0.5 K/uL    Baso # 0.00 0.00 - 0.20 K/uL    nRBC 0 0 /100 WBC    Gran % 90.6 (H) 38.0 - 73.0 %    Lymph % 6.3 (L) 18.0 - 48.0 %    Mono % 2.3 (L) 4.0 - 15.0 %    Eosinophil % 0.2 0.0 - 8.0 %    Basophil % 0.0 0.0 - 1.9 %    Platelet Estimate Decreased (A)     Aniso Moderate     Poik Slight     Poly Occasional     Ovalocytes Occasional     Tear Drop Cells Occasional     Spherocytes Occasional     Schistocytes Present     Rouleaux Present (A)     Differential Method Automated    Sedimentation rate    Collection Time: 07/24/23  3:19 PM   Result Value Ref Range    Sed Rate >120 (H) 0 - 36 mm/Hr   C-Reactive Protein    Collection Time: 07/24/23  3:19 PM   Result Value Ref Range    CRP 2.5 0.0 - 8.2 mg/L   Comprehensive Metabolic Panel    Collection Time: 07/24/23  3:19 PM   Result Value Ref Range    Sodium 141 136 -  145 mmol/L    Potassium 4.2 3.5 - 5.1 mmol/L    Chloride 111 (H) 95 - 110 mmol/L    CO2 21 (L) 23 - 29 mmol/L    Glucose 126 (H) 70 - 110 mg/dL    BUN 14 6 - 20 mg/dL    Creatinine 0.6 0.5 - 1.4 mg/dL    Calcium 8.3 (L) 8.7 - 10.5 mg/dL    Total Protein 5.8 (L) 6.0 - 8.4 g/dL    Albumin 2.7 (L) 3.5 - 5.2 g/dL    Total Bilirubin 0.4 0.1 - 1.0 mg/dL    Alkaline Phosphatase 57 55 - 135 U/L    AST 39 10 - 40 U/L    ALT 14 10 - 44 U/L    eGFR >60 >60 mL/min/1.73 m^2    Anion Gap 9 8 - 16 mmol/L   DNA Titer    Collection Time: 07/24/23  3:19 PM   Result Value Ref Range    DNA Titer 1:320    Urinalysis    Collection Time: 07/24/23  3:24 PM   Result Value Ref Range    Specimen UA Urine, Clean Catch     Color, UA Yellow Yellow, Straw, Erin    Appearance, UA Clear Clear    pH, UA 7.0 5.0 - 8.0    Specific Gravity, UA 1.025 1.005 - 1.030    Protein, UA 3+ (A) Negative    Glucose, UA Negative Negative    Ketones, UA Negative Negative    Bilirubin (UA) Negative Negative    Occult Blood UA 1+ (A) Negative    Nitrite, UA Negative Negative    Leukocytes, UA Negative Negative   Urinalysis Microscopic    Collection Time: 07/24/23  3:24 PM   Result Value Ref Range    RBC, UA 2 0 - 4 /hpf    WBC, UA 1 0 - 5 /hpf    Bacteria Occasional None-Occ /hpf    Squam Epithel, UA 1 /hpf    Hyaline Casts, UA 2 (A) 0-1/lpf /lpf    Microscopic Comment SEE COMMENT    Protein/Creatinine Ratio, Urine    Collection Time: 07/24/23  4:43 PM   Result Value Ref Range    Protein, Urine Random 523 (H) 0 - 15 mg/dL    Creatinine, Urine 126.0 15.0 - 325.0 mg/dL    Prot/Creat Ratio, Urine 4.15 (H) 0.00 - 0.20   Anti-DNA Ab, Double-Stranded    Collection Time: 07/31/23  3:18 PM   Result Value Ref Range    ds DNA Ab Positive (A) Negative 1:10   C3 Complement    Collection Time: 07/31/23  3:18 PM   Result Value Ref Range    Complement (C-3) 71 50 - 180 mg/dL   C4 Complement    Collection Time: 07/31/23  3:18 PM   Result Value Ref Range    Complement (C-4) 15 11 -  44 mg/dL   CBC Auto Differential    Collection Time: 07/31/23  3:18 PM   Result Value Ref Range    WBC 4.73 3.90 - 12.70 K/uL    RBC 3.02 (L) 4.00 - 5.40 M/uL    Hemoglobin 10.1 (L) 12.0 - 16.0 g/dL    Hematocrit 29.8 (L) 37.0 - 48.5 %    MCV 99 (H) 82 - 98 fL    MCH 33.4 (H) 27.0 - 31.0 pg    MCHC 33.9 32.0 - 36.0 g/dL    RDW 18.5 (H) 11.5 - 14.5 %    Platelets 215 150 - 450 K/uL    MPV 8.9 (L) 9.2 - 12.9 fL    Immature Granulocytes 1.3 (H) 0.0 - 0.5 %    Gran # (ANC) 4.1 1.8 - 7.7 K/uL    Immature Grans (Abs) 0.06 (H) 0.00 - 0.04 K/uL    Lymph # 0.3 (L) 1.0 - 4.8 K/uL    Mono # 0.2 (L) 0.3 - 1.0 K/uL    Eos # 0.0 0.0 - 0.5 K/uL    Baso # 0.00 0.00 - 0.20 K/uL    nRBC 0 0 /100 WBC    Gran % 87.3 (H) 38.0 - 73.0 %    Lymph % 7.0 (L) 18.0 - 48.0 %    Mono % 4.4 4.0 - 15.0 %    Eosinophil % 0.0 0.0 - 8.0 %    Basophil % 0.0 0.0 - 1.9 %    Platelet Estimate Appears normal     Differential Method Automated    Sedimentation rate    Collection Time: 07/31/23  3:18 PM   Result Value Ref Range    Sed Rate 127 (H) 0 - 20 mm/Hr   C-Reactive Protein    Collection Time: 07/31/23  3:18 PM   Result Value Ref Range    CRP 87.9 (H) 0.0 - 8.2 mg/L   Comprehensive Metabolic Panel    Collection Time: 07/31/23  3:18 PM   Result Value Ref Range    Sodium 139 136 - 145 mmol/L    Potassium 3.6 3.5 - 5.1 mmol/L    Chloride 104 95 - 110 mmol/L    CO2 25 23 - 29 mmol/L    Glucose 121 (H) 70 - 110 mg/dL    BUN 13 6 - 20 mg/dL    Creatinine 0.6 0.5 - 1.4 mg/dL    Calcium 8.4 (L) 8.7 - 10.5 mg/dL    Total Protein 5.6 (L) 6.0 - 8.4 g/dL    Albumin 2.2 (L) 3.5 - 5.2 g/dL    Total Bilirubin 0.3 0.1 - 1.0 mg/dL    Alkaline Phosphatase 58 55 - 135 U/L    AST 24 10 - 40 U/L    ALT 12 10 - 44 U/L    eGFR >60 >60 mL/min/1.73 m^2    Anion Gap 10 8 - 16 mmol/L   DNA Titer    Collection Time: 07/31/23  3:18 PM   Result Value Ref Range    DNA Titer 1:160    Pregnancy, urine rapid    Collection Time: 08/01/23  8:23 AM   Result Value Ref Range    Preg Test,  Ur Negative    Protein/Creatinine Ratio, Urine    Collection Time: 08/08/23  9:39 AM   Result Value Ref Range    Protein, Urine Random 122 (H) 0 - 15 mg/dL    Creatinine, Urine 239.3 15.0 - 325.0 mg/dL    Prot/Creat Ratio, Urine 0.51 (H) 0.00 - 0.20   Urinalysis Microscopic    Collection Time: 08/08/23  9:39 AM   Result Value Ref Range    RBC, UA 4 0 - 4 /hpf    WBC, UA 5 0 - 5 /hpf    Bacteria Few (A) None-Occ /hpf    Squam Epithel, UA 9 /hpf    Hyaline Casts, UA 1 0-1/lpf /lpf    Ca Oxalate Margot, UA Few None-Moderate    Microscopic Comment SEE COMMENT    Urinalysis    Collection Time: 08/08/23  9:39 AM   Result Value Ref Range    Specimen UA Urine, Clean Catch     Color, UA Yellow Yellow, Straw, Erin    Appearance, UA Hazy (A) Clear    pH, UA 6.0 5.0 - 8.0    Specific Gravity, UA 1.030 1.005 - 1.030    Protein, UA 3+ (A) Negative    Glucose, UA Negative Negative    Ketones, UA Negative Negative    Bilirubin (UA) Negative Negative    Occult Blood UA Negative Negative    Nitrite, UA Negative Negative    Leukocytes, UA Negative Negative   Urinalysis Microscopic    Collection Time: 08/08/23  9:39 AM   Result Value Ref Range    RBC, UA 4 0 - 4 /hpf    WBC, UA 5 0 - 5 /hpf    Bacteria Few (A) None-Occ /hpf    Squam Epithel, UA 9 /hpf    Hyaline Casts, UA 1 0-1/lpf /lpf    Ca Oxalate Margot, UA Few None-Moderate    Microscopic Comment SEE COMMENT    Comprehensive Metabolic Panel    Collection Time: 08/08/23 10:10 AM   Result Value Ref Range    Sodium 141 136 - 145 mmol/L    Potassium 3.5 3.5 - 5.1 mmol/L    Chloride 104 95 - 110 mmol/L    CO2 29 23 - 29 mmol/L    Glucose 102 70 - 110 mg/dL    BUN 22 (H) 6 - 20 mg/dL    Creatinine 0.6 0.5 - 1.4 mg/dL    Calcium 8.6 (L) 8.7 - 10.5 mg/dL    Total Protein 5.2 (L) 6.0 - 8.4 g/dL    Albumin 2.5 (L) 3.5 - 5.2 g/dL    Total Bilirubin 0.2 0.1 - 1.0 mg/dL    Alkaline Phosphatase 54 (L) 55 - 135 U/L    AST 16 10 - 40 U/L    ALT 12 10 - 44 U/L    eGFR >60 >60 mL/min/1.73 m^2    Anion  Gap 8 8 - 16 mmol/L   CBC Auto Differential    Collection Time: 08/08/23 10:10 AM   Result Value Ref Range    WBC 9.31 3.90 - 12.70 K/uL    RBC 3.07 (L) 4.00 - 5.40 M/uL    Hemoglobin 10.0 (L) 12.0 - 16.0 g/dL    Hematocrit 30.5 (L) 37.0 - 48.5 %    MCV 99 (H) 82 - 98 fL    MCH 32.6 (H) 27.0 - 31.0 pg    MCHC 32.8 32.0 - 36.0 g/dL    RDW 16.6 (H) 11.5 - 14.5 %    Platelets 381 150 - 450 K/uL    MPV 8.9 (L) 9.2 - 12.9 fL    Immature Granulocytes 2.4 (H) 0.0 - 0.5 %    Gran # (ANC) 7.4 1.8 - 7.7 K/uL    Immature Grans (Abs) 0.22 (H) 0.00 - 0.04 K/uL    Lymph # 1.0 1.0 - 4.8 K/uL    Mono # 0.6 0.3 - 1.0 K/uL    Eos # 0.0 0.0 - 0.5 K/uL    Baso # 0.01 0.00 - 0.20 K/uL    nRBC 0 0 /100 WBC    Gran % 79.9 (H) 38.0 - 73.0 %    Lymph % 11.1 (L) 18.0 - 48.0 %    Mono % 6.3 4.0 - 15.0 %    Eosinophil % 0.2 0.0 - 8.0 %    Basophil % 0.1 0.0 - 1.9 %    Differential Method Automated    Sedimentation rate    Collection Time: 08/08/23 10:10 AM   Result Value Ref Range    Sed Rate 95 (H) 0 - 20 mm/Hr   C-Reactive Protein    Collection Time: 08/08/23 10:10 AM   Result Value Ref Range    CRP 0.7 0.0 - 8.2 mg/L   Anti-DNA Ab, Double-Stranded    Collection Time: 08/08/23 10:10 AM   Result Value Ref Range    ds DNA Ab Positive (A) Negative 1:10   C4 Complement    Collection Time: 08/08/23 10:10 AM   Result Value Ref Range    Complement (C-4) 7 (L) 11 - 44 mg/dL   C3 Complement    Collection Time: 08/08/23 10:10 AM   Result Value Ref Range    Complement (C-3) 65 50 - 180 mg/dL   DNA Titer    Collection Time: 08/08/23 10:10 AM   Result Value Ref Range    DNA Titer 1:160    Urinalysis    Collection Time: 08/15/23 10:24 AM   Result Value Ref Range    Specimen UA Urine, Clean Catch     Color, UA Yellow Yellow, Straw, Erin    Appearance, UA Clear Clear    pH, UA 8.0 5.0 - 8.0    Specific Gravity, UA 1.025 1.005 - 1.030    Protein, UA 2+ (A) Negative    Glucose, UA Negative Negative    Ketones, UA Negative Negative    Bilirubin (UA) Negative  Negative    Occult Blood UA Negative Negative    Nitrite, UA Negative Negative    Leukocytes, UA Negative Negative   Urinalysis Microscopic    Collection Time: 08/15/23 10:24 AM   Result Value Ref Range    RBC, UA 4 0 - 4 /hpf    WBC, UA 6 (H) 0 - 5 /hpf    Bacteria Occasional None-Occ /hpf    Squam Epithel, UA 2 /hpf    Hyaline Casts, UA 0 0-1/lpf /lpf    Microscopic Comment SEE COMMENT    Protein/Creatinine Ratio, Urine    Collection Time: 08/15/23 10:41 AM   Result Value Ref Range    Protein, Urine Random 87 (H) 0 - 15 mg/dL    Creatinine, Urine 104.0 15.0 - 325.0 mg/dL    Prot/Creat Ratio, Urine 0.84 (H) 0.00 - 0.20         Lab Results   Component Value Date    TBGOLDPLUS Indeterminate (A) 05/02/2023      Lab Results   Component Value Date    HEPAIGM Non-reactive 05/08/2023    HEPBIGM Grayzone (A) 05/08/2023    HEPBCAB Non-reactive 06/08/2023    HEPCAB Non-reactive 05/08/2023        Imaging  I have personally reviewed images and reports as below.  I agree with the interpretation.  CT Soft Tissue Neck With Contrast  Order: [id number]  Status: Final result     Visible to patient: Yes (seen)     Next appt: 07/19/2023 at 09:30 AM in Radiology ([facility] CT1 LIMIT 500 LBS)     Dx: Mass of right side of neck     2 Result Notes    1 Patient Communication  Details    Reading Physician Reading Date Result Priority   Guillermo Powers Jr., MD  [phone] 1/10/2023 Routine     Narrative & Impression  EXAMINATION:  CT SOFT TISSUE NECK WITH CONTRAST     CLINICAL HISTORY:  neck mass//abnormal ultrasound;Localized swelling, mass and lump, neck     TECHNIQUE:  Low dose axial images as well as sagittal and coronal reconstructions were performed from the skull base to the clavicles following the intravenous administration of 75 mL of Omnipaque 350.     COMPARISON:  None     FINDINGS:  The nasopharynx, oral pharynx, hypopharynx, larynx and visualized portion of the trachea are within normal limits.     The oral cavity and buccal space  are limited by artifact from dental metal but appear to grossly within normal limits.     The bilateral parotid, submandibular and thyroid glands are within normal limits.     Abnormal lymphadenopathy is noted throughout the soft tissues of the neck, right greater than left, with the largest nodes in the right supraclavicular region measuring up to 3 x 2.1 cm in size (axial 111, series 2).  No evidence of necrosis is present..     Multilevel degenerative changes noted throughout the cervical spine.     Right greater than left maxillary sinusitis and bilateral ethmoid sinusitis.  Suspected left maxillary dental caries     Visualized lung apices are clear bilaterally.     Impression:     Extensive lymphadenopathy in the right greater than left neck.  Both benign and malignant etiologies are in the differential, recommend correlation with clinical presentation for infectious etiology.  Consider biopsy to rule out lymphoma.     Sinusitis.     Suspected dental caries, dental consultation is recommended        Electronically signed by: Guillermo Powers  Date:                                            01/10/2023  Time:                                           12:53       Surgical path report also reviewed from 2/17/23  Right deep cervical lymph node, excisional biopsy: - Follicular hyperplasia. See comment. - No evidence of lymphoma or metastatic carcinoma.    Assessment:     1. Systemic lupus erythematosus arthritis    2. SLE glomerulonephritis syndrome, WHO class V    3. High risk medication use    4. Medication monitoring encounter    5. Immunocompromised state due to drug therapy    6. Sjogren's syndrome, with unspecified organ involvement    7. Lymphoid hyperplasia    8. Long-term use of Plaquenil    9. Bacteria in urine    10. History of urinary urgency              Plan:     Alisia was seen today for lupus and disease management.    Diagnoses and all orders for this visit:    Systemic lupus erythematosus  arthritis    SLE glomerulonephritis syndrome, WHO class V    High risk medication use    Medication monitoring encounter    Immunocompromised state due to drug therapy    Sjogren's syndrome, with unspecified organ involvement    Lymphoid hyperplasia    Long-term use of Plaquenil    Bacteria in urine    History of urinary urgency          SLE flare w improving sxs  Compliance improved w once daily dosing of meds  Labs reviewed  CMP/CBC stble  No leukopenia  Improved plts  - no thrombocytopenia  ESR remains elevated  Check SPEP/OMAIRA next labs  Consider CT chest/abd/pelvis  CRP normalized  +dsDNA  C4 low, c3 wnl  C/w  mg daily  Due for ophtho exam for monitoring  About 10yrs of Plaquenil therapy -  on again, off again history  Discussed potential for retinal toxicity  C/w cellcept  PDN 20 mg daily x 7 days then 10mg daily til f/u  Hep B viral DNA negative  Edema, hypoalbuminemia and proteinuria  Will cc Dr. Willard  Low threshold for kidney bx to eval LN/nephrotic syndrome  Pt to notify us of worsening edema  PC ratio today  UTI sxs - check culture today  Overlapping SS  Recent lymph node excision shows hyperplasia but negative for malignancy such as lymphoma  SPEP, IEP, cryoglobulins reviewed  Low Albumin  O/w wnl  Discussed red flag symptoms with patient  Continue refresh drops p.r.n  Add pilocarpine 5 mg tid  Drug therapy requiring intensive monitoring for toxicity  High Risk Medication Monitoring encounter  No current medication related issues, no evidence of toxicity  I ordered labs for toxicity monitoring, have personally reviewed the findings, and discussed them with the patient.  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  Monitor carefully for infections.  Advised patient to get immediate medical care if any infection arises.  Also advised strict adherence age-appropriate vaccinations and cancer screenings  with PCP.  Patient advised to hold DMARD and/or biologic therapy for signs of infection or for surgery. If you are unsure what to do please call our office for instruction.[facility] [phone]  Return to clinic: 1-2 mos w sle labs prior    Case discussed with Dr Blair. Assessment and Plan done in collaboration.    Follow up in about 1 month (around 9/15/2023).    The patient understands, chooses and consents to this plan and accepts all the risks which include but are not limited to the risks mentioned above.     Disclaimer: This note was prepared using a voice recognition system and is likely to have sound alike errors within the text.

## 2023-08-21 NOTE — TELEPHONE ENCOUNTER
Called pt at number listed in message. Pt did not answer and I was unable to leave a message on her voicemail. Message sent through portal also.   No care due was identified.  [facility] Embedded Care Due Messages. Reference number: [id number].   8/21/2023 9:30:22 AM CDT

## 2023-08-22 ENCOUNTER — TELEPHONE (OUTPATIENT)
Dept: INFUSION THERAPY | Facility: HOSPITAL | Age: 33
End: 2023-08-22
Payer: MEDICAID

## 2023-08-22 NOTE — TELEPHONE ENCOUNTER
Patient calling to reschedule her infusion and lab.  All appts rescheduled per her preferences. Call ended well.

## 2023-08-28 ENCOUNTER — TELEPHONE (OUTPATIENT)
Dept: RHEUMATOLOGY | Facility: CLINIC | Age: 33
End: 2023-08-28
Payer: MEDICAID

## 2023-08-28 NOTE — TELEPHONE ENCOUNTER
----- Message from Lora Root PA-C sent at 8/16/2023 10:24 AM CDT -----  Please reschedule her 9/1/23 labs to 8/29/23 for b/f her infusion.  She already has a UPT scheduled that morning in the lab.  We can do the bloodwork then as well.

## 2023-08-29 NOTE — ED NOTES
Pt calm and cooperative. Allowed blood draw. Awaiting telepsych at this time. MD states no PEC orders at this time.    The patient is a 89y Female complaining of cough.

## 2023-09-01 ENCOUNTER — INFUSION (OUTPATIENT)
Dept: INFUSION THERAPY | Facility: HOSPITAL | Age: 33
End: 2023-09-01
Attending: PHYSICIAN ASSISTANT
Payer: MEDICAID

## 2023-09-01 ENCOUNTER — PATIENT MESSAGE (OUTPATIENT)
Dept: RHEUMATOLOGY | Facility: CLINIC | Age: 33
End: 2023-09-01
Payer: MEDICAID

## 2023-09-01 VITALS
SYSTOLIC BLOOD PRESSURE: 125 MMHG | RESPIRATION RATE: 18 BRPM | OXYGEN SATURATION: 97 % | DIASTOLIC BLOOD PRESSURE: 75 MMHG | TEMPERATURE: 98 F | BODY MASS INDEX: 31.62 KG/M2 | WEIGHT: 190.06 LBS | HEART RATE: 97 BPM

## 2023-09-01 DIAGNOSIS — M32.9 SLE (SYSTEMIC LUPUS ERYTHEMATOSUS RELATED SYNDROME): Primary | ICD-10-CM

## 2023-09-01 PROCEDURE — 96365 THER/PROPH/DIAG IV INF INIT: CPT

## 2023-09-01 PROCEDURE — 25000003 PHARM REV CODE 250: Performed by: PHYSICIAN ASSISTANT

## 2023-09-01 PROCEDURE — 63600175 PHARM REV CODE 636 W HCPCS: Mod: JZ,TB | Performed by: PHYSICIAN ASSISTANT

## 2023-09-01 RX ORDER — SODIUM CHLORIDE 0.9 % (FLUSH) 0.9 %
10 SYRINGE (ML) INJECTION
Status: CANCELLED | OUTPATIENT
Start: 2023-09-29

## 2023-09-01 RX ORDER — DIPHENHYDRAMINE HYDROCHLORIDE 50 MG/ML
50 INJECTION INTRAMUSCULAR; INTRAVENOUS ONCE AS NEEDED
Status: CANCELLED | OUTPATIENT
Start: 2023-09-29

## 2023-09-01 RX ORDER — HEPARIN 100 UNIT/ML
500 SYRINGE INTRAVENOUS
Status: CANCELLED | OUTPATIENT
Start: 2023-09-29

## 2023-09-01 RX ORDER — EPINEPHRINE 0.3 MG/.3ML
0.3 INJECTION SUBCUTANEOUS ONCE AS NEEDED
Status: CANCELLED | OUTPATIENT
Start: 2023-09-29

## 2023-09-01 RX ADMIN — ANIFROLUMAB 300 MG: 300 INJECTION, SOLUTION INTRAVENOUS at 10:09

## 2023-09-01 NOTE — PLAN OF CARE
Discussed plan of care with pt. Addressed any and ongoing concerns. Pt denies    Problem: Adult Inpatient Plan of Care  Goal: Plan of Care Review  Outcome: Ongoing, Progressing  Flowsheets (Taken 9/1/2023 1048)  Plan of Care Reviewed With: patient  Goal: Patient-Specific Goal (Individualized)  Outcome: Ongoing, Progressing  Goal: Absence of Hospital-Acquired Illness or Injury  Outcome: Ongoing, Progressing  Intervention: Identify and Manage Fall Risk  Flowsheets (Taken 9/1/2023 1048)  Safety Promotion/Fall Prevention:   room near unit station   nonskid shoes/socks when out of bed   in recliner, wheels locked  Intervention: Prevent Infection  Flowsheets (Taken 9/1/2023 1048)  Infection Prevention:   hand hygiene promoted   equipment surfaces disinfected  Goal: Optimal Comfort and Wellbeing  Outcome: Ongoing, Progressing  Intervention: Monitor Pain and Promote Comfort  Flowsheets (Taken 9/1/2023 1048)  Pain Management Interventions:   quiet environment facilitated   warm blanket provided  Intervention: Provide Person-Centered Care  Flowsheets (Taken 9/1/2023 1048)  Trust Relationship/Rapport:   reassurance provided   choices provided   care explained   thoughts/feelings acknowledged   empathic listening provided   emotional support provided   questions answered   questions encouraged

## 2023-09-01 NOTE — NURSING
Attempted to schedule next saphnelo infusion with pt. She stated that she did not want to be scheduled because she does not like being poked. Advised pt that she need to discuss her concerns with RAJ Medeiros to determine if she should continue infusions or had other treatment options and notified provider. Pt verbalized understanding

## 2023-09-05 DIAGNOSIS — D50.9 IRON DEFICIENCY ANEMIA, UNSPECIFIED IRON DEFICIENCY ANEMIA TYPE: Primary | ICD-10-CM

## 2023-09-12 ENCOUNTER — LAB VISIT (OUTPATIENT)
Dept: LAB | Facility: HOSPITAL | Age: 33
End: 2023-09-12
Attending: PHYSICIAN ASSISTANT
Payer: MEDICAID

## 2023-09-12 DIAGNOSIS — M35.00 SJOGREN'S SYNDROME, WITH UNSPECIFIED ORGAN INVOLVEMENT: ICD-10-CM

## 2023-09-12 DIAGNOSIS — M32.14 SLE GLOMERULONEPHRITIS SYNDROME, WHO CLASS V: ICD-10-CM

## 2023-09-12 LAB
ALBUMIN SERPL BCP-MCNC: 3 G/DL (ref 3.5–5.2)
ALP SERPL-CCNC: 52 U/L (ref 55–135)
ALT SERPL W/O P-5'-P-CCNC: 10 U/L (ref 10–44)
ANION GAP SERPL CALC-SCNC: 9 MMOL/L (ref 8–16)
ANISOCYTOSIS BLD QL SMEAR: SLIGHT
AST SERPL-CCNC: 18 U/L (ref 10–40)
BACTERIA #/AREA URNS HPF: ABNORMAL /HPF
BASOPHILS # BLD AUTO: 0.02 K/UL (ref 0–0.2)
BASOPHILS NFR BLD: 0.6 % (ref 0–1.9)
BILIRUB SERPL-MCNC: 0.1 MG/DL (ref 0.1–1)
BUN SERPL-MCNC: 15 MG/DL (ref 6–20)
C3 SERPL-MCNC: 97 MG/DL (ref 50–180)
C4 SERPL-MCNC: 14 MG/DL (ref 11–44)
CALCIUM SERPL-MCNC: 8.3 MG/DL (ref 8.7–10.5)
CHLORIDE SERPL-SCNC: 111 MMOL/L (ref 95–110)
CO2 SERPL-SCNC: 21 MMOL/L (ref 23–29)
CREAT SERPL-MCNC: 0.8 MG/DL (ref 0.5–1.4)
CREAT UR-MCNC: 286.1 MG/DL (ref 15–325)
CRP SERPL-MCNC: 0.6 MG/L (ref 0–8.2)
DIFFERENTIAL METHOD: ABNORMAL
EOSINOPHIL # BLD AUTO: 0.1 K/UL (ref 0–0.5)
EOSINOPHIL NFR BLD: 2.1 % (ref 0–8)
ERYTHROCYTE [DISTWIDTH] IN BLOOD BY AUTOMATED COUNT: 15 % (ref 11.5–14.5)
ERYTHROCYTE [SEDIMENTATION RATE] IN BLOOD BY WESTERGREN METHOD: 58 MM/HR (ref 0–20)
EST. GFR  (NO RACE VARIABLE): >60 ML/MIN/1.73 M^2
GLUCOSE SERPL-MCNC: 95 MG/DL (ref 70–110)
HCT VFR BLD AUTO: 29.7 % (ref 37–48.5)
HGB BLD-MCNC: 10 G/DL (ref 12–16)
HYALINE CASTS #/AREA URNS LPF: 1 /LPF
HYPOCHROMIA BLD QL SMEAR: ABNORMAL
IMM GRANULOCYTES # BLD AUTO: 0.02 K/UL (ref 0–0.04)
IMM GRANULOCYTES NFR BLD AUTO: 0.6 % (ref 0–0.5)
LYMPHOCYTES # BLD AUTO: 1.9 K/UL (ref 1–4.8)
LYMPHOCYTES NFR BLD: 56.2 % (ref 18–48)
MCH RBC QN AUTO: 28.6 PG (ref 27–31)
MCHC RBC AUTO-ENTMCNC: 33.7 G/DL (ref 32–36)
MCV RBC AUTO: 85 FL (ref 82–98)
MICROSCOPIC COMMENT: ABNORMAL
MONOCYTES # BLD AUTO: 0.6 K/UL (ref 0.3–1)
MONOCYTES NFR BLD: 16.8 % (ref 4–15)
NEUTROPHILS # BLD AUTO: 0.8 K/UL (ref 1.8–7.7)
NEUTROPHILS NFR BLD: 23.7 % (ref 38–73)
NRBC BLD-RTO: 0 /100 WBC
PLATELET # BLD AUTO: 347 K/UL (ref 150–450)
PLATELET BLD QL SMEAR: ABNORMAL
PMV BLD AUTO: 9.5 FL (ref 9.2–12.9)
POIKILOCYTOSIS BLD QL SMEAR: SLIGHT
POTASSIUM SERPL-SCNC: 3.5 MMOL/L (ref 3.5–5.1)
PROT SERPL-MCNC: 5.9 G/DL (ref 6–8.4)
PROT UR-MCNC: 185 MG/DL (ref 0–15)
PROT/CREAT UR: 0.65 MG/G{CREAT} (ref 0–0.2)
RBC # BLD AUTO: 3.5 M/UL (ref 4–5.4)
RBC #/AREA URNS HPF: 9 /HPF (ref 0–4)
SODIUM SERPL-SCNC: 141 MMOL/L (ref 136–145)
SQUAMOUS #/AREA URNS HPF: 3 /HPF
WBC # BLD AUTO: 3.4 K/UL (ref 3.9–12.7)
WBC #/AREA URNS HPF: 3 /HPF (ref 0–5)

## 2023-09-12 PROCEDURE — 81000 URINALYSIS NONAUTO W/SCOPE: CPT | Performed by: PHYSICIAN ASSISTANT

## 2023-09-12 PROCEDURE — 85651 RBC SED RATE NONAUTOMATED: CPT | Performed by: PHYSICIAN ASSISTANT

## 2023-09-12 PROCEDURE — 84165 PROTEIN E-PHORESIS SERUM: CPT | Mod: 26,,, | Performed by: PATHOLOGY

## 2023-09-12 PROCEDURE — 36415 COLL VENOUS BLD VENIPUNCTURE: CPT | Performed by: PHYSICIAN ASSISTANT

## 2023-09-12 PROCEDURE — 86140 C-REACTIVE PROTEIN: CPT | Performed by: PHYSICIAN ASSISTANT

## 2023-09-12 PROCEDURE — 84165 PROTEIN E-PHORESIS SERUM: CPT | Performed by: PHYSICIAN ASSISTANT

## 2023-09-12 PROCEDURE — 86225 DNA ANTIBODY NATIVE: CPT | Mod: 59 | Performed by: PHYSICIAN ASSISTANT

## 2023-09-12 PROCEDURE — 86334 IMMUNOFIX E-PHORESIS SERUM: CPT | Mod: 26,,, | Performed by: PATHOLOGY

## 2023-09-12 PROCEDURE — 85025 COMPLETE CBC W/AUTO DIFF WBC: CPT | Performed by: PHYSICIAN ASSISTANT

## 2023-09-12 PROCEDURE — 86225 DNA ANTIBODY NATIVE: CPT | Performed by: PHYSICIAN ASSISTANT

## 2023-09-12 PROCEDURE — 80053 COMPREHEN METABOLIC PANEL: CPT | Performed by: PHYSICIAN ASSISTANT

## 2023-09-12 PROCEDURE — 86334 IMMUNOFIX E-PHORESIS SERUM: CPT | Performed by: PHYSICIAN ASSISTANT

## 2023-09-12 PROCEDURE — 86160 COMPLEMENT ANTIGEN: CPT | Performed by: PHYSICIAN ASSISTANT

## 2023-09-12 PROCEDURE — 82570 ASSAY OF URINE CREATININE: CPT | Performed by: PHYSICIAN ASSISTANT

## 2023-09-12 PROCEDURE — 86160 COMPLEMENT ANTIGEN: CPT | Mod: 59 | Performed by: PHYSICIAN ASSISTANT

## 2023-09-12 PROCEDURE — 84165 PATHOLOGIST INTERPRETATION SPE: ICD-10-PCS | Mod: 26,,, | Performed by: PATHOLOGY

## 2023-09-12 PROCEDURE — 86334 PATHOLOGIST INTERPRETATION IFE: ICD-10-PCS | Mod: 26,,, | Performed by: PATHOLOGY

## 2023-09-13 LAB
ALBUMIN SERPL ELPH-MCNC: 3.16 G/DL (ref 3.35–5.55)
ALPHA1 GLOB SERPL ELPH-MCNC: 0.26 G/DL (ref 0.17–0.41)
ALPHA2 GLOB SERPL ELPH-MCNC: 0.66 G/DL (ref 0.43–0.99)
B-GLOBULIN SERPL ELPH-MCNC: 0.65 G/DL (ref 0.5–1.1)
GAMMA GLOB SERPL ELPH-MCNC: 0.66 G/DL (ref 0.67–1.58)
INTERPRETATION SERPL IFE-IMP: NORMAL
PATHOLOGIST INTERPRETATION IFE: NORMAL
PATHOLOGIST INTERPRETATION SPE: NORMAL
PROT SERPL-MCNC: 5.4 G/DL (ref 6–8.4)

## 2023-09-14 LAB
DNA TITER: NORMAL
DSDNA AB SER-ACNC: POSITIVE [IU]/ML

## 2023-09-19 ENCOUNTER — OFFICE VISIT (OUTPATIENT)
Dept: RHEUMATOLOGY | Facility: CLINIC | Age: 33
End: 2023-09-19
Payer: MEDICAID

## 2023-09-19 ENCOUNTER — PATIENT MESSAGE (OUTPATIENT)
Dept: RHEUMATOLOGY | Facility: CLINIC | Age: 33
End: 2023-09-19

## 2023-09-19 VITALS
BODY MASS INDEX: 31.96 KG/M2 | DIASTOLIC BLOOD PRESSURE: 87 MMHG | HEART RATE: 110 BPM | SYSTOLIC BLOOD PRESSURE: 141 MMHG | WEIGHT: 191.81 LBS | HEIGHT: 65 IN

## 2023-09-19 DIAGNOSIS — Z51.81 MEDICATION MONITORING ENCOUNTER: ICD-10-CM

## 2023-09-19 DIAGNOSIS — Z79.899 IMMUNOCOMPROMISED STATE DUE TO DRUG THERAPY: ICD-10-CM

## 2023-09-19 DIAGNOSIS — R59.9 LYMPHOID HYPERPLASIA: ICD-10-CM

## 2023-09-19 DIAGNOSIS — M35.00 SJOGREN'S SYNDROME, WITH UNSPECIFIED ORGAN INVOLVEMENT: ICD-10-CM

## 2023-09-19 DIAGNOSIS — M32.9 SYSTEMIC LUPUS ERYTHEMATOSUS ARTHRITIS: Primary | ICD-10-CM

## 2023-09-19 DIAGNOSIS — M32.14 SLE GLOMERULONEPHRITIS SYNDROME, WHO CLASS V: ICD-10-CM

## 2023-09-19 DIAGNOSIS — Z79.899 HIGH RISK MEDICATION USE: ICD-10-CM

## 2023-09-19 DIAGNOSIS — D84.821 IMMUNOCOMPROMISED STATE DUE TO DRUG THERAPY: ICD-10-CM

## 2023-09-19 DIAGNOSIS — Z79.899 LONG-TERM USE OF PLAQUENIL: ICD-10-CM

## 2023-09-19 PROCEDURE — 3077F PR MOST RECENT SYSTOLIC BLOOD PRESSURE >= 140 MM HG: ICD-10-PCS | Mod: CPTII,,, | Performed by: PHYSICIAN ASSISTANT

## 2023-09-19 PROCEDURE — 3008F BODY MASS INDEX DOCD: CPT | Mod: CPTII,,, | Performed by: PHYSICIAN ASSISTANT

## 2023-09-19 PROCEDURE — 1159F MED LIST DOCD IN RCRD: CPT | Mod: CPTII,,, | Performed by: PHYSICIAN ASSISTANT

## 2023-09-19 PROCEDURE — 99999 PR PBB SHADOW E&M-EST. PATIENT-LVL III: ICD-10-PCS | Mod: PBBFAC,,, | Performed by: PHYSICIAN ASSISTANT

## 2023-09-19 PROCEDURE — 3066F NEPHROPATHY DOC TX: CPT | Mod: CPTII,,, | Performed by: PHYSICIAN ASSISTANT

## 2023-09-19 PROCEDURE — 99213 OFFICE O/P EST LOW 20 MIN: CPT | Mod: PBBFAC | Performed by: PHYSICIAN ASSISTANT

## 2023-09-19 PROCEDURE — 3077F SYST BP >= 140 MM HG: CPT | Mod: CPTII,,, | Performed by: PHYSICIAN ASSISTANT

## 2023-09-19 PROCEDURE — 3079F DIAST BP 80-89 MM HG: CPT | Mod: CPTII,,, | Performed by: PHYSICIAN ASSISTANT

## 2023-09-19 PROCEDURE — 1160F RVW MEDS BY RX/DR IN RCRD: CPT | Mod: CPTII,,, | Performed by: PHYSICIAN ASSISTANT

## 2023-09-19 PROCEDURE — 3066F PR DOCUMENTATION OF TREATMENT FOR NEPHROPATHY: ICD-10-PCS | Mod: CPTII,,, | Performed by: PHYSICIAN ASSISTANT

## 2023-09-19 PROCEDURE — 99999 PR PBB SHADOW E&M-EST. PATIENT-LVL III: CPT | Mod: PBBFAC,,, | Performed by: PHYSICIAN ASSISTANT

## 2023-09-19 PROCEDURE — 99215 OFFICE O/P EST HI 40 MIN: CPT | Mod: S$PBB,,, | Performed by: PHYSICIAN ASSISTANT

## 2023-09-19 PROCEDURE — 1159F PR MEDICATION LIST DOCUMENTED IN MEDICAL RECORD: ICD-10-PCS | Mod: CPTII,,, | Performed by: PHYSICIAN ASSISTANT

## 2023-09-19 PROCEDURE — 3079F PR MOST RECENT DIASTOLIC BLOOD PRESSURE 80-89 MM HG: ICD-10-PCS | Mod: CPTII,,, | Performed by: PHYSICIAN ASSISTANT

## 2023-09-19 PROCEDURE — 99215 PR OFFICE/OUTPT VISIT, EST, LEVL V, 40-54 MIN: ICD-10-PCS | Mod: S$PBB,,, | Performed by: PHYSICIAN ASSISTANT

## 2023-09-19 PROCEDURE — 3008F PR BODY MASS INDEX (BMI) DOCUMENTED: ICD-10-PCS | Mod: CPTII,,, | Performed by: PHYSICIAN ASSISTANT

## 2023-09-19 PROCEDURE — 1160F PR REVIEW ALL MEDS BY PRESCRIBER/CLIN PHARMACIST DOCUMENTED: ICD-10-PCS | Mod: CPTII,,, | Performed by: PHYSICIAN ASSISTANT

## 2023-09-19 RX ORDER — PREDNISONE 20 MG/1
TABLET ORAL
COMMUNITY
Start: 2023-09-18 | End: 2023-09-20 | Stop reason: SDUPTHER

## 2023-09-19 NOTE — Clinical Note
Please call to schedule next saphnelo.  Pt is a hard stick.  Both Tiffany MONTANA And bacilio ESQUIVEL have had problems w access.  Please get her w someone great w bad veins. Lora Root PA-C [facility]

## 2023-09-19 NOTE — PROGRESS NOTES
Subjective:      Patient ID: Alisia Vines is a 32 y.o. female.    Chief Complaint: systemic lupus erythematosus arthritis      HPI   Alisia Vines  is a 32 y.o. female seen for follow up SLE flare. She had Rituxan dose 1 of 2 on 06/27/2023.  Following that she had a delayed infusion reaction including nausea, vomiting, loss of appetite, dizziness, severe fatigue.  Generally feeling very run down.  Later developed severe lupus flare w critically low leukopenia (following w heme) and worsening PC ratio.  Was admitted and monitored for a few days.      Currently on PDN 0-50 mg daily depending on what she has going on.  She starting to get achy joints.  Morning stiffness lasting about 30 minutes.  Main complaint is severe fatigue.  Upon discharge from the hospital was on a prednisone 60 mg daily for 2 weeks and then tapered by 10 mg every week.  We added Saphnelo.  Last visit was about a month ago.  She was doing well.  Fatigue resolved and she was not having any joint pain.    H/o Class 5 LN.  Saw Dr. Willard 7/31/23.  Has had proteinuria as high as 9 during her flare.  Last week down to less   than 1.  We had discussed renal biopsy with Nephrology.  They opted to follow closely with labs because kidney function was stable.  Next follow-up planned in 1 month.    Patient complaining of some mild edema in her bilateral lower extremities.  She says this just recently started.  Platelets have improved.  Still complaining of recurrent oral ulcerations.    Denying fevers, shortness of breath, chest pain.    Also admitted to the hospital in May 2023 w SLE flare.  Since then has been more consistent with hydroxychloroquine 400 mg once daily.      Her flare in July required pulse steroid and readmission to the hospital.  Critical platelets improved with steroid pulse.  She is been followed outpatient by Hematology since discharge.  CellCept and Saphnelo have been added.  Tolerating both.  Following pulse  Solu-Medrol she is been on a 60 mg prednisone taper.      She uses refresh drops for dry eyes.  She sees Ophthalmology here for Plaquenil monitoring.  Most recent eye exam was September 2021.  Next one scheduled in Feb.  Denies eye pain.  Having trouble getting reestablished for Plaquenil monitoring.  Requesting an internal referral.    Recently had a lymph node biopsy by ENT.  It was negative for lymphoma but did show hyperplasia.    C/o rash across face.  Planning f/u w dermatology.  Also seeing ID w recent lymph node concerns.  Recent TB test is indeterminate.  T spot was negative.    She  denies fevers, chills, photosensitivity, eye pain, shortness of breath, chest pain, hematuria, blood in the stool, raynauds, finger ulcerations, tender swollen joints, LAD, dysphagia, weakness.  Rheumatologic systems otherwise negative.    Serologies/Labs:  (+) HARPREET  (+) ds DNA  (+) SSA  (+) SSB  Current Treatment:  Plaquenil 200mg bid - not consistent, compliance an issue  Rituxan dose 1 of 2 on 6/27/2023 - loss of apetite, n/v, dizziness  Previous Treatment:   Rituxan - used for non-compliance in past  Benlysta - worsening lupus         Current Outpatient Medications:     amLODIPine (NORVASC) 5 MG tablet, TAKE 1 TABLET(5 MG) BY MOUTH EVERY DAY, Disp: 90 tablet, Rfl: 1    ARIPiprazole (ABILIFY) 2 MG Tab, TAKE 1 TABLET(2 MG) BY MOUTH EVERY DAY, Disp: 30 tablet, Rfl: 3    ciclopirox (PENLAC) 8 % Soln, Apply to nail and nail fold once daily for up to 1 year, Disp: 6.6 mL, Rfl: 6    FLUoxetine 40 MG capsule, TAKE 1 CAPSULE(40 MG) BY MOUTH EVERY DAY, Disp: 30 capsule, Rfl: 3    fluticasone propionate (FLONASE) 50 mcg/actuation nasal spray, SHAKE LIQUID AND USE 2 SPRAYS(100 MCG) IN EACH NOSTRIL EVERY DAY, Disp: 16 g, Rfl: 1    HYDROcodone-acetaminophen (NORCO) 5-325 mg per tablet, Take 1-2 tablets by mouth every 6 (six) hours as needed for Pain., Disp: 30 tablet, Rfl: 0    hydrOXYchloroQUINE (PLAQUENIL) 200 mg tablet, TAKE 2  TABLETS(400 MG) BY MOUTH EVERY DAY, Disp: 60 tablet, Rfl: 2    ketoconazole (NIZORAL) 2 % shampoo, Wash hair with medicated shampoo at least 1x/week - let sit on scalp at least 5 minutes prior to rinsing, Disp: 120 mL, Rfl: 11    mometasone (ELOCON) 0.1 % solution, AAA of scalp once daily., Disp: 60 mL, Rfl: 3    mycophenolate (CELLCEPT) 500 mg Tab, Take 1 tablet (500 mg total) by mouth 2 (two) times daily., Disp: 60 tablet, Rfl: 3    ondansetron (ZOFRAN) 4 MG tablet, Take 1 tablet (4 mg total) by mouth every 8 (eight) hours as needed for Nausea., Disp: 20 tablet, Rfl: 0    pimecrolimus (ELIDEL) 1 % cream, Apply to affected areas twice daily. Wear daily sunscreen., Disp: 60 g, Rfl: 3    predniSONE (DELTASONE) 20 MG tablet, Take 1 tablet (20 mg total) by mouth once daily., Disp: 30 tablet, Rfl: 1    triamcinolone acetonide 0.025% (KENALOG) 0.025 % Oint, AAA bid prn. Use for 2 weeks then taper. Mild steroid., Disp: 80 g, Rfl: 1    valACYclovir (VALTREX) 1000 MG tablet, Take 1 tablet (1,000 mg total) by mouth once daily., Disp: 90 tablet, Rfl: 3    Past Medical History:   Diagnosis Date    Allergic rhinitis     Anemia     Condyloma acuminata     COVID-19 virus infection 07/2021    Encounter for blood transfusion     GERD (gastroesophageal reflux disease)     Hemolytic anemia associated with systemic lupus erythematosus 4/30/2023    History of fetal anomaly in prior pregnancy, currently pregnant, unspecified trimester 03/08/2017    Pacemaker placed    HSV-2 (herpes simplex virus 2) infection     Lupus nephritis     Mood disorder     Overweight(278.02)     Sjogren's syndrome     Systemic lupus complicating pregnancy 01/31/2019    Systemic lupus erythematosus     Thrombocytopenia      Family History   Problem Relation Age of Onset    Hypertension Mother     Eczema Brother     Hypertension Maternal Grandmother     Diabetes Paternal Grandmother     Heart disease Son     Arrhythmia Son         CHB    Stroke Neg Hx     Cancer  Neg Hx      Social History     Socioeconomic History    Marital status: Single    Number of children: 2   Occupational History     Comment: [employer]   Tobacco Use    Smoking status: Never     Passive exposure: Never    Smokeless tobacco: Never   Substance and Sexual Activity    Alcohol use: No     Alcohol/week: 0.0 standard drinks of alcohol    Drug use: Yes     Types: Marijuana    Sexual activity: Not Currently     Partners: Male     Birth control/protection: None   Other Topics Concern    Are you pregnant or think you may be? No    Breast-feeding No   Social History Narrative    The patient is single and lives with her significant other.  She has 3 children.  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     Social Determinants of Health     Financial Resource Strain: Low Risk  (5/1/2023)    Overall Financial Resource Strain (CARDIA)     Difficulty of Paying Living Expenses: Not hard at all   Food Insecurity: No Food Insecurity (5/1/2023)    Hunger Vital Sign     Worried About Running Out of Food in the Last Year: Never true     Ran Out of Food in the Last Year: Never true   Transportation Needs: No Transportation Needs (5/1/2023)    PRAPARE - Transportation     Lack of Transportation (Medical): No     Lack of Transportation (Non-Medical): No   Physical Activity: Inactive (5/1/2023)    Exercise Vital Sign     Days of Exercise per Week: 0 days     Minutes of Exercise per Session: 0 min   Stress: No Stress Concern Present (5/1/2023)    [employer] [place] [employer] - Occupational Stress Questionnaire     Feeling of Stress : Not at all   Social Connections: Socially Isolated (5/1/2023)    Social Connection and Isolation Panel [NHANES]     Frequency of Communication with Friends and Family: More than three times a week     Frequency of Social Gatherings with Friends and Family: More than three times a week     Attends [employer] Services: Never     Active Member of Clubs or Organizations: No  "    Attends Club or Organization Meetings: Never     Marital Status: Never    Housing Stability: Unknown (5/1/2023)    Housing Stability Vital Sign     Unable to Pay for Housing in the Last Year: No     Unstable Housing in the Last Year: No     Review of patient's allergies indicates:  No Known Allergies    Objective:   BP (!) 141/87   Pulse 110   Ht 5' 5" (1.651 m)   Wt 87 kg (191 lb 12.8 oz)   BMI 31.92 kg/m²   Immunization History   Administered Date(s) Administered    COVID-19, MRNA, LN-S, PF (Pfizer) (Purple Cap) 12/21/2021, 01/10/2022    Influenza - High Dose - PF (65 years and older) 11/22/2013, 11/20/2018    Influenza - Quadrivalent 11/27/2015    Influenza - Quadrivalent - PF *Preferred* (6 months and older) 10/22/2017    Pneumococcal Conjugate - 13 Valent 11/27/2015    Tdap 09/08/2012, 07/31/2017, 05/01/2022       Physical Exam   Constitutional: She is oriented to person, place, and time. No distress.   HENT:   Head: Normocephalic and atraumatic.   Pulmonary/Chest: Effort normal.   Abdominal: She exhibits no distension.   Musculoskeletal:         General: No swelling or tenderness. Normal range of motion.      Cervical back: Normal range of motion.   Lymphadenopathy:     She has no cervical adenopathy.   Neurological: She is alert and oriented to person, place, and time.   Skin: Skin is warm and dry. No rash noted.   Psychiatric: Mood normal.   Nursing note and vitals reviewed.    No synovitis, no dactylitis, no enthesitis  No effusions of large or small joints  100% fist formation  Well preserved ROM      Recent Results (from the past 672 hour(s))   Comprehensive Metabolic Panel    Collection Time: 09/01/23  8:58 AM   Result Value Ref Range    Sodium 141 136 - 145 mmol/L    Potassium 4.0 3.5 - 5.1 mmol/L    Chloride 110 95 - 110 mmol/L    CO2 26 23 - 29 mmol/L    Glucose 110 70 - 110 mg/dL    BUN 11 6 - 20 mg/dL    Creatinine 0.7 0.5 - 1.4 mg/dL    Calcium 8.6 (L) 8.7 - 10.5 mg/dL    Total " Protein 5.7 (L) 6.0 - 8.4 g/dL    Albumin 2.8 (L) 3.5 - 5.2 g/dL    Total Bilirubin 0.2 0.1 - 1.0 mg/dL    Alkaline Phosphatase 48 (L) 55 - 135 U/L    AST 21 10 - 40 U/L    ALT 16 10 - 44 U/L    eGFR >60 >60 mL/min/1.73 m^2    Anion Gap 5 (L) 8 - 16 mmol/L   CBC Auto Differential    Collection Time: 09/01/23  8:58 AM   Result Value Ref Range    WBC 3.80 (L) 3.90 - 12.70 K/uL    RBC 3.47 (L) 4.00 - 5.40 M/uL    Hemoglobin 10.3 (L) 12.0 - 16.0 g/dL    Hematocrit 31.4 (L) 37.0 - 48.5 %    MCV 91 82 - 98 fL    MCH 29.7 27.0 - 31.0 pg    MCHC 32.8 32.0 - 36.0 g/dL    RDW 14.6 (H) 11.5 - 14.5 %    Platelets 344 150 - 450 K/uL    MPV 9.6 9.2 - 12.9 fL    Immature Granulocytes 0.3 0.0 - 0.5 %    Gran # (ANC) 2.8 1.8 - 7.7 K/uL    Immature Grans (Abs) 0.01 0.00 - 0.04 K/uL    Lymph # 0.7 (L) 1.0 - 4.8 K/uL    Mono # 0.3 0.3 - 1.0 K/uL    Eos # 0.0 0.0 - 0.5 K/uL    Baso # 0.01 0.00 - 0.20 K/uL    nRBC 0 0 /100 WBC    Gran % 73.1 (H) 38.0 - 73.0 %    Lymph % 17.4 (L) 18.0 - 48.0 %    Mono % 8.4 4.0 - 15.0 %    Eosinophil % 0.5 0.0 - 8.0 %    Basophil % 0.3 0.0 - 1.9 %    Differential Method Automated    Sedimentation rate    Collection Time: 09/01/23  8:58 AM   Result Value Ref Range    Sed Rate 50 (H) 0 - 36 mm/Hr   C-Reactive Protein    Collection Time: 09/01/23  8:58 AM   Result Value Ref Range    CRP 1.3 0.0 - 8.2 mg/L   Anti-DNA Ab, Double-Stranded    Collection Time: 09/01/23  8:58 AM   Result Value Ref Range    ds DNA Ab Positive (A) Negative 1:10   C4 Complement    Collection Time: 09/01/23  8:58 AM   Result Value Ref Range    Complement (C-4) 12 11 - 44 mg/dL   C3 Complement    Collection Time: 09/01/23  8:58 AM   Result Value Ref Range    Complement (C-3) 82 50 - 180 mg/dL   Ferritin    Collection Time: 09/01/23  8:58 AM   Result Value Ref Range    Ferritin 15 (L) 20.0 - 300.0 ng/mL   Iron and TIBC    Collection Time: 09/01/23  8:58 AM   Result Value Ref Range    Iron 24 (L) 30 - 160 ug/dL    Transferrin 263 200 -  375 mg/dL    TIBC 389 250 - 450 ug/dL    Saturated Iron 6 (L) 20 - 50 %   CBC Auto Differential    Collection Time: 09/01/23  8:58 AM   Result Value Ref Range    WBC 3.80 (L) 3.90 - 12.70 K/uL    RBC 3.47 (L) 4.00 - 5.40 M/uL    Hemoglobin 10.3 (L) 12.0 - 16.0 g/dL    Hematocrit 31.4 (L) 37.0 - 48.5 %    MCV 91 82 - 98 fL    MCH 29.7 27.0 - 31.0 pg    MCHC 32.8 32.0 - 36.0 g/dL    RDW 14.6 (H) 11.5 - 14.5 %    Platelets 344 150 - 450 K/uL    MPV 9.6 9.2 - 12.9 fL    Immature Granulocytes 0.3 0.0 - 0.5 %    Gran # (ANC) 2.8 1.8 - 7.7 K/uL    Immature Grans (Abs) 0.01 0.00 - 0.04 K/uL    Lymph # 0.7 (L) 1.0 - 4.8 K/uL    Mono # 0.3 0.3 - 1.0 K/uL    Eos # 0.0 0.0 - 0.5 K/uL    Baso # 0.01 0.00 - 0.20 K/uL    nRBC 0 0 /100 WBC    Gran % 73.1 (H) 38.0 - 73.0 %    Lymph % 17.4 (L) 18.0 - 48.0 %    Mono % 8.4 4.0 - 15.0 %    Eosinophil % 0.5 0.0 - 8.0 %    Basophil % 0.3 0.0 - 1.9 %    Differential Method Automated    DNA Titer    Collection Time: 09/01/23  8:58 AM   Result Value Ref Range    DNA Titer 1:80    Protein/Creatinine Ratio, Urine    Collection Time: 09/01/23  9:00 AM   Result Value Ref Range    Protein, Urine Random 72 (H) 0 - 15 mg/dL    Creatinine, Urine 75.0 15.0 - 325.0 mg/dL    Prot/Creat Ratio, Urine 0.96 (H) 0.00 - 0.20   Urinalysis Microscopic    Collection Time: 09/01/23  9:00 AM   Result Value Ref Range    RBC, UA 4 0 - 4 /hpf    WBC, UA 1 0 - 5 /hpf    Bacteria Rare None-Occ /hpf    Squam Epithel, UA 1 /hpf    Microscopic Comment SEE COMMENT    Pregnancy, urine rapid    Collection Time: 09/01/23  9:07 AM   Result Value Ref Range    Preg Test, Ur Negative    Protein/Creatinine Ratio, Urine    Collection Time: 09/12/23  2:00 PM   Result Value Ref Range    Protein, Urine Random 185 (H) 0 - 15 mg/dL    Creatinine, Urine 286.1 15.0 - 325.0 mg/dL    Prot/Creat Ratio, Urine 0.65 (H) 0.00 - 0.20   Urinalysis Microscopic    Collection Time: 09/12/23  2:00 PM   Result Value Ref Range    RBC, UA 9 (H) 0 - 4  /hpf    WBC, UA 3 0 - 5 /hpf    Bacteria Occasional None-Occ /hpf    Squam Epithel, UA 3 /hpf    Hyaline Casts, UA 1 0-1/lpf /lpf    Microscopic Comment SEE COMMENT    Comprehensive Metabolic Panel    Collection Time: 09/12/23  2:04 PM   Result Value Ref Range    Sodium 141 136 - 145 mmol/L    Potassium 3.5 3.5 - 5.1 mmol/L    Chloride 111 (H) 95 - 110 mmol/L    CO2 21 (L) 23 - 29 mmol/L    Glucose 95 70 - 110 mg/dL    BUN 15 6 - 20 mg/dL    Creatinine 0.8 0.5 - 1.4 mg/dL    Calcium 8.3 (L) 8.7 - 10.5 mg/dL    Total Protein 5.9 (L) 6.0 - 8.4 g/dL    Albumin 3.0 (L) 3.5 - 5.2 g/dL    Total Bilirubin 0.1 0.1 - 1.0 mg/dL    Alkaline Phosphatase 52 (L) 55 - 135 U/L    AST 18 10 - 40 U/L    ALT 10 10 - 44 U/L    eGFR >60 >60 mL/min/1.73 m^2    Anion Gap 9 8 - 16 mmol/L   CBC Auto Differential    Collection Time: 09/12/23  2:04 PM   Result Value Ref Range    WBC 3.40 (L) 3.90 - 12.70 K/uL    RBC 3.50 (L) 4.00 - 5.40 M/uL    Hemoglobin 10.0 (L) 12.0 - 16.0 g/dL    Hematocrit 29.7 (L) 37.0 - 48.5 %    MCV 85 82 - 98 fL    MCH 28.6 27.0 - 31.0 pg    MCHC 33.7 32.0 - 36.0 g/dL    RDW 15.0 (H) 11.5 - 14.5 %    Platelets 347 150 - 450 K/uL    MPV 9.5 9.2 - 12.9 fL    Immature Granulocytes 0.6 (H) 0.0 - 0.5 %    Gran # (ANC) 0.8 (L) 1.8 - 7.7 K/uL    Immature Grans (Abs) 0.02 0.00 - 0.04 K/uL    Lymph # 1.9 1.0 - 4.8 K/uL    Mono # 0.6 0.3 - 1.0 K/uL    Eos # 0.1 0.0 - 0.5 K/uL    Baso # 0.02 0.00 - 0.20 K/uL    nRBC 0 0 /100 WBC    Gran % 23.7 (L) 38.0 - 73.0 %    Lymph % 56.2 (H) 18.0 - 48.0 %    Mono % 16.8 (H) 4.0 - 15.0 %    Eosinophil % 2.1 0.0 - 8.0 %    Basophil % 0.6 0.0 - 1.9 %    Platelet Estimate Appears normal     Aniso Slight     Poik Slight     Hypo Occasional     Differential Method Automated    Sedimentation rate    Collection Time: 09/12/23  2:04 PM   Result Value Ref Range    Sed Rate 58 (H) 0 - 20 mm/Hr   C-Reactive Protein    Collection Time: 09/12/23  2:04 PM   Result Value Ref Range    CRP 0.6 0.0 - 8.2  mg/L   Anti-DNA Ab, Double-Stranded    Collection Time: 09/12/23  2:04 PM   Result Value Ref Range    ds DNA Ab Positive (A) Negative 1:10   C4 Complement    Collection Time: 09/12/23  2:04 PM   Result Value Ref Range    Complement (C-4) 14 11 - 44 mg/dL   C3 Complement    Collection Time: 09/12/23  2:04 PM   Result Value Ref Range    Complement (C-3) 97 50 - 180 mg/dL   Protein Electrophoresis, Serum    Collection Time: 09/12/23  2:04 PM   Result Value Ref Range    Protein, Serum 5.4 (L) 6.0 - 8.4 g/dL    Albumin 3.16 (L) 3.35 - 5.55 g/dL    Alpha-1 0.26 0.17 - 0.41 g/dL    Alpha-2 0.66 0.43 - 0.99 g/dL    Beta 0.65 0.50 - 1.10 g/dL    Gamma 0.66 (L) 0.67 - 1.58 g/dL   Immunofixation Electrophoresis    Collection Time: 09/12/23  2:04 PM   Result Value Ref Range    Immunofix Interp. SEE COMMENT    Pathologist Interpretation OMAIRA    Collection Time: 09/12/23  2:04 PM   Result Value Ref Range    Pathologist Interpretation OMAIRA REVIEWED    Pathologist Interpretation SPE    Collection Time: 09/12/23  2:04 PM   Result Value Ref Range    Pathologist Interpretation SPE REVIEWED    DNA Titer    Collection Time: 09/12/23  2:04 PM   Result Value Ref Range    DNA Titer 1:80          Lab Results   Component Value Date    TBGOLDPLUS Indeterminate (A) 05/02/2023      Lab Results   Component Value Date    HEPAIGM Non-reactive 05/08/2023    HEPBIGM Grayzone (A) 05/08/2023    HEPBCAB Non-reactive 06/08/2023    HEPCAB Non-reactive 05/08/2023        Assessment:     1. Systemic lupus erythematosus arthritis    2. SLE glomerulonephritis syndrome, WHO class V    3. High risk medication use    4. Immunocompromised state due to drug therapy    5. Medication monitoring encounter    6. Sjogren's syndrome, with unspecified organ involvement    7. Lymphoid hyperplasia    8. Long-term use of Plaquenil                Plan:     Alisia was seen today for systemic lupus erythematosus arthritis.    Diagnoses and all orders for this  visit:    Systemic lupus erythematosus arthritis  -     predniSONE (DELTASONE) 20 MG tablet; Take 1 tablet (20 mg total) by mouth once daily.    SLE glomerulonephritis syndrome, WHO class V  -     predniSONE (DELTASONE) 20 MG tablet; Take 1 tablet (20 mg total) by mouth once daily.    High risk medication use    Immunocompromised state due to drug therapy    Medication monitoring encounter    Sjogren's syndrome, with unspecified organ involvement    Lymphoid hyperplasia    Long-term use of Plaquenil            SLE w worsening sxs  Labs reviewed  CMP/  CBC   Leukopenia ANC 0.8  no thrombocytopenia  ESR remains elevated  Check SPEP/OMAIRA next labs  Consider CT chest/abd/pelvis  CRP normalized  +dsDNA  C4 low, c3 wnl  C/w  mg daily  Due for ophtho exam for monitoring  About 10yrs of Plaquenil therapy -  on again, off again history  Discussed potential for retinal toxicity  HOLD cellcept for now d/t leukopenia  Repeat CBC in 1 week  If ANC > 1.3 will resume at once daily dosing  Continue saphnelo  Resume PDN 20 mg daily   Hep B viral DNA negative  Overlapping SS  Recent lymph node excision shows hyperplasia but negative for malignancy such as lymphoma  SPEP, IEP, cryoglobulins reviewed  Low Albumin  O/w wnl  Discussed red flag symptoms with patient  Continue refresh drops p.r.n  Add pilocarpine 5 mg tid  Drug therapy requiring intensive monitoring for toxicity  High Risk Medication Monitoring encounter  No current medication related issues, no evidence of toxicity  I ordered labs for toxicity monitoring, have personally reviewed the findings, and discussed them with the patient.  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  Monitor carefully for infections.  Advised patient to get immediate medical care if any infection arises.  Also advised strict adherence age-appropriate vaccinations and cancer screenings with  PCP.  Patient advised to hold DMARD and/or biologic therapy for signs of infection or for surgery. If you are unsure what to do please call our office for instruction.[facility] [phone]  Return to clinic: 1-2 mos w sle labs prior    Case discussed with Dr Blair. Assessment and Plan done in collaboration.  Patient notified via the patient portal regarding CellCept.  Also attempted to call the patient and left her a voicemail.    No follow-ups on file.    The patient understands, chooses and consents to this plan and accepts all the risks which include but are not limited to the risks mentioned above.     Disclaimer: This note was prepared using a voice recognition system and is likely to have sound alike errors within the text.

## 2023-09-20 ENCOUNTER — PATIENT MESSAGE (OUTPATIENT)
Dept: RHEUMATOLOGY | Facility: CLINIC | Age: 33
End: 2023-09-20
Payer: MEDICAID

## 2023-09-20 ENCOUNTER — TELEPHONE (OUTPATIENT)
Dept: RHEUMATOLOGY | Facility: CLINIC | Age: 33
End: 2023-09-20
Payer: MEDICAID

## 2023-09-20 RX ORDER — PREDNISONE 20 MG/1
20 TABLET ORAL DAILY
Qty: 30 TABLET | Refills: 1 | Status: SHIPPED | OUTPATIENT
Start: 2023-09-20 | End: 2023-12-15

## 2023-09-20 NOTE — TELEPHONE ENCOUNTER
----- Message from Arlette Pitts sent at 9/20/2023  8:41 AM CDT -----  Contact: Alisia  Type:  Patient Returning Call    Who Called:Alisia  Who Left Message for Patient:nurse  Does the patient know what this is regarding?:yes  Would the patient rather a call back or a response via [facility]? Call back  Best Call Back Number:[phone]  Additional Information: pt returning call          Thanks  AMBER

## 2023-09-20 NOTE — TELEPHONE ENCOUNTER
----- Message from Lora Root PA-C sent at 9/20/2023  1:02 PM CDT -----  Contact: Alisia    ----- Message -----  From: Arlette Pitts  Sent: 9/20/2023   8:42 AM CDT  To: Dk [facility] Staff    Type:  Patient Returning Call    Who Called:Alisia  Who Left Message for Patient:nurse  Does the patient know what this is regarding?:yes  Would the patient rather a call back or a response via [facility]? Call back  Best Call Back Number:[phone]  Additional Information: pt returning call          Thanks  AMBER

## 2023-09-27 ENCOUNTER — INFUSION (OUTPATIENT)
Dept: INFUSION THERAPY | Facility: HOSPITAL | Age: 33
End: 2023-09-27
Attending: PHYSICIAN ASSISTANT
Payer: MEDICAID

## 2023-09-27 VITALS
BODY MASS INDEX: 31.18 KG/M2 | RESPIRATION RATE: 16 BRPM | WEIGHT: 187.38 LBS | HEART RATE: 93 BPM | SYSTOLIC BLOOD PRESSURE: 129 MMHG | TEMPERATURE: 98 F | DIASTOLIC BLOOD PRESSURE: 80 MMHG | OXYGEN SATURATION: 100 %

## 2023-09-27 DIAGNOSIS — M32.9 SLE (SYSTEMIC LUPUS ERYTHEMATOSUS RELATED SYNDROME): Primary | ICD-10-CM

## 2023-09-27 PROCEDURE — 63600175 PHARM REV CODE 636 W HCPCS: Mod: JZ,TB | Performed by: PHYSICIAN ASSISTANT

## 2023-09-27 PROCEDURE — 96365 THER/PROPH/DIAG IV INF INIT: CPT

## 2023-09-27 PROCEDURE — 25000003 PHARM REV CODE 250: Performed by: PHYSICIAN ASSISTANT

## 2023-09-27 RX ORDER — DIPHENHYDRAMINE HYDROCHLORIDE 50 MG/ML
50 INJECTION INTRAMUSCULAR; INTRAVENOUS ONCE AS NEEDED
Status: CANCELLED | OUTPATIENT
Start: 2023-10-25

## 2023-09-27 RX ORDER — EPINEPHRINE 0.3 MG/.3ML
0.3 INJECTION SUBCUTANEOUS ONCE AS NEEDED
Status: CANCELLED | OUTPATIENT
Start: 2023-10-25

## 2023-09-27 RX ORDER — SODIUM CHLORIDE 0.9 % (FLUSH) 0.9 %
10 SYRINGE (ML) INJECTION
Status: CANCELLED | OUTPATIENT
Start: 2023-10-25

## 2023-09-27 RX ORDER — HEPARIN 100 UNIT/ML
500 SYRINGE INTRAVENOUS
Status: CANCELLED | OUTPATIENT
Start: 2023-10-25

## 2023-09-27 RX ADMIN — ANIFROLUMAB 300 MG: 300 INJECTION, SOLUTION INTRAVENOUS at 02:09

## 2023-09-27 NOTE — PLAN OF CARE
Plan of care reviewed with pt. All needs and concerns addressed.  Problem: Adult Inpatient Plan of Care  Goal: Plan of Care Review  9/27/2023 1519 by Rebekah Grijalva RN  Outcome: Ongoing, Progressing  Flowsheets (Taken 9/27/2023 1519)  Plan of Care Reviewed With: patient  9/27/2023 1519 by Rebekah Grijalva RN  Outcome: Ongoing, Progressing  Goal: Patient-Specific Goal (Individualized)  9/27/2023 1519 by Rebekah Grijalva RN  Outcome: Ongoing, Progressing  Flowsheets (Taken 9/27/2023 1519)  Anxieties, Fears or Concerns: None expressed today  Individualized Care Needs: In recliner with feet elevated and warm blanket.  9/27/2023 1519 by Rebekah Grijalva RN  Outcome: Ongoing, Progressing  Goal: Absence of Hospital-Acquired Illness or Injury  9/27/2023 1519 by Rebekah Grijalva RN  Outcome: Ongoing, Progressing  9/27/2023 1519 by Rebekah Grijalva RN  Outcome: Ongoing, Progressing  Intervention: Identify and Manage Fall Risk  Flowsheets (Taken 9/27/2023 1519)  Safety Promotion/Fall Prevention:   assistive device/personal item within reach   in recliner, wheels locked   room near unit station  Intervention: Prevent Infection  Flowsheets (Taken 9/27/2023 1519)  Infection Prevention:   environmental surveillance performed   hand hygiene promoted   personal protective equipment utilized  Goal: Optimal Comfort and Wellbeing  9/27/2023 1519 by Rebekah Grijalva RN  Outcome: Ongoing, Progressing  9/27/2023 1519 by Rebekah Grijalva RN  Outcome: Ongoing, Progressing  Intervention: Provide Person-Centered Care  Flowsheets (Taken 9/27/2023 1519)  Trust Relationship/Rapport:   care explained   empathic listening provided   choices provided   questions answered   emotional support provided   questions encouraged   thoughts/feelings acknowledged   reassurance provided     Problem: Infection  Goal: Absence of Infection Signs and Symptoms  9/27/2023 1519 by Rebekah Grijalva RN  Outcome: Ongoing,  Progressing  9/27/2023 1519 by Rebekah Grijalva, RN  Outcome: Ongoing, Progressing  Intervention: Prevent or Manage Infection  Flowsheets (Taken 9/27/2023 1519)  Infection Management: aseptic technique maintained

## 2023-10-03 ENCOUNTER — TELEPHONE (OUTPATIENT)
Dept: RHEUMATOLOGY | Facility: CLINIC | Age: 33
End: 2023-10-03
Payer: MEDICAID

## 2023-10-03 NOTE — TELEPHONE ENCOUNTER
----- Message from Lilibeth Montez sent at 10/3/2023  6:48 AM CDT -----  Regarding: Medical Advice  Contact: Alisia  .Type:  Needs Medical Advice     Who Called: Alisia   Symptoms (please be specific):    How long has patient had these symptoms:    Pharmacy name and phone #:    Would the patient rather a call back or a response via [facility]? call  Best Call Back Number: [phone] (home)    Additional Information: Alisia is asking for advice for an over the counter medication for body aches.

## 2023-10-03 NOTE — TELEPHONE ENCOUNTER
----- Message from Lilibeth Montez sent at 10/3/2023  6:48 AM CDT -----  Regarding: Medical Advice  Contact: Alisia  .Type:  Needs Medical Advice    Who Called: Alisia   Symptoms (please be specific):    How long has patient had these symptoms:    Pharmacy name and phone #:    Would the patient rather a call back or a response via [facility]? call  Best Call Back Number: [phone] (home)    Additional Information: Alisia is asking for advice for an over the counter medication for body aches.

## 2023-10-09 ENCOUNTER — TELEPHONE (OUTPATIENT)
Dept: RHEUMATOLOGY | Facility: CLINIC | Age: 33
End: 2023-10-09
Payer: MEDICAID

## 2023-10-09 NOTE — TELEPHONE ENCOUNTER
Spoke to patient.  She has been in pain since Oct 2 with joint pain. Feet and hands are very painful. Pain is all the time.  She has taking Naproxen.

## 2023-10-09 NOTE — TELEPHONE ENCOUNTER
----- Message from Diamone Speed sent at 10/9/2023  1:54 PM CDT -----  Regarding: self [phone]  Type: Patient Call Back       Who called: self        What is the request in detail: pt stated she wants to know if she can come in for a prescription  for the pain no further information was given        Can the clinic reply by [facility]? Yes       Would the patient rather a call back or a response via [facility]? Call back       Best call back number: [phone]

## 2023-10-09 NOTE — TELEPHONE ENCOUNTER
Spoke with pt and she states that she spoke with Marvin earlier today and was advised she would check with john and let her know. States that she has been having severe joint pain since the 2nd, has taken naproxen and aleve and neither has helped and some things she is not able to take. Would like to know what else to do.       Also asked if she does not hear back if she should just go to the ED, advised patient if she is in that severe of pain she should go for an evaluation, pt states that she is and doesn't know how much more she can tolerate as is since it's been a week already.

## 2023-10-09 NOTE — TELEPHONE ENCOUNTER
----- Message from Douglas Baker sent at 10/9/2023  7:22 AM CDT -----  Contact: [phone]  Type:  Sooner Apoointment Request    Caller is requesting a sooner appointment.  Caller declined first available appointment listed below.  Caller will not accept being placed on the waitlist and is requesting a message be sent to doctor.  Name of Caller:Alisia   When is the first available appointment?12/14/2023  Symptoms:check up   Would the patient rather a call back or a response via [facility]? Call back   Best Call Back Number:[phone]  Additional Information:

## 2023-10-09 NOTE — TELEPHONE ENCOUNTER
Taking Prednisone 20 mg, Naproxen and plaquenil 200 mg 2 daily.      C/o pain all over, hand feet Pain scale 10-12. Pain is a constant throb and activity doesn't change pain

## 2023-10-10 ENCOUNTER — OFFICE VISIT (OUTPATIENT)
Dept: RHEUMATOLOGY | Facility: CLINIC | Age: 33
End: 2023-10-10
Payer: MEDICAID

## 2023-10-10 ENCOUNTER — HOSPITAL ENCOUNTER (OUTPATIENT)
Dept: RADIOLOGY | Facility: HOSPITAL | Age: 33
Discharge: HOME | End: 2023-10-10
Attending: PHYSICIAN ASSISTANT
Payer: MEDICAID

## 2023-10-10 ENCOUNTER — TELEPHONE (OUTPATIENT)
Dept: RHEUMATOLOGY | Facility: CLINIC | Age: 33
End: 2023-10-10
Payer: MEDICAID

## 2023-10-10 VITALS
HEART RATE: 79 BPM | BODY MASS INDEX: 33.43 KG/M2 | WEIGHT: 200.63 LBS | SYSTOLIC BLOOD PRESSURE: 131 MMHG | DIASTOLIC BLOOD PRESSURE: 89 MMHG | HEIGHT: 65 IN

## 2023-10-10 DIAGNOSIS — Z79.899 HIGH RISK MEDICATION USE: ICD-10-CM

## 2023-10-10 DIAGNOSIS — M35.00 SJOGREN'S SYNDROME, WITH UNSPECIFIED ORGAN INVOLVEMENT: ICD-10-CM

## 2023-10-10 DIAGNOSIS — M32.14 SLE GLOMERULONEPHRITIS SYNDROME, WHO CLASS V: ICD-10-CM

## 2023-10-10 DIAGNOSIS — Z51.81 MEDICATION MONITORING ENCOUNTER: ICD-10-CM

## 2023-10-10 DIAGNOSIS — Z79.899 IMMUNOCOMPROMISED STATE DUE TO DRUG THERAPY: ICD-10-CM

## 2023-10-10 DIAGNOSIS — M32.9 SYSTEMIC LUPUS ERYTHEMATOSUS ARTHRITIS: Primary | ICD-10-CM

## 2023-10-10 DIAGNOSIS — M32.9 SYSTEMIC LUPUS ERYTHEMATOSUS ARTHRITIS: ICD-10-CM

## 2023-10-10 DIAGNOSIS — D84.821 IMMUNOCOMPROMISED STATE DUE TO DRUG THERAPY: ICD-10-CM

## 2023-10-10 PROCEDURE — 99999PBSHW PR PBB SHADOW TECHNICAL ONLY FILED TO HB: Mod: PBBFAC,,,

## 2023-10-10 PROCEDURE — 1159F PR MEDICATION LIST DOCUMENTED IN MEDICAL RECORD: ICD-10-PCS | Mod: CPTII,,, | Performed by: PHYSICIAN ASSISTANT

## 2023-10-10 PROCEDURE — 99215 PR OFFICE/OUTPT VISIT, EST, LEVL V, 40-54 MIN: ICD-10-PCS | Mod: S$PBB,,, | Performed by: PHYSICIAN ASSISTANT

## 2023-10-10 PROCEDURE — 99999 PR PBB SHADOW E&M-EST. PATIENT-LVL IV: ICD-10-PCS | Mod: PBBFAC,,, | Performed by: PHYSICIAN ASSISTANT

## 2023-10-10 PROCEDURE — 73130 X-RAY EXAM OF HAND: CPT | Mod: TC,50

## 2023-10-10 PROCEDURE — 99214 OFFICE O/P EST MOD 30 MIN: CPT | Mod: 25,PBBFAC | Performed by: PHYSICIAN ASSISTANT

## 2023-10-10 PROCEDURE — 3066F NEPHROPATHY DOC TX: CPT | Mod: CPTII,,, | Performed by: PHYSICIAN ASSISTANT

## 2023-10-10 PROCEDURE — 99999PBSHW PR PBB SHADOW TECHNICAL ONLY FILED TO HB: ICD-10-PCS | Mod: PBBFAC,,,

## 2023-10-10 PROCEDURE — 3075F SYST BP GE 130 - 139MM HG: CPT | Mod: CPTII,,, | Performed by: PHYSICIAN ASSISTANT

## 2023-10-10 PROCEDURE — 3075F PR MOST RECENT SYSTOLIC BLOOD PRESS GE 130-139MM HG: ICD-10-PCS | Mod: CPTII,,, | Performed by: PHYSICIAN ASSISTANT

## 2023-10-10 PROCEDURE — 73630 X-RAY EXAM OF FOOT: CPT | Mod: TC,50

## 2023-10-10 PROCEDURE — 3066F PR DOCUMENTATION OF TREATMENT FOR NEPHROPATHY: ICD-10-PCS | Mod: CPTII,,, | Performed by: PHYSICIAN ASSISTANT

## 2023-10-10 PROCEDURE — 73130 X-RAY EXAM OF HAND: CPT | Mod: 26,50,, | Performed by: RADIOLOGY

## 2023-10-10 PROCEDURE — 3008F PR BODY MASS INDEX (BMI) DOCUMENTED: ICD-10-PCS | Mod: CPTII,,, | Performed by: PHYSICIAN ASSISTANT

## 2023-10-10 PROCEDURE — 73630 X-RAY EXAM OF FOOT: CPT | Mod: 26,50,, | Performed by: RADIOLOGY

## 2023-10-10 PROCEDURE — 99999 PR PBB SHADOW E&M-EST. PATIENT-LVL IV: CPT | Mod: PBBFAC,,, | Performed by: PHYSICIAN ASSISTANT

## 2023-10-10 PROCEDURE — 1159F MED LIST DOCD IN RCRD: CPT | Mod: CPTII,,, | Performed by: PHYSICIAN ASSISTANT

## 2023-10-10 PROCEDURE — 3079F PR MOST RECENT DIASTOLIC BLOOD PRESSURE 80-89 MM HG: ICD-10-PCS | Mod: CPTII,,, | Performed by: PHYSICIAN ASSISTANT

## 2023-10-10 PROCEDURE — 3008F BODY MASS INDEX DOCD: CPT | Mod: CPTII,,, | Performed by: PHYSICIAN ASSISTANT

## 2023-10-10 PROCEDURE — 96372 THER/PROPH/DIAG INJ SC/IM: CPT | Mod: PBBFAC

## 2023-10-10 PROCEDURE — 73630 XR FOOT COMPLETE 3 VIEW BILATERAL: ICD-10-PCS | Mod: 26,50,, | Performed by: RADIOLOGY

## 2023-10-10 PROCEDURE — 73130 XR HAND COMPLETE 3 VIEWS BILATERAL: ICD-10-PCS | Mod: 26,50,, | Performed by: RADIOLOGY

## 2023-10-10 PROCEDURE — 99215 OFFICE O/P EST HI 40 MIN: CPT | Mod: S$PBB,,, | Performed by: PHYSICIAN ASSISTANT

## 2023-10-10 PROCEDURE — 3079F DIAST BP 80-89 MM HG: CPT | Mod: CPTII,,, | Performed by: PHYSICIAN ASSISTANT

## 2023-10-10 RX ORDER — KETOROLAC TROMETHAMINE 30 MG/ML
30 INJECTION, SOLUTION INTRAMUSCULAR; INTRAVENOUS ONCE
Status: COMPLETED | OUTPATIENT
Start: 2023-10-10 | End: 2023-10-10

## 2023-10-10 RX ORDER — BETAMETHASONE SODIUM PHOSPHATE AND BETAMETHASONE ACETATE 3; 3 MG/ML; MG/ML
6 INJECTION, SUSPENSION INTRA-ARTICULAR; INTRALESIONAL; INTRAMUSCULAR; SOFT TISSUE
Status: COMPLETED | OUTPATIENT
Start: 2023-10-10 | End: 2023-10-10

## 2023-10-10 RX ADMIN — KETOROLAC TROMETHAMINE 30 MG: 60 INJECTION, SOLUTION INTRAMUSCULAR at 01:10

## 2023-10-10 RX ADMIN — BETAMETHASONE ACETATE AND BETAMETHASONE SODIUM PHOSPHATE 6 MG: 3; 3 INJECTION, SUSPENSION INTRA-ARTICULAR; INTRALESIONAL; INTRAMUSCULAR; SOFT TISSUE at 01:10

## 2023-10-10 NOTE — TELEPHONE ENCOUNTER
----- Message from Douglas Baker sent at 10/10/2023  7:08 AM CDT -----  Contact: self [phone]  Pt is calling regarding needing an appt . Please call back today at [phone] . Thanksdj

## 2023-10-10 NOTE — PROGRESS NOTES
Administered 1 cc Betamethasone 6mg/cc  to right ventrogluteal. Pt tolerated well. No acute reaction noted to site. Pt instructed on S/S to report. Advised patient to wait in lobby 15 minutes after receiving injection to monitor for any reactions. Pt verbalized understanding.     Lot: [id number]  Exp: 10/24/2024              Side Effects:  appetite changes, glucose intolerance, insomnia, diaphoresis (sweating), elevated blood pressure, ecchymosis (bruising), rash, headache, injection site reaction/pain, anaphylaxis.     Administered 1 cc  Toradol 30mg/cc  to left ventrogluteal. Pt tolerated well. No acute reaction noted to site. Pt instructed on S/S to report. Advised patient to wait in lobby 15 minutes after receiving injection to monitor for any reactions. Pt verbalized understanding.     Lot: [id number]  Exp: 12/30/2024        Side effects: anaphylaxis, diaphoresis (sweating), injection site reaction/pain, headache, hypertension, ecchymosis (bruising), constipation, abdominal pain.

## 2023-10-10 NOTE — PROGRESS NOTES
Subjective:      Patient ID: Alisia Vines is a 33 y.o. female.    Chief Complaint: Lupus      HPI   Alisia Vines  is a 33 y.o. female seen for follow up SLE flare. She has had significantly worsening joint pain, swelling and stiffness.  Worst joints include wrists, ankles and feet.  Currently taking PDN 20 mg daily,  mg daily and Saphenlo q 4 wks.  Cellcept on hold with recent Leukopenia.  Also taking Tylenol 650 every 8 hours prn.  No relief of pain or stiffness.    She had Rituxan dose 1 of 2 on 06/27/2023.  Following that she had a delayed infusion reaction including nausea, vomiting, loss of appetite, dizziness, severe fatigue.  Generally feeling very run down.  Later developed severe lupus flare w critically low leukopenia (following w heme) and worsening PC ratio.  Was admitted and monitored for a few days.       AM stiffness now lasting all day.  Also w worsening c/o fatigue.  Upon discharge from the hospital was on a prednisone 60 mg daily for 2 weeks and then tapered by 10 mg every week.  We added Saphnelo.  Last visit was about a month ago.  She was doing failry well, but starting to have more achiness in her joints.    H/o Class 5 LN.  Saw Dr. Willard 7/31/23.  Has had proteinuria as high as 9 during her flare.  Last mos PC ratio 0.65.  PC Ratio today is pending.  We had discussed renal biopsy with Nephrology.  They opted to follow closely with labs because kidney function was stable.  Next follow-up planned 10/26/23.    Platelets, C3, C4, wnl.  No leukopenia today.  Denies oral/nasal ulcerations, fevers, shortness of breath, chest pain.     She has overlapping SS and uses refresh drops for dry eyes.  She sees Ophthalmology here for Plaquenil monitoring.  Most recent eye exam was September 2021.  Next one scheduled in Feb.  Denies eye pain.  Having trouble getting reestablished for Plaquenil monitoring.  Requesting an internal referral.    Recently had a lymph node biopsy by  ENT.  It was negative for lymphoma but did show hyperplasia.    C/o rash across face.  Planning f/u w dermatology.  Also seeing ID w recent lymph node concerns.  Recent TB test is indeterminate.  T spot was negative.    She  denies fevers, chills, photosensitivity, eye pain, shortness of breath, chest pain, hematuria, blood in the stool, raynauds, finger ulcerations, tender swollen joints, LAD, dysphagia, weakness.  Rheumatologic systems otherwise negative.    Serologies/Labs:  (+) HARPREET  (+) ds DNA  (+) SSA  (+) SSB  Current Treatment:  Plaquenil 200mg bid - not consistent, compliance an issue  Rituxan dose 1 of 2 on 6/27/2023 - loss of apetite, n/v, dizziness  Previous Treatment:   Rituxan - used for non-compliance in past  Benlysta - worsening lupus         Current Outpatient Medications:     amLODIPine (NORVASC) 5 MG tablet, TAKE 1 TABLET(5 MG) BY MOUTH EVERY DAY, Disp: 90 tablet, Rfl: 1    ARIPiprazole (ABILIFY) 2 MG Tab, TAKE 1 TABLET(2 MG) BY MOUTH EVERY DAY, Disp: 30 tablet, Rfl: 3    ciclopirox (PENLAC) 8 % Soln, Apply to nail and nail fold once daily for up to 1 year, Disp: 6.6 mL, Rfl: 6    FLUoxetine 40 MG capsule, TAKE 1 CAPSULE(40 MG) BY MOUTH EVERY DAY, Disp: 30 capsule, Rfl: 3    fluticasone propionate (FLONASE) 50 mcg/actuation nasal spray, SHAKE LIQUID AND USE 2 SPRAYS(100 MCG) IN EACH NOSTRIL EVERY DAY, Disp: 16 g, Rfl: 1    HYDROcodone-acetaminophen (NORCO) 5-325 mg per tablet, Take 1-2 tablets by mouth every 6 (six) hours as needed for Pain., Disp: 30 tablet, Rfl: 0    hydrOXYchloroQUINE (PLAQUENIL) 200 mg tablet, TAKE 2 TABLETS(400 MG) BY MOUTH EVERY DAY, Disp: 60 tablet, Rfl: 2    ketoconazole (NIZORAL) 2 % shampoo, Wash hair with medicated shampoo at least 1x/week - let sit on scalp at least 5 minutes prior to rinsing, Disp: 120 mL, Rfl: 11    mometasone (ELOCON) 0.1 % solution, AAA of scalp once daily., Disp: 60 mL, Rfl: 3    mycophenolate (CELLCEPT) 500 mg Tab, Take 1 tablet (500 mg total) by  mouth 2 (two) times daily., Disp: 60 tablet, Rfl: 3    ondansetron (ZOFRAN) 4 MG tablet, Take 1 tablet (4 mg total) by mouth every 8 (eight) hours as needed for Nausea., Disp: 20 tablet, Rfl: 0    pimecrolimus (ELIDEL) 1 % cream, Apply to affected areas twice daily. Wear daily sunscreen., Disp: 60 g, Rfl: 3    predniSONE (DELTASONE) 20 MG tablet, Take 1 tablet (20 mg total) by mouth once daily., Disp: 30 tablet, Rfl: 1    triamcinolone acetonide 0.025% (KENALOG) 0.025 % Oint, AAA bid prn. Use for 2 weeks then taper. Mild steroid., Disp: 80 g, Rfl: 1    valACYclovir (VALTREX) 1000 MG tablet, Take 1 tablet (1,000 mg total) by mouth once daily., Disp: 90 tablet, Rfl: 3  No current facility-administered medications for this visit.    Past Medical History:   Diagnosis Date    Allergic rhinitis     Anemia     Condyloma acuminata     COVID-19 virus infection 07/2021    Encounter for blood transfusion     GERD (gastroesophageal reflux disease)     Hemolytic anemia associated with systemic lupus erythematosus 4/30/2023    History of fetal anomaly in prior pregnancy, currently pregnant, unspecified trimester 03/08/2017    Pacemaker placed    HSV-2 (herpes simplex virus 2) infection     Lupus nephritis     Mood disorder     Overweight(278.02)     Sjogren's syndrome     Systemic lupus complicating pregnancy 01/31/2019    Systemic lupus erythematosus     Thrombocytopenia      Family History   Problem Relation Age of Onset    Hypertension Mother     Eczema Brother     Hypertension Maternal Grandmother     Diabetes Paternal Grandmother     Heart disease Son     Arrhythmia Son         CHB    Stroke Neg Hx     Cancer Neg Hx      Social History     Socioeconomic History    Marital status: Single    Number of children: 2   Occupational History     Comment: [employer]   Tobacco Use    Smoking status: Never     Passive exposure: Never    Smokeless tobacco: Never   Substance and Sexual Activity    Alcohol use: No      Alcohol/week: 0.0 standard drinks of alcohol    Drug use: Yes     Types: Marijuana    Sexual activity: Not Currently     Partners: Male     Birth control/protection: None   Other Topics Concern    Are you pregnant or think you may be? No    Breast-feeding No   Social History Narrative    The patient is single and lives with her significant other.  She has 3 children.  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     Social Determinants of Health     Financial Resource Strain: Low Risk  (5/1/2023)    Overall Financial Resource Strain (CARDIA)     Difficulty of Paying Living Expenses: Not hard at all   Food Insecurity: No Food Insecurity (5/1/2023)    Hunger Vital Sign     Worried About Running Out of Food in the Last Year: Never true     Ran Out of Food in the Last Year: Never true   Transportation Needs: No Transportation Needs (5/1/2023)    PRAPARE - Transportation     Lack of Transportation (Medical): No     Lack of Transportation (Non-Medical): No   Physical Activity: Inactive (5/1/2023)    Exercise Vital Sign     Days of Exercise per Week: 0 days     Minutes of Exercise per Session: 0 min   Stress: No Stress Concern Present (5/1/2023)    [employer] [employer] - Occupational Stress Questionnaire     Feeling of Stress : Not at all   Social Connections: Socially Isolated (5/1/2023)    Social Connection and Isolation Panel [NHANES]     Frequency of Communication with Friends and Family: More than three times a week     Frequency of Social Gatherings with Friends and Family: More than three times a week     Attends Mandaen Services: Never     Active Member of Clubs or Organizations: No     Attends Club or Organization Meetings: Never     Marital Status: Never    Housing Stability: Unknown (5/1/2023)    Housing Stability Vital Sign     Unable to Pay for Housing in the Last Year: No     Unstable Housing in the Last Year: No     Review of patient's allergies indicates:  No Known  "Allergies    Objective:   /89   Pulse 79   Ht 5' 5" (1.651 m)   Wt 91 kg (200 lb 9.9 oz)   LMP 09/30/2023 (Approximate)   BMI 33.38 kg/m²   Immunization History   Administered Date(s) Administered    COVID-19, MRNA, LN-S, PF (Pfizer) (Purple Cap) 12/21/2021, 01/10/2022    Influenza - High Dose - PF (65 years and older) 11/22/2013, 11/20/2018    Influenza - Quadrivalent 11/27/2015    Influenza - Quadrivalent - PF *Preferred* (6 months and older) 10/22/2017    Pneumococcal Conjugate - 13 Valent 11/27/2015    Tdap 09/08/2012, 07/31/2017, 05/01/2022       Physical Exam   Constitutional: She is oriented to person, place, and time. No distress.   HENT:   Head: Normocephalic and atraumatic.   Pulmonary/Chest: Effort normal.   Abdominal: She exhibits no distension.   Musculoskeletal:         General: No swelling or tenderness. Normal range of motion.      Cervical back: Normal range of motion.   Lymphadenopathy:     She has no cervical adenopathy.   Neurological: She is alert and oriented to person, place, and time.   Skin: Skin is warm and dry. No rash noted.   Psychiatric: Mood normal.   Nursing note and vitals reviewed.    Synovitis b/l wrists, b/l ankles  No dactylitis, no enthesitis  Stiffness w fist formation      Recent Results (from the past 672 hour(s))   Comprehensive Metabolic Panel    Collection Time: 09/27/23  1:57 PM   Result Value Ref Range    Sodium 141 136 - 145 mmol/L    Potassium 4.2 3.5 - 5.1 mmol/L    Chloride 108 95 - 110 mmol/L    CO2 24 23 - 29 mmol/L    Glucose 89 70 - 110 mg/dL    BUN 9 6 - 20 mg/dL    Creatinine 0.7 0.5 - 1.4 mg/dL    Calcium 9.6 8.7 - 10.5 mg/dL    Total Protein 6.6 6.0 - 8.4 g/dL    Albumin 3.3 (L) 3.5 - 5.2 g/dL    Total Bilirubin 0.2 0.1 - 1.0 mg/dL    Alkaline Phosphatase 59 55 - 135 U/L    AST 20 10 - 40 U/L    ALT 14 10 - 44 U/L    eGFR >60 >60 mL/min/1.73 m^2    Anion Gap 9 8 - 16 mmol/L   CBC Auto Differential    Collection Time: 09/27/23  1:57 PM   Result " Value Ref Range    WBC 4.18 3.90 - 12.70 K/uL    RBC 4.08 4.00 - 5.40 M/uL    Hemoglobin 11.1 (L) 12.0 - 16.0 g/dL    Hematocrit 33.4 (L) 37.0 - 48.5 %    MCV 82 82 - 98 fL    MCH 27.2 27.0 - 31.0 pg    MCHC 33.2 32.0 - 36.0 g/dL    RDW 15.3 (H) 11.5 - 14.5 %    Platelets 377 150 - 450 K/uL    MPV 10.3 9.2 - 12.9 fL    Immature Granulocytes 0.2 0.0 - 0.5 %    Gran # (ANC) 2.2 1.8 - 7.7 K/uL    Immature Grans (Abs) 0.01 0.00 - 0.04 K/uL    Lymph # 1.4 1.0 - 4.8 K/uL    Mono # 0.4 0.3 - 1.0 K/uL    Eos # 0.2 0.0 - 0.5 K/uL    Baso # 0.02 0.00 - 0.20 K/uL    nRBC 0 0 /100 WBC    Gran % 53.7 38.0 - 73.0 %    Lymph % 32.5 18.0 - 48.0 %    Mono % 9.3 4.0 - 15.0 %    Eosinophil % 3.8 0.0 - 8.0 %    Basophil % 0.5 0.0 - 1.9 %    Differential Method Automated    Comprehensive Metabolic Panel    Collection Time: 10/10/23 11:27 AM   Result Value Ref Range    Sodium 141 136 - 145 mmol/L    Potassium 3.7 3.5 - 5.1 mmol/L    Chloride 110 95 - 110 mmol/L    CO2 23 23 - 29 mmol/L    Glucose 89 70 - 110 mg/dL    BUN 12 6 - 20 mg/dL    Creatinine 0.6 0.5 - 1.4 mg/dL    Calcium 8.3 (L) 8.7 - 10.5 mg/dL    Total Protein 5.6 (L) 6.0 - 8.4 g/dL    Albumin 2.9 (L) 3.5 - 5.2 g/dL    Total Bilirubin 0.1 0.1 - 1.0 mg/dL    Alkaline Phosphatase 54 (L) 55 - 135 U/L    AST 15 10 - 40 U/L    ALT 12 10 - 44 U/L    eGFR >60 >60 mL/min/1.73 m^2    Anion Gap 8 8 - 16 mmol/L   CBC Auto Differential    Collection Time: 10/10/23 11:27 AM   Result Value Ref Range    WBC 5.87 3.90 - 12.70 K/uL    RBC 3.76 (L) 4.00 - 5.40 M/uL    Hemoglobin 9.7 (L) 12.0 - 16.0 g/dL    Hematocrit 30.2 (L) 37.0 - 48.5 %    MCV 80 (L) 82 - 98 fL    MCH 25.8 (L) 27.0 - 31.0 pg    MCHC 32.1 32.0 - 36.0 g/dL    RDW 15.5 (H) 11.5 - 14.5 %    Platelets 316 150 - 450 K/uL    MPV 10.1 9.2 - 12.9 fL    Immature Granulocytes 0.3 0.0 - 0.5 %    Gran # (ANC) 2.9 1.8 - 7.7 K/uL    Immature Grans (Abs) 0.02 0.00 - 0.04 K/uL    Lymph # 2.2 1.0 - 4.8 K/uL    Mono # 0.6 0.3 - 1.0 K/uL     Eos # 0.2 0.0 - 0.5 K/uL    Baso # 0.01 0.00 - 0.20 K/uL    nRBC 0 0 /100 WBC    Gran % 48.7 38.0 - 73.0 %    Lymph % 37.0 18.0 - 48.0 %    Mono % 10.7 4.0 - 15.0 %    Eosinophil % 3.1 0.0 - 8.0 %    Basophil % 0.2 0.0 - 1.9 %    Differential Method Automated    Sedimentation rate    Collection Time: 10/10/23 11:27 AM   Result Value Ref Range    Sed Rate 10 0 - 36 mm/Hr   C-Reactive Protein    Collection Time: 10/10/23 11:27 AM   Result Value Ref Range    CRP 0.7 0.0 - 8.2 mg/L   C4 Complement    Collection Time: 10/10/23 11:27 AM   Result Value Ref Range    Complement (C-4) 12 11 - 44 mg/dL   C3 Complement    Collection Time: 10/10/23 11:27 AM   Result Value Ref Range    Complement (C-3) 88 50 - 180 mg/dL   Protein/Creatinine Ratio, Urine    Collection Time: 10/10/23 11:30 AM   Result Value Ref Range    Protein, Urine Random 136 (H) 0 - 15 mg/dL    Creatinine, Urine 241.0 15.0 - 325.0 mg/dL    Prot/Creat Ratio, Urine 0.56 (H) 0.00 - 0.20   Urinalysis Microscopic    Collection Time: 10/10/23 11:30 AM   Result Value Ref Range    WBC, UA 5 0 - 5 /hpf    Bacteria Few (A) None-Occ /hpf    Squam Epithel, UA 4 /hpf    Other (U/A) Many (A) None    Microscopic Comment SEE COMMENT          Lab Results   Component Value Date    TBGOLDPLUS Indeterminate (A) 05/02/2023      Lab Results   Component Value Date    HEPAIGM Non-reactive 05/08/2023    HEPBIGM Grayzone (A) 05/08/2023    HEPBCAB Non-reactive 06/08/2023    HEPCAB Non-reactive 05/08/2023        Assessment:     1. Systemic lupus erythematosus arthritis    2. SLE glomerulonephritis syndrome, WHO class V    3. Immunocompromised state due to drug therapy    4. Medication monitoring encounter    5. High risk medication use    6. Sjogren's syndrome, with unspecified organ involvement                Plan:     Alisia was seen today for lupus.    Diagnoses and all orders for this visit:    Systemic lupus erythematosus arthritis  -     ketorolac injection 30 mg  -      betamethasone acetate-betamethasone sodium phosphate injection 6 mg  -     CULTURE, URINE; Standing    SLE glomerulonephritis syndrome, WHO class V    Immunocompromised state due to drug therapy    Medication monitoring encounter    High risk medication use    Sjogren's syndrome, with unspecified organ involvement        SLE w worsening lupus arthritis sxs  Labs reviewed  C3, C4  wnl  No leukopenia, no thrombocytopenia  CRP/ESR have normalized  +dsDNA, PC ratio pending  Bacturia - urine culture added to labs today  PC ratio trending down  C/w  mg daily  Due for ophtho exam for monitoring  About 10yrs of Plaquenil therapy -  on again, off again history  Discussed potential for retinal toxicity  Never resumed Cellcept after ANC rebounded  May have contributed to flaring joints  IM celestone and IM toradol today  Spoke w Dr. MADISON - Recommend Low dose SLE Cytoxan plan as fastest way to improve sxs  Plan for 6 cycles then move back to [facility] for maintenance  Discussed risks/benefits  Consent signed  Pt very leery  Alternative option would be to resume CellCept 500 mg b.i.d..  Recheck CBC in 2 weeks.  If everything stable increase to 1000 mg b.i.d..  Would also include the addition of lupkynis  10/13/23 - patient indecisive over which route she would like to take.  I spoke with her today.  She would like to think about her options over the weekend and get back with me next week.  Hep B viral DNA negative  Recommend high-dose flu vaccine administration prior to receiving Cytoxan  Overlapping SS  Recent lymph node excision shows hyperplasia but negative for malignancy such as lymphoma  SPEP, IEP, cryoglobulins reviewed  Low Albumin  O/w wnl  Discussed red flag symptoms with patient  Continue refresh drops p.r.n  Add pilocarpine 5 mg tid  Drug therapy requiring intensive monitoring for toxicity  High Risk Medication Monitoring encounter  No current medication related issues, no evidence of toxicity  I ordered labs for  toxicity monitoring, have personally reviewed the findings, and discussed them with the patient.  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  Monitor carefully for infections.  Advised patient to get immediate medical care if any infection arises.  Also advised strict adherence age-appropriate vaccinations and cancer screenings with PCP.  Patient advised to hold DMARD and/or biologic therapy for signs of infection or for surgery. If you are unsure what to do please call our office for instruction.[facility] [phone]  Return to clinic: 1-2 mos w sle labs prior    Case discussed with Dr Blair. Assessment and Plan done in collaboration.     Follow up in about 3 months (around 1/10/2024).    The patient understands, chooses and consents to this plan and accepts all the risks which include but are not limited to the risks mentioned above.     Disclaimer: This note was prepared using a voice recognition system and is likely to have sound alike errors within the text.

## 2023-10-11 ENCOUNTER — TELEPHONE (OUTPATIENT)
Dept: RHEUMATOLOGY | Facility: CLINIC | Age: 33
End: 2023-10-11
Payer: MEDICAID

## 2023-10-11 ENCOUNTER — PATIENT MESSAGE (OUTPATIENT)
Dept: RHEUMATOLOGY | Facility: CLINIC | Age: 33
End: 2023-10-11
Payer: MEDICAID

## 2023-10-11 NOTE — TELEPHONE ENCOUNTER
----- Message from Shane Blair MD sent at 10/11/2023  1:21 PM CDT -----  If she would like to try alternate options:-  Reduce prednisone to 10 mg daily, Restart cellcept at 500 mg daily for 2 weeks and if CBC is stable, increase to 500 mg bid thereafter. Add lupkynis.   ----- Message -----  From: Lora Root PA-C  Sent: 10/11/2023   1:14 PM CDT  To: Shane Blair MD    I have a few questions about the low dose cytoxan plan for SLE.    It comes up as 500mg (in 1000 cc NS initially) every 2 weeks x 6 doses based on the Euro-Lupus protocol (I assume).  I recall us talking about monthly infusions for 6 mos.  Did I misunderstand?  Subsequent infusions would be adjusted based on ANC/CrCl 2 weeks after the infusion.  So she would get labs at 2 weeks, rather than day of next infusion if monthly?    Also, when cross referencing rheum secrets:  1.  There is suggestion for [place] (25% cytoxan dose in mg) given in  ml prior to and after the cytoxan infusion.  2.  Recommended pre meds include ([place] as above) in addition to Dexamethasone 10mg (in the plan), Lorazepam 1mg (not in plan) and zofran (not a premed - listed as prn med).  Do you typically do all of these prior to cytoxan?    Pt asking about any other options.  Never resume Cellcept when ANC rebounded one month ago.  I don't suspect that would have great effect if we resumed.  I know JAKs are in trials for SLE.  But, she still as PC ratio 0.56.  Assume Keith not indicated w elevated PC ratio.  Cr has been stable.  Unless we added cellcept and Rinvoq to HCQ for suspicion of seroneg RA?

## 2023-10-19 ENCOUNTER — PATIENT MESSAGE (OUTPATIENT)
Dept: RHEUMATOLOGY | Facility: CLINIC | Age: 33
End: 2023-10-19
Payer: MEDICAID

## 2023-10-19 ENCOUNTER — OFFICE VISIT (OUTPATIENT)
Dept: HEMATOLOGY/ONCOLOGY | Facility: CLINIC | Age: 33
End: 2023-10-19
Payer: MEDICAID

## 2023-10-19 DIAGNOSIS — D84.9 IMMUNOCOMPROMISED: ICD-10-CM

## 2023-10-19 DIAGNOSIS — R59.1 LYMPHADENOPATHY: ICD-10-CM

## 2023-10-19 DIAGNOSIS — M32.9 HEMOLYTIC ANEMIA ASSOCIATED WITH SYSTEMIC LUPUS ERYTHEMATOSUS: ICD-10-CM

## 2023-10-19 DIAGNOSIS — M32.14 SLE GLOMERULONEPHRITIS SYNDROME, WHO CLASS V: Primary | ICD-10-CM

## 2023-10-19 DIAGNOSIS — D50.9 IRON DEFICIENCY ANEMIA, UNSPECIFIED IRON DEFICIENCY ANEMIA TYPE: ICD-10-CM

## 2023-10-19 DIAGNOSIS — M32.9 SLE (SYSTEMIC LUPUS ERYTHEMATOSUS RELATED SYNDROME): Chronic | ICD-10-CM

## 2023-10-19 DIAGNOSIS — D59.10 HEMOLYTIC ANEMIA ASSOCIATED WITH SYSTEMIC LUPUS ERYTHEMATOSUS: ICD-10-CM

## 2023-10-19 DIAGNOSIS — D64.9 ANEMIA, UNSPECIFIED TYPE: ICD-10-CM

## 2023-10-19 PROCEDURE — 3066F NEPHROPATHY DOC TX: CPT | Mod: CPTII,95,,

## 2023-10-19 PROCEDURE — 99214 OFFICE O/P EST MOD 30 MIN: CPT | Mod: 95,,,

## 2023-10-19 PROCEDURE — 3066F PR DOCUMENTATION OF TREATMENT FOR NEPHROPATHY: ICD-10-PCS | Mod: CPTII,95,,

## 2023-10-19 PROCEDURE — 99214 PR OFFICE/OUTPT VISIT, EST, LEVL IV, 30-39 MIN: ICD-10-PCS | Mod: 95,,,

## 2023-10-19 NOTE — PROGRESS NOTES
Subjective:       Patient ID: Alisia Vines is a 33 y.o. female.    Chief Complaint: Anemia    HPI: Ms. Vines is a 33 year old female who is following up for her anemia and folilicular hyperplasia   Pmhx:hospitalized in 4/2023 with lupus flare and hemolytic anemia due to nonadherence to Plaquenil. 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 She was initially treated with IV SoluMedrol and continued on Plaquenil which she continued upon discharge. She also was started on oral iron which wasn't effective. Supposed to receive IV iron but never did. She was on prednisone and Plaquenil for lupus with plans to start rituximab treatment. She received 1 dose in 6/2023 with a drop in Hgb and plts; rituximab was held. She was treated with 1U PRBCs after presenting to the ER. Her plts continued to drop and she was sent back to the ER for admission and plt transfusion. Apparently rheumatology doesn't recommend any more doses of rituxan.     Today: She is currently on Plaquenil, Cellcept, and prednisone.  She states she had 1 dose of Saphnelo, and still had pain with it and was not feeling well after it.  HER2 options are to either to continue with CellCept, saphenous and add Lupkynis.  She is unsure of which she wants to do right now.  She states she will talk to her spouse and decide.  Either way we will have to monitor blood counts.  She was supposed to get labs done today but did not have a ride.  She states she will get these done soon.  She states she is been fatigued, in pain and hungry.    Social History     Socioeconomic History    Marital status: Single    Number of children: 2   Occupational History     Comment: [employer]   Tobacco Use    Smoking status: Never     Passive exposure: Never    Smokeless tobacco: Never   Substance and Sexual Activity    Alcohol use: No     Alcohol/week: 0.0  standard drinks of alcohol    Drug use: Yes     Types: Marijuana    Sexual activity: Not Currently     Partners: Male     Birth control/protection: None   Other Topics Concern    Are you pregnant or think you may be? No    Breast-feeding No   Social History Narrative    The patient is single and lives with her significant other.  She has 3 children.  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     Social Determinants of Health     Financial Resource Strain: Low Risk  (5/1/2023)    Overall Financial Resource Strain (CARDIA)     Difficulty of Paying Living Expenses: Not hard at all   Food Insecurity: No Food Insecurity (5/1/2023)    Hunger Vital Sign     Worried About Running Out of Food in the Last Year: Never true     Ran Out of Food in the Last Year: Never true   Transportation Needs: No Transportation Needs (5/1/2023)    PRAPARE - Transportation     Lack of Transportation (Medical): No     Lack of Transportation (Non-Medical): No   Physical Activity: Inactive (5/1/2023)    Exercise Vital Sign     Days of Exercise per Week: 0 days     Minutes of Exercise per Session: 0 min   Stress: No Stress Concern Present (5/1/2023)    [employer] - Occupational Stress Questionnaire     Feeling of Stress : Not at all   Social Connections: Socially Isolated (5/1/2023)    Social Connection and Isolation Panel [NHANES]     Frequency of Communication with Friends and Family: More than three times a week     Frequency of Social Gatherings with Friends and Family: More than three times a week     Attends [employer] Services: Never     Active Member of Clubs or Organizations: No     Attends Club or Organization Meetings: Never     Marital Status: Never    Housing Stability: Unknown (5/1/2023)    Housing Stability Vital Sign     Unable to Pay for Housing in the Last Year: No     Unstable Housing in the Last Year: No       Past Medical History:   Diagnosis Date    Allergic rhinitis     Anemia      Condyloma acuminata     COVID-19 virus infection 2021    Encounter for blood transfusion     GERD (gastroesophageal reflux disease)     Hemolytic anemia associated with systemic lupus erythematosus 2023    History of fetal anomaly in prior pregnancy, currently pregnant, unspecified trimester 2017    Pacemaker placed    HSV-2 (herpes simplex virus 2) infection     Lupus nephritis     Mood disorder     Overweight(278.02)     Sjogren's syndrome     Systemic lupus complicating pregnancy 2019    Systemic lupus erythematosus     Thrombocytopenia        Family History   Problem Relation Age of Onset    Hypertension Mother     Eczema Brother     Hypertension Maternal Grandmother     Diabetes Paternal Grandmother     Heart disease Son     Arrhythmia Son         CHB    Stroke Neg Hx     Cancer Neg Hx        Past Surgical History:   Procedure Laterality Date     SECTION WITH TUBAL LIGATION N/A 2019    Procedure:  SECTION, WITH TUBAL LIGATION;  Surgeon: VERONICA Valdovinos MD;  Location: [facility] L&D;  Service: OB/GYN;  Laterality: N/A;     SECTION, LOW TRANSVERSE      x2    LYMPH NODE BIOPSY Right 2023    Procedure: BIOPSY, LYMPH NODE;  Surgeon: Rivera Sandoval MD;  Location: [facility] OR;  Service: ENT;  Laterality: Right;  right deep cervial lymph node excision    NECK MASS EXCISION Right 2023    Procedure: EXCISION, MASS, NECK;  Surgeon: Rivera Sandoval MD;  Location: [facility] OR;  Service: ENT;  Laterality: Right;  right deep cervial lymph node excision    RENAL BIOPSY  2013       Review of Systems   Constitutional:  Positive for fatigue. Negative for activity change, appetite change, chills, diaphoresis, fever and unexpected weight change.   HENT:  Negative for congestion.    Respiratory:  Negative for cough.    Gastrointestinal:  Negative for blood in stool, constipation, diarrhea, nausea and vomiting.   Genitourinary:  Negative for hematuria.   Musculoskeletal:   Positive for arthralgias.   Allergic/Immunologic: Positive for immunocompromised state.         Medication List with Changes/Refills   Current Medications    AMLODIPINE (NORVASC) 5 MG TABLET    TAKE 1 TABLET(5 MG) BY MOUTH EVERY DAY    ARIPIPRAZOLE (ABILIFY) 2 MG TAB    TAKE 1 TABLET(2 MG) BY MOUTH EVERY DAY    CICLOPIROX (PENLAC) 8 % SOLN    Apply to nail and nail fold once daily for up to 1 year    FLUOXETINE 40 MG CAPSULE    TAKE 1 CAPSULE(40 MG) BY MOUTH EVERY DAY    FLUTICASONE PROPIONATE (FLONASE) 50 MCG/ACTUATION NASAL SPRAY    SHAKE LIQUID AND USE 2 SPRAYS(100 MCG) IN EACH NOSTRIL EVERY DAY    HYDROCODONE-ACETAMINOPHEN (NORCO) 5-325 MG PER TABLET    Take 1-2 tablets by mouth every 6 (six) hours as needed for Pain.    HYDROXYCHLOROQUINE (PLAQUENIL) 200 MG TABLET    TAKE 2 TABLETS(400 MG) BY MOUTH EVERY DAY    KETOCONAZOLE (NIZORAL) 2 % SHAMPOO    Wash hair with medicated shampoo at least 1x/week - let sit on scalp at least 5 minutes prior to rinsing    MOMETASONE (ELOCON) 0.1 % SOLUTION    AAA of scalp once daily.    MYCOPHENOLATE (CELLCEPT) 500 MG TAB    Take 1 tablet (500 mg total) by mouth 2 (two) times daily.    ONDANSETRON (ZOFRAN) 4 MG TABLET    Take 1 tablet (4 mg total) by mouth every 8 (eight) hours as needed for Nausea.    PIMECROLIMUS (ELIDEL) 1 % CREAM    Apply to affected areas twice daily. Wear daily sunscreen.    PREDNISONE (DELTASONE) 20 MG TABLET    Take 1 tablet (20 mg total) by mouth once daily.    TRIAMCINOLONE ACETONIDE 0.025% (KENALOG) 0.025 % OINT    AAA bid prn. Use for 2 weeks then taper. Mild steroid.    VALACYCLOVIR (VALTREX) 1000 MG TABLET    Take 1 tablet (1,000 mg total) by mouth once daily.     Objective:   There were no vitals filed for this visit.    Physical Exam  Constitutional:       General: She is not in acute distress.     Appearance: She is not ill-appearing, toxic-appearing or diaphoretic.   Neurological:      Mental Status: She is alert.   Psychiatric:         Mood  and Affect: Mood normal.            Physical exam limited due to video visit    Labs/Results:  pending    Assessment:     Problem List Items Addressed This Visit          Renal/    SLE glomerulonephritis syndrome, WHO class V - Primary       Immunology/Multi System    SLE (systemic lupus erythematosus related syndrome) (Chronic)    Immunocompromised       Oncology    Anemia    Hemolytic anemia associated with systemic lupus erythematosus    Iron deficiency anemia       Other    Lymphadenopathy     Plan:     Iron deficiency anemia, unspecified iron deficiency anemia type,. Hemolytic anemia associated with systemic lupus erythematosus, Anemia, unspecified type, Immunocompromised, Thrombocytopenia  --continue to follow with rheumatology  --bleeding precautions given   --vitamin b12, folate and iron labs pending from today     Follow-Up: 4 months with cbc cmp iron/tibc ferritin hapto ldh prior ---> pending lab results    Melissa Suh PA-C  Hematology Oncology    The patient location is: home  The chief complaint leading to consultation is: anemia     Visit type:  Synchronous audio video      Face to Face time with patient: minutes of total time spent on the encounter, which includes face to face time and non-face to face time preparing to see the patient (eg, review of tests), Obtaining and/or reviewing separately obtained history, Documenting clinical information in the electronic or other health record, Independently interpreting results (not separately reported) and communicating results to the patient/family/caregiver, or Care coordination (not separately reported).      Each patient to whom he or she provides medical services by telemedicine is:  (1) informed of the relationship between the provider and patient and the respective role of any other health care provider with respect to management of the patient; and (2) notified that he or she may decline to receive medical services

## 2023-10-29 ENCOUNTER — HOSPITAL ENCOUNTER (EMERGENCY)
Facility: HOSPITAL | Age: 33
Discharge: HOME | End: 2023-10-29
Attending: FAMILY MEDICINE
Payer: MEDICAID

## 2023-10-29 VITALS
OXYGEN SATURATION: 100 % | WEIGHT: 202.19 LBS | SYSTOLIC BLOOD PRESSURE: 140 MMHG | RESPIRATION RATE: 18 BRPM | BODY MASS INDEX: 33.64 KG/M2 | TEMPERATURE: 98 F | DIASTOLIC BLOOD PRESSURE: 94 MMHG | HEART RATE: 77 BPM

## 2023-10-29 DIAGNOSIS — M32.9 SLE EXACERBATION: Primary | ICD-10-CM

## 2023-10-29 DIAGNOSIS — M79.671 FOOT PAIN, RIGHT: ICD-10-CM

## 2023-10-29 LAB
ALBUMIN SERPL BCP-MCNC: 2.7 G/DL (ref 3.5–5.2)
ALP SERPL-CCNC: 59 U/L (ref 55–135)
ALT SERPL W/O P-5'-P-CCNC: 12 U/L (ref 10–44)
ANION GAP SERPL CALC-SCNC: 12 MMOL/L (ref 8–16)
AST SERPL-CCNC: 23 U/L (ref 10–40)
BACTERIA #/AREA URNS HPF: NORMAL /HPF
BASOPHILS NFR BLD: 0 % (ref 0–1.9)
BILIRUB SERPL-MCNC: 0.1 MG/DL (ref 0.1–1)
BILIRUB UR QL STRIP: NEGATIVE
BUN SERPL-MCNC: 9 MG/DL (ref 6–20)
CALCIUM SERPL-MCNC: 8 MG/DL (ref 8.7–10.5)
CHLORIDE SERPL-SCNC: 107 MMOL/L (ref 95–110)
CLARITY UR: CLEAR
CO2 SERPL-SCNC: 23 MMOL/L (ref 23–29)
COLOR UR: YELLOW
CREAT SERPL-MCNC: 0.7 MG/DL (ref 0.5–1.4)
DACRYOCYTES BLD QL SMEAR: ABNORMAL
DIFFERENTIAL METHOD: ABNORMAL
EOSINOPHIL NFR BLD: 2 % (ref 0–8)
ERYTHROCYTE [DISTWIDTH] IN BLOOD BY AUTOMATED COUNT: 15.5 % (ref 11.5–14.5)
EST. GFR  (NO RACE VARIABLE): >60 ML/MIN/1.73 M^2
GLUCOSE SERPL-MCNC: 85 MG/DL (ref 70–110)
GLUCOSE UR QL STRIP: NEGATIVE
HCT VFR BLD AUTO: 31.7 % (ref 37–48.5)
HGB BLD-MCNC: 9.9 G/DL (ref 12–16)
HGB UR QL STRIP: NEGATIVE
HYALINE CASTS #/AREA URNS LPF: 0 /LPF
IMM GRANULOCYTES # BLD AUTO: ABNORMAL K/UL (ref 0–0.04)
IMM GRANULOCYTES NFR BLD AUTO: ABNORMAL % (ref 0–0.5)
KETONES UR QL STRIP: NEGATIVE
LEUKOCYTE ESTERASE UR QL STRIP: NEGATIVE
LYMPHOCYTES NFR BLD: 58 % (ref 18–48)
MCH RBC QN AUTO: 23.9 PG (ref 27–31)
MCHC RBC AUTO-ENTMCNC: 31.2 G/DL (ref 32–36)
MCV RBC AUTO: 76 FL (ref 82–98)
MICROSCOPIC COMMENT: NORMAL
MONOCYTES NFR BLD: 14 % (ref 4–15)
NEUTROPHILS NFR BLD: 26 % (ref 38–73)
NITRITE UR QL STRIP: NEGATIVE
NRBC BLD-RTO: 0 /100 WBC
OVALOCYTES BLD QL SMEAR: ABNORMAL
PH UR STRIP: 7 [PH] (ref 5–8)
PLATELET # BLD AUTO: 285 K/UL (ref 150–450)
PMV BLD AUTO: 10.2 FL (ref 9.2–12.9)
POTASSIUM SERPL-SCNC: 4.1 MMOL/L (ref 3.5–5.1)
PROT SERPL-MCNC: 5.9 G/DL (ref 6–8.4)
PROT UR QL STRIP: ABNORMAL
RBC # BLD AUTO: 4.15 M/UL (ref 4–5.4)
RBC #/AREA URNS HPF: 1 /HPF (ref 0–4)
SODIUM SERPL-SCNC: 142 MMOL/L (ref 136–145)
SP GR UR STRIP: 1.02 (ref 1–1.03)
SQUAMOUS #/AREA URNS HPF: 1 /HPF
URN SPEC COLLECT METH UR: ABNORMAL
UROBILINOGEN UR STRIP-ACNC: ABNORMAL EU/DL
WBC # BLD AUTO: 1.92 K/UL (ref 3.9–12.7)
WBC #/AREA URNS HPF: 0 /HPF (ref 0–5)

## 2023-10-29 PROCEDURE — 85007 BL SMEAR W/DIFF WBC COUNT: CPT | Performed by: FAMILY MEDICINE

## 2023-10-29 PROCEDURE — 85027 COMPLETE CBC AUTOMATED: CPT | Performed by: FAMILY MEDICINE

## 2023-10-29 PROCEDURE — 99284 EMERGENCY DEPT VISIT MOD MDM: CPT | Mod: 25

## 2023-10-29 PROCEDURE — 63600175 PHARM REV CODE 636 W HCPCS: Performed by: FAMILY MEDICINE

## 2023-10-29 PROCEDURE — 80053 COMPREHEN METABOLIC PANEL: CPT | Performed by: FAMILY MEDICINE

## 2023-10-29 PROCEDURE — 81000 URINALYSIS NONAUTO W/SCOPE: CPT | Performed by: FAMILY MEDICINE

## 2023-10-29 PROCEDURE — 96374 THER/PROPH/DIAG INJ IV PUSH: CPT

## 2023-10-29 PROCEDURE — 96375 TX/PRO/DX INJ NEW DRUG ADDON: CPT

## 2023-10-29 RX ORDER — METHYLPREDNISOLONE SOD SUCC 125 MG
250 VIAL (EA) INJECTION
Status: COMPLETED | OUTPATIENT
Start: 2023-10-29 | End: 2023-10-29

## 2023-10-29 RX ORDER — MORPHINE SULFATE 4 MG/ML
4 INJECTION, SOLUTION INTRAMUSCULAR; INTRAVENOUS
Status: COMPLETED | OUTPATIENT
Start: 2023-10-29 | End: 2023-10-29

## 2023-10-29 RX ADMIN — MORPHINE SULFATE 4 MG: 4 INJECTION INTRAVENOUS at 09:10

## 2023-10-29 RX ADMIN — METHYLPREDNISOLONE SODIUM SUCCINATE 250 MG: 125 INJECTION, POWDER, FOR SOLUTION INTRAMUSCULAR; INTRAVENOUS at 09:10

## 2023-10-29 NOTE — ED PROVIDER NOTES
SCRIBE #1 NOTE: I, Garrett Powers, am scribing for, and in the presence of, Lia Solis MD. I have scribed the entire note.       History     Chief Complaint   Patient presents with    Foot Pain     Pt states she is having pain in her feet that's been going on for a 2 days. Pt also complains of a headache and joint pain.     Review of patient's allergies indicates:  No Known Allergies      History of Present Illness     HPI    10/29/2023, 6:50 PM  History obtained from the patient      History of Present Illness: Alisia Vines is a 33 y.o. female patient with a PMHx of HSV-2, anemia, allergic rhinitis, mood disorder, Sjogren's syndrome, condyloma acuminata, GERD, systemic lupus erythematosus, lupus nephritis, thrombocytopenia, and an encounter for blood transfusion who presents to the Emergency Department for evaluation of R foot pain which onset gradually over the last 2 days. Symptoms are constant and moderate in severity. No mitigating or exacerbating factors reported. Associated sxs include HA and arthralgia. Patient denies any and all other sxs at this time. No prior Tx reported. No further complaints or concerns at this time.       Arrival mode: Personal vehicle    PCP: Saumya Quinonez MD        Past Medical History:  Past Medical History:   Diagnosis Date    Allergic rhinitis     Anemia     Condyloma acuminata     COVID-19 virus infection 07/2021    Encounter for blood transfusion     GERD (gastroesophageal reflux disease)     Hemolytic anemia associated with systemic lupus erythematosus 4/30/2023    History of fetal anomaly in prior pregnancy, currently pregnant, unspecified trimester 03/08/2017    Pacemaker placed    HSV-2 (herpes simplex virus 2) infection     Lupus nephritis     Mood disorder     Overweight(278.02)     Sjogren's syndrome     Systemic lupus complicating pregnancy 01/31/2019    Systemic lupus erythematosus     Thrombocytopenia        Past Surgical History:  Past Surgical  History:   Procedure Laterality Date     SECTION WITH TUBAL LIGATION N/A 2019    Procedure:  SECTION, WITH TUBAL LIGATION;  Surgeon: VERONICA Valdovinos MD;  Location: [facility] L&D;  Service: OB/GYN;  Laterality: N/A;     SECTION, LOW TRANSVERSE      x2    LYMPH NODE BIOPSY Right 2023    Procedure: BIOPSY, LYMPH NODE;  Surgeon: Rivera Sandoval MD;  Location: [facility] OR;  Service: ENT;  Laterality: Right;  right deep cervial lymph node excision    NECK MASS EXCISION Right 2023    Procedure: EXCISION, MASS, NECK;  Surgeon: Rivera Sandoval MD;  Location: [facility] OR;  Service: ENT;  Laterality: Right;  right deep cervial lymph node excision    RENAL BIOPSY  2013         Family History:  Family History   Problem Relation Age of Onset    Hypertension Mother     Eczema Brother     Hypertension Maternal Grandmother     Diabetes Paternal Grandmother     Heart disease Son     Arrhythmia Son         CHB    Stroke Neg Hx     Cancer Neg Hx        Social History:  Social History     Tobacco Use    Smoking status: Never     Passive exposure: Never    Smokeless tobacco: Never   Substance and Sexual Activity    Alcohol use: No     Alcohol/week: 0.0 standard drinks of alcohol    Drug use: Yes     Types: Marijuana    Sexual activity: Not Currently     Partners: Male     Birth control/protection: None        Review of Systems     Review of Systems   Musculoskeletal:  Positive for arthralgias (R foot) and myalgias (R foot).   Neurological:  Positive for headaches.        Physical Exam     Initial Vitals [10/29/23 1825]   BP Pulse Resp Temp SpO2   (!) 141/87 88 16 98.1 °F (36.7 °C) 100 %      MAP       --          Physical Exam  Nursing Notes and Vital Signs Reviewed.  Constitutional: Patient is in no acute distress. Well-developed and well-nourished.  Head: Atraumatic. Normocephalic.  Eyes: PERRL. EOM intact. Conjunctivae are not pale. No scleral icterus.  ENT: Mucous membranes are moist. Oropharynx is  clear and symmetric.    Neck: Supple. Full ROM. No lymphadenopathy.  Cardiovascular: Regular rate. Regular rhythm. No murmurs, rubs, or gallops. Distal pulses are 2+ and symmetric.  Pulmonary/Chest: No respiratory distress. Clear to auscultation bilaterally. No wheezing or rales.  Abdominal: Soft and non-distended.  There is no tenderness.  No rebound, guarding, or rigidity. Good bowel sounds.  Genitourinary: No CVA tenderness  Musculoskeletal: Moves all extremities. No obvious deformities. No edema. No calf tenderness. Tenderness to R posterior ankle  Skin: Warm and dry.  Neurological:  Alert, awake, and appropriate.  Normal speech.  No acute focal neurological deficits are appreciated.  Psychiatric: Normal affect. Good eye contact. Appropriate in content.     ED Course   Procedures  ED Vital Signs:  Vitals:    10/29/23 1825 10/29/23 1930 10/29/23 2030 10/29/23 2121   BP: (!) 141/87 138/86 (!) 140/86    Pulse: 88 80 86    Resp: 16 18 18 18   Temp: 98.1 °F (36.7 °C)      TempSrc: Oral      SpO2: 100% 100% 100%    Weight: 91.7 kg (202 lb 2.6 oz)       10/29/23 2129   BP: (!) 140/94   Pulse: 77   Resp: 18   Temp:    TempSrc:    SpO2: 100%   Weight:        Abnormal Lab Results:  Labs Reviewed   CBC W/ AUTO DIFFERENTIAL - Abnormal; Notable for the following components:       Result Value    WBC 1.92 (*)     Hemoglobin 9.9 (*)     Hematocrit 31.7 (*)     MCV 76 (*)     MCH 23.9 (*)     MCHC 31.2 (*)     RDW 15.5 (*)     Gran % 26.0 (*)     Lymph % 58.0 (*)     All other components within normal limits    Narrative:     wbc  critical result(s) called and verbal readback obtained from   effie jj rn by ELMA 10/29/2023 19:25   COMPREHENSIVE METABOLIC PANEL - Abnormal; Notable for the following components:    Calcium 8.0 (*)     Total Protein 5.9 (*)     Albumin 2.7 (*)     All other components within normal limits   URINALYSIS - Abnormal; Notable for the following components:    Protein, UA 1+ (*)     Urobilinogen,  UA 2.0-3.0 (*)     All other components within normal limits   URINALYSIS MICROSCOPIC        All Lab Results:  Results for orders placed or performed during the hospital encounter of 10/29/23   CBC auto differential   Result Value Ref Range    WBC 1.92 (LL) 3.90 - 12.70 K/uL    RBC 4.15 4.00 - 5.40 M/uL    Hemoglobin 9.9 (L) 12.0 - 16.0 g/dL    Hematocrit 31.7 (L) 37.0 - 48.5 %    MCV 76 (L) 82 - 98 fL    MCH 23.9 (L) 27.0 - 31.0 pg    MCHC 31.2 (L) 32.0 - 36.0 g/dL    RDW 15.5 (H) 11.5 - 14.5 %    Platelets 285 150 - 450 K/uL    MPV 10.2 9.2 - 12.9 fL    Immature Granulocytes CANCELED 0.0 - 0.5 %    Immature Grans (Abs) CANCELED 0.00 - 0.04 K/uL    nRBC 0 0 /100 WBC    Gran % 26.0 (L) 38.0 - 73.0 %    Lymph % 58.0 (H) 18.0 - 48.0 %    Mono % 14.0 4.0 - 15.0 %    Eosinophil % 2.0 0.0 - 8.0 %    Basophil % 0.0 0.0 - 1.9 %    Ovalocytes Occasional     Tear Drop Cells Occasional     Differential Method Manual    Comprehensive metabolic panel   Result Value Ref Range    Sodium 142 136 - 145 mmol/L    Potassium 4.1 3.5 - 5.1 mmol/L    Chloride 107 95 - 110 mmol/L    CO2 23 23 - 29 mmol/L    Glucose 85 70 - 110 mg/dL    BUN 9 6 - 20 mg/dL    Creatinine 0.7 0.5 - 1.4 mg/dL    Calcium 8.0 (L) 8.7 - 10.5 mg/dL    Total Protein 5.9 (L) 6.0 - 8.4 g/dL    Albumin 2.7 (L) 3.5 - 5.2 g/dL    Total Bilirubin 0.1 0.1 - 1.0 mg/dL    Alkaline Phosphatase 59 55 - 135 U/L    AST 23 10 - 40 U/L    ALT 12 10 - 44 U/L    eGFR >60 >60 mL/min/1.73 m^2    Anion Gap 12 8 - 16 mmol/L   Urinalysis - Clean Catch   Result Value Ref Range    Specimen UA Urine, Clean Catch     Color, UA Yellow Yellow, Straw, Erin    Appearance, UA Clear Clear    pH, UA 7.0 5.0 - 8.0    Specific Gravity, UA 1.020 1.005 - 1.030    Protein, UA 1+ (A) Negative    Glucose, UA Negative Negative    Ketones, UA Negative Negative    Bilirubin (UA) Negative Negative    Occult Blood UA Negative Negative    Nitrite, UA Negative Negative    Urobilinogen, UA 2.0-3.0 (A) <2.0  EU/dL    Leukocytes, UA Negative Negative   Urinalysis Microscopic   Result Value Ref Range    RBC, UA 1 0 - 4 /hpf    WBC, UA 0 0 - 5 /hpf    Bacteria Rare None-Occ /hpf    Squam Epithel, UA 1 /hpf    Hyaline Casts, UA 0 0-1/lpf /lpf    Microscopic Comment SEE COMMENT      *Note: Due to a large number of results and/or encounters for the requested time period, some results have not been displayed. A complete set of results can be found in Results Review.        Imaging Results:  Imaging Results    None               The Emergency Provider reviewed the vital signs and test results, which are outlined above.     ED Discussion     9:03 PM: Discussed pt's case with Dr. Shane Blair (Rheumamtology) who recommends that she should be okay to go home after receiving 250 mg iv solumedrol since she has no open wounds and only minimal swelling of right posterior ankle as well as no extremity weakness.     9:35 PM: Reassessed pt at this time. Discussed with pt all pertinent ED information and results. Discussed pt dx and plan of tx. Gave pt all f/u and return to the ED instructions. All questions and concerns were addressed at this time. Pt expresses understanding of information and instructions, and is comfortable with plan to discharge. Pt is stable for discharge.    I discussed with patient and/or family/caretaker that evaluation in the ED does not suggest any emergent or life threatening medical conditions requiring immediate intervention beyond what was provided in the ED, and I believe patient is safe for discharge.  Regardless, an unremarkable evaluation in the ED does not preclude the development or presence of a serious of life threatening condition. As such, patient was instructed to return immediately for any worsening or change in current symptoms.        Medical Decision Making  Amount and/or Complexity of Data Reviewed  Labs: ordered. Decision-making details documented in ED Course.    Risk  Prescription  drug management.                ED Medication(s):  Medications   methylPREDNISolone sodium succinate injection 250 mg (250 mg Intravenous Given 10/29/23 2121)   morphine injection 4 mg (4 mg Intravenous Given 10/29/23 2121)       Discharge Medication List as of 10/29/2023  9:22 PM           Follow-up Information       Schedule an appointment as soon as possible for a visit  with Saumya Quinonez MD.    Specialty: Family Medicine  Why: As needed  Contact information:  [address] [place] [id number] [phone]                                 Scribe Attestation:   Scribe #1: I performed the above scribed service and the documentation accurately describes the services I performed. I attest to the accuracy of the note.     Attending:   Physician Attestation Statement for Scribe #1: I, Lia Solis MD, personally performed the services described in this documentation, as scribed by Garrett Powers, in my presence, and it is both accurate and complete.           Clinical Impression       ICD-10-CM ICD-9-CM   1. SLE exacerbation  M32.9 710.0   2. Foot pain, right  M79.671 729.5       Disposition:   Disposition: Discharged  Condition: Stable        Lia Solis MD  10/31/23 1610

## 2023-10-30 ENCOUNTER — TELEPHONE (OUTPATIENT)
Dept: RHEUMATOLOGY | Facility: CLINIC | Age: 33
End: 2023-10-30
Payer: MEDICAID

## 2023-10-30 DIAGNOSIS — M35.00 SJOGREN'S SYNDROME, WITH UNSPECIFIED ORGAN INVOLVEMENT: ICD-10-CM

## 2023-10-30 DIAGNOSIS — M32.9 SYSTEMIC LUPUS ERYTHEMATOSUS ARTHRITIS: ICD-10-CM

## 2023-10-30 DIAGNOSIS — M32.14 SLE GLOMERULONEPHRITIS SYNDROME, WHO CLASS V: ICD-10-CM

## 2023-10-30 RX ORDER — HYDROXYCHLOROQUINE SULFATE 200 MG/1
TABLET, FILM COATED ORAL
Qty: 180 TABLET | Refills: 3 | Status: SHIPPED | OUTPATIENT
Start: 2023-10-30

## 2023-10-30 NOTE — TELEPHONE ENCOUNTER
----- Message from Carlene Muñoz sent at 10/30/2023  1:11 PM CDT -----  Contact: [phone]    Patient: Alisia Vines-    Reason: The patient is requesting a call back from the nurse to get assistance with scheduling an     appointment for a virtual visit on today, if possible.     Comments: Please call the patient back to advise.

## 2023-10-30 NOTE — TELEPHONE ENCOUNTER
----- Message from Carlene Muñoz sent at 10/30/2023  1:11 PM CDT -----  Contact: [phone]    Patient: Alisia Vines-    Reason: The patient is requesting a call back from the nurse to get assistance with scheduling an     appointment for a virtual visit on today, if possible.     Comments: Please call the patient back to advise.

## 2023-10-30 NOTE — TELEPHONE ENCOUNTER
Spoke to patient.  She was wondering when we are going to schedule infusion? I did not see anytime me plan.

## 2023-10-31 ENCOUNTER — TELEPHONE (OUTPATIENT)
Dept: INFUSION THERAPY | Facility: HOSPITAL | Age: 33
End: 2023-10-31
Payer: MEDICAID

## 2023-10-31 ENCOUNTER — PATIENT MESSAGE (OUTPATIENT)
Dept: INFUSION THERAPY | Facility: HOSPITAL | Age: 33
End: 2023-10-31
Payer: MEDICAID

## 2023-10-31 DIAGNOSIS — Z79.899 HIGH RISK MEDICATION USE: Primary | ICD-10-CM

## 2023-10-31 RX ORDER — METHYLPREDNISOLONE SOD SUCC 125 MG
125 VIAL (EA) INJECTION
OUTPATIENT
Start: 2023-10-31

## 2023-10-31 RX ORDER — SODIUM CHLORIDE 0.9 % (FLUSH) 0.9 %
10 SYRINGE (ML) INJECTION
OUTPATIENT
Start: 2023-10-31

## 2023-10-31 RX ORDER — ACETAMINOPHEN 325 MG/1
650 TABLET ORAL EVERY 4 HOURS PRN
OUTPATIENT
Start: 2023-10-31

## 2023-10-31 RX ORDER — HEPARIN 100 UNIT/ML
500 SYRINGE INTRAVENOUS
OUTPATIENT
Start: 2023-10-31

## 2023-11-02 ENCOUNTER — PATIENT OUTREACH (OUTPATIENT)
Dept: EMERGENCY MEDICINE | Facility: HOSPITAL | Age: 33
End: 2023-11-02
Payer: MEDICAID

## 2023-11-02 ENCOUNTER — OFFICE VISIT (OUTPATIENT)
Dept: RHEUMATOLOGY | Facility: CLINIC | Age: 33
End: 2023-11-02
Payer: MEDICAID

## 2023-11-02 ENCOUNTER — INFUSION (OUTPATIENT)
Dept: INFUSION THERAPY | Facility: HOSPITAL | Age: 33
End: 2023-11-02
Attending: PHYSICIAN ASSISTANT
Payer: MEDICAID

## 2023-11-02 VITALS
WEIGHT: 197.06 LBS | DIASTOLIC BLOOD PRESSURE: 95 MMHG | HEIGHT: 65 IN | HEART RATE: 76 BPM | SYSTOLIC BLOOD PRESSURE: 141 MMHG | OXYGEN SATURATION: 100 % | WEIGHT: 197.06 LBS | RESPIRATION RATE: 18 BRPM | BODY MASS INDEX: 32.83 KG/M2 | SYSTOLIC BLOOD PRESSURE: 146 MMHG | TEMPERATURE: 97 F | BODY MASS INDEX: 32.8 KG/M2 | DIASTOLIC BLOOD PRESSURE: 104 MMHG | HEART RATE: 75 BPM

## 2023-11-02 DIAGNOSIS — G89.29 CHRONIC PAIN OF RIGHT ANKLE: ICD-10-CM

## 2023-11-02 DIAGNOSIS — M25.571 ACUTE RIGHT ANKLE PAIN: Primary | ICD-10-CM

## 2023-11-02 DIAGNOSIS — M25.571 CHRONIC PAIN OF RIGHT ANKLE: ICD-10-CM

## 2023-11-02 DIAGNOSIS — D84.821 DRUG-INDUCED IMMUNODEFICIENCY: ICD-10-CM

## 2023-11-02 DIAGNOSIS — Z51.81 MEDICATION MONITORING ENCOUNTER: ICD-10-CM

## 2023-11-02 DIAGNOSIS — G62.9 NEUROPATHY: ICD-10-CM

## 2023-11-02 DIAGNOSIS — M32.9 SYSTEMIC LUPUS ERYTHEMATOSUS ARTHRITIS: ICD-10-CM

## 2023-11-02 DIAGNOSIS — M32.14 SLE GLOMERULONEPHRITIS SYNDROME, WHO CLASS V: ICD-10-CM

## 2023-11-02 DIAGNOSIS — Z79.899 DRUG-INDUCED IMMUNODEFICIENCY: ICD-10-CM

## 2023-11-02 DIAGNOSIS — M32.9 SYSTEMIC LUPUS ERYTHEMATOSUS ARTHRITIS: Primary | ICD-10-CM

## 2023-11-02 DIAGNOSIS — Z79.899 HIGH RISK MEDICATION USE: ICD-10-CM

## 2023-11-02 DIAGNOSIS — M79.7 FIBROMYALGIA: ICD-10-CM

## 2023-11-02 PROCEDURE — 99215 OFFICE O/P EST HI 40 MIN: CPT | Mod: S$PBB,,, | Performed by: INTERNAL MEDICINE

## 2023-11-02 PROCEDURE — 3008F PR BODY MASS INDEX (BMI) DOCUMENTED: ICD-10-PCS | Mod: CPTII,,, | Performed by: INTERNAL MEDICINE

## 2023-11-02 PROCEDURE — 3008F BODY MASS INDEX DOCD: CPT | Mod: CPTII,,, | Performed by: INTERNAL MEDICINE

## 2023-11-02 PROCEDURE — 1160F PR REVIEW ALL MEDS BY PRESCRIBER/CLIN PHARMACIST DOCUMENTED: ICD-10-PCS | Mod: CPTII,,, | Performed by: INTERNAL MEDICINE

## 2023-11-02 PROCEDURE — 1159F PR MEDICATION LIST DOCUMENTED IN MEDICAL RECORD: ICD-10-PCS | Mod: CPTII,,, | Performed by: INTERNAL MEDICINE

## 2023-11-02 PROCEDURE — 1160F RVW MEDS BY RX/DR IN RCRD: CPT | Mod: CPTII,,, | Performed by: INTERNAL MEDICINE

## 2023-11-02 PROCEDURE — 3066F PR DOCUMENTATION OF TREATMENT FOR NEPHROPATHY: ICD-10-PCS | Mod: CPTII,,, | Performed by: INTERNAL MEDICINE

## 2023-11-02 PROCEDURE — 3080F DIAST BP >= 90 MM HG: CPT | Mod: CPTII,,, | Performed by: INTERNAL MEDICINE

## 2023-11-02 PROCEDURE — 99999 PR PBB SHADOW E&M-EST. PATIENT-LVL IV: CPT | Mod: PBBFAC,,, | Performed by: INTERNAL MEDICINE

## 2023-11-02 PROCEDURE — 1159F MED LIST DOCD IN RCRD: CPT | Mod: CPTII,,, | Performed by: INTERNAL MEDICINE

## 2023-11-02 PROCEDURE — 3077F PR MOST RECENT SYSTOLIC BLOOD PRESSURE >= 140 MM HG: ICD-10-PCS | Mod: CPTII,,, | Performed by: INTERNAL MEDICINE

## 2023-11-02 PROCEDURE — 99215 PR OFFICE/OUTPT VISIT, EST, LEVL V, 40-54 MIN: ICD-10-PCS | Mod: S$PBB,,, | Performed by: INTERNAL MEDICINE

## 2023-11-02 PROCEDURE — 3077F SYST BP >= 140 MM HG: CPT | Mod: CPTII,,, | Performed by: INTERNAL MEDICINE

## 2023-11-02 PROCEDURE — 3080F PR MOST RECENT DIASTOLIC BLOOD PRESSURE >= 90 MM HG: ICD-10-PCS | Mod: CPTII,,, | Performed by: INTERNAL MEDICINE

## 2023-11-02 PROCEDURE — 99999 PR PBB SHADOW E&M-EST. PATIENT-LVL IV: ICD-10-PCS | Mod: PBBFAC,,, | Performed by: INTERNAL MEDICINE

## 2023-11-02 PROCEDURE — 3066F NEPHROPATHY DOC TX: CPT | Mod: CPTII,,, | Performed by: INTERNAL MEDICINE

## 2023-11-02 PROCEDURE — 99214 OFFICE O/P EST MOD 30 MIN: CPT | Mod: PBBFAC | Performed by: INTERNAL MEDICINE

## 2023-11-02 RX ORDER — TRAMADOL HYDROCHLORIDE 50 MG/1
50 TABLET ORAL EVERY 8 HOURS PRN
Qty: 21 TABLET | Refills: 0 | Status: SHIPPED | OUTPATIENT
Start: 2023-11-02

## 2023-11-02 RX ORDER — PREGABALIN 75 MG/1
75 CAPSULE ORAL 2 TIMES DAILY
Qty: 60 CAPSULE | Refills: 3 | Status: SHIPPED | OUTPATIENT
Start: 2023-11-02

## 2023-11-02 RX ORDER — COLCHICINE 0.6 MG/1
0.6 TABLET ORAL DAILY
Qty: 30 TABLET | Refills: 11 | Status: SHIPPED | OUTPATIENT
Start: 2023-11-02 | End: 2024-11-01

## 2023-11-02 NOTE — NURSING
0825: Contacted [facility] for ANC. Reported ANC of 0.57, this nurse notified Dr. MADISON in rheumatology, this nurse ordered to hold Cytoxan today. Dr. MADISON ordered pt to be placed in treatment room for him to assess and discuss treatment plan. This nurse waiting for further orders in regards to possible IV solumedrol per Dr. MADISON.

## 2023-11-02 NOTE — PROGRESS NOTES
RHEUMATOLOGY CLINIC FOLLOW UP VISIT  Chief complaints, HPI, ROS, EXAM, Assessment & Plans:-  Alisia Jolly a 33 y.o. pleasant female comes in for worsening right ankle pain.  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  She complains of worsening right foot pain for the past month.  Went to emergency room and received IV Solu-Medrol.  Denies any infection.  No fever.  Have restarted taking CellCept, Plaquenil and prednisone for the past 3 days.  She was admitted back in July with severe lupus flare and severe glomerulonephritis and proteinuria.  Received 3 doses of 100 mg IV Solu-Medrol.  She took CellCept, Plaquenil and prednisone for 3 weeks after discharge from hospital in July.  Denies any injury..    1. Acute right ankle pain    2. Neuropathy    3. Fibromyalgia    4. Systemic lupus erythematosus arthritis    5. SLE glomerulonephritis syndrome, WHO class V    6. High risk medication use    7. Drug-induced immunodeficiency    8. Chronic pain of right ankle      Problem List Items Addressed This Visit       SLE glomerulonephritis syndrome, WHO class V    Overview      12/12/2018  Continue follow-up and management with Dr. Blair         Systemic lupus erythematosus arthritis     Other Visit Diagnoses       Acute right ankle pain    -  Primary    Relevant Medications    colchicine, gout, (COLCRYS) 0.6 mg tablet    Other Relevant Orders    Uric Acid (Completed)    Ambulatory referral/consult to Podiatry    Neuropathy        Relevant Medications    traMADoL (ULTRAM) 50 mg tablet    pregabalin (LYRICA) 75 MG capsule    Fibromyalgia        Relevant Medications    traMADoL (ULTRAM) 50 mg tablet    pregabalin (LYRICA) 75 MG capsule    High risk medication use        Drug-induced immunodeficiency        Chronic pain of right ankle        Relevant Orders     MRI Ankle Without Contrast Right    Ambulatory referral/consult to Podiatry            Labs reviewed today:-   Latest Reference Range & Units 11/02/23 07:20 11/02/23 07:25 11/02/23 09:17   WBC 3.90 - 12.70 K/uL  2.16 (L)    RBC 4.00 - 5.40 M/uL  4.59    Hemoglobin 12.0 - 16.0 g/dL  11.0 (L)    Hematocrit 37.0 - 48.5 %  34.6 (L)    MCV 82 - 98 fL  75 (L)    MCH 27.0 - 31.0 pg  24.0 (L)    MCHC 32.0 - 36.0 g/dL  31.8 (L)    RDW 11.5 - 14.5 %  15.6 (H)    Platelet Count 150 - 450 K/uL  321    MPV 9.2 - 12.9 fL  10.5    Gran % 38.0 - 73.0 %  26.4 (L)    Lymph % 18.0 - 48.0 %  48.1 (H)    Mono % 4.0 - 15.0 %  23.6 (H)    Eosinophil % 0.0 - 8.0 %  0.9    Basophil % 0.0 - 1.9 %  0.5    Immature Granulocytes 0.0 - 0.5 %  0.5    Gran # (ANC) 1.8 - 7.7 K/uL  0.6 (L)    Lymph # 1.0 - 4.8 K/uL  1.0    Mono # 0.3 - 1.0 K/uL  0.5    Eos # 0.0 - 0.5 K/uL  0.0    Baso # 0.00 - 0.20 K/uL  0.01    Immature Grans (Abs) 0.00 - 0.04 K/uL  0.01    nRBC 0 /100 WBC  0    Differential Method   Automated    Ovalocytes   Occasional    Aniso   Slight    Poikilocytosis   Slight    Teardrop Cells   Occasional    Sodium 136 - 145 mmol/L  141    Potassium 3.5 - 5.1 mmol/L  3.8    Chloride 95 - 110 mmol/L  109    CO2 23 - 29 mmol/L  24    Anion Gap 8 - 16 mmol/L  8    BUN 6 - 20 mg/dL  9    Creatinine 0.5 - 1.4 mg/dL  0.6    eGFR >60 mL/min/1.73 m^2  >60    Glucose 70 - 110 mg/dL  98    Calcium 8.7 - 10.5 mg/dL  8.8    ALP 55 - 135 U/L  63    PROTEIN TOTAL 6.0 - 8.4 g/dL  6.1    Albumin 3.5 - 5.2 g/dL  3.0 (L)    Uric Acid 2.4 - 5.7 mg/dL   4.2   BILIRUBIN TOTAL 0.1 - 1.0 mg/dL  0.2    AST 10 - 40 U/L  16    ALT 10 - 44 U/L  13    Preg Test, Ur  Negative     Specimen UA  Urine, Clean Catch     Color, UA Yellow, Straw, Erin  Yellow     Appearance, UA Clear  Clear     Specific Gravity, UA 1.005 - 1.030  1.020     pH, UA 5.0 - 8.0  7.0     Protein, UA Negative  1+ !     Glucose, UA Negative  Negative     Ketones, UA Negative  Negative     Occult  Blood UA Negative  Negative     NITRITE UA Negative  Negative     Bilirubin (UA) Negative  Negative     Leukocytes, UA Negative  Negative     RBC, UA 0 - 4 /hpf 0     WBC, UA 0 - 5 /hpf 0     Bacteria, UA None-Occ /hpf Occasional     Squam Epithel, UA /hpf 3     Hyaline Casts, UA 0-1/lpf /lpf 0     Microscopic Comment  SEE COMMENT     (L): Data is abnormally low  (H): Data is abnormally high  !: Data is abnormal      Worsening right ankle pain with tenderness on the medial aspect without ankle joint effusion.  X-ray showed no significant abnormality.  Ordered MRI today to look for any soft tissue injury.  Tramadol short-term p.r.n. for pain along with Lyrica for fibromyalgia..  Significant improvement of proteinuria with intermittent IV Solu-Medrol and intermittent CellCept, Plaquenil and prednisone.  She has extreme difficulty with oral medications.  Severe intolerance to rituximab in the past.  The plan was to try Cytoxan.  Cytoxan discontinued due to neutropenia.  We will repeat labs in 2 weeks and consider further lupus therapy.   I have explained all of the above in detail and the patient understands all of the current recommendation(s). I have answered all questions to the best of my ability and to their complete satisfaction.         Total time spent 40 minutes including time needed to review the records, the   patient evaluation, documentation, face-to-face discussion with the patient,   coordination of care and counseling.    Level V visit.  # Follow up in about 2 weeks (around 11/16/2023).      Disclaimer: This note was prepared using voice recognition system and is likely to have sound alike errors and is not proof read.  Please call me with any questions.

## 2023-11-11 ENCOUNTER — OFFICE VISIT (OUTPATIENT)
Dept: URGENT CARE | Facility: CLINIC | Age: 33
End: 2023-11-11
Payer: MEDICAID

## 2023-11-11 VITALS
BODY MASS INDEX: 32.82 KG/M2 | TEMPERATURE: 101 F | HEIGHT: 65 IN | WEIGHT: 197 LBS | SYSTOLIC BLOOD PRESSURE: 140 MMHG | RESPIRATION RATE: 18 BRPM | DIASTOLIC BLOOD PRESSURE: 97 MMHG | OXYGEN SATURATION: 100 % | HEART RATE: 94 BPM

## 2023-11-11 DIAGNOSIS — J10.1 INFLUENZA B: Primary | ICD-10-CM

## 2023-11-11 DIAGNOSIS — R50.9 FEVER, UNSPECIFIED FEVER CAUSE: ICD-10-CM

## 2023-11-11 LAB
CTP QC/QA: YES
POC MOLECULAR INFLUENZA A AGN: NEGATIVE
POC MOLECULAR INFLUENZA B AGN: POSITIVE

## 2023-11-11 PROCEDURE — 87502 POCT INFLUENZA A/B MOLECULAR: ICD-10-PCS | Mod: QW,S$GLB,, | Performed by: NURSE PRACTITIONER

## 2023-11-11 PROCEDURE — 99214 OFFICE O/P EST MOD 30 MIN: CPT | Mod: S$GLB,,, | Performed by: NURSE PRACTITIONER

## 2023-11-11 PROCEDURE — 99214 PR OFFICE/OUTPT VISIT, EST, LEVL IV, 30-39 MIN: ICD-10-PCS | Mod: S$GLB,,, | Performed by: NURSE PRACTITIONER

## 2023-11-11 PROCEDURE — 87502 INFLUENZA DNA AMP PROBE: CPT | Mod: QW,S$GLB,, | Performed by: NURSE PRACTITIONER

## 2023-11-11 RX ORDER — OSELTAMIVIR PHOSPHATE 75 MG/1
75 CAPSULE ORAL 2 TIMES DAILY
Qty: 10 CAPSULE | Refills: 0 | Status: SHIPPED | OUTPATIENT
Start: 2023-11-11 | End: 2023-11-16

## 2023-11-11 NOTE — PATIENT INSTRUCTIONS
Flu Discharge Instructions  You have been diagnosed with Influenza. Which is viral in nature. Influenza may take 5-7 days to run its course.   You are contagious until you are fever free for 24 hours without any antipyretic medications such as tylenol or ibuprofen.   Please drink plenty of fluids.  Please get plenty of rest.  Please return here or go to the Emergency Department for any concerns or worsening of condition.  Tamiflu prescription has been discussed and if prescribed, please take to completion unless you cannot tolerate the side effects.   If you were prescribed a narcotic medication, do not drive or operate heavy equipment or machinery while taking these medications.  Take tylenol (acetominophen) for fever, chills or body aches every 4 hours. do not exceed 4000 mg/ day.  Take Motrin (Ibuprofen) every 4 hours for fever, chills, pain or inflammation.  Use an antihistmine such as claritin or zyrtec to dry you out. Use pseudoephedrine (behind the counter) to decongest (beware this can raise your blood pressure). Use mucinex (guaifenisin) to break up mucous

## 2023-11-11 NOTE — PROGRESS NOTES
"Subjective:      Patient ID: Alisia Vines is a 33 y.o. female.    Vitals:  height is 5' 5" (1.651 m) and weight is 89.4 kg (197 lb). Her tympanic temperature is 100.6 °F (38.1 °C) (abnormal). Her blood pressure is 140/97 (abnormal) and her pulse is 94. Her respiration is 18 and oxygen saturation is 100%.     Chief Complaint: Fever (101.3 F)    Alisia Vines is a 33 year old female whom presents today for fever 101.3 F that started today. Patient states she has body aches and is fatigue. Patient states she was exposed to flu by her son.     Fever   This is a new problem. The current episode started today. The problem occurs constantly. The problem has been unchanged. Maximum temperature: 101.3 F. Associated symptoms include congestion, coughing, headaches, muscle aches and sleepiness. Pertinent negatives include no abdominal pain, chest pain, diarrhea, ear pain, nausea, rash, sore throat, urinary pain, vomiting or wheezing. She has tried nothing for the symptoms.       Constitution: Positive for fever.   HENT:  Positive for congestion. Negative for ear pain and sore throat.    Cardiovascular:  Negative for chest pain.   Respiratory:  Positive for cough. Negative for wheezing.    Gastrointestinal:  Negative for abdominal pain, nausea, vomiting and diarrhea.   Genitourinary:  Negative for dysuria.   Skin:  Negative for rash.   Neurological:  Positive for headaches.      Objective:     Physical Exam   Constitutional: She is oriented to person, place, and time. She appears well-developed. She is cooperative. No distress.   HENT:   Head: Normocephalic and atraumatic.   Ears:   Right Ear: Hearing, tympanic membrane, external ear and ear canal normal.   Left Ear: Hearing, tympanic membrane, external ear and ear canal normal.   Nose: Congestion present. No mucosal edema or nasal deformity. No epistaxis. Right sinus exhibits no maxillary sinus tenderness and no frontal sinus tenderness. Left sinus exhibits " no maxillary sinus tenderness and no frontal sinus tenderness.   Mouth/Throat: Uvula is midline, oropharynx is clear and moist and mucous membranes are normal. Mucous membranes are moist. No trismus in the jaw. Normal dentition. No uvula swelling. Oropharynx is clear.   Eyes: Conjunctivae and lids are normal.   Neck: Trachea normal and phonation normal. Neck supple.   Cardiovascular: Normal rate, regular rhythm, normal heart sounds and normal pulses.   Pulmonary/Chest: Effort normal and breath sounds normal.   Abdominal: Normal appearance and bowel sounds are normal. Soft.   Musculoskeletal: Normal range of motion.         General: Normal range of motion.   Neurological: no focal deficit. She is alert and oriented to person, place, and time. She exhibits normal muscle tone.   Skin: Skin is warm, dry and intact. Capillary refill takes less than 2 seconds.   Psychiatric: Her speech is normal and behavior is normal. Judgment and thought content normal.   Nursing note and vitals reviewed.    Results for orders placed or performed in visit on 11/11/23   POCT Influenza A/B MOLECULAR   Result Value Ref Range    POC Molecular Influenza A Ag Negative Negative, Not Reported    POC Molecular Influenza B Ag Positive (A) Negative, Not Reported     Acceptable Yes      *Note: Due to a large number of results and/or encounters for the requested time period, some results have not been displayed. A complete set of results can be found in Results Review.       Assessment:     1. Influenza B    2. Fever, unspecified fever cause        Plan:       Influenza B  -     oseltamivir (TAMIFLU) 75 MG capsule; Take 1 capsule (75 mg total) by mouth 2 (two) times daily. for 5 days  Dispense: 10 capsule; Refill: 0    Fever, unspecified fever cause  -     POCT Influenza A/B MOLECULAR          Medical Decision Making:   Differential Diagnosis:   Viral upper respiratory infection, Bacterial upper respiratory infection, Pneumonia,  covid19, influenza,bacterial vs viral sinusitis.     Clinical Tests:   Lab Tests: Ordered and Reviewed       <> Summary of Lab: Flu b positive  Urgent Care Management:  Discussed positive Influenza B results with patient. Advised patient to begin tamiflu for symptom relief and take OTC zyrtec D and flonase nasal spray for symptom relief. Get plenty of rest and drink plenty of fluids. Discussed that the patient is contagious for 24 hours after starting the Tamilfu or 24 hours after last fever, whichever happens last. Advised her to return here or go to the Emergency Department for any concerns or worsening of condition.Advised patient to take tylenol (acetominophen) for fever, chills or body aches every 4 hours but not to exceed 4000 mg/ day. Patient vitals stable. Patient has no questions or concerns at this time, all questions were answered before discharge.The patient verbalized understanding and agrees with the discussed plan of care. Patient remained stable and was discharged in no acute distress.                   Patient Instructions   Flu Discharge Instructions  You have been diagnosed with Influenza. Which is viral in nature. Influenza may take 5-7 days to run its course.   You are contagious until you are fever free for 24 hours without any antipyretic medications such as tylenol or ibuprofen.   Please drink plenty of fluids.  Please get plenty of rest.  Please return here or go to the Emergency Department for any concerns or worsening of condition.  Tamiflu prescription has been discussed and if prescribed, please take to completion unless you cannot tolerate the side effects.   If you were prescribed a narcotic medication, do not drive or operate heavy equipment or machinery while taking these medications.  Take tylenol (acetominophen) for fever, chills or body aches every 4 hours. do not exceed 4000 mg/ day.  Take Motrin (Ibuprofen) every 4 hours for fever, chills, pain or inflammation.  Use an  antihistmine such as claritin or zyrtec to dry you out. Use pseudoephedrine (behind the counter) to decongest (beware this can raise your blood pressure). Use mucinex (guaifenisin) to break up mucous

## 2023-11-13 ENCOUNTER — LAB VISIT (OUTPATIENT)
Dept: LAB | Facility: HOSPITAL | Age: 33
End: 2023-11-13
Attending: PHYSICIAN ASSISTANT
Payer: MEDICAID

## 2023-11-13 ENCOUNTER — PATIENT MESSAGE (OUTPATIENT)
Dept: RHEUMATOLOGY | Facility: CLINIC | Age: 33
End: 2023-11-13
Payer: MEDICAID

## 2023-11-13 DIAGNOSIS — M32.9 SYSTEMIC LUPUS ERYTHEMATOSUS ARTHRITIS: ICD-10-CM

## 2023-11-13 DIAGNOSIS — M32.14 SLE GLOMERULONEPHRITIS SYNDROME, WHO CLASS V: ICD-10-CM

## 2023-11-13 DIAGNOSIS — Z51.81 MEDICATION MONITORING ENCOUNTER: ICD-10-CM

## 2023-11-13 LAB
ALBUMIN SERPL BCP-MCNC: 2.8 G/DL (ref 3.5–5.2)
ALP SERPL-CCNC: 60 U/L (ref 55–135)
ALT SERPL W/O P-5'-P-CCNC: 13 U/L (ref 10–44)
ANION GAP SERPL CALC-SCNC: 7 MMOL/L (ref 8–16)
AST SERPL-CCNC: 19 U/L (ref 10–40)
BACTERIA #/AREA URNS HPF: NORMAL /HPF
BASOPHILS # BLD AUTO: 0.01 K/UL (ref 0–0.2)
BASOPHILS # BLD AUTO: 0.01 K/UL (ref 0–0.2)
BASOPHILS NFR BLD: 0.3 % (ref 0–1.9)
BASOPHILS NFR BLD: 0.3 % (ref 0–1.9)
BILIRUB SERPL-MCNC: 0.3 MG/DL (ref 0.1–1)
BILIRUB UR QL STRIP: ABNORMAL
BUN SERPL-MCNC: 8 MG/DL (ref 6–20)
C3 SERPL-MCNC: 93 MG/DL (ref 50–180)
C4 SERPL-MCNC: 21 MG/DL (ref 11–44)
CALCIUM SERPL-MCNC: 8.4 MG/DL (ref 8.7–10.5)
CHLORIDE SERPL-SCNC: 109 MMOL/L (ref 95–110)
CLARITY UR: ABNORMAL
CO2 SERPL-SCNC: 23 MMOL/L (ref 23–29)
COLOR UR: ABNORMAL
CREAT SERPL-MCNC: 0.7 MG/DL (ref 0.5–1.4)
CREAT UR-MCNC: 316 MG/DL (ref 15–325)
CRP SERPL-MCNC: 10 MG/L (ref 0–8.2)
DIFFERENTIAL METHOD: ABNORMAL
DIFFERENTIAL METHOD: ABNORMAL
EOSINOPHIL # BLD AUTO: 0.1 K/UL (ref 0–0.5)
EOSINOPHIL # BLD AUTO: 0.1 K/UL (ref 0–0.5)
EOSINOPHIL NFR BLD: 2.1 % (ref 0–8)
EOSINOPHIL NFR BLD: 2.1 % (ref 0–8)
ERYTHROCYTE [DISTWIDTH] IN BLOOD BY AUTOMATED COUNT: 17.1 % (ref 11.5–14.5)
ERYTHROCYTE [DISTWIDTH] IN BLOOD BY AUTOMATED COUNT: 17.1 % (ref 11.5–14.5)
ERYTHROCYTE [SEDIMENTATION RATE] IN BLOOD BY PHOTOMETRIC METHOD: 66 MM/HR (ref 0–36)
EST. GFR  (NO RACE VARIABLE): >60 ML/MIN/1.73 M^2
GLUCOSE SERPL-MCNC: 92 MG/DL (ref 70–110)
GLUCOSE UR QL STRIP: NEGATIVE
HCT VFR BLD AUTO: 35.3 % (ref 37–48.5)
HCT VFR BLD AUTO: 35.3 % (ref 37–48.5)
HGB BLD-MCNC: 11.1 G/DL (ref 12–16)
HGB BLD-MCNC: 11.1 G/DL (ref 12–16)
HGB UR QL STRIP: NEGATIVE
HYALINE CASTS #/AREA URNS LPF: 1 /LPF
IMM GRANULOCYTES # BLD AUTO: 0.02 K/UL (ref 0–0.04)
IMM GRANULOCYTES # BLD AUTO: 0.02 K/UL (ref 0–0.04)
IMM GRANULOCYTES NFR BLD AUTO: 0.7 % (ref 0–0.5)
IMM GRANULOCYTES NFR BLD AUTO: 0.7 % (ref 0–0.5)
KETONES UR QL STRIP: NEGATIVE
LEUKOCYTE ESTERASE UR QL STRIP: NEGATIVE
LYMPHOCYTES # BLD AUTO: 1.4 K/UL (ref 1–4.8)
LYMPHOCYTES # BLD AUTO: 1.4 K/UL (ref 1–4.8)
LYMPHOCYTES NFR BLD: 46.7 % (ref 18–48)
LYMPHOCYTES NFR BLD: 46.7 % (ref 18–48)
MCH RBC QN AUTO: 23.4 PG (ref 27–31)
MCH RBC QN AUTO: 23.4 PG (ref 27–31)
MCHC RBC AUTO-ENTMCNC: 31.4 G/DL (ref 32–36)
MCHC RBC AUTO-ENTMCNC: 31.4 G/DL (ref 32–36)
MCV RBC AUTO: 75 FL (ref 82–98)
MCV RBC AUTO: 75 FL (ref 82–98)
MICROSCOPIC COMMENT: NORMAL
MONOCYTES # BLD AUTO: 0.3 K/UL (ref 0.3–1)
MONOCYTES # BLD AUTO: 0.3 K/UL (ref 0.3–1)
MONOCYTES NFR BLD: 11.3 % (ref 4–15)
MONOCYTES NFR BLD: 11.3 % (ref 4–15)
NEUTROPHILS # BLD AUTO: 1.1 K/UL (ref 1.8–7.7)
NEUTROPHILS # BLD AUTO: 1.1 K/UL (ref 1.8–7.7)
NEUTROPHILS NFR BLD: 38.9 % (ref 38–73)
NEUTROPHILS NFR BLD: 38.9 % (ref 38–73)
NITRITE UR QL STRIP: NEGATIVE
NRBC BLD-RTO: 0 /100 WBC
NRBC BLD-RTO: 0 /100 WBC
PH UR STRIP: 7 [PH] (ref 5–8)
PLATELET # BLD AUTO: 321 K/UL (ref 150–450)
PLATELET # BLD AUTO: 321 K/UL (ref 150–450)
PMV BLD AUTO: 10.6 FL (ref 9.2–12.9)
PMV BLD AUTO: 10.6 FL (ref 9.2–12.9)
POTASSIUM SERPL-SCNC: 3.7 MMOL/L (ref 3.5–5.1)
PROT SERPL-MCNC: 6 G/DL (ref 6–8.4)
PROT UR QL STRIP: ABNORMAL
PROT UR-MCNC: 508 MG/DL (ref 0–15)
PROT/CREAT UR: 1.61 MG/G{CREAT} (ref 0–0.2)
RBC # BLD AUTO: 4.74 M/UL (ref 4–5.4)
RBC # BLD AUTO: 4.74 M/UL (ref 4–5.4)
RBC #/AREA URNS HPF: 1 /HPF (ref 0–4)
SODIUM SERPL-SCNC: 139 MMOL/L (ref 136–145)
SP GR UR STRIP: >=1.03 (ref 1–1.03)
SQUAMOUS #/AREA URNS HPF: 3 /HPF
URN SPEC COLLECT METH UR: ABNORMAL
WBC # BLD AUTO: 2.91 K/UL (ref 3.9–12.7)
WBC # BLD AUTO: 2.91 K/UL (ref 3.9–12.7)
WBC #/AREA URNS HPF: 4 /HPF (ref 0–5)

## 2023-11-13 PROCEDURE — 81000 URINALYSIS NONAUTO W/SCOPE: CPT | Performed by: INTERNAL MEDICINE

## 2023-11-13 PROCEDURE — 80053 COMPREHEN METABOLIC PANEL: CPT | Performed by: INTERNAL MEDICINE

## 2023-11-13 PROCEDURE — 85025 COMPLETE CBC W/AUTO DIFF WBC: CPT | Performed by: PHYSICIAN ASSISTANT

## 2023-11-13 PROCEDURE — 36415 COLL VENOUS BLD VENIPUNCTURE: CPT | Performed by: INTERNAL MEDICINE

## 2023-11-13 PROCEDURE — 86225 DNA ANTIBODY NATIVE: CPT | Performed by: INTERNAL MEDICINE

## 2023-11-13 PROCEDURE — 86225 DNA ANTIBODY NATIVE: CPT | Mod: 59 | Performed by: PHYSICIAN ASSISTANT

## 2023-11-13 PROCEDURE — 85652 RBC SED RATE AUTOMATED: CPT | Performed by: INTERNAL MEDICINE

## 2023-11-13 PROCEDURE — 86140 C-REACTIVE PROTEIN: CPT | Performed by: INTERNAL MEDICINE

## 2023-11-13 PROCEDURE — 86160 COMPLEMENT ANTIGEN: CPT | Performed by: INTERNAL MEDICINE

## 2023-11-13 PROCEDURE — 86160 COMPLEMENT ANTIGEN: CPT | Mod: 59 | Performed by: INTERNAL MEDICINE

## 2023-11-13 PROCEDURE — 82570 ASSAY OF URINE CREATININE: CPT | Performed by: INTERNAL MEDICINE

## 2023-11-13 RX ORDER — SODIUM CHLORIDE 0.9 % (FLUSH) 0.9 %
10 SYRINGE (ML) INJECTION
Status: CANCELLED | OUTPATIENT
Start: 2023-11-13

## 2023-11-13 RX ORDER — HEPARIN 100 UNIT/ML
500 SYRINGE INTRAVENOUS
Status: CANCELLED | OUTPATIENT
Start: 2023-11-13

## 2023-11-14 ENCOUNTER — TELEPHONE (OUTPATIENT)
Dept: RHEUMATOLOGY | Facility: CLINIC | Age: 33
End: 2023-11-14
Payer: MEDICAID

## 2023-11-14 ENCOUNTER — HOSPITAL ENCOUNTER (OUTPATIENT)
Dept: RADIOLOGY | Facility: HOSPITAL | Age: 33
Discharge: HOME | End: 2023-11-14
Attending: INTERNAL MEDICINE
Payer: MEDICAID

## 2023-11-14 DIAGNOSIS — M25.571 CHRONIC PAIN OF RIGHT ANKLE: ICD-10-CM

## 2023-11-14 DIAGNOSIS — G89.29 CHRONIC PAIN OF RIGHT ANKLE: ICD-10-CM

## 2023-11-14 PROCEDURE — 73721 MRI JNT OF LWR EXTRE W/O DYE: CPT | Mod: 26,RT,, | Performed by: RADIOLOGY

## 2023-11-14 PROCEDURE — 73721 MRI ANKLE WITHOUT CONTRAST RIGHT: ICD-10-PCS | Mod: 26,RT,, | Performed by: RADIOLOGY

## 2023-11-14 PROCEDURE — 73721 MRI JNT OF LWR EXTRE W/O DYE: CPT | Mod: TC,RT

## 2023-11-14 NOTE — TELEPHONE ENCOUNTER
----- Message from Shane Blair MD sent at 11/13/2023 11:00 PM CST -----  Cancel prior request for solumedrol on 16'th since patient was diagnosed with Influenza with positive for Flu B on 11/11/2023. Reschedule appointment with me and solumedrol infusion to 11/21/2023 . Thanks.

## 2023-11-14 NOTE — TELEPHONE ENCOUNTER
Patient aware that appt for Dr MADISON was rescheduled to 11/21 with solu medrol infusion to follow

## 2023-11-15 LAB
DNA TITER: NORMAL
DSDNA AB SER-ACNC: POSITIVE [IU]/ML

## 2023-11-16 ENCOUNTER — PATIENT MESSAGE (OUTPATIENT)
Dept: RHEUMATOLOGY | Facility: CLINIC | Age: 33
End: 2023-11-16
Payer: MEDICAID

## 2023-11-16 DIAGNOSIS — G89.29 CHRONIC PAIN OF RIGHT ANKLE: Primary | ICD-10-CM

## 2023-11-16 DIAGNOSIS — M25.571 CHRONIC PAIN OF RIGHT ANKLE: Primary | ICD-10-CM

## 2023-11-21 ENCOUNTER — TELEPHONE (OUTPATIENT)
Dept: INFUSION THERAPY | Facility: HOSPITAL | Age: 33
End: 2023-11-21
Payer: MEDICAID

## 2023-11-21 ENCOUNTER — INFUSION (OUTPATIENT)
Dept: INFUSION THERAPY | Facility: HOSPITAL | Age: 33
End: 2023-11-21
Attending: PHYSICIAN ASSISTANT
Payer: MEDICAID

## 2023-11-21 ENCOUNTER — OFFICE VISIT (OUTPATIENT)
Dept: RHEUMATOLOGY | Facility: CLINIC | Age: 33
End: 2023-11-21
Payer: MEDICAID

## 2023-11-21 VITALS
BODY MASS INDEX: 33.09 KG/M2 | WEIGHT: 198.63 LBS | HEART RATE: 84 BPM | HEIGHT: 65 IN | DIASTOLIC BLOOD PRESSURE: 96 MMHG | SYSTOLIC BLOOD PRESSURE: 146 MMHG

## 2023-11-21 VITALS
DIASTOLIC BLOOD PRESSURE: 96 MMHG | HEART RATE: 72 BPM | SYSTOLIC BLOOD PRESSURE: 150 MMHG | RESPIRATION RATE: 16 BRPM | TEMPERATURE: 98 F | OXYGEN SATURATION: 99 %

## 2023-11-21 DIAGNOSIS — Z79.899 DRUG-INDUCED IMMUNODEFICIENCY: ICD-10-CM

## 2023-11-21 DIAGNOSIS — M32.9 SLE (SYSTEMIC LUPUS ERYTHEMATOSUS RELATED SYNDROME): Primary | ICD-10-CM

## 2023-11-21 DIAGNOSIS — M67.979 TIBIALIS POSTERIOR TENDINOPATHY: ICD-10-CM

## 2023-11-21 DIAGNOSIS — M32.14 SLE GLOMERULONEPHRITIS SYNDROME, WHO CLASS V: ICD-10-CM

## 2023-11-21 DIAGNOSIS — M32.9 SYSTEMIC LUPUS ERYTHEMATOSUS ARTHRITIS: Primary | ICD-10-CM

## 2023-11-21 DIAGNOSIS — D84.821 DRUG-INDUCED IMMUNODEFICIENCY: ICD-10-CM

## 2023-11-21 DIAGNOSIS — Z51.81 ENCOUNTER FOR MEDICATION MONITORING: ICD-10-CM

## 2023-11-21 PROCEDURE — 3066F NEPHROPATHY DOC TX: CPT | Mod: CPTII,,, | Performed by: INTERNAL MEDICINE

## 2023-11-21 PROCEDURE — 99999 PR PBB SHADOW E&M-EST. PATIENT-LVL III: ICD-10-PCS | Mod: PBBFAC,,, | Performed by: INTERNAL MEDICINE

## 2023-11-21 PROCEDURE — 3080F PR MOST RECENT DIASTOLIC BLOOD PRESSURE >= 90 MM HG: ICD-10-PCS | Mod: CPTII,,, | Performed by: INTERNAL MEDICINE

## 2023-11-21 PROCEDURE — 99999 PR PBB SHADOW E&M-EST. PATIENT-LVL III: CPT | Mod: PBBFAC,,, | Performed by: INTERNAL MEDICINE

## 2023-11-21 PROCEDURE — 3008F BODY MASS INDEX DOCD: CPT | Mod: CPTII,,, | Performed by: INTERNAL MEDICINE

## 2023-11-21 PROCEDURE — 3066F PR DOCUMENTATION OF TREATMENT FOR NEPHROPATHY: ICD-10-PCS | Mod: CPTII,,, | Performed by: INTERNAL MEDICINE

## 2023-11-21 PROCEDURE — 63600175 PHARM REV CODE 636 W HCPCS: Performed by: INTERNAL MEDICINE

## 2023-11-21 PROCEDURE — 3080F DIAST BP >= 90 MM HG: CPT | Mod: CPTII,,, | Performed by: INTERNAL MEDICINE

## 2023-11-21 PROCEDURE — 3008F PR BODY MASS INDEX (BMI) DOCUMENTED: ICD-10-PCS | Mod: CPTII,,, | Performed by: INTERNAL MEDICINE

## 2023-11-21 PROCEDURE — 3077F PR MOST RECENT SYSTOLIC BLOOD PRESSURE >= 140 MM HG: ICD-10-PCS | Mod: CPTII,,, | Performed by: INTERNAL MEDICINE

## 2023-11-21 PROCEDURE — 99215 OFFICE O/P EST HI 40 MIN: CPT | Mod: S$PBB,,, | Performed by: INTERNAL MEDICINE

## 2023-11-21 PROCEDURE — 96365 THER/PROPH/DIAG IV INF INIT: CPT

## 2023-11-21 PROCEDURE — 3077F SYST BP >= 140 MM HG: CPT | Mod: CPTII,,, | Performed by: INTERNAL MEDICINE

## 2023-11-21 PROCEDURE — 99213 OFFICE O/P EST LOW 20 MIN: CPT | Mod: PBBFAC,25 | Performed by: INTERNAL MEDICINE

## 2023-11-21 PROCEDURE — 25000003 PHARM REV CODE 250: Performed by: INTERNAL MEDICINE

## 2023-11-21 PROCEDURE — 99215 PR OFFICE/OUTPT VISIT, EST, LEVL V, 40-54 MIN: ICD-10-PCS | Mod: S$PBB,,, | Performed by: INTERNAL MEDICINE

## 2023-11-21 RX ORDER — SODIUM CHLORIDE 0.9 % (FLUSH) 0.9 %
10 SYRINGE (ML) INJECTION
Status: CANCELLED | OUTPATIENT
Start: 2023-11-21

## 2023-11-21 RX ORDER — HEPARIN 100 UNIT/ML
500 SYRINGE INTRAVENOUS
OUTPATIENT
Start: 2023-11-21

## 2023-11-21 RX ORDER — EPINEPHRINE 0.3 MG/.3ML
0.3 INJECTION SUBCUTANEOUS ONCE AS NEEDED
Status: CANCELLED | OUTPATIENT
Start: 2023-11-21

## 2023-11-21 RX ORDER — HEPARIN 100 UNIT/ML
500 SYRINGE INTRAVENOUS
Status: CANCELLED | OUTPATIENT
Start: 2023-11-21

## 2023-11-21 RX ORDER — SODIUM CHLORIDE 0.9 % (FLUSH) 0.9 %
10 SYRINGE (ML) INJECTION
OUTPATIENT
Start: 2023-11-21

## 2023-11-21 RX ORDER — DIPHENHYDRAMINE HYDROCHLORIDE 50 MG/ML
50 INJECTION INTRAMUSCULAR; INTRAVENOUS ONCE AS NEEDED
Status: CANCELLED | OUTPATIENT
Start: 2023-11-21

## 2023-11-21 RX ADMIN — SODIUM CHLORIDE 1000 MG: 9 INJECTION, SOLUTION INTRAVENOUS at 02:11

## 2023-11-21 NOTE — TELEPHONE ENCOUNTER
Reviewed Dr MADISON's orders with pt.  She is getting IV solumedrol today and will restart the Saphnelo on 11/30 pending ins  approval with monthly visits with dr MADISON.

## 2023-11-21 NOTE — TELEPHONE ENCOUNTER
----- Message from Shane Blair MD sent at 11/21/2023  1:49 PM CST -----  IV solumedrol today. Please restart monthly saphnelo with monthly visits with me.

## 2023-11-21 NOTE — PLAN OF CARE
Patient tolerated Solumedrol well today; no adverse reaction noted.  POC reviewed with pt.  No questions or concerns voiced.  NAD noted upon discharge.  No significant new complaints voiced.   Has f/u appt(s) scheduled per MD request.    Problem: Adult Inpatient Plan of Care  Goal: Plan of Care Review  Outcome: Ongoing, Progressing  Goal: Patient-Specific Goal (Individualized)  Outcome: Ongoing, Progressing  Goal: Absence of Hospital-Acquired Illness or Injury  Outcome: Ongoing, Progressing  Intervention: Identify and Manage Fall Risk  Flowsheets (Taken 11/21/2023 154)  Safety Promotion/Fall Prevention: in recliner, wheels locked  Goal: Optimal Comfort and Wellbeing  Outcome: Ongoing, Progressing  Intervention: Provide Person-Centered Care  Flowsheets (Taken 11/21/2023 1545)  Trust Relationship/Rapport:   care explained   reassurance provided   choices provided   thoughts/feelings acknowledged   emotional support provided   empathic listening provided   questions answered   questions encouraged

## 2023-11-21 NOTE — PROGRESS NOTES
RHEUMATOLOGY CLINIC FOLLOW UP VISIT  Chief complaints, HPI, ROS, EXAM, Assessment & Plans:-  Alisia Jolly a 33 y.o. pleasant female comes in for lupus follow-up.  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  Last visit she complained of worsening right foot pain associated with swelling and stiffness.  Since exam failed to show synovitis, MRI was done which showed posterior tibial tendinosis.  She saw an external podiatrist who recommended wrist and NSAIDs.  She took 1 dose of NSAID.  She does not take any oral medications for lupus.   She was admitted back in July with severe lupus flare and severe glomerulonephritis and proteinuria.  Received 3 doses of 100 mg IV Solu-Medrol.  She took CellCept, Plaquenil and prednisone for 3 weeks after discharge from hospital in July.  Denies any injury..    Rheumatological review of system negative otherwise.  Exam shows mild tenderness on the medial aspect of right ankle.  No synovitis or effusion.  No synovitis of small joints.  1. Systemic lupus erythematosus arthritis    2. SLE glomerulonephritis syndrome, WHO class V    3. Tibialis posterior tendinopathy    4. Drug-induced immunodeficiency    5. Encounter for medication monitoring      Problem List Items Addressed This Visit       SLE glomerulonephritis syndrome, WHO class V    Overview      12/12/2018  Continue follow-up and management with Dr. Blair         Systemic lupus erythematosus arthritis - Primary    Tibialis posterior tendinopathy    Drug-induced immunodeficiency    Encounter for medication monitoring       Labs reviewed today:-   Latest Reference Range & Units 11/02/23 07:20 11/02/23 07:25 11/02/23 09:17   WBC 3.90 - 12.70 K/uL  2.16 (L)    RBC 4.00 - 5.40 M/uL  4.59    Hemoglobin 12.0 - 16.0 g/dL  11.0 (L)    Hematocrit 37.0 - 48.5 %  34.6  (L)    MCV 82 - 98 fL  75 (L)    MCH 27.0 - 31.0 pg  24.0 (L)    MCHC 32.0 - 36.0 g/dL  31.8 (L)    RDW 11.5 - 14.5 %  15.6 (H)    Platelet Count 150 - 450 K/uL  321    MPV 9.2 - 12.9 fL  10.5    Gran % 38.0 - 73.0 %  26.4 (L)    Lymph % 18.0 - 48.0 %  48.1 (H)    Mono % 4.0 - 15.0 %  23.6 (H)    Eosinophil % 0.0 - 8.0 %  0.9    Basophil % 0.0 - 1.9 %  0.5    Immature Granulocytes 0.0 - 0.5 %  0.5    Gran # (ANC) 1.8 - 7.7 K/uL  0.6 (L)    Lymph # 1.0 - 4.8 K/uL  1.0    Mono # 0.3 - 1.0 K/uL  0.5    Eos # 0.0 - 0.5 K/uL  0.0    Baso # 0.00 - 0.20 K/uL  0.01    Immature Grans (Abs) 0.00 - 0.04 K/uL  0.01    nRBC 0 /100 WBC  0    Differential Method   Automated    Ovalocytes   Occasional    Aniso   Slight    Poikilocytosis   Slight    Teardrop Cells   Occasional    Sodium 136 - 145 mmol/L  141    Potassium 3.5 - 5.1 mmol/L  3.8    Chloride 95 - 110 mmol/L  109    CO2 23 - 29 mmol/L  24    Anion Gap 8 - 16 mmol/L  8    BUN 6 - 20 mg/dL  9    Creatinine 0.5 - 1.4 mg/dL  0.6    eGFR >60 mL/min/1.73 m^2  >60    Glucose 70 - 110 mg/dL  98    Calcium 8.7 - 10.5 mg/dL  8.8    ALP 55 - 135 U/L  63    PROTEIN TOTAL 6.0 - 8.4 g/dL  6.1    Albumin 3.5 - 5.2 g/dL  3.0 (L)    Uric Acid 2.4 - 5.7 mg/dL   4.2   BILIRUBIN TOTAL 0.1 - 1.0 mg/dL  0.2    AST 10 - 40 U/L  16    ALT 10 - 44 U/L  13    Preg Test, Ur  Negative     Specimen UA  Urine, Clean Catch     Color, UA Yellow, Straw, Erin  Yellow     Appearance, UA Clear  Clear     Specific Gravity, UA 1.005 - 1.030  1.020     pH, UA 5.0 - 8.0  7.0     Protein, UA Negative  1+ !     Glucose, UA Negative  Negative     Ketones, UA Negative  Negative     Occult Blood UA Negative  Negative     NITRITE UA Negative  Negative     Bilirubin (UA) Negative  Negative     Leukocytes, UA Negative  Negative     RBC, UA 0 - 4 /hpf 0     WBC, UA 0 - 5 /hpf 0     Bacteria, UA None-Occ /hpf Occasional     Squam Epithel, UA /hpf 3     Hyaline Casts, UA 0-1/lpf /lpf 0     Microscopic Comment  SEE  COMMENT     (L): Data is abnormally low  (H): Data is abnormally high  !: Data is abnormal      Mild right ankle pain secondary to posterior tibial tendinosis noted on MRI..  Advised conservative measures.  Worsening lupus erythematosus with class 5 lupus nephritis.  Worsening proteinuria.  No active lupus arthritis.  Severe noncompliance with oral medications but compliant with IV infusion therapies.  IV Solu-Medrol today.  She was planned for Cytoxan.  But Cytoxan was not started due to severe neutropenia.  Neutropenia improving now but other than inflammatory markers and proteinuria she does not have any other symptoms of active disease presently.  Restart Saphnelo monthly infusion.    Closely follow-up every 4 weeks.  I have explained all of the above in detail and the patient understands all of the current recommendation(s). I have answered all questions to the best of my ability and to their complete satisfaction.         Total time spent 40 minutes including time needed to review the records, the   patient evaluation, documentation, face-to-face discussion with the patient,   coordination of care and counseling.    Level V visit.  # Follow up in about 4 weeks (around 12/19/2023).      Disclaimer: This note was prepared using voice recognition system and is likely to have sound alike errors and is not proof read.  Please call me with any questions.

## 2023-11-29 DIAGNOSIS — M32.14 SLE GLOMERULONEPHRITIS SYNDROME, WHO CLASS V: ICD-10-CM

## 2023-11-30 ENCOUNTER — INFUSION (OUTPATIENT)
Dept: INFUSION THERAPY | Facility: HOSPITAL | Age: 33
End: 2023-11-30
Attending: PHYSICIAN ASSISTANT
Payer: MEDICAID

## 2023-11-30 VITALS
DIASTOLIC BLOOD PRESSURE: 85 MMHG | RESPIRATION RATE: 16 BRPM | TEMPERATURE: 99 F | SYSTOLIC BLOOD PRESSURE: 135 MMHG | HEART RATE: 86 BPM | BODY MASS INDEX: 32.54 KG/M2 | WEIGHT: 195.56 LBS | OXYGEN SATURATION: 100 %

## 2023-11-30 DIAGNOSIS — M32.9 SYSTEMIC LUPUS ERYTHEMATOSUS ARTHRITIS: ICD-10-CM

## 2023-11-30 DIAGNOSIS — M32.9 SLE (SYSTEMIC LUPUS ERYTHEMATOSUS RELATED SYNDROME): Primary | ICD-10-CM

## 2023-11-30 PROCEDURE — 63600175 PHARM REV CODE 636 W HCPCS: Mod: JZ,TB | Performed by: INTERNAL MEDICINE

## 2023-11-30 PROCEDURE — 25000003 PHARM REV CODE 250: Performed by: INTERNAL MEDICINE

## 2023-11-30 PROCEDURE — 96365 THER/PROPH/DIAG IV INF INIT: CPT

## 2023-11-30 RX ORDER — DIPHENHYDRAMINE HYDROCHLORIDE 50 MG/ML
50 INJECTION INTRAMUSCULAR; INTRAVENOUS ONCE AS NEEDED
Status: CANCELLED | OUTPATIENT
Start: 2023-12-28

## 2023-11-30 RX ORDER — SODIUM CHLORIDE 0.9 % (FLUSH) 0.9 %
10 SYRINGE (ML) INJECTION
Status: CANCELLED | OUTPATIENT
Start: 2023-12-28

## 2023-11-30 RX ORDER — HEPARIN 100 UNIT/ML
500 SYRINGE INTRAVENOUS
Status: CANCELLED | OUTPATIENT
Start: 2023-12-28

## 2023-11-30 RX ORDER — EPINEPHRINE 0.3 MG/.3ML
0.3 INJECTION SUBCUTANEOUS ONCE AS NEEDED
Status: CANCELLED | OUTPATIENT
Start: 2023-12-28

## 2023-11-30 RX ADMIN — ANIFROLUMAB 300 MG: 300 INJECTION, SOLUTION INTRAVENOUS at 09:11

## 2023-11-30 RX ADMIN — SODIUM CHLORIDE: 9 INJECTION, SOLUTION INTRAVENOUS at 09:11

## 2023-11-30 NOTE — PLAN OF CARE
Patient tolerated Saphnelo well today; no adverse reaction noted.  No significant complaints voiced.  IV discontinued with catheter intact and pressure dressing applied to the site.  Has f/u appt(s) scheduled per MD request.  No questions or concerns voiced.  NAD noted upon discharge.

## 2023-11-30 NOTE — NURSING
Infusion # 1 - Saphnelo 300 mg q 4 weeks (restart)  Last dose- 9/27/2023    Any:  -recent illness, infection, or antibiotic use in past week- denied  -open wounds or mouth sores- denied  -invasive procedures or surgeries in past 4 weeks or in upcoming 4 weeks- denied  -vaccinations in past week- denied  -any new symptoms/change in symptoms-denied  -chance you may be pregnant- denied      Recent labs? 11/13/2023  Last Rheumatology provider visit- Seen by Dr MADISON on 11/21/2023     Premeds-none needed     Saphnelo 300 mg administered IV at a 30 minute rate per orders; see MAR and vitals for more details.

## 2023-12-01 RX ORDER — MYCOPHENOLATE MOFETIL 500 MG/1
500 TABLET ORAL 2 TIMES DAILY
Qty: 60 TABLET | Refills: 3 | Status: SHIPPED | OUTPATIENT
Start: 2023-12-01

## 2023-12-15 ENCOUNTER — PATIENT MESSAGE (OUTPATIENT)
Dept: RHEUMATOLOGY | Facility: CLINIC | Age: 33
End: 2023-12-15

## 2023-12-15 ENCOUNTER — OFFICE VISIT (OUTPATIENT)
Dept: RHEUMATOLOGY | Facility: CLINIC | Age: 33
End: 2023-12-15
Payer: MEDICAID

## 2023-12-15 DIAGNOSIS — M32.9 SYSTEMIC LUPUS ERYTHEMATOSUS ARTHRITIS: Primary | ICD-10-CM

## 2023-12-15 DIAGNOSIS — M32.14 SLE GLOMERULONEPHRITIS SYNDROME, WHO CLASS V: ICD-10-CM

## 2023-12-15 DIAGNOSIS — Z51.81 ENCOUNTER FOR MEDICATION MONITORING: ICD-10-CM

## 2023-12-15 DIAGNOSIS — D84.821 DRUG-INDUCED IMMUNODEFICIENCY: ICD-10-CM

## 2023-12-15 DIAGNOSIS — Z79.899 DRUG-INDUCED IMMUNODEFICIENCY: ICD-10-CM

## 2023-12-15 PROCEDURE — 3066F NEPHROPATHY DOC TX: CPT | Mod: CPTII,95,, | Performed by: INTERNAL MEDICINE

## 2023-12-15 PROCEDURE — 3066F PR DOCUMENTATION OF TREATMENT FOR NEPHROPATHY: ICD-10-PCS | Mod: CPTII,95,, | Performed by: INTERNAL MEDICINE

## 2023-12-15 PROCEDURE — 99214 PR OFFICE/OUTPT VISIT, EST, LEVL IV, 30-39 MIN: ICD-10-PCS | Mod: 95,,, | Performed by: INTERNAL MEDICINE

## 2023-12-15 PROCEDURE — 1159F PR MEDICATION LIST DOCUMENTED IN MEDICAL RECORD: ICD-10-PCS | Mod: CPTII,95,, | Performed by: INTERNAL MEDICINE

## 2023-12-15 PROCEDURE — 99214 OFFICE O/P EST MOD 30 MIN: CPT | Mod: 95,,, | Performed by: INTERNAL MEDICINE

## 2023-12-15 PROCEDURE — 1160F RVW MEDS BY RX/DR IN RCRD: CPT | Mod: CPTII,95,, | Performed by: INTERNAL MEDICINE

## 2023-12-15 PROCEDURE — 1159F MED LIST DOCD IN RCRD: CPT | Mod: CPTII,95,, | Performed by: INTERNAL MEDICINE

## 2023-12-15 PROCEDURE — 1160F PR REVIEW ALL MEDS BY PRESCRIBER/CLIN PHARMACIST DOCUMENTED: ICD-10-PCS | Mod: CPTII,95,, | Performed by: INTERNAL MEDICINE

## 2023-12-15 RX ORDER — PREDNISONE 5 MG/1
5 TABLET ORAL DAILY
Qty: 90 TABLET | Refills: 0 | Status: SHIPPED | OUTPATIENT
Start: 2023-12-15

## 2023-12-15 NOTE — Clinical Note
Please schedule her a follow up virtual visit in 3 months with lupus panel. I have already schedule her labs for December.

## 2023-12-28 ENCOUNTER — INFUSION (OUTPATIENT)
Dept: INFUSION THERAPY | Facility: HOSPITAL | Age: 33
End: 2023-12-28
Attending: PHYSICIAN ASSISTANT
Payer: MEDICAID

## 2023-12-28 VITALS
SYSTOLIC BLOOD PRESSURE: 121 MMHG | BODY MASS INDEX: 32.94 KG/M2 | TEMPERATURE: 98 F | OXYGEN SATURATION: 99 % | RESPIRATION RATE: 18 BRPM | WEIGHT: 198 LBS | HEART RATE: 74 BPM | DIASTOLIC BLOOD PRESSURE: 80 MMHG

## 2023-12-28 DIAGNOSIS — M32.9 SYSTEMIC LUPUS ERYTHEMATOSUS ARTHRITIS: ICD-10-CM

## 2023-12-28 DIAGNOSIS — M32.9 SLE (SYSTEMIC LUPUS ERYTHEMATOSUS RELATED SYNDROME): Primary | ICD-10-CM

## 2023-12-28 PROCEDURE — 63600175 PHARM REV CODE 636 W HCPCS: Mod: JZ,TB | Performed by: INTERNAL MEDICINE

## 2023-12-28 PROCEDURE — 25000003 PHARM REV CODE 250: Performed by: INTERNAL MEDICINE

## 2023-12-28 PROCEDURE — 96365 THER/PROPH/DIAG IV INF INIT: CPT

## 2023-12-28 RX ORDER — SODIUM CHLORIDE 0.9 % (FLUSH) 0.9 %
10 SYRINGE (ML) INJECTION
Status: CANCELLED | OUTPATIENT
Start: 2024-01-25

## 2023-12-28 RX ORDER — HEPARIN 100 UNIT/ML
500 SYRINGE INTRAVENOUS
Status: CANCELLED | OUTPATIENT
Start: 2024-01-25

## 2023-12-28 RX ORDER — DIPHENHYDRAMINE HYDROCHLORIDE 50 MG/ML
50 INJECTION INTRAMUSCULAR; INTRAVENOUS ONCE AS NEEDED
Status: CANCELLED | OUTPATIENT
Start: 2024-01-25

## 2023-12-28 RX ORDER — EPINEPHRINE 0.3 MG/.3ML
0.3 INJECTION SUBCUTANEOUS ONCE AS NEEDED
Status: CANCELLED | OUTPATIENT
Start: 2024-01-25

## 2023-12-28 RX ADMIN — ANIFROLUMAB 300 MG: 300 INJECTION, SOLUTION INTRAVENOUS at 12:12

## 2023-12-28 NOTE — PLAN OF CARE
Discussed plan of care with pt. Addressed any and ongoing concerns. Pt denies    Problem: Adult Inpatient Plan of Care  Goal: Plan of Care Review  Outcome: Ongoing, Progressing  Flowsheets (Taken 12/28/2023 1204)  Plan of Care Reviewed With: patient  Goal: Absence of Hospital-Acquired Illness or Injury  Outcome: Ongoing, Progressing  Intervention: Identify and Manage Fall Risk  Flowsheets (Taken 12/28/2023 1204)  Safety Promotion/Fall Prevention:   in recliner, wheels locked   nonskid shoes/socks when out of bed   room near unit station  Intervention: Prevent Infection  Flowsheets (Taken 12/28/2023 1204)  Infection Prevention:   hand hygiene promoted   equipment surfaces disinfected  Goal: Optimal Comfort and Wellbeing  Outcome: Ongoing, Progressing  Intervention: Monitor Pain and Promote Comfort  Flowsheets (Taken 12/28/2023 1204)  Pain Management Interventions:   warm blanket provided   quiet environment facilitated  Intervention: Provide Person-Centered Care  Flowsheets (Taken 12/28/2023 1204)  Trust Relationship/Rapport:   questions encouraged   care explained   choices provided   reassurance provided   emotional support provided   empathic listening provided   questions answered   thoughts/feelings acknowledged

## 2024-01-25 ENCOUNTER — INFUSION (OUTPATIENT)
Dept: INFUSION THERAPY | Facility: HOSPITAL | Age: 34
End: 2024-01-25
Attending: PHYSICIAN ASSISTANT
Payer: MEDICAID

## 2024-01-25 VITALS
WEIGHT: 205.25 LBS | BODY MASS INDEX: 34.2 KG/M2 | HEIGHT: 65 IN | RESPIRATION RATE: 18 BRPM | SYSTOLIC BLOOD PRESSURE: 126 MMHG | OXYGEN SATURATION: 100 % | HEART RATE: 80 BPM | TEMPERATURE: 97 F | DIASTOLIC BLOOD PRESSURE: 89 MMHG

## 2024-01-25 DIAGNOSIS — M32.9 SLE (SYSTEMIC LUPUS ERYTHEMATOSUS RELATED SYNDROME): Primary | ICD-10-CM

## 2024-01-25 DIAGNOSIS — M32.9 SYSTEMIC LUPUS ERYTHEMATOSUS ARTHRITIS: ICD-10-CM

## 2024-01-25 PROCEDURE — 25000003 PHARM REV CODE 250: Performed by: INTERNAL MEDICINE

## 2024-01-25 PROCEDURE — 96365 THER/PROPH/DIAG IV INF INIT: CPT

## 2024-01-25 PROCEDURE — 63600175 PHARM REV CODE 636 W HCPCS: Mod: JZ,TB | Performed by: INTERNAL MEDICINE

## 2024-01-25 RX ORDER — HEPARIN 100 UNIT/ML
500 SYRINGE INTRAVENOUS
Status: CANCELLED | OUTPATIENT
Start: 2024-02-22

## 2024-01-25 RX ORDER — DIPHENHYDRAMINE HYDROCHLORIDE 50 MG/ML
50 INJECTION INTRAMUSCULAR; INTRAVENOUS ONCE AS NEEDED
Status: CANCELLED | OUTPATIENT
Start: 2024-02-22

## 2024-01-25 RX ORDER — SODIUM CHLORIDE 0.9 % (FLUSH) 0.9 %
10 SYRINGE (ML) INJECTION
Status: CANCELLED | OUTPATIENT
Start: 2024-02-22

## 2024-01-25 RX ORDER — EPINEPHRINE 0.3 MG/.3ML
0.3 INJECTION SUBCUTANEOUS ONCE AS NEEDED
Status: CANCELLED | OUTPATIENT
Start: 2024-02-22

## 2024-01-25 RX ORDER — SODIUM CHLORIDE 0.9 % (FLUSH) 0.9 %
10 SYRINGE (ML) INJECTION
Status: DISCONTINUED | OUTPATIENT
Start: 2024-01-25 | End: 2024-01-25 | Stop reason: HOSPADM

## 2024-01-25 RX ADMIN — ANIFROLUMAB 300 MG: 300 INJECTION, SOLUTION INTRAVENOUS at 11:01

## 2024-01-25 NOTE — NURSING
Administered Saphnelo 300 mg IV per MD order.  Patient tolerated infusion without complication.  Discharged with future appointment.

## 2024-02-05 ENCOUNTER — TELEPHONE (OUTPATIENT)
Dept: OPHTHALMOLOGY | Facility: CLINIC | Age: 34
End: 2024-02-05
Payer: MEDICAID

## 2024-02-05 NOTE — TELEPHONE ENCOUNTER
----- Message from Lilibeth Henson Patient Care Assistant sent at 2/5/2024  1:52 PM CST -----  Contact: Alisia    ----- Message -----  From: Verna Pearce  Sent: 2/5/2024  11:35 AM CST  To: Davin Lepe Staff    Pt is calling to rs today's appt until 2/19/24 around 9:30 or 10:00. Pt can be reached at [phone].      Thanks  RUTHY

## 2024-02-07 ENCOUNTER — PATIENT OUTREACH (OUTPATIENT)
Dept: EMERGENCY MEDICINE | Facility: HOSPITAL | Age: 34
End: 2024-02-07
Payer: MEDICAID

## 2024-03-04 ENCOUNTER — TELEPHONE (OUTPATIENT)
Dept: INFUSION THERAPY | Facility: HOSPITAL | Age: 34
End: 2024-03-04
Payer: MEDICAID

## 2024-03-06 ENCOUNTER — TELEPHONE (OUTPATIENT)
Dept: RHEUMATOLOGY | Facility: CLINIC | Age: 34
End: 2024-03-06
Payer: MEDICAID

## 2024-03-06 DIAGNOSIS — M32.9 SYSTEMIC LUPUS ERYTHEMATOSUS ARTHRITIS: Primary | ICD-10-CM

## 2024-03-06 NOTE — TELEPHONE ENCOUNTER
----- Message from Joyce Ramírez sent at 3/6/2024 12:30 PM CST -----  Contact: noel  Patient is requesting a call to see if she have labs done because she was not feeling well. Please call her back at [phone].      Thanks  DD

## 2024-03-06 NOTE — TELEPHONE ENCOUNTER
C/o fatigue, weakness +headachesdenies any pain.  She missed her Saphnelo infusion in February and is scheduled 3/12/2024.      She is requesting to have some lab work done

## 2024-03-07 ENCOUNTER — LAB VISIT (OUTPATIENT)
Dept: LAB | Facility: HOSPITAL | Age: 34
End: 2024-03-07
Attending: INTERNAL MEDICINE
Payer: MEDICAID

## 2024-03-07 DIAGNOSIS — M32.9 SYSTEMIC LUPUS ERYTHEMATOSUS ARTHRITIS: ICD-10-CM

## 2024-03-07 LAB
ALBUMIN SERPL BCP-MCNC: 3.1 G/DL (ref 3.5–5.2)
ALP SERPL-CCNC: 73 U/L (ref 55–135)
ALT SERPL W/O P-5'-P-CCNC: 13 U/L (ref 10–44)
ANION GAP SERPL CALC-SCNC: 5 MMOL/L (ref 8–16)
AST SERPL-CCNC: 21 U/L (ref 10–40)
BACTERIA #/AREA URNS HPF: NORMAL /HPF
BASOPHILS # BLD AUTO: 0.01 K/UL (ref 0–0.2)
BASOPHILS NFR BLD: 0.3 % (ref 0–1.9)
BILIRUB SERPL-MCNC: 0.2 MG/DL (ref 0.1–1)
BILIRUB UR QL STRIP: NEGATIVE
BUN SERPL-MCNC: 8 MG/DL (ref 6–20)
C3 SERPL-MCNC: 78 MG/DL (ref 50–180)
C4 SERPL-MCNC: 13 MG/DL (ref 11–44)
CALCIUM SERPL-MCNC: 8 MG/DL (ref 8.7–10.5)
CHLORIDE SERPL-SCNC: 113 MMOL/L (ref 95–110)
CLARITY UR: CLEAR
CO2 SERPL-SCNC: 23 MMOL/L (ref 23–29)
COLOR UR: YELLOW
CREAT SERPL-MCNC: 0.6 MG/DL (ref 0.5–1.4)
CREAT UR-MCNC: 230 MG/DL (ref 15–325)
CRP SERPL-MCNC: 7.7 MG/L (ref 0–8.2)
DIFFERENTIAL METHOD BLD: ABNORMAL
EOSINOPHIL # BLD AUTO: 0.1 K/UL (ref 0–0.5)
EOSINOPHIL NFR BLD: 2.8 % (ref 0–8)
ERYTHROCYTE [DISTWIDTH] IN BLOOD BY AUTOMATED COUNT: 15 % (ref 11.5–14.5)
ERYTHROCYTE [SEDIMENTATION RATE] IN BLOOD BY PHOTOMETRIC METHOD: 41 MM/HR (ref 0–36)
EST. GFR  (NO RACE VARIABLE): >60 ML/MIN/1.73 M^2
GLUCOSE SERPL-MCNC: 91 MG/DL (ref 70–110)
GLUCOSE UR QL STRIP: NEGATIVE
HCT VFR BLD AUTO: 30.4 % (ref 37–48.5)
HGB BLD-MCNC: 9.5 G/DL (ref 12–16)
HGB UR QL STRIP: NEGATIVE
HYALINE CASTS #/AREA URNS LPF: 1 /LPF
IMM GRANULOCYTES # BLD AUTO: 0.01 K/UL (ref 0–0.04)
IMM GRANULOCYTES NFR BLD AUTO: 0.3 % (ref 0–0.5)
KETONES UR QL STRIP: NEGATIVE
LEUKOCYTE ESTERASE UR QL STRIP: NEGATIVE
LYMPHOCYTES # BLD AUTO: 1 K/UL (ref 1–4.8)
LYMPHOCYTES NFR BLD: 25.1 % (ref 18–48)
MCH RBC QN AUTO: 23.4 PG (ref 27–31)
MCHC RBC AUTO-ENTMCNC: 31.3 G/DL (ref 32–36)
MCV RBC AUTO: 75 FL (ref 82–98)
MICROSCOPIC COMMENT: NORMAL
MONOCYTES # BLD AUTO: 0.3 K/UL (ref 0.3–1)
MONOCYTES NFR BLD: 7.2 % (ref 4–15)
NEUTROPHILS # BLD AUTO: 2.5 K/UL (ref 1.8–7.7)
NEUTROPHILS NFR BLD: 64.3 % (ref 38–73)
NITRITE UR QL STRIP: NEGATIVE
NRBC BLD-RTO: 0 /100 WBC
PH UR STRIP: 7 [PH] (ref 5–8)
PLATELET # BLD AUTO: 265 K/UL (ref 150–450)
PMV BLD AUTO: 10.5 FL (ref 9.2–12.9)
POTASSIUM SERPL-SCNC: 3.8 MMOL/L (ref 3.5–5.1)
PROT SERPL-MCNC: 5.7 G/DL (ref 6–8.4)
PROT UR QL STRIP: ABNORMAL
PROT UR-MCNC: 287 MG/DL (ref 0–15)
PROT/CREAT UR: 1.25 MG/G{CREAT} (ref 0–0.2)
RBC # BLD AUTO: 4.06 M/UL (ref 4–5.4)
RBC #/AREA URNS HPF: 3 /HPF (ref 0–4)
SODIUM SERPL-SCNC: 141 MMOL/L (ref 136–145)
SP GR UR STRIP: 1.02 (ref 1–1.03)
SQUAMOUS #/AREA URNS HPF: 1 /HPF
URN SPEC COLLECT METH UR: ABNORMAL
WBC # BLD AUTO: 3.87 K/UL (ref 3.9–12.7)
WBC #/AREA URNS HPF: 2 /HPF (ref 0–5)

## 2024-03-07 PROCEDURE — 36415 COLL VENOUS BLD VENIPUNCTURE: CPT | Performed by: INTERNAL MEDICINE

## 2024-03-07 PROCEDURE — 85652 RBC SED RATE AUTOMATED: CPT | Performed by: INTERNAL MEDICINE

## 2024-03-07 PROCEDURE — 81000 URINALYSIS NONAUTO W/SCOPE: CPT | Performed by: INTERNAL MEDICINE

## 2024-03-07 PROCEDURE — 82570 ASSAY OF URINE CREATININE: CPT | Performed by: INTERNAL MEDICINE

## 2024-03-07 PROCEDURE — 86225 DNA ANTIBODY NATIVE: CPT | Performed by: INTERNAL MEDICINE

## 2024-03-07 PROCEDURE — 86140 C-REACTIVE PROTEIN: CPT | Performed by: INTERNAL MEDICINE

## 2024-03-07 PROCEDURE — 80053 COMPREHEN METABOLIC PANEL: CPT | Performed by: INTERNAL MEDICINE

## 2024-03-07 PROCEDURE — 86160 COMPLEMENT ANTIGEN: CPT | Performed by: INTERNAL MEDICINE

## 2024-03-07 PROCEDURE — 85025 COMPLETE CBC W/AUTO DIFF WBC: CPT | Performed by: INTERNAL MEDICINE

## 2024-03-07 PROCEDURE — 86160 COMPLEMENT ANTIGEN: CPT | Mod: 59 | Performed by: INTERNAL MEDICINE

## 2024-03-08 LAB — DSDNA AB SER-ACNC: NORMAL [IU]/ML

## 2024-03-12 ENCOUNTER — INFUSION (OUTPATIENT)
Dept: INFUSION THERAPY | Facility: HOSPITAL | Age: 34
End: 2024-03-12
Attending: PHYSICIAN ASSISTANT
Payer: MEDICAID

## 2024-03-12 VITALS
WEIGHT: 198.19 LBS | SYSTOLIC BLOOD PRESSURE: 135 MMHG | TEMPERATURE: 98 F | BODY MASS INDEX: 33.02 KG/M2 | HEART RATE: 80 BPM | RESPIRATION RATE: 16 BRPM | OXYGEN SATURATION: 100 % | DIASTOLIC BLOOD PRESSURE: 95 MMHG | HEIGHT: 65 IN

## 2024-03-12 DIAGNOSIS — M32.9 SYSTEMIC LUPUS ERYTHEMATOSUS ARTHRITIS: ICD-10-CM

## 2024-03-12 DIAGNOSIS — M32.9 SLE (SYSTEMIC LUPUS ERYTHEMATOSUS RELATED SYNDROME): Primary | ICD-10-CM

## 2024-03-12 PROCEDURE — 25000003 PHARM REV CODE 250: Performed by: INTERNAL MEDICINE

## 2024-03-12 PROCEDURE — 96365 THER/PROPH/DIAG IV INF INIT: CPT

## 2024-03-12 PROCEDURE — 63600175 PHARM REV CODE 636 W HCPCS: Mod: JZ,TB | Performed by: INTERNAL MEDICINE

## 2024-03-12 RX ORDER — SODIUM CHLORIDE 0.9 % (FLUSH) 0.9 %
10 SYRINGE (ML) INJECTION
Status: CANCELLED | OUTPATIENT
Start: 2024-03-19

## 2024-03-12 RX ORDER — DIPHENHYDRAMINE HYDROCHLORIDE 50 MG/ML
50 INJECTION INTRAMUSCULAR; INTRAVENOUS ONCE AS NEEDED
Status: CANCELLED | OUTPATIENT
Start: 2024-03-19

## 2024-03-12 RX ORDER — EPINEPHRINE 0.3 MG/.3ML
0.3 INJECTION SUBCUTANEOUS ONCE AS NEEDED
Status: CANCELLED | OUTPATIENT
Start: 2024-03-19

## 2024-03-12 RX ORDER — HEPARIN 100 UNIT/ML
500 SYRINGE INTRAVENOUS
Status: CANCELLED | OUTPATIENT
Start: 2024-03-19

## 2024-03-12 RX ADMIN — ANIFROLUMAB 300 MG: 300 INJECTION, SOLUTION INTRAVENOUS at 08:03

## 2024-03-12 NOTE — PLAN OF CARE
Patient tolerated Saphnelo well today; no adverse reaction noted.  POC reviewed with pt.  NAD noted upon discharge.   Has f/u appt(s) scheduled per MD request.    Problem: Adult Inpatient Plan of Care  Goal: Plan of Care Review  Outcome: Ongoing, Progressing  Goal: Patient-Specific Goal (Individualized)  Outcome: Ongoing, Progressing  Goal: Absence of Hospital-Acquired Illness or Injury  Outcome: Ongoing, Progressing  Intervention: Identify and Manage Fall Risk  Flowsheets (Taken 3/12/2024 0930)  Safety Promotion/Fall Prevention: in recliner, wheels locked  Goal: Optimal Comfort and Wellbeing  Outcome: Ongoing, Progressing  Intervention: Provide Person-Centered Care  Flowsheets (Taken 3/12/2024 0930)  Trust Relationship/Rapport:   care explained   reassurance provided   choices provided   thoughts/feelings acknowledged   emotional support provided   empathic listening provided   questions answered   questions encouraged

## 2024-04-09 ENCOUNTER — INFUSION (OUTPATIENT)
Dept: INFUSION THERAPY | Facility: HOSPITAL | Age: 34
End: 2024-04-09
Attending: PHYSICIAN ASSISTANT
Payer: MEDICAID

## 2024-04-09 VITALS
HEART RATE: 79 BPM | WEIGHT: 194.88 LBS | HEIGHT: 65 IN | DIASTOLIC BLOOD PRESSURE: 87 MMHG | RESPIRATION RATE: 16 BRPM | BODY MASS INDEX: 32.47 KG/M2 | SYSTOLIC BLOOD PRESSURE: 126 MMHG | TEMPERATURE: 98 F | OXYGEN SATURATION: 100 %

## 2024-04-09 DIAGNOSIS — M32.9 SLE (SYSTEMIC LUPUS ERYTHEMATOSUS RELATED SYNDROME): Primary | ICD-10-CM

## 2024-04-09 DIAGNOSIS — M32.9 SYSTEMIC LUPUS ERYTHEMATOSUS ARTHRITIS: ICD-10-CM

## 2024-04-09 PROCEDURE — 63600175 PHARM REV CODE 636 W HCPCS: Mod: JZ,TB | Performed by: INTERNAL MEDICINE

## 2024-04-09 PROCEDURE — 25000003 PHARM REV CODE 250: Performed by: INTERNAL MEDICINE

## 2024-04-09 PROCEDURE — 96365 THER/PROPH/DIAG IV INF INIT: CPT

## 2024-04-09 RX ORDER — HEPARIN 100 UNIT/ML
500 SYRINGE INTRAVENOUS
Status: CANCELLED | OUTPATIENT
Start: 2024-04-16

## 2024-04-09 RX ORDER — EPINEPHRINE 0.3 MG/.3ML
0.3 INJECTION SUBCUTANEOUS ONCE AS NEEDED
Status: CANCELLED | OUTPATIENT
Start: 2024-04-16

## 2024-04-09 RX ORDER — SODIUM CHLORIDE 0.9 % (FLUSH) 0.9 %
10 SYRINGE (ML) INJECTION
Status: CANCELLED | OUTPATIENT
Start: 2024-04-16

## 2024-04-09 RX ORDER — DIPHENHYDRAMINE HYDROCHLORIDE 50 MG/ML
50 INJECTION INTRAMUSCULAR; INTRAVENOUS ONCE AS NEEDED
Status: CANCELLED | OUTPATIENT
Start: 2024-04-16

## 2024-04-09 RX ORDER — SODIUM CHLORIDE 0.9 % (FLUSH) 0.9 %
10 SYRINGE (ML) INJECTION
Status: DISCONTINUED | OUTPATIENT
Start: 2024-04-09 | End: 2024-04-09 | Stop reason: HOSPADM

## 2024-04-09 RX ADMIN — ANIFROLUMAB 300 MG: 300 INJECTION, SOLUTION INTRAVENOUS at 08:04

## 2024-04-09 NOTE — PLAN OF CARE
Problem: Adult Inpatient Plan of Care  Goal: Plan of Care Review  Outcome: Ongoing, Progressing  Flowsheets (Taken 4/9/2024 0824)  Plan of Care Reviewed With: patient  Goal: Patient-Specific Goal (Individualized)  Outcome: Ongoing, Progressing  Flowsheets (Taken 4/9/2024 0824)  Anxieties, Fears or Concerns: denies  Individualized Care Needs: feet elevated, warm blanket provided  Goal: Absence of Hospital-Acquired Illness or Injury  Outcome: Ongoing, Progressing  Intervention: Identify and Manage Fall Risk  Flowsheets (Taken 4/9/2024 0824)  Safety Promotion/Fall Prevention:   assistive device/personal item within reach   in recliner, wheels locked   medications reviewed   instructed to call staff for mobility  Intervention: Prevent Infection  Flowsheets (Taken 4/9/2024 0824)  Infection Prevention:   environmental surveillance performed   equipment surfaces disinfected   hand hygiene promoted   personal protective equipment utilized  Goal: Optimal Comfort and Wellbeing  Outcome: Ongoing, Progressing  Intervention: Monitor Pain and Promote Comfort  Flowsheets (Taken 4/9/2024 0824)  Pain Management Interventions:   position adjusted   warm blanket provided   quiet environment facilitated   relaxation techniques promoted  Intervention: Provide Person-Centered Care  Flowsheets (Taken 4/9/2024 0824)  Trust Relationship/Rapport:   care explained   reassurance provided   thoughts/feelings acknowledged   choices provided   emotional support provided   empathic listening provided   questions answered   questions encouraged     Problem: Infection  Goal: Absence of Infection Signs and Symptoms  Outcome: Ongoing, Progressing  Intervention: Prevent or Manage Infection  Flowsheets (Taken 4/9/2024 0824)  Infection Management: aseptic technique maintained

## 2024-04-15 ENCOUNTER — PATIENT MESSAGE (OUTPATIENT)
Dept: RHEUMATOLOGY | Facility: CLINIC | Age: 34
End: 2024-04-15

## 2024-04-15 ENCOUNTER — OFFICE VISIT (OUTPATIENT)
Dept: RHEUMATOLOGY | Facility: CLINIC | Age: 34
End: 2024-04-15
Payer: MEDICAID

## 2024-04-15 DIAGNOSIS — M32.14 SLE GLOMERULONEPHRITIS SYNDROME, WHO CLASS V: ICD-10-CM

## 2024-04-15 DIAGNOSIS — D84.821 DRUG-INDUCED IMMUNODEFICIENCY: ICD-10-CM

## 2024-04-15 DIAGNOSIS — M32.9 SYSTEMIC LUPUS ERYTHEMATOSUS ARTHRITIS: Primary | ICD-10-CM

## 2024-04-15 DIAGNOSIS — Z51.81 ENCOUNTER FOR MEDICATION MONITORING: ICD-10-CM

## 2024-04-15 DIAGNOSIS — M79.7 FIBROMYALGIA: ICD-10-CM

## 2024-04-15 DIAGNOSIS — Z79.899 DRUG-INDUCED IMMUNODEFICIENCY: ICD-10-CM

## 2024-04-15 PROCEDURE — 1160F RVW MEDS BY RX/DR IN RCRD: CPT | Mod: CPTII,95,, | Performed by: INTERNAL MEDICINE

## 2024-04-15 PROCEDURE — 99215 OFFICE O/P EST HI 40 MIN: CPT | Mod: 95,,, | Performed by: INTERNAL MEDICINE

## 2024-04-15 PROCEDURE — 1159F MED LIST DOCD IN RCRD: CPT | Mod: CPTII,95,, | Performed by: INTERNAL MEDICINE

## 2024-04-15 NOTE — PROGRESS NOTES
RHEUMATOLOGY FOLLOW UP - TELE VISIT     The patient location is: [place]  The chief complaint leading to consultation is: Lupus follow up, worsening fatigue  Visit type: Virtual visit with synchronous audio and video  Total time spent with patient:  15 minutes  Each patient to whom he or she provides medical services by telemedicine is:  (1) informed of the relationship between the physician and patient and the respective role of any other health care provider with respect to management of the patient; and (2) notified that he or she may decline to receive medical services by telemedicine and may withdraw from such care at any time.    Chief complaints, HPI, ROS, EXAM, Assessment & Plans:-  Alisiadank Orozcodanny Claudyharley a 33 y.o. pleasant female seen today for follow-up visit.  Severe lupus associated with arthritis and class 5 glomerulonephritis with significant proteinuria here for follow-up.  On Saphnelo infusion for lupus.  Other than worsening fatigue she denies any problems today.  No joint pain.  No skin rash.  No joint swelling.  No prolonged morning stiffness.  No respirophasic chest pain.  Rheumatological review of system negative otherwise.  100% fist formation bilateral hands.    1. Systemic lupus erythematosus arthritis    2. SLE glomerulonephritis syndrome, WHO class V    3. Drug-induced immunodeficiency    4. Encounter for medication monitoring    5. Fibromyalgia      Problem List Items Addressed This Visit       SLE glomerulonephritis syndrome, WHO class V    Overview      12/12/2018  Continue follow-up and management with Dr. Blair         Systemic lupus erythematosus arthritis - Primary    Drug-induced immunodeficiency    Encounter for medication monitoring     Other Visit Diagnoses       Fibromyalgia               Latest Reference Range & Units 04/09/24 09:06 04/09/24 09:24   WBC 3.90 - 12.70 K/uL  3.74 (L)   RBC 4.00 - 5.40 M/uL  4.47   Hemoglobin 12.0 - 16.0 g/dL  10.1 (L)   Hematocrit 37.0 - 48.5 %   32.4 (L)   MCV 82 - 98 fL  73 (L)   MCH 27.0 - 31.0 pg  22.6 (L)   MCHC 32.0 - 36.0 g/dL  31.2 (L)   RDW 11.5 - 14.5 %  14.4   Platelet Count 150 - 450 K/uL  319   MPV 9.2 - 12.9 fL  9.6   Gran % 38.0 - 73.0 %  59.3   Lymph % 18.0 - 48.0 %  24.1   Mono % 4.0 - 15.0 %  9.9   Eos % 0.0 - 8.0 %  5.9   Basophil % 0.0 - 1.9 %  0.5   Immature Granulocytes 0.0 - 0.5 %  0.3   Gran # (ANC) 1.8 - 7.7 K/uL  2.2   Lymph # 1.0 - 4.8 K/uL  0.9 (L)   Mono # 0.3 - 1.0 K/uL  0.4   Eos # 0.0 - 0.5 K/uL  0.2   Baso # 0.00 - 0.20 K/uL  0.02   Immature Grans (Abs) 0.00 - 0.04 K/uL  0.01   nRBC 0 /100 WBC  0   Differential Method   Automated   Sed Rate 0 - 36 mm/Hr  42 (H)   Sodium 136 - 145 mmol/L  139   Potassium 3.5 - 5.1 mmol/L  3.5   Chloride 95 - 110 mmol/L  106   CO2 23 - 29 mmol/L  24   Anion Gap 8 - 16 mmol/L  9   BUN 6 - 20 mg/dL  7   Creatinine 0.5 - 1.4 mg/dL  0.7   eGFR >60 mL/min/1.73 m^2  >60   Glucose 70 - 110 mg/dL  78   Calcium 8.7 - 10.5 mg/dL  9.1   ALP 55 - 135 U/L  81   PROTEIN TOTAL 6.0 - 8.4 g/dL  7.0   Albumin 3.5 - 5.2 g/dL  3.5   BILIRUBIN TOTAL 0.1 - 1.0 mg/dL  0.3   AST 10 - 40 U/L  19   ALT 10 - 44 U/L  13   CRP 0.0 - 8.2 mg/L  3.1   ds DNA Ab Negative 1:10   Negative 1:10   Complement (C-3) 50 - 180 mg/dL  121   Complement (C-4) 11 - 44 mg/dL  23   Specimen UA  Urine, Clean Catch    Color, UA Yellow, Straw, Erin  Yellow    Appearance, UA Clear  Clear    Specific Gravity, UA 1.005 - 1.030  1.020    pH, UA 5.0 - 8.0  6.0    Protein, UA Negative  1+ !    Glucose, UA Negative  Negative    Ketones, UA Negative  Negative    Blood, UA Negative  Negative    NITRITE UA Negative  Negative    Bilirubin (UA) Negative  Negative    Leukocyte Esterase, UA Negative  Negative    RBC, UA 0 - 4 /hpf 1    WBC, UA 0 - 5 /hpf 2    Bacteria, UA None-Occ /hpf Rare    Squam Epithel, UA /hpf 4    Hyaline Casts, UA 0-1/lpf /lpf 0    Microscopic Comment  SEE COMMENT    Urine Creatinine 15.0 - 325.0 mg/dL 140.0    Urine Protein 0 -  15 mg/dL 35 (H)    Prot/Creat Ratio, Urine 0.00 - 0.20  0.25 (H)    (L): Data is abnormally low  (H): Data is abnormally high  !: Data is abnormal     Latest Reference Range & Units 11/13/23 10:56   ds DNA Ab Negative 1:10  Positive !   DNA Titer  1:80   Complement (C-3) 50 - 180 mg/dL 93   Complement (C-4) 11 - 44 mg/dL 21   !: Data is abnormal   Latest Reference Range & Units Most Recent   HARPREET Negative <1:160  Positive !  10/10/13 18:25   HARPREET Screen Negative <1:160  Positive !  11/18/19 13:58   HARPREET HEP-2 Titer  Positive 1:1280 Speckled  11/18/19 13:58   ds DNA Ab Negative 1:10  Positive !  11/13/23 10:56   DNA Titer  1:80  11/13/23 10:56   Anti-SSA Antibody 0.00 - 19.99 .84 (H)  11/18/19 13:58   Anti-SSA Interpretation Negative  Positive !  11/18/19 13:58   Anti-SSB Antibody 0.00 - 19.99 EU 62.05 (H)  11/18/19 13:58   Anti-SSB Interpretation Negative  Positive !  11/18/19 13:58   Anti Sm Antibody 0.00 - 19.99 EU 7.51  11/18/19 13:58   Anti-Sm Interpretation Negative  Negative  11/18/19 13:58   Anti Sm/RNP Antibody 0.00 - 19.99 EU 7.56  11/18/19 13:58   Anti-Sm/RNP Interpretation Negative  Negative  11/18/19 13:58   ANCA Proteinase 3 <0.4 (Negative) U <0.2  7/8/23 03:22   Cytoplasmic Neutrophilic Ab <1:20 Titer <1:20  7/8/23 03:22   MPO <3.5 U/mL <0.2  7/8/23 03:22   Perinuclear (P-ANCA) <1:20 Titer <1:20  7/8/23 03:22   Complement (C-3) 50 - 180 mg/dL 93  11/13/23 10:56   Complement (C-4) 11 - 44 mg/dL 21  11/13/23 10:56   Protein, Serum 6.0 - 8.4 g/dL 5.4 (L)  9/12/23 14:04   Albumin grams/dl 3.35 - 5.55 g/dL 3.16 (L)  9/12/23 14:04   Alpha-1 grams/dl 0.17 - 0.41 g/dL 0.26  9/12/23 14:04   Alpha-2 0.43 - 0.99 g/dL 0.66  9/12/23 14:04   Beta 0.50 - 1.10 g/dL 0.65  9/12/23 14:04   Gamma 0.67 - 1.58 g/dL 0.66 (L)  9/12/23 14:04   Pathologist Interpretation SPE  REVIEWED  9/12/23 14:04   Pathologist Interpretation OMAIRA  REVIEWED  9/12/23 14:04   Immunofix Interp.  SEE COMMENT  9/12/23 14:04   Cryoglobulin, Qual  Absent  Absent  23 10:25   !: Data is abnormal  (H): Data is abnormally high  (L): Data is abnormally low    Severe lupus arthritis and glomerulonephritis with class 5 disease with significant proteinuria on Saphnelo infusion every 4 weeks.  Significant difficulty in taking oral medications due to noncompliance.  Is not taking any oral medications at this time other than intermittent steroids when she feels really fatigued.  Continue Saphnelo every 4 weeks.  Message sent to Hematology provider to restart iron infusion if appropriate for worsening fatigue.  Drug induced immunodeficiency due to use of immunosuppressive drugs. Monitor carefully for infections. Advised to get immediate medical care if any infection. Also advised strict adherence to age appropriate vaccinations and cancer screenings with PCP.  She understands the risk of not taking lupus therapies which might affect her kidney irreversibly  being dependent on dialysis.  I have explained all of the above in detail and the patient understands all of the current recommendation(s). I have answered all questions to the best of my ability and to their complete satisfaction.   # Follow up in about 6 months (around 10/15/2024).      Past Medical History:   Diagnosis Date    Allergic rhinitis     Anemia     Condyloma acuminata     COVID-19 virus infection 2021    Encounter for blood transfusion     GERD (gastroesophageal reflux disease)     Hemolytic anemia associated with systemic lupus erythematosus 2023    History of fetal anomaly in prior pregnancy, currently pregnant, unspecified trimester 2017    Pacemaker placed    HSV-2 (herpes simplex virus 2) infection     Lupus nephritis     Mood disorder     Overweight(278.02)     Sjogren's syndrome     Systemic lupus complicating pregnancy 2019    Systemic lupus erythematosus     Thrombocytopenia        Past Surgical History:   Procedure Laterality Date     SECTION WITH TUBAL LIGATION  N/A 2019    Procedure:  SECTION, WITH TUBAL LIGATION;  Surgeon: VERONICA Valdovinos MD;  Location: [facility] L&D;  Service: OB/GYN;  Laterality: N/A;     SECTION, LOW TRANSVERSE      x2    LYMPH NODE BIOPSY Right 2023    Procedure: BIOPSY, LYMPH NODE;  Surgeon: Rivera Sandoval MD;  Location: [facility] OR;  Service: ENT;  Laterality: Right;  right deep cervial lymph node excision    NECK MASS EXCISION Right 2023    Procedure: EXCISION, MASS, NECK;  Surgeon: Rivera Sandoval MD;  Location: [facility] OR;  Service: ENT;  Laterality: Right;  right deep cervial lymph node excision    RENAL BIOPSY  2013        Social History     Tobacco Use    Smoking status: Never     Passive exposure: Never    Smokeless tobacco: Never   Substance Use Topics    Alcohol use: No     Alcohol/week: 0.0 standard drinks of alcohol    Drug use: Yes     Types: Marijuana       Family History   Problem Relation Name Age of Onset    Hypertension Mother      Eczema Brother      Hypertension Maternal Grandmother      Diabetes Paternal Grandmother      Heart disease Son      Arrhythmia Son          CHB    Stroke Neg Hx      Cancer Neg Hx         Review of patient's allergies indicates:  No Known Allergies    Medication List with Changes/Refills   Current Medications    AMLODIPINE (NORVASC) 5 MG TABLET    TAKE 1 TABLET(5 MG) BY MOUTH EVERY DAY    CICLOPIROX (PENLAC) 8 % SOLN    Apply to nail and nail fold once daily for up to 1 year    FLUTICASONE PROPIONATE (FLONASE) 50 MCG/ACTUATION NASAL SPRAY    SHAKE LIQUID AND USE 2 SPRAYS(100 MCG) IN EACH NOSTRIL EVERY DAY    KETOCONAZOLE (NIZORAL) 2 % SHAMPOO    Wash hair with medicated shampoo at least 1x/week - let sit on scalp at least 5 minutes prior to rinsing    MOMETASONE (ELOCON) 0.1 % SOLUTION    AAA of scalp once daily.    ONDANSETRON (ZOFRAN) 4 MG TABLET    Take 1 tablet (4 mg total) by mouth every 8 (eight) hours as needed for Nausea.    PIMECROLIMUS (ELIDEL) 1 % CREAM    Apply to  affected areas twice daily. Wear daily sunscreen.    TRIAMCINOLONE ACETONIDE 0.025% (KENALOG) 0.025 % OINT    AAA bid prn. Use for 2 weeks then taper. Mild steroid.    VALACYCLOVIR (VALTREX) 1000 MG TABLET    Take 1 tablet (1,000 mg total) by mouth once daily.   Discontinued Medications    ARIPIPRAZOLE (ABILIFY) 2 MG TAB    TAKE 1 TABLET(2 MG) BY MOUTH EVERY DAY    COLCHICINE, GOUT, (COLCRYS) 0.6 MG TABLET    Take 1 tablet (0.6 mg total) by mouth once daily.    FLUOXETINE 40 MG CAPSULE    TAKE 1 CAPSULE(40 MG) BY MOUTH EVERY DAY    HYDROXYCHLOROQUINE (PLAQUENIL) 200 MG TABLET    TAKE 2 TABLETS(400 MG) BY MOUTH EVERY DAY    MYCOPHENOLATE (CELLCEPT) 500 MG TAB    TAKE 1 TABLET(500 MG) BY MOUTH TWICE DAILY    PREDNISONE (DELTASONE) 5 MG TABLET    Take 1 tablet (5 mg total) by mouth once daily.    PREGABALIN (LYRICA) 75 MG CAPSULE    Take 1 capsule (75 mg total) by mouth 2 (two) times daily.    TRAMADOL (ULTRAM) 50 MG TABLET    Take 1 tablet (50 mg total) by mouth every 8 (eight) hours as needed for Pain.       Disclaimer: This note was prepared using voice recognition system and is likely to have sound alike errors and is not proof read.  Please call me with any questions.          Answers submitted by the patient for this visit:  Rheumatology Questionnaire (Submitted on 4/15/2024)  fever: No  eye redness: No  mouth sores: No  headaches: No  shortness of breath: No  chest pain: No  trouble swallowing: No  diarrhea: No  constipation: No  unexpected weight change: No  genital sore: No  dysuria: No  During the last 3 days, have you had a skin rash?: No  Bruises or bleeds easily: No  cough: No

## 2024-04-18 ENCOUNTER — TELEPHONE (OUTPATIENT)
Dept: HEMATOLOGY/ONCOLOGY | Facility: CLINIC | Age: 34
End: 2024-04-18
Payer: MEDICAID

## 2024-04-18 DIAGNOSIS — D50.9 IRON DEFICIENCY ANEMIA, UNSPECIFIED IRON DEFICIENCY ANEMIA TYPE: Primary | ICD-10-CM

## 2024-04-18 NOTE — TELEPHONE ENCOUNTER
----- Message from Aye Luke MA sent at 4/18/2024  1:52 PM CDT -----  Regarding: FW: Anemia - Iron infusion treatment    ----- Message -----  From: Melissa Suh PA-C  Sent: 4/18/2024  10:04 AM CDT  To: Aye Luke MA  Subject: FW: Anemia - Iron infusion treatment             I ordered cbc cmp iron/tibc ferritin for this patient o have done -VV is ok. With any GUNJAN available.  ----- Message -----  From: Shane Blair MD  Sent: 4/15/2024  12:21 PM CDT  To: Melissa Suh PA-C  Subject: Anemia - Iron infusion treatment                 Keaton Genny Torreselle is complaining of worsening fatigue. Could she repeat her iron infusions if indicated? She has tough time with oral medications. Appreciate your help!

## 2024-04-18 NOTE — TELEPHONE ENCOUNTER
Called pt to schedule lab appt and f/u appt. Lab appt on 4/25 at 9:35 am at [facility]. F/u appt with Cassi Magdaleno NP on 4/26 at 4:30 pm via virtual visit. Pt confirmed, voiced no other concerns or questions.

## 2024-04-22 ENCOUNTER — OFFICE VISIT (OUTPATIENT)
Dept: URGENT CARE | Facility: CLINIC | Age: 34
End: 2024-04-22
Payer: MEDICAID

## 2024-04-22 VITALS
RESPIRATION RATE: 18 BRPM | DIASTOLIC BLOOD PRESSURE: 97 MMHG | BODY MASS INDEX: 32.32 KG/M2 | TEMPERATURE: 98 F | SYSTOLIC BLOOD PRESSURE: 130 MMHG | WEIGHT: 194 LBS | HEIGHT: 65 IN | HEART RATE: 87 BPM | OXYGEN SATURATION: 100 %

## 2024-04-22 DIAGNOSIS — K52.9 GASTROENTERITIS: ICD-10-CM

## 2024-04-22 DIAGNOSIS — R11.2 NAUSEA AND VOMITING, UNSPECIFIED VOMITING TYPE: Primary | ICD-10-CM

## 2024-04-22 DIAGNOSIS — H10.9 CONJUNCTIVITIS OF RIGHT EYE, UNSPECIFIED CONJUNCTIVITIS TYPE: ICD-10-CM

## 2024-04-22 LAB
B-HCG UR QL: NEGATIVE
BILIRUB UR QL STRIP: NEGATIVE
CTP QC/QA: YES
GLUCOSE UR QL STRIP: NEGATIVE
KETONES UR QL STRIP: NEGATIVE
LEUKOCYTE ESTERASE UR QL STRIP: NEGATIVE
PH, POC UA: 6
POC BLOOD, URINE: NEGATIVE
POC NITRATES, URINE: NEGATIVE
PROT UR QL STRIP: POSITIVE
SP GR UR STRIP: 1.02 (ref 1–1.03)
UROBILINOGEN UR STRIP-ACNC: ABNORMAL (ref 0.1–1.1)

## 2024-04-22 PROCEDURE — 81025 URINE PREGNANCY TEST: CPT | Mod: S$GLB,,, | Performed by: NURSE PRACTITIONER

## 2024-04-22 PROCEDURE — 99214 OFFICE O/P EST MOD 30 MIN: CPT | Mod: S$GLB,,, | Performed by: NURSE PRACTITIONER

## 2024-04-22 PROCEDURE — 81003 URINALYSIS AUTO W/O SCOPE: CPT | Mod: QW,S$GLB,, | Performed by: NURSE PRACTITIONER

## 2024-04-22 RX ORDER — ONDANSETRON 8 MG/1
8 TABLET, ORALLY DISINTEGRATING ORAL EVERY 8 HOURS PRN
Qty: 12 TABLET | Refills: 0 | Status: SHIPPED | OUTPATIENT
Start: 2024-04-22

## 2024-04-22 RX ORDER — CIPROFLOXACIN HYDROCHLORIDE 3 MG/ML
1 SOLUTION/ DROPS OPHTHALMIC EVERY 4 HOURS
Qty: 5 ML | Refills: 1 | Status: SHIPPED | OUTPATIENT
Start: 2024-04-22

## 2024-04-22 RX ORDER — ONDANSETRON 8 MG/1
8 TABLET, ORALLY DISINTEGRATING ORAL
Status: COMPLETED | OUTPATIENT
Start: 2024-04-22 | End: 2024-04-22

## 2024-04-22 RX ADMIN — ONDANSETRON 8 MG: 8 TABLET, ORALLY DISINTEGRATING ORAL at 06:04

## 2024-04-22 NOTE — PROGRESS NOTES
"Subjective:      Patient ID: Alisia Vines is a 33 y.o. female.    Vitals:  height is 5' 5" (1.651 m) and weight is 88 kg (194 lb). Her tympanic temperature is 97.8 °F (36.6 °C). Her blood pressure is 130/97 (abnormal) and her pulse is 87. Her respiration is 18 and oxygen saturation is 100%.     Chief Complaint: Nausea    Patient presents with N/V/D especially nausea when she drinks something.  Symptoms started yesterday.  She took pepto bismol for symptoms.  She woke up Saturday with her right eye red and crusty (pink eye she states) she used some old drops which seemed to help a little.  Want to make sure its ok       Nausea  This is a new problem. The current episode started yesterday. The problem occurs intermittently. The problem has been unchanged. Associated symptoms include a change in bowel habit and nausea. Pertinent negatives include no abdominal pain, anorexia, arthralgias, chest pain, chills, congestion, coughing, diaphoresis, fatigue, fever, headaches, joint swelling, myalgias, neck pain, numbness, rash, sore throat, swollen glands, urinary symptoms, vertigo, visual change, vomiting or weakness. Nothing aggravates the symptoms. Treatments tried: above. The treatment provided no relief.       Constitution: Negative for chills, sweating, fatigue and fever.   HENT:  Negative for congestion and sore throat.    Neck: Negative for neck pain.   Cardiovascular:  Negative for chest pain.   Respiratory:  Negative for cough.    Gastrointestinal:  Positive for nausea. Negative for abdominal pain and vomiting.   Musculoskeletal:  Negative for joint pain, joint swelling and muscle ache.   Skin:  Negative for rash.   Neurological:  Negative for history of vertigo, headaches and numbness.      Objective:     Vitals:    04/22/24 1752   BP: (!) 130/97   BP Location: Right arm   Patient Position: Sitting   BP Method: Large (Automatic)   Pulse: 87   Resp: 18   Temp: 97.8 °F (36.6 °C)   TempSrc: Tympanic   SpO2: " "100%   Weight: 88 kg (194 lb)   Height: 5' 5" (1.651 m)       Physical Exam   Constitutional: She is oriented to person, place, and time. She appears well-developed. She is cooperative.  Non-toxic appearance. She does not appear ill. No distress.   HENT:   Head: Normocephalic and atraumatic.   Ears:   Right Ear: Hearing, external ear and ear canal normal.   Left Ear: Hearing, external ear and ear canal normal.   Nose: Congestion present. No mucosal edema, rhinorrhea or nasal deformity. No epistaxis. Right sinus exhibits no maxillary sinus tenderness and no frontal sinus tenderness. Left sinus exhibits no maxillary sinus tenderness and no frontal sinus tenderness.   Mouth/Throat: Uvula is midline, oropharynx is clear and moist and mucous membranes are normal. Mucous membranes are moist. No trismus in the jaw. Normal dentition. No uvula swelling. No oropharyngeal exudate, posterior oropharyngeal edema or posterior oropharyngeal erythema.   Eyes: Lids are normal. Pupils are equal, round, and reactive to light. Lids are everted and swept, no foreign bodies found. Right eye exhibits discharge. Right conjunctiva is injected. No scleral icterus. Extraocular movement intact vision grossly intact gaze aligned appropriately   Neck: Trachea normal and phonation normal. Neck supple. No edema present. No erythema present. No neck rigidity present.   Cardiovascular: Normal rate, regular rhythm, normal heart sounds and normal pulses.   Pulmonary/Chest: Effort normal. No respiratory distress. She has no decreased breath sounds. She has no wheezes. She has no rhonchi.   Abdominal: Normal appearance.   Musculoskeletal: Normal range of motion.         General: No deformity. Normal range of motion.   Neurological: She is alert and oriented to person, place, and time. She exhibits normal muscle tone. Coordination normal.   Skin: Skin is warm, dry, intact, not diaphoretic and not pale.   Psychiatric: Her speech is normal and behavior is " normal. Judgment and thought content normal.   Nursing note and vitals reviewed.      Assessment:     1. Nausea and vomiting, unspecified vomiting type    2. Gastroenteritis    3. Conjunctivitis of right eye, unspecified conjunctivitis type        Plan:     Patient stable for discharge and home management of condition    Nausea and vomiting, unspecified vomiting type  -     Cancel: POCT URINALYSIS  -     POCT urine pregnancy  -     POCT Urinalysis, Dipstick, Automated, W/O Scope  -     ondansetron disintegrating tablet 8 mg  -     ondansetron (ZOFRAN-ODT) 8 MG TbDL; Take 1 tablet (8 mg total) by mouth every 8 (eight) hours as needed.  Dispense: 12 tablet; Refill: 0  -     CULTURE, STOOL; Future; Expected date: 04/22/2024    Gastroenteritis  -     CULTURE, STOOL; Future; Expected date: 04/22/2024    Conjunctivitis of right eye, unspecified conjunctivitis type  -     ciprofloxacin HCl (CILOXAN) 0.3 % ophthalmic solution; Place 1 drop into the right eye every 4 (four) hours. While awake x 7 days  wash hands before and after admin  Dispense: 5 mL; Refill: 1      Patient Instructions   Stool cultures with drop off to [facility]     Rest activity ad rylan   Small sips of fluids   Clear liquid diet and progress as tolerated to bland diet then full diet   Zofran under the tongue every 8 hours as needed for nausea     If any > abdominal pain with tenderness with or without fever or chills go to er for further evaluation and hospital care     Please arrange follow up with your primary medical clinic as soon as possible. You must understand that you've received an Urgent Care treatment only and that you may be released before all of your medical problems are known or treated. You, the patient, will arrange for follow up as instructed. If your symptoms worsen or fail to improve you should go to the Emergency Room.     Go to the Emergency Department for any new or worsening symptoms including: worsening abdominal pain,  dark\black\bloody bowel movements, vomiting blood, hard abdomen, fever, chest pain, shortness of breath, loss of consciousness or any other concerns.       Patient Instructions   Wash hands before and after admin of eye drops   Avoid rubbing eyes   Change linens as needed   Follow up with your PCP as needed or return to [facility]

## 2024-04-22 NOTE — PATIENT INSTRUCTIONS
Stool cultures with drop off to [facility]     Rest activity ad rylan   Small sips of fluids   Clear liquid diet and progress as tolerated to bland diet then full diet   Zofran under the tongue every 8 hours as needed for nausea     If any > abdominal pain with tenderness with or without fever or chills go to er for further evaluation and hospital care     Please arrange follow up with your primary medical clinic as soon as possible. You must understand that you've received an Urgent Care treatment only and that you may be released before all of your medical problems are known or treated. You, the patient, will arrange for follow up as instructed. If your symptoms worsen or fail to improve you should go to the Emergency Room.     Go to the Emergency Department for any new or worsening symptoms including: worsening abdominal pain, dark\black\bloody bowel movements, vomiting blood, hard abdomen, fever, chest pain, shortness of breath, loss of consciousness or any other concerns.       Patient Instructions   Wash hands before and after admin of eye drops   Avoid rubbing eyes   Change linens as needed   Follow up with your PCP as needed or return to [facility]

## 2024-04-25 ENCOUNTER — LAB VISIT (OUTPATIENT)
Dept: LAB | Facility: HOSPITAL | Age: 34
End: 2024-04-25
Payer: MEDICAID

## 2024-04-25 DIAGNOSIS — D50.9 IRON DEFICIENCY ANEMIA, UNSPECIFIED IRON DEFICIENCY ANEMIA TYPE: ICD-10-CM

## 2024-04-25 LAB
ALBUMIN SERPL BCP-MCNC: 2.9 G/DL (ref 3.5–5.2)
ALP SERPL-CCNC: 70 U/L (ref 55–135)
ALT SERPL W/O P-5'-P-CCNC: 10 U/L (ref 10–44)
ANION GAP SERPL CALC-SCNC: 7 MMOL/L (ref 8–16)
AST SERPL-CCNC: 17 U/L (ref 10–40)
BASOPHILS # BLD AUTO: 0.01 K/UL (ref 0–0.2)
BASOPHILS NFR BLD: 0.2 % (ref 0–1.9)
BILIRUB SERPL-MCNC: 0.2 MG/DL (ref 0.1–1)
BUN SERPL-MCNC: 8 MG/DL (ref 6–20)
CALCIUM SERPL-MCNC: 8.8 MG/DL (ref 8.7–10.5)
CHLORIDE SERPL-SCNC: 109 MMOL/L (ref 95–110)
CO2 SERPL-SCNC: 25 MMOL/L (ref 23–29)
CREAT SERPL-MCNC: 0.6 MG/DL (ref 0.5–1.4)
DIFFERENTIAL METHOD BLD: ABNORMAL
EOSINOPHIL # BLD AUTO: 0.3 K/UL (ref 0–0.5)
EOSINOPHIL NFR BLD: 5.4 % (ref 0–8)
ERYTHROCYTE [DISTWIDTH] IN BLOOD BY AUTOMATED COUNT: 14.6 % (ref 11.5–14.5)
EST. GFR  (NO RACE VARIABLE): >60 ML/MIN/1.73 M^2
FERRITIN SERPL-MCNC: 5 NG/ML (ref 20–300)
GLUCOSE SERPL-MCNC: 80 MG/DL (ref 70–110)
HCT VFR BLD AUTO: 28.7 % (ref 37–48.5)
HGB BLD-MCNC: 8.9 G/DL (ref 12–16)
IMM GRANULOCYTES # BLD AUTO: 0.01 K/UL (ref 0–0.04)
IMM GRANULOCYTES NFR BLD AUTO: 0.2 % (ref 0–0.5)
IRON SERPL-MCNC: 19 UG/DL (ref 30–160)
LYMPHOCYTES # BLD AUTO: 1.4 K/UL (ref 1–4.8)
LYMPHOCYTES NFR BLD: 29.8 % (ref 18–48)
MCH RBC QN AUTO: 22.5 PG (ref 27–31)
MCHC RBC AUTO-ENTMCNC: 31 G/DL (ref 32–36)
MCV RBC AUTO: 73 FL (ref 82–98)
MONOCYTES # BLD AUTO: 0.4 K/UL (ref 0.3–1)
MONOCYTES NFR BLD: 9 % (ref 4–15)
NEUTROPHILS # BLD AUTO: 2.6 K/UL (ref 1.8–7.7)
NEUTROPHILS NFR BLD: 55.4 % (ref 38–73)
NRBC BLD-RTO: 0 /100 WBC
PLATELET # BLD AUTO: 354 K/UL (ref 150–450)
PMV BLD AUTO: 9.9 FL (ref 9.2–12.9)
POTASSIUM SERPL-SCNC: 3.5 MMOL/L (ref 3.5–5.1)
PROT SERPL-MCNC: 5.9 G/DL (ref 6–8.4)
RBC # BLD AUTO: 3.96 M/UL (ref 4–5.4)
SATURATED IRON: 5 % (ref 20–50)
SODIUM SERPL-SCNC: 141 MMOL/L (ref 136–145)
TOTAL IRON BINDING CAPACITY: 410 UG/DL (ref 250–450)
TRANSFERRIN SERPL-MCNC: 277 MG/DL (ref 200–375)
WBC # BLD AUTO: 4.66 K/UL (ref 3.9–12.7)

## 2024-04-25 PROCEDURE — 36415 COLL VENOUS BLD VENIPUNCTURE: CPT

## 2024-04-25 PROCEDURE — 80053 COMPREHEN METABOLIC PANEL: CPT

## 2024-04-25 PROCEDURE — 85025 COMPLETE CBC W/AUTO DIFF WBC: CPT

## 2024-04-25 PROCEDURE — 83540 ASSAY OF IRON: CPT

## 2024-04-25 PROCEDURE — 82728 ASSAY OF FERRITIN: CPT

## 2024-04-26 ENCOUNTER — OFFICE VISIT (OUTPATIENT)
Dept: HEMATOLOGY/ONCOLOGY | Facility: CLINIC | Age: 34
End: 2024-04-26
Payer: MEDICAID

## 2024-04-26 DIAGNOSIS — D50.9 IRON DEFICIENCY ANEMIA, UNSPECIFIED IRON DEFICIENCY ANEMIA TYPE: Primary | ICD-10-CM

## 2024-04-26 PROCEDURE — 1160F RVW MEDS BY RX/DR IN RCRD: CPT | Mod: CPTII,95,,

## 2024-04-26 PROCEDURE — 99214 OFFICE O/P EST MOD 30 MIN: CPT | Mod: 95,,,

## 2024-04-26 PROCEDURE — 1159F MED LIST DOCD IN RCRD: CPT | Mod: CPTII,95,,

## 2024-05-01 ENCOUNTER — TELEPHONE (OUTPATIENT)
Dept: INFUSION THERAPY | Facility: HOSPITAL | Age: 34
End: 2024-05-01
Payer: MEDICAID

## 2024-05-01 NOTE — TELEPHONE ENCOUNTER
Patient calling to schedule iron infusions. Explained that I do not have orders for iron infusions at this time, but I will msg Ms. Magdaleno and see if I can get her scheduled.  Informed her that it may take 8-10 days for her insurance to approved.   She acknowledged. Call ended well.

## 2024-05-06 RX ORDER — SODIUM CHLORIDE 0.9 % (FLUSH) 0.9 %
10 SYRINGE (ML) INJECTION
Status: CANCELLED | OUTPATIENT
Start: 2024-05-06

## 2024-05-06 RX ORDER — HEPARIN 100 UNIT/ML
500 SYRINGE INTRAVENOUS
Status: CANCELLED | OUTPATIENT
Start: 2024-05-06

## 2024-05-06 RX ORDER — SODIUM CHLORIDE 0.9 % (FLUSH) 0.9 %
10 SYRINGE (ML) INJECTION
Status: CANCELLED | OUTPATIENT
Start: 2024-05-16

## 2024-05-06 RX ORDER — HEPARIN 100 UNIT/ML
500 SYRINGE INTRAVENOUS
OUTPATIENT
Start: 2024-05-23

## 2024-05-07 ENCOUNTER — INFUSION (OUTPATIENT)
Dept: INFUSION THERAPY | Facility: HOSPITAL | Age: 34
End: 2024-05-07
Attending: PHYSICIAN ASSISTANT
Payer: MEDICAID

## 2024-05-07 VITALS
BODY MASS INDEX: 34.01 KG/M2 | TEMPERATURE: 98 F | WEIGHT: 204.13 LBS | SYSTOLIC BLOOD PRESSURE: 142 MMHG | RESPIRATION RATE: 18 BRPM | OXYGEN SATURATION: 99 % | HEIGHT: 65 IN | DIASTOLIC BLOOD PRESSURE: 100 MMHG | HEART RATE: 72 BPM

## 2024-05-07 DIAGNOSIS — M32.9 SYSTEMIC LUPUS ERYTHEMATOSUS ARTHRITIS: ICD-10-CM

## 2024-05-07 DIAGNOSIS — D50.9 IRON DEFICIENCY ANEMIA, UNSPECIFIED IRON DEFICIENCY ANEMIA TYPE: Primary | ICD-10-CM

## 2024-05-07 DIAGNOSIS — M32.9 SLE (SYSTEMIC LUPUS ERYTHEMATOSUS RELATED SYNDROME): Primary | ICD-10-CM

## 2024-05-07 PROCEDURE — 96365 THER/PROPH/DIAG IV INF INIT: CPT

## 2024-05-07 PROCEDURE — 25000003 PHARM REV CODE 250: Performed by: INTERNAL MEDICINE

## 2024-05-07 PROCEDURE — 63600175 PHARM REV CODE 636 W HCPCS: Mod: JZ,TB | Performed by: INTERNAL MEDICINE

## 2024-05-07 RX ORDER — EPINEPHRINE 0.3 MG/.3ML
0.3 INJECTION SUBCUTANEOUS ONCE AS NEEDED
OUTPATIENT
Start: 2024-05-14

## 2024-05-07 RX ORDER — SODIUM CHLORIDE 0.9 % (FLUSH) 0.9 %
10 SYRINGE (ML) INJECTION
OUTPATIENT
Start: 2024-05-14

## 2024-05-07 RX ORDER — DIPHENHYDRAMINE HYDROCHLORIDE 50 MG/ML
50 INJECTION INTRAMUSCULAR; INTRAVENOUS ONCE AS NEEDED
OUTPATIENT
Start: 2024-05-14

## 2024-05-07 RX ORDER — HEPARIN 100 UNIT/ML
500 SYRINGE INTRAVENOUS
OUTPATIENT
Start: 2024-05-14

## 2024-05-07 RX ADMIN — ANIFROLUMAB 300 MG: 300 INJECTION, SOLUTION INTRAVENOUS at 08:05

## 2024-05-07 NOTE — PLAN OF CARE
Problem: Adult Inpatient Plan of Care  Goal: Plan of Care Review  Outcome: Progressing  Goal: Patient-Specific Goal (Individualized)  Outcome: Progressing  Goal: Absence of Hospital-Acquired Illness or Injury  Outcome: Progressing  Goal: Optimal Comfort and Wellbeing  Outcome: Progressing     Problem: Infection  Goal: Absence of Infection Signs and Symptoms  Outcome: Progressing

## 2024-05-07 NOTE — ASSESSMENT & PLAN NOTE
Lab Results   Component Value Date    HGB 8.9 (L) 04/25/2024      Lab Results   Component Value Date    IRON 19 (L) 04/25/2024    TRANSFERRIN 277 04/25/2024    TIBC 410 04/25/2024    FESATURATED 5 (L) 04/25/2024     Lab Results   Component Value Date    FERRITIN 5 (L) 04/25/2024     Pt with iron deficiency anemia  Plan for IV iron therapy with Feraheme x 2 doses  F/u in 6 weeks post completion with labs prior

## 2024-05-07 NOTE — NURSING
Infusion # Saphnelo - 300 mg q 4wks  Last dose- 4/9/24    Any:  -recent illness, infection, or antibiotic use in past week- denies  -open wounds or mouth sores- denies  -invasive procedures or surgeries in past 4 weeks or in upcoming 4 weeks- denies  -vaccinations in past week- denies  -any new symptoms/change in symptoms-denies  -chance you may be pregnant- denies      Recent labs? 4/25/24  Last Rheumatology provider visit- Seen by Dr. MADISON on 4/15/24     Premeds-N/A     Saphnelo 300 mg administered IV at a 30 minute rate per orders; see MAR and vitals for more details. Tolerated well without adverse events, discharged and ambulatory out of clinic.

## 2024-05-07 NOTE — PROGRESS NOTES
Subjective:      Patient ID: Alisia Vines is a 33 y.o. female.    Chief Complaint: No chief complaint on file.    The patient location is: [place]  The chief complaint leading to consultation is: FOLLOW-UP    Visit type: audiovisual    Face to Face time with patient: 15  15 minutes of total time spent on the encounter, which includes face to face time and non-face to face time preparing to see the patient (eg, review of tests), Obtaining and/or reviewing separately obtained history, Documenting clinical information in the electronic or other health record, Independently interpreting results (not separately reported) and communicating results to the patient/family/caregiver, or Care coordination (not separately reported).         Each patient to whom he or she provides medical services by telemedicine is:  (1) informed of the relationship between the physician and patient and the respective role of any other health care provider with respect to management of the patient; and (2) notified that he or she may decline to receive medical services by telemedicine and may withdraw from such care at any time.    Notes:    HPI:    Alisia Vines  33 y.o.  female with past medical history significant for SLE, lupus nephritis, Sjogren's syndrome who was initially referred for evaluation of lymphoid hyperplasia and anemia.       She was hospitalized in April 2023 for lupus flare with hemolytic anemia after being noncompliant with her medications (Plaquenil) for lupus. She was treated with Solu-Medrol 1 g IV and started Plaquenil.  Patient previously treated with rituximab in 12/2019 & 05/2020 due to noncompliance. Per review of the medical record, she was hospitalized in 4/2023 with lupus flare and hemolytic anemia due to nonadherence to Plaquenil. 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 She was  initially treated with IV SoluMedrol and continued on Plaquenil which she continued upon discharge. She also was started on oral iron which wasn't effective. Supposed to receive IV iron but never did. She was on prednisone and Plaquenil for lupus with plans to start rituximab treatment. She received 1 dose in 6/2023 with a drop in Hgb and plts; rituximab was held. She was treated with 1U PRBCs after presenting to the ER. Her plts continued to drop and she was sent back to the ER for admission and plt transfusion. Apparently rheumatology doesn't recommend any more doses of ri     Interval History: Pt presents today for follow-up of anemia. She notes increasing fatigue. She is not currently on oral iron supplementation and notes difficulty with compliance as it pertains to oral medications. She currently receives monthly infusions of Saphnelo for tx of SLE. She has regular cycles with heavy flow at start only.      Social History     Socioeconomic History    Marital status: Single    Number of children: 2   Occupational History     Comment: [employer]   Tobacco Use    Smoking status: Never     Passive exposure: Never    Smokeless tobacco: Never   Substance and Sexual Activity    Alcohol use: No     Alcohol/week: 0.0 standard drinks of alcohol    Drug use: Yes     Types: Marijuana    Sexual activity: Not Currently     Partners: Male     Birth control/protection: None   Other Topics Concern    Are you pregnant or think you may be? No    Breast-feeding No   Social History Narrative    The patient is single and lives with her significant other.  She has 3 children.  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     Social Determinants of Health     Financial Resource Strain: Low Risk  (4/26/2024)    Overall Financial Resource Strain (CARDIA)     Difficulty of Paying Living Expenses: Not very hard   Food Insecurity: Patient Declined (4/26/2024)    Hunger Vital Sign     Worried About Running Out of Food in  the Last Year: Patient declined     Ran Out of Food in the Last Year: Patient declined   Transportation Needs: Patient Declined (2024)    PRAPARE - Transportation     Lack of Transportation (Medical): Patient declined     Lack of Transportation (Non-Medical): Patient declined   Physical Activity: Inactive (2024)    Exercise Vital Sign     Days of Exercise per Week: 0 days     Minutes of Exercise per Session: 0 min   Stress: No Stress Concern Present (2024)    [employer] - Occupational Stress Questionnaire     Feeling of Stress : Only a little   Housing Stability: Unknown (2023)    Housing Stability Vital Sign     Unable to Pay for Housing in the Last Year: No     Unstable Housing in the Last Year: No       Family History   Problem Relation Name Age of Onset    Hypertension Mother      Eczema Brother      Hypertension Maternal Grandmother      Diabetes Paternal Grandmother      Heart disease Son      Arrhythmia Son          CHB    Stroke Neg Hx      Cancer Neg Hx         Past Surgical History:   Procedure Laterality Date     SECTION WITH TUBAL LIGATION N/A 2019    Procedure:  SECTION, WITH TUBAL LIGATION;  Surgeon: VERONICA Valdovinos MD;  Location: [facility] L&D;  Service: OB/GYN;  Laterality: N/A;     SECTION, LOW TRANSVERSE      x2    LYMPH NODE BIOPSY Right 2023    Procedure: BIOPSY, LYMPH NODE;  Surgeon: Rivera Sandoval MD;  Location: [facility] OR;  Service: ENT;  Laterality: Right;  right deep cervial lymph node excision    NECK MASS EXCISION Right 2023    Procedure: EXCISION, MASS, NECK;  Surgeon: Rivera Sandoval MD;  Location: [facility] OR;  Service: ENT;  Laterality: Right;  right deep cervial lymph node excision    RENAL BIOPSY  2013       Past Medical History:   Diagnosis Date    Allergic rhinitis     Anemia     Condyloma acuminata     COVID-19 virus infection 2021    Encounter for blood transfusion     GERD (gastroesophageal  reflux disease)     Hemolytic anemia associated with systemic lupus erythematosus 4/30/2023    History of fetal anomaly in prior pregnancy, currently pregnant, unspecified trimester 03/08/2017    Pacemaker placed    HSV-2 (herpes simplex virus 2) infection     Lupus nephritis     Mood disorder     Overweight(278.02)     Sjogren's syndrome     Systemic lupus complicating pregnancy 01/31/2019    Systemic lupus erythematosus     Thrombocytopenia        Review of Systems   Constitutional:  Positive for fatigue. Negative for activity change, appetite change, chills, diaphoresis, fever and unexpected weight change.   HENT:  Negative for congestion.    Respiratory:  Negative for cough.    Gastrointestinal:  Negative for blood in stool, constipation, diarrhea, nausea and vomiting.   Genitourinary:  Negative for hematuria.   Musculoskeletal:  Positive for arthralgias.   Allergic/Immunologic: Positive for immunocompromised state.       Medication List with Changes/Refills   Current Medications    AMLODIPINE (NORVASC) 5 MG TABLET    TAKE 1 TABLET(5 MG) BY MOUTH EVERY DAY    CICLOPIROX (PENLAC) 8 % SOLN    Apply to nail and nail fold once daily for up to 1 year    CIPROFLOXACIN HCL (CILOXAN) 0.3 % OPHTHALMIC SOLUTION    Place 1 drop into the right eye every 4 (four) hours. While awake x 7 days  wash hands before and after admin    FLUTICASONE PROPIONATE (FLONASE) 50 MCG/ACTUATION NASAL SPRAY    SHAKE LIQUID AND USE 2 SPRAYS(100 MCG) IN EACH NOSTRIL EVERY DAY    KETOCONAZOLE (NIZORAL) 2 % SHAMPOO    Wash hair with medicated shampoo at least 1x/week - let sit on scalp at least 5 minutes prior to rinsing    MOMETASONE (ELOCON) 0.1 % SOLUTION    AAA of scalp once daily.    ONDANSETRON (ZOFRAN) 4 MG TABLET    Take 1 tablet (4 mg total) by mouth every 8 (eight) hours as needed for Nausea.    ONDANSETRON (ZOFRAN-ODT) 8 MG TBDL    Take 1 tablet (8 mg total) by mouth every 8 (eight) hours as needed.    PIMECROLIMUS (ELIDEL) 1 % CREAM     Apply to affected areas twice daily. Wear daily sunscreen.    TRIAMCINOLONE ACETONIDE 0.025% (KENALOG) 0.025 % OINT    AAA bid prn. Use for 2 weeks then taper. Mild steroid.    VALACYCLOVIR (VALTREX) 1000 MG TABLET    Take 1 tablet (1,000 mg total) by mouth once daily.        Objective:   There were no vitals filed for this visit.    Physical Exam  Vitals reviewed: virtual visit.   Constitutional:       Appearance: Normal appearance.   HENT:      Head: Normocephalic.   Neurological:      Mental Status: She is alert.   Psychiatric:         Mood and Affect: Mood normal.         Behavior: Behavior normal.         Thought Content: Thought content normal.         Judgment: Judgment normal.         Lab Results   Component Value Date    WBC 4.66 04/25/2024    HGB 8.9 (L) 04/25/2024    HCT 28.7 (L) 04/25/2024    MCV 73 (L) 04/25/2024     04/25/2024       Lab Results   Component Value Date     04/25/2024    K 3.5 04/25/2024     04/25/2024    CO2 25 04/25/2024    BUN 8 04/25/2024    CREATININE 0.6 04/25/2024    CALCIUM 8.8 04/25/2024    ANIONGAP 7 (L) 04/25/2024    ESTGFRAFRICA >60.0 07/26/2022    EGFRNONAA >60.0 07/26/2022     Lab Results   Component Value Date    ALT 10 04/25/2024    AST 17 04/25/2024    ALKPHOS 70 04/25/2024    BILITOT 0.2 04/25/2024       Assessment/Plan:     Problem List Items Addressed This Visit          Oncology    Iron deficiency anemia - Primary     Lab Results   Component Value Date    HGB 8.9 (L) 04/25/2024      Lab Results   Component Value Date    IRON 19 (L) 04/25/2024    TRANSFERRIN 277 04/25/2024    TIBC 410 04/25/2024    FESATURATED 5 (L) 04/25/2024     Lab Results   Component Value Date    FERRITIN 5 (L) 04/25/2024     Pt with iron deficiency anemia  Plan for IV iron therapy with Feraheme x 2 doses  F/u in 6 weeks post completion with labs prior               Relevant Orders    CBC Auto Differential    Comprehensive Metabolic Panel    Ferritin    Iron and TIBC     Pregnancy, urine rapid           Med Onc Chart Routing  Urgent    Follow up with physician    Follow up with GUNJAN 6 weeks. with labs a 2 days prior   Infusion scheduling note New or changed treatment   Feraheme x 2; stat UPT prior   Injection scheduling note    Labs CBC, CMP, ferritin and iron and TIBC   Scheduling:  Preferred lab:  Lab interval:     Imaging    Pharmacy appointment    Other referrals                     ROGER SalazarP-C  Hematology/Oncology

## 2024-05-13 ENCOUNTER — TELEPHONE (OUTPATIENT)
Dept: INFUSION THERAPY | Facility: HOSPITAL | Age: 34
End: 2024-05-13
Payer: MEDICAID

## 2024-05-13 NOTE — TELEPHONE ENCOUNTER
----- Message from Rani Strong sent at 5/13/2024  7:39 AM CDT -----  Patient would like to reschedule the appointment on 05/17/2024 to an earlier date this week. Call back number is [phone]. Thx.EL

## 2024-05-15 ENCOUNTER — INFUSION (OUTPATIENT)
Dept: INFUSION THERAPY | Facility: HOSPITAL | Age: 34
End: 2024-05-15
Attending: PHYSICIAN ASSISTANT
Payer: MEDICAID

## 2024-05-15 VITALS
DIASTOLIC BLOOD PRESSURE: 79 MMHG | TEMPERATURE: 98 F | RESPIRATION RATE: 18 BRPM | SYSTOLIC BLOOD PRESSURE: 118 MMHG | HEART RATE: 82 BPM | OXYGEN SATURATION: 99 %

## 2024-05-15 DIAGNOSIS — D50.9 IRON DEFICIENCY ANEMIA, UNSPECIFIED IRON DEFICIENCY ANEMIA TYPE: Primary | ICD-10-CM

## 2024-05-15 PROCEDURE — 96365 THER/PROPH/DIAG IV INF INIT: CPT

## 2024-05-15 PROCEDURE — 25000003 PHARM REV CODE 250

## 2024-05-15 PROCEDURE — 63600175 PHARM REV CODE 636 W HCPCS: Mod: JZ,JG

## 2024-05-15 RX ADMIN — FERUMOXYTOL 510 MG: 510 INJECTION INTRAVENOUS at 11:05

## 2024-05-15 NOTE — PLAN OF CARE
Patient tolerated Feraheme well today; no adverse reaction noted.  POC reviewed with pt.  NAD noted upon discharge.   Has f/u appt(s) scheduled per MD request.    Problem: Adult Inpatient Plan of Care  Goal: Plan of Care Review  Outcome: Progressing  Goal: Patient-Specific Goal (Individualized)  Outcome: Progressing  Goal: Absence of Hospital-Acquired Illness or Injury  Outcome: Progressing  Intervention: Identify and Manage Fall Risk  Flowsheets (Taken 5/15/2024 1100)  Safety Promotion/Fall Prevention: in recliner, wheels locked  Goal: Optimal Comfort and Wellbeing  Outcome: Progressing  Intervention: Provide Person-Centered Care  Flowsheets (Taken 5/15/2024 1100)  Trust Relationship/Rapport:   care explained   questions encouraged   reassurance provided   choices provided   emotional support provided   thoughts/feelings acknowledged   empathic listening provided   questions answered

## 2024-05-15 NOTE — NURSING
Feraheme 510 mg IV administered over 16 minutes.  Patient tolerated medication without complication.  Urine Pregnancy test = negative.  Discharged with future appointment

## 2024-05-22 ENCOUNTER — INFUSION (OUTPATIENT)
Dept: INFUSION THERAPY | Facility: HOSPITAL | Age: 34
End: 2024-05-22
Attending: PHYSICIAN ASSISTANT
Payer: MEDICAID

## 2024-05-22 VITALS
HEART RATE: 80 BPM | TEMPERATURE: 98 F | OXYGEN SATURATION: 100 % | RESPIRATION RATE: 16 BRPM | DIASTOLIC BLOOD PRESSURE: 105 MMHG | SYSTOLIC BLOOD PRESSURE: 146 MMHG

## 2024-05-22 DIAGNOSIS — D50.9 IRON DEFICIENCY ANEMIA, UNSPECIFIED IRON DEFICIENCY ANEMIA TYPE: Primary | ICD-10-CM

## 2024-05-22 RX ORDER — SODIUM CHLORIDE 0.9 % (FLUSH) 0.9 %
10 SYRINGE (ML) INJECTION
Status: DISCONTINUED | OUTPATIENT
Start: 2024-05-22 | End: 2024-05-22

## 2024-05-22 RX ORDER — SODIUM CHLORIDE 0.9 % (FLUSH) 0.9 %
10 SYRINGE (ML) INJECTION
OUTPATIENT
Start: 2024-05-23

## 2024-05-22 NOTE — NURSING
Pt arrived today for Feraheme #2 with elevated bp (see flowsheet). Pt asymptomatic. She decided to return for treatment when she gets her next Saphnelo infusion on 6/4.

## 2024-05-26 DIAGNOSIS — L21.9 SEBORRHEIC DERMATITIS: ICD-10-CM

## 2024-05-27 RX ORDER — KETOCONAZOLE 20 MG/ML
SHAMPOO, SUSPENSION TOPICAL
Qty: 120 ML | Refills: 11 | Status: SHIPPED | OUTPATIENT
Start: 2024-05-27

## 2024-05-29 ENCOUNTER — HOSPITAL ENCOUNTER (EMERGENCY)
Facility: HOSPITAL | Age: 34
Discharge: HOME | End: 2024-05-29
Attending: EMERGENCY MEDICINE
Payer: MEDICAID

## 2024-05-29 VITALS
RESPIRATION RATE: 24 BRPM | OXYGEN SATURATION: 97 % | HEART RATE: 122 BPM | TEMPERATURE: 100 F | SYSTOLIC BLOOD PRESSURE: 131 MMHG | DIASTOLIC BLOOD PRESSURE: 73 MMHG

## 2024-05-29 DIAGNOSIS — R79.89 ELEVATED D-DIMER: ICD-10-CM

## 2024-05-29 DIAGNOSIS — E83.42 HYPOMAGNESEMIA: ICD-10-CM

## 2024-05-29 DIAGNOSIS — R07.9 CHEST PAIN: ICD-10-CM

## 2024-05-29 DIAGNOSIS — R00.0 TACHYCARDIA: ICD-10-CM

## 2024-05-29 DIAGNOSIS — B34.1 ENTEROVIRUS INFECTION: ICD-10-CM

## 2024-05-29 DIAGNOSIS — J20.6 ACUTE BRONCHITIS DUE TO RHINOVIRUS: Primary | ICD-10-CM

## 2024-05-29 LAB
ADENOVIRUS: NOT DETECTED
ALBUMIN SERPL BCP-MCNC: 3.2 G/DL (ref 3.5–5.2)
ALP SERPL-CCNC: 82 U/L (ref 55–135)
ALT SERPL W/O P-5'-P-CCNC: 11 U/L (ref 10–44)
ANION GAP SERPL CALC-SCNC: 11 MMOL/L (ref 8–16)
ANISOCYTOSIS BLD QL SMEAR: SLIGHT
AST SERPL-CCNC: 17 U/L (ref 10–40)
B-HCG UR QL: NEGATIVE
BASOPHILS # BLD AUTO: 0.02 K/UL (ref 0–0.2)
BASOPHILS NFR BLD: 0.1 % (ref 0–1.9)
BILIRUB SERPL-MCNC: 0.5 MG/DL (ref 0.1–1)
BNP SERPL-MCNC: 22 PG/ML (ref 0–99)
BORDETELLA PARAPERTUSSIS (IS1001): NOT DETECTED
BORDETELLA PERTUSSIS (PTXP): NOT DETECTED
BUN SERPL-MCNC: 9 MG/DL (ref 6–20)
CALCIUM SERPL-MCNC: 8.9 MG/DL (ref 8.7–10.5)
CHLAMYDIA PNEUMONIAE: NOT DETECTED
CHLORIDE SERPL-SCNC: 107 MMOL/L (ref 95–110)
CO2 SERPL-SCNC: 18 MMOL/L (ref 23–29)
CORONAVIRUS 229E, COMMON COLD VIRUS: NOT DETECTED
CORONAVIRUS HKU1, COMMON COLD VIRUS: NOT DETECTED
CORONAVIRUS NL63, COMMON COLD VIRUS: NOT DETECTED
CORONAVIRUS OC43, COMMON COLD VIRUS: NOT DETECTED
CREAT SERPL-MCNC: 0.7 MG/DL (ref 0.5–1.4)
D DIMER PPP IA.FEU-MCNC: 1.63 MG/L FEU
DIFFERENTIAL METHOD BLD: ABNORMAL
EOSINOPHIL # BLD AUTO: 0 K/UL (ref 0–0.5)
EOSINOPHIL NFR BLD: 0.2 % (ref 0–8)
ERYTHROCYTE [DISTWIDTH] IN BLOOD BY AUTOMATED COUNT: 25 % (ref 11.5–14.5)
EST. GFR  (NO RACE VARIABLE): >60 ML/MIN/1.73 M^2
FLUBV RNA NPH QL NAA+NON-PROBE: NOT DETECTED
GLUCOSE SERPL-MCNC: 94 MG/DL (ref 70–110)
HCT VFR BLD AUTO: 27.3 % (ref 37–48.5)
HGB BLD-MCNC: 9.2 G/DL (ref 12–16)
HPIV1 RNA NPH QL NAA+NON-PROBE: NOT DETECTED
HPIV2 RNA NPH QL NAA+NON-PROBE: NOT DETECTED
HPIV3 RNA NPH QL NAA+NON-PROBE: NOT DETECTED
HPIV4 RNA NPH QL NAA+NON-PROBE: NOT DETECTED
HUMAN METAPNEUMOVIRUS: NOT DETECTED
IMM GRANULOCYTES # BLD AUTO: 0.1 K/UL (ref 0–0.04)
IMM GRANULOCYTES NFR BLD AUTO: 0.7 % (ref 0–0.5)
INFLUENZA A (SUBTYPES H1,H1-2009,H3): NOT DETECTED
LYMPHOCYTES # BLD AUTO: 0.7 K/UL (ref 1–4.8)
LYMPHOCYTES NFR BLD: 4.7 % (ref 18–48)
MAGNESIUM SERPL-MCNC: 1.4 MG/DL (ref 1.6–2.6)
MCH RBC QN AUTO: 27.8 PG (ref 27–31)
MCHC RBC AUTO-ENTMCNC: 33.7 G/DL (ref 32–36)
MCV RBC AUTO: 83 FL (ref 82–98)
MONOCYTES # BLD AUTO: 0.7 K/UL (ref 0.3–1)
MONOCYTES NFR BLD: 4.9 % (ref 4–15)
MYCOPLASMA PNEUMONIAE: NOT DETECTED
NEUTROPHILS # BLD AUTO: 13.3 K/UL (ref 1.8–7.7)
NEUTROPHILS NFR BLD: 89.4 % (ref 38–73)
NRBC BLD-RTO: 0 /100 WBC
PLATELET # BLD AUTO: 244 K/UL (ref 150–450)
PLATELET BLD QL SMEAR: ABNORMAL
PMV BLD AUTO: 10.1 FL (ref 9.2–12.9)
POTASSIUM SERPL-SCNC: 3.3 MMOL/L (ref 3.5–5.1)
PROT SERPL-MCNC: 6.3 G/DL (ref 6–8.4)
RBC # BLD AUTO: 3.31 M/UL (ref 4–5.4)
RESPIRATORY INFECTION PANEL SOURCE: ABNORMAL
RSV RNA NPH QL NAA+NON-PROBE: NOT DETECTED
RV+EV RNA NPH QL NAA+NON-PROBE: DETECTED
SARS-COV-2 RNA RESP QL NAA+PROBE: NOT DETECTED
SODIUM SERPL-SCNC: 136 MMOL/L (ref 136–145)
T4 FREE SERPL-MCNC: 0.88 NG/DL (ref 0.71–1.51)
TROPONIN I SERPL DL<=0.01 NG/ML-MCNC: <0.006 NG/ML (ref 0–0.03)
TROPONIN I SERPL DL<=0.01 NG/ML-MCNC: <0.006 NG/ML (ref 0–0.03)
TSH SERPL DL<=0.005 MIU/L-ACNC: 0.27 UIU/ML (ref 0.4–4)
WBC # BLD AUTO: 14.81 K/UL (ref 3.9–12.7)

## 2024-05-29 PROCEDURE — 83735 ASSAY OF MAGNESIUM: CPT | Performed by: EMERGENCY MEDICINE

## 2024-05-29 PROCEDURE — 83880 ASSAY OF NATRIURETIC PEPTIDE: CPT | Performed by: EMERGENCY MEDICINE

## 2024-05-29 PROCEDURE — 99285 EMERGENCY DEPT VISIT HI MDM: CPT | Mod: 25

## 2024-05-29 PROCEDURE — 25000003 PHARM REV CODE 250: Performed by: EMERGENCY MEDICINE

## 2024-05-29 PROCEDURE — 63600175 PHARM REV CODE 636 W HCPCS: Performed by: EMERGENCY MEDICINE

## 2024-05-29 PROCEDURE — 81025 URINE PREGNANCY TEST: CPT | Performed by: EMERGENCY MEDICINE

## 2024-05-29 PROCEDURE — 84443 ASSAY THYROID STIM HORMONE: CPT | Performed by: EMERGENCY MEDICINE

## 2024-05-29 PROCEDURE — 85379 FIBRIN DEGRADATION QUANT: CPT | Performed by: EMERGENCY MEDICINE

## 2024-05-29 PROCEDURE — 85025 COMPLETE CBC W/AUTO DIFF WBC: CPT | Performed by: EMERGENCY MEDICINE

## 2024-05-29 PROCEDURE — 96366 THER/PROPH/DIAG IV INF ADDON: CPT

## 2024-05-29 PROCEDURE — 87633 RESP VIRUS 12-25 TARGETS: CPT | Performed by: EMERGENCY MEDICINE

## 2024-05-29 PROCEDURE — 84439 ASSAY OF FREE THYROXINE: CPT | Performed by: EMERGENCY MEDICINE

## 2024-05-29 PROCEDURE — 93005 ELECTROCARDIOGRAM TRACING: CPT

## 2024-05-29 PROCEDURE — 96361 HYDRATE IV INFUSION ADD-ON: CPT

## 2024-05-29 PROCEDURE — 93010 ELECTROCARDIOGRAM REPORT: CPT | Mod: ,,, | Performed by: STUDENT IN AN ORGANIZED HEALTH CARE EDUCATION/TRAINING PROGRAM

## 2024-05-29 PROCEDURE — 84484 ASSAY OF TROPONIN QUANT: CPT | Performed by: EMERGENCY MEDICINE

## 2024-05-29 PROCEDURE — 80053 COMPREHEN METABOLIC PANEL: CPT | Performed by: EMERGENCY MEDICINE

## 2024-05-29 PROCEDURE — 25500020 PHARM REV CODE 255: Performed by: EMERGENCY MEDICINE

## 2024-05-29 PROCEDURE — 96365 THER/PROPH/DIAG IV INF INIT: CPT | Mod: 59

## 2024-05-29 RX ORDER — ACETAMINOPHEN AND CODEINE PHOSPHATE 300; 30 MG/1; MG/1
1 TABLET ORAL
Status: COMPLETED | OUTPATIENT
Start: 2024-05-29 | End: 2024-05-29

## 2024-05-29 RX ORDER — MAGNESIUM SULFATE HEPTAHYDRATE 40 MG/ML
2 INJECTION, SOLUTION INTRAVENOUS
Status: COMPLETED | OUTPATIENT
Start: 2024-05-29 | End: 2024-05-29

## 2024-05-29 RX ADMIN — POTASSIUM BICARBONATE 25 MEQ: 978 TABLET, EFFERVESCENT ORAL at 06:05

## 2024-05-29 RX ADMIN — IOHEXOL 100 ML: 350 INJECTION, SOLUTION INTRAVENOUS at 07:05

## 2024-05-29 RX ADMIN — MAGNESIUM SULFATE HEPTAHYDRATE 2 G: 40 INJECTION, SOLUTION INTRAVENOUS at 09:05

## 2024-05-29 RX ADMIN — SODIUM CHLORIDE 1000 ML: 9 INJECTION, SOLUTION INTRAVENOUS at 06:05

## 2024-05-29 RX ADMIN — ACETAMINOPHEN AND CODEINE PHOSPHATE 1 TABLET: 300; 30 TABLET ORAL at 10:05

## 2024-05-29 NOTE — ED PROVIDER NOTES
"SCRIBE #1 NOTE: I, Delfina Cameron, am scribing for, and in the presence of, Bhargavi Cool DO. I have scribed the HPI, ROS, PEx.     SCRIBE #2 NOTE: I, Diony Rivas, am scribing for, and in the presence of,  Елена Harden MD. I have scribed the remaining portions of the note not scribed by Scribe #1.      History     Chief Complaint   Patient presents with    Chest Pain     Pt. Reports chest pain that started around 830 this morning. She was given 325mg asa, nitro x 1 and 4mg zofran PTA.      Review of patient's allergies indicates:  No Known Allergies      History of Present Illness     HPI    5/29/2024, 5:48 PM  History obtained from the patient      History of Present Illness: Alisia Vines is a 33 y.o. female patient with a PMHx of HTN, GERD, systemic lupus erythematosus, anemia who presents to the Emergency Department for evaluation of midline CP which onset between 7:30-8 AM this morning. Pt also complains of light-headedness, nausea, cough and SOB. She states that "it hurts to breath." Pt states that she has been coughing up yellow mucus. Symptoms are constant and moderate in severity.  Patient states that lying flat makes her symptoms worse as well as taking a deep breath.  Patient denies any fever, chills, v/d, palpitations, headaches, dizziness, sore throat, diaphoresis, and all other sxs at this time. Pt was given 325 mg ASA, 1 nitro,and 4 mg zofran while in route to the ED. Pt states that she takes amlodipine for HTN and receives infusions for iron deficiency anemia and SLE. No further complaints or concerns at this time.       Arrival mode: AASI    PCP: Saumya Quinonez MD        Past Medical History:  Past Medical History:   Diagnosis Date    Allergic rhinitis     Anemia     Condyloma acuminata     COVID-19 virus infection 07/2021    Encounter for blood transfusion     GERD (gastroesophageal reflux disease)     Hemolytic anemia associated with systemic lupus erythematosus 4/30/2023    " History of fetal anomaly in prior pregnancy, currently pregnant, unspecified trimester 2017    Pacemaker placed    HSV-2 (herpes simplex virus 2) infection     Lupus nephritis     Mood disorder     Overweight(278.02)     Sjogren's syndrome     Systemic lupus complicating pregnancy 2019    Systemic lupus erythematosus     Thrombocytopenia        Past Surgical History:  Past Surgical History:   Procedure Laterality Date     SECTION WITH TUBAL LIGATION N/A 2019    Procedure:  SECTION, WITH TUBAL LIGATION;  Surgeon: VERONICA Valdovinos MD;  Location: [facility] L&D;  Service: OB/GYN;  Laterality: N/A;     SECTION, LOW TRANSVERSE      x2    LYMPH NODE BIOPSY Right 2023    Procedure: BIOPSY, LYMPH NODE;  Surgeon: Rivera Sandoval MD;  Location: [facility] OR;  Service: ENT;  Laterality: Right;  right deep cervial lymph node excision    NECK MASS EXCISION Right 2023    Procedure: EXCISION, MASS, NECK;  Surgeon: Rivera Sandoval MD;  Location: [facility] OR;  Service: ENT;  Laterality: Right;  right deep cervial lymph node excision    RENAL BIOPSY  2013         Family History:  Family History   Problem Relation Name Age of Onset    Hypertension Mother      Eczema Brother      Hypertension Maternal Grandmother      Diabetes Paternal Grandmother      Heart disease Son      Arrhythmia Son          CHB    Stroke Neg Hx      Cancer Neg Hx         Social History:  Social History     Tobacco Use    Smoking status: Never     Passive exposure: Never    Smokeless tobacco: Never   Substance and Sexual Activity    Alcohol use: No     Alcohol/week: 0.0 standard drinks of alcohol    Drug use: Yes     Types: Marijuana    Sexual activity: Not Currently     Partners: Male     Birth control/protection: None        Review of Systems     Review of Systems   Constitutional:  Negative for chills, diaphoresis and fever.   HENT:  Negative for sore throat.    Respiratory:  Positive for cough and shortness of  breath.    Cardiovascular:  Positive for chest pain (midline). Negative for palpitations.   Gastrointestinal:  Positive for nausea. Negative for diarrhea and vomiting.   Neurological:  Positive for light-headedness. Negative for dizziness and headaches.      Physical Exam     Initial Vitals [05/29/24 1633]   BP Pulse Resp Temp SpO2   (!) 166/95 (!) 121 18 97.8 °F (36.6 °C) 98 %      MAP       --          Physical Exam  Nursing Notes and Vital Signs Reviewed.  Constitutional: Patient is in no acute distress. Well-developed and well-nourished.  Head: Atraumatic. Normocephalic.  Eyes: PERRL. EOM intact. Conjunctivae are not pale. No scleral icterus.  ENT: Mucous membranes are moist. Oropharynx is clear and symmetric.    Neck: Supple. Full ROM.   Cardiovascular: Tachycardic. Regular rhythm. No murmurs, rubs, or gallops. Pulmonary/Chest: No respiratory distress. Clear to auscultation bilaterally. No wheezing or rales.  Abdominal: Soft and non-distended.  There is no tenderness.  No rebound, guarding, or rigidity.   Musculoskeletal: Moves all extremities. No obvious deformities. No edema. No calf tenderness.  Skin: Warm and dry.  Neurological:  Alert, awake, and appropriate.  Normal speech.  No acute focal neurological deficits are appreciated.  Psychiatric: Normal affect. Good eye contact. Appropriate in content.     ED Course   Procedures  ED Vital Signs:  Vitals:    05/29/24 1633 05/29/24 1740 05/29/24 2020 05/29/24 2140   BP: (!) 166/95 (!) 139/92  (!) 144/86   Pulse: (!) 121 (!) 122 (!) 117 (!) 124   Resp: 18 20 20 20   Temp: 97.8 °F (36.6 °C)   (!) 100.5 °F (38.1 °C)   TempSrc: Oral   Oral   SpO2: 98% 100% 100% 100%    05/29/24 2204 05/29/24 2230 05/29/24 2301 05/29/24 2318   BP:  138/81 128/70    Pulse:  (!) 125 (!) 125 (!) 128   Resp: 20 20 (!) 26 20   Temp:  99.9 °F (37.7 °C)     TempSrc:  Oral     SpO2:  99% 97% 98%    05/29/24 2332   BP: 131/73   Pulse: (!) 122   Resp: (!) 24   Temp:    TempSrc:    SpO2: 97%        Abnormal Lab Results:  Labs Reviewed   RESPIRATORY INFECTION PANEL (PCR), NASOPHARYNGEAL - Abnormal; Notable for the following components:       Result Value    Human Rhinovirus/Enterovirus Detected (*)     All other components within normal limits    Narrative:     Assay not valid for lower respiratory specimens, alternate  testing required.   CBC W/ AUTO DIFFERENTIAL - Abnormal; Notable for the following components:    WBC 14.81 (*)     RBC 3.31 (*)     Hemoglobin 9.2 (*)     Hematocrit 27.3 (*)     RDW 25.0 (*)     Immature Granulocytes 0.7 (*)     Gran # (ANC) 13.3 (*)     Immature Grans (Abs) 0.10 (*)     Lymph # 0.7 (*)     Gran % 89.4 (*)     Lymph % 4.7 (*)     All other components within normal limits   COMPREHENSIVE METABOLIC PANEL - Abnormal; Notable for the following components:    Potassium 3.3 (*)     CO2 18 (*)     Albumin 3.2 (*)     All other components within normal limits   D DIMER, QUANTITATIVE - Abnormal; Notable for the following components:    D-Dimer 1.63 (*)     All other components within normal limits   MAGNESIUM - Abnormal; Notable for the following components:    Magnesium 1.4 (*)     All other components within normal limits   TSH - Abnormal; Notable for the following components:    TSH 0.270 (*)     All other components within normal limits   TROPONIN I   TROPONIN I   B-TYPE NATRIURETIC PEPTIDE   PREGNANCY TEST, URINE RAPID    Narrative:     Specimen Source->Urine   MAGNESIUM   TSH   T4, FREE        All Lab Results:  Results for orders placed or performed during the hospital encounter of 05/29/24   Respiratory Infection Panel (PCR), Nasopharyngeal    Specimen: Nasopharyngeal Swab   Result Value Ref Range    Respiratory Infection Panel Source NP swab Not Detected    Adenovirus Not Detected Not Detected    Coronavirus 229E, Common Cold Virus Not Detected Not Detected    Coronavirus HKU1, Common Cold Virus Not Detected Not Detected    Coronavirus NL63, Common Cold Virus Not Detected  Not Detected    Coronavirus OC43, Common Cold Virus Not Detected Not Detected    SARS-CoV2 (COVID-19) Qualitative PCR Not Detected Not Detected    Human Metapneumovirus Not Detected Not Detected    Human Rhinovirus/Enterovirus Detected (A) Not Detected    Influenza A (subtypes H1, H1-2009,H3) Not Detected Not Detected    Influenza B Not Detected Not Detected    Parainfluenza Virus 1 Not Detected Not Detected    Parainfluenza Virus 2 Not Detected Not Detected    Parainfluenza Virus 3 Not Detected Not Detected    Parainfluenza Virus 4 Not Detected Not Detected    Respiratory Syncytial Virus Not Detected Not Detected    Bordetella Parapertussis (NN0923) Not Detected Not Detected    Bordetella pertussis (ptxP) Not Detected Not Detected    Chlamydia pneumoniae Not Detected Not Detected    Mycoplasma pneumoniae Not Detected Not Detected   CBC auto differential   Result Value Ref Range    WBC 14.81 (H) 3.90 - 12.70 K/uL    RBC 3.31 (L) 4.00 - 5.40 M/uL    Hemoglobin 9.2 (L) 12.0 - 16.0 g/dL    Hematocrit 27.3 (L) 37.0 - 48.5 %    MCV 83 82 - 98 fL    MCH 27.8 27.0 - 31.0 pg    MCHC 33.7 32.0 - 36.0 g/dL    RDW 25.0 (H) 11.5 - 14.5 %    Platelets 244 150 - 450 K/uL    MPV 10.1 9.2 - 12.9 fL    Immature Granulocytes 0.7 (H) 0.0 - 0.5 %    Gran # (ANC) 13.3 (H) 1.8 - 7.7 K/uL    Immature Grans (Abs) 0.10 (H) 0.00 - 0.04 K/uL    Lymph # 0.7 (L) 1.0 - 4.8 K/uL    Mono # 0.7 0.3 - 1.0 K/uL    Eos # 0.0 0.0 - 0.5 K/uL    Baso # 0.02 0.00 - 0.20 K/uL    nRBC 0 0 /100 WBC    Gran % 89.4 (H) 38.0 - 73.0 %    Lymph % 4.7 (L) 18.0 - 48.0 %    Mono % 4.9 4.0 - 15.0 %    Eosinophil % 0.2 0.0 - 8.0 %    Basophil % 0.1 0.0 - 1.9 %    Platelet Estimate Appears normal     Aniso Slight     Differential Method Automated    Comprehensive metabolic panel   Result Value Ref Range    Sodium 136 136 - 145 mmol/L    Potassium 3.3 (L) 3.5 - 5.1 mmol/L    Chloride 107 95 - 110 mmol/L    CO2 18 (L) 23 - 29 mmol/L    Glucose 94 70 - 110 mg/dL    BUN 9  6 - 20 mg/dL    Creatinine 0.7 0.5 - 1.4 mg/dL    Calcium 8.9 8.7 - 10.5 mg/dL    Total Protein 6.3 6.0 - 8.4 g/dL    Albumin 3.2 (L) 3.5 - 5.2 g/dL    Total Bilirubin 0.5 0.1 - 1.0 mg/dL    Alkaline Phosphatase 82 55 - 135 U/L    AST 17 10 - 40 U/L    ALT 11 10 - 44 U/L    eGFR >60 >60 mL/min/1.73 m^2    Anion Gap 11 8 - 16 mmol/L   Troponin I #1   Result Value Ref Range    Troponin I <0.006 0.000 - 0.026 ng/mL   Troponin I #2   Result Value Ref Range    Troponin I <0.006 0.000 - 0.026 ng/mL   BNP   Result Value Ref Range    BNP 22 0 - 99 pg/mL   D dimer, quantitative   Result Value Ref Range    D-Dimer 1.63 (H) <0.50 mg/L FEU   Pregnancy, urine rapid (UPT)   Result Value Ref Range    Preg Test, Ur Negative    Magnesium   Result Value Ref Range    Magnesium 1.4 (L) 1.6 - 2.6 mg/dL   TSH   Result Value Ref Range    TSH 0.270 (L) 0.400 - 4.000 uIU/mL   T4, Free   Result Value Ref Range    Free T4 0.88 0.71 - 1.51 ng/dL     *Note: Due to a large number of results and/or encounters for the requested time period, some results have not been displayed. A complete set of results can be found in Results Review.        Imaging Results:  Imaging Results              US Lower Extremity Veins Bilateral (Final result)  Result time 05/29/24 21:04:54      Final result by Marge Chang MD (05/29/24 21:04:54)                   Impression:      No evidence of deep venous thrombosis in either lower extremity.      Electronically signed by: Marge Chang  Date:    05/29/2024  Time:    21:04               Narrative:    EXAMINATION:  US LOWER EXTREMITY VEINS BILATERAL    CLINICAL HISTORY:  Other specified abnormal findings of blood chemistry    TECHNIQUE:  Duplex and color flow Doppler and dynamic compression was performed of the bilateral lower extremity veins was performed.    COMPARISON:  None    FINDINGS:  Right thigh veins: The common femoral, femoral, popliteal, upper greater saphenous, and deep femoral veins are patent  and free of thrombus. The veins are normally compressible and have normal phasic flow and augmentation response.    Right calf veins: The visualized calf veins are patent.    Left thigh veins: The common femoral, femoral, popliteal, upper greater saphenous, and deep femoral veins are patent and free of thrombus. The veins are normally compressible and have normal phasic flow and augmentation response.    Left calf veins: The visualized calf veins are patent.    Miscellaneous: None                                       CTA Chest Non-Coronary (PE Studies) (Final result)  Result time 05/29/24 19:30:44      Final result by Marge Chang MD (05/29/24 19:30:44)                   Impression:      No pulmonary embolus identified as visualized image degradation      Electronically signed by: Marge Chang  Date:    05/29/2024  Time:    19:30               Narrative:    EXAMINATION:  CTA CHEST NON CORONARY (PE STUDIES)    CLINICAL HISTORY:  Pulmonary embolism (PE) suspected, positive D-dimer;    TECHNIQUE:  Angiographic technique PE protocol with MIPS and post processing volumetric    Iterative technique with low-dose parameters for diminishing radiation dose as reasonably achievable    3D MIPS provided PE study pulmonary angiogram    COMPARISON:  Radiographic correlate    FINDINGS:  Imaging degraded by motion.  As visualized no pulmonary embolus seen.  No pleural effusion    No sizable consolidation with mild dependent atelectasis of                                       X-Ray Chest AP Portable (Final result)  Result time 05/29/24 19:15:25      Final result by Marge Chang MD (05/29/24 19:15:25)                   Impression:      Under inflation      Electronically signed by: Marge Chang  Date:    05/29/2024  Time:    19:15               Narrative:    EXAMINATION:  XR CHEST AP PORTABLE    CLINICAL HISTORY:  Chest Pain;    TECHNIQUE:  Single frontal portable view of the chest was  performed.    COMPARISON:  July 2023    FINDINGS:  Lungs are underinflated without consolidation or sizable pleural effusion and no convincing pneumothorax                                       The EKG was ordered, reviewed, and independently interpreted by the ED provider.  Interpretation time: 17:08  Rate: 120 BPM  Rhythm: sinus tachycardia  Interpretation: No acute ST changes. No STEMI.    The Emergency Provider reviewed the vital signs and test results, which are outlined above.     ED Discussion     8:00 PM: Dr. Cool transfers care of patient to Dr. Harden pending repeat troponin results.     9:15 PM: Reassessed pt at this time. Discussed with pt all pertinent ED information and results. Discussed pt dx and plan of tx. Gave pt all f/u and return to the ED instructions. All questions and concerns were addressed at this time. Pt expresses understanding of information and instructions, and is comfortable with plan to discharge. Pt is stable for discharge.    I discussed with patient and/or family/caretaker that evaluation in the ED does not suggest any emergent or life threatening medical conditions requiring immediate intervention beyond what was provided in the ED, and I believe patient is safe for discharge.  Regardless, an unremarkable evaluation in the ED does not preclude the development or presence of a serious of life threatening condition. As such, patient was instructed to return immediately for any worsening or change in current symptoms.        ED Course as of 05/30/24 0603   Wed May 29, 2024   1715 Patient is hypertensive and tachycardic.  Her EKG shows sinus tachycardia with a rate of 120.  PA interval 156.  QRS 72.  .  No ST or T-wave changes.  Normal axis. [NF]   1831 D-Dimer(!): 1.63 [NF]   1832 Potassium(!): 3.3 [NF]   1843 WBC(!): 14.81 [NF]   1843 Hemoglobin(!): 9.2 [NF]   1843 Hematocrit(!): 27.3 [NF]   1843 Platelet Count: 244 [NF]   1843 Potassium(!): 3.3 [NF]   1843 CO2(!): 18 [NF]    1843 Troponin I: <0.006 [NF]   1843 hCG Qualitative, Urine: Negative [NF]   1843 BNP: 22 [NF]   1846 Chest x-ray shows no acute findings. [NF]   1912 Human Rhinovirus/Enterovirus(!): Detected [NF]      ED Course User Index  [NF] Bhargavi Cool DO     Medical Decision Making  Amount and/or Complexity of Data Reviewed  Labs: ordered. Decision-making details documented in ED Course.  Radiology: ordered. Decision-making details documented in ED Course.  ECG/medicine tests: ordered and independent interpretation performed. Decision-making details documented in ED Course.    Risk  Prescription drug management.                ED Medication(s):  Medications   sodium chloride 0.9% bolus 1,000 mL 1,000 mL (0 mLs Intravenous Stopped 5/29/24 2318)   potassium bicarbonate disintegrating tablet 25 mEq (25 mEq Oral Given 5/29/24 1856)   iohexoL (OMNIPAQUE 350) injection 100 mL (100 mLs Intravenous Given 5/29/24 1901)   magnesium sulfate 2g in water 50mL IVPB (premix) (0 g Intravenous Stopped 5/29/24 2336)   acetaminophen-codeine 300-30mg per tablet 1 tablet (1 tablet Oral Given 5/29/24 2204)       Discharge Medication List as of 5/29/2024  9:02 PM           Follow-up Information       Saumya Quinonez MD In 2 days.    Specialty: Family Medicine  Contact information:  [address] [facility] [address]  [phone]               [facility]    Specialty: Emergency Medicine  Why: As needed, If symptoms worsen  Contact information:  [address] [facility] [address] [phone]                               Scribe Attestation:   Scribe #1: I performed the above scribed service and the documentation accurately describes the services I performed. I attest to the accuracy of the note.     Attending:   Physician Attestation Statement for Scribe #1: I, Bhargavi Cool DO, personally performed the services described in this documentation, as scribed by Delfina Cameron, in my  presence, and it is both accurate and complete.       Scribe Attestation:   Scribe #2: I performed the above scribed service and the documentation accurately describes the services I performed. I attest to the accuracy of the note.    Attending Attestation:           Physician Attestation for Scribe:    Physician Attestation Statement for Scribe #2: I, Елена Harden MD, reviewed documentation, as scribed by Diony Rivas in my presence, and it is both accurate and complete. I also acknowledge and confirm the content of the note done by Scribe #1.           Clinical Impression       ICD-10-CM ICD-9-CM   1. Acute bronchitis due to Rhinovirus  J20.6 466.0     079.3   2. Chest pain  R07.9 786.50   3. Elevated d-dimer  R79.89 790.92   4. Enterovirus infection  B34.1 078.89   5. Tachycardia  R00.0 785.0   6. Hypomagnesemia  E83.42 275.2       Disposition:   Disposition: Discharged  Condition: Stable        Елена Harden MD  05/30/24 0603

## 2024-05-31 ENCOUNTER — TELEPHONE (OUTPATIENT)
Dept: FAMILY MEDICINE | Facility: CLINIC | Age: 34
End: 2024-05-31
Payer: MEDICAID

## 2024-05-31 LAB
OHS QRS DURATION: 92 MS
OHS QTC CALCULATION: 457 MS

## 2024-05-31 NOTE — TELEPHONE ENCOUNTER
Pt stated could she got something prescribed , urgent care discharged her and really didn't do anything

## 2024-05-31 NOTE — TELEPHONE ENCOUNTER
----- Message from Milad Pitts sent at 5/31/2024  7:15 AM CDT -----  Contact: mother  ..Type:  Same Day Appointment Request    Caller is requesting a same day appointment.  Caller declined first available appointment listed below.    Name of Caller:Carolyn Vines  When is the first available appointment?  Symptoms:SOB, tightness of chest, ER follow up   Best Call Back Number:[phone]  Additional Information: MRN: [id number] Cristiano

## 2024-05-31 NOTE — TELEPHONE ENCOUNTER
The ER note shows she is still being worked up for the chest pain. That need to be complete. The ER should give her directions regarding her viral infection. But since it is a virus treatment is symptomatic. There is unlikely anything more I can add that the ER won't already do

## 2024-05-31 NOTE — TELEPHONE ENCOUNTER
She has a virus. Treatment is symptomatic and waiting for the virus to clear on its own. There is nothing I would be adding to the regimen. She is more than welcome to come in on Monday if she is still feeling sick but a visit today would just be an extra co-pay with no change in treatment plan

## 2024-06-03 ENCOUNTER — TELEPHONE (OUTPATIENT)
Dept: INFUSION THERAPY | Facility: HOSPITAL | Age: 34
End: 2024-06-03
Payer: MEDICAID

## 2024-06-03 ENCOUNTER — PATIENT MESSAGE (OUTPATIENT)
Dept: INFUSION THERAPY | Facility: HOSPITAL | Age: 34
End: 2024-06-03
Payer: MEDICAID

## 2024-06-03 NOTE — TELEPHONE ENCOUNTER
Reviewed ED notes and test results.  Instructed pt to f/u with her PCP. States she will call today for an appt. Rescheduled Saphnelo infusion appt from 6/4 to 6/14. She will call to reschedule if not better by this date. Noted that pt has an iron infusion scheduled for the same date. Infusion charge group notified of above.

## 2024-06-03 NOTE — TELEPHONE ENCOUNTER
----- Message from Breanna Crystal MA sent at 6/3/2024  9:32 AM CDT -----    ----- Message -----  From: Breanna Crystal MA  Sent: 6/3/2024   9:09 AM CDT  To: Shane Blair MD    She is scheduled for a Saphnelo infusion  ----- Message -----  From: Landy Zuniga MA  Sent: 6/3/2024   8:59 AM CDT  To: Rossy Lynn Staff    pt diagnosed Wed with acute bronchitis due to rhinovirus but wasn't given any meds. does she need to wait to have her next infusion (scheduled for tomorrow).

## 2024-06-04 ENCOUNTER — TELEPHONE (OUTPATIENT)
Dept: FAMILY MEDICINE | Facility: CLINIC | Age: 34
End: 2024-06-04
Payer: MEDICAID

## 2024-06-04 NOTE — TELEPHONE ENCOUNTER
----- Message from Alin Camejo sent at 6/4/2024 11:35 AM CDT -----  Regarding: PT CALLBACK  Name of Who is Calling:Pt         What is the request in detail: Requesting follow up for ER visit please advise           Can the clinic reply by [facility]: yes         [facility]:Telephone Information:  Mobile          [phone]

## 2024-06-05 ENCOUNTER — HOSPITAL ENCOUNTER (EMERGENCY)
Facility: HOSPITAL | Age: 34
Discharge: HOME | End: 2024-06-05
Attending: EMERGENCY MEDICINE
Payer: MEDICAID

## 2024-06-05 VITALS
BODY MASS INDEX: 32.32 KG/M2 | OXYGEN SATURATION: 99 % | RESPIRATION RATE: 18 BRPM | DIASTOLIC BLOOD PRESSURE: 88 MMHG | HEIGHT: 65 IN | WEIGHT: 194 LBS | SYSTOLIC BLOOD PRESSURE: 148 MMHG | TEMPERATURE: 99 F | HEART RATE: 93 BPM

## 2024-06-05 DIAGNOSIS — R07.9 CHEST PAIN: ICD-10-CM

## 2024-06-05 DIAGNOSIS — J18.9 PNEUMONIA OF LEFT LOWER LOBE DUE TO INFECTIOUS ORGANISM: Primary | ICD-10-CM

## 2024-06-05 LAB
ALBUMIN SERPL BCP-MCNC: 2.5 G/DL (ref 3.5–5.2)
ALP SERPL-CCNC: 79 U/L (ref 55–135)
ALT SERPL W/O P-5'-P-CCNC: 6 U/L (ref 10–44)
ANION GAP SERPL CALC-SCNC: 12 MMOL/L (ref 8–16)
ANISOCYTOSIS BLD QL SMEAR: SLIGHT
AST SERPL-CCNC: 14 U/L (ref 10–40)
BASOPHILS # BLD AUTO: 0.03 K/UL (ref 0–0.2)
BASOPHILS NFR BLD: 0.4 % (ref 0–1.9)
BILIRUB SERPL-MCNC: 0.3 MG/DL (ref 0.1–1)
BUN SERPL-MCNC: 5 MG/DL (ref 6–20)
CALCIUM SERPL-MCNC: 8.8 MG/DL (ref 8.7–10.5)
CHLORIDE SERPL-SCNC: 104 MMOL/L (ref 95–110)
CO2 SERPL-SCNC: 21 MMOL/L (ref 23–29)
CREAT SERPL-MCNC: 0.6 MG/DL (ref 0.5–1.4)
DIFFERENTIAL METHOD BLD: ABNORMAL
EOSINOPHIL # BLD AUTO: 0.1 K/UL (ref 0–0.5)
EOSINOPHIL NFR BLD: 0.7 % (ref 0–8)
ERYTHROCYTE [DISTWIDTH] IN BLOOD BY AUTOMATED COUNT: 22.5 % (ref 11.5–14.5)
EST. GFR  (NO RACE VARIABLE): >60 ML/MIN/1.73 M^2
GLUCOSE SERPL-MCNC: 80 MG/DL (ref 70–110)
HCT VFR BLD AUTO: 31 % (ref 37–48.5)
HCV AB SERPL QL IA: NEGATIVE
HEP C VIRUS HOLD SPECIMEN: NORMAL
HGB BLD-MCNC: 10.1 G/DL (ref 12–16)
HIV 1+2 AB+HIV1 P24 AG SERPL QL IA: NEGATIVE
IMM GRANULOCYTES # BLD AUTO: 0.09 K/UL (ref 0–0.04)
IMM GRANULOCYTES NFR BLD AUTO: 1.3 % (ref 0–0.5)
LACTATE SERPL-SCNC: 1.3 MMOL/L (ref 0.5–2.2)
LYMPHOCYTES # BLD AUTO: 1.2 K/UL (ref 1–4.8)
LYMPHOCYTES NFR BLD: 17.1 % (ref 18–48)
MAGNESIUM SERPL-MCNC: 1.6 MG/DL (ref 1.6–2.6)
MCH RBC QN AUTO: 26.3 PG (ref 27–31)
MCHC RBC AUTO-ENTMCNC: 32.6 G/DL (ref 32–36)
MCV RBC AUTO: 81 FL (ref 82–98)
MONOCYTES # BLD AUTO: 0.8 K/UL (ref 0.3–1)
MONOCYTES NFR BLD: 11.2 % (ref 4–15)
NEUTROPHILS # BLD AUTO: 4.7 K/UL (ref 1.8–7.7)
NEUTROPHILS NFR BLD: 69.3 % (ref 38–73)
NRBC BLD-RTO: 0 /100 WBC
OHS QRS DURATION: 90 MS
OHS QTC CALCULATION: 446 MS
PLATELET # BLD AUTO: 374 K/UL (ref 150–450)
PLATELET BLD QL SMEAR: ABNORMAL
PMV BLD AUTO: 9.4 FL (ref 9.2–12.9)
POTASSIUM SERPL-SCNC: 3.2 MMOL/L (ref 3.5–5.1)
PROCALCITONIN SERPL IA-MCNC: 0.14 NG/ML
PROT SERPL-MCNC: 6.6 G/DL (ref 6–8.4)
RBC # BLD AUTO: 3.84 M/UL (ref 4–5.4)
SODIUM SERPL-SCNC: 137 MMOL/L (ref 136–145)
TROPONIN I SERPL DL<=0.01 NG/ML-MCNC: <0.006 NG/ML (ref 0–0.03)
WBC # BLD AUTO: 6.78 K/UL (ref 3.9–12.7)

## 2024-06-05 PROCEDURE — 87389 HIV-1 AG W/HIV-1&-2 AB AG IA: CPT | Performed by: EMERGENCY MEDICINE

## 2024-06-05 PROCEDURE — 86803 HEPATITIS C AB TEST: CPT | Performed by: EMERGENCY MEDICINE

## 2024-06-05 PROCEDURE — 96360 HYDRATION IV INFUSION INIT: CPT

## 2024-06-05 PROCEDURE — 80053 COMPREHEN METABOLIC PANEL: CPT | Performed by: NURSE PRACTITIONER

## 2024-06-05 PROCEDURE — 93005 ELECTROCARDIOGRAM TRACING: CPT

## 2024-06-05 PROCEDURE — 84484 ASSAY OF TROPONIN QUANT: CPT | Performed by: NURSE PRACTITIONER

## 2024-06-05 PROCEDURE — 87040 BLOOD CULTURE FOR BACTERIA: CPT | Performed by: NURSE PRACTITIONER

## 2024-06-05 PROCEDURE — 83735 ASSAY OF MAGNESIUM: CPT | Performed by: NURSE PRACTITIONER

## 2024-06-05 PROCEDURE — 99285 EMERGENCY DEPT VISIT HI MDM: CPT | Mod: 25

## 2024-06-05 PROCEDURE — 85025 COMPLETE CBC W/AUTO DIFF WBC: CPT | Performed by: NURSE PRACTITIONER

## 2024-06-05 PROCEDURE — 25000003 PHARM REV CODE 250: Performed by: NURSE PRACTITIONER

## 2024-06-05 PROCEDURE — 83605 ASSAY OF LACTIC ACID: CPT | Performed by: NURSE PRACTITIONER

## 2024-06-05 PROCEDURE — 93010 ELECTROCARDIOGRAM REPORT: CPT | Mod: ,,, | Performed by: INTERNAL MEDICINE

## 2024-06-05 PROCEDURE — 84145 PROCALCITONIN (PCT): CPT | Performed by: NURSE PRACTITIONER

## 2024-06-05 RX ORDER — POTASSIUM CHLORIDE 20 MEQ/1
40 TABLET, EXTENDED RELEASE ORAL ONCE
Status: COMPLETED | OUTPATIENT
Start: 2024-06-05 | End: 2024-06-05

## 2024-06-05 RX ORDER — KETOROLAC TROMETHAMINE 10 MG/1
10 TABLET, FILM COATED ORAL EVERY 6 HOURS PRN
Qty: 15 TABLET | Refills: 0 | Status: SHIPPED | OUTPATIENT
Start: 2024-06-05

## 2024-06-05 RX ORDER — ACETAMINOPHEN 325 MG/1
650 TABLET ORAL
Status: COMPLETED | OUTPATIENT
Start: 2024-06-05 | End: 2024-06-05

## 2024-06-05 RX ORDER — SODIUM CHLORIDE 9 MG/ML
1000 INJECTION, SOLUTION INTRAVENOUS
Status: COMPLETED | OUTPATIENT
Start: 2024-06-05 | End: 2024-06-05

## 2024-06-05 RX ORDER — LEVOFLOXACIN 500 MG/1
500 TABLET, FILM COATED ORAL DAILY
Qty: 7 TABLET | Refills: 0 | Status: SHIPPED | OUTPATIENT
Start: 2024-06-05 | End: 2024-06-12

## 2024-06-05 RX ADMIN — SODIUM CHLORIDE 1000 ML: 9 INJECTION, SOLUTION INTRAVENOUS at 10:06

## 2024-06-05 RX ADMIN — POTASSIUM CHLORIDE 40 MEQ: 1500 TABLET, EXTENDED RELEASE ORAL at 12:06

## 2024-06-05 RX ADMIN — ACETAMINOPHEN 650 MG: 325 TABLET ORAL at 09:06

## 2024-06-05 NOTE — ED PROVIDER NOTES
Encounter Date: 2024       History     Chief Complaint   Patient presents with    Chest Pain     Pt dx w/ bronchitis x 1 week ago. Pt reports chest pain came back Monday and is under L breast 6/10 tightness. +fatigue, +sob, -dizziness, -n/v, +diarrhea     33 year old female presents to the emergency department with a complaint of left sided chest pain that is localized under the left breast that began two days ago. The pain has been intermittent and described as sharp. The pain is worsened by lying flat and deep breathing. Associated symptoms are shortness of breath, dizziness, fever, fever, and diarrhea. Denies cough or productive sputum. Patient was just diagnosed with rhinovirus and bronchitis on . Reports that her symptoms got better initially then became worsened again on Monday. Patient has a history of Lupus and Anemia which she sees rheumatology routinely for.          Review of patient's allergies indicates:  No Known Allergies  Past Medical History:   Diagnosis Date    Allergic rhinitis     Anemia     Condyloma acuminata     COVID-19 virus infection 2021    Encounter for blood transfusion     GERD (gastroesophageal reflux disease)     Hemolytic anemia associated with systemic lupus erythematosus 2023    History of fetal anomaly in prior pregnancy, currently pregnant, unspecified trimester 2017    Pacemaker placed    HSV-2 (herpes simplex virus 2) infection     Lupus nephritis     Mood disorder     Overweight(278.02)     Sjogren's syndrome     Systemic lupus complicating pregnancy 2019    Systemic lupus erythematosus     Thrombocytopenia      Past Surgical History:   Procedure Laterality Date     SECTION WITH TUBAL LIGATION N/A 2019    Procedure:  SECTION, WITH TUBAL LIGATION;  Surgeon: VERONICA Valdovinos MD;  Location: [facility];  Service: OB/GYN;  Laterality: N/A;     SECTION, LOW TRANSVERSE      x2    LYMPH NODE BIOPSY Right 2023    Procedure:  BIOPSY, LYMPH NODE;  Surgeon: Rivera Sandoval MD;  Location: [facility] OR;  Service: ENT;  Laterality: Right;  right deep cervial lymph node excision    NECK MASS EXCISION Right 2/17/2023    Procedure: EXCISION, MASS, NECK;  Surgeon: Rivera Sandoval MD;  Location: [facility] OR;  Service: ENT;  Laterality: Right;  right deep cervial lymph node excision    RENAL BIOPSY  October 2013     Family History   Problem Relation Name Age of Onset    Hypertension Mother      Eczema Brother      Hypertension Maternal Grandmother      Diabetes Paternal Grandmother      Heart disease Son      Arrhythmia Son          CHB    Stroke Neg Hx      Cancer Neg Hx       Social History     Tobacco Use    Smoking status: Never     Passive exposure: Never    Smokeless tobacco: Never   Substance Use Topics    Alcohol use: No     Alcohol/week: 0.0 standard drinks of alcohol    Drug use: Yes     Types: Marijuana     Review of Systems   Constitutional:  Positive for fever.   HENT:  Negative for sore throat.    Respiratory:  Positive for shortness of breath.    Cardiovascular:  Positive for chest pain.   Gastrointestinal:  Positive for diarrhea. Negative for nausea.   Genitourinary:  Negative for dysuria.   Musculoskeletal:  Negative for back pain.   Skin:  Negative for rash.   Neurological:  Positive for weakness.   Hematological:  Does not bruise/bleed easily.       Physical Exam     Initial Vitals [06/05/24 0856]   BP Pulse Resp Temp SpO2   125/84 (!) 111 18 100.3 °F (37.9 °C) 100 %      MAP       --         Physical Exam    Nursing note reviewed.  Constitutional: She appears well-developed and well-nourished.   HENT:   Head: Normocephalic and atraumatic.   Eyes: Conjunctivae and EOM are normal. Pupils are equal, round, and reactive to light.   Neck: Neck supple.   Normal range of motion.  Cardiovascular:  Regular rhythm, normal heart sounds and intact distal pulses.   Tachycardia present.   Exam reveals no gallop and no friction rub.       No murmur  heard.  Pulmonary/Chest: Breath sounds normal. Tachypnea (mild) noted.   Abdominal: Abdomen is soft. Bowel sounds are normal. There is no abdominal tenderness. There is no rebound and no guarding.   Musculoskeletal:         General: Normal range of motion.      Cervical back: Normal range of motion and neck supple.     Neurological: She is alert and oriented to person, place, and time. She has normal strength and normal reflexes.   Skin: Skin is warm and dry.   Psychiatric: She has a normal mood and affect. Her behavior is normal. Thought content normal.         ED Course   Procedures  Labs Reviewed   CBC W/ AUTO DIFFERENTIAL - Abnormal; Notable for the following components:       Result Value    RBC 3.84 (*)     Hemoglobin 10.1 (*)     Hematocrit 31.0 (*)     MCV 81 (*)     MCH 26.3 (*)     RDW 22.5 (*)     Immature Granulocytes 1.3 (*)     Immature Grans (Abs) 0.09 (*)     Lymph % 17.1 (*)     All other components within normal limits   COMPREHENSIVE METABOLIC PANEL - Abnormal; Notable for the following components:    Potassium 3.2 (*)     CO2 21 (*)     BUN 5 (*)     Albumin 2.5 (*)     ALT 6 (*)     All other components within normal limits   CULTURE, BLOOD   CULTURE, BLOOD   HIV 1 / 2 ANTIBODY    Narrative:     Release to patient->Immediate   HEPATITIS C ANTIBODY    Narrative:     Release to patient->Immediate   HEP C VIRUS HOLD SPECIMEN    Narrative:     Release to patient->Immediate   LACTIC ACID, PLASMA   PROCALCITONIN   TROPONIN I   MAGNESIUM   URINALYSIS, REFLEX TO URINE CULTURE   LACTIC ACID, PLASMA     EKG Readings: (Independently Interpreted)   Other EKG Interpretations: EKG: sinus tachycardia with rate of 103, no st or t wave abnormalities     ECG Results              EKG 12-lead (In process)        Collection Time Result Time QRS Duration OHS QTC Calculation    06/05/24 08:51:31 06/05/24 10:21:06 90 446                     In process by Interface, Lab In [facility] (06/05/24 10:21:18)                    Narrative:    Test Reason : R07.9,    Vent. Rate : 106 BPM     Atrial Rate : 106 BPM     P-R Int : 142 ms          QRS Dur : 090 ms      QT Int : 336 ms       P-R-T Axes : 034 090 -02 degrees     QTc Int : 446 ms    Sinus tachycardia  Rightward axis  Abnormal QRS-T angle, consider primary T wave abnormality  Abnormal ECG  When compared with ECG of 29-MAY-2024 17:08,  No significant change was found    Referred By: [email or web address]   SELF           Confirmed By:                                   Imaging Results              X-Ray Chest AP Portable (Final result)  Result time 06/05/24 09:36:34      Final result by Jaden Odell MD (06/05/24 09:36:34)                   Impression:      As above.      Electronically signed by: Jaden Odell  Date:    06/05/2024  Time:    09:36               Narrative:    EXAMINATION:  XR CHEST AP PORTABLE    CLINICAL HISTORY:  Sepsis;    TECHNIQUE:  Single frontal view of the chest was performed.    COMPARISON:  Multiple priors.    FINDINGS:  Suspected left basilar opacity possibly pleural fluid and atelectasis or infection.  Correlation is advised.    The cardiac silhouette is normal in size. The hilar and mediastinal contours are unremarkable.    Bones are intact.                                       Medications   potassium chloride SA CR tablet 40 mEq (has no administration in time range)   0.9%  NaCl infusion (1,000 mLs Intravenous New Bag 6/5/24 1008)   acetaminophen tablet 650 mg (650 mg Oral Given 6/5/24 0930)     Medical Decision Making  Patient presents to ED today with left sided chest pain with deep breathing and lying flat. Associated symptom of fever. EKG no acute st elevation. Was evaluated for PE a few days ago with negative CT. Xray today revealed questionable lower left lobe infiltrate, which is consistent with location of pain on clinical exam. Patient will start on Levaquin for 7 days, Toradol 10 mg given for pain management. Instructed to follow up with PCP for  new/worsening symptoms. No evidence of sepsis, wbc, lactate normal. Recheck HR at discharge 95    Amount and/or Complexity of Data Reviewed  Labs: ordered.  Radiology: ordered.    Risk  OTC drugs.  Prescription drug management.                                      Clinical Impression:  Final diagnoses:  [R07.9] Chest pain  [J18.9] Pneumonia of left lower lobe due to infectious organism (Primary)          ED Disposition Condition    Discharge Stable          ED Prescriptions       Medication Sig Dispense Start Date End Date Auth. Provider    levoFLOXacin (LEVAQUIN) 500 MG tablet Take 1 tablet (500 mg total) by mouth once daily. for 7 days 7 tablet 6/5/2024 6/12/2024 Warren Bowen NP    ketorolac (TORADOL) 10 mg tablet Take 1 tablet (10 mg total) by mouth every 6 (six) hours as needed for Pain. 15 tablet 6/5/2024 -- Warren Bowen NP          Follow-up Information       Follow up With Specialties Details Why Contact Info    Saumya Quinonez MD Family Medicine In 2 days If symptoms worsen, As needed [address] [facility] [address]  [phone]               Warren Bowen NP  06/05/24 8023

## 2024-06-10 LAB
BACTERIA BLD CULT: NORMAL
BACTERIA BLD CULT: NORMAL

## 2024-06-21 ENCOUNTER — TELEPHONE (OUTPATIENT)
Dept: INFUSION THERAPY | Facility: HOSPITAL | Age: 34
End: 2024-06-21
Payer: MEDICAID

## 2024-06-21 NOTE — TELEPHONE ENCOUNTER
----- Message from Diamone Speed sent at 6/21/2024  8:55 AM CDT -----  Regarding: self  Type: Patient Call Back       Who called: self        What is the request in detail: needs a call back to get an appt        Can the clinic reply by [facility]? Yes       Would the patient rather a call back or a response via [facility]? Call back       Best call back number:[phone]

## 2024-06-27 ENCOUNTER — INFUSION (OUTPATIENT)
Dept: INFUSION THERAPY | Facility: HOSPITAL | Age: 34
End: 2024-06-27
Attending: PHYSICIAN ASSISTANT
Payer: MEDICAID

## 2024-06-27 VITALS
DIASTOLIC BLOOD PRESSURE: 88 MMHG | WEIGHT: 192.69 LBS | RESPIRATION RATE: 17 BRPM | HEIGHT: 65 IN | TEMPERATURE: 98 F | OXYGEN SATURATION: 99 % | SYSTOLIC BLOOD PRESSURE: 128 MMHG | HEART RATE: 74 BPM | BODY MASS INDEX: 32.1 KG/M2

## 2024-06-27 DIAGNOSIS — D50.9 IRON DEFICIENCY ANEMIA, UNSPECIFIED IRON DEFICIENCY ANEMIA TYPE: Primary | ICD-10-CM

## 2024-06-27 DIAGNOSIS — M32.9 SLE (SYSTEMIC LUPUS ERYTHEMATOSUS RELATED SYNDROME): ICD-10-CM

## 2024-06-27 DIAGNOSIS — M32.9 SYSTEMIC LUPUS ERYTHEMATOSUS ARTHRITIS: ICD-10-CM

## 2024-06-27 PROCEDURE — 63600175 PHARM REV CODE 636 W HCPCS: Mod: JZ,TB | Performed by: INTERNAL MEDICINE

## 2024-06-27 PROCEDURE — 63600175 PHARM REV CODE 636 W HCPCS: Mod: JZ,JG

## 2024-06-27 PROCEDURE — 25000003 PHARM REV CODE 250: Performed by: INTERNAL MEDICINE

## 2024-06-27 PROCEDURE — 96365 THER/PROPH/DIAG IV INF INIT: CPT

## 2024-06-27 PROCEDURE — 25000003 PHARM REV CODE 250

## 2024-06-27 PROCEDURE — 96375 TX/PRO/DX INJ NEW DRUG ADDON: CPT

## 2024-06-27 RX ORDER — HEPARIN 100 UNIT/ML
500 SYRINGE INTRAVENOUS
OUTPATIENT
Start: 2024-07-04

## 2024-06-27 RX ORDER — DIPHENHYDRAMINE HYDROCHLORIDE 50 MG/ML
50 INJECTION INTRAMUSCULAR; INTRAVENOUS ONCE AS NEEDED
OUTPATIENT
Start: 2024-07-04

## 2024-06-27 RX ORDER — SODIUM CHLORIDE 0.9 % (FLUSH) 0.9 %
10 SYRINGE (ML) INJECTION
OUTPATIENT
Start: 2024-07-04

## 2024-06-27 RX ORDER — EPINEPHRINE 0.3 MG/.3ML
0.3 INJECTION SUBCUTANEOUS ONCE AS NEEDED
OUTPATIENT
Start: 2024-07-04

## 2024-06-27 RX ADMIN — FERUMOXYTOL 510 MG: 510 INJECTION INTRAVENOUS at 11:06

## 2024-06-27 RX ADMIN — ANIFROLUMAB 300 MG: 300 INJECTION, SOLUTION INTRAVENOUS at 12:06

## 2024-07-02 ENCOUNTER — TELEPHONE (OUTPATIENT)
Dept: INFECTIOUS DISEASES | Facility: CLINIC | Age: 34
End: 2024-07-02
Payer: MEDICAID

## 2024-07-02 ENCOUNTER — TELEPHONE (OUTPATIENT)
Dept: RHEUMATOLOGY | Facility: CLINIC | Age: 34
End: 2024-07-02
Payer: MEDICAID

## 2024-07-02 NOTE — TELEPHONE ENCOUNTER
Left message on patients vm that Dr MADISON is out of the clinic until 8/5.  She should schedule with her PCP or go to Urgent Care for an evaluation

## 2024-07-02 NOTE — TELEPHONE ENCOUNTER
----- Message from Sawyer Fulton sent at 7/2/2024  7:52 AM CDT -----  Contact: Alisia  .Type:  Needs Medical Advice    Who Called:  Alisia   Symptoms (please be specific):  lymph nodes, neck hurting and uncomfortable    How long has patient had these symptoms:   1 to 2 weeks   Pharmacy name and phone #:   [id number] [place], [place] - [address]   Phone: [phone] Fax: [phone]    Would the patient rather a call back or a response via [facility]?  Call back   Best Call Back Number:  [phone]     Additional Information:      Thanks

## 2024-07-02 NOTE — TELEPHONE ENCOUNTER
----- Message from Sawyer Fulton sent at 7/2/2024  7:55 AM CDT -----  Contact: Alisia  Type:  Needs Medical Advice    Who Called:  Alisia   Symptoms (please be specific):  lymph nodes, neck hurting and uncomfortable    How long has patient had these symptoms:   1 to 2 weeks   Pharmacy name and phone #:   [id number] [place], [place] [facility] [address]   Phone: [phone] Fax: [phone]    Would the patient rather a call back or a response via [facility]?  Call back   Best Call Back Number:  [phone]     Additional Information:        Thanks

## 2024-07-02 NOTE — TELEPHONE ENCOUNTER
Pt scheduled for appt with Dr. Garcia on 7/8/24 at 11 am. Pt verbalized understanding and agreed to appt date, time, and location.

## 2024-07-08 ENCOUNTER — OFFICE VISIT (OUTPATIENT)
Dept: INFECTIOUS DISEASES | Facility: CLINIC | Age: 34
End: 2024-07-08
Payer: MEDICAID

## 2024-07-08 ENCOUNTER — HOSPITAL ENCOUNTER (EMERGENCY)
Facility: HOSPITAL | Age: 34
Discharge: HOME | End: 2024-07-08
Attending: EMERGENCY MEDICINE
Payer: MEDICAID

## 2024-07-08 VITALS
HEART RATE: 82 BPM | WEIGHT: 202.63 LBS | TEMPERATURE: 98 F | BODY MASS INDEX: 33.71 KG/M2 | SYSTOLIC BLOOD PRESSURE: 146 MMHG | OXYGEN SATURATION: 100 % | RESPIRATION RATE: 18 BRPM | DIASTOLIC BLOOD PRESSURE: 105 MMHG

## 2024-07-08 VITALS
TEMPERATURE: 98 F | BODY MASS INDEX: 32.1 KG/M2 | WEIGHT: 192.69 LBS | DIASTOLIC BLOOD PRESSURE: 122 MMHG | OXYGEN SATURATION: 100 % | SYSTOLIC BLOOD PRESSURE: 157 MMHG | HEIGHT: 65 IN | HEART RATE: 78 BPM

## 2024-07-08 DIAGNOSIS — R59.0 LOCALIZED ENLARGED LYMPH NODES: ICD-10-CM

## 2024-07-08 DIAGNOSIS — I10 SEVERE UNCONTROLLED HYPERTENSION: Primary | Chronic | ICD-10-CM

## 2024-07-08 DIAGNOSIS — I10 PRIMARY HYPERTENSION: Primary | ICD-10-CM

## 2024-07-08 DIAGNOSIS — M32.9 SLE (SYSTEMIC LUPUS ERYTHEMATOSUS RELATED SYNDROME): Chronic | ICD-10-CM

## 2024-07-08 DIAGNOSIS — R59.1 LYMPHADENOPATHY: ICD-10-CM

## 2024-07-08 DIAGNOSIS — R03.0 BLOOD PRESSURE ELEVATED WITHOUT HISTORY OF HTN: ICD-10-CM

## 2024-07-08 DIAGNOSIS — R59.0 LYMPHADENOPATHY, CERVICAL: ICD-10-CM

## 2024-07-08 LAB
ALBUMIN SERPL BCP-MCNC: 3.4 G/DL (ref 3.5–5.2)
ALP SERPL-CCNC: 89 U/L (ref 55–135)
ALT SERPL W/O P-5'-P-CCNC: 27 U/L (ref 10–44)
ANION GAP SERPL CALC-SCNC: 11 MMOL/L (ref 8–16)
AST SERPL-CCNC: 41 U/L (ref 10–40)
B-HCG UR QL: NEGATIVE
BASOPHILS # BLD AUTO: 0.02 K/UL (ref 0–0.2)
BASOPHILS NFR BLD: 0.4 % (ref 0–1.9)
BILIRUB SERPL-MCNC: 0.2 MG/DL (ref 0.1–1)
BUN SERPL-MCNC: 9 MG/DL (ref 6–20)
CALCIUM SERPL-MCNC: 9.3 MG/DL (ref 8.7–10.5)
CHLORIDE SERPL-SCNC: 107 MMOL/L (ref 95–110)
CO2 SERPL-SCNC: 23 MMOL/L (ref 23–29)
CREAT SERPL-MCNC: 0.6 MG/DL (ref 0.5–1.4)
DIFFERENTIAL METHOD BLD: ABNORMAL
EOSINOPHIL # BLD AUTO: 0.3 K/UL (ref 0–0.5)
EOSINOPHIL NFR BLD: 5.1 % (ref 0–8)
ERYTHROCYTE [DISTWIDTH] IN BLOOD BY AUTOMATED COUNT: 21 % (ref 11.5–14.5)
EST. GFR  (NO RACE VARIABLE): >60 ML/MIN/1.73 M^2
GLUCOSE SERPL-MCNC: 56 MG/DL (ref 70–110)
GROUP A STREP, MOLECULAR: NEGATIVE
HCT VFR BLD AUTO: 39.2 % (ref 37–48.5)
HETEROPH AB SERPL QL IA: NEGATIVE
HGB BLD-MCNC: 13.1 G/DL (ref 12–16)
IMM GRANULOCYTES # BLD AUTO: 0.03 K/UL (ref 0–0.04)
IMM GRANULOCYTES NFR BLD AUTO: 0.6 % (ref 0–0.5)
INFLUENZA A, MOLECULAR: NEGATIVE
INFLUENZA B, MOLECULAR: NEGATIVE
LYMPHOCYTES # BLD AUTO: 1.3 K/UL (ref 1–4.8)
LYMPHOCYTES NFR BLD: 25.5 % (ref 18–48)
MCH RBC QN AUTO: 27 PG (ref 27–31)
MCHC RBC AUTO-ENTMCNC: 33.4 G/DL (ref 32–36)
MCV RBC AUTO: 81 FL (ref 82–98)
MONOCYTES # BLD AUTO: 0.4 K/UL (ref 0.3–1)
MONOCYTES NFR BLD: 7.7 % (ref 4–15)
NEUTROPHILS # BLD AUTO: 3 K/UL (ref 1.8–7.7)
NEUTROPHILS NFR BLD: 60.7 % (ref 38–73)
NRBC BLD-RTO: 0 /100 WBC
PLATELET # BLD AUTO: 270 K/UL (ref 150–450)
PMV BLD AUTO: 9.7 FL (ref 9.2–12.9)
POTASSIUM SERPL-SCNC: 3.9 MMOL/L (ref 3.5–5.1)
PROT SERPL-MCNC: 7.4 G/DL (ref 6–8.4)
RBC # BLD AUTO: 4.86 M/UL (ref 4–5.4)
SARS-COV-2 RDRP RESP QL NAA+PROBE: NEGATIVE
SODIUM SERPL-SCNC: 141 MMOL/L (ref 136–145)
SPECIMEN SOURCE: NORMAL
WBC # BLD AUTO: 4.94 K/UL (ref 3.9–12.7)

## 2024-07-08 PROCEDURE — 80053 COMPREHEN METABOLIC PANEL: CPT | Performed by: EMERGENCY MEDICINE

## 2024-07-08 PROCEDURE — 93005 ELECTROCARDIOGRAM TRACING: CPT

## 2024-07-08 PROCEDURE — 96365 THER/PROPH/DIAG IV INF INIT: CPT

## 2024-07-08 PROCEDURE — 93010 ELECTROCARDIOGRAM REPORT: CPT | Mod: ,,, | Performed by: INTERNAL MEDICINE

## 2024-07-08 PROCEDURE — 3077F SYST BP >= 140 MM HG: CPT | Mod: CPTII,,, | Performed by: INTERNAL MEDICINE

## 2024-07-08 PROCEDURE — 25000003 PHARM REV CODE 250: Performed by: EMERGENCY MEDICINE

## 2024-07-08 PROCEDURE — 3080F DIAST BP >= 90 MM HG: CPT | Mod: CPTII,,, | Performed by: INTERNAL MEDICINE

## 2024-07-08 PROCEDURE — U0002 COVID-19 LAB TEST NON-CDC: HCPCS | Performed by: EMERGENCY MEDICINE

## 2024-07-08 PROCEDURE — 96375 TX/PRO/DX INJ NEW DRUG ADDON: CPT

## 2024-07-08 PROCEDURE — 99214 OFFICE O/P EST MOD 30 MIN: CPT | Mod: S$PBB,,, | Performed by: INTERNAL MEDICINE

## 2024-07-08 PROCEDURE — 86308 HETEROPHILE ANTIBODY SCREEN: CPT | Performed by: EMERGENCY MEDICINE

## 2024-07-08 PROCEDURE — 1159F MED LIST DOCD IN RCRD: CPT | Mod: CPTII,,, | Performed by: INTERNAL MEDICINE

## 2024-07-08 PROCEDURE — 99284 EMERGENCY DEPT VISIT MOD MDM: CPT | Mod: 25,27

## 2024-07-08 PROCEDURE — 96376 TX/PRO/DX INJ SAME DRUG ADON: CPT

## 2024-07-08 PROCEDURE — 87651 STREP A DNA AMP PROBE: CPT | Performed by: EMERGENCY MEDICINE

## 2024-07-08 PROCEDURE — 25500020 PHARM REV CODE 255: Performed by: EMERGENCY MEDICINE

## 2024-07-08 PROCEDURE — 99214 OFFICE O/P EST MOD 30 MIN: CPT | Mod: PBBFAC,25 | Performed by: INTERNAL MEDICINE

## 2024-07-08 PROCEDURE — 81025 URINE PREGNANCY TEST: CPT | Performed by: EMERGENCY MEDICINE

## 2024-07-08 PROCEDURE — 99999 PR PBB SHADOW E&M-EST. PATIENT-LVL IV: CPT | Mod: PBBFAC,,, | Performed by: INTERNAL MEDICINE

## 2024-07-08 PROCEDURE — 87502 INFLUENZA DNA AMP PROBE: CPT | Performed by: EMERGENCY MEDICINE

## 2024-07-08 PROCEDURE — 63600175 PHARM REV CODE 636 W HCPCS: Performed by: EMERGENCY MEDICINE

## 2024-07-08 PROCEDURE — 85025 COMPLETE CBC W/AUTO DIFF WBC: CPT | Performed by: EMERGENCY MEDICINE

## 2024-07-08 PROCEDURE — 3008F BODY MASS INDEX DOCD: CPT | Mod: CPTII,,, | Performed by: INTERNAL MEDICINE

## 2024-07-08 RX ORDER — NIFEDIPINE 30 MG/1
30 TABLET, EXTENDED RELEASE ORAL
Status: COMPLETED | OUTPATIENT
Start: 2024-07-08 | End: 2024-07-08

## 2024-07-08 RX ORDER — MORPHINE SULFATE 4 MG/ML
4 INJECTION, SOLUTION INTRAMUSCULAR; INTRAVENOUS
Status: COMPLETED | OUTPATIENT
Start: 2024-07-08 | End: 2024-07-08

## 2024-07-08 RX ORDER — KETOROLAC TROMETHAMINE 30 MG/ML
15 INJECTION, SOLUTION INTRAMUSCULAR; INTRAVENOUS
Status: COMPLETED | OUTPATIENT
Start: 2024-07-08 | End: 2024-07-08

## 2024-07-08 RX ORDER — CLONIDINE HYDROCHLORIDE 0.2 MG/1
0.2 TABLET ORAL
Status: COMPLETED | OUTPATIENT
Start: 2024-07-08 | End: 2024-07-08

## 2024-07-08 RX ORDER — HYDROCODONE BITARTRATE AND ACETAMINOPHEN 5; 325 MG/1; MG/1
1 TABLET ORAL EVERY 6 HOURS PRN
Qty: 12 TABLET | Refills: 0 | Status: SHIPPED | OUTPATIENT
Start: 2024-07-08 | End: 2024-07-18

## 2024-07-08 RX ORDER — AMOXICILLIN 875 MG/1
875 TABLET, FILM COATED ORAL 2 TIMES DAILY
Qty: 14 TABLET | Refills: 0 | Status: SHIPPED | OUTPATIENT
Start: 2024-07-08

## 2024-07-08 RX ORDER — ONDANSETRON HYDROCHLORIDE 2 MG/ML
4 INJECTION, SOLUTION INTRAVENOUS
Status: COMPLETED | OUTPATIENT
Start: 2024-07-08 | End: 2024-07-08

## 2024-07-08 RX ORDER — NIFEDIPINE 30 MG/1
30 TABLET, EXTENDED RELEASE ORAL DAILY
Qty: 30 TABLET | Refills: 11 | Status: SHIPPED | OUTPATIENT
Start: 2024-07-08 | End: 2025-07-08

## 2024-07-08 RX ADMIN — CLONIDINE HYDROCHLORIDE 0.2 MG: 0.2 TABLET ORAL at 06:07

## 2024-07-08 RX ADMIN — KETOROLAC TROMETHAMINE 15 MG: 30 INJECTION, SOLUTION INTRAMUSCULAR at 07:07

## 2024-07-08 RX ADMIN — MORPHINE SULFATE 4 MG: 4 INJECTION INTRAVENOUS at 05:07

## 2024-07-08 RX ADMIN — ONDANSETRON 4 MG: 2 INJECTION INTRAMUSCULAR; INTRAVENOUS at 05:07

## 2024-07-08 RX ADMIN — IOHEXOL 75 ML: 350 INJECTION, SOLUTION INTRAVENOUS at 07:07

## 2024-07-08 RX ADMIN — DEXTROSE MONOHYDRATE 250 ML: 100 INJECTION, SOLUTION INTRAVENOUS at 07:07

## 2024-07-08 RX ADMIN — MORPHINE SULFATE 4 MG: 4 INJECTION INTRAVENOUS at 07:07

## 2024-07-08 RX ADMIN — NIFEDIPINE 30 MG: 30 TABLET, FILM COATED, EXTENDED RELEASE ORAL at 08:07

## 2024-07-08 NOTE — ASSESSMENT & PLAN NOTE
She is on Anifrolumab   This has a side effect of    tender, swollen glands in the neck  Will do CT scan of soft tissue of neck  Follow rheumatology   Can use NSAIDS prn

## 2024-07-08 NOTE — PROGRESS NOTES
Subjective     Patient ID: Alisia Vines is a 33 y.o. female.    Chief Complaint: Follow up    HPI  33 year old woman with PMH as listed below who presented with lymphadenopathy -  She was also  recently discharged  from the Hospital yesterday for SLE flare.  CT scan of the neck-01/10/2023.     Extensive lymphadenopathy in the right greater than left neck.  Both benign and malignant etiologies are in the differential, recommend correlation with clinical presentation for infectious etiology.  Consider biopsy to rule out lymphoma.  12/22- neck ultrasound-  In the area of palpable abnormality there are innumerable presumed lymph nodes within the subcutaneous soft tissues which range in size from 1.8 x 1.2 x 1.8 cm 2 2.9 x 1.2 x 2.5 cm.  All of these lymph nodes appear to demonstrate a thickened cortex with a few of the lymph nodes demonstrating an effaced fatty hilum.  These lymph nodes are considered enlarged by size criteria as well.  Findings are nonspecific  Labs-  HIV -neg a month ago  Biopsy - 02/17/23-  Final Pathologic Diagnosis 1. Right deep cervical lymph node, excisional biopsy:   - Follicular hyperplasia.  See comment.   - No evidence of lymphoma or metastatic carcinoma      07/19- since that time, she was seen by dematology .  Her insurance company denies her imaging test for the abdomen.  She was given Augmentin /Minocycline during her visit .    07/07- she comes for follow up  Review of Systems   Constitutional:  Positive for activity change. Negative for appetite change, chills, diaphoresis and fatigue.   Respiratory:  Negative for apnea, cough, choking and chest tightness.           Objective     Physical Exam  Vitals and nursing note reviewed.   Constitutional:       Appearance: She is well-developed.   HENT:      Head: Normocephalic and atraumatic.   Eyes:      Pupils: Pupils are equal, round, and reactive to light.   Neck:      Thyroid: No thyromegaly.      Trachea: No tracheal deviation.    Cardiovascular:      Rate and Rhythm: Normal rate and regular rhythm.   Pulmonary:      Effort: No respiratory distress.      Breath sounds: No wheezing or rales.   Abdominal:      General: Bowel sounds are normal. There is no distension.      Palpations: Abdomen is soft.      Tenderness: There is no abdominal tenderness.   Musculoskeletal:      Cervical back: Normal range of motion and neck supple.   Lymphadenopathy:      Cervical: Cervical adenopathy present.   Skin:     General: Skin is warm and dry.      Coloration: Skin is not pale.   Neurological:      Mental Status: She is alert and oriented to person, place, and time.      Cranial Nerves: No cranial nerve deficit.      Coordination: Coordination normal.      Deep Tendon Reflexes: Reflexes are normal and symmetric.   Psychiatric:         Thought Content: Thought content normal.         Judgment: Judgment normal.            Assessment and Plan       Problem List Items Addressed This Visit       SLE (systemic lupus erythematosus related syndrome) (Chronic)     Follow PCP         Severe uncontrolled hypertension - Primary (Chronic)     Needs optimal control - follow PCP         Lymphadenopathy     She is on Anifrolumab   This has a side effect of    tender, swollen glands in the neck  Will do CT scan of soft tissue of neck  Follow rheumatology   Can use NSAIDS prn          Other Visit Diagnoses       Localized enlarged lymph nodes        Relevant Orders    CT Soft Tissue Neck W WO Contrast

## 2024-07-08 NOTE — PROGRESS NOTES
Subjective     Patient ID: Alisia Vines is a 33 y.o. female.    Chief Complaint:neck swelling   [facility]    Last Id note-  05/02/23-  32 year old woman with PMH as listed below who presented with lymphadenopathy -  She was also  recently discharged  from the Hospital yesterday for SLE flare.  CT scan of the neck-01/10/2023.     Extensive lymphadenopathy in the right greater than left neck.  Both benign and malignant etiologies are in the differential, recommend correlation with clinical presentation for infectious etiology.  Consider biopsy to rule out lymphoma.  12/22- neck ultrasound-  In the area of palpable abnormality there are innumerable presumed lymph nodes within the subcutaneous soft tissues which range in size from 1.8 x 1.2 x 1.8 cm 2 2.9 x 1.2 x 2.5 cm.  All of these lymph nodes appear to demonstrate a thickened cortex with a few of the lymph nodes demonstrating an effaced fatty hilum.  These lymph nodes are considered enlarged by size criteria as well.  Findings are nonspecific  Labs-  HIV -neg a month ago  Biopsy - 02/17/23-  Final Pathologic Diagnosis 1. Right deep cervical lymph node, excisional biopsy:   - Follicular hyperplasia.  See comment.   - No evidence of lymphoma or metastatic carcinoma      07[facility]19- [facility]  Her insurance company denies her imaging test for the abdomen.  She was given Augmentin /Minocycline during her visit .    07/07- she comes now with new right neck swelling and pain over the last month.  She does not have a cat but has a dog.  She denies sore throat   Review of Systems   Constitutional:  Negative for activity change, appetite change, chills, diaphoresis and fatigue.   Respiratory:  Negative for apnea.    Neurological:  Negative for dizziness, facial asymmetry and headaches.          Objective     Physical Exam  Vitals and nursing note reviewed.   HENT:      Head: Normocephalic.   Cardiovascular:      Rate and Rhythm: Normal  rate.   Pulmonary:      Effort: Pulmonary effort is normal.   Abdominal:      General: Abdomen is flat.   Musculoskeletal:      Cervical back: Normal range of motion.   Skin:     General: Skin is warm.   Neurological:      General: No focal deficit present.      Mental Status: She is alert and oriented to person, place, and time.            Assessment and Plan   Problem List Items Addressed This Visit       SLE (systemic lupus erythematosus related syndrome) (Chronic)     Follow PCP         Severe uncontrolled hypertension - Primary (Chronic)     Needs optimal control - follow PCP    Advised to go to URGENT CARE ASAP  BP is uncontrolled.          Lymphadenopathy     She is on Anifrolumab   This has a side effect of    tender, swollen glands in the neck  Will do CT scan of soft tissue of neck  Follow rheumatology   Can use NSAIDS prn          Other Visit Diagnoses       Localized enlarged lymph nodes        Relevant Orders    CT Soft Tissue Neck W WO Contrast

## 2024-07-08 NOTE — ED PROVIDER NOTES
SCRIBE #1 NOTE: IShakir, am scribing for, and in the presence of, Nuno Peralta MD. I have scribed HPI, ROS, and PE.    SCRIBE #2 NOTE: ILucia, am scribing for, and in the presence of,  Елена Harden MD. I have scribed the remaining portions of the note not scribed by Scribe #1.      History     Chief Complaint   Patient presents with    Hypertension     Pt. Was sent over for htn. Denies taking any medications for htn. She is also having right side neck pain x 2 weeks.      Review of patient's allergies indicates:  No Known Allergies      History of Present Illness     HPI    7/8/2024, 3:26 PM  History obtained from the patient      History of Present Illness: Alisia Vines is a 33 y.o. female patient with a PMHx of thrombocytopenia, mood disorder, and anemia who presents to the Emergency Department for evaluation of elevated BP which onset gradually within the past day. Pt initially went to see Dr. Garcia (Infectious Disease) for swollen lymph nodes and associated right-sided neck pain for the past two weeks. Pt used to be on BP medications but was taken off due to an improved BP. Pt has no symptoms today and does not monitor BP at home. Pt's last visit with PCP showed a normal BP. Pt is requesting for BP medication. Patient denies any HA, CP, SOB, fever, and all other sxs at this time. No prior Tx. Pt had a CT completed last year and will be getting another one soon. No further complaints or concerns at this time.       Arrival mode: Personal vehicle    PCP: Saumya Quinonez MD        Past Medical History:  Past Medical History:   Diagnosis Date    Allergic rhinitis     Anemia     Condyloma acuminata     COVID-19 virus infection 07/2021    Encounter for blood transfusion     GERD (gastroesophageal reflux disease)     Hemolytic anemia associated with systemic lupus erythematosus 4/30/2023    History of fetal anomaly in prior pregnancy, currently pregnant, unspecified trimester  2017    Pacemaker placed    HSV-2 (herpes simplex virus 2) infection     Lupus nephritis     Mood disorder     Overweight(278.02)     Sjogren's syndrome     Systemic lupus complicating pregnancy 2019    Systemic lupus erythematosus     Thrombocytopenia        Past Surgical History:  Past Surgical History:   Procedure Laterality Date     SECTION WITH TUBAL LIGATION N/A 2019    Procedure:  SECTION, WITH TUBAL LIGATION;  Surgeon: VERONICA Valdovinos MD;  Location: [facility] L&D;  Service: OB/GYN;  Laterality: N/A;     SECTION, LOW TRANSVERSE      x2    LYMPH NODE BIOPSY Right 2023    Procedure: BIOPSY, LYMPH NODE;  Surgeon: Rivera Sandoval MD;  Location: [facility] OR;  Service: ENT;  Laterality: Right;  right deep cervial lymph node excision    NECK MASS EXCISION Right 2023    Procedure: EXCISION, MASS, NECK;  Surgeon: Rivera Sandoval MD;  Location: [facility] OR;  Service: ENT;  Laterality: Right;  right deep cervial lymph node excision    RENAL BIOPSY  2013         Family History:  Family History   Problem Relation Name Age of Onset    Hypertension Mother      Eczema Brother      Hypertension Maternal Grandmother      Diabetes Paternal Grandmother      Heart disease Son      Arrhythmia Son          CHB    Stroke Neg Hx      Cancer Neg Hx         Social History:  Social History     Tobacco Use    Smoking status: Never     Passive exposure: Never    Smokeless tobacco: Never   Substance and Sexual Activity    Alcohol use: No     Alcohol/week: 0.0 standard drinks of alcohol    Drug use: Yes     Types: Marijuana    Sexual activity: Not Currently     Partners: Male     Birth control/protection: None        Review of Systems     Review of Systems   Constitutional:  Negative for chills, fatigue and fever.   HENT:  Negative for congestion and sore throat.    Respiratory:  Negative for cough and shortness of breath.    Cardiovascular:  Negative for chest pain.        (+) elevated BP    Gastrointestinal:  Negative for abdominal pain, anal bleeding, nausea and vomiting.   Genitourinary:  Negative for dysuria.   Musculoskeletal:  Negative for back pain.   Skin:  Negative for rash.   Neurological:  Negative for dizziness, facial asymmetry, weakness and light-headedness.   Hematological:  Does not bruise/bleed easily.   All other systems reviewed and are negative.       Physical Exam     Initial Vitals [07/08/24 1412]   BP Pulse Resp Temp SpO2   (!) 171/114 94 16 98 °F (36.7 °C) 100 %      MAP       --          Physical Exam  Nursing Notes and Vital Signs Reviewed.  Constitutional: Patient is in no acute distress. Well-developed and well-nourished.  Head: Atraumatic. Normocephalic.  Eyes: PERRL. EOM intact. Conjunctivae are not pale. No scleral icterus.  ENT: Mucous membranes are moist. Oropharynx is clear and symmetric.    Neck: Supple. Full ROM. 3-4 lymph nodes on right side along the posterior chain. No swelling otherwise. Trachea midline. No overlying cellulitis.   Cardiovascular: Regular rate. Regular rhythm. No murmurs, rubs, or gallops. Distal pulses are 2+ and symmetric.  Pulmonary/Chest: No respiratory distress. Clear to auscultation bilaterally. No wheezing or rales.  Abdominal: Soft and non-distended.  There is no tenderness.  No rebound, guarding, or rigidity. Good bowel sounds.  Genitourinary: No CVA tenderness  Musculoskeletal: Moves all extremities. No obvious deformities. No edema. No calf tenderness.  Skin: Warm and dry.  Neurological:  Alert, awake, and appropriate.  Normal speech.  No acute focal neurological deficits are appreciated.  Psychiatric: Normal affect. Good eye contact. Appropriate in content.     ED Course   Procedures  ED Vital Signs:  Vitals:    07/08/24 1412 07/08/24 1727 07/08/24 1749 07/08/24 1924   BP: (!) 171/114 (!) 177/111     Pulse: 94 89     Resp: 16 18 18 18   Temp: 98 °F (36.7 °C)      TempSrc: Oral      SpO2: 100% 100%     Weight: 91.9 kg (202 lb 9.6 oz)        07/08/24 1926 07/08/24 2055 07/08/24 2111   BP: (!) 155/104 (!) 146/105    Pulse: 82     Resp: 18     Temp:   98.2 °F (36.8 °C)   TempSrc:   Oral   SpO2: 100%     Weight:          Abnormal Lab Results:  Labs Reviewed   CBC W/ AUTO DIFFERENTIAL - Abnormal; Notable for the following components:       Result Value    MCV 81 (*)     RDW 21.0 (*)     Immature Granulocytes 0.6 (*)     All other components within normal limits   COMPREHENSIVE METABOLIC PANEL - Abnormal; Notable for the following components:    Glucose 56 (*)     Albumin 3.4 (*)     AST 41 (*)     All other components within normal limits   GROUP A STREP, MOLECULAR   INFLUENZA A & B BY MOLECULAR   SARS-COV-2 RNA AMPLIFICATION, QUAL   HETEROPHILE AB SCREEN   PREGNANCY TEST, URINE RAPID    Narrative:     Specimen Source->Urine        All Lab Results:  Results for orders placed or performed during the hospital encounter of 07/08/24   Group A Strep, Molecular    Specimen: Throat   Result Value Ref Range    Group A Strep, Molecular Negative Negative   Influenza A & B by Molecular    Specimen: Nasal Swab   Result Value Ref Range    Influenza A, Molecular Negative Negative    Influenza B, Molecular Negative Negative    Flu A & B Source Nasal swab    CBC auto differential   Result Value Ref Range    WBC 4.94 3.90 - 12.70 K/uL    RBC 4.86 4.00 - 5.40 M/uL    Hemoglobin 13.1 12.0 - 16.0 g/dL    Hematocrit 39.2 37.0 - 48.5 %    MCV 81 (L) 82 - 98 fL    MCH 27.0 27.0 - 31.0 pg    MCHC 33.4 32.0 - 36.0 g/dL    RDW 21.0 (H) 11.5 - 14.5 %    Platelets 270 150 - 450 K/uL    MPV 9.7 9.2 - 12.9 fL    Immature Granulocytes 0.6 (H) 0.0 - 0.5 %    Gran # (ANC) 3.0 1.8 - 7.7 K/uL    Immature Grans (Abs) 0.03 0.00 - 0.04 K/uL    Lymph # 1.3 1.0 - 4.8 K/uL    Mono # 0.4 0.3 - 1.0 K/uL    Eos # 0.3 0.0 - 0.5 K/uL    Baso # 0.02 0.00 - 0.20 K/uL    nRBC 0 0 /100 WBC    Gran % 60.7 38.0 - 73.0 %    Lymph % 25.5 18.0 - 48.0 %    Mono % 7.7 4.0 - 15.0 %    Eosinophil % 5.1 0.0 -  8.0 %    Basophil % 0.4 0.0 - 1.9 %    Differential Method Automated    Comprehensive metabolic panel   Result Value Ref Range    Sodium 141 136 - 145 mmol/L    Potassium 3.9 3.5 - 5.1 mmol/L    Chloride 107 95 - 110 mmol/L    CO2 23 23 - 29 mmol/L    Glucose 56 (L) 70 - 110 mg/dL    BUN 9 6 - 20 mg/dL    Creatinine 0.6 0.5 - 1.4 mg/dL    Calcium 9.3 8.7 - 10.5 mg/dL    Total Protein 7.4 6.0 - 8.4 g/dL    Albumin 3.4 (L) 3.5 - 5.2 g/dL    Total Bilirubin 0.2 0.1 - 1.0 mg/dL    Alkaline Phosphatase 89 55 - 135 U/L    AST 41 (H) 10 - 40 U/L    ALT 27 10 - 44 U/L    eGFR >60 >60 mL/min/1.73 m^2    Anion Gap 11 8 - 16 mmol/L   COVID-19 Rapid Screening   Result Value Ref Range    SARS-CoV-2 RNA, Amplification, Qual Negative Negative   Heterophile Ab Screen   Result Value Ref Range    Monospot Negative Negative   Pregnancy, urine rapid (UPT)   Result Value Ref Range    Preg Test, Ur Negative    EKG 12-lead   Result Value Ref Range    QRS Duration 98 ms    [facility] QTC Calculation 428 ms     *Note: Due to a large number of results and/or encounters for the requested time period, some results have not been displayed. A complete set of results can be found in Results Review.         Imaging Results:  Imaging Results              CT Soft Tissue Neck W WO Contrast (Final result)  Result time 07/08/24 20:12:29      Final result by Dc Fuchs DO (07/08/24 20:12:29)                   Impression:     Numerous enlarged lymph nodes along both sides of the neck extending to the base of the neck and inferior to the clavicles with representative measurements as described above.  No suppurative lymph nodes at this time.  Broad differential including mononucleosis, lymphoma, possibly reactive.    All CT scans at [this location] are performed using dose modulation techniques as appropriate to a performed exam including the following:  Automated exposure control; adjustment of the mA and/or kV according to patient size (this includes  techniques or standardized protocols for targeted exams where dose is matched to indication / reason for exam; i.e. extremities or head); use of iterative reconstruction technique.    Finalized on: 7/8/2024 8:12 PM By:  Dc Fuchs  [facility]# [id number]      2024-07-08 20:14:39.755    [facility]               Narrative:    EXAM: CT SOFT TISSUE NECK W WO CONTRAST    CLINICAL HISTORY: Lymphadenopathy    COMPARISON: None    TECHNIQUE: Standard thin-section axial images, with reformatted sagittal and coronal images.    FINDINGS: There are innumerable enlarged lymph nodes within both sides of the neck extending to the base of the neck continuing below the clavicles with the largest lymph nodes located at the IIA level measuring 1.9 and 2.0 cm on the right and left respectively.  2.3 cm enlarged subclavicular lymph node on the right.  Lymph nodes enhance uniformly without evidence for necrosis.    Airways patent.  No soft tissue masses, developing or organized fluid collections.  Vascular structures and salivary glands are unremarkable.    No lymphadenopathy in the visualized portions of the mediastinum.  Visualized portions of both hemithoraces and brain parenchyma are unremarkable.    Mucosal thickening in the maxillary sinuses.  No concerning lytic or sclerotic bony lesions.                                         The EKG was ordered, reviewed, and independently interpreted by the ED provider.  Interpretation time: 20:23  Rate: 58 BPM  Rhythm: Sinus bradycardia with sinus arrhythmia  Interpretation: Otherwise no acute ST changes. No STEMI.           The Emergency Provider reviewed the vital signs and test results, which are outlined above.     ED Discussion     8:00 PM: Dr. Peralta transfers care of patient to Dr. Harden pending lab results.     8:20 PM: Dr. Harden agrees with Dr. Peralta's assessment and plan of care. Evaluated pt. Pt is resting comfortably and is in no acute distress.  D/w pt all pertinent results. D/w pt any concerns  expressed at this time. Answered all questions. Pt expresses understanding at this time.    8:28 PM: Reassessed pt at this time.  Discussed with pt all pertinent ED information and results. Discussed pt dx and plan of tx. Gave pt all f/u and return to the ED instructions. All questions and concerns were addressed at this time. Pt expresses understanding of information and instructions, and is comfortable with plan to discharge. Pt is stable for discharge.    I discussed with patient and/or family/caretaker that evaluation in the ED does not suggest any emergent or life threatening medical conditions requiring immediate intervention beyond what was provided in the ED, and I believe patient is safe for discharge.  Regardless, an unremarkable evaluation in the ED does not preclude the development or presence of a serious of life threatening condition. As such, patient was instructed to return immediately for any worsening or change in current symptoms.         Medical Decision Making  Amount and/or Complexity of Data Reviewed  Labs: ordered. Decision-making details documented in ED Course.  Radiology: ordered. Decision-making details documented in ED Course.  ECG/medicine tests: ordered and independent interpretation performed. Decision-making details documented in ED Course.    Risk  Prescription drug management.                ED Medication(s):  Medications   ondansetron injection 4 mg (4 mg Intravenous Given 7/8/24 1748)   morphine injection 4 mg (4 mg Intravenous Given 7/8/24 1749)   cloNIDine tablet 0.2 mg (0.2 mg Oral Given 7/8/24 1833)   morphine injection 4 mg (4 mg Intravenous Given 7/8/24 1924)   ketorolac injection 15 mg (15 mg Intravenous Given 7/8/24 1924)   dextrose 10% bolus 250 mL 250 mL (0 mLs Intravenous Stopped 7/8/24 2039)   iohexoL (OMNIPAQUE 350) injection 100 mL (75 mLs Intravenous Given 7/8/24 1954)   NIFEdipine 24 hr tablet 30 mg (30 mg Oral Given 7/8/24 2055)       Discharge Medication List as  of 7/8/2024  8:30 PM        START taking these medications    Details   amoxicillin (AMOXIL) 875 MG tablet Take 1 tablet (875 mg total) by mouth 2 (two) times daily., Starting Mon 7/8/2024, Print      NIFEdipine (PROCARDIA-XL) 30 MG (OSM) 24 hr tablet Take 1 tablet (30 mg total) by mouth once daily., Starting Mon 7/8/2024, Until Tue 7/8/2025, Normal              Follow-up Information       Saumya Quinonez MD In 2 days.    Specialty: Family Medicine  [facility] information:  [address] [facility] [address]  [phone]               Cheng Garcia MD, FIDSA In 2 days.    Specialties: Infectious Diseases, Hospitalist  Contact information:  [address] [facility] [address] [phone]               [facility] - Emergency Dept..    Specialty: Emergency Medicine  Why: As needed, If symptoms worsen  Contact information:  [address] [facility] [address] [phone]                               Scribe Attestation:   Scribe #1: I performed the above scribed service and the documentation accurately describes the services I performed. I attest to the accuracy of the note.     Attending:   Physician Attestation Statement for Scribe #1: I, Nuno Peralta MD, personally performed the services described in this documentation, as scribed by Shakir Lorenz, in my presence, and it is both accurate and complete.       Scribe Attestation:   Scribe #2: I performed the above scribed service and the documentation accurately describes the services I performed. I attest to the accuracy of the note.    Attending Attestation:           Physician Attestation for Scribe:    Physician Attestation Statement for Scribe #2: I, Елена Harden MD, reviewed documentation, as scribed by Lucia Hernandez in my presence, and it is both accurate and complete. I also acknowledge and confirm the content of the note done by Justusibvandana #1.           Clinical Impression       ICD-10-CM ICD-9-CM   1. Primary  hypertension  I10 401.9   2. Blood pressure elevated without history of HTN  R03.0 796.2   3. Lymphadenopathy, cervical  R59.0 785.6       Disposition:   Disposition: Discharged  Condition: Stable        Елена Harden MD  07/10/24 0512

## 2024-07-09 LAB
OHS QRS DURATION: 98 MS
OHS QTC CALCULATION: 428 MS

## 2024-07-11 ENCOUNTER — PATIENT MESSAGE (OUTPATIENT)
Dept: INFECTIOUS DISEASES | Facility: CLINIC | Age: 34
End: 2024-07-11
Payer: MEDICAID

## 2024-07-11 ENCOUNTER — TELEPHONE (OUTPATIENT)
Dept: FAMILY MEDICINE | Facility: CLINIC | Age: 34
End: 2024-07-11
Payer: MEDICAID

## 2024-07-11 DIAGNOSIS — R59.1 LYMPHADENOPATHY OF HEAD AND NECK: Primary | ICD-10-CM

## 2024-07-11 NOTE — TELEPHONE ENCOUNTER
----- Message from Itzel Manjarrez sent at 7/11/2024  9:35 AM CDT -----  Contact: Lia Bahena is calling to schedule a ER follow up. Please callback [phone]

## 2024-07-11 NOTE — TELEPHONE ENCOUNTER
Next 1-2 weeks.  You CANNOT override on the same day that you have overridden any other Medicaid patient or any other patient that I agreed to.  Only 1 override per day.

## 2024-07-12 ENCOUNTER — TELEPHONE (OUTPATIENT)
Dept: FAMILY MEDICINE | Facility: CLINIC | Age: 34
End: 2024-07-12
Payer: MEDICAID

## 2024-07-12 PROBLEM — R11.2 NAUSEA AND VOMITING: Status: RESOLVED | Noted: 2018-12-04 | Resolved: 2024-07-12

## 2024-07-12 PROBLEM — Z51.81 ENCOUNTER FOR MEDICATION MONITORING: Status: RESOLVED | Noted: 2023-11-21 | Resolved: 2024-07-12

## 2024-07-12 PROBLEM — R59.1 LYMPHADENOPATHY: Status: RESOLVED | Noted: 2023-05-02 | Resolved: 2024-07-12

## 2024-07-12 PROBLEM — Z79.899 DRUG-INDUCED IMMUNODEFICIENCY: Status: RESOLVED | Noted: 2023-11-21 | Resolved: 2024-07-12

## 2024-07-12 PROBLEM — D84.821 DRUG-INDUCED IMMUNODEFICIENCY: Status: RESOLVED | Noted: 2023-11-21 | Resolved: 2024-07-12

## 2024-07-12 PROBLEM — H60.90 OTITIS EXTERNA: Status: RESOLVED | Noted: 2023-05-02 | Resolved: 2024-07-12

## 2024-07-12 PROBLEM — J34.89 NASAL LESION: Status: RESOLVED | Noted: 2023-05-02 | Resolved: 2024-07-12

## 2024-07-12 NOTE — PROGRESS NOTES
SUBJECTIVE:     Alisia Vines is a 33 y.o. female here today for:  ER FOLLOW-UP    HPI:    Alisia Vines is here today for an ER follow-up.  I have reviewed the patient's medical history in detail and updated the computerized patient record.    Facility:  [facility]  DOS:  7/8/24  DX:  HTN, cervical lymphadenopathy  RX:  nifedipine-XL 30 mg qd, Amoxil 875 bid x 7 days    History of Present Illness: Alisia Vines is a 33 y.o. female patient with a PMHx of thrombocytopenia, mood disorder, and anemia who presents to the Emergency Department for evaluation of elevated BP which onset gradually within the past day. Pt initially went to see Dr. Garcia (Infectious Disease) for swollen lymph nodes and associated right-sided neck pain for the past two weeks. Pt used to be on BP medications but was taken off due to an improved BP. Pt has no symptoms today and does not monitor BP at home. Pt's last visit with PCP showed a normal BP. Pt is requesting for BP medication. Patient denies any HA, CP, SOB, fever, and all other sxs at this time. No prior Tx. Pt had a CT completed last year and will be getting another one soon. No further complaints or concerns at this time.     CT SOFT TISSUE NECK:  FINDINGS: There are innumerable enlarged lymph nodes within both sides of the neck extending to the base of the neck continuing below the clavicles with the largest lymph nodes located at the IIA level measuring 1.9 and 2.0 cm on the right and left respectively.  2.3 cm enlarged subclavicular lymph node on the right.  Lymph nodes enhance uniformly without evidence for necrosis.     Airways patent.  No soft tissue masses, developing or organized fluid collections.  Vascular structures and salivary glands are unremarkable.     No lymphadenopathy in the visualized portions of the mediastinum.  Visualized portions of both hemithoraces and brain parenchyma are unremarkable.     Mucosal thickening in the maxillary sinuses.   No concerning lytic or sclerotic bony lesions.       REVIEW OF SYSTEMS:  Review of Systems   Constitutional: Negative.    Respiratory: Negative.     Cardiovascular:  Negative for chest pain, palpitations and leg swelling.        HTN ---- on med as ordered, no home bp checks   Musculoskeletal:  Positive for neck pain (lymph node RT side).   Neurological: Negative.    Psychiatric/Behavioral: Negative.          ALLERGIES:  Review of patient's allergies indicates:  No Known Allergies      CURRENT PROBLEM LIST:  Patient Active Problem List   Diagnosis    Sjogren's syndrome    HSV-2 (herpes simplex virus 2) infection    Allergic rhinitis    Proteinuria    SLE (systemic lupus erythematosus related syndrome)    Thrombocytopenia    GERD (gastroesophageal reflux disease)    SLE glomerulonephritis syndrome, WHO class V    History of fetal anomaly in prior pregnancy, currently pregnant, unspecified trimester    Long-term use of Plaquenil    Systemic lupus erythematosus arthritis    Severe uncontrolled hypertension    Sensitivity to sunlight    Immunocompromised    Reactive depression    Hemolytic anemia associated with systemic lupus erythematosus    Iron deficiency anemia    Seborrhea capitis in adult    Tibialis posterior tendinopathy         CURRENT MEDICATION LIST:    Current Outpatient Medications:     amoxicillin (AMOXIL) 875 MG tablet, Take 1 tablet (875 mg total) by mouth 2 (two) times daily., Disp: 14 tablet, Rfl: 0    ciprofloxacin HCl (CILOXAN) 0.3 % ophthalmic solution, Place 1 drop into the right eye every 4 (four) hours. While awake x 7 days  wash hands before and after admin, Disp: 5 mL, Rfl: 1    HYDROcodone-acetaminophen (NORCO) 5-325 mg per tablet, Take 1 tablet by mouth every 6 (six) hours as needed for Pain., Disp: 12 tablet, Rfl: 0    ketoconazole (NIZORAL) 2 % shampoo, USE TO WASH HAIR AT LEAST ONCE A WEEK. LET SIT ON SCALP AT LEAST 5 MINUTES PRIOR TO RINSING., Disp: 120 mL, Rfl: 11    ketorolac (TORADOL)  "10 mg tablet, Take 1 tablet (10 mg total) by mouth every 6 (six) hours as needed for Pain., Disp: 15 tablet, Rfl: 0    NIFEdipine (PROCARDIA-XL) 30 MG (OSM) 24 hr tablet, Take 1 tablet (30 mg total) by mouth once daily., Disp: 30 tablet, Rfl: 11    ondansetron (ZOFRAN-ODT) 8 MG TbDL, Take 1 tablet (8 mg total) by mouth every 8 (eight) hours as needed., Disp: 12 tablet, Rfl: 0      Past medical, surgical, family and social histories have been reviewed today.      OBJECTIVE:     Vitals:    07/15/24 0946   BP: 122/84   Pulse: 88   Resp: 18   Temp: 98 °F (36.7 °C)   TempSrc: Tympanic   SpO2: 99%   Weight: 91.2 kg (201 lb 1 oz)   Height: 5' 5" (1.651 m)   PainSc:   6   PainLoc: Neck       Physical Exam  Vitals reviewed.   Constitutional:       General: She is not in acute distress.     Appearance: She is not ill-appearing or diaphoretic.   HENT:      Head: Normocephalic and atraumatic.   Cardiovascular:      Rate and Rhythm: Normal rate and regular rhythm.      Heart sounds: No murmur heard.     No friction rub. No gallop.   Pulmonary:      Effort: Pulmonary effort is normal.      Breath sounds: Normal breath sounds.   Lymphadenopathy:      Cervical: Cervical adenopathy (RT side --- enlarged/tender) present.   Skin:     Capillary Refill: Capillary refill takes less than 2 seconds.   Neurological:      Mental Status: She is alert and oriented to person, place, and time.      Sensory: No sensory deficit.      Motor: No weakness.      Coordination: Coordination normal.      Gait: Gait normal.   Psychiatric:         Mood and Affect: Mood normal.         Behavior: Behavior normal.         Thought Content: Thought content normal.         Judgment: Judgment normal.         ASSESSMENT:     1. Hypertension, unspecified type ------ chronic issue, recently restarted on HTN medication and is well-controlled now.  DASH diet, reg exercise, reduce weight    2. Cervical lymphadenopathy ------ see CT scan per ER visit, plans to see " Hem/Onc and Inf Dz as scheduled    3. Class 1 obesity without serious comorbidity with body mass index (BMI) of 33.0 to 33.9 in adult, unspecified obesity type ----- healthy diet, reg exercise, reduce weight      PLAN:     Keep appts with specialists as planned.  Follow-up with PCP for annual, overdue/last completed 3/2023.  RTC as directed and/or prn.        JUAREZ Buenrostro  [facility]         30 minutes of total time spent on the encounter, which includes face to face time and non-face to face time preparing to see the patient.  This includes obtaining and/or reviewing separately obtained history, performing a medically appropriate examination and/or evaluation, and counseling and educating the patient/family/caregiver.  Includes documenting clinical information in the electronic or other health record, independently interpreting results (not separately reported) and communicating results to the patient/family/caregiver, with care coordination (not separately reported).  Medications, tests and/or procedures ordered as necessary along with referring and communicating with other health professionals (when not separately reported).

## 2024-07-15 ENCOUNTER — E-CONSULT (OUTPATIENT)
Dept: HEMATOLOGY/ONCOLOGY | Facility: CLINIC | Age: 34
End: 2024-07-15
Payer: MEDICAID

## 2024-07-15 ENCOUNTER — OFFICE VISIT (OUTPATIENT)
Dept: FAMILY MEDICINE | Facility: CLINIC | Age: 34
End: 2024-07-15
Payer: MEDICAID

## 2024-07-15 VITALS
BODY MASS INDEX: 33.5 KG/M2 | OXYGEN SATURATION: 99 % | DIASTOLIC BLOOD PRESSURE: 84 MMHG | RESPIRATION RATE: 18 BRPM | TEMPERATURE: 98 F | SYSTOLIC BLOOD PRESSURE: 122 MMHG | WEIGHT: 201.06 LBS | HEART RATE: 88 BPM | HEIGHT: 65 IN

## 2024-07-15 DIAGNOSIS — R59.1 LYMPHADENOPATHY: Primary | ICD-10-CM

## 2024-07-15 DIAGNOSIS — I10 HYPERTENSION, UNSPECIFIED TYPE: Primary | ICD-10-CM

## 2024-07-15 DIAGNOSIS — R59.0 CERVICAL LYMPHADENOPATHY: ICD-10-CM

## 2024-07-15 DIAGNOSIS — E66.9 CLASS 1 OBESITY WITHOUT SERIOUS COMORBIDITY WITH BODY MASS INDEX (BMI) OF 33.0 TO 33.9 IN ADULT, UNSPECIFIED OBESITY TYPE: ICD-10-CM

## 2024-07-15 PROCEDURE — 3008F BODY MASS INDEX DOCD: CPT | Mod: CPTII,,, | Performed by: REGISTERED NURSE

## 2024-07-15 PROCEDURE — 3079F DIAST BP 80-89 MM HG: CPT | Mod: CPTII,,, | Performed by: REGISTERED NURSE

## 2024-07-15 PROCEDURE — 3074F SYST BP LT 130 MM HG: CPT | Mod: CPTII,,, | Performed by: REGISTERED NURSE

## 2024-07-15 PROCEDURE — 99999 PR PBB SHADOW E&M-EST. PATIENT-LVL III: CPT | Mod: PBBFAC,,, | Performed by: REGISTERED NURSE

## 2024-07-15 PROCEDURE — 99499 UNLISTED E&M SERVICE: CPT | Mod: S$PBB,,, | Performed by: INTERNAL MEDICINE

## 2024-07-15 PROCEDURE — 99213 OFFICE O/P EST LOW 20 MIN: CPT | Mod: PBBFAC,PO | Performed by: REGISTERED NURSE

## 2024-07-15 PROCEDURE — 99214 OFFICE O/P EST MOD 30 MIN: CPT | Mod: S$PBB,,, | Performed by: REGISTERED NURSE

## 2024-07-15 NOTE — CONSULTS
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 Already follows in hematology for this diagnosis. No additional tests are needed.    Contingency that warrants a repeat eConsult or referral: none    Total time of Consultation: 5 minute    I did not speak to the requesting provider verbally about this.     *This eConsult is based on the clinical data available to me and is furnished without benefit of a physical examination. The eConsult will need to be interpreted in light of any clinical issues or changes in patient status not available to me at the time of filing this eConsults. Significant changes in patient condition or level of acuity should result in immediate formal consultation and reevaluation. Please alert me if you have further questions.    Thank you for this eConsult referral.     Kenna Macedo MD  [facility] - Hematology [facility]

## 2024-07-16 ENCOUNTER — TELEPHONE (OUTPATIENT)
Dept: HEMATOLOGY/ONCOLOGY | Facility: CLINIC | Age: 34
End: 2024-07-16
Payer: MEDICAID

## 2024-07-16 NOTE — TELEPHONE ENCOUNTER
Spoke with patient to rs her appointment today due to missed lab appt. She states that she does not have a ride to come back and fourth to her appts. patient asked could we use the labs from the ER and I explaind that we needed to have iron studies drawn. Different lab locations given and I also offered the lab services on saturday. Pt declined and asked that the appt be cancelled all together.

## 2024-07-25 ENCOUNTER — INFUSION (OUTPATIENT)
Dept: INFUSION THERAPY | Facility: HOSPITAL | Age: 34
End: 2024-07-25
Attending: PHYSICIAN ASSISTANT
Payer: MEDICAID

## 2024-07-25 VITALS
SYSTOLIC BLOOD PRESSURE: 128 MMHG | RESPIRATION RATE: 18 BRPM | OXYGEN SATURATION: 99 % | HEART RATE: 96 BPM | HEIGHT: 65 IN | WEIGHT: 207.88 LBS | BODY MASS INDEX: 34.63 KG/M2 | TEMPERATURE: 98 F | DIASTOLIC BLOOD PRESSURE: 88 MMHG

## 2024-07-25 DIAGNOSIS — M32.9 SLE (SYSTEMIC LUPUS ERYTHEMATOSUS RELATED SYNDROME): Primary | ICD-10-CM

## 2024-07-25 DIAGNOSIS — M32.9 SYSTEMIC LUPUS ERYTHEMATOSUS ARTHRITIS: ICD-10-CM

## 2024-07-25 PROCEDURE — 96365 THER/PROPH/DIAG IV INF INIT: CPT

## 2024-07-25 PROCEDURE — 63600175 PHARM REV CODE 636 W HCPCS: Mod: JZ,TB | Performed by: INTERNAL MEDICINE

## 2024-07-25 PROCEDURE — 25000003 PHARM REV CODE 250: Performed by: INTERNAL MEDICINE

## 2024-07-25 RX ORDER — HEPARIN 100 UNIT/ML
500 SYRINGE INTRAVENOUS
OUTPATIENT
Start: 2024-08-01

## 2024-07-25 RX ORDER — EPINEPHRINE 0.3 MG/.3ML
0.3 INJECTION SUBCUTANEOUS ONCE AS NEEDED
OUTPATIENT
Start: 2024-08-01

## 2024-07-25 RX ORDER — DIPHENHYDRAMINE HYDROCHLORIDE 50 MG/ML
50 INJECTION INTRAMUSCULAR; INTRAVENOUS ONCE AS NEEDED
OUTPATIENT
Start: 2024-08-01

## 2024-07-25 RX ORDER — SODIUM CHLORIDE 0.9 % (FLUSH) 0.9 %
10 SYRINGE (ML) INJECTION
OUTPATIENT
Start: 2024-08-01

## 2024-07-25 RX ADMIN — ANIFROLUMAB 300 MG: 300 INJECTION, SOLUTION INTRAVENOUS at 11:07

## 2024-07-25 NOTE — PLAN OF CARE
"Pt is stable, pt administered Saphnelo today. Pt voiced she was doing "great." Pt will follow up in four weeks. Plan of care reviewed with patient. Discussed if there are any new or ongoing concerns.        Problem: Adult Inpatient Plan of Care  Goal: Plan of Care Review  Outcome: Progressing  Goal: Patient-Specific Goal (Individualized)  Outcome: Progressing  Flowsheets (Taken 7/25/2024 6508)  Individualized Care Needs: Pt provided pillow, warm blanket, legs elevated in reclined position. Refreshments provided.  Anxieties, Fears or Concerns: Pt denies.  Goal: Optimal Comfort and Wellbeing  Outcome: Progressing     Problem: Infection  Goal: Absence of Infection Signs and Symptoms  Outcome: Progressing     Problem: Fall Injury Risk  Goal: Absence of Fall and Fall-Related Injury  Outcome: Progressing     "

## 2024-07-25 NOTE — DISCHARGE INSTRUCTIONS
Thank you for allowing me to care for you today,  TAL DavilaN, RN    [facility]  [address] [address]  [phone] phone     [phone] fax  Hours of Operation: Monday- Friday 7:00am- 5:30pm  After hours phone  [phone]  Hematology / Oncology Physicians on call      RAJ Steward Dr., Dr., Dr., Dr., Dr., Dr., Dr., Dr., Dr., Dr., Dr., Dr., Dr., Dr., Dr., RABIA Magdaleno, RABIA Jessica, RABIA Streeter, NP  Please call with any concerns regarding your appointment today.   FALL PREVENTION   Falls often occur due to slipping, tripping or losing your balance. Here are ways to reduce your risk of falling again.   Was there anything that caused your fall that can be fixed, removed or replaced?   Make your home safe by keeping walkways clear of objects you may trip over.   Use non-slip pads under rugs.   Do not walk in poorly lit areas.   Do not stand on chairs or wobbly ladders.   Use caution when reaching overhead or looking upward. This position can cause a loss of balance.   Be sure your shoes fit properly, have non-slip bottoms and are in good condition.   Be cautious when going up and down stairs, curbs, and when walking on uneven sidewalks.   If your balance is poor, consider using a cane or walker.   If your fall was related to alcohol use, stop or limit alcohol intake.   If your fall was related to use of sleeping medicines, talk to your doctor about this. You may need to reduce your dosage at bedtime if you awaken during the night to go to the bathroom.   To reduce the need for nighttime bathroom trips:   Avoid drinking fluids for several hours before going to bed   Empty your bladder before going to bed   Men can keep a urinal at the bedside   © 6874-7845 [employer], [address] [place] [address], [place] [address]. All rights reserved. This information is not intended as a  substitute for professional medical care. Always follow your healthcare professional's instructions.

## 2024-07-31 ENCOUNTER — TELEPHONE (OUTPATIENT)
Dept: HEMATOLOGY/ONCOLOGY | Facility: CLINIC | Age: 34
End: 2024-07-31
Payer: MEDICAID

## 2024-07-31 NOTE — TELEPHONE ENCOUNTER
Attempted to contact patient multiple times to scheduled a follow up appt with hem/onc. No answer vm was left

## 2024-08-06 ENCOUNTER — TELEPHONE (OUTPATIENT)
Dept: INFECTIOUS DISEASES | Facility: CLINIC | Age: 34
End: 2024-08-06
Payer: MEDICAID

## 2024-08-06 ENCOUNTER — PATIENT MESSAGE (OUTPATIENT)
Dept: URGENT CARE | Facility: CLINIC | Age: 34
End: 2024-08-06
Payer: MEDICAID

## 2024-08-07 ENCOUNTER — HOSPITAL ENCOUNTER (EMERGENCY)
Facility: HOSPITAL | Age: 34
Discharge: HOME | End: 2024-08-07
Attending: EMERGENCY MEDICINE
Payer: MEDICAID

## 2024-08-07 VITALS
SYSTOLIC BLOOD PRESSURE: 166 MMHG | DIASTOLIC BLOOD PRESSURE: 104 MMHG | HEART RATE: 89 BPM | OXYGEN SATURATION: 100 % | BODY MASS INDEX: 34.12 KG/M2 | WEIGHT: 205 LBS | RESPIRATION RATE: 16 BRPM | TEMPERATURE: 99 F

## 2024-08-07 DIAGNOSIS — S80.11XA CONTUSION OF MULTIPLE SITES OF RIGHT LOWER EXTREMITY, INITIAL ENCOUNTER: Primary | ICD-10-CM

## 2024-08-07 DIAGNOSIS — M79.606 LEG PAIN: ICD-10-CM

## 2024-08-07 PROCEDURE — 99283 EMERGENCY DEPT VISIT LOW MDM: CPT | Mod: 25

## 2024-08-07 RX ORDER — BUTALBITAL, ACETAMINOPHEN AND CAFFEINE 50; 325; 40 MG/1; MG/1; MG/1
1 TABLET ORAL EVERY 6 HOURS PRN
Qty: 15 TABLET | Refills: 0 | Status: SHIPPED | OUTPATIENT
Start: 2024-08-07 | End: 2024-08-09
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
## 2024-08-08 ENCOUNTER — OFFICE VISIT (OUTPATIENT)
Dept: INFECTIOUS DISEASES | Facility: CLINIC | Age: 34
End: 2024-08-08
Payer: MEDICAID

## 2024-08-08 DIAGNOSIS — M32.9 SLE (SYSTEMIC LUPUS ERYTHEMATOSUS RELATED SYNDROME): Primary | Chronic | ICD-10-CM

## 2024-08-08 DIAGNOSIS — R59.1 LYMPHADENOPATHY: ICD-10-CM

## 2024-08-09 ENCOUNTER — OFFICE VISIT (OUTPATIENT)
Dept: FAMILY MEDICINE | Facility: CLINIC | Age: 34
End: 2024-08-09
Payer: MEDICAID

## 2024-08-09 ENCOUNTER — HOSPITAL ENCOUNTER (OUTPATIENT)
Dept: RADIOLOGY | Facility: HOSPITAL | Age: 34
Discharge: HOME | End: 2024-08-09
Attending: FAMILY MEDICINE
Payer: MEDICAID

## 2024-08-09 VITALS
DIASTOLIC BLOOD PRESSURE: 84 MMHG | BODY MASS INDEX: 34.49 KG/M2 | SYSTOLIC BLOOD PRESSURE: 140 MMHG | HEART RATE: 84 BPM | OXYGEN SATURATION: 98 % | HEIGHT: 65 IN | WEIGHT: 207 LBS | TEMPERATURE: 99 F

## 2024-08-09 DIAGNOSIS — M32.9 SLE (SYSTEMIC LUPUS ERYTHEMATOSUS RELATED SYNDROME): Primary | Chronic | ICD-10-CM

## 2024-08-09 DIAGNOSIS — S89.91XA INJURY OF RIGHT LOWER EXTREMITY, INITIAL ENCOUNTER: ICD-10-CM

## 2024-08-09 DIAGNOSIS — R79.89 LOW TSH LEVEL: ICD-10-CM

## 2024-08-09 DIAGNOSIS — I10 SEVERE UNCONTROLLED HYPERTENSION: Chronic | ICD-10-CM

## 2024-08-09 DIAGNOSIS — M79.89 SWOLLEN LEG: ICD-10-CM

## 2024-08-09 DIAGNOSIS — Z00.00 PREVENTATIVE HEALTH CARE: ICD-10-CM

## 2024-08-09 PROCEDURE — 99213 OFFICE O/P EST LOW 20 MIN: CPT | Mod: PBBFAC,PO | Performed by: FAMILY MEDICINE

## 2024-08-09 PROCEDURE — 93971 EXTREMITY STUDY: CPT | Mod: TC,RT

## 2024-08-09 PROCEDURE — 93971 EXTREMITY STUDY: CPT | Mod: 26,RT,, | Performed by: RADIOLOGY

## 2024-08-09 PROCEDURE — 99999 PR PBB SHADOW E&M-EST. PATIENT-LVL III: CPT | Mod: PBBFAC,,, | Performed by: FAMILY MEDICINE

## 2024-08-09 RX ORDER — HYDROCODONE BITARTRATE AND ACETAMINOPHEN 5; 325 MG/1; MG/1
1 TABLET ORAL EVERY 6 HOURS PRN
Qty: 30 TABLET | Refills: 0 | Status: SHIPPED | OUTPATIENT
Start: 2024-08-09

## 2024-08-29 ENCOUNTER — INFUSION (OUTPATIENT)
Dept: INFUSION THERAPY | Facility: HOSPITAL | Age: 34
End: 2024-08-29
Attending: PHYSICIAN ASSISTANT
Payer: MEDICAID

## 2024-08-29 VITALS
SYSTOLIC BLOOD PRESSURE: 127 MMHG | BODY MASS INDEX: 34.12 KG/M2 | RESPIRATION RATE: 16 BRPM | DIASTOLIC BLOOD PRESSURE: 86 MMHG | WEIGHT: 205 LBS | HEART RATE: 83 BPM | TEMPERATURE: 99 F | OXYGEN SATURATION: 100 %

## 2024-08-29 DIAGNOSIS — M32.9 SLE (SYSTEMIC LUPUS ERYTHEMATOSUS RELATED SYNDROME): Primary | ICD-10-CM

## 2024-08-29 DIAGNOSIS — M32.9 SYSTEMIC LUPUS ERYTHEMATOSUS ARTHRITIS: ICD-10-CM

## 2024-08-29 PROCEDURE — 25000003 PHARM REV CODE 250: Performed by: INTERNAL MEDICINE

## 2024-08-29 PROCEDURE — 63600175 PHARM REV CODE 636 W HCPCS: Mod: JZ,TB | Performed by: INTERNAL MEDICINE

## 2024-08-29 PROCEDURE — 96365 THER/PROPH/DIAG IV INF INIT: CPT

## 2024-08-29 PROCEDURE — A4216 STERILE WATER/SALINE, 10 ML: HCPCS | Performed by: INTERNAL MEDICINE

## 2024-08-29 RX ORDER — HEPARIN 100 UNIT/ML
500 SYRINGE INTRAVENOUS
OUTPATIENT
Start: 2024-09-05

## 2024-08-29 RX ORDER — SODIUM CHLORIDE 0.9 % (FLUSH) 0.9 %
10 SYRINGE (ML) INJECTION
OUTPATIENT
Start: 2024-09-05

## 2024-08-29 RX ORDER — DIPHENHYDRAMINE HYDROCHLORIDE 50 MG/ML
50 INJECTION INTRAMUSCULAR; INTRAVENOUS ONCE AS NEEDED
OUTPATIENT
Start: 2024-09-05

## 2024-08-29 RX ORDER — EPINEPHRINE 0.3 MG/.3ML
0.3 INJECTION SUBCUTANEOUS ONCE AS NEEDED
OUTPATIENT
Start: 2024-09-05

## 2024-08-29 RX ORDER — SODIUM CHLORIDE 0.9 % (FLUSH) 0.9 %
10 SYRINGE (ML) INJECTION
Status: DISCONTINUED | OUTPATIENT
Start: 2024-08-29 | End: 2024-08-29 | Stop reason: HOSPADM

## 2024-08-29 RX ADMIN — Medication 10 ML: at 09:08

## 2024-08-29 RX ADMIN — ANIFROLUMAB 300 MG: 300 INJECTION, SOLUTION INTRAVENOUS at 09:08

## 2024-08-29 NOTE — PLAN OF CARE
Plan of care reviewed with patient. Discussed if there are any new or ongoing concerns. Denies.   Problem: Adult Inpatient Plan of Care  Goal: Plan of Care Review  Outcome: Progressing  Flowsheets (Taken 8/29/2024 0916)  Plan of Care Reviewed With: patient  Goal: Patient-Specific Goal (Individualized)  Outcome: Progressing  Goal: Optimal Comfort and Wellbeing  Outcome: Progressing  Intervention: Provide Person-Centered Care  Flowsheets (Taken 8/29/2024 0916)  Trust Relationship/Rapport:   care explained   questions encouraged   choices provided   reassurance provided   emotional support provided   thoughts/feelings acknowledged   empathic listening provided   questions answered  Goal: Absence of Hospital-Acquired Illness or Injury  Outcome: Progressing  Intervention: Identify and Manage Fall Risk  Flowsheets (Taken 8/29/2024 0916)  Safety Promotion/Fall Prevention: in recliner, wheels locked

## 2024-08-29 NOTE — NURSING
Infusion # >10 - Saphnelo 300 mg q q 4 weeks  Last dose- 7/25/24    Any:  -recent illness, infection, or antibiotic use in past week- denies  -open wounds or mouth sores- denies  -invasive procedures or surgeries in past 4 weeks or in upcoming 4 weeks- denies  -vaccinations in past week- denies  -any new symptoms/change in symptoms-denies  -chance you may be pregnant- denies      Recent labs? 7/25/24  Last Rheumatology provider visit- Seen by Dr. MADISON on 4/25/24     Premeds-none     Saphnelo 300 mg administered IV at a 30 minute rate per orders; see MAR and vitals for more details. Tolerated well without adverse events, discharged and ambulatory out of clinic.

## 2024-09-09 NOTE — PLAN OF CARE
Problem: Adult Inpatient Plan of Care  Goal: Plan of Care Review  Outcome: Ongoing, Progressing  Goal: Patient-Specific Goal (Individualization)  Outcome: Ongoing, Progressing  Flowsheets (Taken 5/13/2020 0944)  Individualized Care Needs: Elevate feet and give warm blanket. Likes to choose IV site.  Anxieties, Fears or Concerns: Anxiety re: High B/P and her medicaid coverage not covering her doctor appointments.  Patient-Specific Goals (Include Timeframe): Patient will verbalize understanding of the importance of taking her B/P meds.  Goal: Absence of Hospital-Acquired Illness or Injury  Outcome: Ongoing, Progressing  Goal: Optimal Comfort and Wellbeing  Outcome: Ongoing, Progressing  Intervention: Provide Person-Centered Care  Flowsheets (Taken 5/13/2020 0944)  Trust Relationship/Rapport: care explained; choices provided; emotional support provided; empathic listening provided; questions answered; questions encouraged; reassurance provided; thoughts/feelings acknowledged   Provide warm blanket and elevate feet. Explain Importance of taking home medications.   Explain POT.  Given printed material on Covid 19.  Taught on frequent hand-washing and social distancing.     Render Risk Assessment In Note?: no Detail Level: Simple Comment: Discussed nature and etiology; discussed this does not look like an acne bump. Recommend LN2 done today, and if the growth does not resolve will biopsy. Comment: Likely 2/2 sweat, worse when she is hot/sweating. Recommend applying aquaphor prior to event where she will be sweating to act as a barrier cream. Comment: Benign blood vessel dilation on the chest; intermittently more prominent. DIscussed this is not consistent with skin cancer. REcommend steroid avoidance. Consider PDL if doesn’t like appearance, deferred. Comment: L back with ill defined hyperpigmentation, likely from rubbing/scratching.

## 2024-09-16 ENCOUNTER — TELEPHONE (OUTPATIENT)
Dept: PSYCHIATRY | Facility: CLINIC | Age: 34
End: 2024-09-16

## 2024-09-16 ENCOUNTER — OFFICE VISIT (OUTPATIENT)
Dept: PSYCHIATRY | Facility: CLINIC | Age: 34
End: 2024-09-16
Payer: MEDICAID

## 2024-09-16 DIAGNOSIS — F41.9 ANXIETY: ICD-10-CM

## 2024-09-16 DIAGNOSIS — F39 MOOD DISORDER: Primary | ICD-10-CM

## 2024-09-16 PROCEDURE — 99215 OFFICE O/P EST HI 40 MIN: CPT | Mod: HP,HB,95, | Performed by: PSYCHOLOGIST

## 2024-09-16 PROCEDURE — 1159F MED LIST DOCD IN RCRD: CPT | Mod: CPTII,95,, | Performed by: PSYCHOLOGIST

## 2024-09-16 RX ORDER — FLUOXETINE HYDROCHLORIDE 20 MG/1
20 CAPSULE ORAL DAILY
Qty: 30 CAPSULE | Refills: 2 | Status: SHIPPED | OUTPATIENT
Start: 2024-09-16 | End: 2024-12-15

## 2024-09-16 RX ORDER — ARIPIPRAZOLE 5 MG/1
5 TABLET ORAL DAILY
Qty: 30 TABLET | Refills: 2 | Status: SHIPPED | OUTPATIENT
Start: 2024-09-16 | End: 2024-12-15

## 2024-09-16 NOTE — PROGRESS NOTES
"Outpatient Psychiatry Follow-Up Visit     9/16/2024      Virtual Visit    The patient location is: Patient's home/ Patient reported that his/her location at the time of this visit was in the [facility]     Visit type: Virtual visit with synchronous audio and video     Each patient to whom he or she provides medical services by telehealth is: (1) informed of the relationship between the medical psychologist and patient and the respective role of any other health care provider with respect to management of the patient; and (2) notified that he or she may decline to receive medical services by telehealth and may withdraw from such care at any time.    I also informed patient of the following:   Mere Jarvis, PhD, MPAP:  [place] medical license number: [id number]    My contact info:  [facility] / 2nd Floor  [address] [place] [address]   Ph: [phone]    If technology issues, call office phone: Ph: [phone] or [phone]  If crisis: Dial 911 or go to nearest Emergency Room (ER)  If questions related to privacy practices: contact [facility] Information Department: [phone]    Clinical Status of Patient:  Outpatient (Ambulatory)    Chief Complaint:  Alisia Loconum is a 33 y.o. female who presents today for follow-up of mood and anxiety.      LAST VISIT: Alisia attended her visit. She said that she is glad to be "free"--her twins (age 3) are in Headstart; her 4-y/o is in pre-K. Her kids are in school in [facility]--she and the kids have been staying with her grandmother. She and her boyfriend have been working on the trailer that they are hoping to move into behind her grandmother's house. She is hoping that they will be in the trailer by the end of the year. They have essentially had to gut it and rebuilt it. She has been gardening, since she has not been able to help much with the construction. [facility] [place]. MINDY was 0 today--she thinks that her mood is "pretty good." She has been trying not to over-react. She has been using her grandmother's car when needed. Her grandmother is really not supposed to drive, but she does anyway. Alisia does the cooking for her grandmother--Alisia's aunt/uncles are happy that she is there. She is much better--happy with her medicines. She has no suicidal ideation--would not want her kids to grow up without her. She is doing okay physically currently--has been a little concerned about her compliance with her BP medicine. We agreed to continue as prescribed. Plan--continue Prozac 40 mg; Abilify 2 mg.    CURRENT PRESENTATION: Alisia attended her virtual visit.  She was last seen by me in September of 2022. She said that last year she got sick with her Lupus and was hospitalized. She said that she as depressed and it flared. She reported that she has not been consistent enough with the medicine to know whether it was helpful. She said that she back where she started--"nothing is resolved." She has a short fuse--does not like to be questioned. With friends it's okay--but not with family or her significant other. She has not been in therapy but is open to it. We discussed the STeP program, and she agreed to try it. She said that she had some Abilify left--says that she has been taking 2 of them the last couple of days.    She had been living with her boyfriend and his family; over the last couple of months, they moved into a trailer that her dad gave to them. She lives with her 3 kids; her boyfriend is there sometimes, usually on days that he is off. She does not currently have a job--she is focusing on the kids and the house. They only recently got a car so she does not have transportation. She has goals--wants to start her own farm. She wants to teach the kids to be more self-sufficient and "live off the land." She works one day a week at the [employer] on Saturday mornings. 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 We talked about her taking her medicine consistently.    She gets her monthly infusions at the [facility].  We talked about consistency with taking her medicines and discussed ways of building habits.  She reports that she gets up every morning at 6 to get the kids up for school and on the weekends to go to the farmer's market.  She will brush her teeth when the kids are brushing there is, and we discussed her taking her medicines at the time she brushes her teeth in the mornings.  We also discussed her tracking any episodes that she might have so that we can start looking for possible patterns.  See plan below.    Plan--start Prozac 20 mg; Abilify 5 mg; STeP referral; take medicine in mornings when brushing teeth; track episodes for patterns    Kids--twins (boy and girl) (age 5), 5 y/o (boy)  Older son is 12 but lives with her aunt  Boyfriend (Meggan)--works at a [employer] shop          9/16/2024     8:13 AM 9/20/2022     7:53 AM 3/7/2022     7:04 AM   GAD7   1. Feeling nervous, anxious, or on edge? 3 0 0   2. Not being able to stop or control worrying? 3 0 0   3. Worrying too much about different things? 3 0 1   4. Trouble relaxing? 2 0 0   5. Being so restless that it is hard to sit still? 1 0 0   6. Becoming easily annoyed or irritable? 3 0 3   7. Feeling afraid as if something awful might happen? 0 0 0   8. If you checked off any problems, how difficult have these problems made it for you to do your work, take care of things at home, or get along with other people? 3     MINDY-7 Score 15 0 4      0-4 = Minimal anxiety  5-9 = Mild anxiety  10-14 = Moderate anxiety  15-21 = Severe anxiety       Review of Systems   PSYCHIATRIC: Pertinant items are noted in the narrative.    Past Medical, Family and Social History: The patient's past medical, family and social history have been reviewed and updated as appropriate within  "the electronic medical record - see encounter notes.      Current Outpatient Medications:     ARIPiprazole (ABILIFY) 5 MG Tab, Take 1 tablet (5 mg total) by mouth once daily., Disp: 30 tablet, Rfl: 2    FLUoxetine 20 MG capsule, Take 1 capsule (20 mg total) by mouth once daily., Disp: 30 capsule, Rfl: 2    Compliance: yes    Side effects: see above    Risk Parameters:  Patient reports no suicidal ideation  Patient reports no homicidal ideation  Patient reports no self-injurious behavior  Patient reports no violent behavior    Exam (detailed: at least 9 elements; comprehensive: all 15 elements)   Constitutional  Vitals:  Most recent vital signs were reviewed.   Last 3 sets of Vitals        8/7/2024     9:50 AM 8/9/2024     1:23 PM 8/29/2024     9:09 AM   Vitals - 1 value per visit   SYSTOLIC 166 140 120   DIASTOLIC 104 84 83   Pulse 89 84 93   Temp 98.5 °F (36.9 °C) 98.7 °F (37.1 °C) 98.1 °F (36.7 °C)   Resp 16  16   SPO2 100 % 98 % 99 %   Weight (lb) 205.03 207.01 205.03   Weight (kg) 93 93.9 93   Height  5' 5" (1.651 m)    BMI (Calculated)  34.4 34.1   Pain Score  Eight Five          General:  age appropriate, casually dressed, neatly groomed, wearing glasses, short hair; small hoop nose ring on left nostril     Musculoskeletal  Muscle Strength/Tone:  no tremor, no tic   Gait & Station:  video visit     Psychiatric  Speech:  no latency; no press   Behavior: wnl   Mood & Affect:  irritable  congruent and appropriate   Thought Process:  normal and logical   Associations:  intact   Thought Content:  normal, no suicidality, no homicidality, delusions, or paranoia   Insight:  has awareness of illness   Judgement: behavior is adequate to circumstances   Orientation:  grossly intact   Memory: intact for content of interview   Language: grossly intact   Attention Span & Concentration:  Grossly intact   Fund of Knowledge:  intact and appropriate to age and level of education     Assessment and Diagnosis   Status/Progress: " Based on the examination today, the patient's problem(s) is/are inadequately controlled.  New problems have not been presented today.   Co-morbidities, Lack of compliance, and psychosocial stressors  are complicating management of the primary condition.  There are no active rule-out diagnoses for this patient at this time.     General Impression:     Encounter Diagnoses   Name Primary?    Mood disorder Yes    Anxiety        Intervention/Counseling/Treatment Plan   Medication Management: Discussed risks, benefits, and alternatives to treatment plan documented above with patient. I answered all patient questions related to this plan, and patient expressed understanding and agreement.   start Prozac 20 mg; Abilify 5 mg  STeP referral  take medicine in mornings when brushing teeth   track episodes for patterns    Return to Clinic: 6 weeks    Medication List with Changes/Refills   New Medications    ARIPIPRAZOLE (ABILIFY) 5 MG TAB    Take 1 tablet (5 mg total) by mouth once daily.    FLUOXETINE 20 MG CAPSULE    Take 1 capsule (20 mg total) by mouth once daily.   Discontinued Medications    HYDROCODONE-ACETAMINOPHEN (NORCO) 5-325 MG PER TABLET    Take 1 tablet by mouth every 6 (six) hours as needed for Pain.    NIFEDIPINE (PROCARDIA-XL) 30 MG (OSM) 24 HR TABLET    Take 1 tablet (30 mg total) by mouth once daily.        Time spent with pt including note preparation: 36 minutes     Mere Jarvis, PhD, MP  Advanced Practice Medical Psychologist  [facility]--[address]  [place] [address]  [phone]   [phone] fax

## 2024-09-16 NOTE — TELEPHONE ENCOUNTER
----- Message from Mere Jarvis, PhD, MPAP sent at 9/16/2024  9:32 AM CDT -----  Regarding: schedule  Please call patient to schedule an in office appointment with me in 6-8 weeks for 30 minutes.  Thanks

## 2024-09-16 NOTE — PATIENT INSTRUCTIONS

## 2024-09-18 NOTE — TELEPHONE ENCOUNTER
----- Message from Adolph German sent at 11/3/2020  9:59 AM CST -----  Contact: self  Type:  Sooner Apoointment Request    Caller is requesting a sooner appointment.  Caller declined first available appointment listed below.  Caller will not accept being placed on the waitlist and is requesting a message be sent to doctor.  Name of Caller:Alisia Vines   When is the first available appointment?2020  Symptoms:reschedule  Would the patient rather a call back or a response via [facility]? Call back  Best Call Back Number:[phone]  Additional Information:        The patient's goals for the shift include      The clinical goals for the shift include Patient will have a bowel movement by the end of the shift

## 2024-09-26 ENCOUNTER — INFUSION (OUTPATIENT)
Dept: INFUSION THERAPY | Facility: HOSPITAL | Age: 34
End: 2024-09-26
Attending: PHYSICIAN ASSISTANT
Payer: MEDICAID

## 2024-09-26 VITALS
WEIGHT: 202.63 LBS | BODY MASS INDEX: 33.76 KG/M2 | HEART RATE: 75 BPM | DIASTOLIC BLOOD PRESSURE: 89 MMHG | TEMPERATURE: 98 F | OXYGEN SATURATION: 98 % | RESPIRATION RATE: 18 BRPM | HEIGHT: 65 IN | SYSTOLIC BLOOD PRESSURE: 127 MMHG

## 2024-09-26 DIAGNOSIS — M32.9 SYSTEMIC LUPUS ERYTHEMATOSUS ARTHRITIS: ICD-10-CM

## 2024-09-26 DIAGNOSIS — M32.9 SLE (SYSTEMIC LUPUS ERYTHEMATOSUS RELATED SYNDROME): Primary | ICD-10-CM

## 2024-09-26 PROCEDURE — 96365 THER/PROPH/DIAG IV INF INIT: CPT

## 2024-09-26 PROCEDURE — 63600175 PHARM REV CODE 636 W HCPCS: Mod: JZ,TB | Performed by: INTERNAL MEDICINE

## 2024-09-26 PROCEDURE — 25000003 PHARM REV CODE 250: Performed by: INTERNAL MEDICINE

## 2024-09-26 RX ORDER — SODIUM CHLORIDE 0.9 % (FLUSH) 0.9 %
10 SYRINGE (ML) INJECTION
Status: DISCONTINUED | OUTPATIENT
Start: 2024-09-26 | End: 2024-09-26 | Stop reason: HOSPADM

## 2024-09-26 RX ORDER — HEPARIN 100 UNIT/ML
500 SYRINGE INTRAVENOUS
OUTPATIENT
Start: 2024-10-03

## 2024-09-26 RX ORDER — DIPHENHYDRAMINE HYDROCHLORIDE 50 MG/ML
50 INJECTION INTRAMUSCULAR; INTRAVENOUS ONCE AS NEEDED
OUTPATIENT
Start: 2024-10-03

## 2024-09-26 RX ORDER — SODIUM CHLORIDE 0.9 % (FLUSH) 0.9 %
10 SYRINGE (ML) INJECTION
OUTPATIENT
Start: 2024-10-03

## 2024-09-26 RX ORDER — EPINEPHRINE 0.3 MG/.3ML
0.3 INJECTION SUBCUTANEOUS ONCE AS NEEDED
OUTPATIENT
Start: 2024-10-03

## 2024-09-26 RX ADMIN — ANIFROLUMAB 300 MG: 300 INJECTION, SOLUTION INTRAVENOUS at 09:09

## 2024-10-14 ENCOUNTER — TELEPHONE (OUTPATIENT)
Dept: PSYCHIATRY | Facility: CLINIC | Age: 34
End: 2024-10-14
Payer: MEDICAID

## 2024-10-14 NOTE — TELEPHONE ENCOUNTER
----- Message from Dian sent at 10/14/2024  2:40 PM CDT -----  Contact: [phone]  [email or web address]     Patient is calling for Medical Advice regarding:appt on 10/30     How long has patient had these symptoms:pt needs to reschedule     Pharmacy name and phone#:    Patient wants a call back or thru [facility]:call back     Comments:  The appt is with Mere Jarvis please advise   Please advise patient replies from provider may take up to 48 hours.

## 2024-10-21 ENCOUNTER — OFFICE VISIT (OUTPATIENT)
Dept: RHEUMATOLOGY | Facility: CLINIC | Age: 34
End: 2024-10-21
Payer: MEDICAID

## 2024-10-21 DIAGNOSIS — M79.7 FIBROMYALGIA: ICD-10-CM

## 2024-10-21 DIAGNOSIS — M32.14 SLE GLOMERULONEPHRITIS SYNDROME, WHO CLASS V: ICD-10-CM

## 2024-10-21 DIAGNOSIS — Z79.899 DRUG-INDUCED IMMUNODEFICIENCY: ICD-10-CM

## 2024-10-21 DIAGNOSIS — M79.671 PAIN IN BOTH FEET: ICD-10-CM

## 2024-10-21 DIAGNOSIS — M32.9 SYSTEMIC LUPUS ERYTHEMATOSUS ARTHRITIS: Primary | ICD-10-CM

## 2024-10-21 DIAGNOSIS — Z51.81 ENCOUNTER FOR MEDICATION MONITORING: ICD-10-CM

## 2024-10-21 DIAGNOSIS — D84.821 DRUG-INDUCED IMMUNODEFICIENCY: ICD-10-CM

## 2024-10-21 DIAGNOSIS — M79.672 PAIN IN BOTH FEET: ICD-10-CM

## 2024-10-21 PROCEDURE — 4010F ACE/ARB THERAPY RXD/TAKEN: CPT | Mod: CPTII,95,, | Performed by: INTERNAL MEDICINE

## 2024-10-21 PROCEDURE — 99215 OFFICE O/P EST HI 40 MIN: CPT | Mod: 95,,, | Performed by: INTERNAL MEDICINE

## 2024-10-21 PROCEDURE — 1159F MED LIST DOCD IN RCRD: CPT | Mod: CPTII,95,, | Performed by: INTERNAL MEDICINE

## 2024-10-21 PROCEDURE — 1160F RVW MEDS BY RX/DR IN RCRD: CPT | Mod: CPTII,95,, | Performed by: INTERNAL MEDICINE

## 2024-10-21 PROCEDURE — G2211 COMPLEX E/M VISIT ADD ON: HCPCS | Mod: 95,,, | Performed by: INTERNAL MEDICINE

## 2024-10-21 RX ORDER — LOSARTAN POTASSIUM 25 MG/1
25 TABLET ORAL DAILY
Qty: 90 TABLET | Refills: 3 | Status: SHIPPED | OUTPATIENT
Start: 2024-10-21 | End: 2025-10-21

## 2024-10-21 NOTE — PROGRESS NOTES
RHEUMATOLOGY FOLLOW UP - TELE VISIT     The patient location is: [place]  The chief complaint leading to consultation is: Lupus follow up, worsening edema  Visit type: Virtual visit with synchronous audio and video  Total time spent with patient:  15 minutes  Each patient to whom he or she provides medical services by telemedicine is:  (1) informed of the relationship between the physician and patient and the respective role of any other health care provider with respect to management of the patient; and (2) notified that he or she may decline to receive medical services by telemedicine and may withdraw from such care at any time.    Chief complaints, HPI, ROS, EXAM, Assessment & Plans:-  Alisia Jolly a 34 y.o. pleasant female seen today for follow-up visit.  Severe lupus associated with arthritis and class 5 glomerulonephritis with significant proteinuria here for follow-up.  On Saphnelo infusion for lupus.  Reports doing well on Saphnelo other than bilateral ankles swelling usually at the end of the day. No joint pain.  No skin rash.  No joint swelling.  No prolonged morning stiffness.  No respirophasic chest pain.  Rheumatological review of system negative otherwise.  100% fist formation bilateral hands.    1. Systemic lupus erythematosus arthritis    2. SLE glomerulonephritis syndrome, WHO class V    3. Drug-induced immunodeficiency    4. Encounter for medication monitoring    5. Fibromyalgia    6. Pain in both feet      Problem List Items Addressed This Visit       Drug-induced immunodeficiency    Relevant Medications    losartan (COZAAR) 25 MG tablet    Other Relevant Orders    HARPREET Screen w/Reflex    CBC Auto Differential    Comprehensive Metabolic Panel    Sedimentation rate    C-Reactive Protein    Protein/Creatinine Ratio, Urine    C4 Complement    C3 Complement    Urinalysis    Encounter for medication monitoring    Relevant Medications    losartan (COZAAR) 25 MG tablet    Other Relevant Orders     HARPREET Screen w/Reflex    CBC Auto Differential    Comprehensive Metabolic Panel    Sedimentation rate    C-Reactive Protein    Protein/Creatinine Ratio, Urine    C4 Complement    C3 Complement    Urinalysis    Fibromyalgia    Relevant Orders    Urinalysis    Pain in both feet    Relevant Orders    Urinalysis    SLE glomerulonephritis syndrome, WHO class V    Overview      12/12/2018  Continue follow-up and management with Dr. Blair         Relevant Medications    losartan (COZAAR) 25 MG tablet    Other Relevant Orders    Urinalysis    Systemic lupus erythematosus arthritis - Primary    Relevant Medications    losartan (COZAAR) 25 MG tablet    Other Relevant Orders    HARPREET Screen w/Reflex    CBC Auto Differential    Comprehensive Metabolic Panel    Sedimentation rate    C-Reactive Protein    Protein/Creatinine Ratio, Urine    C4 Complement    C3 Complement    Urinalysis      Latest Reference Range & Units 07/25/24 11:12   WBC 3.90 - 12.70 K/uL 3.10 (L)   RBC 4.00 - 5.40 M/uL 4.27   Hemoglobin 12.0 - 16.0 g/dL 11.9 (L)   Hematocrit 37.0 - 48.5 % 35.3 (L)   MCV 82 - 98 fL 83   MCH 27.0 - 31.0 pg 27.9   MCHC 32.0 - 36.0 g/dL 33.7   RDW 11.5 - 14.5 % 17.7 (H)   Platelet Count 150 - 450 K/uL 277   MPV 9.2 - 12.9 fL 10.2   Gran % 38.0 - 73.0 % 45.8   Lymph % 18.0 - 48.0 % 36.8   Mono % 4.0 - 15.0 % 11.0   Eos % 0.0 - 8.0 % 5.8   Basophil % 0.0 - 1.9 % 0.6   Immature Granulocytes 0.0 - 0.5 % 0.0   Gran # (ANC) 1.8 - 7.7 K/uL 1.4 (L)   Lymph # 1.0 - 4.8 K/uL 1.1   Mono # 0.3 - 1.0 K/uL 0.3   Eos # 0.0 - 0.5 K/uL 0.2   Baso # 0.00 - 0.20 K/uL 0.02   Immature Grans (Abs) 0.00 - 0.04 K/uL 0.00   nRBC 0 /100 WBC 0   Differential Method  Automated   Iron 30 - 160 ug/dL 68   TIBC 250 - 450 ug/dL 318   Saturated Iron 20 - 50 % 21   Transferrin 200 - 375 mg/dL 215   Ferritin 20.0 - 300.0 ng/mL 50   Sodium 136 - 145 mmol/L 140   Potassium 3.5 - 5.1 mmol/L 3.9   Chloride 95 - 110 mmol/L 111 (H)   CO2 23 - 29 mmol/L 22 (L)   Anion  Gap 8 - 16 mmol/L 7 (L)   BUN 6 - 20 mg/dL 11   Creatinine 0.5 - 1.4 mg/dL 0.6   eGFR >60 mL/min/1.73 m^2 >60   Glucose 70 - 110 mg/dL 82   Calcium 8.7 - 10.5 mg/dL 8.9   ALP 55 - 135 U/L 79   PROTEIN TOTAL 6.0 - 8.4 g/dL 6.2   Albumin 3.5 - 5.2 g/dL 3.2 (L)   BILIRUBIN TOTAL 0.1 - 1.0 mg/dL 0.2   AST 10 - 40 U/L 22   ALT 10 - 44 U/L 22   (L): Data is abnormally low  (H): Data is abnormally high   Latest Reference Range & Units Most Recent   HARPREET Negative <1:160  Positive !  10/10/13 18:25   HARPREET Screen Negative <1:160  Positive !  11/18/19 13:58   HARPREET HEP-2 Titer  Positive 1:1280 Speckled  11/18/19 13:58   ds DNA Ab Negative 1:10  Positive !  11/13/23 10:56   DNA Titer  1:80  11/13/23 10:56   Anti-SSA Antibody 0.00 - 19.99 .84 (H)  11/18/19 13:58   Anti-SSA Interpretation Negative  Positive !  11/18/19 13:58   Anti-SSB Antibody 0.00 - 19.99 EU 62.05 (H)  11/18/19 13:58   Anti-SSB Interpretation Negative  Positive !  11/18/19 13:58   Anti Sm Antibody 0.00 - 19.99 EU 7.51  11/18/19 13:58   Anti-Sm Interpretation Negative  Negative  11/18/19 13:58   Anti Sm/RNP Antibody 0.00 - 19.99 EU 7.56  11/18/19 13:58   Anti-Sm/RNP Interpretation Negative  Negative  11/18/19 13:58   ANCA Proteinase 3 <0.4 (Negative) U <0.2  7/8/23 03:22   Cytoplasmic Neutrophilic Ab <1:20 Titer <1:20  7/8/23 03:22   MPO <3.5 U/mL <0.2  7/8/23 03:22   Perinuclear (P-ANCA) <1:20 Titer <1:20  7/8/23 03:22   Complement (C-3) 50 - 180 mg/dL 93  11/13/23 10:56   Complement (C-4) 11 - 44 mg/dL 21  11/13/23 10:56   Protein, Serum 6.0 - 8.4 g/dL 5.4 (L)  9/12/23 14:04   Albumin grams/dl 3.35 - 5.55 g/dL 3.16 (L)  9/12/23 14:04   Alpha-1 grams/dl 0.17 - 0.41 g/dL 0.26  9/12/23 14:04   Alpha-2 0.43 - 0.99 g/dL 0.66  9/12/23 14:04   Beta 0.50 - 1.10 g/dL 0.65  9/12/23 14:04   Gamma 0.67 - 1.58 g/dL 0.66 (L)  9/12/23 14:04   Pathologist Interpretation SPE  REVIEWED  9/12/23 14:04   Pathologist Interpretation OMAIRA  REVIEWED  9/12/23 14:04   [employer]  Interp.  SEE COMMENT  23 14:04   Cryoglobulin, Qual Absent  Absent  23 10:25   !: Data is abnormal  (H): Data is abnormally high  (L): Data is abnormally low    Severe lupus arthritis and glomerulonephritis with class 5 disease with significant proteinuria on Saphnelo infusion every 4 weeks.  Significant difficulty in taking oral medications due to noncompliance.  Is not taking any oral medications at this time.  Worsening bilateral ankles swelling suggestive of edema likely secondary to proteinuria.  Restart losartan.  Continue Saphnelo every 4 weeks.  Drug induced immunodeficiency due to use of immunosuppressive drugs. Monitor carefully for infections. Advised to get immediate medical care if any infection. Also advised strict adherence to age appropriate vaccinations and cancer screenings with PCP.  She understands the risk of not taking oral lupus therapies which might affect her kidney irreversibly  being dependent on dialysis.  I have explained all of the above in detail and the patient understands all of the current recommendation(s). I have answered all questions to the best of my ability and to their complete satisfaction.   # Follow up in about 3 months (around 2025).      Past Medical History:   Diagnosis Date    Allergic rhinitis     Anemia     Condyloma acuminata     COVID-19 virus infection 2021    Encounter for blood transfusion     GERD (gastroesophageal reflux disease)     Hemolytic anemia associated with systemic lupus erythematosus 2023    History of fetal anomaly in prior pregnancy, currently pregnant, unspecified trimester 2017    Pacemaker placed    HSV-2 (herpes simplex virus 2) infection     Lupus nephritis     Mood disorder     Overweight(278.02)     Sjogren's syndrome     Systemic lupus complicating pregnancy 2019    Systemic lupus erythematosus     Thrombocytopenia        Past Surgical History:   Procedure Laterality Date     SECTION WITH TUBAL  LIGATION N/A 2019    Procedure:  SECTION, WITH TUBAL LIGATION;  Surgeon: VERONICA Valdovinos MD;  Location: [facility] L&D;  Service: OB/GYN;  Laterality: N/A;     SECTION, LOW TRANSVERSE      x2    LYMPH NODE BIOPSY Right 2023    Procedure: BIOPSY, LYMPH NODE;  Surgeon: Rivera Sandoval MD;  Location: [facility] OR;  Service: ENT;  Laterality: Right;  right deep cervial lymph node excision    NECK MASS EXCISION Right 2023    Procedure: EXCISION, MASS, NECK;  Surgeon: Rivera Sandoval MD;  Location: [facility] OR;  Service: ENT;  Laterality: Right;  right deep cervial lymph node excision    RENAL BIOPSY  2013        Social History     Tobacco Use    Smoking status: Never     Passive exposure: Never    Smokeless tobacco: Never   Substance Use Topics    Alcohol use: No     Alcohol/week: 0.0 standard drinks of alcohol    Drug use: Yes     Types: Marijuana       Family History   Problem Relation Name Age of Onset    Hypertension Mother      Eczema Brother      Hypertension Maternal Grandmother      Diabetes Paternal Grandmother      Heart disease Son      Arrhythmia Son          CHB    Stroke Neg Hx      Cancer Neg Hx         Review of patient's allergies indicates:  No Known Allergies    Medication List with Changes/Refills   New Medications    LOSARTAN (COZAAR) 25 MG TABLET    Take 1 tablet (25 mg total) by mouth once daily.   Current Medications    ARIPIPRAZOLE (ABILIFY) 5 MG TAB    Take 1 tablet (5 mg total) by mouth once daily.    FLUOXETINE 20 MG CAPSULE    Take 1 capsule (20 mg total) by mouth once daily.       Disclaimer: This note was prepared using voice recognition system and is likely to have sound alike errors and is not proof read.  Please call me with any questions.        Answers submitted by the patient for this visit:  Rheumatology Questionnaire (Submitted on 10/21/2024)  fever: No  eye redness: No  mouth sores: No  headaches: No  shortness of breath: No  chest pain: No  trouble  swallowing: No  diarrhea: No  constipation: No  unexpected weight change: No  genital sore: No  dysuria: No  During the last 3 days, have you had a skin rash?: No  Bruises or bleeds easily: Yes  cough: No

## 2024-10-24 ENCOUNTER — TELEPHONE (OUTPATIENT)
Dept: INFUSION THERAPY | Facility: HOSPITAL | Age: 34
End: 2024-10-24
Payer: MEDICAID

## 2024-10-28 ENCOUNTER — PATIENT MESSAGE (OUTPATIENT)
Dept: PSYCHIATRY | Facility: CLINIC | Age: 34
End: 2024-10-28
Payer: MEDICAID

## 2024-11-05 ENCOUNTER — LAB VISIT (OUTPATIENT)
Dept: LAB | Facility: HOSPITAL | Age: 34
End: 2024-11-05
Attending: INTERNAL MEDICINE
Payer: MEDICAID

## 2024-11-05 ENCOUNTER — INFUSION (OUTPATIENT)
Dept: INFUSION THERAPY | Facility: HOSPITAL | Age: 34
End: 2024-11-05
Attending: PHYSICIAN ASSISTANT
Payer: MEDICAID

## 2024-11-05 VITALS
RESPIRATION RATE: 16 BRPM | BODY MASS INDEX: 33.57 KG/M2 | HEIGHT: 65 IN | HEART RATE: 83 BPM | TEMPERATURE: 98 F | SYSTOLIC BLOOD PRESSURE: 112 MMHG | DIASTOLIC BLOOD PRESSURE: 76 MMHG | OXYGEN SATURATION: 100 % | WEIGHT: 201.5 LBS

## 2024-11-05 DIAGNOSIS — M32.9 HEMOLYTIC ANEMIA ASSOCIATED WITH SYSTEMIC LUPUS ERYTHEMATOSUS: ICD-10-CM

## 2024-11-05 DIAGNOSIS — D59.10 HEMOLYTIC ANEMIA ASSOCIATED WITH SYSTEMIC LUPUS ERYTHEMATOSUS: ICD-10-CM

## 2024-11-05 DIAGNOSIS — Z51.81 MEDICATION MONITORING ENCOUNTER: ICD-10-CM

## 2024-11-05 DIAGNOSIS — M32.9 SYSTEMIC LUPUS ERYTHEMATOSUS ARTHRITIS: ICD-10-CM

## 2024-11-05 DIAGNOSIS — D64.9 ANEMIA, UNSPECIFIED TYPE: ICD-10-CM

## 2024-11-05 DIAGNOSIS — M32.9 SLE (SYSTEMIC LUPUS ERYTHEMATOSUS RELATED SYNDROME): Primary | ICD-10-CM

## 2024-11-05 LAB
ALBUMIN SERPL BCP-MCNC: 3.3 G/DL (ref 3.5–5.2)
ALP SERPL-CCNC: 73 U/L (ref 40–150)
ALT SERPL W/O P-5'-P-CCNC: 17 U/L (ref 10–44)
ANION GAP SERPL CALC-SCNC: 6 MMOL/L (ref 8–16)
AST SERPL-CCNC: 22 U/L (ref 10–40)
BACTERIA #/AREA URNS HPF: NORMAL /HPF
BASOPHILS # BLD AUTO: 0.01 K/UL (ref 0–0.2)
BASOPHILS NFR BLD: 0.2 % (ref 0–1.9)
BILIRUB SERPL-MCNC: 0.3 MG/DL (ref 0.1–1)
BILIRUB UR QL STRIP: NEGATIVE
BUN SERPL-MCNC: 7 MG/DL (ref 6–20)
C3 SERPL-MCNC: 82 MG/DL (ref 50–180)
C4 SERPL-MCNC: 21 MG/DL (ref 11–44)
CALCIUM SERPL-MCNC: 8.5 MG/DL (ref 8.7–10.5)
CHLORIDE SERPL-SCNC: 109 MMOL/L (ref 95–110)
CLARITY UR: CLEAR
CO2 SERPL-SCNC: 23 MMOL/L (ref 23–29)
COLOR UR: YELLOW
CREAT SERPL-MCNC: 0.7 MG/DL (ref 0.5–1.4)
CREAT UR-MCNC: 202 MG/DL (ref 15–325)
DIFFERENTIAL METHOD BLD: NORMAL
EOSINOPHIL # BLD AUTO: 0.2 K/UL (ref 0–0.5)
EOSINOPHIL NFR BLD: 3.3 % (ref 0–8)
ERYTHROCYTE [DISTWIDTH] IN BLOOD BY AUTOMATED COUNT: 13.1 % (ref 11.5–14.5)
EST. GFR  (NO RACE VARIABLE): >60 ML/MIN/1.73 M^2
GLUCOSE SERPL-MCNC: 78 MG/DL (ref 70–110)
GLUCOSE UR QL STRIP: NEGATIVE
HCT VFR BLD AUTO: 37 % (ref 37–48.5)
HGB BLD-MCNC: 12.5 G/DL (ref 12–16)
HGB UR QL STRIP: NEGATIVE
HYALINE CASTS #/AREA URNS LPF: 0 /LPF
IMM GRANULOCYTES # BLD AUTO: 0.02 K/UL (ref 0–0.04)
IMM GRANULOCYTES NFR BLD AUTO: 0.4 % (ref 0–0.5)
KETONES UR QL STRIP: NEGATIVE
LEUKOCYTE ESTERASE UR QL STRIP: NEGATIVE
LYMPHOCYTES # BLD AUTO: 1.4 K/UL (ref 1–4.8)
LYMPHOCYTES NFR BLD: 27.3 % (ref 18–48)
MCH RBC QN AUTO: 29.8 PG (ref 27–31)
MCHC RBC AUTO-ENTMCNC: 33.8 G/DL (ref 32–36)
MCV RBC AUTO: 88 FL (ref 82–98)
MICROSCOPIC COMMENT: NORMAL
MONOCYTES # BLD AUTO: 0.3 K/UL (ref 0.3–1)
MONOCYTES NFR BLD: 5.5 % (ref 4–15)
NEUTROPHILS # BLD AUTO: 3.2 K/UL (ref 1.8–7.7)
NEUTROPHILS NFR BLD: 63.3 % (ref 38–73)
NITRITE UR QL STRIP: NEGATIVE
NRBC BLD-RTO: 0 /100 WBC
PH UR STRIP: 6 [PH] (ref 5–8)
PLATELET # BLD AUTO: 243 K/UL (ref 150–450)
PMV BLD AUTO: 11.1 FL (ref 9.2–12.9)
POTASSIUM SERPL-SCNC: 3.7 MMOL/L (ref 3.5–5.1)
PROT SERPL-MCNC: 6.5 G/DL (ref 6–8.4)
PROT UR QL STRIP: ABNORMAL
PROT UR-MCNC: 71 MG/DL (ref 0–15)
PROT/CREAT UR: 0.35 MG/G{CREAT} (ref 0–0.2)
RBC # BLD AUTO: 4.2 M/UL (ref 4–5.4)
RBC #/AREA URNS HPF: 2 /HPF (ref 0–4)
SODIUM SERPL-SCNC: 138 MMOL/L (ref 136–145)
SP GR UR STRIP: 1.02 (ref 1–1.03)
URN SPEC COLLECT METH UR: ABNORMAL
WBC # BLD AUTO: 5.09 K/UL (ref 3.9–12.7)
WBC #/AREA URNS HPF: 3 /HPF (ref 0–5)

## 2024-11-05 PROCEDURE — 96365 THER/PROPH/DIAG IV INF INIT: CPT

## 2024-11-05 PROCEDURE — 63600175 PHARM REV CODE 636 W HCPCS: Mod: JZ,TB | Performed by: INTERNAL MEDICINE

## 2024-11-05 PROCEDURE — 86225 DNA ANTIBODY NATIVE: CPT | Mod: 59

## 2024-11-05 PROCEDURE — 25000003 PHARM REV CODE 250: Performed by: INTERNAL MEDICINE

## 2024-11-05 PROCEDURE — 84156 ASSAY OF PROTEIN URINE: CPT | Performed by: INTERNAL MEDICINE

## 2024-11-05 PROCEDURE — 81000 URINALYSIS NONAUTO W/SCOPE: CPT | Performed by: INTERNAL MEDICINE

## 2024-11-05 PROCEDURE — 86160 COMPLEMENT ANTIGEN: CPT | Performed by: INTERNAL MEDICINE

## 2024-11-05 PROCEDURE — 80053 COMPREHEN METABOLIC PANEL: CPT | Performed by: INTERNAL MEDICINE

## 2024-11-05 PROCEDURE — 86160 COMPLEMENT ANTIGEN: CPT | Mod: 59 | Performed by: INTERNAL MEDICINE

## 2024-11-05 PROCEDURE — 86225 DNA ANTIBODY NATIVE: CPT | Performed by: INTERNAL MEDICINE

## 2024-11-05 PROCEDURE — 85025 COMPLETE CBC W/AUTO DIFF WBC: CPT | Performed by: INTERNAL MEDICINE

## 2024-11-05 RX ORDER — SODIUM CHLORIDE 0.9 % (FLUSH) 0.9 %
10 SYRINGE (ML) INJECTION
OUTPATIENT
Start: 2024-11-19

## 2024-11-05 RX ORDER — HEPARIN 100 UNIT/ML
500 SYRINGE INTRAVENOUS
OUTPATIENT
Start: 2024-11-19

## 2024-11-05 RX ORDER — EPINEPHRINE 0.3 MG/.3ML
0.3 INJECTION SUBCUTANEOUS ONCE AS NEEDED
OUTPATIENT
Start: 2024-11-19

## 2024-11-05 RX ORDER — DIPHENHYDRAMINE HYDROCHLORIDE 50 MG/ML
50 INJECTION INTRAMUSCULAR; INTRAVENOUS ONCE AS NEEDED
OUTPATIENT
Start: 2024-11-19

## 2024-11-05 RX ORDER — SODIUM CHLORIDE 0.9 % (FLUSH) 0.9 %
10 SYRINGE (ML) INJECTION
Status: DISCONTINUED | OUTPATIENT
Start: 2024-11-05 | End: 2024-11-05 | Stop reason: HOSPADM

## 2024-11-05 RX ADMIN — ANIFROLUMAB 300 MG: 300 INJECTION, SOLUTION INTRAVENOUS at 02:11

## 2024-11-05 NOTE — PLAN OF CARE
Patient tolerated Saphnelo well today; no adverse reaction noted.  IV discontinued with catheter intact and pressure dressing applied to the site.  Has f/u appt(s) scheduled per MD request.  No questions or concerns voiced.  NAD noted upon discharge.

## 2024-11-05 NOTE — NURSING
Infusion - Saphnelo 300 mg q 4 weeks  Last dose- 9/26/2024    Any:  -recent illness, infection, or antibiotic use in past week- denied  -open wounds or mouth sores- denied  -invasive procedures or surgeries in past 4 weeks or in upcoming 4 weeks- denied  -vaccinations in past week- denied  -any new symptoms/change in symptoms-c/o flare up since behind on Saphnelo  -chance you may be pregnant- denied      Recent labs? TODAY  Last Rheumatology provider visit- Seen by Dr MADISON on 10/21/2024     Premeds-none needed     Saphnelo 300 mg administered IV at a 30 minute rate per orders; see MAR and vitals for more details. Tolerated well without adverse events, discharged and ambulatory out of clinic.

## 2024-11-06 LAB
DNA TITER: 0
DSDNA AB SER-ACNC: NORMAL [IU]/ML

## 2024-11-10 ENCOUNTER — OFFICE VISIT (OUTPATIENT)
Dept: URGENT CARE | Facility: CLINIC | Age: 34
End: 2024-11-10
Payer: MEDICAID

## 2024-11-10 VITALS
DIASTOLIC BLOOD PRESSURE: 77 MMHG | HEART RATE: 96 BPM | HEIGHT: 65 IN | RESPIRATION RATE: 18 BRPM | OXYGEN SATURATION: 96 % | TEMPERATURE: 99 F | SYSTOLIC BLOOD PRESSURE: 126 MMHG | BODY MASS INDEX: 33.7 KG/M2 | WEIGHT: 202.25 LBS

## 2024-11-10 DIAGNOSIS — J02.9 SORE THROAT: ICD-10-CM

## 2024-11-10 DIAGNOSIS — J06.9 VIRAL URI WITH COUGH: Primary | ICD-10-CM

## 2024-11-10 DIAGNOSIS — H10.9 CONJUNCTIVITIS OF RIGHT EYE, UNSPECIFIED CONJUNCTIVITIS TYPE: ICD-10-CM

## 2024-11-10 DIAGNOSIS — R05.9 COUGH, UNSPECIFIED TYPE: ICD-10-CM

## 2024-11-10 LAB
CTP QC/QA: YES
CTP QC/QA: YES
MOLECULAR STREP A: NEGATIVE
SARS-COV-2 AG RESP QL IA.RAPID: NEGATIVE

## 2024-11-10 PROCEDURE — 87651 STREP A DNA AMP PROBE: CPT | Mod: QW,S$GLB,,

## 2024-11-10 PROCEDURE — 87811 SARS-COV-2 COVID19 W/OPTIC: CPT | Mod: QW,S$GLB,,

## 2024-11-10 PROCEDURE — 99213 OFFICE O/P EST LOW 20 MIN: CPT | Mod: S$GLB,,,

## 2024-11-10 RX ORDER — BENZONATATE 200 MG/1
200 CAPSULE ORAL 3 TIMES DAILY PRN
Qty: 30 CAPSULE | Refills: 0 | Status: SHIPPED | OUTPATIENT
Start: 2024-11-10

## 2024-11-10 NOTE — PROGRESS NOTES
"Subjective:      Patient ID: Alisia Vines is a 34 y.o. female.    Vitals:  height is 5' 5" (1.651 m) and weight is 91.7 kg (202 lb 4.4 oz). Her oral temperature is 98.7 °F (37.1 °C). Her blood pressure is 126/77 and her pulse is 96. Her respiration is 18 and oxygen saturation is 96%.     Chief Complaint: Cough    Alisia Vines is a 34 y.o. female who presents for productive cough which onset 3 days ago. Associated sxs include sore throat only when coughing and rhinorrhea. She states the cough is not constant only when her throat is irritated, producing green/yellow sputum. Patient denies any fever, chills, SOB, CP, abd pain, n/v/d, rash, dizziness, or numbness/tingling. Patient states she woke up three days ago with right eye crusting and soreness. Patient tried Mucinex for the cough and abx eye drops. Patient reports a history of bronchitis. No hx of asthma.     Cough  This is a new problem. The current episode started in the past 7 days. The problem has been unchanged. The problem occurs hourly. The cough is Productive of sputum and productive of purulent sputum. Associated symptoms include headaches, postnasal drip and a sore throat. Pertinent negatives include no chest pain, chills, ear pain, eye redness, fever, myalgias or shortness of breath. The symptoms are aggravated by lying down, stress, pollens and cold air. She has tried OTC cough suppressant for the symptoms. The treatment provided no relief. Her past medical history is significant for bronchitis.       Constitution: Negative for appetite change, chills, sweating, fatigue and fever.   HENT:  Positive for postnasal drip and sore throat. Negative for ear pain, ear discharge, hearing loss, congestion, sinus pain, sinus pressure and trouble swallowing.    Cardiovascular:  Negative for chest pain.   Eyes:  Positive for eye pain. Negative for eye trauma, foreign body in eye, eye discharge, eye itching, eye redness, photophobia, vision " loss, double vision, blurred vision and eyelid swelling.   Respiratory:  Positive for cough. Negative for sputum production and shortness of breath.    Gastrointestinal:  Negative for abdominal pain, nausea, vomiting and diarrhea.   Musculoskeletal:  Negative for muscle ache.   Neurological:  Positive for headaches. Negative for dizziness, numbness and tingling.      Objective:     Physical Exam   Constitutional: She is oriented to person, place, and time. She appears well-developed. She is cooperative.  Non-toxic appearance. She does not appear ill. No distress.   HENT:   Head: Normocephalic and atraumatic.   Ears:   Right Ear: Hearing, tympanic membrane, external ear and ear canal normal.   Left Ear: Hearing, tympanic membrane, external ear and ear canal normal.   Nose: Nose normal. No mucosal edema or nasal deformity. No epistaxis. Right sinus exhibits no maxillary sinus tenderness and no frontal sinus tenderness. Left sinus exhibits no maxillary sinus tenderness and no frontal sinus tenderness.   Mouth/Throat: Uvula is midline, oropharynx is clear and moist and mucous membranes are normal. Mucous membranes are moist. No trismus in the jaw. Normal dentition. No uvula swelling. No oropharyngeal exudate, posterior oropharyngeal edema or posterior oropharyngeal erythema.   Eyes: Lids are normal. Pupils are equal, round, and reactive to light. Right eye exhibits no discharge and no hordeolum. Left eye exhibits no discharge and no hordeolum. Right conjunctiva is injected. Left conjunctiva is not injected. No scleral icterus. Right eye exhibits no nystagmus. Left eye exhibits no nystagmus. Extraocular movement intact vision grossly intact gaze aligned appropriately      Comments: R conjunctival erythema. No active drainage. No periorbital tenderness. PERRLA. EOM intact.    Neck: Trachea normal and phonation normal. Neck supple. No edema present. No erythema present. No neck rigidity present.   Cardiovascular: Normal  rate, regular rhythm, normal heart sounds and normal pulses.   Pulmonary/Chest: Effort normal and breath sounds normal. No stridor. No respiratory distress. She has no decreased breath sounds. She has no wheezes. She has no rhonchi. She has no rales.   Abdominal: Normal appearance.   Musculoskeletal: Normal range of motion.         General: No deformity. Normal range of motion.   Neurological: She is alert and oriented to person, place, and time. She exhibits normal muscle tone. Coordination normal.   Skin: Skin is warm, dry, intact, not diaphoretic and not pale.   Psychiatric: Her speech is normal and behavior is normal. Judgment and thought content normal.   Nursing note and vitals reviewed.      Assessment:     1. Viral URI with cough    2. Cough, unspecified type    3. Sore throat    4. Conjunctivitis of right eye, unspecified conjunctivitis type        Results for orders placed or performed in visit on 11/10/24   SARS Coronavirus 2 Antigen, POCT Manual Read    Collection Time: 11/10/24  9:43 AM   Result Value Ref Range    SARS Coronavirus 2 Antigen Negative Negative     Acceptable Yes    POCT Strep A, Molecular    Collection Time: 11/10/24  9:49 AM   Result Value Ref Range    Molecular Strep A, POC Negative Negative     Acceptable Yes      *Note: Due to a large number of results and/or encounters for the requested time period, some results have not been displayed. A complete set of results can be found in Results Review.       Plan:       Viral URI with cough  -     benzonatate (TESSALON) 200 MG capsule; Take 1 capsule (200 mg total) by mouth 3 (three) times daily as needed for Cough.  Dispense: 30 capsule; Refill: 0    Cough, unspecified type  -     SARS Coronavirus 2 Antigen, POCT Manual Read  -     benzonatate (TESSALON) 200 MG capsule; Take 1 capsule (200 mg total) by mouth 3 (three) times daily as needed for Cough.  Dispense: 30 capsule; Refill: 0    Sore throat  -     POCT  Strep A, Molecular    Conjunctivitis of right eye, unspecified conjunctivitis type      Afebrile. VSS. Patient is in NAD.  Discussed negative results with patient.  Educated patient on viral vs bacterial sinus infection/upper respiratory infection.   Supportive care for sore throat (salt water gargles, lozenges, chloraseptic spray, cough drops, warm tea, honey, etc.).   Increase fluid intake and plenty of rest.  Meds: tessalon perles sent to preferred pharmacy. Patient declined cough syrup but states if she changes her mind she will call back. Promethazine OLE bentley.   DDx: viral conjunctivitis vs. bacterial  Previously Rx cipro drops. Discussed use antibiotic drops as prescribed in the affected eye.  Wash hands frequently and avoid touching the face to prevent the spread of the disease.  Wash pillowcases and towels.  Tylenol/Ibuprofen (as permitted) as needed for any pain or discomfort.  If symptoms do not resolve, return to clinic for further evaluation.  ER precautions given such as SOB, CP, or fever not resolved with fever-reducing medications.                     Principal Discharge DX:	Urinary retention

## 2024-11-10 NOTE — PATIENT INSTRUCTIONS
PLEASE READ YOUR DISCHARGE INSTRUCTIONS ENTIRELY AS IT CONTAINS IMPORTANT INFORMATION.    Please drink plenty of fluids.    Please get plenty of rest.    Please return here or go to the Emergency Department for any concerns or worsening of condition.    Please take an over the counter antihistamine medication (Allegra/Claritin/Zyrtec) of your choice as directed for allergy symptoms and/or runny nose and postnasal drip.    Try an over the counter decongestant for sinus pressure/ear pressure, congestion symptoms like Mucinex D or Sudafed or Phenylephrine. You buy this behind the pharmacy counter.    If you do have Hypertension or palpitations, it is safe to take Coricidin HBP for relief of sinus symptoms.    Tylenol or ibuprofen can also be used as directed for pain and fever unless you have an allergy to them or medical condition such as stomach ulcers, kidney or liver disease or blood thinners etc for which you should not be taking these type of medications.     Sore throat recommendations: Warm fluids, warm salt water gargles, throat lozenges, tea, honey, soup, rest, hydration.    Use over the counter Flonase or Nasocort: one spray each nostril twice daily OR two sprays each nostril once daily until nares dry out, unless you have Glaucoma.   Can also supplement with nasal saline rinse.    Sinus rinses DO NOT USE TAP WATER, if you must, water must be at a rolling boil for 1 minute, let it cool, then use.  May use distilled water, or over the counter nasal saline rinses.  Nathan's vapor rub in shower to help open nasal passages.  May use nasal gel to keep passages moisturized.  May use nasal saline sprays during the day for added relief of congestion.   For those who go to the gym, please do not use the sauna or steam room now to clear sinuses.    Cough     Rest and fluids are important  Can use honey with cassandra to soothe your throat    Robitussin or Delsyum for cough suppressant for dry cough.    Mucinex DM or  products containing Guaifenesin or Dextromethorphan for expectorant (wet cough).    Take prescription cough meds (pills) as prescribed; take prescription cough syrup at night as needed for cough.  Do not take both the prescribed cough pills and syrup at the same time or within 6 hours of each other.  Do not take the cough syrup with any other sedative medication as it can can cause drowsiness. Do not operate any heavy machinery, drink or drive while taking the cough syrup.     Please follow up with your primary care doctor or specialist in the next 48-72hrs as needed and if no improvement    If you smoke, please stop smoking.    Please return or see your primary care doctor if you develop new or worsening symptoms.     Lastly, good hand washing and cough hygiene (cough into your elbow) will help prevent the spread of the illness. A general rule is that you are no longer contagious once you have been without a fever for over 24 hours without requiring fever reducing medications.     Please arrange follow up with your primary medical clinic as soon as possible. You must understand that you've received an Urgent Care treatment only and that you may be released before all of your medical problems are known or treated. You, the patient, will arrange for follow up as instructed. If your symptoms worsen or fail to improve you should go to the Emergency Room.      Use antibiotic drops as prescribed in the affected eye for 5 days. If the pink eye spreads to the opposite eye, start a 5 day treatment in that eye as well.  Quarantine for full 24 hours on the eye antibiotic as you are contagious until you have had 24 hours of treatment.  Wash hands frequently and avoid touching the face to prevent the spread of the disease.  If you wear contacts, discontinue wearing until completion of antibiotics. Wear your glasses.  Wash pillowcases and face towels.  Avoid use of face makeup.      You may use these drops for allergic pink eye  if symptoms do not improve and eyes become itchy.

## 2024-12-11 ENCOUNTER — OFFICE VISIT (OUTPATIENT)
Dept: OPHTHALMOLOGY | Facility: CLINIC | Age: 34
End: 2024-12-11
Payer: MEDICAID

## 2024-12-11 ENCOUNTER — TELEPHONE (OUTPATIENT)
Dept: RHEUMATOLOGY | Facility: CLINIC | Age: 34
End: 2024-12-11
Payer: MEDICAID

## 2024-12-11 DIAGNOSIS — M32.9 SYSTEMIC LUPUS ERYTHEMATOSUS ARTHRITIS: Primary | ICD-10-CM

## 2024-12-11 DIAGNOSIS — Q07.8 ANOMALOUS OPTIC NERVE: Primary | ICD-10-CM

## 2024-12-11 DIAGNOSIS — H52.203 MYOPIA OF BOTH EYES WITH ASTIGMATISM: ICD-10-CM

## 2024-12-11 DIAGNOSIS — H52.13 MYOPIA OF BOTH EYES WITH ASTIGMATISM: ICD-10-CM

## 2024-12-11 PROCEDURE — 99999 PR PBB SHADOW E&M-EST. PATIENT-LVL II: CPT | Mod: PBBFAC,,, | Performed by: OPTOMETRIST

## 2024-12-11 PROCEDURE — 99212 OFFICE O/P EST SF 10 MIN: CPT | Mod: PBBFAC,PO | Performed by: OPTOMETRIST

## 2024-12-11 PROCEDURE — 99204 OFFICE O/P NEW MOD 45 MIN: CPT | Mod: S$PBB,,, | Performed by: OPTOMETRIST

## 2024-12-11 PROCEDURE — 92015 DETERMINE REFRACTIVE STATE: CPT | Mod: ,,, | Performed by: OPTOMETRIST

## 2024-12-11 RX ORDER — FOLIC ACID 1 MG/1
1 TABLET ORAL DAILY
Qty: 90 TABLET | Refills: 2 | Status: SHIPPED | OUTPATIENT
Start: 2024-12-11

## 2024-12-11 RX ORDER — METHOTREXATE 2.5 MG/1
10 TABLET ORAL
Qty: 16 TABLET | Refills: 2 | Status: SHIPPED | OUTPATIENT
Start: 2024-12-11

## 2024-12-11 NOTE — TELEPHONE ENCOUNTER
Start methotrexate and folic acid. Strict contraception while taking MTX. Repeat lupus panel in 4 weeks. Thanks.

## 2024-12-11 NOTE — TELEPHONE ENCOUNTER
Patient c/o severe hand/wrist pain for at least the past month.  She missed her [facility] appt on 12/6 (sending a MSG to infusion to get her scheduled) but she states that she was experiencing pain prior.    She has some Prednisone 10mg and  20mg at home and wants to know if she should start taking any for the pain and inflammation.

## 2024-12-11 NOTE — TELEPHONE ENCOUNTER
----- Message from Alisia sent at 12/11/2024  2:18 PM CST -----  Contact: Alisia  Type:  Needs Medical Advice    Who Called: Alisia  Symptoms (please be specific): Severe joint pain in hands and wrist   How long has patient had these symptoms: Approximately one month  Pharmacy name and phone #:    [id number] - [facility] [place], [place] - [address] AT [place] & [facility]  [address] [place] [address]  Phone: [phone] Fax: [phone]  Would the patient rather a call back or a response via [facility]? call  Best Call Back Number: [phone]   Additional Information: Patient reports having severe joint pain in hands and wrist and request to receive medical advice. Please give patient an immediate call back to assist.  Thank you,  GH

## 2024-12-11 NOTE — PROGRESS NOTES
HPI     Annual Exam            Comments: Patient here today for yearly eye exam          Comments    Vision changes since last eye exam?: None noticed  Wears SVL glasses full-time     Any eye pain today: No    Other ocular symptoms: No    Interested in contact lens fitting today? Yes                      Last edited by Ibeth Peters, PCT on 12/11/2024 10:05 AM.            Assessment /Plan     For exam results, see Encounter Report.    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     2. Myopia of both eyes with astigmatism  Eyeglass Final Rx       Eyeglass Final Rx         Sphere Cylinder Axis    Right -3.50 +1.25 120    Left -4.25 +1.50 071      Type: SVL    Expiration Date: 12/11/2025                  Contact Lens Final Rx       Final Contact Lens Rx         Brand Base Curve Diameter Sphere Cylinder Axis    Right Total 30 for Astigmatism 8.6 14.5 -2.50 -1.25 030    Left Total 30 for Astigmatism 8.6 14.5 -2.50 -1.25 160      Expiration Date: 12/11/2025    Replacement: Monthly    Solutions: OptiFree PureMoist    Wearing Schedule: Daily Wear                  Contact lens trials fitted in office today. Contact lens hygiene reviewed. Patient able to insert the lenses themselves with minimal difficulty. Patient ok to finalize Contact lens after 1 week of wear. RTC if still having difficulty with CTL trial after 1 week.     RTC 6 months for gOCT with DFE, sooner if any changes to vision or worsening symptoms.

## 2024-12-12 ENCOUNTER — OFFICE VISIT (OUTPATIENT)
Dept: PSYCHIATRY | Facility: CLINIC | Age: 34
End: 2024-12-12
Payer: MEDICAID

## 2024-12-12 ENCOUNTER — PATIENT MESSAGE (OUTPATIENT)
Dept: INFUSION THERAPY | Facility: HOSPITAL | Age: 34
End: 2024-12-12
Payer: MEDICAID

## 2024-12-12 DIAGNOSIS — F39 MOOD DISORDER: Primary | ICD-10-CM

## 2024-12-12 DIAGNOSIS — F12.90 MARIJUANA USE: ICD-10-CM

## 2024-12-12 DIAGNOSIS — F41.9 ANXIETY: ICD-10-CM

## 2024-12-12 PROCEDURE — 90833 PSYTX W PT W E/M 30 MIN: CPT | Mod: HP,HB,S$PBB, | Performed by: PSYCHOLOGIST

## 2024-12-12 PROCEDURE — 1159F MED LIST DOCD IN RCRD: CPT | Mod: CPTII,,, | Performed by: PSYCHOLOGIST

## 2024-12-12 PROCEDURE — 4010F ACE/ARB THERAPY RXD/TAKEN: CPT | Mod: CPTII,,, | Performed by: PSYCHOLOGIST

## 2024-12-12 PROCEDURE — 99214 OFFICE O/P EST MOD 30 MIN: CPT | Mod: HP,HB,S$PBB, | Performed by: PSYCHOLOGIST

## 2024-12-12 RX ORDER — LEUCOVORIN, FOLIC ACID, LEVOMEFOLATE MAGNESIUM, FERROUS CYSTEINE GLYCINATE, 1,2-DOCOSAHEXANOYL-SN-GLYCERO-3-PHOSPHOSERINE CALCIUM, 1,2-ICOSAPENTOYL-SN-GLYCERO-3-PHOSPHOSERINE CALCIUM, PHOSPHATIDYL SERINE, PYRIDOXAL 5-PHOSPHATE, FLAVIN ADENINE DINUCLEOTIDE, NADH, COBAMAMIDE, COCARBOXYLASE (THIAMINE PYROPHOSPHATE), MAGNESIUM ASCORBATE, ZINC ASCORBATE, MAGNESIUM L-THREONATE AND BETAINE 2.5; 1; 7; 13.6; 6.4; 800; 12; 25; 25; 25; 50; 25; 24; 1; 1; 500; 1.83; 3.67 MG/1; MG/1; MG/1; MG/1; MG/1; UG/1; MG/1; UG/1; UG/1; UG/1; UG/1; UG/1; MG/1; MG/1; MG/1; UG/1; MG/1; MG/1
1 CAPSULE, DELAYED RELEASE PELLETS ORAL DAILY
Qty: 30 CAPSULE | Refills: 11 | Status: SHIPPED | OUTPATIENT
Start: 2024-12-12 | End: 2025-12-12

## 2024-12-12 RX ORDER — FLUOXETINE HYDROCHLORIDE 20 MG/1
20 CAPSULE ORAL DAILY
Qty: 30 CAPSULE | Refills: 2 | Status: SHIPPED | OUTPATIENT
Start: 2024-12-12 | End: 2025-03-12

## 2024-12-12 RX ORDER — ARIPIPRAZOLE 2 MG/1
2 TABLET ORAL DAILY
Qty: 30 TABLET | Refills: 2 | Status: SHIPPED | OUTPATIENT
Start: 2024-12-12 | End: 2025-03-12

## 2024-12-12 NOTE — Clinical Note
Hey--I saw this pt last week and we talked about trying l-methylfolate. I sent a script for her for Enlyte (can get at no cost) but wanted to make sure that this would be okay with her taking the folic acid and whether you wanted her to continue them both. Thanks, Mere

## 2024-12-12 NOTE — PROGRESS NOTES
"Outpatient Psychiatry Follow-Up Visit     12/12/2024      Clinical Status of Patient:  Outpatient (Ambulatory)    Preferred Name: Alisia  Gender Identity: cis female  Preferred Pronouns: she/her    LAST VISIT: Alisia attended her virtual visit.  She was last seen by me in September of 2022. She said that last year she got sick with her Lupus and was hospitalized. She said that she as depressed and it flared. She reported that she has not been consistent enough with the medicine to know whether it was helpful. She said that she back where she started--"nothing is resolved." She has a short fuse--does not like to be questioned. With friends it's okay--but not with family or her significant other. She has not been in therapy but is open to it. We discussed the [facility] program, and she agreed to try it. She said that she had some Abilify left--says that she has been taking 2 of them the last couple of days.     She had been living with her boyfriend and his family; over the last couple of months, they moved into a trailer that her dad gave to them. She lives with her 3 kids; her boyfriend is there sometimes, usually on days that he is off. She does not currently have a job--she is focusing on the kids and the house. They only recently got a car so she does not have transportation. She has goals--wants to start her own farm. She wants to teach the kids to be more self-sufficient and "live off the land." She works one day a week at the [employer] on Saturday mornings. 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 We talked about her taking her medicine consistently.     She gets her monthly infusions at the [facility].  We talked about consistency with taking her medicines and discussed ways of building habits.  She reports that she gets up every morning at 6 to get the kids up for school and on the weekends to go to the farmer's market.  She will brush her teeth when the kids " "are brushing there is, and we discussed her taking her medicines at the time she brushes her teeth in the mornings.  We also discussed her tracking any episodes that she might have so that we can start looking for possible patterns.  See plan below.     Plan--start Prozac 20 mg; Abilify 5 mg; STeP referral; take medicine in mornings when brushing teeth; track episodes for patterns       History of Present Illness    CHIEF COMPLAINT:  Alisia presents for follow-up on medication management for mood and anxiety, as well as to discuss ongoing issues with focus and task completion. The clinician last saw the patient in September.    HPI:  Alisia reports difficulty taking her prescribed medications (Prozac and Abilify) consistently, struggling with remembering to take pills and often forgetting or avoiding them. This has been an ongoing issue, extending to other medications as well.    She describes feeling "up and down, all around, just kind of everywhere," with significant difficulty focusing and completing tasks, often starting multiple tasks but not finishing them. This issue has been present for at least a few years but has worsened since having children, with distractions from her children making it harder to focus and complete tasks.    Alisia discloses regular cannabis use, which started as recreational but has become a habit. She describes it as her "chill time" and relaxation method, often smoking while watching shows. Unlike some users who become sedentary, she reports feeling more motivated to clean and accomplish tasks after smoking.    She reports getting "pretty good sleep" overall but mentions going to sleep early due to fatigue, which she attributes to her lupus. Recently, she has been having difficulty getting up in the morning, partly due to cold weather causing joint aches.    Alisia works multiple jobs, including at [email or web address] (8:30 AM to 2 PM), the [employer] (Thursdays and Saturdays), " "and occasionally helps with catering. She went from not working to having multiple jobs, which may contribute to her fatigue.    Alisia mentions experiencing increased joint pain and achiness, particularly with the recent cold weather, which she attributes to her lupus condition.    MEDICATIONS:  Alisia is on Prozac 20 mg and Abilify 2.5 mg daily for mood and anxiety, but she is not taking them consistently. She is prescribed Losartan but is not taking it. She is scheduled to start Methotrexate weekly and Folic acid daily for lupus, but has not begun these medications yet.    SUBSTANCE USE:  Alisia currently uses marijuana, reporting that she smokes for relaxation and "chill time". She stopped drinking alcohol "a while ago".    LIFESTYLE:  Alisia obtained her GED due to difficulties focusing and paying attention in school. She attempted college but experienced similar attention issues. She works multiple jobs: at [employer] from 8:30am to 2pm, at the [employer] on Thursdays from 8am to 11:30am and at [employer] on Saturdays from 7:30am to 12:30pm. 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 Her employer at [employer] is flexible with her schedule and values her as an employee. Alisia recently transitioned from not working to having multiple jobs.    She lives near her grandmother's house and has a boyfriend who helps with childcare and finances. For relaxation, she watches shows and smokes. Alisia has children and her boyfriend assists with their care and pays the bills. 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 She mentions spending time with family during holidays.    ROS:  Constitutional: +fatigue  Musculoskeletal: +joint pain       PSYCHIATRIC: Pertinant items are noted in the narrative.    Past Medical, Family and Social History: The patient's past medical, family and social history have been reviewed and updated as appropriate within the electronic medical " "record - see encounter notes.        9/16/2024     8:13 AM 9/20/2022     7:53 AM 3/7/2022     7:04 AM   GAD7   1. Feeling nervous, anxious, or on edge? 3  0  0    2. Not being able to stop or control worrying? 3  0  0    3. Worrying too much about different things? 3  0  1    4. Trouble relaxing? 2  0  0    5. Being so restless that it is hard to sit still? 1  0  0    6. Becoming easily annoyed or irritable? 3  0  3    7. Feeling afraid as if something awful might happen? 0  0  0    8. If you checked off any problems, how difficult have these problems made it for you to do your work, take care of things at home, or get along with other people? 3      MINDY-7 Score 15 0 4       Patient-reported      0-4 = Minimal anxiety  5-9 = Mild anxiety  10-14 = Moderate anxiety  15-21 = Severe anxiety         7/15/2024     9:45 AM   Depression Patient Health Questionnaire   Over the last two weeks how often have you been bothered by little interest or pleasure in doing things Not at all   Over the last two weeks how often have you been bothered by feeling down, depressed or hopeless Not at all   PHQ-2 Total Score 0     0-4 = No intervention  5 to 9 = Mild  10 to 14 = Moderate  15 to 19 = Moderately severe  =20 = Severe    Risk Parameters:  Patient reports no suicidal ideation  Patient reports no homicidal ideation  Patient reports no self-injurious behavior  Patient reports no violent behavior    Exam (detailed: at least 9 elements; comprehensive: all 15 elements)   Constitutional  Vitals:  Most recent vital signs were reviewed.   Last 3 sets of Vitals        9/26/2024     9:12 AM 11/5/2024     2:00 PM 11/10/2024     9:16 AM   Vitals - 1 value per visit   SYSTOLIC 137 124 126   DIASTOLIC 90 76 77   Pulse 74 93 96   Temp 97.8 °F (36.6 °C) 97.9 °F (36.6 °C) 98.7 °F (37.1 °C)   Resp 18 18 18   SPO2 100 % 100 % 96 %   Weight (lb) 202.6 201.5 202.27   Weight (kg) 91.9 91.4 91.75   Height 5' 5" (1.651 m) 5' 5" (1.651 m) 5' 5" (1.651 m) "   BMI (Calculated) 33.7 33.5 33.7   Pain Score Zero Three Zero          General:  age appropriate, casually dressed, neatly groomed     Musculoskeletal  Muscle Strength/Tone:  no tremor, no tic   Gait & Station:  non-ataxic     Psychiatric  Speech:  no latency; no press   Behavior: wnl   Mood & Affect:  anxious, irritable  congruent and appropriate   Thought Process:  normal and logical   Associations:  intact   Thought Content:  normal, no suicidality, no homicidality, delusions, or paranoia   Insight:  has awareness of illness   Judgement: behavior is adequate to circumstances   Orientation:  grossly intact   Memory: intact for content of interview   Language: grossly intact   Attention Span & Concentration:  Grossly intact   Fund of Knowledge:  intact and appropriate to age and level of education     General Impression:     Encounter Diagnoses   Name Primary?    Mood disorder Yes    Anxiety     Marijuana use        Assessment & Plan    - Continued Prozac 20mg and Abilify 2mg for mood and anxiety management  - Consider ADHD evaluation after mood and anxiety are better controlled  - Recommend L-methylfolate instead of folic acid due to potential genetic variation affecting folic acid conversion  - Advised gradual reduction in cannabis use to avoid interference with potential future ADHD medications  - Recognized potential impact of lupus on focus and fatigue    DEPRESSION AND ANXIETY:  - Explained Pomodoro technique for improving productivity and focus.  - Implement Pomodoro technique for improved task management and focus.  - Started L-methylfolate supplement daily (to replace previously prescribed folic acid).  - Restarted Abilify 2mg daily (can use remaining 5mg tablets broken in half until 2mg prescription is filled).  - Continued Prozac 20mg daily.  - L-methylfolate supplement ordered.  - Previously referred to [facility]EP program for therapy (awaiting scheduling).  - Review provided Medicaid resources for additional  therapy options.    ATTENTION-DEFICIT HYPERACTIVITY DISORDER:  - Discussed importance of establishing medication routine and pairing with daily activities.  - Obtain small weekly pill organizer from [employer] to keep in jim pack.    CANNABIS DEPENDENCE:  - Consider alternative relaxation activities to replace cannabis use.    FINANCIAL HARDSHIP:  - Provided information on different types of nursing home accounts ([facility] vs. traditional [facility]).    FOLLOW-UP:  - Follow up virtually on Friday in March (exact date to be communicated via patient portal).  - Contact the office if need to be seen sooner (typically can accommodate within 2 weeks due to cancellations).         I spent an additional 19 minutes performing E/M services with >50% spent on counseling, guidance, coordinating care (not Psychotherapy related) in addition to the 16 minutes performing Psychotherapy.    I spent a total of 35 minutes on the day of the visit. This includes face to face time and non-face to face time preparing to see the patient (eg, review of tests), obtaining and/or reviewing separately obtained history, documenting clinical information in the electronic or other health record, independently interpreting results and communicating results to the patient/family/caregiver, or care coordinator.       Mere Jarvis, PhD, MP  Advanced Practice Medical Psychologist  [facility] [address]  [place] [address]  [phone]   [phone] fax    This note was generated with the assistance of ambient listening technology. Verbal consent was obtained by the patient and accompanying visitor(s) for the recording of patient appointment to facilitate this note. I attest to having reviewed and edited the generated note for accuracy, though some syntax or spelling errors may persist. Please contact the author of this note for any clarification.

## 2024-12-12 NOTE — PATIENT INSTRUCTIONS

## 2024-12-17 ENCOUNTER — PATIENT MESSAGE (OUTPATIENT)
Dept: PSYCHIATRY | Facility: CLINIC | Age: 34
End: 2024-12-17
Payer: MEDICAID

## 2024-12-18 ENCOUNTER — PATIENT MESSAGE (OUTPATIENT)
Dept: OPTOMETRY | Facility: CLINIC | Age: 34
End: 2024-12-18
Payer: MEDICAID

## 2024-12-26 ENCOUNTER — TELEPHONE (OUTPATIENT)
Dept: INFUSION THERAPY | Facility: HOSPITAL | Age: 34
End: 2024-12-26
Payer: MEDICAID

## 2024-12-26 NOTE — TELEPHONE ENCOUNTER
returned call rg r/s missed infusion    ----- Message from Melida sent at 12/26/2024  9:31 AM CST -----  Contact: pt  Type:  Sooner Apoointment Request    Caller is requesting a sooner appointment.  Caller declined first available appointment listed below.  Caller will not accept being placed on the waitlist and is requesting a message be sent to doctor.  Name of Caller: pt  When is the first available appointment?   Symptoms: infusion for her lupus  Would the patient rather a call back or a response via [facility]? phone  Best Call Back Number:  [phone]  Additional Information:

## 2024-12-27 ENCOUNTER — LAB VISIT (OUTPATIENT)
Dept: LAB | Facility: HOSPITAL | Age: 34
End: 2024-12-27
Attending: INTERNAL MEDICINE
Payer: MEDICAID

## 2024-12-27 DIAGNOSIS — Z51.81 ENCOUNTER FOR MEDICATION MONITORING: ICD-10-CM

## 2024-12-27 DIAGNOSIS — D84.821 DRUG-INDUCED IMMUNODEFICIENCY: ICD-10-CM

## 2024-12-27 DIAGNOSIS — M79.671 PAIN IN BOTH FEET: ICD-10-CM

## 2024-12-27 DIAGNOSIS — M79.672 PAIN IN BOTH FEET: ICD-10-CM

## 2024-12-27 DIAGNOSIS — M32.9 SYSTEMIC LUPUS ERYTHEMATOSUS ARTHRITIS: ICD-10-CM

## 2024-12-27 DIAGNOSIS — Z79.899 DRUG-INDUCED IMMUNODEFICIENCY: ICD-10-CM

## 2024-12-27 DIAGNOSIS — M32.14 SLE GLOMERULONEPHRITIS SYNDROME, WHO CLASS V: ICD-10-CM

## 2024-12-27 DIAGNOSIS — M79.7 FIBROMYALGIA: ICD-10-CM

## 2024-12-27 LAB
ALBUMIN SERPL BCP-MCNC: 3.1 G/DL (ref 3.5–5.2)
ALP SERPL-CCNC: 77 U/L (ref 40–150)
ALT SERPL W/O P-5'-P-CCNC: 9 U/L (ref 10–44)
ANION GAP SERPL CALC-SCNC: 10 MMOL/L (ref 8–16)
AST SERPL-CCNC: 15 U/L (ref 10–40)
BACTERIA #/AREA URNS AUTO: ABNORMAL /HPF
BASOPHILS # BLD AUTO: 0.01 K/UL (ref 0–0.2)
BASOPHILS NFR BLD: 0.2 % (ref 0–1.9)
BILIRUB SERPL-MCNC: 0.4 MG/DL (ref 0.1–1)
BILIRUB UR QL STRIP: NEGATIVE
BUN SERPL-MCNC: 6 MG/DL (ref 6–20)
C3 SERPL-MCNC: 80 MG/DL (ref 50–180)
C4 SERPL-MCNC: 19 MG/DL (ref 11–44)
CALCIUM SERPL-MCNC: 8.9 MG/DL (ref 8.7–10.5)
CHLORIDE SERPL-SCNC: 105 MMOL/L (ref 95–110)
CLARITY UR REFRACT.AUTO: CLEAR
CO2 SERPL-SCNC: 24 MMOL/L (ref 23–29)
COLOR UR AUTO: YELLOW
CREAT SERPL-MCNC: 0.6 MG/DL (ref 0.5–1.4)
CRP SERPL-MCNC: 49.4 MG/L (ref 0–8.2)
DIFFERENTIAL METHOD BLD: ABNORMAL
EOSINOPHIL # BLD AUTO: 0.1 K/UL (ref 0–0.5)
EOSINOPHIL NFR BLD: 1 % (ref 0–8)
ERYTHROCYTE [DISTWIDTH] IN BLOOD BY AUTOMATED COUNT: 12.9 % (ref 11.5–14.5)
ERYTHROCYTE [SEDIMENTATION RATE] IN BLOOD BY WESTERGREN METHOD: 25 MM/HR (ref 0–20)
EST. GFR  (NO RACE VARIABLE): >60 ML/MIN/1.73 M^2
GLUCOSE SERPL-MCNC: 85 MG/DL (ref 70–110)
GLUCOSE UR QL STRIP: NEGATIVE
HCT VFR BLD AUTO: 36.6 % (ref 37–48.5)
HGB BLD-MCNC: 12.2 G/DL (ref 12–16)
HGB UR QL STRIP: NEGATIVE
HYALINE CASTS UR QL AUTO: 0 /LPF
IMM GRANULOCYTES # BLD AUTO: 0.03 K/UL (ref 0–0.04)
IMM GRANULOCYTES NFR BLD AUTO: 0.5 % (ref 0–0.5)
KETONES UR QL STRIP: NEGATIVE
LEUKOCYTE ESTERASE UR QL STRIP: NEGATIVE
LYMPHOCYTES # BLD AUTO: 1.4 K/UL (ref 1–4.8)
LYMPHOCYTES NFR BLD: 21.9 % (ref 18–48)
MCH RBC QN AUTO: 28.4 PG (ref 27–31)
MCHC RBC AUTO-ENTMCNC: 33.3 G/DL (ref 32–36)
MCV RBC AUTO: 85 FL (ref 82–98)
MICROSCOPIC COMMENT: ABNORMAL
MONOCYTES # BLD AUTO: 0.6 K/UL (ref 0.3–1)
MONOCYTES NFR BLD: 9.5 % (ref 4–15)
NEUTROPHILS # BLD AUTO: 4.2 K/UL (ref 1.8–7.7)
NEUTROPHILS NFR BLD: 66.9 % (ref 38–73)
NITRITE UR QL STRIP: NEGATIVE
NRBC BLD-RTO: 0 /100 WBC
PH UR STRIP: 7 [PH] (ref 5–8)
PLATELET # BLD AUTO: 279 K/UL (ref 150–450)
PMV BLD AUTO: 9.7 FL (ref 9.2–12.9)
POTASSIUM SERPL-SCNC: 3.7 MMOL/L (ref 3.5–5.1)
PROT SERPL-MCNC: 6.7 G/DL (ref 6–8.4)
PROT UR QL STRIP: ABNORMAL
RBC # BLD AUTO: 4.29 M/UL (ref 4–5.4)
RBC #/AREA URNS AUTO: 3 /HPF (ref 0–4)
SODIUM SERPL-SCNC: 139 MMOL/L (ref 136–145)
SP GR UR STRIP: 1.03 (ref 1–1.03)
SQUAMOUS #/AREA URNS AUTO: 10 /HPF
URN SPEC COLLECT METH UR: ABNORMAL
WBC # BLD AUTO: 6.29 K/UL (ref 3.9–12.7)
WBC #/AREA URNS AUTO: 4 /HPF (ref 0–5)

## 2024-12-27 PROCEDURE — 86140 C-REACTIVE PROTEIN: CPT | Performed by: INTERNAL MEDICINE

## 2024-12-27 PROCEDURE — 36415 COLL VENOUS BLD VENIPUNCTURE: CPT | Performed by: INTERNAL MEDICINE

## 2024-12-27 PROCEDURE — 85651 RBC SED RATE NONAUTOMATED: CPT | Performed by: INTERNAL MEDICINE

## 2024-12-27 PROCEDURE — 86039 ANTINUCLEAR ANTIBODIES (ANA): CPT | Performed by: INTERNAL MEDICINE

## 2024-12-27 PROCEDURE — 80053 COMPREHEN METABOLIC PANEL: CPT | Performed by: INTERNAL MEDICINE

## 2024-12-27 PROCEDURE — 85025 COMPLETE CBC W/AUTO DIFF WBC: CPT | Performed by: INTERNAL MEDICINE

## 2024-12-27 PROCEDURE — 81001 URINALYSIS AUTO W/SCOPE: CPT | Performed by: INTERNAL MEDICINE

## 2024-12-27 PROCEDURE — 86235 NUCLEAR ANTIGEN ANTIBODY: CPT | Mod: 59 | Performed by: INTERNAL MEDICINE

## 2024-12-27 PROCEDURE — 86038 ANTINUCLEAR ANTIBODIES: CPT | Performed by: INTERNAL MEDICINE

## 2024-12-27 PROCEDURE — 86160 COMPLEMENT ANTIGEN: CPT | Mod: 59 | Performed by: INTERNAL MEDICINE

## 2024-12-27 PROCEDURE — 86160 COMPLEMENT ANTIGEN: CPT | Performed by: INTERNAL MEDICINE

## 2024-12-29 ENCOUNTER — OFFICE VISIT (OUTPATIENT)
Dept: URGENT CARE | Facility: CLINIC | Age: 34
End: 2024-12-29
Payer: MEDICAID

## 2024-12-29 VITALS
HEART RATE: 102 BPM | SYSTOLIC BLOOD PRESSURE: 136 MMHG | RESPIRATION RATE: 16 BRPM | OXYGEN SATURATION: 100 % | BODY MASS INDEX: 33.38 KG/M2 | HEIGHT: 65 IN | TEMPERATURE: 100 F | WEIGHT: 200.38 LBS | DIASTOLIC BLOOD PRESSURE: 80 MMHG

## 2024-12-29 DIAGNOSIS — Z20.828 EXPOSURE TO THE FLU: ICD-10-CM

## 2024-12-29 DIAGNOSIS — M32.9 SYSTEMIC LUPUS ERYTHEMATOSUS, UNSPECIFIED SLE TYPE, UNSPECIFIED ORGAN INVOLVEMENT STATUS: ICD-10-CM

## 2024-12-29 DIAGNOSIS — R50.9 FEVER, UNSPECIFIED FEVER CAUSE: ICD-10-CM

## 2024-12-29 DIAGNOSIS — R09.81 NASAL CONGESTION: ICD-10-CM

## 2024-12-29 DIAGNOSIS — R68.83 CHILLS: ICD-10-CM

## 2024-12-29 DIAGNOSIS — B34.9 VIRAL ILLNESS: Primary | ICD-10-CM

## 2024-12-29 DIAGNOSIS — R52 BODY ACHES: ICD-10-CM

## 2024-12-29 LAB
CTP QC/QA: YES
CTP QC/QA: YES
POC MOLECULAR INFLUENZA A AGN: NEGATIVE
POC MOLECULAR INFLUENZA B AGN: NEGATIVE
SARS-COV-2 AG RESP QL IA.RAPID: NEGATIVE

## 2024-12-29 NOTE — PROGRESS NOTES
"Subjective:      Patient ID: Alisia Vines is a 34 y.o. female.    Vitals:  height is 5' 5" (1.651 m) and weight is 90.9 kg (200 lb 6.4 oz). Her tympanic temperature is 99.9 °F (37.7 °C). Her blood pressure is 136/80 and her pulse is 102. Her respiration is 16 and oxygen saturation is 100%.     Chief Complaint: Nasal Congestion    34 year old female presents for evaluation of chills, cough, body aches, and runny nose x 4-5 days. Symptom onset [place] 12/25. History of Lupus reports current flare and contributes body aches to Lupus. Positive household sick contacts: 2 children with Influenza. OTC Mucinex with some relief.    Other  This is a new problem. The problem occurs constantly. The problem has been unchanged. Associated symptoms include chills, congestion, coughing, fatigue (unable to discern if its from current Lupus flare), myalgias, nausea and vomiting (cough induced). Pertinent negatives include no abdominal pain, chest pain, diaphoresis, fever, headaches or sore throat. Nothing aggravates the symptoms. Treatments tried: Mucinex. The treatment provided mild relief.       Constitution: Positive for appetite change, chills and fatigue (unable to discern if its from current Lupus flare). Negative for sweating and fever.   HENT:  Positive for congestion. Negative for ear pain and sore throat.    Neck: neck negative.   Cardiovascular: Negative.  Negative for chest pain.   Eyes: Negative.    Respiratory:  Positive for cough and sputum production. Negative for shortness of breath and wheezing.    Gastrointestinal:  Positive for nausea and vomiting (cough induced). Negative for abdominal pain and diarrhea.   Endocrine: negative.   Genitourinary: Negative.    Musculoskeletal:  Positive for muscle ache.   Skin: Negative.    Allergic/Immunologic: Positive for sneezing.   Neurological: Negative.  Negative for headaches.   Hematologic/Lymphatic: Negative.    Psychiatric/Behavioral: Negative.      "   Objective:     Physical Exam   Constitutional: She is oriented to person, place, and time. She appears well-developed. She is cooperative.  Non-toxic appearance. She appears ill. No distress. She is not intubated. normalawake  HENT:   Head: Normocephalic and atraumatic.   Ears:   Right Ear: Hearing, tympanic membrane, external ear and ear canal normal. Tympanic membrane is not injected, not scarred, not perforated, not erythematous, not retracted and not bulging. no impacted cerumen  Left Ear: Hearing, tympanic membrane, external ear and ear canal normal. Tympanic membrane is not injected, not scarred, not perforated, not erythematous, not retracted and not bulging. no impacted cerumen  Nose: Mucosal edema, rhinorrhea and congestion present. No nasal deformity. No epistaxis. Right sinus exhibits no maxillary sinus tenderness and no frontal sinus tenderness. Left sinus exhibits no maxillary sinus tenderness and no frontal sinus tenderness.   Mouth/Throat: Uvula is midline, oropharynx is clear and moist and mucous membranes are normal. Mucous membranes are moist. No trismus in the jaw. Normal dentition. No uvula swelling. No oropharyngeal exudate, posterior oropharyngeal edema, posterior oropharyngeal erythema or tonsillar abscesses. Tonsils are 0 on the right. Tonsils are 0 on the left. No tonsillar exudate.   Eyes: Conjunctivae and lids are normal. Pupils are equal, round, and reactive to light. Right eye exhibits no discharge. Left eye exhibits no discharge. No scleral icterus. Extraocular movement intact   Neck: Trachea normal and phonation normal. Neck supple. No edema present. No erythema present. No neck rigidity present.   Cardiovascular: Regular rhythm, normal heart sounds and normal pulses. Tachycardia present.   Pulmonary/Chest: Effort normal and breath sounds normal. No accessory muscle usage or stridor. No apnea, no tachypnea and no bradypnea. She is not intubated. No respiratory distress. She has no  decreased breath sounds. She has no wheezes. She has no rhonchi. She has no rales. She exhibits no tenderness.   Abdominal: Normal appearance.   Musculoskeletal: Normal range of motion.         General: No deformity. Normal range of motion.   Neurological: no focal deficit. She is alert and oriented to person, place, and time. She exhibits normal muscle tone. Coordination normal.   Skin: Skin is warm, dry, intact, not diaphoretic and not pale.   Psychiatric: Her speech is normal and behavior is normal. Mood, judgment and thought content normal.   Nursing note and vitals reviewed.    Results for orders placed or performed in visit on 12/29/24   SARS Coronavirus 2 Antigen, POCT Manual Read    Collection Time: 12/29/24 11:56 AM   Result Value Ref Range    SARS Coronavirus 2 Antigen Negative Negative     Acceptable Yes    POCT Influenza A/B MOLECULAR    Collection Time: 12/29/24 11:56 AM   Result Value Ref Range    POC Molecular Influenza A Ag Negative Negative    POC Molecular Influenza B Ag Negative Negative     Acceptable Yes      *Note: Due to a large number of results and/or encounters for the requested time period, some results have not been displayed. A complete set of results can be found in Results Review.       Assessment:     1. Viral illness    2. Fever, unspecified fever cause    3. Chills    4. Body aches    5. Nasal congestion    6. Exposure to the flu    7. Systemic lupus erythematosus, unspecified SLE type, unspecified organ involvement status        Plan:       Viral illness    Fever, unspecified fever cause  -     POCT Influenza A/B MOLECULAR    Chills  -     SARS Coronavirus 2 Antigen, POCT Manual Read    Body aches    Nasal congestion    Exposure to the flu    Systemic lupus erythematosus, unspecified SLE type, unspecified organ involvement status           Patient presents with symptoms and examination that are consistent with acute viral illness and exposure to  Influenza. (-)Flu A/(-)Flu B/(-)Covid swab discussed. Exam negative for otitis media/bacterial sinusitis/bacterial tonsillitis/bronchitis/pneumonia. Plan is to manage symptoms, prevent worsening, and follow up as needed/with worsening. Discussed with patient who verbalizes understanding.      Patient Instructions   Rest  Hydration/increase fluids  Oral rehydration solutions: liquid IV  Symptom management discussed  Typical course and duration of illness discussed  Signs and symptoms of worsening discussed  Follow up as needed/with worsening

## 2024-12-29 NOTE — PATIENT INSTRUCTIONS
Rest  Hydration/increase fluids  Oral rehydration solutions: liquid IV  Symptom management discussed  Typical course and duration of illness discussed  Signs and symptoms of worsening discussed  Follow up as needed/with worsening

## 2024-12-30 LAB
ANA PATTERN 1: NORMAL
ANA SER QL IF: POSITIVE
ANA TITR SER IF: NORMAL {TITER}

## 2025-01-02 ENCOUNTER — OFFICE VISIT (OUTPATIENT)
Dept: URGENT CARE | Facility: CLINIC | Age: 35
End: 2025-01-02
Payer: MEDICAID

## 2025-01-02 VITALS
HEIGHT: 65 IN | RESPIRATION RATE: 17 BRPM | DIASTOLIC BLOOD PRESSURE: 72 MMHG | OXYGEN SATURATION: 97 % | TEMPERATURE: 101 F | BODY MASS INDEX: 32.9 KG/M2 | WEIGHT: 197.44 LBS | HEART RATE: 108 BPM | SYSTOLIC BLOOD PRESSURE: 113 MMHG

## 2025-01-02 DIAGNOSIS — H66.001 NON-RECURRENT ACUTE SUPPURATIVE OTITIS MEDIA OF RIGHT EAR WITHOUT SPONTANEOUS RUPTURE OF TYMPANIC MEMBRANE: Primary | ICD-10-CM

## 2025-01-02 DIAGNOSIS — R50.9 FEVER, UNSPECIFIED FEVER CAUSE: ICD-10-CM

## 2025-01-02 LAB
ANTI SM ANTIBODY: 0.12 RATIO (ref 0–0.99)
ANTI SM/RNP ANTIBODY: 0.27 RATIO (ref 0–0.99)
ANTI-SM INTERPRETATION: NEGATIVE
ANTI-SM/RNP INTERPRETATION: NEGATIVE
ANTI-SSA ANTIBODY: 4.9 RATIO (ref 0–0.99)
ANTI-SSA INTERPRETATION: POSITIVE
ANTI-SSB ANTIBODY: 0.51 RATIO (ref 0–0.99)
ANTI-SSB INTERPRETATION: NEGATIVE
DSDNA AB SER-ACNC: ABNORMAL [IU]/ML

## 2025-01-02 PROCEDURE — 99214 OFFICE O/P EST MOD 30 MIN: CPT | Mod: S$GLB,,, | Performed by: PHYSICIAN ASSISTANT

## 2025-01-02 RX ORDER — AMOXICILLIN 500 MG/1
500 CAPSULE ORAL EVERY 12 HOURS
Qty: 14 CAPSULE | Refills: 0 | Status: SHIPPED | OUTPATIENT
Start: 2025-01-02 | End: 2025-01-09

## 2025-01-02 NOTE — PROGRESS NOTES
"Subjective:      Patient ID: Alisia Vines is a 34 y.o. female.    Vitals:  height is 5' 5" (1.651 m) and weight is 89.5 kg (197 lb 6.8 oz). Her tympanic temperature is 100.8 °F (38.2 °C) (abnormal). Her blood pressure is 113/72 and her pulse is 108. Her respiration is 17 and oxygen saturation is 97%.     Chief Complaint: Sinus Problem    Patient presents with sinus pressure, right ear clogged, congestion, cough, and body aches. Symptoms started x2 weeks ago, but worsened over the last 2 days.     Sinus Problem  This is a new problem. The current episode started 1 to 4 weeks ago. The problem has been gradually worsening since onset. The maximum temperature recorded prior to her arrival was 100.4 - 100.9 F. Her pain is at a severity of 0/10. She is experiencing no pain. Associated symptoms include congestion, coughing, ear pain and sinus pressure. Pertinent negatives include no headaches or sore throat. Past treatments include oral decongestants. The treatment provided mild relief.       HENT:  Positive for ear pain, congestion and sinus pressure. Negative for sore throat.    Respiratory:  Positive for cough.    Neurological:  Negative for headaches.      Objective:     Physical Exam   Constitutional: She is oriented to person, place, and time. She appears well-developed.   HENT:   Head: Normocephalic and atraumatic.   Ears:   Right Ear: Hearing, tympanic membrane, external ear and ear canal normal.   Left Ear: Hearing, external ear and ear canal normal. Tympanic membrane is injected, erythematous and bulging.   Nose: Nose normal.   Mouth/Throat: Oropharynx is clear and moist. Mucous membranes are moist.   Eyes: Conjunctivae and EOM are normal. Pupils are equal, round, and reactive to light.   Neck: Neck supple.   Cardiovascular: Normal rate, regular rhythm, normal heart sounds and normal pulses.   Pulmonary/Chest: Effort normal and breath sounds normal.   Musculoskeletal: Normal range of motion.         " General: Normal range of motion.   Neurological: She is alert and oriented to person, place, and time.   Skin: Skin is warm and dry.   Vitals reviewed.      Assessment:     1. Non-recurrent acute suppurative otitis media of right ear without spontaneous rupture of tympanic membrane        Plan:       Non-recurrent acute suppurative otitis media of right ear without spontaneous rupture of tympanic membrane  -     amoxicillin (AMOXIL) 500 MG capsule; Take 1 capsule (500 mg total) by mouth every 12 (twelve) hours. for 7 days  Dispense: 14 capsule; Refill: 0      Plan:  Amoxicillin sent to pharmacy.  Tylenol and Ibuprofen for pain and/or fever.  Recheck by Primary Care physician in 2-3 weeks.  May need ENT referral if greater than 4 ear infections in one year.  Report to ER with new or worsening symptoms.

## 2025-01-04 ENCOUNTER — OCHSNER VIRTUAL EMERGENCY DEPARTMENT (OUTPATIENT)
Facility: CLINIC | Age: 35
End: 2025-01-04
Payer: MEDICAID

## 2025-01-04 ENCOUNTER — HOSPITAL ENCOUNTER (EMERGENCY)
Facility: HOSPITAL | Age: 35
Discharge: HOME | End: 2025-01-04
Attending: EMERGENCY MEDICINE
Payer: MEDICAID

## 2025-01-04 ENCOUNTER — NURSE TRIAGE (OUTPATIENT)
Dept: ADMINISTRATIVE | Facility: CLINIC | Age: 35
End: 2025-01-04
Payer: MEDICAID

## 2025-01-04 VITALS
OXYGEN SATURATION: 99 % | SYSTOLIC BLOOD PRESSURE: 124 MMHG | HEART RATE: 86 BPM | RESPIRATION RATE: 16 BRPM | DIASTOLIC BLOOD PRESSURE: 84 MMHG | BODY MASS INDEX: 32.52 KG/M2 | WEIGHT: 195.38 LBS | TEMPERATURE: 98 F

## 2025-01-04 DIAGNOSIS — R05.9 COUGH: ICD-10-CM

## 2025-01-04 DIAGNOSIS — J06.9 VIRAL URI WITH COUGH: Primary | ICD-10-CM

## 2025-01-04 LAB
ALBUMIN SERPL BCP-MCNC: 2.7 G/DL (ref 3.5–5.2)
ALP SERPL-CCNC: 72 U/L (ref 40–150)
ALT SERPL W/O P-5'-P-CCNC: 8 U/L (ref 10–44)
ANION GAP SERPL CALC-SCNC: 12 MMOL/L (ref 8–16)
AST SERPL-CCNC: 13 U/L (ref 10–40)
B-HCG UR QL: NEGATIVE
BACTERIA #/AREA URNS HPF: ABNORMAL /HPF
BASOPHILS # BLD AUTO: 0.01 K/UL (ref 0–0.2)
BASOPHILS NFR BLD: 0.1 % (ref 0–1.9)
BILIRUB SERPL-MCNC: 0.3 MG/DL (ref 0.1–1)
BILIRUB UR QL STRIP: NEGATIVE
BUN SERPL-MCNC: 5 MG/DL (ref 6–20)
CALCIUM SERPL-MCNC: 8.5 MG/DL (ref 8.7–10.5)
CHLORIDE SERPL-SCNC: 104 MMOL/L (ref 95–110)
CLARITY UR: ABNORMAL
CO2 SERPL-SCNC: 19 MMOL/L (ref 23–29)
COLOR UR: YELLOW
CREAT SERPL-MCNC: 0.6 MG/DL (ref 0.5–1.4)
DIFFERENTIAL METHOD BLD: ABNORMAL
EOSINOPHIL # BLD AUTO: 0.1 K/UL (ref 0–0.5)
EOSINOPHIL NFR BLD: 1.4 % (ref 0–8)
ERYTHROCYTE [DISTWIDTH] IN BLOOD BY AUTOMATED COUNT: 11.9 % (ref 11.5–14.5)
EST. GFR  (NO RACE VARIABLE): >60 ML/MIN/1.73 M^2
GLUCOSE SERPL-MCNC: 89 MG/DL (ref 70–110)
GLUCOSE UR QL STRIP: NEGATIVE
GROUP A STREP, MOLECULAR: NEGATIVE
HCT VFR BLD AUTO: 33.5 % (ref 37–48.5)
HGB BLD-MCNC: 11.6 G/DL (ref 12–16)
HGB UR QL STRIP: NEGATIVE
HYALINE CASTS #/AREA URNS LPF: 0 /LPF
IMM GRANULOCYTES # BLD AUTO: 0.07 K/UL (ref 0–0.04)
IMM GRANULOCYTES NFR BLD AUTO: 0.9 % (ref 0–0.5)
INFLUENZA A, MOLECULAR: NEGATIVE
INFLUENZA B, MOLECULAR: NEGATIVE
KETONES UR QL STRIP: ABNORMAL
LEUKOCYTE ESTERASE UR QL STRIP: NEGATIVE
LYMPHOCYTES # BLD AUTO: 1.4 K/UL (ref 1–4.8)
LYMPHOCYTES NFR BLD: 18.3 % (ref 18–48)
MCH RBC QN AUTO: 29.4 PG (ref 27–31)
MCHC RBC AUTO-ENTMCNC: 34.6 G/DL (ref 32–36)
MCV RBC AUTO: 85 FL (ref 82–98)
MICROSCOPIC COMMENT: ABNORMAL
MONOCYTES # BLD AUTO: 0.7 K/UL (ref 0.3–1)
MONOCYTES NFR BLD: 9.7 % (ref 4–15)
NEUTROPHILS # BLD AUTO: 5.3 K/UL (ref 1.8–7.7)
NEUTROPHILS NFR BLD: 69.6 % (ref 38–73)
NITRITE UR QL STRIP: NEGATIVE
NRBC BLD-RTO: 0 /100 WBC
PH UR STRIP: 7 [PH] (ref 5–8)
PLATELET # BLD AUTO: 370 K/UL (ref 150–450)
PMV BLD AUTO: 9.5 FL (ref 9.2–12.9)
POTASSIUM SERPL-SCNC: 3.8 MMOL/L (ref 3.5–5.1)
PROT SERPL-MCNC: 6.6 G/DL (ref 6–8.4)
PROT UR QL STRIP: ABNORMAL
RBC # BLD AUTO: 3.95 M/UL (ref 4–5.4)
RBC #/AREA URNS HPF: 2 /HPF (ref 0–4)
SARS-COV-2 RDRP RESP QL NAA+PROBE: NORMAL
SODIUM SERPL-SCNC: 135 MMOL/L (ref 136–145)
SP GR UR STRIP: 1.03 (ref 1–1.03)
SPECIMEN SOURCE: NORMAL
SQUAMOUS #/AREA URNS HPF: 13 /HPF
URN SPEC COLLECT METH UR: ABNORMAL
UROBILINOGEN UR STRIP-ACNC: >=8 EU/DL
WBC # BLD AUTO: 7.64 K/UL (ref 3.9–12.7)
WBC #/AREA URNS HPF: 7 /HPF (ref 0–5)

## 2025-01-04 PROCEDURE — 81025 URINE PREGNANCY TEST: CPT

## 2025-01-04 PROCEDURE — 80053 COMPREHEN METABOLIC PANEL: CPT

## 2025-01-04 PROCEDURE — 99284 EMERGENCY DEPT VISIT MOD MDM: CPT | Mod: 25

## 2025-01-04 PROCEDURE — 87635 SARS-COV-2 COVID-19 AMP PRB: CPT | Performed by: EMERGENCY MEDICINE

## 2025-01-04 PROCEDURE — 87502 INFLUENZA DNA AMP PROBE: CPT

## 2025-01-04 PROCEDURE — 81000 URINALYSIS NONAUTO W/SCOPE: CPT

## 2025-01-04 PROCEDURE — 87651 STREP A DNA AMP PROBE: CPT

## 2025-01-04 PROCEDURE — 85025 COMPLETE CBC W/AUTO DIFF WBC: CPT

## 2025-01-04 NOTE — TELEPHONE ENCOUNTER
Speaking with pt who reports she was seen at  Thursday, and diagnosed with otitis media of right ear. She was prescribed amoxicillin and has been taking since Thursday. She reports that  she currently has temp of 102.0. States that she has been running fever even before being seen at . Does reports feeling unbalanced, and has been feeling that way since Thursday as well. Asking how long is too long to continue to have fever.Dispo see provider within 4 hour or PCP triage.    2:41 secure chat sent to [facility]    Dr. Segovia Advised for pt to be seen in ED today due to gait instability    Pt informed, and verbalized understanding      Reason for Disposition   Walking is very unsteady or feels very dizzy    Additional Information   Negative: [1] Stiff neck (can't touch chin to chest) AND [2] fever   Negative: [1] Bony area of skull behind the ear is pink or swollen AND [2] fever   Negative: Fever > 104 F (40 C)   Negative: Patient sounds very sick or weak to the triager   Negative: [1] SEVERE pain (e.g., excruciating) and [2] not improved 2 hours after pain medicine (e.g., acetaminophen or ibuprofen)    Protocols used: Ear - Otitis Media Follow-up Call-A-

## 2025-01-04 NOTE — PLAN OF CARE-OVED
"[facility] Plan of Care Note    Referral source: Nurse On-Call      Reason for consult: Fever      Additional History: "Pt reports she was seen at  Thursday, and diagnosed with otitis media of right ear. She was prescribed amoxicillin and has been taking since Thursday. She reports that she currently has temp of 102.0. States that she has been running fever even before being seen at . Does reports feeling unbalanced, and has been feeling that way since Thursday as well. "    Patient has history of lupus.  She was seen at urgent care on 12/29, negative for COVID and the flu at that time.    Factors I am considering and making my recommendation:  - Lack of improvement on antibiotics  - Fever now higher at home than was reported at urgent care on 01/02  - New balance issues?    Disposition recommended: Emergency Department  "

## 2025-01-05 NOTE — ED PROVIDER NOTES
Encounter Date: 2025       History     Chief Complaint   Patient presents with    Cough    Nausea    Generalized Body Aches     Generalized body aches, nausea, productive cough x 4 days.      34 year old female with history of lupus presents with complaints of fatigue, sore throat, low grade fever, body aches, nausea, and cough that have been present for roughly 10 days   Was seen at urgent care 25 and started on amoxicillin for right ear otitis media that has improved somewhat since then   Her primary complaint today is fatigue, which she attributes to feeling like a lupus flare. She has not had an infusion per her rheumatologist in quite some time, stating that she is due for this, which helps her fatigue   Denies cp, sob, ab pain, v/d, urinary or bowel changes     The history is provided by the patient.     Review of patient's allergies indicates:  No Known Allergies  Past Medical History:   Diagnosis Date    Allergic rhinitis     Anemia     Condyloma acuminata     COVID-19 virus infection 2021    Encounter for blood transfusion     GERD (gastroesophageal reflux disease)     Hemolytic anemia associated with systemic lupus erythematosus 2023    History of fetal anomaly in prior pregnancy, currently pregnant, unspecified trimester 2017    Pacemaker placed    HSV-2 (herpes simplex virus 2) infection     Lupus nephritis     Mood disorder     Overweight(278.02)     Sjogren's syndrome     Systemic lupus complicating pregnancy 2019    Systemic lupus erythematosus     Thrombocytopenia      Past Surgical History:   Procedure Laterality Date     SECTION WITH TUBAL LIGATION N/A 2019    Procedure:  SECTION, WITH TUBAL LIGATION;  Surgeon: VERONICA Valdovinos MD;  Location: [facility];  Service: OB/GYN;  Laterality: N/A;     SECTION, LOW TRANSVERSE      x2    LYMPH NODE BIOPSY Right 2023    Procedure: BIOPSY, LYMPH NODE;  Surgeon: Rivera Sandoval MD;  Location: [facility]  OR;  Service: ENT;  Laterality: Right;  right deep cervial lymph node excision    NECK MASS EXCISION Right 2/17/2023    Procedure: EXCISION, MASS, NECK;  Surgeon: Rivera Sandoval MD;  Location: [facility] OR;  Service: ENT;  Laterality: Right;  right deep cervial lymph node excision    RENAL BIOPSY  October 2013     Family History   Problem Relation Name Age of Onset    Hypertension Mother      Eczema Brother      Hypertension Maternal Grandmother      Diabetes Paternal Grandmother      Heart disease Son      Arrhythmia Son          CHB    Stroke Neg Hx      Cancer Neg Hx       Social History     Tobacco Use    Smoking status: Never     Passive exposure: Never    Smokeless tobacco: Never   Substance Use Topics    Alcohol use: No     Alcohol/week: 0.0 standard drinks of alcohol    Drug use: Yes     Types: Marijuana     Review of Systems   Constitutional:  Positive for fatigue. Negative for chills and fever.   HENT:  Positive for sore throat.    Respiratory:  Positive for cough. Negative for shortness of breath.    Cardiovascular:  Negative for chest pain.   Gastrointestinal:  Positive for nausea. Negative for abdominal pain, diarrhea and vomiting.   Genitourinary:  Negative for dysuria, frequency, urgency and vaginal pain.   Musculoskeletal:  Positive for myalgias. Negative for back pain.   Skin:  Negative for rash.   Neurological:  Negative for weakness.   Hematological:  Does not bruise/bleed easily.       Physical Exam     Initial Vitals [01/04/25 1932]   BP Pulse Resp Temp SpO2   133/77 107 20 98.6 °F (37 °C) 99 %      MAP       --         Physical Exam    Nursing note and vitals reviewed.  Constitutional: She appears well-developed and well-nourished. She is not diaphoretic.  Non-toxic appearance. She does not have a sickly appearance. She does not appear ill. No distress.   HENT:   Head: Normocephalic and atraumatic.   Right Ear: Hearing, external ear and ear canal normal. No drainage or tenderness. Tympanic membrane  is erythematous and bulging. No decreased hearing is noted.   Left Ear: Hearing, tympanic membrane, external ear and ear canal normal.   Nose: Nose normal. Right sinus exhibits no maxillary sinus tenderness and no frontal sinus tenderness. Left sinus exhibits no maxillary sinus tenderness and no frontal sinus tenderness. Mouth/Throat: Uvula is midline. Normal dentition. No dental abscesses or uvula swelling. Posterior oropharyngeal erythema present. No oropharyngeal exudate, posterior oropharyngeal edema or tonsillar abscesses.   Eyes: Conjunctivae, EOM and lids are normal. Pupils are equal, round, and reactive to light.   Neck: Trachea normal. Neck supple.   Normal range of motion.   Full passive range of motion without pain.     Cardiovascular:  Normal rate, regular rhythm, normal heart sounds, intact distal pulses and normal pulses.           Pulmonary/Chest: Effort normal and breath sounds normal. No respiratory distress. She has no wheezes.   Abdominal: Abdomen is soft. Bowel sounds are normal. She exhibits no distension. There is no abdominal tenderness. There is no rebound and no guarding.   Musculoskeletal:         General: Normal range of motion.      Cervical back: Normal, full passive range of motion without pain, normal range of motion and neck supple.     Neurological: She is alert and oriented to person, place, and time. She has normal strength and normal reflexes. No cranial nerve deficit or sensory deficit. GCS score is 15. GCS eye subscore is 4. GCS verbal subscore is 5. GCS motor subscore is 6.   Skin: Skin is warm and dry. Capillary refill takes less than 2 seconds.   Psychiatric: She has a normal mood and affect. Her behavior is normal. Thought content normal.         ED Course   Procedures  Labs Reviewed   CBC W/ AUTO DIFFERENTIAL - Abnormal       Result Value    WBC 7.64      RBC 3.95 (*)     Hemoglobin 11.6 (*)     Hematocrit 33.5 (*)     MCV 85      MCH 29.4      MCHC 34.6      RDW 11.9       Platelets 370      MPV 9.5      Immature Granulocytes 0.9 (*)     Gran # (ANC) 5.3      Immature Grans (Abs) 0.07 (*)     Lymph # 1.4      Mono # 0.7      Eos # 0.1      Baso # 0.01      nRBC 0      Gran % 69.6      Lymph % 18.3      Mono % 9.7      Eosinophil % 1.4      Basophil % 0.1      Differential Method Automated     COMPREHENSIVE METABOLIC PANEL - Abnormal    Sodium 135 (*)     Potassium 3.8      Chloride 104      CO2 19 (*)     Glucose 89      BUN 5 (*)     Creatinine 0.6      Calcium 8.5 (*)     Total Protein 6.6      Albumin 2.7 (*)     Total Bilirubin 0.3      Alkaline Phosphatase 72      AST 13      ALT 8 (*)     eGFR >60      Anion Gap 12     URINALYSIS, REFLEX TO URINE CULTURE - Abnormal    Specimen UA Urine, Clean Catch      Color, UA Yellow      Appearance, UA Hazy (*)     pH, UA 7.0      Specific Gravity, UA 1.030      Protein, UA 3+ (*)     Glucose, UA Negative      Ketones, UA Trace (*)     Bilirubin (UA) Negative      Occult Blood UA Negative      Nitrite, UA Negative      Urobilinogen, UA >=8.0 (*)     Leukocytes, UA Negative      Narrative:     Specimen Source->Urine   URINALYSIS MICROSCOPIC - Abnormal    RBC, UA 2      WBC, UA 7 (*)     Bacteria Rare      Squam Epithel, UA 13      Hyaline Casts, UA 0      Microscopic Comment SEE COMMENT      Narrative:     Specimen Source->Urine   GROUP A STREP, MOLECULAR    Group A Strep, Molecular Negative     INFLUENZA A & B BY MOLECULAR    Influenza A, Molecular Negative      Influenza B, Molecular Negative      Flu A & B Source Nasal swab     PREGNANCY TEST, URINE RAPID    Preg Test, Ur Negative      Narrative:     Specimen Source->Urine   SARS-COV-2 RNA AMPLIFICATION, QUAL          Imaging Results              X-Ray Chest PA And Lateral (Final result)  Result time 01/04/25 20:20:18      Final result by Kartik Sanabria MD (01/04/25 20:20:18)                   Impression:      No acute process seen      Electronically signed by: Kartik  Daniele  Date:    01/04/2025  Time:    20:20               Narrative:    EXAMINATION:  XR CHEST PA AND LATERAL    CLINICAL HISTORY:  Cough, unspecified    TECHNIQUE:  PA and lateral views of the chest were performed.    COMPARISON:  None    FINDINGS:  Lungs are clear.  No acute osseous injury.  Cardiac silhouette within normal limits.                                       Medications - No data to display  Medical Decision Making  Discussed results with patient   Discussed follow up with rheumatologist and ER precautions   All questions answered     Amount and/or Complexity of Data Reviewed  Labs: ordered.  Radiology: ordered.                                      Clinical Impression:  Final diagnoses:  [R05.9] Cough  [J06.9] Viral URI with cough (Primary)          ED Disposition Condition    Discharge Stable          ED Prescriptions    None       Follow-up Information       Follow up With Specialties Details Why Contact Info    Saumya Quinonez MD Family Medicine Schedule an appointment as soon as possible for a visit   [address] [facility] [address]  [phone]      [facility] - Emergency Dept. Emergency Medicine  If symptoms worsen [address] [facility] [address] [phone]             Robe Toussaint, PA-C  01/04/25 4012

## 2025-01-06 ENCOUNTER — PATIENT MESSAGE (OUTPATIENT)
Dept: RHEUMATOLOGY | Facility: CLINIC | Age: 35
End: 2025-01-06
Payer: MEDICAID

## 2025-01-06 DIAGNOSIS — M32.9 SYSTEMIC LUPUS ERYTHEMATOSUS ARTHRITIS: Primary | ICD-10-CM

## 2025-01-06 RX ORDER — PREDNISONE 10 MG/1
10 TABLET ORAL DAILY
Qty: 30 TABLET | Refills: 0 | Status: SHIPPED | OUTPATIENT
Start: 2025-01-06

## 2025-01-09 ENCOUNTER — TELEPHONE (OUTPATIENT)
Dept: OPHTHALMOLOGY | Facility: CLINIC | Age: 35
End: 2025-01-09
Payer: MEDICAID

## 2025-01-09 NOTE — TELEPHONE ENCOUNTER
Pt needed to schedule a ctl's follow up current trial not working       Dayna     ----- Message from Cindy sent at 1/9/2025  2:44 PM CST -----  Contact: [phone] Patient  Pt is calling in regards to her contact lenses. Pt stated they are not correct. Pt needs an appointment to adjust her contact lenses please. Please call and advise. Thank you

## 2025-01-10 ENCOUNTER — INFUSION (OUTPATIENT)
Dept: INFUSION THERAPY | Facility: HOSPITAL | Age: 35
End: 2025-01-10
Attending: INTERNAL MEDICINE
Payer: MEDICAID

## 2025-01-10 VITALS
SYSTOLIC BLOOD PRESSURE: 124 MMHG | DIASTOLIC BLOOD PRESSURE: 74 MMHG | TEMPERATURE: 98 F | RESPIRATION RATE: 18 BRPM | WEIGHT: 199.94 LBS | HEART RATE: 84 BPM | BODY MASS INDEX: 33.27 KG/M2 | OXYGEN SATURATION: 99 %

## 2025-01-10 DIAGNOSIS — M32.9 SYSTEMIC LUPUS ERYTHEMATOSUS ARTHRITIS: ICD-10-CM

## 2025-01-10 DIAGNOSIS — M32.9 SLE (SYSTEMIC LUPUS ERYTHEMATOSUS RELATED SYNDROME): Primary | ICD-10-CM

## 2025-01-10 PROCEDURE — 63600175 PHARM REV CODE 636 W HCPCS: Mod: JZ,TB | Performed by: INTERNAL MEDICINE

## 2025-01-10 PROCEDURE — 96365 THER/PROPH/DIAG IV INF INIT: CPT

## 2025-01-10 PROCEDURE — 25000003 PHARM REV CODE 250: Performed by: INTERNAL MEDICINE

## 2025-01-10 RX ORDER — EPINEPHRINE 0.3 MG/.3ML
0.3 INJECTION SUBCUTANEOUS ONCE AS NEEDED
Status: DISCONTINUED | OUTPATIENT
Start: 2025-01-10 | End: 2025-01-10 | Stop reason: HOSPADM

## 2025-01-10 RX ORDER — SODIUM CHLORIDE 0.9 % (FLUSH) 0.9 %
10 SYRINGE (ML) INJECTION
OUTPATIENT
Start: 2025-01-17

## 2025-01-10 RX ORDER — DIPHENHYDRAMINE HYDROCHLORIDE 50 MG/ML
50 INJECTION INTRAMUSCULAR; INTRAVENOUS ONCE AS NEEDED
Status: DISCONTINUED | OUTPATIENT
Start: 2025-01-10 | End: 2025-01-10 | Stop reason: HOSPADM

## 2025-01-10 RX ORDER — DIPHENHYDRAMINE HYDROCHLORIDE 50 MG/ML
50 INJECTION INTRAMUSCULAR; INTRAVENOUS ONCE AS NEEDED
OUTPATIENT
Start: 2025-01-17

## 2025-01-10 RX ORDER — HEPARIN 100 UNIT/ML
500 SYRINGE INTRAVENOUS
OUTPATIENT
Start: 2025-01-17

## 2025-01-10 RX ORDER — EPINEPHRINE 0.3 MG/.3ML
0.3 INJECTION SUBCUTANEOUS ONCE AS NEEDED
OUTPATIENT
Start: 2025-01-17

## 2025-01-10 RX ORDER — SODIUM CHLORIDE 0.9 % (FLUSH) 0.9 %
10 SYRINGE (ML) INJECTION
Status: DISCONTINUED | OUTPATIENT
Start: 2025-01-10 | End: 2025-01-10 | Stop reason: HOSPADM

## 2025-01-10 RX ADMIN — ANIFROLUMAB 300 MG: 300 INJECTION, SOLUTION INTRAVENOUS at 10:01

## 2025-01-10 NOTE — PLAN OF CARE
Problem: Adult Inpatient Plan of Care  Goal: Plan of Care Review  Description: Pt here for saphnelo.  No s/s infection noted/voiced.   Outcome: Progressing  Flowsheets (Taken 1/10/2025 1037)  Plan of Care Reviewed With: patient  Goal: Patient-Specific Goal (Individualized)  Description: Pt will tolerate well.   Outcome: Progressing  Flowsheets (Taken 1/10/2025 1037)  Individualized Care Needs: feet elevated, pillow warm blanket and snack provided  Anxieties, Fears or Concerns: denies  Goal: Optimal Comfort and Wellbeing  Description: Patient likes feet elevated with warm blanket and pillow.  Refreshments offered.  Lights dimmed/Curtains closed.   Outcome: Progressing  Intervention: Monitor Pain and Promote Comfort  Flowsheets (Taken 1/10/2025 1037)  Pain Management Interventions:   position adjusted   quiet environment facilitated   relaxation techniques promoted   warm blanket provided  Intervention: Provide Person-Centered Care  Flowsheets (Taken 1/10/2025 1037)  Trust Relationship/Rapport:   care explained   reassurance provided   choices provided   thoughts/feelings acknowledged   emotional support provided   empathic listening provided   questions answered   questions encouraged  Goal: Absence of Hospital-Acquired Illness or Injury  Outcome: Progressing  Intervention: Identify and Manage Fall Risk  Flowsheets (Taken 1/10/2025 1037)  Safety Promotion/Fall Prevention:   assistive device/personal item within reach   Fall Risk reviewed with patient/family   in recliner, wheels locked   medications reviewed   patient expresses understanding of fall risk and prevention     Problem: Infection  Goal: Absence of Infection Signs and Symptoms  Outcome: Progressing  Intervention: Prevent or Manage Infection  Flowsheets (Taken 1/10/2025 1037)  Infection Management: aseptic technique maintained

## 2025-01-14 ENCOUNTER — OFFICE VISIT (OUTPATIENT)
Dept: OPHTHALMOLOGY | Facility: CLINIC | Age: 35
End: 2025-01-14
Payer: MEDICAID

## 2025-01-14 ENCOUNTER — TELEPHONE (OUTPATIENT)
Dept: FAMILY MEDICINE | Facility: CLINIC | Age: 35
End: 2025-01-14
Payer: MEDICAID

## 2025-01-14 DIAGNOSIS — H52.7 REFRACTIVE ERROR: Primary | ICD-10-CM

## 2025-01-14 PROCEDURE — 92499 UNLISTED OPH SVC/PROCEDURE: CPT | Mod: CSM,,, | Performed by: OPTOMETRIST

## 2025-01-14 PROCEDURE — 99499 UNLISTED E&M SERVICE: CPT | Mod: ,,, | Performed by: OPTOMETRIST

## 2025-01-14 PROCEDURE — 99212 OFFICE O/P EST SF 10 MIN: CPT | Mod: PBBFAC,PO | Performed by: OPTOMETRIST

## 2025-01-14 PROCEDURE — 99999 PR PBB SHADOW E&M-EST. PATIENT-LVL II: CPT | Mod: PBBFAC,,, | Performed by: OPTOMETRIST

## 2025-01-14 NOTE — PROGRESS NOTES
HPI     Contact Lens Follow Up            Comments: Patient here today for CTL f/u           Comments    Vision was blurred with contacts  Eyes were dry. Wants to try norbert          Last edited by Ibeth Peters, PCT on 1/14/2025  9:04 AM.            Assessment /Plan     For exam results, see Encounter Report.    Refractive error      Contact Lens Final Rx       Final Contact Lens Rx         Brand Base Curve Diameter Sphere Cylinder Axis    Right Biofinity Toric 8.7 14.5 -3.00 -1.25 020    Left Biofinity Toric 8.7 14.5 -3.00 -1.25 160      Expiration Date: 1/14/2026    Replacement: Monthly    Solutions: OptiFree PureMoist    Wearing Schedule: Daily Wear                  Contact lens trials fitted in office today. Contact lens hygiene reviewed. Patient able to insert the lenses themselves with minimal difficulty. Patient ok to finalize Contact lens after 1 week of wear. RTC if still having difficulty with CTL trial after 1 week.

## 2025-01-14 NOTE — TELEPHONE ENCOUNTER
----- Message from Nando sent at 1/14/2025  2:07 PM CST -----  Contact: [phone]  Type:  Sooner Apoointment Request    Caller is requesting a sooner appointment.  Caller declined first available appointment listed below.  Caller will not accept being placed on the waitlist and is requesting a message be sent to doctor.  Name of Caller:HEIDI SHAIKH [[id number]]  When is the first available appointment?next availability  Symptoms:been having head aches / ear infection  Would the patient rather a call back or a response via [facility]? Call back  Best Call Back Number: [phone]  Additional Information: [id number]

## 2025-01-15 ENCOUNTER — PATIENT MESSAGE (OUTPATIENT)
Dept: PSYCHIATRY | Facility: CLINIC | Age: 35
End: 2025-01-15
Payer: MEDICAID

## 2025-01-15 ENCOUNTER — OFFICE VISIT (OUTPATIENT)
Dept: FAMILY MEDICINE | Facility: CLINIC | Age: 35
End: 2025-01-15
Payer: MEDICAID

## 2025-01-15 VITALS
DIASTOLIC BLOOD PRESSURE: 80 MMHG | OXYGEN SATURATION: 99 % | HEART RATE: 88 BPM | SYSTOLIC BLOOD PRESSURE: 142 MMHG | TEMPERATURE: 98 F | HEIGHT: 65 IN | WEIGHT: 204.5 LBS | BODY MASS INDEX: 34.07 KG/M2 | RESPIRATION RATE: 18 BRPM

## 2025-01-15 DIAGNOSIS — H65.93 OTITIS MEDIA WITH EFFUSION, BILATERAL: Primary | ICD-10-CM

## 2025-01-15 DIAGNOSIS — R59.1 LYMPHADENOPATHY: ICD-10-CM

## 2025-01-15 PROBLEM — M79.671 PAIN IN BOTH FEET: Status: RESOLVED | Noted: 2024-10-21 | Resolved: 2025-01-15

## 2025-01-15 PROBLEM — O09.299: Status: RESOLVED | Noted: 2017-03-08 | Resolved: 2025-01-15

## 2025-01-15 PROBLEM — M79.672 PAIN IN BOTH FEET: Status: RESOLVED | Noted: 2024-10-21 | Resolved: 2025-01-15

## 2025-01-15 PROBLEM — Z51.81 ENCOUNTER FOR MEDICATION MONITORING: Status: RESOLVED | Noted: 2024-10-21 | Resolved: 2025-01-15

## 2025-01-15 PROCEDURE — 99213 OFFICE O/P EST LOW 20 MIN: CPT | Mod: S$PBB,,, | Performed by: REGISTERED NURSE

## 2025-01-15 PROCEDURE — 1160F RVW MEDS BY RX/DR IN RCRD: CPT | Mod: CPTII,,, | Performed by: REGISTERED NURSE

## 2025-01-15 PROCEDURE — 1159F MED LIST DOCD IN RCRD: CPT | Mod: CPTII,,, | Performed by: REGISTERED NURSE

## 2025-01-15 PROCEDURE — 3008F BODY MASS INDEX DOCD: CPT | Mod: CPTII,,, | Performed by: REGISTERED NURSE

## 2025-01-15 PROCEDURE — 3077F SYST BP >= 140 MM HG: CPT | Mod: CPTII,,, | Performed by: REGISTERED NURSE

## 2025-01-15 PROCEDURE — 99214 OFFICE O/P EST MOD 30 MIN: CPT | Mod: PBBFAC,PO | Performed by: REGISTERED NURSE

## 2025-01-15 PROCEDURE — 3079F DIAST BP 80-89 MM HG: CPT | Mod: CPTII,,, | Performed by: REGISTERED NURSE

## 2025-01-15 PROCEDURE — 99999 PR PBB SHADOW E&M-EST. PATIENT-LVL IV: CPT | Mod: PBBFAC,,, | Performed by: REGISTERED NURSE

## 2025-01-15 RX ORDER — CEFDINIR 300 MG/1
300 CAPSULE ORAL 2 TIMES DAILY
Qty: 20 CAPSULE | Refills: 0 | Status: SHIPPED | OUTPATIENT
Start: 2025-01-15 | End: 2025-02-03

## 2025-01-15 RX ORDER — FLUTICASONE PROPIONATE 50 MCG
1 SPRAY, SUSPENSION (ML) NASAL DAILY
Qty: 16 G | Refills: 0 | Status: SHIPPED | OUTPATIENT
Start: 2025-01-15

## 2025-01-15 NOTE — PROGRESS NOTES
Alisia Vines  MRN:  [id number]  34 y.o. female      SUBJECTIVE:     CHIEF COMPLAINT:  - Ear pain    HPI:  Ms. Vines reports ear pain, described as feeling hollow or muffled, with difficulty hearing and inability to equalize ear pressure. She had a recent right ear infection, for which amoxicillin was prescribed on January 6th. She only took the antibiotic for 2-3 days due to symptom improvement after the first day.    Ms. Vines has a constant cough with sputum production, which began around [place] when she became ill. She also reports headaches located behind the eyes. She has been taking Tylenol for the headaches but is unsure of its effectiveness, as she usually takes it immediately before sleeping.        Review of Systems   Constitutional:  Negative for chills, diaphoresis and fever.   HENT:  Positive for ear pain and sore throat. Negative for hearing loss, sinus pain and tinnitus.         + RN  - PND   Eyes:  Positive for pain (retro-orbital OS).   Respiratory:  Positive for cough and sputum production. Negative for hemoptysis, shortness of breath and wheezing.    Cardiovascular: Negative.    Neurological:  Positive for headaches (LT temporal area and LT eye). Negative for dizziness and weakness.       Review of patient's allergies indicates:  No Known Allergies      Patient Active Problem List   Diagnosis    Sjogren's syndrome    HSV-2 (herpes simplex virus 2) infection    Allergic rhinitis    Proteinuria    SLE (systemic lupus erythematosus related syndrome)    Thrombocytopenia    GERD (gastroesophageal reflux disease)    SLE glomerulonephritis syndrome, WHO class V    Long-term use of Plaquenil    Systemic lupus erythematosus arthritis    Severe uncontrolled hypertension    Sensitivity to sunlight    Immunocompromised    Reactive depression    Hemolytic anemia associated with systemic lupus erythematosus    Lymphadenopathy    Iron deficiency anemia    Seborrhea capitis in adult    Tibialis posterior  "tendinopathy    Fibromyalgia    Drug-induced immunodeficiency       Current Outpatient Medications   Medication Instructions    ARIPiprazole (ABILIFY) 2 mg, Oral, Daily    benzonatate (TESSALON) 200 mg, Oral, 3 times daily PRN    FLUoxetine 20 mg, Oral, Daily    folic acid (FOLVITE) 1 mg, Oral, Daily    iron-FA-dha-epa-FAD-NADH-be-mv (ENLYTE) 1.5 mg iron- 8.73 mg CpID 1 capsule, Oral, Daily    losartan (COZAAR) 25 mg, Oral, Daily    methotrexate 10 mg, Oral, Every 7 days    predniSONE (DELTASONE) 10 mg, Oral, Daily         Past medical, surgical, family and social histories have been reviewed today.      OBJECTIVE:     Vitals:    01/15/25 1105   BP: (!) 142/80   Pulse: 88   Resp: 18   Temp: 97.5 °F (36.4 °C)   TempSrc: Tympanic   SpO2: 99%   Weight: 92.8 kg (204 lb 7.6 oz)   Height: 5' 5" (1.651 m)     Vitals:    01/15/25 1105   PainSc:   3   PainLoc: Head       Physical Exam  Vitals reviewed.   Constitutional:       General: She is not in acute distress.  HENT:      Head: Normocephalic and atraumatic.      Right Ear: A middle ear effusion is present. Tympanic membrane is injected, erythematous and bulging. Tympanic membrane is not scarred, perforated or retracted.      Left Ear: A middle ear effusion is present. Tympanic membrane is injected, erythematous and bulging. Tympanic membrane is not scarred, perforated or retracted.      Nose: No mucosal edema or rhinorrhea.      Mouth/Throat:      Mouth: Mucous membranes are moist.      Pharynx: Oropharynx is clear. No pharyngeal swelling, posterior oropharyngeal erythema or postnasal drip.   Eyes:      Pupils: Pupils are equal, round, and reactive to light.   Cardiovascular:      Rate and Rhythm: Normal rate.   Pulmonary:      Effort: Pulmonary effort is normal.      Breath sounds: Normal breath sounds.   Lymphadenopathy:      Cervical: Cervical adenopathy present.      Right cervical: Superficial cervical adenopathy present.      Left cervical: Superficial cervical " adenopathy present.   Skin:     Capillary Refill: Capillary refill takes less than 2 seconds.   Neurological:      Mental Status: She is alert and oriented to person, place, and time.      Sensory: No sensory deficit.      Motor: No weakness.      Coordination: Coordination normal.      Gait: Gait normal.   Psychiatric:         Mood and Affect: Mood normal.         Behavior: Behavior normal.         Thought Content: Thought content normal.         Judgment: Judgment normal.         ASSESSMENT:     1. Otitis media with effusion, bilateral  -     cefdinir (OMNICEF) 300 MG capsule; Take 1 capsule (300 mg total) by mouth 2 (two) times daily. for 10 days  Dispense: 20 capsule; Refill: 0  -     fluticasone propionate (FLONASE) 50 mcg/actuation nasal spray; 1 spray (50 mcg total) by Each Nostril route once daily.  Dispense: 16 g; Refill: 0    2. Lymphadenopathy ---- see # 1      PLAN:     As above.  Symptomatic care, rest, hydration, rx as ordered.  Contact office back if s/s worsen or persist.  RTC as directed and/or prn.        JUAREZ Buenrostro  [facility]       25 minutes of total time spent on the encounter, which includes face to face time and non-face to face time preparing to see the patient.  This includes obtaining and/or reviewing separately obtained history, performing a medically appropriate examination and/or evaluation, and counseling and educating the patient/family/caregiver.  Includes documenting clinical information in the electronic or other health record, independently interpreting results (not separately reported) and communicating results to the patient/family/caregiver, with care coordination (not separately reported).  Medications, tests and/or procedures ordered as necessary along with referring and communicating with other health professionals (when not separately reported).

## 2025-01-28 ENCOUNTER — TELEPHONE (OUTPATIENT)
Dept: FAMILY MEDICINE | Facility: CLINIC | Age: 35
End: 2025-01-28
Payer: MEDICAID

## 2025-01-28 DIAGNOSIS — H92.09 OTALGIA, UNSPECIFIED LATERALITY: Primary | ICD-10-CM

## 2025-01-28 NOTE — TELEPHONE ENCOUNTER
----- Message from Alisia sent at 1/27/2025  7:43 AM CST -----  Contact: Alisia  Type:  Same Day Appointment Request    Caller is requesting a same day appointment.       Name of Caller:Alisia  When is the first available appointment?unknown  Symptoms:Follow-up/possible ear infection  Best Call Back Number:[phone]   Additional Information: Please give patient an immediate call back to assist.   Thank you,  GH

## 2025-01-28 NOTE — TELEPHONE ENCOUNTER
Pt. Stated she has been seen twice for her ear infection and it hasn't gotten better and she wanted to know if you needed to see her or can you send her some meds or referral her to an ENT

## 2025-01-29 ENCOUNTER — LAB VISIT (OUTPATIENT)
Dept: LAB | Facility: HOSPITAL | Age: 35
End: 2025-01-29
Attending: INTERNAL MEDICINE
Payer: MEDICAID

## 2025-01-29 ENCOUNTER — OFFICE VISIT (OUTPATIENT)
Dept: OTOLARYNGOLOGY | Facility: CLINIC | Age: 35
End: 2025-01-29
Payer: MEDICAID

## 2025-01-29 VITALS — WEIGHT: 207.25 LBS | BODY MASS INDEX: 33.31 KG/M2 | HEIGHT: 66 IN

## 2025-01-29 DIAGNOSIS — M79.672 PAIN IN BOTH FEET: ICD-10-CM

## 2025-01-29 DIAGNOSIS — D84.821 DRUG-INDUCED IMMUNODEFICIENCY: ICD-10-CM

## 2025-01-29 DIAGNOSIS — M32.9 SYSTEMIC LUPUS ERYTHEMATOSUS ARTHRITIS: ICD-10-CM

## 2025-01-29 DIAGNOSIS — Z51.81 ENCOUNTER FOR MEDICATION MONITORING: ICD-10-CM

## 2025-01-29 DIAGNOSIS — M79.671 PAIN IN BOTH FEET: ICD-10-CM

## 2025-01-29 DIAGNOSIS — Z79.899 DRUG-INDUCED IMMUNODEFICIENCY: ICD-10-CM

## 2025-01-29 DIAGNOSIS — M79.7 FIBROMYALGIA: ICD-10-CM

## 2025-01-29 DIAGNOSIS — H91.93 SUBJECTIVE HEARING CHANGE OF BOTH EARS: Primary | ICD-10-CM

## 2025-01-29 DIAGNOSIS — M32.14 SLE GLOMERULONEPHRITIS SYNDROME, WHO CLASS V: ICD-10-CM

## 2025-01-29 DIAGNOSIS — H69.93 ETD (EUSTACHIAN TUBE DYSFUNCTION), BILATERAL: ICD-10-CM

## 2025-01-29 LAB
ALBUMIN SERPL BCP-MCNC: 3.4 G/DL (ref 3.5–5.2)
ALP SERPL-CCNC: 71 U/L (ref 40–150)
ALT SERPL W/O P-5'-P-CCNC: 16 U/L (ref 10–44)
ANION GAP SERPL CALC-SCNC: 10 MMOL/L (ref 8–16)
AST SERPL-CCNC: 17 U/L (ref 10–40)
BACTERIA #/AREA URNS HPF: NORMAL /HPF
BASOPHILS # BLD AUTO: 0.01 K/UL (ref 0–0.2)
BASOPHILS NFR BLD: 0.1 % (ref 0–1.9)
BILIRUB SERPL-MCNC: 0.2 MG/DL (ref 0.1–1)
BILIRUB UR QL STRIP: NEGATIVE
BUN SERPL-MCNC: 13 MG/DL (ref 6–20)
CALCIUM SERPL-MCNC: 9.2 MG/DL (ref 8.7–10.5)
CHLORIDE SERPL-SCNC: 111 MMOL/L (ref 95–110)
CLARITY UR: CLEAR
CO2 SERPL-SCNC: 19 MMOL/L (ref 23–29)
COLOR UR: YELLOW
CREAT SERPL-MCNC: 0.7 MG/DL (ref 0.5–1.4)
CRP SERPL-MCNC: 11.3 MG/L (ref 0–8.2)
DIFFERENTIAL METHOD BLD: ABNORMAL
EOSINOPHIL # BLD AUTO: 0.1 K/UL (ref 0–0.5)
EOSINOPHIL NFR BLD: 1.4 % (ref 0–8)
ERYTHROCYTE [DISTWIDTH] IN BLOOD BY AUTOMATED COUNT: 15.5 % (ref 11.5–14.5)
EST. GFR  (NO RACE VARIABLE): >60 ML/MIN/1.73 M^2
GLUCOSE SERPL-MCNC: 109 MG/DL (ref 70–110)
GLUCOSE UR QL STRIP: NEGATIVE
HCT VFR BLD AUTO: 33.8 % (ref 37–48.5)
HGB BLD-MCNC: 11.3 G/DL (ref 12–16)
HGB UR QL STRIP: NEGATIVE
HYALINE CASTS #/AREA URNS LPF: 0 /LPF
IMM GRANULOCYTES # BLD AUTO: 0.01 K/UL (ref 0–0.04)
IMM GRANULOCYTES NFR BLD AUTO: 0.1 % (ref 0–0.5)
KETONES UR QL STRIP: NEGATIVE
LEUKOCYTE ESTERASE UR QL STRIP: NEGATIVE
LYMPHOCYTES # BLD AUTO: 1.4 K/UL (ref 1–4.8)
LYMPHOCYTES NFR BLD: 19.6 % (ref 18–48)
MCH RBC QN AUTO: 28.7 PG (ref 27–31)
MCHC RBC AUTO-ENTMCNC: 33.4 G/DL (ref 32–36)
MCV RBC AUTO: 86 FL (ref 82–98)
MICROSCOPIC COMMENT: NORMAL
MONOCYTES # BLD AUTO: 0.3 K/UL (ref 0.3–1)
MONOCYTES NFR BLD: 4.8 % (ref 4–15)
NEUTROPHILS # BLD AUTO: 5.1 K/UL (ref 1.8–7.7)
NEUTROPHILS NFR BLD: 74 % (ref 38–73)
NITRITE UR QL STRIP: NEGATIVE
NRBC BLD-RTO: 0 /100 WBC
PH UR STRIP: 8 [PH] (ref 5–8)
PLATELET # BLD AUTO: 324 K/UL (ref 150–450)
PMV BLD AUTO: 9.5 FL (ref 9.2–12.9)
POTASSIUM SERPL-SCNC: 4 MMOL/L (ref 3.5–5.1)
PROT SERPL-MCNC: 6.7 G/DL (ref 6–8.4)
PROT UR QL STRIP: ABNORMAL
RBC # BLD AUTO: 3.94 M/UL (ref 4–5.4)
RBC #/AREA URNS HPF: 1 /HPF (ref 0–4)
SODIUM SERPL-SCNC: 140 MMOL/L (ref 136–145)
SP GR UR STRIP: 1.02 (ref 1–1.03)
SQUAMOUS #/AREA URNS HPF: 2 /HPF
URN SPEC COLLECT METH UR: ABNORMAL
WBC # BLD AUTO: 6.93 K/UL (ref 3.9–12.7)
WBC #/AREA URNS HPF: 2 /HPF (ref 0–5)

## 2025-01-29 PROCEDURE — 85025 COMPLETE CBC W/AUTO DIFF WBC: CPT | Performed by: INTERNAL MEDICINE

## 2025-01-29 PROCEDURE — 3008F BODY MASS INDEX DOCD: CPT | Mod: CPTII,,, | Performed by: PHYSICIAN ASSISTANT

## 2025-01-29 PROCEDURE — 84156 ASSAY OF PROTEIN URINE: CPT | Performed by: INTERNAL MEDICINE

## 2025-01-29 PROCEDURE — 36415 COLL VENOUS BLD VENIPUNCTURE: CPT | Performed by: INTERNAL MEDICINE

## 2025-01-29 PROCEDURE — 80053 COMPREHEN METABOLIC PANEL: CPT | Performed by: INTERNAL MEDICINE

## 2025-01-29 PROCEDURE — 86140 C-REACTIVE PROTEIN: CPT | Performed by: INTERNAL MEDICINE

## 2025-01-29 PROCEDURE — 99999 PR PBB SHADOW E&M-EST. PATIENT-LVL III: CPT | Mod: PBBFAC,,, | Performed by: PHYSICIAN ASSISTANT

## 2025-01-29 PROCEDURE — 85652 RBC SED RATE AUTOMATED: CPT | Performed by: INTERNAL MEDICINE

## 2025-01-29 PROCEDURE — 1159F MED LIST DOCD IN RCRD: CPT | Mod: CPTII,,, | Performed by: PHYSICIAN ASSISTANT

## 2025-01-29 PROCEDURE — 86160 COMPLEMENT ANTIGEN: CPT | Performed by: INTERNAL MEDICINE

## 2025-01-29 PROCEDURE — 99213 OFFICE O/P EST LOW 20 MIN: CPT | Mod: PBBFAC | Performed by: PHYSICIAN ASSISTANT

## 2025-01-29 PROCEDURE — 99214 OFFICE O/P EST MOD 30 MIN: CPT | Mod: S$PBB,,, | Performed by: PHYSICIAN ASSISTANT

## 2025-01-29 PROCEDURE — 81000 URINALYSIS NONAUTO W/SCOPE: CPT | Performed by: INTERNAL MEDICINE

## 2025-01-29 PROCEDURE — 86160 COMPLEMENT ANTIGEN: CPT | Mod: 59 | Performed by: INTERNAL MEDICINE

## 2025-01-29 NOTE — PROGRESS NOTES
Subjective     Patient ID: Alisia Vines is a 34 y.o. female.    Chief Complaint: Otalgia (Pt is coming in today for ear infections since christmas. Pt was treated for ear infections but symptoms has not improved. Pt c/o of bilateral muffled ears.)    Patient is a very pleasant 34 year old female here to see me today for evaluation of hearing loss in both ears since Christmas 2024.  She reports having viral URI at that time with nasal congestion and cough.  Seen at  and told she had an ear infection.  Treated with Amoxil which she did not complete.  Followed with PCP and told both ears were infected.  Treated with Cefdinir x 10 days which she's completed.  She says her hearing is muffled in both ears.  Denies tinnitus or ear pain.  She has not seen any ear drainage.   She has no history of otologic surgeries.  No family history of hearing loss.  No loud noise exposure history.  She has Flonase and uses it PRN (not daily).  Denies current nasal congestion or obstruction.      Review of Systems   Constitutional:  Negative for fever.   HENT:  Positive for hearing loss. Negative for nasal congestion (now resolved), ear discharge, ear pain and tinnitus.           Objective     Physical Exam  Constitutional:       Appearance: Normal appearance.   HENT:      Head: Normocephalic and atraumatic.      Right Ear: Tympanic membrane, ear canal and external ear normal. No middle ear effusion. There is no impacted cerumen. Tympanic membrane is not erythematous.      Left Ear: Tympanic membrane, ear canal and external ear normal.  No middle ear effusion. There is no impacted cerumen. Tympanic membrane is not erythematous.      Nose: Congestion present. No rhinorrhea.      Mouth/Throat:      Mouth: Mucous membranes are moist.      Pharynx: Oropharynx is clear.   Eyes:      Pupils: Pupils are equal, round, and reactive to light.   Pulmonary:      Effort: Pulmonary effort is normal.   Neurological:      General: No focal  deficit present.      Mental Status: She is alert.   Psychiatric:         Behavior: Behavior is cooperative.       Tympanograms today:  AS  0.5 mL @ -29 daPa, ECV 1.5 mL  AD  0.4 mL @ -88 daPa, ECV 1.7 mL         Assessment and Plan     1. Subjective hearing change of both ears  -     Ambulatory referral/consult to ENT    2. ETD (Eustachian tube dysfunction), bilateral        Reassured patient that I see no GUMARO in either ear today.  Her tympanograms indicate no middle ear effusions.  She does have slightly more negative pressure in the right ear compared to the left.  I recommend scheduling an audiogram for further evaluation and I'll review those results once complete.  We had a long discussion regarding the anatomy and function of the eustachian tube.  We discussed that the eustachian tube acts as a pump to keep the appropriate amount of pressure behind the ear drum.  I recommend consistent use of her Flonase (nasal steroid spray) to be used on a daily basis, and we discussed that it will take 2-3 weeks of daily use to achieve maximal effectiveness.           No follow-ups on file.

## 2025-01-30 LAB
C3 SERPL-MCNC: 104 MG/DL (ref 50–180)
C4 SERPL-MCNC: 23 MG/DL (ref 11–44)
CREAT UR-MCNC: 97 MG/DL (ref 15–325)
ERYTHROCYTE [SEDIMENTATION RATE] IN BLOOD BY PHOTOMETRIC METHOD: 43 MM/HR (ref 0–36)
PROT UR-MCNC: 33 MG/DL (ref 0–15)
PROT/CREAT UR: 0.34 MG/G{CREAT} (ref 0–0.2)

## 2025-02-03 ENCOUNTER — OFFICE VISIT (OUTPATIENT)
Dept: RHEUMATOLOGY | Facility: CLINIC | Age: 35
End: 2025-02-03
Payer: MEDICAID

## 2025-02-03 DIAGNOSIS — M35.00 SJOGREN'S SYNDROME, WITH UNSPECIFIED ORGAN INVOLVEMENT: ICD-10-CM

## 2025-02-03 DIAGNOSIS — M79.7 FIBROMYALGIA: ICD-10-CM

## 2025-02-03 DIAGNOSIS — M32.14 SLE GLOMERULONEPHRITIS SYNDROME, WHO CLASS V: ICD-10-CM

## 2025-02-03 DIAGNOSIS — Z51.81 ENCOUNTER FOR MEDICATION MONITORING: ICD-10-CM

## 2025-02-03 DIAGNOSIS — M32.9 SYSTEMIC LUPUS ERYTHEMATOSUS ARTHRITIS: Primary | ICD-10-CM

## 2025-02-03 DIAGNOSIS — Z79.899 DRUG-INDUCED IMMUNODEFICIENCY: ICD-10-CM

## 2025-02-03 DIAGNOSIS — D84.821 DRUG-INDUCED IMMUNODEFICIENCY: ICD-10-CM

## 2025-02-03 PROCEDURE — 1159F MED LIST DOCD IN RCRD: CPT | Mod: CPTII,95,, | Performed by: INTERNAL MEDICINE

## 2025-02-03 PROCEDURE — 98007 SYNCH AUDIO-VIDEO EST HI 40: CPT | Mod: 95,,, | Performed by: INTERNAL MEDICINE

## 2025-02-03 PROCEDURE — 1160F RVW MEDS BY RX/DR IN RCRD: CPT | Mod: CPTII,95,, | Performed by: INTERNAL MEDICINE

## 2025-02-03 PROCEDURE — G2211 COMPLEX E/M VISIT ADD ON: HCPCS | Mod: 95,,, | Performed by: INTERNAL MEDICINE

## 2025-02-03 NOTE — PROGRESS NOTES
RHEUMATOLOGY FOLLOW UP - TELE VISIT     The patient location is: [place]  The chief complaint leading to consultation is: Lupus follow up  Visit type: Virtual visit with synchronous audio and video  Total time spent with patient:  15 minutes  Each patient to whom he or she provides medical services by telemedicine is:  (1) informed of the relationship between the physician and patient and the respective role of any other health care provider with respect to management of the patient; and (2) notified that he or she may decline to receive medical services by telemedicine and may withdraw from such care at any time.    Chief complaints, HPI, ROS, EXAM, Assessment & Plans:-  Alisiavivienne Kunzconsuelo Claudyharley a 34 y.o. pleasant female seen today for follow-up visit.  Severe lupus associated with arthritis and class 5 glomerulonephritis with significant proteinuria here for follow-up.  On Saphnelo infusion for lupus.  Reports doing well today.  Severe flare when she missed a Saphnelo in December seems to be resolving after restarting Saphnelo and taking prednisone for 30 days..  No skin rash.  No joint swelling.  No prolonged morning stiffness.  No respirophasic chest pain.  Rheumatological review of system negative otherwise.  100% fist formation bilateral hands.    1. Systemic lupus erythematosus arthritis    2. SLE glomerulonephritis syndrome, WHO class V    3. Drug-induced immunodeficiency    4. Encounter for medication monitoring    5. Fibromyalgia    6. Sjogren's syndrome, with unspecified organ involvement      Problem List Items Addressed This Visit       Drug-induced immunodeficiency    Encounter for medication monitoring    Fibromyalgia    Sjogren's syndrome    SLE glomerulonephritis syndrome, WHO class V    Overview      12/12/2018  Continue follow-up and management with Dr. Blair         Systemic lupus erythematosus arthritis - Primary      Latest Reference Range & Units 01/29/25 16:13 01/29/25 16:23   WBC 3.90 -  12.70 K/uL  6.93   RBC 4.00 - 5.40 M/uL  3.94 (L)   Hemoglobin 12.0 - 16.0 g/dL  11.3 (L)   Hematocrit 37.0 - 48.5 %  33.8 (L)   MCV 82 - 98 fL  86   MCH 27.0 - 31.0 pg  28.7   MCHC 32.0 - 36.0 g/dL  33.4   RDW 11.5 - 14.5 %  15.5 (H)   Platelet Count 150 - 450 K/uL  324   MPV 9.2 - 12.9 fL  9.5   Gran % 38.0 - 73.0 %  74.0 (H)   Lymph % 18.0 - 48.0 %  19.6   Mono % 4.0 - 15.0 %  4.8   Eos % 0.0 - 8.0 %  1.4   Basophil % 0.0 - 1.9 %  0.1   Immature Granulocytes 0.0 - 0.5 %  0.1   Gran # (ANC) 1.8 - 7.7 K/uL  5.1   Lymph # 1.0 - 4.8 K/uL  1.4   Mono # 0.3 - 1.0 K/uL  0.3   Eos # 0.0 - 0.5 K/uL  0.1   Baso # 0.00 - 0.20 K/uL  0.01   Immature Grans (Abs) 0.00 - 0.04 K/uL  0.01   nRBC 0 /100 WBC  0   Differential Method   Automated   Sed Rate 0 - 36 mm/Hr  43 (H)   Sodium 136 - 145 mmol/L  140   Potassium 3.5 - 5.1 mmol/L  4.0   Chloride 95 - 110 mmol/L  111 (H)   CO2 23 - 29 mmol/L  19 (L)   Anion Gap 8 - 16 mmol/L  10   BUN 6 - 20 mg/dL  13   Creatinine 0.5 - 1.4 mg/dL  0.7   eGFR >60 mL/min/1.73 m^2  >60   Glucose 70 - 110 mg/dL  109   Calcium 8.7 - 10.5 mg/dL  9.2   ALP 40 - 150 U/L  71   PROTEIN TOTAL 6.0 - 8.4 g/dL  6.7   Albumin 3.5 - 5.2 g/dL  3.4 (L)   BILIRUBIN TOTAL 0.1 - 1.0 mg/dL  0.2   AST 10 - 40 U/L  17   ALT 10 - 44 U/L  16   CRP 0.0 - 8.2 mg/L  11.3 (H)   Complement (C-3) 50 - 180 mg/dL  104   Complement (C-4) 11 - 44 mg/dL  23   Specimen UA  Urine, Clean Catch    Color, UA Yellow, Straw, Erin  Yellow    Appearance, UA Clear  Clear    Spec Grav UA 1.005 - 1.030  1.020    pH, UA 5.0 - 8.0  8.0    Protein, UA Negative  1+ !    Glucose, UA Negative  Negative    Ketones, UA Negative  Negative    Blood, UA Negative  Negative    NITRITE UA Negative  Negative    Bilirubin (UA) Negative  Negative    Leukocyte Esterase, UA Negative  Negative    RBC, UA 0 - 4 /hpf 1    WBC, UA 0 - 5 /hpf 2    Bacteria, UA None-Occ /hpf Rare    Squam Epithel, UA /hpf 2    Hyaline Casts, UA 0-1/lpf /lpf 0    Microscopic Comment   SEE COMMENT    Urine Creatinine 15.0 - 325.0 mg/dL 97.0    Urine Protein 0 - 15 mg/dL 33 (H)    Urine Protein/Creatinine Ratio 0.00 - 0.20  0.34 (H)    (L): Data is abnormally low  (H): Data is abnormally high  !: Data is abnormal   Latest Reference Range & Units Most Recent   HARPREET Negative <1:160  Positive !  10/10/13 18:25   HARPREET Screen Negative <1:160  Positive !  11/18/19 13:58   HARPREET HEP-2 Titer  Positive 1:1280 Speckled  11/18/19 13:58   ds DNA Ab Negative 1:10  Positive !  11/13/23 10:56   DNA Titer  1:80  11/13/23 10:56   Anti-SSA Antibody 0.00 - 19.99 .84 (H)  11/18/19 13:58   Anti-SSA Interpretation Negative  Positive !  11/18/19 13:58   Anti-SSB Antibody 0.00 - 19.99 EU 62.05 (H)  11/18/19 13:58   Anti-SSB Interpretation Negative  Positive !  11/18/19 13:58   Anti Sm Antibody 0.00 - 19.99 EU 7.51  11/18/19 13:58   Anti-Sm Interpretation Negative  Negative  11/18/19 13:58   Anti Sm/RNP Antibody 0.00 - 19.99 EU 7.56  11/18/19 13:58   Anti-Sm/RNP Interpretation Negative  Negative  11/18/19 13:58   ANCA Proteinase 3 <0.4 (Negative) U <0.2  7/8/23 03:22   Cytoplasmic Neutrophilic Ab <1:20 Titer <1:20  7/8/23 03:22   MPO <3.5 U/mL <0.2  7/8/23 03:22   Perinuclear (P-ANCA) <1:20 Titer <1:20  7/8/23 03:22   Complement (C-3) 50 - 180 mg/dL 93  11/13/23 10:56   Complement (C-4) 11 - 44 mg/dL 21  11/13/23 10:56   Protein, Serum 6.0 - 8.4 g/dL 5.4 (L)  9/12/23 14:04   Albumin grams/dl 3.35 - 5.55 g/dL 3.16 (L)  9/12/23 14:04   Alpha-1 grams/dl 0.17 - 0.41 g/dL 0.26  9/12/23 14:04   Alpha-2 0.43 - 0.99 g/dL 0.66  9/12/23 14:04   Beta 0.50 - 1.10 g/dL 0.65  9/12/23 14:04   Gamma 0.67 - 1.58 g/dL 0.66 (L)  9/12/23 14:04   Pathologist Interpretation SPE  REVIEWED  9/12/23 14:04   Pathologist Interpretation OMAIRA  REVIEWED  9/12/23 14:04   Immunofix Interp.  SEE COMMENT  9/12/23 14:04   Cryoglobulin, Qual Absent  Absent  5/8/23 10:25   !: Data is abnormal  (H): Data is abnormally high  (L): Data is abnormally  low    Severe lupus arthritis and glomerulonephritis with class 5 disease with significant proteinuria on Saphnelo infusion every 4 weeks.  Significant difficulty in taking oral medications due to noncompliance.  Is not taking any oral medications at this time.  Continue Saphnelo every 4 weeks.  Drug induced immunodeficiency due to use of immunosuppressive drugs. Monitor carefully for infections. Advised to get immediate medical care if any infection. Also advised strict adherence to age appropriate vaccinations and cancer screenings with PCP.  She understands the risk of not taking oral lupus therapies which might affect her kidney irreversibly  being dependent on dialysis.  I have explained all of the above in detail and the patient understands all of the current recommendation(s). I have answered all questions to the best of my ability and to their complete satisfaction.   # Follow up in about 3 months (around 5/3/2025).      Past Medical History:   Diagnosis Date    Allergic rhinitis     Anemia     Condyloma acuminata     COVID-19 virus infection 2021    Encounter for blood transfusion     GERD (gastroesophageal reflux disease)     Hemolytic anemia associated with systemic lupus erythematosus 2023    History of fetal anomaly in prior pregnancy, currently pregnant, unspecified trimester 2017    Pacemaker placed    HSV-2 (herpes simplex virus 2) infection     Lupus nephritis     Mood disorder     Overweight(278.02)     Sjogren's syndrome     Systemic lupus complicating pregnancy 2019    Systemic lupus erythematosus     Thrombocytopenia        Past Surgical History:   Procedure Laterality Date     SECTION WITH TUBAL LIGATION N/A 2019    Procedure:  SECTION, WITH TUBAL LIGATION;  Surgeon: VERONICA Valdovinos MD;  [facility];  Service: OB/GYN;  Laterality: N/A;     SECTION, LOW TRANSVERSE      x2    LYMPH NODE BIOPSY Right 2023    Procedure: BIOPSY, LYMPH  NODE;  Surgeon: Rivera Sandoval MD;  Location: [facility] OR;  Service: ENT;  Laterality: Right;  right deep cervial lymph node excision    NECK MASS EXCISION Right 2/17/2023    Procedure: EXCISION, MASS, NECK;  Surgeon: Rivera Sandoval MD;  Location: [facility] OR;  Service: ENT;  Laterality: Right;  right deep cervial lymph node excision    RENAL BIOPSY  October 2013        Social History     Tobacco Use    Smoking status: Never     Passive exposure: Never    Smokeless tobacco: Never   Substance Use Topics    Alcohol use: No     Alcohol/week: 0.0 standard drinks of alcohol    Drug use: Yes     Types: Marijuana       Family History   Problem Relation Name Age of Onset    Hypertension Mother      Eczema Brother      Hypertension Maternal Grandmother      Diabetes Paternal Grandmother      Heart disease Son      Arrhythmia Son          CHB    Stroke Neg Hx      Cancer Neg Hx         Review of patient's allergies indicates:  No Known Allergies    Medication List with Changes/Refills   Current Medications    ARIPIPRAZOLE (ABILIFY) 2 MG TAB    Take 1 tablet (2 mg total) by mouth once daily.    BENZONATATE (TESSALON) 200 MG CAPSULE    Take 1 capsule (200 mg total) by mouth 3 (three) times daily as needed for Cough.    FLUOXETINE 20 MG CAPSULE    Take 1 capsule (20 mg total) by mouth once daily.    FLUTICASONE PROPIONATE (FLONASE) 50 MCG/ACTUATION NASAL SPRAY    1 spray (50 mcg total) by Each Nostril route once daily.    IRON-FA-DHA-EPA-FAD-NADH-BE-MV (ENLYTE) 1.5 MG IRON- 8.73 MG CPID    Take 1 capsule by mouth once daily.    LOSARTAN (COZAAR) 25 MG TABLET    Take 1 tablet (25 mg total) by mouth once daily.   Discontinued Medications    CEFDINIR (OMNICEF) 300 MG CAPSULE    Take 1 capsule (300 mg total) by mouth 2 (two) times daily. for 10 days    FOLIC ACID (FOLVITE) 1 MG TABLET    Take 1 tablet (1 mg total) by mouth once daily.    METHOTREXATE 2.5 MG TAB    Take 4 tablets (10 mg total) by mouth every 7 days.    PREDNISONE (DELTASONE)  10 MG TABLET    Take 1 tablet (10 mg total) by mouth once daily.       Disclaimer: This note was prepared using voice recognition system and is likely to have sound alike errors and is not proof read.  Please call me with any questions.    Answers submitted by the patient for this visit:  Rheumatology Questionnaire (Submitted on 2/3/2025)  fever: No  eye redness: No  mouth sores: No  headaches: No  shortness of breath: No  chest pain: No  trouble swallowing: No  diarrhea: No  constipation: No  unexpected weight change: No  genital sore: No  dysuria: No  During the last 3 days, have you had a skin rash?: No  Bruises or bleeds easily: No  cough: Yes

## 2025-02-05 ENCOUNTER — TELEPHONE (OUTPATIENT)
Dept: INFUSION THERAPY | Facility: HOSPITAL | Age: 35
End: 2025-02-05
Payer: MEDICAID

## 2025-02-05 DIAGNOSIS — F39 MOOD DISORDER: ICD-10-CM

## 2025-02-05 RX ORDER — ARIPIPRAZOLE 2 MG/1
2 TABLET ORAL DAILY
Qty: 30 TABLET | Refills: 2 | Status: SHIPPED | OUTPATIENT
Start: 2025-02-05 | End: 2025-05-06

## 2025-02-05 NOTE — TELEPHONE ENCOUNTER
----- Message from Candida sent at 2/5/2025  2:35 PM CST -----  Contact: HEIDI SHAIKH [[id number]]  .Type:  Patient Requesting Call    Who Called:HEIDI SHAIKH [[id number]]  Does the patient know what this is regarding?:Patient requesting a call back to reschedule her upcoming infusion appointment  Would the patient rather a call back or a response via [facility]? Call back  Best Call Back Number:.[phone]  Additional Information:

## 2025-02-05 NOTE — TELEPHONE ENCOUNTER
03/14/2025 at 8:30 AM     ----- Message from Madeleine sent at 2/5/2025  9:46 AM CST -----  Contact: Alisia  Type:  RX Refill Request    Who Called: Alisia   Refill or New Rx:refill   RX Name and Strength:ARIPiprazole (ABILIFY) 2 MG Tab   How is the patient currently taking it? (ex. 1XDay):   Is this a 30 day or 90 day RX:   Preferred Pharmacy with phone number:      [id number] - [place] - [address] AT [place] & [address]  [address] [place] [address]  Phone: [phone] Fax: [phone]       Local or Mail Order:local   Ordering Provider:Ray   Would the patient rather a call back or a response via [facility]? Call   Best Call Back Number:[phone]    Additional Information:      Please call when prescription is ready

## 2025-02-06 ENCOUNTER — INFUSION (OUTPATIENT)
Dept: INFUSION THERAPY | Facility: HOSPITAL | Age: 35
End: 2025-02-06
Attending: INTERNAL MEDICINE
Payer: MEDICAID

## 2025-02-06 VITALS
HEART RATE: 90 BPM | RESPIRATION RATE: 18 BRPM | OXYGEN SATURATION: 99 % | WEIGHT: 209.69 LBS | TEMPERATURE: 98 F | DIASTOLIC BLOOD PRESSURE: 71 MMHG | BODY MASS INDEX: 33.7 KG/M2 | HEIGHT: 66 IN | SYSTOLIC BLOOD PRESSURE: 117 MMHG

## 2025-02-06 DIAGNOSIS — M32.9 SLE (SYSTEMIC LUPUS ERYTHEMATOSUS RELATED SYNDROME): Primary | ICD-10-CM

## 2025-02-06 DIAGNOSIS — M32.9 SYSTEMIC LUPUS ERYTHEMATOSUS ARTHRITIS: ICD-10-CM

## 2025-02-06 PROCEDURE — 25000003 PHARM REV CODE 250: Performed by: INTERNAL MEDICINE

## 2025-02-06 PROCEDURE — 63600175 PHARM REV CODE 636 W HCPCS: Mod: JZ,TB | Performed by: INTERNAL MEDICINE

## 2025-02-06 PROCEDURE — 96365 THER/PROPH/DIAG IV INF INIT: CPT

## 2025-02-06 RX ORDER — EPINEPHRINE 0.3 MG/.3ML
0.3 INJECTION SUBCUTANEOUS ONCE AS NEEDED
OUTPATIENT
Start: 2025-02-13

## 2025-02-06 RX ORDER — DIPHENHYDRAMINE HYDROCHLORIDE 50 MG/ML
50 INJECTION INTRAMUSCULAR; INTRAVENOUS ONCE AS NEEDED
OUTPATIENT
Start: 2025-02-13

## 2025-02-06 RX ORDER — HEPARIN 100 UNIT/ML
500 SYRINGE INTRAVENOUS
OUTPATIENT
Start: 2025-02-13

## 2025-02-06 RX ORDER — SODIUM CHLORIDE 0.9 % (FLUSH) 0.9 %
10 SYRINGE (ML) INJECTION
OUTPATIENT
Start: 2025-02-13

## 2025-02-06 RX ADMIN — SODIUM CHLORIDE 300 MG: 9 INJECTION, SOLUTION INTRAVENOUS at 03:02

## 2025-02-06 NOTE — PLAN OF CARE
"Pt is stable, pt administered Saphnelo. Pt voiced she was doing "great." Pt will follow up in four weeks. Plan of care reviewed with patient. Discussed if there are any new or ongoing concerns.        Problem: Adult Inpatient Plan of Care  Goal: Plan of Care Review  Outcome: Progressing  Goal: Patient-Specific Goal (Individualized)  Outcome: Progressing  Flowsheets (Taken 2/6/2025 1528)  Individualized Care Needs: Pt provided pillow, legs elevated in reclined position. Refreshments provided.  Anxieties, Fears or Concerns: Pt denies.  Patient/Family-Specific Goals (Include Timeframe): Pt will tolerate treatment without adverse reaction.  Goal: Optimal Comfort and Wellbeing  Outcome: Progressing     Problem: Infection  Goal: Absence of Infection Signs and Symptoms  Outcome: Progressing     Problem: Fall Injury Risk  Goal: Absence of Fall and Fall-Related Injury  Outcome: Progressing     "

## 2025-02-06 NOTE — DISCHARGE INSTRUCTIONS
Thank you for allowing me to care for you today,  TAL DavilaN, RN    [facility]  [address] [address]  [phone] phone     [phone] fax  Hours of Operation: Monday- Friday 7:00am- 5:30pm  After hours phone  [phone]  Hematology / Oncology Physicians on call      RAJ Steward Dr., Dr., Dr., Dr., Dr., Dr., Dr., Dr., Dr., Dr., Dr., Dr., Dr., Dr., Dr., RABIA Magdaleno, RABIA Jessica, RABIA Streeter, NP  Please call with any concerns regarding your appointment today.   FALL PREVENTION   Falls often occur due to slipping, tripping or losing your balance. Here are ways to reduce your risk of falling again.   Was there anything that caused your fall that can be fixed, removed or replaced?   Make your home safe by keeping walkways clear of objects you may trip over.   Use non-slip pads under rugs.   Do not walk in poorly lit areas.   Do not stand on chairs or wobbly ladders.   Use caution when reaching overhead or looking upward. This position can cause a loss of balance.   Be sure your shoes fit properly, have non-slip bottoms and are in good condition.   Be cautious when going up and down stairs, curbs, and when walking on uneven sidewalks.   If your balance is poor, consider using a cane or walker.   If your fall was related to alcohol use, stop or limit alcohol intake.   If your fall was related to use of sleeping medicines, talk to your doctor about this. You may need to reduce your dosage at bedtime if you awaken during the night to go to the bathroom.   To reduce the need for nighttime bathroom trips:   Avoid drinking fluids for several hours before going to bed   Empty your bladder before going to bed   Men can keep a urinal at the bedside   © 3079-6685 [employer], [address] [place] [address], [place] [address]. All rights reserved. This information is not intended as a  substitute for professional medical care. Always follow your healthcare professional's instructions.

## 2025-02-25 ENCOUNTER — TELEPHONE (OUTPATIENT)
Dept: RHEUMATOLOGY | Facility: CLINIC | Age: 35
End: 2025-02-25
Payer: MEDICAID

## 2025-02-25 NOTE — TELEPHONE ENCOUNTER
Patient will send a My chart message with symptoms and what dose of prednisone that she has on hand.    Last Saphnelo 2/6/2025  Next 3/6/2025

## 2025-02-25 NOTE — TELEPHONE ENCOUNTER
----- Message from Sydnie sent at 2/25/2025  8:34 AM CST -----  Contact: Alisia Mendez is calling in regards to her having joint pain for a few days and wanting to know what to do.please call pt back at .[phone]

## 2025-03-06 ENCOUNTER — PATIENT MESSAGE (OUTPATIENT)
Dept: ADMINISTRATIVE | Facility: HOSPITAL | Age: 35
End: 2025-03-06
Payer: MEDICAID

## 2025-03-07 ENCOUNTER — TELEPHONE (OUTPATIENT)
Dept: INFUSION THERAPY | Facility: HOSPITAL | Age: 35
End: 2025-03-07
Payer: MEDICAID

## 2025-03-07 NOTE — TELEPHONE ENCOUNTER
Returned patient's call. She asks to reschedule infusion she missed yesterday. Offered the patient 3/12 2:30 at the [facility], but she states she needs earlier.  Offered her 1:30pm [facility] on 2/13.. informed patient we do not have any earlier appts at either location. Patient keeps appt 2/12 appt.

## 2025-03-12 ENCOUNTER — INFUSION (OUTPATIENT)
Dept: INFUSION THERAPY | Facility: HOSPITAL | Age: 35
End: 2025-03-12
Attending: INTERNAL MEDICINE
Payer: MEDICAID

## 2025-03-12 ENCOUNTER — TELEPHONE (OUTPATIENT)
Dept: RHEUMATOLOGY | Facility: CLINIC | Age: 35
End: 2025-03-12
Payer: MEDICAID

## 2025-03-12 ENCOUNTER — PATIENT MESSAGE (OUTPATIENT)
Dept: RHEUMATOLOGY | Facility: CLINIC | Age: 35
End: 2025-03-12
Payer: MEDICAID

## 2025-03-12 VITALS
RESPIRATION RATE: 18 BRPM | TEMPERATURE: 98 F | BODY MASS INDEX: 36.07 KG/M2 | OXYGEN SATURATION: 100 % | HEART RATE: 80 BPM | SYSTOLIC BLOOD PRESSURE: 120 MMHG | HEIGHT: 65 IN | DIASTOLIC BLOOD PRESSURE: 80 MMHG | WEIGHT: 216.5 LBS

## 2025-03-12 DIAGNOSIS — M32.9 SYSTEMIC LUPUS ERYTHEMATOSUS ARTHRITIS: ICD-10-CM

## 2025-03-12 DIAGNOSIS — M32.9 SLE (SYSTEMIC LUPUS ERYTHEMATOSUS RELATED SYNDROME): Primary | ICD-10-CM

## 2025-03-12 DIAGNOSIS — M79.672 PAIN IN BOTH FEET: Primary | ICD-10-CM

## 2025-03-12 DIAGNOSIS — M79.671 PAIN IN BOTH FEET: Primary | ICD-10-CM

## 2025-03-12 PROCEDURE — 96365 THER/PROPH/DIAG IV INF INIT: CPT

## 2025-03-12 PROCEDURE — 63600175 PHARM REV CODE 636 W HCPCS: Mod: JZ,TB | Performed by: INTERNAL MEDICINE

## 2025-03-12 PROCEDURE — 25000003 PHARM REV CODE 250: Performed by: INTERNAL MEDICINE

## 2025-03-12 RX ORDER — HEPARIN 100 UNIT/ML
500 SYRINGE INTRAVENOUS
OUTPATIENT
Start: 2025-03-19

## 2025-03-12 RX ORDER — EPINEPHRINE 0.3 MG/.3ML
0.3 INJECTION SUBCUTANEOUS ONCE AS NEEDED
OUTPATIENT
Start: 2025-03-19

## 2025-03-12 RX ORDER — SODIUM CHLORIDE 0.9 % (FLUSH) 0.9 %
10 SYRINGE (ML) INJECTION
Status: DISCONTINUED | OUTPATIENT
Start: 2025-03-12 | End: 2025-03-12 | Stop reason: HOSPADM

## 2025-03-12 RX ORDER — DIPHENHYDRAMINE HYDROCHLORIDE 50 MG/ML
50 INJECTION, SOLUTION INTRAMUSCULAR; INTRAVENOUS ONCE AS NEEDED
Status: DISCONTINUED | OUTPATIENT
Start: 2025-03-12 | End: 2025-03-12 | Stop reason: HOSPADM

## 2025-03-12 RX ORDER — EPINEPHRINE 0.3 MG/.3ML
0.3 INJECTION SUBCUTANEOUS ONCE AS NEEDED
Status: DISCONTINUED | OUTPATIENT
Start: 2025-03-12 | End: 2025-03-12 | Stop reason: HOSPADM

## 2025-03-12 RX ORDER — DIPHENHYDRAMINE HYDROCHLORIDE 50 MG/ML
50 INJECTION, SOLUTION INTRAMUSCULAR; INTRAVENOUS ONCE AS NEEDED
OUTPATIENT
Start: 2025-03-19

## 2025-03-12 RX ORDER — SODIUM CHLORIDE 0.9 % (FLUSH) 0.9 %
10 SYRINGE (ML) INJECTION
OUTPATIENT
Start: 2025-03-19

## 2025-03-12 RX ADMIN — ANIFROLUMAB 300 MG: 300 INJECTION, SOLUTION INTRAVENOUS at 02:03

## 2025-03-12 NOTE — PLAN OF CARE
Problem: Adult Inpatient Plan of Care  Goal: Plan of Care Review  Outcome: Progressing  Flowsheets (Taken 3/12/2025 1530)  Plan of Care Reviewed With: patient  Goal: Patient-Specific Goal (Individualized)  Outcome: Progressing  Flowsheets (Taken 3/12/2025 1530)  Individualized Care Needs: Feet elevated in recliner for comfort measures  Anxieties, Fears or Concerns: patient voices no concerns at this time  Patient/Family-Specific Goals (Include Timeframe): Tolerate treatment without adverse reaction  Goal: Optimal Comfort and Wellbeing  Outcome: Progressing  Intervention: Provide Person-Centered Care  Flowsheets (Taken 3/12/2025 1530)  Trust Relationship/Rapport:   care explained   reassurance provided   choices provided   thoughts/feelings acknowledged   emotional support provided   empathic listening provided   questions encouraged   questions answered

## 2025-03-12 NOTE — TELEPHONE ENCOUNTER
----- Message from Hungrio sent at 3/12/2025  8:08 AM CDT -----  Contact: self  ..Type:  Patient Requesting ReferralWho Called:.Alisia Johnson the patient already have the specialty appointment scheduled?:If yes, what is the date of that appointment?:Referral to What Specialty:podiatry Reason for Referral:foot problems Does the patient want the referral with a specific physician?:Is the specialist an [facility] or [facility]?:Patient Requesting a Response?:yes Would the patient rather a call back or a response via [facility]? Call back Best Call Back Number:.[phone] Information: pt states last podiatrist she was referred to is no longer in office

## 2025-03-12 NOTE — DISCHARGE INSTRUCTIONS
[facility]  [address] [facility]  [address] [address]  [phone] phone     [phone] fax  Hours of Operation: Monday- Friday 8:00am- 5:00pm  After hours phone  [phone]  Hematology / Oncology Physicians on call      ARELY Mckeon Dr., NP Phaon Dunbar, NP Khelsea Conley, FNP    Please call with any concerns regarding your appointment today.

## 2025-03-13 DIAGNOSIS — H65.93 OTITIS MEDIA WITH EFFUSION, BILATERAL: ICD-10-CM

## 2025-03-13 RX ORDER — FLUTICASONE PROPIONATE 50 MCG
SPRAY, SUSPENSION (ML) NASAL
Qty: 16 G | Refills: 0 | Status: SHIPPED | OUTPATIENT
Start: 2025-03-13

## 2025-03-14 ENCOUNTER — OFFICE VISIT (OUTPATIENT)
Dept: PSYCHIATRY | Facility: CLINIC | Age: 35
End: 2025-03-14
Payer: MEDICAID

## 2025-03-14 DIAGNOSIS — F12.90 MARIJUANA USE: ICD-10-CM

## 2025-03-14 DIAGNOSIS — F41.9 ANXIETY: ICD-10-CM

## 2025-03-14 DIAGNOSIS — F39 MOOD DISORDER: Primary | ICD-10-CM

## 2025-03-14 RX ORDER — FLUOXETINE HYDROCHLORIDE 20 MG/1
20 CAPSULE ORAL DAILY
Qty: 30 CAPSULE | Refills: 2 | Status: SHIPPED | OUTPATIENT
Start: 2025-03-14 | End: 2025-06-12

## 2025-03-14 NOTE — PROGRESS NOTES
"Outpatient Psychiatry Follow-Up Visit    3/14/2025    Virtual Visit    The patient location is: Patient's car/ Patient reported that his/her location at the time of this visit was in the [facility]     Visit type: Virtual visit with synchronous audio and video     Each patient to whom he or she provides medical services by telehealth is: (1) informed of the relationship between the medical psychologist and patient and the respective role of any other health care provider with respect to management of the patient; and (2) notified that he or she may decline to receive medical services by telehealth and may withdraw from such care at any time.    I also informed patient of the following:   Mere Jarvis, PhD, MPAP:  [place] medical license number: [id number]    My contact info:  [facility] / 2nd Floor  [address] [place] [address]   Ph: [phone]    If technology issues, call office phone: Ph: [phone] or [phone]  If crisis: Dial 911 or go to nearest Emergency Room (ER)  If questions related to privacy practices: contact [facility] Information Department: [phone]    Preferred Name: Alisia  Gender Identity: cis female  Preferred Pronouns: she/her      History of Present Illness    CHIEF COMPLAINT:  Alisia presents for a follow-up visit regarding mood and anxiety, last seen in December 2024.    HPI:  Alisia reports inconsistent medication adherence. She stopped taking Abilify and Prozac due to confusion about dosage and running out of medication. She struggles with consistently taking medications, stating she was consistent for about 1-2 months, but then stopped when she ran out.    Alisia describes her mood as having "ups and downs" since being off medication. She experiences some lupus-related symptoms, though not a full flare. She notes increased discomfort as her infusion time approaches.    Alisia's inconsistent medication " use and symptoms from both psychiatric and lupus conditions appear to be affecting her overall health and daily functioning. She acknowledges the need to prioritize her health, including both behavioral and physical aspects.    Alisia expresses difficulty in managing multiple medications and keeping them in a shared bathroom with her children. She is exploring options for better medication organization and adherence, such as using a pill pack or placing medications by her bedside.    MEDICATIONS:  Alisia was on Abilify 2 mg daily for mood and anxiety, but discontinued due to confusion and miscommunication. She was also on Prozac 20 mg daily for mood and anxiety, which was discontinued due to running out and not refilling. She is taking L-methyldopa (Enlyte) as a supplement for inflammation and depression.    LIFESTYLE:  Alisia lives with her children and is responsible for getting them to school in the morning.      ROS:  Psychiatric: +anxiety, +new or worsening mood changes, +mood swings       PSYCHIATRIC: Pertinant items are noted in the narrative.    Past Medical, Family and Social History: The patient's past medical, family and social history have been reviewed and updated as appropriate within the electronic medical record - see encounter notes.          3/14/2025     8:36 AM 9/16/2024     8:13 AM 9/20/2022     7:53 AM   GAD7   1. Feeling nervous, anxious, or on edge? 1 3 0   2. Not being able to stop or control worrying? 0 3 0   3. Worrying too much about different things? 1 3 0   4. Trouble relaxing? 1 2 0   5. Being so restless that it is hard to sit still? 1 1 0   6. Becoming easily annoyed or irritable? 2 3 0   7. Feeling afraid as if something awful might happen? 0 0 0   8. If you checked off any problems, how difficult have these problems made it for you to do your work, take care of things at home, or get along with other people? 1 3    MINDY-7 Score 6  15 0       Patient-reported      0-4 =  "Minimal anxiety  5-9 = Mild anxiety  10-14 = Moderate anxiety  15-21 = Severe anxiety         1/15/2025    11:05 AM 7/15/2024     9:45 AM   Depression Patient Health Questionnaire   Over the last two weeks how often have you been bothered by little interest or pleasure in doing things Not at all Not at all   Over the last two weeks how often have you been bothered by feeling down, depressed or hopeless Not at all Not at all   PHQ-2 Total Score 0 0     0-4 = No intervention  5 to 9 = Mild  10 to 14 = Moderate  15 to 19 = Moderately severe  =20 = Severe    Risk Parameters:  Patient reports no suicidal ideation  Patient reports no homicidal ideation  Patient reports no self-injurious behavior  Patient reports no violent behavior    Exam (detailed: at least 9 elements; comprehensive: all 15 elements)   Constitutional  Vitals:  Most recent vital signs were reviewed.   Last 3 sets of Vitals        1/29/2025     3:59 PM 2/6/2025     3:01 PM 3/12/2025     2:45 PM   Vitals - 1 value per visit   SYSTOLIC  120 121   DIASTOLIC  71 84   Pulse  99 81   Temp  97.6 °F (36.4 °C) 97.6 °F (36.4 °C)   Resp  18 16   SPO2  99 % 100 %   Weight (lb) 207.23 209.66 216.49   Weight (kg) 94 95.1 98.2   Height 5' 6" (1.676 m) 5' 6" (1.676 m) 5' 5" (1.651 m)   BMI (Calculated) 33.5 33.9 36   Pain Score Zero Zero Six          General:  age appropriate, casually dressed, neatly groomed     Musculoskeletal  Muscle Strength/Tone:  no tremor, no tic   Gait & Station:  video visit     Psychiatric  Speech:  no latency; no press   Behavior: wnl   Mood & Affect:  anxious, irritable  congruent and appropriate   Thought Process:  normal and logical   Associations:  intact   Thought Content:  normal, no suicidality, no homicidality, delusions, or paranoia   Insight:  has awareness of illness   Judgement: behavior is adequate to circumstances   Orientation:  grossly intact   Memory: intact for content of interview   Language: grossly intact   Attention Span " & Concentration:  Grossly intact   Fund of Knowledge:  intact and appropriate to age and level of education     General Impression:     Encounter Diagnoses   Name Primary?    Mood disorder Yes    Anxiety     Marijuana use        Assessment & Plan    - Assessed medication adherence and mental health status after discontinuation of Abilify and Prozac.  - Restarted Abilify 2 mg daily, the lowest effective dose.  - Restarted Prozac 20 mg daily to address mood and anxiety concerns.  - Considered interplay between psychiatric and physical health, particularly in relation to lupus management.  - Evaluated and continued L-methylfolate (Enlyte) supplement as previously prescribed for inflammation and as an adjunct for depression; patient advised to obtain from [employer] in [place] at no cost.    MOOD AND ANXIETY:  - Explained importance of consistent medication adherence for both psychiatric and physical health conditions.  - Discussed interconnection between stress, anxiety, and physical health, emphasizing need to address both mental and physical aspects of health.  - Restarted Prozac 20 mg daily to address mood and anxiety concerns.  - Evaluated and continued L-methylfolate (Enlyte) supplement as previously prescribed for inflammation and as an adjunct for depression; patient advised to obtain from [employer] in [place] at no cost.  - Restarted Abilify 2 mg daily, the lowest effective dose.    MEDICATION ADHERENCE:  - Alisia to implement consistent medication routine, such as taking medications immediately upon waking or pairing with daily activities like brushing teeth.  - Alisia to use pill organizer to improve medication adherence and track doses taken.  - Alisia to keep medications and water by bedside for easy morning access.    FOLLOW-UP AND COMMUNICATION:  - Follow up in 6-8 weeks (early to mid-May) for virtual appointment, to be scheduled on a Monday or Friday, no earlier than 8:00 AM.  - Contact the  office via portal messaging system for any medication-related questions or issues before next appointment.         I spent a total of 18 minutes on the day of the visit. This includes face to face time and non-face to face time preparing to see the patient (eg, review of tests), obtaining and/or reviewing separately obtained history, documenting clinical information in the electronic or other health record, independently interpreting results and communicating results to the patient/family/caregiver, or care coordinator.        Mere Jarvis, PhD, MP  Advanced Practice Medical Psychologist  [facility]  [place] [address]  [phone]   [phone] fax    This note was generated with the assistance of ambient listening technology. Verbal consent was obtained by the patient and accompanying visitor(s) for the recording of patient appointment to facilitate this note. I attest to having reviewed and edited the generated note for accuracy, though some syntax or spelling errors may persist. Please contact the author of this note for any clarification.

## 2025-03-14 NOTE — PATIENT INSTRUCTIONS

## 2025-03-31 ENCOUNTER — OFFICE VISIT (OUTPATIENT)
Dept: PODIATRY | Facility: CLINIC | Age: 35
End: 2025-03-31
Payer: MEDICAID

## 2025-03-31 VITALS — HEIGHT: 65 IN | WEIGHT: 216.5 LBS | BODY MASS INDEX: 36.07 KG/M2

## 2025-03-31 DIAGNOSIS — M21.41 ACQUIRED PES PLANOVALGUS OF RIGHT FOOT: ICD-10-CM

## 2025-03-31 DIAGNOSIS — M62.462 GASTROCNEMIUS EQUINUS OF LEFT LOWER EXTREMITY: ICD-10-CM

## 2025-03-31 DIAGNOSIS — M79.671 FOOT PAIN, BILATERAL: ICD-10-CM

## 2025-03-31 DIAGNOSIS — M79.671 PAIN IN BOTH FEET: Primary | ICD-10-CM

## 2025-03-31 DIAGNOSIS — M79.672 PAIN IN BOTH FEET: Primary | ICD-10-CM

## 2025-03-31 DIAGNOSIS — M21.42 ACQUIRED PES PLANOVALGUS OF LEFT FOOT: ICD-10-CM

## 2025-03-31 DIAGNOSIS — M62.461 GASTROCNEMIUS EQUINUS OF RIGHT LOWER EXTREMITY: ICD-10-CM

## 2025-03-31 DIAGNOSIS — M79.672 FOOT PAIN, BILATERAL: ICD-10-CM

## 2025-03-31 PROCEDURE — 3008F BODY MASS INDEX DOCD: CPT | Mod: CPTII,,, | Performed by: PODIATRIST

## 2025-03-31 PROCEDURE — 99999 PR PBB SHADOW E&M-EST. PATIENT-LVL III: CPT | Mod: PBBFAC,,, | Performed by: PODIATRIST

## 2025-03-31 PROCEDURE — 1159F MED LIST DOCD IN RCRD: CPT | Mod: CPTII,,, | Performed by: PODIATRIST

## 2025-03-31 PROCEDURE — 99213 OFFICE O/P EST LOW 20 MIN: CPT | Mod: PBBFAC | Performed by: PODIATRIST

## 2025-03-31 PROCEDURE — 99213 OFFICE O/P EST LOW 20 MIN: CPT | Mod: S$PBB,,, | Performed by: PODIATRIST

## 2025-03-31 NOTE — PROGRESS NOTES
Podiatry Department    Patient ID: Alisia Vines is a 34 y.o. female.    Chief Complaint: Foot Pain (C/o pain in the both foot, pt states the pain comes and go, pt states the pain is at the bottom of her feet, pt states this been going for a couple years , pt rates pain 0/10 , pt is non-diabetic)    History of Present Illness    CHIEF COMPLAINT:  - Bilateral foot pain, primarily in the arch area.    HPI:  Alisia presents with bilateral foot pain ongoing for a few years. Pain is localized to the bottom of both feet, specifically in the middle of the arch. It is intermittent and sometimes present upon waking, making it painful to put feet on the ground. She reports constant pain, even when sitting or riding in a car.    She works as a , requiring prolonged standing. A specialist in [place] previously recommended orthotic inserts due to completely flat feet. However, she never received these inserts despite being told they were approved by insurance. Various shoe brands, including Hoka and Lindsey, have been tried, but pain persisted while walking in the store.    She has a history of lupus but is unsure if it is related to the foot pain. She denies having diabetes.    PREVIOUS TREATMENTS:  - Orthotic inserts: Recommend but never received  - Various shoe brands (Hoka and Lindsey): Tried, but pain persisted    WORK STATUS:  -   - Requires prolonged standing  - Occupation contributing to foot pain      ROS:  Musculoskeletal: +limb pain, +pain with movement            Physical Exam    Musculoskeletal: Maximally pronated foot type. Diffuse pain along plantar central arch on bilateral feet.           Diagnoses:  1. Pain in both feet  -     Ambulatory referral/consult to Podiatry  -     X-Ray Foot Complete Bilateral; Future; Expected date: 03/31/2025    2. Foot pain, bilateral  -     ORTHOTIC DEVICE (DME)    3. Acquired pes planovalgus of left foot  -     ORTHOTIC DEVICE (DME)    4. Acquired pes  planovalgus of right foot  -     ORTHOTIC DEVICE (DME)    5. Gastrocnemius equinus of right lower extremity    6. Gastrocnemius equinus of left lower extremity        Assessment & Plan    M67.01 Short Achilles tendon (acquired), right ankle  M67.02 Short Achilles tendon (acquired), left ankle  M21.70 Unequal limb length (acquired), unspecified site  M32.10 Systemic lupus erythematosus, organ or system involvement unspecified  Q66.51 Congenital pes planus, right foot  Q66.52 Congenital pes planus, left foot    PATIENT INSTRUCTIONS:  - Perform stretching exercises 3 times daily to loosen up the arch and relieve tightness in the Achilles tendon.  - Use orthotic inserts and appropriate shoes as recommended.              Future Appointments   Date Time Provider Department Center   4/9/2025  2:30 PM CHAIR 18 [facility] INFSN [facility]   4/25/2025  8:30 AM Mere Jarvis, PhD, [facility]   5/29/2025  9:55 AM LABORATORY, [facility] LAB [facility]   6/5/2025  8:45 AM Shane Blair MD [facility]        This note was generated with the assistance of ambient listening technology. Verbal consent was obtained by the patient and accompanying visitor(s) for the recording of patient appointment to facilitate this note. I attest to having reviewed and edited the generated note for accuracy, though some syntax or spelling errors may persist. Please contact the author of this note for any clarification.      Report Electronically Signed By:     Licha Coppola DPM   Podiatry  [facility]  3/31/2025

## 2025-04-09 ENCOUNTER — INFUSION (OUTPATIENT)
Dept: INFUSION THERAPY | Facility: HOSPITAL | Age: 35
End: 2025-04-09
Attending: INTERNAL MEDICINE
Payer: MEDICAID

## 2025-04-09 VITALS
DIASTOLIC BLOOD PRESSURE: 91 MMHG | BODY MASS INDEX: 35.45 KG/M2 | OXYGEN SATURATION: 100 % | HEART RATE: 73 BPM | RESPIRATION RATE: 16 BRPM | TEMPERATURE: 98 F | SYSTOLIC BLOOD PRESSURE: 138 MMHG | HEIGHT: 65 IN | WEIGHT: 212.75 LBS

## 2025-04-09 DIAGNOSIS — M32.9 SYSTEMIC LUPUS ERYTHEMATOSUS ARTHRITIS: ICD-10-CM

## 2025-04-09 DIAGNOSIS — M32.9 SLE (SYSTEMIC LUPUS ERYTHEMATOSUS RELATED SYNDROME): Primary | ICD-10-CM

## 2025-04-09 PROCEDURE — 25000003 PHARM REV CODE 250: Performed by: INTERNAL MEDICINE

## 2025-04-09 PROCEDURE — 63600175 PHARM REV CODE 636 W HCPCS: Mod: JZ,TB | Performed by: INTERNAL MEDICINE

## 2025-04-09 PROCEDURE — 96365 THER/PROPH/DIAG IV INF INIT: CPT

## 2025-04-09 RX ORDER — SODIUM CHLORIDE 0.9 % (FLUSH) 0.9 %
10 SYRINGE (ML) INJECTION
OUTPATIENT
Start: 2025-04-16

## 2025-04-09 RX ORDER — SODIUM CHLORIDE 0.9 % (FLUSH) 0.9 %
10 SYRINGE (ML) INJECTION
Status: DISCONTINUED | OUTPATIENT
Start: 2025-04-09 | End: 2025-04-09 | Stop reason: HOSPADM

## 2025-04-09 RX ORDER — EPINEPHRINE 0.3 MG/.3ML
0.3 INJECTION SUBCUTANEOUS ONCE AS NEEDED
OUTPATIENT
Start: 2025-04-16

## 2025-04-09 RX ORDER — DIPHENHYDRAMINE HYDROCHLORIDE 50 MG/ML
50 INJECTION, SOLUTION INTRAMUSCULAR; INTRAVENOUS ONCE AS NEEDED
OUTPATIENT
Start: 2025-04-16

## 2025-04-09 RX ORDER — HEPARIN 100 UNIT/ML
500 SYRINGE INTRAVENOUS
OUTPATIENT
Start: 2025-04-16

## 2025-04-09 RX ADMIN — SODIUM CHLORIDE 300 MG: 9 INJECTION, SOLUTION INTRAVENOUS at 02:04

## 2025-04-09 NOTE — PLAN OF CARE
Discussed plan of care with pt. Addressed any and ongoing concerns. Pt denies    Problem: Adult Inpatient Plan of Care  Goal: Plan of Care Review  Outcome: Progressing  Flowsheets (Taken 4/9/2025 1459)  Plan of Care Reviewed With: patient  Goal: Absence of Hospital-Acquired Illness or Injury  Outcome: Progressing  Intervention: Identify and Manage Fall Risk  Flowsheets (Taken 4/9/2025 1459)  Safety Promotion/Fall Prevention:   in recliner, wheels locked   nonskid shoes/socks when out of bed   room near unit station  Intervention: Prevent Infection  Flowsheets (Taken 4/9/2025 1459)  Infection Prevention:   hand hygiene promoted   equipment surfaces disinfected  Goal: Optimal Comfort and Wellbeing  Outcome: Progressing  Intervention: Monitor Pain and Promote Comfort  Flowsheets (Taken 4/9/2025 1459)  Pain Management Interventions:   quiet environment facilitated   warm blanket provided   relaxation techniques promoted  Intervention: Provide Person-Centered Care  Flowsheets (Taken 4/9/2025 1459)  Trust Relationship/Rapport:   reassurance provided   care explained   choices provided   thoughts/feelings acknowledged   emotional support provided   empathic listening provided   questions answered   questions encouraged

## 2025-05-02 DIAGNOSIS — F39 MOOD DISORDER: ICD-10-CM

## 2025-05-02 DIAGNOSIS — F41.9 ANXIETY: ICD-10-CM

## 2025-05-05 RX ORDER — FLUOXETINE HYDROCHLORIDE 20 MG/1
CAPSULE ORAL
Qty: 30 CAPSULE | Refills: 2 | Status: SHIPPED | OUTPATIENT
Start: 2025-05-05

## 2025-05-08 RX ORDER — DIPHENHYDRAMINE HYDROCHLORIDE 50 MG/ML
50 INJECTION, SOLUTION INTRAMUSCULAR; INTRAVENOUS ONCE AS NEEDED
OUTPATIENT
Start: 2025-05-08

## 2025-05-08 RX ORDER — SODIUM CHLORIDE 0.9 % (FLUSH) 0.9 %
10 SYRINGE (ML) INJECTION
OUTPATIENT
Start: 2025-05-08

## 2025-05-08 RX ORDER — HEPARIN 100 UNIT/ML
500 SYRINGE INTRAVENOUS
OUTPATIENT
Start: 2025-05-08

## 2025-05-08 RX ORDER — EPINEPHRINE 0.3 MG/.3ML
0.3 INJECTION SUBCUTANEOUS ONCE AS NEEDED
OUTPATIENT
Start: 2025-05-08

## 2025-05-14 ENCOUNTER — INFUSION (OUTPATIENT)
Dept: INFUSION THERAPY | Facility: HOSPITAL | Age: 35
End: 2025-05-14
Attending: INTERNAL MEDICINE
Payer: MEDICAID

## 2025-05-14 VITALS
HEART RATE: 82 BPM | TEMPERATURE: 98 F | BODY MASS INDEX: 35.45 KG/M2 | WEIGHT: 212.75 LBS | OXYGEN SATURATION: 99 % | SYSTOLIC BLOOD PRESSURE: 125 MMHG | HEIGHT: 65 IN | RESPIRATION RATE: 18 BRPM | DIASTOLIC BLOOD PRESSURE: 79 MMHG

## 2025-05-14 DIAGNOSIS — M32.9 SYSTEMIC LUPUS ERYTHEMATOSUS ARTHRITIS: ICD-10-CM

## 2025-05-14 DIAGNOSIS — M32.9 SLE (SYSTEMIC LUPUS ERYTHEMATOSUS RELATED SYNDROME): Primary | ICD-10-CM

## 2025-05-14 PROCEDURE — 96365 THER/PROPH/DIAG IV INF INIT: CPT

## 2025-05-14 PROCEDURE — 63600175 PHARM REV CODE 636 W HCPCS: Mod: JZ,TB | Performed by: INTERNAL MEDICINE

## 2025-05-14 PROCEDURE — 25000003 PHARM REV CODE 250: Performed by: INTERNAL MEDICINE

## 2025-05-14 RX ORDER — EPINEPHRINE 0.3 MG/.3ML
0.3 INJECTION SUBCUTANEOUS ONCE AS NEEDED
OUTPATIENT
Start: 2025-06-04

## 2025-05-14 RX ORDER — SODIUM CHLORIDE 0.9 % (FLUSH) 0.9 %
10 SYRINGE (ML) INJECTION
OUTPATIENT
Start: 2025-06-04

## 2025-05-14 RX ORDER — HEPARIN 100 UNIT/ML
500 SYRINGE INTRAVENOUS
OUTPATIENT
Start: 2025-06-04

## 2025-05-14 RX ORDER — DIPHENHYDRAMINE HYDROCHLORIDE 50 MG/ML
50 INJECTION, SOLUTION INTRAMUSCULAR; INTRAVENOUS ONCE AS NEEDED
OUTPATIENT
Start: 2025-06-04

## 2025-05-14 RX ADMIN — ANIFROLUMAB 300 MG: 300 INJECTION, SOLUTION INTRAVENOUS at 02:05

## 2025-05-14 NOTE — PLAN OF CARE
Problem: Adult Inpatient Plan of Care  Goal: Plan of Care Review  Outcome: Progressing  Flowsheets (Taken 5/14/2025 1441)  Plan of Care Reviewed With: patient  Goal: Patient-Specific Goal (Individualized)  Outcome: Progressing  Flowsheets (Taken 5/14/2025 1441)  Individualized Care Needs: recline with pillow and blanket  Anxieties, Fears or Concerns: none today  Goal: Absence of Hospital-Acquired Illness or Injury  Outcome: Progressing  Intervention: Prevent Infection  Flowsheets (Taken 5/14/2025 1441)  Infection Prevention:   equipment surfaces disinfected   hand hygiene promoted   personal protective equipment utilized   rest/sleep promoted  Goal: Optimal Comfort and Wellbeing  Outcome: Progressing  Intervention: Provide Person-Centered Care  Flowsheets (Taken 5/14/2025 1441)  Trust Relationship/Rapport:   care explained   thoughts/feelings acknowledged   choices provided   emotional support provided   empathic listening provided   questions answered   questions encouraged   reassurance provided

## 2025-05-14 NOTE — NURSING
Pt tolerated Saphnelo infusion well. No adverse reaction noted. Pt education reinforced on possible side effects, what to expect, and when to call . Pt verbalized understanding. I reviewed pt calendar w/ pt and understanding verbalized. IV flushed w/ NS and D/C per protocol.

## 2025-05-29 ENCOUNTER — LAB VISIT (OUTPATIENT)
Dept: LAB | Facility: HOSPITAL | Age: 35
End: 2025-05-29
Attending: INTERNAL MEDICINE
Payer: MEDICAID

## 2025-05-29 DIAGNOSIS — M79.672 PAIN IN BOTH FEET: ICD-10-CM

## 2025-05-29 DIAGNOSIS — Z51.81 ENCOUNTER FOR MEDICATION MONITORING: ICD-10-CM

## 2025-05-29 DIAGNOSIS — M79.671 PAIN IN BOTH FEET: ICD-10-CM

## 2025-05-29 DIAGNOSIS — M32.9 SYSTEMIC LUPUS ERYTHEMATOSUS ARTHRITIS: ICD-10-CM

## 2025-05-29 DIAGNOSIS — M32.14 SLE GLOMERULONEPHRITIS SYNDROME, WHO CLASS V: ICD-10-CM

## 2025-05-29 DIAGNOSIS — Z79.899 DRUG-INDUCED IMMUNODEFICIENCY: ICD-10-CM

## 2025-05-29 DIAGNOSIS — D84.821 DRUG-INDUCED IMMUNODEFICIENCY: ICD-10-CM

## 2025-05-29 DIAGNOSIS — M79.7 FIBROMYALGIA: ICD-10-CM

## 2025-05-29 LAB
ABSOLUTE EOSINOPHIL (OHS): 0.22 K/UL
ABSOLUTE MONOCYTE (OHS): 0.41 K/UL (ref 0.3–1)
ABSOLUTE NEUTROPHIL COUNT (OHS): 3.41 K/UL (ref 1.8–7.7)
ALBUMIN SERPL BCP-MCNC: 3.2 G/DL (ref 3.5–5.2)
ALP SERPL-CCNC: 71 UNIT/L (ref 40–150)
ALT SERPL W/O P-5'-P-CCNC: 9 UNIT/L (ref 10–44)
ANION GAP (OHS): 9 MMOL/L (ref 8–16)
AST SERPL-CCNC: 13 UNIT/L (ref 11–45)
BACTERIA #/AREA URNS AUTO: ABNORMAL /HPF
BASOPHILS # BLD AUTO: 0.02 K/UL
BASOPHILS NFR BLD AUTO: 0.3 %
BILIRUB SERPL-MCNC: 0.2 MG/DL (ref 0.1–1)
BILIRUB UR QL STRIP.AUTO: NEGATIVE
BUN SERPL-MCNC: 9 MG/DL (ref 6–20)
C3 SERPL-MCNC: 86 MG/DL (ref 50–180)
C4 COMPLEMENT (OHS): 22 MG/DL (ref 11–44)
CALCIUM SERPL-MCNC: 8.9 MG/DL (ref 8.7–10.5)
CHLORIDE SERPL-SCNC: 112 MMOL/L (ref 95–110)
CLARITY UR: CLEAR
CO2 SERPL-SCNC: 20 MMOL/L (ref 23–29)
COLOR UR AUTO: YELLOW
CREAT SERPL-MCNC: 0.6 MG/DL (ref 0.5–1.4)
CREAT UR-MCNC: 137.8 MG/DL (ref 15–325)
CRP SERPL-MCNC: 6.5 MG/L
ERYTHROCYTE [DISTWIDTH] IN BLOOD BY AUTOMATED COUNT: 13.2 % (ref 11.5–14.5)
ERYTHROCYTE [SEDIMENTATION RATE] IN BLOOD: 89 MM/HR
GFR SERPLBLD CREATININE-BSD FMLA CKD-EPI: >60 ML/MIN/1.73/M2
GLUCOSE SERPL-MCNC: 83 MG/DL (ref 70–110)
GLUCOSE UR QL STRIP: NEGATIVE
HCT VFR BLD AUTO: 35.7 % (ref 37–48.5)
HGB BLD-MCNC: 12.3 GM/DL (ref 12–16)
HGB UR QL STRIP: NEGATIVE
HYALINE CASTS UR QL AUTO: 0 /LPF (ref 0–1)
IMM GRANULOCYTES # BLD AUTO: 0.03 K/UL (ref 0–0.04)
IMM GRANULOCYTES NFR BLD AUTO: 0.4 % (ref 0–0.5)
KETONES UR QL STRIP: NEGATIVE
LEUKOCYTE ESTERASE UR QL STRIP: ABNORMAL
LYMPHOCYTES # BLD AUTO: 2.7 K/UL (ref 1–4.8)
MCH RBC QN AUTO: 28.5 PG (ref 27–31)
MCHC RBC AUTO-ENTMCNC: 34.5 G/DL (ref 32–36)
MCV RBC AUTO: 83 FL (ref 82–98)
MICROSCOPIC COMMENT: ABNORMAL
NITRITE UR QL STRIP: NEGATIVE
NUCLEATED RBC (/100WBC) (OHS): 0 /100 WBC
PH UR STRIP: 7 [PH]
PLATELET # BLD AUTO: 297 K/UL (ref 150–450)
PMV BLD AUTO: 9.7 FL (ref 9.2–12.9)
POTASSIUM SERPL-SCNC: 3.5 MMOL/L (ref 3.5–5.1)
PROT SERPL-MCNC: 7 GM/DL (ref 6–8.4)
PROT UR QL STRIP: ABNORMAL
PROT UR-MCNC: 134 MG/DL
PROT/CREAT UR: 0.97 MG/G{CREAT}
RBC # BLD AUTO: 4.31 M/UL (ref 4–5.4)
RBC #/AREA URNS AUTO: 2 /HPF (ref 0–4)
RELATIVE EOSINOPHIL (OHS): 3.2 %
RELATIVE LYMPHOCYTE (OHS): 39.8 % (ref 18–48)
RELATIVE MONOCYTE (OHS): 6 % (ref 4–15)
RELATIVE NEUTROPHIL (OHS): 50.3 % (ref 38–73)
SODIUM SERPL-SCNC: 141 MMOL/L (ref 136–145)
SP GR UR STRIP: 1.02
SQUAMOUS #/AREA URNS AUTO: 10 /HPF
UROBILINOGEN UR STRIP-ACNC: NEGATIVE EU/DL
WBC # BLD AUTO: 6.79 K/UL (ref 3.9–12.7)
WBC #/AREA URNS AUTO: 14 /HPF (ref 0–5)
YEAST UR QL AUTO: ABNORMAL /HPF

## 2025-05-29 PROCEDURE — 85025 COMPLETE CBC W/AUTO DIFF WBC: CPT

## 2025-05-29 PROCEDURE — 80053 COMPREHEN METABOLIC PANEL: CPT

## 2025-05-29 PROCEDURE — 86160 COMPLEMENT ANTIGEN: CPT

## 2025-05-29 PROCEDURE — 86140 C-REACTIVE PROTEIN: CPT

## 2025-05-29 PROCEDURE — 85651 RBC SED RATE NONAUTOMATED: CPT

## 2025-05-29 PROCEDURE — 81001 URINALYSIS AUTO W/SCOPE: CPT

## 2025-05-29 PROCEDURE — 36415 COLL VENOUS BLD VENIPUNCTURE: CPT

## 2025-05-29 PROCEDURE — 86160 COMPLEMENT ANTIGEN: CPT | Mod: 59

## 2025-05-29 PROCEDURE — 82570 ASSAY OF URINE CREATININE: CPT

## 2025-06-05 ENCOUNTER — PATIENT MESSAGE (OUTPATIENT)
Dept: RHEUMATOLOGY | Facility: CLINIC | Age: 35
End: 2025-06-05

## 2025-06-05 ENCOUNTER — OFFICE VISIT (OUTPATIENT)
Dept: RHEUMATOLOGY | Facility: CLINIC | Age: 35
End: 2025-06-05
Payer: MEDICAID

## 2025-06-05 DIAGNOSIS — M32.9 SYSTEMIC LUPUS ERYTHEMATOSUS ARTHRITIS: Primary | ICD-10-CM

## 2025-06-05 DIAGNOSIS — Z51.81 ENCOUNTER FOR MEDICATION MONITORING: ICD-10-CM

## 2025-06-05 DIAGNOSIS — Z79.899 DRUG-INDUCED IMMUNODEFICIENCY: ICD-10-CM

## 2025-06-05 DIAGNOSIS — D84.821 DRUG-INDUCED IMMUNODEFICIENCY: ICD-10-CM

## 2025-06-05 DIAGNOSIS — M32.14 SLE GLOMERULONEPHRITIS SYNDROME, WHO CLASS V: ICD-10-CM

## 2025-06-05 DIAGNOSIS — M35.00 SJOGREN'S SYNDROME, WITH UNSPECIFIED ORGAN INVOLVEMENT: ICD-10-CM

## 2025-06-05 RX ORDER — MYCOPHENOLATE MOFETIL 500 MG/1
TABLET ORAL
Qty: 268 TABLET | Refills: 0 | Status: SHIPPED | OUTPATIENT
Start: 2025-06-05 | End: 2025-08-18

## 2025-06-06 ENCOUNTER — OFFICE VISIT (OUTPATIENT)
Dept: PSYCHIATRY | Facility: CLINIC | Age: 35
End: 2025-06-06
Payer: MEDICAID

## 2025-06-06 DIAGNOSIS — F39 MOOD DISORDER: Primary | ICD-10-CM

## 2025-06-06 DIAGNOSIS — F12.90 MARIJUANA USE: ICD-10-CM

## 2025-06-06 DIAGNOSIS — F41.9 ANXIETY: ICD-10-CM

## 2025-06-06 RX ORDER — ARIPIPRAZOLE 2 MG/1
2 TABLET ORAL DAILY
Qty: 30 TABLET | Refills: 3 | Status: SHIPPED | OUTPATIENT
Start: 2025-06-06 | End: 2025-10-04

## 2025-06-06 RX ORDER — FLUOXETINE 20 MG/1
20 CAPSULE ORAL DAILY
Qty: 30 CAPSULE | Refills: 3 | Status: SHIPPED | OUTPATIENT
Start: 2025-06-06 | End: 2025-10-04

## 2025-06-11 ENCOUNTER — TELEPHONE (OUTPATIENT)
Dept: RHEUMATOLOGY | Facility: CLINIC | Age: 35
End: 2025-06-11
Payer: MEDICAID

## 2025-06-11 RX ORDER — METHYLPREDNISOLONE SOD SUCC 125 MG
125 VIAL (EA) INJECTION
Status: CANCELLED
Start: 2025-06-11

## 2025-06-11 NOTE — TELEPHONE ENCOUNTER
Taking aleve otc no relief. C/O all her joints are hurting.  Saphnelo only providing relief for about a week, scheduled for next infusion 6/13/2025.  She started Cellcept on Saturday    Please advise

## 2025-06-11 NOTE — TELEPHONE ENCOUNTER
Copied from CRM #[id number]. Topic: General Inquiry - Patient Advice  >> Jun 11, 2025  8:06 AM Kyle wrote:  Type:  Needs Medical Advice    Who Called: Alisia   Symptoms (please be specific): pain    How long has patient had these symptoms:  n/a  Pharmacy name and phone #:    [id number] - [place], [place] - [address] AT [place] & [facility]  [address]  [place] [facility] [address]  Phone: [phone] Fax: [phone]   Would the patient rather a call back or a response via [facility]? Call Back   Best Call Back Number:  [phone] (M)  Additional Information: pt is calling in regards to getting recommendation on what she can take for her pain. She stated that the pain is getting worse.    Thanks KB

## 2025-06-13 ENCOUNTER — INFUSION (OUTPATIENT)
Dept: INFUSION THERAPY | Facility: HOSPITAL | Age: 35
End: 2025-06-13
Attending: INTERNAL MEDICINE
Payer: MEDICAID

## 2025-06-13 VITALS
SYSTOLIC BLOOD PRESSURE: 133 MMHG | BODY MASS INDEX: 35.04 KG/M2 | TEMPERATURE: 98 F | HEART RATE: 69 BPM | HEIGHT: 65 IN | WEIGHT: 210.31 LBS | RESPIRATION RATE: 16 BRPM | OXYGEN SATURATION: 100 % | DIASTOLIC BLOOD PRESSURE: 96 MMHG

## 2025-06-13 DIAGNOSIS — M32.9 SYSTEMIC LUPUS ERYTHEMATOSUS ARTHRITIS: ICD-10-CM

## 2025-06-13 DIAGNOSIS — M32.9 SLE (SYSTEMIC LUPUS ERYTHEMATOSUS RELATED SYNDROME): Primary | ICD-10-CM

## 2025-06-13 PROCEDURE — 63600175 PHARM REV CODE 636 W HCPCS: Mod: JZ,TB | Performed by: INTERNAL MEDICINE

## 2025-06-13 PROCEDURE — 96375 TX/PRO/DX INJ NEW DRUG ADDON: CPT

## 2025-06-13 PROCEDURE — 96365 THER/PROPH/DIAG IV INF INIT: CPT

## 2025-06-13 PROCEDURE — 25000003 PHARM REV CODE 250: Performed by: INTERNAL MEDICINE

## 2025-06-13 RX ORDER — METHYLPREDNISOLONE SOD SUCC 125 MG
125 VIAL (EA) INJECTION
Status: CANCELLED
Start: 2025-07-04 | End: 2025-07-04

## 2025-06-13 RX ORDER — METHYLPREDNISOLONE SOD SUCC 125 MG
125 VIAL (EA) INJECTION
Status: COMPLETED | OUTPATIENT
Start: 2025-06-13 | End: 2025-06-13

## 2025-06-13 RX ORDER — HEPARIN 100 UNIT/ML
500 SYRINGE INTRAVENOUS
OUTPATIENT
Start: 2025-07-04

## 2025-06-13 RX ORDER — DIPHENHYDRAMINE HYDROCHLORIDE 50 MG/ML
50 INJECTION, SOLUTION INTRAMUSCULAR; INTRAVENOUS ONCE AS NEEDED
OUTPATIENT
Start: 2025-07-04

## 2025-06-13 RX ORDER — SODIUM CHLORIDE 0.9 % (FLUSH) 0.9 %
10 SYRINGE (ML) INJECTION
OUTPATIENT
Start: 2025-07-04

## 2025-06-13 RX ORDER — EPINEPHRINE 0.3 MG/.3ML
0.3 INJECTION SUBCUTANEOUS ONCE AS NEEDED
OUTPATIENT
Start: 2025-07-04

## 2025-06-13 RX ADMIN — ANIFROLUMAB 300 MG: 300 INJECTION, SOLUTION INTRAVENOUS at 01:06

## 2025-06-13 RX ADMIN — METHYLPREDNISOLONE SODIUM SUCCINATE 125 MG: 125 INJECTION, POWDER, FOR SOLUTION INTRAMUSCULAR; INTRAVENOUS at 01:06

## 2025-06-13 NOTE — PLAN OF CARE
Patient tolerated Saphnelo well today; no adverse reaction noted.  C/O being in a severe flare-up to joints.  Dr MADISON aware and ordered 1x dose of Solu medol IVP.  Stated she does not feel medication Is working.  Started Cell-cept 1 wk ago and in not working fully yet.  IV discontinued with catheter intact and pressure dressing applied to the site.  Has f/u appt(s) scheduled per MD request.  No questions or concerns voiced.  NAD noted upon discharge.

## 2025-06-15 ENCOUNTER — NURSE TRIAGE (OUTPATIENT)
Dept: ADMINISTRATIVE | Facility: CLINIC | Age: 35
End: 2025-06-15
Payer: MEDICAID

## 2025-06-15 ENCOUNTER — OCHSNER VIRTUAL EMERGENCY DEPARTMENT (OUTPATIENT)
Facility: CLINIC | Age: 35
End: 2025-06-15
Payer: MEDICAID

## 2025-06-15 DIAGNOSIS — M25.9: Primary | ICD-10-CM

## 2025-06-15 DIAGNOSIS — M32.19: Primary | ICD-10-CM

## 2025-06-16 ENCOUNTER — TELEPHONE (OUTPATIENT)
Dept: RHEUMATOLOGY | Facility: CLINIC | Age: 35
End: 2025-06-16
Payer: MEDICAID

## 2025-06-16 DIAGNOSIS — M32.14 SLE GLOMERULONEPHRITIS SYNDROME, WHO CLASS V: ICD-10-CM

## 2025-06-16 DIAGNOSIS — M32.9 SYSTEMIC LUPUS ERYTHEMATOSUS ARTHRITIS: Primary | ICD-10-CM

## 2025-06-16 RX ORDER — PREDNISONE 20 MG/1
20 TABLET ORAL DAILY
Qty: 30 TABLET | Refills: 1 | Status: SHIPPED | OUTPATIENT
Start: 2025-06-16

## 2025-06-16 NOTE — TELEPHONE ENCOUNTER
Advice not to take ibuprofen since she has lupus nephritis. Ok to take tylenol . Start prednisone 20 mg once daily sent to pharmacy. Thanks.

## 2025-06-16 NOTE — PLAN OF CARE-OVED
[facility] Plan of Care Note  Referral Source: Nurse On-Call                               Chief Complaint   Patient presents with    Lupus     33 yo female with PMhx of Lupus, Sjogrens, GERD, anemia calls with chronic pain. Patient had an infusion of Saphnelo on Friday and is having pain again. She was having joint pain at that time and solumedrol 125 mg was added. She reports the infusion isn't lasting long enough. She has pain in all her joints. Pain is 8/10. Patient has been taking tylenol and aleve with no relief. Started Cellcept on Saturday for pain control and for joint flare for the last month not responsive to steroids.      Recent labs 5/29 reviewed with normal crea and lfts.     Recommendation: Treat in place                            Call rheumatologist tomorrow for appt and to discuss ongoing pain management. Ibuprofen up to 800 mg every 8 hours and acetaminophen up to 1000 mg every 6 hours for pain for the next 24 hours only until talking to rheumatology. Warm compressess/heating pads, light exercise/walking, healthy diet, OTC lidocaine or menthol cream/patches for area of particular pain.     Future Appointments   Date Time Provider Department Center   6/26/2025 10:35 AM LABORATORY, [facility] LAB [facility]   7/11/2025  1:00 PM CHAIR 10 [facility] INFSN [facility]   9/8/2025 10:40 AM LABORATORY, [facility] HG LAB [facility]   9/15/2025  3:15 PM Shane Blair MD [facility] RHEUM [place]   9/19/2025  8:30 AM Mere Jarvis, PhD, [facility] PSYCH [facility]       Encounter Diagnosis   Name Primary?    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

## 2025-06-16 NOTE — TELEPHONE ENCOUNTER
Patient had [facility] yesterday.      She received Saphnelo on Friday with solumedrol 125mg. Started Cellcept on Saturday. C/o pain in all of her joints.    ER advised:     Call rheumatologist tomorrow for appt and to discuss ongoing pain management. Ibuprofen up to 800 mg every 8 hours and acetaminophen up to 1000 mg every 6 hours for pain for the next 24 hours only until talking to rheumatology. Warm compressess/heating pads, light exercise/walking, healthy diet, OTC lidocaine or menthol cream/patches for area of particular pain.

## 2025-06-16 NOTE — TELEPHONE ENCOUNTER
Copied from CRM #[id number]. Topic: General Inquiry - Patient Advice  >> Jun 16, 2025  7:56 AM Milad wrote:  ..Type:  Needs Medical Advice    Who Called: Carolyn Vines  Symptoms (please be specific): inflammation    How long has patient had these symptoms:    Pharmacy name and phone #:   .  [id number] - [facility] [place], [place] - [address] AT [address] [facility]  [address] [place] [address]  Phone: [phone] Fax: [phone]  Would the patient rather a call back or a response via [facility]? Call back   Best Call Back Number: [phone]  Additional Information: pt is asking for an return call in reference to needing to know if steroids could be called in or what medication she is needing for the inflammation.

## 2025-06-16 NOTE — TELEPHONE ENCOUNTER
Patient has Lupus. Patient had an infusion on Friday and is having pain again. She reports the infusion isn't lasting long enough. She has pain in all her joints. Pain is 8/10. Patient has been taking tylenol and aleve with no relief. She admits to taking over the suggested doses. Reinforced advice of following instructions on the bottle. Disposition provided - see hcp or pcp triage within 4 hours. No one listed on call for rheumatology in [place]. Sent secure chat to [facility] and chart reviewed by Dr. Fernandes. She advised...warm compresses/ heating pads on the joints, honestly light exercise/walking will likely help as well. Healthy diet. she can take up to 800 mg of ibuprofen every 8 hours, and acetaminophen up to 1000 mg every 6 hours, and that is it, no more than that. OTC menthol or lidocaine creams/patches for particularly sore places. Updated patient and will route message to clinic. Instructed to call back with additional questions or worsening of symptoms. Patient verbalized understanding.           Reason for Disposition   [1] SEVERE pain (e.g., excruciating, unable to do any normal activities) AND [2] not improved 2 hours after pain medicine    Additional Information   Negative: Shock suspected (e.g., cold/pale/clammy skin, too weak to stand, low BP, rapid pulse)   Negative: Difficult to awaken or acting confused (e.g., disoriented, slurred speech)   Negative: Sounds like a life-threatening emergency to the triager   Negative: Dark (cola or tea-colored) or red-colored urine   Negative: [1] Drinking very little AND [2] dehydration suspected (e.g., no urine > 12 hours, very dry mouth, very lightheaded)   Negative: Patient sounds very sick or weak to the triager    Protocols used: Muscle Aches and Body Pain-A-AH

## 2025-06-17 ENCOUNTER — OFFICE VISIT (OUTPATIENT)
Dept: URGENT CARE | Facility: CLINIC | Age: 35
End: 2025-06-17
Payer: MEDICAID

## 2025-06-17 VITALS
RESPIRATION RATE: 16 BRPM | OXYGEN SATURATION: 99 % | WEIGHT: 213.06 LBS | BODY MASS INDEX: 35.5 KG/M2 | HEART RATE: 72 BPM | TEMPERATURE: 99 F | HEIGHT: 65 IN | SYSTOLIC BLOOD PRESSURE: 140 MMHG | DIASTOLIC BLOOD PRESSURE: 91 MMHG

## 2025-06-17 DIAGNOSIS — H66.92 LEFT OTITIS MEDIA, UNSPECIFIED OTITIS MEDIA TYPE: Primary | ICD-10-CM

## 2025-06-17 PROCEDURE — 99213 OFFICE O/P EST LOW 20 MIN: CPT | Mod: S$GLB,,, | Performed by: NURSE PRACTITIONER

## 2025-06-17 RX ORDER — AMOXICILLIN AND CLAVULANATE POTASSIUM 875; 125 MG/1; MG/1
1 TABLET, FILM COATED ORAL 2 TIMES DAILY
Qty: 14 TABLET | Refills: 0 | Status: SHIPPED | OUTPATIENT
Start: 2025-06-17 | End: 2025-06-24

## 2025-06-17 NOTE — PROGRESS NOTES
"Subjective:      Patient ID: Alisia Vines is a 34 y.o. female.    Vitals:  height is 5' 5" (1.651 m) and weight is 96.6 kg (213 lb 1.2 oz). Her oral temperature is 98.6 °F (37 °C). Her blood pressure is 140/91 (abnormal) and her pulse is 72. Her respiration is 16 and oxygen saturation is 99%.     Chief Complaint: Otalgia    Patient presents with left ear pain.  Symptoms started 1 week ago. Reports recurrent ear infections. States last time she had ear infection it took 3 antibiotics to clear it up.         Otalgia   There is pain in the left ear. This is a recurrent problem. The problem occurs constantly. The problem has been gradually worsening. There has been no fever. The pain is at a severity of 3/10. The pain is mild. Pertinent negatives include no abdominal pain, coughing, diarrhea, ear discharge, headaches, hearing loss, neck pain, rash, rhinorrhea, sore throat or vomiting. Associated symptoms comments: Mild congestion . She has tried nothing for the symptoms.       HENT:  Positive for ear pain. Negative for ear discharge, hearing loss and sore throat.    Neck: Negative for neck pain.   Respiratory:  Negative for cough.    Gastrointestinal:  Negative for abdominal pain, vomiting and diarrhea.   Skin:  Negative for rash.   Neurological:  Negative for headaches.      Objective:     Physical Exam   Constitutional: She is oriented to person, place, and time. She appears well-developed. She is cooperative.  Non-toxic appearance. No distress.   HENT:   Head: Normocephalic and atraumatic.   Ears:   Right Ear: External ear and ear canal normal. A middle ear effusion is present.   Left Ear: External ear and ear canal normal. There is tenderness. A middle ear effusion is present.   Nose: No nasal deformity. No epistaxis. Right sinus exhibits no maxillary sinus tenderness and no frontal sinus tenderness. Left sinus exhibits no maxillary sinus tenderness and no frontal sinus tenderness.   Mouth/Throat: Uvula is " midline, oropharynx is clear and moist and mucous membranes are normal. No trismus in the jaw. Normal dentition. No uvula swelling. No oropharyngeal exudate, posterior oropharyngeal edema or posterior oropharyngeal erythema.   Eyes: Conjunctivae and lids are normal. No scleral icterus.   Neck: Trachea normal and phonation normal. Neck supple. No edema present. No erythema present. No neck rigidity present.   Cardiovascular: Normal rate, regular rhythm, normal heart sounds and normal pulses.   Pulmonary/Chest: Effort normal and breath sounds normal. No respiratory distress. She has no decreased breath sounds. She has no rhonchi.   Abdominal: Normal appearance.   Musculoskeletal: Normal range of motion.         General: No deformity. Normal range of motion.   Neurological: She is alert and oriented to person, place, and time. She exhibits normal muscle tone. Coordination normal.   Skin: Skin is warm, dry, intact, not diaphoretic and not pale.   Psychiatric: Her speech is normal and behavior is normal. Judgment and thought content normal.   Nursing note and vitals reviewed.      Assessment:     1. Left otitis media, unspecified otitis media type        Plan:       Left otitis media, unspecified otitis media type  -     amoxicillin-clavulanate 875-125mg (AUGMENTIN) 875-125 mg per tablet; Take 1 tablet by mouth 2 (two) times daily. for 7 days  Dispense: 14 tablet; Refill: 0

## 2025-06-23 ENCOUNTER — TELEPHONE (OUTPATIENT)
Dept: FAMILY MEDICINE | Facility: CLINIC | Age: 35
End: 2025-06-23
Payer: MEDICAID

## 2025-06-23 ENCOUNTER — TELEPHONE (OUTPATIENT)
Dept: OTOLARYNGOLOGY | Facility: CLINIC | Age: 35
End: 2025-06-23
Payer: MEDICAID

## 2025-06-23 NOTE — TELEPHONE ENCOUNTER
Spoke to pt who states she needed to come in for chronic ear infections. Appt scheduled for 06/25/25 at 2:20. Voiced understanding.

## 2025-06-23 NOTE — TELEPHONE ENCOUNTER
Copied from CRM #[id number]. Topic: Appointments - Appointment Access  >> Jun 23, 2025  8:18 AM Karolyn Turpin wrote:  .Type: Patient  Requesting Call Back      Who Called: Alisia    What is this regarding?: Appointment     Would the patient rather a call back or a response via [facility]?  Call Back    Best Call Back Number: .[phone]    Additional Information:  Patient call in regard to scheduling an Urgent Care Follow Up Visit for Ear Infection. No appointments showing up for me to schedule. Patient states the Medication she was prescribed is not working and she wants to see her PCP. Please Call Back

## 2025-06-25 ENCOUNTER — OFFICE VISIT (OUTPATIENT)
Dept: OTOLARYNGOLOGY | Facility: CLINIC | Age: 35
End: 2025-06-25
Payer: MEDICAID

## 2025-06-25 VITALS — BODY MASS INDEX: 38.2 KG/M2 | HEIGHT: 65 IN | WEIGHT: 229.25 LBS

## 2025-06-25 DIAGNOSIS — M26.623 BILATERAL TEMPOROMANDIBULAR JOINT PAIN: ICD-10-CM

## 2025-06-25 DIAGNOSIS — H92.03 OTALGIA OF BOTH EARS: Primary | ICD-10-CM

## 2025-06-25 PROCEDURE — 3008F BODY MASS INDEX DOCD: CPT | Mod: CPTII,,, | Performed by: PHYSICIAN ASSISTANT

## 2025-06-25 PROCEDURE — 99999 PR PBB SHADOW E&M-EST. PATIENT-LVL III: CPT | Mod: PBBFAC,,, | Performed by: PHYSICIAN ASSISTANT

## 2025-06-25 PROCEDURE — 99213 OFFICE O/P EST LOW 20 MIN: CPT | Mod: PBBFAC | Performed by: PHYSICIAN ASSISTANT

## 2025-06-25 PROCEDURE — 99213 OFFICE O/P EST LOW 20 MIN: CPT | Mod: S$PBB,,, | Performed by: PHYSICIAN ASSISTANT

## 2025-06-25 PROCEDURE — 1159F MED LIST DOCD IN RCRD: CPT | Mod: CPTII,,, | Performed by: PHYSICIAN ASSISTANT

## 2025-07-02 ENCOUNTER — LAB VISIT (OUTPATIENT)
Dept: LAB | Facility: HOSPITAL | Age: 35
End: 2025-07-02
Attending: INTERNAL MEDICINE
Payer: MEDICAID

## 2025-07-02 DIAGNOSIS — M35.00 SJOGREN'S SYNDROME, WITH UNSPECIFIED ORGAN INVOLVEMENT: ICD-10-CM

## 2025-07-02 DIAGNOSIS — Z79.899 DRUG-INDUCED IMMUNODEFICIENCY: ICD-10-CM

## 2025-07-02 DIAGNOSIS — M32.9 SYSTEMIC LUPUS ERYTHEMATOSUS ARTHRITIS: ICD-10-CM

## 2025-07-02 DIAGNOSIS — M32.14 SLE GLOMERULONEPHRITIS SYNDROME, WHO CLASS V: ICD-10-CM

## 2025-07-02 DIAGNOSIS — D84.821 DRUG-INDUCED IMMUNODEFICIENCY: ICD-10-CM

## 2025-07-02 DIAGNOSIS — Z51.81 ENCOUNTER FOR MEDICATION MONITORING: ICD-10-CM

## 2025-07-02 LAB
ABSOLUTE EOSINOPHIL (OHS): 0.05 K/UL
ABSOLUTE MONOCYTE (OHS): 0.15 K/UL (ref 0.3–1)
ABSOLUTE NEUTROPHIL COUNT (OHS): 4.94 K/UL (ref 1.8–7.7)
ALBUMIN SERPL BCP-MCNC: 3.1 G/DL (ref 3.5–5.2)
ALP SERPL-CCNC: 54 UNIT/L (ref 40–150)
ALT SERPL W/O P-5'-P-CCNC: 10 UNIT/L (ref 10–44)
ANION GAP (OHS): 7 MMOL/L (ref 8–16)
AST SERPL-CCNC: 16 UNIT/L (ref 11–45)
BASOPHILS # BLD AUTO: 0.01 K/UL
BASOPHILS NFR BLD AUTO: 0.2 %
BILIRUB SERPL-MCNC: 0.4 MG/DL (ref 0.1–1)
BUN SERPL-MCNC: 12 MG/DL (ref 6–20)
C3 SERPL-MCNC: 77 MG/DL (ref 50–180)
C4 COMPLEMENT (OHS): 18 MG/DL (ref 11–44)
CALCIUM SERPL-MCNC: 8.3 MG/DL (ref 8.7–10.5)
CHLORIDE SERPL-SCNC: 111 MMOL/L (ref 95–110)
CO2 SERPL-SCNC: 21 MMOL/L (ref 23–29)
CREAT SERPL-MCNC: 0.7 MG/DL (ref 0.5–1.4)
CRP SERPL-MCNC: 14.5 MG/L
ERYTHROCYTE [DISTWIDTH] IN BLOOD BY AUTOMATED COUNT: 14.4 % (ref 11.5–14.5)
ERYTHROCYTE [SEDIMENTATION RATE] IN BLOOD: 21 MM/HR
GFR SERPLBLD CREATININE-BSD FMLA CKD-EPI: >60 ML/MIN/1.73/M2
GLUCOSE SERPL-MCNC: 84 MG/DL (ref 70–110)
HCT VFR BLD AUTO: 34.8 % (ref 37–48.5)
HGB BLD-MCNC: 11.5 GM/DL (ref 12–16)
IMM GRANULOCYTES # BLD AUTO: 0.04 K/UL (ref 0–0.04)
IMM GRANULOCYTES NFR BLD AUTO: 0.7 % (ref 0–0.5)
LYMPHOCYTES # BLD AUTO: 0.72 K/UL (ref 1–4.8)
MCH RBC QN AUTO: 28.5 PG (ref 27–31)
MCHC RBC AUTO-ENTMCNC: 33 G/DL (ref 32–36)
MCV RBC AUTO: 86 FL (ref 82–98)
NUCLEATED RBC (/100WBC) (OHS): 0 /100 WBC
PLATELET # BLD AUTO: 248 K/UL (ref 150–450)
PMV BLD AUTO: 9.6 FL (ref 9.2–12.9)
POTASSIUM SERPL-SCNC: 4 MMOL/L (ref 3.5–5.1)
PROT SERPL-MCNC: 6.2 GM/DL (ref 6–8.4)
RBC # BLD AUTO: 4.03 M/UL (ref 4–5.4)
RELATIVE EOSINOPHIL (OHS): 0.8 %
RELATIVE LYMPHOCYTE (OHS): 12.2 % (ref 18–48)
RELATIVE MONOCYTE (OHS): 2.5 % (ref 4–15)
RELATIVE NEUTROPHIL (OHS): 83.6 % (ref 38–73)
SODIUM SERPL-SCNC: 139 MMOL/L (ref 136–145)
WBC # BLD AUTO: 5.91 K/UL (ref 3.9–12.7)

## 2025-07-02 PROCEDURE — 86225 DNA ANTIBODY NATIVE: CPT

## 2025-07-02 PROCEDURE — 80053 COMPREHEN METABOLIC PANEL: CPT

## 2025-07-02 PROCEDURE — 85651 RBC SED RATE NONAUTOMATED: CPT

## 2025-07-02 PROCEDURE — 86160 COMPLEMENT ANTIGEN: CPT

## 2025-07-02 PROCEDURE — 36415 COLL VENOUS BLD VENIPUNCTURE: CPT

## 2025-07-02 PROCEDURE — 86140 C-REACTIVE PROTEIN: CPT

## 2025-07-02 PROCEDURE — 85025 COMPLETE CBC W/AUTO DIFF WBC: CPT

## 2025-07-02 PROCEDURE — 86160 COMPLEMENT ANTIGEN: CPT | Mod: 59

## 2025-07-03 ENCOUNTER — PATIENT MESSAGE (OUTPATIENT)
Dept: FAMILY MEDICINE | Facility: CLINIC | Age: 35
End: 2025-07-03
Payer: MEDICAID

## 2025-07-03 ENCOUNTER — PATIENT MESSAGE (OUTPATIENT)
Dept: RHEUMATOLOGY | Facility: CLINIC | Age: 35
End: 2025-07-03
Payer: MEDICAID

## 2025-07-03 NOTE — DISCHARGE INSTRUCTIONS
Platelet counts are actually rising.  The initial lab appears to be a spurious reading.  I did discuss her case with hematology.  They recommended discharge home with close follow-up with your hematologist.  They will contact him in the morning to let him know of her condition.  Return as needed  
at least a 4/5 t/o

## 2025-07-07 LAB
DSDNA ANTIBODY (OHS): POSITIVE
DSDNA ANTIBODY TITER (OHS): ABNORMAL

## 2025-07-15 ENCOUNTER — INFUSION (OUTPATIENT)
Dept: INFUSION THERAPY | Facility: HOSPITAL | Age: 35
End: 2025-07-15
Attending: INTERNAL MEDICINE
Payer: MEDICAID

## 2025-07-15 VITALS
DIASTOLIC BLOOD PRESSURE: 85 MMHG | OXYGEN SATURATION: 99 % | TEMPERATURE: 98 F | RESPIRATION RATE: 16 BRPM | HEART RATE: 83 BPM | SYSTOLIC BLOOD PRESSURE: 124 MMHG

## 2025-07-15 DIAGNOSIS — M32.9 SYSTEMIC LUPUS ERYTHEMATOSUS ARTHRITIS: ICD-10-CM

## 2025-07-15 DIAGNOSIS — M32.9 SLE (SYSTEMIC LUPUS ERYTHEMATOSUS RELATED SYNDROME): Primary | ICD-10-CM

## 2025-07-15 PROCEDURE — 25000003 PHARM REV CODE 250: Performed by: INTERNAL MEDICINE

## 2025-07-15 PROCEDURE — 96365 THER/PROPH/DIAG IV INF INIT: CPT

## 2025-07-15 PROCEDURE — 63600175 PHARM REV CODE 636 W HCPCS: Mod: JZ,TB | Performed by: INTERNAL MEDICINE

## 2025-07-15 RX ORDER — HEPARIN 100 UNIT/ML
500 SYRINGE INTRAVENOUS
OUTPATIENT
Start: 2025-07-29

## 2025-07-15 RX ORDER — SODIUM CHLORIDE 0.9 % (FLUSH) 0.9 %
10 SYRINGE (ML) INJECTION
OUTPATIENT
Start: 2025-07-29

## 2025-07-15 RX ORDER — EPINEPHRINE 0.3 MG/.3ML
0.3 INJECTION SUBCUTANEOUS ONCE AS NEEDED
OUTPATIENT
Start: 2025-07-29

## 2025-07-15 RX ORDER — DIPHENHYDRAMINE HYDROCHLORIDE 50 MG/ML
50 INJECTION, SOLUTION INTRAMUSCULAR; INTRAVENOUS ONCE AS NEEDED
OUTPATIENT
Start: 2025-07-29

## 2025-07-15 RX ADMIN — ANIFROLUMAB 300 MG: 300 INJECTION, SOLUTION INTRAVENOUS at 03:07

## 2025-07-15 NOTE — PLAN OF CARE
Problem: Adult Inpatient Plan of Care  Goal: Plan of Care Review  Outcome: Ongoing  Flowsheets (Taken 7/15/2025 1528)  Plan of Care Reviewed With: patient  Goal: Patient-Specific Goal (Individualized)  Outcome: Ongoing  Flowsheets (Taken 7/15/2025 1528)  Individualized Care Needs: recline with pillow and blanket  Anxieties, Fears or Concerns: none today  Goal: Absence of Hospital-Acquired Illness or Injury  Outcome: Ongoing  Intervention: Prevent Infection  Flowsheets (Taken 7/15/2025 1528)  Infection Prevention:   equipment surfaces disinfected   hand hygiene promoted   personal protective equipment utilized   rest/sleep promoted  Goal: Optimal Comfort and Wellbeing  Outcome: Ongoing  Intervention: Provide Person-Centered Care  Flowsheets (Taken 7/15/2025 1528)  Trust Relationship/Rapport:   care explained   thoughts/feelings acknowledged   choices provided   emotional support provided   empathic listening provided   questions answered   questions encouraged   reassurance provided

## 2025-07-24 ENCOUNTER — OFFICE VISIT (OUTPATIENT)
Dept: FAMILY MEDICINE | Facility: CLINIC | Age: 35
End: 2025-07-24
Payer: MEDICAID

## 2025-07-24 VITALS
TEMPERATURE: 97 F | SYSTOLIC BLOOD PRESSURE: 138 MMHG | HEART RATE: 70 BPM | DIASTOLIC BLOOD PRESSURE: 80 MMHG | HEIGHT: 65 IN | OXYGEN SATURATION: 98 % | WEIGHT: 220.44 LBS | BODY MASS INDEX: 36.73 KG/M2

## 2025-07-24 DIAGNOSIS — M79.661 PAIN OF RIGHT CALF: Primary | ICD-10-CM

## 2025-07-24 DIAGNOSIS — M25.50 MULTIPLE JOINT PAIN: ICD-10-CM

## 2025-07-24 DIAGNOSIS — D64.9 ANEMIA, UNSPECIFIED TYPE: ICD-10-CM

## 2025-07-24 DIAGNOSIS — M32.9 SLE (SYSTEMIC LUPUS ERYTHEMATOSUS RELATED SYNDROME): Chronic | ICD-10-CM

## 2025-07-24 DIAGNOSIS — R53.83 FATIGUE, UNSPECIFIED TYPE: ICD-10-CM

## 2025-07-24 PROCEDURE — 99214 OFFICE O/P EST MOD 30 MIN: CPT | Mod: S$PBB,,, | Performed by: FAMILY MEDICINE

## 2025-07-24 PROCEDURE — 3079F DIAST BP 80-89 MM HG: CPT | Mod: CPTII,,, | Performed by: FAMILY MEDICINE

## 2025-07-24 PROCEDURE — 99213 OFFICE O/P EST LOW 20 MIN: CPT | Mod: PBBFAC,PO | Performed by: FAMILY MEDICINE

## 2025-07-24 PROCEDURE — G2211 COMPLEX E/M VISIT ADD ON: HCPCS | Mod: ,,, | Performed by: FAMILY MEDICINE

## 2025-07-24 PROCEDURE — 99999 PR PBB SHADOW E&M-EST. PATIENT-LVL III: CPT | Mod: PBBFAC,,, | Performed by: FAMILY MEDICINE

## 2025-07-24 PROCEDURE — 3008F BODY MASS INDEX DOCD: CPT | Mod: CPTII,,, | Performed by: FAMILY MEDICINE

## 2025-07-24 PROCEDURE — 1159F MED LIST DOCD IN RCRD: CPT | Mod: CPTII,,, | Performed by: FAMILY MEDICINE

## 2025-07-24 PROCEDURE — 3075F SYST BP GE 130 - 139MM HG: CPT | Mod: CPTII,,, | Performed by: FAMILY MEDICINE

## 2025-07-24 NOTE — PROGRESS NOTES
CHIEF COMPLAINT:  This is a 34-year-old female with multiple medical complaints.    SUBJECTIVE:  The patient has not been feeling well for at least one-week.  She complains of pain in right calf which is at times so severe that it causes her difficulty walking.  Pain is intermittent in nature and painful to touch.  Patient complains of swelling in bilateral feet and lower extremities.  She is taking daily prednisone and CellCept for SLE.  She complains of fatigue and persistent pain in her joints especially her fingers despite medication.  The patient complains of weight gain.  She complains of intermittent spontaneous drooping of her lower face when she is seated.  She denies unilateral facial drooping, weakness in limb, numbness, tingling, dysarthria, dysphagia, headache or dizziness.     The patient  has a history of resistant hypertension and lupus nephritis.  Her blood pressure today is 138/80.  The patient was diagnosed with SLE in October 2013. She is followed by Rheumatology for SLE and receives prednisone and CellCept.  Additionally, patient has Sjogren syndrome, anemia and leukopenia.  Patient takes Valtrex 1000 mg daily to suppress herpes infection.  She takes fluoxetine and Abilify per psychiatrist for mood disorder and reactive depression.  Patient reports regular menses.  Patient was noted to have suppressed TSH in May 2024.  The patient failed to have TSH repeated as ordered.    ROS:  GENERAL: Patient denies fever, chills, night sweats.  Patient denies weight loss. Patient denies anorexia, weakness or swollen glands.  Positive for weight gain and fatigue.  SKIN: Patient denies rash.  HEENT: Patient denies sore throat, ear pain, hearing loss, nasal congestion, or runny nose. Patient denies visual disturbance, eye irritation or discharge.  LUNGS: Patient denies cough, wheeze or hemoptysis.  CARDIOVASCULAR: Patient denies chest pain, shortness of breath, palpitations, or syncope.  Positive for lower  extremity swelling.  GI: Patient denies abdominal pain, nausea, vomiting, diarrhea, constipation, blood in stool or melena.  GENITOURINARY:  Patient denies dysuria, frequency, hematuria, nocturia, urgency or incontinence.  MUSCULOSKELETAL: Patient denies joint swelling, redness or warmth.  Positive for joint pain and calf pain.  NEUROLOGIC: Patient denies headache, dizziness, paresthesias, weakness in limb, dysarthria, dysphagia or abnormality of gait.  PSYCHIATRIC: Patient denies anxiety, depression, or memory loss.     OBJECTIVE:   GENERAL: Well-developed well-nourished obese black female alert and oriented x3, in no acute distress.  Memory, judgment and cognition without deficit.  Normal affect.  No hallucinations, delusions or flight of ideas.  SKIN: Clear without rash.  Normal color and tone.  HEENT: Eyes: Clear conjunctivae. No scleral icterus.  Pupils equal reactive to light and accommodation.  Extraocular movements intact.  No nystagmus.  Fundi not visualized.  Ears: Clear canals. Clear TMs.  Nose: Without congestion.  Pharynx: Without injection or exudates.  NECK: Supple, normal range of motion.  No masses, lymphadenopathy or enlarged thyroid.  No JVD.  Carotids 2+ and equal.  No bruits.  LUNGS: Clear to auscultation.  Normal respiratory effort.  CARDIOVASCULAR:  Regular rhythm, normal S1, S2 without murmur, gallop or rub.  BACK:  No CVA or spinal tenderness.  EXTREMITIES: Without cyanosis or clubbing.  Trace pedal edema.  Tenderness to palpation of right calf without palpable cord or nodule.  No erythema or warmth.  Normal range of motion in all extremities.  No joint effusion, erythema or warmth.  NEUROLOGIC:   Cranial nerves II through XII without deficit.  Motor strength equal bilaterally.  Sensation normal to touch.  Deep tendon reflexes 2+ and equal.  Gait without abnormality.  No tremor.  Negative cerebellar signs.  Negative Romberg.    ASSESSMENT:  1. Pain of right calf    2. Fatigue, unspecified  type    3. SLE (systemic lupus erythematosus related syndrome)    4. Multiple joint pain    5. Anemia, unspecified type      PLAN:  1. Doppler venous study right lower extremity.    2. Check CBC, TSH, iron studies and BNP.  3. Patient declines pain medication.    4. Follow-up once results reviewed.  5. Keep appointment with rheumatologist.        30 minutes of total time spent on the encounter, which includes face to face time and non-face to face time preparing to see the patient (eg, review of tests), Obtaining and/or reviewing separately obtained history, Documenting clinical information in the electronic or other health record, Independently interpreting results (not separately reported) and communicating results to the patient/family/caregiver, or Care coordination (not separately reported).     This note is generated with speech recognition software and is subject to transcription error and sound alike phrases that may be missed by proofreading.

## 2025-08-12 DIAGNOSIS — M32.9 SYSTEMIC LUPUS ERYTHEMATOSUS ARTHRITIS: ICD-10-CM

## 2025-08-12 DIAGNOSIS — M32.14 SLE GLOMERULONEPHRITIS SYNDROME, WHO CLASS V: ICD-10-CM

## 2025-08-13 RX ORDER — PREDNISONE 10 MG/1
TABLET ORAL
Qty: 81 TABLET | Refills: 0 | Status: SHIPPED | OUTPATIENT
Start: 2025-08-13 | End: 2025-10-26

## 2025-08-21 ENCOUNTER — INFUSION (OUTPATIENT)
Dept: INFUSION THERAPY | Facility: HOSPITAL | Age: 35
End: 2025-08-21
Attending: INTERNAL MEDICINE
Payer: MEDICAID

## 2025-08-21 VITALS
HEART RATE: 87 BPM | TEMPERATURE: 98 F | DIASTOLIC BLOOD PRESSURE: 100 MMHG | BODY MASS INDEX: 35.37 KG/M2 | SYSTOLIC BLOOD PRESSURE: 150 MMHG | RESPIRATION RATE: 18 BRPM | OXYGEN SATURATION: 99 % | WEIGHT: 212.5 LBS

## 2025-08-21 DIAGNOSIS — M32.9 SLE (SYSTEMIC LUPUS ERYTHEMATOSUS RELATED SYNDROME): Primary | ICD-10-CM

## 2025-08-21 DIAGNOSIS — M32.9 SYSTEMIC LUPUS ERYTHEMATOSUS ARTHRITIS: ICD-10-CM

## 2025-08-21 PROCEDURE — 63600175 PHARM REV CODE 636 W HCPCS: Mod: JZ,TB | Performed by: INTERNAL MEDICINE

## 2025-08-21 PROCEDURE — 25000003 PHARM REV CODE 250: Performed by: INTERNAL MEDICINE

## 2025-08-21 PROCEDURE — 96365 THER/PROPH/DIAG IV INF INIT: CPT

## 2025-08-21 RX ORDER — EPINEPHRINE 0.3 MG/.3ML
0.3 INJECTION SUBCUTANEOUS ONCE AS NEEDED
OUTPATIENT
Start: 2025-08-21 | End: 2037-01-17

## 2025-08-21 RX ORDER — DIPHENHYDRAMINE HYDROCHLORIDE 50 MG/ML
50 INJECTION, SOLUTION INTRAMUSCULAR; INTRAVENOUS ONCE AS NEEDED
OUTPATIENT
Start: 2025-08-21 | End: 2037-01-17

## 2025-08-21 RX ORDER — EPINEPHRINE 0.3 MG/.3ML
0.3 INJECTION SUBCUTANEOUS ONCE AS NEEDED
Status: DISCONTINUED | OUTPATIENT
Start: 2025-08-21 | End: 2025-08-21 | Stop reason: HOSPADM

## 2025-08-21 RX ORDER — SODIUM CHLORIDE 0.9 % (FLUSH) 0.9 %
10 SYRINGE (ML) INJECTION
OUTPATIENT
Start: 2025-08-21

## 2025-08-21 RX ORDER — DIPHENHYDRAMINE HYDROCHLORIDE 50 MG/ML
50 INJECTION, SOLUTION INTRAMUSCULAR; INTRAVENOUS ONCE AS NEEDED
Status: DISCONTINUED | OUTPATIENT
Start: 2025-08-21 | End: 2025-08-21 | Stop reason: HOSPADM

## 2025-08-21 RX ORDER — SODIUM CHLORIDE 0.9 % (FLUSH) 0.9 %
10 SYRINGE (ML) INJECTION
Status: DISCONTINUED | OUTPATIENT
Start: 2025-08-21 | End: 2025-08-21 | Stop reason: HOSPADM

## 2025-08-21 RX ORDER — HEPARIN 100 UNIT/ML
500 SYRINGE INTRAVENOUS
OUTPATIENT
Start: 2025-08-21

## 2025-08-21 RX ADMIN — ANIFROLUMAB 300 MG: 300 INJECTION, SOLUTION INTRAVENOUS at 03:08

## 2025-08-28 ENCOUNTER — PATIENT MESSAGE (OUTPATIENT)
Dept: RHEUMATOLOGY | Facility: CLINIC | Age: 35
End: 2025-08-28
Payer: MEDICAID

## (undated) DEVICE — TOWEL OR DISP STRL BLUE 4/PK

## (undated) DEVICE — SOL NS 1000CC

## (undated) DEVICE — TUBING SUCTION STRAIGHT .25X20

## (undated) DEVICE — COVER CAMERA OPERATING ROOM

## (undated) DEVICE — ELECTRODE BLADE INSULATED 1 IN

## (undated) DEVICE — TAPE MEDIPORE 3 X 10YD

## (undated) DEVICE — TRAY SKIN SCRUB WET PREMIUM

## (undated) DEVICE — GOWN POLY REINF BRTH SLV XL

## (undated) DEVICE — TAPE SILK 3IN

## (undated) DEVICE — CORD BIPOLAR ELECTROSURGICAL

## (undated) DEVICE — DRESSING COVER AQUACEL AG SURG

## (undated) DEVICE — GLOVE SURG BIOGEL LATEX SZ 7.5

## (undated) DEVICE — DRAPE ORTH SPLIT 77X108IN

## (undated) DEVICE — NDL HYPO 27G X 1 1/2

## (undated) DEVICE — COVER LIGHT HANDLE 80/CA

## (undated) DEVICE — SYR 10CC LUER LOCK

## (undated) DEVICE — PACK BASIC SETUP SC BR

## (undated) DEVICE — PAD ABD 8X10 STERILE

## (undated) DEVICE — MANIFOLD 4 PORT

## (undated) DEVICE — SKIN MARKER DEVON 160

## (undated) DEVICE — Device